# Patient Record
Sex: MALE | Race: WHITE | NOT HISPANIC OR LATINO | Employment: OTHER | URBAN - METROPOLITAN AREA
[De-identification: names, ages, dates, MRNs, and addresses within clinical notes are randomized per-mention and may not be internally consistent; named-entity substitution may affect disease eponyms.]

---

## 2017-01-16 ENCOUNTER — APPOINTMENT (EMERGENCY)
Dept: RADIOLOGY | Facility: HOSPITAL | Age: 72
End: 2017-01-16
Payer: MEDICARE

## 2017-01-16 ENCOUNTER — HOSPITAL ENCOUNTER (EMERGENCY)
Facility: HOSPITAL | Age: 72
Discharge: HOME | End: 2017-01-16
Attending: EMERGENCY MEDICINE | Admitting: EMERGENCY MEDICINE
Payer: MEDICARE

## 2017-01-16 VITALS
TEMPERATURE: 98.6 F | RESPIRATION RATE: 18 BRPM | HEIGHT: 76 IN | HEART RATE: 75 BPM | SYSTOLIC BLOOD PRESSURE: 184 MMHG | OXYGEN SATURATION: 97 % | BODY MASS INDEX: 26.18 KG/M2 | DIASTOLIC BLOOD PRESSURE: 108 MMHG | WEIGHT: 215 LBS

## 2017-01-16 DIAGNOSIS — S67.20XA: ICD-10-CM

## 2017-01-16 DIAGNOSIS — S62.617B OPEN DISPLACED FRACTURE OF PROXIMAL PHALANX OF LEFT LITTLE FINGER, INITIAL ENCOUNTER: ICD-10-CM

## 2017-01-16 DIAGNOSIS — S62.327B: Primary | ICD-10-CM

## 2017-01-16 DIAGNOSIS — S62.608B: ICD-10-CM

## 2017-01-16 PROCEDURE — 73130 X-RAY EXAM OF HAND: CPT

## 2017-01-16 PROCEDURE — 96365 THER/PROPH/DIAG IV INF INIT: CPT

## 2017-01-16 PROCEDURE — 73120 X-RAY EXAM OF HAND: CPT

## 2017-01-16 PROCEDURE — 99284 EMERGENCY DEPT VISIT MOD MDM: CPT

## 2017-01-16 RX ORDER — CEPHALEXIN 500 MG/1
500 CAPSULE ORAL 4 TIMES DAILY
Qty: 40 CAPSULE | Refills: 0 | Status: SHIPPED | OUTPATIENT
Start: 2017-01-16 | End: 2017-01-26

## 2017-01-16 RX ORDER — OXYCODONE HYDROCHLORIDE AND ACETAMINOPHEN 5; 325 MG/1; MG/1
1 TABLET ORAL EVERY 6 HOURS PRN
Qty: 12 TABLET | Refills: 0 | Status: SHIPPED | OUTPATIENT
Start: 2017-01-16 | End: 2017-01-19

## 2017-01-16 RX ORDER — BUPIVACAINE HYDROCHLORIDE 5 MG/ML
30 INJECTION, SOLUTION EPIDURAL; INTRACAUDAL ONCE
Status: COMPLETED | OUTPATIENT
Start: 2017-01-16 | End: 2017-01-16

## 2017-01-16 RX ORDER — LIDOCAINE HYDROCHLORIDE 10 MG/ML
10 INJECTION, SOLUTION EPIDURAL; INFILTRATION; INTRACAUDAL; PERINEURAL ONCE
Status: DISCONTINUED | OUTPATIENT
Start: 2017-01-16 | End: 2017-01-16 | Stop reason: HOSPADM

## 2017-01-16 RX ORDER — NAPROXEN 500 MG/1
500 TABLET ORAL 2 TIMES DAILY WITH MEALS
Qty: 14 TABLET | Refills: 0 | Status: SHIPPED | OUTPATIENT
Start: 2017-01-16 | End: 2017-01-23

## 2017-01-16 RX ADMIN — CEFAZOLIN SODIUM 2000 MG: 2 SOLUTION INTRAVENOUS at 18:55

## 2017-01-16 RX ADMIN — BUPIVACAINE HYDROCHLORIDE 30 ML: 5 INJECTION, SOLUTION EPIDURAL; INTRACAUDAL at 19:05

## 2017-01-19 ENCOUNTER — APPOINTMENT (OUTPATIENT)
Dept: LAB | Facility: CLINIC | Age: 72
End: 2017-01-19
Payer: MEDICARE

## 2017-01-19 ENCOUNTER — TRANSCRIBE ORDERS (OUTPATIENT)
Dept: LAB | Facility: CLINIC | Age: 72
End: 2017-01-19

## 2017-01-19 ENCOUNTER — ALLSCRIPTS OFFICE VISIT (OUTPATIENT)
Dept: OTHER | Facility: OTHER | Age: 72
End: 2017-01-19

## 2017-01-19 DIAGNOSIS — Z01.812 PRE-OPERATIVE LABORATORY EXAMINATION: ICD-10-CM

## 2017-01-19 DIAGNOSIS — Z01.812 PRE-OPERATIVE LABORATORY EXAMINATION: Primary | ICD-10-CM

## 2017-01-19 DIAGNOSIS — Z01.818 OTHER SPECIFIED PRE-OPERATIVE EXAMINATION: ICD-10-CM

## 2017-01-19 LAB
ANION GAP SERPL CALCULATED.3IONS-SCNC: 6 MMOL/L (ref 4–13)
BASOPHILS # BLD AUTO: 0.02 THOUSANDS/ΜL (ref 0–0.1)
BASOPHILS NFR BLD AUTO: 0 % (ref 0–1)
BUN SERPL-MCNC: 23 MG/DL (ref 5–25)
CALCIUM SERPL-MCNC: 8.7 MG/DL (ref 8.3–10.1)
CHLORIDE SERPL-SCNC: 103 MMOL/L (ref 100–108)
CO2 SERPL-SCNC: 30 MMOL/L (ref 21–32)
CREAT SERPL-MCNC: 0.9 MG/DL (ref 0.6–1.3)
EOSINOPHIL # BLD AUTO: 0.12 THOUSAND/ΜL (ref 0–0.61)
EOSINOPHIL NFR BLD AUTO: 2 % (ref 0–6)
ERYTHROCYTE [DISTWIDTH] IN BLOOD BY AUTOMATED COUNT: 13.8 % (ref 11.6–15.1)
GFR SERPL CREATININE-BSD FRML MDRD: >60 ML/MIN/1.73SQ M
GLUCOSE SERPL-MCNC: 82 MG/DL (ref 65–140)
HCT VFR BLD AUTO: 44.9 % (ref 36.5–49.3)
HGB BLD-MCNC: 15 G/DL (ref 12–17)
LYMPHOCYTES # BLD AUTO: 1.13 THOUSANDS/ΜL (ref 0.6–4.47)
LYMPHOCYTES NFR BLD AUTO: 18 % (ref 14–44)
MCH RBC QN AUTO: 29.2 PG (ref 26.8–34.3)
MCHC RBC AUTO-ENTMCNC: 33.4 G/DL (ref 31.4–37.4)
MCV RBC AUTO: 87 FL (ref 82–98)
MONOCYTES # BLD AUTO: 0.58 THOUSAND/ΜL (ref 0.17–1.22)
MONOCYTES NFR BLD AUTO: 9 % (ref 4–12)
NEUTROPHILS # BLD AUTO: 4.39 THOUSANDS/ΜL (ref 1.85–7.62)
NEUTS SEG NFR BLD AUTO: 71 % (ref 43–75)
PLATELET # BLD AUTO: 239 THOUSANDS/UL (ref 149–390)
PMV BLD AUTO: 9.3 FL (ref 8.9–12.7)
POTASSIUM SERPL-SCNC: 4.3 MMOL/L (ref 3.5–5.3)
RBC # BLD AUTO: 5.14 MILLION/UL (ref 3.88–5.62)
SODIUM SERPL-SCNC: 139 MMOL/L (ref 136–145)
WBC # BLD AUTO: 6.24 THOUSAND/UL (ref 4.31–10.16)

## 2017-01-19 PROCEDURE — 80048 BASIC METABOLIC PNL TOTAL CA: CPT

## 2017-01-19 PROCEDURE — 85025 COMPLETE CBC W/AUTO DIFF WBC: CPT

## 2017-01-19 PROCEDURE — 36415 COLL VENOUS BLD VENIPUNCTURE: CPT

## 2017-01-19 PROCEDURE — 93005 ELECTROCARDIOGRAM TRACING: CPT

## 2017-01-20 LAB
ATRIAL RATE: 63 BPM
P AXIS: 60 DEGREES
PR INTERVAL: 152 MS
QRS AXIS: 17 DEGREES
QRSD INTERVAL: 102 MS
QT INTERVAL: 426 MS
QTC INTERVAL: 435 MS
T WAVE AXIS: 35 DEGREES
VENTRICULAR RATE: 63 BPM

## 2017-01-24 RX ORDER — OXYCODONE HYDROCHLORIDE AND ACETAMINOPHEN 5; 325 MG/1; MG/1
1 TABLET ORAL
COMMUNITY

## 2017-01-25 ENCOUNTER — APPOINTMENT (OUTPATIENT)
Dept: RADIOLOGY | Facility: HOSPITAL | Age: 72
End: 2017-01-25
Payer: MEDICARE

## 2017-01-25 ENCOUNTER — ANESTHESIA (OUTPATIENT)
Dept: PERIOP | Facility: HOSPITAL | Age: 72
End: 2017-01-25
Payer: MEDICARE

## 2017-01-25 ENCOUNTER — ANESTHESIA EVENT (OUTPATIENT)
Dept: PERIOP | Facility: HOSPITAL | Age: 72
End: 2017-01-25
Payer: MEDICARE

## 2017-01-25 ENCOUNTER — HOSPITAL ENCOUNTER (OUTPATIENT)
Facility: HOSPITAL | Age: 72
Setting detail: OUTPATIENT SURGERY
Discharge: HOME | End: 2017-01-25
Attending: ORTHOPAEDIC SURGERY | Admitting: ORTHOPAEDIC SURGERY
Payer: MEDICARE

## 2017-01-25 VITALS
HEIGHT: 76 IN | WEIGHT: 210 LBS | DIASTOLIC BLOOD PRESSURE: 87 MMHG | TEMPERATURE: 97.9 F | SYSTOLIC BLOOD PRESSURE: 159 MMHG | OXYGEN SATURATION: 92 % | HEART RATE: 88 BPM | RESPIRATION RATE: 16 BRPM | BODY MASS INDEX: 25.57 KG/M2

## 2017-01-25 DIAGNOSIS — S62.619A FRACTURE OF PROXIMAL PHALANX OF DIGIT OF LEFT HAND: ICD-10-CM

## 2017-01-25 PROCEDURE — C1769 GUIDE WIRE: HCPCS | Performed by: ORTHOPAEDIC SURGERY

## 2017-01-25 PROCEDURE — C1713 ANCHOR/SCREW BN/BN,TIS/BN: HCPCS | Performed by: ORTHOPAEDIC SURGERY

## 2017-01-25 PROCEDURE — 73120 X-RAY EXAM OF HAND: CPT

## 2017-01-25 DEVICE — 2.4MM HEADLESS COMPRESSION SCREW-LONG THREAD 30MM: Type: IMPLANTABLE DEVICE | Site: FINGER | Status: FUNCTIONAL

## 2017-01-25 RX ORDER — BUPIVACAINE HYDROCHLORIDE 2.5 MG/ML
INJECTION, SOLUTION INFILTRATION; PERINEURAL AS NEEDED
Status: DISCONTINUED | OUTPATIENT
Start: 2017-01-25 | End: 2017-01-25 | Stop reason: HOSPADM

## 2017-01-25 RX ORDER — AMOXICILLIN AND CLAVULANATE POTASSIUM 875; 125 MG/1; MG/1
1 TABLET, FILM COATED ORAL 2 TIMES DAILY
Qty: 28 TABLET | Refills: 0 | Status: SHIPPED | OUTPATIENT
Start: 2017-01-25 | End: 2017-02-08

## 2017-01-25 RX ORDER — PROPOFOL 10 MG/ML
INJECTION, EMULSION INTRAVENOUS AS NEEDED
Status: DISCONTINUED | OUTPATIENT
Start: 2017-01-25 | End: 2017-01-25 | Stop reason: SURG

## 2017-01-25 RX ORDER — PROPOFOL 10 MG/ML
INJECTION, EMULSION INTRAVENOUS CONTINUOUS PRN
Status: DISCONTINUED | OUTPATIENT
Start: 2017-01-25 | End: 2017-01-25

## 2017-01-25 RX ORDER — SODIUM CHLORIDE, SODIUM LACTATE, POTASSIUM CHLORIDE, CALCIUM CHLORIDE 600; 310; 30; 20 MG/100ML; MG/100ML; MG/100ML; MG/100ML
50 INJECTION, SOLUTION INTRAVENOUS CONTINUOUS
Status: DISCONTINUED | OUTPATIENT
Start: 2017-01-25 | End: 2017-01-25 | Stop reason: HOSPADM

## 2017-01-25 RX ORDER — LIDOCAINE HYDROCHLORIDE 10 MG/ML
INJECTION, SOLUTION INFILTRATION; PERINEURAL AS NEEDED
Status: DISCONTINUED | OUTPATIENT
Start: 2017-01-25 | End: 2017-01-25 | Stop reason: SURG

## 2017-01-25 RX ORDER — ONDANSETRON 2 MG/ML
INJECTION INTRAMUSCULAR; INTRAVENOUS AS NEEDED
Status: DISCONTINUED | OUTPATIENT
Start: 2017-01-25 | End: 2017-01-25 | Stop reason: SURG

## 2017-01-25 RX ORDER — OXYCODONE HYDROCHLORIDE AND ACETAMINOPHEN 5; 325 MG/1; MG/1
TABLET ORAL
Qty: 30 TABLET | Refills: 0 | Status: SHIPPED | OUTPATIENT
Start: 2017-01-25

## 2017-01-25 RX ORDER — ONDANSETRON 2 MG/ML
4 INJECTION INTRAMUSCULAR; INTRAVENOUS EVERY 6 HOURS PRN
Status: DISCONTINUED | OUTPATIENT
Start: 2017-01-25 | End: 2017-01-25 | Stop reason: HOSPADM

## 2017-01-25 RX ADMIN — LIDOCAINE HYDROCHLORIDE 50 MG: 10 INJECTION, SOLUTION INFILTRATION; PERINEURAL at 13:02

## 2017-01-25 RX ADMIN — PROPOFOL 200 MG: 10 INJECTION, EMULSION INTRAVENOUS at 13:02

## 2017-01-25 RX ADMIN — ONDANSETRON 4 MG: 2 INJECTION INTRAMUSCULAR; INTRAVENOUS at 13:39

## 2017-01-25 RX ADMIN — DEXAMETHASONE SODIUM PHOSPHATE 10 MG: 10 INJECTION INTRAMUSCULAR; INTRAVENOUS at 13:15

## 2017-01-25 RX ADMIN — SODIUM CHLORIDE, SODIUM LACTATE, POTASSIUM CHLORIDE, AND CALCIUM CHLORIDE: .6; .31; .03; .02 INJECTION, SOLUTION INTRAVENOUS at 12:48

## 2017-01-25 RX ADMIN — CEFAZOLIN SODIUM 2000 MG: 2 SOLUTION INTRAVENOUS at 13:10

## 2017-02-14 ENCOUNTER — ALLSCRIPTS OFFICE VISIT (OUTPATIENT)
Dept: OTHER | Facility: OTHER | Age: 72
End: 2017-02-14

## 2017-02-14 ENCOUNTER — HOSPITAL ENCOUNTER (OUTPATIENT)
Dept: RADIOLOGY | Facility: CLINIC | Age: 72
Discharge: HOME | End: 2017-02-14
Payer: MEDICARE

## 2017-02-14 ENCOUNTER — APPOINTMENT (OUTPATIENT)
Dept: OCCUPATIONAL THERAPY | Facility: CLINIC | Age: 72
End: 2017-02-14
Payer: MEDICARE

## 2017-02-14 DIAGNOSIS — S62.617D DISPLACED FRACTURE OF PROXIMAL PHALANX OF LEFT LITTLE FINGER WITH ROUTINE HEALING: ICD-10-CM

## 2017-02-14 DIAGNOSIS — S62.645D: ICD-10-CM

## 2017-02-14 PROCEDURE — 73130 X-RAY EXAM OF HAND: CPT

## 2017-02-14 PROCEDURE — G8991 OTHER PT/OT GOAL STATUS: HCPCS

## 2017-02-14 PROCEDURE — G8990 OTHER PT/OT CURRENT STATUS: HCPCS

## 2017-02-14 PROCEDURE — L3906 WHO W/O JOINTS CF: HCPCS

## 2017-02-24 ENCOUNTER — GENERIC CONVERSION - ENCOUNTER (OUTPATIENT)
Dept: OTHER | Facility: OTHER | Age: 72
End: 2017-02-24

## 2017-03-16 ENCOUNTER — ALLSCRIPTS OFFICE VISIT (OUTPATIENT)
Dept: OTHER | Facility: OTHER | Age: 72
End: 2017-03-16

## 2017-03-16 ENCOUNTER — HOSPITAL ENCOUNTER (OUTPATIENT)
Dept: RADIOLOGY | Facility: CLINIC | Age: 72
Discharge: HOME | End: 2017-03-16
Payer: MEDICARE

## 2017-03-16 DIAGNOSIS — S62.645D: ICD-10-CM

## 2017-03-16 DIAGNOSIS — S62.617D DISPLACED FRACTURE OF PROXIMAL PHALANX OF LEFT LITTLE FINGER WITH ROUTINE HEALING: ICD-10-CM

## 2017-03-16 PROCEDURE — 73130 X-RAY EXAM OF HAND: CPT

## 2017-03-20 ENCOUNTER — ALLSCRIPTS OFFICE VISIT (OUTPATIENT)
Dept: OTHER | Facility: OTHER | Age: 72
End: 2017-03-20

## 2017-03-30 ENCOUNTER — HOSPITAL ENCOUNTER (OUTPATIENT)
Dept: RADIOLOGY | Facility: CLINIC | Age: 72
Discharge: HOME | End: 2017-03-30
Payer: MEDICARE

## 2017-03-30 ENCOUNTER — ALLSCRIPTS OFFICE VISIT (OUTPATIENT)
Dept: OTHER | Facility: OTHER | Age: 72
End: 2017-03-30

## 2017-03-30 DIAGNOSIS — S62.645D: ICD-10-CM

## 2017-03-30 DIAGNOSIS — L03.012 CELLULITIS OF FINGER OF LEFT HAND: ICD-10-CM

## 2017-03-30 PROCEDURE — 73130 X-RAY EXAM OF HAND: CPT

## 2017-04-10 ENCOUNTER — ALLSCRIPTS OFFICE VISIT (OUTPATIENT)
Dept: OTHER | Facility: OTHER | Age: 72
End: 2017-04-10

## 2017-05-04 ENCOUNTER — HOSPITAL ENCOUNTER (OUTPATIENT)
Dept: RADIOLOGY | Facility: CLINIC | Age: 72
Discharge: HOME | End: 2017-05-04
Payer: MEDICARE

## 2017-05-04 ENCOUNTER — ALLSCRIPTS OFFICE VISIT (OUTPATIENT)
Dept: OTHER | Facility: OTHER | Age: 72
End: 2017-05-04

## 2017-05-04 DIAGNOSIS — L03.012 CELLULITIS OF FINGER OF LEFT HAND: ICD-10-CM

## 2017-05-04 DIAGNOSIS — L98.499 NON-PRESSURE CHRONIC ULCER OF SKIN OF OTHER SITES WITH UNSPECIFIED SEVERITY (HCC): ICD-10-CM

## 2017-05-04 DIAGNOSIS — S62.617D DISPLACED FRACTURE OF PROXIMAL PHALANX OF LEFT LITTLE FINGER WITH ROUTINE HEALING: ICD-10-CM

## 2017-05-04 PROCEDURE — 73130 X-RAY EXAM OF HAND: CPT

## 2017-06-09 ENCOUNTER — GENERIC CONVERSION - ENCOUNTER (OUTPATIENT)
Dept: OTHER | Facility: OTHER | Age: 72
End: 2017-06-09

## 2017-07-06 ENCOUNTER — ALLSCRIPTS OFFICE VISIT (OUTPATIENT)
Dept: OTHER | Facility: OTHER | Age: 72
End: 2017-07-06

## 2017-07-06 ENCOUNTER — APPOINTMENT (OUTPATIENT)
Dept: RADIOLOGY | Facility: CLINIC | Age: 72
End: 2017-07-06
Payer: MEDICARE

## 2017-07-06 DIAGNOSIS — S62.617D DISPLACED FRACTURE OF PROXIMAL PHALANX OF LEFT LITTLE FINGER WITH ROUTINE HEALING: ICD-10-CM

## 2017-07-06 DIAGNOSIS — S62.645D: ICD-10-CM

## 2017-07-06 PROCEDURE — 73130 X-RAY EXAM OF HAND: CPT

## 2017-07-25 ENCOUNTER — ALLSCRIPTS OFFICE VISIT (OUTPATIENT)
Dept: OTHER | Facility: OTHER | Age: 72
End: 2017-07-25

## 2017-08-07 ENCOUNTER — TRANSCRIBE ORDERS (OUTPATIENT)
Dept: ADMINISTRATIVE | Facility: HOSPITAL | Age: 72
End: 2017-08-07

## 2017-08-07 DIAGNOSIS — M54.12 CERVICAL RADICULOPATHY: Primary | ICD-10-CM

## 2017-08-07 DIAGNOSIS — G56.21 LESION OF RIGHT ULNAR NERVE: ICD-10-CM

## 2017-08-07 DIAGNOSIS — G56.01 CARPAL TUNNEL SYNDROME ON RIGHT: ICD-10-CM

## 2017-08-07 DIAGNOSIS — G56.02 CARPAL TUNNEL SYNDROME ON LEFT: ICD-10-CM

## 2017-08-07 DIAGNOSIS — G56.22 LESION OF LEFT ULNAR NERVE: ICD-10-CM

## 2017-08-21 ENCOUNTER — OFFICE VISIT (OUTPATIENT)
Dept: RADIOLOGY | Facility: CLINIC | Age: 72
End: 2017-08-21
Payer: MEDICARE

## 2017-08-21 DIAGNOSIS — M54.12 CERVICAL RADICULOPATHY: ICD-10-CM

## 2017-08-21 PROCEDURE — 95910 NRV CNDJ TEST 7-8 STUDIES: CPT

## 2017-08-21 PROCEDURE — 95886 MUSC TEST DONE W/N TEST COMP: CPT

## 2018-01-10 NOTE — CONSULTS
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  The forefinger fracture was nondisplaced and was therefore managed conservatively  The fifth and the fracture was opened and displaced and therefore had ORIF  Surgery was uneventful  However, unfortunately, as soon as patient went home, he started work on his farm again  He had a splint and his 2 fingers and up her gloves on his hands  However, he does admit that his hands and fingers do get dirty, despite gloves  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  Not unexpectedly, patient developed a scab ulcer on his left fifth finger and cellulitis  2 weeks ago, he was placed on Bactrim by his orthopedic surgeon  He had modest improvement  Last week, antibiotic was changed to Augmentin  Patient states that he has absolutely no improvement Augmentin  Apparently, his fracture is also nonhealing, not unexpectedly  Patient denies fever, chills or systemic symptoms  Patient also tells me that he took additional sulfa medication that he had on the shelf for his cattle  He is not sure of the dosage of this exactly  At present, patient has numbness of his left fifth finger  Minimal pain  No fever or chills  Review of Systems  Complete-Male:   Constitutional: No fever or chills, feels well, no tiredness, no recent weight gain or weight loss  Eyes: No complaints of eye pain, no red eyes, no discharge from eyes, no itchy eyes  ENT: no complaints of earache, no hearing loss, no nosebleeds, no nasal discharge, no sore throat, no hoarseness  Cardiovascular: No complaints of slow heart rate, no fast heart rate, no chest pain, no palpitations, no leg claudication, no lower extremity     Respiratory: No complaints of shortness of breath, no wheezing, no cough, no SOB on exertion, no orthopnea or PND  Gastrointestinal: No complaints of abdominal pain, no constipation, no nausea or vomiting, no diarrhea or bloody stools  Genitourinary: No complaints of dysuria, no incontinence, no hesitancy, no nocturia, no genital lesion, no testicular pain  Musculoskeletal: Left fifth finger swelling with numbness  Integumentary: Scab ulcer on the left fifth finger  Neurological: No compliants of headache, no confusion, no convulsions, no numbness or tingling, no dizziness or fainting, no limb weakness, no difficulty walking  Psychiatric: Is not suicidal, no sleep disturbances, no anxiety or depression, no change in personality, no emotional problems  Endocrine: No complaints of proptosis, no hot flashes, no muscle weakness, no erectile dysfunction, no deepening of the voice, no feelings of weakness  Hematologic/Lymphatic: No complaints of swollen glands, no swollen glands in the neck, does not bleed easily, no easy bruising  ROS Reviewed:   ROS reviewed  Active Problems    1  Cellulitis of finger of left hand (681 00) (L03 012)   2  Closed nondisplaced fracture of proximal phalanx of left ring finger with routine healing   (V54 19) (S62 225D)   3  COPD (chronic obstructive pulmonary disease) (496) (J44 9)   4  Open displaced fracture of proximal phalanx of left little finger with routine healing   (V54 19) (S62 617D)    Family History    1  Family history of    2  Family history of myocardial infarction (V17 3) (Z82 49)    3  Family history of Colon cancer   4  Family history of    5  Family history of type 2 diabetes mellitus (V18 0) (Z83 3)    Social History    · Former smoker (V15 82) (K27 495)    Current Meds   1  Amoxicillin-Pot Clavulanate 875-125 MG Oral Tablet; TAKE 1 TABLET EVERY 12 HOURS   WITH MEALS UNTIL GONE;   Therapy: [id number] to (Xiang Gonzalez)  Requested for: 90RFI4589; Last   Rx:2017 Ordered   2   Cephalexin 500 MG Oral Capsule Recorded   3  Naproxen 500 MG Oral Tablet; TAKE 1 TABLET BY MOUTH EVERY 12 HOURS AS   NEEDED; Therapy: 39MKE3652 to (Last Rx:16Mar2017)  Requested for: 30AYR0636 Ordered   4  Naproxen 500 MG Oral Tablet; TAKE 1 TABLET EVERY 12 HOURS AS NEEDED; Therapy: (Recorded:20Mar2017) to Recorded   5  Oxycodone-Acetaminophen 5-325 MG Oral Tablet Recorded   6  Sulfamethoxazole-Trimethoprim 800-160 MG Oral Tablet; TAKE 1 TABLET TWICE DAILY   UNTIL FINISHED; Therapy: 61ACL6323 to (Evaluate:17Mar2017)  Requested for: 19QMV8681; Last   Rx:10Mar2017 Ordered   7  Sulfamethoxazole-Trimethoprim 800-160 MG Oral Tablet; TAKE 2 TABLET Daily at   lunchtime (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); Therapy: (Recorded:20Mar2017) to Recorded    Allergies    1  No Known Drug Allergies    Vitals  Signs   Recorded: 20Mar2017 02:16PM   Temperature: 97 3 F  Heart Rate: 88  Respiration: 16  Systolic: 110  Diastolic: 90  Height: 6 ft 4 in  Weight: 216 lb 6 4 oz  BMI Calculated: 26 34  BSA Calculated: 2 29    Physical Exam    Constitutional   General appearance: No acute distress, well appearing and well nourished  Eyes   Conjunctiva and lids: No swelling, erythema, or discharge  Pupils and irises: Equal, round and reactive to light  Ears, Nose, Mouth, and Throat   External inspection of ears and nose: Normal     Oropharynx: Normal with no erythema, edema, exudate or lesions  Pulmonary   Respiratory effort: No increased work of breathing or signs of respiratory distress  Auscultation of lungs: Clear to auscultation, equal breath sounds bilaterally, no wheezes, no rales, no rhonci  Cardiovascular   Palpation of heart: Normal PMI, no thrills  Auscultation of heart: Normal rate and rhythm, normal S1 and S2, without murmurs  Examination of extremities for edema and/or varicosities: Normal     Abdomen   Abdomen: Non-tender, no masses  Liver and spleen: No hepatomegaly or splenomegaly      Lymphatic   Palpation of lymph nodes in neck: No lymphadenopathy  Musculoskeletal   Inspection/palpation of joints, bones, and muscles: Abnormal   Left fifth finger with ulcer with scab  Finger is edematous with diffuse erythema  No warmth  No tenderness  No fluctuance  No drainage  Skin   Skin and subcutaneous tissue: Normal without rashes or lesions  Additional Exam:  Patient appears unkempt with extremely dirty hands  Assessment    1  Cellulitis of finger of left hand (681 00) (L03 012)   2  Closed nondisplaced fracture of proximal phalanx of left ring finger with routine healing   (V54 19) (S62 645D)   3  Finger ulcer (707 8) (L93 499)    Plan    1  LevoFLOXacin 500 MG Oral Tablet (Levaquin); TAKE 1 TABLET DAILY UNTIL   FINISHED   2  Sulfamethoxazole-Trimethoprim 800-160 MG Oral Tablet; TAKE 1 TABLET TWICE   DAILY UNTIL FINISHED   3  Follow-up visit in 2 weeks Evaluation and Treatment  Follow-up  Status: Hold For -   Scheduling  Requested for: 20Mar2017    Discussion/Summary  Discussion Summary: This is a 56-year-old former male, referred by his orthopedic surgeon for left fifth finger cellulitis status post recent ORIF  Patient has not responded to Bactrim and Augmentin previously  Patient has evidence of gross contamination of his hands from his daily work on the farm  1  Left fifth finger cellulitis  This is most likely secondary to open ulcer of his left finger  This is exacerbated by patient's very dirty work environment  Furthermore, he resumed work in this dirty environment immediately after surgery, prior to complete well-healing  In addition to the usual skin pathogens, we need to consider environmental pathogens also  Given lack of response to Augmentin, I would discontinue it  Given partial response to Bactrim, I will restarted  I will add Levaquin for coverage for possible environmental GNR   I doubt that any antibiotic change will work, unless patient removes himself from his dirty work environment until the ulcer in his finger heals  He clearly tells me that he will not do it because he does not have a choice  There is no one else to take care of his farm  I instructed patient to stop taking sulfa reserved for his cows  2  Left fifth finger ulcer  This is scabbed over but is probably not heal  This is the likely nidus for cellulitis  As stated above, unless the ulcer heals, cellulitis were not resolved  Patient needs to remove himself from his dirty work environment until the ulcer heals, as in above  3  Left fifth finger open fracture, status post ORIF  There is evidence of non-healing on x-ray  There is no evidence of osteomyelitis of the present time  Once again, for reasons above, it is very doubtful that his fraction will heal, unless he rests his finger, until healing is achieved  Patient is at risk for finger loss  Interestingly, patient states that he prefers to have his finger amputated and to go through another surgery  I encourage him to discuss it with his orthopedic surgeon  Follow-up with me in 2 weeks  Future Appointments    Date/Time Provider Specialty Site   03/30/2017 09:10 AM JON Phipps  Orthopedic Surgery [facility] [phone]     Signatures   Electronically signed by :  JON Verma ; Mar 20 2017  2:44PM EST                       (Author)

## 2018-01-12 VITALS
DIASTOLIC BLOOD PRESSURE: 108 MMHG | SYSTOLIC BLOOD PRESSURE: 202 MMHG | HEIGHT: 76 IN | HEART RATE: 65 BPM | BODY MASS INDEX: 26.13 KG/M2 | WEIGHT: 214.63 LBS

## 2018-01-12 VITALS
HEIGHT: 76 IN | DIASTOLIC BLOOD PRESSURE: 90 MMHG | BODY MASS INDEX: 26.35 KG/M2 | SYSTOLIC BLOOD PRESSURE: 170 MMHG | HEART RATE: 88 BPM | TEMPERATURE: 97.3 F | WEIGHT: 216.4 LBS | RESPIRATION RATE: 16 BRPM

## 2018-01-13 VITALS
HEIGHT: 76 IN | HEART RATE: 70 BPM | BODY MASS INDEX: 25.57 KG/M2 | WEIGHT: 210 LBS | DIASTOLIC BLOOD PRESSURE: 88 MMHG | SYSTOLIC BLOOD PRESSURE: 162 MMHG

## 2018-01-13 VITALS
SYSTOLIC BLOOD PRESSURE: 185 MMHG | HEART RATE: 73 BPM | HEIGHT: 76 IN | WEIGHT: 212.63 LBS | BODY MASS INDEX: 25.89 KG/M2 | DIASTOLIC BLOOD PRESSURE: 100 MMHG

## 2018-01-13 VITALS
HEART RATE: 66 BPM | BODY MASS INDEX: 25.57 KG/M2 | HEIGHT: 76 IN | SYSTOLIC BLOOD PRESSURE: 198 MMHG | DIASTOLIC BLOOD PRESSURE: 101 MMHG | WEIGHT: 210 LBS

## 2018-01-13 VITALS
DIASTOLIC BLOOD PRESSURE: 113 MMHG | HEART RATE: 83 BPM | SYSTOLIC BLOOD PRESSURE: 180 MMHG | HEIGHT: 76 IN | WEIGHT: 216.38 LBS | BODY MASS INDEX: 26.35 KG/M2

## 2018-01-14 VITALS
DIASTOLIC BLOOD PRESSURE: 85 MMHG | HEIGHT: 76 IN | SYSTOLIC BLOOD PRESSURE: 153 MMHG | HEART RATE: 80 BPM | BODY MASS INDEX: 26.58 KG/M2 | WEIGHT: 218.25 LBS

## 2018-01-14 VITALS
WEIGHT: 214 LBS | HEIGHT: 76 IN | HEART RATE: 87 BPM | SYSTOLIC BLOOD PRESSURE: 171 MMHG | DIASTOLIC BLOOD PRESSURE: 93 MMHG | BODY MASS INDEX: 26.06 KG/M2

## 2018-01-18 NOTE — PROGRESS NOTES
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  Review of Systems  Complete-Male:   Constitutional: No fever or chills, feels well, no tiredness, no recent weight gain or weight loss  Eyes: No complaints of eye pain, no red eyes, no discharge from eyes, no itchy eyes  ENT: no complaints of earache, no hearing loss, no nosebleeds, no nasal discharge, no sore throat, no hoarseness  Cardiovascular: No complaints of slow heart rate, no fast heart rate, no chest pain, no palpitations, no leg claudication, no lower extremity  Respiratory: No complaints of shortness of breath, no wheezing, no cough, no SOB on exertion, no orthopnea or PND  Gastrointestinal: No complaints of abdominal pain, no constipation, no nausea or vomiting, no diarrhea or bloody stools  Genitourinary: No complaints of dysuria, no incontinence, no hesitancy, no nocturia, no genital lesion, no testicular pain  Musculoskeletal: Left fifth finger pain improved  , but as noted in HPI  Integumentary: No complaints of skin rash or skin lesions, no itching, no skin wound, no dry skin  Neurological: No compliants of headache, no confusion, no convulsions, no numbness or tingling, no dizziness or fainting, no limb weakness, no difficulty walking  Psychiatric: Is not suicidal, no sleep disturbances, no anxiety or depression, no change in personality, no emotional problems  Endocrine: No complaints of proptosis, no hot flashes, no muscle weakness, no erectile dysfunction, no deepening of the voice, no feelings of weakness     Hematologic/Lymphatic: No complaints of swollen glands, no swollen glands in the neck, does not bleed easily, no easy bruising  ROS Reviewed:   ROS reviewed  Active Problems    1  Cellulitis of finger of left hand (681 00) (L03 012)   2  Closed nondisplaced fracture of proximal phalanx of left ring finger with routine healing   (V54 19) (S62 645D)   3  COPD (chronic obstructive pulmonary disease) (496) (J44 9)   4  Finger ulcer (707 8) (L98 499)   5  Open displaced fracture of proximal phalanx of left little finger with routine healing   (V54 19) (S62 617D)    Family History    1  Family history of    2  Family history of myocardial infarction (V17 3) (Z82 49)    3  Family history of Colon cancer   4  Family history of    5  Family history of type 2 diabetes mellitus (V18 0) (Z83 3)    Social History    · Former smoker (V15 82) (B79 951)    Current Meds   1  LevoFLOXacin 500 MG Oral Tablet; TAKE 1 TABLET DAILY UNTIL FINISHED; Therapy: 54AIH2892 to (Evaluate:2017); Last Rx:2017 Ordered   2  Naproxen 500 MG Oral Tablet; TAKE 1 TABLET EVERY 12 HOURS AS NEEDED; Therapy: (Recorded:2017) to Recorded   3  Oxycodone-Acetaminophen 5-325 MG Oral Tablet Recorded   4  Sulfamethoxazole-Trimethoprim 800-160 MG Oral Tablet; TAKE 1 TABLET TWICE DAILY   UNTIL FINISHED; Therapy: 84OOG5821 to (Evaluate:2017)  Requested for: 37DHP6268; Last   Rx:2017 Ordered   5  Sulfamethoxazole-Trimethoprim 800-160 MG Oral Tablet; TAKE 1 TABLET TWICE DAILY   UNTIL FINISHED; Therapy: 15MII1585 to (Evaluate:2017); Last Rx:2017 Ordered   6  Sulfamethoxazole-Trimethoprim 800-160 MG Oral Tablet; TAKE 2 TABLET Daily at   lunchtime (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); Therapy: (Recorded:2017) to Recorded    Allergies    1   No Known Drug Allergies    Vitals  Signs   Recorded: 2017 02:06PM   Temperature: 98 F  Heart Rate: 98  Respiration: 18  Systolic: 991  Diastolic: 88  Height: 6 ft 4 in  Weight: 214 lb 9 6 oz  BMI Calculated: 26 12  BSA Calculated: 2 28    Physical Exam    Constitutional   General appearance: No acute distress, well appearing and well nourished  Eyes   Conjunctiva and lids: No swelling, erythema, or discharge  Ears, Nose, Mouth, and Throat   External inspection of ears and nose: Normal     Oropharynx: Normal with no erythema, edema, exudate or lesions  Pulmonary   Respiratory effort: No increased work of breathing or signs of respiratory distress  Auscultation of lungs: Clear to auscultation, equal breath sounds bilaterally, no wheezes, no rales, no rhonci  Cardiovascular   Palpation of heart: Normal PMI, no thrills  Auscultation of heart: Normal rate and rhythm, normal S1 and S2, without murmurs  Examination of extremities for edema and/or varicosities: Normal     Abdomen   Abdomen: Non-tender, no masses  Liver and spleen: No hepatomegaly or splenomegaly  Lymphatic   Palpation of lymph nodes in neck: No lymphadenopathy  Musculoskeletal   Inspection/palpation of joints, bones, and muscles: Abnormal   Left fifth finger with decreased edema/erythema/tenderness  Also healed with scab  Skin   Skin and subcutaneous tissue: Normal without rashes or lesions  Psychiatric   Orientation to person, place and time: Normal          Assessment    1  Cellulitis of finger of left hand (681 00) (L03 012)   2  Closed nondisplaced fracture of proximal phalanx of left ring finger with routine healing   (V54 19) (S62 945D)   3  Finger ulcer (707 8) (L91 619)    Plan    1  Sulfamethoxazole-Trimethoprim 800-160 MG Oral Tablet   2  Sulfamethoxazole-Trimethoprim 800-160 MG Oral Tablet   3  LevoFLOXacin 500 MG Oral Tablet; TAKE 1 TABLET DAILY UNTIL FINISHED    4  Sulfamethoxazole-Trimethoprim 800-160 MG Oral Tablet; TAKE 1 TABLET TWICE   DAILY WITH FOOD    Discussion/Summary  Discussion Summary:   1  Left fifth finger cellulitis  This is resolving  Patient is systemically well  I will have him stay on Levaquin/Bactrim for one more week   Cellulitis we'll continue to improve only slowly as long as the patient does not rest his finger  High risk of relapse, given constant exposure to dirty environment with open ulcer  Patient is at risk for loss of finger  He is aware of this  2  Left fifth finger ulcer  This appears to be healing  3  Left fifth finger fracture, status post ORIF  Follow-up with trevor rodriguez  Follow-up with orthopedic surgeon as scheduled  Future Appointments    Date/Time Provider Specialty Site   05/04/2017 08:50 AM JON Sargent  Orthopedic Surgery [facility] [address]     Signatures   Electronically signed by :  JON Escalera ; Apr 10 2017  2:19PM EST                       (Author)

## 2018-01-22 VITALS
TEMPERATURE: 98 F | HEIGHT: 76 IN | BODY MASS INDEX: 26.13 KG/M2 | RESPIRATION RATE: 18 BRPM | HEART RATE: 98 BPM | DIASTOLIC BLOOD PRESSURE: 88 MMHG | WEIGHT: 214.6 LBS | SYSTOLIC BLOOD PRESSURE: 146 MMHG

## 2021-06-19 ENCOUNTER — OFFICE VISIT (OUTPATIENT)
Dept: URGENT CARE | Facility: CLINIC | Age: 76
End: 2021-06-19
Payer: COMMERCIAL

## 2021-06-19 VITALS
WEIGHT: 185 LBS | RESPIRATION RATE: 16 BRPM | HEIGHT: 76 IN | DIASTOLIC BLOOD PRESSURE: 86 MMHG | OXYGEN SATURATION: 97 % | SYSTOLIC BLOOD PRESSURE: 130 MMHG | TEMPERATURE: 97.6 F | BODY MASS INDEX: 22.53 KG/M2 | HEART RATE: 82 BPM

## 2021-06-19 DIAGNOSIS — A69.20 ERYTHEMA MIGRANS (LYME DISEASE): Primary | ICD-10-CM

## 2021-06-19 PROCEDURE — G0382 LEV 3 HOSP TYPE B ED VISIT: HCPCS | Performed by: PHYSICIAN ASSISTANT

## 2021-06-19 RX ORDER — OLMESARTAN MEDOXOMIL 40 MG/1
40 TABLET ORAL DAILY
COMMUNITY
Start: 2021-05-14

## 2021-06-19 RX ORDER — DOXYCYCLINE 100 MG/1
100 TABLET ORAL 2 TIMES DAILY
Qty: 20 TABLET | Refills: 0 | Status: SHIPPED | OUTPATIENT
Start: 2021-06-19 | End: 2021-06-29

## 2021-06-19 RX ORDER — BUDESONIDE AND FORMOTEROL FUMARATE DIHYDRATE 80; 4.5 UG/1; UG/1
2 AEROSOL RESPIRATORY (INHALATION) 2 TIMES DAILY
COMMUNITY

## 2021-06-19 RX ORDER — FOLIC ACID 1 MG/1
TABLET ORAL
COMMUNITY
Start: 2021-04-12

## 2021-06-19 RX ORDER — TIOTROPIUM BROMIDE INHALATION SPRAY 1.56 UG/1
SPRAY, METERED RESPIRATORY (INHALATION)
COMMUNITY

## 2021-06-19 RX ORDER — ALBUTEROL SULFATE 90 UG/1
AEROSOL, METERED RESPIRATORY (INHALATION)
COMMUNITY
Start: 2021-05-24

## 2021-06-19 RX ORDER — LOSARTAN POTASSIUM 100 MG/1
100 TABLET ORAL DAILY
COMMUNITY

## 2021-06-19 NOTE — PROGRESS NOTES
[employer] [facility]        NAME: Eloy Jackson is a 70 y o  male  : 1945    MRN: [id number]  DATE: 2021  TIME: 12:11 PM    Assessment and Plan   Erythema migrans (Lyme disease) [A69 20]  1  Erythema migrans (Lyme disease)  doxycycline (ADOXA) 100 MG tablet         Patient Instructions   Patient Instructions   Start the doxy tomorrow   Follow up with PCP         Follow up with PCP in 3-5 days  Proceed to  ER if symptoms worsen  Chief Complaint     Chief Complaint   Patient presents with    Tick Removal     Pt states he had 4 tick bites this week  1 to top of R foot, 1 on L calf, 1 on L upper inner thigh, and 1 on R lower buttocks which pt states had a bullseye rash appear to it yesterday  History of Present Illness       The pt is a 79-year-old male presenting with erythema migraines to the right buttock  He reports finding 4 ticks this week and removing them all  Review of Systems   Review of Systems   Constitutional: Negative for activity change, appetite change, chills, diaphoresis and fever  HENT: Negative for congestion, rhinorrhea and sore throat  Respiratory: Negative for cough, chest tightness and shortness of breath  Cardiovascular: Negative for chest pain and palpitations  Gastrointestinal: Negative for abdominal pain, diarrhea, nausea and vomiting  Musculoskeletal: Negative for arthralgias and myalgias  Skin: Positive for rash  Negative for color change and pallor  Neurological: Negative for dizziness, weakness, numbness and headaches           Current Medications       Current Outpatient Medications:     albuterol (PROVENTIL HFA,VENTOLIN HFA) 90 mcg/act inhaler, INHALE 2 PUFFS BY MOUTH EVERY 4 HOURS AS NEEDED FOR BREATHING, Disp: , Rfl:     budesonide-formoterol (Symbicort) 80-4 5 MCG/ACT inhaler, Inhale 2 puffs 2 (two) times a day, Disp: , Rfl:     Cholecalciferol 50 MCG (2000) TABS, TAKE ONE TABLET BY MOUTH DAILY (FOR LOW VITAMIN D), Disp: , Rfl:     folic acid (FOLVITE) 1 mg tablet, TAKE ONE TABLET BY MOUTH EVERY DAY *FOLIC ACID SUPPLEMENT*, Disp: , Rfl:     methotrexate 2 5 mg tablet, Take by mouth, Disp: , Rfl:     olmesartan (BENICAR) 40 mg tablet, Take 40 mg by mouth daily, Disp: , Rfl:     tiotropium (Spiriva Respimat) 1 25 MCG/ACT AERS inhaler, Inhale, Disp: , Rfl:     Tiotropium Bromide-Olodaterol (STIOLTO RESPIMAT) 2 5-2 5 MCG/ACT AERS, Inhale 2 puffs every morning, Disp: , Rfl:     tiotropium-olodaterol (STIOLTO RESPIMAT) 2 5-2 5 MCG/ACT inhaler, INHALE 2 PUFFS BY MOUTH DAILY, Disp: , Rfl:     doxycycline (ADOXA) 100 MG tablet, Take 1 tablet (100 mg total) by mouth 2 (two) times a day for 10 days, Disp: 20 tablet, Rfl: 0    losartan (COZAAR) 100 MG tablet, Take 100 mg by mouth daily, Disp: , Rfl:     naproxen (NAPROSYN) 500 mg tablet, Take 1 tablet by mouth 2 (two) times a day with meals for 7 days, Disp: 14 tablet, Rfl: 0    oxyCODONE-acetaminophen (PERCOCET) 5-325 mg per tablet, Take 1 tablet by mouth daily at bedtime as needed for moderate pain, Disp: , Rfl:     oxyCODONE-acetaminophen (PERCOCET) 5-325 mg per tablet, 1-2 tabs PO Q 4-5 hours prn pain, Disp: 30 tablet, Rfl: 0    Current Allergies     Allergies as of 06/19/2021    (No Known Allergies)            The following portions of the patient's history were reviewed and updated as appropriate: allergies, current medications, past family history, past medical history, past social history, past surgical history and problem list      Past Medical History:   Diagnosis Date    COPD (chronic obstructive pulmonary disease) ([facility] [address] )     Crushing injury of finger, left     Infectious viral hepatitis        Past Surgical History:   Procedure Laterality Date    SC OPEN TX PHALANGEAL SHAFT FRACTURE PROX/MIDDLE EA Left 1/25/2017    Procedure: ORIF LEFT SMALL FINGER FRACTURE;  Surgeon: Jeni Buck MD;  Location: [facility];  Service: Orthopedics       History reviewed   No pertinent family history  Medications have been verified  Objective   /86   Pulse 82   Temp 97 6 °F (36 4 °C) (Temporal)   Resp 16   Ht 6' 4" (1 93 m)   Wt 83 9 kg (185 lb)   SpO2 97%   BMI 22 52 kg/m²        Physical Exam     Physical Exam  Vitals reviewed  Constitutional:       General: He is not in acute distress  Appearance: Normal appearance  He is normal weight  He is not ill-appearing, toxic-appearing or diaphoretic  HENT:      Head: Normocephalic and atraumatic  Cardiovascular:      Rate and Rhythm: Normal rate and regular rhythm  Heart sounds: Normal heart sounds  No murmur heard  No friction rub  No gallop  Pulmonary:      Effort: Pulmonary effort is normal  No respiratory distress  Breath sounds: Normal breath sounds  No stridor  No wheezing, rhonchi or rales  Chest:      Chest wall: No tenderness  Abdominal:      General: Abdomen is flat  Bowel sounds are normal  There is no distension  Palpations: Abdomen is soft  Tenderness: There is no abdominal tenderness  There is no guarding  Musculoskeletal:      Cervical back: Normal range of motion  Lymphadenopathy:      Cervical: No cervical adenopathy  Skin:     General: Skin is warm and dry  Capillary Refill: Capillary refill takes less than 2 seconds  Findings: Rash present  Comments: EM to right buttock   Neurological:      Mental Status: He is alert

## 2023-12-04 ENCOUNTER — APPOINTMENT (EMERGENCY)
Dept: CT IMAGING | Facility: HOSPITAL | Age: 78
DRG: 190 | End: 2023-12-04
Payer: COMMERCIAL

## 2023-12-04 ENCOUNTER — HOSPITAL ENCOUNTER (INPATIENT)
Facility: HOSPITAL | Age: 78
LOS: 12 days | Discharge: HOME | DRG: 190 | End: 2023-12-17
Attending: STUDENT IN AN ORGANIZED HEALTH CARE EDUCATION/TRAINING PROGRAM | Admitting: INTERNAL MEDICINE
Payer: COMMERCIAL

## 2023-12-04 ENCOUNTER — APPOINTMENT (EMERGENCY)
Dept: RADIOLOGY | Facility: HOSPITAL | Age: 78
DRG: 190 | End: 2023-12-04
Payer: COMMERCIAL

## 2023-12-04 DIAGNOSIS — Z86.73 HISTORY OF STROKE: ICD-10-CM

## 2023-12-04 DIAGNOSIS — S61.209A AVULSION OF FINGER, INITIAL ENCOUNTER: ICD-10-CM

## 2023-12-04 DIAGNOSIS — R06.00 DYSPNEA: ICD-10-CM

## 2023-12-04 DIAGNOSIS — J44.1 CHRONIC OBSTRUCTIVE PULMONARY DISEASE WITH ACUTE EXACERBATION (HCC): ICD-10-CM

## 2023-12-04 DIAGNOSIS — M70.71 BURSITIS OF RIGHT HIP: ICD-10-CM

## 2023-12-04 DIAGNOSIS — R06.09 DYSPNEA ON EXERTION: ICD-10-CM

## 2023-12-04 DIAGNOSIS — J44.1 COPD EXACERBATION (HCC): Primary | ICD-10-CM

## 2023-12-04 DIAGNOSIS — J18.9 PNEUMONIA: ICD-10-CM

## 2023-12-04 DIAGNOSIS — R10.32 LLQ ABDOMINAL PAIN: ICD-10-CM

## 2023-12-04 LAB
2HR DELTA HS TROPONIN: 0 NG/L
ALBUMIN SERPL BCP-MCNC: 3.9 G/DL (ref 3.5–5)
ALP SERPL-CCNC: 61 U/L (ref 34–104)
ALT SERPL W P-5'-P-CCNC: 31 U/L (ref 7–52)
ANION GAP SERPL CALCULATED.3IONS-SCNC: 7 MMOL/L
AST SERPL W P-5'-P-CCNC: 34 U/L (ref 13–39)
ATRIAL RATE: 97 BPM
BACTERIA UR QL AUTO: ABNORMAL /HPF
BASOPHILS # BLD AUTO: 0 THOUSANDS/ÂΜL (ref 0–0.1)
BASOPHILS NFR BLD AUTO: 0 % (ref 0–1)
BILIRUB SERPL-MCNC: 0.61 MG/DL (ref 0.2–1)
BILIRUB UR QL STRIP: NEGATIVE
BNP SERPL-MCNC: 20 PG/ML (ref 0–100)
BUN SERPL-MCNC: 30 MG/DL (ref 5–25)
CALCIUM SERPL-MCNC: 8.7 MG/DL (ref 8.4–10.2)
CARDIAC TROPONIN I PNL SERPL HS: 10 NG/L
CARDIAC TROPONIN I PNL SERPL HS: 10 NG/L
CHLORIDE SERPL-SCNC: 103 MMOL/L (ref 96–108)
CLARITY UR: CLEAR
CO2 SERPL-SCNC: 23 MMOL/L (ref 21–32)
COLOR UR: YELLOW
CREAT SERPL-MCNC: 1.27 MG/DL (ref 0.6–1.3)
EOSINOPHIL # BLD AUTO: 0 THOUSAND/ÂΜL (ref 0–0.61)
EOSINOPHIL NFR BLD AUTO: 0 % (ref 0–6)
ERYTHROCYTE [DISTWIDTH] IN BLOOD BY AUTOMATED COUNT: 14.6 % (ref 11.6–15.1)
GFR SERPL CREATININE-BSD FRML MDRD: 54 ML/MIN/1.73SQ M
GLUCOSE SERPL-MCNC: 104 MG/DL (ref 65–140)
GLUCOSE UR STRIP-MCNC: NEGATIVE MG/DL
HCT VFR BLD AUTO: 37.1 % (ref 36.5–49.3)
HGB BLD-MCNC: 12.4 G/DL (ref 12–17)
HGB UR QL STRIP.AUTO: ABNORMAL
IMM GRANULOCYTES # BLD AUTO: 0 THOUSAND/UL (ref 0–0.2)
IMM GRANULOCYTES NFR BLD AUTO: 0 % (ref 0–2)
KETONES UR STRIP-MCNC: NEGATIVE MG/DL
LEUKOCYTE ESTERASE UR QL STRIP: NEGATIVE
LIPASE SERPL-CCNC: 32 U/L (ref 11–82)
LYMPHOCYTES # BLD AUTO: 0.32 THOUSANDS/ÂΜL (ref 0.6–4.47)
LYMPHOCYTES NFR BLD AUTO: 11 % (ref 14–44)
MCH RBC QN AUTO: 31.3 PG (ref 26.8–34.3)
MCHC RBC AUTO-ENTMCNC: 33.4 G/DL (ref 31.4–37.4)
MCV RBC AUTO: 94 FL (ref 82–98)
MONOCYTES # BLD AUTO: 0.2 THOUSAND/ÂΜL (ref 0.17–1.22)
MONOCYTES NFR BLD AUTO: 7 % (ref 4–12)
MUCOUS THREADS UR QL AUTO: ABNORMAL
NEUTROPHILS # BLD AUTO: 2.32 THOUSANDS/ÂΜL (ref 1.85–7.62)
NEUTS SEG NFR BLD AUTO: 82 % (ref 43–75)
NITRITE UR QL STRIP: NEGATIVE
NON-SQ EPI CELLS URNS QL MICRO: ABNORMAL /HPF
NRBC BLD AUTO-RTO: 0 /100 WBCS
P AXIS: 79 DEGREES
PH UR STRIP.AUTO: 5.5 [PH]
PLATELET # BLD AUTO: 111 THOUSANDS/UL (ref 149–390)
PMV BLD AUTO: 8.7 FL (ref 8.9–12.7)
POTASSIUM SERPL-SCNC: 4.4 MMOL/L (ref 3.5–5.3)
PR INTERVAL: 138 MS
PROT SERPL-MCNC: 6.9 G/DL (ref 6.4–8.4)
PROT UR STRIP-MCNC: ABNORMAL MG/DL
QRS AXIS: 78 DEGREES
QRSD INTERVAL: 88 MS
QT INTERVAL: 354 MS
QTC INTERVAL: 449 MS
RBC # BLD AUTO: 3.96 MILLION/UL (ref 3.88–5.62)
RBC #/AREA URNS AUTO: ABNORMAL /HPF
SODIUM SERPL-SCNC: 133 MMOL/L (ref 135–147)
SP GR UR STRIP.AUTO: 1.02 (ref 1–1.03)
T WAVE AXIS: 74 DEGREES
UROBILINOGEN UR STRIP-ACNC: <2 MG/DL
VENTRICULAR RATE: 97 BPM
WBC # BLD AUTO: 2.84 THOUSAND/UL (ref 4.31–10.16)
WBC #/AREA URNS AUTO: ABNORMAL /HPF

## 2023-12-04 PROCEDURE — 99285 EMERGENCY DEPT VISIT HI MDM: CPT

## 2023-12-04 PROCEDURE — 71045 X-RAY EXAM CHEST 1 VIEW: CPT

## 2023-12-04 PROCEDURE — 80053 COMPREHEN METABOLIC PANEL: CPT | Performed by: STUDENT IN AN ORGANIZED HEALTH CARE EDUCATION/TRAINING PROGRAM

## 2023-12-04 PROCEDURE — 96374 THER/PROPH/DIAG INJ IV PUSH: CPT

## 2023-12-04 PROCEDURE — 93005 ELECTROCARDIOGRAM TRACING: CPT

## 2023-12-04 PROCEDURE — 74177 CT ABD & PELVIS W/CONTRAST: CPT

## 2023-12-04 PROCEDURE — 99285 EMERGENCY DEPT VISIT HI MDM: CPT | Performed by: STUDENT IN AN ORGANIZED HEALTH CARE EDUCATION/TRAINING PROGRAM

## 2023-12-04 PROCEDURE — 85025 COMPLETE CBC W/AUTO DIFF WBC: CPT | Performed by: STUDENT IN AN ORGANIZED HEALTH CARE EDUCATION/TRAINING PROGRAM

## 2023-12-04 PROCEDURE — 84484 ASSAY OF TROPONIN QUANT: CPT | Performed by: STUDENT IN AN ORGANIZED HEALTH CARE EDUCATION/TRAINING PROGRAM

## 2023-12-04 PROCEDURE — 36415 COLL VENOUS BLD VENIPUNCTURE: CPT

## 2023-12-04 PROCEDURE — 83880 ASSAY OF NATRIURETIC PEPTIDE: CPT | Performed by: STUDENT IN AN ORGANIZED HEALTH CARE EDUCATION/TRAINING PROGRAM

## 2023-12-04 PROCEDURE — 81001 URINALYSIS AUTO W/SCOPE: CPT | Performed by: STUDENT IN AN ORGANIZED HEALTH CARE EDUCATION/TRAINING PROGRAM

## 2023-12-04 PROCEDURE — G1004 CDSM NDSC: HCPCS

## 2023-12-04 PROCEDURE — 94640 AIRWAY INHALATION TREATMENT: CPT

## 2023-12-04 PROCEDURE — 83690 ASSAY OF LIPASE: CPT | Performed by: STUDENT IN AN ORGANIZED HEALTH CARE EDUCATION/TRAINING PROGRAM

## 2023-12-04 PROCEDURE — 71275 CT ANGIOGRAPHY CHEST: CPT

## 2023-12-04 PROCEDURE — 84145 PROCALCITONIN (PCT): CPT | Performed by: NURSE PRACTITIONER

## 2023-12-04 RX ORDER — METHYLPREDNISOLONE SODIUM SUCCINATE 125 MG/2ML
125 INJECTION, POWDER, LYOPHILIZED, FOR SOLUTION INTRAMUSCULAR; INTRAVENOUS ONCE
Status: COMPLETED | OUTPATIENT
Start: 2023-12-04 | End: 2023-12-04

## 2023-12-04 RX ORDER — IPRATROPIUM BROMIDE AND ALBUTEROL SULFATE 2.5; .5 MG/3ML; MG/3ML
3 SOLUTION RESPIRATORY (INHALATION) ONCE
Status: COMPLETED | OUTPATIENT
Start: 2023-12-04 | End: 2023-12-04

## 2023-12-04 RX ADMIN — IOHEXOL 100 ML: 350 INJECTION, SOLUTION INTRAVENOUS at 22:43

## 2023-12-04 RX ADMIN — METHYLPREDNISOLONE SODIUM SUCCINATE 125 MG: 125 INJECTION, POWDER, FOR SOLUTION INTRAMUSCULAR; INTRAVENOUS at 21:09

## 2023-12-04 RX ADMIN — IPRATROPIUM BROMIDE AND ALBUTEROL SULFATE 3 ML: 2.5; .5 SOLUTION RESPIRATORY (INHALATION) at 21:07

## 2023-12-05 ENCOUNTER — APPOINTMENT (INPATIENT)
Dept: RADIOLOGY | Facility: HOSPITAL | Age: 78
DRG: 190 | End: 2023-12-05
Payer: COMMERCIAL

## 2023-12-05 PROBLEM — M54.9 CHRONIC BACK PAIN: Status: ACTIVE | Noted: 2023-12-05

## 2023-12-05 PROBLEM — G89.29 CHRONIC BACK PAIN: Status: ACTIVE | Noted: 2023-12-05

## 2023-12-05 PROBLEM — I25.10 CAD (CORONARY ARTERY DISEASE): Status: ACTIVE | Noted: 2023-12-05

## 2023-12-05 PROBLEM — I77.89 ECTASIA OF ARTERY (HCC): Status: ACTIVE | Noted: 2023-12-05

## 2023-12-05 PROBLEM — G47.33 OSA (OBSTRUCTIVE SLEEP APNEA): Status: ACTIVE | Noted: 2023-12-05

## 2023-12-05 PROBLEM — I10 HYPERTENSION: Status: ACTIVE | Noted: 2023-12-05

## 2023-12-05 PROBLEM — Z86.73 HISTORY OF STROKE: Status: ACTIVE | Noted: 2023-12-05

## 2023-12-05 PROBLEM — R10.9 ABDOMINAL PAIN: Status: ACTIVE | Noted: 2023-12-05

## 2023-12-05 PROBLEM — N40.0 ENLARGED PROSTATE: Status: ACTIVE | Noted: 2023-12-05

## 2023-12-05 PROBLEM — J44.1 CHRONIC OBSTRUCTIVE PULMONARY DISEASE WITH ACUTE EXACERBATION (HCC): Status: ACTIVE | Noted: 2023-12-05

## 2023-12-05 PROBLEM — U07.1 COVID-19: Status: ACTIVE | Noted: 2023-12-05

## 2023-12-05 PROBLEM — S61.209A OPEN WOUND OF FINGER OF RIGHT HAND: Status: ACTIVE | Noted: 2023-12-05

## 2023-12-05 LAB
4HR DELTA HS TROPONIN: -1 NG/L
CARDIAC TROPONIN I PNL SERPL HS: 9 NG/L
PROCALCITONIN SERPL-MCNC: 0.09 NG/ML
PROCALCITONIN SERPL-MCNC: 0.09 NG/ML

## 2023-12-05 PROCEDURE — 84484 ASSAY OF TROPONIN QUANT: CPT | Performed by: STUDENT IN AN ORGANIZED HEALTH CARE EDUCATION/TRAINING PROGRAM

## 2023-12-05 PROCEDURE — 94760 N-INVAS EAR/PLS OXIMETRY 1: CPT

## 2023-12-05 PROCEDURE — 99222 1ST HOSP IP/OBS MODERATE 55: CPT | Performed by: INTERNAL MEDICINE

## 2023-12-05 PROCEDURE — 97163 PT EVAL HIGH COMPLEX 45 MIN: CPT

## 2023-12-05 PROCEDURE — 36415 COLL VENOUS BLD VENIPUNCTURE: CPT | Performed by: STUDENT IN AN ORGANIZED HEALTH CARE EDUCATION/TRAINING PROGRAM

## 2023-12-05 PROCEDURE — 94664 DEMO&/EVAL PT USE INHALER: CPT

## 2023-12-05 PROCEDURE — 84145 PROCALCITONIN (PCT): CPT | Performed by: NURSE PRACTITIONER

## 2023-12-05 PROCEDURE — 94640 AIRWAY INHALATION TREATMENT: CPT

## 2023-12-05 PROCEDURE — 73130 X-RAY EXAM OF HAND: CPT

## 2023-12-05 PROCEDURE — 97116 GAIT TRAINING THERAPY: CPT

## 2023-12-05 PROCEDURE — 99222 1ST HOSP IP/OBS MODERATE 55: CPT | Performed by: STUDENT IN AN ORGANIZED HEALTH CARE EDUCATION/TRAINING PROGRAM

## 2023-12-05 RX ORDER — PYRIDOXINE HCL (VITAMIN B6) 50 MG
100 TABLET ORAL DAILY
Status: DISCONTINUED | OUTPATIENT
Start: 2023-12-05 | End: 2023-12-17 | Stop reason: HOSPADM

## 2023-12-05 RX ORDER — METHYLPREDNISOLONE SODIUM SUCCINATE 40 MG/ML
40 INJECTION, POWDER, LYOPHILIZED, FOR SOLUTION INTRAMUSCULAR; INTRAVENOUS EVERY 8 HOURS
Status: DISCONTINUED | OUTPATIENT
Start: 2023-12-05 | End: 2023-12-06

## 2023-12-05 RX ORDER — GUAIFENESIN 600 MG/1
1200 TABLET, EXTENDED RELEASE ORAL EVERY 12 HOURS SCHEDULED
Status: DISCONTINUED | OUTPATIENT
Start: 2023-12-05 | End: 2023-12-17 | Stop reason: HOSPADM

## 2023-12-05 RX ORDER — HYDROCODONE BITARTRATE AND HOMATROPINE METHYLBROMIDE ORAL SOLUTION 5; 1.5 MG/5ML; MG/5ML
5 LIQUID ORAL EVERY 6 HOURS PRN
Status: DISCONTINUED | OUTPATIENT
Start: 2023-12-05 | End: 2023-12-17 | Stop reason: HOSPADM

## 2023-12-05 RX ORDER — PYRIDOXINE HCL (VITAMIN B6) 50 MG
50 TABLET ORAL DAILY
COMMUNITY

## 2023-12-05 RX ORDER — IPRATROPIUM BROMIDE AND ALBUTEROL SULFATE 2.5; .5 MG/3ML; MG/3ML
3 SOLUTION RESPIRATORY (INHALATION)
Status: DISCONTINUED | OUTPATIENT
Start: 2023-12-05 | End: 2023-12-05

## 2023-12-05 RX ORDER — MELOXICAM 15 MG/1
15 TABLET ORAL DAILY
COMMUNITY
End: 2023-12-17

## 2023-12-05 RX ORDER — LOSARTAN POTASSIUM 50 MG/1
100 TABLET ORAL DAILY
Status: DISCONTINUED | OUTPATIENT
Start: 2023-12-05 | End: 2023-12-12

## 2023-12-05 RX ORDER — AZITHROMYCIN 250 MG/1
500 TABLET, FILM COATED ORAL EVERY 24 HOURS
Status: COMPLETED | OUTPATIENT
Start: 2023-12-05 | End: 2023-12-09

## 2023-12-05 RX ORDER — HYDROCODONE BITARTRATE AND HOMATROPINE METHYLBROMIDE ORAL SOLUTION 5; 1.5 MG/5ML; MG/5ML
5 LIQUID ORAL EVERY 4 HOURS PRN
Status: DISCONTINUED | OUTPATIENT
Start: 2023-12-05 | End: 2023-12-05

## 2023-12-05 RX ORDER — AMLODIPINE BESYLATE 5 MG/1
5 TABLET ORAL DAILY
COMMUNITY

## 2023-12-05 RX ORDER — LEVALBUTEROL INHALATION SOLUTION 1.25 MG/3ML
1.25 SOLUTION RESPIRATORY (INHALATION)
Status: DISCONTINUED | OUTPATIENT
Start: 2023-12-05 | End: 2023-12-08

## 2023-12-05 RX ORDER — ASCORBIC ACID 250 MG
250 TABLET ORAL DAILY
COMMUNITY

## 2023-12-05 RX ORDER — ASCORBIC ACID 500 MG
250 TABLET ORAL DAILY
Status: DISCONTINUED | OUTPATIENT
Start: 2023-12-05 | End: 2023-12-17 | Stop reason: HOSPADM

## 2023-12-05 RX ORDER — BENZONATATE 100 MG/1
200 CAPSULE ORAL 3 TIMES DAILY
Status: DISCONTINUED | OUTPATIENT
Start: 2023-12-05 | End: 2023-12-17 | Stop reason: HOSPADM

## 2023-12-05 RX ORDER — ACETAMINOPHEN 325 MG/1
650 TABLET ORAL EVERY 6 HOURS PRN
Status: DISCONTINUED | OUTPATIENT
Start: 2023-12-05 | End: 2023-12-17 | Stop reason: HOSPADM

## 2023-12-05 RX ORDER — ROSUVASTATIN CALCIUM 40 MG/1
40 TABLET, COATED ORAL DAILY
COMMUNITY

## 2023-12-05 RX ORDER — MELATONIN
2000 DAILY
Status: DISCONTINUED | OUTPATIENT
Start: 2023-12-05 | End: 2023-12-17 | Stop reason: HOSPADM

## 2023-12-05 RX ORDER — SODIUM CHLORIDE, SODIUM LACTATE, POTASSIUM CHLORIDE, CALCIUM CHLORIDE 600; 310; 30; 20 MG/100ML; MG/100ML; MG/100ML; MG/100ML
50 INJECTION, SOLUTION INTRAVENOUS CONTINUOUS
Status: DISPENSED | OUTPATIENT
Start: 2023-12-05 | End: 2023-12-05

## 2023-12-05 RX ORDER — ATORVASTATIN CALCIUM 40 MG/1
80 TABLET, FILM COATED ORAL
Status: DISCONTINUED | OUTPATIENT
Start: 2023-12-05 | End: 2023-12-17 | Stop reason: HOSPADM

## 2023-12-05 RX ORDER — FOLIC ACID 1 MG/1
1 TABLET ORAL DAILY
Status: DISCONTINUED | OUTPATIENT
Start: 2023-12-05 | End: 2023-12-17 | Stop reason: HOSPADM

## 2023-12-05 RX ORDER — ENOXAPARIN SODIUM 100 MG/ML
40 INJECTION SUBCUTANEOUS DAILY
Status: DISCONTINUED | OUTPATIENT
Start: 2023-12-05 | End: 2023-12-17 | Stop reason: HOSPADM

## 2023-12-05 RX ORDER — SODIUM CHLORIDE FOR INHALATION 0.9 %
3 VIAL, NEBULIZER (ML) INHALATION
Status: DISCONTINUED | OUTPATIENT
Start: 2023-12-05 | End: 2023-12-05

## 2023-12-05 RX ORDER — AMLODIPINE BESYLATE 5 MG/1
5 TABLET ORAL DAILY
Status: DISCONTINUED | OUTPATIENT
Start: 2023-12-05 | End: 2023-12-12

## 2023-12-05 RX ORDER — BUDESONIDE 0.5 MG/2ML
0.5 INHALANT ORAL
Status: DISCONTINUED | OUTPATIENT
Start: 2023-12-05 | End: 2023-12-17 | Stop reason: HOSPADM

## 2023-12-05 RX ADMIN — ISODIUM CHLORIDE 3 ML: 0.03 SOLUTION RESPIRATORY (INHALATION) at 13:53

## 2023-12-05 RX ADMIN — CEFEPIME 1000 MG: 1 INJECTION, POWDER, FOR SOLUTION INTRAMUSCULAR; INTRAVENOUS at 01:38

## 2023-12-05 RX ADMIN — IPRATROPIUM BROMIDE 0.5 MG: 0.5 SOLUTION RESPIRATORY (INHALATION) at 20:52

## 2023-12-05 RX ADMIN — IPRATROPIUM BROMIDE 0.5 MG: 0.5 SOLUTION RESPIRATORY (INHALATION) at 08:10

## 2023-12-05 RX ADMIN — VANCOMYCIN HYDROCHLORIDE 2000 MG: 10 INJECTION, POWDER, LYOPHILIZED, FOR SOLUTION INTRAVENOUS at 03:34

## 2023-12-05 RX ADMIN — BENZONATATE 200 MG: 100 CAPSULE ORAL at 21:08

## 2023-12-05 RX ADMIN — GUAIFENESIN 1200 MG: 600 TABLET ORAL at 08:07

## 2023-12-05 RX ADMIN — Medication 2000 UNITS: at 08:06

## 2023-12-05 RX ADMIN — LEVALBUTEROL HYDROCHLORIDE 1.25 MG: 1.25 SOLUTION RESPIRATORY (INHALATION) at 13:53

## 2023-12-05 RX ADMIN — METHYLPREDNISOLONE SODIUM SUCCINATE 40 MG: 40 INJECTION, POWDER, FOR SOLUTION INTRAMUSCULAR; INTRAVENOUS at 13:51

## 2023-12-05 RX ADMIN — METOPROLOL TARTRATE 25 MG: 25 TABLET, FILM COATED ORAL at 21:12

## 2023-12-05 RX ADMIN — LOSARTAN POTASSIUM 100 MG: 50 TABLET, FILM COATED ORAL at 08:08

## 2023-12-05 RX ADMIN — BENZONATATE 200 MG: 100 CAPSULE ORAL at 17:14

## 2023-12-05 RX ADMIN — LEVALBUTEROL HYDROCHLORIDE 1.25 MG: 1.25 SOLUTION RESPIRATORY (INHALATION) at 08:10

## 2023-12-05 RX ADMIN — ISODIUM CHLORIDE 3 ML: 0.03 SOLUTION RESPIRATORY (INHALATION) at 08:10

## 2023-12-05 RX ADMIN — AZITHROMYCIN 500 MG: 250 TABLET, FILM COATED ORAL at 05:45

## 2023-12-05 RX ADMIN — BUDESONIDE 0.5 MG: 0.5 INHALANT ORAL at 08:10

## 2023-12-05 RX ADMIN — SODIUM CHLORIDE, SODIUM LACTATE, POTASSIUM CHLORIDE, AND CALCIUM CHLORIDE 50 ML/HR: .6; .31; .03; .02 INJECTION, SOLUTION INTRAVENOUS at 06:23

## 2023-12-05 RX ADMIN — GUAIFENESIN 1200 MG: 600 TABLET ORAL at 21:06

## 2023-12-05 RX ADMIN — ATORVASTATIN CALCIUM 80 MG: 40 TABLET, FILM COATED ORAL at 15:47

## 2023-12-05 RX ADMIN — FOLIC ACID 1 MG: 1 TABLET ORAL at 08:08

## 2023-12-05 RX ADMIN — METHYLPREDNISOLONE SODIUM SUCCINATE 40 MG: 40 INJECTION, POWDER, FOR SOLUTION INTRAMUSCULAR; INTRAVENOUS at 21:06

## 2023-12-05 RX ADMIN — PYRIDOXINE HCL TAB 50 MG 100 MG: 50 TAB at 08:09

## 2023-12-05 RX ADMIN — ASPIRIN 81 MG: 81 TABLET, COATED ORAL at 08:07

## 2023-12-05 RX ADMIN — METOPROLOL TARTRATE 25 MG: 25 TABLET, FILM COATED ORAL at 08:07

## 2023-12-05 RX ADMIN — ENOXAPARIN SODIUM 40 MG: 40 INJECTION SUBCUTANEOUS at 08:10

## 2023-12-05 RX ADMIN — METHYLPREDNISOLONE SODIUM SUCCINATE 40 MG: 40 INJECTION, POWDER, FOR SOLUTION INTRAMUSCULAR; INTRAVENOUS at 05:46

## 2023-12-05 RX ADMIN — AMLODIPINE BESYLATE 5 MG: 5 TABLET ORAL at 08:07

## 2023-12-05 RX ADMIN — Medication 250 MG: at 08:08

## 2023-12-05 RX ADMIN — IPRATROPIUM BROMIDE AND ALBUTEROL SULFATE 3 ML: 2.5; .5 SOLUTION RESPIRATORY (INHALATION) at 01:34

## 2023-12-05 RX ADMIN — LEVALBUTEROL HYDROCHLORIDE 1.25 MG: 1.25 SOLUTION RESPIRATORY (INHALATION) at 20:52

## 2023-12-05 RX ADMIN — BUDESONIDE 0.5 MG: 0.5 INHALANT ORAL at 20:52

## 2023-12-05 RX ADMIN — IPRATROPIUM BROMIDE 0.5 MG: 0.5 SOLUTION RESPIRATORY (INHALATION) at 13:53

## 2023-12-05 NOTE — UTILIZATION REVIEW
Initial Clinical Review  Date: 12/6/23    Day 3: Has surpassed a 2nd midnight with active treatments and services, which include ongoing labs, scheduled nebs, IV steroids, azithromycin, monitoring of oxygen sats. .     Admission: Date/Time/Statement:  12/5/23 0129 observation AND CHANGED 12/5/23 1134 INPATIENT RE: PATIENT NEEDS > 2 MIDNIGHT STAY AS CARE STARTED ON 12/4/23 IN ED AND CONTINUES DUE TO SHORTNESS OF BREATH WITH EXERTION, TO CONTINUE IV STEROIDS, AZITHROMYCIN, SCHEDULED NEBS FOR ACUTE COPD EXACERBATION.   Admission Orders (From admission, onward)       Ordered        12/05/23 1134  Inpatient Admission  Once                          Orders Placed This Encounter   Procedures    Inpatient Admission     Standing Status:   Standing     Number of Occurrences:   1     Order Specific Question:   Level of Care     Answer:   Med Surg [16]     Order Specific Question:   Estimated length of stay     Answer:   More than 2 Midnights     Order Specific Question:   Certification     Answer:   I certify that inpatient services are medically necessary for this patient for a duration of greater than two midnights. See H&P and MD Progress Notes for additional information about the patient's course of treatment.     ED Arrival Information       Expected   -    Arrival   12/4/2023 19:02    Acuity   Urgent              Means of arrival   Wheelchair    Escorted by   Family Member    Service   Hospitalist    Admission type   Emergency              Arrival complaint   SOB (COPD)             Chief Complaint   Patient presents with    Shortness of Breath     Pt presents with SOB that has gotten worse within the last 2-3 days. Covid 2 weeks ago.    Abdominal Pain     Pt states he also has left sided abd pain that started x3 days ago. Denies vomiting, diarrhea and urinary symptoms.       Initial Presentation: 77 y.o. male from home to ED 12/4/23 and observation CONVERTED TO INPATIENT  order placed 12/5/23 due to COPD exacerbation.   Recent COVID 19.  Presented due to shortness of breath and left lower quadrant abdominal pain starting about 3 days prior to arrival.  + productive cough whitish sputum.  On exam: tachypnea.  Wheezing. Abdominal tenderness in LLQ.  Guarding.   Bun 30. Creatinine 1.27 and was 1 on 9/25/22.   wbc 2.84.  ct showed interstitial coarsening and groundglass opacity in the lung.  Possible bursitis.  Atherosclerosis.  COPD with emphysema.  In the ED given Duoneb x 2, Solumedrol and started on antibiotics.  Plan is continue IV Solu medrol, scheduled nebs of Xopenex/Atrovent TID, Budesonide BID, start azithromycin.  Titrate oxygen for sat > 88%.  Abdominal exams.  Start IVF, trend BMP    Date: 12/5/23    Day 2 CHANGED TO INPATIENT:  Today with continued shortness of breath with exertion.   Dizziness upon sitting to standing.   On exam:  lungs diminished breath sounds.  Expiratory wheezing.  Continue to monitor oxygen sats.  Oxygen weaned.  Continue steroids, scheduled nebs, azithromycin.  IVF in progress.     ED Triage Vitals   Temperature Pulse Respirations Blood Pressure SpO2   12/04/23 1928 12/04/23 1928 12/04/23 1928 12/04/23 1928 12/04/23 1928   98.9 °F (37.2 °C) 96 18 110/66 94 %      Temp Source Heart Rate Source Patient Position - Orthostatic VS BP Location FiO2 (%)   12/04/23 1928 12/04/23 1928 12/04/23 1928 12/04/23 1928 --   Oral Monitor Sitting Right arm       Pain Score       12/05/23 1454       No Pain          Wt Readings from Last 1 Encounters:   12/06/23 77.4 kg (170 lb 10.2 oz)     Additional Vital Signs:   12/05/23 0600 -- 63 20 106/57 78 92 % 28 2 L/min Nasal cannula Lying   12/05/23 0530 -- 63 20 103/61 77 92 % 28 2 L/min Nasal cannula Lying   12/05/23 0430 -- 65 20 109/63 80 93 % 28 2 L/min Nasal cannula Lying   12/05/23 0330 -- 74 20 98/58 73 92 % 28 2 L/min Nasal cannula Lying   12/05/23 0300 -- 80 20 110/62 81 93 % 28 2 L/min Nasal cannula Lying   12/05/23 0230 -- 82 22 115/62 83 94 % 28 2 L/min Nasal  cannula Lying   12/05/23 0200 -- 88 22 116/81 94 95 % -- -- None (Room air) Lying   12/05/23 0130 -- 75 22 107/67 83 95 % 28 2 L/min Nasal cannula Lying   12/05/23 0117 -- -- -- -- -- 93 % 28 2 L/min Nasal cannula --   12/05/23 0000 -- 81 24 Abnormal  104/55 75 90 % -- -- None (Room air) Lying   12/04/23 2330 -- 80 -- 104/56 76 91 % -- -- None (Room air) --   12/04/23 2300 -- 82 24 Abnormal  119/65 86 94 % -- -- None (Room air) Lying   12/04/23 2015 -- 74 24 Abnormal  141/72 97 95 % -- -- None (Room air      Pertinent Labs/Diagnostic Test Results:   PE Study with CT Abdomen and Pelvis with contrast   Final Result by Yesenia Ivey MD (12/05 0048)      There are some peripherally oriented areas of interstitial coarsening and groundglass opacity in the lungs. This may possibly be related to the history of recent COVID-19 infection. Short-term follow-up recommended.      There is an approximately 3.8 x 4.2 x 3.3 cm low-density area without peripheral enhancement just lateral to the superior aspect right hip. This may possibly be related to bursitis. Clinical correlation and follow-up recommended.      The prostate is mildly prominent and contains some calcifications within. Clinical and laboratory (PSA) correlation recommended.      No evidence of pulmonary embolism is seen.      Atherosclerosis. Coronary artery disease. The ascending thoracic aorta measures up to 4 cm diameter; this is similar to July 3, 2012. Recommend follow-up low radiation dose chest CT in 1 year.      Colonic diverticulosis without evidence of acute diverticulitis.      COPD with emphysema.      Other nonemergent and chronic findings as above.      This examination demonstrates findings for which clinical and imaging follow-up is recommended and was logged as such in EPIC.      The study was marked in EPIC for immediate notification.                     Workstation performed: [id number]         XR chest 1 view portable   ED Interpretation by  Sanya Lopez DO (12/04 2108)   Chest x-ray shows no focal consolidations, pleural effusion, pneumothorax as interpreted by myself pending final radiology read        Final Result by Phillip Dixon MD (12/05 1147)      Areas of interstitial coarsening which could be related to patient's recent history of COVID-19 infection better characterized on CTA chest abdomen pelvis performed 12/4/2023.                  Resident: DEMETRI PARKER I, the attending radiologist, have reviewed the images and agree with the final report above.      Workstation performed: [id number]         XR hand 3+ vw right    (Results Pending)       ECG 12 lead  EKG performed at 1936: Sinus rhythm with ventricular rate of 97, normal axis, , QRS 88, QTc 449, no ST segment or T wave changes concerning for acute ischemia/infarct.  Interpreted by ED Doc  as normal sinus rhythm       Results from last 7 days   Lab Units 12/06/23 0454 12/04/23 1932   WBC Thousand/uL 4.26* 2.84*   HEMOGLOBIN g/dL 11.4* 12.4   HEMATOCRIT % 34.6* 37.1   PLATELETS Thousands/uL 127* 111*   NEUTROS ABS Thousands/µL  --  2.32     Results from last 7 days   Lab Units 12/06/23  0452 12/04/23  1932   SODIUM mmol/L 135 133*   POTASSIUM mmol/L 4.6 4.4   CHLORIDE mmol/L 106 103   CO2 mmol/L 24 23   ANION GAP mmol/L 5 7   BUN mg/dL 30* 30*   CREATININE mg/dL 1.03 1.27   EGFR ml/min/1.73sq m 70 54   CALCIUM mg/dL 8.3* 8.7     Results from last 7 days   Lab Units 12/04/23  1932   AST U/L 34   ALT U/L 31   ALK PHOS U/L 61   TOTAL PROTEIN g/dL 6.9   ALBUMIN g/dL 3.9   TOTAL BILIRUBIN mg/dL 0.61     Results from last 7 days   Lab Units 12/06/23  0452 12/04/23 1932   GLUCOSE RANDOM mg/dL 152* 104     Results from last 7 days   Lab Units 12/05/23  0125 12/04/23  2257 12/04/23 1936   HS TNI 0HR ng/L  --   --  10   HS TNI 2HR ng/L  --  10  --    HSTNI D2 ng/L  --  0  --    HS TNI 4HR ng/L 9  --   --    HSTNI D4 ng/L -1  --   --      Results from last 7 days   Lab Units  12/06/23  0451 12/05/23  0416 12/04/23  1932   PROCALCITONIN ng/ml 0.07 0.09 0.09     Results from last 7 days   Lab Units 12/04/23  1932   BNP pg/mL 20     Results from last 7 days   Lab Units 12/04/23  1932   LIPASE u/L 32     Results from last 7 days   Lab Units 12/04/23  2258   CLARITY UA  Clear   COLOR UA  Yellow   SPEC GRAV UA  1.024   PH UA  5.5   GLUCOSE UA mg/dl Negative   KETONES UA mg/dl Negative   BLOOD UA  Trace*   PROTEIN UA mg/dl 100 (2+)*   NITRITE UA  Negative   BILIRUBIN UA  Negative   UROBILINOGEN UA (BE) mg/dl <2.0   LEUKOCYTES UA  Negative   WBC UA /hpf 1-2   RBC UA /hpf None Seen   BACTERIA UA /hpf None Seen   EPITHELIAL CELLS WET PREP /hpf Occasional   MUCUS THREADS  Occasional*         ED Treatment:   Medication Administration from 12/04/2023 1902 to 12/05/2023 0628         Date/Time Order Dose Route Action Comments     12/04/2023 2107 EST ipratropium-albuterol (DUO-NEB) 0.5-2.5 mg/3 mL inhalation solution 3 mL 3 mL Nebulization Given --     12/04/2023 2109 EST methylPREDNISolone sodium succinate (Solu-MEDROL) injection 125 mg 125 mg Intravenous Given --     12/05/2023 0134 EST ipratropium-albuterol (DUO-NEB) 0.5-2.5 mg/3 mL inhalation solution 3 mL 3 mL Nebulization Given --     12/05/2023 0138 EST cefepime (MAXIPIME) 1,000 mg in dextrose 5 % 50 mL IVPB 1,000 mg Intravenous New Bag --     12/05/2023 0334 EST vancomycin (VANCOCIN) 2,000 mg in sodium chloride 0.9 % 500 mL IVPB 2,000 mg Intravenous New Bag --     12/05/2023 0623 EST lactated ringers infusion 50 mL/hr Intravenous New Bag --     12/05/2023 0546 EST methylPREDNISolone sodium succinate (Solu-MEDROL) injection 40 mg 40 mg Intravenous Given --     12/05/2023 0545 EST azithromycin (ZITHROMAX) tablet 500 mg 500 mg Oral Given --          Past Medical History:   Diagnosis Date    COPD (chronic obstructive pulmonary disease) (HCC)     Crushing injury of finger, left     Infectious viral hepatitis      Present on Admission:   Chronic  obstructive pulmonary disease with acute exacerbation (HCC)      Admitting Diagnosis: Pneumonia [J18.9]  Dyspnea [R06.00]  SOB (shortness of breath) [R06.02]  Dyspnea on exertion [R06.09]  LLQ abdominal pain [R10.32]  COPD exacerbation (HCC) [J44.1]  Bursitis of right hip [M70.71]  Avulsion of finger, initial encounter [A66.926W]  Age/Sex: 77 y.o. male  Admission Orders:  Scheduled Medications:  amLODIPine, 5 mg, Oral, Daily  ascorbic acid, 250 mg, Oral, Daily  aspirin, 81 mg, Oral, Daily  atorvastatin, 80 mg, Oral, Daily With Dinner  azithromycin, 500 mg, Oral, Q24H  budesonide, 0.5 mg, Nebulization, Q12H  cholecalciferol, 2,000 Units, Oral, Daily  enoxaparin, 40 mg, Subcutaneous, Daily  folic acid, 1 mg, Oral, Daily  guaiFENesin, 1,200 mg, Oral, Q12H GALE  ipratropium, 0.5 mg, Nebulization, TID  levalbuterol, 1.25 mg, Nebulization, TID   And  sodium chloride, 3 mL, Nebulization, TID  losartan, 100 mg, Oral, Daily  methylPREDNISolone sodium succinate, 40 mg, Intravenous, Q8H  metoprolol tartrate, 25 mg, Oral, Q12H GALE  pyridoxine, 100 mg, Oral, Daily      Continuous IV Infusions:  lactated ringers, 50 mL/hr, Intravenous, Continuous      PRN Meds:  acetaminophen, 650 mg, Oral, Q6H PRN  HYDROcodone Bit-Homatrop MBr, 5 mL, Oral, Q6H PRN    Pulse oximetry with ambulation   Titrate oxygen for sat > 88%    IP CONSULT TO HAND SURGERY    Network Utilization Review Department  ATTENTION: Please call with any questions or concerns to [phone] and carefully listen to the prompts so that you are directed to the right person. All voicemails are confidential.   For Discharge needs, contact Care Management DC Support Team at [phone] opt. [phone]  Send all requests for admission clinical reviews, approved or denied determinations and any other requests to dedicated fax number below belonging to the campus where the patient is receiving treatment. List of dedicated fax numbers for the Facilities:  FACILITY NAME UR FAX NUMBER    ADMISSION DENIALS (Administrative/Medical Necessity) [phone]   DISCHARGE SUPPORT TEAM (NETWORK) [phone]   PARENT CHILD HEALTH (Maternity/NICU/Pediatrics) [phone]   [facility] [phone]   [facility] [phone]   [facility] [phone]   [facility] [phone]   [facility] [phone]   [facility] [phone]   [facility] [phone]   [facility] [phone]   [facility] [phone]   [facility] [phone]   [facility] [phone]

## 2023-12-05 NOTE — ASSESSMENT & PLAN NOTE
Without anginal complaints  Atherosclerosis, CAD noted on CT scan.  Continue statin, bb, asa  Outpatient follow up

## 2023-12-05 NOTE — ED NOTES
Please call pt spouse Francia once disposition is made, will pick pt up if discharged. Can be reached at [phone]     Allie Hernandez RN  12/05/23 0023

## 2023-12-05 NOTE — ASSESSMENT & PLAN NOTE
Worsening shortness of breath, generalized weakness over the past 3 days.  COVID 2-3 weeks ago.   CTA chest: ground glass opacity  Procalcitonin x 1 negative  IV Solu-Medrol: solumedrol 40 mg q8 hours  Respiratory protocol, nebs.   Xopenex/Atrovent: tid  Budesonide bid  Azithromycin for copd exacerbation   Abx: cefepime, vancomycin in ED. Hold further.  Appears currently only on nasal cannula for comfort.  Titrate/remove and keep o2>88%.  Consider pulm eval for ongoing hypoxia.  Monitor respiratory status.

## 2023-12-05 NOTE — ASSESSMENT & PLAN NOTE
Obtain medical records from [facility] losartan, metoprolol with hold parameters

## 2023-12-05 NOTE — CONSULTS
Orthopedics - Hand surgery consultation  Devin Chaves 77 y.o. male MRN: [id number]  Unit/Bed#: DIS-03      Chief Complaint:   Right long finger pain/injury 2 weeks ago.    HPI:  77 y.o. male with PMH COPD who is currently admitted with COPD exacerbation. He also has CAD, hypertension, and ectasia of the ascending aortic artery. Orthopedic hand surgery is engaged for evaluation of a right long finger injury sustained 2 weeks ago per patient. He notes that he was moving a pile of wood at his home, cutting and splitting logs, when he lost his balance while moving the logs, causing him to grab the pile, and the wood subsequently snagged into his right long finger over the volar aspect of the distal phalanx. He noted some bleeding, but cleaned the wound with peroxide, and has been dressing it daily with antibiotic dressings. He has not noted any purulence from the area. He feels that the wound has slowly started to improve. He has some tenderness over the area of injury to palpation. No streaking up the arm. No pain in any other digit on the hand.   He does not feel that any movement of the hand is restricted. No numbness/tingling  He notes a remote history of a left small finger amputation many years ago.   No other complaints at present.     Review Of Systems:   Skin: as per HPI  Neuro: See HPI  Musculoskeletal: See HPI  14 point review of systems negative except as stated above     Past Medical History:   Past Medical History:   Diagnosis Date    COPD (chronic obstructive pulmonary disease) (HCC)     Crushing injury of finger, left     Infectious viral hepatitis        Past Surgical History:   Past Surgical History:   Procedure Laterality Date    AZ OPEN TX PHALANGEAL SHAFT FRACTURE PROX/MIDDLE EA Left 1/25/2017    Procedure: ORIF LEFT SMALL FINGER FRACTURE;  Surgeon: Blake Badillo MD;  Location: [facility];  Service: Orthopedics       Family History:  Family history reviewed and non-contributory  History  reviewed. No pertinent family history.    Social History:  Social History     Socioeconomic History    Marital status: /Civil Union     Spouse name: None    Number of children: None    Years of education: None    Highest education level: None   Occupational History    None   Tobacco Use    Smoking status: Former    Smokeless tobacco: Former     Quit date: 4/1/2011   Substance and Sexual Activity    Alcohol use: No    Drug use: No    Sexual activity: None   Other Topics Concern    None   Social History Narrative    None     Social Determinants of Health     Financial Resource Strain: Not on file   Food Insecurity: Not on file   Transportation Needs: Not on file   Physical Activity: Not on file   Stress: Not on file   Social Connections: Not on file   Intimate Partner Violence: Not on file   Housing Stability: Not on file       Allergies:   No Known Allergies        Labs:  0   Lab Value Date/Time    HCT 37.1 12/04/2023 1932    HCT 44.9 01/19/2017 1102    HGB 12.4 12/04/2023 1932    HGB 15.0 01/19/2017 1102    WBC 2.84 (L) 12/04/2023 1932    WBC 6.24 01/19/2017 1102       Meds:    Current Facility-Administered Medications:     acetaminophen (TYLENOL) tablet 650 mg, 650 mg, Oral, Q6H PRN, AMPARO Huddleston    amLODIPine (NORVASC) tablet 5 mg, 5 mg, Oral, Daily, AMPARO Huddleston, 5 mg at 12/05/23 0807    ascorbic acid (VITAMIN C) tablet 250 mg, 250 mg, Oral, Daily, AMPARO Huddleston, 250 mg at 12/05/23 0808    aspirin (ECOTRIN LOW STRENGTH) EC tablet 81 mg, 81 mg, Oral, Daily, AMPARO Huddleston, 81 mg at 12/05/23 0807    atorvastatin (LIPITOR) tablet 80 mg, 80 mg, Oral, Daily With Dinner, AMPARO Huddleston    azithromycin (ZITHROMAX) tablet 500 mg, 500 mg, Oral, Q24H, AMPARO Huddleston, 500 mg at 12/05/23 0545    budesonide (PULMICORT) inhalation solution 0.5 mg, 0.5 mg, Nebulization, Q12H, AMPARO Huddleston, 0.5 mg at 12/05/23 0810    cholecalciferol (VITAMIN D3) tablet 2,000 Units, 2,000 Units, Oral, Daily, Tootie  AMPARO Tineo, 2,000 Units at 12/05/23 0806    enoxaparin (LOVENOX) subcutaneous injection 40 mg, 40 mg, Subcutaneous, Daily, AMPARO Huddleston, 40 mg at 12/05/23 0810    folic acid (FOLVITE) tablet 1 mg, 1 mg, Oral, Daily, AMPARO Huddleston, 1 mg at 12/05/23 0808    guaiFENesin (MUCINEX) 12 hr tablet 1,200 mg, 1,200 mg, Oral, Q12H GALE, AMPARO Huddleston, 1,200 mg at 12/05/23 0807    ipratropium (ATROVENT) 0.02 % inhalation solution 0.5 mg, 0.5 mg, Nebulization, TID, AMPARO Huddleston, 0.5 mg at 12/05/23 1353    lactated ringers infusion, 50 mL/hr, Intravenous, Continuous, AMPARO Huddleston, Last Rate: 50 mL/hr at 12/05/23 0623, 50 mL/hr at 12/05/23 0623    levalbuterol (XOPENEX) inhalation solution 1.25 mg, 1.25 mg, Nebulization, TID, 1.25 mg at 12/05/23 1353 **AND** [DISCONTINUED] sodium chloride 0.9 % inhalation solution 3 mL, 3 mL, Nebulization, TID, AMPARO Huddleston, 3 mL at 12/05/23 1353    losartan (COZAAR) tablet 100 mg, 100 mg, Oral, Daily, AMPARO Huddleston, 100 mg at 12/05/23 0808    methylPREDNISolone sodium succinate (Solu-MEDROL) injection 40 mg, 40 mg, Intravenous, Q8H, AMPARO Huddleston, 40 mg at 12/05/23 1351    metoprolol tartrate (LOPRESSOR) tablet 25 mg, 25 mg, Oral, Q12H GALE, AMPARO Huddleston, 25 mg at 12/05/23 0807    pyridoxine (VITAMIN B6) tablet 100 mg, 100 mg, Oral, Daily, AMPARO Huddleston, 100 mg at 12/05/23 0809    Current Outpatient Medications:     amLODIPine (NORVASC) 5 mg tablet, Take 5 mg by mouth daily, Disp: , Rfl:     ascorbic acid (VITAMIN C) 250 MG tablet, Take 250 mg by mouth daily, Disp: , Rfl:     aspirin (ECOTRIN LOW STRENGTH) 81 mg EC tablet, Take 81 mg by mouth daily, Disp: , Rfl:     Cholecalciferol 50 MCG (2000 UT) TABS, TAKE ONE TABLET BY MOUTH DAILY (FOR LOW VITAMIN D), Disp: , Rfl:     folic acid (FOLVITE) 1 mg tablet, TAKE ONE TABLET BY MOUTH EVERY DAY *FOLIC ACID SUPPLEMENT*, Disp: , Rfl:     losartan (COZAAR) 100 MG tablet, Take 100 mg by mouth daily, Disp: , Rfl:      "meloxicam (MOBIC) 15 mg tablet, Take 15 mg by mouth daily, Disp: , Rfl:     methotrexate 2.5 mg tablet, Take by mouth 4 tabs in am on Saturday.  3 tabs in evening on Saturday., Disp: , Rfl:     metoprolol tartrate (LOPRESSOR) 25 mg tablet, Take 25 mg by mouth every 12 (twelve) hours, Disp: , Rfl:     pyridoxine (VITAMIN B6) 50 mg tablet, Take 50 mg by mouth daily 2 tab daily, Disp: , Rfl:     rosuvastatin (CRESTOR) 40 MG tablet, Take 40 mg by mouth daily, Disp: , Rfl:     Tiotropium Bromide-Olodaterol (STIOLTO RESPIMAT) 2.5-2.5 MCG/ACT AERS, Inhale 2 puffs every morning, Disp: , Rfl:     albuterol (PROVENTIL HFA,VENTOLIN HFA) 90 mcg/act inhaler, INHALE 2 PUFFS BY MOUTH EVERY 4 HOURS AS NEEDED FOR BREATHING (Patient not taking: Reported on 12/5/2023), Disp: , Rfl:     budesonide-formoterol (Symbicort) 80-4.5 MCG/ACT inhaler, Inhale 2 puffs 2 (two) times a day (Patient not taking: Reported on 12/5/2023), Disp: , Rfl:     naproxen (NAPROSYN) 500 mg tablet, Take 1 tablet by mouth 2 (two) times a day with meals for 7 days, Disp: 14 tablet, Rfl: 0    tiotropium (Spiriva Respimat) 1.25 MCG/ACT AERS inhaler, Inhale (Patient not taking: Reported on 12/5/2023), Disp: , Rfl:     tiotropium-olodaterol (STIOLTO RESPIMAT) 2.5-2.5 MCG/ACT inhaler, INHALE 2 PUFFS BY MOUTH DAILY (Patient not taking: Reported on 12/5/2023), Disp: , Rfl:     Blood Culture:   No results found for: \"BLOODCX\"    Wound Culture:   No results found for: \"WOUNDCULT\"    Ins and Outs:  I/O last 24 hours:  In: 550 [IV Piggyback:550]  Out: -           Physical Exam:   /57 (BP Location: Right arm)   Pulse 74   Temp 98.9 °F (37.2 °C) (Oral)   Resp 20   Ht 6' 2\" (1.88 m)   Wt 77.3 kg (170 lb 6.7 oz)   SpO2 92%   BMI 21.88 kg/m²   Gen: No acute distress, resting comfortably in bed  HEENT: Eyes clear, moist mucus membranes, hearing intact  Respiratory: No audible wheezing or stridor  Cardiovascular: Well Perfused peripherally, 2+ distal pulse  Abdomen: " nondistended, no peritoneal signs  Musculoskeletal: right upper extremity  Skin: please see wound image for full characterization of wound.   He has some tenderness over the area of injury.   Injury appears to be through skin layers, but no exposed soft tissue or tendons.   No purulence or drainage from wound.   He is able to fully extend all digits.   He is able to make a composite fist.   Tendons: FDP, FDP and FDL all intact. Extensor tendons intact.   SILT m/r/u.   Motor intact ain/pin/m/r/u  2+ radial and ulnar pulse  Musculature is soft and compressible, no pain with passive stretch                  Tertiary: no tenderness over all other joints/long bones as except already stated.    Radiology:   I personally reviewed the films.  Imaging reviewed and discussed with Dr. Mckay.   XRAY right hand: no acute osseous abnormality.     _*_*_*_*_*_*_*_*_*_*_*_*_*_*_*_*_*_*_*_*_*_*_*_*_*_*_*_*_*_*_*_*_*_*_*_*_*_*_*_*_*    Assessment:  77 y.o.male with right long finger injury roughly 2 weeks ago.      Plan:   May be WBAT for ADLs to the right upper extremity.   No immediate hand surgery intervention indicated at this time.   Recommend daily dressing changes, ordered.   Body mass index is 21.88 kg/m².   Medical management per primary team.   DVT ppx per primary team.   Dispo: hand surgery signing off.   Case reviewed and discussed with Dr. Mckay.       Maureen Whitaker PA-C

## 2023-12-05 NOTE — H&P
Formerly [facility], [facility]  H&P  Name: Devin Chaves 77 y.o. male I MRN: [id number]  Unit/Bed#: ED-02 I Date of Admission: 12/4/2023   Date of Service: 12/5/2023 I Hospital Day: 0      Assessment/Plan   * Chronic obstructive pulmonary disease with acute exacerbation (HCC)  Assessment & Plan  Worsening shortness of breath, generalized weakness over the past 3 days.  COVID 2-3 weeks ago.   CTA chest: ground glass opacity  Procalcitonin x 1 negative  IV Solu-Medrol: solumedrol 40 mg q8 hours  Respiratory protocol, nebs.   Xopenex/Atrovent: tid  Budesonide bid  Azithromycin for copd exacerbation   Abx: cefepime, vancomycin in ED. Hold further.  Appears currently only on nasal cannula for comfort.  Titrate/remove and keep o2>88%.  Consider pulm eval for ongoing hypoxia.  Monitor respiratory status.    COVID-19  Assessment & Plan  Reports covid infection a few weeks ago.  Managed at home with conservative care.  No recent fever, chills.  Plan as above    Abdominal pain  Assessment & Plan  No acute findings on CT   Monitor symptoms, abdominal exam    Ectasia of artery (HCC)  Assessment & Plan  Fusiform ectasia of the ascending thoracic aorta measuring 40 mm  Repeat CT in one year  Outpatient follow up with pcp    CAD (coronary artery disease)  Assessment & Plan  Without anginal complaints  Atherosclerosis, CAD noted on CT scan.  Continue statin, bb, asa  Outpatient follow up    Hypertension  Assessment & Plan  Obtain medical records from [facility] losartan, metoprolol with hold parameters         VTE Pharmacologic Prophylaxis: VTE Score: 5 High Risk (Score >/= 5) - Pharmacological DVT Prophylaxis Ordered: enoxaparin (Lovenox). Sequential Compression Devices Ordered.  Code Status: Level 1 - Full Code   Discussion with family: Patient declined call to .     Anticipated Length of Stay: Patient will be admitted on an observation basis with an anticipated length of stay of less than 2 midnights  secondary to COPD exac.    Total Time Spent on Date of Encounter in care of patient:  mins. This time was spent on one or more of the following: performing physical exam; counseling and coordination of care; obtaining or reviewing history; documenting in the medical record; reviewing/ordering tests, medications or procedures; communicating with other healthcare professionals and discussing with patient's family/caregivers.    Chief Complaint: shortness of breath    History of Present Illness:  Devin Chaves is a 77 y.o. male with a PMH of COPD, HTN, CAD, CVA, RA on methorexate and humira who presents with shortness of breath and wheezing over the past 3 days.  Also complained of LLQ abdominal pain.  Patient reports having covid 2-3 weeks ago and was managed with supportive care only at home.  Patient underwent CTA chest without PE.  Ground glass opacities noted.  Procalcitonin negative.  Given cefepime and vancomycin in ED.  CT AP without acute findings.  Note, possible bursitis noted on CT however patient is without pain to the right hip.  Continue solumedrol, nebulizers for COPD exacerbation.    Patient is a poor historian regarding his medical history and daily medication but is overall oriented x 3.      Review of Systems:  Review of Systems   Constitutional:  Positive for activity change, appetite change and fatigue. Negative for fever.   Respiratory:  Positive for shortness of breath and wheezing.    Gastrointestinal:  Positive for abdominal pain.   All other systems reviewed and are negative.      Past Medical and Surgical History:   Past Medical History:   Diagnosis Date    COPD (chronic obstructive pulmonary disease) (HCC)     Crushing injury of finger, left     Infectious viral hepatitis        Past Surgical History:   Procedure Laterality Date    KS OPEN TX PHALANGEAL SHAFT FRACTURE PROX/MIDDLE EA Left 1/25/2017    Procedure: ORIF LEFT SMALL FINGER FRACTURE;  Surgeon: Blake Badillo MD;  Location: [facility]  MAIN OR;  Service: Orthopedics       Meds/Allergies:  Prior to Admission medications    Medication Sig Start Date End Date Taking? Authorizing Provider   amLODIPine (NORVASC) 5 mg tablet Take 5 mg by mouth daily   Yes Historical Provider, MD   ascorbic acid (VITAMIN C) 250 MG tablet Take 250 mg by mouth daily   Yes Historical Provider, MD   aspirin (ECOTRIN LOW STRENGTH) 81 mg EC tablet Take 81 mg by mouth daily   Yes Historical Provider, MD   Cholecalciferol 50 MCG (2000 UT) TABS TAKE ONE TABLET BY MOUTH DAILY (FOR LOW VITAMIN D) 4/12/21  Yes Historical Provider, MD   folic acid (FOLVITE) 1 mg tablet TAKE ONE TABLET BY MOUTH EVERY DAY *FOLIC ACID SUPPLEMENT* 4/12/21  Yes Historical Provider, MD   losartan (COZAAR) 100 MG tablet Take 100 mg by mouth daily   Yes Historical Provider, MD   meloxicam (MOBIC) 15 mg tablet Take 15 mg by mouth daily   Yes Historical Provider, MD   methotrexate 2.5 mg tablet Take by mouth 4 tabs in am on Saturday.  3 tabs in evening on Saturday.   Yes Historical Provider, MD   metoprolol tartrate (LOPRESSOR) 25 mg tablet Take 25 mg by mouth every 12 (twelve) hours   Yes Historical Provider, MD   pyridoxine (VITAMIN B6) 50 mg tablet Take 50 mg by mouth daily 2 tab daily   Yes Historical Provider, MD   rosuvastatin (CRESTOR) 40 MG tablet Take 40 mg by mouth daily   Yes Historical Provider, MD   Tiotropium Bromide-Olodaterol (STIOLTO RESPIMAT) 2.5-2.5 MCG/ACT AERS Inhale 2 puffs every morning   Yes Historical Provider, MD   albuterol (PROVENTIL HFA,VENTOLIN HFA) 90 mcg/act inhaler INHALE 2 PUFFS BY MOUTH EVERY 4 HOURS AS NEEDED FOR BREATHING  Patient not taking: Reported on 12/5/2023 5/24/21   Historical Provider, MD   budesonide-formoterol (Symbicort) 80-4.5 MCG/ACT inhaler Inhale 2 puffs 2 (two) times a day  Patient not taking: Reported on 12/5/2023    Historical Provider, MD   naproxen (NAPROSYN) 500 mg tablet Take 1 tablet by mouth 2 (two) times a day with meals for 7 days 1/16/17 1/23/17   "Cyril Arteaga DO   olmesartan (BENICAR) 40 mg tablet Take 40 mg by mouth daily  Patient not taking: Reported on 12/5/2023 5/14/21   Historical Provider, MD   oxyCODONE-acetaminophen (PERCOCET) 5-325 mg per tablet Take 1 tablet by mouth daily at bedtime as needed for moderate pain    Historical Provider, MD   oxyCODONE-acetaminophen (PERCOCET) 5-325 mg per tablet 1-2 tabs PO Q 4-5 hours prn pain 1/25/17   Blake Badillo MD   tiotropium (Spiriva Respimat) 1.25 MCG/ACT AERS inhaler Inhale  Patient not taking: Reported on 12/5/2023    Historical Provider, MD   tiotropium-olodaterol (STIOLTO RESPIMAT) 2.5-2.5 MCG/ACT inhaler INHALE 2 PUFFS BY MOUTH DAILY  Patient not taking: Reported on 12/5/2023 5/24/21   Historical Provider, MD     I have reviewed home medications with a medical source (PCP, Pharmacy, other).    Allergies: No Known Allergies    Social History:  Marital Status: /Civil Union   Occupation:   Patient Pre-hospital Living Situation: Home  Patient Pre-hospital Level of Mobility: walks  Patient Pre-hospital Diet Restrictions:   Substance Use History:   Social History     Substance and Sexual Activity   Alcohol Use No     Social History     Tobacco Use   Smoking Status Former   Smokeless Tobacco Former    Quit date: 4/1/2011     Social History     Substance and Sexual Activity   Drug Use No       Family History:  History reviewed. No pertinent family history.    Physical Exam:     Vitals:   Blood Pressure: 103/61 (12/05/23 0530)  Pulse: 63 (12/05/23 0530)  Temperature: 98.9 °F (37.2 °C) (12/04/23 1928)  Temp Source: Oral (12/04/23 1928)  Respirations: 20 (12/05/23 0530)  Height: 6' 2\" (188 cm) (12/05/23 0330)  Weight - Scale: 77.3 kg (170 lb 6.7 oz) (12/05/23 0330)  SpO2: 92 % (12/05/23 0530)    Physical Exam  Constitutional:       General: He is not in acute distress.     Appearance: Normal appearance.   HENT:      Head: Normocephalic and atraumatic.      Right Ear: External ear normal.      Left " Ear: External ear normal.      Nose: Nose normal.      Mouth/Throat:      Mouth: Mucous membranes are dry.      Pharynx: Oropharynx is clear.   Eyes:      General: No scleral icterus.     Extraocular Movements: Extraocular movements intact.      Conjunctiva/sclera: Conjunctivae normal.   Cardiovascular:      Rate and Rhythm: Normal rate and regular rhythm.      Pulses: Normal pulses.      Heart sounds: Normal heart sounds.   Pulmonary:      Effort: Pulmonary effort is normal.      Breath sounds: Wheezing present.   Abdominal:      General: Bowel sounds are normal. There is no distension.      Palpations: Abdomen is soft.      Tenderness: There is no abdominal tenderness. There is no right CVA tenderness, left CVA tenderness, guarding or rebound.   Musculoskeletal:         General: Normal range of motion.      Cervical back: Normal range of motion and neck supple.   Skin:     General: Skin is warm.      Capillary Refill: Capillary refill takes less than 2 seconds.   Neurological:      General: No focal deficit present.      Mental Status: He is alert and oriented to person, place, and time.   Psychiatric:         Cognition and Memory: Memory is impaired.          Additional Data:     Lab Results:  Results from last 7 days   Lab Units 12/04/23 1932   WBC Thousand/uL 2.84*   HEMOGLOBIN g/dL 12.4   HEMATOCRIT % 37.1   PLATELETS Thousands/uL 111*   NEUTROS PCT % 82*   LYMPHS PCT % 11*   MONOS PCT % 7   EOS PCT % 0     Results from last 7 days   Lab Units 12/04/23 1932   SODIUM mmol/L 133*   POTASSIUM mmol/L 4.4   CHLORIDE mmol/L 103   CO2 mmol/L 23   BUN mg/dL 30*   CREATININE mg/dL 1.27   ANION GAP mmol/L 7   CALCIUM mg/dL 8.7   ALBUMIN g/dL 3.9   TOTAL BILIRUBIN mg/dL 0.61   ALK PHOS U/L 61   ALT U/L 31   AST U/L 34   GLUCOSE RANDOM mg/dL 104                 Results from last 7 days   Lab Units 12/05/23  0416 12/04/23 1932   PROCALCITONIN ng/ml 0.09 0.09       Lines/Drains:  Invasive Devices       Peripheral  Intravenous Line  Duration             Peripheral IV 12/05/23 Right;Ventral (anterior) Forearm <1 day                        Imaging: Reviewed radiology reports from this admission including: chest CT scan and abdominal/pelvic CT  PE Study with CT Abdomen and Pelvis with contrast   Final Result by Yesenia Ivey MD (12/05 0048)      There are some peripherally oriented areas of interstitial coarsening and groundglass opacity in the lungs. This may possibly be related to the history of recent COVID-19 infection. Short-term follow-up recommended.      There is an approximately 3.8 x 4.2 x 3.3 cm low-density area without peripheral enhancement just lateral to the superior aspect right hip. This may possibly be related to bursitis. Clinical correlation and follow-up recommended.      The prostate is mildly prominent and contains some calcifications within. Clinical and laboratory (PSA) correlation recommended.      No evidence of pulmonary embolism is seen.      Atherosclerosis. Coronary artery disease. The ascending thoracic aorta measures up to 4 cm diameter; this is similar to July 3, 2012. Recommend follow-up low radiation dose chest CT in 1 year.      Colonic diverticulosis without evidence of acute diverticulitis.      COPD with emphysema.      Other nonemergent and chronic findings as above.      This examination demonstrates findings for which clinical and imaging follow-up is recommended and was logged as such in EPIC.      The study was marked in EPIC for immediate notification.                     Workstation performed: [id number]         XR chest 1 view portable   ED Interpretation by Sanya Lopez DO (12/04 2108)   Chest x-ray shows no focal consolidations, pleural effusion, pneumothorax as interpreted by myself pending final radiology read            EKG and Other Studies Reviewed on Admission:   EKG: NSR. HR 97.    ** Please Note: This note has been constructed using a voice recognition system.  **

## 2023-12-05 NOTE — ASSESSMENT & PLAN NOTE
Obtain medical records from [facility] losartan, metoprolol with hold parameters

## 2023-12-05 NOTE — ASSESSMENT & PLAN NOTE
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

## 2023-12-05 NOTE — ED NOTES
Patient ambulated with walker to restroom. Patient denies weakness. Dizziness from sitting to standing. SOB upon exertion which resolved at rest. Patient o2 stayed between 94%-96% during ambulation.     Duglas Ha  12/05/23 1100

## 2023-12-05 NOTE — ED CARE HANDOFF
Emergency Department Sign Out Note        Sign out and transfer of care from Dr. Lopez. See Separate Emergency Department note.     The patient, Devin Chaves, was evaluated by the previous provider for sob, abd pain.    Workup Completed:  Yes except CT pending    ED Course / Workup Pending (followup):  CT: FINDINGS:     CHEST     PULMONARY ARTERIAL TREE:  No pulmonary embolus is seen.     LUNGS: The lungs are hyperinflated in keeping with the history of COPD. Moderate emphysematous changes are present. There are some peripherally oriented areas of interstitial coarsening and groundglass opacity. There is mild bibasilar atelectasis. No   focal consolidation. No pneumothorax.     PLEURA:  Unremarkable.     HEART/AORTA: Coronary artery disease. There is atherosclerosis of the thoracic aorta. There is fusiform ectasia of the ascending thoracic aorta measuring up to 40 mm; this is similar to July 3, 2012.  Recommendation is for follow-up low radiation dose   chest CT in one year.     MEDIASTINUM AND PAWEL: There are a few small calcified nodes, similar to the prior study and not enlarged by CT criteria.     CHEST WALL AND LOWER NECK:  Unremarkable.     ABDOMEN     LIVER/BILIARY TREE:  Unremarkable.     GALLBLADDER:  No calcified gallstones. No pericholecystic inflammatory change.     SPLEEN: Small calcified splenic granulomata.     PANCREAS:  Unremarkable.     ADRENAL GLANDS: Unremarkable.     KIDNEYS/URETERS: Small renal cysts and subcentimeter hypodensities too small to characterize. Renal vascular calcifications. No hydronephrosis.     STOMACH AND BOWEL: No bowel obstruction. There is colonic diverticulosis without evidence of acute diverticulitis.     APPENDIX:  No findings to suggest appendicitis.     ABDOMINOPELVIC CAVITY:  No ascites.  No pneumoperitoneum.  No lymphadenopathy.     VESSELS: Atherosclerosis. No abdominal aortic aneurysm.     PELVIS     REPRODUCTIVE ORGANS: The prostate is mildly prominent and  contains some calcifications within.     URINARY BLADDER:  Unremarkable.     ABDOMINAL WALL/INGUINAL REGIONS: There is an approximately 3.8 x 4.2 x 3.3 cm low-density area without peripheral enhancement just lateral to the superior aspect right hip (series 310 image 134, series 606 image 110).     OSSEOUS STRUCTURES: There are healing fractures of the right fifth, sixth, seventh and eighth ribs and the left seventh rib, demonstrating callus formation and partially corticated margins. There is multilevel degenerative change of the spine.     IMPRESSION:     There are some peripherally oriented areas of interstitial coarsening and groundglass opacity in the lungs. This may possibly be related to the history of recent COVID-19 infection. Short-term follow-up recommended.     There is an approximately 3.8 x 4.2 x 3.3 cm low-density area without peripheral enhancement just lateral to the superior aspect right hip. This may possibly be related to bursitis. Clinical correlation and follow-up recommended.     The prostate is mildly prominent and contains some calcifications within. Clinical and laboratory (PSA) correlation recommended.     No evidence of pulmonary embolism is seen.     Atherosclerosis. Coronary artery disease. The ascending thoracic aorta measures up to 4 cm diameter; this is similar to July 3, 2012. Recommend follow-up low radiation dose chest CT in 1 year.     Colonic diverticulosis without evidence of acute diverticulitis.     COPD with emphysema.     Other nonemergent and chronic findings as above.     This examination demonstrates findings for which clinical and imaging follow-up is recommended and was logged as such in EPIC.     The study was marked in EPIC for immediate notification.                    Workstation performed: [id number]    CT d/w patient. Patient with pulse ox of 90% and more neb and nasal cannula oxygen ordered and IV abx ordered and admission indicated.                                       Procedures  Medical Decision Making  Amount and/or Complexity of Data Reviewed  Labs: ordered.  Radiology: ordered and independent interpretation performed.    Risk  Prescription drug management.  Decision regarding hospitalization.            Disposition  Final diagnoses:   Dyspnea   LLQ abdominal pain   Dyspnea on exertion   Pneumonia   COPD exacerbation (HCC)   Bursitis of right hip     Time reflects when diagnosis was documented in both MDM as applicable and the Disposition within this note       Time User Action Codes Description Comment    12/4/2023 10:11 PM LebamSanya Add [R06.00] Dyspnea     12/4/2023 10:11 PM LebamSayna Add [R10.32] LLQ abdominal pain     12/4/2023 10:41 PM JessicaSanya Add [R06.09] Dyspnea on exertion     12/5/2023  1:14 AM Jorge Sinclair Add [J18.9] Pneumonia     12/5/2023  1:14 AM Jogre Sinclair Add [J44.1] COPD exacerbation (HCC)     12/5/2023  1:14 AM Jorge Sinclair Modify [R06.00] Dyspnea     12/5/2023  1:14 AM Jorge Sinclair Modify [J18.9] Pneumonia     12/5/2023  1:15 AM Jorge Sinclair Add [M70.71] Bursitis of right hip           ED Disposition       ED Disposition   Admit    Condition   Stable    Date/Time   Tue Dec 5, 2023  1:28 AM    Comment   Case was discussed with RICHY Tineo and the patient's admission status was agreed to be Admission Status: observation status to the service of Dr. Everett .               Follow-up Information    None       Patient's Medications   Discharge Prescriptions    No medications on file     No discharge procedures on file.       ED Provider  Electronically Signed by     Jorge Sinclair MD  12/05/23 0129

## 2023-12-05 NOTE — PHYSICAL THERAPY NOTE
"   PHYSICAL THERAPY EVALUATION & TREATMENT  DATE: 12/05/23  TIME: 1454-1526    NAME:  Devin Chaves  AGE:   77 y.o.  Mrn:   [id number]  Length Of Stay: 0    ADMIT DX:  SOB (shortness of breath) [R06.02]    Past Medical History:   Diagnosis Date    COPD (chronic obstructive pulmonary disease) (HCC)     Crushing injury of finger, left     Infectious viral hepatitis      Past Surgical History:   Procedure Laterality Date    MD OPEN TX PHALANGEAL SHAFT FRACTURE PROX/MIDDLE EA Left 1/25/2017    Procedure: ORIF LEFT SMALL FINGER FRACTURE;  Surgeon: Blake Badillo MD;  Location: [facility];  Service: Orthopedics       Performed at least 2 patient identifiers during session: Name, Birthday, ID bracelet, and Epic photo     12/05/23 1454   PT Last Visit   PT Visit Date 12/05/23   Note Type   Note type Evaluation  (& treatment)   Pain Assessment   Pain Assessment Tool 0-10   Pain Score No Pain   Multiple Pain Sites No   Restrictions/Precautions   Weight Bearing Precautions Per Order Yes   RUE Weight Bearing Per Order WBAT  (WBAT for ADLs, however per speaking with Ortho AP recommended attempt to avoid WBing to R middle finger.)   Other Precautions Multiple lines;O2;Fall Risk;Pain  (2L O2 via NC (baseline does not wear supplemental O2))   Home Living   Type of Home House   Home Layout Stairs to enter with rails;Multi-level;Work area in basement;Performs ADLs on one level;Able to live on main level with bedroom/bathroom  (3-4 ZEINAB, FFSU, wood stove in basement)   Bathroom Accessibility Accessible   Home Equipment Cane   Prior Function   Level of [place] Independent with ADLs;Independent with functional mobility   Lives With Family  (wife & dtr)   Receives Help From Family   IADLs Independent with driving;Independent with meal prep;Independent with medication management   Falls in the last 6 months 0  (pt denies)   Vocational Retired   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 Pt reports being fully independent with all aspects of self care and functional mobility.   General   Additional Pertinent History Pt is a 77 yr old male admitted 12/4/23 with c/o SOB x2-3days, recent COVID-19, L sided abdominal pain x3 days.   Family/Caregiver Present No   Cognition   Overall Cognitive Status WFL   Arousal/Participation Cooperative   Orientation Level Oriented X4   Memory Within functional limits   Following Commands Follows multistep commands with increased time or repetition   Subjective   Subjective \"I have furniture I hold onto\"   RUE Assessment   RUE Assessment WFL   LUE Assessment   LUE Assessment WFL   RLE Assessment   RLE Assessment WFL   LLE Assessment   LLE Assessment WFL   Vision-Basic Assessment   Current Vision Wears glasses all the time   Coordination   Movements are Fluid and Coordinated 1   Sensation WFL   Light Touch   RLE Light Touch Grossly intact   LLE Light Touch Grossly intact   Proprioception   RLE Proprioception Grossly intact   LLE Proprioception Grossly Intact   Bed Mobility   Supine to Sit 6  Modified independent   Additional items HOB elevated   Sit to Supine 6  Modified independent   Additional items HOB elevated   Transfers   Sit to Stand 5  Supervision   Additional items Assist x 1;Increased time required;Verbal cues   Stand to Sit 5  Supervision   Additional items Assist x 1;Increased time required;Verbal cues   Stand pivot 5  Supervision   Additional items Assist x 1;Increased time required;Verbal cues  (RW)   Additional Comments Cues for hand placement and optimal mechanics; cues for RW management/proximity with approach to target surface.   Ambulation/Elevation   Gait pattern Narrow NICK;Decreased foot clearance;Short stride;Decreased hip extension;Decreased heel strike;Step through pattern   Gait Assistance 4  Minimal assist   Additional items Assist x 1;Verbal cues;Tactile cues   Assistive Device Rolling walker   Distance " 100ft x1 with change in direction custodial  (limited amb distance this trial due to XR tech arrival for hand XR, see below for additional treatment/gait trials)   Stair Management Assistance Not tested  (pt has 3-4 ZEINAB his home but full flight of steps to access basement which he does daily)   Balance   Static Sitting Good   Dynamic Sitting Fair +   Static Standing Fair +  (w/ RW)   Dynamic Standing Fair  (w/ RW)   Ambulatory Fair  (w/ RW)   Endurance Deficit   Endurance Deficit Yes   Endurance Deficit Description Pt currently on 2L O2 via NC, remained 90%+ on 2L t/o mobility. At baseline does not wear supplemental o2.   Activity Tolerance   Activity Tolerance Patient limited by fatigue;Patient limited by pain  (limited d/t SOB/PUTNAM)   Medical Staff Made Aware Spoke with CM Cathryn, ortho AP Maureen   Nurse Made Aware Spoke with RICHIE Sharma pre/post session   Assessment   Prognosis Good   Problem List Decreased strength;Decreased range of motion;Decreased endurance;Impaired balance;Decreased mobility;Impaired judgement;Decreased safety awareness;Decreased skin integrity;Orthopedic restrictions;Pain   Assessment Pt seen for PT evaluation for mobility assessment & discharge needs. Activity orders: Up and OOB as tolerated. Pt is a 77 yr old male admitted 12/4/23 with c/o SOB x2-3days, recent COVID-19, L sided abdominal pain x3 days, dx Chronic obstructive pulmonary disease with acute exacerbation (HCC). Comorbidities affecting pt's fnxl performance include: COPD, arthritis, HTN, CAD, CVA, RA. During PT IE, pt independently completes bed mobility, transfers with S, and ambulates 100ft with RW and TITA. During additional treatment/gait pt ambulates an additional 200ft with RW and close S. Pt displays above outlined functional impairments & limitations, and presents below his baseline level of functional mobility. The AM-PAC & Barthel Index outcome tools were used to assist in determining pt safety w/ mobility/self care &  "appropriate d/c recommendations, see above for scores. Pt is at risk of falls d/t multiple comorbidities, impaired balance, impaired insight/safety awareness, use of ambulatory aid, varying levels of pain , acuity of medical illness, ongoing medical treatment of primary dx, and unstable vitals. Pt's clinical presentation is currently unstable/unpredictable as seen in pt's presentation of vital sign response, changing level of pain, increased fall risk, new onset of impairment of functional mobility, and decreased endurance. Pt will benefit from continued PT services in order to address impairments, decrease risk of falls, maximize independence w/ fnxl mobility, & ensure safety w/ mobility for transition to next level of care. Based on pt presentation & impairments, pt would most appropriately benefit from Level III (minimal PT intensity) resources upon d/c.   Barriers to Discharge Inaccessible home environment   Goals   Patient Goals \"to get back home and working on the farm\"   [facility] Expiration Date 12/19/23   Short Term Goal #1 Patient PT goals established in order to address patient self reported goal of \"to keep walking\". Pt will: consistently complete all transfers independently in order to increase safety with functional mobility; ambulate >250ft with LRAD at indep/BENJAMIN level in order to increase safety with household and community distance functional mobility; negotiate 3-5 stairs with HR assist and S in order to facilitate safe access to his home; demonstrate understanding and independence with LE strengthening HEP; improve ambulatory balance to >/= good grade with LRAD in order to promote safety and increased independence with mobility; tolerate >3hrs OOB in upright position, in order to improve muscular endurance and respiratory status; improve AM-PAC score to >/= 24/24 in order to increase independence with mobility and decrease burden of care; improve Barthel Index score to >/= 85/100 in order to increase " independence and decrease risk of falls.   PT Treatment Day 0   Plan   Treatment/Interventions Functional transfer training;LE strengthening/ROM;Elevations;Therapeutic exercise;Endurance training;Patient/family training;Equipment eval/education;Bed mobility;Gait training;Spoke to nursing;Spoke to case management;Spoke to advanced practitioner   PT Frequency 2-3x/wk   Discharge Recommendation   Rehab Resource Intensity Level, PT III (Minimum Resource Intensity)   Equipment Recommended Walker   Walker Package Recommended Wheeled walker   Change/add to Walker Package? No   AM-PAC Basic Mobility Inpatient   Turning in Flat Bed Without Bedrails 4   Lying on Back to Sitting on Edge of Flat Bed Without Bedrails 4   Moving Bed to Chair 3   Standing Up From Chair Using Arms 3   Walk in Room 3   Climb 3-5 Stairs With Railing 3   Basic Mobility Inpatient Raw Score 20   Basic Mobility Standardized Score 43.99   Highest Level Of Mobility   -HLM Goal 6: Walk 10 steps or more   JH-HLM Achieved 8: Walk 250 feet ot more   Modified Benzie Scale   Modified Benzie Scale 4   Barthel Index   Feeding 10   Bathing 0   Grooming Score 5   Dressing Score 10   Bladder Score 10   Bowels Score 10   Toilet Use Score 5   Transfers (Bed/Chair) Score 10   Mobility (Level Surface) Score 10   Stairs Score 5   Barthel Index Score 75   Additional Treatment Session   Start Time 1510   End Time 1526   Treatment Assessment Pt is agreeable to participate in additional gait training post IE. Pt continues to complete transfers with S, and ambulates an additional 200ft with RW and close S. Pt needing frequent cues for RW management and proximity; cues for PLB due to SOB. SPO2 monitored pre/post mobility and stable at 90%+ on 2L t/o. At end of session, pt was left seated at EOB with needs in reach and lines intact. Regarding functional mobility, pt continues to present below his baseline level of functional mobility. When therapist recommended pt utilize RW  "for mobility (at least temporarily) upon d/c, pt adamantly refusing, states \"I have furniture to hold onto\". Continue to recommend Level III (minimal PT intensity) resources once medically cleared for d/c from the acute care setting. Will continue skilled PT POC as able and appropriate to address functional impairments and progress towards therapy goals. Next session, plan for intervention of increased ambulation with lesser restrictive device as able.   Equipment Use RW, Ax1   Additional Treatment Day 1   End of Consult   Patient Position at End of Consult Seated edge of bed;All needs within reach       Based on patient's [facility] Highest Level of Mobility scores today, patient currently has a goal of [facility] Levels: 8: WALK 250 FEET OR MORE, to be completed with RN staffing each shift, in order to improve overall activity tolerance and mobility, combat hospital related deconditioning, and maximize outcomes for d/c from the acute care setting.     The patient's AM-PAC Basic Mobility Inpatient Short Form Raw Score is 20. A Raw score of greater than 16 suggests the patient may benefit from discharge to home. Please also refer to the recommendation of the Physical Therapist for safe discharge planning.      Melissa Tavarez, PT, DPT   Available via [employer]  NPI # [id number]  [place] License - [id number]  12/5/2023   "

## 2023-12-05 NOTE — PLAN OF CARE
Problem: PHYSICAL THERAPY ADULT  Goal: Performs mobility at highest level of function for planned discharge setting.  See evaluation for individualized goals.  Description: Treatment/Interventions: Functional transfer training, LE strengthening/ROM, Elevations, Therapeutic exercise, Endurance training, Patient/family training, Equipment eval/education, Bed mobility, Gait training, Spoke to nursing, Spoke to case management, Spoke to advanced practitioner    Equipment Recommended: Walker     See flowsheet documentation for full assessment, interventions and recommendations.  Note: Prognosis: Good  Problem List: Decreased strength, Decreased range of motion, Decreased endurance, Impaired balance, Decreased mobility, Impaired judgement, Decreased safety awareness, Decreased skin integrity, Orthopedic restrictions, Pain  Assessment: Pt seen for PT evaluation for mobility assessment & discharge needs. Activity orders: Up and OOB as tolerated. Pt is a 77 yr old male admitted 12/4/23 with c/o SOB x2-3days, recent COVID-19, L sided abdominal pain x3 days, dx Chronic obstructive pulmonary disease with acute exacerbation (HCC). Comorbidities affecting pt's fnxl performance include: COPD, arthritis, HTN, CAD, CVA, RA. During PT IE, pt independently completes bed mobility, transfers with S, and ambulates 100ft with RW and TITA. During additional treatment/gait pt ambulates an additional 200ft with RW and close S. Pt displays above outlined functional impairments & limitations, and presents below his baseline level of functional mobility. The AM-PAC & Barthel Index outcome tools were used to assist in determining pt safety w/ mobility/self care & appropriate d/c recommendations, see above for scores. Pt is at risk of falls d/t multiple comorbidities, impaired balance, impaired insight/safety awareness, use of ambulatory aid, varying levels of pain , acuity of medical illness, ongoing medical treatment of primary dx, and  unstable vitals. Pt's clinical presentation is currently unstable/unpredictable as seen in pt's presentation of vital sign response, changing level of pain, increased fall risk, new onset of impairment of functional mobility, and decreased endurance. Pt will benefit from continued PT services in order to address impairments, decrease risk of falls, maximize independence w/ fnxl mobility, & ensure safety w/ mobility for transition to next level of care. Based on pt presentation & impairments, pt would most appropriately benefit from Level III (minimal PT intensity) resources upon d/c.    Barriers to Discharge: Inaccessible home environment     Rehab Resource Intensity Level, PT: III (Minimum Resource Intensity)    See flowsheet documentation for full assessment.

## 2023-12-05 NOTE — ED NOTES
RN assisted pt with voiding. Pt unsteady on feet, RN attempted to provide pt with nonslip socks but pt refused at this time. Urinal at bedside for future use. Pt educated on how to use urinal in bed. Call bell at bedside and pt advised to use call bell if standing assistance required for voiding.      Ksenia Daniels RN  12/05/23 1754

## 2023-12-05 NOTE — ASSESSMENT & PLAN NOTE
Reports covid infection a few weeks ago.  Managed at home with conservative care.  No recent fever, chills.  Plan as above

## 2023-12-05 NOTE — DISCHARGE INSTR - AVS FIRST PAGE
Discharge Instructions - Orthopedics  Devin Chaves 77 y.o. male MRN: [id number]  Unit/Bed#: DIS-03    Weight Bearing Status:                                           Weight bearing for activities of daily living to the right hand.     Pain:  Continue analgesics as directed    Dressing Instructions:   Change your dressings to the right finger daily. Cover with petroleum gauze, 4x4 gauze, and laura.     Appt Instructions:   If you do not have your appointment, please call the clinic at [phone] to schedule with Dr. Mckay.   Otherwise follow up as scheduled.    Contact the office sooner if you experience any increased numbness/tingling in the extremities.

## 2023-12-05 NOTE — ASSESSMENT & PLAN NOTE
Worsening shortness of breath, generalized weakness over the past 3 days.  COVID 2-3 weeks ago.   CTA chest: ground glass opacity  Procalcitonin x 1 negative  IV Solu-Medrol: solumedrol 40 mg q8 hours  Respiratory protocol, nebs.   Xopenex/Atrovent: tid  Budesonide bid  Azithromycin for copd exacerbation   Abx: cefepime, vancomycin in ED. Hold further.  Appears currently only on nasal cannula for comfort(1-2L).  Titrate/remove and keep o2>88%.  Consider pulm eval if ongoing hypoxia noted.  Monitor respiratory status  Tessalon perles GALE   Consulted pulmonology who recommended the following:   Solumedrol 40mg q12h with planned wean to prednisone  Total Azithromycin course  5 days  -Continue Xopenex q6hr   Discontinue atrovent in setting of thick secretions

## 2023-12-05 NOTE — ASSESSMENT & PLAN NOTE
Noted over middle digit  Consulted hand surgery 12/5/23  No immediate hand surgery intervention indicated at this time.   Daily dressing changes   F/u hand x-ray

## 2023-12-06 LAB
ANION GAP SERPL CALCULATED.3IONS-SCNC: 5 MMOL/L
BUN SERPL-MCNC: 30 MG/DL (ref 5–25)
CALCIUM SERPL-MCNC: 8.3 MG/DL (ref 8.4–10.2)
CHLORIDE SERPL-SCNC: 106 MMOL/L (ref 96–108)
CO2 SERPL-SCNC: 24 MMOL/L (ref 21–32)
CREAT SERPL-MCNC: 1.03 MG/DL (ref 0.6–1.3)
ERYTHROCYTE [DISTWIDTH] IN BLOOD BY AUTOMATED COUNT: 14.5 % (ref 11.6–15.1)
GFR SERPL CREATININE-BSD FRML MDRD: 70 ML/MIN/1.73SQ M
GLUCOSE SERPL-MCNC: 152 MG/DL (ref 65–140)
HCT VFR BLD AUTO: 34.6 % (ref 36.5–49.3)
HGB BLD-MCNC: 11.4 G/DL (ref 12–17)
MCH RBC QN AUTO: 31.6 PG (ref 26.8–34.3)
MCHC RBC AUTO-ENTMCNC: 32.9 G/DL (ref 31.4–37.4)
MCV RBC AUTO: 96 FL (ref 82–98)
PLATELET # BLD AUTO: 127 THOUSANDS/UL (ref 149–390)
PMV BLD AUTO: 9 FL (ref 8.9–12.7)
POTASSIUM SERPL-SCNC: 4.6 MMOL/L (ref 3.5–5.3)
PROCALCITONIN SERPL-MCNC: 0.07 NG/ML
RBC # BLD AUTO: 3.61 MILLION/UL (ref 3.88–5.62)
SODIUM SERPL-SCNC: 135 MMOL/L (ref 135–147)
WBC # BLD AUTO: 4.26 THOUSAND/UL (ref 4.31–10.16)

## 2023-12-06 PROCEDURE — 99222 1ST HOSP IP/OBS MODERATE 55: CPT | Performed by: STUDENT IN AN ORGANIZED HEALTH CARE EDUCATION/TRAINING PROGRAM

## 2023-12-06 PROCEDURE — 94640 AIRWAY INHALATION TREATMENT: CPT

## 2023-12-06 PROCEDURE — 84145 PROCALCITONIN (PCT): CPT | Performed by: NURSE PRACTITIONER

## 2023-12-06 PROCEDURE — 85027 COMPLETE CBC AUTOMATED: CPT

## 2023-12-06 PROCEDURE — 80048 BASIC METABOLIC PNL TOTAL CA: CPT

## 2023-12-06 PROCEDURE — 99232 SBSQ HOSP IP/OBS MODERATE 35: CPT | Performed by: INTERNAL MEDICINE

## 2023-12-06 PROCEDURE — 94760 N-INVAS EAR/PLS OXIMETRY 1: CPT

## 2023-12-06 RX ORDER — METHYLPREDNISOLONE SODIUM SUCCINATE 40 MG/ML
40 INJECTION, POWDER, LYOPHILIZED, FOR SOLUTION INTRAMUSCULAR; INTRAVENOUS EVERY 12 HOURS SCHEDULED
Status: COMPLETED | OUTPATIENT
Start: 2023-12-06 | End: 2023-12-07

## 2023-12-06 RX ADMIN — ASPIRIN 81 MG: 81 TABLET, COATED ORAL at 08:34

## 2023-12-06 RX ADMIN — IPRATROPIUM BROMIDE 0.5 MG: 0.5 SOLUTION RESPIRATORY (INHALATION) at 14:25

## 2023-12-06 RX ADMIN — IPRATROPIUM BROMIDE 0.5 MG: 0.5 SOLUTION RESPIRATORY (INHALATION) at 07:50

## 2023-12-06 RX ADMIN — Medication 2000 UNITS: at 08:35

## 2023-12-06 RX ADMIN — ENOXAPARIN SODIUM 40 MG: 40 INJECTION SUBCUTANEOUS at 10:25

## 2023-12-06 RX ADMIN — METHYLPREDNISOLONE SODIUM SUCCINATE 40 MG: 40 INJECTION, POWDER, FOR SOLUTION INTRAMUSCULAR; INTRAVENOUS at 14:23

## 2023-12-06 RX ADMIN — LEVALBUTEROL HYDROCHLORIDE 1.25 MG: 1.25 SOLUTION RESPIRATORY (INHALATION) at 14:25

## 2023-12-06 RX ADMIN — METHYLPREDNISOLONE SODIUM SUCCINATE 40 MG: 40 INJECTION, POWDER, FOR SOLUTION INTRAMUSCULAR; INTRAVENOUS at 05:29

## 2023-12-06 RX ADMIN — BUDESONIDE 0.5 MG: 0.5 INHALANT ORAL at 07:50

## 2023-12-06 RX ADMIN — METOPROLOL TARTRATE 25 MG: 25 TABLET, FILM COATED ORAL at 21:16

## 2023-12-06 RX ADMIN — BENZONATATE 200 MG: 100 CAPSULE ORAL at 21:13

## 2023-12-06 RX ADMIN — FOLIC ACID 1 MG: 1 TABLET ORAL at 08:34

## 2023-12-06 RX ADMIN — AZITHROMYCIN 500 MG: 250 TABLET, FILM COATED ORAL at 05:30

## 2023-12-06 RX ADMIN — BENZONATATE 200 MG: 100 CAPSULE ORAL at 08:34

## 2023-12-06 RX ADMIN — METHYLPREDNISOLONE SODIUM SUCCINATE 40 MG: 40 INJECTION, POWDER, FOR SOLUTION INTRAMUSCULAR; INTRAVENOUS at 21:14

## 2023-12-06 RX ADMIN — BENZONATATE 200 MG: 100 CAPSULE ORAL at 18:35

## 2023-12-06 RX ADMIN — Medication 250 MG: at 08:34

## 2023-12-06 RX ADMIN — LEVALBUTEROL HYDROCHLORIDE 1.25 MG: 1.25 SOLUTION RESPIRATORY (INHALATION) at 20:44

## 2023-12-06 RX ADMIN — PYRIDOXINE HCL TAB 50 MG 100 MG: 50 TAB at 08:35

## 2023-12-06 RX ADMIN — LEVALBUTEROL HYDROCHLORIDE 1.25 MG: 1.25 SOLUTION RESPIRATORY (INHALATION) at 07:50

## 2023-12-06 RX ADMIN — BUDESONIDE 0.5 MG: 0.5 INHALANT ORAL at 20:44

## 2023-12-06 RX ADMIN — GUAIFENESIN 1200 MG: 600 TABLET ORAL at 21:14

## 2023-12-06 RX ADMIN — GUAIFENESIN 1200 MG: 600 TABLET ORAL at 08:34

## 2023-12-06 RX ADMIN — ATORVASTATIN CALCIUM 80 MG: 40 TABLET, FILM COATED ORAL at 18:34

## 2023-12-06 NOTE — CONSULTS
Consultation - Pulmonary Medicine   Devin Chaves 77 y.o. male MRN: [id number]      Reason for Consult: COPD Exacerbation    Devin Chaves is a 77 y.o. male with a PMH of COPD, Tobacco(Quit 2011, 50-100PY), HTN, CVA(Visual/Equilibrium), HFpEF, RA on methorexate and humira who presents with shortness of breath and wheezing    COPD Exacerbation - Patient has expiratory wheeze that started likely with a viral trigger and has progressed despite increased inhaler use. Patient will likely need rest and a few days to open up. Patient likely has mild to moderate disease based on functional status but no PFTs on file  - Recommend Solumedrol 40mg q12hr - can wean to prednisone once patient improves  - Recommend Azithromycin 5 days  - Continue Xopenex q6hr - wean as tolerated  - Can discontinue ipratropium due to it can thicken secretions making it difficult to clear  - Wean FiO2 - Maintain O2 Sat >88%      Sebastian Ernst MD  [facility]    _____________________________________________________________________    HPI:    Devin Chaves is a 77 y.o. male with a PMH of COPD, Tobacco(Quit 2011, 50-100PY), HTN, CVA(Visual/Equilibrium), HFpEF, RA on methorexate and humira who presents with shortness of breath and wheezing     Worsening dyspnea on exertion, started last month, + sick contacts  Tried nebulizers/inhaler - some improvement  Increaesed Mucus, difficulty clearing  [facility] - 1-2 wks prior clinic   Presented due to progressive symptoms     PFT results:  The most recent pulmonary function tests were reviewed.  None     Imaging:  I personally reviewed the images on the PAC system pertinent to today's visit  CT Chest  There are some peripherally oriented areas of interstitial coarsening and groundglass opacity in the lungs. This may possibly be related to the history of recent COVID-19 infection. Short-term follow-up recommended.    Other studies:  TTE  This result has an attachment that is not  available.     Left Ventricle: Systolic function is normal with an ejection fraction   of 55-60%. There is grade I (mild) diastolic dysfunction.     Right Ventricle: Right ventricle cavity is mildly dilated.     Right Atrium: Right atrium cavity is mildly dilated.     Aortic Valve: The aortic valve is trileaflet. The leaflets are mildly   calcified.    Ascending Aorta: The aortic root is mildly dilated.     Tricuspid Valve: There is mild regurgitation.       Review of Systems:  Aside from what is mentioned in the HPI, the review of systems otherwise negative.      There is no immunization history on file for this patient.     Current Medications:    Current Facility-Administered Medications:     acetaminophen (TYLENOL) tablet 650 mg, 650 mg, Oral, Q6H PRN, AMPARO Huddleston    amLODIPine (NORVASC) tablet 5 mg, 5 mg, Oral, Daily, RON HuddlestonNP, 5 mg at 12/05/23 0807    ascorbic acid (VITAMIN C) tablet 250 mg, 250 mg, Oral, Daily, RON HuddlestonNP, 250 mg at 12/06/23 0834    aspirin (ECOTRIN LOW STRENGTH) EC tablet 81 mg, 81 mg, Oral, Daily, RON HuddlestonNP, 81 mg at 12/06/23 0834    atorvastatin (LIPITOR) tablet 80 mg, 80 mg, Oral, Daily With Dinner, AMPARO Huddleston, 80 mg at 12/05/23 1547    azithromycin (ZITHROMAX) tablet 500 mg, 500 mg, Oral, Q24H, AMPARO Huddleston, 500 mg at 12/06/23 0530    benzonatate (TESSALON PERLES) capsule 200 mg, 200 mg, Oral, TID, Boris Everett, DO, 200 mg at 12/06/23 0834    budesonide (PULMICORT) inhalation solution 0.5 mg, 0.5 mg, Nebulization, Q12H, AMPARO Huddleston, 0.5 mg at 12/06/23 0750    cholecalciferol (VITAMIN D3) tablet 2,000 Units, 2,000 Units, Oral, Daily, AMPARO Huddleston, 2,000 Units at 12/06/23 0835    enoxaparin (LOVENOX) subcutaneous injection 40 mg, 40 mg, Subcutaneous, Daily, AMPARO Huddleston, 40 mg at 12/06/23 1025    folic acid (FOLVITE) tablet 1 mg, 1 mg, Oral, Daily, AMPARO Huddleston, 1 mg at 12/06/23 0834    guaiFENesin (MUCINEX) 12 hr tablet 1,200 mg,  "1,200 mg, Oral, Q12H GALE, AMPARO Huddleston, 1,200 mg at 12/06/23 0834    HYDROcodone Bit-Homatrop MBr (HYCODAN) oral syrup 5 mL, 5 mL, Oral, Q6H PRN, Boris Everett DO    ipratropium (ATROVENT) 0.02 % inhalation solution 0.5 mg, 0.5 mg, Nebulization, TID, AMPARO Huddleston, 0.5 mg at 12/06/23 0750    levalbuterol (XOPENEX) inhalation solution 1.25 mg, 1.25 mg, Nebulization, TID, 1.25 mg at 12/06/23 0750 **AND** [DISCONTINUED] sodium chloride 0.9 % inhalation solution 3 mL, 3 mL, Nebulization, TID, AMPARO Huddleston, 3 mL at 12/05/23 1353    losartan (COZAAR) tablet 100 mg, 100 mg, Oral, Daily, AMPARO Huddleston, 100 mg at 12/05/23 0808    methylPREDNISolone sodium succinate (Solu-MEDROL) injection 40 mg, 40 mg, Intravenous, Q8H, AMPARO Huddleston, 40 mg at 12/06/23 0529    metoprolol tartrate (LOPRESSOR) tablet 25 mg, 25 mg, Oral, Q12H GALE, AMPARO Huddleston, 25 mg at 12/05/23 2112    pyridoxine (VITAMIN B6) tablet 100 mg, 100 mg, Oral, Daily, AMPARO Huddleston, 100 mg at 12/06/23 0835    Historical Information   Past Medical History:   Diagnosis Date    COPD (chronic obstructive pulmonary disease) (HCC)     Crushing injury of finger, left     Infectious viral hepatitis      Past Surgical History:   Procedure Laterality Date    AL OPEN TX PHALANGEAL SHAFT FRACTURE PROX/MIDDLE EA Left 1/25/2017    Procedure: ORIF LEFT SMALL FINGER FRACTURE;  Surgeon: Blake Badillo MD;  Location: [facility];  Service: Orthopedics     Social History   Social History     Tobacco Use   Smoking Status Former   Smokeless Tobacco Former    Quit date: 4/1/2011       Family History:   History reviewed. No pertinent family history.      PhysicalExamination:  Vitals:   /57 (BP Location: Right arm)   Pulse 70   Temp 98.4 °F (36.9 °C) (Oral)   Resp 20   Ht 6' 2\" (1.88 m)   Wt 77.4 kg (170 lb 10.2 oz)   SpO2 94%   BMI 21.91 kg/m²     Appearance -- NAD, speaking full sentences  HEENT -- anicteric sclera, clear OP, MMM  Neck -- no " "JVD  Heart -- RRR, no murmurs  Lungs -- Expiratory [place]  Abdomen -- soft, NTND, +bs  Extremities -- WWP, no LE edema  Skin -- no rash  Neuro -- A&Ox3, wnl  Psych -- no obvious depression or hallucination        Diagnostic Data:  Labs:  I personally reviewed the most recent laboratory data pertinent to today's visit    Lab Results   Component Value Date    WBC 4.26 (L) 12/06/2023    HGB 11.4 (L) 12/06/2023    HCT 34.6 (L) 12/06/2023    MCV 96 12/06/2023     (L) 12/06/2023     Lab Results   Component Value Date    CALCIUM 8.3 (L) 12/06/2023    K 4.6 12/06/2023    CO2 24 12/06/2023     12/06/2023    BUN 30 (H) 12/06/2023    CREATININE 1.03 12/06/2023     No results found for: \"IGE\"  Lab Results   Component Value Date    ALT 31 12/04/2023    AST 34 12/04/2023    ALKPHOS 61 12/04/2023           I have spent a total time of 20-50 minutes on 12/06/23 in caring for this patient including Diagnostic results, Prognosis, Risks and benefits of tx options, Instructions for management, Patient and family education, Importance of tx compliance, Risk factor reductions, Impressions, Counseling / Coordination of care, Documenting in the medical record, Reviewing / ordering tests, medicine, procedures  , Obtaining or reviewing history  , and Communicating with other healthcare professionals .   _        "

## 2023-12-06 NOTE — NUTRITION
12/06/23 1241   Recommendations/Interventions   Interventions/Recommendations Request RD protocol   Recommendations to Provider Consider liberalizing diet to 4gm Na to help improve PO intake

## 2023-12-06 NOTE — PLAN OF CARE
Problem: Potential for Falls  Goal: Patient will remain free of falls  Description: INTERVENTIONS:  - Educate patient/family on patient safety including physical limitations  - Instruct patient to call for assistance with activity   - Consult OT/PT to assist with strengthening/mobility   - Keep Call bell within reach  - Keep bed low and locked with side rails adjusted as appropriate  - Keep care items and personal belongings within reach  - Initiate and maintain comfort rounds  - Make Fall Risk Sign visible to staff  - Offer Toileting every  Hours, in advance of need  - Initiate/Maintain alarm  - Obtain necessary fall risk management equipment  - Apply yellow socks and bracelet for high fall risk patients  - Consider moving patient to room near nurses station  Outcome: Progressing     Problem: Prexisting or High Potential for Compromised Skin Integrity  Goal: Skin integrity is maintained or improved  Description: INTERVENTIONS:  - Identify patients at risk for skin breakdown  - Assess and monitor skin integrity  - Assess and monitor nutrition and hydration status  - Monitor labs   - Assess for incontinence   - Turn and reposition patient  - Assist with mobility/ambulation  - Relieve pressure over bony prominences  - Avoid friction and shearing  - Provide appropriate hygiene as needed including keeping skin clean and dry  - Evaluate need for skin moisturizer/barrier cream  - Collaborate with interdisciplinary team   - Patient/family teaching  - Consider wound care consult   Outcome: Progressing

## 2023-12-06 NOTE — MALNUTRITION/BMI
This medical record reflects one or more clinical indicators suggestive of malnutrition.    Malnutrition Findings:   Adult Malnutrition type: Acute illness  Adult Degree of Malnutrition: Malnutrition of moderate degree  Malnutrition Characteristics: Inadequate energy, Weight loss                  360 Statement: Acute/moderate malnutrition r/t condition as evidenced by intake meeting <75% estimated needs > 1 week, and 6% wt loss x 1 month (12/6/23: 170#, 11/15/23: 181#). Treatment: suggest liberalizing diet to 4gm Na. Pt not interested in nutrition supplements at this time. Request RD protocol to make future diet/supplement adjustments       Body mass index is 21.91 kg/m².     See Nutrition note dated 12/6/23 for additional details.  Completed nutrition assessment is viewable in the nutrition documentation.

## 2023-12-06 NOTE — PROGRESS NOTES
[facility]  Progress Note  Name: Devin Chaves I  MRN: [id number]  Unit/Bed#: MS Ricky CARLOS Date of Admission: 12/4/2023   Date of Service: 12/6/2023 I Hospital Day: 1    Assessment/Plan   * Chronic obstructive pulmonary disease with acute exacerbation (HCC)  Assessment & Plan  Worsening shortness of breath, generalized weakness over the past 3 days.  COVID 2-3 weeks ago.   CTA chest: ground glass opacity  Procalcitonin x 1 negative  IV Solu-Medrol: solumedrol 40 mg q8 hours  Respiratory protocol, nebs.   Xopenex/Atrovent: tid  Budesonide bid  Azithromycin for copd exacerbation   Abx: cefepime, vancomycin in ED. Hold further.  Appears currently only on nasal cannula for comfort(1-2L).  Titrate/remove and keep o2>88%.  Consider pulm eval if ongoing hypoxia noted.  Monitor respiratory status  Tessalon perles GALE     Open wound of finger of right hand  Assessment & Plan  Noted over middle digit  Consulted hand surgery 12/5/23  No immediate hand surgery intervention indicated at this time.   Daily dressing changes   F/u hand x-ray    Enlarged prostate  Assessment & Plan  Noted on CT imaging.  Denies urinary symptoms.   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  Managed at home with conservative care.  No recent fever, chills.  Plan as above    CAD (coronary artery disease)  Assessment & Plan  Without anginal complaints  Atherosclerosis, CAD noted on CT scan.  Continue statin, bb, asa  Outpatient follow up    Hypertension  Assessment & Plan  Obtain medical records from [facility] losartan, metoprolol with hold parameters               VTE Pharmacologic Prophylaxis:  VTE Score: 5 High Risk (Score >/= 5) - Pharmacological DVT Prophylaxis Ordered: enoxaparin (Lovenox). Sequential Compression Devices Ordered.    Mobility:   Basic Mobility Inpatient Raw Score: 20  JH-HLM Goal: 6: Walk 10 steps or more  JH-HLM Achieved: 8: Walk 250 feet ot more  HLM Goal achieved. Continue to encourage appropriate mobility.    Patient Centered Rounds: I performed bedside rounds with nursing staff today.  Discussions with Specialists or Other Care Team Provider: Pulmonology    Education and Discussions with Family / Patient: Patient declined call to .     Current Length of Stay: 1 day(s)  Current Patient Status: Inpatient   Discharge Plan: Anticipate discharge in 24-48 hrs to home.    Code Status: Level 1 - Full Code    Subjective:   No acute events overnight.  Patient awake alert and oriented in no acute distress conversing normally this morning.  Discussed ongoing respiratory treatment and pending pulmonology consult given minimal improvement in symptoms.  Reviewed rationale for ongoing workup with hand imaging in setting of digital wound.  Patient verbalized understanding of plan and desire to continue inpatient treatment until significant improvement in symptoms observed.  No further questions at this time.    Objective:     Vitals:   Temp (24hrs), Av.4 °F (36.9 °C), Min:98.4 °F (36.9 °C), Max:98.4 °F (36.9 °C)    Temp:  [98.4 °F (36.9 °C)] 98.4 °F (36.9 °C)  HR:  [59-84] 59  Resp:  [20-22] 20  BP: ()/(57-78) 98/59  SpO2:  [92 %-98 %] 95 %  Body mass index is 21.91 kg/m².     Input and Output Summary (last 24 hours):     Intake/Output Summary (Last 24 hours) at 2023 0630  Last data filed at 2023 0601  Gross per 24 hour   Intake --   Output 1000 ml   Net -1000 ml       Physical Exam:   Physical Exam  Constitutional:       General: He is not in acute distress.     Appearance: Normal appearance.   HENT:      Head: Normocephalic and atraumatic.      Right Ear:  External ear normal.      Left Ear: External ear normal.      Nose: Nose normal.      Mouth/Throat:      Mouth: Mucous membranes are dry.      Pharynx: Oropharynx is clear.   Eyes:      General: No scleral icterus.     Extraocular Movements: Extraocular movements intact.      Conjunctiva/sclera: Conjunctivae normal.   Cardiovascular:      Rate and Rhythm: Normal rate and regular rhythm.      Pulses: Normal pulses.      Heart sounds: Normal heart sounds.   Pulmonary:      Effort: Pulmonary effort is normal.      Breath sounds: Wheezing present.   Abdominal:      General: Bowel sounds are normal. There is no distension.      Palpations: Abdomen is soft.      Tenderness: There is no abdominal tenderness. There is no right CVA tenderness, left CVA tenderness, guarding or rebound.   Musculoskeletal:         General: Normal range of motion.      Cervical back: Normal range of motion and neck supple.   Skin:     General: Skin is warm.      Capillary Refill: Capillary refill takes less than 2 seconds.   Neurological:      General: No focal deficit present.      Mental Status: He is alert and oriented to person, place, and time.          Additional Data:     Labs:  Results from last 7 days   Lab Units 12/06/23 0454 12/04/23  1932   WBC Thousand/uL 4.26* 2.84*   HEMOGLOBIN g/dL 11.4* 12.4   HEMATOCRIT % 34.6* 37.1   PLATELETS Thousands/uL 127* 111*   NEUTROS PCT %  --  82*   LYMPHS PCT %  --  11*   MONOS PCT %  --  7   EOS PCT %  --  0     Results from last 7 days   Lab Units 12/06/23  0452 12/04/23  1932   SODIUM mmol/L 135 133*   POTASSIUM mmol/L 4.6 4.4   CHLORIDE mmol/L 106 103   CO2 mmol/L 24 23   BUN mg/dL 30* 30*   CREATININE mg/dL 1.03 1.27   ANION GAP mmol/L 5 7   CALCIUM mg/dL 8.3* 8.7   ALBUMIN g/dL  --  3.9   TOTAL BILIRUBIN mg/dL  --  0.61   ALK PHOS U/L  --  61   ALT U/L  --  31   AST U/L  --  34   GLUCOSE RANDOM mg/dL 152* 104                 Results from last 7 days   Lab Units 12/06/23  0451 12/05/23  0416  12/04/23 1932   PROCALCITONIN ng/ml 0.07 0.09 0.09       Lines/Drains:  Invasive Devices       Peripheral Intravenous Line  Duration             Peripheral IV 12/05/23 Right;Ventral (anterior) Forearm 1 day                          Imaging: Reviewed radiology reports from this admission including: chest CT scan    Recent Cultures (last 7 days):         Last 24 Hours Medication List:   Current Facility-Administered Medications   Medication Dose Route Frequency Provider Last Rate    acetaminophen  650 mg Oral Q6H PRN Tootie Tineo, RONNP      amLODIPine  5 mg Oral Daily Tootie Tineo, CRNP      ascorbic acid  250 mg Oral Daily Tootie Tineo, CRNP      aspirin  81 mg Oral Daily Tootie Tineo, CRNP      atorvastatin  80 mg Oral Daily With Dinner Tootie Tineo, CRNP      azithromycin  500 mg Oral Q24H Tootie Tineo, CRNP      benzonatate  200 mg Oral TID Boris Everett, DO      budesonide  0.5 mg Nebulization Q12H Tootie Tineo, RONNP      cholecalciferol  2,000 Units Oral Daily Tootie Tineo, CRNP      enoxaparin  40 mg Subcutaneous Daily Tootie Tineo, CRNP      folic acid  1 mg Oral Daily Tootie Tineo, CRNP      guaiFENesin  1,200 mg Oral Q12H [facility] Tootie Tineo, CRNP      HYDROcodone Bit-Homatrop MBr  5 mL Oral Q6H PRN Boris Everett, DO      ipratropium  0.5 mg Nebulization TID Tootie Tineo, RONNP      levalbuterol  1.25 mg Nebulization TID Tootie Tineo, RONNP      losartan  100 mg Oral Daily Tootie Tineo, CRNP      methylPREDNISolone sodium succinate  40 mg Intravenous Q8H Tootie Tineo, CRNP      metoprolol tartrate  25 mg Oral Q12H [facility] Tootie Tineo, CRNP      pyridoxine  100 mg Oral Daily Tootie Tineo, AMPARO          Today, Patient Was Seen By: Eric Blanco MD    **Please Note: This note may have been constructed using a voice recognition system.**

## 2023-12-07 PROBLEM — E44.0 MODERATE PROTEIN-CALORIE MALNUTRITION (HCC): Status: ACTIVE | Noted: 2023-12-07

## 2023-12-07 LAB
ANION GAP SERPL CALCULATED.3IONS-SCNC: 6 MMOL/L
BUN SERPL-MCNC: 30 MG/DL (ref 5–25)
CALCIUM SERPL-MCNC: 8.1 MG/DL (ref 8.4–10.2)
CHLORIDE SERPL-SCNC: 105 MMOL/L (ref 96–108)
CO2 SERPL-SCNC: 22 MMOL/L (ref 21–32)
CREAT SERPL-MCNC: 0.93 MG/DL (ref 0.6–1.3)
ERYTHROCYTE [DISTWIDTH] IN BLOOD BY AUTOMATED COUNT: 14.6 % (ref 11.6–15.1)
GFR SERPL CREATININE-BSD FRML MDRD: 80 ML/MIN/1.73SQ M
GLUCOSE SERPL-MCNC: 153 MG/DL (ref 65–140)
HCT VFR BLD AUTO: 34.4 % (ref 36.5–49.3)
HGB BLD-MCNC: 11.4 G/DL (ref 12–17)
MCH RBC QN AUTO: 31.6 PG (ref 26.8–34.3)
MCHC RBC AUTO-ENTMCNC: 33.1 G/DL (ref 31.4–37.4)
MCV RBC AUTO: 95 FL (ref 82–98)
PLATELET # BLD AUTO: 146 THOUSANDS/UL (ref 149–390)
PMV BLD AUTO: 9.4 FL (ref 8.9–12.7)
POTASSIUM SERPL-SCNC: 4.4 MMOL/L (ref 3.5–5.3)
RBC # BLD AUTO: 3.61 MILLION/UL (ref 3.88–5.62)
SODIUM SERPL-SCNC: 133 MMOL/L (ref 135–147)
WBC # BLD AUTO: 5.15 THOUSAND/UL (ref 4.31–10.16)

## 2023-12-07 PROCEDURE — 99232 SBSQ HOSP IP/OBS MODERATE 35: CPT | Performed by: INTERNAL MEDICINE

## 2023-12-07 PROCEDURE — 94668 MNPJ CHEST WALL SBSQ: CPT

## 2023-12-07 PROCEDURE — 85027 COMPLETE CBC AUTOMATED: CPT

## 2023-12-07 PROCEDURE — 94640 AIRWAY INHALATION TREATMENT: CPT

## 2023-12-07 PROCEDURE — 99232 SBSQ HOSP IP/OBS MODERATE 35: CPT | Performed by: STUDENT IN AN ORGANIZED HEALTH CARE EDUCATION/TRAINING PROGRAM

## 2023-12-07 PROCEDURE — 80048 BASIC METABOLIC PNL TOTAL CA: CPT

## 2023-12-07 PROCEDURE — 94760 N-INVAS EAR/PLS OXIMETRY 1: CPT

## 2023-12-07 RX ORDER — SODIUM CHLORIDE FOR INHALATION 3 %
4 VIAL, NEBULIZER (ML) INHALATION
Status: DISCONTINUED | OUTPATIENT
Start: 2023-12-08 | End: 2023-12-10

## 2023-12-07 RX ORDER — PREDNISONE 20 MG/1
40 TABLET ORAL DAILY
Status: DISCONTINUED | OUTPATIENT
Start: 2023-12-08 | End: 2023-12-11

## 2023-12-07 RX ORDER — SODIUM CHLORIDE FOR INHALATION 3 %
4 VIAL, NEBULIZER (ML) INHALATION
Status: DISCONTINUED | OUTPATIENT
Start: 2023-12-07 | End: 2023-12-07

## 2023-12-07 RX ADMIN — GUAIFENESIN 1200 MG: 600 TABLET ORAL at 08:41

## 2023-12-07 RX ADMIN — SODIUM CHLORIDE SOLN NEBU 3% 4 ML: 3 NEBU SOLN at 20:36

## 2023-12-07 RX ADMIN — BENZONATATE 200 MG: 100 CAPSULE ORAL at 17:04

## 2023-12-07 RX ADMIN — ATORVASTATIN CALCIUM 80 MG: 40 TABLET, FILM COATED ORAL at 17:04

## 2023-12-07 RX ADMIN — DICLOFENAC SODIUM TOPICAL GEL, 1% 2 G: 10 GEL TOPICAL at 11:54

## 2023-12-07 RX ADMIN — BENZONATATE 200 MG: 100 CAPSULE ORAL at 21:12

## 2023-12-07 RX ADMIN — ASPIRIN 81 MG: 81 TABLET, COATED ORAL at 08:42

## 2023-12-07 RX ADMIN — PYRIDOXINE HCL TAB 50 MG 100 MG: 50 TAB at 08:41

## 2023-12-07 RX ADMIN — METOPROLOL TARTRATE 25 MG: 25 TABLET, FILM COATED ORAL at 08:50

## 2023-12-07 RX ADMIN — LOSARTAN POTASSIUM 100 MG: 50 TABLET, FILM COATED ORAL at 08:41

## 2023-12-07 RX ADMIN — ENOXAPARIN SODIUM 40 MG: 40 INJECTION SUBCUTANEOUS at 08:42

## 2023-12-07 RX ADMIN — Medication 250 MG: at 08:41

## 2023-12-07 RX ADMIN — GUAIFENESIN 1200 MG: 600 TABLET ORAL at 21:12

## 2023-12-07 RX ADMIN — BENZONATATE 200 MG: 100 CAPSULE ORAL at 08:42

## 2023-12-07 RX ADMIN — AMLODIPINE BESYLATE 5 MG: 5 TABLET ORAL at 08:41

## 2023-12-07 RX ADMIN — DICLOFENAC SODIUM TOPICAL GEL, 1% 2 G: 10 GEL TOPICAL at 21:20

## 2023-12-07 RX ADMIN — LEVALBUTEROL HYDROCHLORIDE 1.25 MG: 1.25 SOLUTION RESPIRATORY (INHALATION) at 14:15

## 2023-12-07 RX ADMIN — FOLIC ACID 1 MG: 1 TABLET ORAL at 08:41

## 2023-12-07 RX ADMIN — AZITHROMYCIN 500 MG: 250 TABLET, FILM COATED ORAL at 05:24

## 2023-12-07 RX ADMIN — METHYLPREDNISOLONE SODIUM SUCCINATE 40 MG: 40 INJECTION, POWDER, FOR SOLUTION INTRAMUSCULAR; INTRAVENOUS at 21:12

## 2023-12-07 RX ADMIN — BUDESONIDE 0.5 MG: 0.5 INHALANT ORAL at 20:37

## 2023-12-07 RX ADMIN — LEVALBUTEROL HYDROCHLORIDE 1.25 MG: 1.25 SOLUTION RESPIRATORY (INHALATION) at 20:36

## 2023-12-07 RX ADMIN — LEVALBUTEROL HYDROCHLORIDE 1.25 MG: 1.25 SOLUTION RESPIRATORY (INHALATION) at 08:27

## 2023-12-07 RX ADMIN — METHYLPREDNISOLONE SODIUM SUCCINATE 40 MG: 40 INJECTION, POWDER, FOR SOLUTION INTRAMUSCULAR; INTRAVENOUS at 08:42

## 2023-12-07 RX ADMIN — METOPROLOL TARTRATE 25 MG: 25 TABLET, FILM COATED ORAL at 21:12

## 2023-12-07 RX ADMIN — SODIUM CHLORIDE SOLN NEBU 3% 4 ML: 3 NEBU SOLN at 14:15

## 2023-12-07 RX ADMIN — DICLOFENAC SODIUM TOPICAL GEL, 1% 2 G: 10 GEL TOPICAL at 17:05

## 2023-12-07 RX ADMIN — BUDESONIDE 0.5 MG: 0.5 INHALANT ORAL at 08:27

## 2023-12-07 RX ADMIN — Medication 2000 UNITS: at 08:42

## 2023-12-07 NOTE — ASSESSMENT & PLAN NOTE
Atherosclerosis, CAD noted on CT scan.  Patient without current chest pain  Continue statin, blocker and aspirin  Outpatient follow-up

## 2023-12-07 NOTE — PROGRESS NOTES
[facility]  Progress Note  Name: Devin Chaves I  MRN: [id number]  Unit/Bed#: S -01 I Date of Admission: 12/4/2023   Date of Service: 12/7/2023 I Hospital Day: 2    Assessment/Plan   * Chronic obstructive pulmonary disease with acute exacerbation (HCC)  Assessment & Plan  Worsening shortness of breath, generalized weakness over the past 3 days.  COVID 2-3 weeks ago.   CTA chest: ground glass opacity  Procalcitonin x 1 negative  IV Solu-Medrol: solumedrol 40 mg q8 hours  Respiratory protocol, nebs.   Xopenex/Atrovent: tid  Budesonide bid  Azithromycin for copd exacerbation   Abx: cefepime, vancomycin in ED. Hold further.  Appears currently only on nasal cannula for comfort(1-2L).  Titrate/remove and keep o2>88%.  Consider pulm eval if ongoing hypoxia noted.  Monitor respiratory status  Tessalon perles GALE   Consulted pulmonology who recommended the following:   Solumedrol 40mg q12h with planned wean to prednisone 40  tomorrow  with taper by 10 mg every 3 days as tolerated   Started Azithromycin course (5 days)  Continue Xopenex q6hr   Added humidification to neb treatments       Moderate protein-calorie malnutrition (HCC)  Assessment & Plan  Malnutrition Findings:   Adult Malnutrition type: Acute illness  Adult Degree of Malnutrition: Malnutrition of moderate degree  Malnutrition Characteristics: Inadequate energy, Weight loss                  360 Statement: Acute/moderate malnutrition r/t condition as evidenced by intake meeting <75% estimated needs > 1 week, and 6% wt loss x 1 month (12/6/23: 170#, 11/15/23: 181#). Treatment: suggest liberalizing diet to 4gm Na. Pt not interested in nutrition supplements at this time. Request RD protocol to make future diet/supplement adjustments    BMI Findings:           Body mass index is 21.8 kg/m².       Open wound of finger of right hand  Assessment & Plan  Noted over middle digit  Consulted hand surgery 12/5/23  No immediate hand surgery  intervention indicated at this time.   Daily dressing changes   Follow up hand x-ray official read    Enlarged prostate  Assessment & Plan  Noted on CT imaging.  Denies urinary symptoms.   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  Managed at home with conservative care.  No recent fever, chills.  Plan as above    CAD (coronary artery disease)  Assessment & Plan  Without anginal complaints  Atherosclerosis, CAD noted on CT scan.  Continue statin, bb, asa  Outpatient follow up    Hypertension  Assessment & Plan  Obtain medical records from [facility] losartan, metoprolol with hold parameters         VTE Pharmacologic Prophylaxis: VTE Score: 5 High Risk (Score >/= 5) - Pharmacological DVT Prophylaxis Ordered: enoxaparin (Lovenox). Sequential Compression Devices Ordered.    Mobility:   Basic Mobility Inpatient Raw Score: 23  JH-HLM Goal: 7: Walk 25 feet or more  JH-HLM Achieved: 6: Walk 10 steps or more  HLM Goal achieved. Continue to encourage appropriate mobility.    Patient Centered Rounds: I performed bedside rounds with nursing staff today.  Discussions with Specialists or Other Care Team Provider: Pulmonology    Education and Discussions with Family / Patient: Patient declined call to .     Current Length of Stay: 2 day(s)  Current Patient Status: Inpatient   Discharge Plan: Anticipate discharge in 24-48 hrs to home.    Code Status: Level 1 - Full Code    Subjective:   No acute events overnight.  Patient awake alert and oriented in no acute distress conversing normally this morning.  Reports that he still has significant mucosal secretions and difficulty expectorating  them.   Discussed pulm recommendations and addition of humidification to nebulizer treatments. Patient verbalized understanding. No further questions at this time.     Objective:     Vitals:   Temp (24hrs), Av.4 °F (36.3 °C), Min:97.2 °F (36.2 °C), Max:97.8 °F (36.6 °C)    Temp:  [97.2 °F (36.2 °C)-97.8 °F (36.6 °C)] 97.8 °F (36.6 °C)  HR:  [46-86] 76  Resp:  [17-20] 17  BP: (102-127)/(48-68) 102/48  SpO2:  [90 %-100 %] 90 %  Body mass index is 21.8 kg/m².     Input and Output Summary (last 24 hours):   No intake or output data in the 24 hours ending 23 1751      Physical Exam:   Physical Exam  Constitutional:       General: He is not in acute distress.     Appearance: Normal appearance.   HENT:      Head: Normocephalic and atraumatic.      Right Ear: External ear normal.      Left Ear: External ear normal.      Nose: Nose normal.      Mouth/Throat:      Mouth: Mucous membranes are dry.      Pharynx: Oropharynx is clear.   Eyes:      General: No scleral icterus.     Extraocular Movements: Extraocular movements intact.      Conjunctiva/sclera: Conjunctivae normal.   Cardiovascular:      Rate and Rhythm: Normal rate and regular rhythm.      Pulses: Normal pulses.      Heart sounds: Normal heart sounds.   Pulmonary:      Effort: Pulmonary effort is normal.      Breath sounds: Wheezing present.   Abdominal:      General: Bowel sounds are normal. There is no distension.      Palpations: Abdomen is soft.      Tenderness: There is no abdominal tenderness. There is no right CVA tenderness, left CVA tenderness, guarding or rebound.   Musculoskeletal:         General: Normal range of motion.      Cervical back: Normal range of motion and neck supple.   Skin:     General: Skin is warm.      Capillary Refill: Capillary refill takes less than 2 seconds.   Neurological:      General: No focal deficit present.      Mental Status: He is alert and oriented to person, place, and time.          Additional Data:      Labs:  Results from last 7 days   Lab Units 12/07/23  0513 12/06/23 0454 12/04/23 1932   WBC Thousand/uL 5.15   < > 2.84*   HEMOGLOBIN g/dL 11.4*   < > 12.4   HEMATOCRIT % 34.4*   < > 37.1   PLATELETS Thousands/uL 146*   < > 111*   NEUTROS PCT %  --   --  82*   LYMPHS PCT %  --   --  11*   MONOS PCT %  --   --  7   EOS PCT %  --   --  0    < > = values in this interval not displayed.       Results from last 7 days   Lab Units 12/07/23  0513 12/06/23 0452 12/04/23 1932   SODIUM mmol/L 133*   < > 133*   POTASSIUM mmol/L 4.4   < > 4.4   CHLORIDE mmol/L 105   < > 103   CO2 mmol/L 22   < > 23   BUN mg/dL 30*   < > 30*   CREATININE mg/dL 0.93   < > 1.27   ANION GAP mmol/L 6   < > 7   CALCIUM mg/dL 8.1*   < > 8.7   ALBUMIN g/dL  --   --  3.9   TOTAL BILIRUBIN mg/dL  --   --  0.61   ALK PHOS U/L  --   --  61   ALT U/L  --   --  31   AST U/L  --   --  34   GLUCOSE RANDOM mg/dL 153*   < > 104    < > = values in this interval not displayed.                   Results from last 7 days   Lab Units 12/06/23  0451 12/05/23 0416 12/04/23 1932   PROCALCITONIN ng/ml 0.07 0.09 0.09         Lines/Drains:  Invasive Devices       Peripheral Intravenous Line  Duration             Peripheral IV 12/05/23 Right;Ventral (anterior) Forearm 2 days                          Imaging: Reviewed radiology reports from this admission including: chest CT scan    Recent Cultures (last 7 days):         Last 24 Hours Medication List:   Current Facility-Administered Medications   Medication Dose Route Frequency Provider Last Rate    acetaminophen  650 mg Oral Q6H PRN AMPARO Huddleston      amLODIPine  5 mg Oral Daily AMPARO Huddleston      ascorbic acid  250 mg Oral Daily AMPARO Huddleston      aspirin  81 mg Oral Daily AMPARO Hdudleston      atorvastatin  80 mg Oral Daily With Dinner AMPARO Huddleston      azithromycin  500 mg Oral Q24H Eric Blanco MD      benzonatate  200 mg Oral TID Boris Everett DO      budesonide  0.5 mg Nebulization Q12H  AMPARO Huddleston      cholecalciferol  2,000 Units Oral Daily AMPARO Huddleston      Diclofenac Sodium  2 g Topical 4x Daily Eric Blanco MD      enoxaparin  40 mg Subcutaneous Daily AMPARO Huddleston      folic acid  1 mg Oral Daily AMPARO Huddleston      guaiFENesin  1,200 mg Oral Q12H [facility] AMPARO Huddleston      HYDROcodone Bit-Homatrop MBr  5 mL Oral Q6H PRN Boris Everett DO      levalbuterol  1.25 mg Nebulization TID AMPARO Huddleston      losartan  100 mg Oral Daily AMPARO Huddleston      methylPREDNISolone sodium succinate  40 mg Intravenous Q12H [facility] AMPARO Turcios      metoprolol tartrate  25 mg Oral Q12H [facility] AMPARO Huddleston      [START ON 12/8/2023] predniSONE  40 mg Oral Daily AMPARO Turcios      pyridoxine  100 mg Oral Daily AMPARO Huddleston      sodium chloride  4 mL Nebulization Q6H Boris Everett DO          Today, Patient Was Seen By: Eric Blanco MD    **Please Note: This note may have been constructed using a voice recognition system.**

## 2023-12-07 NOTE — WOUND OSTOMY CARE
Wound care consulted for finger wound, however wound care orders are placed for patient. Wound care will sign off at this time.       Sharri HUGON, RN, CWOCN

## 2023-12-07 NOTE — ASSESSMENT & PLAN NOTE
Blood pressures on softer side this morning.  Patient asymptomatic  Continue amlodipine with higher hold parameter, Lopressor twice daily and losartan with hold parameters  Continue to monitor BP per unit routine

## 2023-12-07 NOTE — PLAN OF CARE
Problem: Potential for Falls  Goal: Patient will remain free of falls  Description: INTERVENTIONS:  - Educate patient/family on patient safety including physical limitations  - Instruct patient to call for assistance with activity   - Consult OT/PT to assist with strengthening/mobility   - Keep Call bell within reach  - Keep bed low and locked with side rails adjusted as appropriate  - Keep care items and personal belongings within reach  - Initiate and maintain comfort rounds  - Make Fall Risk Sign visible to staff  - Apply yellow socks and bracelet for high fall risk patients  - Consider moving patient to room near nurses station  Outcome: Progressing

## 2023-12-07 NOTE — ASSESSMENT & PLAN NOTE
Malnutrition Findings:   Adult Malnutrition type: Acute illness  Adult Degree of Malnutrition: Malnutrition of moderate degree  Malnutrition Characteristics: Inadequate energy, Weight loss    360 Statement: Acute/moderate malnutrition r/t condition as evidenced by intake meeting <75% estimated needs > 1 week, and 6% wt loss x 1 month (12/6/23: 170#, 11/15/23: 181#). Treatment: suggest liberalizing diet to 4gm Na. Pt not interested in nutrition supplements at this time. Request RD protocol to make future diet/supplement adjustments    BMI Findings:     Body mass index is 21.8 kg/m².

## 2023-12-07 NOTE — ASSESSMENT & PLAN NOTE
With reports of left lower quadrant pain.  CT abdomen pelvis without acute findings.  Patient reports having a bowel movement for 1 week and just recently went, abdominal pain improved  Tolerating diet well

## 2023-12-07 NOTE — ASSESSMENT & PLAN NOTE
Fusiform ectasia of the ascending thoracic aorta measuring 4 cm  Repeat CT in one year  Outpatient follow-up PCP

## 2023-12-07 NOTE — ASSESSMENT & PLAN NOTE
Noted over middle digit  Consulted hand surgery 12/5/23  No immediate hand surgery intervention indicated at this time.   Daily dressing changes with local wound care as recommended  X-ray hand was completed 12/5/2023 which displayed no acute osseous abnormality.  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

## 2023-12-07 NOTE — ASSESSMENT & PLAN NOTE
Patient presented with worsening shortness of breath, generalized weakness over the past few days.  COVID-positive 2 to 3 weeks prior to admission.  Worsening shortness of breath, generalized weakness over the past 3 days.  COVID 2-3 weeks ago.   CTA chest on admission displaying interstitial coarsening and groundglass opacities.  No evidence of PE.  COPD with emphysema.  With chronic hypoxic respiratory failure, currently on baseline oxygen  Pulmonology consulted and following appreciate recommendations  Received 4 days of oral prednisone 40 mg, decrease to 30 mg today.  Continue for slow taper.  Respiratory protocol, continue scheduled Xopenex and budesonide  Patient has received 5 days azithromycin.   Remains afebrile, without leukocytosis.  Procalcitonin negative x 2  Continue supportive care with Mucinex, flutter valve, chest physiotherapy and Tessalon Perles  No need for further antibiotics

## 2023-12-07 NOTE — PROGRESS NOTES
"Progress Note - Pulmonary   Devin Chaves 77 y.o. male MRN: [id number]  Unit/Bed#: S -01 Encounter: [id number]    Assessment/Plan:    Chronic hypoxic respiratory failure due to COPD of unknown severity with acute exacerbation   Baseline oxygen requirement is 3 L via nasal cannula; however, he does not wear this consistently.   Titrate supplemental oxygen to maintain saturations greater than equal to 89%.   Ambulatory pulse ox prior to discharge.   Continue Solu-Medrol 40 mg IV every 12 hours and transition to prednisone 40 mg daily tomorrow with taper by 10 mg every 3 days as tolerated.   Continue budesonide twice daily with Xopenex and add 3% saline.  Hold ipratropium.   Will continue triple therapy inhalers at discharge with albuterol as needed.   Complete 5-day course of azithromycin.    Nicotine dependence in remission   Quit 2011.   Continued complete cessation.    Rheumatoid arthritis   On methotrexate and Humira.    Outpatient follow-up as per discharge instructions.  Discussed with primary team at bedside.  Will be available as needed.  Please call with further questions or concerns.    Chief Complaint:    \"I have mucus I cannot get out.\"    Subjective:    Devin is sitting up at the side of the bed.  He reports he is having trouble getting mucus out.  He still feels short of breath appetite.  No other complaints.    Objective:    Vitals: Blood pressure 121/68, pulse 69, temperature (!) 97.4 °F (36.3 °C), temperature source Oral, resp. rate 19, height 6' 2\" (1.88 m), weight 77 kg (169 lb 12.1 oz), SpO2 97 %.2L NC,Body mass index is 21.8 kg/m².      Intake/Output Summary (Last 24 hours) at 12/7/2023 1147  Last data filed at 12/6/2023 1230  Gross per 24 hour   Intake 240 ml   Output --   Net 240 ml       Invasive Devices       Peripheral Intravenous Line  Duration             Peripheral IV 12/05/23 Right;Ventral (anterior) Forearm 2 days                    Physical Exam:     Physical Exam  Vitals " reviewed.   Constitutional:       General: He is not in acute distress.     Appearance: He is well-developed. He is not toxic-appearing or diaphoretic.      Interventions: Nasal cannula in place.   HENT:      Head: Normocephalic and atraumatic.   Eyes:      General: No scleral icterus.  Neck:      Trachea: No tracheal deviation.   Cardiovascular:      Rate and Rhythm: Normal rate and regular rhythm.      Heart sounds: S1 normal and S2 normal. No murmur heard.     No friction rub. No gallop.   Pulmonary:      Effort: Pulmonary effort is normal. No tachypnea, accessory muscle usage or respiratory distress.      Breath sounds: No stridor. Decreased breath sounds present. No wheezing, rhonchi or rales.   Chest:      Chest wall: No tenderness.   Abdominal:      General: Bowel sounds are normal. There is no distension.      Palpations: Abdomen is soft.      Tenderness: There is no abdominal tenderness.   Musculoskeletal:         General: No tenderness.      Cervical back: Neck supple.      Right lower leg: No edema.      Left lower leg: No edema.   Skin:     General: Skin is warm and dry.      Findings: No rash.   Neurological:      Mental Status: He is alert and oriented to person, place, and time.      GCS: GCS eye subscore is 4. GCS verbal subscore is 5. GCS motor subscore is 6.   Psychiatric:         Speech: Speech normal.         Behavior: Behavior normal. Behavior is cooperative.       Labs: CBC:   Lab Results   Component Value Date    WBC 5.15 12/07/2023    HGB 11.4 (L) 12/07/2023    HCT 34.4 (L) 12/07/2023    MCV 95 12/07/2023     (L) 12/07/2023    RBC 3.61 (L) 12/07/2023    MCH 31.6 12/07/2023    MCHC 33.1 12/07/2023    RDW 14.6 12/07/2023    MPV 9.4 12/07/2023   , CMP:   Lab Results   Component Value Date    SODIUM 133 (L) 12/07/2023    K 4.4 12/07/2023     12/07/2023    CO2 22 12/07/2023    BUN 30 (H) 12/07/2023    CREATININE 0.93 12/07/2023    CALCIUM 8.1 (L) 12/07/2023    EGFR 80 12/07/2023      Imaging and other studies:  None today

## 2023-12-07 NOTE — ASSESSMENT & PLAN NOTE
With reports of COVID infection 2 to 3 weeks ago.  Managed at home with conservative care.   Continue supportive care as noted above  Pulmonology indicates in anticipation for slow recovery given recent infection

## 2023-12-07 NOTE — ASSESSMENT & PLAN NOTE
Noted on CT imaging.  Denies urinary symptoms.   Monitor intake and output  Outpatient follow up with pcp/urology

## 2023-12-08 LAB
ANION GAP SERPL CALCULATED.3IONS-SCNC: 5 MMOL/L
ATRIAL RATE: 76 BPM
BUN SERPL-MCNC: 31 MG/DL (ref 5–25)
CALCIUM SERPL-MCNC: 8.4 MG/DL (ref 8.4–10.2)
CARDIAC TROPONIN I PNL SERPL HS: 5 NG/L (ref 8–18)
CHLORIDE SERPL-SCNC: 105 MMOL/L (ref 96–108)
CO2 SERPL-SCNC: 25 MMOL/L (ref 21–32)
CREAT SERPL-MCNC: 0.99 MG/DL (ref 0.6–1.3)
GFR SERPL CREATININE-BSD FRML MDRD: 74 ML/MIN/1.73SQ M
GLUCOSE SERPL-MCNC: 140 MG/DL (ref 65–140)
P AXIS: 74 DEGREES
POTASSIUM SERPL-SCNC: 4.6 MMOL/L (ref 3.5–5.3)
PR INTERVAL: 150 MS
QRS AXIS: 76 DEGREES
QRSD INTERVAL: 88 MS
QT INTERVAL: 418 MS
QTC INTERVAL: 470 MS
SODIUM SERPL-SCNC: 135 MMOL/L (ref 135–147)
T WAVE AXIS: 74 DEGREES
VENTRICULAR RATE: 76 BPM

## 2023-12-08 PROCEDURE — 99232 SBSQ HOSP IP/OBS MODERATE 35: CPT | Performed by: INTERNAL MEDICINE

## 2023-12-08 PROCEDURE — 84484 ASSAY OF TROPONIN QUANT: CPT

## 2023-12-08 PROCEDURE — 87205 SMEAR GRAM STAIN: CPT | Performed by: INTERNAL MEDICINE

## 2023-12-08 PROCEDURE — 99232 SBSQ HOSP IP/OBS MODERATE 35: CPT | Performed by: STUDENT IN AN ORGANIZED HEALTH CARE EDUCATION/TRAINING PROGRAM

## 2023-12-08 PROCEDURE — 94640 AIRWAY INHALATION TREATMENT: CPT

## 2023-12-08 PROCEDURE — 94664 DEMO&/EVAL PT USE INHALER: CPT

## 2023-12-08 PROCEDURE — 87070 CULTURE OTHR SPECIMN AEROBIC: CPT | Performed by: INTERNAL MEDICINE

## 2023-12-08 PROCEDURE — 87107 FUNGI IDENTIFICATION MOLD: CPT | Performed by: INTERNAL MEDICINE

## 2023-12-08 PROCEDURE — 80048 BASIC METABOLIC PNL TOTAL CA: CPT

## 2023-12-08 PROCEDURE — 94668 MNPJ CHEST WALL SBSQ: CPT

## 2023-12-08 PROCEDURE — 94760 N-INVAS EAR/PLS OXIMETRY 1: CPT

## 2023-12-08 PROCEDURE — 97530 THERAPEUTIC ACTIVITIES: CPT

## 2023-12-08 PROCEDURE — 94669 MECHANICAL CHEST WALL OSCILL: CPT

## 2023-12-08 PROCEDURE — 93005 ELECTROCARDIOGRAM TRACING: CPT

## 2023-12-08 RX ORDER — LEVALBUTEROL INHALATION SOLUTION 1.25 MG/3ML
1.25 SOLUTION RESPIRATORY (INHALATION)
Status: DISCONTINUED | OUTPATIENT
Start: 2023-12-09 | End: 2023-12-17 | Stop reason: HOSPADM

## 2023-12-08 RX ORDER — ALBUTEROL SULFATE 2.5 MG/3ML
2.5 SOLUTION RESPIRATORY (INHALATION) EVERY 6 HOURS
Status: DISCONTINUED | OUTPATIENT
Start: 2023-12-08 | End: 2023-12-08

## 2023-12-08 RX ORDER — KETOROLAC TROMETHAMINE 30 MG/ML
15 INJECTION, SOLUTION INTRAMUSCULAR; INTRAVENOUS EVERY 6 HOURS PRN
Status: DISPENSED | OUTPATIENT
Start: 2023-12-08 | End: 2023-12-13

## 2023-12-08 RX ADMIN — ASPIRIN 81 MG: 81 TABLET, COATED ORAL at 08:56

## 2023-12-08 RX ADMIN — DICLOFENAC SODIUM TOPICAL GEL, 1% 2 G: 10 GEL TOPICAL at 09:01

## 2023-12-08 RX ADMIN — DICLOFENAC SODIUM TOPICAL GEL, 1% 2 G: 10 GEL TOPICAL at 21:21

## 2023-12-08 RX ADMIN — ENOXAPARIN SODIUM 40 MG: 40 INJECTION SUBCUTANEOUS at 08:56

## 2023-12-08 RX ADMIN — FOLIC ACID 1 MG: 1 TABLET ORAL at 08:56

## 2023-12-08 RX ADMIN — BUDESONIDE 0.5 MG: 0.5 INHALANT ORAL at 08:29

## 2023-12-08 RX ADMIN — PREDNISONE 40 MG: 20 TABLET ORAL at 08:56

## 2023-12-08 RX ADMIN — AZITHROMYCIN 500 MG: 250 TABLET, FILM COATED ORAL at 05:08

## 2023-12-08 RX ADMIN — LOSARTAN POTASSIUM 100 MG: 50 TABLET, FILM COATED ORAL at 08:56

## 2023-12-08 RX ADMIN — GUAIFENESIN 1200 MG: 600 TABLET ORAL at 21:19

## 2023-12-08 RX ADMIN — BENZONATATE 200 MG: 100 CAPSULE ORAL at 08:56

## 2023-12-08 RX ADMIN — KETOROLAC TROMETHAMINE 15 MG: 30 INJECTION, SOLUTION INTRAMUSCULAR; INTRAVENOUS at 08:56

## 2023-12-08 RX ADMIN — AMLODIPINE BESYLATE 5 MG: 5 TABLET ORAL at 08:56

## 2023-12-08 RX ADMIN — SODIUM CHLORIDE SOLN NEBU 3% 4 ML: 3 NEBU SOLN at 08:29

## 2023-12-08 RX ADMIN — BUDESONIDE 0.5 MG: 0.5 INHALANT ORAL at 19:48

## 2023-12-08 RX ADMIN — BENZONATATE 200 MG: 100 CAPSULE ORAL at 17:24

## 2023-12-08 RX ADMIN — GUAIFENESIN 1200 MG: 600 TABLET ORAL at 08:56

## 2023-12-08 RX ADMIN — BENZONATATE 200 MG: 100 CAPSULE ORAL at 21:19

## 2023-12-08 RX ADMIN — DICLOFENAC SODIUM TOPICAL GEL, 1% 2 G: 10 GEL TOPICAL at 17:25

## 2023-12-08 RX ADMIN — DICLOFENAC SODIUM TOPICAL GEL, 1% 2 G: 10 GEL TOPICAL at 12:01

## 2023-12-08 RX ADMIN — ALBUTEROL SULFATE 2.5 MG: 2.5 SOLUTION RESPIRATORY (INHALATION) at 14:11

## 2023-12-08 RX ADMIN — ALBUTEROL SULFATE 2.5 MG: 2.5 SOLUTION RESPIRATORY (INHALATION) at 19:48

## 2023-12-08 RX ADMIN — SODIUM CHLORIDE SOLN NEBU 3% 4 ML: 3 NEBU SOLN at 19:48

## 2023-12-08 RX ADMIN — METOPROLOL TARTRATE 25 MG: 25 TABLET, FILM COATED ORAL at 21:19

## 2023-12-08 RX ADMIN — Medication 2000 UNITS: at 08:56

## 2023-12-08 RX ADMIN — Medication 250 MG: at 08:56

## 2023-12-08 RX ADMIN — ATORVASTATIN CALCIUM 80 MG: 40 TABLET, FILM COATED ORAL at 17:24

## 2023-12-08 RX ADMIN — METOPROLOL TARTRATE 25 MG: 25 TABLET, FILM COATED ORAL at 08:56

## 2023-12-08 RX ADMIN — PYRIDOXINE HCL TAB 50 MG 100 MG: 50 TAB at 08:56

## 2023-12-08 RX ADMIN — ALBUTEROL SULFATE 2.5 MG: 2.5 SOLUTION RESPIRATORY (INHALATION) at 08:29

## 2023-12-08 RX ADMIN — SODIUM CHLORIDE SOLN NEBU 3% 4 ML: 3 NEBU SOLN at 14:11

## 2023-12-08 NOTE — PROGRESS NOTES
"Progress Note - Pulmonary   Devin Chaves 77 y.o. male MRN: [id number]  Unit/Bed#: S -01 Encounter: [id number]    Assessment/Plan:    Chronic hypoxic respiratory failure due to COPD of unknown severity with acute exacerbation              Baseline oxygen requirement is 3 L via nasal cannula; however, he does not wear this consistently.              Titrate supplemental oxygen to maintain saturations greater than equal to 89%.              Ambulatory pulse ox prior to discharge.              Continue prednisone 40 mg daily with taper by 10 mg every 3 days as tolerated.              Continue budesonide twice daily with Xopenex and 3% saline while inpatient.              Will continue triple therapy inhalers at discharge with albuterol as needed.              Complete 5-day course of azithromycin.   Agree with vest therapy to help with sputum expectoration.     Nicotine dependence in remission              Quit 2011.              Continued complete cessation.     Rheumatoid arthritis              On methotrexate and Humira.     Outpatient follow-up as per discharge instructions.  Discussed with primary team at bedside. Will sign off. Please call with questions or concerns.    Chief Complaint:    \"I could not breathe this morning.    Subjective:    Devin is resting in bed this morning.  He reports he could not breathe earlier.  He is having trouble expectorating mucus.  He did have a breathing treatment just recently and feels some improvement.  He is awaiting chest physiotherapy.    Objective:    Vitals: Blood pressure 129/68, pulse 78, temperature 98.6 °F (37 °C), resp. rate 18, height 6' 2\" (1.88 m), weight 73.3 kg (161 lb 8 oz), SpO2 94 %.  Room air,Body mass index is 20.74 kg/m².    No intake or output data in the 24 hours ending 12/08/23 1102    Invasive Devices       Peripheral Intravenous Line  Duration             Peripheral IV 12/05/23 Right;Ventral (anterior) Forearm 3 days              "       Physical Exam:     Physical Exam  Vitals reviewed.   Constitutional:       General: He is not in acute distress.     Appearance: He is well-developed. He is not toxic-appearing or diaphoretic.   HENT:      Head: Normocephalic and atraumatic.   Eyes:      General: No scleral icterus.  Neck:      Trachea: No tracheal deviation.   Cardiovascular:      Rate and Rhythm: Normal rate and regular rhythm.      Heart sounds: S1 normal and S2 normal. No murmur heard.     No friction rub. No gallop.   Pulmonary:      Effort: Pulmonary effort is normal. No tachypnea, accessory muscle usage or respiratory distress.      Breath sounds: Normal breath sounds. No stridor. No decreased breath sounds, wheezing, rhonchi or rales.   Chest:      Chest wall: No tenderness.   Abdominal:      General: Bowel sounds are normal. There is no distension.      Palpations: Abdomen is soft.      Tenderness: There is no abdominal tenderness.   Musculoskeletal:         General: No tenderness.      Cervical back: Neck supple.      Right lower leg: No edema.      Left lower leg: No edema.   Skin:     General: Skin is warm and dry.      Findings: No rash.   Neurological:      Mental Status: He is alert and oriented to person, place, and time.      GCS: GCS eye subscore is 4. GCS verbal subscore is 5. GCS motor subscore is 6.   Psychiatric:         Speech: Speech normal.         Behavior: Behavior normal. Behavior is cooperative.       Labs: CMP:   Lab Results   Component Value Date    SODIUM 135 12/08/2023    K 4.6 12/08/2023     12/08/2023    CO2 25 12/08/2023    BUN 31 (H) 12/08/2023    CREATININE 0.99 12/08/2023    CALCIUM 8.4 12/08/2023    EGFR 74 12/08/2023     Imaging and other studies:  None today

## 2023-12-08 NOTE — PLAN OF CARE
Problem: Potential for Falls  Goal: Patient will remain free of falls  Description: INTERVENTIONS:  - Educate patient/family on patient safety including physical limitations  - Instruct patient to call for assistance with activity   - Consult OT/PT to assist with strengthening/mobility   - Keep Call bell within reach  - Keep bed low and locked with side rails adjusted as appropriate  - Keep care items and personal belongings within reach  - Initiate and maintain comfort rounds  - Make Fall Risk Sign visible to staff  - Apply yellow socks and bracelet for high fall risk patients  - Consider moving patient to room near nurses station  Outcome: Progressing     Problem: Prexisting or High Potential for Compromised Skin Integrity  Goal: Skin integrity is maintained or improved  Description: INTERVENTIONS:  - Identify patients at risk for skin breakdown  - Assess and monitor skin integrity  - Assess and monitor nutrition and hydration status  - Monitor labs   - Assess for incontinence   - Turn and reposition patient  - Assist with mobility/ambulation  - Relieve pressure over bony prominences  - Avoid friction and shearing  - Provide appropriate hygiene as needed including keeping skin clean and dry  - Evaluate need for skin moisturizer/barrier cream  - Collaborate with interdisciplinary team   - Patient/family teaching  - Consider wound care consult   Outcome: Progressing     Problem: Nutrition/Hydration-ADULT  Goal: Nutrient/Hydration intake appropriate for improving, restoring or maintaining nutritional needs  Description: Monitor and assess patient's nutrition/hydration status for malnutrition. Collaborate with interdisciplinary team and initiate plan and interventions as ordered.  Monitor patient's weight and dietary intake as ordered or per policy. Utilize nutrition screening tool and intervene as necessary. Determine patient's food preferences and provide high-protein, high-caloric foods as appropriate.      INTERVENTIONS:  - Monitor oral intake, urinary output, labs, and treatment plans  - Assess nutrition and hydration status and recommend course of action  - Evaluate amount of meals eaten  - Assist patient with eating if necessary   - Allow adequate time for meals  - Recommend/ encourage appropriate diets, oral nutritional supplements, and vitamin/mineral supplements  - Order, calculate, and assess calorie counts as needed  - Recommend, monitor, and adjust tube feedings and TPN/PPN based on assessed needs  - Assess need for intravenous fluids  - Provide specific nutrition/hydration education as appropriate  - Include patient/family/caregiver in decisions related to nutrition  Outcome: Progressing

## 2023-12-08 NOTE — PLAN OF CARE
Problem: PHYSICAL THERAPY ADULT  Goal: Performs mobility at highest level of function for planned discharge setting.  See evaluation for individualized goals.  Description: Treatment/Interventions: Functional transfer training, LE strengthening/ROM, Elevations, Therapeutic exercise, Endurance training, Patient/family training, Equipment eval/education, Bed mobility, Gait training, Spoke to nursing, Spoke to case management, Spoke to advanced practitioner    Equipment Recommended: Walker     See flowsheet documentation for full assessment, interventions and recommendations.  Outcome: Progressing  Note: Prognosis: Good  Problem List: Decreased strength, Decreased endurance, Impaired balance, Decreased mobility  Assessment: Pt agreeable to PT session and motivated to amb this pm. Pt cont to make steady progress with bed mob, trans, balance, endurance and amb with the RW. Pt is making progress toward PT goals. Pt remains appropriate for level III minimum resource intensity when medically appropriate for dc.  Barriers to Discharge: Inaccessible home environment     Rehab Resource Intensity Level, PT: III (Minimum Resource Intensity)    See flowsheet documentation for full assessment.

## 2023-12-08 NOTE — PHYSICAL THERAPY NOTE
"   PT TREATMENT     23 1325   PT Last Visit   PT Visit Date 23   Note Type   Note Type Treatment   Pain Assessment   Pain Assessment Tool 0-10   Pain Score No Pain   Restrictions/Precautions   RUE Weight Bearing Per Order (S)  WBAT  (avoid weight bearing to R middle finger)   Other Precautions Fall Risk   General   Chart Reviewed Yes   Family/Caregiver Present No   Cognition   Overall Cognitive Status WFL   Arousal/Participation Cooperative   Attention Within functional limits   Orientation Level Oriented X4   Memory Within functional limits   Following Commands Follows multistep commands without difficulty   Comments At least 2 pt identifiers including name and    Subjective   Subjective \"I want to walk\"   Bed Mobility   Supine to Sit 7  Independent   Sit to Supine 7  Independent   Transfers   Sit to Stand 7  Independent   Stand to Sit 7  Independent   Ambulation/Elevation   Gait pattern Decreased foot clearance;Foward flexed;Step through pattern   Gait Assistance 6  Modified independent   Additional items Assist x 1;Verbal cues;Tactile cues   Assistive Device Rolling walker   Distance 500+ feet with change in direction with 2-3 brief standing rest breaks during amb. SAO2 93-94% on RA during ambulation   Balance   Static Sitting Good   Static Standing Fair +  (w RW)   Ambulatory Fair  (w RW)   Endurance Deficit   Endurance Deficit Yes   Endurance Deficit Description Pt needing 2-3 brief, standing rest breaks during ambulation   Activity Tolerance   Activity Tolerance Patient limited by fatigue   Assessment   Problem List Decreased strength;Decreased endurance;Impaired balance;Decreased mobility   Assessment Pt agreeable to PT session and motivated to amb this pm. Pt cont to make steady progress with bed mob, trans, balance, endurance and amb with the RW. Pt is making progress toward PT goals. Pt remains appropriate for level III minimum resource intensity when medically appropriate for dc.    The " "patient's AM-[facility] Basic Mobility Inpatient Short Form Raw Score is 21. A Raw score of greater than 16 suggests the patient may benefit from discharge to home. Please also refer to the recommendation of the Physical Therapist for safe discharge planning.   Goals   STG Expiration Date 12/19/23   Short Term Goal #1 Patient PT goals established in order to address patient self reported goal of \"to keep walking\". Pt will: consistently complete all transfers independently in order to increase safety with functional mobility; ambulate >250ft with LRAD at indep/BENJAMIN level in order to increase safety with household and community distance functional mobility; negotiate 3-5 stairs with HR assist and S in order to facilitate safe access to his home; demonstrate understanding and independence with LE strengthening HEP; improve ambulatory balance to >/= good grade with LRAD in order to promote safety and increased independence with mobility; tolerate >3hrs OOB in upright position, in order to improve muscular endurance and respiratory status; improve AM-PAC score to >/= 24/24 in order to increase independence with mobility and decrease burden of care; improve Barthel Index score to >/= 85/100 in order to increase independence and decrease risk of falls.   Plan   Treatment/Interventions ADL retraining;Functional transfer training;LE strengthening/ROM;Elevations;Therapeutic exercise;Endurance training;Patient/family training;Equipment eval/education;Bed mobility;Gait training;Compensatory technique education   PT Frequency 2-3x/wk   Discharge Recommendation   Rehab Resource Intensity Level, PT III (Minimum Resource Intensity)   AM-PAC Basic Mobility Inpatient   Turning in Flat Bed Without Bedrails 4   Lying on Back to Sitting on Edge of Flat Bed Without Bedrails 4   Moving Bed to Chair 3   Standing Up From Chair Using Arms 4   Walk in Room 3   Climb 3-5 Stairs With Railing 3   Basic Mobility Inpatient Raw Score 21   Basic Mobility " Standardized Score 45.55   Highest Level Of Mobility   JH-HLM Goal 6: Walk 10 steps or more   JH-HLM Achieved 7: Walk 25 feet or more   Education   Education Provided Mobility training;Assistive device   Patient Demonstrates acceptance/verbal understanding;Explanation/teachback used   End of Consult   Patient Position at End of Consult Seated edge of bed;All needs within reach   Licensure   [place] License Number  Kanchan L Shobha, PT

## 2023-12-08 NOTE — PROGRESS NOTES
[facility]  Progress Note  Name: Devin Chaves I  MRN: [id number]  Unit/Bed#: S -01 I Date of Admission: 12/4/2023   Date of Service: 12/8/2023 I Hospital Day: 3    Assessment/Plan   * Chronic obstructive pulmonary disease with acute exacerbation (HCC)  Assessment & Plan  Worsening shortness of breath, generalized weakness over the past 3 days.  COVID 2-3 weeks ago.   CTA chest: ground glass opacity  Procalcitonin x 1 negative  IV Solu-Medrol: solumedrol 40 mg q8 hours  Respiratory protocol, nebs.   Xopenex/Atrovent: tid  Budesonide bid  Monitor off further antibiotics  Appears currently only on nasal cannula for comfort(1-2L).  Titrate/remove and keep o2>88%.  Consider pulm eval if ongoing hypoxia noted.  Monitor respiratory status  Tessalon perles GALE   Consulted pulmonology who recommended the following:   Solumedrol 40mg q12h with planned wean to prednisone 40 tomorrow with taper by 10 mg every 3 days as tolerated   Azithromycin course (5 days)  Continue Xopenex q6hr   Continue humidification to neb treatments   Started Chest PT vest with neb treatments      Moderate protein-calorie malnutrition (HCC)  Assessment & Plan  Malnutrition Findings:   Adult Malnutrition type: Acute illness  Adult Degree of Malnutrition: Malnutrition of moderate degree  Malnutrition Characteristics: Inadequate energy, Weight loss                  360 Statement: Acute/moderate malnutrition r/t condition as evidenced by intake meeting <75% estimated needs > 1 week, and 6% wt loss x 1 month (12/6/23: 170#, 11/15/23: 181#). Treatment: suggest liberalizing diet to 4gm Na. Pt not interested in nutrition supplements at this time. Request RD protocol to make future diet/supplement adjustments    BMI Findings:           Body mass index is 21.8 kg/m².       Open wound of finger of right hand  Assessment & Plan  Noted over middle digit  Consulted hand surgery 12/5/23  No immediate hand surgery intervention  indicated at this time.   Daily dressing changes   Follow up hand x-ray official read    Enlarged prostate  Assessment & Plan  Noted on CT imaging.  Denies urinary symptoms.   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  Managed at home with conservative care.  No recent fever, chills.  Plan as above    CAD (coronary artery disease)  Assessment & Plan  Without anginal complaints  Atherosclerosis, CAD noted on CT scan.  Continue statin, bb, asa  Outpatient follow up    Hypertension  Assessment & Plan  Obtain medical records from [facility] losartan, metoprolol with hold parameters         VTE Pharmacologic Prophylaxis: VTE Score: 5 High Risk (Score >/= 5) - Pharmacological DVT Prophylaxis Ordered: enoxaparin (Lovenox). Sequential Compression Devices Ordered.    Mobility:   Basic Mobility Inpatient Raw Score: 21  JH-HLM Goal: 6: Walk 10 steps or more  JH-HLM Achieved: 7: Walk 25 feet or more  HLM Goal achieved. Continue to encourage appropriate mobility.    Patient Centered Rounds: I performed bedside rounds with nursing staff today.  Discussions with Specialists or Other Care Team Provider: Pulmonology    Education and Discussions with Family / Patient: Patient declined call to .     Current Length of Stay: 3 day(s)  Current Patient Status: Inpatient   Discharge Plan: Anticipate discharge in 24-48 hrs to home.    Code Status: Level 1 - Full Code    Subjective:   Patient continues to saturate well on ambient air while at rest.  Reported some chest pain this morning.  EKG without significant change from prior.  Random at bedtime troponin negative.  Patient reporting he is able to  expectorate some ROSC of the sputum following chest PT.  Reports that this is helping with his sensation of chest tightness.  Discussed plan for continued inpatient treatment and monitoring.  Patient verbalized understanding of and agreement to plan.  No further questions at this time.    Objective:     Vitals:   Temp (24hrs), Av.3 °F (36.8 °C), Min:97.5 °F (36.4 °C), Max:98.6 °F (37 °C)    Temp:  [97.5 °F (36.4 °C)-98.6 °F (37 °C)] 97.5 °F (36.4 °C)  HR:  [69-85] 81  Resp:  [17-18] 18  BP: (110-130)/(59-68) 110/59  SpO2:  [93 %-100 %] 93 %  Body mass index is 20.74 kg/m².     Input and Output Summary (last 24 hours):   No intake or output data in the 24 hours ending 23 1600      Physical Exam:   Physical Exam  Constitutional:       General: He is not in acute distress.     Appearance: Normal appearance.   HENT:      Head: Normocephalic and atraumatic.      Right Ear: External ear normal.      Left Ear: External ear normal.      Nose: Nose normal.      Mouth/Throat:      Mouth: Mucous membranes are dry.      Pharynx: Oropharynx is clear.   Eyes:      General: No scleral icterus.     Extraocular Movements: Extraocular movements intact.      Conjunctiva/sclera: Conjunctivae normal.   Cardiovascular:      Rate and Rhythm: Normal rate and regular rhythm.      Pulses: Normal pulses.      Heart sounds: Normal heart sounds.   Pulmonary:      Effort: Pulmonary effort is normal.      Breath sounds: Wheezing present.   Abdominal:      General: Bowel sounds are normal. There is no distension.      Palpations: Abdomen is soft.      Tenderness: There is no abdominal tenderness. There is no right CVA tenderness, left CVA tenderness, guarding or rebound.   Musculoskeletal:         General: Normal range of motion.      Cervical back: Normal range of motion and neck supple.   Skin:     General: Skin is warm.      Capillary Refill: Capillary refill takes less than 2 seconds.   Neurological:      General: No focal deficit  present.      Mental Status: He is alert and oriented to person, place, and time.          Additional Data:     Labs:  Results from last 7 days   Lab Units 12/07/23  0513 12/06/23 0454 12/04/23 1932   WBC Thousand/uL 5.15   < > 2.84*   HEMOGLOBIN g/dL 11.4*   < > 12.4   HEMATOCRIT % 34.4*   < > 37.1   PLATELETS Thousands/uL 146*   < > 111*   NEUTROS PCT %  --   --  82*   LYMPHS PCT %  --   --  11*   MONOS PCT %  --   --  7   EOS PCT %  --   --  0    < > = values in this interval not displayed.     Results from last 7 days   Lab Units 12/08/23  0521 12/06/23 0452 12/04/23 1932   SODIUM mmol/L 135   < > 133*   POTASSIUM mmol/L 4.6   < > 4.4   CHLORIDE mmol/L 105   < > 103   CO2 mmol/L 25   < > 23   BUN mg/dL 31*   < > 30*   CREATININE mg/dL 0.99   < > 1.27   ANION GAP mmol/L 5   < > 7   CALCIUM mg/dL 8.4   < > 8.7   ALBUMIN g/dL  --   --  3.9   TOTAL BILIRUBIN mg/dL  --   --  0.61   ALK PHOS U/L  --   --  61   ALT U/L  --   --  31   AST U/L  --   --  34   GLUCOSE RANDOM mg/dL 140   < > 104    < > = values in this interval not displayed.                 Results from last 7 days   Lab Units 12/06/23  0451 12/05/23 0416 12/04/23 1932   PROCALCITONIN ng/ml 0.07 0.09 0.09       Lines/Drains:  Invasive Devices       Peripheral Intravenous Line  Duration             Peripheral IV 12/05/23 Right;Ventral (anterior) Forearm 3 days                          Imaging: Reviewed radiology reports from this admission including: chest CT scan    Recent Cultures (last 7 days):         Last 24 Hours Medication List:   Current Facility-Administered Medications   Medication Dose Route Frequency Provider Last Rate    acetaminophen  650 mg Oral Q6H PRN AMPARO Huddleston      albuterol  2.5 mg Nebulization Q6H Eric Blanco MD      amLODIPine  5 mg Oral Daily AMPARO Huddleston      ascorbic acid  250 mg Oral Daily AMPARO Huddleston      aspirin  81 mg Oral Daily AMPARO Huddleston      atorvastatin  80 mg Oral Daily With Dinner Tootie Tineo,  RONNP      azithromycin  500 mg Oral Q24H Eric Blanco MD      benzonatate  200 mg Oral TID Boris Everett DO      budesonide  0.5 mg Nebulization Q12H AMPARO Huddleston      cholecalciferol  2,000 Units Oral Daily AMPARO Huddleston      Diclofenac Sodium  2 g Topical 4x Daily Eric Blanco MD      enoxaparin  40 mg Subcutaneous Daily AMPARO Huddleston      folic acid  1 mg Oral Daily AMPARO Huddleston      guaiFENesin  1,200 mg Oral Q12H [facility] AMPARO Huddleston      HYDROcodone Bit-Homatrop MBr  5 mL Oral Q6H PRN Boris Everett DO      ketorolac  15 mg Intravenous Q6H PRN Eric Blanco MD      losartan  100 mg Oral Daily AMPARO Huddleston      metoprolol tartrate  25 mg Oral Q12H [facility] AMPARO Huddleston      predniSONE  40 mg Oral Daily AMPARO Turcios      pyridoxine  100 mg Oral Daily AMPARO Huddleston      sodium chloride  4 mL Nebulization TID Boris Everett DO          Today, Patient Was Seen By: Eric Blanco MD    **Please Note: This note may have been constructed using a voice recognition system.**

## 2023-12-09 PROCEDURE — 94640 AIRWAY INHALATION TREATMENT: CPT

## 2023-12-09 PROCEDURE — 94669 MECHANICAL CHEST WALL OSCILL: CPT

## 2023-12-09 PROCEDURE — 94760 N-INVAS EAR/PLS OXIMETRY 1: CPT

## 2023-12-09 PROCEDURE — 94664 DEMO&/EVAL PT USE INHALER: CPT

## 2023-12-09 PROCEDURE — 94668 MNPJ CHEST WALL SBSQ: CPT

## 2023-12-09 PROCEDURE — 99232 SBSQ HOSP IP/OBS MODERATE 35: CPT | Performed by: INTERNAL MEDICINE

## 2023-12-09 RX ADMIN — LEVALBUTEROL HYDROCHLORIDE 1.25 MG: 1.25 SOLUTION RESPIRATORY (INHALATION) at 21:22

## 2023-12-09 RX ADMIN — AZITHROMYCIN 500 MG: 250 TABLET, FILM COATED ORAL at 05:28

## 2023-12-09 RX ADMIN — ENOXAPARIN SODIUM 40 MG: 40 INJECTION SUBCUTANEOUS at 10:05

## 2023-12-09 RX ADMIN — LEVALBUTEROL HYDROCHLORIDE 1.25 MG: 1.25 SOLUTION RESPIRATORY (INHALATION) at 13:39

## 2023-12-09 RX ADMIN — BENZONATATE 200 MG: 100 CAPSULE ORAL at 21:16

## 2023-12-09 RX ADMIN — KETOROLAC TROMETHAMINE 15 MG: 30 INJECTION, SOLUTION INTRAMUSCULAR; INTRAVENOUS at 17:12

## 2023-12-09 RX ADMIN — ASPIRIN 81 MG: 81 TABLET, COATED ORAL at 10:05

## 2023-12-09 RX ADMIN — BENZONATATE 200 MG: 100 CAPSULE ORAL at 17:12

## 2023-12-09 RX ADMIN — BENZONATATE 200 MG: 100 CAPSULE ORAL at 10:05

## 2023-12-09 RX ADMIN — GUAIFENESIN 1200 MG: 600 TABLET ORAL at 10:05

## 2023-12-09 RX ADMIN — FOLIC ACID 1 MG: 1 TABLET ORAL at 10:05

## 2023-12-09 RX ADMIN — SODIUM CHLORIDE SOLN NEBU 3% 4 ML: 3 NEBU SOLN at 07:07

## 2023-12-09 RX ADMIN — GUAIFENESIN 1200 MG: 600 TABLET ORAL at 21:16

## 2023-12-09 RX ADMIN — AMLODIPINE BESYLATE 5 MG: 5 TABLET ORAL at 10:05

## 2023-12-09 RX ADMIN — BUDESONIDE 0.5 MG: 0.5 INHALANT ORAL at 21:22

## 2023-12-09 RX ADMIN — LOSARTAN POTASSIUM 100 MG: 50 TABLET, FILM COATED ORAL at 10:04

## 2023-12-09 RX ADMIN — DICLOFENAC SODIUM TOPICAL GEL, 1% 2 G: 10 GEL TOPICAL at 10:06

## 2023-12-09 RX ADMIN — PREDNISONE 40 MG: 20 TABLET ORAL at 10:04

## 2023-12-09 RX ADMIN — DICLOFENAC SODIUM TOPICAL GEL, 1% 2 G: 10 GEL TOPICAL at 17:13

## 2023-12-09 RX ADMIN — Medication 250 MG: at 10:04

## 2023-12-09 RX ADMIN — Medication 2000 UNITS: at 10:05

## 2023-12-09 RX ADMIN — KETOROLAC TROMETHAMINE 15 MG: 30 INJECTION, SOLUTION INTRAMUSCULAR; INTRAVENOUS at 05:26

## 2023-12-09 RX ADMIN — SODIUM CHLORIDE SOLN NEBU 3% 4 ML: 3 NEBU SOLN at 21:22

## 2023-12-09 RX ADMIN — ATORVASTATIN CALCIUM 80 MG: 40 TABLET, FILM COATED ORAL at 17:12

## 2023-12-09 RX ADMIN — PYRIDOXINE HCL TAB 50 MG 100 MG: 50 TAB at 10:04

## 2023-12-09 RX ADMIN — SODIUM CHLORIDE SOLN NEBU 3% 4 ML: 3 NEBU SOLN at 13:39

## 2023-12-09 RX ADMIN — METOPROLOL TARTRATE 25 MG: 25 TABLET, FILM COATED ORAL at 10:05

## 2023-12-09 RX ADMIN — LEVALBUTEROL HYDROCHLORIDE 1.25 MG: 1.25 SOLUTION RESPIRATORY (INHALATION) at 07:07

## 2023-12-09 RX ADMIN — BUDESONIDE 0.5 MG: 0.5 INHALANT ORAL at 07:07

## 2023-12-09 NOTE — PROGRESS NOTES
[facility]  Progress Note  Name: Devin Chaves I  MRN: [id number]  Unit/Bed#: S -01 I Date of Admission: 12/4/2023   Date of Service: 12/9/2023 I Hospital Day: 4    Assessment/Plan   * Chronic obstructive pulmonary disease with acute exacerbation (HCC)  Assessment & Plan  Worsening shortness of breath, generalized weakness over the past 3 days.  COVID 2-3 weeks ago.   CTA chest: ground glass opacity  Procalcitonin x 1 negative  IV Solu-Medrol: solumedrol 40 mg q8 hours  Respiratory protocol, nebs.   Xopenex/Atrovent: tid  Budesonide bid  Monitor off further antibiotics  Appears currently only on nasal cannula for comfort(1-2L).  Titrate/remove and keep o2>88%.  Consider pulm eval if ongoing hypoxia noted.  Monitor respiratory status  Tessalon perles GALE   F/u sputum culture  Consulted pulmonology who recommended the following:   Continue prednisone 40 mg with taper by 10 mg every 3 days as tolerated   Completed 5-day Azithromycin course   Continue Xopenex q6hr   Continue humidification to neb treatments   Chest PT vest with neb treatments      Moderate protein-calorie malnutrition (HCC)  Assessment & Plan  Malnutrition Findings:   Adult Malnutrition type: Acute illness  Adult Degree of Malnutrition: Malnutrition of moderate degree  Malnutrition Characteristics: Inadequate energy, Weight loss                  360 Statement: Acute/moderate malnutrition r/t condition as evidenced by intake meeting <75% estimated needs > 1 week, and 6% wt loss x 1 month (12/6/23: 170#, 11/15/23: 181#). Treatment: suggest liberalizing diet to 4gm Na. Pt not interested in nutrition supplements at this time. Request RD protocol to make future diet/supplement adjustments    BMI Findings:           Body mass index is 21.8 kg/m².       Open wound of finger of right hand  Assessment & Plan  Noted over middle digit  Consulted hand surgery 12/5/23  No immediate hand surgery intervention indicated at this time.    Daily dressing changes   X-ray hand showed scapholunate advanced collapse, no bony abnormality     Enlarged prostate  Assessment & Plan  Noted on CT imaging.  Denies urinary symptoms.   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  Managed at home with conservative care.  No recent fever, chills.  Plan as above    CAD (coronary artery disease)  Assessment & Plan  Without anginal complaints  Atherosclerosis, CAD noted on CT scan.  Continue statin, bb, asa  Outpatient follow up    Hypertension  Assessment & Plan  Obtain medical records from [facility] losartan, metoprolol with hold parameters         VTE Pharmacologic Prophylaxis: VTE Score: 5 High Risk (Score >/= 5) - Pharmacological DVT Prophylaxis Ordered: enoxaparin (Lovenox). Sequential Compression Devices Ordered.    Mobility:   Basic Mobility Inpatient Raw Score: 21  JH-HLM Goal: 6: Walk 10 steps or more  JH-HLM Achieved: 8: Walk 250 feet ot more  HLM Goal achieved. Continue to encourage appropriate mobility.    Patient Centered Rounds: I performed bedside rounds with nursing staff today.  Discussions with Specialists or Other Care Team Provider: Pulmonology    Education and Discussions with Family / Patient: Attempted to update  (daughter) via phone. Unable to contact.    Current Length of Stay: 4 day(s)  Current Patient Status: Inpatient   Discharge Plan: Anticipate discharge in 24-48 hrs to home.    Code Status: Level 1 - Full Code    Subjective:   No events overnight patient AAOX3 in no acute distress. Patient reports he is coughing up mre mucus with use of chest PT with nebs. Reports slow but steady improvement in  symptoms. Discussed continued inpatient monitoring/treatment. Patient without further concerns at this time.     Objective:     Vitals:   Temp (24hrs), Av.3 °F (36.8 °C), Min:98.2 °F (36.8 °C), Max:98.3 °F (36.8 °C)    Temp:  [98.2 °F (36.8 °C)-98.3 °F (36.8 °C)] 98.2 °F (36.8 °C)  HR:  [70-85] 85  Resp:  [18-20] 20  BP: ()/(59-65) 129/64  SpO2:  [82 %-100 %] 93 %  Body mass index is 23.44 kg/m².     Input and Output Summary (last 24 hours):     Intake/Output Summary (Last 24 hours) at 2023 1551  Last data filed at 2023 1930  Gross per 24 hour   Intake 240 ml   Output --   Net 240 ml         Physical Exam:   Physical Exam  Constitutional:       General: He is not in acute distress.     Appearance: Normal appearance.   HENT:      Head: Normocephalic and atraumatic.      Right Ear: External ear normal.      Left Ear: External ear normal.      Nose: Nose normal.      Mouth/Throat:      Mouth: Mucous membranes are dry.      Pharynx: Oropharynx is clear.   Eyes:      General: No scleral icterus.     Extraocular Movements: Extraocular movements intact.      Conjunctiva/sclera: Conjunctivae normal.   Cardiovascular:      Rate and Rhythm: Normal rate and regular rhythm.      Pulses: Normal pulses.      Heart sounds: Normal heart sounds.   Pulmonary:      Effort: Pulmonary effort is normal.      Breath sounds: Wheezing present.   Abdominal:      General: Bowel sounds are normal. There is no distension.      Palpations: Abdomen is soft.      Tenderness: There is no abdominal tenderness. There is no right CVA tenderness, left CVA tenderness, guarding or rebound.   Musculoskeletal:         General: Normal range of motion.      Cervical back: Normal range of motion and neck supple.   Skin:     General: Skin is warm.      Capillary Refill: Capillary refill takes less than 2 seconds.   Neurological:      General: No focal deficit present.      Mental Status: He is alert and oriented to person, place, and time.           Additional Data:     Labs:  Results from last 7 days   Lab Units 12/07/23  0513 12/06/23  0454 12/04/23  1932   WBC Thousand/uL 5.15   < > 2.84*   HEMOGLOBIN g/dL 11.4*   < > 12.4   HEMATOCRIT % 34.4*   < > 37.1   PLATELETS Thousands/uL 146*   < > 111*   NEUTROS PCT %  --   --  82*   LYMPHS PCT %  --   --  11*   MONOS PCT %  --   --  7   EOS PCT %  --   --  0    < > = values in this interval not displayed.     Results from last 7 days   Lab Units 12/08/23  0521 12/06/23 0452 12/04/23 1932   SODIUM mmol/L 135   < > 133*   POTASSIUM mmol/L 4.6   < > 4.4   CHLORIDE mmol/L 105   < > 103   CO2 mmol/L 25   < > 23   BUN mg/dL 31*   < > 30*   CREATININE mg/dL 0.99   < > 1.27   ANION GAP mmol/L 5   < > 7   CALCIUM mg/dL 8.4   < > 8.7   ALBUMIN g/dL  --   --  3.9   TOTAL BILIRUBIN mg/dL  --   --  0.61   ALK PHOS U/L  --   --  61   ALT U/L  --   --  31   AST U/L  --   --  34   GLUCOSE RANDOM mg/dL 140   < > 104    < > = values in this interval not displayed.                 Results from last 7 days   Lab Units 12/06/23  0451 12/05/23 0416 12/04/23 1932   PROCALCITONIN ng/ml 0.07 0.09 0.09       Lines/Drains:  Invasive Devices       Peripheral Intravenous Line  Duration             Peripheral IV 12/09/23 Left Forearm <1 day                    Imaging: Reviewed radiology reports from this admission including: chest CT scan    Recent Cultures (last 7 days):   Results from last 7 days   Lab Units 12/08/23  1840   GRAM STAIN RESULT  1+ Epithelial Cells*  2+ Disintegrating polys*  2+ Gram positive cocci in pairs and chains*  1+ Gram positive rods*       Last 24 Hours Medication List:   Current Facility-Administered Medications   Medication Dose Route Frequency Provider Last Rate    acetaminophen  650 mg Oral Q6H PRN AMPARO Huddleston      amLODIPine  5 mg Oral Daily AMPARO Huddleston      ascorbic acid  250 mg Oral Daily AMPARO Huddleston      aspirin  81 mg Oral Daily AMPARO Huddleston      atorvastatin  80 mg Oral  Daily With Dinner AMPARO Huddleston      benzonatate  200 mg Oral TID Boris Everett DO      budesonide  0.5 mg Nebulization Q12H AMPARO Huddleston      cholecalciferol  2,000 Units Oral Daily AMPARO Huddleston      Diclofenac Sodium  2 g Topical 4x Daily Eric Blanco MD      enoxaparin  40 mg Subcutaneous Daily AMPARO Huddleston      folic acid  1 mg Oral Daily AMPARO Huddleston      guaiFENesin  1,200 mg Oral Q12H [facility] AMPARO Huddleston      HYDROcodone Bit-Homatrop MBr  5 mL Oral Q6H PRN Boris Everett,       ketorolac  15 mg Intravenous Q6H PRN Eric Blanco MD      levalbuterol  1.25 mg Nebulization TID Boris Everett DO      losartan  100 mg Oral Daily AMPARO Huddleston      metoprolol tartrate  25 mg Oral Q12H [facility] AMPARO Huddleston      predniSONE  40 mg Oral Daily AMPARO Turcios      pyridoxine  100 mg Oral Daily AMPARO Huddleston      sodium chloride  4 mL Nebulization TID Boris Everett DO          Today, Patient Was Seen By: Eric Blanco MD    **Please Note: This note may have been constructed using a voice recognition system.**

## 2023-12-10 ENCOUNTER — APPOINTMENT (INPATIENT)
Dept: RADIOLOGY | Facility: HOSPITAL | Age: 78
DRG: 190 | End: 2023-12-10
Payer: COMMERCIAL

## 2023-12-10 LAB
ERYTHROCYTE [DISTWIDTH] IN BLOOD BY AUTOMATED COUNT: 14.8 % (ref 11.6–15.1)
HCT VFR BLD AUTO: 39.5 % (ref 36.5–49.3)
HGB BLD-MCNC: 13.1 G/DL (ref 12–17)
MCH RBC QN AUTO: 31.6 PG (ref 26.8–34.3)
MCHC RBC AUTO-ENTMCNC: 33.2 G/DL (ref 31.4–37.4)
MCV RBC AUTO: 95 FL (ref 82–98)
PLATELET # BLD AUTO: 162 THOUSANDS/UL (ref 149–390)
PMV BLD AUTO: 8.9 FL (ref 8.9–12.7)
RBC # BLD AUTO: 4.14 MILLION/UL (ref 3.88–5.62)
WBC # BLD AUTO: 5.11 THOUSAND/UL (ref 4.31–10.16)

## 2023-12-10 PROCEDURE — 94640 AIRWAY INHALATION TREATMENT: CPT

## 2023-12-10 PROCEDURE — 94669 MECHANICAL CHEST WALL OSCILL: CPT

## 2023-12-10 PROCEDURE — 94760 N-INVAS EAR/PLS OXIMETRY 1: CPT

## 2023-12-10 PROCEDURE — 99232 SBSQ HOSP IP/OBS MODERATE 35: CPT | Performed by: INTERNAL MEDICINE

## 2023-12-10 PROCEDURE — 85027 COMPLETE CBC AUTOMATED: CPT

## 2023-12-10 PROCEDURE — 71046 X-RAY EXAM CHEST 2 VIEWS: CPT

## 2023-12-10 RX ADMIN — Medication 2000 UNITS: at 08:13

## 2023-12-10 RX ADMIN — BENZONATATE 200 MG: 100 CAPSULE ORAL at 23:17

## 2023-12-10 RX ADMIN — SODIUM CHLORIDE SOLN NEBU 3% 4 ML: 3 NEBU SOLN at 07:18

## 2023-12-10 RX ADMIN — GUAIFENESIN 1200 MG: 600 TABLET ORAL at 08:14

## 2023-12-10 RX ADMIN — KETOROLAC TROMETHAMINE 15 MG: 30 INJECTION, SOLUTION INTRAMUSCULAR; INTRAVENOUS at 23:34

## 2023-12-10 RX ADMIN — GUAIFENESIN 1200 MG: 600 TABLET ORAL at 23:17

## 2023-12-10 RX ADMIN — KETOROLAC TROMETHAMINE 15 MG: 30 INJECTION, SOLUTION INTRAMUSCULAR; INTRAVENOUS at 08:21

## 2023-12-10 RX ADMIN — DICLOFENAC SODIUM TOPICAL GEL, 1% 2 G: 10 GEL TOPICAL at 17:14

## 2023-12-10 RX ADMIN — ENOXAPARIN SODIUM 40 MG: 40 INJECTION SUBCUTANEOUS at 08:14

## 2023-12-10 RX ADMIN — Medication 250 MG: at 08:13

## 2023-12-10 RX ADMIN — PREDNISONE 40 MG: 20 TABLET ORAL at 08:13

## 2023-12-10 RX ADMIN — LEVALBUTEROL HYDROCHLORIDE 1.25 MG: 1.25 SOLUTION RESPIRATORY (INHALATION) at 14:08

## 2023-12-10 RX ADMIN — METOPROLOL TARTRATE 25 MG: 25 TABLET, FILM COATED ORAL at 23:17

## 2023-12-10 RX ADMIN — BENZONATATE 200 MG: 100 CAPSULE ORAL at 08:13

## 2023-12-10 RX ADMIN — AMLODIPINE BESYLATE 5 MG: 5 TABLET ORAL at 08:13

## 2023-12-10 RX ADMIN — LEVALBUTEROL HYDROCHLORIDE 1.25 MG: 1.25 SOLUTION RESPIRATORY (INHALATION) at 20:24

## 2023-12-10 RX ADMIN — BUDESONIDE 0.5 MG: 0.5 INHALANT ORAL at 20:24

## 2023-12-10 RX ADMIN — LEVALBUTEROL HYDROCHLORIDE 1.25 MG: 1.25 SOLUTION RESPIRATORY (INHALATION) at 07:18

## 2023-12-10 RX ADMIN — ASPIRIN 81 MG: 81 TABLET, COATED ORAL at 08:13

## 2023-12-10 RX ADMIN — ATORVASTATIN CALCIUM 80 MG: 40 TABLET, FILM COATED ORAL at 17:13

## 2023-12-10 RX ADMIN — FOLIC ACID 1 MG: 1 TABLET ORAL at 08:13

## 2023-12-10 RX ADMIN — BUDESONIDE 0.5 MG: 0.5 INHALANT ORAL at 07:18

## 2023-12-10 RX ADMIN — DICLOFENAC SODIUM TOPICAL GEL, 1% 2 G: 10 GEL TOPICAL at 08:14

## 2023-12-10 RX ADMIN — SODIUM CHLORIDE SOLN NEBU 3% 4 ML: 3 NEBU SOLN at 14:08

## 2023-12-10 RX ADMIN — PYRIDOXINE HCL TAB 50 MG 100 MG: 50 TAB at 08:13

## 2023-12-10 RX ADMIN — METOPROLOL TARTRATE 25 MG: 25 TABLET, FILM COATED ORAL at 08:13

## 2023-12-10 RX ADMIN — BENZONATATE 200 MG: 100 CAPSULE ORAL at 17:13

## 2023-12-10 RX ADMIN — LOSARTAN POTASSIUM 100 MG: 50 TABLET, FILM COATED ORAL at 08:13

## 2023-12-10 NOTE — PROGRESS NOTES
"Progress Note - Pulmonary   Devin Chaves 77 y.o. male MRN: [id number]  Unit/Bed#: S -01 Encounter: [id number]      Interval History:   Asked by primary service to see the patient once again for lack of improvement in subjective shortness of breath.  Patient complains of worsening shortness of breath since baseline.  Patient states that sleep was started approximately 2 to 3 weeks ago when he states he was infected with SARS-CoV-2/COVID-19.  Patient states that he has a history of COPD but does not require supplemental oxygen at baseline although he is recommended to use 3 L.  He has never been vaccinated against COVID-19.    Review of Systems:  Full 12 point review of systems negative other than previously mentioned    Objective:   Vitals: Blood pressure 134/94, pulse 85, temperature 99.2 °F (37.3 °C), resp. rate 16, height 6' 2\" (1.88 m), weight 82.7 kg (182 lb 5.1 oz), SpO2 96 %., Body mass index is 23.41 kg/m².  Mild respiratory distress  S1-S2  Markedly diminished breath sounds diffusely, no wheeze, no rhonchi, no rales  Soft, nontender, nondistended  Awake and alert    Labs: I have personally reviewed pertinent lab results.  Results Reviewed       Procedure Component Value Units Date/Time    Procalcitonin, Next Day AM Collection [[id number]]  (Normal) Collected: 12/06/23 0451    Lab Status: Final result Specimen: Blood Updated: 12/06/23 0533     Procalcitonin 0.07 ng/ml     Basic metabolic panel [[id number]]  (Abnormal) Collected: 12/06/23 0452    Lab Status: Final result Specimen: Blood Updated: 12/06/23 0524     Sodium 135 mmol/L      Potassium 4.6 mmol/L      Chloride 106 mmol/L      CO2 24 mmol/L      ANION GAP 5 mmol/L      BUN 30 mg/dL      Creatinine 1.03 mg/dL      Glucose 152 mg/dL      Calcium 8.3 mg/dL      eGFR 70 ml/min/1.73sq m     Narrative:      National Kidney Disease Foundation guidelines for Chronic Kidney Disease (CKD):     Stage 1 with normal or high GFR (GFR > 90 mL/min/1.73 square " meters)    Stage 2 Mild CKD (GFR = 60-89 mL/min/1.73 square meters)    Stage 3A Moderate CKD (GFR = 45-59 mL/min/1.73 square meters)    Stage 3B Moderate CKD (GFR = 30-44 mL/min/1.73 square meters)    Stage 4 Severe CKD (GFR = 15-29 mL/min/1.73 square meters)    Stage 5 End Stage CKD (GFR <15 mL/min/1.73 square meters)  Note: GFR calculation is accurate only with a steady state creatinine    CBC and Platelet [[id number]]  (Abnormal) Collected: 12/06/23 0454    Lab Status: Final result Specimen: Blood Updated: 12/06/23 0501     WBC 4.26 Thousand/uL      RBC 3.61 Million/uL      Hemoglobin 11.4 g/dL      Hematocrit 34.6 %      MCV 96 fL      MCH 31.6 pg      MCHC 32.9 g/dL      RDW 14.5 %      Platelets 127 Thousands/uL      MPV 9.0 fL     Procalcitonin [[id number]]  (Normal) Collected: 12/05/23 0416    Lab Status: Final result Specimen: Blood from Arm, Right Updated: 12/05/23 0549     Procalcitonin 0.09 ng/ml     Procalcitonin [[id number]]  (Normal) Collected: 12/04/23 1932    Lab Status: Final result Specimen: Blood from Arm, Left Updated: 12/05/23 0400     Procalcitonin 0.09 ng/ml     HS Troponin I 4hr [[id number]]  (Normal) Collected: 12/05/23 0125    Lab Status: Final result Specimen: Blood from Arm, Right Updated: 12/05/23 0159     hs TnI 4hr 9 ng/L      Delta 4hr hsTnI -1 ng/L     HS Troponin I 2hr [[id number]]  (Normal) Collected: 12/04/23 2257    Lab Status: Final result Specimen: Blood from Arm, Left Updated: 12/04/23 2330     hs TnI 2hr 10 ng/L      Delta 2hr hsTnI 0 ng/L     Urine Microscopic [[id number]]  (Abnormal) Collected: 12/04/23 2258    Lab Status: Final result Specimen: Urine, Clean Catch Updated: 12/04/23 2327     RBC, UA None Seen /hpf      WBC, UA 1-2 /hpf      Epithelial Cells Occasional /hpf      Bacteria, UA None Seen /hpf      MUCUS THREADS Occasional    UA w Reflex to Microscopic w Reflex to Culture [[id number]]  (Abnormal) Collected: 12/04/23 2737    Lab Status: Final result Specimen: Urine,  Clean Catch Updated: 12/04/23 2326     Color, UA Yellow     Clarity, UA Clear     Specific Gravity, UA 1.024     pH, UA 5.5     Leukocytes, UA Negative     Nitrite, UA Negative     Protein,  (2+) mg/dl      Glucose, UA Negative mg/dl      Ketones, UA Negative mg/dl      Urobilinogen, UA <2.0 mg/dl      Bilirubin, UA Negative     Occult Blood, UA Trace    B-Type Natriuretic Peptide(BNP) [[id number]]  (Normal) Collected: 12/04/23 1932    Lab Status: Final result Specimen: Blood from Arm, Left Updated: 12/04/23 2141     BNP 20 pg/mL     [place] draw [[id number]] Collected: 12/04/23 1936    Lab Status: Final result Specimen: Blood from Arm, Left Updated: 12/04/23 2101    Narrative:      The following orders were created for panel order [place] draw.  Procedure                               Abnormality         Status                     ---------                               -----------         ------                     Light Blue Top on hold[[id number]]                           Final result               Gold top on hold[[id number]]                                 Final result                 Please view results for these tests on the individual orders.    HS Troponin 0hr (reflex protocol) [[id number]]  (Normal) Collected: 12/04/23 1936    Lab Status: Final result Specimen: Blood from Arm, Left Updated: 12/04/23 2010     hs TnI 0hr 10 ng/L     Comprehensive metabolic panel [[id number]]  (Abnormal) Collected: 12/04/23 1932    Lab Status: Final result Specimen: Blood from Arm, Left Updated: 12/04/23 2003     Sodium 133 mmol/L      Potassium 4.4 mmol/L      Chloride 103 mmol/L      CO2 23 mmol/L      ANION GAP 7 mmol/L      BUN 30 mg/dL      Creatinine 1.27 mg/dL      Glucose 104 mg/dL      Calcium 8.7 mg/dL      AST 34 U/L      ALT 31 U/L      Alkaline Phosphatase 61 U/L      Total Protein 6.9 g/dL      Albumin 3.9 g/dL      Total Bilirubin 0.61 mg/dL      eGFR 54 ml/min/1.73sq m     Narrative:      National Kidney  Disease Foundation guidelines for Chronic Kidney Disease (CKD):     Stage 1 with normal or high GFR (GFR > 90 mL/min/1.73 square meters)    Stage 2 Mild CKD (GFR = 60-89 mL/min/1.73 square meters)    Stage 3A Moderate CKD (GFR = 45-59 mL/min/1.73 square meters)    Stage 3B Moderate CKD (GFR = 30-44 mL/min/1.73 square meters)    Stage 4 Severe CKD (GFR = 15-29 mL/min/1.73 square meters)    Stage 5 End Stage CKD (GFR <15 mL/min/1.73 square meters)  Note: GFR calculation is accurate only with a steady state creatinine    Lipase [[id number]]  (Normal) Collected: 12/04/23 1932    Lab Status: Final result Specimen: Blood from Arm, Left Updated: 12/04/23 2003     Lipase 32 u/L     CBC and differential [[id number]]  (Abnormal) Collected: 12/04/23 1932    Lab Status: Final result Specimen: Blood from Arm, Left Updated: 12/04/23 1940     WBC 2.84 Thousand/uL      RBC 3.96 Million/uL      Hemoglobin 12.4 g/dL      Hematocrit 37.1 %      MCV 94 fL      MCH 31.3 pg      MCHC 33.4 g/dL      RDW 14.6 %      MPV 8.7 fL      Platelets 111 Thousands/uL      nRBC 0 /100 WBCs      Neutrophils Relative 82 %      Immat GRANS % 0 %      Lymphocytes Relative 11 %      Monocytes Relative 7 %      Eosinophils Relative 0 %      Basophils Relative 0 %      Neutrophils Absolute 2.32 Thousands/µL      Immature Grans Absolute 0.00 Thousand/uL      Lymphocytes Absolute 0.32 Thousands/µL      Monocytes Absolute 0.20 Thousand/µL      Eosinophils Absolute 0.00 Thousand/µL      Basophils Absolute 0.00 Thousands/µL              Current Medications:    Current Facility-Administered Medications:     acetaminophen (TYLENOL) tablet 650 mg, 650 mg, Oral, Q6H PRN, AMPARO Huddleston    amLODIPine (NORVASC) tablet 5 mg, 5 mg, Oral, Daily, AMPARO Huddleston, 5 mg at 12/10/23 0813    ascorbic acid (VITAMIN C) tablet 250 mg, 250 mg, Oral, Daily, AMPARO Huddleston, 250 mg at 12/10/23 0813    aspirin (ECOTRIN LOW STRENGTH) EC tablet 81 mg, 81 mg, Oral, Daily, Tootie  AMPARO Tineo, 81 mg at 12/10/23 0813    atorvastatin (LIPITOR) tablet 80 mg, 80 mg, Oral, Daily With Dinner, AMPARO Huddleston, 80 mg at 12/09/23 1712    benzonatate (TESSALON PERLES) capsule 200 mg, 200 mg, Oral, TID, Boris Everett DO, 200 mg at 12/10/23 0813    budesonide (PULMICORT) inhalation solution 0.5 mg, 0.5 mg, Nebulization, Q12H, AMPARO Huddleston, 0.5 mg at 12/10/23 0718    cholecalciferol (VITAMIN D3) tablet 2,000 Units, 2,000 Units, Oral, Daily, AMPARO Huddleston, 2,000 Units at 12/10/23 0813    Diclofenac Sodium (VOLTAREN) 1 % topical gel 2 g, 2 g, Topical, 4x Daily, Eric Blanco MD, 2 g at 12/10/23 0814    enoxaparin (LOVENOX) subcutaneous injection 40 mg, 40 mg, Subcutaneous, Daily, AMPARO Huddleston, 40 mg at 12/10/23 0814    folic acid (FOLVITE) tablet 1 mg, 1 mg, Oral, Daily, AMPARO Huddleston, 1 mg at 12/10/23 0813    guaiFENesin (MUCINEX) 12 hr tablet 1,200 mg, 1,200 mg, Oral, Q12H GALE, AMPARO Huddleston, 1,200 mg at 12/10/23 0814    HYDROcodone Bit-Homatrop MBr (HYCODAN) oral syrup 5 mL, 5 mL, Oral, Q6H PRN, Boris Everett DO    ketorolac (TORADOL) injection 15 mg, 15 mg, Intravenous, Q6H PRN, Eric Blanco MD, 15 mg at 12/10/23 0821    levalbuterol (XOPENEX) inhalation solution 1.25 mg, 1.25 mg, Nebulization, TID, Boris Everett DO, 1.25 mg at 12/10/23 1408    losartan (COZAAR) tablet 100 mg, 100 mg, Oral, Daily, AMPARO Huddleston, 100 mg at 12/10/23 0813    metoprolol tartrate (LOPRESSOR) tablet 25 mg, 25 mg, Oral, Q12H GALE, AMPARO Huddleston, 25 mg at 12/10/23 0813    predniSONE tablet 40 mg, 40 mg, Oral, Daily, AMPARO Turcios, 40 mg at 12/10/23 0813    pyridoxine (VITAMIN B6) tablet 100 mg, 100 mg, Oral, Daily, AMPARO Huddleston, 100 mg at 12/10/23 0813    sodium chloride 3 % inhalation solution 4 mL, 4 mL, Nebulization, TID, Boris Everett, DO, 4 mL at 12/10/23 1408     Imaging and other studies:   I personally viewed and interpreted the following imaging studies:  CT chest  "12/4/2023 shows diffuse emphysema with scattered peripheral groundglass opacities    Assessment:  Acute on chronic hypoxic respiratory failure  COPD with acute exacerbation  PASC    Plan:  Continue to titrate supplemental oxygen for goal SpO2 of 88% to 92% in the setting of acute on chronic hypoxic respiratory failure  Continue prednisone 40 mg p.o. daily for now  Continue budesonide and Xopenex nebs as ordered  Supportive care for PASC.  PT and OT  Pulmonary medicine will continue to follow  I personally discussed this patient in depth with the primary service    Note: Portions of the record may have been created with voice recognition software. Occasional wrong word or \"sound a like\" substitutions may have occurred due to the inherent limitations of voice recognition software. Read the chart carefully and recognize, using context, where substitutions have occurred.     Dez Uriostegui M.D.  [facility]  "

## 2023-12-10 NOTE — PROGRESS NOTES
[facility] Internal Medicine Progress Note  Patient: Devin Chaves 77 y.o. male   MRN: [id number]  PCP: Oswaldo Muniz DO  Unit/Bed#: S MS Ortiz-01 Encounter: [id number]  Date Of Visit: 12/10/23    Assessment:    Principal Problem:    Chronic obstructive pulmonary disease with acute exacerbation (HCC)  Active Problems:    Hypertension    CAD (coronary artery disease)    COVID-19    Ectasia of artery (HCC)    Abdominal pain    Enlarged prostate    Open wound of finger of right hand    Moderate protein-calorie malnutrition (HCC)      Plan:    COPD with acute Exacerbation -   Chest xray today, 12/10/2023 - follow up radiologist read.  Steroid -   Prednisone 40 mg p.o. daily.  Will continue with gradual taper.  Nebulizers -   Albuterol nebulizer every 6 hours scheduled.  Pulmicort bid  Mucolytic -   Mucinex 1200 mg bid.  Continue flutter valve.  Added chest physiotherapy with vest on 12/8/2023.  Continue this today.  Antitussive -   Tessalon perles 200 mg tid.  Hycodan q6h PRN.  Not really using this though.   Antibiotic -   Azithromycin 500 mg daily given for 5 days total with last dose given on 12/9/2023.  No further antibiotic currently.  Follow-up final result for sputum culture.  Chronic Hypoxic Respiratory failure -   At baseline is supposed to use 3L NC but is noncompliant with this.   Also noncompliant with CPAP at night.   Humidify oxygen to help with dryness.  Recent COVID 19 Infection -   Patient states that he was diagnosed with this about 2 to 3 weeks ago.  Mucolytic and antitussive as detailed above.  Monitor temperatures.  Monitor oxygen saturations.  Chest xray as detailed above.  Pulmonologist indicates an anticipation for slow recovery from this infection.  LLQ Abdominal Pain -   No acute finding in the left lower quadrant on the CT abdomen pelvis done.  Seems to be improved as of 12/6/2023.  Monitor bowel movements.  No current urinary symptoms.  He does have enlarged prostate with  calcifications present.  He is not on tamsulosin or other similar medication for this  Abnormal CT at right hip -   The patient has no right hip symptoms currently.  Potentially an over read of the imaging with regards to the potential bursitis but it may not be a bad idea for the patient to have repeat imaging done within the next 4 to 6 weeks.  Voltaren gel for the left hip itself to see if this helps.  Left hip pain is probably more due to arthritis as this is the opposite side of the body as the questionable bursitis.  The patient does have osteoarthritis in various other joints as well but these do not seem to be bothering him as much.  Continue to monitor for symptoms.  PT evaluation done and patient is level 3 minimum resource intensity.  Likely can go home at discharge with minimal therapy needs.  Finger Ulceration -   Hand surgery consultation placed.-Surgery is not recommending any acute surgical interventions currently.  Daily dressing changes with local wound care is recommended.  Weightbearing as tolerated.  X-ray hand was done on 12/5/2023 and showed no acute osseous abnormality.  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  Severe arthritis noted throughout the hand and wrist.  Outpatient follow-up.  Rib fractures of right ribs 5-8 -   Patient states that this occurred a couple of months ago as his cow became spooked which resulted in the cow crushing him against the wall resulting in rib fractures.  The patient never sought medical attention at that time but there appears to be some routine ongoing healing on his imaging currently.  Recommend continuing use of incentive spirometer.       VTE Pharmacologic Prophylaxis:   VTE Score: 5 High Risk (Score >/= 5) - Pharmacological DVT Prophylaxis Ordered: Enoxaparin (Lovenox). Sequential Compression Devices Ordered.    Mechanical VTE Prophylaxis in Place: No (Ambulating when I go to see him)    Patient Centered Rounds: I have  performed bedside rounds with nursing staff today.    Discussions with Specialists or Other Care Team Provider: Dr. Uriostegui with pulmonary team.    Education and Discussions with Family / Patient: Patient declined call to .    Time Spent for Care: 30 minutes.  More than 50% of total time spent on counseling and coordination of care as described above.    Current Length of Stay: 5 day(s)  Current Patient Status: Inpatient     Certification Statement: The patient will continue to require additional inpatient hospital stay due to continue dyspnea and work of breathing.     Discharge Plan / Estimated Discharge Date: Anticipate discharge in 48-72 hrs to home.    Code Status: Level 1 - Full Code      Subjective:   Patient's maximum temperature is 99.2 this morning.  His oxygen saturations have been 92 to 96% on 2 L.  When I went to go see the patient today he was actually ambulating to grab some sharron crackers from a kitchen area that is about 2 doors away from his room here in the hospital.  The patient's oxygen saturations had dipped down to 87% with him off of the oxygen and he was feeling very labored with his breathing.  Patient feels that overall even when he is not ambulating his breathing still feels like it is not getting better.  The patient has been bringing up a little bit more in the way of the sputum.  He denies any nasal congestion but has had some congestion in the chest.  He still feels like it is tough to get the sputum up.  However, he does feel like the chest PT vest is helping.  The patient notes that his nose and mouth feels very dry to him.    Objective:     Vitals:   Temp (24hrs), Av.6 °F (37 °C), Min:98 °F (36.7 °C), Max:99.2 °F (37.3 °C)    Temp:  [98 °F (36.7 °C)-99.2 °F (37.3 °C)] 98.5 °F (36.9 °C)  HR:  [75-85] 75  Resp:  [16-17] 17  BP: (109-134)/(59-94) 119/64  SpO2:  [92 %-96 %] 94 %  Body mass index is 23.41 kg/m².     Input and Output Summary (last 24 hours):        Intake/Output Summary (Last 24 hours) at 12/10/2023 1550  Last data filed at 12/10/2023 0100  Gross per 24 hour   Intake --   Output 400 ml   Net -400 ml       Physical Exam:   The patient's oromucosa is just slightly dry in its appearance.  The heart is with a regular rate and rhythm with normal S1 and S2 heart sounds.  No obvious murmurs, rubs, or gallops.  The lungs are diminished bilaterally and there are still some mild right basilar rales present which are stable compared to previous.  No wheezing or rhonchi noted.  Breathing pattern is labored with his recent ambulation just prior to my visit.  He is using neck and slight use of accessory muscles with some pursed lip breathing.  His oxygen saturations are 87% on room air.  The abdomen is soft, nondistended, nontender to palpation.  Bowel sounds are present and normoactive.  The extremities are with no significant calf tenderness and no significant edema noted.  Patient is awake alert and oriented x 3.  No focal neurological deficits.    Additional Data:     Labs:  Results from last 7 days   Lab Units 12/10/23  0438 12/06/23  0454 12/04/23  1932   WBC Thousand/uL 5.11   < > 2.84*   HEMOGLOBIN g/dL 13.1   < > 12.4   HEMATOCRIT % 39.5   < > 37.1   PLATELETS Thousands/uL 162   < > 111*   NEUTROS PCT %  --   --  82*   LYMPHS PCT %  --   --  11*   MONOS PCT %  --   --  7   EOS PCT %  --   --  0    < > = values in this interval not displayed.     Results from last 7 days   Lab Units 12/08/23  0521 12/06/23  0452 12/04/23  1932   SODIUM mmol/L 135   < > 133*   POTASSIUM mmol/L 4.6   < > 4.4   CHLORIDE mmol/L 105   < > 103   CO2 mmol/L 25   < > 23   BUN mg/dL 31*   < > 30*   CREATININE mg/dL 0.99   < > 1.27   ANION GAP mmol/L 5   < > 7   CALCIUM mg/dL 8.4   < > 8.7   ALBUMIN g/dL  --   --  3.9   TOTAL BILIRUBIN mg/dL  --   --  0.61   ALK PHOS U/L  --   --  61   ALT U/L  --   --  31   AST U/L  --   --  34   GLUCOSE RANDOM mg/dL 140   < > 104    < > = values in  this interval not displayed.                 Results from last 7 days   Lab Units 12/06/23  0451 12/05/23  0416 12/04/23  1932   PROCALCITONIN ng/ml 0.07 0.09 0.09       Imaging: Personally reviewed the following imaging: chest xray    Recent Cultures (last 7 days):     Results from last 7 days   Lab Units 12/08/23  1840   SPUTUM CULTURE  1+ Growth of  2+ Growth of   GRAM STAIN RESULT  1+ Epithelial Cells*  2+ Disintegrating polys*  2+ Gram positive cocci in pairs and chains*  1+ Gram positive rods*       Lines/Drains:  Invasive Devices       Peripheral Intravenous Line  Duration             Peripheral IV 12/09/23 Left Forearm 1 day                    Telemetry:        Last 24 Hours Medication List:   Current Facility-Administered Medications   Medication Dose Route Frequency Provider Last Rate    acetaminophen  650 mg Oral Q6H PRN AMPARO Huddleston      amLODIPine  5 mg Oral Daily AMPARO Huddleston      ascorbic acid  250 mg Oral Daily AMPARO Huddleston      aspirin  81 mg Oral Daily AMPARO Huddleston      atorvastatin  80 mg Oral Daily With Dinner AMPARO Huddleston      benzonatate  200 mg Oral TID Boris Everett,       budesonide  0.5 mg Nebulization Q12H AMPARO Huddleston      cholecalciferol  2,000 Units Oral Daily AMPARO Huddleston      Diclofenac Sodium  2 g Topical 4x Daily Eric Blanco MD      enoxaparin  40 mg Subcutaneous Daily AMPARO Huddleston      folic acid  1 mg Oral Daily AMPARO Huddleston      guaiFENesin  1,200 mg Oral Q12H [facility] AMPARO Huddleston      HYDROcodone Bit-Homatrop MBr  5 mL Oral Q6H PRN Boris Everett DO      ketorolac  15 mg Intravenous Q6H PRN Eric Blanco MD      levalbuterol  1.25 mg Nebulization TID Boris Everett DO      losartan  100 mg Oral Daily AMPARO Huddleston      metoprolol tartrate  25 mg Oral Q12H [facility] AMPARO Huddleston      predniSONE  40 mg Oral Daily AMPARO Turcios      pyridoxine  100 mg Oral Daily AMPARO Huddleston          Today, Patient Was Seen By:  Boris Everett, DO    ** Please Note: This note has been constructed using a voice recognition system. **

## 2023-12-11 PROCEDURE — 94669 MECHANICAL CHEST WALL OSCILL: CPT

## 2023-12-11 PROCEDURE — 99232 SBSQ HOSP IP/OBS MODERATE 35: CPT | Performed by: INTERNAL MEDICINE

## 2023-12-11 PROCEDURE — 94668 MNPJ CHEST WALL SBSQ: CPT

## 2023-12-11 PROCEDURE — 94760 N-INVAS EAR/PLS OXIMETRY 1: CPT

## 2023-12-11 PROCEDURE — 94640 AIRWAY INHALATION TREATMENT: CPT

## 2023-12-11 RX ADMIN — DICLOFENAC SODIUM TOPICAL GEL, 1% 2 G: 10 GEL TOPICAL at 17:36

## 2023-12-11 RX ADMIN — ASPIRIN 81 MG: 81 TABLET, COATED ORAL at 08:06

## 2023-12-11 RX ADMIN — PYRIDOXINE HCL TAB 50 MG 100 MG: 50 TAB at 08:04

## 2023-12-11 RX ADMIN — ATORVASTATIN CALCIUM 80 MG: 40 TABLET, FILM COATED ORAL at 17:36

## 2023-12-11 RX ADMIN — PREDNISONE 40 MG: 20 TABLET ORAL at 08:04

## 2023-12-11 RX ADMIN — BENZONATATE 200 MG: 100 CAPSULE ORAL at 17:36

## 2023-12-11 RX ADMIN — DICLOFENAC SODIUM TOPICAL GEL, 1% 2 G: 10 GEL TOPICAL at 12:19

## 2023-12-11 RX ADMIN — METOPROLOL TARTRATE 25 MG: 25 TABLET, FILM COATED ORAL at 08:57

## 2023-12-11 RX ADMIN — LOSARTAN POTASSIUM 100 MG: 50 TABLET, FILM COATED ORAL at 08:57

## 2023-12-11 RX ADMIN — DICLOFENAC SODIUM TOPICAL GEL, 1% 2 G: 10 GEL TOPICAL at 08:06

## 2023-12-11 RX ADMIN — BUDESONIDE 0.5 MG: 0.5 INHALANT ORAL at 19:16

## 2023-12-11 RX ADMIN — LEVALBUTEROL HYDROCHLORIDE 1.25 MG: 1.25 SOLUTION RESPIRATORY (INHALATION) at 19:16

## 2023-12-11 RX ADMIN — AMLODIPINE BESYLATE 5 MG: 5 TABLET ORAL at 08:56

## 2023-12-11 RX ADMIN — GUAIFENESIN 1200 MG: 600 TABLET ORAL at 08:05

## 2023-12-11 RX ADMIN — BUDESONIDE 0.5 MG: 0.5 INHALANT ORAL at 07:16

## 2023-12-11 RX ADMIN — Medication 2000 UNITS: at 08:05

## 2023-12-11 RX ADMIN — LEVALBUTEROL HYDROCHLORIDE 1.25 MG: 1.25 SOLUTION RESPIRATORY (INHALATION) at 13:33

## 2023-12-11 RX ADMIN — BENZONATATE 200 MG: 100 CAPSULE ORAL at 08:05

## 2023-12-11 RX ADMIN — LEVALBUTEROL HYDROCHLORIDE 1.25 MG: 1.25 SOLUTION RESPIRATORY (INHALATION) at 07:16

## 2023-12-11 RX ADMIN — GUAIFENESIN 1200 MG: 600 TABLET ORAL at 21:12

## 2023-12-11 RX ADMIN — FOLIC ACID 1 MG: 1 TABLET ORAL at 08:05

## 2023-12-11 RX ADMIN — Medication 250 MG: at 08:06

## 2023-12-11 RX ADMIN — BENZONATATE 200 MG: 100 CAPSULE ORAL at 21:12

## 2023-12-11 RX ADMIN — ENOXAPARIN SODIUM 40 MG: 40 INJECTION SUBCUTANEOUS at 08:06

## 2023-12-11 NOTE — PROGRESS NOTES
[facility] Internal Medicine Progress Note  Patient: Devin Chaves 77 y.o. male   MRN: [id number]  PCP: Oswaldo Muniz,   Unit/Bed#: S MS [facility]-01 Encounter: [id number]  Date Of Visit: 12/11/23    Assessment:    Principal Problem:    Chronic obstructive pulmonary disease with acute exacerbation (HCC)  Active Problems:    Hypertension    CAD (coronary artery disease)    COVID-19    Ectasia of artery (HCC)    Abdominal pain    Enlarged prostate    Open wound of finger of right hand    Moderate protein-calorie malnutrition (HCC)      Plan:    COPD with acute Exacerbation -   Chest xray today, 12/10/2023 - follow up radiologist read.  Steroid -   Prednisone 40 mg p.o. daily.  Will continue with gradual taper.  Decrease down to 30 mg dose tomorrow.  Nebulizers -   Albuterol nebulizer every 6 hours scheduled.  Pulmicort bid  Mucolytic -   Mucinex 1200 mg bid.  Continue flutter valve.  Added chest physiotherapy with vest on 12/8/2023.  Has felt this is benefiting him.   Antitussive -   Tessalon perles 200 mg tid.  Hycodan q6h PRN.  Not really using this though.   Antibiotic -   Azithromycin 500 mg daily given for 5 days total with last dose given on 12/9/2023.  No further antibiotic currently.  Follow-up final result for sputum culture.  Chronic Hypoxic Respiratory failure -   At baseline is supposed to use 3L NC but is noncompliant with this.   Also noncompliant with CPAP at night.   Humidifying oxygen to help with dryness.  Increased cough production once this was added.  Oral mucosa more moist.  Recent COVID 19 Infection -   Patient states that he was diagnosed with this about 2 to 3 weeks ago.  Mucolytic and antitussive as detailed above.  Monitor temperatures.  Monitor oxygen saturations.  Chest xray as detailed above.  Pulmonologist indicates an anticipation for slow recovery from this infection.  LLQ Abdominal Pain -   No acute finding in the left lower quadrant on the CT abdomen pelvis done.  Seems to be  improved as of 12/6/2023.  Monitor bowel movements.  No current urinary symptoms.  He does have enlarged prostate with calcifications present.  He is not on tamsulosin or other similar medication for this  Abnormal CT at right hip -   The patient has no right hip symptoms currently.  Potentially an over read of the imaging with regards to the potential bursitis but it may not be a bad idea for the patient to have repeat imaging done within the next 4 to 6 weeks.  Voltaren gel for the left hip itself to see if this helps.  Left hip pain is probably more due to arthritis as this is the opposite side of the body as the questionable bursitis.  The patient does have osteoarthritis in various other joints as well but these do not seem to be bothering him as much.  Continue to monitor for symptoms.  PT evaluation done and patient is level 3 minimum resource intensity.  Likely can go home at discharge with minimal therapy needs.  Finger Ulceration -   Hand surgery consultation placed.-Surgery is not recommending any acute surgical interventions currently.  Daily dressing changes with local wound care is recommended.  Weightbearing as tolerated.  X-ray hand was done on 12/5/2023 and showed no acute osseous abnormality.  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  Severe arthritis noted throughout the hand and wrist.  Outpatient follow-up.  Rib fractures of right ribs 5-8 -   Patient states that this occurred a couple of months ago as his cow became spooked which resulted in the cow crushing him against the wall resulting in rib fractures.  The patient never sought medical attention at that time but there appears to be some routine ongoing healing on his imaging currently.  Recommend continuing use of incentive spirometer.       VTE Pharmacologic Prophylaxis:   VTE Score: 5 High Risk (Score >/= 5) - Pharmacological DVT Prophylaxis Ordered: Enoxaparin (Lovenox). Sequential Compression Devices  Ordered.    Mechanical VTE Prophylaxis in Place: No (Ambulating when I go to see him)    Patient Centered Rounds: I have performed bedside rounds with nursing staff today.    Discussions with Specialists or Other Care Team Provider: Case management.    Education and Discussions with Family / Patient: Patient declined call to .    Time Spent for Care: 30 minutes.  More than 50% of total time spent on counseling and coordination of care as described above.    Current Length of Stay: 6 day(s)  Current Patient Status: Inpatient     Certification Statement: The patient will continue to require additional inpatient hospital stay due to continue dyspnea and work of breathing.     Discharge Plan / Estimated Discharge Date:  Anticipate discharge to home in next 24-48 hours.  Hospital bed ordered.    Code Status: Level 1 - Full Code      Subjective:   The patient's breathing is little bit better today than it was yesterday.  However, the patient still feels like he is not ready to go home yet.  The patient is starting to have a more productive cough finally and shows me a sample of sputum that he has in a cup.  The sputum is a clear to whitish color and seems to be thinning out a bit more.  It no longer is having that darker yellow color.  The patient denies any dizziness or lightheadedness.  No chest tightness or pain today.  No fevers or chills. The patient would benefit from a hospital bed after discharge due to improvements with breathing when head of bed is inclined to a 30 degree or higher angle and also this would reduce any risk of aspiration.      Objective:     Vitals:   Temp (24hrs), Av.5 °F (36.9 °C), Min:97.9 °F (36.6 °C), Max:99 °F (37.2 °C)    Temp:  [97.9 °F (36.6 °C)-99 °F (37.2 °C)] 97.9 °F (36.6 °C)  HR:  [70-93] 93  Resp:  [17-18] 18  BP: (105-119)/(53-70) 112/59  SpO2:  [87 %-95 %] 90 %  Body mass index is 21.29 kg/m².     Input and Output Summary (last 24 hours):     No intake or output  data in the 24 hours ending 12/11/23 1526      Physical Exam:     The patient has culture requested today.  The patient has oxygen saturations of about 92% on room air when I see him but states that he was just on oxygen not long before I came into his room.  However, the patient had been walking about 1 doorway in the hallway when I caught up with him.  The patient states that he has good days and bad days.  The patient's lungs are overall diminished as would be expected with his COPD history but he does seem to have stable aeration of the lungs.  No wheezing, rhonchi, or rales noted today.  No use of accessory muscles for respiration.  However, the patient still has some slight tachypnea.  However, this is again improved from yesterday.  The heart is with a regular rate and rhythm with normal S1 and S2 heart sounds.  No obvious murmurs, rubs, or gallops.  The extremities are with no segment edema or calf tenderness.  No varicosities.  Abdomen is soft, nondistended, nontender to palpation.  Bowel sounds are present and normoactive.  Patient is awake alert and oriented x 3.  No focal neurological deficits.    Additional Data:     Labs:  Results from last 7 days   Lab Units 12/10/23  0438 12/06/23  0454 12/04/23  1932   WBC Thousand/uL 5.11   < > 2.84*   HEMOGLOBIN g/dL 13.1   < > 12.4   HEMATOCRIT % 39.5   < > 37.1   PLATELETS Thousands/uL 162   < > 111*   NEUTROS PCT %  --   --  82*   LYMPHS PCT %  --   --  11*   MONOS PCT %  --   --  7   EOS PCT %  --   --  0    < > = values in this interval not displayed.     Results from last 7 days   Lab Units 12/08/23  0521 12/06/23  0452 12/04/23  1932   SODIUM mmol/L 135   < > 133*   POTASSIUM mmol/L 4.6   < > 4.4   CHLORIDE mmol/L 105   < > 103   CO2 mmol/L 25   < > 23   BUN mg/dL 31*   < > 30*   CREATININE mg/dL 0.99   < > 1.27   ANION GAP mmol/L 5   < > 7   CALCIUM mg/dL 8.4   < > 8.7   ALBUMIN g/dL  --   --  3.9   TOTAL BILIRUBIN mg/dL  --   --  0.61   ALK PHOS U/L  --    --  61   ALT U/L  --   --  31   AST U/L  --   --  34   GLUCOSE RANDOM mg/dL 140   < > 104    < > = values in this interval not displayed.                 Results from last 7 days   Lab Units 12/06/23  0451 12/05/23  0416 12/04/23  1932   PROCALCITONIN ng/ml 0.07 0.09 0.09       Imaging: Reviewed radiology reports from this admission including: chest xray.  Also reviewed images with patient in room.    Recent Cultures (last 7 days):     Results from last 7 days   Lab Units 12/08/23  1840   SPUTUM CULTURE  1+ Growth of  2+ Growth of   GRAM STAIN RESULT  1+ Epithelial Cells*  2+ Disintegrating polys*  2+ Gram positive cocci in pairs and chains*  1+ Gram positive rods*       Lines/Drains:  Invasive Devices       Peripheral Intravenous Line  Duration             Peripheral IV 12/09/23 Left Forearm 2 days                    Telemetry:        Last 24 Hours Medication List:   Current Facility-Administered Medications   Medication Dose Route Frequency Provider Last Rate    acetaminophen  650 mg Oral Q6H PRN AMPARO Huddleston      amLODIPine  5 mg Oral Daily AMPARO Huddleston      ascorbic acid  250 mg Oral Daily AMPARO Huddleston      aspirin  81 mg Oral Daily Tootie Tineo, AMPARO      atorvastatin  80 mg Oral Daily With Dinner AMPARO Huddleston      benzonatate  200 mg Oral TID Boris Everett,       budesonide  0.5 mg Nebulization Q12H AMPARO Huddleston      cholecalciferol  2,000 Units Oral Daily AMPARO Huddleston      Diclofenac Sodium  2 g Topical 4x Daily Eric Blanco MD      enoxaparin  40 mg Subcutaneous Daily AMPARO Huddleston      folic acid  1 mg Oral Daily AMPARO Huddleston      guaiFENesin  1,200 mg Oral Q12H GALE AMPARO Huddleston      HYDROcodone Bit-Homatrop MBr  5 mL Oral Q6H PRN Boris Everett,       ketorolac  15 mg Intravenous Q6H PRN Eric Blanco MD      levalbuterol  1.25 mg Nebulization TID Boris Everett,       losartan  100 mg Oral Daily AMPARO Huddleston      metoprolol tartrate  25 mg Oral Q12H  [facility] AMPARO Huddleston      predniSONE  40 mg Oral Daily AMPARO Turcios      pyridoxine  100 mg Oral Daily AMPARO Huddleston          Today, Patient Was Seen By: Boris Everett DO    ** Please Note: This note has been constructed using a voice recognition system. **

## 2023-12-11 NOTE — CASE MANAGEMENT
Case Management Discharge Planning Note    Patient name Devin Chaves  Location S /S -01 MRN [id number]  : 1945 Date 2023       Current Admission Date: 2023  Current Admission Diagnosis:Chronic obstructive pulmonary disease with acute exacerbation (HCC)   Patient Active Problem List    Diagnosis Date Noted    Moderate protein-calorie malnutrition (HCC) 2023    Hypertension 2023    CAD (coronary artery disease) 2023    Chronic obstructive pulmonary disease with acute exacerbation (HCC) 2023    JAZMINE (obstructive sleep apnea) 2023    Chronic back pain 2023    COVID-19 2023    Ectasia of artery (HCC) 2023    History of stroke 2023    Abdominal pain 2023    Enlarged prostate 2023    Open wound of finger of right hand 2023      LOS (days): 6  Geometric Mean LOS (GMLOS) (days):   Days to GMLOS:     OBJECTIVE:  Risk of Unplanned Readmission Score: 8.56         Current admission status: Inpatient   Preferred Pharmacy:   [id number] - [place], [place] - [address]  [address]  [facility] [address]  Phone: [phone] Fax: [phone]    [facility] [place] [facility] [address]  [address]  [address]  [facility] [address]  Phone: [phone] Fax: [phone]    Primary Care Provider: Oswaldo Muniz DO    Primary Insurance: [facility] [employer] [employer] [facility]  Secondary Insurance: [employer]  REP    DISCHARGE DETAILS:                                     DME Referral Provided  Referral made for DME?: Yes  DME referral completed for the following items:: Hospital Bed  DME Supplier Name:: [employer]    Other Referral/Resources/Interventions Provided:  Interventions: DME       CM notified by primary nurse that patient's wife is interested in a hospital bed for patient at [place].  CM made a referral to [facility] for this request  to see if it will be covered by patient's insurance.    CM department will continue to follow to assist with discharge coordination.

## 2023-12-12 PROBLEM — E87.1 HYPONATREMIA: Status: ACTIVE | Noted: 2023-12-12

## 2023-12-12 PROBLEM — S22.41XA FRACTURE OF MULTIPLE RIBS OF RIGHT SIDE: Status: ACTIVE | Noted: 2023-12-12

## 2023-12-12 PROBLEM — J96.11 CHRONIC HYPOXIC RESPIRATORY FAILURE (HCC): Status: ACTIVE | Noted: 2023-12-12

## 2023-12-12 PROBLEM — M06.9 RHEUMATOID ARTHRITIS, UNSPECIFIED (HCC): Status: ACTIVE | Noted: 2023-12-12

## 2023-12-12 LAB
ANION GAP SERPL CALCULATED.3IONS-SCNC: 5 MMOL/L
BUN SERPL-MCNC: 28 MG/DL (ref 5–25)
CALCIUM SERPL-MCNC: 8.2 MG/DL (ref 8.4–10.2)
CHLORIDE SERPL-SCNC: 100 MMOL/L (ref 96–108)
CO2 SERPL-SCNC: 26 MMOL/L (ref 21–32)
CREAT SERPL-MCNC: 1.13 MG/DL (ref 0.6–1.3)
ERYTHROCYTE [DISTWIDTH] IN BLOOD BY AUTOMATED COUNT: 14.8 % (ref 11.6–15.1)
GFR SERPL CREATININE-BSD FRML MDRD: 63 ML/MIN/1.73SQ M
GLUCOSE SERPL-MCNC: 111 MG/DL (ref 65–140)
HCT VFR BLD AUTO: 38 % (ref 36.5–49.3)
HGB BLD-MCNC: 12.6 G/DL (ref 12–17)
MCH RBC QN AUTO: 31.3 PG (ref 26.8–34.3)
MCHC RBC AUTO-ENTMCNC: 33.2 G/DL (ref 31.4–37.4)
MCV RBC AUTO: 95 FL (ref 82–98)
OSMOLALITY UR: 562 MMOL/KG
PLATELET # BLD AUTO: 141 THOUSANDS/UL (ref 149–390)
PMV BLD AUTO: 9.5 FL (ref 8.9–12.7)
POTASSIUM SERPL-SCNC: 4 MMOL/L (ref 3.5–5.3)
RBC # BLD AUTO: 4.02 MILLION/UL (ref 3.88–5.62)
SODIUM 24H UR-SCNC: 54 MOL/L
SODIUM SERPL-SCNC: 131 MMOL/L (ref 135–147)
TSH SERPL DL<=0.05 MIU/L-ACNC: 3.52 UIU/ML (ref 0.45–4.5)
WBC # BLD AUTO: 7.24 THOUSAND/UL (ref 4.31–10.16)

## 2023-12-12 PROCEDURE — 99232 SBSQ HOSP IP/OBS MODERATE 35: CPT

## 2023-12-12 PROCEDURE — 94640 AIRWAY INHALATION TREATMENT: CPT

## 2023-12-12 PROCEDURE — 94760 N-INVAS EAR/PLS OXIMETRY 1: CPT

## 2023-12-12 PROCEDURE — 85027 COMPLETE CBC AUTOMATED: CPT | Performed by: INTERNAL MEDICINE

## 2023-12-12 PROCEDURE — 83935 ASSAY OF URINE OSMOLALITY: CPT

## 2023-12-12 PROCEDURE — 94669 MECHANICAL CHEST WALL OSCILL: CPT

## 2023-12-12 PROCEDURE — 84443 ASSAY THYROID STIM HORMONE: CPT

## 2023-12-12 PROCEDURE — 84300 ASSAY OF URINE SODIUM: CPT

## 2023-12-12 PROCEDURE — 80048 BASIC METABOLIC PNL TOTAL CA: CPT | Performed by: INTERNAL MEDICINE

## 2023-12-12 RX ORDER — AMLODIPINE BESYLATE 5 MG/1
5 TABLET ORAL DAILY
Status: DISCONTINUED | OUTPATIENT
Start: 2023-12-13 | End: 2023-12-17 | Stop reason: HOSPADM

## 2023-12-12 RX ORDER — LOSARTAN POTASSIUM 50 MG/1
50 TABLET ORAL DAILY
Status: DISCONTINUED | OUTPATIENT
Start: 2023-12-13 | End: 2023-12-17 | Stop reason: HOSPADM

## 2023-12-12 RX ADMIN — BUDESONIDE 0.5 MG: 0.5 INHALANT ORAL at 07:11

## 2023-12-12 RX ADMIN — Medication 2000 UNITS: at 09:15

## 2023-12-12 RX ADMIN — ASPIRIN 81 MG: 81 TABLET, COATED ORAL at 09:14

## 2023-12-12 RX ADMIN — LEVALBUTEROL HYDROCHLORIDE 1.25 MG: 1.25 SOLUTION RESPIRATORY (INHALATION) at 20:33

## 2023-12-12 RX ADMIN — BENZONATATE 200 MG: 100 CAPSULE ORAL at 16:22

## 2023-12-12 RX ADMIN — GUAIFENESIN 1200 MG: 600 TABLET ORAL at 09:14

## 2023-12-12 RX ADMIN — ENOXAPARIN SODIUM 40 MG: 40 INJECTION SUBCUTANEOUS at 09:14

## 2023-12-12 RX ADMIN — Medication 250 MG: at 09:14

## 2023-12-12 RX ADMIN — BENZONATATE 200 MG: 100 CAPSULE ORAL at 21:03

## 2023-12-12 RX ADMIN — PREDNISONE 30 MG: 20 TABLET ORAL at 09:15

## 2023-12-12 RX ADMIN — ATORVASTATIN CALCIUM 80 MG: 40 TABLET, FILM COATED ORAL at 16:22

## 2023-12-12 RX ADMIN — PYRIDOXINE HCL TAB 50 MG 100 MG: 50 TAB at 09:16

## 2023-12-12 RX ADMIN — DICLOFENAC SODIUM TOPICAL GEL, 1% 2 G: 10 GEL TOPICAL at 09:15

## 2023-12-12 RX ADMIN — GUAIFENESIN 1200 MG: 600 TABLET ORAL at 21:03

## 2023-12-12 RX ADMIN — LEVALBUTEROL HYDROCHLORIDE 1.25 MG: 1.25 SOLUTION RESPIRATORY (INHALATION) at 07:11

## 2023-12-12 RX ADMIN — BENZONATATE 200 MG: 100 CAPSULE ORAL at 09:14

## 2023-12-12 RX ADMIN — BUDESONIDE 0.5 MG: 0.5 INHALANT ORAL at 20:33

## 2023-12-12 RX ADMIN — FOLIC ACID 1 MG: 1 TABLET ORAL at 09:15

## 2023-12-12 RX ADMIN — LEVALBUTEROL HYDROCHLORIDE 1.25 MG: 1.25 SOLUTION RESPIRATORY (INHALATION) at 14:32

## 2023-12-12 RX ADMIN — SODIUM CHLORIDE 1000 ML: 0.9 INJECTION, SOLUTION INTRAVENOUS at 16:21

## 2023-12-12 NOTE — ASSESSMENT & PLAN NOTE
Baseline is supposed to be using 3 L nasal cannula but reports noncompliance with this.  Patient currently requiring 1 to 2 L nasal cannula with oxygen saturations above 90%  Patient reports he does not have oxygen at home and will start wearing it  Home oxygen evaluation for needs in the a.m.  CM made aware

## 2023-12-12 NOTE — ASSESSMENT & PLAN NOTE
Suspect hypovolemic hyponatremia in setting increased water intake secondary to steroids with increased urinary output  Will give bolus of NaCL  Start fluid restriction 1800 mL, discussed with patient and nursing  Follow-up urine studies  BMP in AM

## 2023-12-12 NOTE — PROGRESS NOTES
[facility]  Progress Note  Name: Devin Chaves I  MRN: [id number]  Unit/Bed#: S -01 I Date of Admission: 12/4/2023   Date of Service: 12/12/2023 I Hospital Day: 7    Assessment/Plan   * Chronic obstructive pulmonary disease with acute exacerbation (HCC)  Assessment & Plan  Patient presented with worsening shortness of breath, generalized weakness over the past few days.  COVID-positive 2 to 3 weeks prior to admission.  Worsening shortness of breath, generalized weakness over the past 3 days.  COVID 2-3 weeks ago.   CTA chest on admission displaying interstitial coarsening and groundglass opacities.  No evidence of PE.  COPD with emphysema.  With chronic hypoxic respiratory failure, currently on baseline oxygen  Pulmonology consulted and following appreciate recommendations  Received 4 days of oral prednisone 40 mg, decrease to 30 mg today.  Continue for slow taper.  Respiratory protocol, continue scheduled Xopenex and budesonide  Patient has received 5 days azithromycin.   Remains afebrile, without leukocytosis.  Procalcitonin negative x 2  Continue supportive care with Mucinex, flutter valve, chest physiotherapy and Tessalon Perles  No need for further antibiotics      Chronic hypoxic respiratory failure (HCC)  Assessment & Plan  Baseline is supposed to be using 3 L nasal cannula but reports noncompliance with this.  Patient currently requiring 1 to 2 L nasal cannula with oxygen saturations above 90%  Patient reports he does not have oxygen at home and will start wearing it  Home oxygen evaluation for needs in the a.m.  CM made aware    COVID-19  Assessment & Plan  With reports of COVID infection 2 to 3 weeks ago.  Managed at home with conservative care.   Continue supportive care as noted above  Pulmonology indicates in anticipation for slow recovery given recent infection      CAD (coronary artery disease)  Assessment & Plan  Atherosclerosis, CAD noted on CT scan.  Patient  without current chest pain  Continue statin, blocker and aspirin  Outpatient follow-up    Abdominal pain  Assessment & Plan  With reports of left lower quadrant pain.  CT abdomen pelvis without acute findings.  Patient reports having a bowel movement for 1 week and just recently went, abdominal pain improved  Tolerating diet well    Hyponatremia  Assessment & Plan  Suspect hypovolemic hyponatremia in setting increased water intake secondary to steroids with increased urinary output  Will give bolus of NaCL  Start fluid restriction 1800 mL, discussed with patient and nursing  Follow-up urine studies  BMP in AM    Fracture of multiple ribs of right side  Assessment & Plan  With fractures of right ribs 5 through 8.  Per patient this occurred a couple months ago  Appears to have routine ongoing healing on his image currently  Recommend continuing incentive spirometer  Will require outpatient follow-up    Moderate protein-calorie malnutrition (HCC)  Assessment & Plan  Malnutrition Findings:   Adult Malnutrition type: Acute illness  Adult Degree of Malnutrition: Malnutrition of moderate degree  Malnutrition Characteristics: Inadequate energy, Weight loss    360 Statement: Acute/moderate malnutrition r/t condition as evidenced by intake meeting <75% estimated needs > 1 week, and 6% wt loss x 1 month (12/6/23: 170#, 11/15/23: 181#). Treatment: suggest liberalizing diet to 4gm Na. Pt not interested in nutrition supplements at this time. Request RD protocol to make future diet/supplement adjustments    BMI Findings:     Body mass index is 21.8 kg/m².       Open wound of finger of right hand  Assessment & Plan  Noted over middle digit  Consulted hand surgery 12/5/23  No immediate hand surgery intervention indicated at this time.   Daily dressing changes with local wound care as recommended  X-ray hand was completed 12/5/2023 which displayed no acute osseous abnormality.  Wrist findings are consistent with scapholunate advanced  collapse and severe arthritis throughout hand and wrist.    Enlarged prostate  Assessment & Plan  Noted on CT imaging.  Denies urinary symptoms.   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  Patient asymptomatic  Continue amlodipine with higher hold parameter, Lopressor twice daily and losartan with hold parameters  Continue to monitor BP per unit routine        VTE Pharmacologic Prophylaxis: VTE Score: 5 High Risk (Score >/= 5) - Pharmacological DVT Prophylaxis Ordered: enoxaparin (Lovenox). Sequential Compression Devices Ordered.    Mobility:   Basic Mobility Inpatient Raw Score: 21  JH-HLM Goal: 6: Walk 10 steps or more  JH-HLM Achieved: 6: Walk 10 steps or more  HLM Goal NOT achieved. Continue with multidisciplinary rounding and encourage appropriate mobility to improve upon HLM goals.    Patient Centered Rounds: I performed bedside rounds with nursing staff today.   Discussions with Specialists or Other Care Team Provider: Pulmonology, CM    Education and Discussions with Family / Patient: Updated  (wife) via phone. NO answer, left voicemail.     Total Time Spent on Date of Encounter in care of patient: 45 mins. This time was spent on one or more of the following: performing physical exam; counseling and coordination of care; obtaining or reviewing history; documenting in the medical record; reviewing/ordering tests, medications or procedures; communicating with other healthcare professionals and discussing with patient's family/caregivers.    Current Length of Stay: 7 day(s)  Current Patient Status: Inpatient   Certification Statement: The patient will continue to require additional inpatient hospital stay due to hyponatremia, steroids,  breathing treatments, home oxygen evaluation  Discharge Plan: Anticipate discharge in 24-48 hrs to home.    Code Status: Level 1 - Full Code    Subjective:   Patient seen and examined.  Reports he feels so-so.  Feels agitated from the steroids.  Denies fever, chills, chest pain or worsening shortness of breath.  He reports he has been drinking a lot and peeing a lot.  He is still coughing up mucus.  He use the Vicks which he feels helped more than any of the medications.  Reports had a bowel movement and denies any further abdominal pain, nausea or vomiting.  He is tolerating an oral diet well.  He has been putting Voltaren over his hip and reports that it feels better.  He does note that he will need oxygen to go home with.  Lives at home with his family and is anxious to return.    Objective:     Vitals:   Temp (24hrs), Av.2 °F (36.8 °C), Min:97.5 °F (36.4 °C), Max:98.6 °F (37 °C)    Temp:  [97.5 °F (36.4 °C)-98.6 °F (37 °C)] 98.4 °F (36.9 °C)  HR:  [64-98] 90  Resp:  [17-18] 18  BP: ()/(49-63) 103/57  SpO2:  [90 %-95 %] 94 %  Body mass index is 21.29 kg/m².     Input and Output Summary (last 24 hours):     Intake/Output Summary (Last 24 hours) at 2023 1608  Last data filed at 2023 1200  Gross per 24 hour   Intake 240 ml   Output 225 ml   Net 15 ml       Physical Exam:   Physical Exam  Vitals and nursing note reviewed.   Constitutional:       General: He is not in acute distress.     Appearance: He is well-developed. He is not toxic-appearing.      Comments: Chronically ill appearing   Cardiovascular:      Rate and Rhythm: Normal rate and regular rhythm.   Pulmonary:      Effort: Pulmonary effort is normal. No respiratory distress.      Breath sounds: Wheezing present.      Comments: Productive cough. 1.5 L 93%.  Decreased aeration in breath sounds throughout.  Nonlabored breathing.  No conversational dyspnea noted.  Abdominal:      General: Bowel sounds are normal. There is no distension.       Palpations: Abdomen is soft.      Tenderness: There is no abdominal tenderness. There is no guarding or rebound.   Skin:     General: Skin is warm and dry.   Neurological:      General: No focal deficit present.      Mental Status: He is alert. Mental status is at baseline.   Psychiatric:         Mood and Affect: Mood normal.         Behavior: Behavior normal.          Additional Data:     Labs:  Results from last 7 days   Lab Units 12/12/23  0443   WBC Thousand/uL 7.24   HEMOGLOBIN g/dL 12.6   HEMATOCRIT % 38.0   PLATELETS Thousands/uL 141*     Results from last 7 days   Lab Units 12/12/23  0443   SODIUM mmol/L 131*   POTASSIUM mmol/L 4.0   CHLORIDE mmol/L 100   CO2 mmol/L 26   BUN mg/dL 28*   CREATININE mg/dL 1.13   ANION GAP mmol/L 5   CALCIUM mg/dL 8.2*   GLUCOSE RANDOM mg/dL 111     Results from last 7 days   Lab Units 12/06/23  0451   PROCALCITONIN ng/ml 0.07     Lines/Drains:  Invasive Devices       Peripheral Intravenous Line  Duration             Peripheral IV 12/09/23 Left Forearm 3 days                  Imaging: Reviewed radiology reports from this admission including: chest xray, chest CT scan, and abdominal/pelvic CT    Recent Cultures (last 7 days):   Results from last 7 days   Lab Units 12/08/23  1840   SPUTUM CULTURE  1+ Growth of  2+ Growth of   GRAM STAIN RESULT  1+ Epithelial Cells*  2+ Disintegrating polys*  2+ Gram positive cocci in pairs and chains*  1+ Gram positive rods*       Last 24 Hours Medication List:   Current Facility-Administered Medications   Medication Dose Route Frequency Provider Last Rate    acetaminophen  650 mg Oral Q6H PRN AMPARO Huddleston      [START ON 12/13/2023] amLODIPine  5 mg Oral Daily Anay Corona PA-C      ascorbic acid  250 mg Oral Daily AMPARO Huddleston      aspirin  81 mg Oral Daily AMPARO Huddleston      atorvastatin  80 mg Oral Daily With Dinner AMPARO Huddleston      benzonatate  200 mg Oral TID Boris Everett DO      budesonide  0.5 mg Nebulization  Q12H AMPARO Huddleston      cholecalciferol  2,000 Units Oral Daily AMPARO Huddleston      Diclofenac Sodium  2 g Topical 4x Daily Eric Blanco MD      enoxaparin  40 mg Subcutaneous Daily AMPARO Huddleston      folic acid  1 mg Oral Daily AMPARO Huddleston      guaiFENesin  1,200 mg Oral Q12H GALE AMPARO Huddleston      HYDROcodone Bit-Homatrop MBr  5 mL Oral Q6H PRN Boris Everett,       ketorolac  15 mg Intravenous Q6H PRN Eric Blanco MD      levalbuterol  1.25 mg Nebulization TID Boris Everett, DO      losartan  100 mg Oral Daily AMPARO Huddleston      metoprolol tartrate  25 mg Oral Q12H [facility] AMPARO Huddleston      predniSONE  30 mg Oral Daily Boris Everett,       pyridoxine  100 mg Oral Daily AMPARO Huddleston      sodium chloride  1,000 mL Intravenous Once Anay Corona PA-C          Today, Patient Was Seen By: Anay Corona PA-C    **Please Note: This note may have been constructed using a voice recognition system.**

## 2023-12-12 NOTE — ASSESSMENT & PLAN NOTE
With fractures of right ribs 5 through 8.  Per patient this occurred a couple months ago  Appears to have routine ongoing healing on his image currently  Recommend continuing incentive spirometer  Will require outpatient follow-up

## 2023-12-12 NOTE — CASE MANAGEMENT
Case Management Assessment & Discharge Planning Note    Patient name Devin Chaves  Location S /S -01 MRN [id number]  : 1945 Date 2023       Current Admission Date: 2023  Current Admission Diagnosis:Chronic obstructive pulmonary disease with acute exacerbation (HCC)   Patient Active Problem List    Diagnosis Date Noted    Hyponatremia 2023    Moderate protein-calorie malnutrition (HCC) 2023    Hypertension 2023    CAD (coronary artery disease) 2023    Chronic obstructive pulmonary disease with acute exacerbation (HCC) 2023    JAZMINE (obstructive sleep apnea) 2023    Chronic back pain 2023    COVID-19 2023    Ectasia of artery (HCC) 2023    History of stroke 2023    Abdominal pain 2023    Enlarged prostate 2023    Open wound of finger of right hand 2023      LOS (days): 7  Geometric Mean LOS (GMLOS) (days):   Days to GMLOS:     OBJECTIVE:    Risk of Unplanned Readmission Score: 12.96         Current admission status: Inpatient       Preferred Pharmacy:   [facility]/pharmacy #[id number] - [place], [place] - [address]  [address]  [facility] [address]  Phone: [phone] Fax: [phone]    [place], [place] - [address]  [address]  [address]  [facility] [address]  Phone: [phone] Fax: [phone]    Primary Care Provider: Oswaldo Muniz DO    Primary Insurance: [facility] OPTUM [facility]  Secondary Insurance: AETNA  REP    ASSESSMENT:  Active Health Care Proxies       Zafar Chavesn Health Care Representative - Spouse   Primary Phone: [phone] (Mobile)  Home Phone: [phone]                 Advance Directives  Does patient have a Health Care POA?: Yes  Does patient have Advance Directives?: Yes  Primary Contact: Patient's spouse Francia    Readmission Root Cause  30 Day Readmission:  No    Patient Information  Admitted from:: Home  Mental Status: Alert  During Assessment patient was accompanied by: Not accompanied during assessment  Assessment information provided by:: Patient, Spouse  Primary Caregiver: Self  Support Systems: Self, Spouse/significant other, Family members  County of Residence: [place]  [facility] do you live in?: [place]  Home entry access options. Select all that apply.: Stairs  Number of steps to enter home.: 4  Do the steps have railings?: Yes  Type of Current Residence: [address] [place] home  Upon entering residence, is there a bedroom on the main floor (no further steps)?: Yes  Upon entering residence, is there a bathroom on the main floor (no further steps)?: Yes  Living Arrangements: Lives w/ Spouse/significant other, Lives w/ Daughter  Is patient a ?: Yes  Is patient active with [facility] ([facility])?: Yes  Is patient service connected?: Yes ([facility])    Activities of Daily Living Prior to Admission  Functional Status: Independent  Completes ADLs independently?: Yes  Ambulates independently?: Yes  Does patient use assisted devices?: No  Does patient currently own DME?: Yes  What DME does the patient currently own?: [employer]  Does patient have a history of Outpatient Therapy (PT/OT)?: No  Does the patient have a history of Short-Term Rehab?: No  Does patient have a history of HHC?: No  Does patient currently have HHC?: No    Patient Information Continued  Income Source: Pension/intermediate  Does patient have prescription coverage?: Yes  Does patient receive dialysis treatments?: No  Does patient have a history of substance abuse?: No  Does patient have a history of Mental Health Diagnosis?: No    PHQ 2/9 Screening   Reviewed PHQ 2/9 Depression Screening Score?: No    Means of Transportation  Means of Transport to Appts:: Drives Self      Housing Stability: Not on file   Food Insecurity: Not on file   Transportation Needs: Not on file   Utilities: Not on file        DISCHARGE DETAILS:    Discharge planning discussed with:: patient and spouse  Freedom of Choice: Yes     CM contacted family/caregiver?: Yes (Spouse)  Were Treatment Team discharge recommendations reviewed with patient/caregiver?: Yes  Did patient/caregiver verbalize understanding of patient care needs?: Yes  Were patient/caregiver advised of the risks associated with not following Treatment Team discharge recommendations?: Yes    Contacts  Patient Contacts: Francia-spouse  Relationship to Patient:: Family  Contact Method: Phone  Phone Number: [phone]  Reason/Outcome: Continuity of Care, Emergency Contact, Discharge Planning    Requested Home Health Care         Is the patient interested in HHC at discharge?: No    Other Referral/Resources/Interventions Provided:  Referral Comments: Referral sent for hospital-cancelled. Family bought patient a bed    Treatment Team Recommendation: Home  Discharge Destination Plan:: Home  Transport at Discharge : Family     CM spoke with patient at the bedside. CM introduced self and role. Patient lives with his daughter and spouse in a multi level home. Patient very independent prior to hospitalization. Patient runs a farm daily. No DME use at baseline. Patient has an old oxygen concentrator at home, unsure if it works. Patient currently on acute oxygen at beside. CM discussed possible home oxygen evaluation prior to discharge. CM will f/u if needing home oxygen at discharge.     Patient not interested in a RW at discharge.     [facility] Patient goes to [facility], Dr. Rowland as PCP.     CM spoke with patient's spouse Francia at . CM introduced self and role. Patient's spouse updated CM did speak with patient at the bedside. Patient's spouse stated her son bought a bed for patient at the home. No hospital bed needed. 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 Patient and spouse familiar with oxygen. Patient may have difficulty  having oxygen at home due to working on a farm. CM expressed understanding on the phone. CM provided contact number to spouse for any further questions/concerns.     CM reviewed discharge planning process including the following: identifying caregivers at home, preference for d/c planning needs,   availability of [facility] Med[facility] to Bed program, availability of treatment team to discuss questions or concerns patient and/or family may have regarding diagnosis, plan of care, old or new medications and discharge planning .CM will continue to follow for care coordination and update assessment as appropriate.

## 2023-12-13 LAB
ANION GAP SERPL CALCULATED.3IONS-SCNC: 4 MMOL/L
BUN SERPL-MCNC: 23 MG/DL (ref 5–25)
CALCIUM SERPL-MCNC: 8.2 MG/DL (ref 8.4–10.2)
CHLORIDE SERPL-SCNC: 107 MMOL/L (ref 96–108)
CO2 SERPL-SCNC: 24 MMOL/L (ref 21–32)
CREAT SERPL-MCNC: 0.87 MG/DL (ref 0.6–1.3)
GFR SERPL CREATININE-BSD FRML MDRD: 84 ML/MIN/1.73SQ M
GLUCOSE SERPL-MCNC: 85 MG/DL (ref 65–140)
MAGNESIUM SERPL-MCNC: 1.9 MG/DL (ref 1.9–2.7)
POTASSIUM SERPL-SCNC: 4.2 MMOL/L (ref 3.5–5.3)
SODIUM SERPL-SCNC: 135 MMOL/L (ref 135–147)

## 2023-12-13 PROCEDURE — 97116 GAIT TRAINING THERAPY: CPT

## 2023-12-13 PROCEDURE — 83735 ASSAY OF MAGNESIUM: CPT

## 2023-12-13 PROCEDURE — 94640 AIRWAY INHALATION TREATMENT: CPT

## 2023-12-13 PROCEDURE — 97530 THERAPEUTIC ACTIVITIES: CPT

## 2023-12-13 PROCEDURE — 80048 BASIC METABOLIC PNL TOTAL CA: CPT

## 2023-12-13 PROCEDURE — 94760 N-INVAS EAR/PLS OXIMETRY 1: CPT

## 2023-12-13 PROCEDURE — 99232 SBSQ HOSP IP/OBS MODERATE 35: CPT | Performed by: PHYSICIAN ASSISTANT

## 2023-12-13 PROCEDURE — 94669 MECHANICAL CHEST WALL OSCILL: CPT

## 2023-12-13 PROCEDURE — 94761 N-INVAS EAR/PLS OXIMETRY MLT: CPT

## 2023-12-13 PROCEDURE — 94664 DEMO&/EVAL PT USE INHALER: CPT

## 2023-12-13 RX ADMIN — GUAIFENESIN 1200 MG: 600 TABLET ORAL at 21:54

## 2023-12-13 RX ADMIN — AMLODIPINE BESYLATE 5 MG: 5 TABLET ORAL at 08:36

## 2023-12-13 RX ADMIN — LEVALBUTEROL HYDROCHLORIDE 1.25 MG: 1.25 SOLUTION RESPIRATORY (INHALATION) at 07:11

## 2023-12-13 RX ADMIN — Medication 250 MG: at 08:34

## 2023-12-13 RX ADMIN — LEVALBUTEROL HYDROCHLORIDE 1.25 MG: 1.25 SOLUTION RESPIRATORY (INHALATION) at 13:16

## 2023-12-13 RX ADMIN — PYRIDOXINE HCL TAB 50 MG 100 MG: 50 TAB at 08:36

## 2023-12-13 RX ADMIN — PREDNISONE 30 MG: 20 TABLET ORAL at 08:35

## 2023-12-13 RX ADMIN — Medication 2000 UNITS: at 08:35

## 2023-12-13 RX ADMIN — ATORVASTATIN CALCIUM 80 MG: 40 TABLET, FILM COATED ORAL at 17:31

## 2023-12-13 RX ADMIN — ENOXAPARIN SODIUM 40 MG: 40 INJECTION SUBCUTANEOUS at 08:36

## 2023-12-13 RX ADMIN — LEVALBUTEROL HYDROCHLORIDE 1.25 MG: 1.25 SOLUTION RESPIRATORY (INHALATION) at 20:36

## 2023-12-13 RX ADMIN — BENZONATATE 200 MG: 100 CAPSULE ORAL at 17:31

## 2023-12-13 RX ADMIN — METOPROLOL TARTRATE 25 MG: 25 TABLET, FILM COATED ORAL at 08:35

## 2023-12-13 RX ADMIN — DICLOFENAC SODIUM TOPICAL GEL, 1% 2 G: 10 GEL TOPICAL at 08:36

## 2023-12-13 RX ADMIN — BENZONATATE 200 MG: 100 CAPSULE ORAL at 21:54

## 2023-12-13 RX ADMIN — BENZONATATE 200 MG: 100 CAPSULE ORAL at 08:35

## 2023-12-13 RX ADMIN — ASPIRIN 81 MG: 81 TABLET, COATED ORAL at 08:34

## 2023-12-13 RX ADMIN — FOLIC ACID 1 MG: 1 TABLET ORAL at 08:34

## 2023-12-13 RX ADMIN — LOSARTAN POTASSIUM 50 MG: 50 TABLET, FILM COATED ORAL at 08:35

## 2023-12-13 RX ADMIN — BUDESONIDE 0.5 MG: 0.5 INHALANT ORAL at 20:36

## 2023-12-13 RX ADMIN — GUAIFENESIN 1200 MG: 600 TABLET ORAL at 08:34

## 2023-12-13 RX ADMIN — BUDESONIDE 0.5 MG: 0.5 INHALANT ORAL at 07:11

## 2023-12-13 NOTE — ASSESSMENT & PLAN NOTE
Patient presented with worsening shortness of breath, generalized weakness over the past few days. COVID-positive 2 to 3 weeks prior to admission.  Worsening shortness of breath, generalized weakness over the past 3 days.  COVID 2-3 weeks ago.   CTA chest on admission displaying interstitial coarsening and groundglass opacities.  No evidence of PE.  COPD with emphysema.  With chronic hypoxic respiratory failure, currently on baseline oxygen  Pulmonology consulted and following appreciate recommendations  Received 4 days of oral prednisone 40 mg, decrease to 30 mg today. Continue for slow taper.  Respiratory protocol, continue scheduled Xopenex and budesonide  Patient has received 5 days azithromycin.   Hopeful d/c tomorrow; pt with coughing spout w/ ambulation today w/ brief resp. compromise & w/ rhonchi on exam, will monitor 1 additional day   Remains afebrile, without leukocytosis.  Procalcitonin negative x 2  Continue supportive care with Mucinex, flutter valve, chest physiotherapy and Tessalon Perles  No need for further antibiotics

## 2023-12-13 NOTE — ASSESSMENT & PLAN NOTE
Malnutrition Findings:   Adult Malnutrition type: Acute illness  Adult Degree of Malnutrition: Malnutrition of moderate degree  Malnutrition Characteristics: Inadequate energy, Weight loss    360 Statement: Acute/moderate malnutrition r/t condition as evidenced by intake meeting <75% estimated needs > 1 week, and 6% wt loss x 1 month (12/6/23: 170#, 11/15/23: 181#). Treatment: suggest liberalizing diet to 4gm Na. Pt not interested in nutrition supplements at this time. Request RD protocol to make future diet/supplement adjustments    BMI Findings:     Body mass index is 21.57 kg/m².

## 2023-12-13 NOTE — CASE MANAGEMENT
Case Management Discharge Planning Note    Patient name Devin Chaves  Location S /S -01 MRN [id number]  : 1945 Date 2023       Current Admission Date: 2023  Current Admission Diagnosis:Chronic obstructive pulmonary disease with acute exacerbation (HCC)   Patient Active Problem List    Diagnosis Date Noted    Hyponatremia 2023    Chronic hypoxic respiratory failure (HCC) 2023    Fracture of multiple ribs of right side 2023    Rheumatoid arthritis, unspecified (HCC) 2023    Moderate protein-calorie malnutrition (HCC) 2023    Hypertension 2023    CAD (coronary artery disease) 2023    Chronic obstructive pulmonary disease with acute exacerbation (HCC) 2023    JAZMINE (obstructive sleep apnea) 2023    Chronic back pain 2023    COVID-19 2023    Ectasia of artery (HCC) 2023    History of stroke 2023    Abdominal pain 2023    Enlarged prostate 2023    Open wound of finger of right hand 2023      LOS (days): 8  Geometric Mean LOS (GMLOS) (days):   Days to GMLOS:     OBJECTIVE:  Risk of Unplanned Readmission Score: 12.37         Current admission status: Inpatient   Preferred Pharmacy:   [facility]/pharmacy #[id number] - [place], [place] - [address]  [address]  [facility] [address]  Phone: [phone] Fax: [phone]    [facility] [place], [place] - [address]  [address]  [address]  [facility] [address]  Phone: [phone] Fax: [phone]    Primary Care Provider: Oswaldo Muniz DO    Primary Insurance: [facility] OPTUM [facility]  Secondary Insurance: [employer]ET[employer] [facility] REP    DISCHARGE DETAILS:  CM contacted [facility] at  ext [phone]. Spoke with Deyanira. VINAY introduced self and role. Deyanira updated [facility] patient currently hospitalized and recommended for home oxygen per respiratory therapy. Deyanira requested scripts  and documentation be faxed to VA at . VA will review and once approved, [place] (DME) will deliver to the hospital.     CM faxed scripts and clinicals to [facility] at .

## 2023-12-13 NOTE — CASE MANAGEMENT
Case Management Progress Note    Patient name Devin Chaves  Location S /S -01 MRN [id number]  : 1945 Date 2023       LOS (days): 8  Geometric Mean LOS (GMLOS) (days):   Days to GMLOS:        OBJECTIVE:        Current admission status: Inpatient  Preferred Pharmacy:   [id number] - [place] - [address]  [address]  [facility] [address]  Phone: [phone] Fax: [phone]    [facility] - [place], [place] - [address]  [address]  [address]  [facility] [address]  Phone: [phone] Fax: [phone]    Primary Care Provider: Oswaldo Muniz DO    Primary Insurance: [facility]  Secondary Insurance: [employer]ET[employer] [facility] REP    PROGRESS NOTE:    Weekly Care Management Length of Stay Review     Current LOS: 8 Days    Most Recent Labs:     Lab Results   Component Value Date/Time    WBC 7.24 2023 04:43 AM    HGB 12.6 2023 04:43 AM    HCT 38.0 2023 04:43 AM     (L) 2023 04:43 AM    SODIUM 135 2023 05:35 AM    K 4.2 2023 05:35 AM     2023 05:35 AM    CO2 24 2023 05:35 AM    BUN 23 2023 05:35 AM    CREATININE 0.87 2023 05:35 AM    GLUC 85 2023 05:35 AM       Most Recent Vitals:   Vitals:    23 1536   BP: 105/52   Pulse: 80   Resp: 16   Temp: 98.4 °F (36.9 °C)   SpO2: 94%        Identified Barriers to Discharge/Discharge Goals/Care Management Interventions: COPD, wound care, pulm, ortho, home O2 eval    Intended Discharge Disposition: home-[facility] will need supple home O2    Expected Discharge Date: 24hrs

## 2023-12-13 NOTE — CASE MANAGEMENT
Case Management Discharge Planning Note    Patient name Devin Chaves  Location S /S -01 MRN [id number]  : 1945 Date 2023       Current Admission Date: 2023  Current Admission Diagnosis:Chronic obstructive pulmonary disease with acute exacerbation (HCC)   Patient Active Problem List    Diagnosis Date Noted    Hyponatremia 2023    Chronic hypoxic respiratory failure (HCC) 2023    Fracture of multiple ribs of right side 2023    Rheumatoid arthritis, unspecified (HCC) 2023    Moderate protein-calorie malnutrition (HCC) 2023    Hypertension 2023    CAD (coronary artery disease) 2023    Chronic obstructive pulmonary disease with acute exacerbation (HCC) 2023    JAZMINE (obstructive sleep apnea) 2023    Chronic back pain 2023    COVID-19 2023    Ectasia of artery (HCC) 2023    History of stroke 2023    Abdominal pain 2023    Enlarged prostate 2023    Open wound of finger of right hand 2023      LOS (days): 8  Geometric Mean LOS (GMLOS) (days):   Days to GMLOS:     OBJECTIVE:  Risk of Unplanned Readmission Score: 12.4         Current admission status: Inpatient   Preferred [facility]acy[facility]   [id number] [place], [place] - [address]  [address]  [facility] [address]  Phone: [phone] Fax: [phone]    [facility] [place] - [address]  [address]  [address]  [facility] [address]  Phone: [phone] Fax: [phone]    Primary Care Provider: Oswaldo Muniz DO    Primary Insurance: [facility] OPTUM [facility]  Secondary Insurance: [employer]ET[employer] REP    DISCHARGE DETAILS:  CM spoke with patient at the bedside. Patient expressed his concerns and frustrations to CM at the bedside. Patient stated to CM that per physicians he will be medically ready for discharge home tomorrow. Patient stating  he cannot go home at this time due to his room being cleaned and renovated. CM discussed with patient once medically stable, bedroom renovations is not a acute medical reason to stay in the hospital. CM discussed if patient could stay in another room of the home or at a family members house. Patient continued to discuss going to a rehab facility for a few days. CM reviewed with patient per PT, patient is recommended for home vs outpatient therapy. Patient does not meet criteria for inpatient rehab.     Patient frustrated at bedside. CM attempted to provide support at bedside however patient feels that as a  he is getting discharged too soon from the hospital.     Patient aware that he is not written for discharge at this time. RICHY will evaluate tomorrow. CM currently working on [facility] oxygen [facility] with the [facility]. Patient updated per respiratory he qualifies for home oxygen 3 L NC with exertion.     CM answered all questions/concerns at this time.

## 2023-12-13 NOTE — PLAN OF CARE
Problem: PHYSICAL THERAPY ADULT  Goal: Performs mobility at highest level of function for planned discharge setting.  See evaluation for individualized goals.  Description: Treatment/Interventions: Functional transfer training, LE strengthening/ROM, Elevations, Therapeutic exercise, Endurance training, Patient/family training, Equipment eval/education, Bed mobility, Gait training, Spoke to nursing, Spoke to case management, Spoke to advanced practitioner    Equipment Recommended: Walker     See flowsheet documentation for full assessment, interventions and recommendations.  Outcome: Progressing  Note: Prognosis: Good  Problem List: Decreased strength, Decreased endurance, Impaired balance, Decreased mobility  Assessment: pt began tx session in bed and was agreeable to participate in PT intervention. pt continues to remain consistant with being independent for all bed mobility and functional transfers to and from RW. In [facility] PT intervention pt was limited with activity tolerance, ambulation distance and steps completed due to SOB, decreased Sp02 low 80's and persistant cough. pt ambulated 150'x1 RW /s no LOB but Sp02 decreased to 82%. pt required several therapeutic seated rest breaks in [facility] tx session especially post ambulation as that rest break was 5 minutes. pt educated w/ verbal/visual demonstration of stair trials. pt completed 3 steps w/ bilateral hand rails and close /s. Additional was not possible due to decreased Sp02 83%. pt would benefit from cotninued skilled PT intervention in order to address deficits listed above. Post tx session pt in bed with call bell and all pt needs met  Barriers to Discharge: Inaccessible home environment     Rehab Resource Intensity Level, PT: III (Minimum Resource Intensity)    See flowsheet documentation for full assessment.

## 2023-12-13 NOTE — PROGRESS NOTES
[facility]  Progress Note  Name: Devin Chaves I  MRN: [id number]  Unit/Bed#: S -01 I Date of Admission: 12/4/2023   Date of Service: 12/13/2023 I Hospital Day: 8    Assessment/Plan     * Chronic obstructive pulmonary disease with acute exacerbation (HCC)  Assessment & Plan  Patient presented with worsening shortness of breath, generalized weakness over the past few days. COVID-positive 2 to 3 weeks prior to admission.  Worsening shortness of breath, generalized weakness over the past 3 days.  COVID 2-3 weeks ago.   CTA chest on admission displaying interstitial coarsening and groundglass opacities.  No evidence of PE.  COPD with emphysema.  With chronic hypoxic respiratory failure, currently on baseline oxygen  Pulmonology consulted and following appreciate recommendations  Received 4 days of oral prednisone 40 mg, decrease to 30 mg today. Continue for slow taper.  Respiratory protocol, continue scheduled Xopenex and budesonide  Patient has received 5 days azithromycin.   Hopeful d/c tomorrow; pt with coughing spout w/ ambulation today w/ brief resp. compromise & w/ rhonchi on exam, will monitor 1 additional day   Remains afebrile, without leukocytosis.  Procalcitonin negative x 2  Continue supportive care with Mucinex, flutter valve, chest physiotherapy and Tessalon Perles  No need for further antibiotics      COVID-19  Assessment & Plan  With reports of COVID infection 2 to 3 weeks ago.  Managed at home with conservative care.   Continue supportive care as noted above  Pulmonology indicates in anticipation for slow recovery given recent infection      Chronic hypoxic respiratory failure (HCC)  Assessment & Plan  Baseline is supposed to be using 3 L nasal cannula but reports noncompliance with this.  Patient currently requiring 1 to 2 L nasal cannula with oxygen saturations above 90%  Patient reports he does not have oxygen at home and will start wearing it  Home oxygen  evaluation for needs in the a.m.  CM made aware    Fracture of multiple ribs of right side  Assessment & Plan  With fractures of right ribs 5 through 8.  Per patient this occurred a couple months ago  Appears to have routine ongoing healing on his image currently  Recommend continuing incentive spirometer  Will require outpatient follow-up    Hyponatremia  Assessment & Plan  Suspect hypovolemic hyponatremia in setting increased water intake secondary to steroids with increased urinary output  Will give bolus of NaCL  Start fluid restriction 1800 mL, discussed with patient and nursing  Follow-up urine studies  BMP in AM    Moderate protein-calorie malnutrition (HCC)  Assessment & Plan  Malnutrition Findings:   Adult Malnutrition type: Acute illness  Adult Degree of Malnutrition: Malnutrition of moderate degree  Malnutrition Characteristics: Inadequate energy, Weight loss    360 Statement: Acute/moderate malnutrition r/t condition as evidenced by intake meeting <75% estimated needs > 1 week, and 6% wt loss x 1 month (12/6/23: 170#, 11/15/23: 181#). Treatment: suggest liberalizing diet to 4gm Na. Pt not interested in nutrition supplements at this time. Request RD protocol to make future diet/supplement adjustments    BMI Findings:     Body mass index is 21.57 kg/m².       Open wound of finger of right hand  Assessment & Plan  Noted over middle digit  Consulted hand surgery 12/5/23  No immediate hand surgery intervention indicated at this time.   Daily dressing changes with local wound care as recommended  X-ray hand was completed 12/5/2023 which displayed no acute osseous abnormality.  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    Enlarged prostate  Assessment & Plan  Noted on CT imaging.  Denies urinary symptoms.   Monitor intake and output  Outpatient follow up with pcp/urology     Abdominal pain  Assessment & Plan  With reports of left lower  quadrant pain.  CT abdomen pelvis without acute findings.  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  Patient without current chest pain  Continue statin, blocker and aspirin  Outpatient follow-up    Hypertension  Assessment & Plan  Blood pressures on softer side this morning.  Patient asymptomatic  Continue amlodipine with higher hold parameter, Lopressor twice daily and losartan with hold parameters  Continue to monitor BP per unit routine           VTE Pharmacologic Prophylaxis: VTE Score: 5 High Risk (Score >/= 5) - Pharmacological DVT Prophylaxis Ordered: enoxaparin (Lovenox). Sequential Compression Devices Ordered.    Mobility:   Basic Mobility Inpatient Raw Score: 21  JH-HLM Goal: 6: Walk 10 steps or more  JH-HLM Achieved: 7: Walk 25 feet or more  HLM Goal achieved. Continue to encourage appropriate mobility.    Patient Centered Rounds: I performed bedside rounds with nursing staff today.   Discussions with Specialists or Other Care Team Provider: CM and PT/OT    Education and Discussions with Family / Patient: wife updated via phone     Total Time Spent on Date of Encounter in care of patient: 38 mins. This time was spent on one or more of the following: performing physical exam; counseling and coordination of care; obtaining or reviewing history; documenting in the medical record; reviewing/ordering tests, medications or procedures; communicating with other healthcare professionals and discussing with patient's family/caregivers.    Current Length of Stay: 8 day(s)  Current Patient Status: Inpatient   Certification Statement: The patient will continue to require additional inpatient  hospital stay due to improving COPD exacerbation   Discharge Plan: Anticipate discharge tomorrow to home.    Code Status: Level 1 - Full Code    Subjective:   Pt is very concerned about d/c. He notes severe SOB and near syncope with coughing fit w/ sputum today. Breathing is stable at rest. He is without dysuria or constipation.     Objective:     Vitals:   Temp (24hrs), Av.1 °F (36.7 °C), Min:97.7 °F (36.5 °C), Max:98.4 °F (36.9 °C)    Temp:  [97.7 °F (36.5 °C)-98.4 °F (36.9 °C)] 98.4 °F (36.9 °C)  HR:  [61-91] 80  Resp:  [16-18] 16  BP: (105-131)/(52-72) 105/52  SpO2:  [92 %-100 %] 94 %  Body mass index is 21.57 kg/m².     Input and Output Summary (last 24 hours):     Intake/Output Summary (Last 24 hours) at 2023 1709  Last data filed at 2023 0722  Gross per 24 hour   Intake --   Output 1000 ml   Net -1000 ml       Physical Exam:   Physical Exam  Constitutional:       Appearance: Normal appearance.      Comments: Frail   HENT:      Head: Normocephalic and atraumatic.      Right Ear: External ear normal.      Left Ear: External ear normal.      Nose: Nose normal.      Mouth/Throat:      Mouth: Mucous membranes are moist.      Pharynx: Oropharynx is clear.   Eyes:      Extraocular Movements: Extraocular movements intact.      Conjunctiva/sclera: Conjunctivae normal.   Cardiovascular:      Rate and Rhythm: Normal rate and regular rhythm.   Pulmonary:      Effort: No respiratory distress.      Breath sounds: Rhonchi (exp on bases, moderate) present. No wheezing or rales.   Abdominal:      General: Abdomen is flat. Bowel sounds are normal.      Palpations: Abdomen is soft.   Musculoskeletal:         General: Deformity (right hand dressing) present. Normal range of motion.      Cervical back: Normal range of motion and neck supple.   Skin:     General: Skin is warm and dry.      Capillary Refill: Capillary refill takes less than 2 seconds.   Neurological:      General: No focal deficit present.       Mental Status: He is alert. Mental status is at baseline.   Psychiatric:         Mood and Affect: Mood normal.         Behavior: Behavior normal.          Additional Data:     Labs:  Results from last 7 days   Lab Units 12/12/23  0443   WBC Thousand/uL 7.24   HEMOGLOBIN g/dL 12.6   HEMATOCRIT % 38.0   PLATELETS Thousands/uL 141*     Results from last 7 days   Lab Units 12/13/23  0535   SODIUM mmol/L 135   POTASSIUM mmol/L 4.2   CHLORIDE mmol/L 107   CO2 mmol/L 24   BUN mg/dL 23   CREATININE mg/dL 0.87   ANION GAP mmol/L 4   CALCIUM mg/dL 8.2*   GLUCOSE RANDOM mg/dL 85                       Lines/Drains:  Invasive Devices       Peripheral Intravenous Line  Duration             Peripheral IV 12/13/23 Left;Ventral (anterior) Forearm <1 day                          Imaging: Reviewed radiology reports from this admission including: xray(s)    Recent Cultures (last 7 days):   Results from last 7 days   Lab Units 12/08/23  1840   SPUTUM CULTURE  1+ Growth of Aspergillus species*  2+ Growth of   GRAM STAIN RESULT  1+ Epithelial Cells*  2+ Disintegrating polys*  2+ Gram positive cocci in pairs and chains*  1+ Gram positive rods*       Last 24 Hours Medication List:   Current Facility-Administered Medications   Medication Dose Route Frequency Provider Last Rate    acetaminophen  650 mg Oral Q6H PRN AMPARO Huddleston      amLODIPine  5 mg Oral Daily Anay Corona PA-C      ascorbic acid  250 mg Oral Daily AMPARO Huddleston      aspirin  81 mg Oral Daily AMPARO Huddleston      atorvastatin  80 mg Oral Daily With Dinner AMPARO Huddleston      benzonatate  200 mg Oral TID Boris Everett DO      budesonide  0.5 mg Nebulization Q12H AMPARO Huddleston      cholecalciferol  2,000 Units Oral Daily AMPARO Huddleston      Diclofenac Sodium  2 g Topical 4x Daily Eric Blanco MD      enoxaparin  40 mg Subcutaneous Daily AMPARO Huddleston      folic acid  1 mg Oral Daily AMPARO Huddleston      guaiFENesin  1,200 mg Oral Q12H GALE Sommers  AMPARO Tineo      HYDROcodone Bit-Homatrop MBr  5 mL Oral Q6H PRN Boris Everett DO      levalbuterol  1.25 mg Nebulization TID Boris Everett DO      losartan  50 mg Oral Daily Anay Corona PA-C      metoprolol tartrate  25 mg Oral Q12H GALE AMPARO Huddleston      predniSONE  30 mg Oral Daily Boris Everett DO      pyridoxine  100 mg Oral Daily AMPARO Huddleston          Today, Patient Was Seen By: Brit Orellana PA-C    **Please Note: This note may have been constructed using a voice recognition system.**

## 2023-12-13 NOTE — PHYSICAL THERAPY NOTE
PHYSICAL THERAPY NOTE          Patient Name: Devin Chaves  Today's Date: 12/13/2023 12/13/23 1040   PT Last Visit   PT Visit Date 12/13/23   Note Type   Note Type Treatment   Pain Assessment   Pain Assessment Tool 0-10   Pain Score No Pain   Pain Location/Orientation Location: Back   Patient's Stated Pain Goal No pain   Hospital Pain Intervention(s) Repositioned;Ambulation/increased activity;Emotional support;Rest   Multiple Pain Sites No   Pain Rating: FLACC (Rest) - Face 0   Pain Rating: FLACC (Rest) - Legs 0   Pain Rating: FLACC (Rest) - Activity 0   Pain Rating: FLACC (Rest) - Cry 0   Pain Rating: FLACC (Rest) - Consolability 0   Score: FLACC (Rest) 0   Pain Rating: FLACC (Activity) - Face 1   Pain Rating: FLACC (Activity) - Legs 1   Pain Rating: FLACC (Activity) - Activity 1   Pain Rating: FLACC (Activity) - Cry 1   Pain Rating: FLACC (Activity) - Consolability 0   Score: FLACC (Activity) 4   Restrictions/Precautions   Weight Bearing Precautions Per Order Yes   RUE Weight Bearing Per Order (S)  WBAT  (avoid WB to R middle finger)   Other Precautions Fall Risk   General   Chart Reviewed Yes   Additional Pertinent History (S)  pt required several therapeutic rest breaks in todays tx session due to decreased Sp02 low 80's and persistant coughing   Response to Previous Treatment Other (Comment)  (pt reports a persistant coughing)   Family/Caregiver Present Yes   Cognition   Overall Cognitive Status WFL   Arousal/Participation Alert;Responsive;Cooperative   Attention Within functional limits   Orientation Level Oriented X4   Memory Within functional limits   Following Commands Follows multistep commands with increased time or repetition   Comments pt was cooperatiev throughout PT intervention   Subjective   Subjective pt was agreeable to participate in PT intervention. pt stated 0/10 pain pre/post tx session. pt stated he  has a persistant cough   Bed Mobility   Supine to Sit 7  Independent   Additional items HOB elevated   Sit to Supine 7  Independent   Additional items HOB elevated   Additional Comments pt is able to sit EOB w/o LOB while perofrming TE activities in order to strengthen LE's and increase static/dynamic sitting balance   Transfers   Sit to Stand 7  Independent   Additional items Increased time required   Stand to Sit 7  Independent   Additional items Increased time required   Stand pivot Unable to assess   Additional Comments pt utilized RW for all functional transfers w/o LOB in todays tx session   Ambulation/Elevation   Gait pattern Decreased foot clearance;Foward flexed;Short stride   Gait Assistance 5  Supervision   Additional items Assist x 1;Verbal cues   Assistive Device Rolling walker   Distance 150'x1 RW w/ /s   Stair Management Assistance 5  Supervision   Additional items Assist x 1;Verbal cues;Increased time required   Stair Management Technique Two rails;Step to pattern;Foreward;Backward   Number of Stairs 3   Ambulation/Elevation Additional Comments (S)  additional ambulation distance and steps completed not possible due to decreased Sp02 and persistant cough   Balance   Static Sitting Good   Dynamic Sitting Fair +   Static Standing Fair +   Dynamic Standing Fair   Ambulatory Fair -  (w/ RW)   Endurance Deficit   Endurance Deficit Yes   Endurance Deficit Description pt required several therapeutic seated rest breaks due to fatigue, SOB, decreased Sp02 and persistant cough   Activity Tolerance   Activity Tolerance Patient limited by fatigue;Other (Comment)  (SOB,decreased Sp02, persistant cough)   Nurse Made Aware Spoke to RN   Exercises   Knee AROM Long Arc Quad Sitting;10 reps;AROM;Bilateral   Ankle Pumps Sitting;20 reps;AROM;Bilateral   Marching Sitting;10 reps;AROM;Bilateral   Assessment   Prognosis Good   Problem List Decreased strength;Decreased endurance;Impaired balance;Decreased mobility    Assessment pt began tx session in bed and was agreeable to participate in PT intervention. pt continues to remain consistant with being independent for all bed mobility and functional transfers to and from RW. In [facility] PT intervention pt was limited with activity tolerance, ambulation distance and steps completed due to SOB, decreased Sp02 low 80's and persistant cough. pt ambulated 150'x1 RW /s no LOB but Sp02 decreased to 82%. pt required several therapeutic seated rest breaks in [facility] tx session especially post ambulation as that rest break was 5 minutes. pt educated w/ verbal/visual demonstration of stair trials. pt completed 3 steps w/ bilateral hand rails and close /s. Additional was not possible due to decreased Sp02 83%. pt would benefit from cotninued skilled PT intervention in order to address deficits listed above. Post tx session pt in bed with call bell and all pt needs met   Barriers to Discharge Inaccessible home environment   Goals   Patient Goals to stop coughing   STG Expiration Date 12/19/23   PT Treatment Day 1   Plan   Treatment/Interventions ADL retraining;Functional transfer training;LE strengthening/ROM;Elevations;Therapeutic exercise;Endurance training;Patient/family training;Equipment eval/education;Bed mobility;Gait training;Spoke to nursing;Compensatory technique education   Progress Slow progress, decreased activity tolerance   PT Frequency 2-3x/wk   Discharge Recommendation   Rehab Resource Intensity Level, PT III (Minimum Resource Intensity)   Equipment Recommended Walker   Walker Package Recommended Wheeled walker   Change/add to Walker Package? No   AM-PAC Basic Mobility Inpatient   Turning in Flat Bed Without Bedrails 4   Lying on Back to Sitting on Edge of Flat Bed Without Bedrails 4   Moving Bed to Chair 3   Standing Up From Chair Using Arms 4   Walk in Room 3   Climb 3-5 Stairs With Railing 3   Basic Mobility Inpatient Raw Score 21   Basic Mobility Standardized Score 45.55    Highest Level Of Mobility   JH-HLM Goal 6: Walk 10 steps or more   JH-HLM Achieved 7: Walk 25 feet or more   Education   Education Provided Mobility training;Assistive device;Other  (stair trials)   Patient Demonstrates verbal understanding   End of Consult   Patient Position at End of Consult Seated edge of bed;Bed/Chair alarm activated;All needs within reach   The patient's AM-PAC Basic Mobility Inpatient Short Form Raw Score is 21. A Raw score of greater than 16 suggests the patient may benefit from discharge to home. Please also refer to the recommendation of the Physical Therapist for safe discharge planning.    Mac Valladares

## 2023-12-13 NOTE — RESPIRATORY THERAPY NOTE
Home Oxygen Qualifying Test     Patient name: Devin Chaves        : 1945   Date of Test:  2023  Diagnosis:    Home Oxygen Test:    **Medicare Guidelines require item(s) 1-5 on all ambulatory patients or 1 and 2 on non-ambulatory patients.    1. Baseline SPO2 on Room Air at rest 94 %   If <= 88% on Room Air add O2 via NC to obtain SpO2 >=88%. If LPM needed, document LPM NA needed to reach =>88%    SPO2 during exertion on Room Air 86  %  During exertion monitor SPO2. If SPO2 increases >=89%, do not add supplemental oxygen    SPO2 on Oxygen at Rest NA % at NA LPM    SPO2 during exertion on Oxygen 92 % at 3 LPM    Test performed during exertion activity.      [x]  Supplemental Home Oxygen is indicated.    []  Client does not qualify for home oxygen.    Respiratory Additional Notes- Patient on RA was 94%. Does not qualify for oxygen at rest. Patient during ambulation walked about 500ft using the walker and desaturated to 86% and required 3L to maintain a goal of above 89%. Patient post walk did express SOB/wob and did say the walk was difficult for him.  TT sent to ordering provider.    Joi Pepe, RT

## 2023-12-14 PROBLEM — E87.1 HYPONATREMIA: Status: RESOLVED | Noted: 2023-12-12 | Resolved: 2023-12-14

## 2023-12-14 PROBLEM — R10.9 ABDOMINAL PAIN: Status: RESOLVED | Noted: 2023-12-05 | Resolved: 2023-12-14

## 2023-12-14 LAB
ANION GAP SERPL CALCULATED.3IONS-SCNC: 4 MMOL/L
BUN SERPL-MCNC: 21 MG/DL (ref 5–25)
CALCIUM SERPL-MCNC: 8.1 MG/DL (ref 8.4–10.2)
CHLORIDE SERPL-SCNC: 105 MMOL/L (ref 96–108)
CO2 SERPL-SCNC: 26 MMOL/L (ref 21–32)
CREAT SERPL-MCNC: 0.96 MG/DL (ref 0.6–1.3)
GFR SERPL CREATININE-BSD FRML MDRD: 77 ML/MIN/1.73SQ M
GLUCOSE SERPL-MCNC: 124 MG/DL (ref 65–140)
POTASSIUM SERPL-SCNC: 3.8 MMOL/L (ref 3.5–5.3)
SODIUM SERPL-SCNC: 135 MMOL/L (ref 135–147)

## 2023-12-14 PROCEDURE — 94669 MECHANICAL CHEST WALL OSCILL: CPT

## 2023-12-14 PROCEDURE — 94640 AIRWAY INHALATION TREATMENT: CPT

## 2023-12-14 PROCEDURE — 99232 SBSQ HOSP IP/OBS MODERATE 35: CPT | Performed by: NURSE PRACTITIONER

## 2023-12-14 PROCEDURE — 99232 SBSQ HOSP IP/OBS MODERATE 35: CPT | Performed by: INTERNAL MEDICINE

## 2023-12-14 PROCEDURE — 80048 BASIC METABOLIC PNL TOTAL CA: CPT | Performed by: PHYSICIAN ASSISTANT

## 2023-12-14 PROCEDURE — 94760 N-INVAS EAR/PLS OXIMETRY 1: CPT

## 2023-12-14 RX ADMIN — LEVALBUTEROL HYDROCHLORIDE 1.25 MG: 1.25 SOLUTION RESPIRATORY (INHALATION) at 07:04

## 2023-12-14 RX ADMIN — FOLIC ACID 1 MG: 1 TABLET ORAL at 09:02

## 2023-12-14 RX ADMIN — BENZONATATE 200 MG: 100 CAPSULE ORAL at 21:15

## 2023-12-14 RX ADMIN — GUAIFENESIN 1200 MG: 600 TABLET ORAL at 09:02

## 2023-12-14 RX ADMIN — PYRIDOXINE HCL TAB 50 MG 100 MG: 50 TAB at 09:02

## 2023-12-14 RX ADMIN — LEVALBUTEROL HYDROCHLORIDE 1.25 MG: 1.25 SOLUTION RESPIRATORY (INHALATION) at 19:52

## 2023-12-14 RX ADMIN — DICLOFENAC SODIUM TOPICAL GEL, 1% 2 G: 10 GEL TOPICAL at 18:24

## 2023-12-14 RX ADMIN — BUDESONIDE 0.5 MG: 0.5 INHALANT ORAL at 19:52

## 2023-12-14 RX ADMIN — BENZONATATE 200 MG: 100 CAPSULE ORAL at 16:11

## 2023-12-14 RX ADMIN — ATORVASTATIN CALCIUM 80 MG: 40 TABLET, FILM COATED ORAL at 16:11

## 2023-12-14 RX ADMIN — LOSARTAN POTASSIUM 50 MG: 50 TABLET, FILM COATED ORAL at 09:05

## 2023-12-14 RX ADMIN — METOPROLOL TARTRATE 25 MG: 25 TABLET, FILM COATED ORAL at 09:02

## 2023-12-14 RX ADMIN — GUAIFENESIN 1200 MG: 600 TABLET ORAL at 21:15

## 2023-12-14 RX ADMIN — BENZONATATE 200 MG: 100 CAPSULE ORAL at 09:01

## 2023-12-14 RX ADMIN — ASPIRIN 81 MG: 81 TABLET, COATED ORAL at 09:02

## 2023-12-14 RX ADMIN — ENOXAPARIN SODIUM 40 MG: 40 INJECTION SUBCUTANEOUS at 09:05

## 2023-12-14 RX ADMIN — LEVALBUTEROL HYDROCHLORIDE 1.25 MG: 1.25 SOLUTION RESPIRATORY (INHALATION) at 14:00

## 2023-12-14 RX ADMIN — Medication 2000 UNITS: at 09:02

## 2023-12-14 RX ADMIN — PREDNISONE 30 MG: 20 TABLET ORAL at 09:05

## 2023-12-14 RX ADMIN — Medication 250 MG: at 09:01

## 2023-12-14 RX ADMIN — BUDESONIDE 0.5 MG: 0.5 INHALANT ORAL at 07:04

## 2023-12-14 NOTE — ASSESSMENT & PLAN NOTE
Suspect hypovolemic hyponatremia in setting increased water intake secondary to steroids with increased urinary output- now resolved  D/c fluid restriction 1800 mL   BMP in AM

## 2023-12-14 NOTE — ASSESSMENT & PLAN NOTE
Patient presented with worsening shortness of breath, generalized weakness over the past few days. COVID-positive 2 to 3 weeks prior to admission.  Worsening shortness of breath, generalized weakness over the past 3 days.  COVID 2-3 weeks ago.   CTA chest on admission displaying interstitial coarsening and groundglass opacities.  No evidence of PE.  COPD with emphysema.  With chronic hypoxic respiratory failure, currently on baseline oxygen  Pulmonology consulted and following appreciate recommendations  Received 4 days of oral prednisone 40 mg, decrease to 30 mg x3 days- per pulm ok to d/c prednisone as patient reports this makes him agitated and he has overall improved  Respiratory protocol, continue scheduled Xopenex and budesonide  Patient has received 5 days azithromycin.   Hopeful d/c tomorrow; needs PT eval for steps- patient is adamant about not leaving and will likely appeal his discharge if discharged prior to Monday. He wants to remain in the hospital or go to a rehab facility due to his deconditioning, weakness and also his room at his house is being refinished and he reports she can not stay in his home.   Remains afebrile, without leukocytosis.  Procalcitonin negative x 2  Continue supportive care with Mucinex, flutter valve, chest physiotherapy and Tessalon Perles  No need for further antibiotics

## 2023-12-14 NOTE — CASE MANAGEMENT
Case Management Discharge Planning Note    Patient name Devin Chaves  Location S /S -01 MRN [id number]  : 1945 Date 2023       Current Admission Date: 2023  Current Admission Diagnosis:Chronic obstructive pulmonary disease with acute exacerbation (HCC)   Patient Active Problem List    Diagnosis Date Noted    Hyponatremia 2023    Chronic hypoxic respiratory failure (HCC) 2023    Fracture of multiple ribs of right side 2023    Rheumatoid arthritis, unspecified (HCC) 2023    Moderate protein-calorie malnutrition (HCC) 2023    Hypertension 2023    CAD (coronary artery disease) 2023    Chronic obstructive pulmonary disease with acute exacerbation (HCC) 2023    JAZMINE (obstructive sleep apnea) 2023    Chronic back pain 2023    COVID-19 2023    Ectasia of artery (HCC) 2023    History of stroke 2023    Abdominal pain 2023    Enlarged prostate 2023    Open wound of finger of right hand 2023      LOS (days): 9  Geometric Mean LOS (GMLOS) (days):   Days to GMLOS:     OBJECTIVE:  Risk of Unplanned Readmission Score: 12.32         Current admission status: Inpatient   Preferred Pharmacy:   [id number] - [place], [place] - [address]  [address]  [facility] [address]  Phone: [phone] Fax: [phone]    [facility] [place], [place] - [address]  [address]  [address]  [facility] [address]  Phone: [phone] Fax: [phone]    Primary Care Provider: Oswaldo Muniz DO    Primary Insurance: [facility] [employer] [facility]  Secondary Insurance: [employer]    DISCHARGE DETAILS:    Discharge planning discussed with:: Patient     Comments - [facility]: CM met with Pt to discuss his d/c plans to home but Pt remains adamant he wants to go to rehab, despite walking the hallways independently.        IMM Given (Date):: 12/14/23  IMM Given to:: Patient (Pt refused to sign acknowledging he received a review of his Medicare Rights, reporting he plans to appeal his d/c. Jim contact information given to Pt.)

## 2023-12-14 NOTE — ASSESSMENT & PLAN NOTE
Baseline is supposed to be using 3 L nasal cannula but reports noncompliance with this.  Patient currently requiring 1 to 2 L nasal cannula with oxygen saturations above 90%  Patient reported he does not have oxygen at home and will start wearing it  Home oxygen evaluation appreciated confirming need for o2 at discharge- RA was 94%. Does not qualify for oxygen at rest. Patient during ambulation walked about 500ft using the walker and desaturated to 86% and required 3L to maintain a goal of above 89%   CM aware  Noted to have o2 sat of 71% last evening, per RN pt has been noncompliant walking in the halls without o2 and this was the cause of low sat overnight

## 2023-12-14 NOTE — PLAN OF CARE
Problem: Potential for Falls  Goal: Patient will remain free of falls  Description: INTERVENTIONS:  - Educate patient/family on patient safety including physical limitations  - Instruct patient to call for assistance with activity   - Consult OT/PT to assist with strengthening/mobility   - Keep Call bell within reach  - Keep bed low and locked with side rails adjusted as appropriate  - Keep care items and personal belongings within reach  - Initiate and maintain comfort rounds  - Make Fall Risk Sign visible to staff  - Offer Toileting every  Hours, in advance of need  - Initiate/Maintain alarm  - Obtain necessary fall risk management equipment:   - Apply yellow socks and bracelet for high fall risk patients  - Consider moving patient to room near nurses station  Outcome: Progressing     Problem: Prexisting or High Potential for Compromised Skin Integrity  Goal: Skin integrity is maintained or improved  Description: INTERVENTIONS:  - Identify patients at risk for skin breakdown  - Assess and monitor skin integrity  - Assess and monitor nutrition and hydration status  - Monitor labs   - Assess for incontinence   - Turn and reposition patient  - Assist with mobility/ambulation  - Relieve pressure over bony prominences  - Avoid friction and shearing  - Provide appropriate hygiene as needed including keeping skin clean and dry  - Evaluate need for skin moisturizer/barrier cream  - Collaborate with interdisciplinary team   - Patient/family teaching  - Consider wound care consult   Outcome: Progressing     Problem: Nutrition/Hydration-ADULT  Goal: Nutrient/Hydration intake appropriate for improving, restoring or maintaining nutritional needs  Description: Monitor and assess patient's nutrition/hydration status for malnutrition. Collaborate with interdisciplinary team and initiate plan and interventions as ordered.  Monitor patient's weight and dietary intake as ordered or per policy. Utilize nutrition screening tool and  intervene as necessary. Determine patient's food preferences and provide high-protein, high-caloric foods as appropriate.     INTERVENTIONS:  - Monitor oral intake, urinary output, labs, and treatment plans  - Assess nutrition and hydration status and recommend course of action  - Evaluate amount of meals eaten  - Assist patient with eating if necessary   - Allow adequate time for meals  - Recommend/ encourage appropriate diets, oral nutritional supplements, and vitamin/mineral supplements  - Order, calculate, and assess calorie counts as needed  - Recommend, monitor, and adjust tube feedings and TPN/PPN based on assessed needs  - Assess need for intravenous fluids  - Provide specific nutrition/hydration education as appropriate  - Include patient/family/caregiver in decisions related to nutrition  Outcome: Progressing

## 2023-12-14 NOTE — ASSESSMENT & PLAN NOTE
With reports of left lower quadrant pain.  CT abdomen pelvis without acute findings.  Patient reported no bowel movement for 1 week and just recently went, abdominal pain improved  Tolerating diet well

## 2023-12-14 NOTE — ASSESSMENT & PLAN NOTE
Malnutrition Findings:   Adult Malnutrition type: Acute illness  Adult Degree of Malnutrition: Malnutrition of moderate degree  Malnutrition Characteristics: Inadequate energy, Weight loss    360 Statement: Acute/moderate malnutrition r/t condition as evidenced by intake meeting <75% estimated needs > 1 week, and 6% wt loss x 1 month (12/6/23: 170#, 11/15/23: 181#). Treatment: suggest liberalizing diet to 4gm Na. Pt not interested in nutrition supplements at this time. Request RD protocol to make future diet/supplement adjustments    BMI Findings:     Body mass index is 21.12 kg/m².

## 2023-12-14 NOTE — PROGRESS NOTES
[facility]  Progress Note  Name: Devin Chaves I  MRN: [id number]  Unit/Bed#: S -01 I Date of Admission: 12/4/2023   Date of Service: 12/14/2023 I Hospital Day: 9    Assessment/Plan   * Chronic obstructive pulmonary disease with acute exacerbation (HCC)  Assessment & Plan  Patient presented with worsening shortness of breath, generalized weakness over the past few days. COVID-positive 2 to 3 weeks prior to admission.  Worsening shortness of breath, generalized weakness over the past 3 days.  COVID 2-3 weeks ago.   CTA chest on admission displaying interstitial coarsening and groundglass opacities.  No evidence of PE.  COPD with emphysema.  With chronic hypoxic respiratory failure, currently on baseline oxygen  Pulmonology consulted and following appreciate recommendations  Received 4 days of oral prednisone 40 mg, decrease to 30 mg x3 days- per pulm ok to d/c prednisone as patient reports this makes him agitated and he has overall improved  Respiratory protocol, continue scheduled Xopenex and budesonide  Patient has received 5 days azithromycin.   Hopeful d/c tomorrow; needs PT eval for steps- patient is adamant about not leaving and will likely appeal his discharge if discharged prior to Monday. He wants to remain in the hospital or go to a rehab facility due to his deconditioning, weakness and also his room at his house is being refinished and he reports she can not stay in his home.   Remains afebrile, without leukocytosis.  Procalcitonin negative x 2  Continue supportive care with Mucinex, flutter valve, chest physiotherapy and Tessalon Perles  No need for further antibiotics      Chronic hypoxic respiratory failure (HCC)  Assessment & Plan  Baseline is supposed to be using 3 L nasal cannula but reports noncompliance with this.  Patient currently requiring 1 to 2 L nasal cannula with oxygen saturations above 90%  Patient reported he does not have oxygen at home and will start  wearing it  Home oxygen evaluation appreciated confirming need for o2 at discharge- RA was 94%. Does not qualify for oxygen at rest. Patient during ambulation walked about 500ft using the walker and desaturated to 86% and required 3L to maintain a goal of above 89%   CM aware  Noted to have o2 sat of 71% last evening, per RN pt has been noncompliant walking in the halls without o2 and this was the cause of low sat overnight     Fracture of multiple ribs of right side  Assessment & Plan  With fractures of right ribs 5 through 8.  Per patient this occurred a couple months ago  Appears to have routine ongoing healing on his image currently  Recommend continuing incentive spirometer  Will require outpatient follow-up    Moderate protein-calorie malnutrition (HCC)  Assessment & Plan  Malnutrition Findings:   Adult Malnutrition type: Acute illness  Adult Degree of Malnutrition: Malnutrition of moderate degree  Malnutrition Characteristics: Inadequate energy, Weight loss    360 Statement: Acute/moderate malnutrition r/t condition as evidenced by intake meeting <75% estimated needs > 1 week, and 6% wt loss x 1 month (12/6/23: 170#, 11/15/23: 181#). Treatment: suggest liberalizing diet to 4gm Na. Pt not interested in nutrition supplements at this time. Request RD protocol to make future diet/supplement adjustments    BMI Findings:     Body mass index is 21.12 kg/m².       Open wound of finger of right hand  Assessment & Plan  Noted over middle digit  Consulted hand surgery 12/5/23  No immediate hand surgery intervention indicated at this time.   Daily dressing changes with local wound care as recommended  X-ray hand was completed 12/5/2023 which displayed no acute osseous abnormality.  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    Enlarged prostate  Assessment & Plan  Noted on CT imaging.  Denies urinary symptoms.   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  Managed at home with conservative care.   Continue supportive care as noted above  Pulmonology indicates in anticipation for slow recovery given recent infection      CAD (coronary artery disease)  Assessment & Plan  Atherosclerosis, CAD noted on CT scan.  Patient without current chest pain  Continue statin, blocker and aspirin  Outpatient follow-up    Hypertension  Assessment & Plan  Blood pressures on softer side this morning.  Patient asymptomatic  Continue amlodipine with higher hold parameter, Lopressor twice daily and losartan with hold parameters  Continue to monitor BP per unit routine      Hyponatremia-resolved as of 12/14/2023  Assessment & Plan  Suspect hypovolemic hyponatremia in setting increased water intake secondary to steroids with increased urinary output- now resolved  D/c fluid restriction 1800 mL   BMP in AM    Abdominal pain-resolved as of 12/14/2023  Assessment & Plan  With reports of left lower quadrant pain.  CT abdomen pelvis without acute findings.  Patient reported no bowel movement for 1 week and just recently went, abdominal pain improved  Tolerating diet well               VTE Pharmacologic Prophylaxis: VTE Score: 5 High Risk (Score >/= 5) - Pharmacological DVT Prophylaxis Ordered: enoxaparin (Lovenox). Sequential Compression Devices Ordered.    Mobility:   Basic Mobility Inpatient Raw Score: 21  JH-HLM Goal: 6: Walk 10 steps or more  JH-HLM Achieved: 7: Walk 25 feet or more  HLM Goal NOT achieved. Continue with multidisciplinary rounding and encourage appropriate mobility to improve upon HLM goals.    Patient Centered Rounds: I performed bedside rounds with nursing staff today.   Discussions with Specialists or Other  "Care Team Provider: d/w RN d/w pulm, d/w ID     Education and Discussions with Family / Patient: Patient declined call to .     Total Time Spent on Date of Encounter in care of patient: 45 mins. This time was spent on one or more of the following: performing physical exam; counseling and coordination of care; obtaining or reviewing history; documenting in the medical record; reviewing/ordering tests, medications or procedures; communicating with other healthcare professionals and discussing with patient's family/caregivers.    Current Length of Stay: 9 day(s)  Current Patient Status: Inpatient   Certification Statement: The patient will continue to require additional inpatient hospital stay due to pending PT re-eval and assessment for steps   Discharge Plan: Anticipate discharge tomorrow to discharge location to be determined pending rehab evaluations.    Code Status: Level 1 - Full Code    Subjective:   Pt is agitated. Reports he fought for his country and this is the thanks he gets, getting kicked out of the hospital. He feels the government is responsible for this and he is going to fight. He is annoyed that he can't stay in the hospital until Monday until his room is refinished at home. He reports he has three rooms in his house and one room is for his daughter and the other is for his wife. He is concerned he will go home and get someone else sick in his family, reports his sons mother in law is admitted and just got intubated for what he has. He was up walking in the halls last night without o2 and his o2 was noted at 71%. He denies any current sob, he reports he gets winded when he is agitated. He reports PT never worked with him on steps. He denies any other complaints. He still feels even though he is feeling better that he should be allowed to stay in the hospital to be monitored. Denies n/v and tolerating po intake. Pt reports he gets \"nasty\" when he is on prednisone     Objective: "     Vitals:   Temp (24hrs), Av °F (36.7 °C), Min:97.7 °F (36.5 °C), Max:98.2 °F (36.8 °C)    Temp:  [97.7 °F (36.5 °C)-98.2 °F (36.8 °C)] 97.7 °F (36.5 °C)  HR:  [80-86] 80  Resp:  [18] 18  BP: (107-118)/(58-63) 118/62  SpO2:  [71 %-97 %] 97 %  Body mass index is 21.12 kg/m².     Input and Output Summary (last 24 hours):     Intake/Output Summary (Last 24 hours) at 2023 1703  Last data filed at 2023 0904  Gross per 24 hour   Intake 240 ml   Output 1000 ml   Net -760 ml       Physical Exam:   Physical Exam  Vitals and nursing note reviewed.   Constitutional:       General: He is not in acute distress.     Comments: thin   HENT:      Head: Normocephalic and atraumatic.   Eyes:      Conjunctiva/sclera: Conjunctivae normal.   Cardiovascular:      Rate and Rhythm: Normal rate and regular rhythm.      Heart sounds: Normal heart sounds. No murmur heard.  Pulmonary:      Effort: Pulmonary effort is normal. No respiratory distress.      Breath sounds: Normal breath sounds. No wheezing or rales.      Comments: Speaking in full sentences without difficulty, occasionally stopping to take a deep breath while agitated. He is on room air and sats 100%  Abdominal:      General: Bowel sounds are normal. There is no distension.      Palpations: Abdomen is soft.      Tenderness: There is no abdominal tenderness.   Musculoskeletal:         General: No swelling or tenderness.   Skin:     General: Skin is warm and dry.   Neurological:      Mental Status: He is alert. Mental status is at baseline.   Psychiatric:         Mood and Affect: Affect is angry.         Speech: Speech is rapid and pressured.         Behavior: Behavior is agitated and aggressive.          Additional Data:     Labs:  Results from last 7 days   Lab Units 23  0443   WBC Thousand/uL 7.24   HEMOGLOBIN g/dL 12.6   HEMATOCRIT % 38.0   PLATELETS Thousands/uL 141*     Results from last 7 days   Lab Units 23  0526   SODIUM mmol/L 135   POTASSIUM  mmol/L 3.8   CHLORIDE mmol/L 105   CO2 mmol/L 26   BUN mg/dL 21   CREATININE mg/dL 0.96   ANION GAP mmol/L 4   CALCIUM mg/dL 8.1*   GLUCOSE RANDOM mg/dL 124                       Lines/Drains:  Invasive Devices       Peripheral Intravenous Line  Duration             Peripheral IV 12/13/23 Left;Ventral (anterior) Forearm 1 day                          Imaging: Reviewed radiology reports from this admission including: chest CT scan, abdominal/pelvic CT, and xray(s)    Recent Cultures (last 7 days):   Results from last 7 days   Lab Units 12/08/23  1840   SPUTUM CULTURE  1+ Growth of Aspergillus species*  2+ Growth of   GRAM STAIN RESULT  1+ Epithelial Cells*  2+ Disintegrating polys*  2+ Gram positive cocci in pairs and chains*  1+ Gram positive rods*       Last 24 Hours Medication List:   Current Facility-Administered Medications   Medication Dose Route Frequency Provider Last Rate    acetaminophen  650 mg Oral Q6H PRN AMPARO Huddleston      amLODIPine  5 mg Oral Daily Anay Corona PA-C      ascorbic acid  250 mg Oral Daily AMPARO Huddleston      aspirin  81 mg Oral Daily AMPARO Huddleston      atorvastatin  80 mg Oral Daily With Dinner AMPARO Huddleston      benzonatate  200 mg Oral TID Boris Everett DO      budesonide  0.5 mg Nebulization Q12H AMPARO Huddleston      cholecalciferol  2,000 Units Oral Daily AMPARO Huddleston      Diclofenac Sodium  2 g Topical 4x Daily Eric Blanco MD      enoxaparin  40 mg Subcutaneous Daily AMPARO Huddleston      folic acid  1 mg Oral Daily AMPARO Huddleston      guaiFENesin  1,200 mg Oral Q12H [facility] AMPARO Huddleston      HYDROcodone Bit-Homatrop MBr  5 mL Oral Q6H PRN Boris Everett DO      levalbuterol  1.25 mg Nebulization TID Boris Everett DO      losartan  50 mg Oral Daily Anay Corona PA-C      metoprolol tartrate  25 mg Oral Q12H [facility] AMPARO Huddleston      pyridoxine  100 mg Oral Daily AMPARO Huddleston          Today, Patient Was Seen By: Theodora Corea,  CRNP    **Please Note: This note may have been constructed using a voice recognition system.**

## 2023-12-14 NOTE — PROGRESS NOTES
"Progress Note - Pulmonary   Devin Chaves 77 y.o. male MRN: [id number]  Unit/Bed#: S -01 Encounter: [id number]      Interval History:   Asked by primary service to see the patient once again due ot aspergillus growing form sputum from last week    Pt feels like breathing is improved significanlty. No more cough or wheezing. Does feel bery tired and Sob with exertion    Review of Systems:  Full 12 point review of systems negative other than previously mentioned    Objective:   Vitals: Blood pressure 118/62, pulse 80, temperature 97.7 °F (36.5 °C), temperature source Axillary, resp. rate 18, height 6' 2\" (1.88 m), weight 74.6 kg (164 lb 7.4 oz), SpO2 93%., Body mass index is 21.12 kg/m².  Regular rate of breathing, no wheezing, good air movement  S1-S2  Soft, nontender, nondistended  Awake and alert    Labs: I have personally reviewed pertinent lab results.  Results Reviewed       Procedure Component Value Units Date/Time    Procalcitonin, Next Day AM Collection [[id number]]  (Normal) Collected: 12/06/23 0451    Lab Status: Final result Specimen: Blood Updated: 12/06/23 0533     Procalcitonin 0.07 ng/ml     Basic metabolic panel [[id number]]  (Abnormal) Collected: 12/06/23 0452    Lab Status: Final result Specimen: Blood Updated: 12/06/23 0524     Sodium 135 mmol/L      Potassium 4.6 mmol/L      Chloride 106 mmol/L      CO2 24 mmol/L      ANION GAP 5 mmol/L      BUN 30 mg/dL      Creatinine 1.03 mg/dL      Glucose 152 mg/dL      Calcium 8.3 mg/dL      eGFR 70 ml/min/1.73sq m     Narrative:      National Kidney Disease Foundation guidelines for Chronic Kidney Disease (CKD):     Stage 1 with normal or high GFR (GFR > 90 mL/min/1.73 square meters)    Stage 2 Mild CKD (GFR = 60-89 mL/min/1.73 square meters)    Stage 3A Moderate CKD (GFR = 45-59 mL/min/1.73 square meters)    Stage 3B Moderate CKD (GFR = 30-44 mL/min/1.73 square meters)    Stage 4 Severe CKD (GFR = 15-29 mL/min/1.73 square meters)    Stage 5 End " Stage CKD (GFR <15 mL/min/1.73 square meters)  Note: GFR calculation is accurate only with a steady state creatinine    CBC and Platelet [[id number]]  (Abnormal) Collected: 12/06/23 0454    Lab Status: Final result Specimen: Blood Updated: 12/06/23 0501     WBC 4.26 Thousand/uL      RBC 3.61 Million/uL      Hemoglobin 11.4 g/dL      Hematocrit 34.6 %      MCV 96 fL      MCH 31.6 pg      MCHC 32.9 g/dL      RDW 14.5 %      Platelets 127 Thousands/uL      MPV 9.0 fL     Procalcitonin [[id number]]  (Normal) Collected: 12/05/23 0416    Lab Status: Final result Specimen: Blood from Arm, Right Updated: 12/05/23 0549     Procalcitonin 0.09 ng/ml     Procalcitonin [[id number]]  (Normal) Collected: 12/04/23 1932    Lab Status: Final result Specimen: Blood from Arm, Left Updated: 12/05/23 0400     Procalcitonin 0.09 ng/ml     HS Troponin I 4hr [[id number]]  (Normal) Collected: 12/05/23 0125    Lab Status: Final result Specimen: Blood from Arm, Right Updated: 12/05/23 0159     hs TnI 4hr 9 ng/L      Delta 4hr hsTnI -1 ng/L     HS Troponin I 2hr [[id number]]  (Normal) Collected: 12/04/23 2257    Lab Status: Final result Specimen: Blood from Arm, Left Updated: 12/04/23 2330     hs TnI 2hr 10 ng/L      Delta 2hr hsTnI 0 ng/L     Urine Microscopic [[id number]]  (Abnormal) Collected: 12/04/23 2258    Lab Status: Final result Specimen: Urine, Clean Catch Updated: 12/04/23 2327     RBC, UA None Seen /hpf      WBC, UA 1-2 /hpf      Epithelial Cells Occasional /hpf      Bacteria, UA None Seen /hpf      MUCUS THREADS Occasional    UA w Reflex to Microscopic w Reflex to Culture [[id number]]  (Abnormal) Collected: 12/04/23 2258    Lab Status: Final result Specimen: Urine, Clean Catch Updated: 12/04/23 2326     Color, UA Yellow     Clarity, UA Clear     Specific Gravity, UA 1.024     pH, UA 5.5     Leukocytes, UA Negative     Nitrite, UA Negative     Protein,  (2+) mg/dl      Glucose, UA Negative mg/dl      Ketones, UA Negative mg/dl       Urobilinogen, UA <2.0 mg/dl      Bilirubin, UA Negative     Occult Blood, UA Trace    B-Type Natriuretic Peptide(BNP) [[id number]]  (Normal) Collected: 12/04/23 1932    Lab Status: Final result Specimen: Blood from Arm, Left Updated: 12/04/23 2141     BNP 20 pg/mL     [facility] draw [[id number]] Collected: 12/04/23 1936    Lab Status: Final result Specimen: Blood from Arm, Left Updated: 12/04/23 2101    Narrative:      The following orders were created for panel order [place] draw.  Procedure                               Abnormality         Status                     ---------                               -----------         ------                     Light Blue Top on hold[[id number]]                           Final result               Gold top on hold[[id number]]                                 Final result                 Please view results for these tests on the individual orders.    HS Troponin 0hr (reflex protocol) [[id number]]  (Normal) Collected: 12/04/23 1936    Lab Status: Final result Specimen: Blood from Arm, Left Updated: 12/04/23 2010     hs TnI 0hr 10 ng/L     Comprehensive metabolic panel [[id number]]  (Abnormal) Collected: 12/04/23 1932    Lab Status: Final result Specimen: Blood from Arm, Left Updated: 12/04/23 2003     Sodium 133 mmol/L      Potassium 4.4 mmol/L      Chloride 103 mmol/L      CO2 23 mmol/L      ANION GAP 7 mmol/L      BUN 30 mg/dL      Creatinine 1.27 mg/dL      Glucose 104 mg/dL      Calcium 8.7 mg/dL      AST 34 U/L      ALT 31 U/L      Alkaline Phosphatase 61 U/L      Total Protein 6.9 g/dL      Albumin 3.9 g/dL      Total Bilirubin 0.61 mg/dL      eGFR 54 ml/min/1.73sq m     Narrative:      National Kidney Disease Foundation guidelines for Chronic Kidney Disease (CKD):     Stage 1 with normal or high GFR (GFR > 90 mL/min/1.73 square meters)    Stage 2 Mild CKD (GFR = 60-89 mL/min/1.73 square meters)    Stage 3A Moderate CKD (GFR = 45-59 mL/min/1.73 square meters)    Stage 3B  Moderate CKD (GFR = 30-44 mL/min/1.73 square meters)    Stage 4 Severe CKD (GFR = 15-29 mL/min/1.73 square meters)    Stage 5 End Stage CKD (GFR <15 mL/min/1.73 square meters)  Note: GFR calculation is accurate only with a steady state creatinine    Lipase [[id number]]  (Normal) Collected: 12/04/23 1932    Lab Status: Final result Specimen: Blood from Arm, Left Updated: 12/04/23 2003     Lipase 32 u/L     CBC and differential [[id number]]  (Abnormal) Collected: 12/04/23 1932    Lab Status: Final result Specimen: Blood from Arm, Left Updated: 12/04/23 1940     WBC 2.84 Thousand/uL      RBC 3.96 Million/uL      Hemoglobin 12.4 g/dL      Hematocrit 37.1 %      MCV 94 fL      MCH 31.3 pg      MCHC 33.4 g/dL      RDW 14.6 %      MPV 8.7 fL      Platelets 111 Thousands/uL      nRBC 0 /100 WBCs      Neutrophils Relative 82 %      Immat GRANS % 0 %      Lymphocytes Relative 11 %      Monocytes Relative 7 %      Eosinophils Relative 0 %      Basophils Relative 0 %      Neutrophils Absolute 2.32 Thousands/µL      Immature Grans Absolute 0.00 Thousand/uL      Lymphocytes Absolute 0.32 Thousands/µL      Monocytes Absolute 0.20 Thousand/µL      Eosinophils Absolute 0.00 Thousand/µL      Basophils Absolute 0.00 Thousands/µL              Current Medications:    Current Facility-Administered Medications:     acetaminophen (TYLENOL) tablet 650 mg, 650 mg, Oral, Q6H PRN, AMPARO Huddleston    amLODIPine (NORVASC) tablet 5 mg, 5 mg, Oral, Daily, Anay Corona PA-C, 5 mg at 12/13/23 0836    ascorbic acid (VITAMIN C) tablet 250 mg, 250 mg, Oral, Daily, AMPARO Huddleston, 250 mg at 12/14/23 0901    aspirin (ECOTRIN LOW STRENGTH) EC tablet 81 mg, 81 mg, Oral, Daily, AMPARO Huddleston, 81 mg at 12/14/23 0902    atorvastatin (LIPITOR) tablet 80 mg, 80 mg, Oral, Daily With Dinner, AMPARO Huddleston, 80 mg at 12/13/23 1731    benzonatate (TESSALON PERLES) capsule 200 mg, 200 mg, Oral, TID, Boris Everett DO, 200 mg at 12/14/23 0901    budesonide  (PULMICORT) inhalation solution 0.5 mg, 0.5 mg, Nebulization, Q12H, AMPARO Huddleston, 0.5 mg at 12/14/23 0704    cholecalciferol (VITAMIN D3) tablet 2,000 Units, 2,000 Units, Oral, Daily, AMPARO Huddleston, 2,000 Units at 12/14/23 0902    Diclofenac Sodium (VOLTAREN) 1 % topical gel 2 g, 2 g, Topical, 4x Daily, Eric Blanco MD, 2 g at 12/13/23 0836    enoxaparin (LOVENOX) subcutaneous injection 40 mg, 40 mg, Subcutaneous, Daily, AMPARO Huddleston, 40 mg at 12/14/23 0905    folic acid (FOLVITE) tablet 1 mg, 1 mg, Oral, Daily, AMPARO Huddleston, 1 mg at 12/14/23 0902    guaiFENesin (MUCINEX) 12 hr tablet 1,200 mg, 1,200 mg, Oral, Q12H GALE, AMPARO Huddleston, 1,200 mg at 12/14/23 0902    HYDROcodone Bit-Homatrop MBr (HYCODAN) oral syrup 5 mL, 5 mL, Oral, Q6H PRN, Boris Everett DO    levalbuterol (XOPENEX) inhalation solution 1.25 mg, 1.25 mg, Nebulization, TID, Boris Everett DO, 1.25 mg at 12/14/23 0704    losartan (COZAAR) tablet 50 mg, 50 mg, Oral, Daily, Anay Corona PA-C, 50 mg at 12/14/23 0905    metoprolol tartrate (LOPRESSOR) tablet 25 mg, 25 mg, Oral, Q12H GALE, AMPARO Huddleston, 25 mg at 12/14/23 0902    predniSONE tablet 30 mg, 30 mg, Oral, Daily, Boris Everett DO, 30 mg at 12/14/23 0905    pyridoxine (VITAMIN B6) tablet 100 mg, 100 mg, Oral, Daily, AMPARO Huddleston, 100 mg at 12/14/23 0902     Imaging and other studies:   I personally viewed and interpreted the following imaging studies:  CT chest 12/4/2023 shows diffuse emphysema with scattered peripheral groundglass opacities    Assessment:  Acute on chronic hypoxic respiratory failure, resolved  COPD with acute exacerbation  [facility]    Presented with COPD exacerbation, symptoms signifiantly improved after 10 days of steroids but remains very deconditioned.     Plan:  Home O2 assessment prior to discharge  Sp 10 days of steroids, ok to stop now  Continue budesonide and Xopenex nebs as ordered  [facility]  Recommend PT eval  due to deconditioning, weakness and SOB  I personally discussed this patient in depth with the primary service    Ledy Pagan  [facility]

## 2023-12-15 PROCEDURE — 94640 AIRWAY INHALATION TREATMENT: CPT

## 2023-12-15 PROCEDURE — 94760 N-INVAS EAR/PLS OXIMETRY 1: CPT

## 2023-12-15 PROCEDURE — 97530 THERAPEUTIC ACTIVITIES: CPT

## 2023-12-15 PROCEDURE — 97116 GAIT TRAINING THERAPY: CPT

## 2023-12-15 PROCEDURE — 94668 MNPJ CHEST WALL SBSQ: CPT

## 2023-12-15 PROCEDURE — 97110 THERAPEUTIC EXERCISES: CPT

## 2023-12-15 PROCEDURE — 99232 SBSQ HOSP IP/OBS MODERATE 35: CPT | Performed by: PHYSICIAN ASSISTANT

## 2023-12-15 PROCEDURE — 94669 MECHANICAL CHEST WALL OSCILL: CPT

## 2023-12-15 RX ORDER — LOSARTAN POTASSIUM 100 MG/1
50 TABLET ORAL DAILY
Start: 2023-12-15 | End: 2023-12-17

## 2023-12-15 RX ORDER — ALBUTEROL SULFATE 2.5 MG/3ML
2.5 SOLUTION RESPIRATORY (INHALATION) EVERY 6 HOURS PRN
Qty: 30 ML | Refills: 0 | Status: SHIPPED | OUTPATIENT
Start: 2023-12-15 | End: 2023-12-17

## 2023-12-15 RX ORDER — BENZONATATE 200 MG/1
200 CAPSULE ORAL 3 TIMES DAILY
Qty: 20 CAPSULE | Refills: 0 | Status: SHIPPED | OUTPATIENT
Start: 2023-12-15 | End: 2023-12-17

## 2023-12-15 RX ORDER — GUAIFENESIN 1200 MG/1
1200 TABLET, EXTENDED RELEASE ORAL EVERY 12 HOURS SCHEDULED
Start: 2023-12-15 | End: 2023-12-17

## 2023-12-15 RX ADMIN — GUAIFENESIN 1200 MG: 600 TABLET ORAL at 08:29

## 2023-12-15 RX ADMIN — BUDESONIDE 0.5 MG: 0.5 INHALANT ORAL at 19:58

## 2023-12-15 RX ADMIN — Medication 2000 UNITS: at 08:27

## 2023-12-15 RX ADMIN — BENZONATATE 200 MG: 100 CAPSULE ORAL at 16:41

## 2023-12-15 RX ADMIN — BUDESONIDE 0.5 MG: 0.5 INHALANT ORAL at 07:52

## 2023-12-15 RX ADMIN — ATORVASTATIN CALCIUM 80 MG: 40 TABLET, FILM COATED ORAL at 16:41

## 2023-12-15 RX ADMIN — PYRIDOXINE HCL TAB 50 MG 100 MG: 50 TAB at 08:29

## 2023-12-15 RX ADMIN — Medication 250 MG: at 08:29

## 2023-12-15 RX ADMIN — LEVALBUTEROL HYDROCHLORIDE 1.25 MG: 1.25 SOLUTION RESPIRATORY (INHALATION) at 07:52

## 2023-12-15 RX ADMIN — METOPROLOL TARTRATE 25 MG: 25 TABLET, FILM COATED ORAL at 08:27

## 2023-12-15 RX ADMIN — LEVALBUTEROL HYDROCHLORIDE 1.25 MG: 1.25 SOLUTION RESPIRATORY (INHALATION) at 19:58

## 2023-12-15 RX ADMIN — DICLOFENAC SODIUM TOPICAL GEL, 1% 2 G: 10 GEL TOPICAL at 12:01

## 2023-12-15 RX ADMIN — LOSARTAN POTASSIUM 50 MG: 50 TABLET, FILM COATED ORAL at 08:29

## 2023-12-15 RX ADMIN — ENOXAPARIN SODIUM 40 MG: 40 INJECTION SUBCUTANEOUS at 08:30

## 2023-12-15 RX ADMIN — BENZONATATE 200 MG: 100 CAPSULE ORAL at 08:27

## 2023-12-15 RX ADMIN — LEVALBUTEROL HYDROCHLORIDE 1.25 MG: 1.25 SOLUTION RESPIRATORY (INHALATION) at 13:08

## 2023-12-15 RX ADMIN — BENZONATATE 200 MG: 100 CAPSULE ORAL at 21:05

## 2023-12-15 RX ADMIN — FOLIC ACID 1 MG: 1 TABLET ORAL at 08:29

## 2023-12-15 RX ADMIN — ASPIRIN 81 MG: 81 TABLET, COATED ORAL at 08:29

## 2023-12-15 RX ADMIN — GUAIFENESIN 1200 MG: 600 TABLET ORAL at 21:05

## 2023-12-15 NOTE — ASSESSMENT & PLAN NOTE
COVID-positive 2 to 3 weeks prior to admission.  CTA chest on admission displaying interstitial coarsening and groundglass opacities.  No evidence of PE.  COPD with emphysema.  With chronic hypoxic respiratory failure, currently on baseline oxygen  Pulmonology consulted.  Treated with IV steroids and subsequently oral steroids.  These were then discontinued prior to completion of taper given patient reported that they made him feel unwell.  Being treated with Xopenex and budesonide nebulizers  Status post 5 days of azithromycin  Ambulating throughout the halls with no distress.  Needs 3L NC with exertion - home O2 being set up as prior concentrator was potentially not functional  Discussed with pulmonary.  Plan for discharge with Symbicort, Spiriva, as needed albuterol.  Patient does have a nebulizer at home as well.

## 2023-12-15 NOTE — CASE MANAGEMENT
Case Management Discharge Planning Note    Patient name Devin Chaves  Location S /S -01 MRN [id number]  : 1945 Date 12/15/2023       Current Admission Date: 2023  Current Admission Diagnosis:Chronic obstructive pulmonary disease with acute exacerbation (HCC)   Patient Active Problem List    Diagnosis Date Noted    Chronic hypoxic respiratory failure (HCC) 2023    Fracture of multiple ribs of right side 2023    Rheumatoid arthritis, unspecified (HCC) 2023    Moderate protein-calorie malnutrition (HCC) 2023    Hypertension 2023    CAD (coronary artery disease) 2023    Chronic obstructive pulmonary disease with acute exacerbation (HCC) 2023    JAZMINE (obstructive sleep apnea) 2023    Chronic back pain 2023    COVID-19 2023    Ectasia of artery (HCC) 2023    History of stroke 2023    Enlarged prostate 2023    Open wound of finger of right hand 2023      LOS (days): 10  Geometric Mean LOS (GMLOS) (days):   Days to GMLOS:     OBJECTIVE:  Risk of Unplanned Readmission Score: 10.21         Current admission status: Inpatient   Preferred Pharmacy:   [id number] - [place], [place] - [address]  [address]  [facility] [address]  Phone: [phone] Fax: [phone]    [facility] [place], [place] - [address]  [address]  [address]  [facility] [address]  Phone: [phone] Fax: [phone]    Primary Care Provider: Oswaldo Muniz DO    Primary Insurance: [facility] [employer] [facility]  Secondary Insurance: [employer]  REP    DISCHARGE DETAILS:  VINAY [facility]. Dillon BROWN is going to contact patient/family for home oxygen setup. VINAY contacted Dillon BROWN at . VINAY spoke with Allie. Dillon is contacting family at  today to coordinate home oxygen delivery. VINAY provided contact  number for confirmation of delivery.

## 2023-12-15 NOTE — PLAN OF CARE
Problem: PHYSICAL THERAPY ADULT  Goal: Performs mobility at highest level of function for planned discharge setting.  See evaluation for individualized goals.  Description: Treatment/Interventions: Functional transfer training, LE strengthening/ROM, Elevations, Therapeutic exercise, Endurance training, Patient/family training, Equipment eval/education, Bed mobility, Gait training, Spoke to nursing, Spoke to case management, Spoke to advanced practitioner    Equipment Recommended: Walker     See flowsheet documentation for full assessment, interventions and recommendations.  Note: Prognosis: Good  Problem List: Decreased strength, Decreased endurance, Decreased mobility, Impaired balance  Assessment: pt began tx session in bed and was agreeable to participate in PT intervention. Since previous tx session pt continues top remain consistant with being I for all bed mobility and functional transfers to and from RW as pt demonstrated good recall on hand placement while ascending to RW and descending back to seated EOB. pt was able to sit EOB w/o LOB while performing TE activities in order to strengthen LE's , increase activity toleranec and static/dynamic sitting balance. pt was able to increase activity tolerance and ambulation distanec in todays tx session as pt ambulated 270'x1 RW with /s, no LOB. pt Sp02 did decrease to 80% w/ ambulation as pt required a 5 minute therapeutic seated rest break in order to continue with skilled PT intervention. pt educated on all stair trials prior to initiating steps. pt completed 5 steps with bilateral hand rails and /s , no LOB. Additional was not possible due to fatigue, decreased Sp02 80% on 3L NC02. pt would benefit from continued skilled PT intervention in order to increase steps completed as pt has 12 steps at home to complete. Post tx pt seated EOB with call bell and all pt needs met  Barriers to Discharge: Inaccessible home environment     Rehab Resource Intensity Level,  PT: III (Minimum Resource Intensity)    See flowsheet documentation for full assessment.

## 2023-12-15 NOTE — ASSESSMENT & PLAN NOTE
With reports of COVID infection 2 to 3 weeks ago.  Managed at home with conservative care.   Pulmonology indicates suspect will have slow recovery given recent infection

## 2023-12-15 NOTE — ASSESSMENT & PLAN NOTE
Baseline is supposed to be using 3 L nasal cannula but reports noncompliance with this.  Reports that his concentrator at home may have not been working correctly.  New oxygen being delivered today (CM confirmed)  Home oxygen evaluation appreciated confirming need for o2 at discharge- RA was 94%. Does not qualify for oxygen at rest. Patient during ambulation walked about 500ft using the walker and desaturated to 86% and required 3L to maintain a goal of above 89%

## 2023-12-15 NOTE — CASE MANAGEMENT
Case Management Discharge Planning Note    Patient name Devin Chaves  Location S /S -01 MRN [id number]  : 1945 Date 12/15/2023       Current Admission Date: 2023  Current Admission Diagnosis:Chronic obstructive pulmonary disease with acute exacerbation (HCC)   Patient Active Problem List    Diagnosis Date Noted    Chronic hypoxic respiratory failure (HCC) 2023    Fracture of multiple ribs of right side 2023    Rheumatoid arthritis, unspecified (HCC) 2023    Moderate protein-calorie malnutrition (HCC) 2023    Hypertension 2023    CAD (coronary artery disease) 2023    Chronic obstructive pulmonary disease with acute exacerbation (HCC) 2023    JAZMINE (obstructive sleep apnea) 2023    Chronic back pain 2023    COVID-19 2023    Ectasia of artery (HCC) 2023    History of stroke 2023    Enlarged prostate 2023    Open wound of finger of right hand 2023      LOS (days): 10  Geometric Mean LOS (GMLOS) (days):   Days to GMLOS:     OBJECTIVE:  Risk of Unplanned Readmission Score: 10.21         Current admission status: Inpatient   Preferred Pharmacy:   [id number] - [place], [place] - [address]  [address]  [facility] [address]  Phone: [phone] Fax: [phone]    [facility] [place] [place] - [address]  [address]  [address]  [facility] [address]  Phone: [phone] Fax: [phone]    Primary Care Provider: Oswaldo Muniz DO    [facility]  Secondary Insurance: [employer] [facility] REP    DISCHARGE DETAILS:  VINAY and SLIM present at bedside. CM introduced self and role. Patient aware per SLIM, he is medically stable for discharge today. Patient's Medicare rights reviewed at bedside. Patient stating he wants to appeal his discharge. Patient contacted Jim and completed Appeal.  "    CM forwarded Appeal notice and IMM to  discharge support team. Appeal pending.     CM spoke with Rayshawn from [facility], [phone]. Rayshawn stating he was at patient's home and spoke with spouse. Spouse refusing to accept home oxygen delivery, stating \"he isn't coming home until Monday.\" Rayshawn from [facility] stated there is an oxygen concentrator in the home. Rayshawn switched out new tubing and also provided a portable tank for patient to transfer home.                                                                                                                           "

## 2023-12-15 NOTE — ASSESSMENT & PLAN NOTE
SBP 90-110s  Continue amlodipine with higher hold parameter, Lopressor twice daily and losartan with hold parameters

## 2023-12-15 NOTE — DISCHARGE SUMMARY
[facility]  Discharge- Devin Chaves 1945, 77 y.o. male MRN: [id number]  Unit/Bed#: S -01 Encounter: [id number]  Primary Care Provider: Oswaldo Muniz DO   Date and time admitted to hospital: 12/4/2023  8:12 PM    * Chronic obstructive pulmonary disease with acute exacerbation (HCC)  Assessment & Plan  COVID-positive 2 to 3 weeks prior to admission.  CTA chest on admission displaying interstitial coarsening and groundglass opacities.  No evidence of PE.  COPD with emphysema.  With chronic hypoxic respiratory failure, currently on baseline oxygen  Pulmonology consulted.  Treated with IV steroids and subsequently oral steroids.  These were then discontinued prior to completion of taper given patient reported that they made him feel unwell.  Being treated with Xopenex and budesonide nebulizers  Status post 5 days of azithromycin  Ambulating throughout the halls with no distress.  Needs 3L NC with exertion - home O2 being set up as prior concentrator was potentially not functional  Discussed with pulmonary.  Plan for discharge with Symbicort, Spiriva, as needed albuterol.  Patient does have a nebulizer at home as well.      Chronic hypoxic respiratory failure (HCC)  Assessment & Plan  Baseline is supposed to be using 3 L nasal cannula but reports noncompliance with this.  Reports that his concentrator at home may have not been working correctly.  New oxygen being delivered today (CM confirmed)  Home oxygen evaluation appreciated confirming need for o2 at discharge- RA was 94%. Does not qualify for oxygen at rest. Patient during ambulation walked about 500ft using the walker and desaturated to 86% and required 3L to maintain a goal of above 89%     Fracture of multiple ribs of right side  Assessment & Plan  With fractures of right ribs 5 through 8.  Per patient this occurred a couple months ago  Appears to have routine ongoing healing on his image currently  Recommend  continuing incentive spirometer    Moderate protein-calorie malnutrition (HCC)  Assessment & Plan  Malnutrition Findings:   Adult Malnutrition type: Acute illness  Adult Degree of Malnutrition: Malnutrition of moderate degree  Malnutrition Characteristics: Inadequate energy, Weight loss    360 Statement: Acute/moderate malnutrition r/t condition as evidenced by intake meeting <75% estimated needs > 1 week, and 6% wt loss x 1 month (12/6/23: 170#, 11/15/23: 181#). Treatment: suggest liberalizing diet to 4gm Na. Pt not interested in nutrition supplements at this time. Request RD protocol to make future diet/supplement adjustments    BMI Findings:     Body mass index is 21.52 kg/m².       Open wound of finger of right hand  Assessment & Plan  Noted over middle digit  Consulted hand surgery 12/5/23  No immediate hand surgery intervention indicated at this time.   Daily dressing changes with local wound care as recommended  X-ray hand was completed 12/5/2023 which displayed no acute osseous abnormality.  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    Enlarged prostate  Assessment & Plan  Noted on CT imaging.  Denies urinary symptoms.   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  Managed at home with conservative care.   Pulmonology indicates suspect will have slow recovery given recent infection      CAD (coronary artery disease)  Assessment & Plan  Atherosclerosis, CAD noted on CT scan.    Continue statin, blocker and aspirin  Outpatient follow-up    Hypertension  Assessment & Plan  SBP 90-110s  Continue amlodipine with higher hold parameter, Lopressor twice daily and losartan with hold parameters      Medical  Problems       Resolved Problems  Date Reviewed: 12/15/2023            Resolved    Abdominal pain 12/14/2023     Resolved by  AMPARO Chaves    Hyponatremia 12/14/2023     Resolved by  AMPARO Chaves        Discharging Physician / Practitioner: Priscilla Molina PA-C  PCP: Oswaldo Mnuiz DO  Admission Date:   Admission Orders (From admission, onward)       Ordered        12/05/23 1134  Inpatient Admission  Once            12/05/23 0129  Place in Observation  Once                          Discharge Date: 12/15/23    Consultations During Hospital Stay:  Orthopedics  pulmonary    Procedures Performed:   none    Significant Findings / Test Results:   CXR: No acute pulmonary disease.  COPD.  Hand xray: No acute osseous abnormality. 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   CTA chest: There are some peripherally oriented areas of interstitial coarsening and groundglass opacity in the lungs. This may possibly be related to the history of recent COVID-19 infection. Short-term follow-up recommended. There is an approximately 3.8 x 4.2 x 3.3 cm low-density area without peripheral enhancement just lateral to the superior aspect right hip. This may possibly be related to bursitis.  The prostate is mildly prominent and contains some calcifications within. No evidence of pulmonary embolism is seen. Atherosclerosis. Coronary artery disease. The ascending thoracic aorta measures up to 4 cm diameter; this is similar to July 3, 2012. Recommend follow-up low radiation dose chest CT in 1 year. Colonic diverticulosis without evidence of acute diverticulitis. COPD with emphysema.  CXR: Areas of interstitial coarsening which could be related to patient's recent history of COVID-19 infection better characterized on CTA chest abdomen pelvis performed 12/4/2023.     Incidental Findings:   Enlarged prostate and calcifications   4 cm ascending thoracic aorta  Possible bursitis of the  hip  Groundglass opacity of the lungs    Test Results Pending at Discharge (will require follow up):   none     Outpatient Tests Requested:  Imaging of right hip 4-6 weeks  CT chest 4-6 weeks   CT chest low radiation 1 year for 4 cm ascending thoracic aorta    Complications:  none    Reason for Admission: [facility]    Hospital Course:   Devin Chaves is a 77 y.o. male patient who originally presented to the hospital on 12/4/2023 due to some breath.  Patient has past medical history of COPD, hypertension, coronary disease, chronic hypoxic respiratory failure, history of CVA, rheumatoid arthritis maintained on methotrexate and Humira who presented with shortness of breath and wheezing for 3 days prior to admission.  The patient reports that he had home oxygen however was not using this because he did not want to become dependent on it.  The patient had had COVID 2 to 3 weeks ago.  He was managed with supportive care at home.  The patient underwent CTA of the chest upon admission which showed no PE however did show groundglass opacities.  Patient was started on steroids and antibiotics and subsequently admitted.    Patient was seen in consultation by orthopedics secondary to right long finger injury.  The patient had x-rays which showed no acute abnormality.  Recommended for daily dressing changes and to follow-up with the hand team as an outpatient.    Patient did have an abnormal CT of the hip however had no right pain symptoms.  Patient was recommended to have repeat imaging performed in 4 to 6 weeks.  It was noted to have healing rib fractures noted on CAT scan as well which appeared consistent with a few months ago when a cow was noted to crush him against the wall.    Patient was seen in consultation by pulmonary and felt to have COPD exacerbation and was started on azithromycin for 5 days.  Patient was tapered to oral prednisone beginning on 12/9/2023.    The patient has been up and walking throughout the hallways.  " He was seen by physical therapy and Occupational Therapy and was not deemed necessary to have rehab upon discharge.  However the patient felt that he needed time in a rehab prior to being discharged home so that he could regain his strength.  The patient was medically stable for several days however continued to be adamant that he not be discharged home because his room was being painted at home and did not want to sleep on the couch.     Patient is planning to appeal his discharge to Medicare at this time.    Please see above list of diagnoses and related plan for additional information.     Condition at Discharge: stable    Discharge Day Visit / Exam:   Subjective: Wants to go to rehab to get stronger before going home.  [place] that [place] the [place] and feel he needs to get better before doing that.  Vitals: Blood Pressure: 102/59 (12/15/23 1520)  Pulse: 65 (12/15/23 1520)  Temperature: 98.6 °F (37 °C) (12/15/23 1520)  Temp Source: Axillary (12/14/23 0705)  Respirations: 19 (12/15/23 1520)  Height: 6' 2\" (188 cm) (12/05/23 0330)  Weight - Scale: 76 kg (167 lb 9.6 oz) (12/15/23 0600)  SpO2: 95 % (12/15/23 1520)  Exam:   Physical Exam  Vitals and nursing note reviewed.   Constitutional:       General: He is not in acute distress.     Appearance: Normal appearance. He is not diaphoretic.   HENT:      Head: Normocephalic and atraumatic.      Mouth/Throat:      Mouth: Mucous membranes are moist.   Cardiovascular:      Rate and Rhythm: Normal rate and regular rhythm.   Pulmonary:      Effort: Pulmonary effort is normal.      Breath sounds: Wheezing present. No rales.      Comments: Faint end exp wheeze  2LNC  Abdominal:      General: Bowel sounds are normal.      Palpations: Abdomen is soft. There is no mass.      Tenderness: There is no abdominal tenderness. There is no guarding.   Musculoskeletal:      Right lower leg: No edema.      Left lower leg: No edema.   Skin:     " General: Skin is warm and dry.   Neurological:      Mental Status: He is alert.   Psychiatric:      Comments: agitated          Discussion with Family: Patient declined call to .     Discharge instructions/Information to patient and family:   See after visit summary for information provided to patient and family.      Provisions for Follow-Up Care:  See after visit summary for information related to follow-up care and any pertinent home health orders.      Mobility at time of Discharge:   Basic Mobility Inpatient Raw Score: 22  JH-HLM Goal: 7: Walk 25 feet or more  JH-HLM Achieved: 8: Walk 250 feet ot more  HLM Goal achieved. Continue to encourage appropriate mobility.     Disposition:   Home with VNA Services (Reminder: Complete face to face encounter)    Planned Readmission: no     Discharge Statement:  I spent 55 minutes discharging the patient. This time was spent on the day of discharge. I had direct contact with the patient on the day of discharge. Greater than 50% of the total time was spent examining patient, answering all patient questions, arranging and discussing plan of care with patient as well as directly providing post-discharge instructions.  Additional time then spent on discharge activities.    Discharge Medications:  See after visit summary for reconciled discharge medications provided to patient and/or family.      **Please Note: This note may have been constructed using a voice recognition system**

## 2023-12-15 NOTE — PHYSICAL THERAPY NOTE
PHYSICAL THERAPY NOTE          Patient Name: Devin Chaves  Today's Date: 12/15/2023         12/15/23 1125   PT Last Visit   PT Visit Date 12/15/23   Note Type   Note Type Treatment   Pain Assessment   Pain Assessment Tool 0-10   Patient's Stated Pain Goal No pain   Hospital Pain Intervention(s) Repositioned;Ambulation/increased activity;Rest;Emotional support   Multiple Pain Sites No   Pain Rating: FLACC (Rest) - Face 0   Pain Rating: FLACC (Rest) - Legs 0   Pain Rating: FLACC (Rest) - Activity 0   Pain Rating: FLACC (Rest) - Cry 0   Pain Rating: FLACC (Rest) - Consolability 0   Score: FLACC (Rest) 0   Pain Rating: FLACC (Activity) - Face 0   Pain Rating: FLACC (Activity) - Legs 0   Pain Rating: FLACC (Activity) - Activity 0   Pain Rating: FLACC (Activity) - Cry 0   Pain Rating: FLACC (Activity) - Consolability 0   Score: FLACC (Activity) 0   Restrictions/Precautions   Weight Bearing Precautions Per Order Yes   RUE Weight Bearing Per Order (S)  WBAT  (avoid WB to R middle finger)   Other Precautions Fall Risk   General   Chart Reviewed Yes   Additional Pertinent History (S)  pt continues to require several therapeutic seated rest breaks due to decreased SP02 low 80's and SOB with increased activity   Family/Caregiver Present No   Cognition   Overall Cognitive Status WFL   Arousal/Participation Alert;Responsive;Cooperative   Attention Within functional limits   Orientation Level Oriented X4   Memory Within functional limits   Following Commands Follows multistep commands with increased time or repetition   Comments pt was cooperatiev throughout PT intervention but frustrated with his situation and wants to get out of the hospital   Subjective   Subjective pt was agreeable to participate in PT intervention   Bed Mobility   Supine to Sit 7  Independent   Additional items HOB elevated   Sit to Supine 7  Independent   Additional items  "HOB elevated   Additional Comments pt was able to sit EOB w/o LOB prior to initiating functional transfers to and from RW and completed TE activities in order to strengthen LE's and increase activity tolerance   Transfers   Sit to Stand 7  Independent   Additional items Increased time required   Stand to Sit 7  Independent   Additional items Increased time required   Stand pivot 7  Independent   Additional items Increased time required   Additional Comments pt continues to require RW for all funcitonal transfers in [facility]. pt demonstrated good recall on hand placement whiel ascending to RW and descending to seated EOB   Ambulation/Elevation   Gait pattern Decreased foot clearance;Short stride;Excessively slow   Gait Assistance 5  Supervision   Additional items Assist x 1;Verbal cues   Assistive Device Rolling walker   Distance 270'x1 RW /s , no LOB but Sp02 decreased to 80% w/ ambulation   Stair Management Assistance 5  Supervision   Additional items Assist x 1;Verbal cues;Increased time required   Stair Management Technique Two rails;Step to pattern;Foreward;Backward   Number of Stairs 5   Ambulation/Elevation Additional Comments pt continues to be limited with steps completed due to SOB and decreased Sp02 80%  on 3L NC 02   Balance   Static Sitting Good   Dynamic Sitting Fair +   Static Standing Fair +   Dynamic Standing Fair +   Ambulatory Fair -  (w/ RW)   Endurance Deficit   Endurance Deficit Yes   Endurance Deficit Description pt continues to require several therapeutic seated rest breaks due to SOB, limited ambulation distance, activity tolerance and decreased Sp02 low 80\"s   Activity Tolerance   Activity Tolerance Patient limited by fatigue;Other (Comment)  (SOB, decreased Sp02 low 80\"s)   Exercises   Hip Abduction Sitting;20 reps;AROM;Bilateral   Hip Adduction Sitting;20 reps;AROM;Bilateral  (pillow squeezes)   Knee AROM Long Arc Quad Sitting;10 reps;AROM;Bilateral   Ankle Pumps Sitting;20 " reps;AROM;Bilateral   Marching Sitting;10 reps;AROM;Bilateral   Assessment   Prognosis Good   Problem List Decreased strength;Decreased endurance;Decreased mobility;Impaired balance   Assessment pt began tx session in bed and was agreeable to participate in PT intervention. Since previous tx session pt continues top remain consistant with being I for all bed mobility and functional transfers to and from RW as pt demonstrated good recall on hand placement while ascending to RW and descending back to seated EOB. pt was able to sit EOB w/o LOB while performing TE activities in order to strengthen LE's , increase activity toleranec and static/dynamic sitting balance. pt was able to increase activity tolerance and ambulation distanec in todays tx session as pt ambulated 270'x1 RW with /s, no LOB. pt Sp02 did decrease to 80% w/ ambulation as pt required a 5 minute therapeutic seated rest break in order to continue with skilled PT intervention. pt educated on all stair trials prior to initiating steps. pt completed 5 steps with bilateral hand rails and /s , no LOB. Additional was not possible due to fatigue, decreased Sp02 80% on 3L NC02. pt would benefit from continued skilled PT intervention in order to increase steps completed as pt has 12 steps at home to complete. Post tx pt seated EOB with call bell and all pt needs met   Goals   Patient Goals to get out of the hospital   STG Expiration Date 12/19/23   PT Treatment Day 2   Plan   Treatment/Interventions ADL retraining;Functional transfer training;LE strengthening/ROM;Elevations;Therapeutic exercise;Endurance training;Patient/family training;Equipment eval/education;Bed mobility;Gait training;Spoke to nursing;Compensatory technique education   Progress Slow progress, decreased activity tolerance   PT Frequency 2-3x/wk   Discharge Recommendation   Rehab Resource Intensity Level, PT III (Minimum Resource Intensity)   Equipment Recommended Walker   Walker Package  Recommended Wheeled walker   Change/add to Walker Package? No   AM-PAC Basic Mobility Inpatient   Turning in Flat Bed Without Bedrails 4   Lying on Back to Sitting on Edge of Flat Bed Without Bedrails 4   Moving Bed to Chair 4   Standing Up From Chair Using Arms 4   Walk in Room 3   Climb 3-5 Stairs With Railing 3   Basic Mobility Inpatient Raw Score 22   Basic Mobility Standardized Score 47.4   Highest Level Of Mobility   JH-HLM Goal 7: Walk 25 feet or more   JH-HLM Achieved 8: Walk 250 feet ot more   Education   Education Provided Mobility training;Assistive device;Other  (stair trials)   Patient Demonstrates acceptance/verbal understanding   End of Consult   Patient Position at End of Consult Seated edge of bed;Bed/Chair alarm activated;All needs within reach   The patient's AM-PAC Basic Mobility Inpatient Short Form Raw Score is 22. A Raw score of greater than 16 suggests the patient may benefit from discharge to home. Please also refer to the recommendation of the Physical Therapist for safe discharge planning.    Mac Valladares

## 2023-12-15 NOTE — CASE MANAGEMENT
[facility] received a [employer] appeal.   Care Manager notified of the appeal: Camryn Keith  Case Control ID: [id number]  [facility]  Clinicals attached via [employer] online portal. Appeal is pending.

## 2023-12-15 NOTE — ASSESSMENT & PLAN NOTE
Atherosclerosis, CAD noted on CT scan.    Continue statin, blocker and aspirin  Outpatient follow-up

## 2023-12-15 NOTE — ASSESSMENT & PLAN NOTE
Malnutrition Findings:   Adult Malnutrition type: Acute illness  Adult Degree of Malnutrition: Malnutrition of moderate degree  Malnutrition Characteristics: Inadequate energy, Weight loss    360 Statement: Acute/moderate malnutrition r/t condition as evidenced by intake meeting <75% estimated needs > 1 week, and 6% wt loss x 1 month (12/6/23: 170#, 11/15/23: 181#). Treatment: suggest liberalizing diet to 4gm Na. Pt not interested in nutrition supplements at this time. Request RD protocol to make future diet/supplement adjustments    BMI Findings:     Body mass index is 21.52 kg/m².

## 2023-12-15 NOTE — ASSESSMENT & PLAN NOTE
With fractures of right ribs 5 through 8.  Per patient this occurred a couple months ago  Appears to have routine ongoing healing on his image currently  Recommend continuing incentive spirometer

## 2023-12-15 NOTE — CASE MANAGEMENT
Case Management Discharge Planning Note    Patient name Devin Chaves  Location S /S -01 MRN [id number]  : 1945 Date 12/15/2023       Current Admission Date: 2023  Current Admission Diagnosis:Chronic obstructive pulmonary disease with acute exacerbation (HCC)   Patient Active Problem List    Diagnosis Date Noted    Chronic hypoxic respiratory failure (HCC) 2023    Fracture of multiple ribs of right side 2023    Rheumatoid arthritis, unspecified (HCC) 2023    Moderate protein-calorie malnutrition (HCC) 2023    Hypertension 2023    CAD (coronary artery disease) 2023    Chronic obstructive pulmonary disease with acute exacerbation (HCC) 2023    JAZMINE (obstructive sleep apnea) 2023    Chronic back pain 2023    COVID-19 2023    Ectasia of artery (HCC) 2023    History of stroke 2023    Enlarged prostate 2023    Open wound of finger of right hand 2023      LOS (days): 10  Geometric Mean LOS (GMLOS) (days):   Days to GMLOS:     OBJECTIVE:  Risk of Unplanned Readmission Score: 10.21         Current admission status: Inpatient   Preferred Pharmacy:   [id number] - [place], [place] - [address]  [address]  [facility] [address]  Phone: [phone] Fax: [phone]    [facility] [place], [place] - [address]  [address]  [address]  [facility] [address]  Phone: [phone] Fax: [phone]    Primary Care Provider: Oswaldo Muniz DO    Primary Insurance: [facility] [employer] [facility]  Secondary Insurance: [employer]  REP    DISCHARGE DETAILS:  VINAY left voice message with MARILYN Mcmillan for update on home oxygen set up at  ext [phone].     CM received follow up call from Deyanira. VINAY introduced self and role. Deyanira updated patient stable for discharge to home. VINAY following up on home oxygen set up. Deyanira stated she  will f/u and provide CM update shortly.

## 2023-12-16 ENCOUNTER — APPOINTMENT (INPATIENT)
Dept: RADIOLOGY | Facility: HOSPITAL | Age: 78
DRG: 190 | End: 2023-12-16
Payer: COMMERCIAL

## 2023-12-16 LAB
BACTERIA SPT RESP CULT: ABNORMAL
BACTERIA SPT RESP CULT: ABNORMAL
GRAM STN SPEC: ABNORMAL

## 2023-12-16 PROCEDURE — 71046 X-RAY EXAM CHEST 2 VIEWS: CPT

## 2023-12-16 PROCEDURE — 94760 N-INVAS EAR/PLS OXIMETRY 1: CPT

## 2023-12-16 PROCEDURE — 94668 MNPJ CHEST WALL SBSQ: CPT

## 2023-12-16 PROCEDURE — 94640 AIRWAY INHALATION TREATMENT: CPT

## 2023-12-16 PROCEDURE — 99232 SBSQ HOSP IP/OBS MODERATE 35: CPT | Performed by: FAMILY MEDICINE

## 2023-12-16 RX ORDER — FUROSEMIDE 10 MG/ML
20 INJECTION INTRAMUSCULAR; INTRAVENOUS ONCE
Status: COMPLETED | OUTPATIENT
Start: 2023-12-16 | End: 2023-12-16

## 2023-12-16 RX ADMIN — GUAIFENESIN 1200 MG: 600 TABLET ORAL at 09:17

## 2023-12-16 RX ADMIN — LOSARTAN POTASSIUM 50 MG: 50 TABLET, FILM COATED ORAL at 09:19

## 2023-12-16 RX ADMIN — DICLOFENAC SODIUM TOPICAL GEL, 1% 2 G: 10 GEL TOPICAL at 09:20

## 2023-12-16 RX ADMIN — LEVALBUTEROL HYDROCHLORIDE 1.25 MG: 1.25 SOLUTION RESPIRATORY (INHALATION) at 20:44

## 2023-12-16 RX ADMIN — Medication 250 MG: at 09:17

## 2023-12-16 RX ADMIN — GUAIFENESIN 1200 MG: 600 TABLET ORAL at 21:27

## 2023-12-16 RX ADMIN — FOLIC ACID 1 MG: 1 TABLET ORAL at 09:17

## 2023-12-16 RX ADMIN — METOPROLOL TARTRATE 25 MG: 25 TABLET, FILM COATED ORAL at 09:19

## 2023-12-16 RX ADMIN — BENZONATATE 200 MG: 100 CAPSULE ORAL at 17:59

## 2023-12-16 RX ADMIN — BENZONATATE 200 MG: 100 CAPSULE ORAL at 21:28

## 2023-12-16 RX ADMIN — FUROSEMIDE 20 MG: 10 INJECTION, SOLUTION INTRAMUSCULAR; INTRAVENOUS at 17:59

## 2023-12-16 RX ADMIN — AMLODIPINE BESYLATE 5 MG: 5 TABLET ORAL at 09:18

## 2023-12-16 RX ADMIN — LEVALBUTEROL HYDROCHLORIDE 1.25 MG: 1.25 SOLUTION RESPIRATORY (INHALATION) at 08:43

## 2023-12-16 RX ADMIN — ENOXAPARIN SODIUM 40 MG: 40 INJECTION SUBCUTANEOUS at 09:18

## 2023-12-16 RX ADMIN — ASPIRIN 81 MG: 81 TABLET, COATED ORAL at 09:19

## 2023-12-16 RX ADMIN — ATORVASTATIN CALCIUM 80 MG: 40 TABLET, FILM COATED ORAL at 17:59

## 2023-12-16 RX ADMIN — PYRIDOXINE HCL TAB 50 MG 100 MG: 50 TAB at 09:17

## 2023-12-16 RX ADMIN — Medication 2000 UNITS: at 09:19

## 2023-12-16 RX ADMIN — BUDESONIDE 0.5 MG: 0.5 INHALANT ORAL at 08:43

## 2023-12-16 RX ADMIN — BUDESONIDE 0.5 MG: 0.5 INHALANT ORAL at 20:50

## 2023-12-16 NOTE — PROGRESS NOTES
"Ambulated patient utilizing [employer] for continuous pulse oximetry. Started 94% at rest w/ 3L nc.     Completed 2 full laps in hallway, maintaining 90% on 3L nc.    Appears well but says \"I was pushing myself\"    Now on Room Air, 93% at rest    Will CTM  "

## 2023-12-16 NOTE — PROGRESS NOTES
[facility]  Progress Note  Name: Devin Chaves I  MRN: [id number]  Unit/Bed#: S -01 I Date of Admission: 12/4/2023   Date of Service: 12/16/2023 I Hospital Day: 11    Assessment/Plan   Fracture of multiple ribs of right side  Assessment & Plan  With fractures of right ribs 5 through 8.  Per patient this occurred a couple months ago  Appears to have routine ongoing healing on his image currently  Recommend continuing incentive spirometer    Chronic hypoxic respiratory failure (HCC)  Assessment & Plan  Baseline is supposed to be using 3 L nasal cannula but reports noncompliance with this.  Reports that his concentrator at home may have not been working correctly.  New oxygen being delivered today (CM confirmed)  Home oxygen evaluation appreciated confirming need for o2 at discharge- RA was 94%. Does not qualify for oxygen at rest. Patient during ambulation walked about 500ft using the walker and desaturated to 86% and required 3L to maintain a goal of above 89%     Moderate protein-calorie malnutrition (HCC)  Assessment & Plan  Malnutrition Findings:   Adult Malnutrition type: Acute illness  Adult Degree of Malnutrition: Malnutrition of moderate degree  Malnutrition Characteristics: Inadequate energy, Weight loss    360 Statement: Acute/moderate malnutrition r/t condition as evidenced by intake meeting <75% estimated needs > 1 week, and 6% wt loss x 1 month (12/6/23: 170#, 11/15/23: 181#). Treatment: suggest liberalizing diet to 4gm Na. Pt not interested in nutrition supplements at this time. Request RD protocol to make future diet/supplement adjustments    BMI Findings:     Body mass index is 21.52 kg/m².       Open wound of finger of right hand  Assessment & Plan  Noted over middle digit  Consulted hand surgery 12/5/23  No immediate hand surgery intervention indicated at this time.   Daily dressing changes with local wound care as recommended  X-ray hand was completed 12/5/2023  which displayed no acute osseous abnormality.  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    Enlarged prostate  Assessment & Plan  Noted on CT imaging.  Denies urinary symptoms.   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  Managed at home with conservative care.   Pulmonology indicates suspect will have slow recovery given recent infection      CAD (coronary artery disease)  Assessment & Plan  Atherosclerosis, CAD noted on CT scan.    Continue statin, blocker and aspirin  Outpatient follow-up    Hypertension  Assessment & Plan  SBP 90-110s  Continue amlodipine with higher hold parameter, Lopressor twice daily and losartan with hold parameters    * Chronic obstructive pulmonary disease with acute exacerbation (HCC)  Assessment & Plan  COVID-positive 2 to 3 weeks prior to admission.  CTA chest on admission displaying interstitial coarsening and groundglass opacities.  No evidence of PE.  COPD with emphysema.  With chronic hypoxic respiratory failure, currently on baseline oxygen  Pulmonology consulted.  Treated with IV steroids and subsequently oral steroids.  These were then discontinued prior to completion of taper given patient reported that they made him feel unwell.  Being treated with Xopenex and budesonide nebulizers  Status post 5 days of azithromycin  Ambulating throughout the halls with no distress.  Needs 3L NC with exertion - home O2 being set up as prior concentrator was potentially not functional  Discussed with pulmonary.  Plan for discharge with Symbicort, Spiriva, as needed albuterol.  Patient does have a nebulizer at home as well.  Patient filled medicare appeal and CM awaiting determination                   VTE Pharmacologic Prophylaxis: VTE Score: 5 High Risk (Score >/= 5) - Pharmacological DVT Prophylaxis Ordered: enoxaparin (Lovenox). Sequential Compression Devices Ordered.    Mobility:   Basic Mobility Inpatient Raw Score: 22  JH-HLM Goal: 7: Walk 25 feet or more  JH-HLM Achieved: 7: Walk 25 feet or more  HLM Goal achieved. Continue to encourage appropriate mobility.    Patient Centered Rounds: I performed bedside rounds with nursing staff today.   Discussions with Specialists or Other Care Team Provider: VINAY     Education and Discussions with Family / Patient: Patient declined call to .     Total Time Spent on Date of Encounter in care of patient: 30 mins. This time was spent on one or more of the following: performing physical exam; counseling and coordination of care; obtaining or reviewing history; documenting in the medical record; reviewing/ordering tests, medications or procedures; communicating with other healthcare professionals and discussing with patient's family/caregivers.    Current Length of Stay: 11 day(s)  Current Patient Status: Inpatient   Certification Statement: The patient will continue to require additional inpatient hospital stay due to pending medicare discharge appeal   Discharge Plan: Anticipate discharge in 24-48 hrs to home.    Code Status: Level 1 - Full Code    Subjective:   Patient reports he is concerned to go home [place] and unable to manage on his own. Needs to walk down 10 stairs to basement to put firewood on burner and only able to do 3 steps with PT per his story.     Objective:     Vitals:   Temp (24hrs), Av.2 °F (36.8 °C), Min:98.1 °F (36.7 °C), Max:98.3 °F (36.8 °C)    Temp:  [98.1 °F (36.7 °C)-98.3 °F (36.8 °C)] 98.3 °F (36.8 °C)  HR:  [72-95] 84  Resp:  [18-20] 20  BP: ()/(57-73) 95/57  SpO2:  [94 %-98 %] 94 %  Body mass index is 21.52 kg/m².     Input and Output Summary (last 24 hours):   No intake or output  data in the 24 hours ending 12/16/23 1523    Physical Exam:   Physical Exam  Constitutional:       Appearance: Normal appearance. He is not ill-appearing.   HENT:      Head: Normocephalic and atraumatic.   Eyes:      Conjunctiva/sclera: Conjunctivae normal.   Cardiovascular:      Rate and Rhythm: Normal rate and regular rhythm.      Pulses: Normal pulses.      Heart sounds: No murmur heard.  Pulmonary:      Effort: Pulmonary effort is normal. No respiratory distress.      Breath sounds: No wheezing or rales.   Abdominal:      General: Bowel sounds are normal. There is no distension.      Palpations: Abdomen is soft.      Tenderness: There is no abdominal tenderness.   Musculoskeletal:      Right lower leg: No edema.      Left lower leg: No edema.   Neurological:      General: No focal deficit present.      Mental Status: He is alert and oriented to person, place, and time.   Psychiatric:         Mood and Affect: Mood normal.         Behavior: Behavior normal.          Additional Data:     Labs:  Results from last 7 days   Lab Units 12/12/23  0443   WBC Thousand/uL 7.24   HEMOGLOBIN g/dL 12.6   HEMATOCRIT % 38.0   PLATELETS Thousands/uL 141*     Results from last 7 days   Lab Units 12/14/23  0526   SODIUM mmol/L 135   POTASSIUM mmol/L 3.8   CHLORIDE mmol/L 105   CO2 mmol/L 26   BUN mg/dL 21   CREATININE mg/dL 0.96   ANION GAP mmol/L 4   CALCIUM mg/dL 8.1*   GLUCOSE RANDOM mg/dL 124                       Lines/Drains:  Invasive Devices       Peripheral Intravenous Line  Duration             Peripheral IV 12/13/23 Left;Ventral (anterior) Forearm 3 days                          Imaging: Personally reviewed the following imaging: chest xray    Recent Cultures (last 7 days):         Last 24 Hours Medication List:   Current Facility-Administered Medications   Medication Dose Route Frequency Provider Last Rate    acetaminophen  650 mg Oral Q6H PRN AMPARO Huddleston      amLODIPine  5 mg Oral Daily Anay Corona PA-C       ascorbic acid  250 mg Oral Daily AMPARO Huddleston      aspirin  81 mg Oral Daily AMPARO Huddleston      atorvastatin  80 mg Oral Daily With Dinner AMPARO Huddleston      benzonatate  200 mg Oral TID Boris Everett DO      budesonide  0.5 mg Nebulization Q12H AMPARO Huddleston      cholecalciferol  2,000 Units Oral Daily AMPARO Huddleston      Diclofenac Sodium  2 g Topical 4x Daily Eric Blanco MD      enoxaparin  40 mg Subcutaneous Daily AMPARO Huddleston      folic acid  1 mg Oral Daily AMPARO Huddleston      guaiFENesin  1,200 mg Oral Q12H GALE AMPARO Huddleston      HYDROcodone Bit-Homatrop MBr  5 mL Oral Q6H PRN Boris Everett DO      levalbuterol  1.25 mg Nebulization TID Boris Everett DO      losartan  50 mg Oral Daily Anay Corona PA-C      metoprolol tartrate  25 mg Oral Q12H [facility] AMPARO Huddleston      pyridoxine  100 mg Oral Daily AMPARO Huddleston          Today, Patient Was Seen By: Dania Russell DO    **Please Note: This note may have been constructed using a voice recognition system.**

## 2023-12-16 NOTE — CASE MANAGEMENT
Case Management Discharge Planning Note    Patient name Devin Chaves  Location S /S -01 MRN [id number]  : 1945 Date 2023       Current Admission Date: 2023  Current Admission Diagnosis:Chronic obstructive pulmonary disease with acute exacerbation (HCC)   Patient Active Problem List    Diagnosis Date Noted    Chronic hypoxic respiratory failure (HCC) 2023    Fracture of multiple ribs of right side 2023    Rheumatoid arthritis, unspecified (HCC) 2023    Moderate protein-calorie malnutrition (HCC) 2023    Hypertension 2023    CAD (coronary artery disease) 2023    Chronic obstructive pulmonary disease with acute exacerbation (HCC) 2023    JAZMINE (obstructive sleep apnea) 2023    Chronic back pain 2023    COVID-19 2023    Ectasia of artery (HCC) 2023    History of stroke 2023    Enlarged prostate 2023    Open wound of finger of right hand 2023      LOS (days): 11  Geometric Mean LOS (GMLOS) (days):   Days to GMLOS:     OBJECTIVE:  Risk of Unplanned Readmission Score: 10.73         Current admission status: Inpatient   Preferred Pharmacy:   [id number] - [place], [place] - [address]  [address]  [facility] [address]  Phone: [phone] Fax: [phone]    [facility] [place] [place] - [address]  [address]  [address]  [facility] [address]  Phone: [phone] Fax: [phone]    Primary Care Provider: Oswaldo Muniz DO    Primary Insurance: [facility] [employer] [facility]  Secondary Insurance: [employer]    DISCHARGE DETAILS:  CM updated per Jim, patient's appeal is denied. Patient liability starts on 23 by noon.     CM spoke with patient at the bedside. Patient aware his appeal has been denied. Patient stated he will have transport tomorrow morning. Patient requesting every piece of  documentation be printed for him by 0700 tomorrow. Patient updated he will receive a copy of his discharge paperwork tomorrow. CM discussed signing medical release form vs joining [employer] to view his records. Patient demanding information for [employer] be provided right now.     CM updated SLIM and nursing with plan of care. Nursing provided [employer] contact number to provided to patient.

## 2023-12-16 NOTE — ASSESSMENT & PLAN NOTE
COVID-positive 2 to 3 weeks prior to admission.  CTA chest on admission displaying interstitial coarsening and groundglass opacities.  No evidence of PE.  COPD with emphysema.  With chronic hypoxic respiratory failure, currently on baseline oxygen  Pulmonology consulted.  Treated with IV steroids and subsequently oral steroids.  These were then discontinued prior to completion of taper given patient reported that they made him feel unwell.  Being treated with Xopenex and budesonide nebulizers  Status post 5 days of azithromycin  Ambulating throughout the halls with no distress.  Needs 3L NC with exertion - home O2 being set up as prior concentrator was potentially not functional  Discussed with pulmonary.  Plan for discharge with Symbicort, Spiriva, as needed albuterol.  Patient does have a nebulizer at home as well.  Patient filled medicare appeal and CM awaiting determination

## 2023-12-17 VITALS
SYSTOLIC BLOOD PRESSURE: 114 MMHG | OXYGEN SATURATION: 91 % | DIASTOLIC BLOOD PRESSURE: 71 MMHG | HEIGHT: 74 IN | WEIGHT: 167.6 LBS | TEMPERATURE: 98.3 F | HEART RATE: 78 BPM | RESPIRATION RATE: 20 BRPM | BODY MASS INDEX: 21.51 KG/M2

## 2023-12-17 PROBLEM — R22.40: Status: ACTIVE | Noted: 2023-12-17

## 2023-12-17 PROCEDURE — 99239 HOSP IP/OBS DSCHRG MGMT >30: CPT | Performed by: STUDENT IN AN ORGANIZED HEALTH CARE EDUCATION/TRAINING PROGRAM

## 2023-12-17 PROCEDURE — 94640 AIRWAY INHALATION TREATMENT: CPT

## 2023-12-17 PROCEDURE — 94669 MECHANICAL CHEST WALL OSCILL: CPT

## 2023-12-17 PROCEDURE — 94760 N-INVAS EAR/PLS OXIMETRY 1: CPT

## 2023-12-17 RX ORDER — BENZONATATE 200 MG/1
200 CAPSULE ORAL 3 TIMES DAILY PRN
Qty: 30 CAPSULE | Refills: 0 | Status: SHIPPED | OUTPATIENT
Start: 2023-12-17

## 2023-12-17 RX ORDER — LOSARTAN POTASSIUM 50 MG/1
50 TABLET ORAL DAILY
Qty: 90 TABLET | Refills: 0 | Status: SHIPPED | OUTPATIENT
Start: 2023-12-17

## 2023-12-17 RX ORDER — GUAIFENESIN 1200 MG/1
1200 TABLET, EXTENDED RELEASE ORAL EVERY 12 HOURS SCHEDULED
Qty: 14 TABLET | Refills: 0 | Status: SHIPPED | OUTPATIENT
Start: 2023-12-17 | End: 2023-12-24

## 2023-12-17 RX ORDER — ALBUTEROL SULFATE 2.5 MG/3ML
2.5 SOLUTION RESPIRATORY (INHALATION) EVERY 6 HOURS PRN
Qty: 30 ML | Refills: 0 | Status: SHIPPED | OUTPATIENT
Start: 2023-12-17

## 2023-12-17 RX ADMIN — LOSARTAN POTASSIUM 50 MG: 50 TABLET, FILM COATED ORAL at 08:52

## 2023-12-17 RX ADMIN — AMLODIPINE BESYLATE 5 MG: 5 TABLET ORAL at 08:52

## 2023-12-17 RX ADMIN — ENOXAPARIN SODIUM 40 MG: 40 INJECTION SUBCUTANEOUS at 08:53

## 2023-12-17 RX ADMIN — LEVALBUTEROL HYDROCHLORIDE 1.25 MG: 1.25 SOLUTION RESPIRATORY (INHALATION) at 08:21

## 2023-12-17 RX ADMIN — GUAIFENESIN 1200 MG: 600 TABLET ORAL at 08:51

## 2023-12-17 RX ADMIN — DICLOFENAC SODIUM TOPICAL GEL, 1% 2 G: 10 GEL TOPICAL at 08:56

## 2023-12-17 RX ADMIN — ASPIRIN 81 MG: 81 TABLET, COATED ORAL at 08:51

## 2023-12-17 RX ADMIN — Medication 250 MG: at 08:51

## 2023-12-17 RX ADMIN — PYRIDOXINE HCL TAB 50 MG 100 MG: 50 TAB at 08:52

## 2023-12-17 RX ADMIN — Medication 2000 UNITS: at 08:52

## 2023-12-17 RX ADMIN — FOLIC ACID 1 MG: 1 TABLET ORAL at 08:52

## 2023-12-17 RX ADMIN — METOPROLOL TARTRATE 25 MG: 25 TABLET, FILM COATED ORAL at 08:52

## 2023-12-17 RX ADMIN — BENZONATATE 200 MG: 100 CAPSULE ORAL at 08:51

## 2023-12-17 NOTE — ASSESSMENT & PLAN NOTE
COVID-positive 2 to 3 weeks prior to admission.  CTA chest on admission displaying interstitial coarsening and groundglass opacities.  No evidence of PE.  COPD with emphysema.  With chronic hypoxic respiratory failure, currently on baseline oxygen  Pulmonology consulted.  Treated with IV steroids and subsequently oral steroids.  These were then discontinued prior to completion of taper given patient reported that they made him feel unwell.  Being treated with Xopenex and budesonide nebulizers  Status post 5 days of azithromycin  Ambulating throughout the halls with no distress.  Needs 3L NC with exertion - home O2 being set up as prior concentrator was potentially not functional  Discussed with pulmonary.  Plan for discharge with Symbicort, Spiriva, as needed albuterol.  Patient does have a nebulizer at home as well.  Patient filled medicare appeal and CM awaiting determination ; 12/17/23 declined

## 2023-12-17 NOTE — DISCHARGE SUMMARY
"[facility]  Discharge- Devin Chaves 1945, 77 y.o. male MRN: [id number]  Unit/Bed#: S -01 Encounter: [id number]  Primary Care Provider: Oswaldo Muniz DO   Date and time admitted to hospital: 12/4/2023  8:12 PM    * Chronic obstructive pulmonary disease with acute exacerbation (HCC)  Assessment & Plan  COVID-positive 2 to 3 weeks prior to admission.  CTA chest on admission displaying interstitial coarsening and groundglass opacities.  No evidence of PE.  COPD with emphysema.  With chronic hypoxic respiratory failure, currently on baseline oxygen  Pulmonology consulted.  Treated with IV steroids and subsequently oral steroids.  These were then discontinued prior to completion of taper given patient reported that they made him feel unwell.  Being treated with Xopenex and budesonide nebulizers  Status post 5 days of azithromycin  Ambulating throughout the halls with no distress.  Needs 3L NC with exertion - home O2 being set up as prior concentrator was potentially not functional  Discussed with pulmonary.  Plan for discharge with Symbicort, Spiriva, as needed albuterol.  Patient does have a nebulizer at home as well.  Patient filled medicare appeal and CM awaiting determination ; 12/17/23 declined    Hip region mass, unspecified laterality  Assessment & Plan  CTCAP \" There is an approximately 3.8 x 4.2 x 3.3 cm low-density area without peripheral enhancement just lateral to the superior aspect right hip. This may possibly be related to bursitis. Clinical correlation and follow-up recommended.\"COPD with emphysema.    Patient made aware, he will need to follow-up with PCP to evaluate and decide if further outpatient workup is needed, on exam no masses/bruises or abnormality, no tenderness to palpation, patient noted that possibly he might have trauma at the site in the past but he denies any pain or symptoms, made aware and advised to follow-up with PCP and update if " "any changes         Fracture of multiple ribs of right side  Assessment & Plan  With fractures of right ribs 5 through 8.  Per patient this occurred a couple months ago  Appears to have routine ongoing healing on his image currently  Recommend continuing incentive spirometer    Chronic hypoxic respiratory failure (HCC)  Assessment & Plan  Baseline is supposed to be using 3 L nasal cannula but reports noncompliance with this.  Reports that his concentrator at home may have not been working correctly.  New oxygen being delivered today (CM confirmed)  Home oxygen evaluation appreciated confirming need for o2 at discharge- RA was 94%. Does not qualify for oxygen at rest. Patient during ambulation walked about 500ft using the walker and desaturated to 86% and required 3L to maintain a goal of above 89%     12/17 again patient reports he has home oxygen generator but he does not use it due to not enough space at home\"\" and he is not willing to use it, encouraged use it is tolerable on the possible and also encouraged taking a break as appropriate between the walks or stairs    Moderate protein-calorie malnutrition (HCC)  Assessment & Plan  Malnutrition Findings:   Adult Malnutrition type: Acute illness  Adult Degree of Malnutrition: Malnutrition of moderate degree  Malnutrition Characteristics: Inadequate energy, Weight loss    360 Statement: Acute/moderate malnutrition r/t condition as evidenced by intake meeting <75% estimated needs > 1 week, and 6% wt loss x 1 month (12/6/23: 170#, 11/15/23: 181#). Treatment: suggest liberalizing diet to 4gm Na. Pt not interested in nutrition supplements at this time. Request RD protocol to make future diet/supplement adjustments    BMI Findings:     Body mass index is 21.52 kg/m².       Open wound of finger of right hand  Assessment & Plan  Noted over middle digit  Consulted hand surgery 12/5/23  No immediate hand surgery intervention indicated at this time.   Daily dressing changes " with local wound care as recommended  X-ray hand was completed 12/5/2023 which displayed no acute osseous abnormality.  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    Enlarged prostate  Assessment & Plan  Noted on CT imaging.  Denies urinary symptoms.   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  Managed at home with conservative care.   Pulmonology indicates suspect will have slow recovery given recent infection      CAD (coronary artery disease)  Assessment & Plan  Atherosclerosis, CAD noted on CT scan.    Continue statin, blocker and aspirin  Outpatient follow-up    Hypertension  Assessment & Plan  SBP 90-110s  Continue amlodipine with higher hold parameter, Lopressor twice daily and losartan with hold parameters        Medical Problems       Resolved Problems  Date Reviewed: 12/16/2023            Resolved    Abdominal pain 12/14/2023     Resolved by  AMPARO Chaves    Hyponatremia 12/14/2023     Resolved by  AMPARO Chaves        Discharging Physician / Practitioner: Sreedhar Guerra DO  PCP: Oswaldo Muniz DO  Admission Date:   Admission Orders (From admission, onward)       Ordered        12/05/23 1134  Inpatient Admission  Once            12/05/23 0129  Place in Observation  Once                          Discharge Date: 12/17/23    Consultations During Hospital Stay:  Pulmonary, wound care, hand surgery    Procedures Performed:   Incidental Findings:   High-frequency chest wall oscillation/respiratory protocol    Significant Findings / Test Results:   Possible bursitis, incidental note filed and patient aware to follow-up with PCP  Enlarged prostate and calcifications   4 cm ascending  thoracic aorta  Possible bursitis of the hip  Groundglass opacity of the lungs    CXR: No acute pulmonary disease.  COPD.  Hand xray: No acute osseous abnormality. 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   CTA chest: There are some peripherally oriented areas of interstitial coarsening and groundglass opacity in the lungs. This may possibly be related to the history of recent COVID-19 infection. Short-term follow-up recommended. There is an approximately 3.8 x 4.2 x 3.3 cm low-density area without peripheral enhancement just lateral to the superior aspect right hip. This may possibly be related to bursitis.  The prostate is mildly prominent and contains some calcifications within. No evidence of pulmonary embolism is seen. Atherosclerosis. Coronary artery disease. The ascending thoracic aorta measures up to 4 cm diameter; this is similar to July 3, 2012. Recommend follow-up low radiation dose chest CT in 1 year. Colonic diverticulosis without evidence of acute diverticulitis. COPD with emphysema.  CXR: Areas of interstitial coarsening which could be related to patient's recent history of COVID-19 infection better characterized on CTA chest abdomen pelvis performed 12/4/2023.         Test Results Pending at Discharge (will require follow up):   none     Outpatient Tests Requested:  None, needs follow-up with PCP    Complications: None    Reason for Admission: COPD exacerbation    Hospital Course:   Devin Chaves is a 77 y.o. male patient who originally presented to the hospital on 12/4/2023 due to some breath.  Patient has past medical history of COPD, hypertension, coronary disease, chronic hypoxic respiratory failure, history of CVA, rheumatoid arthritis maintained on methotrexate and Humira who presented with shortness of breath and wheezing for 3 days prior to admission.  The patient reports that he had home oxygen however was not using this because he did not want to become  dependent on it.  The patient had had COVID 2 to 3 weeks ago.  He was managed with supportive care at home.  The patient underwent CTA of the chest upon admission which showed no PE however did show groundglass opacities.  Patient was started on steroids and antibiotics and subsequently admitted.     Patient was seen in consultation by orthopedics secondary to right long finger injury.  The patient had x-rays which showed no acute abnormality.  Recommended for daily dressing changes and to follow-up with the hand team as an outpatient.     Patient did have an abnormal CT of the hip however had no right pain symptoms.  Patient was recommended to have repeat imaging performed in 4 to 6 weeks.  It was noted to have healing rib fractures noted on CAT scan as well which appeared consistent with a few months ago when a cow was noted to crush him against the wall.     Patient was seen in consultation by pulmonary and felt to have COPD exacerbation and was started on azithromycin for 5 days.  Patient was tapered to oral prednisone beginning on 12/9/2023.     The patient has been up and walking throughout the hallways.  He was seen by physical therapy and Occupational Therapy and was not deemed necessary to have rehab upon discharge.  However the patient felt that he needed time in a rehab prior to being discharged home so that he could regain his strength.  The patient was medically stable for several days however continued to be adamant that he not be discharged home because his room was being painted at home and did not want to sleep on the couch.      Patient appealed his discharge to Medicare, declined on 12/17 morning    12/17/2023 patient seen and examined, sitting in the bed in no acute distress, speaking complete sentences, without noted dyspnea on exertion cough or any other respiratory symptoms, above assessment and plan explained to the patient including the right hip possible bursitis, see above, he again noted  "that he has oxygenation at home but he refused using it, updated about the appeals to decline and patient understand that there is some frustration, also explained that PT laly concluded that rehab is not indicated, he again expressed understanding, [facility] and he is advised to contact and schedule follow-up appointment with PCP within 2 weeks of discharge     Please see above list of diagnoses and related plan for additional information.     Condition at Discharge: stable    Discharge Day Visit / Exam:   Subjective: See above denies any new symptoms   Vitals: Blood Pressure: 114/71 (12/17/23 0656)  Pulse: 78 (12/17/23 0656)  Temperature: 98.3 °F (36.8 °C) (12/17/23 0656)  Temp Source: Axillary (12/14/23 0705)  Respirations: 20 (12/17/23 0656)  Height: 6' 2\" (188 cm) (12/05/23 0330)  Weight - Scale: 76 kg (167 lb 9.6 oz) (12/15/23 0600)  SpO2: 91 % (12/17/23 0822)  Exam:   Physical Exam   - GEN: Appears well, alert and oriented x 3, pleasant and cooperative, in no acute distress  - HEENT: Anicteric, mucous membranes moist, PERRL and EOMI   - NECK: No lymphadenopathy, JVD or carotid bruits   - HEART: RRR, normal S1 and S2, no murmurs, clicks, gallops or rubs   - LUNGS: Clear to auscultation bilaterally; no wheezes, rales, or rhonchi  - ABDOMEN: Normal bowel sounds, soft, no tenderness, no distention, no organomegaly or masses felt on exam.   - EXTREMITIES: Peripheral pulses normal; no clubbing, cyanosis, or edema  - NEURO: No focal findings, CN II-XII are grossly intact.   - Musculoskeletal: 5/5 strength, normal ROM, no swollen or erythematous joints.   - SKIN: Normal without suspicious lesions on exposed skin        Discussion with Family: Patient declined call to .  Patient called his wife to pick him up    Discharge instructions/Information to patient and family:   See after visit summary for information provided to patient and " family.      Provisions for Follow-Up Care:  See after visit summary for information related to follow-up care and any pertinent home health orders.      Mobility at time of Discharge:   Basic Mobility Inpatient Raw Score: 22  JH-HLM Goal: 7: Walk 25 feet or more  JH-HLM Achieved: 7: Walk 25 feet or more  HLM Goal achieved. Continue to encourage appropriate mobility.     Disposition:   Home    Planned Readmission: no     Discharge Statement:  I spent 50 minutes discharging the patient. This time was spent on the day of discharge. I had direct contact with the patient on the day of discharge. Greater than 50% of the total time was spent examining patient, answering all patient questions, arranging and discussing plan of care with patient as well as directly providing post-discharge instructions.  Additional time then spent on discharge activities.    Discharge Medications:  See after visit summary for reconciled discharge medications provided to patient and/or family.      **Please Note: This note may have been constructed using a voice recognition system**

## 2023-12-17 NOTE — ASSESSMENT & PLAN NOTE
"Baseline is supposed to be using 3 L nasal cannula but reports noncompliance with this.  Reports that his concentrator at home may have not been working correctly.  New oxygen being delivered today (CM confirmed)  Home oxygen evaluation appreciated confirming need for o2 at discharge- RA was 94%. Does not qualify for oxygen at rest. Patient during ambulation walked about 500ft using the walker and desaturated to 86% and required 3L to maintain a goal of above 89%     12/17 again patient reports he has home oxygen generator but he does not use it due to not enough space at home\"\" and he is not willing to use it, encouraged use it is tolerable on the possible and also encouraged taking a break as appropriate between the walks or stairs  "

## 2023-12-17 NOTE — ASSESSMENT & PLAN NOTE
"CTCAP \" There is an approximately 3.8 x 4.2 x 3.3 cm low-density area without peripheral enhancement just lateral to the superior aspect right hip. This may possibly be related to bursitis. Clinical correlation and follow-up recommended.\"COPD with emphysema.    Patient made aware, he will need to follow-up with PCP to evaluate and decide if further outpatient workup is needed, on exam no masses/bruises or abnormality, no tenderness to palpation, patient noted that possibly he might have trauma at the site in the past but he denies any pain or symptoms, made aware and advised to follow-up with PCP and update if any changes       "

## 2023-12-17 NOTE — INCIDENTAL FINDINGS
The following findings require follow up:  Radiographic finding   Finding: CT AP w contrast 12/4/23     There are some peripherally oriented areas of interstitial coarsening and groundglass opacity in the lungs. This may possibly be related to the history of recent COVID-19 infection. Short-term follow-up recommended.     There is an approximately 3.8 x 4.2 x 3.3 cm low-density area without peripheral enhancement just lateral to the superior aspect right hip. This may possibly be related to bursitis. Clinical correlation and follow-up recommended.     The prostate is mildly prominent and contains some calcifications within. Clinical and laboratory (PSA) correlation recommended.     No evidence of pulmonary embolism is seen.     Atherosclerosis. Coronary artery disease. The ascending thoracic aorta measures up to 4 cm diameter; this is similar to July 3, 2012. Recommend follow-up low radiation dose chest CT in 1 year.     Colonic diverticulosis without evidence of acute diverticulitis.     COPD with emphysema.     Other nonemergent and chronic findings as above.     This examination demonstrates findings for which clinical and imaging follow-up is recommended and was logged as such in EPIC.     The study was marked in EPIC for immediate notification.            Follow up required: yes , need follow up with PCP to revaluate , patient made aware    Follow up should be done within 1-2 week(s)

## 2023-12-18 ENCOUNTER — TELEPHONE (OUTPATIENT)
Dept: OBGYN CLINIC | Facility: CLINIC | Age: 78
End: 2023-12-18

## 2023-12-18 NOTE — TELEPHONE ENCOUNTER
"Spoke to pt;s wife, he is in the [facility]. Per his wife he will no longer be dealing with anyone at \"[facility]\"   "

## 2023-12-18 NOTE — TELEPHONE ENCOUNTER
----- Message from Fly Mckay MD sent at 12/18/2023  2:14 PM EST -----  Please call and offer patient follow-up for tomorrow for finger wound. Thank you.

## 2023-12-18 NOTE — CASE MANAGEMENT
[facility] received determination for [employer] Appeal.   Med Director agrees with termination of services.   Liability starts: 12/17/2023   Care Manager notified of [employer] Decision: Camryn Keith

## 2023-12-20 NOTE — UTILIZATION REVIEW
NOTIFICATION OF ADMISSION DISCHARGE   This is a Notification of Discharge from [facility]. Please be advised that this patient has been discharge from our facility. Below you will find the admission and discharge date and time including the patient’s disposition.   UTILIZATION REVIEW CONTACT:  Bijan Kwong  Utilization   Network Utilization Review Department  Phone: [phone] x carefully listen to the prompts. All voicemails are confidential.  Email: [email or web address]     ADMISSION INFORMATION  PRESENTATION DATE: 12/4/2023  8:12 PM  OBERVATION ADMISSION DATE:   INPATIENT ADMISSION DATE: 12/5/23 11:34 AM   DISCHARGE DATE: 12/17/2023 11:41 AM   DISPOSITION:Home/Self Care    Network Utilization Review Department  ATTENTION: Please call with any questions or concerns to [phone] and carefully listen to the prompts so that you are directed to the right person. All voicemails are confidential.   For Discharge needs, contact Care Management DC Support Team at [phone] opt. 2  Send all requests for admission clinical reviews, approved or denied determinations and any other requests to dedicated fax number below belonging to the campus where the patient is receiving treatment. List of dedicated fax numbers for the Facilities:  FACILITY NAME UR FAX NUMBER   ADMISSION DENIALS (Administrative/Medical Necessity) [phone]   DISCHARGE SUPPORT TEAM ([facility]) [phone]   PARENT CHILD HEALTH (Maternity/NICU/Pediatrics) [phone]   [facility] [phone]   [facility] [phone]   [facility] [phone]   [facility] [phone]   [facility] [phone]   [facility] [phone]   [facility] [phone]   [facility] [phone]    [facility] [phone]   [facility] [phone]   [facility] [phone]

## 2023-12-21 LAB
BACTERIA SPT RESP CULT: ABNORMAL
BACTERIA SPT RESP CULT: ABNORMAL
GRAM STN SPEC: ABNORMAL

## 2025-01-01 ENCOUNTER — APPOINTMENT (INPATIENT)
Dept: RADIOLOGY | Facility: HOSPITAL | Age: 80
End: 2025-01-01
Payer: COMMERCIAL

## 2025-01-01 ENCOUNTER — APPOINTMENT (INPATIENT)
Dept: CT IMAGING | Facility: HOSPITAL | Age: 80
End: 2025-01-01
Payer: COMMERCIAL

## 2025-01-01 ENCOUNTER — ANESTHESIA (INPATIENT)
Dept: PERIOP | Facility: HOSPITAL | Age: 80
End: 2025-01-01
Payer: COMMERCIAL

## 2025-01-01 ENCOUNTER — APPOINTMENT (INPATIENT)
Dept: NON INVASIVE DIAGNOSTICS | Facility: HOSPITAL | Age: 80
End: 2025-01-01
Payer: COMMERCIAL

## 2025-01-01 ENCOUNTER — ANESTHESIA EVENT (INPATIENT)
Dept: PERIOP | Facility: HOSPITAL | Age: 80
End: 2025-01-01
Payer: COMMERCIAL

## 2025-01-01 ENCOUNTER — APPOINTMENT (INPATIENT)
Dept: RADIOLOGY | Facility: HOSPITAL | Age: 80
End: 2025-01-01
Attending: RADIOLOGY
Payer: COMMERCIAL

## 2025-01-01 ENCOUNTER — APPOINTMENT (EMERGENCY)
Dept: CT IMAGING | Facility: HOSPITAL | Age: 80
End: 2025-01-01
Payer: COMMERCIAL

## 2025-01-01 ENCOUNTER — APPOINTMENT (INPATIENT)
Dept: MRI IMAGING | Facility: HOSPITAL | Age: 80
End: 2025-01-01
Payer: COMMERCIAL

## 2025-01-01 ENCOUNTER — APPOINTMENT (INPATIENT)
Dept: NEUROLOGY | Facility: CLINIC | Age: 80
End: 2025-01-01
Payer: COMMERCIAL

## 2025-01-01 ENCOUNTER — RESULTS FOLLOW-UP (OUTPATIENT)
Age: 80
End: 2025-01-01

## 2025-01-01 ENCOUNTER — APPOINTMENT (INPATIENT)
Dept: NEUROLOGY | Facility: HOSPITAL | Age: 80
End: 2025-01-01
Payer: COMMERCIAL

## 2025-01-01 ENCOUNTER — APPOINTMENT (INPATIENT)
Dept: GASTROENTEROLOGY | Facility: HOSPITAL | Age: 80
End: 2025-01-01
Payer: COMMERCIAL

## 2025-01-01 ENCOUNTER — APPOINTMENT (EMERGENCY)
Dept: RADIOLOGY | Facility: HOSPITAL | Age: 80
End: 2025-01-01
Payer: COMMERCIAL

## 2025-01-01 ENCOUNTER — APPOINTMENT (INPATIENT)
Dept: NEUROLOGY | Facility: HOSPITAL | Age: 80
End: 2025-01-01
Attending: STUDENT IN AN ORGANIZED HEALTH CARE EDUCATION/TRAINING PROGRAM
Payer: COMMERCIAL

## 2025-01-01 ENCOUNTER — TELEPHONE (OUTPATIENT)
Age: 80
End: 2025-01-01

## 2025-01-01 ENCOUNTER — HOSPITAL ENCOUNTER (INPATIENT)
Facility: HOSPITAL | Age: 80
LOS: 28 days | End: 2025-03-28
Attending: EMERGENCY MEDICINE | Admitting: SURGERY
Payer: COMMERCIAL

## 2025-01-01 VITALS
HEART RATE: 119 BPM | DIASTOLIC BLOOD PRESSURE: 45 MMHG | OXYGEN SATURATION: 96 % | WEIGHT: 225 LBS | BODY MASS INDEX: 30.48 KG/M2 | HEIGHT: 72 IN | RESPIRATION RATE: 31 BRPM | TEMPERATURE: 99.5 F | SYSTOLIC BLOOD PRESSURE: 73 MMHG

## 2025-01-01 DIAGNOSIS — R79.89 ELEVATED TROPONIN: ICD-10-CM

## 2025-01-01 DIAGNOSIS — R82.90 ABNORMAL URINALYSIS: ICD-10-CM

## 2025-01-01 DIAGNOSIS — J96.11 CHRONIC HYPOXIC RESPIRATORY FAILURE (HCC): ICD-10-CM

## 2025-01-01 DIAGNOSIS — N17.9 ACUTE RENAL FAILURE, UNSPECIFIED ACUTE RENAL FAILURE TYPE (HCC): ICD-10-CM

## 2025-01-01 DIAGNOSIS — I46.9 CARDIAC ARREST (HCC): ICD-10-CM

## 2025-01-01 DIAGNOSIS — R57.8 HEMORRHAGIC SHOCK (HCC): ICD-10-CM

## 2025-01-01 DIAGNOSIS — B95.61 MSSA BACTEREMIA: ICD-10-CM

## 2025-01-01 DIAGNOSIS — D64.9 ANEMIA: ICD-10-CM

## 2025-01-01 DIAGNOSIS — R58 HEMORRHAGE: ICD-10-CM

## 2025-01-01 DIAGNOSIS — R13.14 PHARYNGOESOPHAGEAL DYSPHAGIA: ICD-10-CM

## 2025-01-01 DIAGNOSIS — I46.8 CARDIAC ARREST DUE TO OTHER UNDERLYING CONDITION (HCC): ICD-10-CM

## 2025-01-01 DIAGNOSIS — K66.1 HEMOPERITONEUM: ICD-10-CM

## 2025-01-01 DIAGNOSIS — K63.89 COLONIC MASS: ICD-10-CM

## 2025-01-01 DIAGNOSIS — N17.9 AKI (ACUTE KIDNEY INJURY) (HCC): ICD-10-CM

## 2025-01-01 DIAGNOSIS — J96.00 SEPSIS WITH ACUTE RESPIRATORY FAILURE AND SEPTIC SHOCK, DUE TO UNSPECIFIED ORGANISM, UNSPECIFIED WHETHER HYPOXIA OR HYPERCAPNIA PRESENT (HCC): Primary | ICD-10-CM

## 2025-01-01 DIAGNOSIS — R91.1 PULMONARY NODULE: ICD-10-CM

## 2025-01-01 DIAGNOSIS — J44.9 CHRONIC OBSTRUCTIVE PULMONARY DISEASE, UNSPECIFIED COPD TYPE (HCC): ICD-10-CM

## 2025-01-01 DIAGNOSIS — R78.81 MSSA BACTEREMIA: ICD-10-CM

## 2025-01-01 DIAGNOSIS — K92.1 MELENA: ICD-10-CM

## 2025-01-01 DIAGNOSIS — R65.21 SEPSIS WITH ACUTE RESPIRATORY FAILURE AND SEPTIC SHOCK, DUE TO UNSPECIFIED ORGANISM, UNSPECIFIED WHETHER HYPOXIA OR HYPERCAPNIA PRESENT (HCC): Primary | ICD-10-CM

## 2025-01-01 DIAGNOSIS — I25.10 CORONARY ARTERY DISEASE INVOLVING NATIVE HEART WITHOUT ANGINA PECTORIS, UNSPECIFIED VESSEL OR LESION TYPE: ICD-10-CM

## 2025-01-01 DIAGNOSIS — D62 ACUTE BLOOD LOSS ANEMIA: ICD-10-CM

## 2025-01-01 DIAGNOSIS — A41.9 SEPSIS WITH ACUTE RESPIRATORY FAILURE AND SEPTIC SHOCK, DUE TO UNSPECIFIED ORGANISM, UNSPECIFIED WHETHER HYPOXIA OR HYPERCAPNIA PRESENT (HCC): Primary | ICD-10-CM

## 2025-01-01 DIAGNOSIS — E44.0 MODERATE PROTEIN-CALORIE MALNUTRITION (HCC): ICD-10-CM

## 2025-01-01 DIAGNOSIS — C18.9 MALIGNANT NEOPLASM OF COLON, UNSPECIFIED PART OF COLON (HCC): Primary | ICD-10-CM

## 2025-01-01 DIAGNOSIS — J90 PLEURAL EFFUSION, BILATERAL: ICD-10-CM

## 2025-01-01 DIAGNOSIS — I95.9 HYPOTENSION: ICD-10-CM

## 2025-01-01 DIAGNOSIS — I48.91 ATRIAL FIBRILLATION, UNSPECIFIED TYPE (HCC): ICD-10-CM

## 2025-01-01 LAB
2HR DELTA HS TROPONIN: -25 NG/L
2HR DELTA HS TROPONIN: 1877 NG/L
2HR DELTA HS TROPONIN: 8 NG/L
4HR DELTA HS TROPONIN: 1154 NG/L
4HR DELTA HS TROPONIN: 4 NG/L
4HR DELTA HS TROPONIN: 4987 NG/L
AAASAAGGREGATION: 50.2 % (ref 89–100)
AAASAINHIBITION: 49.8 % (ref 0–11)
AAMA (MAX AMPLITUDE AA): 33.4 MM (ref 51–71)
ABO GROUP BLD BPU: NORMAL
ABO GROUP BLD: NORMAL
ACTFMA(MAX AMPLITUDE ACTF): 10.6 MM (ref 2–19)
ADPADPAGGREGATION: 14.1 % (ref 83–100)
ADPADPINHIBITION: 85.9 % (ref 0–17)
ADPMA(MAX AMPLITUDE ADP): 17 MM (ref 45–69)
ALBUMIN SERPL BCG-MCNC: 1.6 G/DL (ref 3.5–5)
ALBUMIN SERPL BCG-MCNC: 1.8 G/DL (ref 3.5–5)
ALBUMIN SERPL BCG-MCNC: 1.9 G/DL (ref 3.5–5)
ALBUMIN SERPL BCG-MCNC: 2 G/DL (ref 3.5–5)
ALBUMIN SERPL BCG-MCNC: 2 G/DL (ref 3.5–5)
ALBUMIN SERPL BCG-MCNC: 2.1 G/DL (ref 3.5–5)
ALBUMIN SERPL BCG-MCNC: 2.4 G/DL (ref 3.5–5)
ALBUMIN SERPL BCG-MCNC: 2.5 G/DL (ref 3.5–5)
ALBUMIN SERPL BCG-MCNC: 2.7 G/DL (ref 3.5–5)
ALBUMIN SERPL BCG-MCNC: 2.8 G/DL (ref 3.5–5)
ALBUMIN SERPL ELPH-MCNC: 1.9 G/DL (ref 3.2–5.1)
ALBUMIN SERPL ELPH-MCNC: 39.6 % (ref 48–70)
ALBUMIN UR ELPH-MCNC: 45.2 %
ALP SERPL-CCNC: 34 U/L (ref 34–104)
ALP SERPL-CCNC: 41 U/L (ref 34–104)
ALP SERPL-CCNC: 42 U/L (ref 34–104)
ALP SERPL-CCNC: 43 U/L (ref 34–104)
ALP SERPL-CCNC: 47 U/L (ref 34–104)
ALP SERPL-CCNC: 50 U/L (ref 34–104)
ALP SERPL-CCNC: 50 U/L (ref 34–104)
ALP SERPL-CCNC: 51 U/L (ref 34–104)
ALP SERPL-CCNC: 51 U/L (ref 34–104)
ALP SERPL-CCNC: 53 U/L (ref 34–104)
ALP SERPL-CCNC: 53 U/L (ref 34–104)
ALP SERPL-CCNC: 60 U/L (ref 34–104)
ALPHA1 GLOB MFR UR ELPH: 10.6 %
ALPHA1 GLOB SERPL ELPH-MCNC: 0.32 G/DL (ref 0.15–0.47)
ALPHA1 GLOB SERPL ELPH-MCNC: 6.6 % (ref 1.8–7)
ALPHA2 GLOB MFR UR ELPH: 8.5 %
ALPHA2 GLOB SERPL ELPH-MCNC: 0.45 G/DL (ref 0.42–1.04)
ALPHA2 GLOB SERPL ELPH-MCNC: 9.3 % (ref 5.9–14.9)
ALT SERPL W P-5'-P-CCNC: 4 U/L (ref 7–52)
ALT SERPL W P-5'-P-CCNC: 5 U/L (ref 7–52)
ALT SERPL W P-5'-P-CCNC: 6 U/L (ref 7–52)
ALT SERPL W P-5'-P-CCNC: <3 U/L (ref 7–52)
ANION GAP SERPL CALCULATED.3IONS-SCNC: 11 MMOL/L (ref 4–13)
ANION GAP SERPL CALCULATED.3IONS-SCNC: 12 MMOL/L (ref 4–13)
ANION GAP SERPL CALCULATED.3IONS-SCNC: 13 MMOL/L (ref 4–13)
ANION GAP SERPL CALCULATED.3IONS-SCNC: 13 MMOL/L (ref 4–13)
ANION GAP SERPL CALCULATED.3IONS-SCNC: 14 MMOL/L (ref 4–13)
ANION GAP SERPL CALCULATED.3IONS-SCNC: 14 MMOL/L (ref 4–13)
ANION GAP SERPL CALCULATED.3IONS-SCNC: 15 MMOL/L (ref 4–13)
ANION GAP SERPL CALCULATED.3IONS-SCNC: 2 MMOL/L (ref 4–13)
ANION GAP SERPL CALCULATED.3IONS-SCNC: 3 MMOL/L (ref 4–13)
ANION GAP SERPL CALCULATED.3IONS-SCNC: 4 MMOL/L (ref 4–13)
ANION GAP SERPL CALCULATED.3IONS-SCNC: 5 MMOL/L (ref 4–13)
ANION GAP SERPL CALCULATED.3IONS-SCNC: 6 MMOL/L (ref 4–13)
ANION GAP SERPL CALCULATED.3IONS-SCNC: 7 MMOL/L (ref 4–13)
ANION GAP SERPL CALCULATED.3IONS-SCNC: 8 MMOL/L (ref 4–13)
ANION GAP SERPL CALCULATED.3IONS-SCNC: 9 MMOL/L (ref 4–13)
ANION GAP SERPL CALCULATED.3IONS-SCNC: 9 MMOL/L (ref 4–13)
ANISOCYTOSIS BLD QL SMEAR: PRESENT
APTT PPP: 106 SECONDS (ref 23–34)
APTT PPP: 123 SECONDS (ref 23–34)
APTT PPP: 153 SECONDS (ref 23–34)
APTT PPP: 169 SECONDS (ref 23–34)
APTT PPP: 41 SECONDS (ref 23–34)
APTT PPP: 44 SECONDS (ref 23–34)
APTT PPP: 45 SECONDS (ref 23–34)
APTT PPP: 45 SECONDS (ref 23–34)
APTT PPP: 59 SECONDS (ref 23–34)
APTT PPP: 61 SECONDS (ref 23–34)
APTT PPP: 63 SECONDS (ref 23–34)
APTT PPP: 66 SECONDS (ref 23–34)
APTT PPP: 69 SECONDS (ref 23–34)
APTT PPP: 79 SECONDS (ref 23–34)
APTT PPP: 81 SECONDS (ref 23–34)
ARTERIAL PATENCY WRIST A: ABNORMAL
ARTERIAL PATENCY WRIST A: NO
ARTERIAL PATENCY WRIST A: NO
AST SERPL W P-5'-P-CCNC: 15 U/L (ref 13–39)
AST SERPL W P-5'-P-CCNC: 16 U/L (ref 13–39)
AST SERPL W P-5'-P-CCNC: 19 U/L (ref 13–39)
AST SERPL W P-5'-P-CCNC: 19 U/L (ref 13–39)
AST SERPL W P-5'-P-CCNC: 20 U/L (ref 13–39)
AST SERPL W P-5'-P-CCNC: 21 U/L (ref 13–39)
AST SERPL W P-5'-P-CCNC: 26 U/L (ref 13–39)
AST SERPL W P-5'-P-CCNC: 60 U/L (ref 13–39)
AST SERPL W P-5'-P-CCNC: 69 U/L (ref 13–39)
ATRIAL RATE: 100 BPM
ATRIAL RATE: 103 BPM
ATRIAL RATE: 106 BPM
ATRIAL RATE: 108 BPM
ATRIAL RATE: 122 BPM
ATRIAL RATE: 76 BPM
B-GLOBULIN MFR UR ELPH: 7.2 %
BACTERIA BRONCH AEROBE CULT: ABNORMAL
BACTERIA BRONCH AEROBE CULT: ABNORMAL
BACTERIA UR QL AUTO: ABNORMAL /HPF
BACTERIA UR QL AUTO: ABNORMAL /HPF
BASE EX.OXY STD BLDV CALC-SCNC: 59 % (ref 60–80)
BASE EX.OXY STD BLDV CALC-SCNC: 60.4 % (ref 60–80)
BASE EX.OXY STD BLDV CALC-SCNC: 63.6 % (ref 60–80)
BASE EX.OXY STD BLDV CALC-SCNC: 63.9 % (ref 60–80)
BASE EX.OXY STD BLDV CALC-SCNC: 64.9 % (ref 60–80)
BASE EX.OXY STD BLDV CALC-SCNC: 74.2 % (ref 60–80)
BASE EX.OXY STD BLDV CALC-SCNC: 74.9 % (ref 60–80)
BASE EX.OXY STD BLDV CALC-SCNC: 80.5 % (ref 60–80)
BASE EXCESS BLDA CALC-SCNC: -0.2 MMOL/L
BASE EXCESS BLDA CALC-SCNC: -0.7 MMOL/L
BASE EXCESS BLDA CALC-SCNC: -1 MMOL/L (ref -2–3)
BASE EXCESS BLDA CALC-SCNC: -2.3 MMOL/L
BASE EXCESS BLDA CALC-SCNC: -2.5 MMOL/L
BASE EXCESS BLDA CALC-SCNC: -3.9 MMOL/L
BASE EXCESS BLDA CALC-SCNC: -4 MMOL/L (ref -2–3)
BASE EXCESS BLDA CALC-SCNC: -4.1 MMOL/L
BASE EXCESS BLDA CALC-SCNC: -4.1 MMOL/L
BASE EXCESS BLDA CALC-SCNC: -4.5 MMOL/L
BASE EXCESS BLDA CALC-SCNC: -5 MMOL/L (ref -2–3)
BASE EXCESS BLDA CALC-SCNC: -5.1 MMOL/L
BASE EXCESS BLDA CALC-SCNC: -5.3 MMOL/L
BASE EXCESS BLDA CALC-SCNC: -6 MMOL/L (ref -2–3)
BASE EXCESS BLDA CALC-SCNC: -6 MMOL/L (ref -2–3)
BASE EXCESS BLDA CALC-SCNC: 0 MMOL/L (ref -2–3)
BASE EXCESS BLDA CALC-SCNC: 0.9 MMOL/L
BASE EXCESS BLDA CALC-SCNC: 1 MMOL/L (ref -2–3)
BASE EXCESS BLDA CALC-SCNC: 2.5 MMOL/L
BASE EXCESS BLDV CALC-SCNC: -0.8 MMOL/L
BASE EXCESS BLDV CALC-SCNC: -1.6 MMOL/L
BASE EXCESS BLDV CALC-SCNC: -2.1 MMOL/L
BASE EXCESS BLDV CALC-SCNC: -2.4 MMOL/L
BASE EXCESS BLDV CALC-SCNC: -4 MMOL/L
BASE EXCESS BLDV CALC-SCNC: -5 MMOL/L
BASE EXCESS BLDV CALC-SCNC: -9.6 MMOL/L
BASE EXCESS BLDV CALC-SCNC: 1.1 MMOL/L
BASO STIPL BLD QL SMEAR: PRESENT
BASOPHILS # BLD AUTO: 0 THOUSANDS/ÂΜL (ref 0–0.1)
BASOPHILS # BLD AUTO: 0.02 THOUSANDS/ÂΜL (ref 0–0.1)
BASOPHILS # BLD AUTO: 0.03 THOUSANDS/ÂΜL (ref 0–0.1)
BASOPHILS # BLD AUTO: 0.04 THOUSANDS/ÂΜL (ref 0–0.1)
BASOPHILS # BLD AUTO: 0.04 THOUSANDS/ÂΜL (ref 0–0.1)
BASOPHILS # BLD AUTO: 0.05 THOUSANDS/ÂΜL (ref 0–0.1)
BASOPHILS # BLD AUTO: 0.06 THOUSANDS/ÂΜL (ref 0–0.1)
BASOPHILS # BLD AUTO: 0.09 THOUSANDS/ÂΜL (ref 0–0.1)
BASOPHILS # BLD MANUAL: 0 THOUSAND/UL (ref 0–0.1)
BASOPHILS # BLD MANUAL: 0.12 THOUSAND/UL (ref 0–0.1)
BASOPHILS NFR BLD AUTO: 0 % (ref 0–1)
BASOPHILS NFR BLD AUTO: 1 % (ref 0–1)
BASOPHILS NFR MAR MANUAL: 0 % (ref 0–1)
BASOPHILS NFR MAR MANUAL: 1 % (ref 0–1)
BETA GLOB ABNORMAL SERPL ELPH-MCNC: 0.23 G/DL (ref 0.31–0.57)
BETA1 GLOB SERPL ELPH-MCNC: 4.7 % (ref 4.7–7.7)
BETA2 GLOB SERPL ELPH-MCNC: 5.7 % (ref 3.1–7.9)
BETA2+GAMMA GLOB SERPL ELPH-MCNC: 0.27 G/DL (ref 0.2–0.58)
BILIRUB DIRECT SERPL-MCNC: 0.15 MG/DL (ref 0–0.2)
BILIRUB DIRECT SERPL-MCNC: 0.21 MG/DL (ref 0–0.2)
BILIRUB SERPL-MCNC: 0.3 MG/DL (ref 0.2–1)
BILIRUB SERPL-MCNC: 0.31 MG/DL (ref 0.2–1)
BILIRUB SERPL-MCNC: 0.31 MG/DL (ref 0.2–1)
BILIRUB SERPL-MCNC: 0.34 MG/DL (ref 0.2–1)
BILIRUB SERPL-MCNC: 0.38 MG/DL (ref 0.2–1)
BILIRUB SERPL-MCNC: 0.38 MG/DL (ref 0.2–1)
BILIRUB SERPL-MCNC: 0.4 MG/DL (ref 0.2–1)
BILIRUB SERPL-MCNC: 0.47 MG/DL (ref 0.2–1)
BILIRUB SERPL-MCNC: 0.56 MG/DL (ref 0.2–1)
BILIRUB SERPL-MCNC: 0.62 MG/DL (ref 0.2–1)
BILIRUB SERPL-MCNC: 0.65 MG/DL (ref 0.2–1)
BILIRUB SERPL-MCNC: 0.79 MG/DL (ref 0.2–1)
BILIRUB UR QL STRIP: NEGATIVE
BILIRUB UR QL STRIP: NEGATIVE
BLASTS NFR BLD MANUAL: 0 %
BLD GP AB SCN SERPL QL: NEGATIVE
BODY TEMPERATURE: 97.7 DEGREES FEHRENHEIT
BODY TEMPERATURE: 98.5 DEGREES FEHRENHEIT
BPU ID: NORMAL
BSA FOR ECHO PROCEDURE: 2.18 M2
BSA FOR ECHO PROCEDURE: 2.24 M2
BUN SERPL-MCNC: 104 MG/DL (ref 5–25)
BUN SERPL-MCNC: 104 MG/DL (ref 5–25)
BUN SERPL-MCNC: 106 MG/DL (ref 5–25)
BUN SERPL-MCNC: 107 MG/DL (ref 5–25)
BUN SERPL-MCNC: 109 MG/DL (ref 5–25)
BUN SERPL-MCNC: 115 MG/DL (ref 5–25)
BUN SERPL-MCNC: 118 MG/DL (ref 5–25)
BUN SERPL-MCNC: 125 MG/DL (ref 5–25)
BUN SERPL-MCNC: 29 MG/DL (ref 5–25)
BUN SERPL-MCNC: 30 MG/DL (ref 5–25)
BUN SERPL-MCNC: 31 MG/DL (ref 5–25)
BUN SERPL-MCNC: 32 MG/DL (ref 5–25)
BUN SERPL-MCNC: 32 MG/DL (ref 5–25)
BUN SERPL-MCNC: 33 MG/DL (ref 5–25)
BUN SERPL-MCNC: 34 MG/DL (ref 5–25)
BUN SERPL-MCNC: 35 MG/DL (ref 5–25)
BUN SERPL-MCNC: 36 MG/DL (ref 5–25)
BUN SERPL-MCNC: 36 MG/DL (ref 5–25)
BUN SERPL-MCNC: 37 MG/DL (ref 5–25)
BUN SERPL-MCNC: 37 MG/DL (ref 5–25)
BUN SERPL-MCNC: 38 MG/DL (ref 5–25)
BUN SERPL-MCNC: 39 MG/DL (ref 5–25)
BUN SERPL-MCNC: 40 MG/DL (ref 5–25)
BUN SERPL-MCNC: 41 MG/DL (ref 5–25)
BUN SERPL-MCNC: 42 MG/DL (ref 5–25)
BUN SERPL-MCNC: 43 MG/DL (ref 5–25)
BUN SERPL-MCNC: 44 MG/DL (ref 5–25)
BUN SERPL-MCNC: 45 MG/DL (ref 5–25)
BUN SERPL-MCNC: 46 MG/DL (ref 5–25)
BUN SERPL-MCNC: 47 MG/DL (ref 5–25)
BUN SERPL-MCNC: 48 MG/DL (ref 5–25)
BUN SERPL-MCNC: 50 MG/DL (ref 5–25)
BUN SERPL-MCNC: 51 MG/DL (ref 5–25)
BUN SERPL-MCNC: 52 MG/DL (ref 5–25)
BUN SERPL-MCNC: 53 MG/DL (ref 5–25)
BUN SERPL-MCNC: 54 MG/DL (ref 5–25)
BUN SERPL-MCNC: 56 MG/DL (ref 5–25)
BUN SERPL-MCNC: 57 MG/DL (ref 5–25)
BUN SERPL-MCNC: 57 MG/DL (ref 5–25)
BUN SERPL-MCNC: 58 MG/DL (ref 5–25)
BUN SERPL-MCNC: 60 MG/DL (ref 5–25)
BUN SERPL-MCNC: 61 MG/DL (ref 5–25)
BUN SERPL-MCNC: 63 MG/DL (ref 5–25)
BUN SERPL-MCNC: 64 MG/DL (ref 5–25)
BUN SERPL-MCNC: 68 MG/DL (ref 5–25)
BUN SERPL-MCNC: 71 MG/DL (ref 5–25)
BUN SERPL-MCNC: 74 MG/DL (ref 5–25)
BUN SERPL-MCNC: 78 MG/DL (ref 5–25)
BUN SERPL-MCNC: 79 MG/DL (ref 5–25)
BUN SERPL-MCNC: 84 MG/DL (ref 5–25)
BUN SERPL-MCNC: 86 MG/DL (ref 5–25)
BUN SERPL-MCNC: 90 MG/DL (ref 5–25)
BUN SERPL-MCNC: 96 MG/DL (ref 5–25)
BUN SERPL-MCNC: 98 MG/DL (ref 5–25)
BURR CELLS BLD QL SMEAR: PRESENT
C3 SERPL-MCNC: 83 MG/DL (ref 87–200)
C3 SERPL-MCNC: 88 MG/DL (ref 87–200)
C4 SERPL-MCNC: 21 MG/DL (ref 19–52)
C4 SERPL-MCNC: 26 MG/DL (ref 19–52)
CA-I BLD-SCNC: 1.01 MMOL/L (ref 1.12–1.32)
CA-I BLD-SCNC: 1.02 MMOL/L (ref 1.12–1.32)
CA-I BLD-SCNC: 1.04 MMOL/L (ref 1.12–1.32)
CA-I BLD-SCNC: 1.04 MMOL/L (ref 1.12–1.32)
CA-I BLD-SCNC: 1.07 MMOL/L (ref 1.12–1.32)
CA-I BLD-SCNC: 1.08 MMOL/L (ref 1.12–1.32)
CA-I BLD-SCNC: 1.1 MMOL/L (ref 1.12–1.32)
CA-I BLD-SCNC: 1.11 MMOL/L (ref 1.12–1.32)
CA-I BLD-SCNC: 1.12 MMOL/L (ref 1.12–1.32)
CA-I BLD-SCNC: 1.13 MMOL/L (ref 1.12–1.32)
CA-I BLD-SCNC: 1.14 MMOL/L (ref 1.12–1.32)
CA-I BLD-SCNC: 1.15 MMOL/L (ref 1.12–1.32)
CA-I BLD-SCNC: 1.16 MMOL/L (ref 1.12–1.32)
CA-I BLD-SCNC: 1.17 MMOL/L (ref 1.12–1.32)
CA-I BLD-SCNC: 1.18 MMOL/L (ref 1.12–1.32)
CA-I BLD-SCNC: 1.2 MMOL/L (ref 1.12–1.32)
CA-I BLD-SCNC: 1.51 MMOL/L (ref 1.12–1.32)
CALCIUM ALBUM COR SERPL-MCNC: 8.8 MG/DL (ref 8.3–10.1)
CALCIUM ALBUM COR SERPL-MCNC: 8.8 MG/DL (ref 8.3–10.1)
CALCIUM ALBUM COR SERPL-MCNC: 9.1 MG/DL (ref 8.3–10.1)
CALCIUM ALBUM COR SERPL-MCNC: 9.1 MG/DL (ref 8.3–10.1)
CALCIUM ALBUM COR SERPL-MCNC: 9.2 MG/DL (ref 8.3–10.1)
CALCIUM ALBUM COR SERPL-MCNC: 9.3 MG/DL (ref 8.3–10.1)
CALCIUM ALBUM COR SERPL-MCNC: 9.4 MG/DL (ref 8.3–10.1)
CALCIUM ALBUM COR SERPL-MCNC: 9.4 MG/DL (ref 8.3–10.1)
CALCIUM ALBUM COR SERPL-MCNC: 9.5 MG/DL (ref 8.3–10.1)
CALCIUM ALBUM COR SERPL-MCNC: 9.5 MG/DL (ref 8.3–10.1)
CALCIUM SERPL-MCNC: 6.8 MG/DL (ref 8.4–10.2)
CALCIUM SERPL-MCNC: 6.9 MG/DL (ref 8.4–10.2)
CALCIUM SERPL-MCNC: 7.1 MG/DL (ref 8.4–10.2)
CALCIUM SERPL-MCNC: 7.2 MG/DL (ref 8.4–10.2)
CALCIUM SERPL-MCNC: 7.3 MG/DL (ref 8.4–10.2)
CALCIUM SERPL-MCNC: 7.3 MG/DL (ref 8.4–10.2)
CALCIUM SERPL-MCNC: 7.4 MG/DL (ref 8.4–10.2)
CALCIUM SERPL-MCNC: 7.5 MG/DL (ref 8.4–10.2)
CALCIUM SERPL-MCNC: 7.6 MG/DL (ref 8.4–10.2)
CALCIUM SERPL-MCNC: 7.7 MG/DL (ref 8.4–10.2)
CALCIUM SERPL-MCNC: 7.8 MG/DL (ref 8.4–10.2)
CALCIUM SERPL-MCNC: 7.9 MG/DL (ref 8.4–10.2)
CALCIUM SERPL-MCNC: 8 MG/DL (ref 8.4–10.2)
CALCIUM SERPL-MCNC: 8.1 MG/DL (ref 8.4–10.2)
CALCIUM SERPL-MCNC: 8.2 MG/DL (ref 8.4–10.2)
CALCIUM SERPL-MCNC: 8.3 MG/DL (ref 8.4–10.2)
CALCIUM SERPL-MCNC: 8.5 MG/DL (ref 8.4–10.2)
CARDIAC TROPONIN I PNL SERPL HS: 116 NG/L (ref ?–50)
CARDIAC TROPONIN I PNL SERPL HS: 1295 NG/L (ref ?–50)
CARDIAC TROPONIN I PNL SERPL HS: 134 NG/L (ref ?–50)
CARDIAC TROPONIN I PNL SERPL HS: 138 NG/L (ref ?–50)
CARDIAC TROPONIN I PNL SERPL HS: 141 NG/L (ref ?–50)
CARDIAC TROPONIN I PNL SERPL HS: 142 NG/L (ref ?–50)
CARDIAC TROPONIN I PNL SERPL HS: 1990 NG/L (ref 8–18)
CARDIAC TROPONIN I PNL SERPL HS: 2898 NG/L (ref ?–50)
CARDIAC TROPONIN I PNL SERPL HS: 4775 NG/L (ref ?–50)
CARDIAC TROPONIN I PNL SERPL HS: 7233 NG/L (ref 8–18)
CARDIAC TROPONIN I PNL SERPL HS: 7885 NG/L (ref ?–50)
CARDIAC TROPONIN I PNL SERPL HS: ABNORMAL NG/L (ref 8–18)
CFFFLEV: 310.2 MG/DL (ref 278–581)
CFFMA (FUNCTIONAL FIBRINOGEN MAX AMPLITUDE): 14.6 MM (ref 15–32)
CFFMA (FUNCTIONAL FIBRINOGEN MAX AMPLITUDE): 15.3 MM (ref 15–32)
CFFMA (FUNCTIONAL FIBRINOGEN MAX AMPLITUDE): 17 MM (ref 15–32)
CFFMA (FUNCTIONAL FIBRINOGEN MAX AMPLITUDE): 17.4 MM (ref 15–32)
CHLORIDE SERPL-SCNC: 103 MMOL/L (ref 96–108)
CHLORIDE SERPL-SCNC: 104 MMOL/L (ref 96–108)
CHLORIDE SERPL-SCNC: 105 MMOL/L (ref 96–108)
CHLORIDE SERPL-SCNC: 106 MMOL/L (ref 96–108)
CHLORIDE SERPL-SCNC: 107 MMOL/L (ref 96–108)
CHLORIDE SERPL-SCNC: 108 MMOL/L (ref 96–108)
CHLORIDE SERPL-SCNC: 109 MMOL/L (ref 96–108)
CHLORIDE SERPL-SCNC: 110 MMOL/L (ref 96–108)
CHLORIDE SERPL-SCNC: 111 MMOL/L (ref 96–108)
CHLORIDE SERPL-SCNC: 112 MMOL/L (ref 96–108)
CHLORIDE SERPL-SCNC: 112 MMOL/L (ref 96–108)
CHLORIDE SERPL-SCNC: 113 MMOL/L (ref 96–108)
CHLORIDE SERPL-SCNC: 115 MMOL/L (ref 96–108)
CHLORIDE SERPL-SCNC: 116 MMOL/L (ref 96–108)
CHLORIDE SERPL-SCNC: 119 MMOL/L (ref 96–108)
CK SERPL-CCNC: 379 U/L (ref 39–308)
CKA(ANGLE): 70.8 DEG (ref 63–78)
CKHR(HEPARINASE REACTION TIME): 7.1 MIN (ref 4.3–8.3)
CKK(CLOT KINETICS): 1.6 MIN (ref 0.8–2.1)
CKLY30: 0 % (ref 0–2.6)
CKLY30: 0 % (ref 0–2.6)
CKLY30: 2.9 % (ref 0–2.6)
CKMA(MAX AMPLITUDE): 58 MM (ref 52–69)
CKR(REACTION TIME): 6.7 MIN (ref 4.6–9.1)
CKR(REACTION TIME): 7.9 MIN (ref 4.6–9.1)
CKR(REACTION TIME): 8.2 MIN (ref 4.6–9.1)
CKR(REACTION TIME): 8.8 MIN (ref 4.6–9.1)
CLARITY UR: ABNORMAL
CLARITY UR: CLEAR
CO2 SERPL-SCNC: 19 MMOL/L (ref 21–32)
CO2 SERPL-SCNC: 19 MMOL/L (ref 21–32)
CO2 SERPL-SCNC: 20 MMOL/L (ref 21–32)
CO2 SERPL-SCNC: 21 MMOL/L (ref 21–32)
CO2 SERPL-SCNC: 22 MMOL/L (ref 21–32)
CO2 SERPL-SCNC: 22 MMOL/L (ref 21–32)
CO2 SERPL-SCNC: 23 MMOL/L (ref 21–32)
CO2 SERPL-SCNC: 24 MMOL/L (ref 21–32)
CO2 SERPL-SCNC: 25 MMOL/L (ref 21–32)
CO2 SERPL-SCNC: 26 MMOL/L (ref 21–32)
CO2 SERPL-SCNC: 27 MMOL/L (ref 21–32)
CO2 SERPL-SCNC: 28 MMOL/L (ref 21–32)
CO2 SERPL-SCNC: 30 MMOL/L (ref 21–32)
CO2 SERPL-SCNC: 31 MMOL/L (ref 21–32)
CO2 SERPL-SCNC: 32 MMOL/L (ref 21–32)
CO2 SERPL-SCNC: 33 MMOL/L (ref 21–32)
CO2 SERPL-SCNC: 33 MMOL/L (ref 21–32)
CO2 SERPL-SCNC: 34 MMOL/L (ref 21–32)
COLOR UR: ABNORMAL
COLOR UR: ABNORMAL
CREAT SERPL-MCNC: 0.69 MG/DL (ref 0.6–1.3)
CREAT SERPL-MCNC: 0.71 MG/DL (ref 0.6–1.3)
CREAT SERPL-MCNC: 0.73 MG/DL (ref 0.6–1.3)
CREAT SERPL-MCNC: 0.73 MG/DL (ref 0.6–1.3)
CREAT SERPL-MCNC: 0.76 MG/DL (ref 0.6–1.3)
CREAT SERPL-MCNC: 0.79 MG/DL (ref 0.6–1.3)
CREAT SERPL-MCNC: 0.8 MG/DL (ref 0.6–1.3)
CREAT SERPL-MCNC: 0.81 MG/DL (ref 0.6–1.3)
CREAT SERPL-MCNC: 0.87 MG/DL (ref 0.6–1.3)
CREAT SERPL-MCNC: 0.92 MG/DL (ref 0.6–1.3)
CREAT SERPL-MCNC: 1 MG/DL (ref 0.6–1.3)
CREAT SERPL-MCNC: 1.02 MG/DL (ref 0.6–1.3)
CREAT SERPL-MCNC: 1.1 MG/DL (ref 0.6–1.3)
CREAT SERPL-MCNC: 1.11 MG/DL (ref 0.6–1.3)
CREAT SERPL-MCNC: 1.15 MG/DL (ref 0.6–1.3)
CREAT SERPL-MCNC: 1.2 MG/DL (ref 0.6–1.3)
CREAT SERPL-MCNC: 1.23 MG/DL (ref 0.6–1.3)
CREAT SERPL-MCNC: 1.23 MG/DL (ref 0.6–1.3)
CREAT SERPL-MCNC: 1.38 MG/DL (ref 0.6–1.3)
CREAT SERPL-MCNC: 1.39 MG/DL (ref 0.6–1.3)
CREAT SERPL-MCNC: 1.47 MG/DL (ref 0.6–1.3)
CREAT SERPL-MCNC: 1.56 MG/DL (ref 0.6–1.3)
CREAT SERPL-MCNC: 1.67 MG/DL (ref 0.6–1.3)
CREAT SERPL-MCNC: 1.8 MG/DL (ref 0.6–1.3)
CREAT SERPL-MCNC: 1.8 MG/DL (ref 0.6–1.3)
CREAT SERPL-MCNC: 1.82 MG/DL (ref 0.6–1.3)
CREAT SERPL-MCNC: 1.85 MG/DL (ref 0.6–1.3)
CREAT SERPL-MCNC: 1.85 MG/DL (ref 0.6–1.3)
CREAT SERPL-MCNC: 1.86 MG/DL (ref 0.6–1.3)
CREAT SERPL-MCNC: 1.88 MG/DL (ref 0.6–1.3)
CREAT SERPL-MCNC: 1.88 MG/DL (ref 0.6–1.3)
CREAT SERPL-MCNC: 1.94 MG/DL (ref 0.6–1.3)
CREAT SERPL-MCNC: 1.95 MG/DL (ref 0.6–1.3)
CREAT SERPL-MCNC: 1.95 MG/DL (ref 0.6–1.3)
CREAT SERPL-MCNC: 1.97 MG/DL (ref 0.6–1.3)
CREAT SERPL-MCNC: 1.97 MG/DL (ref 0.6–1.3)
CREAT SERPL-MCNC: 1.99 MG/DL (ref 0.6–1.3)
CREAT SERPL-MCNC: 2 MG/DL (ref 0.6–1.3)
CREAT SERPL-MCNC: 2.03 MG/DL (ref 0.6–1.3)
CREAT SERPL-MCNC: 2.07 MG/DL (ref 0.6–1.3)
CREAT SERPL-MCNC: 2.07 MG/DL (ref 0.6–1.3)
CREAT SERPL-MCNC: 2.11 MG/DL (ref 0.6–1.3)
CREAT SERPL-MCNC: 2.14 MG/DL (ref 0.6–1.3)
CREAT SERPL-MCNC: 2.2 MG/DL (ref 0.6–1.3)
CREAT SERPL-MCNC: 2.43 MG/DL (ref 0.6–1.3)
CREAT SERPL-MCNC: 2.43 MG/DL (ref 0.6–1.3)
CREAT SERPL-MCNC: 2.45 MG/DL (ref 0.6–1.3)
CREAT SERPL-MCNC: 2.51 MG/DL (ref 0.6–1.3)
CREAT SERPL-MCNC: 2.54 MG/DL (ref 0.6–1.3)
CREAT SERPL-MCNC: 2.68 MG/DL (ref 0.6–1.3)
CREAT SERPL-MCNC: 2.79 MG/DL (ref 0.6–1.3)
CREAT SERPL-MCNC: 2.82 MG/DL (ref 0.6–1.3)
CREAT SERPL-MCNC: 2.89 MG/DL (ref 0.6–1.3)
CREAT SERPL-MCNC: 2.9 MG/DL (ref 0.6–1.3)
CREAT SERPL-MCNC: 3.08 MG/DL (ref 0.6–1.3)
CREAT SERPL-MCNC: 3.24 MG/DL (ref 0.6–1.3)
CREAT SERPL-MCNC: 3.33 MG/DL (ref 0.6–1.3)
CREAT SERPL-MCNC: 3.35 MG/DL (ref 0.6–1.3)
CREAT SERPL-MCNC: 3.59 MG/DL (ref 0.6–1.3)
CREAT SERPL-MCNC: 3.73 MG/DL (ref 0.6–1.3)
CREAT SERPL-MCNC: 3.79 MG/DL (ref 0.6–1.3)
CREAT SERPL-MCNC: 3.91 MG/DL (ref 0.6–1.3)
CREAT SERPL-MCNC: 4.06 MG/DL (ref 0.6–1.3)
CREAT SERPL-MCNC: 4.08 MG/DL (ref 0.6–1.3)
CREAT SERPL-MCNC: 4.11 MG/DL (ref 0.6–1.3)
CREAT SERPL-MCNC: 4.18 MG/DL (ref 0.6–1.3)
CREAT SERPL-MCNC: 4.27 MG/DL (ref 0.6–1.3)
CREAT SERPL-MCNC: 4.28 MG/DL (ref 0.6–1.3)
CREAT SERPL-MCNC: 4.31 MG/DL (ref 0.6–1.3)
CREAT SERPL-MCNC: 4.41 MG/DL (ref 0.6–1.3)
CREAT UR-MCNC: 28.1 MG/DL
CREAT UR-MCNC: 89.7 MG/DL
CROSSMATCH: NORMAL
CRP SERPL QL: 166.3 MG/L
CRTMA(RAPIDTEG MAX AMPLITUDE): 48.2 MM (ref 52–70)
CRTMA(RAPIDTEG MAX AMPLITUDE): 48.4 MM (ref 52–70)
CRTMA(RAPIDTEG MAX AMPLITUDE): 49.7 MM (ref 52–70)
CRTMA(RAPIDTEG MAX AMPLITUDE): 57 MM (ref 52–70)
D DIMER PPP FEU-MCNC: 3.41 UG/ML FEU
DACRYOCYTES BLD QL SMEAR: PRESENT
DOHLE BOD BLD QL SMEAR: PRESENT
DS:DELIVERY SYSTEM: ABNORMAL
E WAVE DECELERATION TIME: 76 MS
E/A RATIO: 1.72
EOSINOPHIL # BLD AUTO: 0.01 THOUSAND/ÂΜL (ref 0–0.61)
EOSINOPHIL # BLD AUTO: 0.03 THOUSAND/ÂΜL (ref 0–0.61)
EOSINOPHIL # BLD AUTO: 0.04 THOUSAND/ÂΜL (ref 0–0.61)
EOSINOPHIL # BLD AUTO: 0.05 THOUSAND/ÂΜL (ref 0–0.61)
EOSINOPHIL # BLD AUTO: 0.07 THOUSAND/ÂΜL (ref 0–0.61)
EOSINOPHIL # BLD AUTO: 0.08 THOUSAND/ÂΜL (ref 0–0.61)
EOSINOPHIL # BLD AUTO: 0.08 THOUSAND/ÂΜL (ref 0–0.61)
EOSINOPHIL # BLD AUTO: 0.09 THOUSAND/ÂΜL (ref 0–0.61)
EOSINOPHIL # BLD AUTO: 0.11 THOUSAND/ÂΜL (ref 0–0.61)
EOSINOPHIL # BLD AUTO: 0.14 THOUSAND/ÂΜL (ref 0–0.61)
EOSINOPHIL # BLD AUTO: 0.24 THOUSAND/ÂΜL (ref 0–0.61)
EOSINOPHIL # BLD MANUAL: 0 THOUSAND/UL (ref 0–0.4)
EOSINOPHIL # BLD MANUAL: 0.1 THOUSAND/UL (ref 0–0.4)
EOSINOPHIL # BLD MANUAL: 0.11 THOUSAND/UL (ref 0–0.4)
EOSINOPHIL # BLD MANUAL: 0.23 THOUSAND/UL (ref 0–0.4)
EOSINOPHIL # BLD MANUAL: 0.4 THOUSAND/UL (ref 0–0.4)
EOSINOPHIL NFR BLD AUTO: 0 % (ref 0–6)
EOSINOPHIL NFR BLD AUTO: 1 % (ref 0–6)
EOSINOPHIL NFR BLD AUTO: 2 % (ref 0–6)
EOSINOPHIL NFR BLD MANUAL: 0 % (ref 0–6)
EOSINOPHIL NFR BLD MANUAL: 1 % (ref 0–6)
EOSINOPHIL NFR BLD MANUAL: 1 % (ref 0–6)
EOSINOPHIL NFR BLD MANUAL: 4 % (ref 0–6)
EOSINOPHIL NFR BLD MANUAL: 4 % (ref 0–6)
ERYTHROCYTE [DISTWIDTH] IN BLOOD BY AUTOMATED COUNT: 16.3 % (ref 11.6–15.1)
ERYTHROCYTE [DISTWIDTH] IN BLOOD BY AUTOMATED COUNT: 16.6 % (ref 11.6–15.1)
ERYTHROCYTE [DISTWIDTH] IN BLOOD BY AUTOMATED COUNT: 16.7 % (ref 11.6–15.1)
ERYTHROCYTE [DISTWIDTH] IN BLOOD BY AUTOMATED COUNT: 16.9 % (ref 11.6–15.1)
ERYTHROCYTE [DISTWIDTH] IN BLOOD BY AUTOMATED COUNT: 17.2 % (ref 11.6–15.1)
ERYTHROCYTE [DISTWIDTH] IN BLOOD BY AUTOMATED COUNT: 17.2 % (ref 11.6–15.1)
ERYTHROCYTE [DISTWIDTH] IN BLOOD BY AUTOMATED COUNT: 17.4 % (ref 11.6–15.1)
ERYTHROCYTE [DISTWIDTH] IN BLOOD BY AUTOMATED COUNT: 17.5 % (ref 11.6–15.1)
ERYTHROCYTE [DISTWIDTH] IN BLOOD BY AUTOMATED COUNT: 17.6 % (ref 11.6–15.1)
ERYTHROCYTE [DISTWIDTH] IN BLOOD BY AUTOMATED COUNT: 17.7 % (ref 11.6–15.1)
ERYTHROCYTE [DISTWIDTH] IN BLOOD BY AUTOMATED COUNT: 17.9 % (ref 11.6–15.1)
ERYTHROCYTE [DISTWIDTH] IN BLOOD BY AUTOMATED COUNT: 18 % (ref 11.6–15.1)
ERYTHROCYTE [DISTWIDTH] IN BLOOD BY AUTOMATED COUNT: 18 % (ref 11.6–15.1)
ERYTHROCYTE [DISTWIDTH] IN BLOOD BY AUTOMATED COUNT: 18.1 % (ref 11.6–15.1)
ERYTHROCYTE [DISTWIDTH] IN BLOOD BY AUTOMATED COUNT: 18.2 % (ref 11.6–15.1)
ERYTHROCYTE [DISTWIDTH] IN BLOOD BY AUTOMATED COUNT: 18.3 % (ref 11.6–15.1)
ERYTHROCYTE [DISTWIDTH] IN BLOOD BY AUTOMATED COUNT: 18.4 % (ref 11.6–15.1)
ERYTHROCYTE [DISTWIDTH] IN BLOOD BY AUTOMATED COUNT: 18.5 % (ref 11.6–15.1)
ERYTHROCYTE [DISTWIDTH] IN BLOOD BY AUTOMATED COUNT: 18.6 % (ref 11.6–15.1)
ERYTHROCYTE [DISTWIDTH] IN BLOOD BY AUTOMATED COUNT: 18.6 % (ref 11.6–15.1)
ERYTHROCYTE [DISTWIDTH] IN BLOOD BY AUTOMATED COUNT: 18.7 % (ref 11.6–15.1)
ERYTHROCYTE [DISTWIDTH] IN BLOOD BY AUTOMATED COUNT: 18.8 % (ref 11.6–15.1)
ERYTHROCYTE [DISTWIDTH] IN BLOOD BY AUTOMATED COUNT: 18.9 % (ref 11.6–15.1)
ERYTHROCYTE [DISTWIDTH] IN BLOOD BY AUTOMATED COUNT: 18.9 % (ref 11.6–15.1)
ERYTHROCYTE [DISTWIDTH] IN BLOOD BY AUTOMATED COUNT: 19 % (ref 11.6–15.1)
ERYTHROCYTE [DISTWIDTH] IN BLOOD BY AUTOMATED COUNT: 19.3 % (ref 11.6–15.1)
ERYTHROCYTE [DISTWIDTH] IN BLOOD BY AUTOMATED COUNT: 20.7 % (ref 11.6–15.1)
ERYTHROCYTE [SEDIMENTATION RATE] IN BLOOD: 5 MM/HOUR (ref 0–19)
FERRITIN SERPL-MCNC: 209 NG/ML (ref 24–336)
FIBRINOGEN PPP-MCNC: 307 MG/DL (ref 206–523)
FIO2 GAS DIL.REBREATH: 100 L
FIO2 GAS DIL.REBREATH: 100 L
GAMMA GLOB ABNORMAL SERPL ELPH-MCNC: 1.64 G/DL (ref 0.4–1.66)
GAMMA GLOB MFR UR ELPH: 28.5 %
GAMMA GLOB SERPL ELPH-MCNC: 34.1 % (ref 6.9–22.3)
GFR SERPL CREATININE-BSD FRML MDRD: 12 ML/MIN/1.73SQ M
GFR SERPL CREATININE-BSD FRML MDRD: 13 ML/MIN/1.73SQ M
GFR SERPL CREATININE-BSD FRML MDRD: 14 ML/MIN/1.73SQ M
GFR SERPL CREATININE-BSD FRML MDRD: 14 ML/MIN/1.73SQ M
GFR SERPL CREATININE-BSD FRML MDRD: 15 ML/MIN/1.73SQ M
GFR SERPL CREATININE-BSD FRML MDRD: 16 ML/MIN/1.73SQ M
GFR SERPL CREATININE-BSD FRML MDRD: 16 ML/MIN/1.73SQ M
GFR SERPL CREATININE-BSD FRML MDRD: 17 ML/MIN/1.73SQ M
GFR SERPL CREATININE-BSD FRML MDRD: 18 ML/MIN/1.73SQ M
GFR SERPL CREATININE-BSD FRML MDRD: 19 ML/MIN/1.73SQ M
GFR SERPL CREATININE-BSD FRML MDRD: 20 ML/MIN/1.73SQ M
GFR SERPL CREATININE-BSD FRML MDRD: 21 ML/MIN/1.73SQ M
GFR SERPL CREATININE-BSD FRML MDRD: 23 ML/MIN/1.73SQ M
GFR SERPL CREATININE-BSD FRML MDRD: 23 ML/MIN/1.73SQ M
GFR SERPL CREATININE-BSD FRML MDRD: 24 ML/MIN/1.73SQ M
GFR SERPL CREATININE-BSD FRML MDRD: 27 ML/MIN/1.73SQ M
GFR SERPL CREATININE-BSD FRML MDRD: 28 ML/MIN/1.73SQ M
GFR SERPL CREATININE-BSD FRML MDRD: 29 ML/MIN/1.73SQ M
GFR SERPL CREATININE-BSD FRML MDRD: 30 ML/MIN/1.73SQ M
GFR SERPL CREATININE-BSD FRML MDRD: 31 ML/MIN/1.73SQ M
GFR SERPL CREATININE-BSD FRML MDRD: 32 ML/MIN/1.73SQ M
GFR SERPL CREATININE-BSD FRML MDRD: 33 ML/MIN/1.73SQ M
GFR SERPL CREATININE-BSD FRML MDRD: 33 ML/MIN/1.73SQ M
GFR SERPL CREATININE-BSD FRML MDRD: 34 ML/MIN/1.73SQ M
GFR SERPL CREATININE-BSD FRML MDRD: 35 ML/MIN/1.73SQ M
GFR SERPL CREATININE-BSD FRML MDRD: 38 ML/MIN/1.73SQ M
GFR SERPL CREATININE-BSD FRML MDRD: 42 ML/MIN/1.73SQ M
GFR SERPL CREATININE-BSD FRML MDRD: 45 ML/MIN/1.73SQ M
GFR SERPL CREATININE-BSD FRML MDRD: 48 ML/MIN/1.73SQ M
GFR SERPL CREATININE-BSD FRML MDRD: 48 ML/MIN/1.73SQ M
GFR SERPL CREATININE-BSD FRML MDRD: 56 ML/MIN/1.73SQ M
GFR SERPL CREATININE-BSD FRML MDRD: 56 ML/MIN/1.73SQ M
GFR SERPL CREATININE-BSD FRML MDRD: 57 ML/MIN/1.73SQ M
GFR SERPL CREATININE-BSD FRML MDRD: 61 ML/MIN/1.73SQ M
GFR SERPL CREATININE-BSD FRML MDRD: 63 ML/MIN/1.73SQ M
GFR SERPL CREATININE-BSD FRML MDRD: 64 ML/MIN/1.73SQ M
GFR SERPL CREATININE-BSD FRML MDRD: 70 ML/MIN/1.73SQ M
GFR SERPL CREATININE-BSD FRML MDRD: 72 ML/MIN/1.73SQ M
GFR SERPL CREATININE-BSD FRML MDRD: 79 ML/MIN/1.73SQ M
GFR SERPL CREATININE-BSD FRML MDRD: 83 ML/MIN/1.73SQ M
GFR SERPL CREATININE-BSD FRML MDRD: 85 ML/MIN/1.73SQ M
GFR SERPL CREATININE-BSD FRML MDRD: 86 ML/MIN/1.73SQ M
GFR SERPL CREATININE-BSD FRML MDRD: 86 ML/MIN/1.73SQ M
GFR SERPL CREATININE-BSD FRML MDRD: 88 ML/MIN/1.73SQ M
GFR SERPL CREATININE-BSD FRML MDRD: 89 ML/MIN/1.73SQ M
GFR SERPL CREATININE-BSD FRML MDRD: 89 ML/MIN/1.73SQ M
GFR SERPL CREATININE-BSD FRML MDRD: 90 ML/MIN/1.73SQ M
GFR SERPL CREATININE-BSD FRML MDRD: 91 ML/MIN/1.73SQ M
GIANT PLATELETS BLD QL SMEAR: PRESENT
GLUCOSE SERPL-MCNC: 105 MG/DL (ref 65–140)
GLUCOSE SERPL-MCNC: 121 MG/DL (ref 65–140)
GLUCOSE SERPL-MCNC: 123 MG/DL (ref 65–140)
GLUCOSE SERPL-MCNC: 123 MG/DL (ref 65–140)
GLUCOSE SERPL-MCNC: 124 MG/DL (ref 65–140)
GLUCOSE SERPL-MCNC: 124 MG/DL (ref 65–140)
GLUCOSE SERPL-MCNC: 125 MG/DL (ref 65–140)
GLUCOSE SERPL-MCNC: 125 MG/DL (ref 65–140)
GLUCOSE SERPL-MCNC: 126 MG/DL (ref 65–140)
GLUCOSE SERPL-MCNC: 128 MG/DL (ref 65–140)
GLUCOSE SERPL-MCNC: 131 MG/DL (ref 65–140)
GLUCOSE SERPL-MCNC: 133 MG/DL (ref 65–140)
GLUCOSE SERPL-MCNC: 133 MG/DL (ref 65–140)
GLUCOSE SERPL-MCNC: 135 MG/DL (ref 65–140)
GLUCOSE SERPL-MCNC: 136 MG/DL (ref 65–140)
GLUCOSE SERPL-MCNC: 137 MG/DL (ref 65–140)
GLUCOSE SERPL-MCNC: 139 MG/DL (ref 65–140)
GLUCOSE SERPL-MCNC: 140 MG/DL (ref 65–140)
GLUCOSE SERPL-MCNC: 142 MG/DL (ref 65–140)
GLUCOSE SERPL-MCNC: 142 MG/DL (ref 65–140)
GLUCOSE SERPL-MCNC: 143 MG/DL (ref 65–140)
GLUCOSE SERPL-MCNC: 144 MG/DL (ref 65–140)
GLUCOSE SERPL-MCNC: 145 MG/DL (ref 65–140)
GLUCOSE SERPL-MCNC: 147 MG/DL (ref 65–140)
GLUCOSE SERPL-MCNC: 147 MG/DL (ref 65–140)
GLUCOSE SERPL-MCNC: 148 MG/DL (ref 65–140)
GLUCOSE SERPL-MCNC: 148 MG/DL (ref 65–140)
GLUCOSE SERPL-MCNC: 149 MG/DL (ref 65–140)
GLUCOSE SERPL-MCNC: 149 MG/DL (ref 65–140)
GLUCOSE SERPL-MCNC: 150 MG/DL (ref 65–140)
GLUCOSE SERPL-MCNC: 151 MG/DL (ref 65–140)
GLUCOSE SERPL-MCNC: 153 MG/DL (ref 65–140)
GLUCOSE SERPL-MCNC: 155 MG/DL (ref 65–140)
GLUCOSE SERPL-MCNC: 156 MG/DL (ref 65–140)
GLUCOSE SERPL-MCNC: 157 MG/DL (ref 65–140)
GLUCOSE SERPL-MCNC: 158 MG/DL (ref 65–140)
GLUCOSE SERPL-MCNC: 159 MG/DL (ref 65–140)
GLUCOSE SERPL-MCNC: 159 MG/DL (ref 65–140)
GLUCOSE SERPL-MCNC: 160 MG/DL (ref 65–140)
GLUCOSE SERPL-MCNC: 163 MG/DL (ref 65–140)
GLUCOSE SERPL-MCNC: 164 MG/DL (ref 65–140)
GLUCOSE SERPL-MCNC: 165 MG/DL (ref 65–140)
GLUCOSE SERPL-MCNC: 166 MG/DL (ref 65–140)
GLUCOSE SERPL-MCNC: 168 MG/DL (ref 65–140)
GLUCOSE SERPL-MCNC: 169 MG/DL (ref 65–140)
GLUCOSE SERPL-MCNC: 171 MG/DL (ref 65–140)
GLUCOSE SERPL-MCNC: 172 MG/DL (ref 65–140)
GLUCOSE SERPL-MCNC: 172 MG/DL (ref 65–140)
GLUCOSE SERPL-MCNC: 174 MG/DL (ref 65–140)
GLUCOSE SERPL-MCNC: 175 MG/DL (ref 65–140)
GLUCOSE SERPL-MCNC: 176 MG/DL (ref 65–140)
GLUCOSE SERPL-MCNC: 178 MG/DL (ref 65–140)
GLUCOSE SERPL-MCNC: 179 MG/DL (ref 65–140)
GLUCOSE SERPL-MCNC: 180 MG/DL (ref 65–140)
GLUCOSE SERPL-MCNC: 181 MG/DL (ref 65–140)
GLUCOSE SERPL-MCNC: 183 MG/DL (ref 65–140)
GLUCOSE SERPL-MCNC: 184 MG/DL (ref 65–140)
GLUCOSE SERPL-MCNC: 185 MG/DL (ref 65–140)
GLUCOSE SERPL-MCNC: 186 MG/DL (ref 65–140)
GLUCOSE SERPL-MCNC: 187 MG/DL (ref 65–140)
GLUCOSE SERPL-MCNC: 190 MG/DL (ref 65–140)
GLUCOSE SERPL-MCNC: 191 MG/DL (ref 65–140)
GLUCOSE SERPL-MCNC: 192 MG/DL (ref 65–140)
GLUCOSE SERPL-MCNC: 194 MG/DL (ref 65–140)
GLUCOSE SERPL-MCNC: 195 MG/DL (ref 65–140)
GLUCOSE SERPL-MCNC: 200 MG/DL (ref 65–140)
GLUCOSE SERPL-MCNC: 201 MG/DL (ref 65–140)
GLUCOSE SERPL-MCNC: 203 MG/DL (ref 65–140)
GLUCOSE SERPL-MCNC: 204 MG/DL (ref 65–140)
GLUCOSE SERPL-MCNC: 206 MG/DL (ref 65–140)
GLUCOSE SERPL-MCNC: 216 MG/DL (ref 65–140)
GLUCOSE SERPL-MCNC: 221 MG/DL (ref 65–140)
GLUCOSE SERPL-MCNC: 281 MG/DL (ref 65–140)
GLUCOSE SERPL-MCNC: 314 MG/DL (ref 65–140)
GLUCOSE SERPL-MCNC: 439 MG/DL (ref 65–140)
GLUCOSE SERPL-MCNC: 61 MG/DL (ref 65–140)
GLUCOSE SERPL-MCNC: 75 MG/DL (ref 65–140)
GLUCOSE SERPL-MCNC: 77 MG/DL (ref 65–140)
GLUCOSE SERPL-MCNC: 78 MG/DL (ref 65–140)
GLUCOSE SERPL-MCNC: 80 MG/DL (ref 65–140)
GLUCOSE SERPL-MCNC: 81 MG/DL (ref 65–140)
GLUCOSE SERPL-MCNC: 81 MG/DL (ref 65–140)
GLUCOSE SERPL-MCNC: 82 MG/DL (ref 65–140)
GLUCOSE SERPL-MCNC: 84 MG/DL (ref 65–140)
GLUCOSE SERPL-MCNC: 86 MG/DL (ref 65–140)
GLUCOSE SERPL-MCNC: 88 MG/DL (ref 65–140)
GLUCOSE SERPL-MCNC: 92 MG/DL (ref 65–140)
GLUCOSE UR STRIP-MCNC: NEGATIVE MG/DL
GLUCOSE UR STRIP-MCNC: NEGATIVE MG/DL
GRAM STN SPEC: ABNORMAL
HCO3 BLDA-SCNC: 18.7 MMOL/L (ref 22–28)
HCO3 BLDA-SCNC: 18.8 MMOL/L (ref 22–28)
HCO3 BLDA-SCNC: 19.8 MMOL/L (ref 22–28)
HCO3 BLDA-SCNC: 20.2 MMOL/L (ref 22–28)
HCO3 BLDA-SCNC: 21.1 MMOL/L (ref 22–28)
HCO3 BLDA-SCNC: 21.5 MMOL/L (ref 22–28)
HCO3 BLDA-SCNC: 21.5 MMOL/L (ref 22–28)
HCO3 BLDA-SCNC: 21.6 MMOL/L (ref 22–28)
HCO3 BLDA-SCNC: 21.7 MMOL/L (ref 22–28)
HCO3 BLDA-SCNC: 21.9 MMOL/L (ref 22–28)
HCO3 BLDA-SCNC: 22.9 MMOL/L (ref 22–28)
HCO3 BLDA-SCNC: 23.2 MMOL/L (ref 22–28)
HCO3 BLDA-SCNC: 23.8 MMOL/L (ref 22–28)
HCO3 BLDA-SCNC: 24.5 MMOL/L (ref 22–28)
HCO3 BLDA-SCNC: 24.9 MMOL/L (ref 22–28)
HCO3 BLDA-SCNC: 25.4 MMOL/L (ref 22–28)
HCO3 BLDA-SCNC: 25.6 MMOL/L (ref 22–28)
HCO3 BLDA-SCNC: 25.7 MMOL/L (ref 22–28)
HCO3 BLDA-SCNC: 27.3 MMOL/L (ref 22–28)
HCO3 BLDV-SCNC: 16 MMOL/L (ref 24–30)
HCO3 BLDV-SCNC: 21.3 MMOL/L (ref 24–30)
HCO3 BLDV-SCNC: 21.3 MMOL/L (ref 24–30)
HCO3 BLDV-SCNC: 23.6 MMOL/L (ref 24–30)
HCO3 BLDV-SCNC: 23.7 MMOL/L (ref 24–30)
HCO3 BLDV-SCNC: 24.6 MMOL/L (ref 24–30)
HCO3 BLDV-SCNC: 24.8 MMOL/L (ref 24–30)
HCO3 BLDV-SCNC: 24.8 MMOL/L (ref 24–30)
HCT VFR BLD AUTO: 15.2 % (ref 36.5–49.3)
HCT VFR BLD AUTO: 20.1 % (ref 36.5–49.3)
HCT VFR BLD AUTO: 21 % (ref 36.5–49.3)
HCT VFR BLD AUTO: 21 % (ref 36.5–49.3)
HCT VFR BLD AUTO: 21.1 % (ref 36.5–49.3)
HCT VFR BLD AUTO: 21.5 % (ref 36.5–49.3)
HCT VFR BLD AUTO: 21.6 % (ref 36.5–49.3)
HCT VFR BLD AUTO: 21.6 % (ref 36.5–49.3)
HCT VFR BLD AUTO: 21.7 % (ref 36.5–49.3)
HCT VFR BLD AUTO: 21.8 % (ref 36.5–49.3)
HCT VFR BLD AUTO: 22.1 % (ref 36.5–49.3)
HCT VFR BLD AUTO: 22.1 % (ref 36.5–49.3)
HCT VFR BLD AUTO: 22.2 % (ref 36.5–49.3)
HCT VFR BLD AUTO: 22.5 % (ref 36.5–49.3)
HCT VFR BLD AUTO: 22.7 % (ref 36.5–49.3)
HCT VFR BLD AUTO: 22.8 % (ref 36.5–49.3)
HCT VFR BLD AUTO: 23.2 % (ref 36.5–49.3)
HCT VFR BLD AUTO: 23.3 % (ref 36.5–49.3)
HCT VFR BLD AUTO: 23.5 % (ref 36.5–49.3)
HCT VFR BLD AUTO: 23.9 % (ref 36.5–49.3)
HCT VFR BLD AUTO: 24 % (ref 36.5–49.3)
HCT VFR BLD AUTO: 24.3 % (ref 36.5–49.3)
HCT VFR BLD AUTO: 24.5 % (ref 36.5–49.3)
HCT VFR BLD AUTO: 24.7 % (ref 36.5–49.3)
HCT VFR BLD AUTO: 24.7 % (ref 36.5–49.3)
HCT VFR BLD AUTO: 25.1 % (ref 36.5–49.3)
HCT VFR BLD AUTO: 25.2 % (ref 36.5–49.3)
HCT VFR BLD AUTO: 25.3 % (ref 36.5–49.3)
HCT VFR BLD AUTO: 25.3 % (ref 36.5–49.3)
HCT VFR BLD AUTO: 25.5 % (ref 36.5–49.3)
HCT VFR BLD AUTO: 25.6 % (ref 36.5–49.3)
HCT VFR BLD AUTO: 25.7 % (ref 36.5–49.3)
HCT VFR BLD AUTO: 25.8 % (ref 36.5–49.3)
HCT VFR BLD AUTO: 25.8 % (ref 36.5–49.3)
HCT VFR BLD AUTO: 26 % (ref 36.5–49.3)
HCT VFR BLD AUTO: 26 % (ref 36.5–49.3)
HCT VFR BLD AUTO: 26.1 % (ref 36.5–49.3)
HCT VFR BLD AUTO: 26.2 % (ref 36.5–49.3)
HCT VFR BLD AUTO: 26.4 % (ref 36.5–49.3)
HCT VFR BLD AUTO: 26.6 % (ref 36.5–49.3)
HCT VFR BLD AUTO: 26.9 % (ref 36.5–49.3)
HCT VFR BLD AUTO: 26.9 % (ref 36.5–49.3)
HCT VFR BLD AUTO: 27 % (ref 36.5–49.3)
HCT VFR BLD AUTO: 27 % (ref 36.5–49.3)
HCT VFR BLD AUTO: 27.1 % (ref 36.5–49.3)
HCT VFR BLD AUTO: 27.8 % (ref 36.5–49.3)
HCT VFR BLD AUTO: 28.1 % (ref 36.5–49.3)
HCT VFR BLD AUTO: 28.6 % (ref 36.5–49.3)
HCT VFR BLD AUTO: 28.8 % (ref 36.5–49.3)
HCT VFR BLD AUTO: 29.2 % (ref 36.5–49.3)
HCT VFR BLD AUTO: 29.8 % (ref 36.5–49.3)
HCT VFR BLD AUTO: 29.9 % (ref 36.5–49.3)
HCT VFR BLD AUTO: 30.4 % (ref 36.5–49.3)
HCT VFR BLD AUTO: 30.7 % (ref 36.5–49.3)
HCT VFR BLD AUTO: 32.3 % (ref 36.5–49.3)
HCT VFR BLD AUTO: 32.4 % (ref 36.5–49.3)
HCT VFR BLD AUTO: 32.4 % (ref 36.5–49.3)
HCT VFR BLD AUTO: 32.5 % (ref 36.5–49.3)
HCT VFR BLD AUTO: 33 % (ref 36.5–49.3)
HCT VFR BLD AUTO: 33.1 % (ref 36.5–49.3)
HCT VFR BLD AUTO: 33.3 % (ref 36.5–49.3)
HCT VFR BLD AUTO: 33.4 % (ref 36.5–49.3)
HCT VFR BLD AUTO: 34.3 % (ref 36.5–49.3)
HCT VFR BLD AUTO: 35.4 % (ref 36.5–49.3)
HCT VFR BLD AUTO: 35.6 % (ref 36.5–49.3)
HCT VFR BLD AUTO: 36.7 % (ref 36.5–49.3)
HCT VFR BLD AUTO: 36.7 % (ref 36.5–49.3)
HCT VFR BLD AUTO: 37.3 % (ref 36.5–49.3)
HCT VFR BLD AUTO: 38.4 % (ref 36.5–49.3)
HCT VFR BLD AUTO: 38.5 % (ref 36.5–49.3)
HCT VFR BLD AUTO: 38.7 % (ref 36.5–49.3)
HCT VFR BLD AUTO: 39 % (ref 36.5–49.3)
HCT VFR BLD AUTO: 39 % (ref 36.5–49.3)
HCT VFR BLD CALC: 16 % (ref 36.5–49.3)
HCT VFR BLD CALC: 20 % (ref 36.5–49.3)
HCT VFR BLD CALC: 30 % (ref 36.5–49.3)
HCT VFR BLD CALC: 32 % (ref 36.5–49.3)
HCT VFR BLD CALC: 32 % (ref 36.5–49.3)
HCT VFR BLD CALC: 34 % (ref 36.5–49.3)
HCT VFR BLD CALC: <15 % (ref 36.5–49.3)
HGB BLD-MCNC: 10.1 G/DL (ref 12–17)
HGB BLD-MCNC: 10.1 G/DL (ref 12–17)
HGB BLD-MCNC: 10.3 G/DL (ref 12–17)
HGB BLD-MCNC: 10.4 G/DL (ref 12–17)
HGB BLD-MCNC: 10.6 G/DL (ref 12–17)
HGB BLD-MCNC: 10.8 G/DL (ref 12–17)
HGB BLD-MCNC: 11 G/DL (ref 12–17)
HGB BLD-MCNC: 11.2 G/DL (ref 12–17)
HGB BLD-MCNC: 11.5 G/DL (ref 12–17)
HGB BLD-MCNC: 11.8 G/DL (ref 12–17)
HGB BLD-MCNC: 12.5 G/DL (ref 12–17)
HGB BLD-MCNC: 12.5 G/DL (ref 12–17)
HGB BLD-MCNC: 12.9 G/DL (ref 12–17)
HGB BLD-MCNC: 12.9 G/DL (ref 12–17)
HGB BLD-MCNC: 13.1 G/DL (ref 12–17)
HGB BLD-MCNC: 13.2 G/DL (ref 12–17)
HGB BLD-MCNC: 13.3 G/DL (ref 12–17)
HGB BLD-MCNC: 13.3 G/DL (ref 12–17)
HGB BLD-MCNC: 4.6 G/DL (ref 12–17)
HGB BLD-MCNC: 6.3 G/DL (ref 12–17)
HGB BLD-MCNC: 6.4 G/DL (ref 12–17)
HGB BLD-MCNC: 6.4 G/DL (ref 12–17)
HGB BLD-MCNC: 6.5 G/DL (ref 12–17)
HGB BLD-MCNC: 6.7 G/DL (ref 12–17)
HGB BLD-MCNC: 6.8 G/DL (ref 12–17)
HGB BLD-MCNC: 6.9 G/DL (ref 12–17)
HGB BLD-MCNC: 7 G/DL (ref 12–17)
HGB BLD-MCNC: 7.1 G/DL (ref 12–17)
HGB BLD-MCNC: 7.2 G/DL (ref 12–17)
HGB BLD-MCNC: 7.2 G/DL (ref 12–17)
HGB BLD-MCNC: 7.3 G/DL (ref 12–17)
HGB BLD-MCNC: 7.4 G/DL (ref 12–17)
HGB BLD-MCNC: 7.5 G/DL (ref 12–17)
HGB BLD-MCNC: 7.6 G/DL (ref 12–17)
HGB BLD-MCNC: 7.6 G/DL (ref 12–17)
HGB BLD-MCNC: 7.7 G/DL (ref 12–17)
HGB BLD-MCNC: 7.8 G/DL (ref 12–17)
HGB BLD-MCNC: 7.9 G/DL (ref 12–17)
HGB BLD-MCNC: 7.9 G/DL (ref 12–17)
HGB BLD-MCNC: 8 G/DL (ref 12–17)
HGB BLD-MCNC: 8 G/DL (ref 12–17)
HGB BLD-MCNC: 8.1 G/DL (ref 12–17)
HGB BLD-MCNC: 8.2 G/DL (ref 12–17)
HGB BLD-MCNC: 8.3 G/DL (ref 12–17)
HGB BLD-MCNC: 8.4 G/DL (ref 12–17)
HGB BLD-MCNC: 8.5 G/DL (ref 12–17)
HGB BLD-MCNC: 8.6 G/DL (ref 12–17)
HGB BLD-MCNC: 8.7 G/DL (ref 12–17)
HGB BLD-MCNC: 8.8 G/DL (ref 12–17)
HGB BLD-MCNC: 8.8 G/DL (ref 12–17)
HGB BLD-MCNC: 8.9 G/DL (ref 12–17)
HGB BLD-MCNC: 9 G/DL (ref 12–17)
HGB BLD-MCNC: 9 G/DL (ref 12–17)
HGB BLD-MCNC: 9.2 G/DL (ref 12–17)
HGB BLD-MCNC: 9.3 G/DL (ref 12–17)
HGB BLD-MCNC: 9.5 G/DL (ref 12–17)
HGB BLD-MCNC: 9.7 G/DL (ref 12–17)
HGB BLD-MCNC: 9.8 G/DL (ref 12–17)
HGB BLDA-MCNC: 10.2 G/DL (ref 12–17)
HGB BLDA-MCNC: 10.9 G/DL (ref 12–17)
HGB BLDA-MCNC: 10.9 G/DL (ref 12–17)
HGB BLDA-MCNC: 11.6 G/DL (ref 12–17)
HGB BLDA-MCNC: 5.4 G/DL (ref 12–17)
HGB BLDA-MCNC: 6.8 G/DL (ref 12–17)
HGB UR QL STRIP.AUTO: ABNORMAL
HGB UR QL STRIP.AUTO: ABNORMAL
HKHMA(MAX AMPLITUDE KAOLIN): 56 MM (ref 53–68)
HOROWITZ INDEX BLDA+IHG-RTO: 40 MM[HG]
I-TIME: 0.7
IGG/ALB SER: 0.66 {RATIO} (ref 1.1–1.8)
IMM GRANULOCYTES # BLD AUTO: 0.02 THOUSAND/UL (ref 0–0.2)
IMM GRANULOCYTES # BLD AUTO: 0.04 THOUSAND/UL (ref 0–0.2)
IMM GRANULOCYTES # BLD AUTO: 0.05 THOUSAND/UL (ref 0–0.2)
IMM GRANULOCYTES # BLD AUTO: 0.06 THOUSAND/UL (ref 0–0.2)
IMM GRANULOCYTES # BLD AUTO: 0.08 THOUSAND/UL (ref 0–0.2)
IMM GRANULOCYTES # BLD AUTO: 0.11 THOUSAND/UL (ref 0–0.2)
IMM GRANULOCYTES # BLD AUTO: >0.5 THOUSAND/UL (ref 0–0.2)
IMM GRANULOCYTES NFR BLD AUTO: 0 % (ref 0–2)
IMM GRANULOCYTES NFR BLD AUTO: 1 % (ref 0–2)
IMM GRANULOCYTES NFR BLD AUTO: 2 % (ref 0–2)
IMM GRANULOCYTES NFR BLD AUTO: 2 % (ref 0–2)
IMM GRANULOCYTES NFR BLD AUTO: 5 % (ref 0–2)
INR PPP: 1.19 (ref 0.85–1.19)
INR PPP: 1.2 (ref 0.85–1.19)
INR PPP: 1.24 (ref 0.85–1.19)
INR PPP: 1.36 (ref 0.85–1.19)
INR PPP: 1.43 (ref 0.85–1.19)
INR PPP: 1.43 (ref 0.85–1.19)
INR PPP: 1.91 (ref 0.85–1.19)
INTERPRETATION UR IFE-IMP: NORMAL
INTERPRETATION UR IFE-IMP: NORMAL
IRON SATN MFR SERPL: 64 % (ref 15–50)
IRON SERPL-MCNC: 101 UG/DL (ref 50–212)
KAPPA LC FREE SER-MCNC: 153.5 MG/L (ref 3.3–19.4)
KAPPA LC FREE/LAMBDA FREE SER: 1.29 {RATIO} (ref 0.26–1.65)
KETONES UR STRIP-MCNC: ABNORMAL MG/DL
KETONES UR STRIP-MCNC: NEGATIVE MG/DL
LACTATE SERPL-SCNC: 1.1 MMOL/L (ref 0.5–2)
LACTATE SERPL-SCNC: 1.1 MMOL/L (ref 0.5–2)
LACTATE SERPL-SCNC: 1.3 MMOL/L (ref 0.5–2)
LACTATE SERPL-SCNC: 1.5 MMOL/L (ref 0.5–2)
LACTATE SERPL-SCNC: 1.7 MMOL/L (ref 0.5–2)
LACTATE SERPL-SCNC: 1.8 MMOL/L (ref 0.5–2)
LACTATE SERPL-SCNC: 1.9 MMOL/L (ref 0.5–2)
LACTATE SERPL-SCNC: 1.9 MMOL/L (ref 0.5–2)
LACTATE SERPL-SCNC: 10.4 MMOL/L (ref 0.5–2)
LACTATE SERPL-SCNC: 2 MMOL/L (ref 0.5–2)
LACTATE SERPL-SCNC: 2.1 MMOL/L (ref 0.5–2)
LACTATE SERPL-SCNC: 2.2 MMOL/L (ref 0.5–2)
LACTATE SERPL-SCNC: 2.3 MMOL/L (ref 0.5–2)
LACTATE SERPL-SCNC: 2.4 MMOL/L (ref 0.5–2)
LACTATE SERPL-SCNC: 2.4 MMOL/L (ref 0.5–2)
LACTATE SERPL-SCNC: 2.5 MMOL/L (ref 0.5–2)
LACTATE SERPL-SCNC: 2.7 MMOL/L (ref 0.5–2)
LACTATE SERPL-SCNC: 2.7 MMOL/L (ref 0.5–2)
LACTATE SERPL-SCNC: 3 MMOL/L (ref 0.5–2)
LACTATE SERPL-SCNC: 4.3 MMOL/L (ref 0.5–2)
LACTATE SERPL-SCNC: 5.6 MMOL/L (ref 0.5–2)
LACTATE SERPL-SCNC: 9.7 MMOL/L (ref 0.5–2)
LAMBDA LC FREE SERPL-MCNC: 119.4 MG/L (ref 5.7–26.3)
LEUKOCYTE ESTERASE UR QL STRIP: ABNORMAL
LEUKOCYTE ESTERASE UR QL STRIP: NEGATIVE
LG PLATELETS BLD QL SMEAR: PRESENT
LG PLATELETS BLD QL SMEAR: PRESENT
LIPASE SERPL-CCNC: 24 U/L (ref 11–82)
LYMPHOCYTES # BLD AUTO: 0.17 THOUSAND/UL (ref 0.6–4.47)
LYMPHOCYTES # BLD AUTO: 0.2 THOUSAND/UL (ref 0.6–4.47)
LYMPHOCYTES # BLD AUTO: 0.22 THOUSAND/UL (ref 0.6–4.47)
LYMPHOCYTES # BLD AUTO: 0.27 THOUSAND/UL (ref 0.6–4.47)
LYMPHOCYTES # BLD AUTO: 0.33 THOUSAND/UL (ref 0.6–4.47)
LYMPHOCYTES # BLD AUTO: 0.4 THOUSAND/UL (ref 0.6–4.47)
LYMPHOCYTES # BLD AUTO: 0.43 THOUSAND/UL (ref 0.6–4.47)
LYMPHOCYTES # BLD AUTO: 0.44 THOUSANDS/ÂΜL (ref 0.6–4.47)
LYMPHOCYTES # BLD AUTO: 0.47 THOUSAND/UL (ref 0.6–4.47)
LYMPHOCYTES # BLD AUTO: 0.52 THOUSAND/UL (ref 0.6–4.47)
LYMPHOCYTES # BLD AUTO: 0.53 THOUSAND/UL (ref 0.6–4.47)
LYMPHOCYTES # BLD AUTO: 0.57 THOUSAND/UL (ref 0.6–4.47)
LYMPHOCYTES # BLD AUTO: 0.65 THOUSAND/UL (ref 0.6–4.47)
LYMPHOCYTES # BLD AUTO: 0.7 THOUSANDS/ÂΜL (ref 0.6–4.47)
LYMPHOCYTES # BLD AUTO: 0.71 THOUSANDS/ÂΜL (ref 0.6–4.47)
LYMPHOCYTES # BLD AUTO: 0.75 THOUSANDS/ÂΜL (ref 0.6–4.47)
LYMPHOCYTES # BLD AUTO: 0.76 THOUSANDS/ÂΜL (ref 0.6–4.47)
LYMPHOCYTES # BLD AUTO: 0.78 THOUSANDS/ÂΜL (ref 0.6–4.47)
LYMPHOCYTES # BLD AUTO: 0.83 THOUSANDS/ÂΜL (ref 0.6–4.47)
LYMPHOCYTES # BLD AUTO: 0.84 THOUSANDS/ÂΜL (ref 0.6–4.47)
LYMPHOCYTES # BLD AUTO: 0.92 THOUSANDS/ÂΜL (ref 0.6–4.47)
LYMPHOCYTES # BLD AUTO: 0.95 THOUSANDS/ÂΜL (ref 0.6–4.47)
LYMPHOCYTES # BLD AUTO: 1 % (ref 14–44)
LYMPHOCYTES # BLD AUTO: 1.11 THOUSANDS/ÂΜL (ref 0.6–4.47)
LYMPHOCYTES # BLD AUTO: 1.13 THOUSANDS/ÂΜL (ref 0.6–4.47)
LYMPHOCYTES # BLD AUTO: 2 % (ref 14–44)
LYMPHOCYTES # BLD AUTO: 2 % (ref 14–44)
LYMPHOCYTES # BLD AUTO: 3 % (ref 14–44)
LYMPHOCYTES # BLD AUTO: 4 % (ref 14–44)
LYMPHOCYTES # BLD AUTO: 5 % (ref 14–44)
LYMPHOCYTES # BLD AUTO: 5.71 THOUSANDS/ÂΜL (ref 0.6–4.47)
LYMPHOCYTES # BLD AUTO: 6 % (ref 14–44)
LYMPHOCYTES # BLD AUTO: 7 % (ref 14–44)
LYMPHOCYTES NFR BLD AUTO: 14 % (ref 14–44)
LYMPHOCYTES NFR BLD AUTO: 15 % (ref 14–44)
LYMPHOCYTES NFR BLD AUTO: 15 % (ref 14–44)
LYMPHOCYTES NFR BLD AUTO: 16 % (ref 14–44)
LYMPHOCYTES NFR BLD AUTO: 17 % (ref 14–44)
LYMPHOCYTES NFR BLD AUTO: 18 % (ref 14–44)
LYMPHOCYTES NFR BLD AUTO: 18 % (ref 14–44)
LYMPHOCYTES NFR BLD AUTO: 19 % (ref 14–44)
LYMPHOCYTES NFR BLD AUTO: 21 % (ref 14–44)
LYMPHOCYTES NFR BLD AUTO: 25 % (ref 14–44)
LYMPHOCYTES NFR BLD AUTO: 4 % (ref 14–44)
LYMPHOCYTES NFR BLD AUTO: 44 % (ref 14–44)
LYMPHOCYTES NFR BLD AUTO: 5 % (ref 14–44)
MACROCYTES BLD QL AUTO: PRESENT
MAGNESIUM SERPL-MCNC: 1.6 MG/DL (ref 1.9–2.7)
MAGNESIUM SERPL-MCNC: 1.8 MG/DL (ref 1.9–2.7)
MAGNESIUM SERPL-MCNC: 1.8 MG/DL (ref 1.9–2.7)
MAGNESIUM SERPL-MCNC: 1.9 MG/DL (ref 1.9–2.7)
MAGNESIUM SERPL-MCNC: 2 MG/DL (ref 1.9–2.7)
MAGNESIUM SERPL-MCNC: 2.1 MG/DL (ref 1.9–2.7)
MAGNESIUM SERPL-MCNC: 2.2 MG/DL (ref 1.9–2.7)
MAGNESIUM SERPL-MCNC: 2.3 MG/DL (ref 1.9–2.7)
MAGNESIUM SERPL-MCNC: 2.3 MG/DL (ref 1.9–2.7)
MAGNESIUM SERPL-MCNC: 2.6 MG/DL (ref 1.9–2.7)
MCH RBC QN AUTO: 28.2 PG (ref 26.8–34.3)
MCH RBC QN AUTO: 28.2 PG (ref 26.8–34.3)
MCH RBC QN AUTO: 28.3 PG (ref 26.8–34.3)
MCH RBC QN AUTO: 28.4 PG (ref 26.8–34.3)
MCH RBC QN AUTO: 28.4 PG (ref 26.8–34.3)
MCH RBC QN AUTO: 28.6 PG (ref 26.8–34.3)
MCH RBC QN AUTO: 28.6 PG (ref 26.8–34.3)
MCH RBC QN AUTO: 28.7 PG (ref 26.8–34.3)
MCH RBC QN AUTO: 28.8 PG (ref 26.8–34.3)
MCH RBC QN AUTO: 28.9 PG (ref 26.8–34.3)
MCH RBC QN AUTO: 29 PG (ref 26.8–34.3)
MCH RBC QN AUTO: 29.1 PG (ref 26.8–34.3)
MCH RBC QN AUTO: 29.1 PG (ref 26.8–34.3)
MCH RBC QN AUTO: 29.2 PG (ref 26.8–34.3)
MCH RBC QN AUTO: 29.3 PG (ref 26.8–34.3)
MCH RBC QN AUTO: 29.4 PG (ref 26.8–34.3)
MCH RBC QN AUTO: 29.5 PG (ref 26.8–34.3)
MCH RBC QN AUTO: 29.6 PG (ref 26.8–34.3)
MCH RBC QN AUTO: 29.6 PG (ref 26.8–34.3)
MCH RBC QN AUTO: 29.7 PG (ref 26.8–34.3)
MCH RBC QN AUTO: 29.8 PG (ref 26.8–34.3)
MCH RBC QN AUTO: 29.8 PG (ref 26.8–34.3)
MCH RBC QN AUTO: 29.9 PG (ref 26.8–34.3)
MCH RBC QN AUTO: 30.1 PG (ref 26.8–34.3)
MCH RBC QN AUTO: 30.2 PG (ref 26.8–34.3)
MCH RBC QN AUTO: 30.3 PG (ref 26.8–34.3)
MCH RBC QN AUTO: 30.3 PG (ref 26.8–34.3)
MCH RBC QN AUTO: 30.4 PG (ref 26.8–34.3)
MCH RBC QN AUTO: 30.4 PG (ref 26.8–34.3)
MCH RBC QN AUTO: 30.6 PG (ref 26.8–34.3)
MCHC RBC AUTO-ENTMCNC: 29.6 G/DL (ref 31.4–37.4)
MCHC RBC AUTO-ENTMCNC: 29.6 G/DL (ref 31.4–37.4)
MCHC RBC AUTO-ENTMCNC: 29.8 G/DL (ref 31.4–37.4)
MCHC RBC AUTO-ENTMCNC: 30 G/DL (ref 31.4–37.4)
MCHC RBC AUTO-ENTMCNC: 30.3 G/DL (ref 31.4–37.4)
MCHC RBC AUTO-ENTMCNC: 30.6 G/DL (ref 31.4–37.4)
MCHC RBC AUTO-ENTMCNC: 30.7 G/DL (ref 31.4–37.4)
MCHC RBC AUTO-ENTMCNC: 30.8 G/DL (ref 31.4–37.4)
MCHC RBC AUTO-ENTMCNC: 30.8 G/DL (ref 31.4–37.4)
MCHC RBC AUTO-ENTMCNC: 30.9 G/DL (ref 31.4–37.4)
MCHC RBC AUTO-ENTMCNC: 31 G/DL (ref 31.4–37.4)
MCHC RBC AUTO-ENTMCNC: 31.1 G/DL (ref 31.4–37.4)
MCHC RBC AUTO-ENTMCNC: 31.2 G/DL (ref 31.4–37.4)
MCHC RBC AUTO-ENTMCNC: 31.3 G/DL (ref 31.4–37.4)
MCHC RBC AUTO-ENTMCNC: 31.4 G/DL (ref 31.4–37.4)
MCHC RBC AUTO-ENTMCNC: 31.5 G/DL (ref 31.4–37.4)
MCHC RBC AUTO-ENTMCNC: 31.5 G/DL (ref 31.4–37.4)
MCHC RBC AUTO-ENTMCNC: 31.7 G/DL (ref 31.4–37.4)
MCHC RBC AUTO-ENTMCNC: 31.8 G/DL (ref 31.4–37.4)
MCHC RBC AUTO-ENTMCNC: 31.9 G/DL (ref 31.4–37.4)
MCHC RBC AUTO-ENTMCNC: 31.9 G/DL (ref 31.4–37.4)
MCHC RBC AUTO-ENTMCNC: 32 G/DL (ref 31.4–37.4)
MCHC RBC AUTO-ENTMCNC: 32.1 G/DL (ref 31.4–37.4)
MCHC RBC AUTO-ENTMCNC: 32.1 G/DL (ref 31.4–37.4)
MCHC RBC AUTO-ENTMCNC: 32.2 G/DL (ref 31.4–37.4)
MCHC RBC AUTO-ENTMCNC: 32.2 G/DL (ref 31.4–37.4)
MCHC RBC AUTO-ENTMCNC: 32.4 G/DL (ref 31.4–37.4)
MCHC RBC AUTO-ENTMCNC: 32.5 G/DL (ref 31.4–37.4)
MCHC RBC AUTO-ENTMCNC: 32.5 G/DL (ref 31.4–37.4)
MCHC RBC AUTO-ENTMCNC: 32.6 G/DL (ref 31.4–37.4)
MCHC RBC AUTO-ENTMCNC: 32.6 G/DL (ref 31.4–37.4)
MCHC RBC AUTO-ENTMCNC: 32.7 G/DL (ref 31.4–37.4)
MCHC RBC AUTO-ENTMCNC: 32.9 G/DL (ref 31.4–37.4)
MCHC RBC AUTO-ENTMCNC: 33.2 G/DL (ref 31.4–37.4)
MCHC RBC AUTO-ENTMCNC: 33.5 G/DL (ref 31.4–37.4)
MCHC RBC AUTO-ENTMCNC: 34.1 G/DL (ref 31.4–37.4)
MCHC RBC AUTO-ENTMCNC: 34.4 G/DL (ref 31.4–37.4)
MCV RBC AUTO: 84 FL (ref 82–98)
MCV RBC AUTO: 86 FL (ref 82–98)
MCV RBC AUTO: 87 FL (ref 82–98)
MCV RBC AUTO: 88 FL (ref 82–98)
MCV RBC AUTO: 89 FL (ref 82–98)
MCV RBC AUTO: 90 FL (ref 82–98)
MCV RBC AUTO: 91 FL (ref 82–98)
MCV RBC AUTO: 91 FL (ref 82–98)
MCV RBC AUTO: 92 FL (ref 82–98)
MCV RBC AUTO: 93 FL (ref 82–98)
MCV RBC AUTO: 94 FL (ref 82–98)
MCV RBC AUTO: 95 FL (ref 82–98)
MCV RBC AUTO: 96 FL (ref 82–98)
MCV RBC AUTO: 96 FL (ref 82–98)
MCV RBC AUTO: 98 FL (ref 82–98)
METAMYELOCYTE ABSOLUTE CT: 0.1 THOUSAND/UL (ref 0–0.1)
METAMYELOCYTE ABSOLUTE CT: 0.11 THOUSAND/UL (ref 0–0.1)
METAMYELOCYTE ABSOLUTE CT: 0.12 THOUSAND/UL (ref 0–0.1)
METAMYELOCYTE ABSOLUTE CT: 0.13 THOUSAND/UL (ref 0–0.1)
METAMYELOCYTE ABSOLUTE CT: 0.22 THOUSAND/UL (ref 0–0.1)
METAMYELOCYTE ABSOLUTE CT: 0.26 THOUSAND/UL (ref 0–0.1)
METAMYELOCYTE ABSOLUTE CT: 0.3 THOUSAND/UL (ref 0–0.1)
METAMYELOCYTES NFR BLD MANUAL: 0 % (ref 0–1)
METAMYELOCYTES NFR BLD MANUAL: 1 % (ref 0–1)
METAMYELOCYTES NFR BLD MANUAL: 2 % (ref 0–1)
METAMYELOCYTES NFR BLD MANUAL: 3 % (ref 0–1)
MICROALBUMIN UR-MCNC: 736.3 MG/L
MICROALBUMIN/CREAT 24H UR: 821 MG/G CREATININE (ref 0–30)
MICROCYTES BLD QL AUTO: PRESENT
MONOCYTES # BLD AUTO: 0.11 THOUSAND/UL (ref 0–1.22)
MONOCYTES # BLD AUTO: 0.23 THOUSAND/UL (ref 0–1.22)
MONOCYTES # BLD AUTO: 0.32 THOUSAND/UL (ref 0–1.22)
MONOCYTES # BLD AUTO: 0.4 THOUSAND/UL (ref 0–1.22)
MONOCYTES # BLD AUTO: 0.46 THOUSAND/ÂΜL (ref 0.17–1.22)
MONOCYTES # BLD AUTO: 0.49 THOUSAND/ÂΜL (ref 0.17–1.22)
MONOCYTES # BLD AUTO: 0.49 THOUSAND/ÂΜL (ref 0.17–1.22)
MONOCYTES # BLD AUTO: 0.54 THOUSAND/UL (ref 0–1.22)
MONOCYTES # BLD AUTO: 0.54 THOUSAND/UL (ref 0–1.22)
MONOCYTES # BLD AUTO: 0.54 THOUSAND/ÂΜL (ref 0.17–1.22)
MONOCYTES # BLD AUTO: 0.55 THOUSAND/ÂΜL (ref 0.17–1.22)
MONOCYTES # BLD AUTO: 0.56 THOUSAND/ÂΜL (ref 0.17–1.22)
MONOCYTES # BLD AUTO: 0.56 THOUSAND/ÂΜL (ref 0.17–1.22)
MONOCYTES # BLD AUTO: 0.57 THOUSAND/UL (ref 0–1.22)
MONOCYTES # BLD AUTO: 0.57 THOUSAND/ÂΜL (ref 0.17–1.22)
MONOCYTES # BLD AUTO: 0.58 THOUSAND/UL (ref 0–1.22)
MONOCYTES # BLD AUTO: 0.59 THOUSAND/ÂΜL (ref 0.17–1.22)
MONOCYTES # BLD AUTO: 0.59 THOUSAND/ÂΜL (ref 0.17–1.22)
MONOCYTES # BLD AUTO: 0.75 THOUSAND/UL (ref 0–1.22)
MONOCYTES # BLD AUTO: 0.79 THOUSAND/UL (ref 0–1.22)
MONOCYTES # BLD AUTO: 0.8 THOUSAND/UL (ref 0–1.22)
MONOCYTES # BLD AUTO: 0.89 THOUSAND/ÂΜL (ref 0.17–1.22)
MONOCYTES # BLD AUTO: 1.1 THOUSAND/UL (ref 0–1.22)
MONOCYTES # BLD AUTO: 1.1 THOUSAND/ÂΜL (ref 0.17–1.22)
MONOCYTES # BLD AUTO: 1.29 THOUSAND/ÂΜL (ref 0.17–1.22)
MONOCYTES NFR BLD AUTO: 10 % (ref 4–12)
MONOCYTES NFR BLD AUTO: 11 % (ref 4–12)
MONOCYTES NFR BLD AUTO: 13 % (ref 4–12)
MONOCYTES NFR BLD AUTO: 7 % (ref 4–12)
MONOCYTES NFR BLD AUTO: 9 % (ref 4–12)
MONOCYTES NFR BLD: 1 % (ref 4–12)
MONOCYTES NFR BLD: 1 % (ref 4–12)
MONOCYTES NFR BLD: 3 % (ref 4–12)
MONOCYTES NFR BLD: 4 % (ref 4–12)
MONOCYTES NFR BLD: 5 % (ref 4–12)
MONOCYTES NFR BLD: 5 % (ref 4–12)
MONOCYTES NFR BLD: 6 % (ref 4–12)
MONOCYTES NFR BLD: 6 % (ref 4–12)
MONOCYTES NFR BLD: 8 % (ref 4–12)
MONOCYTES NFR BLD: 8 % (ref 4–12)
MRSA NOSE QL CULT: NORMAL
MV PEAK A VEL: 0.64 M/S
MV PEAK E VEL: 110 CM/S
MV STENOSIS PRESSURE HALF TIME: 22 MS
MV VALVE AREA P 1/2 METHOD: 10
MYELOCYTE ABSOLUTE CT: 0.07 THOUSAND/UL (ref 0–0.1)
MYELOCYTE ABSOLUTE CT: 0.09 THOUSAND/UL (ref 0–0.1)
MYELOCYTE ABSOLUTE CT: 0.11 THOUSAND/UL (ref 0–0.1)
MYELOCYTE ABSOLUTE CT: 0.14 THOUSAND/UL (ref 0–0.1)
MYELOCYTE ABSOLUTE CT: 0.17 THOUSAND/UL (ref 0–0.1)
MYELOCYTE ABSOLUTE CT: 0.39 THOUSAND/UL (ref 0–0.1)
MYELOCYTE ABSOLUTE CT: 0.4 THOUSAND/UL (ref 0–0.1)
MYELOCYTE ABSOLUTE CT: 1.32 THOUSAND/UL (ref 0–0.1)
MYELOCYTES NFR BLD MANUAL: 0 % (ref 0–1)
MYELOCYTES NFR BLD MANUAL: 1 % (ref 0–1)
MYELOCYTES NFR BLD MANUAL: 3 % (ref 0–1)
MYELOCYTES NFR BLD MANUAL: 3 % (ref 0–1)
MYELOCYTES NFR BLD MANUAL: 4 % (ref 0–1)
MYELOCYTES NFR BLD MANUAL: 6 % (ref 0–1)
NEUTROPHILS # BLD AUTO: 11.53 THOUSANDS/ÂΜL (ref 1.85–7.62)
NEUTROPHILS # BLD AUTO: 2.83 THOUSANDS/ÂΜL (ref 1.85–7.62)
NEUTROPHILS # BLD AUTO: 3.09 THOUSANDS/ÂΜL (ref 1.85–7.62)
NEUTROPHILS # BLD AUTO: 3.21 THOUSANDS/ÂΜL (ref 1.85–7.62)
NEUTROPHILS # BLD AUTO: 3.33 THOUSANDS/ÂΜL (ref 1.85–7.62)
NEUTROPHILS # BLD AUTO: 3.41 THOUSANDS/ÂΜL (ref 1.85–7.62)
NEUTROPHILS # BLD AUTO: 3.43 THOUSANDS/ÂΜL (ref 1.85–7.62)
NEUTROPHILS # BLD AUTO: 3.43 THOUSANDS/ÂΜL (ref 1.85–7.62)
NEUTROPHILS # BLD AUTO: 3.53 THOUSANDS/ÂΜL (ref 1.85–7.62)
NEUTROPHILS # BLD AUTO: 3.58 THOUSANDS/ÂΜL (ref 1.85–7.62)
NEUTROPHILS # BLD AUTO: 3.88 THOUSANDS/ÂΜL (ref 1.85–7.62)
NEUTROPHILS # BLD AUTO: 5.84 THOUSANDS/ÂΜL (ref 1.85–7.62)
NEUTROPHILS # BLD AUTO: 9.63 THOUSANDS/ÂΜL (ref 1.85–7.62)
NEUTROPHILS # BLD MANUAL: 10.03 THOUSAND/UL (ref 1.85–7.62)
NEUTROPHILS # BLD MANUAL: 10.49 THOUSAND/UL (ref 1.85–7.62)
NEUTROPHILS # BLD MANUAL: 11.17 THOUSAND/UL (ref 1.85–7.62)
NEUTROPHILS # BLD MANUAL: 12.87 THOUSAND/UL (ref 1.85–7.62)
NEUTROPHILS # BLD MANUAL: 19.13 THOUSAND/UL (ref 1.85–7.62)
NEUTROPHILS # BLD MANUAL: 4.8 THOUSAND/UL (ref 1.85–7.62)
NEUTROPHILS # BLD MANUAL: 5.62 THOUSAND/UL (ref 1.85–7.62)
NEUTROPHILS # BLD MANUAL: 6.89 THOUSAND/UL (ref 1.85–7.62)
NEUTROPHILS # BLD MANUAL: 8.07 THOUSAND/UL (ref 1.85–7.62)
NEUTROPHILS # BLD MANUAL: 8.31 THOUSAND/UL (ref 1.85–7.62)
NEUTROPHILS # BLD MANUAL: 8.58 THOUSAND/UL (ref 1.85–7.62)
NEUTROPHILS # BLD MANUAL: 9.63 THOUSAND/UL (ref 1.85–7.62)
NEUTS BAND NFR BLD MANUAL: 1 % (ref 0–8)
NEUTS BAND NFR BLD MANUAL: 19 % (ref 0–8)
NEUTS BAND NFR BLD MANUAL: 2 % (ref 0–8)
NEUTS BAND NFR BLD MANUAL: 27 % (ref 0–8)
NEUTS BAND NFR BLD MANUAL: 41 % (ref 0–8)
NEUTS BAND NFR BLD MANUAL: 5 % (ref 0–8)
NEUTS BAND NFR BLD MANUAL: 7 % (ref 0–8)
NEUTS BAND NFR BLD MANUAL: 9 % (ref 0–8)
NEUTS SEG NFR BLD AUTO: 42 % (ref 43–75)
NEUTS SEG NFR BLD AUTO: 44 % (ref 43–75)
NEUTS SEG NFR BLD AUTO: 59 % (ref 43–75)
NEUTS SEG NFR BLD AUTO: 62 % (ref 43–75)
NEUTS SEG NFR BLD AUTO: 65 % (ref 43–75)
NEUTS SEG NFR BLD AUTO: 66 % (ref 43–75)
NEUTS SEG NFR BLD AUTO: 67 % (ref 43–75)
NEUTS SEG NFR BLD AUTO: 67 % (ref 43–75)
NEUTS SEG NFR BLD AUTO: 69 % (ref 43–75)
NEUTS SEG NFR BLD AUTO: 69 % (ref 43–75)
NEUTS SEG NFR BLD AUTO: 70 % (ref 43–75)
NEUTS SEG NFR BLD AUTO: 70 % (ref 43–75)
NEUTS SEG NFR BLD AUTO: 71 % (ref 43–75)
NEUTS SEG NFR BLD AUTO: 71 % (ref 43–75)
NEUTS SEG NFR BLD AUTO: 72 % (ref 43–75)
NEUTS SEG NFR BLD AUTO: 73 % (ref 43–75)
NEUTS SEG NFR BLD AUTO: 74 % (ref 43–75)
NEUTS SEG NFR BLD AUTO: 79 % (ref 43–75)
NEUTS SEG NFR BLD AUTO: 82 % (ref 43–75)
NEUTS SEG NFR BLD AUTO: 82 % (ref 43–75)
NEUTS SEG NFR BLD AUTO: 83 % (ref 43–75)
NEUTS SEG NFR BLD AUTO: 86 % (ref 43–75)
NEUTS SEG NFR BLD AUTO: 90 % (ref 43–75)
NEUTS SEG NFR BLD AUTO: 90 % (ref 43–75)
NEUTS SEG NFR BLD AUTO: 93 % (ref 43–75)
NITRITE UR QL STRIP: NEGATIVE
NITRITE UR QL STRIP: NEGATIVE
NON-SQ EPI CELLS URNS QL MICRO: ABNORMAL /HPF
NON-SQ EPI CELLS URNS QL MICRO: ABNORMAL /HPF
NRBC BLD AUTO-RTO: 0 /100 WBCS
NRBC BLD AUTO-RTO: 1 /100 WBC (ref 0–2)
NRBC BLD AUTO-RTO: 2 /100 WBC (ref 0–2)
O2 CT BLDA-SCNC: 10.9 ML/DL (ref 16–23)
O2 CT BLDA-SCNC: 11.9 ML/DL (ref 16–23)
O2 CT BLDA-SCNC: 12.3 ML/DL (ref 16–23)
O2 CT BLDA-SCNC: 12.3 ML/DL (ref 16–23)
O2 CT BLDA-SCNC: 12.7 ML/DL (ref 16–23)
O2 CT BLDA-SCNC: 12.9 ML/DL (ref 16–23)
O2 CT BLDA-SCNC: 13.3 ML/DL (ref 16–23)
O2 CT BLDA-SCNC: 16 ML/DL (ref 16–23)
O2 CT BLDA-SCNC: 17.3 ML/DL (ref 16–23)
O2 CT BLDA-SCNC: 17.6 ML/DL (ref 16–23)
O2 CT BLDA-SCNC: 18 ML/DL (ref 16–23)
O2 CT BLDA-SCNC: 18 ML/DL (ref 16–23)
O2 CT BLDV-SCNC: 12.6 ML/DL
O2 CT BLDV-SCNC: 6.8 ML/DL
O2 CT BLDV-SCNC: 8.8 ML/DL
O2 CT BLDV-SCNC: 9.3 ML/DL
O2 CT BLDV-SCNC: 9.3 ML/DL
O2 CT BLDV-SCNC: 9.4 ML/DL
O2 CT BLDV-SCNC: 9.5 ML/DL
O2 CT BLDV-SCNC: 9.5 ML/DL
OVALOCYTES BLD QL SMEAR: PRESENT
OXYHGB MFR BLDA: 85.6 % (ref 94–97)
OXYHGB MFR BLDA: 89.3 % (ref 94–97)
OXYHGB MFR BLDA: 93.2 % (ref 94–97)
OXYHGB MFR BLDA: 94 % (ref 94–97)
OXYHGB MFR BLDA: 95.3 % (ref 94–97)
OXYHGB MFR BLDA: 95.5 % (ref 94–97)
OXYHGB MFR BLDA: 96 % (ref 94–97)
OXYHGB MFR BLDA: 96.3 % (ref 94–97)
OXYHGB MFR BLDA: 96.4 % (ref 94–97)
OXYHGB MFR BLDA: 96.6 % (ref 94–97)
OXYHGB MFR BLDA: 96.9 % (ref 94–97)
OXYHGB MFR BLDA: 97.2 % (ref 94–97)
P AXIS: 47 DEGREES
P AXIS: 55 DEGREES
P AXIS: 57 DEGREES
P AXIS: 61 DEGREES
P AXIS: 61 DEGREES
P AXIS: 74 DEGREES
PCO2 BLD: 21 MMOL/L (ref 21–32)
PCO2 BLD: 23 MMOL/L (ref 21–32)
PCO2 BLD: 23 MMOL/L (ref 21–32)
PCO2 BLD: 24 MMOL/L (ref 21–32)
PCO2 BLD: 27 MMOL/L (ref 21–32)
PCO2 BLD: 28 MMOL/L (ref 21–32)
PCO2 BLD: 29 MMOL/L (ref 21–32)
PCO2 BLD: 38.2 MM HG (ref 36–44)
PCO2 BLD: 43.2 MM HG (ref 36–44)
PCO2 BLD: 44.3 MM HG (ref 36–44)
PCO2 BLD: 47.3 MM HG (ref 36–44)
PCO2 BLD: 50.1 MM HG (ref 36–44)
PCO2 BLD: 58.6 MM HG (ref 36–44)
PCO2 BLD: 63.5 MM HG (ref 36–44)
PCO2 BLDA: 30.4 MM HG (ref 36–44)
PCO2 BLDA: 30.7 MM HG (ref 36–44)
PCO2 BLDA: 32.2 MM HG (ref 36–44)
PCO2 BLDA: 33.9 MM HG (ref 36–44)
PCO2 BLDA: 35.3 MM HG (ref 36–44)
PCO2 BLDA: 35.4 MM HG (ref 36–44)
PCO2 BLDA: 35.7 MM HG (ref 36–44)
PCO2 BLDA: 36.1 MM HG (ref 36–44)
PCO2 BLDA: 40.3 MM HG (ref 36–44)
PCO2 BLDA: 42.2 MM HG (ref 36–44)
PCO2 BLDA: 42.7 MM HG (ref 36–44)
PCO2 BLDA: 59.6 MM HG (ref 36–44)
PCO2 BLDV: 33.3 MM HG (ref 42–50)
PCO2 BLDV: 35.4 MM HG (ref 42–50)
PCO2 BLDV: 39.5 MM HG (ref 42–50)
PCO2 BLDV: 44 MM HG (ref 42–50)
PCO2 BLDV: 44.9 MM HG (ref 42–50)
PCO2 BLDV: 45.6 MM HG (ref 42–50)
PCO2 BLDV: 46 MM HG (ref 42–50)
PCO2 BLDV: 49.4 MM HG (ref 42–50)
PEEP RESPIRATORY: 6 CM[H2O]
PEEP RESPIRATORY: 8 CM[H2O]
PH BLD: 7.17 [PH] (ref 7.35–7.45)
PH BLD: 7.21 [PH] (ref 7.35–7.45)
PH BLD: 7.29 [PH] (ref 7.35–7.45)
PH BLD: 7.29 [PH] (ref 7.35–7.45)
PH BLD: 7.32 [PH] (ref 7.35–7.45)
PH BLD: 7.34 [PH] (ref 7.35–7.45)
PH BLD: 7.38 [PH] (ref 7.35–7.45)
PH BLDA: 7.22 [PH] (ref 7.35–7.45)
PH BLDA: 7.32 [PH] (ref 7.35–7.45)
PH BLDA: 7.34 [PH] (ref 7.35–7.45)
PH BLDA: 7.38 [PH] (ref 7.35–7.45)
PH BLDA: 7.39 [PH] (ref 7.35–7.45)
PH BLDA: 7.4 [PH] (ref 7.35–7.45)
PH BLDA: 7.4 [PH] (ref 7.35–7.45)
PH BLDA: 7.41 [PH] (ref 7.35–7.45)
PH BLDA: 7.43 [PH] (ref 7.35–7.45)
PH BLDA: 7.43 [PH] (ref 7.35–7.45)
PH BLDA: 7.46 [PH] (ref 7.35–7.45)
PH BLDA: 7.49 [PH] (ref 7.35–7.45)
PH BLDV: 7.3 [PH] (ref 7.3–7.4)
PH BLDV: 7.3 [PH] (ref 7.3–7.4)
PH BLDV: 7.32 [PH] (ref 7.3–7.4)
PH BLDV: 7.33 [PH] (ref 7.3–7.4)
PH BLDV: 7.34 [PH] (ref 7.3–7.4)
PH BLDV: 7.35 [PH] (ref 7.3–7.4)
PH BLDV: 7.35 [PH] (ref 7.3–7.4)
PH BLDV: 7.46 [PH] (ref 7.3–7.4)
PH UR STRIP.AUTO: 5 [PH]
PH UR STRIP.AUTO: 5.5 [PH]
PHOSPHATE SERPL-MCNC: 1.4 MG/DL (ref 2.3–4.1)
PHOSPHATE SERPL-MCNC: 1.7 MG/DL (ref 2.3–4.1)
PHOSPHATE SERPL-MCNC: 1.7 MG/DL (ref 2.3–4.1)
PHOSPHATE SERPL-MCNC: 1.9 MG/DL (ref 2.3–4.1)
PHOSPHATE SERPL-MCNC: 2 MG/DL (ref 2.3–4.1)
PHOSPHATE SERPL-MCNC: 2 MG/DL (ref 2.3–4.1)
PHOSPHATE SERPL-MCNC: 2.1 MG/DL (ref 2.3–4.1)
PHOSPHATE SERPL-MCNC: 2.2 MG/DL (ref 2.3–4.1)
PHOSPHATE SERPL-MCNC: 2.3 MG/DL (ref 2.3–4.1)
PHOSPHATE SERPL-MCNC: 2.4 MG/DL (ref 2.3–4.1)
PHOSPHATE SERPL-MCNC: 2.5 MG/DL (ref 2.3–4.1)
PHOSPHATE SERPL-MCNC: 2.6 MG/DL (ref 2.3–4.1)
PHOSPHATE SERPL-MCNC: 2.8 MG/DL (ref 2.3–4.1)
PHOSPHATE SERPL-MCNC: 3.2 MG/DL (ref 2.3–4.1)
PHOSPHATE SERPL-MCNC: 3.2 MG/DL (ref 2.3–4.1)
PHOSPHATE SERPL-MCNC: 3.6 MG/DL (ref 2.3–4.1)
PHOSPHATE SERPL-MCNC: 3.8 MG/DL (ref 2.3–4.1)
PHOSPHATE SERPL-MCNC: 4 MG/DL (ref 2.3–4.1)
PHOSPHATE SERPL-MCNC: 4 MG/DL (ref 2.3–4.1)
PHOSPHATE SERPL-MCNC: 4.1 MG/DL (ref 2.3–4.1)
PHOSPHATE SERPL-MCNC: 4.1 MG/DL (ref 2.3–4.1)
PHOSPHATE SERPL-MCNC: 4.3 MG/DL (ref 2.3–4.1)
PHOSPHATE SERPL-MCNC: 4.4 MG/DL (ref 2.3–4.1)
PHOSPHATE SERPL-MCNC: 4.5 MG/DL (ref 2.3–4.1)
PHOSPHATE SERPL-MCNC: 4.6 MG/DL (ref 2.3–4.1)
PHOSPHATE SERPL-MCNC: 4.7 MG/DL (ref 2.3–4.1)
PHOSPHATE SERPL-MCNC: 4.8 MG/DL (ref 2.3–4.1)
PHOSPHATE SERPL-MCNC: 4.9 MG/DL (ref 2.3–4.1)
PHOSPHATE SERPL-MCNC: 5 MG/DL (ref 2.3–4.1)
PHOSPHATE SERPL-MCNC: 5.1 MG/DL (ref 2.3–4.1)
PHOSPHATE SERPL-MCNC: 5.2 MG/DL (ref 2.3–4.1)
PHOSPHATE SERPL-MCNC: 5.2 MG/DL (ref 2.3–4.1)
PLASMA CELLS NFR BLD: 0 % (ref 0–0)
PLATELET # BLD AUTO: 103 THOUSANDS/UL (ref 149–390)
PLATELET # BLD AUTO: 103 THOUSANDS/UL (ref 149–390)
PLATELET # BLD AUTO: 104 THOUSANDS/UL (ref 149–390)
PLATELET # BLD AUTO: 110 THOUSANDS/UL (ref 149–390)
PLATELET # BLD AUTO: 117 THOUSANDS/UL (ref 149–390)
PLATELET # BLD AUTO: 122 THOUSANDS/UL (ref 149–390)
PLATELET # BLD AUTO: 126 THOUSANDS/UL (ref 149–390)
PLATELET # BLD AUTO: 133 THOUSANDS/UL (ref 149–390)
PLATELET # BLD AUTO: 135 THOUSANDS/UL (ref 149–390)
PLATELET # BLD AUTO: 142 THOUSANDS/UL (ref 149–390)
PLATELET # BLD AUTO: 143 THOUSANDS/UL (ref 149–390)
PLATELET # BLD AUTO: 144 THOUSANDS/UL (ref 149–390)
PLATELET # BLD AUTO: 145 THOUSANDS/UL (ref 149–390)
PLATELET # BLD AUTO: 146 THOUSANDS/UL (ref 149–390)
PLATELET # BLD AUTO: 147 THOUSANDS/UL (ref 149–390)
PLATELET # BLD AUTO: 159 THOUSANDS/UL (ref 149–390)
PLATELET # BLD AUTO: 160 THOUSANDS/UL (ref 149–390)
PLATELET # BLD AUTO: 162 THOUSANDS/UL (ref 149–390)
PLATELET # BLD AUTO: 173 THOUSANDS/UL (ref 149–390)
PLATELET # BLD AUTO: 173 THOUSANDS/UL (ref 149–390)
PLATELET # BLD AUTO: 187 THOUSANDS/UL (ref 149–390)
PLATELET # BLD AUTO: 201 THOUSANDS/UL (ref 149–390)
PLATELET # BLD AUTO: 220 THOUSANDS/UL (ref 149–390)
PLATELET # BLD AUTO: 47 THOUSANDS/UL (ref 149–390)
PLATELET # BLD AUTO: 56 THOUSANDS/UL (ref 149–390)
PLATELET # BLD AUTO: 64 THOUSANDS/UL (ref 149–390)
PLATELET # BLD AUTO: 64 THOUSANDS/UL (ref 149–390)
PLATELET # BLD AUTO: 70 THOUSANDS/UL (ref 149–390)
PLATELET # BLD AUTO: 72 THOUSANDS/UL (ref 149–390)
PLATELET # BLD AUTO: 74 THOUSANDS/UL (ref 149–390)
PLATELET # BLD AUTO: 76 THOUSANDS/UL (ref 149–390)
PLATELET # BLD AUTO: 78 THOUSANDS/UL (ref 149–390)
PLATELET # BLD AUTO: 78 THOUSANDS/UL (ref 149–390)
PLATELET # BLD AUTO: 79 THOUSANDS/UL (ref 149–390)
PLATELET # BLD AUTO: 86 THOUSANDS/UL (ref 149–390)
PLATELET # BLD AUTO: 90 THOUSANDS/UL (ref 149–390)
PLATELET # BLD AUTO: 95 THOUSANDS/UL (ref 149–390)
PLATELET # BLD AUTO: 99 THOUSANDS/UL (ref 149–390)
PLATELET BLD QL SMEAR: ABNORMAL
PLATELET BLD QL SMEAR: ADEQUATE
PMV BLD AUTO: 10.1 FL (ref 8.9–12.7)
PMV BLD AUTO: 10.6 FL (ref 8.9–12.7)
PMV BLD AUTO: 10.6 FL (ref 8.9–12.7)
PMV BLD AUTO: 10.7 FL (ref 8.9–12.7)
PMV BLD AUTO: 11.1 FL (ref 8.9–12.7)
PMV BLD AUTO: 11.3 FL (ref 8.9–12.7)
PMV BLD AUTO: 11.3 FL (ref 8.9–12.7)
PMV BLD AUTO: 11.4 FL (ref 8.9–12.7)
PMV BLD AUTO: 11.4 FL (ref 8.9–12.7)
PMV BLD AUTO: 11.5 FL (ref 8.9–12.7)
PMV BLD AUTO: 11.6 FL (ref 8.9–12.7)
PMV BLD AUTO: 11.8 FL (ref 8.9–12.7)
PMV BLD AUTO: 11.8 FL (ref 8.9–12.7)
PMV BLD AUTO: 12 FL (ref 8.9–12.7)
PMV BLD AUTO: 12.1 FL (ref 8.9–12.7)
PMV BLD AUTO: 12.5 FL (ref 8.9–12.7)
PMV BLD AUTO: 12.6 FL (ref 8.9–12.7)
PMV BLD AUTO: 12.6 FL (ref 8.9–12.7)
PMV BLD AUTO: 13 FL (ref 8.9–12.7)
PMV BLD AUTO: 8.4 FL (ref 8.9–12.7)
PMV BLD AUTO: 8.6 FL (ref 8.9–12.7)
PMV BLD AUTO: 8.7 FL (ref 8.9–12.7)
PMV BLD AUTO: 8.8 FL (ref 8.9–12.7)
PMV BLD AUTO: 8.8 FL (ref 8.9–12.7)
PMV BLD AUTO: 8.9 FL (ref 8.9–12.7)
PMV BLD AUTO: 9.1 FL (ref 8.9–12.7)
PMV BLD AUTO: 9.1 FL (ref 8.9–12.7)
PMV BLD AUTO: 9.2 FL (ref 8.9–12.7)
PMV BLD AUTO: 9.2 FL (ref 8.9–12.7)
PMV BLD AUTO: 9.4 FL (ref 8.9–12.7)
PMV BLD AUTO: 9.5 FL (ref 8.9–12.7)
PMV BLD AUTO: 9.6 FL (ref 8.9–12.7)
PMV BLD AUTO: 9.8 FL (ref 8.9–12.7)
PO2 BLD: 104 MM HG (ref 75–129)
PO2 BLD: 170 MM HG (ref 75–129)
PO2 BLD: 349 MM HG (ref 75–129)
PO2 BLD: 54 MM HG (ref 75–129)
PO2 BLD: 85 MM HG (ref 75–129)
PO2 BLD: 85 MM HG (ref 75–129)
PO2 BLD: 88 MM HG (ref 75–129)
PO2 BLDA: 100.4 MM HG (ref 75–129)
PO2 BLDA: 103.2 MM HG (ref 75–129)
PO2 BLDA: 107.7 MM HG (ref 75–129)
PO2 BLDA: 178.7 MM HG (ref 75–129)
PO2 BLDA: 53.9 MM HG (ref 75–129)
PO2 BLDA: 58.2 MM HG (ref 75–129)
PO2 BLDA: 70.5 MM HG (ref 75–129)
PO2 BLDA: 84.9 MM HG (ref 75–129)
PO2 BLDA: 86.2 MM HG (ref 75–129)
PO2 BLDA: 89 MM HG (ref 75–129)
PO2 BLDA: 94.5 MM HG (ref 75–129)
PO2 BLDA: 96.6 MM HG (ref 75–129)
PO2 BLDV: 31.3 MM HG (ref 35–45)
PO2 BLDV: 32.7 MM HG (ref 35–45)
PO2 BLDV: 32.9 MM HG (ref 35–45)
PO2 BLDV: 33.2 MM HG (ref 35–45)
PO2 BLDV: 35.9 MM HG (ref 35–45)
PO2 BLDV: 38.5 MM HG (ref 35–45)
PO2 BLDV: 40.8 MM HG (ref 35–45)
PO2 BLDV: 46.3 MM HG (ref 35–45)
POIKILOCYTOSIS BLD QL SMEAR: PRESENT
POLYCHROMASIA BLD QL SMEAR: PRESENT
POTASSIUM BLD-SCNC: 3.4 MMOL/L (ref 3.5–5.3)
POTASSIUM BLD-SCNC: 3.8 MMOL/L (ref 3.5–5.3)
POTASSIUM BLD-SCNC: 3.9 MMOL/L (ref 3.5–5.3)
POTASSIUM BLD-SCNC: 4.2 MMOL/L (ref 3.5–5.3)
POTASSIUM BLD-SCNC: 4.5 MMOL/L (ref 3.5–5.3)
POTASSIUM BLD-SCNC: 4.5 MMOL/L (ref 3.5–5.3)
POTASSIUM BLD-SCNC: 5.2 MMOL/L (ref 3.5–5.3)
POTASSIUM SERPL-SCNC: 3.2 MMOL/L (ref 3.5–5.3)
POTASSIUM SERPL-SCNC: 3.2 MMOL/L (ref 3.5–5.3)
POTASSIUM SERPL-SCNC: 3.3 MMOL/L (ref 3.5–5.3)
POTASSIUM SERPL-SCNC: 3.4 MMOL/L (ref 3.5–5.3)
POTASSIUM SERPL-SCNC: 3.5 MMOL/L (ref 3.5–5.3)
POTASSIUM SERPL-SCNC: 3.6 MMOL/L (ref 3.5–5.3)
POTASSIUM SERPL-SCNC: 3.7 MMOL/L (ref 3.5–5.3)
POTASSIUM SERPL-SCNC: 3.8 MMOL/L (ref 3.5–5.3)
POTASSIUM SERPL-SCNC: 3.9 MMOL/L (ref 3.5–5.3)
POTASSIUM SERPL-SCNC: 4 MMOL/L (ref 3.5–5.3)
POTASSIUM SERPL-SCNC: 4.1 MMOL/L (ref 3.5–5.3)
POTASSIUM SERPL-SCNC: 4.2 MMOL/L (ref 3.5–5.3)
POTASSIUM SERPL-SCNC: 4.3 MMOL/L (ref 3.5–5.3)
POTASSIUM SERPL-SCNC: 4.4 MMOL/L (ref 3.5–5.3)
POTASSIUM SERPL-SCNC: 4.5 MMOL/L (ref 3.5–5.3)
POTASSIUM SERPL-SCNC: 4.6 MMOL/L (ref 3.5–5.3)
PR INTERVAL: 136 MS
PR INTERVAL: 138 MS
PR INTERVAL: 138 MS
PR INTERVAL: 140 MS
PR INTERVAL: 146 MS
PR INTERVAL: 166 MS
PROMYELOCYTE ABSOLUTE CT: 0.06 THOUSAND/UL (ref 0–0)
PROMYELOCYTE ABSOLUTE CT: 0.11 THOUSAND/UL (ref 0–0)
PROMYELOCYTES NFR BLD MANUAL: 0 % (ref 0–0)
PROMYELOCYTES NFR BLD MANUAL: 1 % (ref 0–0)
PROMYELOCYTES NFR BLD MANUAL: 1 % (ref 0–0)
PROT PATTERN SERPL ELPH-IMP: ABNORMAL
PROT PATTERN UR ELPH-IMP: NORMAL
PROT SERPL-MCNC: 3.1 G/DL (ref 6.4–8.4)
PROT SERPL-MCNC: 4.2 G/DL (ref 6.4–8.4)
PROT SERPL-MCNC: 4.2 G/DL (ref 6.4–8.4)
PROT SERPL-MCNC: 4.3 G/DL (ref 6.4–8.4)
PROT SERPL-MCNC: 4.3 G/DL (ref 6.4–8.4)
PROT SERPL-MCNC: 4.4 G/DL (ref 6.4–8.4)
PROT SERPL-MCNC: 4.5 G/DL (ref 6.4–8.4)
PROT SERPL-MCNC: 4.6 G/DL (ref 6.4–8.4)
PROT SERPL-MCNC: 4.7 G/DL (ref 6.4–8.4)
PROT SERPL-MCNC: 4.8 G/DL (ref 6.4–8.2)
PROT SERPL-MCNC: 5.1 G/DL (ref 6.4–8.4)
PROT SERPL-MCNC: 5.1 G/DL (ref 6.4–8.4)
PROT SERPL-MCNC: 5.3 G/DL (ref 6.4–8.4)
PROT UR STRIP-MCNC: ABNORMAL MG/DL
PROT UR STRIP-MCNC: ABNORMAL MG/DL
PROT UR-MCNC: 203.7 MG/DL
PROT UR-MCNC: 96.9 MG/DL
PROT/CREAT UR: 3.4 MG/G{CREAT} (ref 0–0.1)
PROTHROMBIN TIME: 15.9 SECONDS (ref 12.3–15)
PROTHROMBIN TIME: 16 SECONDS (ref 12.3–15)
PROTHROMBIN TIME: 16.4 SECONDS (ref 12.3–15)
PROTHROMBIN TIME: 17.5 SECONDS (ref 12.3–15)
PROTHROMBIN TIME: 18.2 SECONDS (ref 12.3–15)
PROTHROMBIN TIME: 18.2 SECONDS (ref 12.3–15)
PROTHROMBIN TIME: 22.7 SECONDS (ref 12.3–15)
QRS AXIS: -6 DEGREES
QRS AXIS: 67 DEGREES
QRS AXIS: 68 DEGREES
QRS AXIS: 70 DEGREES
QRSD INTERVAL: 102 MS
QRSD INTERVAL: 90 MS
QRSD INTERVAL: 90 MS
QRSD INTERVAL: 94 MS
QRSD INTERVAL: 94 MS
QRSD INTERVAL: 98 MS
QT INTERVAL: 302 MS
QT INTERVAL: 320 MS
QT INTERVAL: 346 MS
QT INTERVAL: 364 MS
QT INTERVAL: 384 MS
QT INTERVAL: 424 MS
QTC INTERVAL: 401 MS
QTC INTERVAL: 446 MS
QTC INTERVAL: 456 MS
QTC INTERVAL: 476 MS
QTC INTERVAL: 477 MS
QTC INTERVAL: 514 MS
RBC # BLD AUTO: 1.59 MILLION/UL (ref 3.88–5.62)
RBC # BLD AUTO: 2.15 MILLION/UL (ref 3.88–5.62)
RBC # BLD AUTO: 2.3 MILLION/UL (ref 3.88–5.62)
RBC # BLD AUTO: 2.31 MILLION/UL (ref 3.88–5.62)
RBC # BLD AUTO: 2.33 MILLION/UL (ref 3.88–5.62)
RBC # BLD AUTO: 2.33 MILLION/UL (ref 3.88–5.62)
RBC # BLD AUTO: 2.34 MILLION/UL (ref 3.88–5.62)
RBC # BLD AUTO: 2.48 MILLION/UL (ref 3.88–5.62)
RBC # BLD AUTO: 2.5 MILLION/UL (ref 3.88–5.62)
RBC # BLD AUTO: 2.5 MILLION/UL (ref 3.88–5.62)
RBC # BLD AUTO: 2.52 MILLION/UL (ref 3.88–5.62)
RBC # BLD AUTO: 2.56 MILLION/UL (ref 3.88–5.62)
RBC # BLD AUTO: 2.61 MILLION/UL (ref 3.88–5.62)
RBC # BLD AUTO: 2.64 MILLION/UL (ref 3.88–5.62)
RBC # BLD AUTO: 2.69 MILLION/UL (ref 3.88–5.62)
RBC # BLD AUTO: 2.7 MILLION/UL (ref 3.88–5.62)
RBC # BLD AUTO: 2.72 MILLION/UL (ref 3.88–5.62)
RBC # BLD AUTO: 2.74 MILLION/UL (ref 3.88–5.62)
RBC # BLD AUTO: 2.77 MILLION/UL (ref 3.88–5.62)
RBC # BLD AUTO: 2.8 MILLION/UL (ref 3.88–5.62)
RBC # BLD AUTO: 2.8 MILLION/UL (ref 3.88–5.62)
RBC # BLD AUTO: 2.82 MILLION/UL (ref 3.88–5.62)
RBC # BLD AUTO: 2.84 MILLION/UL (ref 3.88–5.62)
RBC # BLD AUTO: 2.86 MILLION/UL (ref 3.88–5.62)
RBC # BLD AUTO: 2.89 MILLION/UL (ref 3.88–5.62)
RBC # BLD AUTO: 2.93 MILLION/UL (ref 3.88–5.62)
RBC # BLD AUTO: 2.94 MILLION/UL (ref 3.88–5.62)
RBC # BLD AUTO: 2.94 MILLION/UL (ref 3.88–5.62)
RBC # BLD AUTO: 2.97 MILLION/UL (ref 3.88–5.62)
RBC # BLD AUTO: 2.99 MILLION/UL (ref 3.88–5.62)
RBC # BLD AUTO: 3.02 MILLION/UL (ref 3.88–5.62)
RBC # BLD AUTO: 3.08 MILLION/UL (ref 3.88–5.62)
RBC # BLD AUTO: 3.1 MILLION/UL (ref 3.88–5.62)
RBC # BLD AUTO: 3.21 MILLION/UL (ref 3.88–5.62)
RBC # BLD AUTO: 3.26 MILLION/UL (ref 3.88–5.62)
RBC # BLD AUTO: 3.32 MILLION/UL (ref 3.88–5.62)
RBC # BLD AUTO: 3.5 MILLION/UL (ref 3.88–5.62)
RBC # BLD AUTO: 3.53 MILLION/UL (ref 3.88–5.62)
RBC # BLD AUTO: 3.63 MILLION/UL (ref 3.88–5.62)
RBC # BLD AUTO: 3.97 MILLION/UL (ref 3.88–5.62)
RBC # BLD AUTO: 4.29 MILLION/UL (ref 3.88–5.62)
RBC # BLD AUTO: 4.45 MILLION/UL (ref 3.88–5.62)
RBC # BLD AUTO: 4.61 MILLION/UL (ref 3.88–5.62)
RBC #/AREA URNS AUTO: ABNORMAL /HPF
RBC #/AREA URNS AUTO: ABNORMAL /HPF
RBC MORPH BLD: PRESENT
RH BLD: NEGATIVE
SAMPLE SITE: ABNORMAL
SAO2 % BLD FROM PO2: 100 % (ref 60–85)
SAO2 % BLD FROM PO2: 80 % (ref 60–85)
SAO2 % BLD FROM PO2: 95 % (ref 60–85)
SAO2 % BLD FROM PO2: 96 % (ref 60–85)
SAO2 % BLD FROM PO2: 96 % (ref 60–85)
SAO2 % BLD FROM PO2: 97 % (ref 60–85)
SAO2 % BLD FROM PO2: 99 % (ref 60–85)
SL CV LV EF: 55
SMUDGE CELLS BLD QL SMEAR: PRESENT
SODIUM BLD-SCNC: 137 MMOL/L (ref 136–145)
SODIUM BLD-SCNC: 143 MMOL/L (ref 136–145)
SODIUM BLD-SCNC: 149 MMOL/L (ref 136–145)
SODIUM BLD-SCNC: 150 MMOL/L (ref 136–145)
SODIUM BLD-SCNC: 152 MMOL/L (ref 136–145)
SODIUM SERPL-SCNC: 132 MMOL/L (ref 135–147)
SODIUM SERPL-SCNC: 133 MMOL/L (ref 135–147)
SODIUM SERPL-SCNC: 134 MMOL/L (ref 135–147)
SODIUM SERPL-SCNC: 135 MMOL/L (ref 135–147)
SODIUM SERPL-SCNC: 136 MMOL/L (ref 135–147)
SODIUM SERPL-SCNC: 137 MMOL/L (ref 135–147)
SODIUM SERPL-SCNC: 139 MMOL/L (ref 135–147)
SODIUM SERPL-SCNC: 140 MMOL/L (ref 135–147)
SODIUM SERPL-SCNC: 141 MMOL/L (ref 135–147)
SODIUM SERPL-SCNC: 142 MMOL/L (ref 135–147)
SODIUM SERPL-SCNC: 143 MMOL/L (ref 135–147)
SODIUM SERPL-SCNC: 144 MMOL/L (ref 135–147)
SODIUM SERPL-SCNC: 145 MMOL/L (ref 135–147)
SODIUM SERPL-SCNC: 145 MMOL/L (ref 135–147)
SODIUM SERPL-SCNC: 146 MMOL/L (ref 135–147)
SODIUM SERPL-SCNC: 147 MMOL/L (ref 135–147)
SODIUM SERPL-SCNC: 147 MMOL/L (ref 135–147)
SODIUM SERPL-SCNC: 148 MMOL/L (ref 135–147)
SODIUM SERPL-SCNC: 148 MMOL/L (ref 135–147)
SODIUM SERPL-SCNC: 151 MMOL/L (ref 135–147)
SODIUM SERPL-SCNC: 152 MMOL/L (ref 135–147)
SP GR UR STRIP.AUTO: 1.01 (ref 1–1.03)
SP GR UR STRIP.AUTO: 1.04 (ref 1–1.03)
SPECIMEN EXPIRATION DATE: NORMAL
SPECIMEN SOURCE: ABNORMAL
SPHEROCYTES BLD QL SMEAR: PRESENT
T WAVE AXIS: -12 DEGREES
T WAVE AXIS: 22 DEGREES
T WAVE AXIS: 54 DEGREES
T WAVE AXIS: 60 DEGREES
T WAVE AXIS: 62 DEGREES
T WAVE AXIS: 68 DEGREES
TARGETS BLD QL SMEAR: PRESENT
TIBC SERPL-MCNC: 158.2 UG/DL (ref 250–450)
TOTAL CELLS COUNTED SPEC: 100
TOXIC GRANULES BLD QL SMEAR: PRESENT
TOXIC GRANULES BLD QL SMEAR: PRESENT
TRANSFERRIN SERPL-MCNC: 113 MG/DL (ref 203–362)
TRIGL SERPL-MCNC: 108 MG/DL (ref ?–150)
TRIGL SERPL-MCNC: 160 MG/DL (ref ?–150)
UIBC SERPL-MCNC: 57 UG/DL (ref 155–355)
UNIDENT CELLS # BLD: 0 % (ref 0–0)
UNIT DISPENSE STATUS: NORMAL
UNIT PRODUCT CODE: NORMAL
UNIT PRODUCT VOLUME: 125 ML
UNIT PRODUCT VOLUME: 125 ML
UNIT PRODUCT VOLUME: 159 ML
UNIT PRODUCT VOLUME: 250 ML
UNIT PRODUCT VOLUME: 280 ML
UNIT PRODUCT VOLUME: 300 ML
UNIT PRODUCT VOLUME: 350 ML
UNIT PRODUCT VOLUME: 500 ML
UNIT RH: NORMAL
UROBILINOGEN UR STRIP-ACNC: <2 MG/DL
UROBILINOGEN UR STRIP-ACNC: <2 MG/DL
VANCOMYCIN TROUGH SERPL-MCNC: 14.9 UG/ML (ref 10–20)
VARIANT LYMPHS # BLD AUTO: 0 %
VARIANT LYMPHS # BLD AUTO: 1 %
VENT AC: 12
VENT AC: 18
VENT- AC: AC
VENTRICULAR RATE: 100 BPM
VENTRICULAR RATE: 103 BPM
VENTRICULAR RATE: 106 BPM
VENTRICULAR RATE: 108 BPM
VENTRICULAR RATE: 122 BPM
VENTRICULAR RATE: 76 BPM
VT SETTING VENT: 470 ML
VT SETTING VENT: 550 ML
WBC # BLD AUTO: 10.01 THOUSAND/UL (ref 4.31–10.16)
WBC # BLD AUTO: 10.13 THOUSAND/UL (ref 4.31–10.16)
WBC # BLD AUTO: 10.31 THOUSAND/UL (ref 4.31–10.16)
WBC # BLD AUTO: 10.78 THOUSAND/UL (ref 4.31–10.16)
WBC # BLD AUTO: 10.82 THOUSAND/UL (ref 4.31–10.16)
WBC # BLD AUTO: 11.65 THOUSAND/UL (ref 4.31–10.16)
WBC # BLD AUTO: 12.07 THOUSAND/UL (ref 4.31–10.16)
WBC # BLD AUTO: 13.12 THOUSAND/UL (ref 4.31–10.16)
WBC # BLD AUTO: 13.14 THOUSAND/UL (ref 4.31–10.16)
WBC # BLD AUTO: 13.31 THOUSAND/UL (ref 4.31–10.16)
WBC # BLD AUTO: 13.6 THOUSAND/UL (ref 4.31–10.16)
WBC # BLD AUTO: 14.14 THOUSAND/UL (ref 4.31–10.16)
WBC # BLD AUTO: 21.99 THOUSAND/UL (ref 4.31–10.16)
WBC # BLD AUTO: 4.5 THOUSAND/UL (ref 4.31–10.16)
WBC # BLD AUTO: 4.59 THOUSAND/UL (ref 4.31–10.16)
WBC # BLD AUTO: 4.62 THOUSAND/UL (ref 4.31–10.16)
WBC # BLD AUTO: 4.63 THOUSAND/UL (ref 4.31–10.16)
WBC # BLD AUTO: 4.69 THOUSAND/UL (ref 4.31–10.16)
WBC # BLD AUTO: 4.92 THOUSAND/UL (ref 4.31–10.16)
WBC # BLD AUTO: 4.96 THOUSAND/UL (ref 4.31–10.16)
WBC # BLD AUTO: 4.97 THOUSAND/UL (ref 4.31–10.16)
WBC # BLD AUTO: 5.12 THOUSAND/UL (ref 4.31–10.16)
WBC # BLD AUTO: 5.18 THOUSAND/UL (ref 4.31–10.16)
WBC # BLD AUTO: 5.28 THOUSAND/UL (ref 4.31–10.16)
WBC # BLD AUTO: 5.32 THOUSAND/UL (ref 4.31–10.16)
WBC # BLD AUTO: 5.4 THOUSAND/UL (ref 4.31–10.16)
WBC # BLD AUTO: 5.78 THOUSAND/UL (ref 4.31–10.16)
WBC # BLD AUTO: 6.69 THOUSAND/UL (ref 4.31–10.16)
WBC # BLD AUTO: 7.08 THOUSAND/UL (ref 4.31–10.16)
WBC # BLD AUTO: 7.45 THOUSAND/UL (ref 4.31–10.16)
WBC # BLD AUTO: 7.49 THOUSAND/UL (ref 4.31–10.16)
WBC # BLD AUTO: 7.73 THOUSAND/UL (ref 4.31–10.16)
WBC # BLD AUTO: 7.73 THOUSAND/UL (ref 4.31–10.16)
WBC # BLD AUTO: 8.29 THOUSAND/UL (ref 4.31–10.16)
WBC # BLD AUTO: 8.29 THOUSAND/UL (ref 4.31–10.16)
WBC # BLD AUTO: 8.3 THOUSAND/UL (ref 4.31–10.16)
WBC # BLD AUTO: 8.68 THOUSAND/UL (ref 4.31–10.16)
WBC # BLD AUTO: 8.75 THOUSAND/UL (ref 4.31–10.16)
WBC # BLD AUTO: 8.97 THOUSAND/UL (ref 4.31–10.16)
WBC # BLD AUTO: 9.3 THOUSAND/UL (ref 4.31–10.16)
WBC # BLD AUTO: 9.54 THOUSAND/UL (ref 4.31–10.16)
WBC # BLD AUTO: 9.92 THOUSAND/UL (ref 4.31–10.16)
WBC # BLD AUTO: 9.98 THOUSAND/UL (ref 4.31–10.16)
WBC #/AREA URNS AUTO: ABNORMAL /HPF
WBC #/AREA URNS AUTO: ABNORMAL /HPF

## 2025-01-01 PROCEDURE — 99291 CRITICAL CARE FIRST HOUR: CPT | Performed by: SURGERY

## 2025-01-01 PROCEDURE — 82948 REAGENT STRIP/BLOOD GLUCOSE: CPT

## 2025-01-01 PROCEDURE — 83735 ASSAY OF MAGNESIUM: CPT

## 2025-01-01 PROCEDURE — 93005 ELECTROCARDIOGRAM TRACING: CPT

## 2025-01-01 PROCEDURE — 70553 MRI BRAIN STEM W/O & W/DYE: CPT

## 2025-01-01 PROCEDURE — 85018 HEMOGLOBIN: CPT

## 2025-01-01 PROCEDURE — 86037 ANCA TITER EACH ANTIBODY: CPT | Performed by: STUDENT IN AN ORGANIZED HEALTH CARE EDUCATION/TRAINING PROGRAM

## 2025-01-01 PROCEDURE — 94003 VENT MGMT INPAT SUBQ DAY: CPT

## 2025-01-01 PROCEDURE — 0JH63XZ INSERTION OF TUNNELED VASCULAR ACCESS DEVICE INTO CHEST SUBCUTANEOUS TISSUE AND FASCIA, PERCUTANEOUS APPROACH: ICD-10-PCS | Performed by: RADIOLOGY

## 2025-01-01 PROCEDURE — 99223 1ST HOSP IP/OBS HIGH 75: CPT | Performed by: INTERNAL MEDICINE

## 2025-01-01 PROCEDURE — 36556 INSERT NON-TUNNEL CV CATH: CPT | Performed by: SURGERY

## 2025-01-01 PROCEDURE — 80053 COMPREHEN METABOLIC PANEL: CPT

## 2025-01-01 PROCEDURE — 85730 THROMBOPLASTIN TIME PARTIAL: CPT | Performed by: SURGERY

## 2025-01-01 PROCEDURE — 84100 ASSAY OF PHOSPHORUS: CPT | Performed by: SURGERY

## 2025-01-01 PROCEDURE — 99233 SBSQ HOSP IP/OBS HIGH 50: CPT | Performed by: INTERNAL MEDICINE

## 2025-01-01 PROCEDURE — 74177 CT ABD & PELVIS W/CONTRAST: CPT

## 2025-01-01 PROCEDURE — 85576 BLOOD PLATELET AGGREGATION: CPT

## 2025-01-01 PROCEDURE — 85014 HEMATOCRIT: CPT

## 2025-01-01 PROCEDURE — 99232 SBSQ HOSP IP/OBS MODERATE 35: CPT | Performed by: NURSE PRACTITIONER

## 2025-01-01 PROCEDURE — 86850 RBC ANTIBODY SCREEN: CPT

## 2025-01-01 PROCEDURE — 80048 BASIC METABOLIC PNL TOTAL CA: CPT

## 2025-01-01 PROCEDURE — 84484 ASSAY OF TROPONIN QUANT: CPT | Performed by: STUDENT IN AN ORGANIZED HEALTH CARE EDUCATION/TRAINING PROGRAM

## 2025-01-01 PROCEDURE — 99232 SBSQ HOSP IP/OBS MODERATE 35: CPT | Performed by: INTERNAL MEDICINE

## 2025-01-01 PROCEDURE — 86140 C-REACTIVE PROTEIN: CPT | Performed by: INTERNAL MEDICINE

## 2025-01-01 PROCEDURE — 85027 COMPLETE CBC AUTOMATED: CPT | Performed by: INTERNAL MEDICINE

## 2025-01-01 PROCEDURE — 80048 BASIC METABOLIC PNL TOTAL CA: CPT | Performed by: PHYSICIAN ASSISTANT

## 2025-01-01 PROCEDURE — 90945 DIALYSIS ONE EVALUATION: CPT

## 2025-01-01 PROCEDURE — G0545 PR INHERENT VISIT TO INPT: HCPCS | Performed by: INTERNAL MEDICINE

## 2025-01-01 PROCEDURE — 83735 ASSAY OF MAGNESIUM: CPT | Performed by: STUDENT IN AN ORGANIZED HEALTH CARE EDUCATION/TRAINING PROGRAM

## 2025-01-01 PROCEDURE — 97530 THERAPEUTIC ACTIVITIES: CPT

## 2025-01-01 PROCEDURE — NC001 PR NO CHARGE

## 2025-01-01 PROCEDURE — 82805 BLOOD GASES W/O2 SATURATION: CPT | Performed by: PHYSICIAN ASSISTANT

## 2025-01-01 PROCEDURE — 84100 ASSAY OF PHOSPHORUS: CPT | Performed by: NURSE PRACTITIONER

## 2025-01-01 PROCEDURE — 94150 VITAL CAPACITY TEST: CPT

## 2025-01-01 PROCEDURE — 86901 BLOOD TYPING SEROLOGIC RH(D): CPT

## 2025-01-01 PROCEDURE — 85027 COMPLETE CBC AUTOMATED: CPT | Performed by: PHYSICIAN ASSISTANT

## 2025-01-01 PROCEDURE — 30233H1 TRANSFUSION OF NONAUTOLOGOUS WHOLE BLOOD INTO PERIPHERAL VEIN, PERCUTANEOUS APPROACH: ICD-10-PCS | Performed by: INTERNAL MEDICINE

## 2025-01-01 PROCEDURE — 71045 X-RAY EXAM CHEST 1 VIEW: CPT

## 2025-01-01 PROCEDURE — 82803 BLOOD GASES ANY COMBINATION: CPT

## 2025-01-01 PROCEDURE — NC001 PR NO CHARGE: Performed by: SURGERY

## 2025-01-01 PROCEDURE — 84100 ASSAY OF PHOSPHORUS: CPT | Performed by: PHYSICIAN ASSISTANT

## 2025-01-01 PROCEDURE — 94760 N-INVAS EAR/PLS OXIMETRY 1: CPT

## 2025-01-01 PROCEDURE — 84132 ASSAY OF SERUM POTASSIUM: CPT

## 2025-01-01 PROCEDURE — 99024 POSTOP FOLLOW-UP VISIT: CPT | Performed by: SURGERY

## 2025-01-01 PROCEDURE — 84484 ASSAY OF TROPONIN QUANT: CPT | Performed by: SURGERY

## 2025-01-01 PROCEDURE — 0B938ZZ DRAINAGE OF RIGHT MAIN BRONCHUS, VIA NATURAL OR ARTIFICIAL OPENING ENDOSCOPIC: ICD-10-PCS | Performed by: SURGERY

## 2025-01-01 PROCEDURE — 86160 COMPLEMENT ANTIGEN: CPT | Performed by: INTERNAL MEDICINE

## 2025-01-01 PROCEDURE — 84156 ASSAY OF PROTEIN URINE: CPT | Performed by: INTERNAL MEDICINE

## 2025-01-01 PROCEDURE — 85610 PROTHROMBIN TIME: CPT

## 2025-01-01 PROCEDURE — 94640 AIRWAY INHALATION TREATMENT: CPT

## 2025-01-01 PROCEDURE — 99232 SBSQ HOSP IP/OBS MODERATE 35: CPT | Performed by: STUDENT IN AN ORGANIZED HEALTH CARE EDUCATION/TRAINING PROGRAM

## 2025-01-01 PROCEDURE — P9010 WHOLE BLOOD FOR TRANSFUSION: HCPCS

## 2025-01-01 PROCEDURE — 83605 ASSAY OF LACTIC ACID: CPT

## 2025-01-01 PROCEDURE — 84166 PROTEIN E-PHORESIS/URINE/CSF: CPT | Performed by: STUDENT IN AN ORGANIZED HEALTH CARE EDUCATION/TRAINING PROGRAM

## 2025-01-01 PROCEDURE — 76937 US GUIDE VASCULAR ACCESS: CPT

## 2025-01-01 PROCEDURE — 85027 COMPLETE CBC AUTOMATED: CPT

## 2025-01-01 PROCEDURE — 85025 COMPLETE CBC W/AUTO DIFF WBC: CPT

## 2025-01-01 PROCEDURE — 86900 BLOOD TYPING SEROLOGIC ABO: CPT

## 2025-01-01 PROCEDURE — 70450 CT HEAD/BRAIN W/O DYE: CPT

## 2025-01-01 PROCEDURE — 87070 CULTURE OTHR SPECIMN AEROBIC: CPT | Performed by: SURGERY

## 2025-01-01 PROCEDURE — 80053 COMPREHEN METABOLIC PANEL: CPT | Performed by: INTERNAL MEDICINE

## 2025-01-01 PROCEDURE — 0DBA0ZZ EXCISION OF JEJUNUM, OPEN APPROACH: ICD-10-PCS | Performed by: SURGERY

## 2025-01-01 PROCEDURE — 06HY33Z INSERTION OF INFUSION DEVICE INTO LOWER VEIN, PERCUTANEOUS APPROACH: ICD-10-PCS | Performed by: INTERNAL MEDICINE

## 2025-01-01 PROCEDURE — 99292 CRITICAL CARE ADDL 30 MIN: CPT | Performed by: INTERNAL MEDICINE

## 2025-01-01 PROCEDURE — 84100 ASSAY OF PHOSPHORUS: CPT

## 2025-01-01 PROCEDURE — 82330 ASSAY OF CALCIUM: CPT

## 2025-01-01 PROCEDURE — 93308 TTE F-UP OR LMTD: CPT | Performed by: INTERNAL MEDICINE

## 2025-01-01 PROCEDURE — 85007 BL SMEAR W/DIFF WBC COUNT: CPT | Performed by: STUDENT IN AN ORGANIZED HEALTH CARE EDUCATION/TRAINING PROGRAM

## 2025-01-01 PROCEDURE — 85007 BL SMEAR W/DIFF WBC COUNT: CPT | Performed by: PHYSICIAN ASSISTANT

## 2025-01-01 PROCEDURE — 90945 DIALYSIS ONE EVALUATION: CPT | Performed by: INTERNAL MEDICINE

## 2025-01-01 PROCEDURE — 85014 HEMATOCRIT: CPT | Performed by: PHYSICIAN ASSISTANT

## 2025-01-01 PROCEDURE — 85018 HEMOGLOBIN: CPT | Performed by: NURSE PRACTITIONER

## 2025-01-01 PROCEDURE — 87205 SMEAR GRAM STAIN: CPT | Performed by: SURGERY

## 2025-01-01 PROCEDURE — 93321 DOPPLER ECHO F-UP/LMTD STD: CPT | Performed by: INTERNAL MEDICINE

## 2025-01-01 PROCEDURE — 85730 THROMBOPLASTIN TIME PARTIAL: CPT | Performed by: NURSE PRACTITIONER

## 2025-01-01 PROCEDURE — 82947 ASSAY GLUCOSE BLOOD QUANT: CPT

## 2025-01-01 PROCEDURE — 87040 BLOOD CULTURE FOR BACTERIA: CPT

## 2025-01-01 PROCEDURE — 86850 RBC ANTIBODY SCREEN: CPT | Performed by: STUDENT IN AN ORGANIZED HEALTH CARE EDUCATION/TRAINING PROGRAM

## 2025-01-01 PROCEDURE — NC001 PR NO CHARGE: Performed by: INTERNAL MEDICINE

## 2025-01-01 PROCEDURE — 85027 COMPLETE CBC AUTOMATED: CPT | Performed by: STUDENT IN AN ORGANIZED HEALTH CARE EDUCATION/TRAINING PROGRAM

## 2025-01-01 PROCEDURE — 82728 ASSAY OF FERRITIN: CPT | Performed by: INTERNAL MEDICINE

## 2025-01-01 PROCEDURE — NC001 PR NO CHARGE: Performed by: PHYSICIAN ASSISTANT

## 2025-01-01 PROCEDURE — 02H633Z INSERTION OF INFUSION DEVICE INTO RIGHT ATRIUM, PERCUTANEOUS APPROACH: ICD-10-PCS | Performed by: RADIOLOGY

## 2025-01-01 PROCEDURE — 95720 EEG PHY/QHP EA INCR W/VEEG: CPT | Performed by: STUDENT IN AN ORGANIZED HEALTH CARE EDUCATION/TRAINING PROGRAM

## 2025-01-01 PROCEDURE — 86920 COMPATIBILITY TEST SPIN: CPT

## 2025-01-01 PROCEDURE — 82805 BLOOD GASES W/O2 SATURATION: CPT

## 2025-01-01 PROCEDURE — 83605 ASSAY OF LACTIC ACID: CPT | Performed by: STUDENT IN AN ORGANIZED HEALTH CARE EDUCATION/TRAINING PROGRAM

## 2025-01-01 PROCEDURE — 99233 SBSQ HOSP IP/OBS HIGH 50: CPT | Performed by: SURGERY

## 2025-01-01 PROCEDURE — P9016 RBC LEUKOCYTES REDUCED: HCPCS

## 2025-01-01 PROCEDURE — 06HY33Z INSERTION OF INFUSION DEVICE INTO LOWER VEIN, PERCUTANEOUS APPROACH: ICD-10-PCS | Performed by: SURGERY

## 2025-01-01 PROCEDURE — 83605 ASSAY OF LACTIC ACID: CPT | Performed by: PHYSICIAN ASSISTANT

## 2025-01-01 PROCEDURE — 99233 SBSQ HOSP IP/OBS HIGH 50: CPT | Performed by: STUDENT IN AN ORGANIZED HEALTH CARE EDUCATION/TRAINING PROGRAM

## 2025-01-01 PROCEDURE — 99222 1ST HOSP IP/OBS MODERATE 55: CPT | Performed by: UROLOGY

## 2025-01-01 PROCEDURE — 83540 ASSAY OF IRON: CPT | Performed by: INTERNAL MEDICINE

## 2025-01-01 PROCEDURE — 88307 TISSUE EXAM BY PATHOLOGIST: CPT | Performed by: PATHOLOGY

## 2025-01-01 PROCEDURE — 82550 ASSAY OF CK (CPK): CPT | Performed by: PHYSICIAN ASSISTANT

## 2025-01-01 PROCEDURE — 0W3G0ZZ CONTROL BLEEDING IN PERITONEAL CAVITY, OPEN APPROACH: ICD-10-PCS | Performed by: SURGERY

## 2025-01-01 PROCEDURE — 0DBF0ZZ EXCISION OF RIGHT LARGE INTESTINE, OPEN APPROACH: ICD-10-PCS | Performed by: SURGERY

## 2025-01-01 PROCEDURE — 85347 COAGULATION TIME ACTIVATED: CPT | Performed by: PHYSICIAN ASSISTANT

## 2025-01-01 PROCEDURE — 83735 ASSAY OF MAGNESIUM: CPT | Performed by: PHYSICIAN ASSISTANT

## 2025-01-01 PROCEDURE — 85018 HEMOGLOBIN: CPT | Performed by: SURGERY

## 2025-01-01 PROCEDURE — 93010 ELECTROCARDIOGRAM REPORT: CPT | Performed by: NURSE PRACTITIONER

## 2025-01-01 PROCEDURE — 88344 IMHCHEM/IMCYTCHM EA MLT ANTB: CPT | Performed by: PATHOLOGY

## 2025-01-01 PROCEDURE — 0DB80ZZ EXCISION OF SMALL INTESTINE, OPEN APPROACH: ICD-10-PCS | Performed by: SURGERY

## 2025-01-01 PROCEDURE — 36620 INSERTION CATHETER ARTERY: CPT | Performed by: INTERNAL MEDICINE

## 2025-01-01 PROCEDURE — 85397 CLOTTING FUNCT ACTIVITY: CPT

## 2025-01-01 PROCEDURE — 85397 CLOTTING FUNCT ACTIVITY: CPT | Performed by: STUDENT IN AN ORGANIZED HEALTH CARE EDUCATION/TRAINING PROGRAM

## 2025-01-01 PROCEDURE — 99222 1ST HOSP IP/OBS MODERATE 55: CPT | Performed by: INTERNAL MEDICINE

## 2025-01-01 PROCEDURE — 85397 CLOTTING FUNCT ACTIVITY: CPT | Performed by: PHYSICIAN ASSISTANT

## 2025-01-01 PROCEDURE — P9099 BLOOD COMPONENT/PRODUCT NOC: HCPCS

## 2025-01-01 PROCEDURE — 4A133B1 MONITORING OF ARTERIAL PRESSURE, PERIPHERAL, PERCUTANEOUS APPROACH: ICD-10-PCS | Performed by: INTERNAL MEDICINE

## 2025-01-01 PROCEDURE — 82330 ASSAY OF CALCIUM: CPT | Performed by: PHYSICIAN ASSISTANT

## 2025-01-01 PROCEDURE — 84166 PROTEIN E-PHORESIS/URINE/CSF: CPT | Performed by: INTERNAL MEDICINE

## 2025-01-01 PROCEDURE — 86160 COMPLEMENT ANTIGEN: CPT | Performed by: STUDENT IN AN ORGANIZED HEALTH CARE EDUCATION/TRAINING PROGRAM

## 2025-01-01 PROCEDURE — 80076 HEPATIC FUNCTION PANEL: CPT

## 2025-01-01 PROCEDURE — 84165 PROTEIN E-PHORESIS SERUM: CPT | Performed by: INTERNAL MEDICINE

## 2025-01-01 PROCEDURE — P9012 CRYOPRECIPITATE EACH UNIT: HCPCS

## 2025-01-01 PROCEDURE — 86923 COMPATIBILITY TEST ELECTRIC: CPT

## 2025-01-01 PROCEDURE — 36620 INSERTION CATHETER ARTERY: CPT

## 2025-01-01 PROCEDURE — A6250 SKIN SEAL PROTECT MOISTURIZR: HCPCS | Performed by: SURGERY

## 2025-01-01 PROCEDURE — 85007 BL SMEAR W/DIFF WBC COUNT: CPT

## 2025-01-01 PROCEDURE — 83550 IRON BINDING TEST: CPT | Performed by: INTERNAL MEDICINE

## 2025-01-01 PROCEDURE — 80048 BASIC METABOLIC PNL TOTAL CA: CPT | Performed by: SURGERY

## 2025-01-01 PROCEDURE — 85014 HEMATOCRIT: CPT | Performed by: SURGERY

## 2025-01-01 PROCEDURE — 80202 ASSAY OF VANCOMYCIN: CPT | Performed by: INTERNAL MEDICINE

## 2025-01-01 PROCEDURE — 85018 HEMOGLOBIN: CPT | Performed by: PHYSICIAN ASSISTANT

## 2025-01-01 PROCEDURE — 82330 ASSAY OF CALCIUM: CPT | Performed by: SURGERY

## 2025-01-01 PROCEDURE — 86335 IMMUNFIX E-PHORSIS/URINE/CSF: CPT | Performed by: INTERNAL MEDICINE

## 2025-01-01 PROCEDURE — 87081 CULTURE SCREEN ONLY: CPT | Performed by: HOSPITALIST

## 2025-01-01 PROCEDURE — 82330 ASSAY OF CALCIUM: CPT | Performed by: INTERNAL MEDICINE

## 2025-01-01 PROCEDURE — 93325 DOPPLER ECHO COLOR FLOW MAPG: CPT

## 2025-01-01 PROCEDURE — 74178 CT ABD&PLV WO CNTR FLWD CNTR: CPT

## 2025-01-01 PROCEDURE — 84100 ASSAY OF PHOSPHORUS: CPT | Performed by: INTERNAL MEDICINE

## 2025-01-01 PROCEDURE — 99285 EMERGENCY DEPT VISIT HI MDM: CPT | Performed by: EMERGENCY MEDICINE

## 2025-01-01 PROCEDURE — P9017 PLASMA 1 DONOR FRZ W/IN 8 HR: HCPCS

## 2025-01-01 PROCEDURE — 85027 COMPLETE CBC AUTOMATED: CPT | Performed by: SURGERY

## 2025-01-01 PROCEDURE — 87102 FUNGUS ISOLATION CULTURE: CPT | Performed by: SURGERY

## 2025-01-01 PROCEDURE — 03HY32Z INSERTION OF MONITORING DEVICE INTO UPPER ARTERY, PERCUTANEOUS APPROACH: ICD-10-PCS | Performed by: SURGERY

## 2025-01-01 PROCEDURE — 85730 THROMBOPLASTIN TIME PARTIAL: CPT

## 2025-01-01 PROCEDURE — 87077 CULTURE AEROBIC IDENTIFY: CPT

## 2025-01-01 PROCEDURE — 85610 PROTHROMBIN TIME: CPT | Performed by: INTERNAL MEDICINE

## 2025-01-01 PROCEDURE — 4A133J1 MONITORING OF ARTERIAL PULSE, PERIPHERAL, PERCUTANEOUS APPROACH: ICD-10-PCS | Performed by: SURGERY

## 2025-01-01 PROCEDURE — 86334 IMMUNOFIX E-PHORESIS SERUM: CPT | Performed by: STUDENT IN AN ORGANIZED HEALTH CARE EDUCATION/TRAINING PROGRAM

## 2025-01-01 PROCEDURE — 0BH18EZ INSERTION OF ENDOTRACHEAL AIRWAY INTO TRACHEA, VIA NATURAL OR ARTIFICIAL OPENING ENDOSCOPIC: ICD-10-PCS | Performed by: INTERNAL MEDICINE

## 2025-01-01 PROCEDURE — 93321 DOPPLER ECHO F-UP/LMTD STD: CPT

## 2025-01-01 PROCEDURE — 77001 FLUOROGUIDE FOR VEIN DEVICE: CPT

## 2025-01-01 PROCEDURE — 97535 SELF CARE MNGMENT TRAINING: CPT

## 2025-01-01 PROCEDURE — 0B978ZX DRAINAGE OF LEFT MAIN BRONCHUS, VIA NATURAL OR ARTIFICIAL OPENING ENDOSCOPIC, DIAGNOSTIC: ICD-10-PCS | Performed by: SURGERY

## 2025-01-01 PROCEDURE — 82805 BLOOD GASES W/O2 SATURATION: CPT | Performed by: NURSE PRACTITIONER

## 2025-01-01 PROCEDURE — 99291 CRITICAL CARE FIRST HOUR: CPT | Performed by: INTERNAL MEDICINE

## 2025-01-01 PROCEDURE — 80053 COMPREHEN METABOLIC PANEL: CPT | Performed by: PHYSICIAN ASSISTANT

## 2025-01-01 PROCEDURE — 83735 ASSAY OF MAGNESIUM: CPT | Performed by: NURSE PRACTITIONER

## 2025-01-01 PROCEDURE — P9035 PLATELET PHERES LEUKOREDUCED: HCPCS

## 2025-01-01 PROCEDURE — 80053 COMPREHEN METABOLIC PANEL: CPT | Performed by: STUDENT IN AN ORGANIZED HEALTH CARE EDUCATION/TRAINING PROGRAM

## 2025-01-01 PROCEDURE — 36600 WITHDRAWAL OF ARTERIAL BLOOD: CPT

## 2025-01-01 PROCEDURE — 82570 ASSAY OF URINE CREATININE: CPT

## 2025-01-01 PROCEDURE — 85384 FIBRINOGEN ACTIVITY: CPT | Performed by: STUDENT IN AN ORGANIZED HEALTH CARE EDUCATION/TRAINING PROGRAM

## 2025-01-01 PROCEDURE — 71260 CT THORAX DX C+: CPT

## 2025-01-01 PROCEDURE — 85730 THROMBOPLASTIN TIME PARTIAL: CPT | Performed by: INTERNAL MEDICINE

## 2025-01-01 PROCEDURE — 85025 COMPLETE CBC W/AUTO DIFF WBC: CPT | Performed by: INTERNAL MEDICINE

## 2025-01-01 PROCEDURE — 0WQF0ZZ REPAIR ABDOMINAL WALL, OPEN APPROACH: ICD-10-PCS | Performed by: SURGERY

## 2025-01-01 PROCEDURE — 97116 GAIT TRAINING THERAPY: CPT

## 2025-01-01 PROCEDURE — 97110 THERAPEUTIC EXERCISES: CPT

## 2025-01-01 PROCEDURE — 99285 EMERGENCY DEPT VISIT HI MDM: CPT

## 2025-01-01 PROCEDURE — 44120 REMOVAL OF SMALL INTESTINE: CPT | Performed by: SURGERY

## 2025-01-01 PROCEDURE — 85347 COAGULATION TIME ACTIVATED: CPT

## 2025-01-01 PROCEDURE — P9040 RBC LEUKOREDUCED IRRADIATED: HCPCS

## 2025-01-01 PROCEDURE — 84484 ASSAY OF TROPONIN QUANT: CPT

## 2025-01-01 PROCEDURE — 36556 INSERT NON-TUNNEL CV CATH: CPT | Performed by: PHYSICIAN ASSISTANT

## 2025-01-01 PROCEDURE — 93010 ELECTROCARDIOGRAM REPORT: CPT | Performed by: INTERNAL MEDICINE

## 2025-01-01 PROCEDURE — 99223 1ST HOSP IP/OBS HIGH 75: CPT | Performed by: COLON & RECTAL SURGERY

## 2025-01-01 PROCEDURE — C1750 CATH, HEMODIALYSIS,LONG-TERM: HCPCS

## 2025-01-01 PROCEDURE — 83605 ASSAY OF LACTIC ACID: CPT | Performed by: NURSE PRACTITIONER

## 2025-01-01 PROCEDURE — 93325 DOPPLER ECHO COLOR FLOW MAPG: CPT | Performed by: INTERNAL MEDICINE

## 2025-01-01 PROCEDURE — 93308 TTE F-UP OR LMTD: CPT

## 2025-01-01 PROCEDURE — 92526 ORAL FUNCTION THERAPY: CPT

## 2025-01-01 PROCEDURE — 82043 UR ALBUMIN QUANTITATIVE: CPT

## 2025-01-01 PROCEDURE — 36558 INSERT TUNNELED CV CATH: CPT

## 2025-01-01 PROCEDURE — 85014 HEMATOCRIT: CPT | Performed by: NURSE PRACTITIONER

## 2025-01-01 PROCEDURE — 4A133J1 MONITORING OF ARTERIAL PULSE, PERIPHERAL, PERCUTANEOUS APPROACH: ICD-10-PCS | Performed by: INTERNAL MEDICINE

## 2025-01-01 PROCEDURE — 31500 INSERT EMERGENCY AIRWAY: CPT

## 2025-01-01 PROCEDURE — 83735 ASSAY OF MAGNESIUM: CPT | Performed by: INTERNAL MEDICINE

## 2025-01-01 PROCEDURE — 74176 CT ABD & PELVIS W/O CONTRAST: CPT

## 2025-01-01 PROCEDURE — 85379 FIBRIN DEGRADATION QUANT: CPT | Performed by: STUDENT IN AN ORGANIZED HEALTH CARE EDUCATION/TRAINING PROGRAM

## 2025-01-01 PROCEDURE — 99223 1ST HOSP IP/OBS HIGH 75: CPT | Performed by: SURGERY

## 2025-01-01 PROCEDURE — 97163 PT EVAL HIGH COMPLEX 45 MIN: CPT

## 2025-01-01 PROCEDURE — 83735 ASSAY OF MAGNESIUM: CPT | Performed by: SURGERY

## 2025-01-01 PROCEDURE — NC001 PR NO CHARGE: Performed by: NURSE PRACTITIONER

## 2025-01-01 PROCEDURE — 85730 THROMBOPLASTIN TIME PARTIAL: CPT | Performed by: STUDENT IN AN ORGANIZED HEALTH CARE EDUCATION/TRAINING PROGRAM

## 2025-01-01 PROCEDURE — 84100 ASSAY OF PHOSPHORUS: CPT | Performed by: STUDENT IN AN ORGANIZED HEALTH CARE EDUCATION/TRAINING PROGRAM

## 2025-01-01 PROCEDURE — 80048 BASIC METABOLIC PNL TOTAL CA: CPT | Performed by: INTERNAL MEDICINE

## 2025-01-01 PROCEDURE — 74018 RADEX ABDOMEN 1 VIEW: CPT

## 2025-01-01 PROCEDURE — 97167 OT EVAL HIGH COMPLEX 60 MIN: CPT

## 2025-01-01 PROCEDURE — 88309 TISSUE EXAM BY PATHOLOGIST: CPT | Performed by: PATHOLOGY

## 2025-01-01 PROCEDURE — 30233P1 TRANSFUSION OF NONAUTOLOGOUS FROZEN RED CELLS INTO PERIPHERAL VEIN, PERCUTANEOUS APPROACH: ICD-10-PCS | Performed by: EMERGENCY MEDICINE

## 2025-01-01 PROCEDURE — 94664 DEMO&/EVAL PT USE INHALER: CPT

## 2025-01-01 PROCEDURE — A9585 GADOBUTROL INJECTION: HCPCS | Performed by: FAMILY MEDICINE

## 2025-01-01 PROCEDURE — 36415 COLL VENOUS BLD VENIPUNCTURE: CPT

## 2025-01-01 PROCEDURE — 82805 BLOOD GASES W/O2 SATURATION: CPT | Performed by: STUDENT IN AN ORGANIZED HEALTH CARE EDUCATION/TRAINING PROGRAM

## 2025-01-01 PROCEDURE — 85610 PROTHROMBIN TIME: CPT | Performed by: STUDENT IN AN ORGANIZED HEALTH CARE EDUCATION/TRAINING PROGRAM

## 2025-01-01 PROCEDURE — 87186 SC STD MICRODIL/AGAR DIL: CPT | Performed by: SURGERY

## 2025-01-01 PROCEDURE — 0W3F0ZZ CONTROL BLEEDING IN ABDOMINAL WALL, OPEN APPROACH: ICD-10-PCS | Performed by: SURGERY

## 2025-01-01 PROCEDURE — 85018 HEMOGLOBIN: CPT | Performed by: HOSPITALIST

## 2025-01-01 PROCEDURE — 86334 IMMUNOFIX E-PHORESIS SERUM: CPT | Performed by: INTERNAL MEDICINE

## 2025-01-01 PROCEDURE — 84295 ASSAY OF SERUM SODIUM: CPT

## 2025-01-01 PROCEDURE — 5A1D90Z PERFORMANCE OF URINARY FILTRATION, CONTINUOUS, GREATER THAN 18 HOURS PER DAY: ICD-10-PCS | Performed by: INTERNAL MEDICINE

## 2025-01-01 PROCEDURE — 85576 BLOOD PLATELET AGGREGATION: CPT | Performed by: STUDENT IN AN ORGANIZED HEALTH CARE EDUCATION/TRAINING PROGRAM

## 2025-01-01 PROCEDURE — 83521 IG LIGHT CHAINS FREE EACH: CPT | Performed by: INTERNAL MEDICINE

## 2025-01-01 PROCEDURE — 95712 VEEG 2-12 HR INTMT MNTR: CPT

## 2025-01-01 PROCEDURE — 04HY32Z INSERTION OF MONITORING DEVICE INTO LOWER ARTERY, PERCUTANEOUS APPROACH: ICD-10-PCS | Performed by: INTERNAL MEDICINE

## 2025-01-01 PROCEDURE — 99233 SBSQ HOSP IP/OBS HIGH 50: CPT | Performed by: COLON & RECTAL SURGERY

## 2025-01-01 PROCEDURE — 84478 ASSAY OF TRIGLYCERIDES: CPT

## 2025-01-01 PROCEDURE — 36558 INSERT TUNNELED CV CATH: CPT | Performed by: RADIOLOGY

## 2025-01-01 PROCEDURE — 83520 IMMUNOASSAY QUANT NOS NONAB: CPT | Performed by: STUDENT IN AN ORGANIZED HEALTH CARE EDUCATION/TRAINING PROGRAM

## 2025-01-01 PROCEDURE — 30233M1 TRANSFUSION OF NONAUTOLOGOUS PLASMA CRYOPRECIPITATE INTO PERIPHERAL VEIN, PERCUTANEOUS APPROACH: ICD-10-PCS | Performed by: INTERNAL MEDICINE

## 2025-01-01 PROCEDURE — 86335 IMMUNFIX E-PHORSIS/URINE/CSF: CPT | Performed by: STUDENT IN AN ORGANIZED HEALTH CARE EDUCATION/TRAINING PROGRAM

## 2025-01-01 PROCEDURE — 85576 BLOOD PLATELET AGGREGATION: CPT | Performed by: PHYSICIAN ASSISTANT

## 2025-01-01 PROCEDURE — 95715 VEEG EA 12-26HR INTMT MNTR: CPT

## 2025-01-01 PROCEDURE — 76937 US GUIDE VASCULAR ACCESS: CPT | Performed by: RADIOLOGY

## 2025-01-01 PROCEDURE — 5A1955Z RESPIRATORY VENTILATION, GREATER THAN 96 CONSECUTIVE HOURS: ICD-10-PCS | Performed by: INTERNAL MEDICINE

## 2025-01-01 PROCEDURE — 4A133B1 MONITORING OF ARTERIAL PRESSURE, PERIPHERAL, PERCUTANEOUS APPROACH: ICD-10-PCS | Performed by: SURGERY

## 2025-01-01 PROCEDURE — 85014 HEMATOCRIT: CPT | Performed by: STUDENT IN AN ORGANIZED HEALTH CARE EDUCATION/TRAINING PROGRAM

## 2025-01-01 PROCEDURE — 85384 FIBRINOGEN ACTIVITY: CPT | Performed by: PHYSICIAN ASSISTANT

## 2025-01-01 PROCEDURE — 85007 BL SMEAR W/DIFF WBC COUNT: CPT | Performed by: SURGERY

## 2025-01-01 PROCEDURE — 30233K1 TRANSFUSION OF NONAUTOLOGOUS FROZEN PLASMA INTO PERIPHERAL VEIN, PERCUTANEOUS APPROACH: ICD-10-PCS | Performed by: STUDENT IN AN ORGANIZED HEALTH CARE EDUCATION/TRAINING PROGRAM

## 2025-01-01 PROCEDURE — 81001 URINALYSIS AUTO W/SCOPE: CPT

## 2025-01-01 PROCEDURE — 31624 DX BRONCHOSCOPE/LAVAGE: CPT | Performed by: SURGERY

## 2025-01-01 PROCEDURE — 31500 INSERT EMERGENCY AIRWAY: CPT | Performed by: INTERNAL MEDICINE

## 2025-01-01 PROCEDURE — 80076 HEPATIC FUNCTION PANEL: CPT | Performed by: NURSE PRACTITIONER

## 2025-01-01 PROCEDURE — 82570 ASSAY OF URINE CREATININE: CPT | Performed by: INTERNAL MEDICINE

## 2025-01-01 PROCEDURE — 86900 BLOOD TYPING SEROLOGIC ABO: CPT | Performed by: STUDENT IN AN ORGANIZED HEALTH CARE EDUCATION/TRAINING PROGRAM

## 2025-01-01 PROCEDURE — 84478 ASSAY OF TRIGLYCERIDES: CPT | Performed by: PHYSICIAN ASSISTANT

## 2025-01-01 PROCEDURE — 92610 EVALUATE SWALLOWING FUNCTION: CPT

## 2025-01-01 PROCEDURE — 71250 CT THORAX DX C-: CPT

## 2025-01-01 PROCEDURE — 83690 ASSAY OF LIPASE: CPT

## 2025-01-01 PROCEDURE — 85384 FIBRINOGEN ACTIVITY: CPT

## 2025-01-01 PROCEDURE — 44160 REMOVAL OF COLON: CPT | Performed by: SURGERY

## 2025-01-01 PROCEDURE — 99223 1ST HOSP IP/OBS HIGH 75: CPT | Performed by: STUDENT IN AN ORGANIZED HEALTH CARE EDUCATION/TRAINING PROGRAM

## 2025-01-01 PROCEDURE — 81001 URINALYSIS AUTO W/SCOPE: CPT | Performed by: INTERNAL MEDICINE

## 2025-01-01 PROCEDURE — 85018 HEMOGLOBIN: CPT | Performed by: STUDENT IN AN ORGANIZED HEALTH CARE EDUCATION/TRAINING PROGRAM

## 2025-01-01 PROCEDURE — 86901 BLOOD TYPING SEROLOGIC RH(D): CPT | Performed by: STUDENT IN AN ORGANIZED HEALTH CARE EDUCATION/TRAINING PROGRAM

## 2025-01-01 PROCEDURE — 49002 REOPENING OF ABDOMEN: CPT | Performed by: SURGERY

## 2025-01-01 PROCEDURE — 30233R1 TRANSFUSION OF NONAUTOLOGOUS PLATELETS INTO PERIPHERAL VEIN, PERCUTANEOUS APPROACH: ICD-10-PCS | Performed by: INTERNAL MEDICINE

## 2025-01-01 PROCEDURE — 95700 EEG CONT REC W/VID EEG TECH: CPT

## 2025-01-01 PROCEDURE — 36430 TRANSFUSION BLD/BLD COMPNT: CPT

## 2025-01-01 PROCEDURE — 85652 RBC SED RATE AUTOMATED: CPT | Performed by: INTERNAL MEDICINE

## 2025-01-01 PROCEDURE — 92950 HEART/LUNG RESUSCITATION CPR: CPT

## 2025-01-01 PROCEDURE — 94002 VENT MGMT INPAT INIT DAY: CPT

## 2025-01-01 PROCEDURE — 84165 PROTEIN E-PHORESIS SERUM: CPT | Performed by: STUDENT IN AN ORGANIZED HEALTH CARE EDUCATION/TRAINING PROGRAM

## 2025-01-01 PROCEDURE — C1894 INTRO/SHEATH, NON-LASER: HCPCS

## 2025-01-01 PROCEDURE — 99232 SBSQ HOSP IP/OBS MODERATE 35: CPT | Performed by: COLON & RECTAL SURGERY

## 2025-01-01 PROCEDURE — 5A12012 PERFORMANCE OF CARDIAC OUTPUT, SINGLE, MANUAL: ICD-10-PCS | Performed by: INTERNAL MEDICINE

## 2025-01-01 PROCEDURE — NC001 PR NO CHARGE: Performed by: RADIOLOGY

## 2025-01-01 PROCEDURE — 77001 FLUOROGUIDE FOR VEIN DEVICE: CPT | Performed by: RADIOLOGY

## 2025-01-01 PROCEDURE — C1769 GUIDE WIRE: HCPCS

## 2025-01-01 PROCEDURE — 87154 CUL TYP ID BLD PTHGN 6+ TRGT: CPT

## 2025-01-01 RX ORDER — CALCIUM CHLORIDE 100 MG/ML
SYRINGE (ML) INTRAVENOUS CODE/TRAUMA/SEDATION MEDICATION
Status: COMPLETED | OUTPATIENT
Start: 2025-01-01 | End: 2025-01-01

## 2025-01-01 RX ORDER — AMOXICILLIN 250 MG
1 CAPSULE ORAL
Status: DISCONTINUED | OUTPATIENT
Start: 2025-01-01 | End: 2025-01-01

## 2025-01-01 RX ORDER — FENTANYL CITRATE 50 UG/ML
100 INJECTION, SOLUTION INTRAMUSCULAR; INTRAVENOUS ONCE
Refills: 0 | Status: COMPLETED | OUTPATIENT
Start: 2025-01-01 | End: 2025-01-01

## 2025-01-01 RX ORDER — LACTULOSE 10 G/15ML
30 SOLUTION ORAL 3 TIMES DAILY
Status: DISCONTINUED | OUTPATIENT
Start: 2025-01-01 | End: 2025-01-01

## 2025-01-01 RX ORDER — ALBUMIN HUMAN 50 G/1000ML
25 SOLUTION INTRAVENOUS ONCE
Status: COMPLETED | OUTPATIENT
Start: 2025-01-01 | End: 2025-01-01

## 2025-01-01 RX ORDER — POTASSIUM CHLORIDE 1500 MG/1
40 TABLET, EXTENDED RELEASE ORAL ONCE
Status: COMPLETED | OUTPATIENT
Start: 2025-01-01 | End: 2025-01-01

## 2025-01-01 RX ORDER — ALBUMIN HUMAN 50 G/1000ML
SOLUTION INTRAVENOUS
Status: COMPLETED
Start: 2025-01-01 | End: 2025-01-01

## 2025-01-01 RX ORDER — VANCOMYCIN HYDROCHLORIDE 1 G/200ML
10 INJECTION, SOLUTION INTRAVENOUS EVERY 12 HOURS
Status: DISCONTINUED | OUTPATIENT
Start: 2025-01-01 | End: 2025-01-01

## 2025-01-01 RX ORDER — CEFAZOLIN SODIUM 2 G/50ML
2000 SOLUTION INTRAVENOUS EVERY 12 HOURS
Status: DISCONTINUED | OUTPATIENT
Start: 2025-01-01 | End: 2025-01-01

## 2025-01-01 RX ORDER — FENTANYL CITRATE 50 UG/ML
INJECTION, SOLUTION INTRAMUSCULAR; INTRAVENOUS
Status: COMPLETED
Start: 2025-01-01 | End: 2025-01-01

## 2025-01-01 RX ORDER — MIDAZOLAM HYDROCHLORIDE 2 MG/2ML
INJECTION, SOLUTION INTRAMUSCULAR; INTRAVENOUS
Status: COMPLETED
Start: 2025-01-01 | End: 2025-01-01

## 2025-01-01 RX ORDER — MAGNESIUM SULFATE 1 G/100ML
1 INJECTION INTRAVENOUS ONCE
Status: COMPLETED | OUTPATIENT
Start: 2025-01-01 | End: 2025-01-01

## 2025-01-01 RX ORDER — FUROSEMIDE 10 MG/ML
20 INJECTION INTRAMUSCULAR; INTRAVENOUS ONCE
Status: COMPLETED | OUTPATIENT
Start: 2025-01-01 | End: 2025-01-01

## 2025-01-01 RX ORDER — ALBUMIN HUMAN 50 G/1000ML
12.5 SOLUTION INTRAVENOUS ONCE
Status: COMPLETED | OUTPATIENT
Start: 2025-01-01 | End: 2025-01-01

## 2025-01-01 RX ORDER — METOPROLOL TARTRATE 25 MG/1
25 TABLET, FILM COATED ORAL EVERY 12 HOURS SCHEDULED
Status: DISCONTINUED | OUTPATIENT
Start: 2025-01-01 | End: 2025-01-01

## 2025-01-01 RX ORDER — POTASSIUM CHLORIDE 29.8 MG/ML
40 INJECTION INTRAVENOUS ONCE
Status: DISCONTINUED | OUTPATIENT
Start: 2025-01-01 | End: 2025-01-01

## 2025-01-01 RX ORDER — LORAZEPAM 2 MG/ML
1 INJECTION INTRAMUSCULAR
Status: DISCONTINUED | OUTPATIENT
Start: 2025-01-01 | End: 2025-01-01 | Stop reason: HOSPADM

## 2025-01-01 RX ORDER — CALCIUM GLUCONATE 20 MG/ML
1 INJECTION, SOLUTION INTRAVENOUS ONCE
Status: COMPLETED | OUTPATIENT
Start: 2025-01-01 | End: 2025-01-01

## 2025-01-01 RX ORDER — BUMETANIDE 0.25 MG/ML
2 INJECTION, SOLUTION INTRAMUSCULAR; INTRAVENOUS 3 TIMES DAILY
Status: DISCONTINUED | OUTPATIENT
Start: 2025-01-01 | End: 2025-01-01

## 2025-01-01 RX ORDER — MAGNESIUM HYDROXIDE 1200 MG/15ML
LIQUID ORAL AS NEEDED
Status: DISCONTINUED | OUTPATIENT
Start: 2025-01-01 | End: 2025-01-01 | Stop reason: HOSPADM

## 2025-01-01 RX ORDER — SENNOSIDES 8.8 MG/5ML
17.6 LIQUID ORAL
Status: DISCONTINUED | OUTPATIENT
Start: 2025-01-01 | End: 2025-01-01

## 2025-01-01 RX ORDER — FENTANYL CITRATE 50 UG/ML
50 INJECTION, SOLUTION INTRAMUSCULAR; INTRAVENOUS ONCE
Refills: 0 | Status: COMPLETED | OUTPATIENT
Start: 2025-01-01 | End: 2025-01-01

## 2025-01-01 RX ORDER — FUROSEMIDE 10 MG/ML
40 INJECTION INTRAMUSCULAR; INTRAVENOUS ONCE
Status: COMPLETED | OUTPATIENT
Start: 2025-01-01 | End: 2025-01-01

## 2025-01-01 RX ORDER — DEXTROSE MONOHYDRATE 50 MG/ML
75 INJECTION, SOLUTION INTRAVENOUS CONTINUOUS
Status: DISCONTINUED | OUTPATIENT
Start: 2025-01-01 | End: 2025-01-01

## 2025-01-01 RX ORDER — HYDROMORPHONE HCL IN WATER/PF 6 MG/30 ML
0.2 PATIENT CONTROLLED ANALGESIA SYRINGE INTRAVENOUS EVERY 4 HOURS PRN
Status: DISCONTINUED | OUTPATIENT
Start: 2025-01-01 | End: 2025-01-01

## 2025-01-01 RX ORDER — POTASSIUM CHLORIDE 29.8 MG/ML
40 INJECTION INTRAVENOUS ONCE
Status: COMPLETED | OUTPATIENT
Start: 2025-01-01 | End: 2025-01-01

## 2025-01-01 RX ORDER — FERROUS SULFATE 325(65) MG
325 TABLET ORAL
Status: DISCONTINUED | OUTPATIENT
Start: 2025-01-01 | End: 2025-01-01

## 2025-01-01 RX ORDER — INDOMETHACIN 25 MG/1
CAPSULE ORAL
Status: COMPLETED
Start: 2025-01-01 | End: 2025-01-01

## 2025-01-01 RX ORDER — SODIUM CHLORIDE FOR INHALATION 3 %
4 VIAL, NEBULIZER (ML) INHALATION
Status: DISCONTINUED | OUTPATIENT
Start: 2025-01-01 | End: 2025-01-01

## 2025-01-01 RX ORDER — ALBUTEROL SULFATE 90 UG/1
2 INHALANT RESPIRATORY (INHALATION) EVERY 4 HOURS PRN
Status: DISCONTINUED | OUTPATIENT
Start: 2025-01-01 | End: 2025-01-01

## 2025-01-01 RX ORDER — SODIUM CHLORIDE FOR INHALATION 3 %
4 VIAL, NEBULIZER (ML) INHALATION
Status: COMPLETED | OUTPATIENT
Start: 2025-01-01 | End: 2025-01-01

## 2025-01-01 RX ORDER — HEPARIN SODIUM 5000 [USP'U]/ML
5000 INJECTION, SOLUTION INTRAVENOUS; SUBCUTANEOUS EVERY 8 HOURS SCHEDULED
Status: DISCONTINUED | OUTPATIENT
Start: 2025-01-01 | End: 2025-01-01

## 2025-01-01 RX ORDER — DEXTROSE 50 % IN WATER 50 %
SYRINGE (ML) INTRAVENOUS CODE/TRAUMA/SEDATION MEDICATION
Status: COMPLETED | OUTPATIENT
Start: 2025-01-01 | End: 2025-01-01

## 2025-01-01 RX ORDER — DEXTROSE MONOHYDRATE 100 MG/ML
75 INJECTION, SOLUTION INTRAVENOUS CONTINUOUS
Status: DISCONTINUED | OUTPATIENT
Start: 2025-01-01 | End: 2025-01-01

## 2025-01-01 RX ORDER — OXYCODONE HCL 5 MG/5 ML
5 SOLUTION, ORAL ORAL
Refills: 0 | Status: DISCONTINUED | OUTPATIENT
Start: 2025-01-01 | End: 2025-01-01

## 2025-01-01 RX ORDER — INDOMETHACIN 25 MG/1
50 CAPSULE ORAL ONCE
Status: COMPLETED | OUTPATIENT
Start: 2025-01-01 | End: 2025-01-01

## 2025-01-01 RX ORDER — SODIUM CHLORIDE FOR INHALATION 3 %
4 VIAL, NEBULIZER (ML) INHALATION 3 TIMES DAILY
Status: DISCONTINUED | OUTPATIENT
Start: 2025-01-01 | End: 2025-01-01

## 2025-01-01 RX ORDER — MIDAZOLAM HYDROCHLORIDE 2 MG/2ML
2 INJECTION, SOLUTION INTRAMUSCULAR; INTRAVENOUS ONCE
Status: COMPLETED | OUTPATIENT
Start: 2025-01-01 | End: 2025-01-01

## 2025-01-01 RX ORDER — SODIUM CHLORIDE, SODIUM GLUCONATE, SODIUM ACETATE, POTASSIUM CHLORIDE, MAGNESIUM CHLORIDE, SODIUM PHOSPHATE, DIBASIC, AND POTASSIUM PHOSPHATE .53; .5; .37; .037; .03; .012; .00082 G/100ML; G/100ML; G/100ML; G/100ML; G/100ML; G/100ML; G/100ML
500 INJECTION, SOLUTION INTRAVENOUS ONCE
Status: COMPLETED | OUTPATIENT
Start: 2025-01-01 | End: 2025-01-01

## 2025-01-01 RX ORDER — POLYETHYLENE GLYCOL 3350 17 G/17G
17 POWDER, FOR SOLUTION ORAL DAILY
Status: DISCONTINUED | OUTPATIENT
Start: 2025-01-01 | End: 2025-01-01

## 2025-01-01 RX ORDER — HEPARIN SODIUM 10000 [USP'U]/100ML
3-20 INJECTION, SOLUTION INTRAVENOUS
Status: CANCELLED | OUTPATIENT
Start: 2025-01-01

## 2025-01-01 RX ORDER — POTASSIUM CHLORIDE 1500 MG/1
20 TABLET, EXTENDED RELEASE ORAL DAILY
Status: DISCONTINUED | OUTPATIENT
Start: 2025-01-01 | End: 2025-01-01

## 2025-01-01 RX ORDER — SODIUM CHLORIDE, SODIUM LACTATE, POTASSIUM CHLORIDE, CALCIUM CHLORIDE 600; 310; 30; 20 MG/100ML; MG/100ML; MG/100ML; MG/100ML
100 INJECTION, SOLUTION INTRAVENOUS CONTINUOUS
Status: DISCONTINUED | OUTPATIENT
Start: 2025-01-01 | End: 2025-01-01 | Stop reason: ALTCHOICE

## 2025-01-01 RX ORDER — EPINEPHRINE 1 MG/ML
INJECTION, SOLUTION, CONCENTRATE INTRAVENOUS
Status: DISPENSED
Start: 2025-01-01 | End: 2025-01-01

## 2025-01-01 RX ORDER — SODIUM CHLORIDE 9 MG/ML
INJECTION, SOLUTION INTRAVENOUS CONTINUOUS PRN
Status: DISCONTINUED | OUTPATIENT
Start: 2025-01-01 | End: 2025-01-01

## 2025-01-01 RX ORDER — BUDESONIDE 0.5 MG/2ML
0.5 INHALANT ORAL
Status: DISCONTINUED | OUTPATIENT
Start: 2025-01-01 | End: 2025-01-01

## 2025-01-01 RX ORDER — LEVALBUTEROL INHALATION SOLUTION 1.25 MG/3ML
1.25 SOLUTION RESPIRATORY (INHALATION)
Status: DISCONTINUED | OUTPATIENT
Start: 2025-01-01 | End: 2025-01-01

## 2025-01-01 RX ORDER — POTASSIUM CHLORIDE 14.9 MG/ML
20 INJECTION INTRAVENOUS ONCE
Status: COMPLETED | OUTPATIENT
Start: 2025-01-01 | End: 2025-01-01

## 2025-01-01 RX ORDER — BUPIVACAINE HYDROCHLORIDE 2.5 MG/ML
INJECTION, SOLUTION EPIDURAL; INFILTRATION; INTRACAUDAL; PERINEURAL AS NEEDED
Status: DISCONTINUED | OUTPATIENT
Start: 2025-01-01 | End: 2025-01-01 | Stop reason: HOSPADM

## 2025-01-01 RX ORDER — FUROSEMIDE 10 MG/ML
80 INJECTION INTRAMUSCULAR; INTRAVENOUS ONCE
Status: COMPLETED | OUTPATIENT
Start: 2025-01-01 | End: 2025-01-01

## 2025-01-01 RX ORDER — OXYCODONE HCL 5 MG/5 ML
2.5 SOLUTION, ORAL ORAL
Refills: 0 | Status: DISCONTINUED | OUTPATIENT
Start: 2025-01-01 | End: 2025-01-01

## 2025-01-01 RX ORDER — ATORVASTATIN CALCIUM 40 MG/1
40 TABLET, FILM COATED ORAL
Status: DISCONTINUED | OUTPATIENT
Start: 2025-01-01 | End: 2025-01-01

## 2025-01-01 RX ORDER — PROPOFOL 10 MG/ML
INJECTION, EMULSION INTRAVENOUS
Status: COMPLETED
Start: 2025-01-01 | End: 2025-01-01

## 2025-01-01 RX ORDER — HEPARIN SODIUM 10000 [USP'U]/100ML
3-24 INJECTION, SOLUTION INTRAVENOUS
Status: DISCONTINUED | OUTPATIENT
Start: 2025-01-01 | End: 2025-01-01

## 2025-01-01 RX ORDER — CALCIUM GLUCONATE 20 MG/ML
2 INJECTION, SOLUTION INTRAVENOUS ONCE
Status: COMPLETED | OUTPATIENT
Start: 2025-01-01 | End: 2025-01-01

## 2025-01-01 RX ORDER — OXYCODONE HYDROCHLORIDE 5 MG/1
5 TABLET ORAL EVERY 6 HOURS PRN
Refills: 0 | Status: DISCONTINUED | OUTPATIENT
Start: 2025-01-01 | End: 2025-01-01

## 2025-01-01 RX ORDER — HALOPERIDOL 5 MG/ML
1 INJECTION INTRAMUSCULAR EVERY 2 HOUR PRN
Status: DISCONTINUED | OUTPATIENT
Start: 2025-01-01 | End: 2025-01-01 | Stop reason: HOSPADM

## 2025-01-01 RX ORDER — MAGNESIUM SULFATE HEPTAHYDRATE 40 MG/ML
2 INJECTION, SOLUTION INTRAVENOUS ONCE
Status: COMPLETED | OUTPATIENT
Start: 2025-01-01 | End: 2025-01-01

## 2025-01-01 RX ORDER — ALBUMIN (HUMAN) 12.5 G/50ML
12.5 SOLUTION INTRAVENOUS EVERY 6 HOURS
Status: DISCONTINUED | OUTPATIENT
Start: 2025-01-01 | End: 2025-01-01

## 2025-01-01 RX ORDER — ALBUMIN (HUMAN) 12.5 G/50ML
12.5 SOLUTION INTRAVENOUS ONCE
Status: COMPLETED | OUTPATIENT
Start: 2025-01-01 | End: 2025-01-01

## 2025-01-01 RX ORDER — GADOBUTROL 604.72 MG/ML
10 INJECTION INTRAVENOUS
Status: COMPLETED | OUTPATIENT
Start: 2025-01-01 | End: 2025-01-01

## 2025-01-01 RX ORDER — SUCCINYLCHOLINE/SOD CL,ISO/PF 100 MG/5ML
SYRINGE (ML) INTRAVENOUS CODE/TRAUMA/SEDATION MEDICATION
Status: COMPLETED | OUTPATIENT
Start: 2025-01-01 | End: 2025-01-01

## 2025-01-01 RX ORDER — ASCORBIC ACID 500 MG
250 TABLET ORAL DAILY
Status: DISCONTINUED | OUTPATIENT
Start: 2025-01-01 | End: 2025-01-01

## 2025-01-01 RX ORDER — HYDROXYCHLOROQUINE SULFATE 200 MG/1
400 TABLET, FILM COATED ORAL
Status: DISCONTINUED | OUTPATIENT
Start: 2025-01-01 | End: 2025-01-01

## 2025-01-01 RX ORDER — METOLAZONE 5 MG/1
5 TABLET ORAL ONCE
Status: COMPLETED | OUTPATIENT
Start: 2025-01-01 | End: 2025-01-01

## 2025-01-01 RX ORDER — PROPOFOL 10 MG/ML
5-50 INJECTION, EMULSION INTRAVENOUS
Status: DISCONTINUED | OUTPATIENT
Start: 2025-01-01 | End: 2025-01-01

## 2025-01-01 RX ORDER — BUMETANIDE 0.25 MG/ML
2 INJECTION INTRAMUSCULAR; INTRAVENOUS CONTINUOUS
Status: DISCONTINUED | OUTPATIENT
Start: 2025-01-01 | End: 2025-01-01

## 2025-01-01 RX ORDER — TORSEMIDE 10 MG/1
10 TABLET ORAL EVERY OTHER DAY
Status: DISCONTINUED | OUTPATIENT
Start: 2025-01-01 | End: 2025-01-01

## 2025-01-01 RX ORDER — ACETAMINOPHEN 325 MG/1
650 TABLET ORAL EVERY 6 HOURS PRN
Status: DISCONTINUED | OUTPATIENT
Start: 2025-01-01 | End: 2025-01-01

## 2025-01-01 RX ORDER — FUROSEMIDE 10 MG/ML
60 INJECTION INTRAMUSCULAR; INTRAVENOUS ONCE
Status: COMPLETED | OUTPATIENT
Start: 2025-01-01 | End: 2025-01-01

## 2025-01-01 RX ORDER — METHYLPREDNISOLONE SODIUM SUCCINATE 40 MG/ML
40 INJECTION, POWDER, LYOPHILIZED, FOR SOLUTION INTRAMUSCULAR; INTRAVENOUS EVERY 8 HOURS SCHEDULED
Status: DISCONTINUED | OUTPATIENT
Start: 2025-01-01 | End: 2025-01-01

## 2025-01-01 RX ORDER — INSULIN LISPRO 100 [IU]/ML
2-12 INJECTION, SOLUTION INTRAVENOUS; SUBCUTANEOUS EVERY 6 HOURS SCHEDULED
Status: DISCONTINUED | OUTPATIENT
Start: 2025-01-01 | End: 2025-01-01

## 2025-01-01 RX ORDER — HEPARIN SODIUM 1000 [USP'U]/ML
2000 INJECTION, SOLUTION INTRAVENOUS; SUBCUTANEOUS EVERY 6 HOURS PRN
Status: DISCONTINUED | OUTPATIENT
Start: 2025-01-01 | End: 2025-01-01

## 2025-01-01 RX ORDER — HYDROMORPHONE HCL IN WATER/PF 6 MG/30 ML
0.2 PATIENT CONTROLLED ANALGESIA SYRINGE INTRAVENOUS ONCE
Status: COMPLETED | OUTPATIENT
Start: 2025-01-01 | End: 2025-01-01

## 2025-01-01 RX ORDER — EPINEPHRINE 0.1 MG/ML
INJECTION INTRAVENOUS CODE/TRAUMA/SEDATION MEDICATION
Status: COMPLETED | OUTPATIENT
Start: 2025-01-01 | End: 2025-01-01

## 2025-01-01 RX ORDER — ALBUTEROL SULFATE 0.83 MG/ML
2.5 SOLUTION RESPIRATORY (INHALATION) EVERY 6 HOURS PRN
Status: DISCONTINUED | OUTPATIENT
Start: 2025-01-01 | End: 2025-01-01

## 2025-01-01 RX ORDER — SODIUM CHLORIDE, SODIUM GLUCONATE, SODIUM ACETATE, POTASSIUM CHLORIDE, MAGNESIUM CHLORIDE, SODIUM PHOSPHATE, DIBASIC, AND POTASSIUM PHOSPHATE .53; .5; .37; .037; .03; .012; .00082 G/100ML; G/100ML; G/100ML; G/100ML; G/100ML; G/100ML; G/100ML
1000 INJECTION, SOLUTION INTRAVENOUS ONCE
Status: COMPLETED | OUTPATIENT
Start: 2025-01-01 | End: 2025-01-01

## 2025-01-01 RX ORDER — FENTANYL CITRATE-0.9 % NACL/PF 10 MCG/ML
50 PLASTIC BAG, INJECTION (ML) INTRAVENOUS CONTINUOUS
Status: DISCONTINUED | OUTPATIENT
Start: 2025-01-01 | End: 2025-01-01

## 2025-01-01 RX ORDER — FOLIC ACID 1 MG/1
1 TABLET ORAL DAILY
Status: DISCONTINUED | OUTPATIENT
Start: 2025-01-01 | End: 2025-01-01

## 2025-01-01 RX ORDER — SODIUM BICARBONATE 84 MG/ML
INJECTION, SOLUTION INTRAVENOUS CODE/TRAUMA/SEDATION MEDICATION
Status: COMPLETED | OUTPATIENT
Start: 2025-01-01 | End: 2025-01-01

## 2025-01-01 RX ORDER — HEPARIN SODIUM 1000 [USP'U]/ML
4000 INJECTION, SOLUTION INTRAVENOUS; SUBCUTANEOUS ONCE
Status: DISCONTINUED | OUTPATIENT
Start: 2025-01-01 | End: 2025-01-01

## 2025-01-01 RX ORDER — LEVALBUTEROL INHALATION SOLUTION 0.63 MG/3ML
0.63 SOLUTION RESPIRATORY (INHALATION)
Status: DISCONTINUED | OUTPATIENT
Start: 2025-01-01 | End: 2025-01-01

## 2025-01-01 RX ORDER — ROCURONIUM BROMIDE 10 MG/ML
INJECTION, SOLUTION INTRAVENOUS AS NEEDED
Status: DISCONTINUED | OUTPATIENT
Start: 2025-01-01 | End: 2025-01-01

## 2025-01-01 RX ORDER — TORSEMIDE 10 MG/1
10 TABLET ORAL DAILY
Status: DISCONTINUED | OUTPATIENT
Start: 2025-01-01 | End: 2025-01-01

## 2025-01-01 RX ORDER — CALCIUM GLUCONATE 20 MG/ML
INJECTION, SOLUTION INTRAVENOUS
Status: COMPLETED
Start: 2025-01-01 | End: 2025-01-01

## 2025-01-01 RX ORDER — OXYCODONE HCL 5 MG/5 ML
5 SOLUTION, ORAL ORAL EVERY 6 HOURS
Refills: 0 | Status: DISCONTINUED | OUTPATIENT
Start: 2025-01-01 | End: 2025-01-01

## 2025-01-01 RX ORDER — FENTANYL CITRATE 50 UG/ML
50 INJECTION, SOLUTION INTRAMUSCULAR; INTRAVENOUS EVERY 2 HOUR PRN
Refills: 0 | Status: DISCONTINUED | OUTPATIENT
Start: 2025-01-01 | End: 2025-01-01

## 2025-01-01 RX ORDER — LIDOCAINE 50 MG/G
3 PATCH TOPICAL DAILY
Status: DISCONTINUED | OUTPATIENT
Start: 2025-01-01 | End: 2025-01-01

## 2025-01-01 RX ORDER — PANTOPRAZOLE SODIUM 40 MG/10ML
40 INJECTION, POWDER, LYOPHILIZED, FOR SOLUTION INTRAVENOUS
Status: DISCONTINUED | OUTPATIENT
Start: 2025-01-01 | End: 2025-01-01

## 2025-01-01 RX ORDER — FENTANYL CITRATE 50 UG/ML
25 INJECTION, SOLUTION INTRAMUSCULAR; INTRAVENOUS ONCE
Refills: 0 | Status: COMPLETED | OUTPATIENT
Start: 2025-01-01 | End: 2025-01-01

## 2025-01-01 RX ORDER — OXYCODONE HCL 5 MG/5 ML
7.5 SOLUTION, ORAL ORAL EVERY 6 HOURS
Refills: 0 | Status: DISCONTINUED | OUTPATIENT
Start: 2025-01-01 | End: 2025-01-01

## 2025-01-01 RX ORDER — PANTOPRAZOLE SODIUM 40 MG/1
40 TABLET, DELAYED RELEASE ORAL
Status: DISCONTINUED | OUTPATIENT
Start: 2025-01-01 | End: 2025-01-01

## 2025-01-01 RX ORDER — CEFAZOLIN SODIUM 2 G/50ML
2000 SOLUTION INTRAVENOUS EVERY 8 HOURS
Status: DISCONTINUED | OUTPATIENT
Start: 2025-01-01 | End: 2025-01-01

## 2025-01-01 RX ORDER — SODIUM CHLORIDE, SODIUM LACTATE, POTASSIUM CHLORIDE, CALCIUM CHLORIDE 600; 310; 30; 20 MG/100ML; MG/100ML; MG/100ML; MG/100ML
INJECTION, SOLUTION INTRAVENOUS CONTINUOUS PRN
Status: DISCONTINUED | OUTPATIENT
Start: 2025-01-01 | End: 2025-01-01

## 2025-01-01 RX ORDER — MORPHINE SULFATE 4 MG/ML
4 INJECTION, SOLUTION INTRAMUSCULAR; INTRAVENOUS
Status: DISCONTINUED | OUTPATIENT
Start: 2025-01-01 | End: 2025-01-01 | Stop reason: HOSPADM

## 2025-01-01 RX ORDER — METHYLPREDNISOLONE SODIUM SUCCINATE 40 MG/ML
40 INJECTION, POWDER, LYOPHILIZED, FOR SOLUTION INTRAMUSCULAR; INTRAVENOUS EVERY 12 HOURS SCHEDULED
Status: DISCONTINUED | OUTPATIENT
Start: 2025-01-01 | End: 2025-01-01

## 2025-01-01 RX ORDER — SODIUM CHLORIDE, SODIUM GLUCONATE, SODIUM ACETATE, POTASSIUM CHLORIDE, MAGNESIUM CHLORIDE, SODIUM PHOSPHATE, DIBASIC, AND POTASSIUM PHOSPHATE .53; .5; .37; .037; .03; .012; .00082 G/100ML; G/100ML; G/100ML; G/100ML; G/100ML; G/100ML; G/100ML
500 INJECTION, SOLUTION INTRAVENOUS ONCE
Status: DISCONTINUED | OUTPATIENT
Start: 2025-01-01 | End: 2025-01-01

## 2025-01-01 RX ORDER — TAMSULOSIN HYDROCHLORIDE 0.4 MG/1
0.4 CAPSULE ORAL
Status: DISCONTINUED | OUTPATIENT
Start: 2025-01-01 | End: 2025-01-01

## 2025-01-01 RX ORDER — SODIUM CHLORIDE, SODIUM GLUCONATE, SODIUM ACETATE, POTASSIUM CHLORIDE, MAGNESIUM CHLORIDE, SODIUM PHOSPHATE, DIBASIC, AND POTASSIUM PHOSPHATE .53; .5; .37; .037; .03; .012; .00082 G/100ML; G/100ML; G/100ML; G/100ML; G/100ML; G/100ML; G/100ML
250 INJECTION, SOLUTION INTRAVENOUS ONCE
Status: COMPLETED | OUTPATIENT
Start: 2025-01-01 | End: 2025-01-01

## 2025-01-01 RX ORDER — HEPARIN SODIUM 10000 [USP'U]/100ML
3-20 INJECTION, SOLUTION INTRAVENOUS
Status: DISCONTINUED | OUTPATIENT
Start: 2025-01-01 | End: 2025-01-01

## 2025-01-01 RX ORDER — OLANZAPINE 10 MG/2ML
5 INJECTION, POWDER, FOR SOLUTION INTRAMUSCULAR ONCE
Status: COMPLETED | OUTPATIENT
Start: 2025-01-01 | End: 2025-01-01

## 2025-01-01 RX ORDER — BISACODYL 10 MG
10 SUPPOSITORY, RECTAL RECTAL DAILY
Status: DISCONTINUED | OUTPATIENT
Start: 2025-01-01 | End: 2025-01-01

## 2025-01-01 RX ORDER — BISACODYL 10 MG
10 SUPPOSITORY, RECTAL RECTAL DAILY PRN
Status: DISCONTINUED | OUTPATIENT
Start: 2025-01-01 | End: 2025-01-01 | Stop reason: HOSPADM

## 2025-01-01 RX ORDER — HYDROMORPHONE HCL/PF 1 MG/ML
0.5 SYRINGE (ML) INJECTION EVERY 4 HOURS PRN
Status: DISCONTINUED | OUTPATIENT
Start: 2025-01-01 | End: 2025-01-01

## 2025-01-01 RX ORDER — LIDOCAINE HYDROCHLORIDE 10 MG/ML
INJECTION, SOLUTION EPIDURAL; INFILTRATION; INTRACAUDAL; PERINEURAL
Status: COMPLETED
Start: 2025-01-01 | End: 2025-01-01

## 2025-01-01 RX ORDER — MICONAZOLE NITRATE 20 MG/G
CREAM TOPICAL 2 TIMES DAILY
Status: DISCONTINUED | OUTPATIENT
Start: 2025-01-01 | End: 2025-01-01

## 2025-01-01 RX ORDER — INSULIN LISPRO 100 [IU]/ML
1-6 INJECTION, SOLUTION INTRAVENOUS; SUBCUTANEOUS EVERY 6 HOURS SCHEDULED
Status: DISCONTINUED | OUTPATIENT
Start: 2025-01-01 | End: 2025-01-01

## 2025-01-01 RX ORDER — MIDAZOLAM HYDROCHLORIDE 2 MG/2ML
4 INJECTION, SOLUTION INTRAMUSCULAR; INTRAVENOUS ONCE
Status: COMPLETED | OUTPATIENT
Start: 2025-01-01 | End: 2025-01-01

## 2025-01-01 RX ORDER — DEXMEDETOMIDINE HYDROCHLORIDE 4 UG/ML
.1-.7 INJECTION, SOLUTION INTRAVENOUS
Status: DISCONTINUED | OUTPATIENT
Start: 2025-01-01 | End: 2025-01-01

## 2025-01-01 RX ORDER — CALCIUM GLUCONATE 20 MG/ML
1 INJECTION, SOLUTION INTRAVENOUS ONCE
Status: DISCONTINUED | OUTPATIENT
Start: 2025-01-01 | End: 2025-01-01

## 2025-01-01 RX ORDER — PANTOPRAZOLE SODIUM 40 MG/10ML
40 INJECTION, POWDER, LYOPHILIZED, FOR SOLUTION INTRAVENOUS EVERY 12 HOURS SCHEDULED
Status: DISCONTINUED | OUTPATIENT
Start: 2025-01-01 | End: 2025-01-01

## 2025-01-01 RX ORDER — HEPARIN SODIUM 10000 [USP'U]/100ML
3-24 INJECTION, SOLUTION INTRAVENOUS
Status: DISCONTINUED | OUTPATIENT
Start: 2025-01-01 | End: 2025-01-01 | Stop reason: SDUPTHER

## 2025-01-01 RX ORDER — ASPIRIN 81 MG/1
81 TABLET ORAL DAILY
Status: DISCONTINUED | OUTPATIENT
Start: 2025-01-01 | End: 2025-01-01

## 2025-01-01 RX ORDER — ETOMIDATE 2 MG/ML
INJECTION INTRAVENOUS CODE/TRAUMA/SEDATION MEDICATION
Status: COMPLETED | OUTPATIENT
Start: 2025-01-01 | End: 2025-01-01

## 2025-01-01 RX ORDER — CHLORHEXIDINE GLUCONATE ORAL RINSE 1.2 MG/ML
15 SOLUTION DENTAL EVERY 12 HOURS SCHEDULED
Status: DISCONTINUED | OUTPATIENT
Start: 2025-01-01 | End: 2025-01-01 | Stop reason: SDUPTHER

## 2025-01-01 RX ORDER — LEVALBUTEROL INHALATION SOLUTION 1.25 MG/3ML
1.25 SOLUTION RESPIRATORY (INHALATION) EVERY 4 HOURS PRN
Status: DISCONTINUED | OUTPATIENT
Start: 2025-01-01 | End: 2025-01-01

## 2025-01-01 RX ORDER — LEVALBUTEROL INHALATION SOLUTION 1.25 MG/3ML
1.25 SOLUTION RESPIRATORY (INHALATION) EVERY 8 HOURS PRN
Status: DISCONTINUED | OUTPATIENT
Start: 2025-01-01 | End: 2025-01-01

## 2025-01-01 RX ORDER — HEPARIN SODIUM 1000 [USP'U]/ML
4000 INJECTION, SOLUTION INTRAVENOUS; SUBCUTANEOUS EVERY 6 HOURS PRN
Status: DISCONTINUED | OUTPATIENT
Start: 2025-01-01 | End: 2025-01-01

## 2025-01-01 RX ORDER — POTASSIUM CHLORIDE 14.9 MG/ML
20 INJECTION INTRAVENOUS
Status: COMPLETED | OUTPATIENT
Start: 2025-01-01 | End: 2025-01-01

## 2025-01-01 RX ORDER — HYDROMORPHONE HCL/PF 1 MG/ML
0.5 SYRINGE (ML) INJECTION
Status: DISCONTINUED | OUTPATIENT
Start: 2025-01-01 | End: 2025-01-01

## 2025-01-01 RX ORDER — FENTANYL CITRATE-0.9 % NACL/PF 10 MCG/ML
100 PLASTIC BAG, INJECTION (ML) INTRAVENOUS CONTINUOUS
Status: DISCONTINUED | OUTPATIENT
Start: 2025-01-01 | End: 2025-01-01 | Stop reason: HOSPADM

## 2025-01-01 RX ORDER — TORSEMIDE 20 MG/1
20 TABLET ORAL DAILY
Status: DISCONTINUED | OUTPATIENT
Start: 2025-01-01 | End: 2025-01-01

## 2025-01-01 RX ORDER — ACETAMINOPHEN 10 MG/ML
1000 INJECTION, SOLUTION INTRAVENOUS EVERY 6 HOURS SCHEDULED
Status: DISPENSED | OUTPATIENT
Start: 2025-01-01 | End: 2025-01-01

## 2025-01-01 RX ORDER — SODIUM CHLORIDE, SODIUM GLUCONATE, SODIUM ACETATE, POTASSIUM CHLORIDE, MAGNESIUM CHLORIDE, SODIUM PHOSPHATE, DIBASIC, AND POTASSIUM PHOSPHATE .53; .5; .37; .037; .03; .012; .00082 G/100ML; G/100ML; G/100ML; G/100ML; G/100ML; G/100ML; G/100ML
75 INJECTION, SOLUTION INTRAVENOUS CONTINUOUS
Status: DISCONTINUED | OUTPATIENT
Start: 2025-01-01 | End: 2025-01-01

## 2025-01-01 RX ORDER — CHLORHEXIDINE GLUCONATE ORAL RINSE 1.2 MG/ML
15 SOLUTION DENTAL EVERY 12 HOURS SCHEDULED
Status: DISCONTINUED | OUTPATIENT
Start: 2025-01-01 | End: 2025-01-01

## 2025-01-01 RX ORDER — POLYETHYLENE GLYCOL 3350 17 G/17G
17 POWDER, FOR SOLUTION ORAL DAILY PRN
Status: DISCONTINUED | OUTPATIENT
Start: 2025-01-01 | End: 2025-01-01

## 2025-01-01 RX ADMIN — Medication: at 20:58

## 2025-01-01 RX ADMIN — CEFAZOLIN SODIUM 2000 MG: 2 SOLUTION INTRAVENOUS at 16:26

## 2025-01-01 RX ADMIN — HYDROXYCHLOROQUINE SULFATE 400 MG: 200 TABLET ORAL at 08:12

## 2025-01-01 RX ADMIN — BUDESONIDE INHALATION 0.5 MG: 0.5 SUSPENSION RESPIRATORY (INHALATION) at 08:28

## 2025-01-01 RX ADMIN — CEFAZOLIN SODIUM 2000 MG: 2 SOLUTION INTRAVENOUS at 02:59

## 2025-01-01 RX ADMIN — PANTOPRAZOLE SODIUM 40 MG: 40 INJECTION, POWDER, LYOPHILIZED, FOR SOLUTION INTRAVENOUS at 21:16

## 2025-01-01 RX ADMIN — LIDOCAINE 5% 3 PATCH: 700 PATCH TOPICAL at 08:12

## 2025-01-01 RX ADMIN — BUDESONIDE INHALATION 0.5 MG: 0.5 SUSPENSION RESPIRATORY (INHALATION) at 19:54

## 2025-01-01 RX ADMIN — Medication 250 MG: at 08:23

## 2025-01-01 RX ADMIN — BUDESONIDE INHALATION 0.5 MG: 0.5 SUSPENSION RESPIRATORY (INHALATION) at 07:43

## 2025-01-01 RX ADMIN — IOHEXOL 100 ML: 350 INJECTION, SOLUTION INTRAVENOUS at 14:46

## 2025-01-01 RX ADMIN — TAMSULOSIN HYDROCHLORIDE 0.4 MG: 0.4 CAPSULE ORAL at 17:33

## 2025-01-01 RX ADMIN — HEPARIN SODIUM 6 UNITS/KG/HR: 10000 INJECTION, SOLUTION INTRAVENOUS at 08:25

## 2025-01-01 RX ADMIN — IPRATROPIUM BROMIDE 0.5 MG: 0.5 SOLUTION RESPIRATORY (INHALATION) at 08:28

## 2025-01-01 RX ADMIN — PANTOPRAZOLE SODIUM 40 MG: 40 TABLET, DELAYED RELEASE ORAL at 08:09

## 2025-01-01 RX ADMIN — POLYETHYLENE GLYCOL 3350 17 G: 17 POWDER, FOR SOLUTION ORAL at 08:34

## 2025-01-01 RX ADMIN — CEFAZOLIN SODIUM 2000 MG: 2 SOLUTION INTRAVENOUS at 20:48

## 2025-01-01 RX ADMIN — Medication: at 13:18

## 2025-01-01 RX ADMIN — SODIUM CHLORIDE SOLN NEBU 3% 4 ML: 3 NEBU SOLN at 19:54

## 2025-01-01 RX ADMIN — POTASSIUM CHLORIDE 40 MEQ: 1500 TABLET, EXTENDED RELEASE ORAL at 09:36

## 2025-01-01 RX ADMIN — HYDROMORPHONE HYDROCHLORIDE 0.2 MG: 0.2 INJECTION, SOLUTION INTRAMUSCULAR; INTRAVENOUS; SUBCUTANEOUS at 00:13

## 2025-01-01 RX ADMIN — FENTANYL CITRATE 50 MCG: 50 INJECTION INTRAMUSCULAR; INTRAVENOUS at 20:29

## 2025-01-01 RX ADMIN — VASOPRESSIN 0.04 UNITS/MIN: 20 INJECTION, SOLUTION INTRAVENOUS at 03:00

## 2025-01-01 RX ADMIN — DEXMEDETOMIDINE HYDROCHLORIDE 0.2 MCG/KG/HR: 400 INJECTION INTRAVENOUS at 02:52

## 2025-01-01 RX ADMIN — NOREPINEPHRINE BITARTRATE 3 MCG/MIN: 1 INJECTION, SOLUTION, CONCENTRATE INTRAVENOUS at 13:39

## 2025-01-01 RX ADMIN — FENTANYL CITRATE 50 MCG: 50 INJECTION INTRAMUSCULAR; INTRAVENOUS at 05:40

## 2025-01-01 RX ADMIN — CEFAZOLIN SODIUM 2000 MG: 2 SOLUTION INTRAVENOUS at 13:30

## 2025-01-01 RX ADMIN — SODIUM CHLORIDE SOLN NEBU 3% 4 ML: 3 NEBU SOLN at 14:16

## 2025-01-01 RX ADMIN — Medication 20000 ML: at 23:03

## 2025-01-01 RX ADMIN — LEVALBUTEROL HYDROCHLORIDE 1.25 MG: 1.25 SOLUTION RESPIRATORY (INHALATION) at 21:09

## 2025-01-01 RX ADMIN — PANTOPRAZOLE SODIUM 40 MG: 40 TABLET, DELAYED RELEASE ORAL at 05:19

## 2025-01-01 RX ADMIN — CEFAZOLIN SODIUM 2000 MG: 2 SOLUTION INTRAVENOUS at 04:48

## 2025-01-01 RX ADMIN — CHLORHEXIDINE GLUCONATE 15 ML: 1.2 SOLUTION ORAL at 21:29

## 2025-01-01 RX ADMIN — CEFAZOLIN SODIUM 2000 MG: 2 SOLUTION INTRAVENOUS at 20:14

## 2025-01-01 RX ADMIN — Medication 2000 UNITS: at 08:30

## 2025-01-01 RX ADMIN — Medication: at 21:05

## 2025-01-01 RX ADMIN — IPRATROPIUM BROMIDE 0.5 MG: 0.5 SOLUTION RESPIRATORY (INHALATION) at 07:43

## 2025-01-01 RX ADMIN — PANTOPRAZOLE SODIUM 40 MG: 40 TABLET, DELAYED RELEASE ORAL at 05:45

## 2025-01-01 RX ADMIN — Medication 2000 UNITS: at 08:12

## 2025-01-01 RX ADMIN — Medication 20000 ML: at 23:13

## 2025-01-01 RX ADMIN — HYDROXYCHLOROQUINE SULFATE 400 MG: 200 TABLET ORAL at 08:26

## 2025-01-01 RX ADMIN — Medication 2 MG/HR: at 04:52

## 2025-01-01 RX ADMIN — PIPERACILLIN AND TAZOBACTAM 4.5 G: 36; 4.5 INJECTION, POWDER, LYOPHILIZED, FOR SOLUTION INTRAVENOUS at 21:15

## 2025-01-01 RX ADMIN — Medication 1 MG/HR: at 20:02

## 2025-01-01 RX ADMIN — FENTANYL CITRATE 50 MCG: 50 INJECTION INTRAMUSCULAR; INTRAVENOUS at 16:28

## 2025-01-01 RX ADMIN — METOPROLOL TARTRATE 25 MG: 25 TABLET, FILM COATED ORAL at 08:23

## 2025-01-01 RX ADMIN — IPRATROPIUM BROMIDE 0.5 MG: 0.5 SOLUTION RESPIRATORY (INHALATION) at 13:37

## 2025-01-01 RX ADMIN — LIDOCAINE 5% 3 PATCH: 700 PATCH TOPICAL at 08:10

## 2025-01-01 RX ADMIN — Medication 75 MCG/HR: at 06:21

## 2025-01-01 RX ADMIN — POLYETHYLENE GLYCOL 3350 17 G: 17 POWDER, FOR SOLUTION ORAL at 08:20

## 2025-01-01 RX ADMIN — CEFAZOLIN SODIUM 2000 MG: 2 SOLUTION INTRAVENOUS at 20:27

## 2025-01-01 RX ADMIN — INSULIN LISPRO 2 UNITS: 100 INJECTION, SOLUTION INTRAVENOUS; SUBCUTANEOUS at 05:30

## 2025-01-01 RX ADMIN — Medication 20000 ML: at 12:05

## 2025-01-01 RX ADMIN — EPINEPHRINE 1 MG: 0.1 INJECTION INTRAVENOUS at 11:37

## 2025-01-01 RX ADMIN — LIDOCAINE 5% 3 PATCH: 700 PATCH TOPICAL at 08:31

## 2025-01-01 RX ADMIN — CALCIUM GLUCONATE 1 G: 20 INJECTION, SOLUTION INTRAVENOUS at 07:34

## 2025-01-01 RX ADMIN — CALCIUM GLUCONATE 1 G: 20 INJECTION, SOLUTION INTRAVENOUS at 22:49

## 2025-01-01 RX ADMIN — INSULIN LISPRO 1 UNITS: 100 INJECTION, SOLUTION INTRAVENOUS; SUBCUTANEOUS at 18:11

## 2025-01-01 RX ADMIN — SODIUM CHLORIDE, SODIUM GLUCONATE, SODIUM ACETATE, POTASSIUM CHLORIDE, MAGNESIUM CHLORIDE, SODIUM PHOSPHATE, DIBASIC, AND POTASSIUM PHOSPHATE 75 ML/HR: .53; .5; .37; .037; .03; .012; .00082 INJECTION, SOLUTION INTRAVENOUS at 09:01

## 2025-01-01 RX ADMIN — FENTANYL CITRATE 50 MCG: 50 INJECTION, SOLUTION INTRAMUSCULAR; INTRAVENOUS at 16:24

## 2025-01-01 RX ADMIN — Medication 250 MG: at 09:01

## 2025-01-01 RX ADMIN — SENNOSIDES, DOCUSATE SODIUM 1 TABLET: 8.6; 5 TABLET ORAL at 23:53

## 2025-01-01 RX ADMIN — LEVALBUTEROL HYDROCHLORIDE 1.25 MG: 1.25 SOLUTION RESPIRATORY (INHALATION) at 08:28

## 2025-01-01 RX ADMIN — Medication 100 MCG: at 08:23

## 2025-01-01 RX ADMIN — HYDROXYCHLOROQUINE SULFATE 400 MG: 200 TABLET ORAL at 09:02

## 2025-01-01 RX ADMIN — LIDOCAINE 5% 3 PATCH: 700 PATCH TOPICAL at 09:44

## 2025-01-01 RX ADMIN — METOPROLOL TARTRATE 25 MG: 25 TABLET, FILM COATED ORAL at 08:49

## 2025-01-01 RX ADMIN — SODIUM CHLORIDE, SODIUM GLUCONATE, SODIUM ACETATE, POTASSIUM CHLORIDE, MAGNESIUM CHLORIDE, SODIUM PHOSPHATE, DIBASIC, AND POTASSIUM PHOSPHATE 75 ML/HR: .53; .5; .37; .037; .03; .012; .00082 INJECTION, SOLUTION INTRAVENOUS at 05:23

## 2025-01-01 RX ADMIN — IPRATROPIUM BROMIDE 0.5 MG: 0.5 SOLUTION RESPIRATORY (INHALATION) at 14:33

## 2025-01-01 RX ADMIN — FLUTICASONE FUROATE 1 PUFF: 100 POWDER RESPIRATORY (INHALATION) at 09:44

## 2025-01-01 RX ADMIN — LEVALBUTEROL HYDROCHLORIDE 0.63 MG: 0.63 SOLUTION RESPIRATORY (INHALATION) at 15:38

## 2025-01-01 RX ADMIN — FLUTICASONE FUROATE 1 PUFF: 100 POWDER RESPIRATORY (INHALATION) at 16:08

## 2025-01-01 RX ADMIN — IPRATROPIUM BROMIDE 0.5 MG: 0.5 SOLUTION RESPIRATORY (INHALATION) at 14:00

## 2025-01-01 RX ADMIN — MIDAZOLAM HYDROCHLORIDE 4 MG: 1 INJECTION, SOLUTION INTRAMUSCULAR; INTRAVENOUS at 15:20

## 2025-01-01 RX ADMIN — LACTULOSE 30 G: 20 SOLUTION ORAL at 21:33

## 2025-01-01 RX ADMIN — LEVALBUTEROL HYDROCHLORIDE 1.25 MG: 1.25 SOLUTION RESPIRATORY (INHALATION) at 08:34

## 2025-01-01 RX ADMIN — SENNOSIDES 17.6 MG: 8.8 SYRUP ORAL at 21:29

## 2025-01-01 RX ADMIN — SODIUM CHLORIDE, SODIUM LACTATE, POTASSIUM CHLORIDE, AND CALCIUM CHLORIDE 500 ML: .6; .31; .03; .02 INJECTION, SOLUTION INTRAVENOUS at 00:10

## 2025-01-01 RX ADMIN — BUDESONIDE INHALATION 0.5 MG: 0.5 SUSPENSION RESPIRATORY (INHALATION) at 19:44

## 2025-01-01 RX ADMIN — ALBUMIN (HUMAN) 12.5 G: 12.5 INJECTION, SOLUTION INTRAVENOUS at 03:58

## 2025-01-01 RX ADMIN — HYDROMORPHONE HYDROCHLORIDE 0.5 MG: 1 INJECTION, SOLUTION INTRAMUSCULAR; INTRAVENOUS; SUBCUTANEOUS at 19:33

## 2025-01-01 RX ADMIN — DEXMEDETOMIDINE HYDROCHLORIDE 0.2 MCG/KG/HR: 400 INJECTION INTRAVENOUS at 11:18

## 2025-01-01 RX ADMIN — FOLIC ACID 1 MG: 1 TABLET ORAL at 08:32

## 2025-01-01 RX ADMIN — POTASSIUM CHLORIDE 20 MEQ: 200 INJECTION, SOLUTION INTRAVENOUS at 09:18

## 2025-01-01 RX ADMIN — FENTANYL CITRATE 50 MCG: 50 INJECTION INTRAMUSCULAR; INTRAVENOUS at 13:07

## 2025-01-01 RX ADMIN — LEVALBUTEROL HYDROCHLORIDE 1.25 MG: 1.25 SOLUTION RESPIRATORY (INHALATION) at 07:59

## 2025-01-01 RX ADMIN — CHLORHEXIDINE GLUCONATE 15 ML: 1.2 SOLUTION ORAL at 21:33

## 2025-01-01 RX ADMIN — CEFAZOLIN SODIUM 2000 MG: 2 SOLUTION INTRAVENOUS at 18:56

## 2025-01-01 RX ADMIN — APIXABAN 5 MG: 5 TABLET, FILM COATED ORAL at 17:40

## 2025-01-01 RX ADMIN — METOPROLOL TARTRATE 25 MG: 25 TABLET, FILM COATED ORAL at 21:08

## 2025-01-01 RX ADMIN — CALCIUM GLUCONATE 1 G: 20 INJECTION, SOLUTION INTRAVENOUS at 17:40

## 2025-01-01 RX ADMIN — FENTANYL CITRATE 100 MCG: 50 INJECTION INTRAMUSCULAR; INTRAVENOUS at 15:19

## 2025-01-01 RX ADMIN — ALBUMIN (HUMAN) 12.5 G: 0.25 INJECTION, SOLUTION INTRAVENOUS at 17:33

## 2025-01-01 RX ADMIN — LEVALBUTEROL HYDROCHLORIDE 1.25 MG: 1.25 SOLUTION RESPIRATORY (INHALATION) at 08:11

## 2025-01-01 RX ADMIN — TORSEMIDE 10 MG: 10 TABLET ORAL at 09:01

## 2025-01-01 RX ADMIN — SODIUM CHLORIDE SOLN NEBU 3% 4 ML: 3 NEBU SOLN at 13:27

## 2025-01-01 RX ADMIN — OXYCODONE HYDROCHLORIDE 5 MG: 5 SOLUTION ORAL at 23:59

## 2025-01-01 RX ADMIN — PIPERACILLIN AND TAZOBACTAM 4.5 G: 36; 4.5 INJECTION, POWDER, LYOPHILIZED, FOR SOLUTION INTRAVENOUS at 05:40

## 2025-01-01 RX ADMIN — Medication 100 MCG: at 09:43

## 2025-01-01 RX ADMIN — POTASSIUM CHLORIDE 40 MEQ: 29.8 INJECTION, SOLUTION INTRAVENOUS at 10:46

## 2025-01-01 RX ADMIN — SENNOSIDES, DOCUSATE SODIUM 1 TABLET: 8.6; 5 TABLET ORAL at 20:35

## 2025-01-01 RX ADMIN — PANTOPRAZOLE SODIUM 40 MG: 40 INJECTION, POWDER, LYOPHILIZED, FOR SOLUTION INTRAVENOUS at 20:38

## 2025-01-01 RX ADMIN — Medication 20000 ML: at 11:10

## 2025-01-01 RX ADMIN — MICONAZOLE NITRATE: 20 CREAM TOPICAL at 17:13

## 2025-01-01 RX ADMIN — ATORVASTATIN CALCIUM 40 MG: 40 TABLET, FILM COATED ORAL at 15:07

## 2025-01-01 RX ADMIN — SODIUM CHLORIDE SOLN NEBU 3% 4 ML: 3 NEBU SOLN at 21:07

## 2025-01-01 RX ADMIN — SODIUM CHLORIDE, SODIUM GLUCONATE, SODIUM ACETATE, POTASSIUM CHLORIDE, MAGNESIUM CHLORIDE, SODIUM PHOSPHATE, DIBASIC, AND POTASSIUM PHOSPHATE 500 ML: .53; .5; .37; .037; .03; .012; .00082 INJECTION, SOLUTION INTRAVENOUS at 00:56

## 2025-01-01 RX ADMIN — MICONAZOLE NITRATE: 20 CREAM TOPICAL at 08:49

## 2025-01-01 RX ADMIN — CEFAZOLIN SODIUM 2000 MG: 2 SOLUTION INTRAVENOUS at 06:14

## 2025-01-01 RX ADMIN — IPRATROPIUM BROMIDE 0.5 MG: 0.5 SOLUTION RESPIRATORY (INHALATION) at 13:27

## 2025-01-01 RX ADMIN — CEFAZOLIN SODIUM 2000 MG: 2 SOLUTION INTRAVENOUS at 13:15

## 2025-01-01 RX ADMIN — CHLORHEXIDINE GLUCONATE 15 ML: 1.2 SOLUTION ORAL at 20:56

## 2025-01-01 RX ADMIN — Medication 2000 UNITS: at 11:56

## 2025-01-01 RX ADMIN — CEFAZOLIN SODIUM 2000 MG: 2 SOLUTION INTRAVENOUS at 13:47

## 2025-01-01 RX ADMIN — BISACODYL 10 MG: 10 SUPPOSITORY RECTAL at 08:33

## 2025-01-01 RX ADMIN — CEFAZOLIN SODIUM 2000 MG: 2 SOLUTION INTRAVENOUS at 05:50

## 2025-01-01 RX ADMIN — PANTOPRAZOLE SODIUM 40 MG: 40 INJECTION, POWDER, LYOPHILIZED, FOR SOLUTION INTRAVENOUS at 08:06

## 2025-01-01 RX ADMIN — BUMETANIDE 2 MG: 0.25 INJECTION INTRAMUSCULAR; INTRAVENOUS at 08:54

## 2025-01-01 RX ADMIN — Medication 2 MG/HR: at 00:17

## 2025-01-01 RX ADMIN — ALBUMIN (HUMAN) 12.5 G: 0.25 INJECTION, SOLUTION INTRAVENOUS at 03:32

## 2025-01-01 RX ADMIN — Medication 2000 UNITS: at 08:48

## 2025-01-01 RX ADMIN — POTASSIUM CHLORIDE 20 MEQ: 14.9 INJECTION, SOLUTION INTRAVENOUS at 21:16

## 2025-01-01 RX ADMIN — HYDROMORPHONE HYDROCHLORIDE 0.5 MG: 1 INJECTION, SOLUTION INTRAMUSCULAR; INTRAVENOUS; SUBCUTANEOUS at 00:11

## 2025-01-01 RX ADMIN — FOLIC ACID 1 MG: 1 TABLET ORAL at 08:12

## 2025-01-01 RX ADMIN — CALCIUM GLUCONATE 1 G: 20 INJECTION, SOLUTION INTRAVENOUS at 11:47

## 2025-01-01 RX ADMIN — FOLIC ACID 1 MG: 1 TABLET ORAL at 13:15

## 2025-01-01 RX ADMIN — CEFAZOLIN SODIUM 2000 MG: 2 SOLUTION INTRAVENOUS at 20:56

## 2025-01-01 RX ADMIN — CEFAZOLIN SODIUM 2000 MG: 2 SOLUTION INTRAVENOUS at 05:39

## 2025-01-01 RX ADMIN — PIPERACILLIN AND TAZOBACTAM 4.5 G: 36; 4.5 INJECTION, POWDER, LYOPHILIZED, FOR SOLUTION INTRAVENOUS at 22:34

## 2025-01-01 RX ADMIN — IPRATROPIUM BROMIDE 0.5 MG: 0.5 SOLUTION RESPIRATORY (INHALATION) at 13:53

## 2025-01-01 RX ADMIN — CEFAZOLIN SODIUM 2000 MG: 2 SOLUTION INTRAVENOUS at 05:38

## 2025-01-01 RX ADMIN — CALCIUM GLUCONATE 1 G: 20 INJECTION, SOLUTION INTRAVENOUS at 03:11

## 2025-01-01 RX ADMIN — POTASSIUM CHLORIDE 40 MEQ: 1500 TABLET, EXTENDED RELEASE ORAL at 06:38

## 2025-01-01 RX ADMIN — SODIUM CHLORIDE, SODIUM LACTATE, POTASSIUM CHLORIDE, AND CALCIUM CHLORIDE 100 ML/HR: .6; .31; .03; .02 INJECTION, SOLUTION INTRAVENOUS at 00:10

## 2025-01-01 RX ADMIN — HEPARIN SODIUM 15 UNITS/KG/HR: 10000 INJECTION, SOLUTION INTRAVENOUS at 17:50

## 2025-01-01 RX ADMIN — POTASSIUM CHLORIDE 40 MEQ: 1500 TABLET, EXTENDED RELEASE ORAL at 16:57

## 2025-01-01 RX ADMIN — CHLORHEXIDINE GLUCONATE 15 ML: 1.2 SOLUTION ORAL at 21:34

## 2025-01-01 RX ADMIN — CHLORHEXIDINE GLUCONATE 15 ML: 1.2 SOLUTION ORAL at 08:58

## 2025-01-01 RX ADMIN — FENTANYL CITRATE 50 MCG: 50 INJECTION INTRAMUSCULAR; INTRAVENOUS at 18:07

## 2025-01-01 RX ADMIN — CEFAZOLIN SODIUM 2000 MG: 2 SOLUTION INTRAVENOUS at 13:11

## 2025-01-01 RX ADMIN — LEVALBUTEROL HYDROCHLORIDE 1.25 MG: 1.25 SOLUTION RESPIRATORY (INHALATION) at 20:24

## 2025-01-01 RX ADMIN — ALBUMIN (HUMAN) 25 G: 12.5 INJECTION, SOLUTION INTRAVENOUS at 06:53

## 2025-01-01 RX ADMIN — CEFAZOLIN SODIUM 2000 MG: 2 SOLUTION INTRAVENOUS at 18:00

## 2025-01-01 RX ADMIN — FLUTICASONE FUROATE 1 PUFF: 100 POWDER RESPIRATORY (INHALATION) at 08:28

## 2025-01-01 RX ADMIN — MAGNESIUM SULFATE HEPTAHYDRATE 2 G: 40 INJECTION, SOLUTION INTRAVENOUS at 10:22

## 2025-01-01 RX ADMIN — CALCIUM GLUCONATE 1 G: 20 INJECTION, SOLUTION INTRAVENOUS at 17:02

## 2025-01-01 RX ADMIN — Medication: at 21:45

## 2025-01-01 RX ADMIN — BUDESONIDE INHALATION 0.5 MG: 0.5 SUSPENSION RESPIRATORY (INHALATION) at 07:45

## 2025-01-01 RX ADMIN — LEVALBUTEROL HYDROCHLORIDE 1.25 MG: 1.25 SOLUTION RESPIRATORY (INHALATION) at 13:27

## 2025-01-01 RX ADMIN — LEVALBUTEROL HYDROCHLORIDE 1.25 MG: 1.25 SOLUTION RESPIRATORY (INHALATION) at 07:45

## 2025-01-01 RX ADMIN — Medication 20000 ML: at 11:13

## 2025-01-01 RX ADMIN — BUDESONIDE INHALATION 0.5 MG: 0.5 SUSPENSION RESPIRATORY (INHALATION) at 08:34

## 2025-01-01 RX ADMIN — SENNOSIDES, DOCUSATE SODIUM 1 TABLET: 8.6; 5 TABLET ORAL at 23:11

## 2025-01-01 RX ADMIN — FLUTICASONE FUROATE 1 PUFF: 100 POWDER RESPIRATORY (INHALATION) at 08:34

## 2025-01-01 RX ADMIN — VANCOMYCIN HYDROCHLORIDE 1000 MG: 1 INJECTION, SOLUTION INTRAVENOUS at 16:12

## 2025-01-01 RX ADMIN — IPRATROPIUM BROMIDE 0.5 MG: 0.5 SOLUTION RESPIRATORY (INHALATION) at 20:24

## 2025-01-01 RX ADMIN — CEFAZOLIN SODIUM 2000 MG: 2 SOLUTION INTRAVENOUS at 21:27

## 2025-01-01 RX ADMIN — Medication 20000 ML: at 21:02

## 2025-01-01 RX ADMIN — CEFAZOLIN SODIUM 2000 MG: 2 SOLUTION INTRAVENOUS at 15:06

## 2025-01-01 RX ADMIN — FENTANYL CITRATE 50 MCG: 50 INJECTION INTRAMUSCULAR; INTRAVENOUS at 07:52

## 2025-01-01 RX ADMIN — TAMSULOSIN HYDROCHLORIDE 0.4 MG: 0.4 CAPSULE ORAL at 17:25

## 2025-01-01 RX ADMIN — NOREPINEPHRINE BITARTRATE 2 MCG/MIN: 1 INJECTION, SOLUTION, CONCENTRATE INTRAVENOUS at 01:43

## 2025-01-01 RX ADMIN — Medication 2000 UNITS: at 08:06

## 2025-01-01 RX ADMIN — TAMSULOSIN HYDROCHLORIDE 0.4 MG: 0.4 CAPSULE ORAL at 17:29

## 2025-01-01 RX ADMIN — SODIUM BICARBONATE 50 MEQ: 84 INJECTION INTRAVENOUS at 11:41

## 2025-01-01 RX ADMIN — Medication 20000 ML: at 06:15

## 2025-01-01 RX ADMIN — IPRATROPIUM BROMIDE 0.5 MG: 0.5 SOLUTION RESPIRATORY (INHALATION) at 08:15

## 2025-01-01 RX ADMIN — PROPOFOL 5 MCG/KG/MIN: 10 INJECTION, EMULSION INTRAVENOUS at 21:20

## 2025-01-01 RX ADMIN — SODIUM BICARBONATE 50 MEQ: 84 INJECTION INTRAVENOUS at 11:37

## 2025-01-01 RX ADMIN — CEFAZOLIN SODIUM 2000 MG: 2 SOLUTION INTRAVENOUS at 05:41

## 2025-01-01 RX ADMIN — CALCIUM GLUCONATE 2 G: 20 INJECTION, SOLUTION INTRAVENOUS at 11:42

## 2025-01-01 RX ADMIN — SODIUM CHLORIDE, SODIUM GLUCONATE, SODIUM ACETATE, POTASSIUM CHLORIDE, MAGNESIUM CHLORIDE, SODIUM PHOSPHATE, DIBASIC, AND POTASSIUM PHOSPHATE 75 ML/HR: .53; .5; .37; .037; .03; .012; .00082 INJECTION, SOLUTION INTRAVENOUS at 18:10

## 2025-01-01 RX ADMIN — FENTANYL CITRATE 50 MCG: 50 INJECTION INTRAMUSCULAR; INTRAVENOUS at 05:08

## 2025-01-01 RX ADMIN — METOPROLOL TARTRATE 25 MG: 25 TABLET, FILM COATED ORAL at 21:14

## 2025-01-01 RX ADMIN — CEFAZOLIN SODIUM 2000 MG: 2 SOLUTION INTRAVENOUS at 15:15

## 2025-01-01 RX ADMIN — CALCIUM GLUCONATE 1 G: 20 INJECTION, SOLUTION INTRAVENOUS at 16:44

## 2025-01-01 RX ADMIN — ATORVASTATIN CALCIUM 40 MG: 40 TABLET, FILM COATED ORAL at 17:33

## 2025-01-01 RX ADMIN — CHLORHEXIDINE GLUCONATE 15 ML: 1.2 SOLUTION ORAL at 20:38

## 2025-01-01 RX ADMIN — SENNOSIDES 17.6 MG: 8.8 SYRUP ORAL at 21:10

## 2025-01-01 RX ADMIN — MICONAZOLE NITRATE: 20 CREAM TOPICAL at 18:11

## 2025-01-01 RX ADMIN — Medication 20000 ML: at 06:31

## 2025-01-01 RX ADMIN — LIDOCAINE 5% 3 PATCH: 700 PATCH TOPICAL at 09:33

## 2025-01-01 RX ADMIN — CEFAZOLIN SODIUM 2000 MG: 2 SOLUTION INTRAVENOUS at 20:06

## 2025-01-01 RX ADMIN — CEFAZOLIN SODIUM 2000 MG: 2 SOLUTION INTRAVENOUS at 19:36

## 2025-01-01 RX ADMIN — MAGNESIUM SULFATE HEPTAHYDRATE 1 G: 1 INJECTION, SOLUTION INTRAVENOUS at 10:06

## 2025-01-01 RX ADMIN — METOPROLOL TARTRATE 25 MG: 25 TABLET, FILM COATED ORAL at 21:56

## 2025-01-01 RX ADMIN — POLYETHYLENE GLYCOL 3350 17 G: 17 POWDER, FOR SOLUTION ORAL at 11:19

## 2025-01-01 RX ADMIN — ALBUTEROL SULFATE 2.5 MG: 2.5 SOLUTION RESPIRATORY (INHALATION) at 11:40

## 2025-01-01 RX ADMIN — MICONAZOLE NITRATE: 20 CREAM TOPICAL at 08:54

## 2025-01-01 RX ADMIN — METOLAZONE 5 MG: 5 TABLET ORAL at 15:04

## 2025-01-01 RX ADMIN — Medication 250 MG: at 13:15

## 2025-01-01 RX ADMIN — Medication 250 MG: at 08:26

## 2025-01-01 RX ADMIN — HYDROMORPHONE HYDROCHLORIDE 0.5 MG: 1 INJECTION, SOLUTION INTRAMUSCULAR; INTRAVENOUS; SUBCUTANEOUS at 12:08

## 2025-01-01 RX ADMIN — PANTOPRAZOLE SODIUM 40 MG: 40 INJECTION, POWDER, LYOPHILIZED, FOR SOLUTION INTRAVENOUS at 09:00

## 2025-01-01 RX ADMIN — CEFAZOLIN SODIUM 2000 MG: 2 SOLUTION INTRAVENOUS at 05:13

## 2025-01-01 RX ADMIN — Medication 3 MG: at 21:56

## 2025-01-01 RX ADMIN — FLUTICASONE FUROATE 1 PUFF: 100 POWDER RESPIRATORY (INHALATION) at 09:04

## 2025-01-01 RX ADMIN — SODIUM PHOSPHATE, MONOBASIC, MONOHYDRATE AND SODIUM PHOSPHATE, DIBASIC, ANHYDROUS 6 MMOL: 142; 276 INJECTION, SOLUTION INTRAVENOUS at 17:16

## 2025-01-01 RX ADMIN — ALBUMIN (HUMAN) 12.5 G: 0.25 INJECTION, SOLUTION INTRAVENOUS at 04:09

## 2025-01-01 RX ADMIN — METOPROLOL TARTRATE 25 MG: 25 TABLET, FILM COATED ORAL at 08:48

## 2025-01-01 RX ADMIN — Medication 2000 UNITS: at 09:37

## 2025-01-01 RX ADMIN — LEVALBUTEROL HYDROCHLORIDE 1.25 MG: 1.25 SOLUTION RESPIRATORY (INHALATION) at 14:22

## 2025-01-01 RX ADMIN — MICONAZOLE NITRATE: 20 CREAM TOPICAL at 17:04

## 2025-01-01 RX ADMIN — SODIUM CHLORIDE SOLN NEBU 3% 4 ML: 3 NEBU SOLN at 14:22

## 2025-01-01 RX ADMIN — CEFAZOLIN SODIUM 2000 MG: 2 SOLUTION INTRAVENOUS at 02:42

## 2025-01-01 RX ADMIN — SODIUM PHOSPHATE, MONOBASIC, MONOHYDRATE AND SODIUM PHOSPHATE, DIBASIC, ANHYDROUS 6 MMOL: 276; 142 INJECTION, SOLUTION INTRAVENOUS at 17:11

## 2025-01-01 RX ADMIN — POTASSIUM CHLORIDE 20 MEQ: 14.9 INJECTION, SOLUTION INTRAVENOUS at 00:10

## 2025-01-01 RX ADMIN — INDOMETHACIN 50 MEQ: 25 CAPSULE ORAL at 13:38

## 2025-01-01 RX ADMIN — BUDESONIDE INHALATION 0.5 MG: 0.5 SUSPENSION RESPIRATORY (INHALATION) at 20:24

## 2025-01-01 RX ADMIN — SODIUM PHOSPHATE, MONOBASIC, MONOHYDRATE AND SODIUM PHOSPHATE, DIBASIC, ANHYDROUS 6 MMOL: 142; 276 INJECTION, SOLUTION INTRAVENOUS at 00:57

## 2025-01-01 RX ADMIN — MAGNESIUM SULFATE HEPTAHYDRATE 1 G: 1 INJECTION, SOLUTION INTRAVENOUS at 13:04

## 2025-01-01 RX ADMIN — MICONAZOLE NITRATE 1 APPLICATION: 20 CREAM TOPICAL at 09:27

## 2025-01-01 RX ADMIN — CEFAZOLIN SODIUM 2000 MG: 2 SOLUTION INTRAVENOUS at 20:10

## 2025-01-01 RX ADMIN — SODIUM BICARBONATE 50 MEQ: 84 INJECTION INTRAVENOUS at 13:38

## 2025-01-01 RX ADMIN — METHYLPREDNISOLONE SODIUM SUCCINATE 40 MG: 40 INJECTION, POWDER, FOR SOLUTION INTRAMUSCULAR; INTRAVENOUS at 09:01

## 2025-01-01 RX ADMIN — DOCUSATE SODIUM 100 MG: 50 LIQUID ORAL at 08:20

## 2025-01-01 RX ADMIN — LIDOCAINE 5% 3 PATCH: 700 PATCH TOPICAL at 08:32

## 2025-01-01 RX ADMIN — DEXTROSE MONOHYDRATE 25 G: 500 INJECTION PARENTERAL at 10:35

## 2025-01-01 RX ADMIN — HYDROXYCHLOROQUINE SULFATE 400 MG: 200 TABLET ORAL at 09:30

## 2025-01-01 RX ADMIN — EPINEPHRINE 1 MG: 0.1 INJECTION INTRAVENOUS at 11:31

## 2025-01-01 RX ADMIN — Medication 250 MG: at 08:58

## 2025-01-01 RX ADMIN — PANTOPRAZOLE SODIUM 40 MG: 40 INJECTION, POWDER, LYOPHILIZED, FOR SOLUTION INTRAVENOUS at 22:05

## 2025-01-01 RX ADMIN — SODIUM PHOSPHATE, MONOBASIC, MONOHYDRATE AND SODIUM PHOSPHATE, DIBASIC, ANHYDROUS 9 MMOL: 142; 276 INJECTION, SOLUTION INTRAVENOUS at 00:09

## 2025-01-01 RX ADMIN — SODIUM CHLORIDE, SODIUM GLUCONATE, SODIUM ACETATE, POTASSIUM CHLORIDE, MAGNESIUM CHLORIDE, SODIUM PHOSPHATE, DIBASIC, AND POTASSIUM PHOSPHATE 500 ML: .53; .5; .37; .037; .03; .012; .00082 INJECTION, SOLUTION INTRAVENOUS at 12:13

## 2025-01-01 RX ADMIN — TAMSULOSIN HYDROCHLORIDE 0.4 MG: 0.4 CAPSULE ORAL at 17:04

## 2025-01-01 RX ADMIN — INSULIN LISPRO 1 UNITS: 100 INJECTION, SOLUTION INTRAVENOUS; SUBCUTANEOUS at 12:14

## 2025-01-01 RX ADMIN — Medication 100 MCG: at 08:33

## 2025-01-01 RX ADMIN — METHYLNALTREXONE BROMIDE 12 MG: 12 INJECTION, SOLUTION SUBCUTANEOUS at 12:31

## 2025-01-01 RX ADMIN — LIDOCAINE 5% 3 PATCH: 700 PATCH TOPICAL at 09:02

## 2025-01-01 RX ADMIN — FUROSEMIDE 40 MG: 10 INJECTION, SOLUTION INTRAMUSCULAR; INTRAVENOUS at 16:26

## 2025-01-01 RX ADMIN — SODIUM PHOSPHATE, MONOBASIC, MONOHYDRATE AND SODIUM PHOSPHATE, DIBASIC, ANHYDROUS 9 MMOL: 142; 276 INJECTION, SOLUTION INTRAVENOUS at 13:37

## 2025-01-01 RX ADMIN — FLUTICASONE FUROATE 1 PUFF: 100 POWDER RESPIRATORY (INHALATION) at 09:38

## 2025-01-01 RX ADMIN — NOREPINEPHRINE BITARTRATE 24 MCG/MIN: 1 INJECTION INTRAVENOUS at 12:12

## 2025-01-01 RX ADMIN — CHLORHEXIDINE GLUCONATE 15 ML: 1.2 SOLUTION ORAL at 08:20

## 2025-01-01 RX ADMIN — METOPROLOL TARTRATE 25 MG: 25 TABLET, FILM COATED ORAL at 08:32

## 2025-01-01 RX ADMIN — Medication 250 MG: at 08:50

## 2025-01-01 RX ADMIN — Medication 20000 ML: at 01:34

## 2025-01-01 RX ADMIN — Medication 20000 ML: at 04:28

## 2025-01-01 RX ADMIN — ALBUMIN (HUMAN) 25 G: 12.5 INJECTION, SOLUTION INTRAVENOUS at 20:38

## 2025-01-01 RX ADMIN — ALBUMIN (HUMAN) 25 G: 12.5 INJECTION, SOLUTION INTRAVENOUS at 00:05

## 2025-01-01 RX ADMIN — SODIUM CHLORIDE, SODIUM LACTATE, POTASSIUM CHLORIDE, AND CALCIUM CHLORIDE 100 ML/HR: .6; .31; .03; .02 INJECTION, SOLUTION INTRAVENOUS at 01:53

## 2025-01-01 RX ADMIN — Medication 20000 ML: at 22:05

## 2025-01-01 RX ADMIN — MICONAZOLE NITRATE: 20 CREAM TOPICAL at 17:25

## 2025-01-01 RX ADMIN — CALCIUM GLUCONATE 2 G: 20 INJECTION, SOLUTION INTRAVENOUS at 19:51

## 2025-01-01 RX ADMIN — MICONAZOLE NITRATE: 20 CREAM TOPICAL at 08:51

## 2025-01-01 RX ADMIN — INSULIN LISPRO 6 UNITS: 100 INJECTION, SOLUTION INTRAVENOUS; SUBCUTANEOUS at 18:16

## 2025-01-01 RX ADMIN — METOPROLOL TARTRATE 25 MG: 25 TABLET, FILM COATED ORAL at 20:28

## 2025-01-01 RX ADMIN — Medication 20000 ML: at 05:16

## 2025-01-01 RX ADMIN — MAGNESIUM SULFATE HEPTAHYDRATE 1 G: 1 INJECTION, SOLUTION INTRAVENOUS at 06:20

## 2025-01-01 RX ADMIN — NOREPINEPHRINE BITARTRATE 24 MCG/MIN: 1 INJECTION, SOLUTION, CONCENTRATE INTRAVENOUS at 11:35

## 2025-01-01 RX ADMIN — SODIUM CHLORIDE, SODIUM GLUCONATE, SODIUM ACETATE, POTASSIUM CHLORIDE, MAGNESIUM CHLORIDE, SODIUM PHOSPHATE, DIBASIC, AND POTASSIUM PHOSPHATE 250 ML: .53; .5; .37; .037; .03; .012; .00082 INJECTION, SOLUTION INTRAVENOUS at 09:19

## 2025-01-01 RX ADMIN — SODIUM PHOSPHATE, MONOBASIC, MONOHYDRATE AND SODIUM PHOSPHATE, DIBASIC, ANHYDROUS 6 MMOL: 142; 276 INJECTION, SOLUTION INTRAVENOUS at 00:52

## 2025-01-01 RX ADMIN — PANTOPRAZOLE SODIUM 40 MG: 40 INJECTION, POWDER, LYOPHILIZED, FOR SOLUTION INTRAVENOUS at 08:10

## 2025-01-01 RX ADMIN — Medication: at 21:22

## 2025-01-01 RX ADMIN — MICONAZOLE NITRATE: 20 CREAM TOPICAL at 17:09

## 2025-01-01 RX ADMIN — TORSEMIDE 10 MG: 10 TABLET ORAL at 08:32

## 2025-01-01 RX ADMIN — Medication 100 MCG: at 09:37

## 2025-01-01 RX ADMIN — CEFAZOLIN SODIUM 2000 MG: 2 SOLUTION INTRAVENOUS at 20:29

## 2025-01-01 RX ADMIN — SODIUM CHLORIDE, SODIUM GLUCONATE, SODIUM ACETATE, POTASSIUM CHLORIDE, MAGNESIUM CHLORIDE, SODIUM PHOSPHATE, DIBASIC, AND POTASSIUM PHOSPHATE 75 ML/HR: .53; .5; .37; .037; .03; .012; .00082 INJECTION, SOLUTION INTRAVENOUS at 02:48

## 2025-01-01 RX ADMIN — PANTOPRAZOLE SODIUM 40 MG: 40 INJECTION, POWDER, LYOPHILIZED, FOR SOLUTION INTRAVENOUS at 08:20

## 2025-01-01 RX ADMIN — Medication 100 MCG/HR: at 06:27

## 2025-01-01 RX ADMIN — HEPARIN SODIUM 6 UNITS/KG/HR: 10000 INJECTION, SOLUTION INTRAVENOUS at 20:23

## 2025-01-01 RX ADMIN — IPRATROPIUM BROMIDE 0.5 MG: 0.5 SOLUTION RESPIRATORY (INHALATION) at 14:39

## 2025-01-01 RX ADMIN — FOLIC ACID 1 MG: 1 TABLET ORAL at 09:37

## 2025-01-01 RX ADMIN — ALBUMIN HUMAN 12.5 G: 50 SOLUTION INTRAVENOUS at 21:21

## 2025-01-01 RX ADMIN — FLUTICASONE FUROATE 1 PUFF: 100 POWDER RESPIRATORY (INHALATION) at 08:50

## 2025-01-01 RX ADMIN — ALBUMIN (HUMAN) 12.5 G: 12.5 INJECTION, SOLUTION INTRAVENOUS at 04:48

## 2025-01-01 RX ADMIN — CEFAZOLIN SODIUM 2000 MG: 2 SOLUTION INTRAVENOUS at 04:24

## 2025-01-01 RX ADMIN — INSULIN LISPRO 2 UNITS: 100 INJECTION, SOLUTION INTRAVENOUS; SUBCUTANEOUS at 06:45

## 2025-01-01 RX ADMIN — PANTOPRAZOLE SODIUM 40 MG: 40 INJECTION, POWDER, LYOPHILIZED, FOR SOLUTION INTRAVENOUS at 20:15

## 2025-01-01 RX ADMIN — MAGNESIUM SULFATE 1 G: 1 INJECTION INTRAVENOUS at 11:36

## 2025-01-01 RX ADMIN — ALBUMIN (HUMAN) 12.5 G: 0.25 INJECTION, SOLUTION INTRAVENOUS at 09:56

## 2025-01-01 RX ADMIN — POTASSIUM CHLORIDE 20 MEQ: 14.9 INJECTION, SOLUTION INTRAVENOUS at 23:19

## 2025-01-01 RX ADMIN — MAGNESIUM SULFATE 1 G: 1 INJECTION INTRAVENOUS at 05:33

## 2025-01-01 RX ADMIN — ROCURONIUM 20 MG: 50 INJECTION, SOLUTION INTRAVENOUS at 19:02

## 2025-01-01 RX ADMIN — OXYCODONE HYDROCHLORIDE 7.5 MG: 5 SOLUTION ORAL at 05:52

## 2025-01-01 RX ADMIN — SODIUM BICARBONATE 50 MEQ: 84 INJECTION INTRAVENOUS at 11:28

## 2025-01-01 RX ADMIN — INSULIN LISPRO 2 UNITS: 100 INJECTION, SOLUTION INTRAVENOUS; SUBCUTANEOUS at 12:04

## 2025-01-01 RX ADMIN — ALBUMIN (HUMAN) 12.5 G: 0.25 INJECTION, SOLUTION INTRAVENOUS at 09:16

## 2025-01-01 RX ADMIN — IPRATROPIUM BROMIDE 0.5 MG: 0.5 SOLUTION RESPIRATORY (INHALATION) at 14:22

## 2025-01-01 RX ADMIN — MICONAZOLE NITRATE: 20 CREAM TOPICAL at 08:21

## 2025-01-01 RX ADMIN — LEVALBUTEROL HYDROCHLORIDE 1.25 MG: 1.25 SOLUTION RESPIRATORY (INHALATION) at 19:44

## 2025-01-01 RX ADMIN — MAGNESIUM SULFATE 1 G: 1 INJECTION INTRAVENOUS at 00:07

## 2025-01-01 RX ADMIN — LEVALBUTEROL HYDROCHLORIDE 1.25 MG: 1.25 SOLUTION RESPIRATORY (INHALATION) at 14:00

## 2025-01-01 RX ADMIN — CHLORHEXIDINE GLUCONATE 15 ML: 1.2 SOLUTION ORAL at 20:14

## 2025-01-01 RX ADMIN — SODIUM CHLORIDE 1000 ML: 0.9 INJECTION, SOLUTION INTRAVENOUS at 10:45

## 2025-01-01 RX ADMIN — CEFAZOLIN SODIUM 2000 MG: 2 SOLUTION INTRAVENOUS at 02:34

## 2025-01-01 RX ADMIN — ACETAMINOPHEN 1000 MG: 10 INJECTION INTRAVENOUS at 06:13

## 2025-01-01 RX ADMIN — FENTANYL CITRATE 50 MCG: 50 INJECTION INTRAMUSCULAR; INTRAVENOUS at 02:55

## 2025-01-01 RX ADMIN — FUROSEMIDE 40 MG: 10 INJECTION, SOLUTION INTRAMUSCULAR; INTRAVENOUS at 13:19

## 2025-01-01 RX ADMIN — LEVALBUTEROL HYDROCHLORIDE 1.25 MG: 1.25 SOLUTION RESPIRATORY (INHALATION) at 10:05

## 2025-01-01 RX ADMIN — CEFAZOLIN SODIUM 2000 MG: 2 SOLUTION INTRAVENOUS at 06:11

## 2025-01-01 RX ADMIN — SODIUM PHOSPHATE, MONOBASIC, MONOHYDRATE AND SODIUM PHOSPHATE, DIBASIC, ANHYDROUS 9 MMOL: 142; 276 INJECTION, SOLUTION INTRAVENOUS at 11:36

## 2025-01-01 RX ADMIN — BISACODYL 10 MG: 10 SUPPOSITORY RECTAL at 08:12

## 2025-01-01 RX ADMIN — LACTULOSE 30 G: 20 SOLUTION ORAL at 21:34

## 2025-01-01 RX ADMIN — Medication: at 21:58

## 2025-01-01 RX ADMIN — MICONAZOLE NITRATE: 20 CREAM TOPICAL at 17:40

## 2025-01-01 RX ADMIN — ATORVASTATIN CALCIUM 40 MG: 40 TABLET, FILM COATED ORAL at 16:57

## 2025-01-01 RX ADMIN — VASOPRESSIN 0.04 UNITS/MIN: 20 INJECTION, SOLUTION INTRAVENOUS at 18:06

## 2025-01-01 RX ADMIN — Medication 100 MCG: at 08:06

## 2025-01-01 RX ADMIN — HYDROMORPHONE HYDROCHLORIDE 0.5 MG: 1 INJECTION, SOLUTION INTRAMUSCULAR; INTRAVENOUS; SUBCUTANEOUS at 08:09

## 2025-01-01 RX ADMIN — INSULIN LISPRO 2 UNITS: 100 INJECTION, SOLUTION INTRAVENOUS; SUBCUTANEOUS at 23:41

## 2025-01-01 RX ADMIN — SODIUM CHLORIDE: 0.9 INJECTION, SOLUTION INTRAVENOUS at 17:46

## 2025-01-01 RX ADMIN — IPRATROPIUM BROMIDE 0.5 MG: 0.5 SOLUTION RESPIRATORY (INHALATION) at 07:45

## 2025-01-01 RX ADMIN — INSULIN LISPRO 2 UNITS: 100 INJECTION, SOLUTION INTRAVENOUS; SUBCUTANEOUS at 12:16

## 2025-01-01 RX ADMIN — PANTOPRAZOLE SODIUM 40 MG: 40 INJECTION, POWDER, LYOPHILIZED, FOR SOLUTION INTRAVENOUS at 20:57

## 2025-01-01 RX ADMIN — TORSEMIDE 10 MG: 10 TABLET ORAL at 08:06

## 2025-01-01 RX ADMIN — SODIUM CHLORIDE, SODIUM GLUCONATE, SODIUM ACETATE, POTASSIUM CHLORIDE, MAGNESIUM CHLORIDE, SODIUM PHOSPHATE, DIBASIC, AND POTASSIUM PHOSPHATE 75 ML/HR: .53; .5; .37; .037; .03; .012; .00082 INJECTION, SOLUTION INTRAVENOUS at 13:29

## 2025-01-01 RX ADMIN — SODIUM CHLORIDE, SODIUM GLUCONATE, SODIUM ACETATE, POTASSIUM CHLORIDE, MAGNESIUM CHLORIDE, SODIUM PHOSPHATE, DIBASIC, AND POTASSIUM PHOSPHATE 1000 ML: .53; .5; .37; .037; .03; .012; .00082 INJECTION, SOLUTION INTRAVENOUS at 13:40

## 2025-01-01 RX ADMIN — INSULIN LISPRO 2 UNITS: 100 INJECTION, SOLUTION INTRAVENOUS; SUBCUTANEOUS at 06:06

## 2025-01-01 RX ADMIN — BISACODYL 10 MG: 10 SUPPOSITORY RECTAL at 08:11

## 2025-01-01 RX ADMIN — SENNOSIDES, DOCUSATE SODIUM 1 TABLET: 8.6; 5 TABLET ORAL at 21:01

## 2025-01-01 RX ADMIN — ATORVASTATIN CALCIUM 40 MG: 40 TABLET, FILM COATED ORAL at 17:45

## 2025-01-01 RX ADMIN — ALBUMIN (HUMAN) 12.5 G: 0.25 INJECTION, SOLUTION INTRAVENOUS at 14:30

## 2025-01-01 RX ADMIN — LIDOCAINE 5% 3 PATCH: 700 PATCH TOPICAL at 09:24

## 2025-01-01 RX ADMIN — HYDROMORPHONE HYDROCHLORIDE 0.2 MG: 0.2 INJECTION, SOLUTION INTRAMUSCULAR; INTRAVENOUS; SUBCUTANEOUS at 18:37

## 2025-01-01 RX ADMIN — MEROPENEM 1000 MG: 1 INJECTION, POWDER, FOR SOLUTION INTRAVENOUS at 12:21

## 2025-01-01 RX ADMIN — GLYCERIN 1 SUPPOSITORY: 2 SUPPOSITORY RECTAL at 21:29

## 2025-01-01 RX ADMIN — BUMETANIDE 2 MG: 0.25 INJECTION INTRAMUSCULAR; INTRAVENOUS at 16:14

## 2025-01-01 RX ADMIN — OXYCODONE HYDROCHLORIDE 5 MG: 5 SOLUTION ORAL at 05:45

## 2025-01-01 RX ADMIN — DOCUSATE SODIUM 100 MG: 50 LIQUID ORAL at 17:11

## 2025-01-01 RX ADMIN — NOREPINEPHRINE BITARTRATE 4 MCG/MIN: 1 INJECTION, SOLUTION, CONCENTRATE INTRAVENOUS at 21:58

## 2025-01-01 RX ADMIN — OXYCODONE HYDROCHLORIDE 5 MG: 5 SOLUTION ORAL at 17:13

## 2025-01-01 RX ADMIN — CEFAZOLIN SODIUM 2000 MG: 2 SOLUTION INTRAVENOUS at 12:38

## 2025-01-01 RX ADMIN — SODIUM CHLORIDE 8 MG/HR: 9 INJECTION, SOLUTION INTRAVENOUS at 02:48

## 2025-01-01 RX ADMIN — CEFAZOLIN SODIUM 2000 MG: 2 SOLUTION INTRAVENOUS at 11:00

## 2025-01-01 RX ADMIN — NOREPINEPHRINE BITARTRATE 4 MCG/MIN: 1 INJECTION INTRAVENOUS at 10:45

## 2025-01-01 RX ADMIN — UMECLIDINIUM BROMIDE AND VILANTEROL TRIFENATATE 1 PUFF: 62.5; 25 POWDER RESPIRATORY (INHALATION) at 08:49

## 2025-01-01 RX ADMIN — CHLORHEXIDINE GLUCONATE 15 ML: 1.2 SOLUTION ORAL at 20:23

## 2025-01-01 RX ADMIN — METOPROLOL TARTRATE 25 MG: 25 TABLET, FILM COATED ORAL at 09:38

## 2025-01-01 RX ADMIN — SODIUM CHLORIDE SOLN NEBU 3% 4 ML: 3 NEBU SOLN at 08:26

## 2025-01-01 RX ADMIN — ALBUMIN HUMAN 12.5 G: 50 SOLUTION INTRAVENOUS at 10:43

## 2025-01-01 RX ADMIN — PANTOPRAZOLE SODIUM 40 MG: 40 INJECTION, POWDER, LYOPHILIZED, FOR SOLUTION INTRAVENOUS at 08:13

## 2025-01-01 RX ADMIN — FLUTICASONE FUROATE 1 PUFF: 100 POWDER RESPIRATORY (INHALATION) at 08:49

## 2025-01-01 RX ADMIN — MICONAZOLE NITRATE: 20 CREAM TOPICAL at 09:18

## 2025-01-01 RX ADMIN — HYDROXYCHLOROQUINE SULFATE 400 MG: 200 TABLET ORAL at 09:26

## 2025-01-01 RX ADMIN — CALCIUM GLUCONATE 2 G: 20 INJECTION, SOLUTION INTRAVENOUS at 15:26

## 2025-01-01 RX ADMIN — MAGNESIUM SULFATE HEPTAHYDRATE 1 G: 1 INJECTION, SOLUTION INTRAVENOUS at 00:40

## 2025-01-01 RX ADMIN — FLUTICASONE FUROATE 1 PUFF: 100 POWDER RESPIRATORY (INHALATION) at 09:03

## 2025-01-01 RX ADMIN — MIDAZOLAM HYDROCHLORIDE 4 MG: 2 INJECTION, SOLUTION INTRAMUSCULAR; INTRAVENOUS at 15:20

## 2025-01-01 RX ADMIN — CALCIUM GLUCONATE 1 G: 20 INJECTION, SOLUTION INTRAVENOUS at 05:36

## 2025-01-01 RX ADMIN — PANTOPRAZOLE SODIUM 40 MG: 40 TABLET, DELAYED RELEASE ORAL at 08:48

## 2025-01-01 RX ADMIN — Medication 50 MCG/HR: at 21:27

## 2025-01-01 RX ADMIN — OLANZAPINE 5 MG: 10 INJECTION, POWDER, FOR SOLUTION INTRAMUSCULAR at 08:19

## 2025-01-01 RX ADMIN — FLUTICASONE FUROATE 1 PUFF: 100 POWDER RESPIRATORY (INHALATION) at 08:24

## 2025-01-01 RX ADMIN — IPRATROPIUM BROMIDE 0.5 MG: 0.5 SOLUTION RESPIRATORY (INHALATION) at 07:56

## 2025-01-01 RX ADMIN — CHLORHEXIDINE GLUCONATE 15 ML: 1.2 SOLUTION ORAL at 08:11

## 2025-01-01 RX ADMIN — IPRATROPIUM BROMIDE 0.5 MG: 0.5 SOLUTION RESPIRATORY (INHALATION) at 07:59

## 2025-01-01 RX ADMIN — LIDOCAINE 5% 1 PATCH: 700 PATCH TOPICAL at 09:02

## 2025-01-01 RX ADMIN — DOCUSATE SODIUM 100 MG: 50 LIQUID ORAL at 08:31

## 2025-01-01 RX ADMIN — MICONAZOLE NITRATE: 20 CREAM TOPICAL at 08:33

## 2025-01-01 RX ADMIN — Medication 250 MG: at 08:34

## 2025-01-01 RX ADMIN — CEFAZOLIN SODIUM 2000 MG: 2 SOLUTION INTRAVENOUS at 10:10

## 2025-01-01 RX ADMIN — CHLORHEXIDINE GLUCONATE 15 ML: 1.2 SOLUTION ORAL at 08:13

## 2025-01-01 RX ADMIN — BUDESONIDE INHALATION 0.5 MG: 0.5 SUSPENSION RESPIRATORY (INHALATION) at 19:37

## 2025-01-01 RX ADMIN — EPINEPHRINE 1 MG: 0.1 INJECTION INTRAVENOUS at 11:40

## 2025-01-01 RX ADMIN — ACETAMINOPHEN 1000 MG: 10 INJECTION INTRAVENOUS at 17:51

## 2025-01-01 RX ADMIN — Medication 2000 UNITS: at 08:59

## 2025-01-01 RX ADMIN — BUDESONIDE INHALATION 0.5 MG: 0.5 SUSPENSION RESPIRATORY (INHALATION) at 19:53

## 2025-01-01 RX ADMIN — FOLIC ACID 1 MG: 1 TABLET ORAL at 08:23

## 2025-01-01 RX ADMIN — BUDESONIDE INHALATION 0.5 MG: 0.5 SUSPENSION RESPIRATORY (INHALATION) at 07:42

## 2025-01-01 RX ADMIN — MICONAZOLE NITRATE: 20 CREAM TOPICAL at 08:06

## 2025-01-01 RX ADMIN — PANTOPRAZOLE SODIUM 40 MG: 40 INJECTION, POWDER, LYOPHILIZED, FOR SOLUTION INTRAVENOUS at 18:07

## 2025-01-01 RX ADMIN — LACTULOSE 30 G: 20 SOLUTION ORAL at 08:34

## 2025-01-01 RX ADMIN — PANTOPRAZOLE SODIUM 40 MG: 40 INJECTION, POWDER, LYOPHILIZED, FOR SOLUTION INTRAVENOUS at 08:11

## 2025-01-01 RX ADMIN — Medication 100 MCG: at 08:32

## 2025-01-01 RX ADMIN — Medication 2000 UNITS: at 08:27

## 2025-01-01 RX ADMIN — Medication 2 MG/HR: at 11:04

## 2025-01-01 RX ADMIN — CEFAZOLIN SODIUM 2000 MG: 2 SOLUTION INTRAVENOUS at 21:26

## 2025-01-01 RX ADMIN — LACTULOSE 30 G: 20 SOLUTION ORAL at 16:03

## 2025-01-01 RX ADMIN — BUDESONIDE INHALATION 0.5 MG: 0.5 SUSPENSION RESPIRATORY (INHALATION) at 07:56

## 2025-01-01 RX ADMIN — BUDESONIDE INHALATION 0.5 MG: 0.5 SUSPENSION RESPIRATORY (INHALATION) at 19:36

## 2025-01-01 RX ADMIN — Medication 2000 UNITS: at 08:49

## 2025-01-01 RX ADMIN — SODIUM PHOSPHATE, MONOBASIC, MONOHYDRATE AND SODIUM PHOSPHATE, DIBASIC, ANHYDROUS 12 MMOL: 142; 276 INJECTION, SOLUTION INTRAVENOUS at 17:31

## 2025-01-01 RX ADMIN — FENTANYL CITRATE 50 MCG: 50 INJECTION INTRAMUSCULAR; INTRAVENOUS at 13:46

## 2025-01-01 RX ADMIN — FOLIC ACID 1 MG: 1 TABLET ORAL at 08:06

## 2025-01-01 RX ADMIN — HYDROXYCHLOROQUINE SULFATE 400 MG: 200 TABLET ORAL at 08:49

## 2025-01-01 RX ADMIN — PANTOPRAZOLE SODIUM 40 MG: 40 INJECTION, POWDER, LYOPHILIZED, FOR SOLUTION INTRAVENOUS at 20:23

## 2025-01-01 RX ADMIN — FLUTICASONE FUROATE 1 PUFF: 100 POWDER RESPIRATORY (INHALATION) at 09:33

## 2025-01-01 RX ADMIN — ROCURONIUM 50 MG: 50 INJECTION, SOLUTION INTRAVENOUS at 17:46

## 2025-01-01 RX ADMIN — MICONAZOLE NITRATE: 20 CREAM TOPICAL at 19:16

## 2025-01-01 RX ADMIN — ACETAMINOPHEN 1000 MG: 10 INJECTION INTRAVENOUS at 05:38

## 2025-01-01 RX ADMIN — ACETAMINOPHEN 1000 MG: 10 INJECTION INTRAVENOUS at 00:11

## 2025-01-01 RX ADMIN — BUDESONIDE INHALATION 0.5 MG: 0.5 SUSPENSION RESPIRATORY (INHALATION) at 08:10

## 2025-01-01 RX ADMIN — CEFAZOLIN SODIUM 2000 MG: 2 SOLUTION INTRAVENOUS at 18:15

## 2025-01-01 RX ADMIN — DOCUSATE SODIUM 100 MG: 50 LIQUID ORAL at 17:13

## 2025-01-01 RX ADMIN — HEPARIN SODIUM 15 UNITS/KG/HR: 10000 INJECTION, SOLUTION INTRAVENOUS at 15:15

## 2025-01-01 RX ADMIN — SODIUM PHOSPHATE, MONOBASIC, MONOHYDRATE AND SODIUM PHOSPHATE, DIBASIC, ANHYDROUS 6 MMOL: 142; 276 INJECTION, SOLUTION INTRAVENOUS at 11:42

## 2025-01-01 RX ADMIN — NOREPINEPHRINE BITARTRATE 5 MCG/MIN: 1 INJECTION, SOLUTION, CONCENTRATE INTRAVENOUS at 02:56

## 2025-01-01 RX ADMIN — FOLIC ACID 1 MG: 1 TABLET ORAL at 09:26

## 2025-01-01 RX ADMIN — VANCOMYCIN HYDROCHLORIDE 1750 MG: 5 INJECTION, POWDER, LYOPHILIZED, FOR SOLUTION INTRAVENOUS at 17:48

## 2025-01-01 RX ADMIN — CEFAZOLIN SODIUM 2000 MG: 2 SOLUTION INTRAVENOUS at 21:39

## 2025-01-01 RX ADMIN — IOHEXOL 100 ML: 350 INJECTION, SOLUTION INTRAVENOUS at 06:03

## 2025-01-01 RX ADMIN — Medication 250 MG: at 09:37

## 2025-01-01 RX ADMIN — POTASSIUM CHLORIDE 40 MEQ: 29.8 INJECTION, SOLUTION INTRAVENOUS at 11:11

## 2025-01-01 RX ADMIN — MICONAZOLE NITRATE: 20 CREAM TOPICAL at 09:16

## 2025-01-01 RX ADMIN — TORSEMIDE 10 MG: 10 TABLET ORAL at 08:49

## 2025-01-01 RX ADMIN — LEVALBUTEROL HYDROCHLORIDE 1.25 MG: 1.25 SOLUTION RESPIRATORY (INHALATION) at 08:23

## 2025-01-01 RX ADMIN — CALCIUM GLUCONATE 1 G: 20 INJECTION, SOLUTION INTRAVENOUS at 17:10

## 2025-01-01 RX ADMIN — FUROSEMIDE 60 MG: 10 INJECTION, SOLUTION INTRAMUSCULAR; INTRAVENOUS at 15:27

## 2025-01-01 RX ADMIN — HYDROMORPHONE HYDROCHLORIDE 0.5 MG: 1 INJECTION, SOLUTION INTRAMUSCULAR; INTRAVENOUS; SUBCUTANEOUS at 19:55

## 2025-01-01 RX ADMIN — INSULIN LISPRO 2 UNITS: 100 INJECTION, SOLUTION INTRAVENOUS; SUBCUTANEOUS at 00:25

## 2025-01-01 RX ADMIN — HYDROXYCHLOROQUINE SULFATE 400 MG: 200 TABLET ORAL at 09:46

## 2025-01-01 RX ADMIN — SODIUM CHLORIDE, SODIUM LACTATE, POTASSIUM CHLORIDE, AND CALCIUM CHLORIDE 500 ML: .6; .31; .03; .02 INJECTION, SOLUTION INTRAVENOUS at 01:15

## 2025-01-01 RX ADMIN — CALCIUM GLUCONATE 1 G: 20 INJECTION, SOLUTION INTRAVENOUS at 00:42

## 2025-01-01 RX ADMIN — SODIUM PHOSPHATE, MONOBASIC, MONOHYDRATE AND SODIUM PHOSPHATE, DIBASIC, ANHYDROUS 6 MMOL: 142; 276 INJECTION, SOLUTION INTRAVENOUS at 06:14

## 2025-01-01 RX ADMIN — SODIUM PHOSPHATE, MONOBASIC, MONOHYDRATE AND SODIUM PHOSPHATE, DIBASIC, ANHYDROUS 6 MMOL: 142; 276 INJECTION, SOLUTION INTRAVENOUS at 06:42

## 2025-01-01 RX ADMIN — HYDROXYCHLOROQUINE SULFATE 400 MG: 200 TABLET ORAL at 08:06

## 2025-01-01 RX ADMIN — ATORVASTATIN CALCIUM 40 MG: 40 TABLET, FILM COATED ORAL at 17:40

## 2025-01-01 RX ADMIN — IPRATROPIUM BROMIDE 0.5 MG: 0.5 SOLUTION RESPIRATORY (INHALATION) at 21:09

## 2025-01-01 RX ADMIN — CEFAZOLIN SODIUM 2000 MG: 2 SOLUTION INTRAVENOUS at 08:49

## 2025-01-01 RX ADMIN — INSULIN LISPRO 2 UNITS: 100 INJECTION, SOLUTION INTRAVENOUS; SUBCUTANEOUS at 17:20

## 2025-01-01 RX ADMIN — SENNOSIDES 17.6 MG: 8.8 SYRUP ORAL at 21:01

## 2025-01-01 RX ADMIN — MAGNESIUM SULFATE HEPTAHYDRATE 2 G: 40 INJECTION, SOLUTION INTRAVENOUS at 18:12

## 2025-01-01 RX ADMIN — CEFAZOLIN SODIUM 2000 MG: 2 SOLUTION INTRAVENOUS at 21:16

## 2025-01-01 RX ADMIN — CEFAZOLIN SODIUM 2000 MG: 2 SOLUTION INTRAVENOUS at 18:20

## 2025-01-01 RX ADMIN — MICONAZOLE NITRATE: 20 CREAM TOPICAL at 08:58

## 2025-01-01 RX ADMIN — FLUTICASONE FUROATE 1 PUFF: 100 POWDER RESPIRATORY (INHALATION) at 08:06

## 2025-01-01 RX ADMIN — Medication 50 MCG/HR: at 06:23

## 2025-01-01 RX ADMIN — APIXABAN 5 MG: 5 TABLET, FILM COATED ORAL at 08:34

## 2025-01-01 RX ADMIN — METOPROLOL TARTRATE 25 MG: 25 TABLET, FILM COATED ORAL at 09:44

## 2025-01-01 RX ADMIN — CHLORHEXIDINE GLUCONATE 15 ML: 1.2 SOLUTION ORAL at 08:06

## 2025-01-01 RX ADMIN — SENNOSIDES 17.6 MG: 8.8 SYRUP ORAL at 21:33

## 2025-01-01 RX ADMIN — HYDROXYCHLOROQUINE SULFATE 400 MG: 200 TABLET ORAL at 08:48

## 2025-01-01 RX ADMIN — LACTULOSE 30 G: 20 SOLUTION ORAL at 08:20

## 2025-01-01 RX ADMIN — CEFAZOLIN SODIUM 2000 MG: 2 SOLUTION INTRAVENOUS at 04:59

## 2025-01-01 RX ADMIN — MICONAZOLE NITRATE: 20 CREAM TOPICAL at 17:16

## 2025-01-01 RX ADMIN — ALBUMIN (HUMAN) 25 G: 12.5 INJECTION, SOLUTION INTRAVENOUS at 06:05

## 2025-01-01 RX ADMIN — Medication 100 MCG: at 08:12

## 2025-01-01 RX ADMIN — CALCIUM GLUCONATE 1 G: 20 INJECTION, SOLUTION INTRAVENOUS at 00:43

## 2025-01-01 RX ADMIN — METHYLPREDNISOLONE SODIUM SUCCINATE 40 MG: 40 INJECTION, POWDER, FOR SOLUTION INTRAMUSCULAR; INTRAVENOUS at 17:04

## 2025-01-01 RX ADMIN — CEFAZOLIN SODIUM 2000 MG: 2 SOLUTION INTRAVENOUS at 04:33

## 2025-01-01 RX ADMIN — Medication 100 MCG/HR: at 13:45

## 2025-01-01 RX ADMIN — PANTOPRAZOLE SODIUM 40 MG: 40 TABLET, DELAYED RELEASE ORAL at 05:13

## 2025-01-01 RX ADMIN — ATORVASTATIN CALCIUM 40 MG: 40 TABLET, FILM COATED ORAL at 17:15

## 2025-01-01 RX ADMIN — SODIUM PHOSPHATE, MONOBASIC, MONOHYDRATE AND SODIUM PHOSPHATE, DIBASIC, ANHYDROUS 6 MMOL: 142; 276 INJECTION, SOLUTION INTRAVENOUS at 11:54

## 2025-01-01 RX ADMIN — Medication 20000 ML: at 17:35

## 2025-01-01 RX ADMIN — Medication 50 MCG/HR: at 02:31

## 2025-01-01 RX ADMIN — TAMSULOSIN HYDROCHLORIDE 0.4 MG: 0.4 CAPSULE ORAL at 15:43

## 2025-01-01 RX ADMIN — FOLIC ACID 1 MG: 1 TABLET ORAL at 09:43

## 2025-01-01 RX ADMIN — CEFAZOLIN SODIUM 2000 MG: 2 SOLUTION INTRAVENOUS at 12:46

## 2025-01-01 RX ADMIN — CHLORHEXIDINE GLUCONATE 15 ML: 1.2 SOLUTION ORAL at 21:35

## 2025-01-01 RX ADMIN — CEFAZOLIN SODIUM 2000 MG: 2 SOLUTION INTRAVENOUS at 03:16

## 2025-01-01 RX ADMIN — DEXTROSE 75 ML/HR: 10 SOLUTION INTRAVENOUS at 21:40

## 2025-01-01 RX ADMIN — SODIUM PHOSPHATE, MONOBASIC, MONOHYDRATE AND SODIUM PHOSPHATE, DIBASIC, ANHYDROUS 6 MMOL: 142; 276 INJECTION, SOLUTION INTRAVENOUS at 07:30

## 2025-01-01 RX ADMIN — APIXABAN 2.5 MG: 2.5 TABLET, FILM COATED ORAL at 08:50

## 2025-01-01 RX ADMIN — NOREPINEPHRINE BITARTRATE 20 MCG/MIN: 1 INJECTION, SOLUTION, CONCENTRATE INTRAVENOUS at 17:46

## 2025-01-01 RX ADMIN — CEFAZOLIN SODIUM 2000 MG: 2 SOLUTION INTRAVENOUS at 22:05

## 2025-01-01 RX ADMIN — SODIUM CHLORIDE SOLN NEBU 3% 4 ML: 3 NEBU SOLN at 08:35

## 2025-01-01 RX ADMIN — CALCIUM GLUCONATE 1 G: 20 INJECTION, SOLUTION INTRAVENOUS at 12:26

## 2025-01-01 RX ADMIN — Medication 250 MG: at 08:32

## 2025-01-01 RX ADMIN — DOCUSATE SODIUM 100 MG: 50 LIQUID ORAL at 08:11

## 2025-01-01 RX ADMIN — HEPARIN SODIUM 6 UNITS/KG/HR: 10000 INJECTION, SOLUTION INTRAVENOUS at 00:01

## 2025-01-01 RX ADMIN — OXYCODONE HYDROCHLORIDE 7.5 MG: 5 SOLUTION ORAL at 00:11

## 2025-01-01 RX ADMIN — FUROSEMIDE 80 MG: 10 INJECTION, SOLUTION INTRAMUSCULAR; INTRAVENOUS at 04:29

## 2025-01-01 RX ADMIN — TAMSULOSIN HYDROCHLORIDE 0.4 MG: 0.4 CAPSULE ORAL at 15:41

## 2025-01-01 RX ADMIN — DEXTROSE 75 ML/HR: 5 SOLUTION INTRAVENOUS at 04:31

## 2025-01-01 RX ADMIN — HYDROXYCHLOROQUINE SULFATE 400 MG: 200 TABLET ORAL at 08:30

## 2025-01-01 RX ADMIN — MICONAZOLE NITRATE: 20 CREAM TOPICAL at 08:32

## 2025-01-01 RX ADMIN — VANCOMYCIN HYDROCHLORIDE 1000 MG: 1 INJECTION, SOLUTION INTRAVENOUS at 03:58

## 2025-01-01 RX ADMIN — ATORVASTATIN CALCIUM 40 MG: 40 TABLET, FILM COATED ORAL at 15:41

## 2025-01-01 RX ADMIN — TAMSULOSIN HYDROCHLORIDE 0.4 MG: 0.4 CAPSULE ORAL at 15:07

## 2025-01-01 RX ADMIN — Medication: at 21:02

## 2025-01-01 RX ADMIN — Medication 2.5 MG: at 09:45

## 2025-01-01 RX ADMIN — IPRATROPIUM BROMIDE 0.5 MG: 0.5 SOLUTION RESPIRATORY (INHALATION) at 15:44

## 2025-01-01 RX ADMIN — CHLORHEXIDINE GLUCONATE 15 ML: 1.2 SOLUTION ORAL at 09:00

## 2025-01-01 RX ADMIN — IPRATROPIUM BROMIDE 0.5 MG: 0.5 SOLUTION RESPIRATORY (INHALATION) at 07:42

## 2025-01-01 RX ADMIN — APIXABAN 5 MG: 5 TABLET, FILM COATED ORAL at 17:04

## 2025-01-01 RX ADMIN — TORSEMIDE 10 MG: 10 TABLET ORAL at 13:43

## 2025-01-01 RX ADMIN — Medication 100 MG: at 10:55

## 2025-01-01 RX ADMIN — MAGNESIUM SULFATE HEPTAHYDRATE 1 G: 1 INJECTION, SOLUTION INTRAVENOUS at 11:47

## 2025-01-01 RX ADMIN — FOLIC ACID 1 MG: 1 TABLET ORAL at 08:27

## 2025-01-01 RX ADMIN — SODIUM PHOSPHATE, MONOBASIC, MONOHYDRATE AND SODIUM PHOSPHATE, DIBASIC, ANHYDROUS 6 MMOL: 142; 276 INJECTION, SOLUTION INTRAVENOUS at 05:54

## 2025-01-01 RX ADMIN — Medication 2000 UNITS: at 08:32

## 2025-01-01 RX ADMIN — HYDROMORPHONE HYDROCHLORIDE 0.2 MG: 0.2 INJECTION, SOLUTION INTRAMUSCULAR; INTRAVENOUS; SUBCUTANEOUS at 08:15

## 2025-01-01 RX ADMIN — VASOPRESSIN 0.04 UNITS/MIN: 20 INJECTION, SOLUTION INTRAVENOUS at 11:15

## 2025-01-01 RX ADMIN — INSULIN LISPRO 2 UNITS: 100 INJECTION, SOLUTION INTRAVENOUS; SUBCUTANEOUS at 23:27

## 2025-01-01 RX ADMIN — SODIUM PHOSPHATE, MONOBASIC, MONOHYDRATE AND SODIUM PHOSPHATE, DIBASIC, ANHYDROUS 6 MMOL: 142; 276 INJECTION, SOLUTION INTRAVENOUS at 23:44

## 2025-01-01 RX ADMIN — LEVALBUTEROL HYDROCHLORIDE 1.25 MG: 1.25 SOLUTION RESPIRATORY (INHALATION) at 13:37

## 2025-01-01 RX ADMIN — BISACODYL 10 MG: 10 SUPPOSITORY RECTAL at 08:20

## 2025-01-01 RX ADMIN — CHLORHEXIDINE GLUCONATE 15 ML: 1.2 SOLUTION ORAL at 09:15

## 2025-01-01 RX ADMIN — LACTULOSE 30 G: 20 SOLUTION ORAL at 21:10

## 2025-01-01 RX ADMIN — CHLORHEXIDINE GLUCONATE 15 ML: 1.2 SOLUTION ORAL at 20:57

## 2025-01-01 RX ADMIN — APIXABAN 2.5 MG: 2.5 TABLET, FILM COATED ORAL at 09:36

## 2025-01-01 RX ADMIN — PANTOPRAZOLE SODIUM 40 MG: 40 TABLET, DELAYED RELEASE ORAL at 05:41

## 2025-01-01 RX ADMIN — CEFAZOLIN SODIUM 2000 MG: 2 SOLUTION INTRAVENOUS at 21:08

## 2025-01-01 RX ADMIN — CALCIUM GLUCONATE 1 G: 20 INJECTION, SOLUTION INTRAVENOUS at 11:11

## 2025-01-01 RX ADMIN — METOPROLOL TARTRATE 25 MG: 25 TABLET, FILM COATED ORAL at 08:59

## 2025-01-01 RX ADMIN — Medication 20 ML: at 15:14

## 2025-01-01 RX ADMIN — LACTULOSE 30 G: 20 SOLUTION ORAL at 21:29

## 2025-01-01 RX ADMIN — ATORVASTATIN CALCIUM 40 MG: 40 TABLET, FILM COATED ORAL at 15:43

## 2025-01-01 RX ADMIN — NOREPINEPHRINE BITARTRATE 7 MCG/MIN: 1 INJECTION, SOLUTION, CONCENTRATE INTRAVENOUS at 05:02

## 2025-01-01 RX ADMIN — HYDROMORPHONE HYDROCHLORIDE 0.5 MG: 1 INJECTION, SOLUTION INTRAMUSCULAR; INTRAVENOUS; SUBCUTANEOUS at 15:53

## 2025-01-01 RX ADMIN — METOPROLOL TARTRATE 25 MG: 25 TABLET, FILM COATED ORAL at 08:12

## 2025-01-01 RX ADMIN — POLYETHYLENE GLYCOL 3350 17 G: 17 POWDER, FOR SOLUTION ORAL at 08:11

## 2025-01-01 RX ADMIN — ETOMIDATE 20 MG: 2 INJECTION INTRAVENOUS at 10:54

## 2025-01-01 RX ADMIN — CEFAZOLIN SODIUM 2000 MG: 2 SOLUTION INTRAVENOUS at 11:39

## 2025-01-01 RX ADMIN — LEVALBUTEROL HYDROCHLORIDE 1.25 MG: 1.25 SOLUTION RESPIRATORY (INHALATION) at 14:39

## 2025-01-01 RX ADMIN — ATORVASTATIN CALCIUM 40 MG: 40 TABLET, FILM COATED ORAL at 17:18

## 2025-01-01 RX ADMIN — CEFAZOLIN SODIUM 2000 MG: 2 SOLUTION INTRAVENOUS at 12:39

## 2025-01-01 RX ADMIN — LEVALBUTEROL HYDROCHLORIDE 1.25 MG: 1.25 SOLUTION RESPIRATORY (INHALATION) at 19:36

## 2025-01-01 RX ADMIN — SODIUM CHLORIDE 8 MG/HR: 9 INJECTION, SOLUTION INTRAVENOUS at 11:35

## 2025-01-01 RX ADMIN — IPRATROPIUM BROMIDE 0.5 MG: 0.5 SOLUTION RESPIRATORY (INHALATION) at 08:34

## 2025-01-01 RX ADMIN — LEVALBUTEROL HYDROCHLORIDE 1.25 MG: 1.25 SOLUTION RESPIRATORY (INHALATION) at 08:26

## 2025-01-01 RX ADMIN — LEVALBUTEROL HYDROCHLORIDE 1.25 MG: 1.25 SOLUTION RESPIRATORY (INHALATION) at 19:37

## 2025-01-01 RX ADMIN — CHLORHEXIDINE GLUCONATE 15 ML: 1.2 SOLUTION ORAL at 08:33

## 2025-01-01 RX ADMIN — ALBUMIN (HUMAN) 12.5 G: 12.5 INJECTION, SOLUTION INTRAVENOUS at 10:43

## 2025-01-01 RX ADMIN — FOLIC ACID 1 MG: 1 TABLET ORAL at 08:48

## 2025-01-01 RX ADMIN — HYDROXYCHLOROQUINE SULFATE 400 MG: 200 TABLET ORAL at 08:38

## 2025-01-01 RX ADMIN — MAGNESIUM SULFATE HEPTAHYDRATE 1 G: 1 INJECTION, SOLUTION INTRAVENOUS at 12:26

## 2025-01-01 RX ADMIN — METOPROLOL TARTRATE 25 MG: 25 TABLET, FILM COATED ORAL at 21:59

## 2025-01-01 RX ADMIN — ALBUMIN (HUMAN) 25 G: 12.5 INJECTION, SOLUTION INTRAVENOUS at 11:16

## 2025-01-01 RX ADMIN — PANTOPRAZOLE SODIUM 40 MG: 40 INJECTION, POWDER, LYOPHILIZED, FOR SOLUTION INTRAVENOUS at 08:54

## 2025-01-01 RX ADMIN — BUDESONIDE INHALATION 0.5 MG: 0.5 SUSPENSION RESPIRATORY (INHALATION) at 21:09

## 2025-01-01 RX ADMIN — CHLORHEXIDINE GLUCONATE 15 ML: 1.2 SOLUTION ORAL at 08:19

## 2025-01-01 RX ADMIN — SODIUM PHOSPHATE, MONOBASIC, MONOHYDRATE AND SODIUM PHOSPHATE, DIBASIC, ANHYDROUS 6 MMOL: 142; 276 INJECTION, SOLUTION INTRAVENOUS at 17:49

## 2025-01-01 RX ADMIN — MICONAZOLE NITRATE: 20 CREAM TOPICAL at 17:11

## 2025-01-01 RX ADMIN — SODIUM PHOSPHATE, MONOBASIC, MONOHYDRATE AND SODIUM PHOSPHATE, DIBASIC, ANHYDROUS 6 MMOL: 142; 276 INJECTION, SOLUTION INTRAVENOUS at 23:04

## 2025-01-01 RX ADMIN — PANTOPRAZOLE SODIUM 40 MG: 40 INJECTION, POWDER, LYOPHILIZED, FOR SOLUTION INTRAVENOUS at 08:33

## 2025-01-01 RX ADMIN — CALCIUM GLUCONATE 1 G: 20 INJECTION, SOLUTION INTRAVENOUS at 17:57

## 2025-01-01 RX ADMIN — IPRATROPIUM BROMIDE 0.5 MG: 0.5 SOLUTION RESPIRATORY (INHALATION) at 19:54

## 2025-01-01 RX ADMIN — CALCIUM GLUCONATE 1 G: 20 INJECTION, SOLUTION INTRAVENOUS at 05:13

## 2025-01-01 RX ADMIN — INSULIN LISPRO 2 UNITS: 100 INJECTION, SOLUTION INTRAVENOUS; SUBCUTANEOUS at 05:10

## 2025-01-01 RX ADMIN — MAGNESIUM SULFATE HEPTAHYDRATE 1 G: 1 INJECTION, SOLUTION INTRAVENOUS at 17:15

## 2025-01-01 RX ADMIN — FENTANYL CITRATE 50 MCG: 50 INJECTION INTRAMUSCULAR; INTRAVENOUS at 15:10

## 2025-01-01 RX ADMIN — HYDROXYCHLOROQUINE SULFATE 400 MG: 200 TABLET ORAL at 08:22

## 2025-01-01 RX ADMIN — SENNOSIDES, DOCUSATE SODIUM 1 TABLET: 8.6; 5 TABLET ORAL at 21:56

## 2025-01-01 RX ADMIN — Medication 100 MCG: at 08:26

## 2025-01-01 RX ADMIN — TAMSULOSIN HYDROCHLORIDE 0.4 MG: 0.4 CAPSULE ORAL at 17:18

## 2025-01-01 RX ADMIN — CHLORHEXIDINE GLUCONATE 15 ML: 1.2 SOLUTION ORAL at 08:12

## 2025-01-01 RX ADMIN — MICONAZOLE NITRATE: 20 CREAM TOPICAL at 09:09

## 2025-01-01 RX ADMIN — NOREPINEPHRINE BITARTRATE 7 MCG/MIN: 1 INJECTION, SOLUTION, CONCENTRATE INTRAVENOUS at 15:55

## 2025-01-01 RX ADMIN — ALBUMIN (HUMAN) 25 G: 12.5 INJECTION, SOLUTION INTRAVENOUS at 03:22

## 2025-01-01 RX ADMIN — Medication: at 20:55

## 2025-01-01 RX ADMIN — APIXABAN 2.5 MG: 2.5 TABLET, FILM COATED ORAL at 17:18

## 2025-01-01 RX ADMIN — IPRATROPIUM BROMIDE 0.5 MG: 0.5 SOLUTION RESPIRATORY (INHALATION) at 08:23

## 2025-01-01 RX ADMIN — METOPROLOL TARTRATE 25 MG: 25 TABLET, FILM COATED ORAL at 21:30

## 2025-01-01 RX ADMIN — CEFAZOLIN SODIUM 2000 MG: 2 SOLUTION INTRAVENOUS at 01:18

## 2025-01-01 RX ADMIN — FUROSEMIDE 20 MG: 10 INJECTION, SOLUTION INTRAMUSCULAR; INTRAVENOUS at 14:30

## 2025-01-01 RX ADMIN — PIPERACILLIN AND TAZOBACTAM 4.5 G: 36; 4.5 INJECTION, POWDER, LYOPHILIZED, FOR SOLUTION INTRAVENOUS at 06:13

## 2025-01-01 RX ADMIN — FOLIC ACID 1 MG: 1 TABLET ORAL at 08:33

## 2025-01-01 RX ADMIN — METOPROLOL TARTRATE 25 MG: 25 TABLET, FILM COATED ORAL at 19:41

## 2025-01-01 RX ADMIN — PANTOPRAZOLE SODIUM 40 MG: 40 TABLET, DELAYED RELEASE ORAL at 05:20

## 2025-01-01 RX ADMIN — SODIUM PHOSPHATE, MONOBASIC, MONOHYDRATE AND SODIUM PHOSPHATE, DIBASIC, ANHYDROUS 6 MMOL: 142; 276 INJECTION, SOLUTION INTRAVENOUS at 13:07

## 2025-01-01 RX ADMIN — CEFAZOLIN SODIUM 2000 MG: 2 SOLUTION INTRAVENOUS at 02:22

## 2025-01-01 RX ADMIN — MAGNESIUM SULFATE 1 G: 1 INJECTION INTRAVENOUS at 11:18

## 2025-01-01 RX ADMIN — Medication 20000 ML: at 00:14

## 2025-01-01 RX ADMIN — ALBUMIN (HUMAN) 25 G: 12.5 INJECTION, SOLUTION INTRAVENOUS at 23:29

## 2025-01-01 RX ADMIN — CHLORHEXIDINE GLUCONATE 15 ML: 1.2 SOLUTION ORAL at 08:31

## 2025-01-01 RX ADMIN — SODIUM CHLORIDE 8 MG/HR: 9 INJECTION, SOLUTION INTRAVENOUS at 15:06

## 2025-01-01 RX ADMIN — HYDROXYCHLOROQUINE SULFATE 400 MG: 200 TABLET ORAL at 09:37

## 2025-01-01 RX ADMIN — CALCIUM GLUCONATE 1 G: 20 INJECTION, SOLUTION INTRAVENOUS at 00:56

## 2025-01-01 RX ADMIN — PANTOPRAZOLE SODIUM 40 MG: 40 TABLET, DELAYED RELEASE ORAL at 05:35

## 2025-01-01 RX ADMIN — ATORVASTATIN CALCIUM 40 MG: 40 TABLET, FILM COATED ORAL at 17:04

## 2025-01-01 RX ADMIN — Medication: at 21:29

## 2025-01-01 RX ADMIN — Medication 75 MCG/HR: at 06:31

## 2025-01-01 RX ADMIN — CEFAZOLIN SODIUM 2000 MG: 2 SOLUTION INTRAVENOUS at 14:27

## 2025-01-01 RX ADMIN — SODIUM CHLORIDE, SODIUM LACTATE, POTASSIUM CHLORIDE, AND CALCIUM CHLORIDE: .6; .31; .03; .02 INJECTION, SOLUTION INTRAVENOUS at 17:46

## 2025-01-01 RX ADMIN — Medication 100 MCG: at 09:01

## 2025-01-01 RX ADMIN — ALTEPLASE 2 MG: 2.2 INJECTION, POWDER, LYOPHILIZED, FOR SOLUTION INTRAVENOUS at 03:12

## 2025-01-01 RX ADMIN — TAMSULOSIN HYDROCHLORIDE 0.4 MG: 0.4 CAPSULE ORAL at 17:45

## 2025-01-01 RX ADMIN — CEFAZOLIN SODIUM 2000 MG: 2 SOLUTION INTRAVENOUS at 21:36

## 2025-01-01 RX ADMIN — ROCURONIUM 30 MG: 50 INJECTION, SOLUTION INTRAVENOUS at 18:12

## 2025-01-01 RX ADMIN — MICONAZOLE NITRATE: 20 CREAM TOPICAL at 18:43

## 2025-01-01 RX ADMIN — PANTOPRAZOLE SODIUM 40 MG: 40 INJECTION, POWDER, LYOPHILIZED, FOR SOLUTION INTRAVENOUS at 08:58

## 2025-01-01 RX ADMIN — CALCIUM GLUCONATE 1 G: 20 INJECTION, SOLUTION INTRAVENOUS at 22:46

## 2025-01-01 RX ADMIN — NOREPINEPHRINE BITARTRATE 13 MCG/MIN: 1 INJECTION, SOLUTION, CONCENTRATE INTRAVENOUS at 03:57

## 2025-01-01 RX ADMIN — IPRATROPIUM BROMIDE 0.5 MG: 0.5 SOLUTION RESPIRATORY (INHALATION) at 14:37

## 2025-01-01 RX ADMIN — CEFAZOLIN SODIUM 2000 MG: 2 SOLUTION INTRAVENOUS at 13:42

## 2025-01-01 RX ADMIN — Medication: at 21:19

## 2025-01-01 RX ADMIN — SENNOSIDES, DOCUSATE SODIUM 1 TABLET: 8.6; 5 TABLET ORAL at 21:40

## 2025-01-01 RX ADMIN — MIDAZOLAM 2 MG: 1 INJECTION INTRAMUSCULAR; INTRAVENOUS at 14:12

## 2025-01-01 RX ADMIN — SODIUM CHLORIDE 8 MG/HR: 9 INJECTION, SOLUTION INTRAVENOUS at 17:57

## 2025-01-01 RX ADMIN — CEFAZOLIN SODIUM 2000 MG: 2 SOLUTION INTRAVENOUS at 23:59

## 2025-01-01 RX ADMIN — ALBUMIN (HUMAN) 12.5 G: 0.25 INJECTION, SOLUTION INTRAVENOUS at 16:23

## 2025-01-01 RX ADMIN — Medication 250 MG: at 08:06

## 2025-01-01 RX ADMIN — PANTOPRAZOLE SODIUM 40 MG: 40 INJECTION, POWDER, LYOPHILIZED, FOR SOLUTION INTRAVENOUS at 09:15

## 2025-01-01 RX ADMIN — INSULIN LISPRO 2 UNITS: 100 INJECTION, SOLUTION INTRAVENOUS; SUBCUTANEOUS at 00:15

## 2025-01-01 RX ADMIN — HEPARIN SODIUM 5000 UNITS: 5000 INJECTION INTRAVENOUS; SUBCUTANEOUS at 14:27

## 2025-01-01 RX ADMIN — Medication 250 MG: at 09:43

## 2025-01-01 RX ADMIN — MICONAZOLE NITRATE: 20 CREAM TOPICAL at 08:11

## 2025-01-01 RX ADMIN — SENNOSIDES 17.6 MG: 8.8 SYRUP ORAL at 21:09

## 2025-01-01 RX ADMIN — PANTOPRAZOLE SODIUM 40 MG: 40 INJECTION, POWDER, LYOPHILIZED, FOR SOLUTION INTRAVENOUS at 08:36

## 2025-01-01 RX ADMIN — DOCUSATE SODIUM 100 MG: 50 LIQUID ORAL at 17:08

## 2025-01-01 RX ADMIN — BUDESONIDE INHALATION 0.5 MG: 0.5 SUSPENSION RESPIRATORY (INHALATION) at 08:15

## 2025-01-01 RX ADMIN — ACETAMINOPHEN 1000 MG: 10 INJECTION INTRAVENOUS at 00:57

## 2025-01-01 RX ADMIN — VANCOMYCIN HYDROCHLORIDE 1000 MG: 1 INJECTION, SOLUTION INTRAVENOUS at 04:03

## 2025-01-01 RX ADMIN — SODIUM CHLORIDE 8 MG/HR: 9 INJECTION, SOLUTION INTRAVENOUS at 03:04

## 2025-01-01 RX ADMIN — MAGNESIUM SULFATE 1 G: 1 INJECTION INTRAVENOUS at 18:14

## 2025-01-01 RX ADMIN — LEVALBUTEROL HYDROCHLORIDE 1.25 MG: 1.25 SOLUTION RESPIRATORY (INHALATION) at 07:42

## 2025-01-01 RX ADMIN — TORSEMIDE 20 MG: 20 TABLET ORAL at 08:48

## 2025-01-01 RX ADMIN — NOREPINEPHRINE BITARTRATE 4 MCG/MIN: 1 INJECTION, SOLUTION, CONCENTRATE INTRAVENOUS at 08:25

## 2025-01-01 RX ADMIN — CEFAZOLIN SODIUM 2000 MG: 2 SOLUTION INTRAVENOUS at 13:07

## 2025-01-01 RX ADMIN — Medication 2000 UNITS: at 08:22

## 2025-01-01 RX ADMIN — Medication 100 MCG/HR: at 03:30

## 2025-01-01 RX ADMIN — CEFAZOLIN SODIUM 2000 MG: 2 SOLUTION INTRAVENOUS at 20:18

## 2025-01-01 RX ADMIN — IPRATROPIUM BROMIDE 0.5 MG: 0.5 SOLUTION RESPIRATORY (INHALATION) at 19:37

## 2025-01-01 RX ADMIN — ALBUMIN (HUMAN) 12.5 G: 12.5 INJECTION, SOLUTION INTRAVENOUS at 21:21

## 2025-01-01 RX ADMIN — CALCIUM GLUCONATE 1 G: 20 INJECTION, SOLUTION INTRAVENOUS at 13:05

## 2025-01-01 RX ADMIN — FENTANYL CITRATE 50 MCG: 50 INJECTION INTRAMUSCULAR; INTRAVENOUS at 05:21

## 2025-01-01 RX ADMIN — ALBUMIN (HUMAN) 12.5 G: 0.25 INJECTION, SOLUTION INTRAVENOUS at 21:24

## 2025-01-01 RX ADMIN — POTASSIUM CHLORIDE 40 MEQ: 29.8 INJECTION, SOLUTION INTRAVENOUS at 22:27

## 2025-01-01 RX ADMIN — POTASSIUM CHLORIDE 20 MEQ: 14.9 INJECTION, SOLUTION INTRAVENOUS at 19:08

## 2025-01-01 RX ADMIN — BISACODYL 10 MG: 10 SUPPOSITORY RECTAL at 08:13

## 2025-01-01 RX ADMIN — OXYCODONE HYDROCHLORIDE 5 MG: 5 TABLET ORAL at 18:17

## 2025-01-01 RX ADMIN — IPRATROPIUM BROMIDE 0.5 MG: 0.5 SOLUTION RESPIRATORY (INHALATION) at 21:07

## 2025-01-01 RX ADMIN — Medication: at 23:35

## 2025-01-01 RX ADMIN — MIDAZOLAM HYDROCHLORIDE 2 MG: 2 INJECTION, SOLUTION INTRAMUSCULAR; INTRAVENOUS at 14:12

## 2025-01-01 RX ADMIN — TAMSULOSIN HYDROCHLORIDE 0.4 MG: 0.4 CAPSULE ORAL at 17:15

## 2025-01-01 RX ADMIN — Medication 250 MG: at 09:26

## 2025-01-01 RX ADMIN — METOPROLOL TARTRATE 25 MG: 25 TABLET, FILM COATED ORAL at 08:34

## 2025-01-01 RX ADMIN — PANTOPRAZOLE SODIUM 40 MG: 40 INJECTION, POWDER, LYOPHILIZED, FOR SOLUTION INTRAVENOUS at 08:12

## 2025-01-01 RX ADMIN — Medication 2000 UNITS: at 13:14

## 2025-01-01 RX ADMIN — BUDESONIDE INHALATION 0.5 MG: 0.5 SUSPENSION RESPIRATORY (INHALATION) at 08:26

## 2025-01-01 RX ADMIN — CEFAZOLIN SODIUM 2000 MG: 2 SOLUTION INTRAVENOUS at 11:49

## 2025-01-01 RX ADMIN — SODIUM PHOSPHATE, MONOBASIC, MONOHYDRATE AND SODIUM PHOSPHATE, DIBASIC, ANHYDROUS 6 MMOL: 142; 276 INJECTION, SOLUTION INTRAVENOUS at 19:05

## 2025-01-01 RX ADMIN — IPRATROPIUM BROMIDE 0.5 MG: 0.5 SOLUTION RESPIRATORY (INHALATION) at 19:44

## 2025-01-01 RX ADMIN — CEFAZOLIN SODIUM 2000 MG: 2 SOLUTION INTRAVENOUS at 03:04

## 2025-01-01 RX ADMIN — LACTULOSE 30 G: 20 SOLUTION ORAL at 16:29

## 2025-01-01 RX ADMIN — METHYLPREDNISOLONE SODIUM SUCCINATE 40 MG: 40 INJECTION, POWDER, FOR SOLUTION INTRAMUSCULAR; INTRAVENOUS at 00:53

## 2025-01-01 RX ADMIN — MAGNESIUM SULFATE IN WATER FOR 2 G: 40 INJECTION INTRAVENOUS at 19:08

## 2025-01-01 RX ADMIN — ALBUMIN (HUMAN) 12.5 G: 0.25 INJECTION, SOLUTION INTRAVENOUS at 21:30

## 2025-01-01 RX ADMIN — MICONAZOLE NITRATE: 20 CREAM TOPICAL at 08:34

## 2025-01-01 RX ADMIN — HEPARIN SODIUM 8 UNITS/KG/HR: 10000 INJECTION, SOLUTION INTRAVENOUS at 10:05

## 2025-01-01 RX ADMIN — CEFAZOLIN SODIUM 2000 MG: 2 SOLUTION INTRAVENOUS at 11:02

## 2025-01-01 RX ADMIN — METOPROLOL TARTRATE 25 MG: 25 TABLET, FILM COATED ORAL at 08:26

## 2025-01-01 RX ADMIN — EPINEPHRINE 1 MG: 0.1 INJECTION INTRAVENOUS at 11:34

## 2025-01-01 RX ADMIN — BUDESONIDE INHALATION 0.5 MG: 0.5 SUSPENSION RESPIRATORY (INHALATION) at 08:23

## 2025-01-01 RX ADMIN — Medication 100 MCG: at 08:48

## 2025-01-01 RX ADMIN — SENNOSIDES, DOCUSATE SODIUM 1 TABLET: 8.6; 5 TABLET ORAL at 21:30

## 2025-01-01 RX ADMIN — Medication 2 MG/HR: at 17:20

## 2025-01-01 RX ADMIN — SODIUM CHLORIDE, SODIUM LACTATE, POTASSIUM CHLORIDE, AND CALCIUM CHLORIDE 500 ML: .6; .31; .03; .02 INJECTION, SOLUTION INTRAVENOUS at 00:45

## 2025-01-01 RX ADMIN — CALCIUM GLUCONATE 1 G: 20 INJECTION, SOLUTION INTRAVENOUS at 06:12

## 2025-01-01 RX ADMIN — LEVALBUTEROL HYDROCHLORIDE 1.25 MG: 1.25 SOLUTION RESPIRATORY (INHALATION) at 19:54

## 2025-01-01 RX ADMIN — METOPROLOL TARTRATE 25 MG: 25 TABLET, FILM COATED ORAL at 13:14

## 2025-01-01 RX ADMIN — POLYETHYLENE GLYCOL 3350 17 G: 17 POWDER, FOR SOLUTION ORAL at 08:31

## 2025-01-01 RX ADMIN — MICONAZOLE NITRATE: 20 CREAM TOPICAL at 08:13

## 2025-01-01 RX ADMIN — FENTANYL CITRATE 25 MCG: 50 INJECTION INTRAMUSCULAR; INTRAVENOUS at 12:15

## 2025-01-01 RX ADMIN — TAMSULOSIN HYDROCHLORIDE 0.4 MG: 0.4 CAPSULE ORAL at 16:08

## 2025-01-01 RX ADMIN — ATORVASTATIN CALCIUM 40 MG: 40 TABLET, FILM COATED ORAL at 17:25

## 2025-01-01 RX ADMIN — DOCUSATE SODIUM 100 MG: 50 LIQUID ORAL at 08:33

## 2025-01-01 RX ADMIN — POTASSIUM CHLORIDE 40 MEQ: 1500 TABLET, EXTENDED RELEASE ORAL at 10:22

## 2025-01-01 RX ADMIN — IPRATROPIUM BROMIDE 0.5 MG: 0.5 SOLUTION RESPIRATORY (INHALATION) at 13:21

## 2025-01-01 RX ADMIN — SODIUM PHOSPHATE, MONOBASIC, MONOHYDRATE AND SODIUM PHOSPHATE, DIBASIC, ANHYDROUS 6 MMOL: 142; 276 INJECTION, SOLUTION INTRAVENOUS at 06:27

## 2025-01-01 RX ADMIN — SENNOSIDES, DOCUSATE SODIUM 1 TABLET: 8.6; 5 TABLET ORAL at 20:58

## 2025-01-01 RX ADMIN — MICONAZOLE NITRATE: 20 CREAM TOPICAL at 08:48

## 2025-01-01 RX ADMIN — NOREPINEPHRINE BITARTRATE 17 MCG/MIN: 1 INJECTION, SOLUTION, CONCENTRATE INTRAVENOUS at 08:21

## 2025-01-01 RX ADMIN — Medication 2000 UNITS: at 09:43

## 2025-01-01 RX ADMIN — SENNOSIDES, DOCUSATE SODIUM 1 TABLET: 8.6; 5 TABLET ORAL at 21:59

## 2025-01-01 RX ADMIN — FOLIC ACID 1 MG: 1 TABLET ORAL at 09:00

## 2025-01-01 RX ADMIN — POTASSIUM CHLORIDE 40 MEQ: 29.8 INJECTION, SOLUTION INTRAVENOUS at 11:47

## 2025-01-01 RX ADMIN — APIXABAN 5 MG: 5 TABLET, FILM COATED ORAL at 08:33

## 2025-01-01 RX ADMIN — CEFAZOLIN SODIUM 2000 MG: 2 SOLUTION INTRAVENOUS at 04:11

## 2025-01-01 RX ADMIN — MICONAZOLE NITRATE: 20 CREAM TOPICAL at 12:19

## 2025-01-01 RX ADMIN — Medication 100 MCG: at 08:49

## 2025-01-01 RX ADMIN — POTASSIUM CHLORIDE 40 MEQ: 29.8 INJECTION, SOLUTION INTRAVENOUS at 13:03

## 2025-01-01 RX ADMIN — LEVALBUTEROL HYDROCHLORIDE 1.25 MG: 1.25 SOLUTION RESPIRATORY (INHALATION) at 14:37

## 2025-01-01 RX ADMIN — LEVALBUTEROL HYDROCHLORIDE 1.25 MG: 1.25 SOLUTION RESPIRATORY (INHALATION) at 08:15

## 2025-01-01 RX ADMIN — MICONAZOLE NITRATE: 20 CREAM TOPICAL at 08:10

## 2025-01-01 RX ADMIN — BUDESONIDE INHALATION 0.5 MG: 0.5 SUSPENSION RESPIRATORY (INHALATION) at 07:59

## 2025-01-01 RX ADMIN — SODIUM PHOSPHATE, MONOBASIC, MONOHYDRATE AND SODIUM PHOSPHATE, DIBASIC, ANHYDROUS 6 MMOL: 142; 276 INJECTION, SOLUTION INTRAVENOUS at 00:38

## 2025-01-01 RX ADMIN — CALCIUM CHLORIDE 2 G: 100 INJECTION INTRAVENOUS; INTRAVENTRICULAR at 11:43

## 2025-01-01 RX ADMIN — PANTOPRAZOLE SODIUM 40 MG: 40 TABLET, DELAYED RELEASE ORAL at 05:51

## 2025-01-01 RX ADMIN — CALCIUM GLUCONATE 1 G: 20 INJECTION, SOLUTION INTRAVENOUS at 23:06

## 2025-01-01 RX ADMIN — Medication 50 MCG/HR: at 11:08

## 2025-01-01 RX ADMIN — Medication 20000 ML: at 18:30

## 2025-01-01 RX ADMIN — ACETAMINOPHEN 1000 MG: 10 INJECTION INTRAVENOUS at 14:33

## 2025-01-01 RX ADMIN — VASOPRESSIN 0.04 UNITS/MIN: 20 INJECTION, SOLUTION INTRAVENOUS at 11:48

## 2025-01-01 RX ADMIN — CALCIUM GLUCONATE 1 G: 20 INJECTION, SOLUTION INTRAVENOUS at 10:47

## 2025-01-01 RX ADMIN — LEVALBUTEROL HYDROCHLORIDE 1.25 MG: 1.25 SOLUTION RESPIRATORY (INHALATION) at 19:53

## 2025-01-01 RX ADMIN — FENTANYL CITRATE 25 MCG: 50 INJECTION INTRAMUSCULAR; INTRAVENOUS at 15:06

## 2025-01-01 RX ADMIN — METOPROLOL TARTRATE 25 MG: 25 TABLET, FILM COATED ORAL at 08:06

## 2025-01-01 RX ADMIN — FENTANYL CITRATE 25 MCG: 50 INJECTION, SOLUTION INTRAMUSCULAR; INTRAVENOUS at 12:15

## 2025-01-01 RX ADMIN — FENTANYL CITRATE 50 MCG: 50 INJECTION INTRAMUSCULAR; INTRAVENOUS at 16:24

## 2025-01-01 RX ADMIN — HYDROMORPHONE HYDROCHLORIDE 0.2 MG: 0.2 INJECTION, SOLUTION INTRAMUSCULAR; INTRAVENOUS; SUBCUTANEOUS at 19:19

## 2025-01-01 RX ADMIN — TORSEMIDE 10 MG: 10 TABLET ORAL at 08:34

## 2025-01-01 RX ADMIN — Medication 75 MCG/HR: at 16:36

## 2025-01-01 RX ADMIN — MICONAZOLE NITRATE: 20 CREAM TOPICAL at 17:10

## 2025-01-01 RX ADMIN — HYDROXYCHLOROQUINE SULFATE 400 MG: 200 TABLET ORAL at 08:33

## 2025-01-01 RX ADMIN — Medication 75 MCG/HR: at 16:44

## 2025-01-01 RX ADMIN — METOPROLOL TARTRATE 25 MG: 25 TABLET, FILM COATED ORAL at 20:09

## 2025-01-01 RX ADMIN — GLYCERIN 1 SUPPOSITORY: 2 SUPPOSITORY RECTAL at 22:48

## 2025-01-01 RX ADMIN — IOHEXOL 100 ML: 350 INJECTION, SOLUTION INTRAVENOUS at 08:07

## 2025-01-01 RX ADMIN — Medication 100 MCG: at 08:59

## 2025-01-01 RX ADMIN — FOLIC ACID 1 MG: 1 TABLET ORAL at 09:01

## 2025-01-01 RX ADMIN — Medication 100 MCG: at 09:26

## 2025-01-01 RX ADMIN — MAGNESIUM SULFATE HEPTAHYDRATE 2 G: 40 INJECTION, SOLUTION INTRAVENOUS at 08:22

## 2025-01-01 RX ADMIN — INSULIN LISPRO 4 UNITS: 100 INJECTION, SOLUTION INTRAVENOUS; SUBCUTANEOUS at 17:41

## 2025-01-01 RX ADMIN — APIXABAN 2.5 MG: 2.5 TABLET, FILM COATED ORAL at 17:29

## 2025-01-01 RX ADMIN — FLUTICASONE FUROATE 1 PUFF: 100 POWDER RESPIRATORY (INHALATION) at 09:25

## 2025-01-01 RX ADMIN — ALBUMIN (HUMAN) 12.5 G: 0.25 INJECTION, SOLUTION INTRAVENOUS at 16:29

## 2025-01-01 RX ADMIN — ALBUMIN (HUMAN) 12.5 G: 12.5 INJECTION, SOLUTION INTRAVENOUS at 11:48

## 2025-01-01 RX ADMIN — Medication 2000 UNITS: at 08:33

## 2025-01-01 RX ADMIN — CEFAZOLIN SODIUM 2000 MG: 2 SOLUTION INTRAVENOUS at 10:18

## 2025-01-01 RX ADMIN — INSULIN LISPRO 2 UNITS: 100 INJECTION, SOLUTION INTRAVENOUS; SUBCUTANEOUS at 17:36

## 2025-01-01 RX ADMIN — LIDOCAINE 5% 3 PATCH: 700 PATCH TOPICAL at 09:27

## 2025-01-01 RX ADMIN — INSULIN LISPRO 2 UNITS: 100 INJECTION, SOLUTION INTRAVENOUS; SUBCUTANEOUS at 05:39

## 2025-01-01 RX ADMIN — LACTULOSE 30 G: 20 SOLUTION ORAL at 08:11

## 2025-01-01 RX ADMIN — METHYLNALTREXONE BROMIDE 12 MG: 12 INJECTION, SOLUTION SUBCUTANEOUS at 14:23

## 2025-01-01 RX ADMIN — IPRATROPIUM BROMIDE 0.5 MG: 0.5 SOLUTION RESPIRATORY (INHALATION) at 19:53

## 2025-01-01 RX ADMIN — LIDOCAINE 5% 3 PATCH: 700 PATCH TOPICAL at 08:50

## 2025-01-01 RX ADMIN — INSULIN LISPRO 2 UNITS: 100 INJECTION, SOLUTION INTRAVENOUS; SUBCUTANEOUS at 13:46

## 2025-01-01 RX ADMIN — SENNOSIDES 17.6 MG: 8.8 SYRUP ORAL at 21:36

## 2025-01-01 RX ADMIN — Medication 250 MG: at 08:12

## 2025-01-01 RX ADMIN — METOPROLOL TARTRATE 25 MG: 25 TABLET, FILM COATED ORAL at 08:33

## 2025-01-01 RX ADMIN — Medication 200 G: at 16:52

## 2025-01-01 RX ADMIN — CEFAZOLIN SODIUM 2000 MG: 2 SOLUTION INTRAVENOUS at 17:11

## 2025-01-01 RX ADMIN — CEFAZOLIN SODIUM 2000 MG: 2 SOLUTION INTRAVENOUS at 05:04

## 2025-01-01 RX ADMIN — OXYCODONE HYDROCHLORIDE 5 MG: 5 SOLUTION ORAL at 06:06

## 2025-01-01 RX ADMIN — LEVALBUTEROL HYDROCHLORIDE 1.25 MG: 1.25 SOLUTION RESPIRATORY (INHALATION) at 13:21

## 2025-01-01 RX ADMIN — Medication 2000 UNITS: at 09:02

## 2025-01-01 RX ADMIN — IOHEXOL 70 ML: 350 INJECTION, SOLUTION INTRAVENOUS at 15:56

## 2025-01-01 RX ADMIN — SENNOSIDES 17.6 MG: 8.8 SYRUP ORAL at 21:39

## 2025-01-01 RX ADMIN — FENTANYL CITRATE 50 MCG: 50 INJECTION INTRAMUSCULAR; INTRAVENOUS at 03:40

## 2025-01-01 RX ADMIN — CHLORHEXIDINE GLUCONATE 15 ML: 1.2 SOLUTION ORAL at 20:29

## 2025-01-01 RX ADMIN — INSULIN LISPRO 2 UNITS: 100 INJECTION, SOLUTION INTRAVENOUS; SUBCUTANEOUS at 18:02

## 2025-01-01 RX ADMIN — LIDOCAINE HYDROCHLORIDE 300 MG: 10 INJECTION, SOLUTION EPIDURAL; INFILTRATION; INTRACAUDAL; PERINEURAL at 15:12

## 2025-01-01 RX ADMIN — METHYLPREDNISOLONE SODIUM SUCCINATE 40 MG: 40 INJECTION, POWDER, FOR SOLUTION INTRAMUSCULAR; INTRAVENOUS at 08:33

## 2025-01-01 RX ADMIN — METOPROLOL TARTRATE 25 MG: 25 TABLET, FILM COATED ORAL at 09:01

## 2025-01-01 RX ADMIN — PANTOPRAZOLE SODIUM 40 MG: 40 INJECTION, POWDER, LYOPHILIZED, FOR SOLUTION INTRAVENOUS at 20:29

## 2025-01-01 RX ADMIN — INSULIN LISPRO 2 UNITS: 100 INJECTION, SOLUTION INTRAVENOUS; SUBCUTANEOUS at 12:07

## 2025-01-01 RX ADMIN — FENTANYL CITRATE 50 MCG: 50 INJECTION INTRAMUSCULAR; INTRAVENOUS at 00:33

## 2025-01-01 RX ADMIN — ACETAMINOPHEN 1000 MG: 10 INJECTION INTRAVENOUS at 11:53

## 2025-01-01 RX ADMIN — MAGNESIUM SULFATE HEPTAHYDRATE 1 G: 1 INJECTION, SOLUTION INTRAVENOUS at 00:47

## 2025-01-01 RX ADMIN — METOPROLOL TARTRATE 25 MG: 25 TABLET, FILM COATED ORAL at 20:06

## 2025-01-01 RX ADMIN — PANTOPRAZOLE SODIUM 40 MG: 40 INJECTION, POWDER, LYOPHILIZED, FOR SOLUTION INTRAVENOUS at 21:35

## 2025-01-01 RX ADMIN — PANTOPRAZOLE SODIUM 40 MG: 40 INJECTION, POWDER, LYOPHILIZED, FOR SOLUTION INTRAVENOUS at 20:56

## 2025-01-01 RX ADMIN — SODIUM CHLORIDE 8 MG/HR: 9 INJECTION, SOLUTION INTRAVENOUS at 10:38

## 2025-01-01 RX ADMIN — MICONAZOLE NITRATE: 20 CREAM TOPICAL at 15:43

## 2025-01-01 RX ADMIN — MICONAZOLE NITRATE: 20 CREAM TOPICAL at 17:36

## 2025-01-01 RX ADMIN — OXYCODONE HYDROCHLORIDE 5 MG: 5 SOLUTION ORAL at 00:15

## 2025-01-01 RX ADMIN — Medication 250 MG: at 08:48

## 2025-01-01 RX ADMIN — INSULIN LISPRO 1 UNITS: 100 INJECTION, SOLUTION INTRAVENOUS; SUBCUTANEOUS at 05:32

## 2025-01-01 RX ADMIN — GLYCERIN 1 SUPPOSITORY: 2 SUPPOSITORY RECTAL at 22:00

## 2025-01-01 RX ADMIN — SODIUM PHOSPHATE, MONOBASIC, MONOHYDRATE AND SODIUM PHOSPHATE, DIBASIC, ANHYDROUS 6 MMOL: 142; 276 INJECTION, SOLUTION INTRAVENOUS at 12:16

## 2025-01-01 RX ADMIN — Medication: at 21:15

## 2025-01-01 RX ADMIN — MICONAZOLE NITRATE: 20 CREAM TOPICAL at 18:34

## 2025-01-01 RX ADMIN — CEFAZOLIN SODIUM 2000 MG: 2 SOLUTION INTRAVENOUS at 12:17

## 2025-01-01 RX ADMIN — NOREPINEPHRINE BITARTRATE 12 MCG/MIN: 1 INJECTION, SOLUTION, CONCENTRATE INTRAVENOUS at 22:49

## 2025-01-01 RX ADMIN — FENTANYL CITRATE 25 MCG: 50 INJECTION INTRAMUSCULAR; INTRAVENOUS at 11:40

## 2025-01-01 RX ADMIN — METHYLPREDNISOLONE SODIUM SUCCINATE 40 MG: 40 INJECTION, POWDER, FOR SOLUTION INTRAMUSCULAR; INTRAVENOUS at 17:25

## 2025-01-01 RX ADMIN — CALCIUM GLUCONATE 1 G: 20 INJECTION, SOLUTION INTRAVENOUS at 05:42

## 2025-01-01 RX ADMIN — IPRATROPIUM BROMIDE 0.5 MG: 0.5 SOLUTION RESPIRATORY (INHALATION) at 08:10

## 2025-01-01 RX ADMIN — MICONAZOLE NITRATE: 20 CREAM TOPICAL at 17:18

## 2025-01-01 RX ADMIN — FLUTICASONE FUROATE 1 PUFF: 100 POWDER RESPIRATORY (INHALATION) at 08:33

## 2025-01-01 RX ADMIN — CEFAZOLIN SODIUM 2000 MG: 2 SOLUTION INTRAVENOUS at 03:12

## 2025-01-01 RX ADMIN — PIPERACILLIN AND TAZOBACTAM 4.5 G: 36; 4.5 INJECTION, POWDER, LYOPHILIZED, FOR SOLUTION INTRAVENOUS at 17:47

## 2025-01-01 RX ADMIN — METOPROLOL TARTRATE 25 MG: 25 TABLET, FILM COATED ORAL at 21:45

## 2025-01-01 RX ADMIN — PANTOPRAZOLE SODIUM 40 MG: 40 TABLET, DELAYED RELEASE ORAL at 05:37

## 2025-01-01 RX ADMIN — POTASSIUM CHLORIDE 40 MEQ: 1500 TABLET, EXTENDED RELEASE ORAL at 07:06

## 2025-01-01 RX ADMIN — METOPROLOL TARTRATE 25 MG: 25 TABLET, FILM COATED ORAL at 20:27

## 2025-01-01 RX ADMIN — LEVALBUTEROL HYDROCHLORIDE 1.25 MG: 1.25 SOLUTION RESPIRATORY (INHALATION) at 21:07

## 2025-01-01 RX ADMIN — OXYCODONE HYDROCHLORIDE 5 MG: 5 SOLUTION ORAL at 12:31

## 2025-01-01 RX ADMIN — GADOBUTROL 10 ML: 604.72 INJECTION INTRAVENOUS at 13:44

## 2025-01-01 RX ADMIN — LEVALBUTEROL HYDROCHLORIDE 1.25 MG: 1.25 SOLUTION RESPIRATORY (INHALATION) at 15:44

## 2025-01-01 RX ADMIN — LEVALBUTEROL HYDROCHLORIDE 1.25 MG: 1.25 SOLUTION RESPIRATORY (INHALATION) at 07:56

## 2025-01-01 RX ADMIN — FOLIC ACID 1 MG: 1 TABLET ORAL at 08:50

## 2025-01-01 RX ADMIN — PANTOPRAZOLE SODIUM 40 MG: 40 TABLET, DELAYED RELEASE ORAL at 05:16

## 2025-01-01 RX ADMIN — CEFAZOLIN SODIUM 2000 MG: 2 SOLUTION INTRAVENOUS at 21:29

## 2025-01-01 RX ADMIN — NOREPINEPHRINE BITARTRATE 7 MCG/MIN: 1 INJECTION, SOLUTION, CONCENTRATE INTRAVENOUS at 00:47

## 2025-01-01 RX ADMIN — DOCUSATE SODIUM 100 MG: 50 LIQUID ORAL at 13:10

## 2025-01-01 RX ADMIN — MICONAZOLE NITRATE: 20 CREAM TOPICAL at 08:12

## 2025-01-01 RX ADMIN — IPRATROPIUM BROMIDE 0.5 MG: 0.5 SOLUTION RESPIRATORY (INHALATION) at 08:26

## 2025-01-01 RX ADMIN — SODIUM PHOSPHATE, MONOBASIC, MONOHYDRATE AND SODIUM PHOSPHATE, DIBASIC, ANHYDROUS 6 MMOL: 142; 276 INJECTION, SOLUTION INTRAVENOUS at 03:58

## 2025-01-01 RX ADMIN — UMECLIDINIUM BROMIDE AND VILANTEROL TRIFENATATE 1 PUFF: 62.5; 25 POWDER RESPIRATORY (INHALATION) at 09:25

## 2025-01-01 RX ADMIN — LEVALBUTEROL HYDROCHLORIDE 1.25 MG: 1.25 SOLUTION RESPIRATORY (INHALATION) at 13:52

## 2025-01-01 RX ADMIN — ALBUMIN (HUMAN) 12.5 G: 0.25 INJECTION, SOLUTION INTRAVENOUS at 15:28

## 2025-01-01 RX ADMIN — TAMSULOSIN HYDROCHLORIDE 0.4 MG: 0.4 CAPSULE ORAL at 17:40

## 2025-01-01 RX ADMIN — SODIUM CHLORIDE SOLN NEBU 3% 4 ML: 3 NEBU SOLN at 08:16

## 2025-01-01 RX ADMIN — INSULIN LISPRO 2 UNITS: 100 INJECTION, SOLUTION INTRAVENOUS; SUBCUTANEOUS at 00:00

## 2025-01-01 RX ADMIN — LEVALBUTEROL HYDROCHLORIDE 1.25 MG: 1.25 SOLUTION RESPIRATORY (INHALATION) at 14:16

## 2025-01-01 RX ADMIN — OXYCODONE HYDROCHLORIDE 5 MG: 5 SOLUTION ORAL at 12:08

## 2025-01-01 RX ADMIN — FENTANYL CITRATE 50 MCG: 50 INJECTION INTRAMUSCULAR; INTRAVENOUS at 21:33

## 2025-01-01 RX ADMIN — CEFAZOLIN SODIUM 2000 MG: 2 SOLUTION INTRAVENOUS at 03:49

## 2025-01-01 RX ADMIN — PIPERACILLIN AND TAZOBACTAM 4.5 G: 36; 4.5 INJECTION, POWDER, LYOPHILIZED, FOR SOLUTION INTRAVENOUS at 14:04

## 2025-01-01 RX ADMIN — FLUTICASONE FUROATE 1 PUFF: 100 POWDER RESPIRATORY (INHALATION) at 08:13

## 2025-01-01 RX ADMIN — IPRATROPIUM BROMIDE 0.5 MG: 0.5 SOLUTION RESPIRATORY (INHALATION) at 14:16

## 2025-01-01 RX ADMIN — EPINEPHRINE 1 MG: 0.1 INJECTION INTRAVENOUS at 11:28

## 2025-01-01 RX ADMIN — FENTANYL CITRATE 50 MCG: 50 INJECTION INTRAMUSCULAR; INTRAVENOUS at 02:04

## 2025-01-01 RX ADMIN — TAMSULOSIN HYDROCHLORIDE 0.4 MG: 0.4 CAPSULE ORAL at 16:58

## 2025-01-01 RX ADMIN — CEFAZOLIN SODIUM 2000 MG: 2 SOLUTION INTRAVENOUS at 12:45

## 2025-01-01 RX ADMIN — PANTOPRAZOLE SODIUM 40 MG: 40 TABLET, DELAYED RELEASE ORAL at 08:26

## 2025-01-01 RX ADMIN — POTASSIUM CHLORIDE 40 MEQ: 29.8 INJECTION, SOLUTION INTRAVENOUS at 18:12

## 2025-01-01 RX ADMIN — LEVALBUTEROL HYDROCHLORIDE 1.25 MG: 1.25 SOLUTION RESPIRATORY (INHALATION) at 07:43

## 2025-01-01 RX ADMIN — Medication 20000 ML: at 06:41

## 2025-01-01 RX ADMIN — Medication 2000 UNITS: at 09:26

## 2025-01-01 RX ADMIN — ATORVASTATIN CALCIUM 40 MG: 40 TABLET, FILM COATED ORAL at 16:26

## 2025-01-01 RX ADMIN — SODIUM CHLORIDE SOLN NEBU 3% 4 ML: 3 NEBU SOLN at 21:09

## 2025-01-01 RX ADMIN — CALCIUM GLUCONATE 1 G: 20 INJECTION, SOLUTION INTRAVENOUS at 06:19

## 2025-01-01 RX ADMIN — TAMSULOSIN HYDROCHLORIDE 0.4 MG: 0.4 CAPSULE ORAL at 16:26

## 2025-01-01 RX ADMIN — CEFAZOLIN SODIUM 2000 MG: 2 SOLUTION INTRAVENOUS at 13:10

## 2025-01-01 RX ADMIN — Medication 25 MCG/HR: at 13:39

## 2025-01-01 RX ADMIN — OXYCODONE HYDROCHLORIDE 7.5 MG: 5 SOLUTION ORAL at 11:41

## 2025-01-01 RX ADMIN — METHYLPREDNISOLONE SODIUM SUCCINATE 40 MG: 40 INJECTION, POWDER, FOR SOLUTION INTRAMUSCULAR; INTRAVENOUS at 01:19

## 2025-01-01 RX ADMIN — FUROSEMIDE 20 MG: 10 INJECTION, SOLUTION INTRAMUSCULAR; INTRAVENOUS at 17:33

## 2025-01-01 RX ADMIN — LEVALBUTEROL HYDROCHLORIDE 1.25 MG: 1.25 SOLUTION RESPIRATORY (INHALATION) at 14:33

## 2025-01-01 RX ADMIN — ATORVASTATIN CALCIUM 40 MG: 40 TABLET, FILM COATED ORAL at 17:29

## 2025-01-01 RX ADMIN — BUDESONIDE INHALATION 0.5 MG: 0.5 SUSPENSION RESPIRATORY (INHALATION) at 21:07

## 2025-01-01 RX ADMIN — CEFAZOLIN SODIUM 2000 MG: 2 SOLUTION INTRAVENOUS at 16:39

## 2025-01-01 RX ADMIN — IPRATROPIUM BROMIDE 0.5 MG: 0.5 SOLUTION RESPIRATORY (INHALATION) at 19:36

## 2025-01-01 RX ADMIN — METOPROLOL TARTRATE 25 MG: 25 TABLET, FILM COATED ORAL at 09:43

## 2025-02-07 ENCOUNTER — NURSING HOME VISIT (OUTPATIENT)
Dept: GERIATRICS | Facility: OTHER | Age: 80
End: 2025-02-07
Payer: COMMERCIAL

## 2025-02-07 VITALS
SYSTOLIC BLOOD PRESSURE: 110 MMHG | HEART RATE: 98 BPM | RESPIRATION RATE: 16 BRPM | DIASTOLIC BLOOD PRESSURE: 62 MMHG | WEIGHT: 177.5 LBS | TEMPERATURE: 98.3 F | BODY MASS INDEX: 22.79 KG/M2

## 2025-02-07 DIAGNOSIS — J44.1 CHRONIC OBSTRUCTIVE PULMONARY DISEASE WITH ACUTE EXACERBATION (HCC): ICD-10-CM

## 2025-02-07 DIAGNOSIS — I25.10 CORONARY ARTERY DISEASE INVOLVING NATIVE HEART WITHOUT ANGINA PECTORIS, UNSPECIFIED VESSEL OR LESION TYPE: ICD-10-CM

## 2025-02-07 DIAGNOSIS — M54.9 CHRONIC BACK PAIN: ICD-10-CM

## 2025-02-07 DIAGNOSIS — G89.29 CHRONIC BACK PAIN: ICD-10-CM

## 2025-02-07 DIAGNOSIS — Z86.73 HISTORY OF STROKE: ICD-10-CM

## 2025-02-07 DIAGNOSIS — I48.91 ATRIAL FIBRILLATION (HCC): ICD-10-CM

## 2025-02-07 DIAGNOSIS — J96.11 CHRONIC HYPOXIC RESPIRATORY FAILURE (HCC): ICD-10-CM

## 2025-02-07 DIAGNOSIS — I10 HYPERTENSION, UNSPECIFIED TYPE: Primary | ICD-10-CM

## 2025-02-07 PROCEDURE — 99305 1ST NF CARE MODERATE MDM 35: CPT | Performed by: INTERNAL MEDICINE

## 2025-02-07 RX ORDER — SERTRALINE HYDROCHLORIDE 25 MG/1
12.5 TABLET, FILM COATED ORAL DAILY
Status: ON HOLD | COMMUNITY

## 2025-02-07 RX ORDER — FERROUS SULFATE 325(65) MG
325 TABLET ORAL
Status: ON HOLD | COMMUNITY

## 2025-02-07 NOTE — ASSESSMENT & PLAN NOTE
Recent hospitalization for COPD exacerbation.  Chronically on 2 L nasal cannula  Continue Spiriva and albuterol

## 2025-02-07 NOTE — PROGRESS NOTES
[facility]  [address]   [address]  [place], [address] [phone]    Progress Note  Code [facility] 31    Patient Location     [facility]    Reason for visit       Patient’s recent vitals, labs and updated medications were reviewed on [facility] of facility.     Problem List Items Addressed This Visit       Hypertension - Primary    Continue Losartan 50 mg daily and lopressor 25 mg q12 hrs.   Monitor daily vitals.          CAD (coronary artery disease)    Atherosclerosis, CAD noted on CT scan.    Continue statin, blocker and aspirin  Outpatient follow-up         Relevant Medications    apixaban (Eliquis) 5 mg    Chronic obstructive pulmonary disease with acute exacerbation (HCC)    Recent hospitalization for COPD exacerbation.  Chronically on 2 L nasal cannula  Continue Spiriva and albuterol         Chronic back pain    Patient reports history of fall with chronic pain         Chronic hypoxic respiratory failure (HCC)    Chronically on 2 L nasal cannula at rest and 3 L nasal cannula on exertion         Atrial fibrillation (HCC)    History of A-fib  On Eliquis and Lopressor                HPI       77-year-old male with a past medical history of COPD, hypertension, CAD, chronic hypoxic respiratory failure on 2 L, history of CVA, rheumatoid arthritis who reports he initially presented to hospital due to shortness of breath likely secondary to COPD extubation as he had COVID few weeks prior to hospitalization. He was treated with steroids and antibiotics. [facility] to his bilateral lower extremities with pruritic rash up to the knees.  He was then subsequently discharged for postacute rehab.      Patient seen and examined at bedside.  He is alert and oriented, lying comfortably in his bed and in no distress.  However, he reports chronic pain on his back, right shoulder and bilateral lower  extremities.        Review of Systems   Constitutional:  Negative for chills and fever.   Respiratory:  Negative for cough and shortness of breath.    Cardiovascular:  Negative for chest pain and palpitations.   Gastrointestinal:  Negative for abdominal pain, nausea and vomiting.   Musculoskeletal:  Positive for arthralgias and back pain.   Neurological:  Negative for dizziness, light-headedness and numbness.   All other systems reviewed and are negative.      Past Medical History:   Diagnosis Date    COPD (chronic obstructive pulmonary disease) (HCC)     Crushing injury of finger, left     Infectious viral hepatitis        Past Surgical History:   Procedure Laterality Date    FL LUMBAR PUNCTURE DIAGNOSTIC  2/10/2022    AK OPEN TX PHALANGEAL SHAFT FRACTURE PROX/MIDDLE EA Left 1/25/2017    Procedure: ORIF LEFT SMALL FINGER FRACTURE;  Surgeon: Blake Badillo MD;  Location: [facility];  Service: Orthopedics       Social History     Tobacco Use   Smoking Status Former   Smokeless Tobacco Former    Quit date: 4/1/2011       No family history on file.     No Known Allergies      Current Outpatient Medications:     apixaban (Eliquis) 5 mg, Take 5 mg by mouth 2 (two) times a day, Disp: , Rfl:     ferrous sulfate 325 (65 Fe) mg tablet, Take 325 mg by mouth daily with breakfast, Disp: , Rfl:     sertraline (ZOLOFT) 25 mg tablet, Take 12.5 mg by mouth daily, Disp: , Rfl:     albuterol (2.5 mg/3 mL) 0.083 % nebulizer solution, Take 3 mL (2.5 mg total) by nebulization every 6 (six) hours as needed for wheezing or shortness of breath, Disp: 30 mL, Rfl: 0    albuterol (PROVENTIL HFA,VENTOLIN HFA) 90 mcg/act inhaler, INHALE 2 PUFFS BY MOUTH EVERY 4 HOURS AS NEEDED FOR BREATHING, Disp: , Rfl:     amLODIPine (NORVASC) 5 mg tablet, Take 5 mg by mouth daily, Disp: , Rfl:     ascorbic acid (VITAMIN C) 250 MG tablet, Take 250 mg by mouth daily, Disp: , Rfl:     aspirin (ECOTRIN LOW STRENGTH) 81 mg EC tablet, Take 81 mg by mouth daily,  Disp: , Rfl:     benzonatate (TESSALON) 200 MG capsule, Take 1 capsule (200 mg total) by mouth 3 (three) times a day as needed for cough, Disp: 30 capsule, Rfl: 0    budesonide-formoterol (Symbicort) 80-4.5 MCG/ACT inhaler, Inhale 2 puffs 2 (two) times a day, Disp: , Rfl:     Cholecalciferol 50 MCG (2000 UT) TABS, TAKE ONE TABLET BY MOUTH DAILY (FOR LOW VITAMIN D), Disp: , Rfl:     folic acid (FOLVITE) 1 mg tablet, TAKE ONE TABLET BY MOUTH EVERY DAY *FOLIC ACID SUPPLEMENT*, Disp: , Rfl:     losartan (COZAAR) 50 mg tablet, Take 1 tablet (50 mg total) by mouth daily, Disp: 90 tablet, Rfl: 0    methotrexate 2.5 mg tablet, Take by mouth 4 tabs in am on Saturday.  3 tabs in evening on Saturday., Disp: , Rfl:     metoprolol tartrate (LOPRESSOR) 25 mg tablet, Take 25 mg by mouth every 12 (twelve) hours, Disp: , Rfl:     pyridoxine (VITAMIN B6) 50 mg tablet, Take 50 mg by mouth daily 2 tab daily, Disp: , Rfl:     rosuvastatin (CRESTOR) 40 MG tablet, Take 40 mg by mouth daily, Disp: , Rfl:     tiotropium (Spiriva Respimat) 1.25 MCG/ACT AERS inhaler, Inhale, Disp: , Rfl:     Updated list was reviewed in [facility] system of facility.     Vitals:    02/07/25 1443   BP: 110/62   Pulse: 98   Resp: 16   Temp: 98.3 °F (36.8 °C)       Physical Exam  Vitals and nursing note reviewed.   Constitutional:       Appearance: Normal appearance. He is normal weight.   Cardiovascular:      Rate and Rhythm: Normal rate and regular rhythm.      Pulses: Normal pulses.      Heart sounds: Normal heart sounds.   Pulmonary:      Effort: Pulmonary effort is normal.      Breath sounds: Normal breath sounds.   Abdominal:      General: Bowel sounds are normal.      Palpations: Abdomen is soft.   Musculoskeletal:         General: Deformity present.   Skin:     Findings: Rash present.   Neurological:      Mental Status: He is alert and oriented to person, place, and time. Mental status is at baseline.         Diagnostic Data:    Recent labs were  "reviewed.    Additional Notes:     Portions of the record may have been created with voice recognition software.  Occasional wrong word or \"sound a like\" substitutions may have occurred due to the inherent limitations of voice recognition software.  Read the chart carefully and recognize, using context, where substitutions have occurred.        "

## 2025-02-08 ENCOUNTER — TELEPHONE (OUTPATIENT)
Dept: OTHER | Facility: OTHER | Age: 80
End: 2025-02-08

## 2025-02-08 NOTE — TELEPHONE ENCOUNTER
"RN stated, \"I have a medication that I cannot get for this resident and need direction from the on call.\"    On call notified via secure chat.  "

## 2025-02-09 RX ORDER — DOCUSATE SODIUM 100 MG/1
100 CAPSULE, LIQUID FILLED ORAL 2 TIMES DAILY
Status: ON HOLD | COMMUNITY

## 2025-02-09 RX ORDER — LOSARTAN POTASSIUM 25 MG/1
25 TABLET ORAL DAILY
Status: ON HOLD
Start: 2025-02-09 | End: 2025-03-11

## 2025-02-09 RX ORDER — MELOXICAM 15 MG/1
15 TABLET ORAL DAILY
Status: ON HOLD | COMMUNITY

## 2025-02-09 RX ORDER — FAMOTIDINE 20 MG/1
20 TABLET, FILM COATED ORAL 2 TIMES DAILY
Status: ON HOLD | COMMUNITY

## 2025-02-09 RX ORDER — HYDROXYCHLOROQUINE SULFATE 200 MG/1
400 TABLET, FILM COATED ORAL
Status: ON HOLD | COMMUNITY

## 2025-02-09 NOTE — ADDENDUM NOTE
Addended by: ESTRELLITA CARRIZALES on: 2/9/2025 03:59 PM     Modules accepted: Orders, Level of Service

## 2025-02-11 ENCOUNTER — APPOINTMENT (EMERGENCY)
Dept: RADIOLOGY | Facility: HOSPITAL | Age: 80
DRG: 871 | End: 2025-02-11
Payer: COMMERCIAL

## 2025-02-11 ENCOUNTER — TELEPHONE (OUTPATIENT)
Dept: OTHER | Facility: OTHER | Age: 80
End: 2025-02-11

## 2025-02-11 ENCOUNTER — APPOINTMENT (INPATIENT)
Dept: VASCULAR ULTRASOUND | Facility: HOSPITAL | Age: 80
DRG: 871 | End: 2025-02-11
Payer: COMMERCIAL

## 2025-02-11 ENCOUNTER — APPOINTMENT (EMERGENCY)
Dept: CT IMAGING | Facility: HOSPITAL | Age: 80
DRG: 871 | End: 2025-02-11
Payer: COMMERCIAL

## 2025-02-11 ENCOUNTER — APPOINTMENT (INPATIENT)
Dept: ULTRASOUND IMAGING | Facility: HOSPITAL | Age: 80
DRG: 871 | End: 2025-02-11
Payer: COMMERCIAL

## 2025-02-11 ENCOUNTER — HOSPITAL ENCOUNTER (INPATIENT)
Facility: HOSPITAL | Age: 80
LOS: 11 days | Discharge: NURSING, REHAB OR LONG-TERM CARE | DRG: 871 | End: 2025-02-22
Attending: EMERGENCY MEDICINE | Admitting: INTERNAL MEDICINE
Payer: COMMERCIAL

## 2025-02-11 DIAGNOSIS — K63.89 COLONIC MASS: ICD-10-CM

## 2025-02-11 DIAGNOSIS — I48.91 ATRIAL FIBRILLATION (HCC): ICD-10-CM

## 2025-02-11 DIAGNOSIS — I77.810 AORTIC ECTASIA, THORACIC (HCC): ICD-10-CM

## 2025-02-11 DIAGNOSIS — J18.9 PNEUMONIA: ICD-10-CM

## 2025-02-11 DIAGNOSIS — Z86.73 HISTORY OF STROKE: ICD-10-CM

## 2025-02-11 DIAGNOSIS — M54.2 NECK PAIN: ICD-10-CM

## 2025-02-11 DIAGNOSIS — D64.9 ANEMIA: ICD-10-CM

## 2025-02-11 DIAGNOSIS — M54.9 BACK PAIN: ICD-10-CM

## 2025-02-11 DIAGNOSIS — J96.11 CHRONIC HYPOXIC RESPIRATORY FAILURE (HCC): ICD-10-CM

## 2025-02-11 DIAGNOSIS — R91.1 PULMONARY NODULE, LEFT: ICD-10-CM

## 2025-02-11 DIAGNOSIS — N40.0 ENLARGED PROSTATE: ICD-10-CM

## 2025-02-11 DIAGNOSIS — I63.9 EMBOLIC STROKE (HCC): ICD-10-CM

## 2025-02-11 DIAGNOSIS — R79.89 ELEVATED TROPONIN: ICD-10-CM

## 2025-02-11 DIAGNOSIS — I63.9 STROKE (HCC): ICD-10-CM

## 2025-02-11 DIAGNOSIS — R78.81 BACTEREMIA: ICD-10-CM

## 2025-02-11 DIAGNOSIS — K59.00 CONSTIPATION: ICD-10-CM

## 2025-02-11 DIAGNOSIS — I77.819 AORTIC ECTASIA (HCC): ICD-10-CM

## 2025-02-11 DIAGNOSIS — R78.81 MSSA BACTEREMIA: ICD-10-CM

## 2025-02-11 DIAGNOSIS — E86.0 DEHYDRATION: ICD-10-CM

## 2025-02-11 DIAGNOSIS — A41.9 SEPSIS (HCC): ICD-10-CM

## 2025-02-11 DIAGNOSIS — B95.61 MSSA BACTEREMIA: ICD-10-CM

## 2025-02-11 DIAGNOSIS — J44.1 COPD EXACERBATION (HCC): Primary | ICD-10-CM

## 2025-02-11 DIAGNOSIS — J44.9 CHRONIC OBSTRUCTIVE PULMONARY DISEASE, UNSPECIFIED COPD TYPE (HCC): ICD-10-CM

## 2025-02-11 DIAGNOSIS — R29.90 STROKE-LIKE SYMPTOMS: ICD-10-CM

## 2025-02-11 DIAGNOSIS — M21.962 LEFT ANKLE JOINT DEFORMITY: ICD-10-CM

## 2025-02-11 PROBLEM — R33.8 ACUTE URINARY RETENTION: Status: ACTIVE | Noted: 2025-02-11

## 2025-02-11 PROBLEM — R65.10 SIRS (SYSTEMIC INFLAMMATORY RESPONSE SYNDROME) (HCC): Status: ACTIVE | Noted: 2025-02-11

## 2025-02-11 PROBLEM — R26.2 AMBULATORY DYSFUNCTION: Status: ACTIVE | Noted: 2025-02-11

## 2025-02-11 PROBLEM — M79.89 SWELLING OF CALF: Status: ACTIVE | Noted: 2025-02-11

## 2025-02-11 PROBLEM — I95.9 HYPOTENSION: Status: ACTIVE | Noted: 2025-02-11

## 2025-02-11 LAB
2HR DELTA HS TROPONIN: 114 NG/L
4HR DELTA HS TROPONIN: 245 NG/L
ALBUMIN SERPL BCG-MCNC: 2.6 G/DL (ref 3.5–5)
ALP SERPL-CCNC: 66 U/L (ref 34–104)
ALT SERPL W P-5'-P-CCNC: 89 U/L (ref 7–52)
ANION GAP SERPL CALCULATED.3IONS-SCNC: 6 MMOL/L (ref 4–13)
ANISOCYTOSIS BLD QL SMEAR: PRESENT
AST SERPL W P-5'-P-CCNC: 62 U/L (ref 13–39)
ATRIAL RATE: 109 BPM
ATRIAL RATE: 110 BPM
BACTERIA UR QL AUTO: ABNORMAL /HPF
BASE EX.OXY STD BLDV CALC-SCNC: 73.9 % (ref 60–80)
BASE EXCESS BLDV CALC-SCNC: 1.6 MMOL/L
BASOPHILS # BLD MANUAL: 0 THOUSAND/UL (ref 0–0.1)
BASOPHILS NFR MAR MANUAL: 0 % (ref 0–1)
BILIRUB SERPL-MCNC: 0.78 MG/DL (ref 0.2–1)
BILIRUB UR QL STRIP: NEGATIVE
BNP SERPL-MCNC: 228 PG/ML (ref 0–100)
BUN SERPL-MCNC: 45 MG/DL (ref 5–25)
CALCIUM ALBUM COR SERPL-MCNC: 9.7 MG/DL (ref 8.3–10.1)
CALCIUM SERPL-MCNC: 8.6 MG/DL (ref 8.4–10.2)
CARDIAC TROPONIN I PNL SERPL HS: 362 NG/L (ref ?–50)
CARDIAC TROPONIN I PNL SERPL HS: 476 NG/L (ref ?–50)
CARDIAC TROPONIN I PNL SERPL HS: 607 NG/L (ref ?–50)
CHLORIDE SERPL-SCNC: 98 MMOL/L (ref 96–108)
CLARITY UR: CLEAR
CO2 SERPL-SCNC: 28 MMOL/L (ref 21–32)
COLOR UR: YELLOW
CREAT SERPL-MCNC: 1.25 MG/DL (ref 0.6–1.3)
D DIMER PPP FEU-MCNC: 3.89 UG/ML FEU
EOSINOPHIL # BLD MANUAL: 0 THOUSAND/UL (ref 0–0.4)
EOSINOPHIL NFR BLD MANUAL: 0 % (ref 0–6)
ERYTHROCYTE [DISTWIDTH] IN BLOOD BY AUTOMATED COUNT: 17.2 % (ref 11.6–15.1)
FLUAV AG UPPER RESP QL IA.RAPID: NEGATIVE
FLUBV AG UPPER RESP QL IA.RAPID: NEGATIVE
GFR SERPL CREATININE-BSD FRML MDRD: 55 ML/MIN/1.73SQ M
GLUCOSE SERPL-MCNC: 134 MG/DL (ref 65–140)
GLUCOSE UR STRIP-MCNC: NEGATIVE MG/DL
HCO3 BLDV-SCNC: 25.8 MMOL/L (ref 24–30)
HCT VFR BLD AUTO: 25.4 % (ref 36.5–49.3)
HGB BLD-MCNC: 8.2 G/DL (ref 12–17)
HGB UR QL STRIP.AUTO: ABNORMAL
KETONES UR STRIP-MCNC: NEGATIVE MG/DL
L PNEUMO1 AG UR QL IA.RAPID: NEGATIVE
LACTATE SERPL-SCNC: 1.9 MMOL/L (ref 0.5–2)
LEUKOCYTE ESTERASE UR QL STRIP: NEGATIVE
LYMPHOCYTES # BLD AUTO: 0.74 THOUSAND/UL (ref 0.6–4.47)
LYMPHOCYTES # BLD AUTO: 6 % (ref 14–44)
MCH RBC QN AUTO: 29.8 PG (ref 26.8–34.3)
MCHC RBC AUTO-ENTMCNC: 32.3 G/DL (ref 31.4–37.4)
MCV RBC AUTO: 92 FL (ref 82–98)
METAMYELOCYTE ABSOLUTE CT: 0.49 THOUSAND/UL (ref 0–0.1)
METAMYELOCYTES NFR BLD MANUAL: 4 % (ref 0–1)
MICROCYTES BLD QL AUTO: PRESENT
MONOCYTES # BLD AUTO: 0.12 THOUSAND/UL (ref 0–1.22)
MONOCYTES NFR BLD: 1 % (ref 4–12)
MUCOUS THREADS UR QL AUTO: ABNORMAL
NEUTROPHILS # BLD MANUAL: 10.96 THOUSAND/UL (ref 1.85–7.62)
NEUTS BAND NFR BLD MANUAL: 2 % (ref 0–8)
NEUTS SEG NFR BLD AUTO: 87 % (ref 43–75)
NITRITE UR QL STRIP: NEGATIVE
NON-SQ EPI CELLS URNS QL MICRO: ABNORMAL /HPF
O2 CT BLDV-SCNC: 9.4 ML/DL
P AXIS: 53 DEGREES
P AXIS: 58 DEGREES
PCO2 BLDV: 38.7 MM HG (ref 42–50)
PH BLDV: 7.44 [PH] (ref 7.3–7.4)
PH UR STRIP.AUTO: 5.5 [PH]
PLATELET # BLD AUTO: 224 THOUSANDS/UL (ref 149–390)
PLATELET BLD QL SMEAR: ADEQUATE
PLATELET CLUMP BLD QL SMEAR: PRESENT
PMV BLD AUTO: 8.7 FL (ref 8.9–12.7)
PO2 BLDV: 38.8 MM HG (ref 35–45)
POLYCHROMASIA BLD QL SMEAR: PRESENT
POTASSIUM SERPL-SCNC: 4.2 MMOL/L (ref 3.5–5.3)
PR INTERVAL: 136 MS
PR INTERVAL: 138 MS
PROCALCITONIN SERPL-MCNC: 0.9 NG/ML
PROT SERPL-MCNC: 6.3 G/DL (ref 6.4–8.4)
PROT UR STRIP-MCNC: ABNORMAL MG/DL
QRS AXIS: 42 DEGREES
QRS AXIS: 50 DEGREES
QRSD INTERVAL: 92 MS
QRSD INTERVAL: 96 MS
QT INTERVAL: 352 MS
QT INTERVAL: 358 MS
QTC INTERVAL: 476 MS
QTC INTERVAL: 482 MS
RBC # BLD AUTO: 2.75 MILLION/UL (ref 3.88–5.62)
RBC #/AREA URNS AUTO: ABNORMAL /HPF
RBC MORPH BLD: PRESENT
S PNEUM AG UR QL: NEGATIVE
SARS-COV+SARS-COV-2 AG RESP QL IA.RAPID: NEGATIVE
SODIUM SERPL-SCNC: 132 MMOL/L (ref 135–147)
SP GR UR STRIP.AUTO: 1.03 (ref 1–1.03)
T WAVE AXIS: 53 DEGREES
T WAVE AXIS: 56 DEGREES
UROBILINOGEN UR STRIP-ACNC: <2 MG/DL
VENTRICULAR RATE: 109 BPM
VENTRICULAR RATE: 110 BPM
WBC # BLD AUTO: 12.32 THOUSAND/UL (ref 4.31–10.16)
WBC #/AREA URNS AUTO: ABNORMAL /HPF

## 2025-02-11 PROCEDURE — 94640 AIRWAY INHALATION TREATMENT: CPT

## 2025-02-11 PROCEDURE — 85007 BL SMEAR W/DIFF WBC COUNT: CPT

## 2025-02-11 PROCEDURE — 87147 CULTURE TYPE IMMUNOLOGIC: CPT

## 2025-02-11 PROCEDURE — 87081 CULTURE SCREEN ONLY: CPT | Performed by: INTERNAL MEDICINE

## 2025-02-11 PROCEDURE — 99285 EMERGENCY DEPT VISIT HI MDM: CPT

## 2025-02-11 PROCEDURE — 96365 THER/PROPH/DIAG IV INF INIT: CPT

## 2025-02-11 PROCEDURE — 87811 SARS-COV-2 COVID19 W/OPTIC: CPT

## 2025-02-11 PROCEDURE — 76775 US EXAM ABDO BACK WALL LIM: CPT

## 2025-02-11 PROCEDURE — 96367 TX/PROPH/DG ADDL SEQ IV INF: CPT

## 2025-02-11 PROCEDURE — 87804 INFLUENZA ASSAY W/OPTIC: CPT

## 2025-02-11 PROCEDURE — 96375 TX/PRO/DX INJ NEW DRUG ADDON: CPT

## 2025-02-11 PROCEDURE — 85027 COMPLETE CBC AUTOMATED: CPT

## 2025-02-11 PROCEDURE — 87040 BLOOD CULTURE FOR BACTERIA: CPT

## 2025-02-11 PROCEDURE — 96360 HYDRATION IV INFUSION INIT: CPT

## 2025-02-11 PROCEDURE — 99285 EMERGENCY DEPT VISIT HI MDM: CPT | Performed by: EMERGENCY MEDICINE

## 2025-02-11 PROCEDURE — 93005 ELECTROCARDIOGRAM TRACING: CPT

## 2025-02-11 PROCEDURE — 87449 NOS EACH ORGANISM AG IA: CPT

## 2025-02-11 PROCEDURE — 71045 X-RAY EXAM CHEST 1 VIEW: CPT

## 2025-02-11 PROCEDURE — 93971 EXTREMITY STUDY: CPT

## 2025-02-11 PROCEDURE — 81001 URINALYSIS AUTO W/SCOPE: CPT

## 2025-02-11 PROCEDURE — 80053 COMPREHEN METABOLIC PANEL: CPT

## 2025-02-11 PROCEDURE — 93010 ELECTROCARDIOGRAM REPORT: CPT | Performed by: INTERNAL MEDICINE

## 2025-02-11 PROCEDURE — 87154 CUL TYP ID BLD PTHGN 6+ TRGT: CPT

## 2025-02-11 PROCEDURE — 71275 CT ANGIOGRAPHY CHEST: CPT

## 2025-02-11 PROCEDURE — 84145 PROCALCITONIN (PCT): CPT

## 2025-02-11 PROCEDURE — 94760 N-INVAS EAR/PLS OXIMETRY 1: CPT

## 2025-02-11 PROCEDURE — 96361 HYDRATE IV INFUSION ADD-ON: CPT

## 2025-02-11 PROCEDURE — 94664 DEMO&/EVAL PT USE INHALER: CPT

## 2025-02-11 PROCEDURE — 83605 ASSAY OF LACTIC ACID: CPT

## 2025-02-11 PROCEDURE — 36415 COLL VENOUS BLD VENIPUNCTURE: CPT

## 2025-02-11 PROCEDURE — 94644 CONT INHLJ TX 1ST HOUR: CPT

## 2025-02-11 PROCEDURE — 99223 1ST HOSP IP/OBS HIGH 75: CPT | Performed by: INTERNAL MEDICINE

## 2025-02-11 PROCEDURE — 83880 ASSAY OF NATRIURETIC PEPTIDE: CPT

## 2025-02-11 PROCEDURE — 87186 SC STD MICRODIL/AGAR DIL: CPT

## 2025-02-11 PROCEDURE — 96374 THER/PROPH/DIAG INJ IV PUSH: CPT

## 2025-02-11 PROCEDURE — 82805 BLOOD GASES W/O2 SATURATION: CPT

## 2025-02-11 PROCEDURE — 85379 FIBRIN DEGRADATION QUANT: CPT

## 2025-02-11 PROCEDURE — 84484 ASSAY OF TROPONIN QUANT: CPT

## 2025-02-11 RX ORDER — PREDNISONE 20 MG/1
40 TABLET ORAL DAILY
Status: DISCONTINUED | OUTPATIENT
Start: 2025-02-12 | End: 2025-02-11

## 2025-02-11 RX ORDER — PREDNISONE 20 MG/1
40 TABLET ORAL DAILY
Status: DISCONTINUED | OUTPATIENT
Start: 2025-02-12 | End: 2025-02-13

## 2025-02-11 RX ORDER — ATORVASTATIN CALCIUM 40 MG/1
80 TABLET, FILM COATED ORAL
Status: DISCONTINUED | OUTPATIENT
Start: 2025-02-11 | End: 2025-02-22 | Stop reason: HOSPADM

## 2025-02-11 RX ORDER — TAMSULOSIN HYDROCHLORIDE 0.4 MG/1
0.4 CAPSULE ORAL
Status: DISCONTINUED | OUTPATIENT
Start: 2025-02-11 | End: 2025-02-22 | Stop reason: HOSPADM

## 2025-02-11 RX ORDER — LOSARTAN POTASSIUM 25 MG/1
25 TABLET ORAL DAILY
Status: DISCONTINUED | OUTPATIENT
Start: 2025-02-12 | End: 2025-02-22 | Stop reason: HOSPADM

## 2025-02-11 RX ORDER — ACETAMINOPHEN 10 MG/ML
1000 INJECTION, SOLUTION INTRAVENOUS ONCE
Status: COMPLETED | OUTPATIENT
Start: 2025-02-11 | End: 2025-02-11

## 2025-02-11 RX ORDER — SERTRALINE HYDROCHLORIDE 25 MG/1
12.5 TABLET, FILM COATED ORAL DAILY
Status: DISCONTINUED | OUTPATIENT
Start: 2025-02-12 | End: 2025-02-22 | Stop reason: HOSPADM

## 2025-02-11 RX ORDER — SODIUM CHLORIDE FOR INHALATION 0.9 %
3 VIAL, NEBULIZER (ML) INHALATION
Status: DISCONTINUED | OUTPATIENT
Start: 2025-02-11 | End: 2025-02-12

## 2025-02-11 RX ORDER — LEVALBUTEROL INHALATION SOLUTION 1.25 MG/3ML
1.25 SOLUTION RESPIRATORY (INHALATION)
Status: DISCONTINUED | OUTPATIENT
Start: 2025-02-11 | End: 2025-02-14

## 2025-02-11 RX ORDER — POLYETHYLENE GLYCOL 3350 17 G/17G
17 POWDER, FOR SOLUTION ORAL DAILY PRN
Status: DISCONTINUED | OUTPATIENT
Start: 2025-02-11 | End: 2025-02-22 | Stop reason: HOSPADM

## 2025-02-11 RX ORDER — METHYLPREDNISOLONE 4 MG/1
12 TABLET ORAL 2 TIMES DAILY
COMMUNITY
Start: 2025-02-09 | End: 2025-02-22

## 2025-02-11 RX ORDER — DOCUSATE SODIUM 100 MG/1
100 CAPSULE, LIQUID FILLED ORAL 2 TIMES DAILY
Status: DISCONTINUED | OUTPATIENT
Start: 2025-02-11 | End: 2025-02-12

## 2025-02-11 RX ORDER — ALBUTEROL SULFATE 90 UG/1
2 INHALANT RESPIRATORY (INHALATION) EVERY 4 HOURS PRN
Status: CANCELLED | OUTPATIENT
Start: 2025-02-11

## 2025-02-11 RX ORDER — ALBUTEROL SULFATE 5 MG/ML
10 SOLUTION RESPIRATORY (INHALATION) ONCE
Status: COMPLETED | OUTPATIENT
Start: 2025-02-11 | End: 2025-02-11

## 2025-02-11 RX ORDER — ALBUTEROL SULFATE 0.83 MG/ML
2.5 SOLUTION RESPIRATORY (INHALATION) EVERY 6 HOURS PRN
Status: CANCELLED | OUTPATIENT
Start: 2025-02-11

## 2025-02-11 RX ORDER — SODIUM CHLORIDE FOR INHALATION 0.9 %
12 VIAL, NEBULIZER (ML) INHALATION ONCE
Status: COMPLETED | OUTPATIENT
Start: 2025-02-11 | End: 2025-02-11

## 2025-02-11 RX ORDER — HYDROXYCHLOROQUINE SULFATE 200 MG/1
400 TABLET, FILM COATED ORAL
Status: DISCONTINUED | OUTPATIENT
Start: 2025-02-12 | End: 2025-02-22 | Stop reason: HOSPADM

## 2025-02-11 RX ORDER — FAMOTIDINE 20 MG/1
20 TABLET, FILM COATED ORAL DAILY
Status: DISCONTINUED | OUTPATIENT
Start: 2025-02-12 | End: 2025-02-13

## 2025-02-11 RX ORDER — IPRATROPIUM BROMIDE AND ALBUTEROL SULFATE .5; 3 MG/3ML; MG/3ML
1 SOLUTION RESPIRATORY (INHALATION) ONCE
Status: COMPLETED | OUTPATIENT
Start: 2025-02-11 | End: 2025-02-11

## 2025-02-11 RX ORDER — ASPIRIN 81 MG/1
81 TABLET ORAL DAILY
Status: DISCONTINUED | OUTPATIENT
Start: 2025-02-12 | End: 2025-02-15

## 2025-02-11 RX ORDER — ALBUTEROL SULFATE 2.5 MG/3ML
1 SOLUTION RESPIRATORY (INHALATION) ONCE
Status: COMPLETED | OUTPATIENT
Start: 2025-02-11 | End: 2025-02-11

## 2025-02-11 RX ORDER — METOPROLOL TARTRATE 25 MG/1
25 TABLET, FILM COATED ORAL EVERY 12 HOURS SCHEDULED
Status: DISCONTINUED | OUTPATIENT
Start: 2025-02-11 | End: 2025-02-22 | Stop reason: HOSPADM

## 2025-02-11 RX ORDER — ACETAMINOPHEN 325 MG/1
650 TABLET ORAL EVERY 6 HOURS PRN
Status: DISCONTINUED | OUTPATIENT
Start: 2025-02-11 | End: 2025-02-22 | Stop reason: HOSPADM

## 2025-02-11 RX ORDER — METHYLPREDNISOLONE SODIUM SUCCINATE 125 MG/2ML
60 INJECTION, POWDER, LYOPHILIZED, FOR SOLUTION INTRAMUSCULAR; INTRAVENOUS ONCE
Status: COMPLETED | OUTPATIENT
Start: 2025-02-11 | End: 2025-02-11

## 2025-02-11 RX ORDER — GUAIFENESIN 600 MG/1
1200 TABLET, EXTENDED RELEASE ORAL 2 TIMES DAILY
Status: DISCONTINUED | OUTPATIENT
Start: 2025-02-11 | End: 2025-02-22 | Stop reason: HOSPADM

## 2025-02-11 RX ORDER — FORMOTEROL FUMARATE 20 UG/2ML
20 SOLUTION RESPIRATORY (INHALATION)
Status: DISCONTINUED | OUTPATIENT
Start: 2025-02-11 | End: 2025-02-14

## 2025-02-11 RX ADMIN — GUAIFENESIN 1200 MG: 600 TABLET, EXTENDED RELEASE ORAL at 17:49

## 2025-02-11 RX ADMIN — SODIUM CHLORIDE 1000 ML: 0.9 INJECTION, SOLUTION INTRAVENOUS at 13:23

## 2025-02-11 RX ADMIN — IPRATROPIUM BROMIDE 1 MG: 0.5 SOLUTION RESPIRATORY (INHALATION) at 09:31

## 2025-02-11 RX ADMIN — ACETAMINOPHEN 1000 MG: 10 INJECTION INTRAVENOUS at 10:02

## 2025-02-11 RX ADMIN — ALBUTEROL SULFATE 10 MG: 2.5 SOLUTION RESPIRATORY (INHALATION) at 09:31

## 2025-02-11 RX ADMIN — AZITHROMYCIN MONOHYDRATE 500 MG: 500 INJECTION, POWDER, LYOPHILIZED, FOR SOLUTION INTRAVENOUS at 12:00

## 2025-02-11 RX ADMIN — FORMOTEROL FUMARATE DIHYDRATE 20 MCG: 20 SOLUTION RESPIRATORY (INHALATION) at 19:39

## 2025-02-11 RX ADMIN — ISODIUM CHLORIDE 12 ML: 0.03 SOLUTION RESPIRATORY (INHALATION) at 09:31

## 2025-02-11 RX ADMIN — SODIUM CHLORIDE 500 ML: 0.9 INJECTION, SOLUTION INTRAVENOUS at 09:56

## 2025-02-11 RX ADMIN — ATORVASTATIN CALCIUM 80 MG: 40 TABLET, FILM COATED ORAL at 17:49

## 2025-02-11 RX ADMIN — LEVALBUTEROL HYDROCHLORIDE 1.25 MG: 1.25 SOLUTION RESPIRATORY (INHALATION) at 19:39

## 2025-02-11 RX ADMIN — TAMSULOSIN HYDROCHLORIDE 0.4 MG: 0.4 CAPSULE ORAL at 23:02

## 2025-02-11 RX ADMIN — Medication 3 ML: at 19:39

## 2025-02-11 RX ADMIN — ACETAMINOPHEN 650 MG: 325 TABLET, FILM COATED ORAL at 18:36

## 2025-02-11 RX ADMIN — IPRATROPIUM BROMIDE 0.5 MG: 0.5 SOLUTION RESPIRATORY (INHALATION) at 19:39

## 2025-02-11 RX ADMIN — SODIUM CHLORIDE 1000 ML: 0.9 INJECTION, SOLUTION INTRAVENOUS at 11:36

## 2025-02-11 RX ADMIN — METHYLPREDNISOLONE SODIUM SUCCINATE 60 MG: 125 INJECTION, POWDER, FOR SOLUTION INTRAMUSCULAR; INTRAVENOUS at 09:25

## 2025-02-11 RX ADMIN — CEFTRIAXONE SODIUM 1000 MG: 10 INJECTION, POWDER, FOR SOLUTION INTRAVENOUS at 11:42

## 2025-02-11 RX ADMIN — DOCUSATE SODIUM 100 MG: 100 CAPSULE, LIQUID FILLED ORAL at 17:49

## 2025-02-11 RX ADMIN — APIXABAN 5 MG: 5 TABLET, FILM COATED ORAL at 17:49

## 2025-02-11 RX ADMIN — IOHEXOL 85 ML: 350 INJECTION, SOLUTION INTRAVENOUS at 12:31

## 2025-02-11 NOTE — ED ATTENDING ATTESTATION
2/11/2025  I, Chang Tran MD, saw and evaluated the patient. I have discussed the patient with the resident/non-physician practitioner and agree with the resident's/non-physician practitioner's findings, Plan of Care, and MDM as documented in the resident's/non-physician practitioner's note, except where noted. All available labs and Radiology studies were reviewed.  I was present for key portions of any procedure(s) performed by the resident/non-physician practitioner and I was immediately available to provide assistance.       At this point I agree with the current assessment done in the Emergency Department.  I have conducted an independent evaluation of this patient a history and physical is as follows:  Briefly, 79-year-old male sent from postacute rehab with worsening shortness of breath.  Patient has a history of COPD, typically wears 2 L of oxygen by nasal cannula at baseline, at approximately 6 this morning began having worsening shortness of breath, dyspneic despite increasing oxygen to 4 L/min.  Also noted to be tachycardic.  Patient was given nebulizer treatment by EMS prior to arrival with some improvement but not resolution of symptoms.  Patient does note relative immobility over the last 5 days, largely bedbound, does have some swelling to the left lower extremity that he describes as chronic.  Patient is currently on Eliquis and is compliant with same.  He denies fevers, trauma, chest pain, abdominal pain, vomiting, other symptoms.  On examination, wheezes throughout all lung fields, very poor air movement and prolonged expiration noted, swelling with 1+ pitting edema noted to left lower extremity, abdomen nontender, heart sounds normal.  EKG not acutely ischemic, labs as detailed in resident note.  Breathing improved substantially with nebs and steroids, started on broad-spectrum antibiotics for COPD exacerbation.  D-dimer was sent, returned elevated, prompting CT which showed no PE.   Incidental findings relayed to patient and noted in diagnoses.  Admitted to internal medicine, hemodynamically stable and comfortable at that time.    ED Course         Critical Care Time  Procedures

## 2025-02-11 NOTE — ED PROVIDER NOTES
Time reflects when diagnosis was documented in both MDM as applicable and the Disposition within this note       Time User Action Codes Description Comment    2/11/2025  1:57 PM Kotil, Mal Add [J44.1] COPD exacerbation (HCC)     2/11/2025  1:57 PM Kotil, Mal Add [J18.9] Pneumonia     2/11/2025  1:57 PM Kotil, Mal Add [I77.819] Aortic ectasia (HCC)     2/11/2025  1:58 PM Kotil, Mal Add [A41.9] Sepsis (HCC)     2/11/2025  1:58 PM Kotil, Mal Add [E86.0] Dehydration     2/11/2025  1:58 PM Kotil, Mal Add [R79.89] Elevated troponin     2/11/2025  1:58 PM Kotil, Mal Add [I77.810] Aortic ectasia, thoracic (HCC)     2/11/2025  1:59 PM Kotil, Mal Add [R91.1] Pulmonary nodule, left     2/11/2025  6:43 PM DionysiusYazmin Add [I48.91] Atrial fibrillation (HCC)     2/12/2025  8:43 AM Maria Alejandra Mercer Add [R78.81] Bacteremia           ED Disposition       ED Disposition   Admit    Condition   Stable    Date/Time   Tue Feb 11, 2025  2:10 PM    Comment   Case was discussed with Dr. Hodges and the patient's admission status was agreed to be Admission Status: inpatient status to the service of Dr. Hodges .               Assessment & Plan       Medical Decision Making  79y.o M presenting with SOB. Ddx includes but not limited to COPD exacerbation, PNA, PE, HF. On presentation, Pt appeared with increased WOB and intermittent episodes of sleepiness. VBG ordered to r/o CO2 retention, which was not evident Exam showed mild end-expiratory wheezes, which was not definitive for COPD exacerbation, albeit this could be due to severe bronchoconstriction leading to minimal air movement. He was provided a AMAYA neb which improved his WOB and SOB, however, his repeat lung exam did not show much of a difference. Given his WOB, tachycardia, which may be due to severe dehydration, minimal relief with solumedrol and AMAYA neb, and risk factors for DVT (immobilization) I had concerns for PE. Ddimer was elevated  prompting CT PE study. He presented with hypotension that eventually resolved with significant amounts of IV fluid; additionally, his tachycardia improved as well, thus indicating likely dehydration as a cause. CT PE study was negative for PE. No evidence of PNA, however, he does meet sepsis criteria and PNA is likely source, therefore, he was started on rocephin and azithromycin both for PNA and for COPD exacerbation. He improved significantly following my interventions. Given his initial clinical appearance, and concerns that he would need escalating O2 requirements if discharged home, Pt was admitted to medicine for further workup and management.     Amount and/or Complexity of Data Reviewed  Labs: ordered. Decision-making details documented in ED Course.  Radiology: ordered and independent interpretation performed. Decision-making details documented in ED Course.    Risk  Prescription drug management.  Decision regarding hospitalization.        ED Course as of 02/12/25 1142   Tue Feb 11, 2025   1014 hs TnI 0hr(!): 362  Likely demand given tachycardia, h/o afib, respiratory distress    1036 EKG without evidence of acute ischemic findings    1036 Given persistent SOB, tachypnea, increased WOB, and absence of significant wheezing on exam s/p nebulizer therapy, I have c/f possible PE. Therefore, Ddimer was ordered   1206 Ddimer elevated, therefore, CT PE study ordered   1357 CTA chest pe study  IMPRESSION:     No evidence of pulmonary embolus.     Stable ectasia of the ascending thoracic aorta measuring up to 41 mm.     Mild secretions in the lower trachea, correlate for any dysphagia or possible aspiration.     Noncalcified left lung apex pulmonary nodule, 6 x 5 mm. Because this was not present on prior chest CT of December 4, 2023, conservative management with follow-up low radiation dose noncontrast chest CT in 6 months is recommended.            Medications   albuterol (FOR EMS ONLY) (2.5 mg/3 mL) 0.083 %  inhalation solution 2.5 mg (0 mg Does not apply Given to EMS 2/11/25 0919)   ipratropium-albuterol (FOR EMS ONLY) (DUO-NEB) 0.5-2.5 mg/3 mL inhalation solution 3 mL (0 mL Does not apply Given to EMS 2/11/25 0919)   albuterol inhalation solution 10 mg (10 mg Nebulization Given 2/11/25 0931)   ipratropium (ATROVENT) 0.02 % inhalation solution 1 mg (1 mg Nebulization Given 2/11/25 0931)   sodium chloride 0.9 % inhalation solution 12 mL (12 mL Nebulization Given 2/11/25 0931)   methylPREDNISolone sodium succinate (Solu-MEDROL) injection 60 mg (60 mg Intravenous Given 2/11/25 0925)   sodium chloride 0.9 % bolus 500 mL (0 mL Intravenous Stopped 2/11/25 1056)   acetaminophen (Ofirmev) injection 1,000 mg (0 mg Intravenous Stopped 2/11/25 1017)   ceftriaxone (ROCEPHIN) 1 g/50 mL in dextrose IVPB (0 mg Intravenous Stopped 2/11/25 1212)   azithromycin (ZITHROMAX) 500 mg in sodium chloride 0.9% 250mL IVPB 500 mg (0 mg Intravenous Stopped 2/11/25 1300)   sodium chloride 0.9 % bolus 1,000 mL (0 mL Intravenous Stopped 2/11/25 1311)   iohexol (OMNIPAQUE) 350 MG/ML injection (MULTI-DOSE) 85 mL (85 mL Intravenous Given 2/11/25 1231)   sodium chloride 0.9 % bolus 1,000 mL (0 mL Intravenous Stopped 2/11/25 1423)       ED Risk Strat Scores   HEART Risk Score      Flowsheet Row Most Recent Value   Heart Score Risk Calculator    History 1 Filed at: 02/12/2025 1135   ECG 1 Filed at: 02/12/2025 1135   Age 2 Filed at: 02/12/2025 1135   Risk Factors 1 Filed at: 02/12/2025 1135   Troponin 2 Filed at: 02/12/2025 1135   HEART Score 7 Filed at: 02/12/2025 1135          HEART Risk Score      Flowsheet Row Most Recent Value   Heart Score Risk Calculator    History 1 Filed at: 02/12/2025 1135   ECG 1 Filed at: 02/12/2025 1135   Age 2 Filed at: 02/12/2025 1135   Risk Factors 1 Filed at: 02/12/2025 1135   Troponin 2 Filed at: 02/12/2025 1135   HEART Score 7 Filed at: 02/12/2025 1135                            SBIRT 22yo+      Flowsheet Row Most Recent  Value   Initial Alcohol Screen: US AUDIT-C     1. How often do you have a drink containing alcohol? 0 Filed at: 02/11/2025 0847   2. How many drinks containing alcohol do you have on a typical day you are drinking?  0 Filed at: 02/11/2025 0847   3a. Male UNDER 65: How often do you have five or more drinks on one occasion? 0 Filed at: 02/11/2025 0847   3b. FEMALE Any Age, or MALE 65+: How often do you have 4 or more drinks on one occassion? 0 Filed at: 02/11/2025 0847   Audit-C Score 0 Filed at: 02/11/2025 0847   PRINCE: How many times in the past year have you...    Used an illegal drug or used a prescription medication for non-medical reasons? Never Filed at: 02/11/2025 0847            Wells' Criteria for PE      Flowsheet Row Most Recent Value   Wells' Criteria for PE    Clinical signs and symptoms of DVT 3 Filed at: 02/12/2025 1135   PE is primary diagnosis or equally likely 3 Filed at: 02/12/2025 1135   HR >100 1.5 Filed at: 02/12/2025 1135   Immobilization at least 3 days or Surgery in the previous 4 weeks 1.5 Filed at: 02/12/2025 1135   Previous, objectively diagnosed PE or DVT 0 Filed at: 02/12/2025 1135   Hemoptysis 0 Filed at: 02/12/2025 1135   Malignancy with treatment within 6 months or palliative 0 Filed at: 02/12/2025 1135   Wells' Criteria Total 9 Filed at: 02/12/2025 1135                        History of Present Illness       Chief Complaint   Patient presents with    Shortness of Breath     Pt arrives via EMS from [place] [facility] with c/o SOB since 0600, wears 2LNC chronically. EMS reports facility increased O2 to 4L without relief and noticed he was tachy, called EMS. 2.5 albuterol and 3mg duoneb given by EMS. Denies CP.        Past Medical History:   Diagnosis Date    Atrial fibrillation (HCC)     COPD (chronic obstructive pulmonary disease) (HCC)     Crushing injury of finger, left     Infectious viral hepatitis       Past Surgical History:   Procedure Laterality Date    FL LUMBAR PUNCTURE  DIAGNOSTIC  2/10/2022    [place] OPEN TX PHALANGEAL SHAFT FRACTURE PROX/MIDDLE EA Left 1/25/2017    Procedure: ORIF LEFT SMALL FINGER FRACTURE;  Surgeon: Blake Badillo MD;  Location: [facility];  Service: Orthopedics      History reviewed. No pertinent family history.   Social History     Tobacco Use    Smoking status: Former    Smokeless tobacco: Former     Quit date: 4/1/2011   Substance Use Topics    Alcohol use: No    Drug use: No      E-Cigarette/Vaping      E-Cigarette/Vaping Substances      I have reviewed and agree with the history as documented.     79y.o M w/ h/o COPD (on 2L O2 at home), Afib (on eliquis) presenting for SOB. 1 night ago Pt became acutely short of breath with CP and need to increase his home O2 to 4L. He found minimal relief with home albuterol. This morning, however, Pt's SOB worsened. Of note, 2yrs ago he sustained a left ankle Fx resulting in ambulatory difficulties and LLE edema. He has been working with PT for several months, however, over the past week he has been predominantly sedentary due to [facility] He otherwise denies h/o DVT/PE, recent long-distance travel, recent Fx, recent surgery, pleurisy, coughing, N/V, abdominal pain, arm pain, HA, fevers.      History provided by:  Patient      Review of Systems   Constitutional:  Positive for activity change. Negative for fever.   Respiratory:  Positive for shortness of breath and wheezing. Negative for cough.    Cardiovascular:  Positive for chest pain and leg swelling.   Gastrointestinal:  Negative for abdominal pain, nausea and vomiting.           Objective       ED Triage Vitals   Temperature Pulse Blood Pressure Respirations SpO2 Patient Position - Orthostatic VS   02/11/25 0845 02/11/25 0845 02/11/25 0845 02/11/25 0845 02/11/25 0845 02/11/25 0845   97.6 °F (36.4 °C) (!) 110 98/55 20 96 % Sitting      Temp Source Heart Rate Source BP Location FiO2 (%) Pain Score    02/11/25 0845 02/11/25 0845  02/11/25 0845 -- 02/11/25 1715    Oral Monitor Right arm  5      Vitals      Date and Time Temp Pulse SpO2 Resp BP Pain Score FACES Pain Rating User   02/12/25 1015 -- 75 -- -- 117/59 -- -- MAL   02/12/25 0824 -- 62 95 % 20 117/59 -- -- ML   02/12/25 0730 -- -- 100 % -- -- -- -- BD   02/12/25 0014 -- -- -- -- 99/58 -- -- VC   02/12/25 0013 -- -- -- -- 93/52 -- -- VC   02/11/25 2300 -- 86 100 % 18 91/51 -- -- CC   02/11/25 2100 97.5 °F (36.4 °C) 95 -- 18 98/54 5 -- CC   02/11/25 1941 -- -- 98 % -- -- -- -- MD   02/11/25 1836 -- -- -- -- -- 5 -- SD   02/11/25 1715 -- -- -- -- -- 5 -- SD   02/11/25 1715 97.6 °F (36.4 °C) 87 -- 18 102/56 -- -- BA   02/11/25 1630 -- 92 98 % 20 100/58 -- -- AG   02/11/25 1515 -- 88 100 % 18 99/60 -- -- AG   02/11/25 1500 -- 92 98 % -- 101/63 -- -- CF   02/11/25 1445 -- 88 98 % -- 101/62 -- -- AG   02/11/25 1430 -- 89 96 % -- 101/60 -- -- AG   02/11/25 1415 -- 89 98 % 18 95/58 -- -- AG   02/11/25 1400 -- 88 99 % 18 99/60 -- -- AG   02/11/25 1345 -- 90 100 % 16 91/57 -- -- AG   02/11/25 1330 -- 91 100 % 16 89/55 -- -- AG   02/11/25 1315 -- 92 99 % 16 86/53 provider notified -- -- AG   02/11/25 1300 -- 91 99 % 20 89/55 -- -- AG   02/11/25 1245 -- 95 96 % 20 90/54 -- -- AG   02/11/25 1215 -- 95 97 % 20 90/56 -- -- AG   02/11/25 1200 -- 98 93 % 20 88/50 -- -- AG   02/11/25 1145 -- 102 96 % 20 83/51 -- -- AG   02/11/25 1130 -- 105 97 % 20 83/51 -- -- AG   02/11/25 1115 -- 106 95 % 20 88/54 -- -- AG   02/11/25 1100 -- 107 93 % 24 84/47 -- -- AG   02/11/25 1000 -- 109 100 % 24 96/58 -- -- AG   02/11/25 0930 -- 106 94 % 28 89/54 -- -- AG   02/11/25 0900 -- 107 98 % 24 95/57 -- -- AG   02/11/25 0845 97.6 °F (36.4 °C) 110 96 % 20 98/55 -- -- AG            Physical Exam  Constitutional:       General: He is in acute distress.      Appearance: Normal appearance.   HENT:      Head: Normocephalic and atraumatic.      Nose: Nose normal. No rhinorrhea.      Mouth/Throat:      Mouth: Mucous membranes are  dry.      Pharynx: Oropharynx is clear.   Eyes:      Extraocular Movements: Extraocular movements intact.      Conjunctiva/sclera: Conjunctivae normal.   Cardiovascular:      Rate and Rhythm: Regular rhythm. Tachycardia present.      Pulses: Normal pulses.      Heart sounds: Normal heart sounds.   Pulmonary:      Effort: Bradypnea, accessory muscle usage and prolonged expiration present.      Breath sounds: Decreased air movement present. Wheezing (mild diffuse expiratory wheezes) present.   Abdominal:      General: Abdomen is flat.      Palpations: Abdomen is soft.   Musculoskeletal:         General: No tenderness.      Right lower leg: No edema.      Left lower leg: Edema present.   Skin:     General: Skin is warm and dry.      Capillary Refill: Capillary refill takes less than 2 seconds.   Neurological:      General: No focal deficit present.      Mental Status: He is alert and oriented to person, place, and time.         Results Reviewed       Procedure Component Value Units Date/Time    Blood culture #1 [[id number]]  (Abnormal) Collected: 02/11/25 1113    Lab Status: Preliminary result Specimen: Blood from Arm, Right Updated: 02/12/25 0947     Gram Stain Result Gram positive cocci in clusters    Blood culture #2 [[id number]]  (Abnormal) Collected: 02/11/25 1113    Lab Status: Preliminary result Specimen: Blood from Arm, Right Updated: 02/12/25 0126     Gram Stain Result Gram positive cocci in clusters    Blood Culture Identification Panel [[id number]]  (Abnormal) Collected: 02/11/25 1113    Lab Status: Preliminary result Specimen: Blood from Arm, Right Updated: 02/12/25 0126     Staphylococcus aureus Detected    Narrative:      Routine culture and susceptiblity to follow for confirmation.    Film Array panel tests for 11 gram positive organisms, 15 gram negative organisms, 7 yeast species and 10 resistance genes.     Legionella antigen, urine [[id number]]  (Normal) Collected: 02/11/25 1635    Lab Status: Final  result Specimen: Urine, Other Updated: 02/11/25 2325     Legionella Urinary Antigen Negative    Strep Pneumoniae, Urine [[id number]]  (Normal) Collected: 02/11/25 1635    Lab Status: Final result Specimen: Urine, Other Updated: 02/11/25 2324     Strep pneumoniae antigen, urine Negative    Urine Microscopic [[id number]]  (Abnormal) Collected: 02/11/25 1635    Lab Status: Final result Specimen: Urine, Straight Cath Updated: 02/11/25 1743     RBC, UA 10-20 /hpf      WBC, UA 2-4 /hpf      Epithelial Cells None Seen /hpf      Bacteria, UA None Seen /hpf      MUCUS THREADS Occasional    UA w Reflex to Microscopic w Reflex to Culture [[id number]]  (Abnormal) Collected: 02/11/25 1635    Lab Status: Final result Specimen: Urine, Straight Cath Updated: 02/11/25 1707     Color, UA Yellow     Clarity, UA Clear     Specific Gravity, UA 1.034     pH, UA 5.5     Leukocytes, UA Negative     Nitrite, UA Negative     Protein, UA 30 (1+) mg/dl      Glucose, UA Negative mg/dl      Ketones, UA Negative mg/dl      Urobilinogen, UA <2.0 mg/dl      Bilirubin, UA Negative     Occult Blood, UA Small    Lactic acid, plasma (w/reflex if result > 2.0) [[id number]]  (Normal) Collected: 02/11/25 1531    Lab Status: Final result Specimen: Blood from Arm, Left Updated: 02/11/25 1554     LACTIC ACID 1.9 mmol/L     Narrative:      Result may be elevated if tourniquet was used during collection.    HS Troponin I 4hr [[id number]]  (Abnormal) Collected: 02/11/25 1328    Lab Status: Final result Specimen: Blood from Arm, Left Updated: 02/11/25 1406     hs TnI 4hr 607 ng/L      Delta 4hr hsTnI 245 ng/L     Procalcitonin [[id number]]  (Abnormal) Collected: 02/11/25 0921    Lab Status: Final result Specimen: Blood from Arm, Right Updated: 02/11/25 1232     Procalcitonin 0.90 ng/ml     HS Troponin I 2hr [[id number]]  (Abnormal) Collected: 02/11/25 1115    Lab Status: Final result Specimen: Blood from Arm, Right Updated: 02/11/25 1214     hs TnI 2hr 476 ng/L       Delta 2hr hsTnI 114 ng/L     D-dimer, quantitative [[id number]]  (Abnormal) Collected: 02/11/25 1030    Lab Status: Final result Specimen: Blood from Arm, Right Updated: 02/11/25 1130     D-Dimer, Quant 3.89 ug/ml FEU     Narrative:      In the evaluation for possible pulmonary embolism, in the appropriate (Well's Score of 4 or less) patient, the age adjusted d-dimer cutoff for this patient can be calculated as:    Age x 0.01 (in ug/mL) for Age-adjusted D-dimer exclusion threshold for a patient over 50 years.    RBC Morphology Reflex Test [[id number]] Collected: 02/11/25 0921    Lab Status: Final result Specimen: Blood from Arm, Right Updated: 02/11/25 1101    CBC and differential [[id number]]  (Abnormal) Collected: 02/11/25 0921    Lab Status: Final result Specimen: Blood from Arm, Right Updated: 02/11/25 1037     WBC 12.32 Thousand/uL      RBC 2.75 Million/uL      Hemoglobin 8.2 g/dL      Hematocrit 25.4 %      MCV 92 fL      MCH 29.8 pg      MCHC 32.3 g/dL      RDW 17.2 %      MPV 8.7 fL      Platelets 224 Thousands/uL     Narrative:      This is an appended report.  These results have been appended to a previously verified report.    Manual Differential(PHLEBS Do Not Order) [[id number]]  (Abnormal) Collected: 02/11/25 0921    Lab Status: Final result Specimen: Blood from Arm, Right Updated: 02/11/25 1037     Segmented % 87 %      Bands % 2 %      Lymphocytes % 6 %      Monocytes % 1 %      Eosinophils % 0 %      Basophils % 0 %      Metamyelocytes % 4 %      Absolute Neutrophils 10.96 Thousand/uL      Absolute Lymphocytes 0.74 Thousand/uL      Absolute Monocytes 0.12 Thousand/uL      Absolute Eosinophils 0.00 Thousand/uL      Absolute Basophils 0.00 Thousand/uL      Absolute Metamyelocytes 0.49 Thousand/uL      Total Counted --     RBC Morphology Present     Platelet Estimate Adequate     Clumped Platelets Present     Anisocytosis Present     Microcytes Present     Polychromasia Present    B-Type Natriuretic  Peptide(BNP) [[id number]]  (Abnormal) Collected: 02/11/25 0921    Lab Status: Final result Specimen: Blood from Arm, Right Updated: 02/11/25 1013      pg/mL     HS Troponin 0hr (reflex protocol) [[id number]]  (Abnormal) Collected: 02/11/25 0921    Lab Status: Final result Specimen: Blood from Arm, Right Updated: 02/11/25 1011     hs TnI 0hr 362 ng/L     Blood gas, venous [[id number]]  (Abnormal) Collected: 02/11/25 1001    Lab Status: Final result Specimen: Blood from Arm, Right Updated: 02/11/25 1008     pH, [place] 7.442     pCO2, [place] 38.7 mm Hg      pO2, [place] 38.8 mm Hg      HCO3, [place] 25.8 mmol/L      Base Excess, [place] 1.6 mmol/L      O2 Content, [place] 9.4 ml/dL      O2 HGB, VENOUS 73.9 %     Comprehensive metabolic panel [[id number]]  (Abnormal) Collected: 02/11/25 0921    Lab Status: Final result Specimen: Blood from Arm, Right Updated: 02/11/25 1002     Sodium 132 mmol/L      Potassium 4.2 mmol/L      Chloride 98 mmol/L      CO2 28 mmol/L      ANION GAP 6 mmol/L      BUN 45 mg/dL      Creatinine 1.25 mg/dL      Glucose 134 mg/dL      Calcium 8.6 mg/dL      Corrected Calcium 9.7 mg/dL      AST 62 U/L      ALT 89 U/L      Alkaline Phosphatase 66 U/L      Total Protein 6.3 g/dL      Albumin 2.6 g/dL      Total Bilirubin 0.78 mg/dL      eGFR 55 ml/min/1.73sq m     Narrative:      National Kidney Disease Foundation guidelines for Chronic Kidney Disease (CKD):     Stage 1 with normal or high GFR (GFR > 90 mL/min/1.73 square meters)    Stage 2 Mild CKD (GFR = 60-89 mL/min/1.73 square meters)    Stage 3A Moderate CKD (GFR = 45-59 mL/min/1.73 square meters)    Stage 3B Moderate CKD (GFR = 30-44 mL/min/1.73 square meters)    Stage 4 Severe CKD (GFR = 15-29 mL/min/1.73 square meters)    Stage 5 End Stage CKD (GFR <15 mL/min/1.73 square meters)  Note: GFR calculation is accurate only with a steady state creatinine    FLU/COVID Rapid Antigen (30 min. TAT) - Preferred screening test in ED [[id number]]  (Normal) Collected:  02/11/25 0921    Lab Status: Final result Specimen: Nares from Nose Updated: 02/11/25 0954     SARS COV Rapid Antigen Negative     Influenza A Rapid Antigen Negative     Influenza B Rapid Antigen Negative    Narrative:      This test has been performed using the [employer] Berna 2 FLU+SARS Antigen test under the Emergency Use Authorization (EUA). This test has been validated by the  and verified by the performing laboratory. The Berna uses lateral flow immunofluorescent sandwich assay to detect SARS-COV, Influenza A and Influenza B Antigen.     The [employer] Berna 2 SARS Antigen test does not differentiate between SARS-CoV and SARS-CoV-2.     Negative results are presumptive and may be confirmed with a molecular assay, if necessary, for patient management. Negative results do not rule out SARS-CoV-2 or influenza infection and should not be used as the sole basis for treatment or patient management decisions. A negative test result may occur if the level of antigen in a sample is below the limit of detection of this test.     Positive results are indicative of the presence of viral antigens, but do not rule out bacterial infection or co-infection with other viruses.     All test results should be used as an adjunct to clinical observations and other information available to the provider.    FOR PEDIATRIC PATIENTS - copy/paste COVID Guidelines URL to browser: [email or web address]            VAS VENOUS DUPLEX -LOWER LIMB UNILATERAL   Final Interpretation by Darnell Webb MD (02/12 0720)      US kidney and bladder   Final Interpretation by Wilmer Ceballos MD (02/11 1930)      No hydronephrosis.      Moderate bladder distention.      Perinephric edema on the left.      Findings were discussed with Dr. Peralta at 7:25 p.m. via secure text      Workstation performed: [id number]         CTA chest pe study   Final Interpretation by Darnell Sharma MD (02/11  "[id number])      No evidence of pulmonary embolus.      Stable ectasia of the ascending thoracic aorta measuring up to 41 mm.      Mild secretions in the lower trachea, correlate for any dysphagia or possible aspiration.      Noncalcified left lung apex pulmonary nodule, 6 x 5 mm. Because this was not present on prior chest CT of December 4, 2023, conservative management with follow-up low radiation dose noncontrast chest CT in 6 months is recommended.      This examination was marked \"immediate notification\" in Epic in order to begin the standard process by which the radiology reading room liaison alerts the referring practitioner.                     Resident: Bijan Fajardo I, the attending radiologist, have reviewed the images and agree with the final report above.      Workstation performed: [id number]         XR chest 1 view portable   ED Interpretation by Mal Cotton MD (02/11 1041)   Findings suggestive of atelectasis. No obvious consolidation, cardiomegaly, pleural effusion, or pulmonary edema      Final Interpretation by Chato Beltran MD (02/11 1048)      No acute cardiopulmonary disease.            Workstation performed: [id number]             ECG 12 Lead Documentation Only    Date/Time: 2/11/2025 9:16 AM    Performed by: Mal Cotton MD  Authorized by: Mal Cotton MD    ECG reviewed by me, the ED Provider: yes    Patient location:  ED  Interpretation:     Interpretation: abnormal    Rate:     ECG rate:  110    ECG rate assessment: tachycardic    Rhythm:     Rhythm: sinus rhythm    Ectopy:     Ectopy: none    QRS:     QRS axis:  Normal    QRS intervals:  Normal  Conduction:     Conduction: normal    ST segments:     ST segments:  Normal  T waves:     T waves: normal        ED Medication and Procedure Management   Prior to Admission Medications   Prescriptions Last Dose Informant Patient Reported? Taking?   Cholecalciferol 50 MCG (2000 UT) TABS Unknown  Yes No   Sig: TAKE ONE TABLET BY MOUTH " DAILY (FOR LOW VITAMIN D)   albuterol (2.5 mg/3 mL) 0.083 % nebulizer solution 2/10/2025  No Yes   Sig: Take 3 mL (2.5 mg total) by nebulization every 6 (six) hours as needed for wheezing or shortness of breath   albuterol (PROVENTIL HFA,VENTOLIN HFA) 90 mcg/act inhaler Unknown  Yes No   Sig: INHALE 2 PUFFS BY MOUTH EVERY 4 HOURS AS NEEDED FOR BREATHING   apixaban (Eliquis) 5 mg 2/11/2025  Yes Yes   Sig: Take 5 mg by mouth 2 (two) times a day   ascorbic acid (VITAMIN C) 250 MG tablet 2/11/2025  Yes Yes   Sig: Take 250 mg by mouth daily   aspirin (ECOTRIN LOW STRENGTH) 81 mg EC tablet 2/11/2025  Yes Yes   Sig: Take 81 mg by mouth daily   cyanocobalamin (VITAMIN B-12) 100 MCG tablet 2/11/2025  Yes Yes   Sig: Take 100 mcg by mouth daily   docusate sodium (COLACE) 100 mg capsule 2/10/2025  Yes Yes   Sig: Take 100 mg by mouth 2 (two) times a day   famotidine (PEPCID) 20 mg tablet 2/10/2025  Yes Yes   Sig: Take 20 mg by mouth 2 (two) times a day   ferrous sulfate 325 (65 Fe) mg tablet 2/11/2025  Yes Yes   Sig: Take 325 mg by mouth daily with breakfast   folic acid (FOLVITE) 1 mg tablet 2/10/2025  Yes Yes   Sig: TAKE ONE TABLET BY MOUTH EVERY DAY *FOLIC ACID SUPPLEMENT*   hydroxychloroquine (PLAQUENIL) 200 mg tablet 2/11/2025  Yes Yes   Sig: Take 400 mg by mouth daily with breakfast   losartan (COZAAR) 25 mg tablet 2/11/2025  No Yes   Sig: Take 1 tablet (25 mg total) by mouth daily   meloxicam (MOBIC) 15 mg tablet 2/11/2025  Yes Yes   Sig: Take 15 mg by mouth daily   methylprednisolone (MEDROL) 4 mg tablet 2/10/2025  Yes Yes   Sig: Take 12 mg by mouth 2 (two) times a day   metoprolol tartrate (LOPRESSOR) 25 mg tablet 2/11/2025  Yes Yes   Sig: Take 25 mg by mouth every 12 (twelve) hours   mometasone 220 mcg/actuation inhaler 2/10/2025  Yes Yes   Sig: Inhale 1 puff every evening Rinse mouth after use.   rosuvastatin (CRESTOR) 40 MG tablet 2/10/2025  Yes Yes   Sig: Take 20 mg by mouth daily   sertraline (ZOLOFT) 25 mg tablet  2/11/2025  Yes Yes   Sig: Take 12.5 mg by mouth daily      Facility-Administered Medications: None     Current Discharge Medication List        CONTINUE these medications which have NOT CHANGED    Details   albuterol (2.5 mg/3 mL) 0.083 % nebulizer solution Take 3 mL (2.5 mg total) by nebulization every 6 (six) hours as needed for wheezing or shortness of breath  Qty: 30 mL, Refills: 0    Associated Diagnoses: COPD exacerbation (HCC)      apixaban (Eliquis) 5 mg Take 5 mg by mouth 2 (two) times a day      ascorbic acid (VITAMIN C) 250 MG tablet Take 250 mg by mouth daily      aspirin (ECOTRIN LOW STRENGTH) 81 mg EC tablet Take 81 mg by mouth daily      cyanocobalamin (VITAMIN B-12) 100 MCG tablet Take 100 mcg by mouth daily      docusate sodium (COLACE) 100 mg capsule Take 100 mg by mouth 2 (two) times a day      famotidine (PEPCID) 20 mg tablet Take 20 mg by mouth 2 (two) times a day      ferrous sulfate 325 (65 Fe) mg tablet Take 325 mg by mouth daily with breakfast      folic acid (FOLVITE) 1 mg tablet TAKE ONE TABLET BY MOUTH EVERY DAY *FOLIC ACID SUPPLEMENT*      hydroxychloroquine (PLAQUENIL) 200 mg tablet Take 400 mg by mouth daily with breakfast      losartan (COZAAR) 25 mg tablet Take 1 tablet (25 mg total) by mouth daily    Associated Diagnoses: History of stroke      meloxicam (MOBIC) 15 mg tablet Take 15 mg by mouth daily      methylprednisolone (MEDROL) 4 mg tablet Take 12 mg by mouth 2 (two) times a day      metoprolol tartrate (LOPRESSOR) 25 mg tablet Take 25 mg by mouth every 12 (twelve) hours      mometasone 220 mcg/actuation inhaler Inhale 1 puff every evening Rinse mouth after use.      rosuvastatin (CRESTOR) 40 MG tablet Take 20 mg by mouth daily      sertraline (ZOLOFT) 25 mg tablet Take 12.5 mg by mouth daily      albuterol (PROVENTIL HFA,VENTOLIN HFA) 90 mcg/act inhaler INHALE 2 PUFFS BY MOUTH EVERY 4 HOURS AS NEEDED FOR BREATHING      Cholecalciferol 50 MCG (2000 UT) TABS TAKE ONE TABLET BY  MOUTH DAILY (FOR LOW VITAMIN D)           No discharge procedures on file.  ED SEPSIS DOCUMENTATION   Time reflects when diagnosis was documented in both MDM as applicable and the Disposition within this note       Time User Action Codes Description Comment    2/11/2025  1:57 PM Mal Cotton [J44.1] COPD exacerbation ([facility])     2/11/2025  1:57 PM Mal Cotton [J18.9] Pneumonia     2/11/2025  1:57 PM Mal Cotton [I77.819] Aortic ectasia ([facility])     2/11/2025  1:58 PM Mal Cotton [A41.9] Sepsis ([facility])     2/11/2025  1:58 PM Mal Cotton [E86.0] Dehydration     2/11/2025  1:58 PM Mal Cotton [R79.89] Elevated troponin     2/11/2025  1:58 PM Mal Cotton [I77.810] Aortic ectasia, thoracic ([facility])     2/11/2025  1:59 PM Mal Cotton [R91.1] Pulmonary nodule, left     2/11/2025  6:43 PM Yazmin Cortez [I48.91] Atrial fibrillation ([facility])     2/12/2025  8:43 AM Maria Alejandra Mercer [R78.81] Bacteremia                  Mal Cotton MD  02/12/25 1142

## 2025-02-11 NOTE — TELEPHONE ENCOUNTER
Nanette from [facility] [phone]. Yesterday patient experienced shortness of breath needed increased oxygen, labs ordered for this morning. This morning increased shortness of breath, fever 100.5 , , B/P 95/56 which is change from baseline. Please call.  Thank you. ESC sent to [facility], confirmed receipt.

## 2025-02-11 NOTE — ED NOTES
Pt unable to void, bladder scan complete. Provider notified. Provider okay'd antibiotics before urine sample.      Mariposa Horn RN  02/11/25 5933

## 2025-02-11 NOTE — ED PROCEDURE NOTE
Procedure  POC Cardiac US    Date/Time: 2/11/2025 10:48 AM    Performed by: Shukri Mccormack DO  Authorized by: Shukri Mccormack DO    Patient location:  ED  Other Assisting Provider: No    Procedure details:     Exam Type:  Educational    Indications: dyspnea      Assessment / Evaluation for: cardiac function and pericardial effusion      Exam Type: initial exam      Image quality: limited diagnostic      Image availability:  Images available in PACS  Patient Details:     Cardiac Rhythm:  Regular    Mechanical ventilation: No    Cardiac findings:     Echo technique: limited 2D      Views obtained: subcostal and apical      Pericardial effusion: absent      Tamponade physiology: absent      Wall motion: normal      LV systolic function: normal      RV dilation: none    Pulmonary findings:     Left Lung Findings: left lung sliding      Right lung findings: right lung sliding      B-lines: no B-lines present                     Shukri Mccormack DO  02/11/25 1050

## 2025-02-12 ENCOUNTER — APPOINTMENT (INPATIENT)
Dept: NON INVASIVE DIAGNOSTICS | Facility: HOSPITAL | Age: 80
DRG: 871 | End: 2025-02-12
Payer: COMMERCIAL

## 2025-02-12 ENCOUNTER — APPOINTMENT (INPATIENT)
Dept: MRI IMAGING | Facility: HOSPITAL | Age: 80
DRG: 871 | End: 2025-02-12
Payer: COMMERCIAL

## 2025-02-12 PROBLEM — R78.81 GRAM-POSITIVE COCCI BACTEREMIA: Status: ACTIVE | Noted: 2025-02-12

## 2025-02-12 LAB
2HR DELTA HS TROPONIN: -227 NG/L
4HR DELTA HS TROPONIN: -98 NG/L
ALBUMIN SERPL BCG-MCNC: 2.3 G/DL (ref 3.5–5)
ALP SERPL-CCNC: 63 U/L (ref 34–104)
ALT SERPL W P-5'-P-CCNC: 65 U/L (ref 7–52)
ANION GAP SERPL CALCULATED.3IONS-SCNC: 5 MMOL/L (ref 4–13)
ANISOCYTOSIS BLD QL SMEAR: PRESENT
AORTIC ROOT: 3.6 CM
AORTIC VALVE MEAN VELOCITY: 11.7 M/S
ASCENDING AORTA: 3.9 CM
AST SERPL W P-5'-P-CCNC: 40 U/L (ref 13–39)
AV AREA BY CONTINUOUS VTI: 3 CM2
AV AREA PEAK VELOCITY: 2.3 CM2
AV LVOT MEAN GRADIENT: 2 MMHG
AV LVOT PEAK GRADIENT: 4 MMHG
AV MEAN PRESS GRAD SYS DOP V1V2: 6 MMHG
AV ORIFICE AREA US: 3.03 CM2
AV PEAK GRADIENT: 13 MMHG
AV REGURGITATION PRESSURE HALF TIME: 268 MS
AV VELOCITY RATIO: 0.73
AV VMAX SYS DOP: 1.79 M/S
BASOPHILS # BLD MANUAL: 0 THOUSAND/UL (ref 0–0.1)
BASOPHILS NFR MAR MANUAL: 0 % (ref 0–1)
BILIRUB SERPL-MCNC: 0.42 MG/DL (ref 0.2–1)
BSA FOR ECHO PROCEDURE: 2.13 M2
BUN SERPL-MCNC: 40 MG/DL (ref 5–25)
CALCIUM ALBUM COR SERPL-MCNC: 9.6 MG/DL (ref 8.3–10.1)
CALCIUM SERPL-MCNC: 8.2 MG/DL (ref 8.4–10.2)
CARDIAC TROPONIN I PNL SERPL HS: 489 NG/L (ref ?–50)
CARDIAC TROPONIN I PNL SERPL HS: 618 NG/L (ref ?–50)
CARDIAC TROPONIN I PNL SERPL HS: 716 NG/L (ref ?–50)
CHLORIDE SERPL-SCNC: 106 MMOL/L (ref 96–108)
CO2 SERPL-SCNC: 26 MMOL/L (ref 21–32)
CREAT SERPL-MCNC: 0.97 MG/DL (ref 0.6–1.3)
DOP CALC AO VTI: 32.52 CM
DOP CALC LVOT AREA: 4.15 CM2
DOP CALC LVOT CARDIAC INDEX: 3.47 L/MIN/M2
DOP CALC LVOT CARDIAC OUTPUT: 7.4 L/MIN
DOP CALC LVOT DIAMETER: 2.3 CM
DOP CALC LVOT PEAK VEL VTI: 23.69 CM
DOP CALC LVOT PEAK VEL: 0.99 M/S
DOP CALC LVOT STROKE INDEX: 46.5 ML/M2
DOP CALC LVOT STROKE VOLUME: 99
E WAVE DECELERATION TIME: 187 MS
E/A RATIO: 0.69
EOSINOPHIL # BLD MANUAL: 0 THOUSAND/UL (ref 0–0.4)
EOSINOPHIL NFR BLD MANUAL: 0 % (ref 0–6)
ERYTHROCYTE [DISTWIDTH] IN BLOOD BY AUTOMATED COUNT: 17.1 % (ref 11.6–15.1)
FRACTIONAL SHORTENING: 33 (ref 28–44)
GFR SERPL CREATININE-BSD FRML MDRD: 74 ML/MIN/1.73SQ M
GLUCOSE SERPL-MCNC: 110 MG/DL (ref 65–140)
HCT VFR BLD AUTO: 25 % (ref 36.5–49.3)
HGB BLD-MCNC: 8.1 G/DL (ref 12–17)
INTERVENTRICULAR SEPTUM IN DIASTOLE (PARASTERNAL SHORT AXIS VIEW): 1.1 CM
INTERVENTRICULAR SEPTUM: 1.1 CM (ref 0.6–1.1)
LAAS-AP2: 22.1 CM2
LAAS-AP4: 24.2 CM2
LEFT ATRIUM AREA SYSTOLE SINGLE PLANE A4C: 24 CM2
LEFT ATRIUM SIZE: 3.9 CM
LEFT ATRIUM VOLUME (MOD BIPLANE): 79 ML
LEFT ATRIUM VOLUME INDEX (MOD BIPLANE): 37.1 ML/M2
LEFT INTERNAL DIMENSION IN SYSTOLE: 3.2 CM (ref 2.1–4)
LEFT VENTRICULAR INTERNAL DIMENSION IN DIASTOLE: 4.8 CM (ref 3.5–6)
LEFT VENTRICULAR POSTERIOR WALL IN END DIASTOLE: 1.1 CM
LEFT VENTRICULAR STROKE VOLUME: 65 ML
LV EF US.2D.A4C+ESTIMATED: 53 %
LVSV (TEICH): 65 ML
LYMPHOCYTES # BLD AUTO: 0.31 THOUSAND/UL (ref 0.6–4.47)
LYMPHOCYTES # BLD AUTO: 3 % (ref 14–44)
MCH RBC QN AUTO: 30.5 PG (ref 26.8–34.3)
MCHC RBC AUTO-ENTMCNC: 32.4 G/DL (ref 31.4–37.4)
MCV RBC AUTO: 94 FL (ref 82–98)
MONOCYTES # BLD AUTO: 0.31 THOUSAND/UL (ref 0–1.22)
MONOCYTES NFR BLD: 3 % (ref 4–12)
MV E'TISSUE VEL-SEP: 8 CM/S
MV PEAK A VEL: 1.31 M/S
MV PEAK E VEL: 90 CM/S
MV STENOSIS PRESSURE HALF TIME: 54 MS
MV VALVE AREA P 1/2 METHOD: 4.1
NEUTROPHILS # BLD MANUAL: 9.83 THOUSAND/UL (ref 1.85–7.62)
NEUTS BAND NFR BLD MANUAL: 3 % (ref 0–8)
NEUTS SEG NFR BLD AUTO: 91 % (ref 43–75)
PLATELET # BLD AUTO: 173 THOUSANDS/UL (ref 149–390)
PLATELET BLD QL SMEAR: ADEQUATE
PMV BLD AUTO: 8.9 FL (ref 8.9–12.7)
POLYCHROMASIA BLD QL SMEAR: PRESENT
POTASSIUM SERPL-SCNC: 4.5 MMOL/L (ref 3.5–5.3)
PROCALCITONIN SERPL-MCNC: 0.85 NG/ML
PROT SERPL-MCNC: 5.5 G/DL (ref 6.4–8.4)
RBC # BLD AUTO: 2.66 MILLION/UL (ref 3.88–5.62)
RBC MORPH BLD: PRESENT
RIGHT ATRIUM AREA SYSTOLE A4C: 16.4 CM2
RIGHT VENTRICLE ID DIMENSION: 3.3 CM
SINOTUBULAR JUNCTION: 2.7 CM
SL CV AV DECELERATION TIME RETROGRADE: 923 MS
SL CV AV PEAK GRADIENT RETROGRADE: 44 MMHG
SL CV LEFT ATRIUM LENGTH A2C: 5.4 CM
SL CV LV EF: 55
SL CV PED ECHO LEFT VENTRICLE DIASTOLIC VOLUME (MOD BIPLANE) 2D: 107 ML
SL CV PED ECHO LEFT VENTRICLE SYSTOLIC VOLUME (MOD BIPLANE) 2D: 42 ML
SL CV SINUS OF VALSALVA 2D: 4.2 CM
SODIUM SERPL-SCNC: 137 MMOL/L (ref 135–147)
STJ: 2.7 CM
TR MAX PG: 23 MMHG
TR PEAK VELOCITY: 2.4 M/S
TRICUSPID ANNULAR PLANE SYSTOLIC EXCURSION: 2 CM
TRICUSPID VALVE PEAK REGURGITATION VELOCITY: 2.42 M/S
WBC # BLD AUTO: 10.46 THOUSAND/UL (ref 4.31–10.16)

## 2025-02-12 PROCEDURE — 72158 MRI LUMBAR SPINE W/O & W/DYE: CPT

## 2025-02-12 PROCEDURE — 85007 BL SMEAR W/DIFF WBC COUNT: CPT

## 2025-02-12 PROCEDURE — 80053 COMPREHEN METABOLIC PANEL: CPT

## 2025-02-12 PROCEDURE — 84145 PROCALCITONIN (PCT): CPT

## 2025-02-12 PROCEDURE — 93971 EXTREMITY STUDY: CPT | Performed by: STUDENT IN AN ORGANIZED HEALTH CARE EDUCATION/TRAINING PROGRAM

## 2025-02-12 PROCEDURE — 85027 COMPLETE CBC AUTOMATED: CPT

## 2025-02-12 PROCEDURE — 93306 TTE W/DOPPLER COMPLETE: CPT

## 2025-02-12 PROCEDURE — G0545 PR INHERENT VISIT TO INPT: HCPCS | Performed by: INTERNAL MEDICINE

## 2025-02-12 PROCEDURE — 92610 EVALUATE SWALLOWING FUNCTION: CPT

## 2025-02-12 PROCEDURE — 94760 N-INVAS EAR/PLS OXIMETRY 1: CPT

## 2025-02-12 PROCEDURE — A9585 GADOBUTROL INJECTION: HCPCS | Performed by: INTERNAL MEDICINE

## 2025-02-12 PROCEDURE — 94640 AIRWAY INHALATION TREATMENT: CPT

## 2025-02-12 PROCEDURE — 99232 SBSQ HOSP IP/OBS MODERATE 35: CPT | Performed by: INTERNAL MEDICINE

## 2025-02-12 PROCEDURE — 99223 1ST HOSP IP/OBS HIGH 75: CPT | Performed by: INTERNAL MEDICINE

## 2025-02-12 PROCEDURE — 84484 ASSAY OF TROPONIN QUANT: CPT

## 2025-02-12 PROCEDURE — 93306 TTE W/DOPPLER COMPLETE: CPT | Performed by: INTERNAL MEDICINE

## 2025-02-12 RX ORDER — DOCUSATE SODIUM 100 MG/1
100 CAPSULE, LIQUID FILLED ORAL DAILY
Status: DISCONTINUED | OUTPATIENT
Start: 2025-02-13 | End: 2025-02-17

## 2025-02-12 RX ORDER — CEFAZOLIN SODIUM 2 G/50ML
2000 SOLUTION INTRAVENOUS EVERY 8 HOURS
Status: DISCONTINUED | OUTPATIENT
Start: 2025-02-12 | End: 2025-02-22 | Stop reason: HOSPADM

## 2025-02-12 RX ORDER — ALBUMIN (HUMAN) 12.5 G/50ML
25 SOLUTION INTRAVENOUS ONCE
Status: COMPLETED | OUTPATIENT
Start: 2025-02-12 | End: 2025-02-12

## 2025-02-12 RX ORDER — GADOBUTROL 604.72 MG/ML
8 INJECTION INTRAVENOUS
Status: COMPLETED | OUTPATIENT
Start: 2025-02-12 | End: 2025-02-12

## 2025-02-12 RX ADMIN — Medication 3 ML: at 14:24

## 2025-02-12 RX ADMIN — LEVALBUTEROL HYDROCHLORIDE 1.25 MG: 1.25 SOLUTION RESPIRATORY (INHALATION) at 14:23

## 2025-02-12 RX ADMIN — IPRATROPIUM BROMIDE 0.5 MG: 0.5 SOLUTION RESPIRATORY (INHALATION) at 07:35

## 2025-02-12 RX ADMIN — LEVALBUTEROL HYDROCHLORIDE 1.25 MG: 1.25 SOLUTION RESPIRATORY (INHALATION) at 23:31

## 2025-02-12 RX ADMIN — IPRATROPIUM BROMIDE 0.5 MG: 0.5 SOLUTION RESPIRATORY (INHALATION) at 14:23

## 2025-02-12 RX ADMIN — FAMOTIDINE 20 MG: 20 TABLET, FILM COATED ORAL at 08:37

## 2025-02-12 RX ADMIN — METOPROLOL TARTRATE 25 MG: 25 TABLET, FILM COATED ORAL at 08:38

## 2025-02-12 RX ADMIN — APIXABAN 5 MG: 5 TABLET, FILM COATED ORAL at 17:44

## 2025-02-12 RX ADMIN — Medication 3 ML: at 07:35

## 2025-02-12 RX ADMIN — GUAIFENESIN 1200 MG: 600 TABLET, EXTENDED RELEASE ORAL at 08:38

## 2025-02-12 RX ADMIN — FORMOTEROL FUMARATE DIHYDRATE 20 MCG: 20 SOLUTION RESPIRATORY (INHALATION) at 07:35

## 2025-02-12 RX ADMIN — CEFAZOLIN SODIUM 2000 MG: 2 SOLUTION INTRAVENOUS at 18:17

## 2025-02-12 RX ADMIN — PREDNISONE 40 MG: 20 TABLET ORAL at 08:38

## 2025-02-12 RX ADMIN — ATORVASTATIN CALCIUM 80 MG: 40 TABLET, FILM COATED ORAL at 17:46

## 2025-02-12 RX ADMIN — SERTRALINE HYDROCHLORIDE 12.5 MG: 25 TABLET ORAL at 08:38

## 2025-02-12 RX ADMIN — FLUTICASONE FUROATE 1 PUFF: 100 POWDER RESPIRATORY (INHALATION) at 08:40

## 2025-02-12 RX ADMIN — CEFAZOLIN SODIUM 2000 MG: 2 SOLUTION INTRAVENOUS at 10:34

## 2025-02-12 RX ADMIN — ALBUMIN (HUMAN) 25 G: 0.25 INJECTION, SOLUTION INTRAVENOUS at 08:38

## 2025-02-12 RX ADMIN — LEVALBUTEROL HYDROCHLORIDE 1.25 MG: 1.25 SOLUTION RESPIRATORY (INHALATION) at 07:35

## 2025-02-12 RX ADMIN — TAMSULOSIN HYDROCHLORIDE 0.4 MG: 0.4 CAPSULE ORAL at 17:44

## 2025-02-12 RX ADMIN — GUAIFENESIN 1200 MG: 600 TABLET, EXTENDED RELEASE ORAL at 17:44

## 2025-02-12 RX ADMIN — APIXABAN 5 MG: 5 TABLET, FILM COATED ORAL at 08:37

## 2025-02-12 RX ADMIN — ASPIRIN 81 MG: 81 TABLET, COATED ORAL at 08:38

## 2025-02-12 RX ADMIN — FORMOTEROL FUMARATE DIHYDRATE 20 MCG: 20 SOLUTION RESPIRATORY (INHALATION) at 23:31

## 2025-02-12 RX ADMIN — HYDROXYCHLOROQUINE SULFATE 400 MG: 200 TABLET ORAL at 08:40

## 2025-02-12 RX ADMIN — IPRATROPIUM BROMIDE 0.5 MG: 0.5 SOLUTION RESPIRATORY (INHALATION) at 23:31

## 2025-02-12 RX ADMIN — DOCUSATE SODIUM 100 MG: 100 CAPSULE, LIQUID FILLED ORAL at 08:38

## 2025-02-12 RX ADMIN — GADOBUTROL 8 ML: 604.72 INJECTION INTRAVENOUS at 23:18

## 2025-02-12 NOTE — ASSESSMENT & PLAN NOTE
"Patient reports chronic back pain has been worse due to \"inactivity\" and thinks worsened around the time of the catheterization. In setting of MSSA bacteremia, need to evaluate lumbar spine   -continues antibiotic as above  -serial exam  -pain management per primary   -recommend MRI L spine  "

## 2025-02-12 NOTE — ASSESSMENT & PLAN NOTE
Pt reports new urinary incontinence day prior to presentation and inability to void despite urge to urinate on day of presentation  H/o enlarged prostate  Marked suprapubic tenderness on exam  Straight cath drained 700 cc urine  Suspect new urinary retention 2/2 BPH    Start tamsulosin 0.4 mg QD  Urinary retention protocol  [facility]

## 2025-02-12 NOTE — ASSESSMENT & PLAN NOTE
2/11/25 CXR no infiltrates, CTA chest: no PE, mild secretions in lower trachea. On 6L NCO2, up from baseline 2L NCO2  Urinary antigens, Flu/COVID screen negative  -monitor respiratory status  -respiratory support prn  -monitor O2 requirements  -serial exam  -aspiration precautions  -additional inventions pending clinical course

## 2025-02-12 NOTE — ASSESSMENT & PLAN NOTE
Incidental finding on CTA PE, new from prior study  6 mm x 5 mm  Recommend repeat CT chest in 6 months for surveillance

## 2025-02-12 NOTE — ASSESSMENT & PLAN NOTE
Pt reports new urinary incontinence day prior to presentation and inability to void despite urge to urinate on day of presentation  H/o enlarged prostate  Marked suprapubic tenderness on exam  Straight cath drained 700 cc urine  Suspect new urinary retention 2/2 BPH  US Kidney/Bladder 2/11/25: No hydronephrosis, moderate bladder distention, perinephric edema on L side.    Plan:  Tamsulosin 0.4 mg QD  Urinary retention protocol

## 2025-02-12 NOTE — ASSESSMENT & PLAN NOTE
Incidental finding on CTA PE, new from prior study  6 mm x 5 mm    Plan:  Recommend repeat CT chest in 6 months for surveillance

## 2025-02-12 NOTE — ASSESSMENT & PLAN NOTE
-MAP as low as 62 in the ED per automated cuff  -Pt denies acute lightheadedness (does endorse chronic intermittent positional lightheadedness, but none today)  -Received 2.5 L NS in ED (30cc/kg) with resolution of hypotension  -Etiology of hypotension remains unclear at this time, though, as noted above, suspect possible uro-sepsis, suggested by new urinary incontinence day prior to presentation (suspect overflow) and urinary retention identified this admission  -Monitor VS including BP.   -If hypotension recurs, confirm via manual, assess symptoms; suggest fluid bolus and discussion w crit care.

## 2025-02-12 NOTE — INCIDENTAL FINDINGS
The following findings require follow up:  Radiographic finding   Finding: Noncalcified left lung apex pulmonary nodule, 6 x 5 mm. Because this was not present on prior chest CT of December 4, 2023    Follow up required: Follow-up low radiation dose noncontrast chest CT in 6 months is recommended.    Follow up should be done within 6 month(s)    Please notify the following clinician to assist with the follow up:   Dr. Muniz    Incidental finding results were discussed with the Patient by Mal Neely DO on 02/12/25.   They expressed understanding and all questions answered.

## 2025-02-12 NOTE — ASSESSMENT & PLAN NOTE
Renal US: moderate bladder distention, no hydronephrosis, perinephric edema left. 2/11/25 s/p straight cath for 700 mL. U/A benign. No urinary symptoms other than hesitancy   -monitor urinary output/symptoms  -additional inventions pending retention course

## 2025-02-12 NOTE — ASSESSMENT & PLAN NOTE
Pt presents from [facility], to which he had been discharged following recent hospitalization.   Reports history of L ankle fracture--reportedly has been advised against surgery/anesthesia due to COPD

## 2025-02-12 NOTE — ASSESSMENT & PLAN NOTE
Pt w COPD on 2L supplemental O2 at baseline  Was started on course of methylprednisolone 12 mg PO BID at STR on 2/9/25, continued up to presentation to hospital  Pt c/o increased SOB, increased PUTNAM, increased cough, increased wheezing, increased sputum production.  Reports feeling better since arriving to ED and receiving nebulizer treatments and IV steroids    Plan:  -Nebs TID  -Prednisone 40 mg PO QD starting tomorrow x2 days to complete short course of steroids  -Titrate O2 to maintain SpO2 greater than 88%  -Check Echo to assess for PA pressure/pulmonary hypertension (minimally elevated on 2022 echo), worsening diastolic dysfunction (grade 1 in 2022), or new systolic dysfunction contributing to chronic respiratory failure

## 2025-02-12 NOTE — ASSESSMENT & PLAN NOTE
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

## 2025-02-12 NOTE — ASSESSMENT & PLAN NOTE
Trop 362-->476-->607  No CP or EKG changes to suggest MI  Suspect non-MI troponin elevation in the setting of hypotension

## 2025-02-12 NOTE — ASSESSMENT & PLAN NOTE
Patient rate controlled since admission    Plan:  Continue home Eliquis  Continue home metoprolol with hold parameters

## 2025-02-12 NOTE — ASSESSMENT & PLAN NOTE
Vs Sepsis with tachycardia, tachypnea and hypotension with WBC 12 K.   -antibiotics as below  -follow-up cultures and adjust antibiotics as needed  -monitor temperature and hemodynamics  -serial exam  -additional inventions pending clinical course  -monitoring serial CBC and BMP for treatment response and any developing toxicities

## 2025-02-12 NOTE — ASSESSMENT & PLAN NOTE
Pt w COPD on 2L supplemental O2 at baseline  Was started on course of methylprednisolone 12 mg PO BID at STR on 2/9/25, continued up to presentation to hospital  Pt c/o increased SOB, increased PUTNAM, increased cough, increased wheezing, increased sputum production.  Reports feeling better since arriving to ED and receiving nebulizer treatments and IV steroids  Currently requiring 4-6L, above baseline of 2L NC  TTE 2/12/25: EF 55%, Normal systolic dysfunction, G1DD, No vegetation, No mural thrombus, moderate aortic sclerosis    Plan:  Nebs TID  Prednisone 40 mg PO QD starting tomorrow x2 days to complete short course of steroids  Can consider longer course if no improvement  Titrate O2 to maintain SpO2 greater than 88%

## 2025-02-12 NOTE — H&P
H&P - Hospitalist   Name: Devin Chaves 79 y.o. male I MRN: [id number]  Unit/Bed#: -01 I Date of Admission: 2/11/2025   Date of Service: 2/11/2025 I Hospital Day: 0     Assessment & Plan  SIRS (systemic inflammatory response syndrome) ([facility])  -Meeting SIRS criteria for tachycardia (HR ), tachypnea (RR 16-28), and leukocytosis (12.32)  -Notably, patient was started on course of methylprednisolone on 2/09/25 for COPD exacerbation, current dose 12 mg PO BID prior to admission  -EMS administered albuterol and ipratropium nebs prior to arrival  -Received CTX and azithro in ED for suspected CAP  -No clear source of infection. CTA PE study in ED did not demonstrate acute pathology.  -Pt c/o inability to urinate today despite sensation of needing to void  -Bladder scan showed only 30 cc, however straight cath returned 700 ml  -UA w reflex collected and sent, though pt received abx several hours prior to collection  -Consider possible uro-sepsis    -Continue CTX 1 g Q24H  -F/u UA  -Repeat AM procal  -Monitor fever curve and WBC (on steroids)  Hypotension  -MAP as low as 62 in the ED per automated cuff  -Pt denies acute lightheadedness (does endorse chronic intermittent positional lightheadedness, but none today)  -Received 2.5 L NS in ED (30cc/kg) with resolution of hypotension  -Etiology of hypotension remains unclear at this time, though, as noted above, suspect possible uro-sepsis, suggested by new urinary incontinence day prior to presentation (suspect overflow) and urinary retention identified this admission  -Monitor VS including BP.   -If hypotension recurs, confirm via manual, assess symptoms; suggest fluid bolus and discussion w crit care.  COPD (chronic obstructive pulmonary disease) ([facility])  Pt w COPD on 2L supplemental O2 at baseline  Was started on course of methylprednisolone 12 mg PO BID at [facility] on 2/9/25, continued up to presentation to hospital  Pt c/o increased SOB, increased PUTNAM, increased  cough, increased wheezing, increased sputum production.  Reports feeling better since arriving to ED and receiving nebulizer treatments and IV steroids    Plan:  -Nebs TID  -Prednisone 40 mg PO QD starting tomorrow x2 days to complete short course of steroids  -Titrate O2 to maintain SpO2 greater than 88%  -Check Echo to assess for PA pressure/pulmonary hypertension (minimally elevated on 2022 echo), worsening diastolic dysfunction (grade 1 in 2022), or new systolic dysfunction contributing to chronic respiratory failure  Acute urinary retention  Pt reports new urinary incontinence day prior to presentation and inability to void despite urge to urinate on day of presentation  H/o enlarged prostate  Marked suprapubic tenderness on exam  Straight cath drained 700 cc urine  Suspect new urinary retention 2/2 BPH    Start tamsulosin 0.4 mg QD  Urinary retention protocol  US kiddn/bladder  Swelling of calf  L calf markedly swollen and edematous compared to R  L calf tenderness to palpation; R calf non-tender  No palpabale cords  D-dimer 3.89  CTA PE chest negative for PE  Pt is on Eliquis for Afib prior to admission    Check venous duplex to r/o acute DVT in LLE  Atrial fibrillation (HCC)  Continue home Eliquis  Continue home metoprolol with hold parameters  Pulmonary nodule  Incidental finding on CTA PE, new from prior study  6 mm x 5 mm  Recommend repeat CT chest in 6 months for surveillance  Constipation  Pt reports last BM two days prior to presentation  States he normally moves bowels daily  Start Colace BID  Miralax prn  Up-titrate bowel regimen as needed to achieve effect  Ambulatory dysfunction  Pt presents from [facility], to which he had been discharged following recent hospitalization.   Reports history of L ankle fracture--reportedly has been advised against surgery/anesthesia due to COPD  Elevated troponin  Trop 362-->476-->607  No CP or EKG changes to suggest MI  Suspect non-MI troponin elevation in the setting  "of hypotension      VTE Pharmacologic Prophylaxis: VTE Score: 9 High Risk (Score >/= 5) - Pharmacological DVT Prophylaxis Ordered: apixaban (Eliquis). Sequential Compression Devices Ordered.  Code Status: Level 3 - DNAR and DNI   Discussion with family: Updated  (wife and grandson) at bedside.    Anticipated Length of Stay: Patient will be admitted on an inpatient basis with an anticipated length of stay of greater than 2 midnights secondary to hypotension.    History of Present Illness   Chief Complaint: JAKE Chaves is a 79 y.o. male with a PMH of COPD, chronic respiratory failure, JAZMINE, coronary artery disease, atrial fibrillation, rheumatoid arthritis, hypertension, aortic ectasia, stroke, enlarged prostate, who presents from [facility] for shortness of breath.    Patient was recently admitted from 12/10/24-2/6/25 for COPD flare, discharged 2/6/25 to [facility].  Wife has noticed a gradual decline over these 2 months.  Patient reports he \"felt lousy\" this morning, with back pain (chronic but believes it has been exacerbated by sedentary status) and increased shortness of breath.  Also notes that he has been unable to urinate today despite urge to void, and endorses a new episode of urinary incontinence yesterday on review of systems.   Patient endorses increased cough, increased wheezing, increased sputum production, increased shortness of breath, orthopnea, dyspnea on exertion.  Started on a course of methylprednisolone at [facility] on 2/9/2025 for suspected COPD exacerbation.  Both patient and his spouse deny any episodes of coughing or choking when eating food or drinking fluids.  Reports last BM 2 days prior to presentation, passing flatus today.    Review of Systems   Constitutional:  Positive for activity change (Decreased), appetite change (Decreased), chills and unexpected weight change. Negative for diaphoresis, fatigue and fever.   HENT:  " Positive for congestion. Negative for sore throat.    Respiratory:  Positive for cough, shortness of breath and wheezing. Negative for choking.    Cardiovascular:  Positive for chest pain (Patient reports chronic mild, dull pain in a band across the anterior chest, denies any acute changes) and leg swelling. Negative for palpitations.   Gastrointestinal:  Positive for constipation. Negative for abdominal pain, diarrhea, nausea and vomiting.   Genitourinary:  Positive for difficulty urinating, enuresis and urgency. Negative for dysuria and flank pain.   Musculoskeletal:  Positive for joint swelling (Chronic, history of RA).   Neurological:  Positive for light-headedness (Chronic, intermittent, positional; denies lightheadedness today). Negative for dizziness and headaches.       Historical Information   Past Medical History:   Diagnosis Date    Atrial fibrillation (HCC)     COPD (chronic obstructive pulmonary disease) (HCC)     Crushing injury of finger, left     Infectious viral hepatitis      Past Surgical History:   Procedure Laterality Date    FL LUMBAR PUNCTURE DIAGNOSTIC  2/10/2022    [place] OPEN TX PHALANGEAL SHAFT FRACTURE PROX/MIDDLE EA Left 1/25/2017    Procedure: ORIF LEFT SMALL FINGER FRACTURE;  Surgeon: Blake Badillo MD;  Location: [facility];  Service: Orthopedics     Social History     Tobacco Use    Smoking status: Former    Smokeless tobacco: Former     Quit date: 4/1/2011   Substance and Sexual Activity    Alcohol use: No    Drug use: No    Sexual activity: Not on file     E-Cigarette/Vaping     E-Cigarette/Vaping Substances     Family history non-contributory  Social History:  Marital Status: /Civil Union   Occupation: Not assessed  Patient Pre-hospital Living Situation: Home, With spouse  Patient Pre-hospital Level of Mobility:  Previously walked, however has been staying off his feet to avoid further injury to the left lower extremity  Patient Pre-hospital Diet Restrictions:  None    Meds/Allergies   I have reveiwed home medications using records provided by [facility].  Prior to Admission medications    Medication Sig Start Date End Date Taking? Authorizing Provider   albuterol (2.5 mg/3 mL) 0.083 % nebulizer solution Take 3 mL (2.5 mg total) by nebulization every 6 (six) hours as needed for wheezing or shortness of breath 12/17/23  Yes Sreedhar Guerra,    apixaban (Eliquis) 5 mg Take 5 mg by mouth 2 (two) times a day   Yes Historical Provider, MD   ascorbic acid (VITAMIN C) 250 MG tablet Take 250 mg by mouth daily   Yes Historical Provider, MD   aspirin (ECOTRIN LOW STRENGTH) 81 mg EC tablet Take 81 mg by mouth daily   Yes Historical Provider, MD   cyanocobalamin (VITAMIN B-12) 100 MCG tablet Take 100 mcg by mouth daily   Yes Historical Provider, MD   docusate sodium (COLACE) 100 mg capsule Take 100 mg by mouth 2 (two) times a day   Yes Historical Provider, MD   famotidine (PEPCID) 20 mg tablet Take 20 mg by mouth 2 (two) times a day   Yes Historical Provider, MD   ferrous sulfate 325 (65 Fe) mg tablet Take 325 mg by mouth daily with breakfast   Yes Historical Provider, MD   folic acid (FOLVITE) 1 mg tablet TAKE ONE TABLET BY MOUTH EVERY DAY *FOLIC ACID SUPPLEMENT* 4/12/21  Yes Historical Provider, MD   hydroxychloroquine (PLAQUENIL) 200 mg tablet Take 400 mg by mouth daily with breakfast   Yes Historical Provider, MD   losartan (COZAAR) 25 mg tablet Take 1 tablet (25 mg total) by mouth daily 2/9/25 3/11/25 Yes Barbara Roman MD   meloxicam (MOBIC) 15 mg tablet Take 15 mg by mouth daily   Yes Historical Provider, MD   methylprednisolone (MEDROL) 4 mg tablet Take 12 mg by mouth 2 (two) times a day 2/9/25 2/12/25 Yes Historical Provider, MD   metoprolol tartrate (LOPRESSOR) 25 mg tablet Take 25 mg by mouth every 12 (twelve) hours   Yes Historical Provider, MD   mometasone 220 mcg/actuation inhaler Inhale 1 puff every evening Rinse mouth after use.   Yes Historical Provider, MD   rosuvastatin  "(CRESTOR) 40 MG tablet Take 20 mg by mouth daily   Yes Historical Provider, MD   sertraline (ZOLOFT) 25 mg tablet Take 12.5 mg by mouth daily   Yes Historical Provider, MD   albuterol (PROVENTIL HFA,VENTOLIN HFA) 90 mcg/act inhaler INHALE 2 PUFFS BY MOUTH EVERY 4 HOURS AS NEEDED FOR BREATHING 5/24/21   Historical Provider, MD   Cholecalciferol 50 MCG (2000 UT) TABS TAKE ONE TABLET BY MOUTH DAILY (FOR LOW VITAMIN D) 4/12/21   Historical Provider, MD     Allergies   Allergen Reactions    Shellfish-Derived Products - Food Allergy Other (See Comments)     Pt states, \"I reacted, I dont know\"       Objective :  Temp:  [97.6 °F (36.4 °C)] 97.6 °F (36.4 °C)  HR:  [] 87  BP: ()/(47-63) 102/56  Resp:  [16-28] 18  SpO2:  [93 %-100 %] 98 %  O2 Device: Nasal cannula  Nasal Cannula O2 Flow Rate (L/min):  [2 L/min-3 L/min] 3 L/min    Physical Exam  Vitals reviewed.   Constitutional:       General: He is not in acute distress.     Appearance: Normal appearance. He is normal weight. He is not ill-appearing, toxic-appearing or diaphoretic.   HENT:      Head: Normocephalic and atraumatic.   Eyes:      General: No scleral icterus.        Right eye: No discharge.         Left eye: No discharge.      Conjunctiva/sclera: Conjunctivae normal.   Cardiovascular:      Rate and Rhythm: Normal rate and regular rhythm.      Pulses: Normal pulses.      Heart sounds: No murmur heard.     No friction rub. No gallop.      Comments: Faint heart sounds  Pulmonary:      Effort: No respiratory distress.      Breath sounds: No stridor. No wheezing, rhonchi or rales.      Comments: Diminished breath sounds in all lung fields, respiratory rate 28 breaths/min  Chest:      Chest wall: No tenderness.   Abdominal:      General: Bowel sounds are normal. There is no distension.      Palpations: Abdomen is soft.      Tenderness: There is abdominal tenderness (Mild to marked tenderness to palpation throughout the abdomen, most notably suprapubic). " There is no guarding or rebound.   Musculoskeletal:         General: Swelling (Marked swelling of left lower leg compared to right), tenderness (Left calf tenderness to palpation) and signs of injury (Left ankle, brace in place) present.      Right lower leg: Edema (2+ pretibial pitting edema) present.      Left lower leg: Edema (3+ uniform pitting edema from ankle to knee) present.   Skin:     General: Skin is warm and dry.      Coloration: Skin is not jaundiced or pale.   Neurological:      Mental Status: He is alert.   Psychiatric:         Mood and Affect: Mood normal.         Behavior: Behavior normal.       Lines/Drains:            Lab Results: I have reviewed the following results:  Results from last 7 days   Lab Units 02/11/25  0921   WBC Thousand/uL 12.32*   HEMOGLOBIN g/dL 8.2*   HEMATOCRIT % 25.4*   PLATELETS Thousands/uL 224   BANDS PCT % 2   LYMPHO PCT % 6*   MONO PCT % 1*   EOS PCT % 0     Results from last 7 days   Lab Units 02/11/25  0921   SODIUM mmol/L 132*   POTASSIUM mmol/L 4.2   CHLORIDE mmol/L 98   CO2 mmol/L 28   BUN mg/dL 45*   CREATININE mg/dL 1.25   ANION GAP mmol/L 6   CALCIUM mg/dL 8.6   ALBUMIN g/dL 2.6*   TOTAL BILIRUBIN mg/dL 0.78   ALK PHOS U/L 66   ALT U/L 89*   AST U/L 62*   GLUCOSE RANDOM mg/dL 134             Lab Results   Component Value Date    HGBA1C 5.4 09/25/2022     Results from last 7 days   Lab Units 02/11/25  1531 02/11/25  0921   LACTIC ACID mmol/L 1.9  --    PROCALCITONIN ng/ml  --  0.90*       Imaging Results Review: I reviewed radiology reports from this admission including: CT PE chest.  Other Study Results Review: EKG was reviewed.  Other studies reviewed include: Prior echo (2022)    Administrative Statements       ** Please Note: This note has been constructed using a voice recognition system. **

## 2025-02-12 NOTE — ASSESSMENT & PLAN NOTE
MAP as low as 62 in the ED per automated cuff  Received 2.5 L NS in ED (30cc/kg) with resolution of hypotension  Home Htn meds: Metoprolol tartrate 25 mg BID, Losartan 25 mg QD  Losartan recent decreased from 50 mg due to concerns for hypotension  Etiology of hypotension remains unclear at this time, though, as noted above, suspect possible uro-sepsis, suggested by new urinary incontinence day prior to presentation (suspect overflow) and urinary retention identified this admission    Plan:  Monitor VS including BP.   If hypotension recurs, confirm via manual, assess symptoms; suggest fluid bolus and discussion w crit care.  Continue home metoprolol tartrate 25 mg BID  Hold losartan 25 mg QD

## 2025-02-12 NOTE — ASSESSMENT & PLAN NOTE
-Meeting SIRS criteria for tachycardia (HR ), tachypnea (RR 16-28), and leukocytosis (12.32)  -Notably, patient was started on course of methylprednisolone on 2/09/25 for COPD exacerbation, current dose 12 mg PO BID prior to admission  -EMS administered albuterol and ipratropium nebs prior to arrival  -Received CTX and azithro in ED for suspected CAP  -No clear source of infection. CTA PE study in ED did not demonstrate acute pathology.  -Pt c/o inability to urinate today despite sensation of needing to void  -Bladder scan showed only 30 cc, however straight cath returned 700 ml  -UA w reflex collected and sent, though pt received abx several hours prior to collection  -Consider possible uro-sepsis    -Continue CTX 1 g Q24H  -F/u UA  -Repeat AM procal  -Monitor fever curve and WBC (on steroids)

## 2025-02-12 NOTE — ASSESSMENT & PLAN NOTE
Pt presents from [facility], to which he had been discharged following recent hospitalization.   Reports history of L ankle fracture--reportedly has been advised against surgery/anesthesia due to COPD    Plan:  PT/OT Evaluation  Fall precautions

## 2025-02-12 NOTE — CONSULTS
"Consultation - Infectious Disease   Name: Devin Chaves 79 y.o. male I MRN: [id number]  Unit/Bed#: -01 I Date of Admission: 2/11/2025   Date of Service: 2/12/2025 I Hospital Day: 1   Inpatient consult to Infectious Diseases  Consult performed by: Berta Martinez PA-C  Consult ordered by: Maria Alejandra Mercer MD        Physician Requesting Evaluation: Viviane Chavez, *   Reason for Evaluation / Principal Problem: Bacteremia    Assessment & Plan  SIRS (systemic inflammatory response syndrome) (HCC)  Vs Sepsis with tachycardia, tachypnea and hypotension with WBC 12 K.   -antibiotics as below  -follow-up cultures and adjust antibiotics as needed  -monitor temperature and hemodynamics  -serial exam  -additional inventions pending clinical course  -monitoring serial CBC and BMP for treatment response and any developing toxicities     Gram-positive cocci bacteremia  Admission Blood cultures labeled same arm/same time are + for MSSA on prelim BCID panel. 2/12/25 TTE negative for vegetation. No PPM, intravascular devices. Patient does have acute on chronic low back pain.  -continues Cefazolin 2 g IV q 8 hours at prsent  -follow-up final blood cultures and adjust antibiotics as needed  -check repeat blood cultures Friday  -recommend MRI L spine  -additional inventions pending clinical course     Back pain  Patient reports chronic back pain has been worse due to \"inactivity\" and thinks worsened around the time of the catheterization. In setting of MSSA bacteremia, need to evaluate lumbar spine   -continues antibiotic as above  -serial exam  -pain management per primary   -recommend MRI L spine  Acute urinary retention  Renal US: moderate bladder distention, no hydronephrosis, perinephric edema left. 2/11/25 s/p straight cath for 700 mL. U/A benign. No urinary symptoms other than hesitancy   -monitor urinary output/symptoms  -additional inventions pending retention course     COPD (chronic obstructive pulmonary " "disease) (HCC)  2/11/25 CXR no infiltrates, CTA chest: no PE, mild secretions in lower trachea. 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 They agree with the plan.    Antibiotics:  Cefazolin     History of Present Illness   Devin Chaves is a 79 y.o. year old male with COPD [facility]24-2[facility]/25 for COPD exacerbation s/p COVID-19, chronic respiratory failure, JAZMINE, coronary artery disease, atrial fibrillation, rheumatoid arthritis on plaquenil, hypertension, aortic ectasia, stroke, and enlarged prostate who presents from [facility] for shortness of breath on 2/11/25. Soon on arrival patient had tachycardia, tachypnea and hypotension with WBC 12 K. Blood cultures were obtained. 2/11/25 CXR no infiltrates, CTA chest: no PE, mild secretions in lower trachea, Renal US: moderate bladder distention, no hydronephrosis, perinephric edema left. Patient was given loading doses of Ceftriaxone and Azithromycin. Blood cultures labeled same arm/same time are + for MSSA on prelim BCID panel. Infectious Disease is now being consulted regarding evaluation and management of bacteremia.  Patient denies any fever, chills, sweats, N/V/D, CP, increased cough, SOB and no dysuria. Patient reports chronic back pain has been worse due to \"inactivity\" and thinks worsened around the time of the catheterization.  A complete review of systems is negative other than that noted in the HPI.    Historical Information   Past Medical History:   Diagnosis Date    Atrial fibrillation (HCC)     COPD (chronic obstructive " pulmonary disease) (HCC)     Crushing injury of finger, left     Infectious viral hepatitis      Past Surgical History:   Procedure Laterality Date    FL LUMBAR PUNCTURE DIAGNOSTIC  2/10/2022    OR OPEN TX PHALANGEAL SHAFT FRACTURE PROX/MIDDLE EA Left 1/25/2017    Procedure: ORIF LEFT SMALL FINGER FRACTURE;  Surgeon: Blake Badillo MD;  Location: [facility];  Service: Orthopedics     Social History     Tobacco Use    Smoking status: Former    Smokeless tobacco: Former     Quit date: 4/1/2011   Substance and Sexual Activity    Alcohol use: No    Drug use: No    Sexual activity: Not on file     E-Cigarette/Vaping     E-Cigarette/Vaping Substances         Objective :  Temp:  [97.5 °F (36.4 °C)-97.6 °F (36.4 °C)] 97.5 °F (36.4 °C)  HR:  [62-95] 75  BP: ()/(51-63) 117/59  Resp:  [16-20] 20  SpO2:  [95 %-100 %] 95 %  O2 Device: Nasal cannula  Nasal Cannula O2 Flow Rate (L/min):  [2 L/min-6 L/min] 6 L/min      General Appearance:  79 year old male, appearing chronically debilitated, nontoxic, propped in bed, actively arching back due to lumbar spine discomfort   Head:  Normocephalic, without obvious abnormality, atraumatic   Eyes:  Conjunctiva pink and sclera anicteric, both eyes   Nose: Nares normal, mucosa normal, no drainage   Throat: Oropharynx moist without lesions   Neck: Supple, symmetrical, no adenopathy, no tenderness/mass/nodules   Back:   Symmetric, no curvature, ROM normal, no CVA tenderness   Lungs:   Decreased  breath sounds fairly clear to auscultation bilaterally, some short answers to questions, on 4L NCO2 statting 95%   Chest Wall:  No tenderness or deformity   Heart:  RRR; no murmur, rub or gallop   Abdomen:   Soft, non-tender, non-distended, positive bowel sounds    Extremities: No cyanosis, clubbing, + L > RLE edema   Skin: No rashes or lesions. No draining wounds noted. Multiple ecchymoses of UEs and scabs of hands.         Lymph nodes: Cervical, supraclavicular nodes normal   Neurologic: Alert  and oriented times 3, extremity strength 5/5 and symmetric         Lab Results: I have reviewed the following results:  Results from last 7 days   Lab Units 02/12/25  0442 02/11/25  0921   WBC Thousand/uL 10.46* 12.32*   HEMOGLOBIN g/dL 8.1* 8.2*   PLATELETS Thousands/uL 173 224     Results from last 7 days   Lab Units 02/12/25  0442 02/11/25  0921   SODIUM mmol/L 137 132*   POTASSIUM mmol/L 4.5 4.2   CHLORIDE mmol/L 106 98   CO2 mmol/L 26 28   BUN mg/dL 40* 45*   CREATININE mg/dL 0.97 1.25   EGFR ml/min/1.73sq m 74 55   CALCIUM mg/dL 8.2* 8.6   AST U/L 40* 62*   ALT U/L 65* 89*   ALK PHOS U/L 63 66   ALBUMIN g/dL 2.3* 2.6*     Results from last 7 days   Lab Units 02/11/25  1635 02/11/25  1113   GRAM STAIN RESULT   --  Gram positive cocci in clusters*  Gram positive cocci in clusters*   LEGIONELLA URINARY ANTIGEN  Negative  --      Results from last 7 days   Lab Units 02/12/25  0442 02/11/25  0921   PROCALCITONIN ng/ml 0.85* 0.90*             Results from last 7 days   Lab Units 02/11/25  1030   D-DIMER QUANTITATIVE ug/ml FEU 3.89*       Imaging Results Review: I personally reviewed the following image studies in PACS and associated radiology reports: chest xray, CT chest, and Ultrasound(s). My interpretation of the radiology images/reports is: 2/11/25 CXR no infiltrates, CTA chest: no PE, mild secretions in lower trachea, Renal US: moderate bladder distention, no hydronephrosis, perinephric edema left..  Other Study Results Review: My interpretation of other studies include: 2/12/25 TTE no vegetation seen.

## 2025-02-12 NOTE — SPEECH THERAPY NOTE
"Speech-Language Pathology Bedside Swallow Evaluation        Patient Name: Devin Chaves    Today's Date: 2/12/2025     Problem List  Principal Problem:    Gram-positive cocci bacteremia  Active Problems:    COPD (chronic obstructive pulmonary disease) (HCC)    Atrial fibrillation (HCC)    SIRS (systemic inflammatory response syndrome) (HCC)    Acute urinary retention    Hypotension    Pulmonary nodule    Swelling of calf    Constipation    Ambulatory dysfunction    Elevated troponin         Summary    Pt presents with normal appearing oral and pharyngeal swallowing skills w/ no c/o food dysphagia or overt s/s aspiration. Pt denied any difficulty chewing or swallowing  w/ edentulous stated.      Recommendations:   Diet: regular diet and thin liquids   Meds: whole with liquid   Frequent Oral care: 2x/day  Other Recommendations/ considerations: no follow up tx needed.        Current Medical Status  Pt is a 79 y.o. male who presented to [facility]  with bacteremia.  Patient was recently admitted from 12/10/24-2/6/25 for COPD flare, discharged 2/6/25 to [place] [facility].  Wife has noticed a gradual decline over these 2 months.  Patient reports he \"felt lousy\" this morning, with back pain (chronic but believes it has been exacerbated by sedentary status) and increased shortness of breath.  Also notes that he has been unable to urinate today despite urge to void, and endorses a new episode of urinary incontinence yesterday on review of systems.              Patient endorses increased cough, increased wheezing, increased sputum production, increased shortness of breath, orthopnea, dyspnea on exertion.  Started on a course of methylprednisolone at [facility] [place] on 2/9/2025 for suspected COPD exacerbation.  Both patient and his spouse deny any episodes of coughing or choking when eating food or drinking fluids.  Reports last BM 2 days prior to presentation, passing flatus today.    Past medical history:   Please " see H&P for details    Special Studies:  CTA chest pe study: 2/11/25 Mild secretions in the lower trachea, correlate for any dysphagia or possible aspiration.       Social/Education/Vocational Hx:  Pt lives in SNF[facility]ECF- [facility]    Swallow Information   Prior speech/swallowing tx: none   Current Risks for Dysphagia & Aspiration:  generalized weakness  Current Symptoms/Concerns:  c/o cough, SOB  Current Diet: regular diet and thin liquids   Baseline Diet: regular diet and thin liquids  Takes pills- whole w/ water     Baseline Assessment   Behavior/Cognition: alert  Speech/Language Status: able to participate in conversation and able to follow commands  Patient Positioning: upright in bed     Swallow Mechanism Exam   Facial: symmetrical  Labial: WFL  Lingual: WFL  Velum: unable to visualize  Mandible: adequate ROM  Dentition: edentulous  Vocal quality:clear/adequate   Volitional Cough: strong/productive   Respiratory: NC    Consistencies Assessed and Performance   Consistencies Administered: thin liquids, nectar thick, puree, and mechanical soft solids    Oral Stage: pt able to bite solids with functional mastication/manipulation. No oral residue noted. Pt took sips of liquids by cup and straw w/ good oral control and transfer.     Pharyngeal Stage: swallow initiation appeared timely w/ no overt s/s aspiration.       Esophageal Concerns: none reported      Results Reviewed with: patient and RN   Dysphagia Goals: none at this time      Cheri Strong MA CCC-SLP  Speech Pathologist  [place] license # [id number]  [place] license # [id number]  Available via Secure Chat

## 2025-02-12 NOTE — ASSESSMENT & PLAN NOTE
Trop 362-->476-->607  No CP or EKG changes to suggest MI  Suspect non-MI troponin elevation in the setting of hypotension    Plan:  Continue to monitor hemodynamics

## 2025-02-12 NOTE — UTILIZATION REVIEW
"Initial Clinical Review    Admission: Date/Time/Statement:   Admission Orders (From admission, onward)       Ordered        02/11/25 1411  INPATIENT ADMISSION  Once                          Orders Placed This Encounter   Procedures    INPATIENT ADMISSION     Standing Status:   Standing     Number of Occurrences:   1     Level of Care:   Med Surg [16]     Estimated length of stay:   More than 2 Midnights     Certification:   I certify that inpatient services are medically necessary for this patient for a duration of greater than two midnights. See H&P and MD Progress Notes for additional information about the patient's course of treatment.     ED Arrival Information       Expected   -    Arrival   2/11/2025 08:40    Acuity   Emergent              Means of arrival   Ambulance    Escorted by   [facility]    Service   Hospitalist    Admission type   Emergency              Arrival complaint   ems             Chief Complaint   Patient presents with    Shortness of Breath     Pt arrives via EMS from [place] [facility] with c/o SOB since 0600, wears 2LNC chronically. EMS reports facility increased O2 to 4L without relief and noticed he was tachy, called EMS. 2.5 albuterol and 3mg duoneb given by EMS. Denies CP.        Initial Presentation: 79 y.o. male presents to ED via  EMS  from  post acute rehab with shortness of breath.  Recently admitted  12/10 - 2/6  with COPD and discharged  on  2/6  to rehab.  Wife noticed a gradual decline over the past  2 months. Felt \"  lousy\" the day of admission, had back pain, chronic issue  but feels  it is worse and increased shortness of breath.  Has  been unable to urinate the day of admission despite an urge to void  and had  an episode of  urinary incontinence the day  before admission.  Has increased cough, increased wheezing with sputum production, increased  shortness of breath, orthopnea  and dyspnea on  exertion.  Started  on  prednisone on  2/9  at rehab for " suspected  COPD  exacerbation.  Last  BM 2 days  prior to admission. Additional PMH  is chronic respiratory failure, JAZMINE, CAD, Afib,  RA, HTN, aortic ectasia, stroke  and enlarged prostate.  Troponin elevated,  362>476>607.  No EKG  changes.    L calf  markedly tender, swollen and  edematous compared to right.    D  Dimer  3.89.   CTA  chest negative for  PE.  Met SIRS  criteria  in ED with tachycardia, tachypnea and leukocytosis, no clear source of infection, possible  urosepsis.  Admit  Ip with  SIRS,  Hypotension,   COPD, Acute urinary retention, Swelling of calf  and plan is monitor  BP and labs,  S/P 2.5 L  IVF in ED,  straight cathed in ED for  700 cc,  DARRYL,  prednisone, nebulizers, O2  to keep sats  >  88 %,  2 DE,  U/S  kidney and bladder,  VDE  LE,  colace and continue current meds.          Anticipated Length of Stay/Certification Statement:  Patient will be admitted on an inpatient basis with an anticipated length of stay of greater than 2 midnights secondary to hypotension.     Date:    2/12   Day 2:   ID  consult  Now with  Gram positive  COCCI Bacteremia.  Unknown source at present.  Continue   DARRYL and F/u final blood cultures.      Now on  O2  6 L  NC.   No DVT  or  thrombophlebitis   noted on   VAS.  States  L ankle is  chronically deformed and swollen for past  2 years after [place] accident [place].  Needs  PT/OT/fall precautions.  No further urinary incontinence.      Date:    2/13  Day 3: Has surpassed a 2nd midnight with active treatments and services.  Currently on  O2  6 L  NC, sats  94  %.  Remains on  DARRYL.  F/U  repeat  BC.    No new  symptoms noted.  Continue  PT/OT/fall precautions.  Continue current meds.         ED Treatment-Medication Administration from 02/11/2025 0840 to 02/11/2025 4314         Date/Time Order Dose Route Action     02/11/2025 0919 albuterol (FOR EMS ONLY) (2.5 mg/3 mL) 0.083 % inhalation solution 2.5 mg 0 mg Does not apply Given to EMS     02/11/2025 0919  ipratropium-albuterol (FOR EMS ONLY) (DUO-NEB) 0.5-2.5 mg/3 mL inhalation solution 3 mL 0 mL Does not apply Given to EMS     02/11/2025 0931 albuterol inhalation solution 10 mg 10 mg Nebulization Given     02/11/2025 0931 ipratropium (ATROVENT) 0.02 % inhalation solution 1 mg 1 mg Nebulization Given     02/11/2025 0931 sodium chloride 0.9 % inhalation solution 12 mL 12 mL Nebulization Given     02/11/2025 0925 methylPREDNISolone sodium succinate (Solu-MEDROL) injection 60 mg 60 mg Intravenous Given     02/11/2025 0956 sodium chloride 0.9 % bolus 500 mL 500 mL Intravenous New Bag     02/11/2025 1002 acetaminophen (Ofirmev) injection 1,000 mg 1,000 mg Intravenous New Bag     02/11/2025 1142 ceftriaxone (ROCEPHIN) 1 g/50 mL in dextrose IVPB 1,000 mg Intravenous New Bag     02/11/2025 1200 azithromycin (ZITHROMAX) 500 mg in sodium chloride 0.9% 250mL IVPB 500 mg 500 mg Intravenous New Bag     02/11/2025 1136 sodium chloride 0.9 % bolus 1,000 mL 1,000 mL Intravenous New Bag     02/11/2025 1231 iohexol (OMNIPAQUE) 350 MG/ML injection (MULTI-DOSE) 85 mL 85 mL Intravenous Given     02/11/2025 1323 sodium chloride 0.9 % bolus 1,000 mL 1,000 mL Intravenous New Bag            Scheduled Medications:  apixaban, 5 mg, Oral, BID  aspirin, 81 mg, Oral, Daily  atorvastatin, 80 mg, Oral, Daily With Dinner  cefazolin, 2,000 mg, Intravenous, Q8H  docusate sodium, 100 mg, Oral, BID  famotidine, 20 mg, Oral, Daily  fluticasone, 1 puff, Inhalation, Daily  formoterol, 20 mcg, Nebulization, Q12H  guaiFENesin, 1,200 mg, Oral, BID  hydroxychloroquine, 400 mg, Oral, Daily With Breakfast  ipratropium, 0.5 mg, Nebulization, TID  levalbuterol, 1.25 mg, Nebulization, TID   And  sodium chloride, 3 mL, Nebulization, TID  [Held by provider] losartan, 25 mg, Oral, Daily  metoprolol tartrate, 25 mg, Oral, Q12H GALE  predniSONE, 40 mg, Oral, Daily  sertraline, 12.5 mg, Oral, Daily  tamsulosin, 0.4 mg, Oral, Daily With Dinner      Continuous IV  Infusions:     PRN Meds:  acetaminophen, 650 mg, Oral, Q6H PRN  polyethylene glycol, 17 g, Oral, Daily PRN      ED Triage Vitals   Temperature Pulse Respirations Blood Pressure SpO2 Pain Score   02/11/25 0845 02/11/25 0845 02/11/25 0845 02/11/25 0845 02/11/25 0845 02/11/25 1715   97.6 °F (36.4 °C) (!) 110 20 98/55 96 % 5     Weight (last 2 days)       Date/Time Weight    02/12/25 0600 82.8 (182.54)    02/11/25 0845 82.3 (181.44)            Vital Signs (last 3 days)       Date/Time Temp Pulse Resp BP MAP (mmHg) SpO2 Calculated FIO2 (%) - Nasal Cannula Nasal Cannula O2 Flow Rate (L/min) O2 Device Patient Position - Orthostatic VS Pain    02/12/25 0824 -- 62 20 117/59 82 95 % 44 6 L/min Nasal cannula Lying --    02/12/25 0730 -- -- -- -- -- 100 % 32 3 L/min Nasal cannula -- --    02/12/25 0014 -- -- -- 99/58 -- -- -- -- -- Lying --    02/12/25 0013 -- -- -- 93/52 -- -- -- -- -- Lying --    02/11/25 2300 -- 86 18 91/51 64 100 % 32 3 L/min Nasal cannula Lying --    02/11/25 2100 97.5 °F (36.4 °C) 95 18 98/54 70 -- -- -- -- Lying 5 02/11/25 1941 -- -- -- -- -- 98 % 32 3 L/min Nasal cannula -- --    02/11/25 1836 -- -- -- -- -- -- -- -- -- -- 5 02/11/25 1715 97.6 °F (36.4 °C) 87 18 102/56 74 -- 32 3 L/min Nasal cannula Lying 5    02/11/25 1630 -- 92 20 100/58 72 98 % 28 2 L/min Nasal cannula Lying --    02/11/25 1515 -- 88 18 99/60 75 100 % 28 2 L/min Nasal cannula Lying --    02/11/25 1500 -- 92 -- 101/63 78 98 % -- -- -- -- --    02/11/25 1445 -- 88 -- 101/62 77 98 % -- -- -- -- --    02/11/25 1430 -- 89 -- 101/60 75 96 % -- -- -- -- --    02/11/25 1415 -- 89 18 95/58 72 98 % 28 2 L/min Nasal cannula Lying --    02/11/25 1400 -- 88 18 99/60 73 99 % 28 2 L/min Nasal cannula Lying --    02/11/25 1345 -- 90 16 91/57 70 100 % 28 2 L/min Nasal cannula Lying --    02/11/25 1330 -- 91 16 89/55 67 100 % 28 2 L/min Nasal cannula Lying --    02/11/25 1315 -- 92 16 86/53 64 99 % 28 2 L/min Nasal cannula Lying --    02/11/25  "1300 -- 91 20 89/55 68 99 % 28 2 L/min Nasal cannula Lying --    02/11/25 1245 -- 95 20 90/54 67 96 % 28 2 L/min Nasal cannula Lying --    02/11/25 1215 -- 95 20 90/56 68 97 % 28 2 L/min Nasal cannula Lying --    02/11/25 1200 -- 98 20 88/50 62 93 % 28 2 L/min Nasal cannula Lying --    02/11/25 1145 -- 102 20 83/51 63 96 % 28 2 L/min Nasal cannula Sitting --    02/11/25 1130 -- 105 20 83/51 63 97 % 28 2 L/min Nasal cannula Sitting --    02/11/25 1115 -- 106 20 88/54 66 95 % 28 2 L/min Nasal cannula Lying --    02/11/25 1100 -- 107 24 84/47 62 93 % -- -- None (Room air) Lying --    02/11/25 1000 -- 109 24 96/58 72 100 % -- -- Simple mask Sitting --    02/11/25 0930 -- 106 28 89/54 67 94 % -- -- None (Room air) Sitting --    02/11/25 0900 -- 107 24 95/57 71 98 % -- -- None (Room air) Sitting --    02/11/25 0845 97.6 °F (36.4 °C) 110 20 98/55 71 96 % -- -- None (Room air) Sitting --              Pertinent Labs/Diagnostic Test Results:   Radiology:  VAS VENOUS DUPLEX -LOWER LIMB UNILATERAL   Final Interpretation by Darnell Webb MD (02/12 0720)      US kidney and bladder   Final Interpretation by Wilmer Ceballos MD (02/11 1930)      No hydronephrosis.      Moderate bladder distention.      Perinephric edema on the left.      Findings were discussed with Dr. Peralta at 7:25 p.m. via secure text      Workstation performed: [id number]         CTA chest pe study   Final Interpretation by Darnell Sharma MD (02/11 7035)      No evidence of pulmonary embolus.      Stable ectasia of the ascending thoracic aorta measuring up to 41 mm.      Mild secretions in the lower trachea, correlate for any dysphagia or possible aspiration.      Noncalcified left lung apex pulmonary nodule, 6 x 5 mm. Because this was not present on prior chest CT of December 4, 2023, conservative management with follow-up low radiation dose noncontrast chest CT in 6 months is recommended.      This examination was marked \"immediate notification\" in " Epic in order to begin the standard process by which the radiology reading room liaison alerts the referring practitioner.                     Resident: Bijan Fajardo I, the attending radiologist, have reviewed the images and agree with the final report above.      Workstation performed: [id number]         XR chest 1 view portable   ED Interpretation by Mal Cotton MD (02/11 1041)   Findings suggestive of atelectasis. No obvious consolidation, cardiomegaly, pleural effusion, or pulmonary edema      Final Interpretation by Chato Beltran MD (02/11 1048)      No acute cardiopulmonary disease.            Workstation performed: [id number]           Cardiology:  ECG 12 lead   Final Result by Talon Ayoub MD (02/11 1615)   Sinus tachycardia   Otherwise normal ECG   When compared with ECG of 11-Feb-2025 08:47, (unconfirmed)   No significant change was found   Confirmed by Talon Ayoub ([id number]) on 2/11/2025 4:15:28 PM      ECG 12 lead   Final Result by Talon Ayoub MD (02/11 1609)   Sinus tachycardia   Otherwise normal ECG   When compared with ECG of 08-Dec-2023 08:04,   No significant change was found   Confirmed by Talon Ayoub ([id number]) on 2/11/2025 4:09:12 PM            Results from last 7 days   Lab Units 02/12/25  0442 02/11/25  0921   WBC Thousand/uL 10.46* 12.32*   HEMOGLOBIN g/dL 8.1* 8.2*   HEMATOCRIT % 25.0* 25.4*   PLATELETS Thousands/uL 173 224   BANDS PCT % 3 2         Results from last 7 days   Lab Units 02/12/25  0442 02/11/25  0921   SODIUM mmol/L 137 132*   POTASSIUM mmol/L 4.5 4.2   CHLORIDE mmol/L 106 98   CO2 mmol/L 26 28   ANION GAP mmol/L 5 6   BUN mg/dL 40* 45*   CREATININE mg/dL 0.97 1.25   EGFR ml/min/1.73sq m 74 55   CALCIUM mg/dL 8.2* 8.6     Results from last 7 days   Lab Units 02/12/25  0442 02/11/25  0921   AST U/L 40* 62*   ALT U/L 65* 89*   ALK PHOS U/L 63 66   TOTAL PROTEIN g/dL 5.5* 6.3*   ALBUMIN g/dL 2.3* 2.6*   TOTAL BILIRUBIN mg/dL 0.42 0.78         Results from  last 7 days   Lab Units 02/12/25  0442 02/11/25  0921   GLUCOSE RANDOM mg/dL 110 134     Results from last 7 days   Lab Units 02/11/25  1001   PH DEAN  7.442*   PCO2 DEAN mm Hg 38.7*   PO2 DEAN mm Hg 38.8   HCO3 DEAN mmol/L 25.8   BASE EXC DEAN mmol/L 1.6   O2 CONTENT DEAN ml/dL 9.4   O2 HGB, VENOUS % 73.9             Results from last 7 days   Lab Units 02/11/25  1328 02/11/25  1115 02/11/25  0921   HS TNI 0HR ng/L  --   --  362*   HS TNI 2HR ng/L  --  476*  --    HSTNI D2 ng/L  --  114*  --    HS TNI 4HR ng/L 607*  --   --    HSTNI D4 ng/L 245*  --   --      Results from last 7 days   Lab Units 02/11/25  1030   D-DIMER QUANTITATIVE ug/ml FEU 3.89*             Results from last 7 days   Lab Units 02/12/25  0442 02/11/25  0921   PROCALCITONIN ng/ml 0.85* 0.90*     Results from last 7 days   Lab Units 02/11/25  1531   LACTIC ACID mmol/L 1.9             Results from last 7 days   Lab Units 02/11/25  0921   BNP pg/mL 228*           Results from last 7 days   Lab Units 02/11/25  1635   CLARITY UA  Clear   COLOR UA  Yellow   SPEC GRAV UA  1.034*   PH UA  5.5   GLUCOSE UA mg/dl Negative   KETONES UA mg/dl Negative   BLOOD UA  Small*   PROTEIN UA mg/dl 30 (1+)*   NITRITE UA  Negative   BILIRUBIN UA  Negative   UROBILINOGEN UA (BE) mg/dl <2.0   LEUKOCYTES UA  Negative   WBC UA /hpf 2-4*   RBC UA /hpf 10-20*   BACTERIA UA /hpf None Seen   EPITHELIAL CELLS WET PREP /hpf None Seen   MUCUS THREADS  Occasional*     Results from last 7 days   Lab Units 02/11/25  1635   STREP PNEUMONIAE ANTIGEN, URINE  Negative   LEGIONELLA URINARY ANTIGEN  Negative                             Results from last 7 days   Lab Units 02/11/25  1113   GRAM STAIN RESULT  Gram positive cocci in clusters*  Gram positive cocci in clusters*               Present on Admission:   Atrial fibrillation ([facility])   COPD (chronic obstructive pulmonary disease) ([facility])      Admitting Diagnosis: Dehydration [E86.0]  Pneumonia [J18.9]  SOB (shortness of breath) [R06.02]  Aortic  ectasia (HCC) [I77.819]  Elevated troponin [R79.89]  COPD exacerbation (HCC) [J44.1]  Pulmonary nodule, left [R91.1]  Aortic ectasia, thoracic (HCC) [I77.810]  Sepsis (HCC) [A41.9]  Age/Sex: 79 y.o. male    Network Utilization Review Department  ATTENTION: Please call with any questions or concerns to [phone] and carefully listen to the prompts so that you are directed to the right person. All voicemails are confidential.   For Discharge needs, contact Care Management DC Support Team at [phone] opt. 2  Send all requests for admission clinical reviews, approved or denied determinations and any other requests to dedicated fax number below belonging to the campus where the patient is receiving treatment. List of dedicated fax numbers for the Facilities:  FACILITY NAME UR FAX NUMBER   ADMISSION DENIALS (Administrative/Medical Necessity) [phone]   DISCHARGE SUPPORT TEAM (NETWORK) [phone]   PARENT CHILD HEALTH (Maternity/NICU/Pediatrics) [phone]   [facility] [phone]   [facility] [phone]   [facility] [phone]   [facility] [phone]   [facility] [phone]   [facility] [phone]   [facility] [phone]   [facility] [phone]   [facility] [phone]   [facility] [phone]   [facility] [phone]   [facility] [phone]

## 2025-02-12 NOTE — ASSESSMENT & PLAN NOTE
"Meeting SIRS criteria for tachycardia (HR ), tachypnea (RR 16-28), and leukocytosis (12.32)  Notably, patient was started on course of methylprednisolone on 2/09/25 for COPD exacerbation, current dose 12 mg PO BID prior to admission  EMS administered albuterol and ipratropium nebs prior to arrival  Received CTX and azithro in ED for suspected CAP  No clear source of infection. CTA PE study in ED did not demonstrate acute pathology.  Bladder scan showed only 30 cc, however straight cath returned 700 ml  Consider possible uro-sepsis    Plan:  See \"Gram positive bacteremia\"  "

## 2025-02-12 NOTE — ASSESSMENT & PLAN NOTE
Blood cultures with Gram + cocci, likely Staph Aureus  Unknown source at this time, patient with multiple small abrasions on hands and arms. UA negative, LLE does not appear to have cellulitis.  Patient complaining of worsening back pain, given new onset urinary retention and incontinence will evaluate for lumbar spine pathology  TTE negative for vegetations    Plan:  Start Cefazolin 2 g IV q8 hrs  Discontinue CTX  Monitor fever curve and WBC (on steroids)  ID consulted, appreciate recs  MRI L-Spine pending

## 2025-02-12 NOTE — ASSESSMENT & PLAN NOTE
Admission Blood cultures labeled same arm/same time are + for MSSA on prelim BCID panel. 2/12/25 TTE negative for vegetation. No PPM, intravascular devices. Patient does have acute on chronic low back pain.  -continues Cefazolin 2 g IV q 8 hours at prsent  -follow-up final blood cultures and adjust antibiotics as needed  -check repeat blood cultures Friday  -recommend MRI L spine  -additional inventions pending clinical course

## 2025-02-12 NOTE — ASSESSMENT & PLAN NOTE
Pt reports last BM two days prior to presentation  States he normally moves bowels daily  Start Colace BID  Miralax prn  Up-titrate bowel regimen as needed to achieve effect

## 2025-02-12 NOTE — ASSESSMENT & PLAN NOTE
L calf markedly swollen and edematous compared to R  L calf tenderness to palpation; R calf non-tender  No palpabale cords  D-dimer 3.89  CTA PE chest negative for PE  Pt is on Eliquis for Afib prior to admission    Check venous duplex to r/o acute DVT in LLE

## 2025-02-12 NOTE — PROGRESS NOTES
"Progress Note - Hospitalist   Name: Devin Chaves 79 y.o. male I MRN: [id number]  Unit/Bed#: -01 I Date of Admission: 2/11/2025   Date of Service: 2/12/2025 I Hospital Day: 1    Assessment & Plan  Gram-positive cocci bacteremia  Blood cultures with Gram + cocci, likely Staph Aureus  Unknown source at this time, patient with multiple small abrasions on hands and arms. UA negative, LLE does not appear to have cellulitis.  Patient complaining of worsening back pain, given new onset urinary retention and incontinence will evaluate for lumbar spine pathology  TTE negative for vegetations    Plan:  Start Cefazolin 2 g IV q8 hrs  Discontinue CTX  Monitor fever curve and WBC (on steroids)  ID consulted, appreciate recs  MRI L-Spine pending  SIRS (systemic inflammatory response syndrome) (HCC)  Meeting SIRS criteria for tachycardia (HR ), tachypnea (RR 16-28), and leukocytosis (12.32)  Notably, patient was started on course of methylprednisolone on 2/09/25 for COPD exacerbation, current dose 12 mg PO BID prior to admission  EMS administered albuterol and ipratropium nebs prior to arrival  Received CTX and azithro in ED for suspected CAP  No clear source of infection. CTA PE study in ED did not demonstrate acute pathology.  Bladder scan showed only 30 cc, however straight cath returned 700 ml  Consider possible uro-sepsis    Plan:  See \"Gram positive bacteremia\"  Hypotension  MAP as low as 62 in the ED per automated cuff  Received 2.5 L NS in ED (30cc/kg) with resolution of hypotension  Home Htn meds: Metoprolol tartrate 25 mg BID, Losartan 25 mg QD  Losartan recent decreased from 50 mg due to concerns for hypotension  Etiology of hypotension remains unclear at this time, though, as noted above, suspect possible uro-sepsis, suggested by new urinary incontinence day prior to presentation (suspect overflow) and urinary retention identified this admission    Plan:  Monitor VS including BP.   If hypotension recurs, " confirm via manual, assess symptoms; suggest fluid bolus and discussion w crit care.  Continue home metoprolol tartrate 25 mg BID  Hold losartan 25 mg QD  COPD (chronic obstructive pulmonary disease) (HCC)  Pt w COPD on 2L supplemental O2 at baseline  Was started on course of methylprednisolone 12 mg PO BID at [facility] on 2/9/25, continued up to presentation to hospital  Pt c/o increased SOB, increased PUTNAM, increased cough, increased wheezing, increased sputum production.  Reports feeling better since arriving to ED and receiving nebulizer treatments and IV steroids  Currently requiring 4-6L, above baseline of 2L NC  TTE 2/12/25: EF 55%, Normal systolic dysfunction, G1DD, No vegetation, No mural thrombus, moderate aortic sclerosis    Plan:  Nebs TID  Prednisone 40 mg PO QD starting tomorrow x2 days to complete short course of steroids  Can consider longer course if no improvement  Titrate O2 to maintain SpO2 greater than 88%  Acute urinary retention  Pt reports new urinary incontinence day prior to presentation and inability to void despite urge to urinate on day of presentation  H/o enlarged prostate  Marked suprapubic tenderness on exam  Straight cath drained 700 cc urine  Suspect new urinary retention 2/2 BPH  US Kidney/Bladder 2/11/25: No hydronephrosis, moderate bladder distention, perinephric edema on L side.    Plan:  Tamsulosin 0.4 mg QD  Urinary retention protocol  Swelling of calf  Pt is on Eliquis for Afib prior to admission  L calf markedly swollen and edematous compared to R, TTP/  D-dimer 3.89 on admission  CTA PE chest negative for PE  VAS duplex BLE 2/11/25: No DVT or thrombophlebitis   Patient notes that his L ankle is chronically deformed and swollen for the past 2 years after accident [place]    Plan:  PT/OT Evaluation  Fall precautions   Monitor for expansion of erythema  Atrial fibrillation (HCC)  Patient rate controlled since admission    Plan:  Continue home Eliquis  Continue home  metoprolol with hold parameters  Pulmonary nodule  Incidental finding on CTA PE, new from prior study  6 mm x 5 mm    Plan:  Recommend repeat CT chest in 6 months for surveillance  Constipation  Patient with BM overnight    Plan:  Deescalate Colace to QD  Miralax prn  Ambulatory dysfunction  Pt [facility] from [facility], to which he had been discharged following recent hospitalization.   Reports history of L ankle fracture--reportedly has been advised against surgery/anesthesia due to COPD    Plan:  PT/OT Evaluation  Fall precautions   Elevated troponin  Trop 362-->476-->607  No CP or EKG changes to suggest MI  Suspect non-MI troponin elevation in the setting of hypotension    Plan:  Continue to monitor hemodynamics    VTE Pharmacologic Prophylaxis: VTE Score: 9 High Risk (Score >/= 5) - Pharmacological DVT Prophylaxis Ordered: apixaban (Eliquis). Sequential Compression Devices Ordered.    Mobility:   Basic Mobility Inpatient Raw Score: 14  -HLM Goal: 4: Move to chair/commode  JH-HLM Achieved: 3: Sit at edge of bed  JH-HLM Goal NOT achieved. Continue with multidisciplinary rounding and encourage appropriate mobility to improve upon -HLM goals.    Patient Centered Rounds: I performed bedside rounds with nursing staff today.   Discussions with Specialists or Other Care Team Provider: None    Education and Discussions with Family / Patient: Updated  (wife) via phone.    Current Length of Stay: 1 day(s)  Current Patient Status: Inpatient   Certification Statement: The patient will continue to require additional inpatient hospital stay due to COPD Exacerbation  Discharge Plan: Anticipate discharge in 48-72 hrs to discharge location to be determined pending rehab evaluations.    Code Status: Level 3 - DNAR and DNI    Subjective   Patient endorses episode of urinary incontinence overnight, denies any occurrence of this prior to this admission. 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     Objective :  Temp:  [97.5 °F (36.4 °C)-97.6 °F (36.4 °C)] 97.5 °F (36.4 °C)  HR:  [] 62  BP: ()/(47-63) 117/59  Resp:  [16-24] 20  SpO2:  [93 %-100 %] 95 %  O2 Device: Nasal cannula  Nasal Cannula O2 Flow Rate (L/min):  [2 L/min-6 L/min] 6 L/min    Body mass index is 22.22 kg/m².     Input and Output Summary (last 24 hours):     Intake/Output Summary (Last 24 hours) at 2/12/2025 1032  Last data filed at 2/12/2025 0601  Gross per 24 hour   Intake 3070 ml   Output 700 ml   Net 2370 ml       Physical Exam  Vitals and nursing note reviewed.   Constitutional:       General: He is not in acute distress.     Appearance: He is well-developed.   HENT:      Head: Normocephalic and atraumatic.   Eyes:      Conjunctiva/sclera: Conjunctivae normal.   Cardiovascular:      Rate and Rhythm: Normal rate and regular rhythm.      Heart sounds: No murmur heard.  Pulmonary:      Effort: Pulmonary effort is normal.      Breath sounds: No stridor. No wheezing, rhonchi or rales.      Comments: Decreased breath sounds throughout  Abdominal:      Palpations: Abdomen is soft.      Tenderness: There is no abdominal tenderness.   Musculoskeletal:         General: No swelling.      Cervical back: Neck supple.      Left foot: Deformity present.   Feet:      Left foot:      Skin integrity: Erythema present. No ulcer, blister, skin breakdown or warmth.   Skin:     General: Skin is warm and dry.      Capillary Refill: Capillary refill takes less than 2 seconds.   Neurological:      Mental Status: He is alert.   Psychiatric:         Mood and Affect: Mood normal.           Lines/Drains:              Lab Results: I have reviewed the following results:   Results from last 7 days   Lab Units 02/12/25  0442   WBC Thousand/uL 10.46*   HEMOGLOBIN g/dL 8.1*   HEMATOCRIT % 25.0*   PLATELETS Thousands/uL 173   BANDS PCT % 3   LYMPHO PCT % 3*   MONO PCT % 3*   EOS PCT % 0     Results from  "last 7 days   Lab Units 02/12/25  0442   SODIUM mmol/L 137   POTASSIUM mmol/L 4.5   CHLORIDE mmol/L 106   CO2 mmol/L 26   BUN mg/dL 40*   CREATININE mg/dL 0.97   ANION GAP mmol/L 5   CALCIUM mg/dL 8.2*   ALBUMIN g/dL 2.3*   TOTAL BILIRUBIN mg/dL 0.42   ALK PHOS U/L 63   ALT U/L 65*   AST U/L 40*   GLUCOSE RANDOM mg/dL 110                 Results from last 7 days   Lab Units 02/12/25  0442 02/11/25  1531 02/11/25  0921   LACTIC ACID mmol/L  --  1.9  --    PROCALCITONIN ng/ml 0.85*  --  0.90*       Recent Cultures (last 7 days):   Results from last 7 days   Lab Units 02/11/25  1635 02/11/25  1113   GRAM STAIN RESULT   --  Gram positive cocci in clusters*  Gram positive cocci in clusters*   LEGIONELLA URINARY ANTIGEN  Negative  --        US kidney and bladder  Result Date: 2/11/2025  Impression: No hydronephrosis. Moderate bladder distention. Perinephric edema on the left. Findings were discussed with Dr. Peralta at 7:25 p.m. via secure text Workstation performed: [id number]     CTA chest pe study  Result Date: 2/11/2025  Impression: No evidence of pulmonary embolus. Stable ectasia of the ascending thoracic aorta measuring up to 41 mm. Mild secretions in the lower trachea, correlate for any dysphagia or possible aspiration. Noncalcified left lung apex pulmonary nodule, 6 x 5 mm. Because this was not present on prior chest CT of December 4, 2023, conservative management with follow-up low radiation dose noncontrast chest CT in 6 months is recommended. This examination was marked \"immediate notification\" in Epic in order to begin the standard process by which the radiology reading room liaison alerts the referring practitioner. Resident: Bijan Fajardo I, the attending radiologist, have reviewed the images and agree with the final report above. Workstation performed: [id number]     XR chest 1 view portable  Result Date: 2/11/2025  Impression: No acute cardiopulmonary disease. Workstation performed: [id number]       XR " chest 1 view portable  Result Date: 2/11/2025  Impression No acute cardiopulmonary disease. Workstation performed: [id number]        Other Study Results Review: EKG was reviewed.     Last 24 Hours Medication List:     Current Facility-Administered Medications:     acetaminophen (TYLENOL) tablet 650 mg, Q6H PRN    apixaban (ELIQUIS) tablet 5 mg, BID    aspirin (ECOTRIN LOW STRENGTH) EC tablet 81 mg, Daily    atorvastatin (LIPITOR) tablet 80 mg, Daily With Dinner    ceFAZolin (ANCEF) IVPB (premix in dextrose) 2,000 mg 50 mL, Q8H    docusate sodium (COLACE) capsule 100 mg, BID    famotidine (PEPCID) tablet 20 mg, Daily    fluticasone (ARNUITY ELLIPTA) 100 MCG/ACT inhaler 1 puff, Daily    formoterol (PERFOROMIST) nebulizer solution 20 mcg, Q12H    guaiFENesin (MUCINEX) 12 hr tablet 1,200 mg, BID    hydroxychloroquine (PLAQUENIL) tablet 400 mg, Daily With Breakfast    ipratropium (ATROVENT) 0.02 % inhalation solution 0.5 mg, TID    levalbuterol (XOPENEX) inhalation solution 1.25 mg, TID **AND** sodium chloride 0.9 % inhalation solution 3 mL, TID    [Held by provider] losartan (COZAAR) tablet 25 mg, Daily    metoprolol tartrate (LOPRESSOR) tablet 25 mg, Q12H GALE    polyethylene glycol (MIRALAX) packet 17 g, Daily PRN    predniSONE tablet 40 mg, Daily    sertraline (ZOLOFT) tablet 12.5 mg, Daily    tamsulosin (FLOMAX) capsule 0.4 mg, Daily With Dinner    Administrative Statements   Today, Patient Was Seen By: Mal Neely DO      **Please Note: This note may have been constructed using a voice recognition system.**

## 2025-02-13 ENCOUNTER — APPOINTMENT (INPATIENT)
Dept: MRI IMAGING | Facility: HOSPITAL | Age: 80
DRG: 871 | End: 2025-02-13
Payer: COMMERCIAL

## 2025-02-13 ENCOUNTER — APPOINTMENT (INPATIENT)
Dept: CT IMAGING | Facility: HOSPITAL | Age: 80
DRG: 871 | End: 2025-02-13
Payer: COMMERCIAL

## 2025-02-13 PROBLEM — D64.9 ANEMIA: Status: ACTIVE | Noted: 2025-02-13

## 2025-02-13 LAB
ABO GROUP BLD: NORMAL
ABO GROUP BLD: NORMAL
ALBUMIN SERPL BCG-MCNC: 2.5 G/DL (ref 3.5–5)
ALP SERPL-CCNC: 57 U/L (ref 34–104)
ALT SERPL W P-5'-P-CCNC: 42 U/L (ref 7–52)
ANION GAP SERPL CALCULATED.3IONS-SCNC: 5 MMOL/L (ref 4–13)
ANISOCYTOSIS BLD QL SMEAR: PRESENT
AST SERPL W P-5'-P-CCNC: 26 U/L (ref 13–39)
BASOPHILS # BLD MANUAL: 0 THOUSAND/UL (ref 0–0.1)
BASOPHILS NFR MAR MANUAL: 0 % (ref 0–1)
BILIRUB SERPL-MCNC: 0.37 MG/DL (ref 0.2–1)
BLD GP AB SCN SERPL QL: NEGATIVE
BUN SERPL-MCNC: 37 MG/DL (ref 5–25)
CALCIUM ALBUM COR SERPL-MCNC: 9.4 MG/DL (ref 8.3–10.1)
CALCIUM SERPL-MCNC: 8.2 MG/DL (ref 8.4–10.2)
CARDIAC TROPONIN I PNL SERPL HS: 564 NG/L (ref 8–18)
CHLORIDE SERPL-SCNC: 105 MMOL/L (ref 96–108)
CO2 SERPL-SCNC: 28 MMOL/L (ref 21–32)
CREAT SERPL-MCNC: 0.94 MG/DL (ref 0.6–1.3)
EOSINOPHIL # BLD MANUAL: 0 THOUSAND/UL (ref 0–0.4)
EOSINOPHIL NFR BLD MANUAL: 0 % (ref 0–6)
ERYTHROCYTE [DISTWIDTH] IN BLOOD BY AUTOMATED COUNT: 17.3 % (ref 11.6–15.1)
GFR SERPL CREATININE-BSD FRML MDRD: 77 ML/MIN/1.73SQ M
GLUCOSE SERPL-MCNC: 94 MG/DL (ref 65–140)
HCT VFR BLD AUTO: 20.8 % (ref 36.5–49.3)
HCT VFR BLD AUTO: 21 % (ref 36.5–49.3)
HGB BLD-MCNC: 6.7 G/DL (ref 12–17)
HGB BLD-MCNC: 6.8 G/DL (ref 12–17)
HGB BLD-MCNC: 7.1 G/DL (ref 12–17)
HYPERCHROMIA BLD QL SMEAR: PRESENT
LYMPHOCYTES # BLD AUTO: 0.41 THOUSAND/UL (ref 0.6–4.47)
LYMPHOCYTES # BLD AUTO: 5 % (ref 14–44)
MAGNESIUM SERPL-MCNC: 2.1 MG/DL (ref 1.9–2.7)
MCH RBC QN AUTO: 29.8 PG (ref 26.8–34.3)
MCHC RBC AUTO-ENTMCNC: 32.4 G/DL (ref 31.4–37.4)
MCV RBC AUTO: 92 FL (ref 82–98)
MONOCYTES # BLD AUTO: 0.33 THOUSAND/UL (ref 0–1.22)
MONOCYTES NFR BLD: 4 % (ref 4–12)
MRSA NOSE QL CULT: NORMAL
NEUTROPHILS # BLD MANUAL: 7.53 THOUSAND/UL (ref 1.85–7.62)
NEUTS SEG NFR BLD AUTO: 91 % (ref 43–75)
PLATELET # BLD AUTO: 183 THOUSANDS/UL (ref 149–390)
PLATELET BLD QL SMEAR: ADEQUATE
PLATELET CLUMP BLD QL SMEAR: PRESENT
PMV BLD AUTO: 8.6 FL (ref 8.9–12.7)
POLYCHROMASIA BLD QL SMEAR: PRESENT
POTASSIUM SERPL-SCNC: 4.1 MMOL/L (ref 3.5–5.3)
PROT SERPL-MCNC: 5.4 G/DL (ref 6.4–8.4)
RBC # BLD AUTO: 2.28 MILLION/UL (ref 3.88–5.62)
RBC MORPH BLD: PRESENT
RH BLD: NEGATIVE
RH BLD: NEGATIVE
SODIUM SERPL-SCNC: 138 MMOL/L (ref 135–147)
SPECIMEN EXPIRATION DATE: NORMAL
WBC # BLD AUTO: 8.27 THOUSAND/UL (ref 4.31–10.16)

## 2025-02-13 PROCEDURE — 30233N1 TRANSFUSION OF NONAUTOLOGOUS RED BLOOD CELLS INTO PERIPHERAL VEIN, PERCUTANEOUS APPROACH: ICD-10-PCS | Performed by: INTERNAL MEDICINE

## 2025-02-13 PROCEDURE — A9585 GADOBUTROL INJECTION: HCPCS | Performed by: INTERNAL MEDICINE

## 2025-02-13 PROCEDURE — 85014 HEMATOCRIT: CPT

## 2025-02-13 PROCEDURE — 86900 BLOOD TYPING SEROLOGIC ABO: CPT

## 2025-02-13 PROCEDURE — 86850 RBC ANTIBODY SCREEN: CPT

## 2025-02-13 PROCEDURE — 99233 SBSQ HOSP IP/OBS HIGH 50: CPT | Performed by: INTERNAL MEDICINE

## 2025-02-13 PROCEDURE — 70470 CT HEAD/BRAIN W/O & W/DYE: CPT

## 2025-02-13 PROCEDURE — 85018 HEMOGLOBIN: CPT

## 2025-02-13 PROCEDURE — 86901 BLOOD TYPING SEROLOGIC RH(D): CPT

## 2025-02-13 PROCEDURE — 85007 BL SMEAR W/DIFF WBC COUNT: CPT

## 2025-02-13 PROCEDURE — 84484 ASSAY OF TROPONIN QUANT: CPT

## 2025-02-13 PROCEDURE — 72156 MRI NECK SPINE W/O & W/DYE: CPT

## 2025-02-13 PROCEDURE — P9016 RBC LEUKOCYTES REDUCED: HCPCS

## 2025-02-13 PROCEDURE — 83735 ASSAY OF MAGNESIUM: CPT

## 2025-02-13 PROCEDURE — G0545 PR INHERENT VISIT TO INPT: HCPCS | Performed by: INTERNAL MEDICINE

## 2025-02-13 PROCEDURE — 85027 COMPLETE CBC AUTOMATED: CPT

## 2025-02-13 PROCEDURE — 80053 COMPREHEN METABOLIC PANEL: CPT

## 2025-02-13 PROCEDURE — 86923 COMPATIBILITY TEST ELECTRIC: CPT

## 2025-02-13 PROCEDURE — 99232 SBSQ HOSP IP/OBS MODERATE 35: CPT | Performed by: INTERNAL MEDICINE

## 2025-02-13 PROCEDURE — 94760 N-INVAS EAR/PLS OXIMETRY 1: CPT

## 2025-02-13 PROCEDURE — 93005 ELECTROCARDIOGRAM TRACING: CPT

## 2025-02-13 PROCEDURE — 94640 AIRWAY INHALATION TREATMENT: CPT

## 2025-02-13 RX ORDER — OXYCODONE HYDROCHLORIDE 5 MG/1
5 TABLET ORAL EVERY 4 HOURS PRN
Refills: 0 | Status: DISCONTINUED | OUTPATIENT
Start: 2025-02-13 | End: 2025-02-20

## 2025-02-13 RX ORDER — LIDOCAINE 50 MG/G
1 PATCH TOPICAL DAILY
Status: DISCONTINUED | OUTPATIENT
Start: 2025-02-13 | End: 2025-02-22 | Stop reason: HOSPADM

## 2025-02-13 RX ORDER — GADOBUTROL 604.72 MG/ML
8 INJECTION INTRAVENOUS
Status: COMPLETED | OUTPATIENT
Start: 2025-02-13 | End: 2025-02-13

## 2025-02-13 RX ORDER — SODIUM CHLORIDE, SODIUM GLUCONATE, SODIUM ACETATE, POTASSIUM CHLORIDE, MAGNESIUM CHLORIDE, SODIUM PHOSPHATE, DIBASIC, AND POTASSIUM PHOSPHATE .53; .5; .37; .037; .03; .012; .00082 G/100ML; G/100ML; G/100ML; G/100ML; G/100ML; G/100ML; G/100ML
75 INJECTION, SOLUTION INTRAVENOUS CONTINUOUS
Status: DISPENSED | OUTPATIENT
Start: 2025-02-13 | End: 2025-02-14

## 2025-02-13 RX ORDER — METHOCARBAMOL 500 MG/1
500 TABLET, FILM COATED ORAL EVERY 8 HOURS SCHEDULED
Status: DISCONTINUED | OUTPATIENT
Start: 2025-02-13 | End: 2025-02-22 | Stop reason: HOSPADM

## 2025-02-13 RX ORDER — ALBUMIN (HUMAN) 12.5 G/50ML
25 SOLUTION INTRAVENOUS ONCE
Status: COMPLETED | OUTPATIENT
Start: 2025-02-13 | End: 2025-02-13

## 2025-02-13 RX ORDER — LIDOCAINE HYDROCHLORIDE 20 MG/ML
15 SOLUTION OROPHARYNGEAL 2 TIMES DAILY
Status: DISCONTINUED | OUTPATIENT
Start: 2025-02-13 | End: 2025-02-16

## 2025-02-13 RX ORDER — METHOCARBAMOL 500 MG/1
500 TABLET, FILM COATED ORAL EVERY 6 HOURS PRN
Status: DISCONTINUED | OUTPATIENT
Start: 2025-02-13 | End: 2025-02-13

## 2025-02-13 RX ORDER — PANTOPRAZOLE SODIUM 40 MG/1
40 TABLET, DELAYED RELEASE ORAL
Status: DISCONTINUED | OUTPATIENT
Start: 2025-02-13 | End: 2025-02-15

## 2025-02-13 RX ORDER — LIDOCAINE 50 MG/G
1 PATCH TOPICAL DAILY
Status: DISCONTINUED | OUTPATIENT
Start: 2025-02-14 | End: 2025-02-22 | Stop reason: HOSPADM

## 2025-02-13 RX ORDER — SUCRALFATE 1 G/1
1 TABLET ORAL
Status: DISCONTINUED | OUTPATIENT
Start: 2025-02-13 | End: 2025-02-22 | Stop reason: HOSPADM

## 2025-02-13 RX ADMIN — FORMOTEROL FUMARATE DIHYDRATE 20 MCG: 20 SOLUTION RESPIRATORY (INHALATION) at 07:20

## 2025-02-13 RX ADMIN — ACETAMINOPHEN 650 MG: 325 TABLET, FILM COATED ORAL at 15:13

## 2025-02-13 RX ADMIN — OXYCODONE HYDROCHLORIDE 5 MG: 5 TABLET ORAL at 18:45

## 2025-02-13 RX ADMIN — CEFAZOLIN SODIUM 2000 MG: 2 SOLUTION INTRAVENOUS at 18:47

## 2025-02-13 RX ADMIN — FLUTICASONE FUROATE 1 PUFF: 100 POWDER RESPIRATORY (INHALATION) at 08:48

## 2025-02-13 RX ADMIN — LEVALBUTEROL HYDROCHLORIDE 1.25 MG: 1.25 SOLUTION RESPIRATORY (INHALATION) at 19:20

## 2025-02-13 RX ADMIN — APIXABAN 5 MG: 5 TABLET, FILM COATED ORAL at 18:45

## 2025-02-13 RX ADMIN — IPRATROPIUM BROMIDE 0.5 MG: 0.5 SOLUTION RESPIRATORY (INHALATION) at 14:02

## 2025-02-13 RX ADMIN — ATORVASTATIN CALCIUM 80 MG: 40 TABLET, FILM COATED ORAL at 15:10

## 2025-02-13 RX ADMIN — SUCRALFATE 1 G: 1 TABLET ORAL at 21:24

## 2025-02-13 RX ADMIN — SODIUM CHLORIDE, SODIUM GLUCONATE, SODIUM ACETATE, POTASSIUM CHLORIDE, MAGNESIUM CHLORIDE, SODIUM PHOSPHATE, DIBASIC, AND POTASSIUM PHOSPHATE 75 ML/HR: .53; .5; .37; .037; .03; .012; .00082 INJECTION, SOLUTION INTRAVENOUS at 08:37

## 2025-02-13 RX ADMIN — SERTRALINE HYDROCHLORIDE 12.5 MG: 25 TABLET ORAL at 08:43

## 2025-02-13 RX ADMIN — DOCUSATE SODIUM 100 MG: 100 CAPSULE, LIQUID FILLED ORAL at 08:43

## 2025-02-13 RX ADMIN — PANTOPRAZOLE SODIUM 40 MG: 40 TABLET, DELAYED RELEASE ORAL at 15:12

## 2025-02-13 RX ADMIN — IPRATROPIUM BROMIDE 0.5 MG: 0.5 SOLUTION RESPIRATORY (INHALATION) at 19:20

## 2025-02-13 RX ADMIN — IPRATROPIUM BROMIDE 0.5 MG: 0.5 SOLUTION RESPIRATORY (INHALATION) at 07:20

## 2025-02-13 RX ADMIN — PREDNISONE 40 MG: 20 TABLET ORAL at 08:43

## 2025-02-13 RX ADMIN — LIDOCAINE HYDROCHLORIDE 15 ML: 20 SOLUTION ORAL at 21:48

## 2025-02-13 RX ADMIN — ASPIRIN 81 MG: 81 TABLET, COATED ORAL at 08:45

## 2025-02-13 RX ADMIN — FORMOTEROL FUMARATE DIHYDRATE 20 MCG: 20 SOLUTION RESPIRATORY (INHALATION) at 19:20

## 2025-02-13 RX ADMIN — GUAIFENESIN 1200 MG: 600 TABLET, EXTENDED RELEASE ORAL at 18:45

## 2025-02-13 RX ADMIN — CEFAZOLIN SODIUM 2000 MG: 2 SOLUTION INTRAVENOUS at 01:15

## 2025-02-13 RX ADMIN — FAMOTIDINE 20 MG: 20 TABLET, FILM COATED ORAL at 08:43

## 2025-02-13 RX ADMIN — ACETAMINOPHEN 650 MG: 325 TABLET, FILM COATED ORAL at 06:28

## 2025-02-13 RX ADMIN — OXYCODONE HYDROCHLORIDE 5 MG: 5 TABLET ORAL at 15:13

## 2025-02-13 RX ADMIN — PANTOPRAZOLE SODIUM 40 MG: 40 TABLET, DELAYED RELEASE ORAL at 09:12

## 2025-02-13 RX ADMIN — METHOCARBAMOL TABLETS 500 MG: 500 TABLET, COATED ORAL at 21:24

## 2025-02-13 RX ADMIN — TAMSULOSIN HYDROCHLORIDE 0.4 MG: 0.4 CAPSULE ORAL at 15:10

## 2025-02-13 RX ADMIN — LEVALBUTEROL HYDROCHLORIDE 1.25 MG: 1.25 SOLUTION RESPIRATORY (INHALATION) at 14:02

## 2025-02-13 RX ADMIN — CEFAZOLIN SODIUM 2000 MG: 2 SOLUTION INTRAVENOUS at 08:43

## 2025-02-13 RX ADMIN — LEVALBUTEROL HYDROCHLORIDE 1.25 MG: 1.25 SOLUTION RESPIRATORY (INHALATION) at 07:20

## 2025-02-13 RX ADMIN — SUCRALFATE 1 G: 1 TABLET ORAL at 18:47

## 2025-02-13 RX ADMIN — IOHEXOL 100 ML: 350 INJECTION, SOLUTION INTRAVENOUS at 10:13

## 2025-02-13 RX ADMIN — ALBUMIN (HUMAN) 25 G: 0.25 INJECTION, SOLUTION INTRAVENOUS at 18:47

## 2025-02-13 RX ADMIN — METHOCARBAMOL TABLETS 500 MG: 500 TABLET, COATED ORAL at 16:07

## 2025-02-13 RX ADMIN — HYDROXYCHLOROQUINE SULFATE 400 MG: 200 TABLET ORAL at 08:43

## 2025-02-13 RX ADMIN — LIDOCAINE 1 PATCH: 50 PATCH CUTANEOUS at 08:45

## 2025-02-13 RX ADMIN — GUAIFENESIN 1200 MG: 600 TABLET, EXTENDED RELEASE ORAL at 08:43

## 2025-02-13 RX ADMIN — GADOBUTROL 8 ML: 604.72 INJECTION INTRAVENOUS at 17:17

## 2025-02-13 RX ADMIN — METOPROLOL TARTRATE 25 MG: 25 TABLET, FILM COATED ORAL at 21:24

## 2025-02-13 RX ADMIN — METOPROLOL TARTRATE 25 MG: 25 TABLET, FILM COATED ORAL at 08:43

## 2025-02-13 RX ADMIN — OXYCODONE HYDROCHLORIDE 5 MG: 5 TABLET ORAL at 09:12

## 2025-02-13 NOTE — ASSESSMENT & PLAN NOTE
Blood cultures with Gram + cocci, likely Staph Aureus  Unknown source at this time, patient with multiple small abrasions on hands and arms. UA negative, LLE does not appear to have cellulitis.  Patient complaining of worsening back pain, given new onset urinary retention and incontinence will evaluate for lumbar spine pathology  TTE negative for vegetations  MRI Lumbar: No evidence of abscess or abnormal enhancement in the paraspinal soft tissues.   NCCTH: No acute hemorrhage, edema, or mass effect. Chronic microangiopathic changes and chronic appearing infarctions    Plan:  Start Cefazolin 2 g IV q8 hrs  Discontinue CTX  Monitor fever curve and WBC (on steroids)  ID consulted, appreciate recs  MRI Brain and C-Spine pending

## 2025-02-13 NOTE — ASSESSMENT & PLAN NOTE
Recent Labs     02/12/25  0442 02/13/25  0537 02/13/25  0906   HGB 8.1* 6.8* 7.1*     Plan:  H/H q8 hrs  Transfuse if <7   Patient already typed and screened, not consented

## 2025-02-13 NOTE — ASSESSMENT & PLAN NOTE
"Patient reports chronic back pain has been worse due to \"inactivity\" and thinks worsened around the time of the catheterization. 2/13/25 MRI L spine: mild inferior plate edema at L1 with adjacent small Schmorl's node and minimal adjacent postcontrast enhancement. More c/w DDD. No paraspinal edema.   -continues antibiotic as above  -serial exam  -pain management per primary   "

## 2025-02-13 NOTE — ASSESSMENT & PLAN NOTE
Admission Blood cultures labeled same arm/same time are + for MSSA on prelim BCID panel. 2/12/25 TTE negative for vegetation. No PPM, intravascular devices. Patient reports chronic low back pain is at baseline today  -continues Cefazolin 2 g IV q 8 hours at present  -follow-up final blood cultures and adjust antibiotics as needed  -check repeat blood cultures Friday  -additional inventions pending clinical course

## 2025-02-13 NOTE — PLAN OF CARE
Problem: PAIN - ADULT  Goal: Verbalizes/displays adequate comfort level or baseline comfort level  Description: Interventions:  - Encourage patient to monitor pain and request assistance  - Assess pain using appropriate pain scale  - Administer analgesics based on type and severity of pain and evaluate response  - Implement non-pharmacological measures as appropriate and evaluate response  - Consider cultural and social influences on pain and pain management  - Notify physician/advanced practitioner if interventions unsuccessful or patient reports new pain  Outcome: Progressing     Problem: INFECTION - ADULT  Goal: Absence or prevention of progression during hospitalization  Description: INTERVENTIONS:  - Assess and monitor for signs and symptoms of infection  - Monitor lab/diagnostic results  - Monitor all insertion sites, i.e. indwelling lines, tubes, and drains  - Monitor endotracheal if appropriate and nasal secretions for changes in amount and color  - [place] appropriate cooling/warming therapies per order  - Administer medications as ordered  - Instruct and encourage patient and family to use good hand hygiene technique  - Identify and instruct in appropriate isolation precautions for identified infection/condition  Outcome: Progressing     Problem: Knowledge Deficit  Goal: Patient/family/caregiver demonstrates understanding of disease process, treatment plan, medications, and discharge instructions  Description: Complete learning assessment and assess knowledge base.  Interventions:  - Provide teaching at level of understanding  - Provide teaching via preferred learning methods  Outcome: Progressing

## 2025-02-13 NOTE — PROGRESS NOTES
"Progress Note - Hospitalist   Name: Devin Chaves 79 y.o. male I MRN: [id number]  Unit/Bed#: -01 I Date of Admission: 2/11/2025   Date of Service: 2/13/2025 I Hospital Day: 2    Assessment & Plan  Gram-positive cocci bacteremia  Blood cultures with Gram + cocci, likely Staph Aureus  Unknown source at this time, patient with multiple small abrasions on hands and arms. UA negative, LLE does not appear to have cellulitis.  Patient complaining of worsening back pain, given new onset urinary retention and incontinence will evaluate for lumbar spine pathology  TTE negative for vegetations  MRI Lumbar: No evidence of abscess or abnormal enhancement in the paraspinal soft tissues.   NCCTH: No acute hemorrhage, edema, or mass effect. 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 CTA PE study in ED did not demonstrate acute pathology.  Bladder scan showed only 30 cc, however straight cath returned 700 ml  Consider possible uro-sepsis    Plan:  See \"Gram positive bacteremia\"  Hypotension  MAP as low as 62 in the ED per automated cuff  Received 2.5 L NS in ED (30cc/kg) with resolution of hypotension  Home Htn meds: Metoprolol tartrate 25 mg BID, Losartan 25 mg QD  Losartan recent decreased from 50 mg due to concerns for hypotension  Etiology of hypotension remains unclear at this time, though, as noted above, suspect possible uro-sepsis, " suggested by new urinary incontinence day prior to presentation (suspect overflow) and urinary retention identified this admission    Plan:  Monitor VS including BP.   If hypotension recurs, confirm via manual, assess symptoms; suggest fluid bolus and discussion w crit care.  Continue home metoprolol tartrate 25 mg BID  Hold losartan 25 mg QD  COPD (chronic obstructive pulmonary disease) (HCC)  Pt w COPD on 2L supplemental O2 at baseline  Was started on course of methylprednisolone 12 mg PO BID at [facility] on 2/9/25, continued up to presentation to hospital  Pt c/o increased SOB, increased PUTNAM, increased cough, increased wheezing, increased sputum production.  Reports feeling better since arriving to ED and receiving nebulizer treatments and IV steroids  Currently requiring 4-6L, above baseline of 2L NC  TTE 2/12/25: EF 55%, Normal systolic dysfunction, G1DD, No vegetation, No mural thrombus, moderate aortic sclerosis    Plan:  Nebs TID  Prednisone 40 mg PO QD starting tomorrow x2 days to complete short course of steroids  Can consider longer course if no improvement  Titrate O2 to maintain SpO2 greater than 88%  Acute urinary retention  Pt reports new urinary incontinence day prior to presentation and inability to void despite urge to urinate on day of presentation  H/o enlarged prostate  Marked suprapubic tenderness on exam  Straight cath drained 700 cc urine  Suspect new urinary retention 2/2 BPH  US Kidney/Bladder 2/11/25: No hydronephrosis, moderate bladder distention, perinephric edema on L side.    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   Reports history of L ankle fracture--reportedly has been advised against surgery/anesthesia due to COPD    Plan:  PT/OT Evaluation  Fall precautions   Elevated troponin  Trop 362-->476-->607  No CP or EKG changes to suggest MI  Suspect non-MI troponin elevation in the setting of hypotension    Plan:  Continue to monitor hemodynamics  Consider cardiology consult if persistent chest pain and elevated troponins  Back pain    Anemia  Recent Labs     02/12/25  0442 02/13/25  0537 02/13/25  0906   HGB 8.1* 6.8* 7.1*     Plan:  H/H q8 hrs  Transfuse if <7   Patient already typed and screened, not consented      VTE Pharmacologic Prophylaxis: VTE Score: 9 High Risk (Score >/= 5) - Pharmacological DVT Prophylaxis Ordered: apixaban (Eliquis). Sequential Compression Devices Ordered.    Mobility:   Basic Mobility Inpatient Raw Score: 14  JH-HLM Goal: 4: Move to chair/commode  JH-HLM Achieved: 4: Move to chair/commode  JH-HLM Goal achieved. Continue to encourage appropriate mobility.    Patient Centered Rounds: I performed bedside rounds with nursing staff today.   Discussions with Specialists or Other Care Team Provider: Infectious Disease    Education and Discussions with Family / Patient: Updated  (wife and daughter) via phone.    Current Length of Stay: 2 day(s)  Current Patient Status: Inpatient   Certification Statement: The patient  will continue to require additional inpatient hospital stay due to Gram positive bacteremia.  Discharge Plan: Anticipate discharge in >72 hrs to discharge location to be determined pending rehab evaluations.    Code Status: Level 3 - DNAR and DNI    Subjective   Patient seen examined at bedside, versus continued mild to moderate shortness of breath and cough, patient requesting something for cough.  Also endorsing continued description chest pain. Otherwise states that back pain is under control, continues to note neck pain.    Objective :  Temp:  [97.7 °F (36.5 °C)-97.9 °F (36.6 °C)] 97.9 °F (36.6 °C)  HR:  [] 104  BP: ()/(56-58) 120/58  Resp:  [19-20] 20  SpO2:  [94 %-100 %] 94 %  O2 Device: Nasal cannula  Nasal Cannula O2 Flow Rate (L/min):  [3 L/min-6 L/min] 6 L/min    Body mass index is 22.25 kg/m².     Input and Output Summary (last 24 hours):     Intake/Output Summary (Last 24 hours) at 2/13/2025 1318  Last data filed at 2/13/2025 0601  Gross per 24 hour   Intake 400 ml   Output 1500 ml   Net -1100 ml       Physical Exam  Vitals and nursing note reviewed.   Constitutional:       General: He is not in acute distress.     Appearance: He is well-developed.   HENT:      Head: Normocephalic and atraumatic.   Eyes:      Conjunctiva/sclera: Conjunctivae normal.   Cardiovascular:      Rate and Rhythm: Normal rate and regular rhythm.      Heart sounds: No murmur heard.  Pulmonary:      Effort: Pulmonary effort is normal.      Breath sounds: No stridor. No wheezing, rhonchi or rales.      Comments: Decreased breath sounds throughout  Abdominal:      Palpations: Abdomen is soft.      Tenderness: There is no abdominal tenderness.   Musculoskeletal:         General: Tenderness (C-spine) present. No swelling.      Cervical back: Neck supple.      Left foot: Deformity present.   Feet:      Left foot:      Skin integrity: Erythema present. No ulcer, blister, skin breakdown or warmth.   Skin:     General: Skin is  warm and dry.      Capillary Refill: Capillary refill takes less than 2 seconds.   Neurological:      General: No focal deficit present.      Mental Status: He is alert and oriented to person, place, and time.   Psychiatric:         Mood and Affect: Mood normal.           Lines/Drains:        Telemetry:  Telemetry Orders (From admission, onward)               24 Hour Telemetry Monitoring  Continuous x 24 Hours (Telem)        Expiring   Question:  Reason for 24 Hour Telemetry  Answer:  Arrhythmias requiring acute medical intervention / PPM or ICD malfunction                     Telemetry Reviewed: Normal Sinus Rhythm  Indication for Continued Telemetry Use: Acute MI/Unstable Angina/Rule out ACS               Lab Results: I have reviewed the following results:   Results from last 7 days   Lab Units 02/13/25  0906 02/13/25  0537 02/12/25  0442   WBC Thousand/uL  --  8.27 10.46*   HEMOGLOBIN g/dL 7.1* 6.8* 8.1*   HEMATOCRIT %  --  21.0* 25.0*   PLATELETS Thousands/uL  --  183 173   BANDS PCT %  --   --  3   LYMPHO PCT %  --  5* 3*   MONO PCT %  --  4 3*   EOS PCT %  --  0 0     Results from last 7 days   Lab Units 02/13/25  0537   SODIUM mmol/L 138   POTASSIUM mmol/L 4.1   CHLORIDE mmol/L 105   CO2 mmol/L 28   BUN mg/dL 37*   CREATININE mg/dL 0.94   ANION GAP mmol/L 5   CALCIUM mg/dL 8.2*   ALBUMIN g/dL 2.5*   TOTAL BILIRUBIN mg/dL 0.37   ALK PHOS U/L 57   ALT U/L 42   AST U/L 26   GLUCOSE RANDOM mg/dL 94                 Results from last 7 days   Lab Units 02/12/25  0442 02/11/25  1531 02/11/25  0921   LACTIC ACID mmol/L  --  1.9  --    PROCALCITONIN ng/ml 0.85*  --  0.90*       Recent Cultures (last 7 days):   Results from last 7 days   Lab Units 02/11/25  1635 02/11/25  1113   BLOOD CULTURE   --  Staphylococcus aureus*  Staphylococcus aureus*   GRAM STAIN RESULT   --  Gram positive cocci in clusters*  Gram positive cocci in clusters*   LEGIONELLA URINARY ANTIGEN  Negative  --        CT head w wo contrast  Result  "Date: 2/13/2025  Impression: No acute hemorrhage, edema, or mass effect. Chronic microangiopathic changes and chronic appearing infarctions as above. No pathologic enhancement. Workstation performed: [id number]     MRI lumbar spine w wo contrast  Result Date: 2/13/2025  Impression: Mild inferior plate edema at L1 with adjacent small Schmorl's node and minimal adjacent postcontrast enhancement. Vacuum disc phenomenon noted on the recent CT at this level. Findings likely represent degenerative disc disease with Schmorl's node rather than discitis and osteomyelitis which should only be considered in the right clinical setting. No paraspinal edema or enhancement identified. No evidence of abscess or abnormal enhancement in the paraspinal soft tissues. 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 Resident: KEI Wallace I, the attending radiologist, have reviewed the images and agree with the final report above. Workstation performed: [id number]     US kidney and bladder  Result Date: 2/11/2025  Impression: No hydronephrosis. Moderate bladder distention. Perinephric edema on the left. Findings were discussed with Dr. Peralta at 7:25 p.m. via secure text Workstation performed: [id number]     CTA chest pe study  Result Date: 2/11/2025  Impression: No evidence of pulmonary embolus. Stable ectasia of the ascending thoracic aorta measuring up to 41 mm. Mild secretions in the lower trachea, correlate for any dysphagia or possible aspiration. Noncalcified left lung apex pulmonary nodule, 6 x 5 mm. Because this was not present on prior chest CT of December 4, 2023, conservative management with follow-up low radiation dose noncontrast chest CT in 6 months is recommended. This examination was marked \"immediate notification\" in Epic in order to begin the standard process by which the radiology reading room liaison alerts the referring practitioner. Resident: Bijan Fajardo I, the attending radiologist, have " reviewed the images and agree with the final report above. Workstation performed: [id number]     XR chest 1 view portable  Result Date: 2/11/2025  Impression: No acute cardiopulmonary disease. Workstation performed: [id number]       No Chest XR results available for this patient.     Other Study Results Review: No additional pertinent studies reviewed.    Last 24 Hours Medication List:     Current Facility-Administered Medications:     acetaminophen (TYLENOL) tablet 650 mg, Q6H PRN    apixaban (ELIQUIS) tablet 5 mg, BID    aspirin (ECOTRIN LOW STRENGTH) EC tablet 81 mg, Daily    atorvastatin (LIPITOR) tablet 80 mg, Daily With Dinner    ceFAZolin (ANCEF) IVPB (premix in dextrose) 2,000 mg 50 mL, Q8H, Last Rate: 2,000 mg (02/13/25 0843)    docusate sodium (COLACE) capsule 100 mg, Daily    famotidine (PEPCID) tablet 20 mg, Daily    fluticasone (ARNUITY ELLIPTA) 100 MCG/ACT inhaler 1 puff, Daily    formoterol (PERFOROMIST) nebulizer solution 20 mcg, Q12H    guaiFENesin (MUCINEX) 12 hr tablet 1,200 mg, BID    hydroxychloroquine (PLAQUENIL) tablet 400 mg, Daily With Breakfast    ipratropium (ATROVENT) 0.02 % inhalation solution 0.5 mg, TID    levalbuterol (XOPENEX) inhalation solution 1.25 mg, TID **AND** [DISCONTINUED] sodium chloride 0.9 % inhalation solution 3 mL, TID    lidocaine (LIDODERM) 5 % patch 1 patch, Daily    [Held by provider] losartan (COZAAR) tablet 25 mg, Daily    methocarbamol (ROBAXIN) tablet 500 mg, Q6H PRN    metoprolol tartrate (LOPRESSOR) tablet 25 mg, Q12H GALE    multi-electrolyte (PLASMALYTE-A/ISOLYTE-S PH 7.4) IV solution, Continuous, Last Rate: 75 mL/hr (02/13/25 0837)    oxyCODONE (ROXICODONE) IR tablet 5 mg, Q4H PRN    oxyCODONE (ROXICODONE) split tablet 2.5 mg, Q4H PRN    pantoprazole (PROTONIX) EC tablet 40 mg, BID AC    polyethylene glycol (MIRALAX) packet 17 g, Daily PRN    sertraline (ZOLOFT) tablet 12.5 mg, Daily    tamsulosin (FLOMAX) capsule 0.4 mg, Daily With Dinner    Administrative  Statements   Today, Patient Was Seen By: Mal Neely DO      **Please Note: This note may have been constructed using a voice recognition system.**

## 2025-02-13 NOTE — PLAN OF CARE
Problem: Prexisting or High Potential for Compromised Skin Integrity  Goal: Skin integrity is maintained or improved  Description: INTERVENTIONS:  - Identify patients at risk for skin breakdown  - Assess and monitor skin integrity  - Assess and monitor nutrition and hydration status  - Monitor labs   - Assess for incontinence   - Turn and reposition patient  - Assist with mobility/ambulation  - Relieve pressure over bony prominences  - Avoid friction and shearing  - Provide appropriate hygiene as needed including keeping skin clean and dry  - Evaluate need for skin moisturizer/barrier cream  - Collaborate with interdisciplinary team   - Patient/family teaching  - Consider wound care consult   Outcome: Progressing     Problem: PAIN - ADULT  Goal: Verbalizes/displays adequate comfort level or baseline comfort level  Description: Interventions:  - Encourage patient to monitor pain and request assistance  - Assess pain using appropriate pain scale  - Administer analgesics based on type and severity of pain and evaluate response  - Implement non-pharmacological measures as appropriate and evaluate response  - Consider cultural and social influences on pain and pain management  - Notify physician/advanced practitioner if interventions unsuccessful or patient reports new pain  Outcome: Progressing     Problem: INFECTION - ADULT  Goal: Absence or prevention of progression during hospitalization  Description: INTERVENTIONS:  - Assess and monitor for signs and symptoms of infection  - Monitor lab/diagnostic results  - Monitor all insertion sites, i.e. indwelling lines, tubes, and drains  - Monitor endotracheal if appropriate and nasal secretions for changes in amount and color  - [place] appropriate cooling/warming therapies per order  - Administer medications as ordered  - Instruct and encourage patient and family to use good hand hygiene technique  - Identify and instruct in appropriate isolation precautions for  identified infection/condition  Outcome: Progressing  Goal: Absence of fever/infection during neutropenic period  Description: INTERVENTIONS:  - Monitor WBC    Outcome: Progressing     Problem: SAFETY ADULT  Goal: Patient will remain free of falls  Description: INTERVENTIONS:  - Educate patient/family on patient safety including physical limitations  - Instruct patient to call for assistance with activity   - Consult OT/PT to assist with strengthening/mobility   - Keep Call bell within reach  - Keep bed low and locked with side rails adjusted as appropriate  - Keep care items and personal belongings within reach  - Initiate and maintain comfort rounds  - Make Fall Risk Sign visible to staff  - Offer Toileting every 2 Hours, in advance of need  - Initiate/Maintain bed alarm    - Apply yellow socks and bracelet for high fall risk patients  - Consider moving patient to room near nurses station  Outcome: Progressing  Goal: Maintain or return to baseline ADL function  Description: INTERVENTIONS:  -  Assess patient's ability to carry out ADLs; assess patient's baseline for ADL function and identify physical deficits which impact ability to perform ADLs (bathing, care of mouth/teeth, toileting, grooming, dressing, etc.)  - Assess/evaluate cause of self-care deficits   - Assess range of motion  - Assess patient's mobility; develop plan if impaired  - Assess patient's need for assistive devices and provide as appropriate  - Encourage maximum independence but intervene and supervise when necessary  - Involve family in performance of ADLs  - Assess for home care needs following discharge   - Consider OT consult to assist with ADL evaluation and planning for discharge  - Provide patient education as appropriate  Outcome: Progressing  Goal: Maintains/Returns to pre admission functional level  Description: INTERVENTIONS:  - Perform AM-PAC 6 Click Basic Mobility/ Daily Activity assessment daily.  - Set and communicate daily  mobility goal to care team and patient/family/caregiver.   - Collaborate with rehabilitation services on mobility goals if consulted  - Perform Range of Motion 3 times a day.  - Reposition patient every 2 hours.  - Dangle patient 3 times a day  - Stand patient 3 times a day  - Ambulate patient 3 times a day  - Out of bed to chair 3 times a day   - Out of bed for meals 3 times a day  - Out of bed for toileting  - Record patient progress and toleration of activity level   Outcome: Progressing     Problem: DISCHARGE PLANNING  Goal: Discharge to home or other facility with appropriate resources  Description: INTERVENTIONS:  - Identify barriers to discharge w/patient and caregiver  - Arrange for needed discharge resources and transportation as appropriate  - Identify discharge learning needs (meds, wound care, etc.)  - Arrange for interpretive services to assist at discharge as needed  - Refer to Case Management Department for coordinating discharge planning if the patient needs post-hospital services based on physician/advanced practitioner order or complex needs related to functional status, cognitive ability, or social support system  Outcome: Progressing     Problem: Knowledge Deficit  Goal: Patient/family/caregiver demonstrates understanding of disease process, treatment plan, medications, and discharge instructions  Description: Complete learning assessment and assess knowledge base.  Interventions:  - Provide teaching at level of understanding  - Provide teaching via preferred learning methods  Outcome: Progressing     Problem: Nutrition/Hydration-ADULT  Goal: Nutrient/Hydration intake appropriate for improving, restoring or maintaining nutritional needs  Description: Monitor and assess patient's nutrition/hydration status for malnutrition. Collaborate with interdisciplinary team and initiate plan and interventions as ordered.  Monitor patient's weight and dietary intake as ordered or per policy. Utilize nutrition  screening tool and intervene as necessary. Determine patient's food preferences and provide high-protein, high-caloric foods as appropriate.     INTERVENTIONS:  - Monitor oral intake, urinary output, labs, and treatment plans  - Assess nutrition and hydration status and recommend course of action  - Evaluate amount of meals eaten  - Assist patient with eating if necessary   - Allow adequate time for meals  - Recommend/ encourage appropriate diets, oral nutritional supplements, and vitamin/mineral supplements  - Order, calculate, and assess calorie counts as needed  - Recommend, monitor, and adjust tube feedings and TPN/PPN based on assessed needs  - Assess need for intravenous fluids  - Provide specific nutrition/hydration education as appropriate  - Include patient/family/caregiver in decisions related to nutrition  Outcome: Progressing

## 2025-02-13 NOTE — CASE MANAGEMENT
Case Management Assessment & Discharge Planning Note    Patient name Devin Chaves  Location /-01 MRN [id number]  : 1945 Date 2025       Current Admission Date: 2025  Current Admission Diagnosis:Gram-positive cocci bacteremia   Patient Active Problem List    Diagnosis Date Noted Date Diagnosed    Gram-positive cocci bacteremia 2025     SIRS (systemic inflammatory response syndrome) ([facility]) 2025     Acute urinary retention 2025     Hypotension 2025     Pulmonary nodule 2025     Swelling of calf 2025     Constipation 2025     Ambulatory dysfunction 2025     Elevated troponin 2025     Atrial fibrillation ([facility]) 2025     Hip region mass, unspecified laterality 2023     Chronic hypoxic respiratory failure ([facility]) 2023     Fracture of multiple ribs of right side 2023     Rheumatoid arthritis, unspecified ([facility]) 2023     Moderate protein-calorie malnutrition ([facility]) 2023     Hypertension 2023     CAD (coronary artery disease) 2023     COPD (chronic obstructive pulmonary disease) ([facility]) 2023     JAZMINE (obstructive sleep apnea) 2023     Back pain 2023     COVID-19 2023     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  [address]  [facility] [address]  Phone: [phone] Fax: [phone]    [facility] - [place], [place] - [address]  [address]  [address]  [facility] [address]  Phone: [phone] Fax: [phone]    [facility] - [address]  [address]  [place] [address]  Phone: [phone] Fax: [phone]    Primary Care Provider: Oswaldo Muniz DO    Primary Insurance: [facility] [employer] [facility]  Secondary Insurance: [employer]    ASSESSMENT:  Active Health Care Proxies       Francia Chaves Health Care Representative - Spouse   Primary Phone: [phone] (Mobile)  Home Phone: [phone]                           Readmission Root Cause  30 Day Readmission: No    Patient Information  Admitted from:: Home  Mental Status: Alert  During Assessment patient was accompanied by: Spouse, Daughter  Assessment information provided by:: Spouse, Daughter  Primary Caregiver: Self  Support Systems: Self, Spouse/significant other, Daughter, Family members  County of Residence: [place]  What city do you live in?: [place]  Home entry access options. Select all that apply.: Stairs  Number of steps to enter home.: 4  Type of Current Residence: 2 story home  Upon entering residence, is there a bedroom on the main floor (no further steps)?: Yes  Upon entering residence, is there a bathroom on the main floor (no further steps)?: Yes  Living Arrangements: Lives w/ Spouse/significant other, Lives w/ Daughter  Is patient a ?: Yes  Is patient active with [facility] ([facility])?: Yes  Is patient service connected?: Yes    Activities of Daily Living Prior to Admission  Functional Status: Independent  Completes ADLs independently?: Yes  Ambulates independently?: Yes  Does patient use assisted devices?: Yes  Assisted Devices (DME) used: Hospital Bed, Home Oxygen concentrator, Portable Oxygen tanks, Straight Cane  DME Company Name (respiratory supplies): [employer]  O2 Rate(s): 2L  Does the patient have a history of Short-Term Rehab?: Yes    Patient Information Continued  Income Source: Pension/[employer]  Does patient have prescription coverage?: Yes  Does patient receive dialysis  treatments?: No  Does patient have a history of substance abuse?: No  Does patient have a history of Mental Health Diagnosis?: No    Means of Transportation  Means of Transport to Appts:: Family transport    DISCHARGE DETAILS:    Discharge planning discussed with:: Patient spouse and daughter  Freedom of Choice: Yes  Comments - Freedom of Choice: Preference is for pt to return to [facility], family unsure if they would like pt to return to [facility], would like blanket referral sent. Aware PT/OT recs are pending at this time  CM contacted family/caregiver?: Yes (Spouse and daughter via phone)  Were Treatment Team discharge recommendations reviewed with patient/caregiver?: Yes  Did patient/caregiver verbalize understanding of patient care needs?: N/A- going to facility  Were patient/caregiver advised of the risks associated with not following Treatment Team discharge recommendations?: Yes    Contacts  Patient Contacts: Spouse, daughter  Relationship to Patient:: Family  Contact Method: Phone  Phone Number: [phone]  Reason/Outcome: Continuity of Care, Emergency Contact, Discharge Planning, Referral    Requested Home Health Care         Is the patient interested in C at discharge?: No    DME Referral Provided  Referral made for DME?: No    Other Referral/Resources/Interventions Provided:  Interventions: Other (Specify), Short Term Rehab  Referral Comments: CM spoke with pt spouse and daughter via phone, introduced self and role with dcp. Spouse reported pt was receiving STR at [facility]. Spouse unsure if she would like for pt to return to facility, would like blanket referrals sent to see what additional options are. Spouse aware choice list will be provided so preference can be established, pending PT/OT recs. Spouse reported prior to rehab, pt was living with her and their daughter in a 2 story home with 4 ZEINAB. Spouse reported there is a FFSU. Spouse reported pt has an electric bed, cane and O2 equipment. Pt  is on 2L O2 at baseline. Spouse reported pt is 100% service connected through the [facility]. PCP is Dr. Ortiz through the [facility] as per spouse. CM sent STR blanket referral via [employer]. CM will continue to follow for formal PT/OT recs.    Would you like to participate in our [facility] service program?  : No - Declined    Treatment Team Recommendation: Other (Pending PT/OT)  Discharge Destination Plan:: Short Term Rehab

## 2025-02-13 NOTE — PROGRESS NOTES
"Progress Note - Infectious Disease   Name: Devin Chaves 79 y.o. male I MRN: [id number]  Unit/Bed#: -01 I Date of Admission: 2/11/2025   Date of Service: 2/13/2025 I Hospital Day: 2    Assessment & Plan  Sepsis ([facility])  E/b tachycardia, tachypnea and hypotension with WBC 12 K.   -antibiotics as below  -follow-up cultures and adjust antibiotics as needed  -monitor temperature and hemodynamics  -serial exam  -additional inventions pending clinical course  -monitoring serial CBC and BMP for treatment response and any developing toxicities     Gram-positive cocci bacteremia  Admission Blood cultures labeled same arm/same time are + for MSSA on prelim BCID panel. 2/12/25 TTE negative for vegetation. No PPM, intravascular devices. Patient reports chronic low back pain is at baseline today  -continues Cefazolin 2 g IV q 8 hours at present  -follow-up final blood cultures and adjust antibiotics as needed  -check repeat blood cultures Friday  -additional inventions pending clinical course     Back pain  Patient reports chronic back pain has been worse due to \"inactivity\" and thinks worsened around the time of the catheterization. 2/13/25 MRI L spine: mild inferior plate edema at L1 with adjacent small Schmorl's node and minimal adjacent postcontrast enhancement. More c/w DDD. No paraspinal edema.   -continues antibiotic as above  -serial exam  -pain management per primary   Acute urinary retention  Renal US: moderate bladder distention, no hydronephrosis, perinephric edema left. 2/11/25 s/p straight cath for 700 mL. U/A benign. No urinary symptoms other than hesitancy   -monitor urinary output/symptoms  -additional inventions pending retention course     COPD (chronic obstructive pulmonary disease) ([facility])  2/11/25 CXR no infiltrates, CTA chest: no PE, mild secretions in lower trachea. Back to baseline 2L NCO2  Urinary antigens, Flu/COVID screen negative  -monitor respiratory status  -respiratory support prn  -monitor " O2 requirements  -serial exam  -aspiration precautions  -additional inventions pending clinical course     Atrial fibrillation (HCC)  On Eliquis        I have discussed with patient, RN, and Dr. Chavez's primary care team regarding the above plan to continue IV Cefazolin and monitor closely. They agree with the plan.     Antibiotics:  Cefazolin     Subjective   Patient has no fever, chills, sweats overnight; no nausea, vomiting, diarrhea; no increased cough, shortness of breath; chronic back pain back to baseline today. Main discomfort is chest pressure. 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     Objective :  Temp:  [97.7 °F (36.5 °C)-97.9 °F (36.6 °C)] 97.9 °F (36.6 °C)  HR:  [] 104  BP: ()/(56-58) 120/58  Resp:  [19-20] 20  SpO2:  [94 %-100 %] 94 %  O2 Device: Nasal cannula  Nasal Cannula O2 Flow Rate (L/min):  [3 L/min-6 L/min] 6 L/min    General Appearance:  79 year old male, chronically debilitated, nontoxic, no acute distress, propped fairly comfortably in bed.   HEENT: Atraumatic normocephalic   Throat: Oropharynx moist.    Pulmonary:   Scattered coarse breath sounds, respirations fairly comfortable with conversation about cows, statting 97% on 2L NCO2   Cardiac:  RRR   Abdomen:   Soft, non-tender, non-distended   Extremities: + edema LLE > RLE   : No glass, no SPT   Psychiatric: Awake, cooperative   Skin: No new rashes. IV site nontender.          Lab Results: I have reviewed the following results:  Results from last 7 days   Lab Units 02/13/25  0906 02/13/25  0537 02/12/25  0442 02/11/25  0921   WBC Thousand/uL  --  8.27 10.46* 12.32*   HEMOGLOBIN g/dL 7.1* 6.8* 8.1* 8.2*   PLATELETS Thousands/uL  --  183 173 224     Results from last 7 days   Lab Units 02/13/25  0537 02/12/25  0442 02/11/25  0921   SODIUM mmol/L 138 137 132*   POTASSIUM mmol/L 4.1 4.5 4.2   CHLORIDE mmol/L 105 106 98   CO2 mmol/L 28 26 28   BUN mg/dL 37* 40* 45*   CREATININE mg/dL 0.94 0.97 1.25   EGFR ml/min/1.73sq m 77 74 55    CALCIUM mg/dL 8.2* 8.2* 8.6   AST U/L 26 40* 62*   ALT U/L 42 65* 89*   ALK PHOS U/L 57 63 66   ALBUMIN g/dL 2.5* 2.3* 2.6*     Results from last 7 days   Lab Units 02/11/25  1810 02/11/25  1635 02/11/25  1113   BLOOD CULTURE   --   --  Staphylococcus aureus*  Staphylococcus aureus*   GRAM STAIN RESULT   --   --  Gram positive cocci in clusters*  Gram positive cocci in clusters*   MRSA CULTURE ONLY  No Methicillin Resistant Staphlyococcus aureus (MRSA) isolated  --   --    LEGIONELLA URINARY ANTIGEN   --  Negative  --      Results from last 7 days   Lab Units 02/12/25  0442 02/11/25  0921   PROCALCITONIN ng/ml 0.85* 0.90*             Results from last 7 days   Lab Units 02/11/25  1030   D-DIMER QUANTITATIVE ug/ml FEU 3.89*     Imaging Results Review: I personally reviewed the following image studies in PACS and associated radiology reports: MRI spine. My interpretation of the radiology images/reports is: 2/13/25 MRI L spine: mild inferior plate edema at L1 with adjacent small Schmorl's node and minimal adjacent postcontrast enhancement. More c/w DDD. No paraspinal edema. 2/13/25 CT head: no acute hemorrhage, edema or mass effect.  Other Study Results Review: No additional pertinent studies reviewed.

## 2025-02-13 NOTE — ASSESSMENT & PLAN NOTE
E/b tachycardia, tachypnea and hypotension with WBC 12 K.   -antibiotics as below  -follow-up cultures and adjust antibiotics as needed  -monitor temperature and hemodynamics  -serial exam  -additional inventions pending clinical course  -monitoring serial CBC and BMP for treatment response and any developing toxicities

## 2025-02-13 NOTE — ASSESSMENT & PLAN NOTE
Trop 362-->476-->607  No CP or EKG changes to suggest MI  Suspect non-MI troponin elevation in the setting of hypotension    Plan:  Continue to monitor hemodynamics  Consider cardiology consult if persistent chest pain and elevated troponins

## 2025-02-13 NOTE — ASSESSMENT & PLAN NOTE
2/11/25 CXR no infiltrates, CTA chest: no PE, mild secretions in lower trachea. Back to baseline 2L NCO2  Urinary antigens, Flu/COVID screen negative  -monitor respiratory status  -respiratory support prn  -monitor O2 requirements  -serial exam  -aspiration precautions  -additional inventions pending clinical course

## 2025-02-13 NOTE — ASSESSMENT & PLAN NOTE
Pt presents from [facility], to which he had been discharged following recent hospitalization.   Reports history of L ankle fracture--reportedly has been advised against surgery/anesthesia due to COPD    Plan:  PT/OT Evaluation  Fall precautions

## 2025-02-14 ENCOUNTER — APPOINTMENT (INPATIENT)
Dept: MRI IMAGING | Facility: HOSPITAL | Age: 80
DRG: 871 | End: 2025-02-14
Payer: COMMERCIAL

## 2025-02-14 PROBLEM — B95.61 MSSA BACTEREMIA: Status: ACTIVE | Noted: 2025-02-12

## 2025-02-14 PROBLEM — M54.2 NECK PAIN: Status: ACTIVE | Noted: 2025-02-14

## 2025-02-14 LAB
ABO GROUP BLD BPU: NORMAL
ABO GROUP BLD BPU: NORMAL
ALBUMIN SERPL BCG-MCNC: 2.6 G/DL (ref 3.5–5)
ALP SERPL-CCNC: 49 U/L (ref 34–104)
ALT SERPL W P-5'-P-CCNC: 17 U/L (ref 7–52)
ANION GAP SERPL CALCULATED.3IONS-SCNC: 5 MMOL/L (ref 4–13)
ANISOCYTOSIS BLD QL SMEAR: PRESENT
AST SERPL W P-5'-P-CCNC: 23 U/L (ref 13–39)
ATRIAL RATE: 74 BPM
BACTERIA BLD CULT: ABNORMAL
BACTERIA BLD CULT: ABNORMAL
BASOPHILS # BLD AUTO: 0.01 THOUSANDS/ΜL (ref 0–0.1)
BASOPHILS NFR BLD AUTO: 0 % (ref 0–1)
BILIRUB SERPL-MCNC: 0.41 MG/DL (ref 0.2–1)
BPU ID: NORMAL
BPU ID: NORMAL
BUN SERPL-MCNC: 38 MG/DL (ref 5–25)
CA-I BLD-SCNC: 1.13 MMOL/L (ref 1.12–1.32)
CALCIUM ALBUM COR SERPL-MCNC: 9.3 MG/DL (ref 8.3–10.1)
CALCIUM SERPL-MCNC: 8.2 MG/DL (ref 8.4–10.2)
CHLORIDE SERPL-SCNC: 106 MMOL/L (ref 96–108)
CO2 SERPL-SCNC: 27 MMOL/L (ref 21–32)
CREAT SERPL-MCNC: 1.08 MG/DL (ref 0.6–1.3)
CROSSMATCH: NORMAL
CROSSMATCH: NORMAL
EOSINOPHIL # BLD AUTO: 0 THOUSAND/ΜL (ref 0–0.61)
EOSINOPHIL NFR BLD AUTO: 0 % (ref 0–6)
ERYTHROCYTE [DISTWIDTH] IN BLOOD BY AUTOMATED COUNT: 18.3 % (ref 11.6–15.1)
ERYTHROCYTE [SEDIMENTATION RATE] IN BLOOD: 26 MM/HOUR (ref 0–19)
FERRITIN SERPL-MCNC: 147 NG/ML (ref 24–336)
FOLATE SERPL-MCNC: 14 NG/ML
GFR SERPL CREATININE-BSD FRML MDRD: 65 ML/MIN/1.73SQ M
GLUCOSE SERPL-MCNC: 100 MG/DL (ref 65–140)
GRAM STN SPEC: ABNORMAL
GRAM STN SPEC: ABNORMAL
HCT VFR BLD AUTO: 21.3 % (ref 36.5–49.3)
HCT VFR BLD AUTO: 26.1 % (ref 36.5–49.3)
HCT VFR BLD AUTO: 26.2 % (ref 36.5–49.3)
HGB BLD-MCNC: 6.8 G/DL (ref 12–17)
HGB BLD-MCNC: 8.4 G/DL (ref 12–17)
HGB BLD-MCNC: 8.4 G/DL (ref 12–17)
IMM GRANULOCYTES # BLD AUTO: 0.09 THOUSAND/UL (ref 0–0.2)
IMM GRANULOCYTES NFR BLD AUTO: 1 % (ref 0–2)
IRON SATN MFR SERPL: 22 % (ref 15–50)
IRON SERPL-MCNC: 37 UG/DL (ref 50–212)
LDH SERPL-CCNC: 229 U/L (ref 140–271)
LYMPHOCYTES # BLD AUTO: 0.55 THOUSANDS/ΜL (ref 0.6–4.47)
LYMPHOCYTES # BLD AUTO: 0.67 THOUSAND/UL (ref 0.6–4.47)
LYMPHOCYTES # BLD AUTO: 10 %
LYMPHOCYTES NFR BLD AUTO: 8 % (ref 14–44)
MAGNESIUM SERPL-MCNC: 2.2 MG/DL (ref 1.9–2.7)
MCH RBC QN AUTO: 29.2 PG (ref 26.8–34.3)
MCHC RBC AUTO-ENTMCNC: 31.9 G/DL (ref 31.4–37.4)
MCV RBC AUTO: 91 FL (ref 82–98)
MONOCYTES # BLD AUTO: 0.27 THOUSAND/UL (ref 0–1.22)
MONOCYTES # BLD AUTO: 0.42 THOUSAND/ΜL (ref 0.17–1.22)
MONOCYTES NFR BLD AUTO: 4 % (ref 4–12)
MONOCYTES NFR BLD AUTO: 6 % (ref 4–12)
NEUTROPHILS # BLD AUTO: 5.66 THOUSANDS/ΜL (ref 1.85–7.62)
NEUTS BAND NFR BLD MANUAL: 3 % (ref 0–8)
NEUTS SEG # BLD: 5.79 THOUSAND/UL (ref 1.81–6.82)
NEUTS SEG NFR BLD AUTO: 83 %
NEUTS SEG NFR BLD AUTO: 85 % (ref 43–75)
NRBC BLD AUTO-RTO: 0 /100 WBCS
P AXIS: 93 DEGREES
PLATELET # BLD AUTO: 168 THOUSANDS/UL (ref 149–390)
PLATELET BLD QL SMEAR: ADEQUATE
PMV BLD AUTO: 8.8 FL (ref 8.9–12.7)
POLYCHROMASIA BLD QL SMEAR: PRESENT
POTASSIUM SERPL-SCNC: 4.2 MMOL/L (ref 3.5–5.3)
PR INTERVAL: 172 MS
PROT SERPL-MCNC: 5.2 G/DL (ref 6.4–8.4)
QRS AXIS: 9 DEGREES
QRSD INTERVAL: 94 MS
QT INTERVAL: 416 MS
QTC INTERVAL: 462 MS
RBC # BLD AUTO: 2.33 MILLION/UL (ref 3.88–5.62)
RETICS # AUTO: ABNORMAL 10*3/UL (ref 14356–105094)
RETICS # CALC: 3.21 % (ref 0.37–1.87)
S AUREUS DNA BLD POS QL NAA+NON-PROBE: DETECTED
SODIUM SERPL-SCNC: 138 MMOL/L (ref 135–147)
T WAVE AXIS: 31 DEGREES
TIBC SERPL-MCNC: 166.6 UG/DL (ref 250–450)
TOTAL CELLS COUNTED SPEC: 100
TOXIC GRANULES BLD QL SMEAR: PRESENT
TRANSFERRIN SERPL-MCNC: 119 MG/DL (ref 203–362)
UIBC SERPL-MCNC: 130 UG/DL (ref 155–355)
UNIT DISPENSE STATUS: NORMAL
UNIT DISPENSE STATUS: NORMAL
UNIT PRODUCT CODE: NORMAL
UNIT PRODUCT CODE: NORMAL
UNIT PRODUCT VOLUME: 300 ML
UNIT PRODUCT VOLUME: 350 ML
UNIT RH: NORMAL
UNIT RH: NORMAL
VENTRICULAR RATE: 74 BPM
VIT B12 SERPL-MCNC: 972 PG/ML (ref 180–914)
WBC # BLD AUTO: 6.73 THOUSAND/UL (ref 4.31–10.16)
WBC TOXIC VACUOLES BLD QL SMEAR: PRESENT

## 2025-02-14 PROCEDURE — 80053 COMPREHEN METABOLIC PANEL: CPT | Performed by: INTERNAL MEDICINE

## 2025-02-14 PROCEDURE — 87040 BLOOD CULTURE FOR BACTERIA: CPT | Performed by: INTERNAL MEDICINE

## 2025-02-14 PROCEDURE — 85652 RBC SED RATE AUTOMATED: CPT | Performed by: PHYSICIAN ASSISTANT

## 2025-02-14 PROCEDURE — 83540 ASSAY OF IRON: CPT

## 2025-02-14 PROCEDURE — 82728 ASSAY OF FERRITIN: CPT

## 2025-02-14 PROCEDURE — 82746 ASSAY OF FOLIC ACID SERUM: CPT

## 2025-02-14 PROCEDURE — 82330 ASSAY OF CALCIUM: CPT | Performed by: INTERNAL MEDICINE

## 2025-02-14 PROCEDURE — 82607 VITAMIN B-12: CPT

## 2025-02-14 PROCEDURE — 70553 MRI BRAIN STEM W/O & W/DYE: CPT

## 2025-02-14 PROCEDURE — 85014 HEMATOCRIT: CPT

## 2025-02-14 PROCEDURE — 85018 HEMOGLOBIN: CPT | Performed by: INTERNAL MEDICINE

## 2025-02-14 PROCEDURE — 99232 SBSQ HOSP IP/OBS MODERATE 35: CPT | Performed by: INTERNAL MEDICINE

## 2025-02-14 PROCEDURE — 97167 OT EVAL HIGH COMPLEX 60 MIN: CPT

## 2025-02-14 PROCEDURE — 85014 HEMATOCRIT: CPT | Performed by: INTERNAL MEDICINE

## 2025-02-14 PROCEDURE — P9016 RBC LEUKOCYTES REDUCED: HCPCS

## 2025-02-14 PROCEDURE — 30233N1 TRANSFUSION OF NONAUTOLOGOUS RED BLOOD CELLS INTO PERIPHERAL VEIN, PERCUTANEOUS APPROACH: ICD-10-PCS | Performed by: INTERNAL MEDICINE

## 2025-02-14 PROCEDURE — G0545 PR INHERENT VISIT TO INPT: HCPCS | Performed by: INTERNAL MEDICINE

## 2025-02-14 PROCEDURE — 85025 COMPLETE CBC W/AUTO DIFF WBC: CPT | Performed by: INTERNAL MEDICINE

## 2025-02-14 PROCEDURE — 85045 AUTOMATED RETICULOCYTE COUNT: CPT

## 2025-02-14 PROCEDURE — 83735 ASSAY OF MAGNESIUM: CPT | Performed by: INTERNAL MEDICINE

## 2025-02-14 PROCEDURE — 99222 1ST HOSP IP/OBS MODERATE 55: CPT | Performed by: STUDENT IN AN ORGANIZED HEALTH CARE EDUCATION/TRAINING PROGRAM

## 2025-02-14 PROCEDURE — 94640 AIRWAY INHALATION TREATMENT: CPT

## 2025-02-14 PROCEDURE — 93010 ELECTROCARDIOGRAM REPORT: CPT | Performed by: INTERNAL MEDICINE

## 2025-02-14 PROCEDURE — 94760 N-INVAS EAR/PLS OXIMETRY 1: CPT

## 2025-02-14 PROCEDURE — 83550 IRON BINDING TEST: CPT

## 2025-02-14 PROCEDURE — 97530 THERAPEUTIC ACTIVITIES: CPT

## 2025-02-14 PROCEDURE — 85007 BL SMEAR W/DIFF WBC COUNT: CPT

## 2025-02-14 PROCEDURE — A9585 GADOBUTROL INJECTION: HCPCS | Performed by: INTERNAL MEDICINE

## 2025-02-14 PROCEDURE — 97163 PT EVAL HIGH COMPLEX 45 MIN: CPT

## 2025-02-14 PROCEDURE — 83615 LACTATE (LD) (LDH) ENZYME: CPT

## 2025-02-14 PROCEDURE — 85018 HEMOGLOBIN: CPT

## 2025-02-14 RX ORDER — ALBUTEROL SULFATE 90 UG/1
2 INHALANT RESPIRATORY (INHALATION) EVERY 4 HOURS PRN
Status: DISCONTINUED | OUTPATIENT
Start: 2025-02-14 | End: 2025-02-22 | Stop reason: HOSPADM

## 2025-02-14 RX ORDER — HEPARIN SODIUM 1000 [USP'U]/ML
4000 INJECTION, SOLUTION INTRAVENOUS; SUBCUTANEOUS EVERY 6 HOURS PRN
Status: DISCONTINUED | OUTPATIENT
Start: 2025-02-14 | End: 2025-02-14

## 2025-02-14 RX ORDER — HEPARIN SODIUM 1000 [USP'U]/ML
4000 INJECTION, SOLUTION INTRAVENOUS; SUBCUTANEOUS ONCE
Status: DISCONTINUED | OUTPATIENT
Start: 2025-02-14 | End: 2025-02-14

## 2025-02-14 RX ORDER — HYDROMORPHONE HCL IN WATER/PF 6 MG/30 ML
0.2 PATIENT CONTROLLED ANALGESIA SYRINGE INTRAVENOUS ONCE
Status: COMPLETED | OUTPATIENT
Start: 2025-02-14 | End: 2025-02-14

## 2025-02-14 RX ORDER — ALBUMIN (HUMAN) 12.5 G/50ML
25 SOLUTION INTRAVENOUS ONCE
Status: COMPLETED | OUTPATIENT
Start: 2025-02-14 | End: 2025-02-14

## 2025-02-14 RX ORDER — HEPARIN SODIUM 1000 [USP'U]/ML
2000 INJECTION, SOLUTION INTRAVENOUS; SUBCUTANEOUS EVERY 6 HOURS PRN
Status: DISCONTINUED | OUTPATIENT
Start: 2025-02-14 | End: 2025-02-14

## 2025-02-14 RX ORDER — GADOBUTROL 604.72 MG/ML
8 INJECTION INTRAVENOUS
Status: COMPLETED | OUTPATIENT
Start: 2025-02-14 | End: 2025-02-14

## 2025-02-14 RX ORDER — HEPARIN SODIUM 10000 [USP'U]/100ML
3-20 INJECTION, SOLUTION INTRAVENOUS
Status: DISCONTINUED | OUTPATIENT
Start: 2025-02-14 | End: 2025-02-14

## 2025-02-14 RX ADMIN — LIDOCAINE HYDROCHLORIDE 15 ML: 20 SOLUTION ORAL at 09:52

## 2025-02-14 RX ADMIN — ALBUTEROL SULFATE 2 PUFF: 90 AEROSOL, METERED RESPIRATORY (INHALATION) at 06:04

## 2025-02-14 RX ADMIN — GUAIFENESIN 1200 MG: 600 TABLET, EXTENDED RELEASE ORAL at 09:51

## 2025-02-14 RX ADMIN — ALBUMIN (HUMAN) 25 G: 0.25 INJECTION, SOLUTION INTRAVENOUS at 12:20

## 2025-02-14 RX ADMIN — PANTOPRAZOLE SODIUM 40 MG: 40 TABLET, DELAYED RELEASE ORAL at 06:04

## 2025-02-14 RX ADMIN — FORMOTEROL FUMARATE DIHYDRATE 20 MCG: 20 SOLUTION RESPIRATORY (INHALATION) at 09:10

## 2025-02-14 RX ADMIN — CEFAZOLIN SODIUM 2000 MG: 2 SOLUTION INTRAVENOUS at 10:18

## 2025-02-14 RX ADMIN — HYDROMORPHONE HYDROCHLORIDE 0.2 MG: 0.2 INJECTION, SOLUTION INTRAMUSCULAR; INTRAVENOUS; SUBCUTANEOUS at 16:02

## 2025-02-14 RX ADMIN — APIXABAN 5 MG: 5 TABLET, FILM COATED ORAL at 09:51

## 2025-02-14 RX ADMIN — LIDOCAINE 1 PATCH: 50 PATCH CUTANEOUS at 09:52

## 2025-02-14 RX ADMIN — SERTRALINE HYDROCHLORIDE 12.5 MG: 25 TABLET ORAL at 09:51

## 2025-02-14 RX ADMIN — METHOCARBAMOL TABLETS 500 MG: 500 TABLET, COATED ORAL at 05:09

## 2025-02-14 RX ADMIN — OXYCODONE HYDROCHLORIDE 5 MG: 5 TABLET ORAL at 21:04

## 2025-02-14 RX ADMIN — LEVALBUTEROL HYDROCHLORIDE 1.25 MG: 1.25 SOLUTION RESPIRATORY (INHALATION) at 09:10

## 2025-02-14 RX ADMIN — OXYCODONE HYDROCHLORIDE 5 MG: 5 TABLET ORAL at 09:51

## 2025-02-14 RX ADMIN — SUCRALFATE 1 G: 1 TABLET ORAL at 12:20

## 2025-02-14 RX ADMIN — SUCRALFATE 1 G: 1 TABLET ORAL at 18:14

## 2025-02-14 RX ADMIN — APIXABAN 5 MG: 5 TABLET, FILM COATED ORAL at 18:14

## 2025-02-14 RX ADMIN — METOPROLOL TARTRATE 25 MG: 25 TABLET, FILM COATED ORAL at 21:04

## 2025-02-14 RX ADMIN — SUCRALFATE 1 G: 1 TABLET ORAL at 06:04

## 2025-02-14 RX ADMIN — CEFAZOLIN SODIUM 2000 MG: 2 SOLUTION INTRAVENOUS at 18:15

## 2025-02-14 RX ADMIN — HYDROXYCHLOROQUINE SULFATE 400 MG: 200 TABLET ORAL at 09:51

## 2025-02-14 RX ADMIN — SUCRALFATE 1 G: 1 TABLET ORAL at 21:04

## 2025-02-14 RX ADMIN — METHOCARBAMOL TABLETS 500 MG: 500 TABLET, COATED ORAL at 21:04

## 2025-02-14 RX ADMIN — GADOBUTROL 8 ML: 604.72 INJECTION INTRAVENOUS at 17:05

## 2025-02-14 RX ADMIN — OXYCODONE HYDROCHLORIDE 5 MG: 5 TABLET ORAL at 01:00

## 2025-02-14 RX ADMIN — FLUTICASONE FUROATE 1 PUFF: 100 POWDER RESPIRATORY (INHALATION) at 12:21

## 2025-02-14 RX ADMIN — METOPROLOL TARTRATE 25 MG: 25 TABLET, FILM COATED ORAL at 09:51

## 2025-02-14 RX ADMIN — GUAIFENESIN 1200 MG: 600 TABLET, EXTENDED RELEASE ORAL at 18:14

## 2025-02-14 RX ADMIN — METHOCARBAMOL TABLETS 500 MG: 500 TABLET, COATED ORAL at 14:22

## 2025-02-14 RX ADMIN — DOCUSATE SODIUM 100 MG: 100 CAPSULE, LIQUID FILLED ORAL at 09:51

## 2025-02-14 RX ADMIN — ATORVASTATIN CALCIUM 80 MG: 40 TABLET, FILM COATED ORAL at 18:14

## 2025-02-14 RX ADMIN — CEFAZOLIN SODIUM 2000 MG: 2 SOLUTION INTRAVENOUS at 01:35

## 2025-02-14 RX ADMIN — LIDOCAINE HYDROCHLORIDE 15 ML: 20 SOLUTION ORAL at 21:07

## 2025-02-14 RX ADMIN — PANTOPRAZOLE SODIUM 40 MG: 40 TABLET, DELAYED RELEASE ORAL at 18:14

## 2025-02-14 RX ADMIN — TAMSULOSIN HYDROCHLORIDE 0.4 MG: 0.4 CAPSULE ORAL at 18:14

## 2025-02-14 RX ADMIN — IPRATROPIUM BROMIDE 0.5 MG: 0.5 SOLUTION RESPIRATORY (INHALATION) at 09:10

## 2025-02-14 RX ADMIN — ASPIRIN 81 MG: 81 TABLET, COATED ORAL at 09:51

## 2025-02-14 NOTE — PROGRESS NOTES
"Progress Note - Infectious Disease   Name: Devin Chaves 79 y.o. male I MRN: [id number]  Unit/Bed#: S -01 I Date of Admission: 2/11/2025   Date of Service: 2/14/2025 I Hospital Day: 3    Assessment & Plan  Sepsis (HCC)  E/b tachycardia, tachypnea and hypotension with WBC 12 K.   -antibiotics as below  -follow-up final cultures  -monitor temperature and hemodynamics  -serial exam  -additional inventions pending clinical course  -monitoring serial CBC and BMP for treatment response and any developing toxicities     MSSA bacteremia  Admission Blood cultures labeled same arm/same time are + for MSSA. 2/12/25 TTE negative for vegetation. No PPM, intravascular devices. Acute posterior neck pain predominant starting 2/14/25 and possible deep cervical infection evident on MRI C spine  -continues Cefazolin 2 g IV q 8 hours at present  -follow-up repeat blood cultures Friday  -serial exam  -additional inventions pending clinical course  -anticipate protracted IV antibiotic course     Neck pain  Acute posterior neck pain predominant starting 2/14/25 2/14/25 MRI C spine: cervical degenerative change. Mild nonspecific edema within the right posterior paraspinal musculature of the upper cervical spine with mild enhancement. 2 separate foci of linear nonenhancement may represent peripherally enhancing fluid collections. No discitis or osteomyelitis.  -continues antibiotic as above  -serial exam  -check ESR/CRP  -pain management per primary   Back pain  Patient reports chronic back pain has been worse due to \"inactivity\" and thinks worsened around the time of the catheterization. 2/13/25 MRI L spine: mild inferior plate edema at L1 with adjacent small Schmorl's node and minimal adjacent postcontrast enhancement. More c/w DDD. No paraspinal edema.   -continues antibiotic as above  -serial exam  -pain management per primary   Acute urinary retention  Renal US: moderate bladder distention, no hydronephrosis, perinephric " edema left. 2/11/25 s/p straight cath for 700 mL. U/A benign. No urinary symptoms other than hesitancy   -monitor urinary output/symptoms  -additional inventions pending retention course     COPD (chronic obstructive pulmonary disease) ([facility])  2/11/25 CXR no infiltrates, CTA chest: no PE, mild secretions in lower trachea. Back to baseline 2L NCO2  Urinary antigens, Flu/COVID screen negative  -monitor respiratory status  -respiratory support prn  -monitor O2 requirements  -serial exam  -aspiration precautions  -additional inventions pending clinical course     Atrial fibrillation ([facility])  On Eliquis      I have discussed with patient, RN, and Dr. Chavez's primary care team regarding the above plan to continue IV Cefazolin and monitor closely. They agree with the plan.     Antibiotics:  Cefazolin     Subjective   Patient has no fever, chills, sweats overnight; no nausea, vomiting, diarrhea; no increased cough, shortness of breath; chronic low back pain back to baseline today. However, main discomfort today is in posterior neck.     Objective :  Temp:  [97.5 °F (36.4 °C)-98.1 °F (36.7 °C)] 97.5 °F (36.4 °C)  HR:  [75-94] 85  BP: ()/(46-72) 115/72  Resp:  [16-20] 18  SpO2:  [93 %-99 %] 96 %  O2 Device: Nasal cannula  Nasal Cannula O2 Flow Rate (L/min):  [2 L/min] 2 L/min    General Appearance:  79 year old male, chronically debilitated, nontoxic, no acute distress, propped fairly comfortably in bed.   HEENT: Atraumatic normocephalic, keeps neck partially flexed and decreased LROM due to pain.    Throat: Oropharynx moist.    Pulmonary:   Scattered coarse breath sounds, respirations fairly comfortable with conversation about cows, statting 96% on 2L NCO2   Cardiac:  RRR   Abdomen:   Soft, non-tender, non-distended   Extremities: + edema LLE > RLE   : No glass, no SPT   Psychiatric: Awake, cooperative   Skin: No new rashes. IV site nontender.          Lab Results: I have reviewed the following results:  Results from  last 7 days   Lab Units 02/14/25  0337 02/13/25  1829 02/13/25  0906 02/13/25  0537 02/12/25  0442   WBC Thousand/uL 6.73  --   --  8.27 10.46*   HEMOGLOBIN g/dL 6.8* 6.7* 7.1* 6.8* 8.1*   PLATELETS Thousands/uL 168  --   --  183 173     Results from last 7 days   Lab Units 02/14/25  0337 02/13/25  0537 02/12/25  0442   SODIUM mmol/L 138 138 137   POTASSIUM mmol/L 4.2 4.1 4.5   CHLORIDE mmol/L 106 105 106   CO2 mmol/L 27 28 26   BUN mg/dL 38* 37* 40*   CREATININE mg/dL 1.08 0.94 0.97   EGFR ml/min/1.73sq m 65 77 74   CALCIUM mg/dL 8.2* 8.2* 8.2*   AST U/L 23 26 40*   ALT U/L 17 42 65*   ALK PHOS U/L 49 57 63   ALBUMIN g/dL 2.6* 2.5* 2.3*     Results from last 7 days   Lab Units 02/11/25  1810 02/11/25  1635 02/11/25  1113   BLOOD CULTURE   --   --  Staphylococcus aureus*  Staphylococcus aureus*   GRAM STAIN RESULT   --   --  Gram positive cocci in clusters*  Gram positive cocci in clusters*   MRSA CULTURE ONLY  No Methicillin Resistant Staphlyococcus aureus (MRSA) isolated  --   --    LEGIONELLA URINARY ANTIGEN   --  Negative  --      Results from last 7 days   Lab Units 02/12/25  0442 02/11/25  0921   PROCALCITONIN ng/ml 0.85* 0.90*             Results from last 7 days   Lab Units 02/11/25  1030   D-DIMER QUANTITATIVE ug/ml FEU 3.89*     Imaging Results Review: I personally reviewed the following image studies in PACS and associated radiology reports: MRI brain and MRI spine. My interpretation of the radiology images/reports is: 2/14/25 MRI C spine: cervical degenerative change. Mild nonspecific edema within the right posterior paraspinal musculature of the upper cervical spine with mild enhancement. 2 separate foci of linear nonenhancement may represent peripherally enhancing fluid collections. No discitis or osteomyelitis.  Other Study Results Review: No additional pertinent studies reviewed.

## 2025-02-14 NOTE — ASSESSMENT & PLAN NOTE
Blood cultures with Gram + cocci, likely Staph Aureus  Unknown source at this time, patient with multiple small abrasions on hands and arms. UA negative, LLE does not appear to have cellulitis.  TTE negative for vegetations  MRI L-spine: No evidence of abscess or abnormal enhancement in the paraspinal soft tissues.  MRI C-spine: Mild nonspecific edema within the right posterior paraspinal musculature of the upper cervical spine seen on sagittal STIR imaging with mild enhancement. Minimal peripheral enhancement is noted and differential considerations would include infectious/inflammatory myositis with soft tissue abscess. No osteomyelitis or discitis. No cord compression or abnormal cord signal.  NCCTH: No acute hemorrhage, edema, or mass effect. Chronic microangiopathic changes and chronic appearing infarctions    Plan:  Start Cefazolin 2 g IV q8 hrs  Given myositis and possible abscess appreicated on MRI C-spine will require prolonged IV antibiotic course  Monitor fever curve and WBC  ID consulted, appreciate recs  MRI Brain pending  If  findings concerning for septic emboli will require a CATHI

## 2025-02-14 NOTE — CONSULTS
Consultation - Gastroenterology   Name: Devin Chaves 79 y.o. male I MRN: [id number]  Unit/Bed#: S -01 I Date of Admission: 2/11/2025   Date of Service: 2/14/2025 I Hospital Day: 3   Inpatient consult to gastroenterology  Consult performed by: Edith Nunez PA-C  Consult ordered by: Mal Neely DO        Physician Requesting Evaluation: Viviane Chavez, *   Reason for Evaluation / Principal Problem: pt with persistent anemia s/p transfusion, on eliquis. Denies hematemesis/melena    Assessment & Plan  Anemia  - Hgb of 6.7 improved to 8.4 following 2 units PRBC. 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 Also gastric nodule noted requiring EUS, unclear if this has been done  - Patient on Eliquis    - Agree with pantroprazole 40 mg PO BID and sucralfate   - Monitor for overt signs of bleeding and transfuse for hgb goal >7  - Will plan for inpatient colonoscopy +/- EUS. Timing of procedure will depend on resolution of bacteremia    - Eliquis must be held 2 days prior to colonoscopy         History of Present Illness   HPI:  Devin Chaves is a 79 y.o. male with COPD on 2L NC at baseline, CAD, Afib on Eliquis, and rheumatoid arthritis who presented with shortness of breath on 2/11 found to have gram positive bacteremia now with hemoglobin of 6.7 requiring 2 units PRBC overnight and improving to 8.4.     Patient endorses chronic GERD, takes famotidine. Has been constipated this admission but had a small BM last night and denies melena or blood in stool. Denies nausea, vomiting, odynophagia, dysphagia, abdominal pain, rectal bleeding.    [facility] His most recent colonoscopy which was completed 12/2020. Per report pt had +FOBT and father with colon cancer diagnosed at age 60. 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   - non-bleeding ulcer at the hepatic flexure, path showed focal colitis w/ ulceration and atrophic changes but negative for malignancy  - 1 sessile polyp at transverse colon and 1 at rectum, both negative for malignancy  - multiple diverticula  Additionally, colonoscopy report recommended follow up for a gastric nodule with EUS. Patient does not recall if this was done. No record of prior EGD in chart.     Patient stopped drinking alcohol in 1988.     Review of Systems   Constitutional:  Positive for appetite change and fatigue.   Gastrointestinal:  Positive for constipation. Negative for abdominal distention, abdominal pain, anal bleeding, blood in stool, diarrhea, nausea, rectal pain and vomiting.     Medical History Review: I have reviewed the patient's PMH, PSH, Social History, Family History, Meds, and Allergies     Objective :  Temp:  [97.5 °F (36.4 °C)-98.1 °F (36.7 °C)] 97.5 °F (36.4 °C)  HR:  [75-94] 85  BP: ()/(46-72) 115/72  Resp:  [16-20] 18  SpO2:  [93 %-99 %] 96 %  O2 Device: Nasal cannula  Nasal Cannula O2 Flow Rate (L/min):  [2 L/min] 2 L/min    Physical Exam  Vitals and nursing note reviewed.   Constitutional:       General: He is not in acute distress.     Appearance: He is well-developed.   HENT:      Head: Normocephalic and atraumatic.   Eyes:      Conjunctiva/sclera: Conjunctivae normal.   Cardiovascular:      Rate and Rhythm: Normal rate and regular rhythm.      Heart sounds: No murmur heard.  Pulmonary:      Effort: Pulmonary effort is normal. No respiratory distress.      Comments: 2L NC, diminished breath sounds  Abdominal:      General: Bowel sounds are normal. There is no distension.      Palpations: Abdomen is soft.      Tenderness: There is no abdominal tenderness. There is no guarding or rebound.   Musculoskeletal:         General: No swelling.      Cervical back: Neck supple.   Skin:     General: Skin is  warm and dry.      Capillary Refill: Capillary refill takes less than 2 seconds.   Neurological:      General: No focal deficit present.      Mental Status: He is alert.   Psychiatric:         Mood and Affect: Mood normal.         Behavior: Behavior normal.         Lab Results: I have reviewed the following results:CBC/BMP:   .     02/14/25 0337 02/14/25  1122   WBC 6.73  --    HGB 6.8* 8.4*   HCT 21.3* 26.1*     --    SODIUM 138  --    K 4.2  --      --    CO2 27  --    BUN 38*  --    CREATININE 1.08  --    GLUC 100  --    CAIONIZED 1.13  --    MG 2.2  --     , LFTs:   .     02/14/25 0337   AST 23   ALT 17   ALB 2.6*   TBILI 0.41   ALKPHOS 49        Imaging Results Review: No pertinent imaging studies reviewed.  Other Study Results Review: No additional pertinent studies reviewed.

## 2025-02-14 NOTE — ASSESSMENT & PLAN NOTE
Recent Labs     02/13/25  0906 02/13/25  1829 02/14/25  0337   HGB 7.1* 6.7* 6.8*     Plan:  H/H q8 hrs  Transfuse if <7   Patient already typed and screened, not consented  Patient required 2 units packed red blood cells overnight on 2/13  Given persistent anemia despite PRBC transfusions will initiate anemia workup  Iron panel, B12, Folate, LDH  GI consult, appreciate recs

## 2025-02-14 NOTE — PLAN OF CARE
Problem: OCCUPATIONAL THERAPY ADULT  Goal: Performs self-care activities at highest level of function for planned discharge setting.  See evaluation for individualized goals.  Description: Treatment Interventions: ADL retraining, Functional transfer training, UE strengthening/ROM, Endurance training, Patient/family training, Equipment evaluation/education, Compensatory technique education, Continued evaluation, Energy conservation, Activityengagement          See flowsheet documentation for full assessment, interventions and recommendations.   Note: Limitation: Decreased ADL status, Decreased endurance, Decreased self-care trans, Decreased high-level ADLs  Prognosis: Fair  Assessment: Pt is a 78 yo male who presented to [facility] from rehab with SOB, difficulty urinating, a new episode of urinary incontinence, cough, wheezing, orthopnea, and PUTNAM. Pt dx w/ gram-positive cocci bacteremia. Pt  has a past medical history of Atrial fibrillation (HCC), COPD (chronic obstructive pulmonary disease) (HCC), Crushing injury of finger, left, and Infectious viral hepatitis. Pt with active OT orders in which OT consulted to assess pt's functional status and occupational performance to determine safe d/c needs. Pt presenting from rehab however prior to rehab, pt lives with his wife and daughter in a raised ranch home with 6 ZEINAB in front and 12 steps to access main floor through garage. PTA, pt was independent in ADLs/IADLs however at rehab, pt was receiving assistance with ADLs/IADLs and uses RW for functional mobility. (+) driving. Currently, pt performing bed mobility w/ Min A x1, UB ADLs w/ supervision, and LB ADLs w/ Mod A. Pt demonstrates the following limitations/impairments which impact the pt's ability to engage in valued occupations: balance, endurance/activity tolerance, standing tolerance, functional reach, postural/trunk control, strength, safety awareness, and pain. From an OT standpoint, recommend  discharge to post-acute rehab once medically stable. The patient's raw score on the AM-PAC Daily Activity Inpatient Short Form is 16. A raw score of less than 19 suggests the patient may benefit from discharge to post-acute rehabilitation services. Please refer to the recommendation of the Occupational Therapist for safe discharge planning.  Pt would benefit from skilled OT services 3-5x/wk to address acute care needs and underlying performance skills to promote safety, decrease fall risk, and enhance occupational performance to return to PLOF. Goals to be met within the next 10-14 days.     Rehab Resource Intensity Level, OT: II (Moderate Resource Intensity)

## 2025-02-14 NOTE — ASSESSMENT & PLAN NOTE
Acute posterior neck pain predominant starting 2/14/25 2/14/25 MRI C spine: cervical degenerative change. Mild nonspecific edema within the right posterior paraspinal musculature of the upper cervical spine with mild enhancement. 2 separate foci of linear nonenhancement may represent peripherally enhancing fluid collections. No discitis or osteomyelitis.  -continues antibiotic as above  -serial exam  -check ESR/CRP  -pain management per primary

## 2025-02-14 NOTE — PROGRESS NOTES
"Progress Note - Hospitalist   Name: Devin Chaves 79 y.o. male I MRN: [id number]  Unit/Bed#: S -01 I Date of Admission: 2/11/2025   Date of Service: 2/14/2025 I Hospital Day: 3    Assessment & Plan  Gram-positive cocci bacteremia  Blood cultures with Gram + cocci, likely Staph Aureus  Unknown source at this time, patient with multiple small abrasions on hands and arms. UA negative, LLE does not appear to have cellulitis.  TTE negative for vegetations  MRI L-spine: No evidence of abscess or abnormal enhancement in the paraspinal soft tissues.  MRI C-spine: Mild nonspecific edema within the right posterior paraspinal musculature of the upper cervical spine seen on sagittal STIR imaging with mild enhancement. Minimal peripheral enhancement is noted and differential considerations would include infectious/inflammatory myositis with soft tissue abscess. No osteomyelitis or discitis. No cord compression or abnormal cord signal.  NCCTH: No acute hemorrhage, edema, or mass effect. Chronic microangiopathic changes and chronic appearing infarctions    Plan:  Start Cefazolin 2 g IV q8 hrs  Given myositis and possible abscess appreicated on MRI C-spine will require prolonged IV antibiotic course  Monitor fever curve and WBC  ID consulted, appreciate recs  MRI Brain pending  If  findings concerning for septic emboli will require a CATHI  Sepsis (HCC)  Meeting SIRS criteria for tachycardia (HR ), tachypnea (RR 16-28), and leukocytosis (12.32)  Notably, patient was started on course of methylprednisolone on 2/09/25 for COPD exacerbation, current dose 12 mg PO BID prior to admission  EMS administered albuterol and ipratropium nebs prior to arrival  Received CTX and azithro in ED for suspected CAP  No clear source of infection. CTA PE study in ED did not demonstrate acute pathology.  Bladder scan showed only 30 cc, however straight cath returned 700 ml  Consider possible uro-sepsis    Plan:  See \"Gram positive " "bacteremia\"  Hypotension  MAP as low as 62 in the ED per automated cuff  Home Htn meds: Metoprolol tartrate 25 mg BID, Losartan 25 mg QD  Losartan recent decreased from 50 mg due to concerns for hypotension    Plan:  Monitor VS including BP.   If hypotension recurs, confirm via manual, assess symptoms; suggest fluid bolus and discussion w crit care.  Continue home metoprolol tartrate 25 mg BID  Hold losartan 25 mg QD  COPD (chronic obstructive pulmonary disease) ([facility])  Pt w COPD on 2L supplemental O2 at baseline  Was started on course of methylprednisolone 12 mg PO BID at STR on 2/9/25, continued up to presentation to hospital  Pt c/o increased SOB, increased PUTNAM, increased cough, increased wheezing, increased sputum production.  Currently requiring 4-6L, above baseline of 2L NC  TTE 2/12/25: EF 55%, Normal systolic dysfunction, G1DD, No vegetation, No mural thrombus, moderate aortic sclerosis    Plan:  Nebs TID  Titrate O2 to maintain SpO2 greater than 88%  Acute urinary retention  Pt reports new urinary incontinence day prior to presentation and inability to void despite urge to urinate on day of presentation  H/o enlarged prostate  Marked suprapubic tenderness on exam  Straight cath drained 700 cc urine  Suspect new urinary retention 2/2 BPH  US Kidney/Bladder 2/11/25: No hydronephrosis, moderate bladder distention, perinephric edema on L side.    Plan:  Tamsulosin 0.4 mg QD  Urinary retention protocol  Atrial fibrillation (HCC)  Patient rate controlled since admission    Plan:  Continue home Eliquis  Continue home metoprolol with hold parameters  Constipation  Patient with BM overnight    Plan:  Deescalate Colace to QD  Miralax prn  Back pain  MRI L-Spine: Negative for acute pathology    Plan:  Lidocaine patches  Robaxin 500 mg q8 hrs scheduled  Anemia  Recent Labs     02/13/25  0906 02/13/25  1829 02/14/25  0337   HGB 7.1* 6.7* 6.8*     Plan:  H/H q8 hrs  Transfuse if <7   Patient already typed and screened, " not consented  Patient required 2 units packed red blood cells overnight on 2/13  Given persistent anemia despite PRBC transfusions will initiate anemia workup  Iron panel, B12, Folate, LDH  GI consult, appreciate recs    VTE Pharmacologic Prophylaxis: VTE Score: 9 High Risk (Score >/= 5) - Pharmacological DVT Prophylaxis Ordered: apixaban (Eliquis). Sequential Compression Devices Ordered.    Mobility:   Basic Mobility Inpatient Raw Score: 14  JH-HL Goal: 4: Move to chair/commode  JH-HLM Achieved: 1: Laying in bed  JH-HLM Goal achieved. Continue to encourage appropriate mobility.    Patient Centered Rounds: I performed bedside rounds with nursing staff today.   Discussions with Specialists or Other Care Team Provider: ID    Education and Discussions with Family / Patient: Updated  (wife and daughter) via phone.    Current Length of Stay: 3 day(s)  Current Patient Status: Inpatient   Certification Statement: The patient will continue to require additional inpatient hospital stay due to Bacteremia, persistent anemia requiring blood transfusions  Discharge Plan: Anticipate discharge in >72 hrs to discharge location to be determined pending rehab evaluations.    Code Status: Level 3 - DNAR and DNI    Subjective   Patient seen and examined at bedside, denies any hematemesis, hematuria, melena or bloody stools.  Also denying any lightheadedness or dizziness overnight.  Denies history of anemia in the past however I will confirm this with his daughter and evaluate if any [facility].    Objective :  Temp:  [97.5 °F (36.4 °C)-98.1 °F (36.7 °C)] 97.6 °F (36.4 °C)  HR:  [] 85  BP: ()/(48-63) 111/62  Resp:  [16-20] 20  SpO2:  [93 %-99 %] 97 %  O2 Device: Nasal cannula  Nasal Cannula O2 Flow Rate (L/min):  [2 L/min-6 L/min] 2 L/min    Body mass index is 22.25 kg/m².     Input and Output Summary (last 24 hours):     Intake/Output  Summary (Last 24 hours) at 2/14/2025 0652  Last data filed at 2/14/2025 0417  Gross per 24 hour   Intake 702.5 ml   Output 700 ml   Net 2.5 ml       Physical Exam  Vitals and nursing note reviewed.   Constitutional:       General: He is not in acute distress.     Appearance: He is well-developed.   HENT:      Head: Normocephalic and atraumatic.   Eyes:      Conjunctiva/sclera: Conjunctivae normal.   Cardiovascular:      Rate and Rhythm: Normal rate and regular rhythm.      Heart sounds: No murmur heard.  Pulmonary:      Effort: Pulmonary effort is normal.      Breath sounds: No stridor. No wheezing, rhonchi or rales.      Comments: Diminished breath sounds   Abdominal:      Palpations: Abdomen is soft.      Tenderness: There is no abdominal tenderness.   Musculoskeletal:         General: Tenderness (C-spine) present. No swelling.      Cervical back: Neck supple.      Left foot: Deformity present.   Feet:      Left foot:      Skin integrity: Erythema present. No ulcer, blister, skin breakdown or warmth.   Skin:     General: Skin is warm and dry.      Capillary Refill: Capillary refill takes less than 2 seconds.   Neurological:      General: No focal deficit present.      Mental Status: He is alert and oriented to person, place, and time.   Psychiatric:         Mood and Affect: Mood normal.           Lines/Drains:        Telemetry:  Telemetry Orders (From admission, onward)               24 Hour Telemetry Monitoring  Continuous x 24 Hours (Telem)        Expiring   Question:  Reason for 24 Hour Telemetry  Answer:  Arrhythmias requiring acute medical intervention / PPM or ICD malfunction                     Telemetry Reviewed: Normal Sinus Rhythm  Indication for Continued Telemetry Use: Acute MI/Unstable Angina/Rule out ACS               Lab Results: I have reviewed the following results:   Results from last 7 days   Lab Units 02/14/25  0337 02/13/25  0537 02/12/25  0442   WBC Thousand/uL 6.73   < > 10.46*   HEMOGLOBIN  "g/dL 6.8*   < > 8.1*   HEMATOCRIT % 21.3*   < > 25.0*   PLATELETS Thousands/uL 168   < > 173   BANDS PCT %  --   --  3   SEGS PCT % 85*  --   --    LYMPHO PCT % 8*   < > 3*   MONO PCT % 6   < > 3*   EOS PCT % 0   < > 0    < > = values in this interval not displayed.     Results from last 7 days   Lab Units 02/14/25  0337   SODIUM mmol/L 138   POTASSIUM mmol/L 4.2   CHLORIDE mmol/L 106   CO2 mmol/L 27   BUN mg/dL 38*   CREATININE mg/dL 1.08   ANION GAP mmol/L 5   CALCIUM mg/dL 8.2*   ALBUMIN g/dL 2.6*   TOTAL BILIRUBIN mg/dL 0.41   ALK PHOS U/L 49   ALT U/L 17   AST U/L 23   GLUCOSE RANDOM mg/dL 100                 Results from last 7 days   Lab Units 02/12/25  0442 02/11/25  1531 02/11/25  0921   LACTIC ACID mmol/L  --  1.9  --    PROCALCITONIN ng/ml 0.85*  --  0.90*       Recent Cultures (last 7 days):   Results from last 7 days   Lab Units 02/11/25  1635 02/11/25  1113   BLOOD CULTURE   --  Staphylococcus aureus*  Staphylococcus aureus*   GRAM STAIN RESULT   --  Gram positive cocci in clusters*  Gram positive cocci in clusters*   LEGIONELLA URINARY ANTIGEN  Negative  --        MRI cervical spine w wo contrast  Result Date: 2/14/2025  Impression: Cervical degenerative change with mild canal stenosis and mild to moderate foraminal narrowing. No cord compression or abnormal cord signal. Mild nonspecific edema within the right posterior paraspinal musculature of the upper cervical spine seen on sagittal STIR imaging with mild enhancement. Minimal peripheral enhancement is noted and differential considerations would include infectious/inflammatory myositis with soft tissue abscess. No discitis or osteomyelitis. This examination was marked \"immediate notification\" in Epic in order to begin the standard process by which the radiology reading room liaison alerts the referring practitioner. Workstation performed: [id number]     CT head w wo contrast  Result Date: 2/13/2025  Impression: No acute hemorrhage, edema, or " "mass effect. Chronic microangiopathic changes and chronic appearing infarctions as above. No pathologic enhancement. Workstation performed: [id number]     MRI lumbar spine w wo contrast  Result Date: 2/13/2025  Impression: Mild inferior plate edema at L1 with adjacent small Schmorl's node and minimal adjacent postcontrast enhancement. Vacuum disc phenomenon noted on the recent CT at this level. Findings likely represent degenerative disc disease with Schmorl's node rather than discitis and osteomyelitis which should only be considered in the right clinical setting. No paraspinal edema or enhancement identified. No evidence of abscess or abnormal enhancement in the paraspinal soft tissues. 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 Resident: KEI Wallace I, the attending radiologist, have reviewed the images and agree with the final report above. Workstation performed: [id number]     US kidney and bladder  Result Date: 2/11/2025  Impression: No hydronephrosis. Moderate bladder distention. Perinephric edema on the left. Findings were discussed with Dr. Peralta at 7:25 p.m. via secure text Workstation performed: [id number]     CTA chest pe study  Result Date: 2/11/2025  Impression: No evidence of pulmonary embolus. Stable ectasia of the ascending thoracic aorta measuring up to 41 mm. Mild secretions in the lower trachea, correlate for any dysphagia or possible aspiration. Noncalcified left lung apex pulmonary nodule, 6 x 5 mm. Because this was not present on prior chest CT of December 4, 2023, conservative management with follow-up low radiation dose noncontrast chest CT in 6 months is recommended. This examination was marked \"immediate notification\" in Epic in order to begin the standard process by which the radiology reading room liaison alerts the referring practitioner. Resident: Bijan Fajardo I, the attending radiologist, have reviewed the images and agree with the final report above. " Workstation performed: [id number]     XR chest 1 view portable  Result Date: 2/11/2025  Impression: No acute cardiopulmonary disease. Workstation performed: [id number]       No Chest XR results available for this patient.     Other Study Results Review: No additional pertinent studies reviewed.    Last 24 Hours Medication List:     Current Facility-Administered Medications:     acetaminophen (TYLENOL) tablet 650 mg, Q6H PRN    albuterol (PROVENTIL HFA,VENTOLIN HFA) inhaler 2 puff, Q4H PRN    apixaban (ELIQUIS) tablet 5 mg, BID    aspirin (ECOTRIN LOW STRENGTH) EC tablet 81 mg, Daily    atorvastatin (LIPITOR) tablet 80 mg, Daily With Dinner    ceFAZolin (ANCEF) IVPB (premix in dextrose) 2,000 mg 50 mL, Q8H, Last Rate: 2,000 mg (02/14/25 0135)    docusate sodium (COLACE) capsule 100 mg, Daily    fluticasone (ARNUITY ELLIPTA) 100 MCG/ACT inhaler 1 puff, Daily    formoterol (PERFOROMIST) nebulizer solution 20 mcg, Q12H    guaiFENesin (MUCINEX) 12 hr tablet 1,200 mg, BID    hydroxychloroquine (PLAQUENIL) tablet 400 mg, Daily With Breakfast    ipratropium (ATROVENT) 0.02 % inhalation solution 0.5 mg, TID    levalbuterol (XOPENEX) inhalation solution 1.25 mg, TID **AND** [DISCONTINUED] sodium chloride 0.9 % inhalation solution 3 mL, TID    lidocaine (LIDODERM) 5 % patch 1 patch, Daily    lidocaine (LIDODERM) 5 % patch 1 patch, Daily    Lidocaine Viscous HCl (XYLOCAINE) 2 % mucosal solution 15 mL, BID    [Held by provider] losartan (COZAAR) tablet 25 mg, Daily    methocarbamol (ROBAXIN) tablet 500 mg, Q8H GALE    metoprolol tartrate (LOPRESSOR) tablet 25 mg, Q12H GALE    multi-electrolyte (PLASMALYTE-A/ISOLYTE-S PH 7.4) IV solution, Continuous, Last Rate: 75 mL/hr (02/13/25 0837)    oxyCODONE (ROXICODONE) IR tablet 5 mg, Q4H PRN    oxyCODONE (ROXICODONE) split tablet 2.5 mg, Q4H PRN    pantoprazole (PROTONIX) EC tablet 40 mg, BID AC    polyethylene glycol (MIRALAX) packet 17 g, Daily PRN    sertraline (ZOLOFT) tablet 12.5  mg, Daily    sucralfate (CARAFATE) tablet 1 g, 4x Daily (AC & HS)    tamsulosin (FLOMAX) capsule 0.4 mg, Daily With Dinner    Administrative Statements   Today, Patient Was Seen By: Mal Neely DO      **Please Note: This note may have been constructed using a voice recognition system.**

## 2025-02-14 NOTE — ASSESSMENT & PLAN NOTE
MAP as low as 62 in the ED per automated cuff  Home Htn meds: Metoprolol tartrate 25 mg BID, Losartan 25 mg QD  Losartan recent decreased from 50 mg due to concerns for hypotension    Plan:  Monitor VS including BP.   If hypotension recurs, confirm via manual, assess symptoms; suggest fluid bolus and discussion w crit care.  Continue home metoprolol tartrate 25 mg BID  Hold losartan 25 mg QD

## 2025-02-14 NOTE — RESPIRATORY THERAPY NOTE
RT Protocol Note  Devin Chaves 79 y.o. male MRN: [id number]  Unit/Bed#: S -01 Encounter: [id number]    Assessment    Principal Problem:    Gram-positive cocci bacteremia  Active Problems:    COPD (chronic obstructive pulmonary disease) (HCC)    Back pain    Atrial fibrillation (HCC)    Sepsis (HCC)    Acute urinary retention    Hypotension    Constipation    Anemia      Home Pulmonary Medications:  PRN Alb Nebs and MDI  Home Devices/Therapy: Other (Comment) (MDI/nebulizer)    Past Medical History:   Diagnosis Date    Atrial fibrillation (HCC)     COPD (chronic obstructive pulmonary disease) (HCC)     Crushing injury of finger, left     Infectious viral hepatitis      Social History     Socioeconomic History    Marital status: /Civil Union     Spouse name: None    Number of children: None    Years of education: None    Highest education level: None   Occupational History    None   Tobacco Use    Smoking status: Former    Smokeless tobacco: Former     Quit date: 4/1/2011   Substance and Sexual Activity    Alcohol use: No    Drug use: No    Sexual activity: None   Other Topics Concern    None   Social History Narrative    None     Social Drivers of Health     Financial Resource Strain: Not on file   Food Insecurity: No Food Insecurity (2/11/2025)    Nursing - Inadequate Food Risk Classification     Worried About Running Out of Food in the Last Year: Not on file     Ran Out of Food in the Last Year: Not on file     Ran Out of Food in the Last Year: Never true   Transportation Needs: No Transportation Needs (2/11/2025)    Nursing - Transportation Risk Classification     Lack of Transportation: Not on file     Lack of Transportation: No   Physical Activity: Not on file   Stress: Not on file   Social Connections: Unknown (6/18/2024)    Received from [employer]    Social Connections     How often do you feel lonely or isolated from those around you? (Adult - for ages 18 years and over): Not on file  "  Intimate Partner Violence: Unknown (2025)    Nursing IPS     Feels Physically and Emotionally Safe: Not on file     Physically Hurt by Someone: Not on file     Humiliated or Emotionally Abused by Someone: Not on file     Physically Hurt by Someone: No     Hurt or Threatened by Someone: No   Housing Stability: Unknown (2025)    Nursing: Inadequate Housing Risk Classification     Has Housing: Not on file     Worried About Losing Housing: Not on file     Unable to Get Utilities: Not on file     Unable to Pay for Housing in the Last Year: No     Has Housin       Subjective         Objective    Physical Exam:   Assessment Type: Assess only  General Appearance: Awake  Respiratory Pattern: Normal  Chest Assessment: Chest expansion symmetrical  Bilateral Breath Sounds: Diminished    Vitals:  Blood pressure 115/72, pulse 85, temperature 97.5 °F (36.4 °C), resp. rate 18, height 6' 4\" (1.93 m), weight 84.3 kg (185 lb 13.6 oz), SpO2 96%.          Imaging and other studies:           Plan    Respiratory Plan: Mild Distress pathway        Resp Comments: scheduled nebs not indicated pt takes PRN at home has MDI PRN ordered   "

## 2025-02-14 NOTE — PHYSICAL THERAPY NOTE
PHYSICAL THERAPY EVALUATION NOTE    Patient Name: Devin Chaves  Today's Date: 2/14/2025  AGE:   79 y.o.  Mrn:   [id number]  ADMIT DX:  Dehydration [E86.0]  Pneumonia [J18.9]  SOB (shortness of breath) [R06.02]  Aortic ectasia (HCC) [I77.819]  Elevated troponin [R79.89]  COPD exacerbation (HCC) [J44.1]  Pulmonary nodule, left [R91.1]  Aortic ectasia, thoracic (HCC) [I77.810]  Sepsis (HCC) [A41.9]    Past Medical History:   Diagnosis Date    Atrial fibrillation (HCC)     COPD (chronic obstructive pulmonary disease) (HCC)     Crushing injury of finger, left     Infectious viral hepatitis      Length Of Stay: 3  PHYSICAL THERAPY EVALUATION :    02/14/25 0809   PT Last Visit   PT Visit Date 02/14/25   Pain Assessment   Pain Assessment Tool 0-10   Pain Score 6   Pain Location/Orientation Location: Back   Hospital Pain Intervention(s) Repositioned;Ambulation/increased activity   Restrictions/Precautions   Braces or Orthoses Other (Comment)  (left ankle brace)   Other Precautions Chair Alarm;Bed Alarm;Multiple lines;Telemetry;O2;Fall Risk;Pain   Home Living   Type of Home House;Other (Comment)  (pt was admitted from [facility] where he was receiving inpatient rehab.)   Home Layout Two level;Able to live on main level with bedroom/bathroom;Other (Comment)  (4 ZEINAB)   Additional Comments lives w/ spouse. daughter is supportive. ambulates w/ cane. owns walker and hospital bed. independent w/ ADLs. no falls in last 6 months. uses 2L oxygen at home.  (pt reports progressing well at inpatient rehab. ambulated in the aguirre w/ walker and chair follow. received assist w/ ADLs.)   General   Additional Pertinent History 2/14/25 at 1:21 blood pressure was 97/52   Family/Caregiver Present No   Cognition   Arousal/Participation Alert   Orientation Level Oriented to person;Other (Comment)  (pt was identified w/ full name, birth date)    Following Commands Follows one step commands with increased time or repetition   Comments 3L oxygen via nasal cannula. resting pulse ox 97%, active 94%. blood pressure supine 114/62 and 79 BPM, seated 111/46 and 90 BPM, standing 115/72 and 85 BPM.   Subjective   Subjective pt seen supine in bed. agreed to PT eval. stated having back pain and feeling tired. reports feeling frustration w/ current mobility status.   RUE Assessment   RUE Assessment WFL   LUE Assessment   LUE Assessment WFL   RLE Assessment   RLE Assessment WFL  (hip and knee 4-/5, ankle 3+/5)   LLE Assessment   LLE Assessment X  (hip and knee 3-/5, ankle not tested)   Vision-Basic Assessment   Current Vision Wears glasses all the time   Coordination   Sensation X  (light touch impaired bilateral feet)   Bed Mobility   Supine to Sit 3  Moderate assistance   Additional items Assist x 1;HOB elevated;Bedrails;Increased time required;Verbal cues;LE management  (for bedrail use, trunk/LE positioning)   Transfers   Sit to Stand 3  Moderate assistance   Additional items Assist x 1;Increased time required;Verbal cues  (for hand placement, LE positioning)   Stand to Sit 3  Moderate assistance  (poorly controlled descent to sitting surface)   Additional items Assist x 1;Impulsive;Verbal cues  (for body positioning, safety)   Stand pivot Unable to assess   Additional Comments pt stood approximately 1 minute w/ roller walker w/ minx1 progressing to modx1 as pt became fatigued. pt noted increasingly dyspnea and lightheadedness, which relieved w/ return to seaed position.   Ambulation/Elevation   Gait pattern Not appropriate   Assistive Device Rolling walker   Balance   Static Sitting Fair +   Static Standing Poor +  (to poor, w/ roller walker)   Ambulatory Zero   Activity Tolerance   Activity Tolerance Patient limited by fatigue;Patient limited by pain   Nurse Made Aware spoke to [facility]   Assessment   Problem List Decreased strength;Decreased range of  motion;Decreased endurance;Impaired balance;Decreased mobility;Decreased safety awareness;Impaired sensation;Pain   Assessment Pt presents w/ shortness of breath. Dx: bacteremia, sepsis, COPD, hypotension, acute urinary retention, a-fib, and back pain. order placed for PT eval and tx, w/ activity order of activity as tolerated. pt presents w/ comorbidities of COPD, CAD, a-fib, RA, HTN, and CVA and personal factors of living in 2 story house, mobilizing w/ assistive device, and stair(s) to enter home. pt presents w/ pain, weakness, decreased ROM, decreased endurance, impaired balance, altered sensation, decreased safety awareness, and fall risk. these impairments are evident in findings from physical examination (weakness, decreased ROM, and altered sensation), mobility assessment (need for min to mod assist w/ all phases of mobility and inability to mobilize out of bed when usually mobilizing independently and need for cueing for mobility and breathing technique), and Barthel Index: 50/100. pt needed input for mobility technique and safety. pt is at risk for falls due to physical and safety awareness deficits. pt's clinical presentation is unstable/unpredictable (evident in poor blood pressure control, need for assist w/ all phases of mobility when usually mobilizing independently, pain impacting overall mobility status, need for increased supplemental oxygen in order to maintain oxygen saturation, and need for input for mobility technique/safety). pt needs inpatient PT tx to improve mobility deficits and progress mobility training as appropriate.   Goals   Patient Goals I want to go home. 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   STG Expiration Date 02/28/25   Short Term Goal #1 pt will: Increase bilateral LE strength 1/2 grade to facilitate independent mobility, Perform bed mobility w/ supervision to increase level of independence, Perform all transfers w/ supervision to improve independence, Ambulate 100 ft. 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, Navigate 3 stair(s) w/ modx1 with unilateral handrail to facilitate return to previous living environment, Increase all balance 1 grade to decrease risk for falls, Complete exercise program independently to increase strength and endurance, Tolerate 3 hr OOB to faciliate upright tolerance, Tolerate standing 2 minutes w/ supervision to facilitate functional task performance, Improve Barthel Index score to 70 or greater to facilitate independence, and Complete Timed Up and Go or Comfortable Gait Speed to further assess mobility and monitor progress   PT Treatment Day 1   Plan   Treatment/Interventions Functional transfer training;LE strengthening/ROM;Therapeutic exercise;Cognitive reorientation;Endurance training;Patient/family training;Equipment eval/education;Bed mobility;Gait training;Compensatory technique education;Elevations   PT Frequency 3-5x/wk   Discharge Recommendation   Rehab Resource Intensity Level, PT II (Moderate Resource Intensity)   AM-PAC Basic Mobility Inpatient   Turning in Flat Bed Without Bedrails 3   Lying on Back to Sitting on Edge of Flat Bed Without Bedrails 2   Moving Bed to Chair 2   Standing Up From Chair Using Arms 2   Walk in Room 1   Climb 3-5 Stairs With Railing 1   Basic Mobility Inpatient Raw Score 11   Basic Mobility Standardized Score 30.25   [facility] Highest Level Of Mobility   -[facility] Goal 4: Move to chair/commode   -[facility] Achieved 5: Stand (1 or more minutes)   Barthel Index   Feeding 10   Bathing 0   Grooming Score 5   Dressing Score 5   Bladder Score 5   Bowels Score 10   Toilet Use Score 5   Transfers (Bed/Chair) Score 10   Mobility (Level Surface) Score 0   Stairs Score 0   Barthel Index Score 50   Additional Treatment Session   Start Time 0809   End Time 0819   Treatment Assessment Pt agreed to participate in PT intervention. Pt completed additional  mobilization to address physical and mobility deficits noted during eval. Therapist provided education to pt including walker management and transfer technique. Sit < - > stand transfer w/ modx1. Stand pivot transfer w/ roller walker w/ maxx1. Pt was unable to complete pregait activities. Pt shows improvement w/ completion of transition out of bed. Further inpatient PT intervention is necessary to maximize functional independence.   Equipment Use roller walker   Additional Treatment Day 1   End of Consult   Patient Position at End of Consult Bedside chair;Bed/Chair alarm activated;All needs within reach     The patient's AM-PAC Basic Mobility Inpatient Short Form Raw Score is 11. A Raw score of less than or equal to 16 suggests the patient may benefit from discharge to post-acute rehabilitation services. Please also refer to the recommendation of the Physical Therapist for safe discharge planning.    Skilled PT recommended while in hospital and upon DC to progress pt toward treatment goals.     Gonzalo Mcgraw, PT

## 2025-02-14 NOTE — PLAN OF CARE
Problem: Prexisting or High Potential for Compromised Skin Integrity  Goal: Skin integrity is maintained or improved  Description: INTERVENTIONS:  - Identify patients at risk for skin breakdown  - Assess and monitor skin integrity  - Assess and monitor nutrition and hydration status  - Monitor labs   - Assess for incontinence   - Turn and reposition patient  - Assist with mobility/ambulation  - Relieve pressure over bony prominences  - Avoid friction and shearing  - Provide appropriate hygiene as needed including keeping skin clean and dry  - Evaluate need for skin moisturizer/barrier cream  - Collaborate with interdisciplinary team   - Patient/family teaching  - Consider wound care consult   Outcome: Progressing     Problem: PAIN - ADULT  Goal: Verbalizes/displays adequate comfort level or baseline comfort level  Description: Interventions:  - Encourage patient to monitor pain and request assistance  - Assess pain using appropriate pain scale  - Administer analgesics based on type and severity of pain and evaluate response  - Implement non-pharmacological measures as appropriate and evaluate response  - Consider cultural and social influences on pain and pain management  - Notify physician/advanced practitioner if interventions unsuccessful or patient reports new pain  Outcome: Not Progressing     Problem: INFECTION - ADULT  Goal: Absence or prevention of progression during hospitalization  Description: INTERVENTIONS:  - Assess and monitor for signs and symptoms of infection  - Monitor lab/diagnostic results  - Monitor all insertion sites, i.e. indwelling lines, tubes, and drains  - Monitor endotracheal if appropriate and nasal secretions for changes in amount and color  - [place] appropriate cooling/warming therapies per order  - Administer medications as ordered  - Instruct and encourage patient and family to use good hand hygiene technique  - Identify and instruct in appropriate isolation precautions for  identified infection/condition  Outcome: Progressing  Goal: Absence of fever/infection during neutropenic period  Description: INTERVENTIONS:  - Monitor WBC    Outcome: Progressing     Problem: SAFETY ADULT  Goal: Patient will remain free of falls  Description: INTERVENTIONS:  - Educate patient/family on patient safety including physical limitations  - Instruct patient to call for assistance with activity   - Consult OT/PT to assist with strengthening/mobility   - Keep Call bell within reach  - Keep bed low and locked with side rails adjusted as appropriate  - Keep care items and personal belongings within reach  - Initiate and maintain comfort rounds  - Make Fall Risk Sign visible to staff  - Offer Toileting every = Hours, in advance of need  - Initiate/Maintain alarm  - Obtain necessary fall risk management equipment:   - Apply yellow socks and bracelet for high fall risk patients  - Consider moving patient to room near nurses station  Outcome: Progressing  Goal: Maintain or return to baseline ADL function  Description: INTERVENTIONS:  -  Assess patient's ability to carry out ADLs; assess patient's baseline for ADL function and identify physical deficits which impact ability to perform ADLs (bathing, care of mouth/teeth, toileting, grooming, dressing, etc.)  - Assess/evaluate cause of self-care deficits   - Assess range of motion  - Assess patient's mobility; develop plan if impaired  - Assess patient's need for assistive devices and provide as appropriate  - Encourage maximum independence but intervene and supervise when necessary  - Involve family in performance of ADLs  - Assess for home care needs following discharge   - Consider OT consult to assist with ADL evaluation and planning for discharge  - Provide patient education as appropriate  Outcome: Not Progressing  Goal: Maintains/Returns to pre admission functional level  Description: INTERVENTIONS:  - Perform AM-PAC 6 Click Basic Mobility/ Daily Activity  assessment daily.  - Set and communicate daily mobility goal to care team and patient/family/caregiver.   - Collaborate with rehabilitation services on mobility goals if consulted  - Perform Range of Motion  times a day.  - Reposition patient every  hours.  - Dangle patient  times a day  - Stand patient  times a day  - Ambulate patient  times a day  - Out of bed to chair  times a day   - Out of bed for meals times a day  - Out of bed for toileting  - Record patient progress and toleration of activity level   Outcome: Not Progressing     Problem: DISCHARGE PLANNING  Goal: Discharge to home or other facility with appropriate resources  Description: INTERVENTIONS:  - Identify barriers to discharge w/patient and caregiver  - Arrange for needed discharge resources and transportation as appropriate  - Identify discharge learning needs (meds, wound care, etc.)  - Arrange for interpretive services to assist at discharge as needed  - Refer to Case Management Department for coordinating discharge planning if the patient needs post-hospital services based on physician/advanced practitioner order or complex needs related to functional status, cognitive ability, or social support system  Outcome: Progressing     Problem: Knowledge Deficit  Goal: Patient/family/caregiver demonstrates understanding of disease process, treatment plan, medications, and discharge instructions  Description: Complete learning assessment and assess knowledge base.  Interventions:  - Provide teaching at level of understanding  - Provide teaching via preferred learning methods  Outcome: Progressing     Problem: Nutrition/Hydration-ADULT  Goal: Nutrient/Hydration intake appropriate for improving, restoring or maintaining nutritional needs  Description: Monitor and assess patient's nutrition/hydration status for malnutrition. Collaborate with interdisciplinary team and initiate plan and interventions as ordered.  Monitor patient's weight and dietary intake  as ordered or per policy. Utilize nutrition screening tool and intervene as necessary. Determine patient's food preferences and provide high-protein, high-caloric foods as appropriate.     INTERVENTIONS:  - Monitor oral intake, urinary output, labs, and treatment plans  - Assess nutrition and hydration status and recommend course of action  - Evaluate amount of meals eaten  - Assist patient with eating if necessary   - Allow adequate time for meals  - Recommend/ encourage appropriate diets, oral nutritional supplements, and vitamin/mineral supplements  - Order, calculate, and assess calorie counts as needed  - Recommend, monitor, and adjust tube feedings and TPN/PPN based on assessed needs  - Assess need for intravenous fluids  - Provide specific nutrition/hydration education as appropriate  - Include patient/family/caregiver in decisions related to nutrition  Outcome: Progressing

## 2025-02-14 NOTE — ASSESSMENT & PLAN NOTE
E/b tachycardia, tachypnea and hypotension with WBC 12 K.   -antibiotics as below  -follow-up final cultures  -monitor temperature and hemodynamics  -serial exam  -additional inventions pending clinical course  -monitoring serial CBC and BMP for treatment response and any developing toxicities

## 2025-02-14 NOTE — ASSESSMENT & PLAN NOTE
Pt w COPD on 2L supplemental O2 at baseline  Was started on course of methylprednisolone 12 mg PO BID at STR on 2/9/25, continued up to presentation to hospital  Pt c/o increased SOB, increased PUTNAM, increased cough, increased wheezing, increased sputum production.  Currently requiring 4-6L, above baseline of 2L NC  TTE 2/12/25: EF 55%, Normal systolic dysfunction, G1DD, No vegetation, No mural thrombus, moderate aortic sclerosis    Plan:  Nebs TID  Titrate O2 to maintain SpO2 greater than 88%

## 2025-02-14 NOTE — ASSESSMENT & PLAN NOTE
Recent Labs     02/14/25  1122 02/14/25  2021 02/15/25  0459   HGB 8.4* 8.4* 8.2*     Plan:  H/H q8 hrs  Transfuse if <7   Patient already typed and screened, not consented  Patient required 2 units packed red blood cells overnight on 2/13  Given persistent anemia despite PRBC transfusions will initiate anemia workup  Iron panel, B12, Folate, LDH  GI consult, appreciate recs  GI deferred colonoscopy in the background of active bacteremia

## 2025-02-14 NOTE — ASSESSMENT & PLAN NOTE
Admission Blood cultures labeled same arm/same time are + for MSSA. 2/12/25 TTE negative for vegetation. No PPM, intravascular devices. Acute posterior neck pain predominant starting 2/14/25 and possible deep cervical infection evident on MRI C spine  -continues Cefazolin 2 g IV q 8 hours at present  -follow-up repeat blood cultures Friday  -serial exam  -additional inventions pending clinical course  -anticipate protracted IV antibiotic course

## 2025-02-14 NOTE — PLAN OF CARE
Problem: PHYSICAL THERAPY ADULT  Goal: Performs mobility at highest level of function for planned discharge setting.  See evaluation for individualized goals.  Description: Treatment/Interventions: Functional transfer training, LE strengthening/ROM, Therapeutic exercise, Cognitive reorientation, Endurance training, Patient/family training, Equipment eval/education, Bed mobility, Gait training, Compensatory technique education, Elevations          See flowsheet documentation for full assessment, interventions and recommendations.  Note:    Problem List: Decreased strength, Decreased range of motion, Decreased endurance, Impaired balance, Decreased mobility, Decreased safety awareness, Impaired sensation, Pain  Assessment: Pt presents w/ shortness of breath. Dx: bacteremia, sepsis, COPD, hypotension, acute urinary retention, a-fib, and back pain. order placed for PT eval and tx, w/ activity order of activity as tolerated. pt presents w/ comorbidities of COPD, CAD, a-fib, RA, HTN, and CVA and personal factors of living in 2 story house, mobilizing w/ assistive device, and stair(s) to enter home. pt presents w/ pain, weakness, decreased ROM, decreased endurance, impaired balance, altered sensation, decreased safety awareness, and fall risk. these impairments are evident in findings from physical examination (weakness, decreased ROM, and altered sensation), mobility assessment (need for min to mod assist w/ all phases of mobility and inability to mobilize out of bed when usually mobilizing independently and need for cueing for mobility and breathing technique), and Barthel Index: 50/100. pt needed input for mobility technique and safety. pt is at risk for falls due to physical and safety awareness deficits. pt's clinical presentation is unstable/unpredictable (evident in poor blood pressure control, need for assist w/ all phases of mobility when usually mobilizing independently, pain impacting overall mobility status,  need for increased supplemental oxygen in order to maintain oxygen saturation, and need for input for mobility technique/safety). pt needs inpatient PT tx to improve mobility deficits and progress mobility training as appropriate.        Rehab Resource Intensity Level, PT: II (Moderate Resource Intensity)    See flowsheet documentation for full assessment.

## 2025-02-14 NOTE — ASSESSMENT & PLAN NOTE
- Hgb of 6.7 improved to 8.4 following 2 units PRBC. 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 Also gastric nodule noted requiring EUS, unclear if this has been done  - Patient on Eliquis    - Agree with pantroprazole 40 mg PO BID and sucralfate   - Monitor for overt signs of bleeding and transfuse for hgb goal >7  - Will plan for inpatient colonoscopy +/- EUS. Timing of procedure will depend on resolution of bacteremia    - Eliquis must be held 2 days prior to colonoscopy

## 2025-02-14 NOTE — ASSESSMENT & PLAN NOTE
MRI L-Spine: Negative for acute pathology    Plan:  Lidocaine patches  Robaxin 500 mg q8 hrs scheduled

## 2025-02-14 NOTE — OCCUPATIONAL THERAPY NOTE
Occupational Therapy Evaluation     Patient Name: Devin Chaves  Today's Date: 2/14/2025  Problem List  Principal Problem:    Gram-positive cocci bacteremia  Active Problems:    COPD (chronic obstructive pulmonary disease) (HCC)    Back pain    Atrial fibrillation (HCC)    Sepsis (HCC)    Acute urinary retention    Hypotension    Constipation    Anemia    Past Medical History  Past Medical History:   Diagnosis Date    Atrial fibrillation (HCC)     COPD (chronic obstructive pulmonary disease) (HCC)     Crushing injury of finger, left     Infectious viral hepatitis      Past Surgical History  Past Surgical History:   Procedure Laterality Date    FL LUMBAR PUNCTURE DIAGNOSTIC  2/10/2022    NH OPEN TX PHALANGEAL SHAFT FRACTURE PROX/MIDDLE EA Left 1/25/2017    Procedure: ORIF LEFT SMALL FINGER FRACTURE;  Surgeon: Blake Badillo MD;  Location: [facility];  Service: Orthopedics         02/14/25 1314   OT Last Visit   OT Visit Date 02/14/25   Note Type   Note type Evaluation   Pain Assessment   Pain Assessment Tool 0-10   Pain Score 7   Pain Location/Orientation Orientation: Left;Location: Foot;Location: Back   Effect of Pain on Daily Activities Impacts ability to engage in valued occupations   Hospital Pain Intervention(s) Repositioned;Ambulation/increased activity;Emotional support   Restrictions/Precautions   Weight Bearing Precautions Per Order No   Braces or Orthoses Other (Comment)  (Left ankle brace)   Other Precautions Bed Alarm;Multiple lines;Telemetry;Fall Risk;Pain   Home Living   Type of Home House;Other (Comment)  (Pt presenting from [facility] however prior to rehab, pt lives in raised ranch home with basement with 6 ZEINAB in front and 12 steps through garage to main floor)   Home Layout Two level;Work area in basement;Performs ADLs on one level;Able to live on main level with bedroom/bathroom;Access   Bathroom Shower/Tub Tub/shower unit   Bathroom Toilet Standard  (Pt reporting low  "toilet)   Bathroom Accessibility Accessible   Home Equipment Walker;Cane;Hospital bed   Additional Comments Pt presenting from rehab however prior to rehab, pt lives with his wife and daughter in a raised ranch home with 6 ZEINAB in front and 12 steps through garage to access main floor; reports that he does his wood cutting/work down in the basement; has RW vs SPC for functional mobility   Prior Function   Level of [facility] Independent with ADLs;Independent with functional mobility;Independent with IADLS  (At rehab, pt was recieving increased assistance however prior to rehab, pt was independent)   Lives With Spouse;Family;Daughter   Receives Help From Family   IADLs Independent with driving;Independent with meal prep;Independent with medication management   Falls in the last 6 months 0   Vocational Retired   Lifestyle   Autonomy Prior to rehab, pt was independent in ADLs/IADLs however @ rehab was recieving assistance with functional needs and using RW for functional mobility   Reciprocal Relationships Lives with his wife and daughter   Service to Others Retired   Intrinsic Gratification Enjoys wood cutting   Subjective   Subjective \"I do my work in the basement so I have to get up and down those steps, but right now no\"   ADL   Where Assessed Edge of bed   Eating Assistance 5  Supervision/Setup   Grooming Assistance 5  Supervision/Setup   UB Bathing Assistance 5  Supervision/Setup   LB Bathing Assistance 3  Moderate Assistance   UB Dressing Assistance 5  Supervision/Setup   LB Dressing Assistance 3  Moderate Assistance   Toileting Assistance  3  Moderate Assistance   Functional Assistance 3  Moderate Assistance   Bed Mobility   Supine to Sit 4  Minimal assistance   Additional items Assist x 1;HOB elevated;Bedrails;Increased time required;Verbal cues;LE management   Sit to Supine 4  Minimal assistance   Additional items Assist x 1;HOB elevated;Bedrails;Increased time required;Verbal cues;LE management   Additional " Comments At end of session, pt left lying supine in bed with HOB elevated with all functional needs in reach with bed alarm activated   Transfers   Sit to Stand Unable to assess   Stand to Sit Unable to assess   Additional Comments Declining further OOB activity @ this time, reporting that he does not want to aggrevate his L ankle anymore than it already is bothering him   Functional Mobility   Additional Comments Will continue to assess as pt declining further OOB mobility @ this time   Balance   Static Sitting Fair +   Dynamic Sitting Fair -   Activity Tolerance   Activity Tolerance Patient limited by pain   Nurse Made Aware RN cleared   RUE Assessment   RUE Assessment WFL   LUE Assessment   LUE Assessment WFL   Hand Function   Gross Motor Coordination Functional   Fine Motor Coordination Functional   Cognition   Overall Cognitive Status WFL   Arousal/Participation Alert;Responsive;Arousable;Cooperative   Attention Within functional limits   Orientation Level Oriented X4   Memory Within functional limits   Following Commands Follows one step commands without difficulty   Comments Pt pleasant and cooperative   Assessment   Limitation Decreased ADL status;Decreased endurance;Decreased self-care trans;Decreased high-level ADLs   Prognosis Fair   Assessment Pt is a 80 yo male who presented to [facility] from rehab with SOB, difficulty urinating, a new episode of urinary incontinence, cough, wheezing, orthopnea, and PUTNAM. Pt dx w/ gram-positive cocci bacteremia. Pt  has a past medical history of Atrial fibrillation (HCC), COPD (chronic obstructive pulmonary disease) (HCC), Crushing injury of finger, left, and Infectious viral hepatitis. Pt with active OT orders in which OT consulted to assess pt's functional status and occupational performance to determine safe d/c needs. Pt presenting from rehab however prior to rehab, pt lives with his wife and daughter in a raised ranch home with 6 ZEINAB in front and 12 steps  to access main floor through garage. PTA, pt was independent in ADLs/IADLs however at rehab, pt was receiving assistance with ADLs/IADLs and uses RW for functional mobility. (+) driving. Currently, pt performing bed mobility w/ Min A x1, UB ADLs w/ supervision, and LB ADLs w/ Mod A. Pt demonstrates the following limitations/impairments which impact the pt's ability to engage in valued occupations: balance, endurance/activity tolerance, standing tolerance, functional reach, postural/trunk control, strength, safety awareness, and pain. From an OT standpoint, recommend discharge to post-acute rehab once medically stable. The patient's raw score on the -PAC Daily Activity Inpatient Short Form is 16. A raw score of less than 19 suggests the patient may benefit from discharge to post-acute rehabilitation services. Please refer to the recommendation of the Occupational Therapist for safe discharge planning.  Pt would benefit from skilled OT services 3-5x/wk to address acute care needs and underlying performance skills to promote safety, decrease fall risk, and enhance occupational performance to return to [facility]. Goals to be met within the next 10-14 days.   Goals   Patient Goals To go home   LTG Time Frame 10-14   Long Term Goal #1 See OT goals listed below   Plan   Treatment Interventions ADL retraining;Functional transfer training;UE strengthening/ROM;Endurance training;Patient/family training;Equipment evaluation/education;Compensatory technique education;Continued evaluation;Energy conservation;Activityengagement   Goal Expiration Date 02/28/25   OT Frequency 3-5x/wk   Discharge Recommendation   Rehab Resource Intensity Level, OT II (Moderate Resource Intensity)   AM-PAC Daily Activity Inpatient   Lower Body Dressing 2   Bathing 2   Toileting 2   Upper Body Dressing 3   Grooming 3   Eating 4   Daily Activity Raw Score 16   Daily Activity Standardized Score (Calc for Raw Score >=11) 35.96   AM-PAC Applied Cognition  Inpatient   Following a Speech/Presentation 4   Understanding Ordinary Conversation 4   Taking Medications 4   Remembering Where Things Are Placed or Put Away 4   Remembering List of 4-5 Errands 4   Taking Care of Complicated Tasks 4   Applied Cognition Raw Score 24   Applied Cognition Standardized Score 62.21   End of Consult   Education Provided Yes   Patient Position at End of Consult Supine;Bed/Chair alarm activated;All needs within reach   Nurse Communication Nurse aware of consult       OT GOALS:    Pt will improve functional mobility during ADL/IADL/leisure tasks with S using AE/DME prn.    Pt will improve activity tolerance/functional endurance during ADL/IADL/leisure tasks for at least 20 minutes to improve occupational performance and engagement in valued occupations using AE/DME prn.    Pt will engage in ongoing functional/formal cognitive assessments to assist with safe d/c planning and increase safety during functional tasks.    Pt will improve dynamic standing balance for at least 15 minutes with S during functional tasks to decrease fall risk and improve independence and engagement in ADL/IADL/leisure activities.    Pt will follow 100% of multi-step commands in ADL/IADL/leisure activities to improve functional cognition used in functional daily routines.    Pt will complete functional transfers on and off all surfaces used in daily routines with S for safety to maximize functional/occupational performance.    Pt will complete all bed mobility tasks with Mod I to serve as a prerequisite for EOB/OOB ADL/IADL/leisure tasks, optimize positioning/comfort, and increase functional independence.    Pt will independently demonstrate good carryover of safety precautions and education/training during ADL/IADL/leisure tasks with energy conservation techniques s/p skilled instruction without verbal cues.    Pt will complete UB ADL tasks with Mod I using AE/DME prn to increase functional independence in  ADL/IADL/leisure tasks.    Pt will complete LB ADL tasks with Mod I using AE/DME prn to increase functional independence in ADL/IADL/leisure tasks.     Pt will complete toileting tasks with Mod I and good hygiene/thoroughness using AE/DME prn to increase functional independence.    Pt will independently identify and utilize 2-3 positive coping strategies to enhance overall wellbeing and engagement in valued occupations.    Nirali Sam MS, OTR/L

## 2025-02-15 ENCOUNTER — APPOINTMENT (INPATIENT)
Dept: CT IMAGING | Facility: HOSPITAL | Age: 80
DRG: 871 | End: 2025-02-15
Payer: COMMERCIAL

## 2025-02-15 ENCOUNTER — APPOINTMENT (INPATIENT)
Dept: RADIOLOGY | Facility: HOSPITAL | Age: 80
DRG: 871 | End: 2025-02-15
Payer: COMMERCIAL

## 2025-02-15 PROBLEM — I63.9 STROKE (HCC): Status: ACTIVE | Noted: 2025-02-15

## 2025-02-15 LAB
ANION GAP SERPL CALCULATED.3IONS-SCNC: 6 MMOL/L (ref 4–13)
BASOPHILS # BLD AUTO: 0.03 THOUSANDS/ΜL (ref 0–0.1)
BASOPHILS NFR BLD AUTO: 0 % (ref 0–1)
BUN SERPL-MCNC: 29 MG/DL (ref 5–25)
CALCIUM SERPL-MCNC: 8.2 MG/DL (ref 8.4–10.2)
CHLORIDE SERPL-SCNC: 104 MMOL/L (ref 96–108)
CO2 SERPL-SCNC: 27 MMOL/L (ref 21–32)
CREAT SERPL-MCNC: 1.09 MG/DL (ref 0.6–1.3)
EOSINOPHIL # BLD AUTO: 0.04 THOUSAND/ΜL (ref 0–0.61)
EOSINOPHIL NFR BLD AUTO: 1 % (ref 0–6)
ERYTHROCYTE [DISTWIDTH] IN BLOOD BY AUTOMATED COUNT: 18.2 % (ref 11.6–15.1)
GFR SERPL CREATININE-BSD FRML MDRD: 65 ML/MIN/1.73SQ M
GLUCOSE SERPL-MCNC: 98 MG/DL (ref 65–140)
HCT VFR BLD AUTO: 25.9 % (ref 36.5–49.3)
HGB BLD-MCNC: 8.2 G/DL (ref 12–17)
IMM GRANULOCYTES # BLD AUTO: 0.21 THOUSAND/UL (ref 0–0.2)
IMM GRANULOCYTES NFR BLD AUTO: 3 % (ref 0–2)
LYMPHOCYTES # BLD AUTO: 0.8 THOUSANDS/ΜL (ref 0.6–4.47)
LYMPHOCYTES NFR BLD AUTO: 12 % (ref 14–44)
MAGNESIUM SERPL-MCNC: 1.9 MG/DL (ref 1.9–2.7)
MCH RBC QN AUTO: 28.9 PG (ref 26.8–34.3)
MCHC RBC AUTO-ENTMCNC: 31.7 G/DL (ref 31.4–37.4)
MCV RBC AUTO: 91 FL (ref 82–98)
MONOCYTES # BLD AUTO: 0.35 THOUSAND/ΜL (ref 0.17–1.22)
MONOCYTES NFR BLD AUTO: 5 % (ref 4–12)
NEUTROPHILS # BLD AUTO: 5.42 THOUSANDS/ΜL (ref 1.85–7.62)
NEUTS SEG NFR BLD AUTO: 79 % (ref 43–75)
NRBC BLD AUTO-RTO: 0 /100 WBCS
PLATELET # BLD AUTO: 178 THOUSANDS/UL (ref 149–390)
PMV BLD AUTO: 9.1 FL (ref 8.9–12.7)
POTASSIUM SERPL-SCNC: 3.8 MMOL/L (ref 3.5–5.3)
RBC # BLD AUTO: 2.84 MILLION/UL (ref 3.88–5.62)
SODIUM SERPL-SCNC: 137 MMOL/L (ref 135–147)
WBC # BLD AUTO: 6.85 THOUSAND/UL (ref 4.31–10.16)

## 2025-02-15 PROCEDURE — G0545 PR INHERENT VISIT TO INPT: HCPCS | Performed by: INTERNAL MEDICINE

## 2025-02-15 PROCEDURE — 70496 CT ANGIOGRAPHY HEAD: CPT

## 2025-02-15 PROCEDURE — 83735 ASSAY OF MAGNESIUM: CPT

## 2025-02-15 PROCEDURE — 99233 SBSQ HOSP IP/OBS HIGH 50: CPT | Performed by: INTERNAL MEDICINE

## 2025-02-15 PROCEDURE — 92610 EVALUATE SWALLOWING FUNCTION: CPT | Performed by: SPEECH-LANGUAGE PATHOLOGIST

## 2025-02-15 PROCEDURE — 99232 SBSQ HOSP IP/OBS MODERATE 35: CPT | Performed by: INTERNAL MEDICINE

## 2025-02-15 PROCEDURE — 74230 X-RAY XM SWLNG FUNCJ C+: CPT

## 2025-02-15 PROCEDURE — 80048 BASIC METABOLIC PNL TOTAL CA: CPT

## 2025-02-15 PROCEDURE — 85027 COMPLETE CBC AUTOMATED: CPT

## 2025-02-15 PROCEDURE — 99223 1ST HOSP IP/OBS HIGH 75: CPT | Performed by: STUDENT IN AN ORGANIZED HEALTH CARE EDUCATION/TRAINING PROGRAM

## 2025-02-15 PROCEDURE — 70498 CT ANGIOGRAPHY NECK: CPT

## 2025-02-15 PROCEDURE — 92611 MOTION FLUOROSCOPY/SWALLOW: CPT | Performed by: SPEECH-LANGUAGE PATHOLOGIST

## 2025-02-15 RX ORDER — PANTOPRAZOLE SODIUM 40 MG/10ML
40 INJECTION, POWDER, LYOPHILIZED, FOR SOLUTION INTRAVENOUS EVERY 12 HOURS SCHEDULED
Status: DISCONTINUED | OUTPATIENT
Start: 2025-02-15 | End: 2025-02-22

## 2025-02-15 RX ORDER — MAGNESIUM SULFATE 1 G/100ML
1 INJECTION INTRAVENOUS ONCE
Status: COMPLETED | OUTPATIENT
Start: 2025-02-15 | End: 2025-02-15

## 2025-02-15 RX ORDER — POTASSIUM CHLORIDE 1500 MG/1
40 TABLET, EXTENDED RELEASE ORAL ONCE
Status: COMPLETED | OUTPATIENT
Start: 2025-02-15 | End: 2025-02-15

## 2025-02-15 RX ORDER — ASPIRIN 81 MG/1
81 TABLET, CHEWABLE ORAL DAILY
Status: DISCONTINUED | OUTPATIENT
Start: 2025-02-15 | End: 2025-02-22 | Stop reason: HOSPADM

## 2025-02-15 RX ADMIN — GUAIFENESIN 1200 MG: 600 TABLET, EXTENDED RELEASE ORAL at 17:51

## 2025-02-15 RX ADMIN — CEFAZOLIN SODIUM 2000 MG: 2 SOLUTION INTRAVENOUS at 01:28

## 2025-02-15 RX ADMIN — METHOCARBAMOL TABLETS 500 MG: 500 TABLET, COATED ORAL at 04:28

## 2025-02-15 RX ADMIN — CEFAZOLIN SODIUM 2000 MG: 2 SOLUTION INTRAVENOUS at 17:52

## 2025-02-15 RX ADMIN — PANTOPRAZOLE SODIUM 40 MG: 40 INJECTION, POWDER, FOR SOLUTION INTRAVENOUS at 20:01

## 2025-02-15 RX ADMIN — CEFAZOLIN SODIUM 2000 MG: 2 SOLUTION INTRAVENOUS at 11:22

## 2025-02-15 RX ADMIN — METOPROLOL TARTRATE 25 MG: 25 TABLET, FILM COATED ORAL at 20:01

## 2025-02-15 RX ADMIN — METHOCARBAMOL TABLETS 500 MG: 500 TABLET, COATED ORAL at 16:09

## 2025-02-15 RX ADMIN — ATORVASTATIN CALCIUM 80 MG: 40 TABLET, FILM COATED ORAL at 17:51

## 2025-02-15 RX ADMIN — SUCRALFATE 1 G: 1 TABLET ORAL at 17:51

## 2025-02-15 RX ADMIN — IOHEXOL 75 ML: 350 INJECTION, SOLUTION INTRAVENOUS at 12:45

## 2025-02-15 RX ADMIN — FLUTICASONE FUROATE 1 PUFF: 100 POWDER RESPIRATORY (INHALATION) at 09:46

## 2025-02-15 RX ADMIN — POTASSIUM CHLORIDE 40 MEQ: 1500 TABLET, EXTENDED RELEASE ORAL at 16:09

## 2025-02-15 RX ADMIN — OXYCODONE HYDROCHLORIDE 5 MG: 5 TABLET ORAL at 19:33

## 2025-02-15 RX ADMIN — TAMSULOSIN HYDROCHLORIDE 0.4 MG: 0.4 CAPSULE ORAL at 17:51

## 2025-02-15 RX ADMIN — PANTOPRAZOLE SODIUM 40 MG: 40 INJECTION, POWDER, FOR SOLUTION INTRAVENOUS at 09:38

## 2025-02-15 RX ADMIN — OXYCODONE HYDROCHLORIDE 5 MG: 5 TABLET ORAL at 05:00

## 2025-02-15 RX ADMIN — LIDOCAINE 1 PATCH: 50 PATCH CUTANEOUS at 09:39

## 2025-02-15 RX ADMIN — MAGNESIUM SULFATE HEPTAHYDRATE 1 G: 1 INJECTION, SOLUTION INTRAVENOUS at 16:09

## 2025-02-15 RX ADMIN — OXYCODONE HYDROCHLORIDE 5 MG: 5 TABLET ORAL at 01:28

## 2025-02-15 RX ADMIN — LIDOCAINE HYDROCHLORIDE 15 ML: 20 SOLUTION ORAL at 20:00

## 2025-02-15 NOTE — ASSESSMENT & PLAN NOTE
MRI L-Spine: Negative for acute pathology  MRI C-spine shows possible soft tissue infection status post IV cefazolin  ID on board    Plan:  Lidocaine patches  Robaxin 500 mg q8 hrs scheduled

## 2025-02-15 NOTE — PROGRESS NOTES
"Progress Note - Infectious Disease   Name: Devin Chaves 79 y.o. male I MRN: [id number]  Unit/Bed#: S -01 I Date of Admission: 2/11/2025   Date of Service: 2/15/2025 I Hospital Day: 4    Assessment & Plan  Sepsis (HCC)  E/b tachycardia, tachypnea and hypotension with WBC 12 K.  Patient is clinically much improved.  WBC has normalized.  SBP is now normal  -antibiotics as below  -follow-up final cultures  -monitor temperature and hemodynamics  -serial exam  -additional inventions pending clinical course  -monitoring serial CBC and BMP for treatment response and any developing toxicities     MSSA bacteremia  Admission Blood cultures labeled same arm/same time are + for MSSA. 2/12/25 TTE negative for vegetation. No PPM, intravascular devices. Acute posterior neck pain predominant starting 2/14/25 and possible deep cervical infection evident on MRI C spine  -continues Cefazolin 2 g IV q 8 hours at present  -follow-up repeat blood cultures Friday  -serial exam  -additional inventions pending clinical course  -anticipate protracted IV antibiotic course     Neck pain  Acute posterior neck pain predominant starting 2/14/25 2/14/25 MRI C spine: cervical degenerative change. Mild nonspecific edema within the right posterior paraspinal musculature of the upper cervical spine with mild enhancement. 2 separate foci of linear nonenhancement may represent peripherally enhancing fluid collections. No discitis or osteomyelitis.  ESR is only mildly elevated and CRP is pending.  -continues antibiotic as above  -serial exam  -Follow-up CRP  -pain management per primary   Back pain  Patient reports chronic back pain has been worse due to \"inactivity\" and thinks worsened around the time of the catheterization. 2/13/25 MRI L spine: mild inferior plate edema at L1 with adjacent small Schmorl's node and minimal adjacent postcontrast enhancement. More c/w DDD. No paraspinal edema.   -continues antibiotic as above  -serial " exam  -pain management per primary   Acute urinary retention  Renal US: moderate bladder distention, no hydronephrosis, perinephric edema left. 2/11/25 s/p straight cath for 700 mL. U/A benign. No urinary symptoms other than hesitancy   -monitor urinary output/symptoms  -additional inventions pending retention course     COPD (chronic obstructive pulmonary disease) ([facility])  2/11/25 CXR no infiltrates, CTA chest: no PE, mild secretions in lower trachea. Back to baseline 2L NCO2  Urinary antigens, Flu/COVID screen negative  -monitor respiratory status  -respiratory support prn  -monitor O2 requirements  -serial exam  -aspiration precautions  -additional inventions pending clinical course     Atrial fibrillation (HCC)  On Eliquis    Discussed with patient in detail regarding the above plan.      Antibiotics:  Cefazolin    Subjective   Patient complains of persistent pain in his neck and low back.  Pain is worse with sitting up straight.  No arm or leg weakness.  Temperature stays down.  No chills.  He is tolerating the bottle well.  No nausea, vomiting or diarrhea.    Objective :  Temp:  [98 °F (36.7 °C)-100.1 °F (37.8 °C)] 98 °F (36.7 °C)  HR:  [] 104  BP: (111-132)/(55-69) 120/63  SpO2:  [91 %-99 %] 99 %    General:  No acute distress  Psychiatric:  Awake and alert  Pulmonary:  Normal respiratory excursion without accessory muscle use  Abdomen:  Soft, nontender  Extremities:  No edema  Skin:  No rashes      Lab Results: I have reviewed the following results:  Results from last 7 days   Lab Units 02/15/25  0459 02/14/25  2021 02/14/25  1122 02/14/25 0337 02/13/25  0906 02/13/25  0537   WBC Thousand/uL 6.85  --   --  6.73  --  8.27   HEMOGLOBIN g/dL 8.2* 8.4* 8.4* 6.8*   < > 6.8*   PLATELETS Thousands/uL 178  --   --  168  --  183    < > = values in this interval not displayed.     Results from last 7 days   Lab Units 02/15/25  0459 02/14/25  0337 02/13/25  0537 02/12/25  0442   SODIUM mmol/L 137 138 138 137    POTASSIUM mmol/L 3.8 4.2 4.1 4.5   CHLORIDE mmol/L 104 106 105 106   CO2 mmol/L 27 27 28 26   BUN mg/dL 29* 38* 37* 40*   CREATININE mg/dL 1.09 1.08 0.94 0.97   EGFR ml/min/1.73sq m 65 65 77 74   CALCIUM mg/dL 8.2* 8.2* 8.2* 8.2*   AST U/L  --  23 26 40*   ALT U/L  --  17 42 65*   ALK PHOS U/L  --  49 57 63   ALBUMIN g/dL  --  2.6* 2.5* 2.3*     Results from last 7 days   Lab Units 02/14/25  0325 02/11/25  1810 02/11/25  1635 02/11/25  1113   BLOOD CULTURE  Received in Microbiology Lab. Culture in Progress.  Received in Microbiology Lab. Culture in Progress.  --   --  Staphylococcus aureus*  Staphylococcus aureus*   GRAM STAIN RESULT   --   --   --  Gram positive cocci in clusters*  Gram positive cocci in clusters*   MRSA CULTURE ONLY   --  No Methicillin Resistant Staphlyococcus aureus (MRSA) isolated  --   --    LEGIONELLA URINARY ANTIGEN   --   --  Negative  --      Results from last 7 days   Lab Units 02/12/25  0442 02/11/25  0921   PROCALCITONIN ng/ml 0.85* 0.90*         Results from last 7 days   Lab Units 02/14/25  0337   FERRITIN ng/mL 147     Results from last 7 days   Lab Units 02/11/25  1030   D-DIMER QUANTITATIVE ug/ml FEU 3.89*

## 2025-02-15 NOTE — ASSESSMENT & PLAN NOTE
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 Addmited with sepsis with leukocytosis, tachypnea, and tachycardia and found to have MSSA bacteremia. Work up thus far with no clear infectious origin. Course c/b anemia requiring transfusions. Neurology consulted after MRI revealed acute/subacute bihemispheric multifocal strokes.     Work Up:  MRI brain w/wo (2/14/25): L frontal lobe acute to early subacute infarct, multiple scattered punctate strokes in the bilateral frontal, right parietal, bilateral occipital, and right cerebellum with several microhemorrhages associated with recent infarcts with a few additional chronic microhemorrhages. Chronic RBG and L cerebellar lacunar infarct. L occipital lobe hemosiderin staining consistent with remote injury.  CTA H/N without evidence of LVO, dissection, aneurysm, high-grade stenosis. Mild atherosclerotic disease of the head and neck. 4.1 cm ectasia of the ascending thoracic aorta.  TTE (2/12/2025): EF 55%, G1 DD, akinetic basal inferior and mid inferior segments. Normal size of atria bilaterally.    Impression: Pt with acute - subacute bihemispheric strokes in the setting of MSSA bacteremia of unclear origin and acute anemia only missing x1 dose of Eliquis while admitted. Etiology possibly 2/2 eliquis failure d/t acute infectious disease process      Plan:  Stroke pathway  Telemetry   BP goal: Normotension  AC/AP: Hold Eliquis given microhemorrhages on MR, can continue aspirin.  Restart Eliquis on 2/17 in repeat CTH stable  Statin: Atorvastatin 8  Labs: Hemoglobin A1c, LDL  Consider CATHI - will notify primary team  ID following, appreciate recommendations

## 2025-02-15 NOTE — ASSESSMENT & PLAN NOTE
E/b tachycardia, tachypnea and hypotension with WBC 12 K.  Patient is clinically much improved.  WBC has normalized.  SBP is now normal  -antibiotics as below  -follow-up final cultures  -monitor temperature and hemodynamics  -serial exam  -additional inventions pending clinical course  -monitoring serial CBC and BMP for treatment response and any developing toxicities

## 2025-02-15 NOTE — PROCEDURES
Speech-Language Pathology Video Barium Swallow Study        Patient Name: Devin Chaves    Today's Date: 2/15/2025     Problem List  Patient Active Problem List   Diagnosis    Hypertension    CAD (coronary artery disease)    COPD (chronic obstructive pulmonary disease) (HCC)    JAZMINE (obstructive sleep apnea)    Back pain    COVID-19    Ectasia of artery (HCC)    History of stroke    Enlarged prostate    Open wound of finger of right hand    Moderate protein-calorie malnutrition (HCC)    Chronic hypoxic respiratory failure (HCC)    Fracture of multiple ribs of right side    Rheumatoid arthritis, unspecified (HCC)    Hip region mass, unspecified laterality    Atrial fibrillation (HCC)    Sepsis (HCC)    Acute urinary retention    Hypotension    Constipation    MSSA bacteremia    Anemia    Neck pain       Past Medical History  Past Medical History:   Diagnosis Date    Atrial fibrillation (HCC)     COPD (chronic obstructive pulmonary disease) (HCC)     Crushing injury of finger, left     Infectious viral hepatitis        Past Surgical History  Past Surgical History:   Procedure Laterality Date    FL LUMBAR PUNCTURE DIAGNOSTIC  2/10/2022    [place] OPEN TX PHALANGEAL SHAFT FRACTURE PROX/MIDDLE EA Left 1/25/2017    Procedure: ORIF LEFT SMALL FINGER FRACTURE;  Surgeon: Blake Badillo MD;  Location: [facility];  Service: Orthopedics         General Information;  Pt is a 79 y.o. male who was seen for a BSE, with recommendations for VBS to further assess the swallow.     Previous VBS: none    Consistencies Assessed and Performance   Pt was seen in radiology for a Video Barium Swallow Study, seated in the upright position and viewed laterally with the following consistencies:     Administered: thin liquids, nectar thick, puree, soft solids, and hard solids  Specific materials administered: Barium laden pudding, cracker, cookie, nectar thick, thin liquids, 13mm barium pill. Liquids were  administered by tsp, cup and straw.    Results are as follows:     **Images are available for review on PACS    Oral stage:  Lip closure: adequate  Mastication: prolonged as expected given edentulous status- GWFL  Bolus formation: adequate  Bolus control: GWFL  Transfer: piecemeal transfer  Residue: min tongue base retention    Pharyngeal stage:  Swallow promptness: prompt- min delay  Spill to valleculae:  +with solid  Spill to pyriforms: + with solid  Epiglottic inversion: reduced   Laryngeal rise: adequate  Pharyngeal constriction: reduced  Vallecular retention: mod with puree and solids  Pyriform retention: -  PPW coating:  -  CP prominence: not visualized  Retropulsion from prominence: -  Transient penetration: -  Epiglottic undercoat: -  Penetration: -  Aspiration: -    Screening of Esophageal stage:  Dysmotility: +  Retropulsion: +  Tertiary contractions: +  Reflux: -  Retention: +  Stricture:  no obvious stricture noted  LES: delayed emptying/relaxation of LES    This is not a formal assessment of the esophagus and limited to small amounts in the upright position      Penetration/Aspiration:  Thin: PAS - 1  Nectar: PAS- 1  Honey: PAS- DNT  Puree: PAS- 1  Solid: PAS-1  Response to Aspiration: N/A  Strategies/Efficacy: patient benefited from effortful swallows, slow/coordination of breath and liquid wash           8-Point Penetration-Aspiration Scale   1 Material does not enter the airway   2 Material enters the airway, remains above the vocal folds, and is ejected  from the  airway    3 Material enters the airway, remains above the vocal folds, and is not ejected from the airway   4 Material enters the airway, contacts the vocal folds, and is ejected from the airway   5 Material enters the airway, contacts the vocal folds, and is not ejected from the airway    6 Material enters the airway, passes below the vocal folds and is ejected into the larynx or out of the airway    7 Material enters the airway, passes  below the vocal folds, and is not ejected from the trachea despite effort    8 Material enters the airway, passes below the vocal folds, and no effort is made to eject          Assessment Summary;  Pt presents with mild-moderate oropharyngeal dysphagia characterized by reduced pharyngeal constriction, tongue base retraction and epiglottic inversion with post swallow retention. No aspiration or penetration was noted today however risk is present. Additionally, he does appear to have a component of esophageal dysphagia- likely retrograde aspiration risk. If a dedicated assessment of the esophagus is desired, consider esophagram/barium swallow.       Recommendations;  Recommend mechanically altered/level 2 diet and thin liquids, with upright posture, only feed when fully alert, slow rate of feeding, small bites/sips, and alternating bites and sips.   Recommended Form of Meds:  as tolerated/desired      Aspiration precautions   Reflux Precautions    Results reviewed with patient, RN, and MD.      Goals:  Pt will tolerate least restrictive diet w/out s/s aspiration or oral/pharyngeal difficulties.    Dysphagia Goals: pt will tolerate dysphagia 3 with thin liquids without s/s of aspiration x3    Will f/u      Jacqueline Hamilton M.S., CCC-SLP  Speech Language Pathologist   Available via Secure Chat  [place] #[id number]  [place] #[id number]

## 2025-02-15 NOTE — PLAN OF CARE
Problem: Prexisting or High Potential for Compromised Skin Integrity  Goal: Skin integrity is maintained or improved  Description: INTERVENTIONS:  - Identify patients at risk for skin breakdown  - Assess and monitor skin integrity  - Assess and monitor nutrition and hydration status  - Monitor labs   - Assess for incontinence   - Turn and reposition patient  - Assist with mobility/ambulation  - Relieve pressure over bony prominences  - Avoid friction and shearing  - Provide appropriate hygiene as needed including keeping skin clean and dry  - Evaluate need for skin moisturizer/barrier cream  - Collaborate with interdisciplinary team   - Patient/family teaching  - Consider wound care consult   Outcome: Progressing     Problem: PAIN - ADULT  Goal: Verbalizes/displays adequate comfort level or baseline comfort level  Description: Interventions:  - Encourage patient to monitor pain and request assistance  - Assess pain using appropriate pain scale  - Administer analgesics based on type and severity of pain and evaluate response  - Implement non-pharmacological measures as appropriate and evaluate response  - Consider cultural and social influences on pain and pain management  - Notify physician/advanced practitioner if interventions unsuccessful or patient reports new pain  Outcome: Progressing     Problem: INFECTION - ADULT  Goal: Absence or prevention of progression during hospitalization  Description: INTERVENTIONS:  - Assess and monitor for signs and symptoms of infection  - Monitor lab/diagnostic results  - Monitor all insertion sites, i.e. indwelling lines, tubes, and drains  - Monitor endotracheal if appropriate and nasal secretions for changes in amount and color  - [place] appropriate cooling/warming therapies per order  - Administer medications as ordered  - Instruct and encourage patient and family to use good hand hygiene technique  - Identify and instruct in appropriate isolation precautions for  identified infection/condition  Outcome: Progressing  Goal: Absence of fever/infection during neutropenic period  Description: INTERVENTIONS:  - Monitor WBC    Outcome: Progressing     Problem: SAFETY ADULT  Goal: Patient will remain free of falls  Description: INTERVENTIONS:  - Educate patient/family on patient safety including physical limitations  - Instruct patient to call for assistance with activity   - Consult OT/PT to assist with strengthening/mobility   - Keep Call bell within reach  - Keep bed low and locked with side rails adjusted as appropriate  - Keep care items and personal belongings within reach  - Initiate and maintain comfort rounds  - Make Fall Risk Sign visible to staff  - Offer Toileting every  Hours, in advance of need  - Initiate/Maintain alarm  - Obtain necessary fall risk management equipment:   - Apply yellow socks and bracelet for high fall risk patients  - Consider moving patient to room near nurses station  Outcome: Progressing  Goal: Maintain or return to baseline ADL function  Description: INTERVENTIONS:  -  Assess patient's ability to carry out ADLs; assess patient's baseline for ADL function and identify physical deficits which impact ability to perform ADLs (bathing, care of mouth/teeth, toileting, grooming, dressing, etc.)  - Assess/evaluate cause of self-care deficits   - Assess range of motion  - Assess patient's mobility; develop plan if impaired  - Assess patient's need for assistive devices and provide as appropriate  - Encourage maximum independence but intervene and supervise when necessary  - Involve family in performance of ADLs  - Assess for home care needs following discharge   - Consider OT consult to assist with ADL evaluation and planning for discharge  - Provide patient education as appropriate  Outcome: Progressing  Goal: Maintains/Returns to pre admission functional level  Description: INTERVENTIONS:  - Perform AM-PAC 6 Click Basic Mobility/ Daily Activity  assessment daily.  - Set and communicate daily mobility goal to care team and patient/family/caregiver.   - Collaborate with rehabilitation services on mobility goals if consulted  - Perform Range of Motion  times a day.  - Reposition patient every  hours.  - Dangle patient  times a day  - Stand patient  times a day  - Ambulate patient  times a day  - Out of bed to chair  times a day   - Out of bed for meals  times a day  - Out of bed for toileting  - Record patient progress and toleration of activity level   Outcome: Progressing     Problem: DISCHARGE PLANNING  Goal: Discharge to home or other facility with appropriate resources  Description: INTERVENTIONS:  - Identify barriers to discharge w/patient and caregiver  - Arrange for needed discharge resources and transportation as appropriate  - Identify discharge learning needs (meds, wound care, etc.)  - Arrange for interpretive services to assist at discharge as needed  - Refer to Case Management Department for coordinating discharge planning if the patient needs post-hospital services based on physician/advanced practitioner order or complex needs related to functional status, cognitive ability, or social support system  Outcome: Progressing     Problem: Knowledge Deficit  Goal: Patient/family/caregiver demonstrates understanding of disease process, treatment plan, medications, and discharge instructions  Description: Complete learning assessment and assess knowledge base.  Interventions:  - Provide teaching at level of understanding  - Provide teaching via preferred learning methods  Outcome: Progressing     Problem: Nutrition/Hydration-ADULT  Goal: Nutrient/Hydration intake appropriate for improving, restoring or maintaining nutritional needs  Description: Monitor and assess patient's nutrition/hydration status for malnutrition. Collaborate with interdisciplinary team and initiate plan and interventions as ordered.  Monitor patient's weight and dietary intake as  ordered or per policy. Utilize nutrition screening tool and intervene as necessary. Determine patient's food preferences and provide high-protein, high-caloric foods as appropriate.     INTERVENTIONS:  - Monitor oral intake, urinary output, labs, and treatment plans  - Assess nutrition and hydration status and recommend course of action  - Evaluate amount of meals eaten  - Assist patient with eating if necessary   - Allow adequate time for meals  - Recommend/ encourage appropriate diets, oral nutritional supplements, and vitamin/mineral supplements  - Order, calculate, and assess calorie counts as needed  - Recommend, monitor, and adjust tube feedings and TPN/PPN based on assessed needs  - Assess need for intravenous fluids  - Provide specific nutrition/hydration education as appropriate  - Include patient/family/caregiver in decisions related to nutrition  Outcome: Progressing

## 2025-02-15 NOTE — ASSESSMENT & PLAN NOTE
Acute posterior neck pain predominant starting 2/14/25 2/14/25 MRI C spine: cervical degenerative change. Mild nonspecific edema within the right posterior paraspinal musculature of the upper cervical spine with mild enhancement. 2 separate foci of linear nonenhancement may represent peripherally enhancing fluid collections. No discitis or osteomyelitis.  ESR is only mildly elevated and CRP is pending.  -continues antibiotic as above  -serial exam  -Follow-up CRP  -pain management per primary    Nephrology Inpatient Initial Consult       Reason for Consult:   JASON on CKD 4    History of Present Illness:      88 yo M with history of CKD 4 followed by Dr. Colin, CAD, sCHF (EF 35-40% in 2020), HTN, BPH with bilateral hydronephrosis in the past for which he performs CIC three times a day presents with JASON on outpatient labs.    Creatinine up to 3.69 mg/dl - was 1.92 mg/dl on May outpatient labs.  States he has not been taking NSAIDs, denies n/v/d, eating ok but has less urine output despite CIC.  No leg swelling or SOB.  No new meds.    Review of Systems: 12 point review of systems were reviewed and were negative unless reported in the History of Present Illness.    Other History:  Past Medical History:   Diagnosis Date    BPH (benign prostatic hyperplasia)     CAD (coronary artery disease)     Mixed hyperlipidemia         Past Surgical History:   Procedure Laterality Date    Eye surgery      bilateral catarct     Prostatectomy          History reviewed. No pertinent family history.    Social History     Socioeconomic History    Marital status: /Civil Union     Spouse name: Not on file    Number of children: Not on file    Years of education: Not on file    Highest education level: Not on file   Occupational History    Not on file   Tobacco Use    Smoking status: Never    Smokeless tobacco: Never   Vaping Use    Vaping status: never used   Substance and Sexual Activity    Alcohol use: Never    Drug use: Never    Sexual activity: Not Currently     Partners: Female   Other Topics Concern    Not on file   Social History Narrative    Not on file     Social Determinants of Health     Financial Resource Strain: Patient Declined (8/16/2024)    Financial Resource Strain     Unable to Get: Patient declined   Food Insecurity: Patient Declined (8/16/2024)    Food Insecurity     Worried about Food: Patient declined     Food is Gone: Patient declined   Transportation Needs: Patient Declined (8/16/2024)     Transportation Needs     Lack of Reliable Transportation: Patient declined   Physical Activity: Not on file   Stress: Not on file   Social Connections: Low Risk  (8/16/2024)    Social Connections     Social Connectivity: 5 or more times a week   Interpersonal Safety: Patient Declined (8/16/2024)    Interpersonal Safety     How often physically hurt: Patient declined     How often insulted or talked down to: Patient declined     How often threatened with harm: Patient declined     How often scream or curse at: Patient declined       Social History     Tobacco Use    Smoking status: Never    Smokeless tobacco: Never   Vaping Use    Vaping status: never used   Substance Use Topics    Alcohol use: Never    Drug use: Never        ALLERGIES:  No Known Allergies     Medications:    Current Facility-Administered Medications   Medication Dose Route Frequency Provider Last Rate Last Admin    rosuvastatin (CRESTOR) tablet 40 mg  40 mg Oral Daily Cynthia Toney MD   40 mg at 08/17/24 0855    aspirin (ECOTRIN) enteric coated tablet 81 mg  81 mg Oral Once Cynthia Toney MD        aspirin (ECOTRIN) enteric coated tablet 81 mg  81 mg Oral Daily Cynthia Toney MD        calcitRIOL (ROCALTROL) capsule 0.25 mcg  0.25 mcg Oral Every Other Day Cynthia Toney MD   0.25 mcg at 08/17/24 0855    metoPROLOL succinate (TOPROL-XL) ER tablet 50 mg  50 mg Oral Daily Cynthia Toney MD        latanoprost (XALATAN) 0.005 % ophthalmic solution 1 drop  1 drop Both Eyes Nightly Cynthia Toney MD        cefTRIAXone (ROCEPHIN) syringe 1,000 mg  1,000 mg Intravenous Daily Cynthia Toney MD   1,000 mg at 08/17/24 0856    heparin (porcine) injection 5,000 Units  5,000 Units Subcutaneous 3 times per day Cynthia Toney MD             Physical Exam:    Patient Vitals for the past 24 hrs:   BP Temp Temp src Pulse Resp SpO2 Height Weight   08/17/24 0740 (!) 150/69 97.7 °F (36.5 °C) Oral 65 16 99 % -- --   08/17/24 0057 (!) 153/65 -- -- 78 -- 98 % -- --    08/16/24 2348 (!) 160/72 -- -- -- -- -- -- --   08/16/24 2327 -- -- -- -- -- -- 5' 5\" (1.651 m) 59.1 kg (130 lb 4.7 oz)   08/16/24 2317 -- 97.9 °F (36.6 °C) Oral 78 18 98 % -- --   08/16/24 2222 (!) 149/78 -- -- 77 18 98 % -- --   08/16/24 2027 (!) 157/88 -- -- 74 18 98 % -- --   08/16/24 1933 (!) 182/86 97.9 °F (36.6 °C) Oral 80 18 98 % -- --         Intake/Output Summary (Last 24 hours) at 8/17/2024 1201  Last data filed at 8/17/2024 0830  Gross per 24 hour   Intake 1240 ml   Output 950 ml   Net 290 ml       General - well appearing, comfortable, nad  Skin - warm, no visible rashes  HEENT - NCAT, MMM, no oral ulcers  CV- RRR, no m/g/r  Resp- CTAB, no wheeze/rale/rhonchi  Abd- soft, nt/nd, normoacitve BS   - no CVA tenderness, no [place]  Ext- warm, no LE edema  Psych - affect normal   Neuro- no asterixis    Labs:    BMP:   Recent Labs   Lab 08/17/24  0734 08/16/24 2014   CREATININE 3.48* 3.69*   BUN 46* 48*   CO2 21 25       CBC:   Recent Labs   Lab 08/17/24  0632 08/16/24 2014   HGB 12.2* 13.5   WBC 6.7 7.4   * 145   MCV 91.6 90.8           Imaging:    CT a/p without contrast - atrophic kidneys, moderate R hydro, no ureteral stone seen, BPH    ASSESSMENT/PLAN:    89 yo M with history of CKD 4 followed by Dr. Colin, CAD, sCHF (EF 35-40% in 2020), HTN, BPH with bilateral hydronephrosis in the past for which he performs CIC three times a day presents with JASON on outpatient labs.    JASON on CKD 4:  -- euvolemic on exam; trial LR at 75 ml/h  -- hold diuretics, RAASi, contrast, NSAIDs, fleets  -- strict I/Os    R hydronephrosis:  -- urology consult    Acidosis:  -- change Iv fluids to LR  -- address hydronephrosis    Anemia:  -- no indication for ROBERT    Secondary hyperparathyroidism:  -- continue outpateint calcitriol    HTN:  -- continue metoprolol  -- add CCB if needed    Will follow.  Discussed with RN.    Thank you for allowing me to participate in the care of this patient. Please do not hesitate to  contact me with questions or concerns.    Annemarie Horton MD    [facility]  Nephrology  [address] [place] [address]  PH: [phone]

## 2025-02-15 NOTE — CONSULTS
NEUROLOGY RESIDENCY CONSULT NOTE   Name: Devin Chaves   Age & Sex: 79 y.o. 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 Addmited with sepsis with leukocytosis, tachypnea, and tachycardia and found to have MSSA bacteremia. Work up thus far with no clear infectious origin. Course c/b anemia requiring transfusions. Neurology consulted after MRI revealed acute/subacute bihemispheric multifocal strokes.     Work Up:  MRI brain w/wo (2/14/25): L frontal lobe acute to early subacute infarct, multiple scattered punctate strokes in the bilateral frontal, right parietal, bilateral occipital, and right cerebellum with several microhemorrhages associated with recent infarcts with a few additional chronic microhemorrhages. Chronic RBG and L cerebellar lacunar infarct. L occipital lobe hemosiderin staining consistent with remote injury.  CTA H/N without evidence of LVO, dissection, aneurysm, high-grade stenosis. Mild atherosclerotic disease of the head and neck. 4.1 cm ectasia of the ascending thoracic aorta.  TTE (2/12/2025): EF 55%, G1 DD, akinetic basal inferior and mid inferior segments. Normal size of atria bilaterally.    Impression: Pt with acute - subacute bihemispheric strokes in the setting of MSSA bacteremia of unclear origin and acute anemia only missing x1 dose of Eliquis while admitted. Etiology possibly 2/2 eliquis failure d/t acute infectious disease process      Plan:  Stroke pathway  Telemetry   BP goal: Normotension  AC/AP: Hold Eliquis given microhemorrhages on MR, can continue aspirin.  Restart Eliquis on 2/17 in repeat CTH stable  Statin: Atorvastatin 8  Labs: Hemoglobin A1c, LDL  Consider CATHI -  will notify primary team  ID following, appreciate recommendations    SUBJECTIVE   Reason for Consult / Principal Problem: Strokes on MRI  Hx and PE limited by: None  HPI: 79-year-old male with a pertinent past medical history of atrial fibrillation on Eliquis, CAD on aspirin, rheumatoid arthritis, COPD on baseline O2 supplementation with 2 to 3 L, enlarged prostate, and aortic anesthesia initially presented for evaluation of increased shortness of breath from facility and urinary retention. Admitted with sepsis with leukocytosis, tachypnea, and tachycardia with initial concerns for upper respiratory infection versus aspiration pneumonitis started on IV antibiotics with ceftriaxone. Was found to have MSSA bacteremia. There was concerns for discitis versus osteomyelitis given new complaints of back pain however MRI L-spine with mild inferior plate edema at L1 but without overt concerns for osteo or discitis. Hospital course further complicated by anemia requiring transfusions. MRI C-spine shows cervical degenerative disease. Mild nonspecific edema within the right posterior paraspinal muscle on upper cervical spine questioning infectious myositis. Neurology consulted after MRI brain was obtained revealing evidence of new strokes.    Inpatient consult to Neurology  Consult performed by: Nirav Yancey DO  Consult ordered by: Maria Alejandra Mercer MD        Historical Information   Past Medical History:   Diagnosis Date    Atrial fibrillation (HCC)     COPD (chronic obstructive pulmonary disease) (HCC)     Crushing injury of finger, left     Infectious viral hepatitis      Past Surgical History:   Procedure Laterality Date    FL LUMBAR PUNCTURE DIAGNOSTIC  2/10/2022    AL OPEN TX PHALANGEAL SHAFT FRACTURE PROX/MIDDLE EA Left 1/25/2017    Procedure: ORIF LEFT SMALL FINGER FRACTURE;  Surgeon: Blake Badillo MD;  Location: [facility];  Service: Orthopedics     Social History   Social History     Substance and  Sexual Activity   Alcohol Use No     Social History     Substance and Sexual Activity   Drug Use No     E-Cigarette/Vaping     E-Cigarette/Vaping Substances     Social History     Tobacco Use   Smoking Status Former   Smokeless Tobacco Former    Quit date: 4/1/2011     Family History: History reviewed. No pertinent family history.  Meds/Allergies   current meds:   Current Facility-Administered Medications:     acetaminophen (TYLENOL) tablet 650 mg, Q6H PRN    albuterol (PROVENTIL HFA,VENTOLIN HFA) inhaler 2 puff, Q4H PRN    [Held by provider] apixaban (ELIQUIS) tablet 5 mg, BID    aspirin chewable tablet 81 mg, Daily    atorvastatin (LIPITOR) tablet 80 mg, Daily With Dinner    barium sulfate tablet 1 tablet, Once in imaging    barium sulfate tablet 1 tablet, Once in imaging    ceFAZolin (ANCEF) IVPB (premix in dextrose) 2,000 mg 50 mL, Q8H, Last Rate: 2,000 mg (02/15/25 1122)    docusate sodium (COLACE) capsule 100 mg, Daily    fluticasone (ARNUITY ELLIPTA) 100 MCG/ACT inhaler 1 puff, Daily    guaiFENesin (MUCINEX) 12 hr tablet 1,200 mg, BID    hydroxychloroquine (PLAQUENIL) tablet 400 mg, Daily With Breakfast    lidocaine (LIDODERM) 5 % patch 1 patch, Daily    lidocaine (LIDODERM) 5 % patch 1 patch, Daily    Lidocaine Viscous HCl (XYLOCAINE) 2 % mucosal solution 15 mL, BID    [Held by provider] losartan (COZAAR) tablet 25 mg, Daily    methocarbamol (ROBAXIN) tablet 500 mg, Q8H GALE    metoprolol tartrate (LOPRESSOR) tablet 25 mg, Q12H GALE    oxyCODONE (ROXICODONE) IR tablet 5 mg, Q4H PRN    oxyCODONE (ROXICODONE) split tablet 2.5 mg, Q4H PRN    pantoprazole (PROTONIX) injection 40 mg, Q12H GALE    polyethylene glycol (MIRALAX) packet 17 g, Daily PRN    sertraline (ZOLOFT) tablet 12.5 mg, Daily    sucralfate (CARAFATE) tablet 1 g, 4x Daily (AC & HS)    tamsulosin (FLOMAX) capsule 0.4 mg, Daily With Dinner and PTA meds:   Prior to Admission Medications   Prescriptions Last Dose Informant Patient Reported? Taking?    Cholecalciferol 50 MCG (2000 UT) TABS Unknown  Yes No   Sig: TAKE ONE TABLET BY MOUTH DAILY (FOR LOW VITAMIN D)   albuterol (2.5 mg/3 mL) 0.083 % nebulizer solution 2/10/2025  No Yes   Sig: Take 3 mL (2.5 mg total) by nebulization every 6 (six) hours as needed for wheezing or shortness of breath   albuterol (PROVENTIL HFA,VENTOLIN HFA) 90 mcg/act inhaler Unknown  Yes No   Sig: INHALE 2 PUFFS BY MOUTH EVERY 4 HOURS AS NEEDED FOR BREATHING   apixaban (Eliquis) 5 mg 2/11/2025  Yes Yes   Sig: Take 5 mg by mouth 2 (two) times a day   ascorbic acid (VITAMIN C) 250 MG tablet 2/11/2025  Yes Yes   Sig: Take 250 mg by mouth daily   aspirin (ECOTRIN LOW STRENGTH) 81 mg EC tablet 2/11/2025  Yes Yes   Sig: Take 81 mg by mouth daily   cyanocobalamin (VITAMIN B-12) 100 MCG tablet 2/11/2025  Yes Yes   Sig: Take 100 mcg by mouth daily   docusate sodium (COLACE) 100 mg capsule 2/10/2025  Yes Yes   Sig: Take 100 mg by mouth 2 (two) times a day   famotidine (PEPCID) 20 mg tablet 2/10/2025  Yes Yes   Sig: Take 20 mg by mouth 2 (two) times a day   ferrous sulfate 325 (65 Fe) mg tablet 2/11/2025  Yes Yes   Sig: Take 325 mg by mouth daily with breakfast   folic acid (FOLVITE) 1 mg tablet 2/10/2025  Yes Yes   Sig: TAKE ONE TABLET BY MOUTH EVERY DAY *FOLIC ACID SUPPLEMENT*   hydroxychloroquine (PLAQUENIL) 200 mg tablet 2/11/2025  Yes Yes   Sig: Take 400 mg by mouth daily with breakfast   losartan (COZAAR) 25 mg tablet 2/11/2025  No Yes   Sig: Take 1 tablet (25 mg total) by mouth daily   meloxicam (MOBIC) 15 mg tablet 2/11/2025  Yes Yes   Sig: Take 15 mg by mouth daily   methylprednisolone (MEDROL) 4 mg tablet 2/10/2025  Yes Yes   Sig: Take 12 mg by mouth 2 (two) times a day   metoprolol tartrate (LOPRESSOR) 25 mg tablet 2/11/2025  Yes Yes   Sig: Take 25 mg by mouth every 12 (twelve) hours   mometasone 220 mcg/actuation inhaler 2/10/2025  Yes Yes   Sig: Inhale 1 puff every evening Rinse mouth after use.   rosuvastatin (CRESTOR) 40 MG tablet  "2/10/2025  Yes Yes   Sig: Take 20 mg by mouth daily   sertraline (ZOLOFT) 25 mg tablet 2025  Yes Yes   Sig: Take 12.5 mg by mouth daily      Facility-Administered Medications: None     Allergies   Allergen Reactions    Shellfish-Derived Products - Food Allergy Other (See Comments)     Pt states, \"I reacted, I dont know\"       Review of previous medical records was completed.  Review of Systems    OBJECTIVE     Patient ID: Devin Chaves is a 79 y.o. male.    Vitals:   Vitals:    02/15/25 0300 02/15/25 0753 02/15/25 1110 02/15/25 1622   BP:  120/63 139/63 127/57   BP Location:       Pulse:  104 100 102   Resp:       Temp:  98 °F (36.7 °C) 97.6 °F (36.4 °C) 98.2 °F (36.8 °C)   TempSrc:       SpO2:  99% 97% 95%   Weight: 76.2 kg (167 lb 15.9 oz)      Height:          Body mass index is 20.45 kg/m².     Intake/Output Summary (Last 24 hours) at 2/15/2025 1727  Last data filed at 2/15/2025 1502  Gross per 24 hour   Intake 540 ml   Output 1400 ml   Net -860 ml       Temperature:   Temp (24hrs), Av.6 °F (37 °C), Min:97.6 °F (36.4 °C), Max:100.1 °F (37.8 °C)    Temperature: 98.2 °F (36.8 °C)    Invasive Devices:   Invasive Devices       Peripheral Intravenous Line  Duration             Peripheral IV 25 Right Antecubital 4 days    Peripheral IV 25 Right;Ventral (anterior) Forearm 4 days                    GENERAL EXAM:  Constitutional: Not in acute distress. Not ill-appearing, toxic-appearing or diaphoretic.   HENT: Normocephalic and atraumatic. Nose and Ears normal.   Eyes: No scleral icterus. No discharge.   Cardiovascular:  Distal extremities warm without palpable edema or tenderness, no observed significant swelling.   Pulmonary: Pulmonary effort is normal. Not in respiratory distress  Musculoskeletal: No swelling or deformity.  Psychiatric: Normal behavior and appropriate affect     NEUROLOGIC  EXAM:  Mental Status: Alertness: alert, Orientation: person, month year but not day a week or date. , " Speech/Language fluent, clear, coherent, Commands: Can follow multistep commands  Cranial Nerves:  I Not tested   II Visual Fields bitemporal hemianopia   II, Ill,  IV, VI Pupils equal, round, reactive to light and accommodation  EOM full and intact   V facial sensation was normal and symmetrical   VII facial symmetry equal   VIII Normal hearing to speech   IX, X Normal Palatal Elevation, No Uvular Deviation   XI Shoulder shrug and head turn is normal   XII Midline tongue protrusion   Motor: 5/5 strength in the upper extremities, RLE 4 out of 5, LLE limited strength testing due to significant swelling  Sensory: Decreased sensation to light touch on the left lower extremity compared to the right  Coordination: Cerebellar arm drift absent. Finger To Nose: Right Intact, Left Inact  Station/Gait: Deferred d/t medical severity  LABORATORY DATA    Labs: I have personally reviewed pertinent labs.    CBC:  Results from last 7 days   Lab Units 02/15/25  0459 02/14/25  2021 02/14/25  1122 02/14/25  0337 02/13/25  1829 02/13/25  0906 02/13/25  0537 02/12/25  0442 02/11/25  0921   WBC Thousand/uL 6.85  --   --  6.73  --   --  8.27 10.46* 12.32*   RBC Million/uL 2.84*  --   --  2.33*  --   --  2.28* 2.66* 2.75*   HEMOGLOBIN g/dL 8.2* 8.4* 8.4* 6.8* 6.7* 7.1* 6.8* 8.1* 8.2*   HEMATOCRIT % 25.9* 26.2* 26.1* 21.3* 20.8*  --  21.0* 25.0* 25.4*   MCV fL 91  --   --  91  --   --  92 94 92   MCH pg 28.9  --   --  29.2  --   --  29.8 30.5 29.8   MCHC g/dL 31.7  --   --  31.9  --   --  32.4 32.4 32.3   RDW % 18.2*  --   --  18.3*  --   --  17.3* 17.1* 17.2*   MPV fL 9.1  --   --  8.8*  --   --  8.6* 8.9 8.7*   PLATELETS Thousands/uL 178  --   --  168  --   --  183 173 224   NRBC AUTO /100 WBCs 0  --   --  0  --   --   --   --   --    SEGS PCT % 79*  --   --  85*  --   --   --   --   --    LYMPHO PCT % 12*  --   --  8*  10  --   --  5* 3* 6*   MONO PCT % 5  --   --  6  4  --   --  4 3* 1*   EOS PCT % 1  --   --  0  --   --  0 0 0  "  BASOS PCT % 0  --   --  0  --   --  0 0 0   TOTAL NEUT ABS Thousands/µL 5.42  --   --  5.79  5.66  --   --   --   --   --    LYMPHS ABS Thousands/µL 0.80  --   --  0.67  0.55*  --   --   --   --   --    MONOS ABS Thousand/µL 0.35  --   --  0.42  --   --   --   --   --    EOS ABS Thousand/µL 0.04  --   --  0.00  --   --  0.00 0.00 0.00   ,   Chemistry Profile:   Results from last 7 days   Lab Units 02/15/25  0459 02/14/25  0337 02/13/25  0537 02/12/25  0442 02/11/25  0921   POTASSIUM mmol/L 3.8 4.2 4.1 4.5 4.2   CHLORIDE mmol/L 104 106 105 106 98   CO2 mmol/L 27 27 28 26 28   BUN mg/dL 29* 38* 37* 40* 45*   CREATININE mg/dL 1.09 1.08 0.94 0.97 1.25   CALCIUM mg/dL 8.2* 8.2* 8.2* 8.2* 8.6   MAGNESIUM mg/dL 1.9 2.2 2.1  --   --    AST U/L  --  23 26 40* 62*   ALT U/L  --  17 42 65* 89*   ALK PHOS U/L  --  49 57 63 66   EGFR ml/min/1.73sq m 65 65 77 74 55     Results from last 7 days   Lab Units 02/15/25  0459 02/14/25  0337 02/13/25  0537 02/12/25  0442 02/11/25  0921   GLUCOSE RANDOM mg/dL 98 100 94 110 134      No results for input(s): \"POCGLU\" in the last 72 hours.  PT/INR: No results found for: \"PT\", \"INR\",   Hemoglobin A1c 5.4 (9/25/2022), LDL No results in last 5 years (No results in last 5 years)    Micro: I have reviewed pertinent results.  Results from last 7 days   Lab Units 02/11/25  1635   LEUKOCYTES UA  Negative   NITRITE UA  Negative   GLUCOSE UA mg/dl Negative   KETONES UA mg/dl Negative   BLOOD UA  Small*   WBC UA /hpf 2-4*   RBC UA /hpf 10-20*   BACTERIA UA /hpf None Seen     Lab Results   Component Value Date    BLOODCX No Growth at 24 hrs. 02/14/2025    BLOODCX No Growth at 24 hrs. 02/14/2025    BLOODCX Staphylococcus aureus (A) 02/11/2025    BLOODCX Staphylococcus aureus (A) 02/11/2025    SPUTUMCULTUR 1+ Growth of Aspergillus species, NOT fumigatus (A) 12/08/2023    SPUTUMCULTUR 2+ Growth of 12/08/2023     IMAGING STUDIES    Radiology Results: I have personally reviewed pertinent reports and " reviewed films in PACS  CTA head and neck w wo contrast  Result Date: 2/15/2025  Impression: CT Brain: - Known small multifocal infarcts in bilateral cerebral hemispheres and right cerebellum were better evaluated on MRI brain dated 2/14/2025, likely embolic. No new CT signs of acute infarction. - Unchanged chronic lacunar infarct in right basal ganglia with moderate chronic microangiopathy. CT Angiography: - Negative CTA head and neck for large vessel occlusion, dissection, aneurysm, or high-grade stenosis. - Mild atherosclerotic disease of the head and neck, as detailed above. Multiple pulmonary nodules measuring up to 0.9 cm in left upper lobe, unchanged. Based on current Fleischner Society 2017 Guidelines on incidental pulmonary nodule, follow-up non-contrast CT is recommended at 3-6 months from the initial examination and, if stable at that time, an additional follow-up is recommended for 18-24 months from the initial examination. 4.1 cm ectasia of ascending thoracic aorta. Recommend follow-up CT chest in 12 months. Additional chronic/incidental findings as detailed above. The study was marked in EPIC for immediate notification. Workstation performed: [id number]     MRI brain w wo contrast  Result Date: 2/15/2025  Impression: Multiple tiny recent infarcts scattered in multiple vascular distributions that are likely embolic. Several microhemorrhages associated with recent infarcts. No acute space-occupying hematoma. Chronic ischemic changes. The study was marked in EPIC for immediate notification. Workstation performed: [id number]     MRI cervical spine w wo contrast  Result Date: 2/14/2025  Impression: Cervical degenerative change with mild canal stenosis and mild to moderate foraminal narrowing. No cord compression or abnormal cord signal. Mild nonspecific edema within the right posterior paraspinal musculature of the upper cervical spine seen on sagittal STIR imaging with mild enhancement. Minimal peripheral  "enhancement is noted and differential considerations would include infectious/inflammatory myositis with soft tissue abscess. No discitis or osteomyelitis. This examination was marked \"immediate notification\" in Epic in order to begin the standard process by which the radiology reading room liaison alerts the referring practitioner. Workstation performed: [id number]     CT head w wo contrast  Result Date: 2/13/2025  Impression: No acute hemorrhage, edema, or mass effect. Chronic microangiopathic changes and chronic appearing infarctions as above. No pathologic enhancement. Workstation performed: [id number]     MRI lumbar spine w wo contrast  Result Date: 2/13/2025  Impression: Mild inferior plate edema at L1 with adjacent small Schmorl's node and minimal adjacent postcontrast enhancement. Vacuum disc phenomenon noted on the recent CT at this level. Findings likely represent degenerative disc disease with Schmorl's node rather than discitis and osteomyelitis which should only be considered in the right clinical setting. No paraspinal edema or enhancement identified. No evidence of abscess or abnormal enhancement in the paraspinal soft tissues. 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 Resident: KEI Wallace I, the attending radiologist, have reviewed the images and agree with the final report above. Workstation performed: [id number]     US kidney and bladder  Result Date: 2/11/2025  Impression: No hydronephrosis. Moderate bladder distention. Perinephric edema on the left. Findings were discussed with Dr. Peralta at 7:25 p.m. via secure text Workstation performed: [id number]     CTA chest pe study  Result Date: 2/11/2025  Impression: No evidence of pulmonary embolus. Stable ectasia of the ascending thoracic aorta measuring up to 41 mm. Mild secretions in the lower trachea, correlate for any dysphagia or possible aspiration. Noncalcified left lung apex pulmonary nodule, 6 x 5 mm. Because " "this was not present on prior chest CT of December 4, 2023, conservative management with follow-up low radiation dose noncontrast chest CT in 6 months is recommended. This examination was marked \"immediate notification\" in Epic in order to begin the standard process by which the radiology reading room liaison alerts the referring practitioner. Resident: Bijan Fajardo I, the attending radiologist, have reviewed the images and agree with the final report above. Workstation performed: [id number]     XR chest 1 view portable  Result Date: 2/11/2025  Impression: No acute cardiopulmonary disease. Workstation performed: [id number]      Other Diagnostic Results: I have personally reviewed pertinent reports  Results for orders placed during the hospital encounter of 02/11/25    Echo complete w/ contrast if indicated    Interpretation Summary    Left Ventricle: Left ventricle is not well visualized. Left ventricular cavity size is normal. Wall thickness is mildly increased. The left ventricular ejection fraction is 55%. Systolic function is normal. Diastolic function is mildly abnormal, consistent with grade I (abnormal) relaxation.    The following segments are akinetic: basal inferior and mid inferior.    All other segments are normal.    Right Ventricle: Right ventricle is not well visualized. Right ventricular cavity size is mildly dilated. Systolic function is mildly to moderately reduced.    Aortic Valve: The aortic valve is trileaflet. The leaflets are not thickened. The leaflets are moderately calcified. There is mildly reduced mobility. There is mild to moderate regurgitation. There is aortic valve sclerosis.    Mitral Valve: There is mild regurgitation.    Tricuspid Valve: There is mild regurgitation.    Encounter Date: 02/11/25   ECG 12 lead   Result Value    Ventricular Rate 74    Atrial Rate 74    MA Interval 172    QRSD Interval 94    QT Interval 416    QTC Interval 462    P Axis 93    QRS Axis 9    T Wave Axis " 31    Narrative    Normal sinus rhythm  Cannot rule out Inferior infarct , age undetermined  Abnormal ECG  When compared with ECG of 11-Feb-2025 10:43,  No significant change was found  Confirmed by Talon Ayoub ([id number]) on 2/14/2025 8:37:29 AM        ACTIVE MEDICATIONS   Scheduled PRN   [Held by provider] apixaban, 5 mg, BID  aspirin, 81 mg, Daily  atorvastatin, 80 mg, Daily With Dinner  cefazolin, 2,000 mg, Q8H  docusate sodium, 100 mg, Daily  fluticasone, 1 puff, Daily  guaiFENesin, 1,200 mg, BID  hydroxychloroquine, 400 mg, Daily With Breakfast  lidocaine, 1 patch, Daily  lidocaine, 1 patch, Daily  Lidocaine Viscous HCl, 15 mL, BID  [Held by provider] losartan, 25 mg, Daily  methocarbamol, 500 mg, Q8H GALE  metoprolol tartrate, 25 mg, Q12H GALE  pantoprazole, 40 mg, Q12H GALE  sertraline, 12.5 mg, Daily  sucralfate, 1 g, 4x Daily (AC & HS)  tamsulosin, 0.4 mg, Daily With Dinner      acetaminophen, 650 mg, Q6H PRN  albuterol, 2 puff, Q4H PRN  barium sulfate, 1 tablet, Once in imaging  barium sulfate, 1 tablet, Once in imaging  oxyCODONE, 5 mg, Q4H PRN  oxyCODONE, 2.5 mg, Q4H PRN  polyethylene glycol, 17 g, Daily PRN       Continuous          Prior to Admission medications    Medication Sig Start Date End Date Taking? Authorizing Provider   albuterol (2.5 mg/3 mL) 0.083 % nebulizer solution Take 3 mL (2.5 mg total) by nebulization every 6 (six) hours as needed for wheezing or shortness of breath 12/17/23  Yes Sreedhar Guerra,    apixaban (Eliquis) 5 mg Take 5 mg by mouth 2 (two) times a day   Yes Historical Provider, MD   ascorbic acid (VITAMIN C) 250 MG tablet Take 250 mg by mouth daily   Yes Historical Provider, MD   aspirin (ECOTRIN LOW STRENGTH) 81 mg EC tablet Take 81 mg by mouth daily   Yes Historical Provider, MD   cyanocobalamin (VITAMIN B-12) 100 MCG tablet Take 100 mcg by mouth daily   Yes Historical Provider, MD   docusate sodium (COLACE) 100 mg capsule Take 100 mg by mouth 2 (two) times a day   Yes  Historical Provider, MD   famotidine (PEPCID) 20 mg tablet Take 20 mg by mouth 2 (two) times a day   Yes Historical Provider, MD   ferrous sulfate 325 (65 Fe) mg tablet Take 325 mg by mouth daily with breakfast   Yes Historical Provider, MD   folic acid (FOLVITE) 1 mg tablet TAKE ONE TABLET BY MOUTH EVERY DAY *FOLIC ACID SUPPLEMENT* 4/12/21  Yes Historical Provider, MD   hydroxychloroquine (PLAQUENIL) 200 mg tablet Take 400 mg by mouth daily with breakfast   Yes Historical Provider, MD   losartan (COZAAR) 25 mg tablet Take 1 tablet (25 mg total) by mouth daily 2/9/25 3/11/25 Yes Barbara Roman MD   meloxicam (MOBIC) 15 mg tablet Take 15 mg by mouth daily   Yes Historical Provider, MD   metoprolol tartrate (LOPRESSOR) 25 mg tablet Take 25 mg by mouth every 12 (twelve) hours   Yes Historical Provider, MD   mometasone 220 mcg/actuation inhaler Inhale 1 puff every evening Rinse mouth after use.   Yes Historical Provider, MD   rosuvastatin (CRESTOR) 40 MG tablet Take 20 mg by mouth daily   Yes Historical Provider, MD   sertraline (ZOLOFT) 25 mg tablet Take 12.5 mg by mouth daily   Yes Historical Provider, MD   albuterol (PROVENTIL HFA,VENTOLIN HFA) 90 mcg/act inhaler INHALE 2 PUFFS BY MOUTH EVERY 4 HOURS AS NEEDED FOR BREATHING 5/24/21   Historical Provider, MD   Cholecalciferol 50 MCG (2000 UT) TABS TAKE ONE TABLET BY MOUTH DAILY (FOR LOW VITAMIN D) 4/12/21   Historical Provider, MD       VTE Mechanical Prophylaxis: Sequential Compression Device  VTE Pharmacologic Prophylaxis: VTE covered by:  [Held by provider] apixaban, Oral      CODE STATUS & ADVANCED DIRECTIVES   Code Status: Level 3 - DNAR and DNI  Advance Directive and Living Will:      Power of :    POLST:    ====================================================  Nirav Yancey, DO [facility] Neurology Residency, PGY-3

## 2025-02-15 NOTE — SPEECH THERAPY NOTE
Speech-Language Pathology Bedside Swallow Evaluation        Patient Name: Devin Chaves    Today's Date: 2/15/2025     Problem List  Patient Active Problem List   Diagnosis    Hypertension    CAD (coronary artery disease)    COPD (chronic obstructive pulmonary disease) (HCC)    JAZMINE (obstructive sleep apnea)    Back pain    COVID-19    Ectasia of artery (HCC)    History of stroke    Enlarged prostate    Open wound of finger of right hand    Moderate protein-calorie malnutrition (HCC)    Chronic hypoxic respiratory failure (HCC)    Fracture of multiple ribs of right side    Rheumatoid arthritis, unspecified (HCC)    Hip region mass, unspecified laterality    Atrial fibrillation (HCC)    Sepsis (HCC)    Acute urinary retention    Hypotension    Constipation    MSSA bacteremia    Anemia    Neck pain       Past Medical History  Past Medical History:   Diagnosis Date    Atrial fibrillation (HCC)     COPD (chronic obstructive pulmonary disease) (HCC)     Crushing injury of finger, left     Infectious viral hepatitis        Past Surgical History  Past Surgical History:   Procedure Laterality Date    FL LUMBAR PUNCTURE DIAGNOSTIC  2/10/2022    NV OPEN TX PHALANGEAL SHAFT FRACTURE PROX/MIDDLE EA Left 1/25/2017    Procedure: ORIF LEFT SMALL FINGER FRACTURE;  Surgeon: Blake Badillo MD;  Location: [facility];  Service: Orthopedics         Current Medical Status  Pt is a 79 y.o. male who presented to [facility] with sepsis in the context of Staph bacteremia with ?deep cervical neck infection. Patient seen by this department during this admission with swallow assessed to be GWFL and subsequently discharged from our services. Unfortunately MRI with incidental findings of multiple tiny recent infarcts. Stroke pathway was initiated and re-evaluation requested. Upon my arrival the patient does admit to increased phelgm/mucus and worsening dyspnea over the last few days which worsens with phonation and po  "intake. He reports increased need for effort with swallowing and that with liquids \"it feels like I'm drowning.\"     He denies any new changes in speech or language aside from SOB with phonation. He does admit to chronic word finding difficulties for years.    Secretions in lower trachea noted on CT 2/11    Past medical history:   Please see H&P for details      Special Studies:  2/13 MRI brain w wo contrast: Multiple tiny recent infarcts scattered in multiple vascular distributions that are likely embolic. Several microhemorrhages associated with recent infarcts. No acute space-occupying hematoma. Chronic ischemic changes.    2/13 MRI w wo contrast cervical spine: Cervical degenerative change with mild canal stenosis and mild to moderate foraminal narrowing. No cord compression or abnormal cord signal. Mild nonspecific edema within the right posterior paraspinal musculature of the upper cervical spine seen on sagittal STIR imaging with mild enhancement. Minimal peripheral enhancement is noted and differential considerations would include infectious/inflammatory myositis with soft tissue abscess. No discitis or osteomyelitis.    2/11 CTA chest po study: No evidence of pulmonary embolus. Stable ectasia of the ascending thoracic aorta measuring up to 41 mm. Mild secretions in the lower trachea, correlate for any dysphagia or possible aspiration. Noncalcified left lung apex pulmonary nodule, 6 x 5 mm. Because this was not present on prior chest CT of December 4, 2023, conservative management with follow-up low radiation dose noncontrast chest CT in 6 months is recommended.    Swallow Information   Current Risks for Dysphagia & Aspiration: CVA, change in respiratory status, and reported new dysphagia     Current Symptoms/Concerns: coughing with po and change in respiratory status    Current Diet: regular diet and thin liquids      Baseline Diet: regular diet and thin liquids      Baseline Assessment "   Behavior/Cognition: alert    Speech/Language Status: able to participate in conversation and able to follow commands    Patient Positioning: upright in recliner    Swallow Mechanism Exam   Facial: symmetrical appearing + R upper and middle facial sensory deficit  Labial: WFL  Lingual:  mildly reduced lingual strength, ROM adequate  Velum: symmetrical  Mandible: adequate ROM  Dentition: edentulous ( for at least a few years)  Vocal quality:clear/adequate   Volitional Cough:  fair+    Resp: 2L NC    Consistencies Assessed and Performance   Consistencies Administered: thin liquids, nectar thick, and soft solids  Specific materials administered included: pancakes, thin juice, nectar thick juice    Oral Stage:   Bolus retrieval and anterior containment appeared adequate. Mastication was adequate with the materials administered today. Bolus formation and transfer were functional with no significant oral residue noted.Cannot r/o premature spill.     Pharyngeal Stage:   Swallowing initiation appeared prompt/min delayed + effortful. Pt with c/o globus + req multiple swallows and increased dyspnea. Intermittent drop in SPO2 noted with thin liquids. Wet congested cough noted prior to and intermittently during po intake. No additional coughing, throat clearing, change in vocal quality or respiratory status noted today.     Esophageal Concerns: globus sensation and belching    Summary   s/s suggestive of minimal oral and suspected at least mild pharyngeal dysphagia- cannot r/o silent aspiration component. VFSS is recommended to further assess.   Further goals/POC TBD pending results of VBSS      Plan  Will complete VBSS today    Jacqueline Hamilton M.S., CCC-SLP  Speech Language Pathologist   Available via Secure Chat  [place] #[id number]  [place] #[id number]

## 2025-02-15 NOTE — ASSESSMENT & PLAN NOTE
Blood cultures with Gram + cocci, likely Staph Aureus  Unknown source at this time, patient with multiple small abrasions on hands and arms. UA negative, LLE does not appear to have cellulitis.  TTE negative for vegetations  MRI L-spine: No evidence of abscess or abnormal enhancement in the paraspinal soft tissues.  MRI C-spine: Mild nonspecific edema within the right posterior paraspinal musculature of the upper cervical spine seen on sagittal STIR imaging with mild enhancement. Minimal peripheral enhancement is noted and differential considerations would include infectious/inflammatory myositis with soft tissue abscess. No osteomyelitis or discitis. No cord compression or abnormal cord signal.  NCCTH: No acute hemorrhage, edema, or mass effect. Chronic microangiopathic changes and chronic appearing infarctions    Plan:  Start Cefazolin 2 g IV q8 hrs  Given myositis and possible abscess appreicated on MRI C-spine will require prolonged IV antibiotic course  ID consulted, appreciate recs  MRI Brain shows possible multifocal infarction

## 2025-02-15 NOTE — ASSESSMENT & PLAN NOTE
Patient rate controlled since admission    Plan:  Eliquis has been hold because of MRI proven hemorrhagic conversion of multifocal embolic infarction, most likely septic emboli in the background of MSSA bacteremia.  Continue home metoprolol with hold parameters

## 2025-02-15 NOTE — ASSESSMENT & PLAN NOTE
"Meeting SIRS criteria for tachycardia (HR ), tachypnea (RR 16-28), and leukocytosis (12.32)  Notably, patient was started on course of methylprednisolone on 2/09/25 for COPD exacerbation, current dose 12 mg PO BID prior to admission  EMS administered albuterol and ipratropium nebs prior to arrival  Received CTX and azithro in ED for suspected CAP  No clear source of infection. CTA PE study in ED did not demonstrate acute pathology.  Bladder scan showed only 30 cc, however straight cath returned 700 ml  Consider possible uro-sepsis  The most recent MRI lumbar spine unremarkable  The most recent MRI C-spine shows possible soft tissue infection  The most recent MRI contrast of the brain shows possible multifocal embolic infarction with hemorrhagic conversion, most likely septic emboli s/p cefazolin IV.    Plan:  See \"Gram positive bacteremia\"  "

## 2025-02-15 NOTE — PROGRESS NOTES
Progress Note - Hospitalist   Name: Devin Chaves 79 y.o. male I MRN: [id number]  Unit/Bed#: S -01 I Date of Admission: 2/11/2025   Date of Service: 2/15/2025 I Hospital Day: 4    Assessment & Plan  MSSA bacteremia  Blood cultures with Gram + cocci, likely Staph Aureus  Unknown source at this time, patient with multiple small abrasions on hands and arms. UA negative, LLE does not appear to have cellulitis.  TTE negative for vegetations  MRI L-spine: No evidence of abscess or abnormal enhancement in the paraspinal soft tissues.  MRI C-spine: Mild nonspecific edema within the right posterior paraspinal musculature of the upper cervical spine seen on sagittal STIR imaging with mild enhancement. Minimal peripheral enhancement is noted and differential considerations would include infectious/inflammatory myositis with soft tissue abscess. No osteomyelitis or discitis. No cord compression or abnormal cord signal.  NCCTH: No acute hemorrhage, edema, or mass effect. Chronic microangiopathic changes and chronic appearing infarctions    Plan:  Start Cefazolin 2 g IV q8 hrs  Given myositis and possible abscess appreicated on MRI C-spine will require prolonged IV antibiotic course  ID consulted, appreciate recs  MRI Brain shows possible multifocal infarction  Sepsis (HCC)  Meeting SIRS criteria for tachycardia (HR ), tachypnea (RR 16-28), and leukocytosis (12.32)  Notably, patient was started on course of methylprednisolone on 2/09/25 for COPD exacerbation, current dose 12 mg PO BID prior to admission  EMS administered albuterol and ipratropium nebs prior to arrival  Received CTX and azithro in ED for suspected CAP  No clear source of infection. CTA PE study in ED did not demonstrate acute pathology.  Bladder scan showed only 30 cc, however straight cath returned 700 ml  Consider possible uro-sepsis  The most recent MRI lumbar spine unremarkable  The most recent MRI C-spine shows possible soft tissue  "infection  The most recent MRI contrast of the brain shows possible multifocal embolic infarction with hemorrhagic conversion, most likely septic emboli s/p cefazolin IV.    Plan:  See \"Gram positive bacteremia\"  Hypotension  MAP as low as 62 in the ED per automated cuff  Home Htn meds: Metoprolol tartrate 25 mg BID, Losartan 25 mg QD  Losartan recent decreased from 50 mg due to concerns for hypotension    Plan:  Monitor VS including BP.   If hypotension recurs, confirm via manual, assess symptoms; suggest fluid bolus and discussion w crit care.  Continue home metoprolol tartrate 25 mg BID  Hold losartan 25 mg QD  COPD (chronic obstructive pulmonary disease) ([facility])  Pt w COPD on 2L supplemental O2 at baseline  Was started on course of methylprednisolone 12 mg PO BID at [facility] on 2/9/25, continued up to presentation to hospital  Pt c/o increased SOB, increased PUTNAM, increased cough, increased wheezing, increased sputum production.  Currently requiring 4-6L, above baseline of 2L NC  TTE 2/12/25: EF 55%, Normal systolic dysfunction, G1DD, No vegetation, No mural thrombus, moderate aortic sclerosis    Plan:  Nebs TID  Titrate O2 to maintain SpO2 greater than 88%  Acute urinary retention  Pt reports new urinary incontinence day prior to presentation and inability to void despite urge to urinate on day of presentation  H/o enlarged prostate  Marked suprapubic tenderness on exam  Straight cath drained 700 cc urine  Suspect new urinary retention 2/2 BPH  US Kidney/Bladder 2/11/25: No hydronephrosis, moderate bladder distention, perinephric edema on L side.    Plan:  Tamsulosin 0.4 mg QD  Urinary retention protocol  Atrial fibrillation ([facility])  Patient rate controlled since admission    Plan:  Eliquis has been hold because of MRI proven hemorrhagic conversion of multifocal embolic infarction, most likely septic emboli in the background of MSSA bacteremia.  Continue home metoprolol with hold parameters  Constipation  Patient with BM " overnight    Plan:  Deescalate Colace to QD  Miralax prn  Back pain  MRI L-Spine: Negative for acute pathology  MRI C-spine shows possible soft tissue infection status post IV cefazolin  ID on board    Plan:  Lidocaine patches  Robaxin 500 mg q8 hrs scheduled  Anemia  Recent Labs     02/14/25  1122 02/14/25  2021 02/15/25  0459   HGB 8.4* 8.4* 8.2*     Plan:  H/H q8 hrs  Transfuse if <7   Patient already typed and screened, not consented  Patient required 2 units packed red blood cells overnight on 2/13  Given persistent anemia despite PRBC transfusions will initiate anemia workup  Iron panel, B12, Folate, LDH  GI consult, appreciate recs  GI deferred colonoscopy in the background of active bacteremia  Neck pain  The most recent MRI C-spine shows possible soft tissue infection in the background of bacteremia    VTE Pharmacologic Prophylaxis: VTE Score: 9 High Risk (Score >/= 5) - Pharmacological DVT Prophylaxis Contraindicated. Sequential Compression Devices Ordered.    Mobility:   Basic Mobility Inpatient Raw Score: 11  JH-HLM Goal: 4: Move to chair/commode  JH-HLM Achieved: 5: Stand (1 or more minutes)  JH-HLM Goal NOT achieved. Continue with multidisciplinary rounding and encourage appropriate mobility to improve upon JH-HLM goals.    Patient Centered Rounds: I performed bedside rounds with nursing staff today.   Discussions with Specialists or Other Care Team Provider: ID, neuro    Education and Discussions with Family / Patient: Updated  (wife) via phone.    Current Length of Stay: 4 day(s)  Current Patient Status: Inpatient   Certification Statement: The patient will continue to require additional inpatient hospital stay due to MSSA bacteremia  Discharge Plan: Anticipate discharge in 48-72 hrs to rehab facility.    Code Status: Level 3 - DNAR and DNI    Subjective   No overnight events.  Patient was feeling better.    Objective :  Temp:  [97.6 °F (36.4 °C)-100.1 °F (37.8 °C)] 97.6 °F (36.4  °C)  HR:  [] 100  BP: (111-139)/(55-69) 139/63  SpO2:  [91 %-99 %] 97 %    Body mass index is 20.45 kg/m².     Input and Output Summary (last 24 hours):     Intake/Output Summary (Last 24 hours) at 2/15/2025 1208  Last data filed at 2/15/2025 0900  Gross per 24 hour   Intake 1380 ml   Output 1325 ml   Net 55 ml       Physical Exam  Vitals and nursing note reviewed.   Constitutional:       General: He is not in acute distress.     Appearance: He is well-developed. He is ill-appearing. He is not toxic-appearing or diaphoretic.   HENT:      Head: Normocephalic and atraumatic.   Eyes:      Conjunctiva/sclera: Conjunctivae normal.   Cardiovascular:      Rate and Rhythm: Regular rhythm. Tachycardia present.      Pulses: Normal pulses.      Heart sounds: Normal heart sounds. No murmur heard.     No friction rub. No gallop.   Pulmonary:      Effort: Pulmonary effort is normal.      Breath sounds: No stridor. No wheezing, rhonchi or rales.      Comments: Diminished breath sounds   Abdominal:      General: There is no distension.      Palpations: Abdomen is soft. There is no mass.      Tenderness: There is no abdominal tenderness. There is no guarding or rebound.      Hernia: No hernia is present.   Musculoskeletal:         General: Tenderness (C-spine) present. No swelling.      Cervical back: Neck supple.      Right lower leg: No edema.      Left lower leg: No edema.      Left foot: Deformity present.   Feet:      Left foot:      Skin integrity: Erythema present. No ulcer, blister, skin breakdown or warmth.   Skin:     General: Skin is warm and dry.      Capillary Refill: Capillary refill takes less than 2 seconds.   Neurological:      General: No focal deficit present.      Mental Status: He is alert and oriented to person, place, and time.   Psychiatric:         Mood and Affect: Mood normal.         Behavior: Behavior normal.         Thought Content: Thought content normal.         Judgment: Judgment normal.          Lines/Drains:              Lab Results: I have reviewed the following results:   Results from last 7 days   Lab Units 02/15/25  0459 02/14/25  1122 02/14/25  0337   WBC Thousand/uL 6.85  --  6.73   HEMOGLOBIN g/dL 8.2*   < > 6.8*   HEMATOCRIT % 25.9*   < > 21.3*   PLATELETS Thousands/uL 178  --  168   BANDS PCT %  --   --  3   SEGS PCT % 79*  --  85*   LYMPHO PCT % 12*  --  8*  10   MONO PCT % 5  --  6  4   EOS PCT % 1  --  0    < > = values in this interval not displayed.     Results from last 7 days   Lab Units 02/15/25  0459 02/14/25  0337   SODIUM mmol/L 137 138   POTASSIUM mmol/L 3.8 4.2   CHLORIDE mmol/L 104 106   CO2 mmol/L 27 27   BUN mg/dL 29* 38*   CREATININE mg/dL 1.09 1.08   ANION GAP mmol/L 6 5   CALCIUM mg/dL 8.2* 8.2*   ALBUMIN g/dL  --  2.6*   TOTAL BILIRUBIN mg/dL  --  0.41   ALK PHOS U/L  --  49   ALT U/L  --  17   AST U/L  --  23   GLUCOSE RANDOM mg/dL 98 100                 Results from last 7 days   Lab Units 02/12/25  0442 02/11/25  1531 02/11/25  0921   LACTIC ACID mmol/L  --  1.9  --    PROCALCITONIN ng/ml 0.85*  --  0.90*       Recent Cultures (last 7 days):   Results from last 7 days   Lab Units 02/14/25  0325 02/11/25  1635 02/11/25  1113   BLOOD CULTURE  No Growth at 24 hrs.  No Growth at 24 hrs.  --  Staphylococcus aureus*  Staphylococcus aureus*   GRAM STAIN RESULT   --   --  Gram positive cocci in clusters*  Gram positive cocci in clusters*   LEGIONELLA URINARY ANTIGEN   --  Negative  --        Imaging Results Review: I personally reviewed the following image studies/reports in PACS and discussed pertinent findings with Radiology: chest xray, CT C-spine, and MRI brain. My interpretation of the radiology images/reports is: Possible soft tissue infection in the cervical spine, multifocal emboli infarct with hemorrhagic conversion in the brain.  Other Study Results Review: EKG was reviewed.     Last 24 Hours Medication List:     Current Facility-Administered Medications:      acetaminophen (TYLENOL) tablet 650 mg, Q6H PRN    albuterol (PROVENTIL HFA,VENTOLIN HFA) inhaler 2 puff, Q4H PRN    atorvastatin (LIPITOR) tablet 80 mg, Daily With Dinner    barium sulfate tablet 1 tablet, Once in imaging    barium sulfate tablet 1 tablet, Once in imaging    ceFAZolin (ANCEF) IVPB (premix in dextrose) 2,000 mg 50 mL, Q8H, Last Rate: 2,000 mg (02/15/25 1122)    docusate sodium (COLACE) capsule 100 mg, Daily    fluticasone (ARNUITY ELLIPTA) 100 MCG/ACT inhaler 1 puff, Daily    guaiFENesin (MUCINEX) 12 hr tablet 1,200 mg, BID    hydroxychloroquine (PLAQUENIL) tablet 400 mg, Daily With Breakfast    lidocaine (LIDODERM) 5 % patch 1 patch, Daily    lidocaine (LIDODERM) 5 % patch 1 patch, Daily    Lidocaine Viscous HCl (XYLOCAINE) 2 % mucosal solution 15 mL, BID    [Held by provider] losartan (COZAAR) tablet 25 mg, Daily    magnesium sulfate IVPB (premix) SOLN 1 g, Once    methocarbamol (ROBAXIN) tablet 500 mg, Q8H GALE    metoprolol tartrate (LOPRESSOR) tablet 25 mg, Q12H GALE    oxyCODONE (ROXICODONE) IR tablet 5 mg, Q4H PRN    oxyCODONE (ROXICODONE) split tablet 2.5 mg, Q4H PRN    pantoprazole (PROTONIX) injection 40 mg, Q12H GALE    polyethylene glycol (MIRALAX) packet 17 g, Daily PRN    potassium chloride (Klor-Con M20) CR tablet 40 mEq, Once    sertraline (ZOLOFT) tablet 12.5 mg, Daily    sucralfate (CARAFATE) tablet 1 g, 4x Daily (AC & HS)    tamsulosin (FLOMAX) capsule 0.4 mg, Daily With Dinner    Administrative Statements   Today, Patient Was Seen By: Maria Alejandra Mercer MD      **Please Note: This note may have been constructed using a voice recognition system.**

## 2025-02-16 ENCOUNTER — APPOINTMENT (INPATIENT)
Dept: RADIOLOGY | Facility: HOSPITAL | Age: 80
DRG: 871 | End: 2025-02-16
Payer: COMMERCIAL

## 2025-02-16 PROBLEM — K59.00 CONSTIPATION: Status: RESOLVED | Noted: 2025-02-11 | Resolved: 2025-02-16

## 2025-02-16 LAB
ALBUMIN SERPL BCG-MCNC: 2.6 G/DL (ref 3.5–5)
ALP SERPL-CCNC: 51 U/L (ref 34–104)
ALT SERPL W P-5'-P-CCNC: 3 U/L (ref 7–52)
ANION GAP SERPL CALCULATED.3IONS-SCNC: 3 MMOL/L (ref 4–13)
APTT PPP: 56 SECONDS (ref 23–34)
AST SERPL W P-5'-P-CCNC: 20 U/L (ref 13–39)
BASOPHILS # BLD AUTO: 0.04 THOUSANDS/ΜL (ref 0–0.1)
BASOPHILS NFR BLD AUTO: 1 % (ref 0–1)
BILIRUB SERPL-MCNC: 0.55 MG/DL (ref 0.2–1)
BUN SERPL-MCNC: 21 MG/DL (ref 5–25)
CALCIUM ALBUM COR SERPL-MCNC: 9.3 MG/DL (ref 8.3–10.1)
CALCIUM SERPL-MCNC: 8.2 MG/DL (ref 8.4–10.2)
CHLORIDE SERPL-SCNC: 104 MMOL/L (ref 96–108)
CHOLEST SERPL-MCNC: 54 MG/DL (ref ?–200)
CO2 SERPL-SCNC: 30 MMOL/L (ref 21–32)
CREAT SERPL-MCNC: 0.91 MG/DL (ref 0.6–1.3)
CRP SERPL QL: 106.8 MG/L
EOSINOPHIL # BLD AUTO: 0.04 THOUSAND/ΜL (ref 0–0.61)
EOSINOPHIL NFR BLD AUTO: 1 % (ref 0–6)
ERYTHROCYTE [DISTWIDTH] IN BLOOD BY AUTOMATED COUNT: 17.7 % (ref 11.6–15.1)
EST. AVERAGE GLUCOSE BLD GHB EST-MCNC: 120 MG/DL
GFR SERPL CREATININE-BSD FRML MDRD: 81 ML/MIN/1.73SQ M
GLUCOSE SERPL-MCNC: 91 MG/DL (ref 65–140)
HBA1C MFR BLD: 5.8 %
HCT VFR BLD AUTO: 26.7 % (ref 36.5–49.3)
HDLC SERPL-MCNC: 17 MG/DL
HGB BLD-MCNC: 8.5 G/DL (ref 12–17)
IMM GRANULOCYTES # BLD AUTO: 0.15 THOUSAND/UL (ref 0–0.2)
IMM GRANULOCYTES NFR BLD AUTO: 2 % (ref 0–2)
INR PPP: 1.51 (ref 0.85–1.19)
LDLC SERPL CALC-MCNC: 15 MG/DL (ref 0–100)
LYMPHOCYTES # BLD AUTO: 0.89 THOUSANDS/ΜL (ref 0.6–4.47)
LYMPHOCYTES NFR BLD AUTO: 13 % (ref 14–44)
MAGNESIUM SERPL-MCNC: 2 MG/DL (ref 1.9–2.7)
MCH RBC QN AUTO: 29.4 PG (ref 26.8–34.3)
MCHC RBC AUTO-ENTMCNC: 31.8 G/DL (ref 31.4–37.4)
MCV RBC AUTO: 92 FL (ref 82–98)
MONOCYTES # BLD AUTO: 0.35 THOUSAND/ΜL (ref 0.17–1.22)
MONOCYTES NFR BLD AUTO: 5 % (ref 4–12)
NEUTROPHILS # BLD AUTO: 5.19 THOUSANDS/ΜL (ref 1.85–7.62)
NEUTS SEG NFR BLD AUTO: 78 % (ref 43–75)
NRBC BLD AUTO-RTO: 0 /100 WBCS
PLATELET # BLD AUTO: 149 THOUSANDS/UL (ref 149–390)
PMV BLD AUTO: 8.8 FL (ref 8.9–12.7)
POTASSIUM SERPL-SCNC: 3.6 MMOL/L (ref 3.5–5.3)
PROT SERPL-MCNC: 5.6 G/DL (ref 6.4–8.4)
PROTHROMBIN TIME: 19 SECONDS (ref 12.3–15)
RBC # BLD AUTO: 2.89 MILLION/UL (ref 3.88–5.62)
SODIUM SERPL-SCNC: 137 MMOL/L (ref 135–147)
TRIGL SERPL-MCNC: 112 MG/DL (ref ?–150)
WBC # BLD AUTO: 6.66 THOUSAND/UL (ref 4.31–10.16)

## 2025-02-16 PROCEDURE — 94640 AIRWAY INHALATION TREATMENT: CPT

## 2025-02-16 PROCEDURE — 80061 LIPID PANEL: CPT

## 2025-02-16 PROCEDURE — 80053 COMPREHEN METABOLIC PANEL: CPT

## 2025-02-16 PROCEDURE — 71045 X-RAY EXAM CHEST 1 VIEW: CPT

## 2025-02-16 PROCEDURE — 99232 SBSQ HOSP IP/OBS MODERATE 35: CPT | Performed by: INTERNAL MEDICINE

## 2025-02-16 PROCEDURE — 83735 ASSAY OF MAGNESIUM: CPT

## 2025-02-16 PROCEDURE — 94760 N-INVAS EAR/PLS OXIMETRY 1: CPT

## 2025-02-16 PROCEDURE — 85730 THROMBOPLASTIN TIME PARTIAL: CPT

## 2025-02-16 PROCEDURE — G0545 PR INHERENT VISIT TO INPT: HCPCS | Performed by: INTERNAL MEDICINE

## 2025-02-16 PROCEDURE — 85025 COMPLETE CBC W/AUTO DIFF WBC: CPT

## 2025-02-16 PROCEDURE — 85610 PROTHROMBIN TIME: CPT

## 2025-02-16 PROCEDURE — 86140 C-REACTIVE PROTEIN: CPT | Performed by: PHYSICIAN ASSISTANT

## 2025-02-16 PROCEDURE — 83036 HEMOGLOBIN GLYCOSYLATED A1C: CPT

## 2025-02-16 PROCEDURE — 99233 SBSQ HOSP IP/OBS HIGH 50: CPT | Performed by: INTERNAL MEDICINE

## 2025-02-16 RX ORDER — MAGNESIUM SULFATE HEPTAHYDRATE 40 MG/ML
2 INJECTION, SOLUTION INTRAVENOUS ONCE
Status: COMPLETED | OUTPATIENT
Start: 2025-02-16 | End: 2025-02-16

## 2025-02-16 RX ORDER — LEVALBUTEROL INHALATION SOLUTION 1.25 MG/3ML
1.25 SOLUTION RESPIRATORY (INHALATION)
Status: DISCONTINUED | OUTPATIENT
Start: 2025-02-16 | End: 2025-02-21

## 2025-02-16 RX ORDER — POTASSIUM CHLORIDE 1500 MG/1
20 TABLET, EXTENDED RELEASE ORAL ONCE
Status: COMPLETED | OUTPATIENT
Start: 2025-02-16 | End: 2025-02-16

## 2025-02-16 RX ORDER — LEVALBUTEROL INHALATION SOLUTION 1.25 MG/3ML
1.25 SOLUTION RESPIRATORY (INHALATION)
Status: DISCONTINUED | OUTPATIENT
Start: 2025-02-16 | End: 2025-02-16

## 2025-02-16 RX ORDER — LIDOCAINE 50 MG/G
1 PATCH TOPICAL DAILY
Status: DISCONTINUED | OUTPATIENT
Start: 2025-02-16 | End: 2025-02-22 | Stop reason: HOSPADM

## 2025-02-16 RX ADMIN — ATORVASTATIN CALCIUM 80 MG: 40 TABLET, FILM COATED ORAL at 17:26

## 2025-02-16 RX ADMIN — METOPROLOL TARTRATE 25 MG: 25 TABLET, FILM COATED ORAL at 21:47

## 2025-02-16 RX ADMIN — CEFAZOLIN SODIUM 2000 MG: 2 SOLUTION INTRAVENOUS at 08:18

## 2025-02-16 RX ADMIN — SUCRALFATE 1 G: 1 TABLET ORAL at 11:26

## 2025-02-16 RX ADMIN — METHOCARBAMOL TABLETS 500 MG: 500 TABLET, COATED ORAL at 21:47

## 2025-02-16 RX ADMIN — LIDOCAINE 1 PATCH: 50 PATCH CUTANEOUS at 08:26

## 2025-02-16 RX ADMIN — METHOCARBAMOL TABLETS 500 MG: 500 TABLET, COATED ORAL at 15:25

## 2025-02-16 RX ADMIN — FLUTICASONE FUROATE 1 PUFF: 100 POWDER RESPIRATORY (INHALATION) at 08:27

## 2025-02-16 RX ADMIN — ASPIRIN 81 MG CHEWABLE TABLET 81 MG: 81 TABLET CHEWABLE at 08:17

## 2025-02-16 RX ADMIN — CEFAZOLIN SODIUM 2000 MG: 2 SOLUTION INTRAVENOUS at 01:42

## 2025-02-16 RX ADMIN — HYDROXYCHLOROQUINE SULFATE 400 MG: 200 TABLET ORAL at 08:17

## 2025-02-16 RX ADMIN — PANTOPRAZOLE SODIUM 40 MG: 40 INJECTION, POWDER, FOR SOLUTION INTRAVENOUS at 08:17

## 2025-02-16 RX ADMIN — GUAIFENESIN 1200 MG: 600 TABLET, EXTENDED RELEASE ORAL at 08:17

## 2025-02-16 RX ADMIN — SUCRALFATE 1 G: 1 TABLET ORAL at 17:25

## 2025-02-16 RX ADMIN — MAGNESIUM SULFATE HEPTAHYDRATE 2 G: 40 INJECTION, SOLUTION INTRAVENOUS at 08:18

## 2025-02-16 RX ADMIN — IPRATROPIUM BROMIDE 0.5 MG: 0.5 SOLUTION RESPIRATORY (INHALATION) at 19:13

## 2025-02-16 RX ADMIN — CEFAZOLIN SODIUM 2000 MG: 2 SOLUTION INTRAVENOUS at 17:25

## 2025-02-16 RX ADMIN — METHOCARBAMOL TABLETS 500 MG: 500 TABLET, COATED ORAL at 06:19

## 2025-02-16 RX ADMIN — SUCRALFATE 1 G: 1 TABLET ORAL at 06:19

## 2025-02-16 RX ADMIN — LEVALBUTEROL HYDROCHLORIDE 1.25 MG: 1.25 SOLUTION RESPIRATORY (INHALATION) at 19:13

## 2025-02-16 RX ADMIN — GUAIFENESIN 1200 MG: 600 TABLET, EXTENDED RELEASE ORAL at 17:25

## 2025-02-16 RX ADMIN — TAMSULOSIN HYDROCHLORIDE 0.4 MG: 0.4 CAPSULE ORAL at 17:25

## 2025-02-16 RX ADMIN — DOCUSATE SODIUM 100 MG: 100 CAPSULE, LIQUID FILLED ORAL at 08:18

## 2025-02-16 RX ADMIN — POTASSIUM CHLORIDE 20 MEQ: 1500 TABLET, EXTENDED RELEASE ORAL at 08:17

## 2025-02-16 RX ADMIN — SUCRALFATE 1 G: 1 TABLET ORAL at 21:47

## 2025-02-16 RX ADMIN — SERTRALINE HYDROCHLORIDE 12.5 MG: 25 TABLET ORAL at 08:17

## 2025-02-16 RX ADMIN — PANTOPRAZOLE SODIUM 40 MG: 40 INJECTION, POWDER, FOR SOLUTION INTRAVENOUS at 21:47

## 2025-02-16 RX ADMIN — METOPROLOL TARTRATE 25 MG: 25 TABLET, FILM COATED ORAL at 08:17

## 2025-02-16 RX ADMIN — LIDOCAINE 1 PATCH: 50 PATCH CUTANEOUS at 09:44

## 2025-02-16 NOTE — ASSESSMENT & PLAN NOTE
Recent Labs     02/14/25  2021 02/15/25  0459 02/16/25  0608   HGB 8.4* 8.2* 8.5*     Lab Results   Component Value Date    IRON 37 (L) 02/14/2025    FERRITIN 147 02/14/2025    TIBC 166.6 (L) 02/14/2025    CONCFE 22 02/14/2025     B12 972, Folate 14  Stable    Plan:  H/H q8 hrs  Transfuse if <7   Patient already typed and screened, not consented  Patient required 2 units packed red blood cells overnight on 2/13  Given persistent anemia despite PRBC transfusions will initiate anemia workup  Iron panel, B12, Folate, LDH  GI consult, appreciate recs  Discussed with GI, will defer given stabilization of Hgb and new finding of septic emboli

## 2025-02-16 NOTE — ASSESSMENT & PLAN NOTE
The most recent MRI C-spine shows possible soft tissue infection in the background of bacteremia    Plan:  Lidocaine patch

## 2025-02-16 NOTE — ASSESSMENT & PLAN NOTE
Pt w COPD on 2L supplemental O2 at baseline  Pt c/o increased SOB, increased PUTNAM, increased cough, increased wheezing, increased sputum production prior to admission.  Currently requiring 3L, above baseline of 2L NC  TTE 2/12/25: EF 55%, Normal systolic dysfunction, G1DD, No vegetation, No mural thrombus, moderate aortic sclerosis    Plan:  Nebs TID  Titrate O2 to maintain SpO2 greater than 88%

## 2025-02-16 NOTE — PROGRESS NOTES
Progress Note - Hospitalist   Name: Devin Chaves 79 y.o. male I MRN: [id number]  Unit/Bed#: S -01 I Date of Admission: 2/11/2025   Date of Service: 2/16/2025 I Hospital Day: 5    Assessment & Plan  MSSA bacteremia  Blood cultures with Gram + cocci, likely Staph Aureus  Unknown source at this time, patient with multiple small abrasions on hands and arms. UA negative, LLE does not appear to have cellulitis.  TTE negative for vegetations  Patient mentioned small laceration from [facility] with pustule formation, possible source at there is myositis to that area  MRI L-spine: No evidence of abscess or abnormal enhancement in the paraspinal soft tissues.  MRI C-spine: Mild nonspecific edema within the right posterior paraspinal musculature of the upper cervical spine seen on sagittal STIR imaging with mild enhancement. Minimal peripheral enhancement is noted and differential considerations would include infectious/inflammatory myositis with soft tissue abscess. No osteomyelitis or discitis. No cord compression or abnormal cord signal.  NCCTH: No acute hemorrhage, edema, or mass effect. 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    Stroke (HCC)  MRI brain: Multiple tiny recent infarcts scattered in multiple vascular distributions that are likely embolic. Several microhemorrhages associated with recent infarcts. No acute space-occupying hematoma. Chronic ischemic changes.    Plan:  Hold Eliquis given conversion demonstrated on MRI brain  Continue aspirin per neurology  Will inquire for need for CATHI with ID  Sepsis (HCC)  Meeting SIRS criteria for tachycardia (HR ), tachypnea (RR 16-28), and  "leukocytosis (12.32)  No clear source of infection. CTA PE study in ED did not demonstrate acute pathology.  The most recent MRI lumbar spine unremarkable  The most recent MRI C-spine shows possible soft tissue infection  The most recent MRI contrast of the brain shows possible multifocal embolic infarction with hemorrhagic conversion, most likely septic emboli s/p cefazolin IV.    Plan:  See \"Gram positive bacteremia\"  Hypotension  MAP as low as 62 in the ED per automated cuff  Home Htn meds: Metoprolol tartrate 25 mg BID, Losartan 25 mg QD  Losartan recent decreased from 50 mg due to concerns for hypotension    Plan:  Monitor VS including BP.   If hypotension recurs, confirm via manual, assess symptoms; suggest fluid bolus and discussion w crit care.  Continue home metoprolol tartrate 25 mg BID  Hold losartan 25 mg QD  COPD (chronic obstructive pulmonary disease) ([facility])  Pt w COPD on 2L supplemental O2 at baseline  Pt c/o increased SOB, increased PUTNAM, increased cough, increased wheezing, increased sputum production prior to admission.  Currently requiring 3L, above baseline of 2L NC  TTE 2/12/25: EF 55%, Normal systolic dysfunction, G1DD, No vegetation, No mural thrombus, moderate aortic sclerosis    Plan:  Nebs TID  Titrate O2 to maintain SpO2 greater than 88%  Acute urinary retention  Pt reports new urinary incontinence day prior to presentation and inability to void despite urge to urinate on day of presentation  H/o enlarged prostate  Suspect new urinary retention 2/2 BPH  US Kidney/Bladder 2/11/25: No hydronephrosis, moderate bladder distention, perinephric edema on L side.    Plan:  Tamsulosin 0.4 mg QD  Urinary retention protocol  Atrial fibrillation ([facility])  Patient rate controlled since admission    Plan:  Eliquis has been held because of MRI proven hemorrhagic conversion of multifocal embolic infarction, most likely septic emboli in the background of MSSA bacteremia.  Continue home metoprolol with hold " parameters  Back pain  MRI L-Spine: Negative for acute pathology  MRI C-spine shows possible soft tissue infection status post IV cefazolin  ID on board    Plan:  Lidocaine patches  Robaxin 500 mg q8 hrs scheduled  Anemia  Recent Labs     02/14/25  2021 02/15/25  0459 02/16/25  0608   HGB 8.4* 8.2* 8.5*     Lab Results   Component Value Date    IRON 37 (L) 02/14/2025    FERRITIN 147 02/14/2025    TIBC 166.6 (L) 02/14/2025    CONCFE 22 02/14/2025     B12 972, Folate 14  Stable    Plan:  H/H q8 hrs  Transfuse if <7   Patient already typed and screened, not consented  Patient required 2 units packed red blood cells overnight on 2/13  Given persistent anemia despite PRBC transfusions will initiate anemia workup  Iron panel, B12, Folate, LDH  GI consult, appreciate recs  Discussed with GI, will defer given stabilization of Hgb and new finding of septic emboli  Neck pain  The most recent MRI C-spine shows possible soft tissue infection in the background of bacteremia    Plan:  Lidocaine patch  Constipation (Resolved: 2/16/2025)  Patient with BM overnight    Plan:  Deescalate Colace to QD  Miralax prn    VTE Pharmacologic Prophylaxis: VTE Score: 9 High Risk (Score >/= 5) - Pharmacological DVT Prophylaxis Contraindicated. Sequential Compression Devices Ordered.    Mobility:   Basic Mobility Inpatient Raw Score: 11  -HLM Goal: 4: Move to chair/commode  JH-HLM Achieved: 1: Laying in bed  JH-HLM Goal NOT achieved. Continue with multidisciplinary rounding and encourage appropriate mobility to improve upon JH-HLM goals.    Patient Centered Rounds: I performed bedside rounds with nursing staff today.   Discussions with Specialists or Other Care Team Provider: None    Education and Discussions with Family / Patient: Updated  (wife and niece) at bedside.    Current Length of Stay: 5 day(s)  Current Patient Status: Inpatient   Certification Statement: The patient will continue to require additional inpatient hospital  stay due to MSSA bacteremia and new bi hemispheric strokes  Discharge Plan: Anticipate discharge in >72 hrs to discharge location to be determined pending rehab evaluations.    Code Status: Level 3 - DNAR and DNI    Subjective   Patient seen and examined at bedside this morning, AAOx3.  Was in obvious discomfort complaining of neck pain.  When I reiterated that he had myositis and possible abscess in this area patient noted that [facility] and was put in this area, noted a small pimple/pustule formed and resolved.  Otherwise patient endorses improvement of shortness of breath denies any chest pain at this time.    Objective :  Temp:  [97.6 °F (36.4 °C)-98.2 °F (36.8 °C)] 97.7 °F (36.5 °C)  HR:  [] 76  BP: (102-139)/(54-66) 108/54  Resp:  [16] 16  SpO2:  [95 %-100 %] 98 %  O2 Device: Nasal cannula  Nasal Cannula O2 Flow Rate (L/min):  [3 L/min] 3 L/min    Body mass index is 22.11 kg/m².     Input and Output Summary (last 24 hours):     Intake/Output Summary (Last 24 hours) at 2/16/2025 0625  Last data filed at 2/15/2025 2201  Gross per 24 hour   Intake 480 ml   Output 1050 ml   Net -570 ml       Physical Exam  Vitals and nursing note reviewed.   Constitutional:       General: He is not in acute distress.     Appearance: He is well-developed.   HENT:      Head: Normocephalic and atraumatic.   Eyes:      Conjunctiva/sclera: Conjunctivae normal.   Cardiovascular:      Rate and Rhythm: Normal rate and regular rhythm.      Heart sounds: No murmur heard.     Comments: Left third finger discolored and purple on examination, picture in media  Pulmonary:      Effort: Pulmonary effort is normal.      Breath sounds: No stridor. No wheezing, rhonchi or rales.      Comments: Diminished breath sounds   Abdominal:      Palpations: Abdomen is soft.      Tenderness: There is no abdominal tenderness.   Musculoskeletal:         General: Tenderness (C-spine) present. No swelling.      Cervical back:  Neck supple.      Left foot: Deformity present.   Feet:      Left foot:      Skin integrity: Erythema present. No ulcer, blister, skin breakdown or warmth.   Skin:     General: Skin is warm and dry.      Capillary Refill: Capillary refill takes less than 2 seconds.   Neurological:      General: No focal deficit present.      Mental Status: He is alert and oriented to person, place, and time.   Psychiatric:         Mood and Affect: Mood normal.           Lines/Drains:              Lab Results: I have reviewed the following results:   Results from last 7 days   Lab Units 02/16/25  0608 02/14/25  1122 02/14/25  0337   WBC Thousand/uL 6.66   < > 6.73   HEMOGLOBIN g/dL 8.5*   < > 6.8*   HEMATOCRIT % 26.7*   < > 21.3*   PLATELETS Thousands/uL 149   < > 168   BANDS PCT %  --   --  3   SEGS PCT % 78*   < > 85*   LYMPHO PCT % 13*   < > 8*  10   MONO PCT % 5   < > 6  4   EOS PCT % 1   < > 0    < > = values in this interval not displayed.     Results from last 7 days   Lab Units 02/15/25  0459 02/14/25  0337   SODIUM mmol/L 137 138   POTASSIUM mmol/L 3.8 4.2   CHLORIDE mmol/L 104 106   CO2 mmol/L 27 27   BUN mg/dL 29* 38*   CREATININE mg/dL 1.09 1.08   ANION GAP mmol/L 6 5   CALCIUM mg/dL 8.2* 8.2*   ALBUMIN g/dL  --  2.6*   TOTAL BILIRUBIN mg/dL  --  0.41   ALK PHOS U/L  --  49   ALT U/L  --  17   AST U/L  --  23   GLUCOSE RANDOM mg/dL 98 100     Results from last 7 days   Lab Units 02/16/25  0051   INR  1.51*             Results from last 7 days   Lab Units 02/12/25  0442 02/11/25  1531 02/11/25  0921   LACTIC ACID mmol/L  --  1.9  --    PROCALCITONIN ng/ml 0.85*  --  0.90*       Recent Cultures (last 7 days):   Results from last 7 days   Lab Units 02/14/25  0325 02/11/25  1635 02/11/25  1113   BLOOD CULTURE  No Growth at 24 hrs.  No Growth at 24 hrs.  --  Staphylococcus aureus*  Staphylococcus aureus*   GRAM STAIN RESULT   --   --  Gram positive cocci in clusters*  Gram positive cocci in clusters*   LEGIONELLA URINARY  ANTIGEN   --  Negative  --        CTA head and neck w wo contrast  Result Date: 2/15/2025  Impression: CT Brain: - Known small multifocal infarcts in bilateral cerebral hemispheres and right cerebellum were better evaluated on MRI brain dated 2/14/2025, likely embolic. No new CT signs of acute infarction. - Unchanged chronic lacunar infarct in right basal ganglia with moderate chronic microangiopathy. CT Angiography: - Negative CTA head and neck for large vessel occlusion, dissection, aneurysm, or high-grade stenosis. - Mild atherosclerotic disease of the head and neck, as detailed above. Multiple pulmonary nodules measuring up to 0.9 cm in left upper lobe, unchanged. Based on current Fleischner Society 2017 Guidelines on incidental pulmonary nodule, follow-up non-contrast CT is recommended at 3-6 months from the initial examination and, if stable at that time, an additional follow-up is recommended for 18-24 months from the initial examination. 4.1 cm ectasia of ascending thoracic aorta. Recommend follow-up CT chest in 12 months. Additional chronic/incidental findings as detailed above. The study was marked in EPIC for immediate notification. Workstation performed: [id number]     MRI brain w wo contrast  Result Date: 2/15/2025  Impression: Multiple tiny recent infarcts scattered in multiple vascular distributions that are likely embolic. Several microhemorrhages associated with recent infarcts. No acute space-occupying hematoma. Chronic ischemic changes. The study was marked in EPIC for immediate notification. Workstation performed: [id number]     MRI cervical spine w wo contrast  Result Date: 2/14/2025  Impression: Cervical degenerative change with mild canal stenosis and mild to moderate foraminal narrowing. No cord compression or abnormal cord signal. Mild nonspecific edema within the right posterior paraspinal musculature of the upper cervical spine seen on sagittal STIR imaging with mild enhancement. Minimal  "peripheral enhancement is noted and differential considerations would include infectious/inflammatory myositis with soft tissue abscess. No discitis or osteomyelitis. This examination was marked \"immediate notification\" in Epic in order to begin the standard process by which the radiology reading room liaison alerts the referring practitioner. Workstation performed: [id number]     CT head w wo contrast  Result Date: 2/13/2025  Impression: No acute hemorrhage, edema, or mass effect. Chronic microangiopathic changes and chronic appearing infarctions as above. No pathologic enhancement. Workstation performed: [id number]     MRI lumbar spine w wo contrast  Result Date: 2/13/2025  Impression: Mild inferior plate edema at L1 with adjacent small Schmorl's node and minimal adjacent postcontrast enhancement. Vacuum disc phenomenon noted on the recent CT at this level. Findings likely represent degenerative disc disease with Schmorl's node rather than discitis and osteomyelitis which should only be considered in the right clinical setting. No paraspinal edema or enhancement identified. No evidence of abscess or abnormal enhancement in the paraspinal soft tissues. 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 Resident: KEI Wallace I, the attending radiologist, have reviewed the images and agree with the final report above. Workstation performed: [id number]     US kidney and bladder  Result Date: 2/11/2025  Impression: No hydronephrosis. Moderate bladder distention. Perinephric edema on the left. Findings were discussed with Dr. Peralta at 7:25 p.m. via secure text Workstation performed: [id number]     CTA chest pe study  Result Date: 2/11/2025  Impression: No evidence of pulmonary embolus. Stable ectasia of the ascending thoracic aorta measuring up to 41 mm. Mild secretions in the lower trachea, correlate for any dysphagia or possible aspiration. Noncalcified left lung apex pulmonary nodule, 6 x 5 mm. " "Because this was not present on prior chest CT of December 4, 2023, conservative management with follow-up low radiation dose noncontrast chest CT in 6 months is recommended. This examination was marked \"immediate notification\" in Epic in order to begin the standard process by which the radiology reading room liaison alerts the referring practitioner. Resident: Bijan Fajardo I, the attending radiologist, have reviewed the images and agree with the final report above. Workstation performed: [id number]     XR chest 1 view portable  Result Date: 2/11/2025  Impression: No acute cardiopulmonary disease. Workstation performed: [id number]       No Chest XR results available for this patient.     Other Study Results Review: No additional pertinent studies reviewed.    Last 24 Hours Medication List:     Current Facility-Administered Medications:     acetaminophen (TYLENOL) tablet 650 mg, Q6H PRN    albuterol (PROVENTIL HFA,VENTOLIN HFA) inhaler 2 puff, Q4H PRN    [Held by provider] apixaban (ELIQUIS) tablet 5 mg, BID    aspirin chewable tablet 81 mg, Daily    atorvastatin (LIPITOR) tablet 80 mg, Daily With Dinner    barium sulfate tablet 1 tablet, Once in imaging    barium sulfate tablet 1 tablet, Once in imaging    ceFAZolin (ANCEF) IVPB (premix in dextrose) 2,000 mg 50 mL, Q8H, Last Rate: 2,000 mg (02/16/25 0142)    docusate sodium (COLACE) capsule 100 mg, Daily    fluticasone (ARNUITY ELLIPTA) 100 MCG/ACT inhaler 1 puff, Daily    guaiFENesin (MUCINEX) 12 hr tablet 1,200 mg, BID    hydroxychloroquine (PLAQUENIL) tablet 400 mg, Daily With Breakfast    lidocaine (LIDODERM) 5 % patch 1 patch, Daily    lidocaine (LIDODERM) 5 % patch 1 patch, Daily    Lidocaine Viscous HCl (XYLOCAINE) 2 % mucosal solution 15 mL, BID    [Held by provider] losartan (COZAAR) tablet 25 mg, Daily    methocarbamol (ROBAXIN) tablet 500 mg, Q8H GALE    metoprolol tartrate (LOPRESSOR) tablet 25 mg, Q12H GALE    oxyCODONE (ROXICODONE) IR tablet 5 mg, Q4H " PRN    oxyCODONE (ROXICODONE) split tablet 2.5 mg, Q4H PRN    pantoprazole (PROTONIX) injection 40 mg, Q12H GALE    polyethylene glycol (MIRALAX) packet 17 g, Daily PRN    sertraline (ZOLOFT) tablet 12.5 mg, Daily    sucralfate (CARAFATE) tablet 1 g, 4x Daily (AC & HS)    tamsulosin (FLOMAX) capsule 0.4 mg, Daily With Dinner    Administrative Statements   Today, Patient Was Seen By: Mal Neely DO      **Please Note: This note may have been constructed using a voice recognition system.**

## 2025-02-16 NOTE — PROGRESS NOTES
Progress Note - Infectious Disease   Name: Devin Chaves 79 y.o. male I MRN: [id number]  Unit/Bed#: S -01 I Date of Admission: 2/11/2025   Date of Service: 2/16/2025 I Hospital Day: 5    Assessment & Plan  Sepsis (HCC)  E/b tachycardia, tachypnea and hypotension with WBC 12 K.  Patient is clinically much improved.  WBC has normalized.  SBP is now normal  -antibiotics as below  -follow-up final cultures  -monitor temperature and hemodynamics  -serial exam  -additional inventions pending clinical course  -monitoring serial CBC and BMP for treatment response and any developing toxicities     MSSA bacteremia  Admission Blood cultures labeled same arm/same time are + for MSSA. 2/12/25 TTE negative for vegetation. No PPM, intravascular devices. Acute posterior neck pain predominant starting 2/14/25 and possible deep cervical infection evident on MRI C spine.  Bacteremia clearing  -continues Cefazolin 2 g IV q 8 hours at present  -follow-up repeat blood cultures  -serial exam  -additional inventions pending clinical course  -anticipate protracted IV antibiotic course     Neck pain  Acute posterior neck pain predominant starting 2/14/25 2/14/25 MRI C spine: cervical degenerative change. Mild nonspecific edema within the right posterior paraspinal musculature of the upper cervical spine with mild enhancement. 2 separate foci of linear nonenhancement may represent peripherally enhancing fluid collections. No discitis or osteomyelitis.  ESR is only mildly elevated but CRP is highly elevated.  Patient will need long-term IV antibiotic and monitoring of ESR/CRP on treatment.  He also needs repeat MRI.  -continues cefazolin as above  -Treat x 6-week course  -serial exam  -Monitor ESR/CRP.  If ESR/CRP remain elevated at the end of IV antibiotic course, patient will need to be transitioned to p.o. antibiotic and remain on it until inflammatory markers are normal.  -Repeat C-spine MRI in 4 to 6 weeks.  -pain management per  "primary   Back pain  Patient reports chronic back pain has been worse due to \"inactivity\" and thinks worsened around the time of the catheterization. 2/13/25 MRI L spine: mild inferior plate edema at L1 with adjacent small Schmorl's node and minimal adjacent postcontrast enhancement. More c/w DDD. No paraspinal edema.   -continues antibiotic as above  -serial exam  -pain management per primary   Acute urinary retention  Renal US: moderate bladder distention, no hydronephrosis, perinephric edema left. 2/11/25 s/p straight cath for 700 mL. U/A benign. No urinary symptoms other than hesitancy   -monitor urinary output/symptoms  -additional inventions pending retention course     COPD (chronic obstructive pulmonary disease) ([facility])  2/11/25 CXR no infiltrates, CTA chest: no PE, mild secretions in lower trachea. Back to baseline 2L N[employer]  Urinary antigens, Flu/COVID screen negative  -monitor respiratory status  -respiratory support prn  -monitor O2 requirements  -serial exam  -aspiration precautions  -additional inventions pending clinical course     Atrial fibrillation ([facility])  On Eliquis    Stroke ([facility])  Patient's head CT and MRI showed multiple small bilateral hemispheric infarcts, suggestive of septic emboli.  TTE did not show obvious vegetation but infective endocarditis is possible.  However, patient will be on long-term IV antibiotic for C-spine infection.  As long as bacteremia clears, CATHI will not change treatment plan.  For, it does not need to be done.  However, if bacteremia does not clear, patient would need CATHI then.  -IV cefazolin as in above  -Follow-up on repeat blood culture    Discussed with patient in detail regarding the above plan.  I have discussed with Dr. Chavez from primary service regarding the above plan to continue IV antibiotics.  She agrees with the plan.    Antibiotics:  Cefazolin     Subjective   Patient's neck pain about the same.  Low back pain is improved  No headache  Temperature stays " down.  No chills.  He is tolerating antibiotic well.  No nausea, vomiting or diarrhea.    Objective :  Temp:  [97.3 °F (36.3 °C)-98.2 °F (36.8 °C)] 97.5 °F (36.4 °C)  HR:  [] 82  BP: ()/(44-66) 97/44  Resp:  [16] 16  SpO2:  [84 %-100 %] 99 %  O2 Device: Nasal cannula  Nasal Cannula O2 Flow Rate (L/min):  [3 L/min] 3 L/min    General:  No acute distress  Psychiatric:  Awake and alert  Neck: Mild tenderness to palpation at midline.  No deformity.  No mass.  No erythema.  Pulmonary:  Normal respiratory excursion without accessory muscle use  Abdomen:  Soft, nontender  Extremities:  No edema  Skin:  No rashes      Lab Results: I have reviewed the following results:  Results from last 7 days   Lab Units 02/16/25  0608 02/15/25  0459 02/14/25 2021 02/14/25  1122 02/14/25  0337   WBC Thousand/uL 6.66 6.85  --   --  6.73   HEMOGLOBIN g/dL 8.5* 8.2* 8.4*   < > 6.8*   PLATELETS Thousands/uL 149 178  --   --  168    < > = values in this interval not displayed.     Results from last 7 days   Lab Units 02/16/25  0837 02/15/25  0459 02/14/25  0337 02/13/25  0537   SODIUM mmol/L 137 137 138 138   POTASSIUM mmol/L 3.6 3.8 4.2 4.1   CHLORIDE mmol/L 104 104 106 105   CO2 mmol/L 30 27 27 28   BUN mg/dL 21 29* 38* 37*   CREATININE mg/dL 0.91 1.09 1.08 0.94   EGFR ml/min/1.73sq m 81 65 65 77   CALCIUM mg/dL 8.2* 8.2* 8.2* 8.2*   AST U/L 20  --  23 26   ALT U/L 3*  --  17 42   ALK PHOS U/L 51  --  49 57   ALBUMIN g/dL 2.6*  --  2.6* 2.5*     Results from last 7 days   Lab Units 02/14/25  0325 02/11/25  1810 02/11/25  1635 02/11/25  1113   BLOOD CULTURE  No Growth at 48 hrs.  No Growth at 48 hrs.  --   --  Staphylococcus aureus*  Staphylococcus aureus*   GRAM STAIN RESULT   --   --   --  Gram positive cocci in clusters*  Gram positive cocci in clusters*   MRSA CULTURE ONLY   --  No Methicillin Resistant Staphlyococcus aureus (MRSA) isolated  --   --    LEGIONELLA URINARY ANTIGEN   --   --  Negative  --      Results from  last 7 days   Lab Units 02/12/25  0442 02/11/25  0921   PROCALCITONIN ng/ml 0.85* 0.90*     Results from last 7 days   Lab Units 02/16/25  0051   CRP mg/L 106.8*     Results from last 7 days   Lab Units 02/14/25  0337   FERRITIN ng/mL 147     Results from last 7 days   Lab Units 02/11/25  1030   D-DIMER QUANTITATIVE ug/ml FEU 3.89*     Imaging Results Review: I personally reviewed the following image studies in PACS and associated radiology reports: CT head and MRI brain. My interpretation of the radiology images/reports is: Head CT and MRI show small multifocal infarcts, concerning for septic emboli..

## 2025-02-16 NOTE — ASSESSMENT & PLAN NOTE
MRI brain: Multiple tiny recent infarcts scattered in multiple vascular distributions that are likely embolic. Several microhemorrhages associated with recent infarcts. No acute space-occupying hematoma. Chronic ischemic changes.    Plan:  Hold Eliquis given conversion demonstrated on MRI brain  Continue aspirin per neurology  Will inquire for need for CATHI with ID

## 2025-02-16 NOTE — ASSESSMENT & PLAN NOTE
Patient's head CT and MRI showed multiple small bilateral hemispheric infarcts, suggestive of septic emboli.  TTE did not show obvious vegetation but infective endocarditis is possible.  However, patient will be on long-term IV antibiotic for C-spine infection.  As long as bacteremia clears, CATHI will not change treatment plan.  For, it does not need to be done.  However, if bacteremia does not clear, patient would need CATHI then.  -IV cefazolin as in above  -Follow-up on repeat blood culture    Discussed with patient in detail regarding the above plan.

## 2025-02-16 NOTE — PLAN OF CARE
Problem: Prexisting or High Potential for Compromised Skin Integrity  Goal: Skin integrity is maintained or improved  Description: INTERVENTIONS:  - Identify patients at risk for skin breakdown  - Assess and monitor skin integrity  - Assess and monitor nutrition and hydration status  - Monitor labs   - Assess for incontinence   - Turn and reposition patient  - Assist with mobility/ambulation  - Relieve pressure over bony prominences  - Avoid friction and shearing  - Provide appropriate hygiene as needed including keeping skin clean and dry  - Evaluate need for skin moisturizer/barrier cream  - Collaborate with interdisciplinary team   - Patient/family teaching  - Consider wound care consult   Outcome: Progressing     Problem: PAIN - ADULT  Goal: Verbalizes/displays adequate comfort level or baseline comfort level  Description: Interventions:  - Encourage patient to monitor pain and request assistance  - Assess pain using appropriate pain scale  - Administer analgesics based on type and severity of pain and evaluate response  - Implement non-pharmacological measures as appropriate and evaluate response  - Consider cultural and social influences on pain and pain management  - Notify physician/advanced practitioner if interventions unsuccessful or patient reports new pain  Outcome: Progressing     Problem: INFECTION - ADULT  Goal: Absence or prevention of progression during hospitalization  Description: INTERVENTIONS:  - Assess and monitor for signs and symptoms of infection  - Monitor lab/diagnostic results  - Monitor all insertion sites, i.e. indwelling lines, tubes, and drains  - Monitor endotracheal if appropriate and nasal secretions for changes in amount and color  - [place] appropriate cooling/warming therapies per order  - Administer medications as ordered  - Instruct and encourage patient and family to use good hand hygiene technique  - Identify and instruct in appropriate isolation precautions for  identified infection/condition  Outcome: Progressing  Goal: Absence of fever/infection during neutropenic period  Description: INTERVENTIONS:  - Monitor WBC    Outcome: Progressing     Problem: SAFETY ADULT  Goal: Patient will remain free of falls  Description: INTERVENTIONS:  - Educate patient/family on patient safety including physical limitations  - Instruct patient to call for assistance with activity   - Consult OT/PT to assist with strengthening/mobility   - Keep Call bell within reach  - Keep bed low and locked with side rails adjusted as appropriate  - Keep care items and personal belongings within reach  - Initiate and maintain comfort rounds  - Make Fall Risk Sign visible to staff  - Offer Toileting every 2 Hours, in advance of need  - Initiate/Maintain bed/chair alarm  - Obtain necessary fall risk management equipment  - Apply yellow socks and bracelet for high fall risk patients  - Consider moving patient to room near nurses station  Outcome: Progressing     Problem: Knowledge Deficit  Goal: Patient/family/caregiver demonstrates understanding of disease process, treatment plan, medications, and discharge instructions  Description: Complete learning assessment and assess knowledge base.  Interventions:  - Provide teaching at level of understanding  - Provide teaching via preferred learning methods  Outcome: Progressing     Problem: Neurological Deficit  Goal: Neurological status is stable or improving  Description: Interventions:  - Monitor and assess patient's level of consciousness, motor function, sensory function, and level of assistance needed for ADLs.   - Monitor and report changes from baseline. Collaborate with interdisciplinary team to initiate plan and implement interventions as ordered.   - Provide and maintain a safe environment.  - Consider seizure precautions.  - Consider fall precautions.  - Consider aspiration precautions.  - Consider bleeding precautions.  Outcome: Progressing      Problem: Activity Intolerance/Impaired Mobility  Goal: Mobility/activity is maintained at optimum level for patient  Description: Interventions:  - Assess and monitor patient  barriers to mobility and need for assistive/adaptive devices.  - Assess patient's emotional response to limitations.  - Collaborate with interdisciplinary team and initiate plans and interventions as ordered.  - Encourage independent activity per ability.  - Maintain proper body alignment.  - Perform active/passive rom as tolerated/ordered.  - Plan activities to conserve energy.  - Turn patient as appropriate  Outcome: Progressing     Problem: Communication Impairment  Goal: Ability to express needs and understand communication  Description: Assess patient's communication skills and ability to understand information.  Patient will demonstrate use of effective communication techniques, alternative methods of communication and understanding even if not able to speak.     - Encourage communication and provide alternate methods of communication as needed.  - Collaborate with case management/ for discharge needs.  - Include patient/family/caregiver in decisions related to communication.  Outcome: Progressing     Problem: Potential for Aspiration  Goal: Non-ventilated patient's risk of aspiration is minimized  Description: Assess and monitor vital signs, respiratory status, and labs (WBC).  Monitor for signs of aspiration (tachypnea, cough, rales, wheezing, cyanosis, fever).    - Assess and monitor patient's ability to swallow.  - Place patient up in chair to eat if possible.  - HOB up at 90 degrees to eat if unable to get patient up into chair.  - Supervise patient during oral intake.   - Instruct patient/ family to take small bites.  - Instruct patient/ family to take small single sips when taking liquids.  - Follow patient-specific strategies generated by speech pathologist.  Outcome: Progressing  Goal: Ventilated patient's risk  of aspiration is minimized  Description: Assess and monitor vital signs, respiratory status, airway cuff pressure, and labs (WBC).  Monitor for signs of aspiration (tachypnea, cough, rales, wheezing, cyanosis, fever).    - Elevate head of bed 30 degrees if patient has tube feeding.  - Monitor tube feeding.  Outcome: Progressing     Problem: Nutrition  Goal: Nutrition/Hydration status is improving  Description: Monitor and assess patient's nutrition/hydration status for malnutrition (ex- brittle hair, bruises, dry skin, pale skin and conjunctiva, muscle wasting, smooth red tongue, and disorientation). Collaborate with interdisciplinary team and initiate plan and interventions as ordered.  Monitor patient's weight and dietary intake as ordered or per policy. Utilize nutrition screening tool and intervene per policy. Determine patient's food preferences and provide high-protein, high-caloric foods as appropriate.     - Assist patient with eating.  - Allow adequate time for meals.  - Encourage patient to take dietary supplement as ordered.  - Collaborate with clinical nutritionist.  - Include patient/family/caregiver in decisions related to nutrition.  Outcome: Progressing

## 2025-02-16 NOTE — ASSESSMENT & PLAN NOTE
Admission Blood cultures labeled same arm/same time are + for MSSA. 2/12/25 TTE negative for vegetation. No PPM, intravascular devices. Acute posterior neck pain predominant starting 2/14/25 and possible deep cervical infection evident on MRI C spine.  Bacteremia clearing  -continues Cefazolin 2 g IV q 8 hours at present  -follow-up repeat blood cultures  -serial exam  -additional inventions pending clinical course  -anticipate protracted IV antibiotic course

## 2025-02-16 NOTE — ASSESSMENT & PLAN NOTE
Patient rate controlled since admission    Plan:  Eliquis has been held because of MRI proven hemorrhagic conversion of multifocal embolic infarction, most likely septic emboli in the background of MSSA bacteremia.  Continue home metoprolol with hold parameters

## 2025-02-16 NOTE — ASSESSMENT & PLAN NOTE
"Meeting SIRS criteria for tachycardia (HR ), tachypnea (RR 16-28), and leukocytosis (12.32)  No clear source of infection. CTA PE study in ED did not demonstrate acute pathology.  The most recent MRI lumbar spine unremarkable  The most recent MRI C-spine shows possible soft tissue infection  The most recent MRI contrast of the brain shows possible multifocal embolic infarction with hemorrhagic conversion, most likely septic emboli s/p cefazolin IV.    Plan:  See \"Gram positive bacteremia\"  "

## 2025-02-16 NOTE — ASSESSMENT & PLAN NOTE
Acute posterior neck pain predominant starting 2/14/25 2/14/25 MRI C spine: cervical degenerative change. Mild nonspecific edema within the right posterior paraspinal musculature of the upper cervical spine with mild enhancement. 2 separate foci of linear nonenhancement may represent peripherally enhancing fluid collections. No discitis or osteomyelitis.  ESR is only mildly elevated but CRP is highly elevated.  Patient will need long-term IV antibiotic and monitoring of ESR/CRP on treatment.  He also needs repeat MRI.  -continues cefazolin as above  -Treat x 6-week course  -serial exam  -Monitor ESR/CRP.  If ESR/CRP remain elevated at the end of IV antibiotic course, patient will need to be transitioned to p.o. antibiotic and remain on it until inflammatory markers are normal.  -Repeat C-spine MRI in 4 to 6 weeks.  -pain management per primary

## 2025-02-16 NOTE — ASSESSMENT & PLAN NOTE
Pt reports new urinary incontinence day prior to presentation and inability to void despite urge to urinate on day of presentation  H/o enlarged prostate  Suspect new urinary retention 2/2 BPH  US Kidney/Bladder 2/11/25: No hydronephrosis, moderate bladder distention, perinephric edema on L side.    Plan:  Tamsulosin 0.4 mg QD  Urinary retention protocol

## 2025-02-16 NOTE — ASSESSMENT & PLAN NOTE
Blood cultures with Gram + cocci, likely Staph Aureus  Unknown source at this time, patient with multiple small abrasions on hands and arms. UA negative, LLE does not appear to have cellulitis.  TTE negative for vegetations  Patient mentioned small laceration from haircut at [employer] with pustule formation, possible source at there is myositis to that area  MRI L-spine: No evidence of abscess or abnormal enhancement in the paraspinal soft tissues.  MRI C-spine: Mild nonspecific edema within the right posterior paraspinal musculature of the upper cervical spine seen on sagittal STIR imaging with mild enhancement. Minimal peripheral enhancement is noted and differential considerations would include infectious/inflammatory myositis with soft tissue abscess. No osteomyelitis or discitis. No cord compression or abnormal cord signal.  NCCTH: No acute hemorrhage, edema, or mass effect. 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

## 2025-02-17 ENCOUNTER — APPOINTMENT (INPATIENT)
Dept: CT IMAGING | Facility: HOSPITAL | Age: 80
DRG: 871 | End: 2025-02-17
Payer: COMMERCIAL

## 2025-02-17 LAB
ANION GAP SERPL CALCULATED.3IONS-SCNC: 3 MMOL/L (ref 4–13)
ANISOCYTOSIS BLD QL SMEAR: PRESENT
APTT PPP: 54 SECONDS (ref 23–34)
BASOPHILS # BLD MANUAL: 0 THOUSAND/UL (ref 0–0.1)
BASOPHILS NFR MAR MANUAL: 0 % (ref 0–1)
BUN SERPL-MCNC: 19 MG/DL (ref 5–25)
CALCIUM SERPL-MCNC: 7.8 MG/DL (ref 8.4–10.2)
CHLORIDE SERPL-SCNC: 104 MMOL/L (ref 96–108)
CO2 SERPL-SCNC: 30 MMOL/L (ref 21–32)
CREAT SERPL-MCNC: 0.95 MG/DL (ref 0.6–1.3)
EOSINOPHIL # BLD MANUAL: 0 THOUSAND/UL (ref 0–0.4)
EOSINOPHIL NFR BLD MANUAL: 0 % (ref 0–6)
ERYTHROCYTE [DISTWIDTH] IN BLOOD BY AUTOMATED COUNT: 17.4 % (ref 11.6–15.1)
GFR SERPL CREATININE-BSD FRML MDRD: 76 ML/MIN/1.73SQ M
GLUCOSE SERPL-MCNC: 95 MG/DL (ref 65–140)
HCT VFR BLD AUTO: 24.5 % (ref 36.5–49.3)
HCT VFR BLD AUTO: 24.8 % (ref 36.5–49.3)
HGB BLD-MCNC: 7.8 G/DL (ref 12–17)
HGB BLD-MCNC: 7.9 G/DL (ref 12–17)
HGB BLD-MCNC: 8.7 G/DL (ref 12–17)
INR PPP: 1.39 (ref 0.85–1.19)
LYMPHOCYTES # BLD AUTO: 0.72 THOUSAND/UL (ref 0.6–4.47)
LYMPHOCYTES # BLD AUTO: 14 % (ref 14–44)
MAGNESIUM SERPL-MCNC: 2 MG/DL (ref 1.9–2.7)
MCH RBC QN AUTO: 29.4 PG (ref 26.8–34.3)
MCHC RBC AUTO-ENTMCNC: 32.2 G/DL (ref 31.4–37.4)
MCV RBC AUTO: 91 FL (ref 82–98)
MONOCYTES # BLD AUTO: 0.19 THOUSAND/UL (ref 0–1.22)
MONOCYTES NFR BLD: 4 % (ref 4–12)
MYELOCYTE ABSOLUTE CT: 0.05 THOUSAND/UL (ref 0–0.1)
MYELOCYTES NFR BLD MANUAL: 1 % (ref 0–1)
NEUTROPHILS # BLD MANUAL: 3.86 THOUSAND/UL (ref 1.85–7.62)
NEUTS BAND NFR BLD MANUAL: 1 % (ref 0–8)
NEUTS SEG NFR BLD AUTO: 79 % (ref 43–75)
PLATELET # BLD AUTO: 141 THOUSANDS/UL (ref 149–390)
PLATELET BLD QL SMEAR: ABNORMAL
PMV BLD AUTO: 8.6 FL (ref 8.9–12.7)
POTASSIUM SERPL-SCNC: 3.5 MMOL/L (ref 3.5–5.3)
PROTHROMBIN TIME: 17.8 SECONDS (ref 12.3–15)
RBC # BLD AUTO: 2.69 MILLION/UL (ref 3.88–5.62)
RBC MORPH BLD: PRESENT
SODIUM SERPL-SCNC: 137 MMOL/L (ref 135–147)
VARIANT LYMPHS # BLD AUTO: 1 %
WBC # BLD AUTO: 4.82 THOUSAND/UL (ref 4.31–10.16)

## 2025-02-17 PROCEDURE — 70450 CT HEAD/BRAIN W/O DYE: CPT

## 2025-02-17 PROCEDURE — G0545 PR INHERENT VISIT TO INPT: HCPCS | Performed by: INTERNAL MEDICINE

## 2025-02-17 PROCEDURE — 85007 BL SMEAR W/DIFF WBC COUNT: CPT

## 2025-02-17 PROCEDURE — 85014 HEMATOCRIT: CPT

## 2025-02-17 PROCEDURE — 99232 SBSQ HOSP IP/OBS MODERATE 35: CPT | Performed by: INTERNAL MEDICINE

## 2025-02-17 PROCEDURE — 85027 COMPLETE CBC AUTOMATED: CPT

## 2025-02-17 PROCEDURE — 80048 BASIC METABOLIC PNL TOTAL CA: CPT

## 2025-02-17 PROCEDURE — 94640 AIRWAY INHALATION TREATMENT: CPT

## 2025-02-17 PROCEDURE — 85610 PROTHROMBIN TIME: CPT

## 2025-02-17 PROCEDURE — 85018 HEMOGLOBIN: CPT

## 2025-02-17 PROCEDURE — 97530 THERAPEUTIC ACTIVITIES: CPT

## 2025-02-17 PROCEDURE — 99233 SBSQ HOSP IP/OBS HIGH 50: CPT | Performed by: INTERNAL MEDICINE

## 2025-02-17 PROCEDURE — 85730 THROMBOPLASTIN TIME PARTIAL: CPT

## 2025-02-17 PROCEDURE — 83735 ASSAY OF MAGNESIUM: CPT

## 2025-02-17 PROCEDURE — 94760 N-INVAS EAR/PLS OXIMETRY 1: CPT

## 2025-02-17 PROCEDURE — 99233 SBSQ HOSP IP/OBS HIGH 50: CPT | Performed by: PSYCHIATRY & NEUROLOGY

## 2025-02-17 PROCEDURE — 99223 1ST HOSP IP/OBS HIGH 75: CPT

## 2025-02-17 RX ORDER — BISACODYL 10 MG
10 SUPPOSITORY, RECTAL RECTAL DAILY
Status: DISCONTINUED | OUTPATIENT
Start: 2025-02-17 | End: 2025-02-17

## 2025-02-17 RX ORDER — SENNOSIDES 8.6 MG
1 TABLET ORAL
Status: DISCONTINUED | OUTPATIENT
Start: 2025-02-17 | End: 2025-02-18

## 2025-02-17 RX ORDER — HEPARIN SODIUM 5000 [USP'U]/ML
5000 INJECTION, SOLUTION INTRAVENOUS; SUBCUTANEOUS EVERY 8 HOURS SCHEDULED
Status: DISCONTINUED | OUTPATIENT
Start: 2025-02-17 | End: 2025-02-18

## 2025-02-17 RX ORDER — BISACODYL 10 MG
10 SUPPOSITORY, RECTAL RECTAL DAILY PRN
Status: DISCONTINUED | OUTPATIENT
Start: 2025-02-17 | End: 2025-02-22 | Stop reason: HOSPADM

## 2025-02-17 RX ORDER — DOCUSATE SODIUM 100 MG/1
100 CAPSULE, LIQUID FILLED ORAL 2 TIMES DAILY
Status: DISCONTINUED | OUTPATIENT
Start: 2025-02-17 | End: 2025-02-22 | Stop reason: HOSPADM

## 2025-02-17 RX ORDER — POTASSIUM CHLORIDE 1500 MG/1
40 TABLET, EXTENDED RELEASE ORAL ONCE
Status: COMPLETED | OUTPATIENT
Start: 2025-02-17 | End: 2025-02-17

## 2025-02-17 RX ADMIN — METOPROLOL TARTRATE 25 MG: 25 TABLET, FILM COATED ORAL at 09:35

## 2025-02-17 RX ADMIN — LEVALBUTEROL HYDROCHLORIDE 1.25 MG: 1.25 SOLUTION RESPIRATORY (INHALATION) at 13:30

## 2025-02-17 RX ADMIN — LIDOCAINE 1 PATCH: 50 PATCH CUTANEOUS at 09:36

## 2025-02-17 RX ADMIN — PANTOPRAZOLE SODIUM 40 MG: 40 INJECTION, POWDER, FOR SOLUTION INTRAVENOUS at 21:22

## 2025-02-17 RX ADMIN — POTASSIUM CHLORIDE 40 MEQ: 1500 TABLET, EXTENDED RELEASE ORAL at 09:35

## 2025-02-17 RX ADMIN — SERTRALINE HYDROCHLORIDE 12.5 MG: 25 TABLET ORAL at 09:35

## 2025-02-17 RX ADMIN — CEFAZOLIN SODIUM 2000 MG: 2 SOLUTION INTRAVENOUS at 00:24

## 2025-02-17 RX ADMIN — LEVALBUTEROL HYDROCHLORIDE 1.25 MG: 1.25 SOLUTION RESPIRATORY (INHALATION) at 20:03

## 2025-02-17 RX ADMIN — METHOCARBAMOL TABLETS 500 MG: 500 TABLET, COATED ORAL at 21:22

## 2025-02-17 RX ADMIN — ASPIRIN 81 MG CHEWABLE TABLET 81 MG: 81 TABLET CHEWABLE at 09:35

## 2025-02-17 RX ADMIN — POLYETHYLENE GLYCOL 3350, SODIUM SULFATE ANHYDROUS, SODIUM BICARBONATE, SODIUM CHLORIDE, POTASSIUM CHLORIDE 4000 ML: 236; 22.74; 6.74; 5.86; 2.97 POWDER, FOR SOLUTION ORAL at 16:50

## 2025-02-17 RX ADMIN — SUCRALFATE 1 G: 1 TABLET ORAL at 06:13

## 2025-02-17 RX ADMIN — BISACODYL 10 MG: 10 SUPPOSITORY RECTAL at 12:14

## 2025-02-17 RX ADMIN — SUCRALFATE 1 G: 1 TABLET ORAL at 16:50

## 2025-02-17 RX ADMIN — ATORVASTATIN CALCIUM 80 MG: 40 TABLET, FILM COATED ORAL at 16:49

## 2025-02-17 RX ADMIN — SUCRALFATE 1 G: 1 TABLET ORAL at 21:22

## 2025-02-17 RX ADMIN — LIDOCAINE 1 PATCH: 50 PATCH CUTANEOUS at 09:35

## 2025-02-17 RX ADMIN — CEFAZOLIN SODIUM 2000 MG: 2 SOLUTION INTRAVENOUS at 16:50

## 2025-02-17 RX ADMIN — OXYCODONE HYDROCHLORIDE 5 MG: 5 TABLET ORAL at 21:50

## 2025-02-17 RX ADMIN — PANTOPRAZOLE SODIUM 40 MG: 40 INJECTION, POWDER, FOR SOLUTION INTRAVENOUS at 09:34

## 2025-02-17 RX ADMIN — GUAIFENESIN 1200 MG: 600 TABLET, EXTENDED RELEASE ORAL at 16:49

## 2025-02-17 RX ADMIN — GUAIFENESIN 1200 MG: 600 TABLET, EXTENDED RELEASE ORAL at 09:34

## 2025-02-17 RX ADMIN — SENNOSIDES 8.6 MG: 8.6 TABLET ORAL at 21:22

## 2025-02-17 RX ADMIN — FLUTICASONE FUROATE 1 PUFF: 100 POWDER RESPIRATORY (INHALATION) at 09:36

## 2025-02-17 RX ADMIN — METHOCARBAMOL TABLETS 500 MG: 500 TABLET, COATED ORAL at 05:09

## 2025-02-17 RX ADMIN — POLYETHYLENE GLYCOL 3350 17 G: 17 POWDER, FOR SOLUTION ORAL at 00:24

## 2025-02-17 RX ADMIN — LEVALBUTEROL HYDROCHLORIDE 1.25 MG: 1.25 SOLUTION RESPIRATORY (INHALATION) at 07:54

## 2025-02-17 RX ADMIN — IPRATROPIUM BROMIDE 0.5 MG: 0.5 SOLUTION RESPIRATORY (INHALATION) at 20:03

## 2025-02-17 RX ADMIN — TAMSULOSIN HYDROCHLORIDE 0.4 MG: 0.4 CAPSULE ORAL at 16:49

## 2025-02-17 RX ADMIN — DOCUSATE SODIUM 100 MG: 100 CAPSULE, LIQUID FILLED ORAL at 16:50

## 2025-02-17 RX ADMIN — OXYCODONE HYDROCHLORIDE 5 MG: 5 TABLET ORAL at 05:09

## 2025-02-17 RX ADMIN — HYDROXYCHLOROQUINE SULFATE 400 MG: 200 TABLET ORAL at 09:35

## 2025-02-17 RX ADMIN — METOPROLOL TARTRATE 25 MG: 25 TABLET, FILM COATED ORAL at 21:22

## 2025-02-17 RX ADMIN — IPRATROPIUM BROMIDE 0.5 MG: 0.5 SOLUTION RESPIRATORY (INHALATION) at 13:30

## 2025-02-17 RX ADMIN — CEFAZOLIN SODIUM 2000 MG: 2 SOLUTION INTRAVENOUS at 09:35

## 2025-02-17 RX ADMIN — IPRATROPIUM BROMIDE 0.5 MG: 0.5 SOLUTION RESPIRATORY (INHALATION) at 07:54

## 2025-02-17 RX ADMIN — SUCRALFATE 1 G: 1 TABLET ORAL at 12:14

## 2025-02-17 RX ADMIN — DOCUSATE SODIUM 100 MG: 100 CAPSULE, LIQUID FILLED ORAL at 09:35

## 2025-02-17 NOTE — ASSESSMENT & PLAN NOTE
- SC for >400 2/17   - Recommend bladder scans q4-6h and PRN SC   - monitor for retention, incontinence (including overflow incontinence), signs/symptoms of UTI  - recommend toileting program Q2-4 H during day and Q4-6H overnight   - recommend bladder scans/urinary retention protocol if concerns/risk of retention  - Optimal bowel management/constipation mgmt/prevention

## 2025-02-17 NOTE — ASSESSMENT & PLAN NOTE
MSSA bacteremia of uncertain etiology   - Possible soft tissue infection in cervical spine  - Risk of septic v other emboli to brain   - Mgmt per ID  - 6 week course of Ancef, monitor labs, vitals, exam closely  - C-scope planned for 2/17 for anemia with hx of non-bleeding angioectasia at cecum and non-bleeding ulcer at hepatic flexure in past  - Repeat MRI C spine in 4-6 weeks

## 2025-02-17 NOTE — PROGRESS NOTES
Progress Note - Neurology   Name: Devin Chaves 79 y.o. male I MRN: [id number]  Unit/Bed#: S -01 I Date of Admission: 2/11/2025   Date of Service: 2/17/2025 I Hospital Day: 6    Assessment & Plan  Stroke (HCC)  79M with PMH of Afib on Apixaban, chronic R BG and L cerebellar infarcts, COPD on 2-3L O2, RA and JAZMINE with recent prolonged hospital course for COPD exacerbation presenting with SOB and acute back pain w/ new urinary retention. Addmited with sepsis with leukocytosis, tachypnea, and tachycardia and found to have MSSA bacteremia. Work up thus far with no clear infectious origin. Course c/b anemia requiring transfusions. Neurology consulted after MRI revealed acute/subacute bihemispheric multifocal strokes.     Work Up:  MRI brain w/wo (2/14/25): L frontal lobe acute to early subacute infarct, multiple scattered punctate strokes in the bilateral frontal, right parietal, bilateral occipital, and right cerebellum with several microhemorrhages associated with recent infarcts with a few additional chronic microhemorrhages. Chronic RBG and L cerebellar lacunar infarct. L occipital lobe hemosiderin staining consistent with remote injury.  CTA H/N without evidence of LVO, dissection, aneurysm, high-grade stenosis. Mild atherosclerotic disease of the head and neck. 4.1 cm ectasia of the ascending thoracic aorta.  TTE (2/12/2025): EF 55%, G1 DD, akinetic basal inferior and mid inferior segments. Normal size of atria bilaterally.  NC CTH (2/17/25): 3 mm focus of hyperdensity left superior frontal lobe as described unchanged from the prior study may represent a small 3 mm hemorrhage versus focus of mineralization.   Labs: Hemoglobin A1c 5.8 (2/16/2025), LDL 15 (2/16/2025)    Impression: Pt with acute - subacute bihemispheric strokes in the setting of MSSA bacteremia of unclear origin and acute anemia only missing x1 dose of Eliquis while admitted. Etiology possibly 2/2 eliquis failure d/t acute infectious disease  process. Given presence of potential hemorrhage in the inferior L frontal lobe will continue to hold AC until colonoscopy performed on Wednesday. Can then repeat NC CTH and if stable can restart anticoagulation with Apixaban.      Plan:  Stroke pathway  Telemetry   BP goal: Normotension  AC/AP: Hold Eliquis given microhemorrhages on MR and L Inferior forntal lobe with questionable small hemorrhage, can continue aspirin.  Repeat NC CTH on Wed after colonoscopy - if stable can restart AC  Statin: Atorvastatin 80 mg QHS  PT/OT/ST Beltran Andie will need follow up in in 6 weeks with neurovascular attending or advance practitioner. He will not require outpatient neurological testing.  I have discussed the above management plan in detail with the primary service.   Neurology service will follow.  Please contact the SecureChat role for the Neurology service with any questions/concerns.    Subjective   Patient seen and examined this morning at bedside. No acute overnight events.     Review of Systems   Neurological:  Negative for dizziness, tremors, seizures, syncope, facial asymmetry, speech difficulty, weakness, light-headedness, numbness and headaches.     negative  Complete 14 point review of systems completed and was otherwise unremarkable aside noted above.    Objective :    Temp:  [97.5 °F (36.4 °C)-97.9 °F (36.6 °C)] 97.9 °F (36.6 °C)  HR:  [77-91] 77  BP: ()/(44-57) 116/52  Resp:  [17-18] 18  SpO2:  [99 %-100 %] 100 %  O2 Device: Nasal cannula  Nasal Cannula O2 Flow Rate (L/min):  [4 L/min-13 L/min] 4 L/min    GENERAL EXAM:  General Appearance: in no apparent distress, well developed and well nourished, and non-toxic  HENT: Normocephalic and atraumatic. Nose and Ears normal.   Eyes: No scleral icterus. No discharge.   Cardiovascular:  Distal extremities warm without palpable edema or tenderness, no observed significant swelling.   Pulmonary: Pulmonary effort is normal. Not in respiratory  distress  Musculoskeletal: No swelling or deformity.  Psychiatric: Normal behavior and appropriate affect     NEUROLOGIC  EXAM:  Mental Status: Alertness: alert. Orientation: time, date, person, place. Speech/Language normal rate, tone and rhythm. Commands: Can follow multistep commands  Cranial Nerves:  I Not tested   II Visual Fields bitemporal hemianopsia   II, Ill,  IV, VI Pupils equal, round, reactive to light and accommodation  EOM full and intact   V facial sensation was normal and symmetrical   VII facial symmetry equal   VIII Normal hearing to speech   IX, X Normal Palatal Elevation, No Uvular Deviation   XI Shoulder shrug and head turn is normal   XII Midline tongue protrusion   Motor: RUE 5/5, LUE 5/5, RLE 4/5, LLE testing limited d/t edema   Reflexes:   Sensory: Normal light touch sensation  Coordination: Finger To Nose:  R - Intact, L - Intact  Gait: Not tested      Lab Results: I have reviewed the following results:  CBC:  Results from last 7 days   Lab Units 02/17/25  0844 02/16/25  0608 02/15/25  0459 02/14/25  2021 02/14/25  1122 02/14/25  0337 02/13/25  1829 02/13/25  0906 02/13/25  0537 02/12/25  0442 02/11/25  0921   WBC Thousand/uL 4.82 6.66 6.85  --   --  6.73  --   --  8.27 10.46* 12.32*   RBC Million/uL 2.69* 2.89* 2.84*  --   --  2.33*  --   --  2.28* 2.66* 2.75*   HEMOGLOBIN g/dL 7.9* 8.5* 8.2* 8.4* 8.4* 6.8* 6.7*   < > 6.8* 8.1* 8.2*   HEMATOCRIT % 24.5* 26.7* 25.9* 26.2* 26.1* 21.3* 20.8*  --  21.0* 25.0* 25.4*   MCV fL 91 92 91  --   --  91  --   --  92 94 92   MCH pg 29.4 29.4 28.9  --   --  29.2  --   --  29.8 30.5 29.8   MCHC g/dL 32.2 31.8 31.7  --   --  31.9  --   --  32.4 32.4 32.3   RDW % 17.4* 17.7* 18.2*  --   --  18.3*  --   --  17.3* 17.1* 17.2*   MPV fL 8.6* 8.8* 9.1  --   --  8.8*  --   --  8.6* 8.9 8.7*   PLATELETS Thousands/uL 141* 149 178  --   --  168  --   --  183 173 224   NRBC AUTO /100 WBCs  --  0 0  --   --  0  --   --   --   --   --    SEGS PCT %  --  78* 79*  --    "--  85*  --   --   --   --   --    LYMPHO PCT % 14 13* 12*  --   --  8*  10  --   --  5* 3* 6*   MONO PCT % 4 5 5  --   --  6  4  --   --  4 3* 1*   EOS PCT % 0 1 1  --   --  0  --   --  0 0 0   BASOS PCT % 0 1 0  --   --  0  --   --  0 0 0   TOTAL NEUT ABS Thousands/µL  --  5.19 5.42  --   --  5.79  5.66  --   --   --   --   --    LYMPHS ABS Thousands/µL  --  0.89 0.80  --   --  0.67  0.55*  --   --   --   --   --    MONOS ABS Thousand/µL  --  0.35 0.35  --   --  0.42  --   --   --   --   --    EOS ABS Thousand/uL 0.00 0.04 0.04  --   --  0.00  --   --  0.00 0.00 0.00    < > = values in this interval not displayed.   ,   Chemistry Profile:   Results from last 7 days   Lab Units 02/17/25  0844 02/16/25  0837 02/15/25  0459 02/14/25  0337 02/13/25  0537 02/12/25  0442 02/11/25  0921   POTASSIUM mmol/L 3.5 3.6 3.8 4.2 4.1 4.5 4.2   CHLORIDE mmol/L 104 104 104 106 105 106 98   CO2 mmol/L 30 30 27 27 28 26 28   BUN mg/dL 19 21 29* 38* 37* 40* 45*   CREATININE mg/dL 0.95 0.91 1.09 1.08 0.94 0.97 1.25   CALCIUM mg/dL 7.8* 8.2* 8.2* 8.2* 8.2* 8.2* 8.6   MAGNESIUM mg/dL 2.0 2.0 1.9 2.2 2.1  --   --    AST U/L  --  20  --  23 26 40* 62*   ALT U/L  --  3*  --  17 42 65* 89*   ALK PHOS U/L  --  51  --  49 57 63 66   EGFR ml/min/1.73sq m 76 81 65 65 77 74 55     Results from last 7 days   Lab Units 02/17/25  0844 02/16/25  0837 02/15/25  0459 02/14/25  0337 02/13/25  0537 02/12/25  0442 02/11/25  0921   GLUCOSE RANDOM mg/dL 95 91 98 100 94 110 134      No results for input(s): \"POCGLU\" in the last 72 hours.  PT/INR: No results found for: \"PT\", \"INR\",   Hemoglobin A1c 5.8 (2/16/2025), LDL 15 (2/16/2025)    Micro: I have reviewed pertinent results.  Results from last 7 days   Lab Units 02/11/25  1635   LEUKOCYTES UA  Negative   NITRITE UA  Negative   GLUCOSE UA mg/dl Negative   KETONES UA mg/dl Negative   BLOOD UA  Small*   WBC UA /hpf 2-4*   RBC UA /hpf 10-20*     Lab Results   Component Value Date    BLOODCX No Growth at 48 " hrs. 02/14/2025    BLOODCX No Growth at 48 hrs. 02/14/2025    BLOODCX Staphylococcus aureus (A) 02/11/2025    BLOODCX Staphylococcus aureus (A) 02/11/2025    SPUTUMCULTUR 1+ Growth of Aspergillus species, NOT fumigatus (A) 12/08/2023    SPUTUMCULTUR 2+ Growth of 12/08/2023       Imaging Results Review: I reviewed radiology reports from this admission including: CT head.  CTA head and neck w wo contrast  Result Date: 2/15/2025  Impression: CT Brain: - Known small multifocal infarcts in bilateral cerebral hemispheres and right cerebellum were better evaluated on MRI brain dated 2/14/2025, likely embolic. No new CT signs of acute infarction. - Unchanged chronic lacunar infarct in right basal ganglia with moderate chronic microangiopathy. CT Angiography: - Negative CTA head and neck for large vessel occlusion, dissection, aneurysm, or high-grade stenosis. - Mild atherosclerotic disease of the head and neck, as detailed above. Multiple pulmonary nodules measuring up to 0.9 cm in left upper lobe, unchanged. Based on current Fleischner Society 2017 Guidelines on incidental pulmonary nodule, follow-up non-contrast CT is recommended at 3-6 months from the initial examination and, if stable at that time, an additional follow-up is recommended for 18-24 months from the initial examination. 4.1 cm ectasia of ascending thoracic aorta. Recommend follow-up CT chest in 12 months. Additional chronic/incidental findings as detailed above. The study was marked in EPIC for immediate notification. Workstation performed: [id number]     MRI brain w wo contrast  Result Date: 2/15/2025  Impression: Multiple tiny recent infarcts scattered in multiple vascular distributions that are likely embolic. Several microhemorrhages associated with recent infarcts. No acute space-occupying hematoma. Chronic ischemic changes. The study was marked in EPIC for immediate notification. Workstation performed: [id number]     MRI cervical spine w wo  "contrast  Result Date: 2/14/2025  Impression: Cervical degenerative change with mild canal stenosis and mild to moderate foraminal narrowing. No cord compression or abnormal cord signal. Mild nonspecific edema within the right posterior paraspinal musculature of the upper cervical spine seen on sagittal STIR imaging with mild enhancement. Minimal peripheral enhancement is noted and differential considerations would include infectious/inflammatory myositis with soft tissue abscess. No discitis or osteomyelitis. This examination was marked \"immediate notification\" in Epic in order to begin the standard process by which the radiology reading room liaison alerts the referring practitioner. Workstation performed: [id number]     CT head w wo contrast  Result Date: 2/13/2025  Impression: No acute hemorrhage, edema, or mass effect. Chronic microangiopathic changes and chronic appearing infarctions as above. No pathologic enhancement. Workstation performed: [id number]     MRI lumbar spine w wo contrast  Result Date: 2/13/2025  Impression: Mild inferior plate edema at L1 with adjacent small Schmorl's node and minimal adjacent postcontrast enhancement. Vacuum disc phenomenon noted on the recent CT at this level. Findings likely represent degenerative disc disease with Schmorl's node rather than discitis and osteomyelitis which should only be considered in the right clinical setting. No paraspinal edema or enhancement identified. No evidence of abscess or abnormal enhancement in the paraspinal soft tissues. 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 Resident: KEI Wallace I, the attending radiologist, have reviewed the images and agree with the final report above. Workstation performed: [id number]     US kidney and bladder  Result Date: 2/11/2025  Impression: No hydronephrosis. Moderate bladder distention. Perinephric edema on the left. Findings were discussed with Dr. Peralta at 7:25 p.m. via " "secure text Workstation performed: [id number]     CTA chest pe study  Result Date: 2/11/2025  Impression: No evidence of pulmonary embolus. Stable ectasia of the ascending thoracic aorta measuring up to 41 mm. Mild secretions in the lower trachea, correlate for any dysphagia or possible aspiration. Noncalcified left lung apex pulmonary nodule, 6 x 5 mm. Because this was not present on prior chest CT of December 4, 2023, conservative management with follow-up low radiation dose noncontrast chest CT in 6 months is recommended. This examination was marked \"immediate notification\" in Epic in order to begin the standard process by which the radiology reading room liaison alerts the referring practitioner. Resident: Bijan Fajardo I, the attending radiologist, have reviewed the images and agree with the final report above. Workstation performed: [id number]     XR chest 1 view portable  Result Date: 2/11/2025  Impression: No acute cardiopulmonary disease. Workstation performed: [id number]      Other Diagnostic Results: I have personally reviewed pertinent reports  Results for orders placed during the hospital encounter of 02/11/25    Echo complete w/ contrast if indicated    Interpretation Summary    Left Ventricle: Left ventricle is not well visualized. Left ventricular cavity size is normal. Wall thickness is mildly increased. The left ventricular ejection fraction is 55%. Systolic function is normal. Diastolic function is mildly abnormal, consistent with grade I (abnormal) relaxation.    The following segments are akinetic: basal inferior and mid inferior.    All other segments are normal.    Right Ventricle: Right ventricle is not well visualized. Right ventricular cavity size is mildly dilated. Systolic function is mildly to moderately reduced.    Aortic Valve: The aortic valve is trileaflet. The leaflets are not thickened. The leaflets are moderately calcified. There is mildly reduced mobility. There is mild to " moderate regurgitation. There is aortic valve sclerosis.    Mitral Valve: There is mild regurgitation.    Tricuspid Valve: There is mild regurgitation.    ACTIVE MEDICATIONS   Scheduled PRN   [Held by provider] apixaban, 5 mg, BID  aspirin, 81 mg, Daily  atorvastatin, 80 mg, Daily With Dinner  cefazolin, 2,000 mg, Q8H  docusate sodium, 100 mg, Daily  fluticasone, 1 puff, Daily  guaiFENesin, 1,200 mg, BID  hydroxychloroquine, 400 mg, Daily With Breakfast  ipratropium, 0.5 mg, TID  levalbuterol, 1.25 mg, TID  lidocaine, 1 patch, Daily  lidocaine, 1 patch, Daily  lidocaine, 1 patch, Daily  [Held by provider] losartan, 25 mg, Daily  methocarbamol, 500 mg, Q8H GALE  metoprolol tartrate, 25 mg, Q12H GALE  pantoprazole, 40 mg, Q12H GALE  polyethylene glycol, 4,000 mL, See Admin Instructions  sertraline, 12.5 mg, Daily  sucralfate, 1 g, 4x Daily (AC & HS)  tamsulosin, 0.4 mg, Daily With Dinner     acetaminophen, 650 mg, Q6H PRN  albuterol, 2 puff, Q4H PRN  barium sulfate, 1 tablet, Once in imaging  barium sulfate, 1 tablet, Once in imaging  oxyCODONE, 5 mg, Q4H PRN  oxyCODONE, 2.5 mg, Q4H PRN  polyethylene glycol, 17 g, Daily PRN       Continuous          VTE Mechanical Prophylaxis: Sequential Compression Device  VTE Pharmacologic Prophylaxis: VTE covered by:  [Held by provider] apixaban, Oral      ====================================================  Nirav Yancey, DO [facility] Neurology Residency, PGY-3

## 2025-02-17 NOTE — ASSESSMENT & PLAN NOTE
Blood cultures with Gram + cocci, likely Staph Aureus  Unknown source at this time, patient with multiple small abrasions on hands and arms. UA negative, LLE does not appear to have cellulitis.  TTE negative for vegetations  Patient mentioned small laceration from haircut at [employer] with pustule formation, possible source at there is myositis to that area  MRI L-spine: No evidence of abscess or abnormal enhancement in the paraspinal soft tissues.  MRI C-spine: Mild nonspecific edema within the right posterior paraspinal musculature of the upper cervical spine seen on sagittal STIR imaging with mild enhancement. Minimal peripheral enhancement is noted and differential considerations would include infectious/inflammatory myositis with soft tissue abscess. No osteomyelitis or discitis. No cord compression or abnormal cord signal.  NCCTH: No acute hemorrhage, edema, or mass effect. Chronic microangiopathic changes and chronic appearing infarctions  MRI Brain shows possible multifocal infarction    Plan:  Continue cefazolin 2 g IV q8 hrs  Given myositis and possible abscess appreicated on MRI C-spine will require prolonged IV antibiotic course  ID consulted, appreciate recs  Follow repeat blood cultures  If repeat blood cultures positive, can discuss CATHI at that time.  Will eventually need PICC for planned 6 week course

## 2025-02-17 NOTE — ASSESSMENT & PLAN NOTE
Admission Blood cultures labeled same arm/same time are + for MSSA. 2/12/25 TTE negative for vegetation. No PPM, intravascular devices. Acute posterior neck pain predominant starting 2/14/25 and possible deep cervical infection evident on MRI C spine.  Bacteremia clearing  -continues Cefazolin 2 g IV q 8 hours  -follow-up repeat blood cultures  -can have PICC placed anytime   -anticipate protracted IV antibiotic course as below  -office personnel arranging outpatient f/u and MRI needs

## 2025-02-17 NOTE — PHYSICAL THERAPY NOTE
PHYSICAL THERAPY TREATMENT NOTE    Patient Name: Devin Chaves  Today's Date: 2/17/2025 02/17/25 1332   PT Last Visit   PT Visit Date 02/17/25   Pain Assessment   Pain Assessment Tool 0-10   Pain Score No Pain   Restrictions/Precautions   Other Precautions Chair Alarm;Bed Alarm;Fall Risk;O2  (Masimo)   General   Chart Reviewed Yes   Additional Pertinent History 2L oxygen via nasal cannula. resting pulse ox 95% and 73 BPM, active 91% and 86 BPM.   Family/Caregiver Present No   Cognition   Arousal/Participation Alert;Cooperative   Attention Attends with cues to redirect   Orientation Level Oriented to person;Other (Comment)  (pt was identified w/ full name, birth date)   Following Commands Follows one step commands with increased time or repetition   Subjective   Subjective pt seen sitting on commode w/ NSG present .pt agreed to PT session. denied pain. reports feeling tired and easily short of breath.   Transfers   Sit to Stand 2  Maximal assistance   Additional items Assist x 1;Increased time required;Verbal cues  (for hand placement)   Stand to Sit 3  Moderate assistance  (poorly controlled descent)   Additional items Assist x 1;Verbal cues  (for body positioning, safety)   Stand pivot 2  Maximal assistance   Additional items Assist x 1;Increased time required;Verbal cues  (for walker placement, LE positioning)   Additional Comments pt stood 45 seconds w/ roller walker and mod to maxx1. additional standing not possible due to fatigue. seated rest break. pt then completed stand pivot transfer w/ roller walker as noted above.   Ambulation/Elevation   Gait pattern Not appropriate   Assistive Device Rolling walker   Balance   Static Sitting Fair +   Static Standing Poor  (to poor-, w/ roller walker)   Ambulatory Zero   Activity Tolerance   Activity Tolerance Patient limited by fatigue   Nurse Made Aware spoke to Rachel WALLACE    Assessment   Problem List Decreased strength;Decreased range of motion;Decreased endurance;Impaired balance;Decreased mobility;Decreased safety awareness;Impaired sensation;Pain   Assessment pt continues to require assistance w/ all phases of mobility, including input for appropriate technique and frequent rest breaks w/ low threshold for activity. pt remains at high risk for falling secondary to physical and saety awareness deficits. continued inpatient PT is indicated to improve mobility status and independence.   Goals   Patient Goals I want to go home   STG Expiration Date 02/28/25   Short Term Goal #1 pt will: Increase bilateral LE strength 1/2 grade to facilitate independent mobility, Perform bed mobility w/ supervision to increase level of independence, Perform all transfers w/ supervision to improve independence, Ambulate 100 ft. 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, Navigate 3 stair(s) w/ modx1 with unilateral handrail to facilitate return to previous living environment, Increase all balance 1 grade to decrease risk for falls, Complete exercise program independently to increase strength and endurance, Tolerate 3 hr OOB to faciliate upright tolerance, Tolerate standing 2 minutes w/ supervision to facilitate functional task performance, Improve Barthel Index score to 70 or greater to facilitate independence, and Complete Timed Up and Go or Comfortable Gait Speed to further assess mobility and monitor progress   PT Treatment Day 2   Plan   Treatment/Interventions Functional transfer training;LE strengthening/ROM;Therapeutic exercise;Cognitive reorientation;Endurance training;Patient/family training;Equipment eval/education;Bed mobility;Gait training;Compensatory technique education;Elevations   Progress Progressing toward goals   PT Frequency 3-5x/wk   Discharge Recommendation   Rehab Resource Intensity Level, PT II  (Moderate Resource Intensity)   AM-PAC Basic Mobility Inpatient   Turning in Flat Bed Without Bedrails 3   Lying on Back to Sitting on Edge of Flat Bed Without Bedrails 2   Moving Bed to Chair 2   Standing Up From Chair Using Arms 2   Walk in Room 1   Climb 3-5 Stairs With Railing 1   Basic Mobility Inpatient Raw Score 11   Basic Mobility Standardized Score 30.25   [facility] Highest Level Of Mobility   -[facility] Goal 4: Move to chair/commode   -HL Achieved 5: Stand (1 or more minutes)   End of Consult   Patient Position at End of Consult Bedside chair;Bed/Chair alarm activated;All needs within reach     The patient's AM-PAC Basic Mobility Inpatient Short Form Raw Score is 11. A Raw score of less than or equal to 16 suggests the patient may benefit from discharge to post-acute rehabilitation services. Please also refer to the recommendation of the Physical Therapist for safe discharge planning.    Skilled inpatient PT recommended while in hospital to progress pt toward treatment goals.    Gonzalo Mcgraw, PT

## 2025-02-17 NOTE — ASSESSMENT & PLAN NOTE
Patient again complaining of constipation, no BM for 2 days    Plan:  Increase Colace to BID  Miralax daily  Senna daily

## 2025-02-17 NOTE — PLAN OF CARE
Problem: PHYSICAL THERAPY ADULT  Goal: Performs mobility at highest level of function for planned discharge setting.  See evaluation for individualized goals.  Description: Treatment/Interventions: Functional transfer training, LE strengthening/ROM, Therapeutic exercise, Cognitive reorientation, Endurance training, Patient/family training, Equipment eval/education, Bed mobility, Gait training, Compensatory technique education, Elevations          See flowsheet documentation for full assessment, interventions and recommendations.  Outcome: Progressing  Note:    Problem List: Decreased strength, Decreased range of motion, Decreased endurance, Impaired balance, Decreased mobility, Decreased safety awareness, Impaired sensation, Pain  Assessment: pt continues to require assistance w/ all phases of mobility, including input for appropriate technique and frequent rest breaks w/ low threshold for activity. pt remains at high risk for falling secondary to physical and saety awareness deficits. continued inpatient PT is indicated to improve mobility status and independence.        Rehab Resource Intensity Level, PT: II (Moderate Resource Intensity)    See flowsheet documentation for full assessment.

## 2025-02-17 NOTE — ASSESSMENT & PLAN NOTE
MRI L spine showed mild inferior plate edema at L1 with adjacent small Schmorl's node and minimal adjacent postcontrast enhancement. More c/w DDD. No paraspinal edema.  - Pain mgmt  - Monitor neuro and overall exam   - PT, OT

## 2025-02-17 NOTE — ASSESSMENT & PLAN NOTE
Patient's head CT and MRI showed multiple small bilateral hemispheric infarcts, suggestive of septic emboli.  TTE did not show obvious vegetation but infective endocarditis is possible.  However, patient will be on long-term IV antibiotic for C-spine infection.  As long as bacteremia clears, CATHI will not change treatment plan, therefore, it does not need to be done.  -IV cefazolin as in above  -Follow-up on repeat blood culture    Discussed with patient in detail regarding the above plan.

## 2025-02-17 NOTE — ASSESSMENT & PLAN NOTE
MRI C spine showed cervical degenerative change - mild canal and moderate neuroforaminal stenosis. Mild nonspecific edema within the right posterior paraspinal musculature of the upper cervical spine with mild enhancement. 2 separate foci of linear nonenhancement may represent peripherally enhancing fluid collections. No discitis or osteomyelitis     - Abx per ID  - Pain mgmt  - Monitor neuro and overall exam   - PT, OT

## 2025-02-17 NOTE — PROGRESS NOTES
Progress Note - Infectious Disease   Name: Devin Chaves 79 y.o. male I MRN: [id number]  Unit/Bed#: S -01 I Date of Admission: 2/11/2025   Date of Service: 2/17/2025 I Hospital Day: 6    Assessment & Plan  Sepsis (HCC)  E/b tachycardia, tachypnea and hypotension with WBC 12 K.  Patient is clinically much improved.  WBC has normalized.  SBP is now normal  -antibiotics as below  -follow-up final cultures  -monitor temperature and hemodynamics  -serial exam  -additional inventions pending clinical course  -monitoring serial CBC and BMP for treatment response and any developing toxicities     MSSA bacteremia  Admission Blood cultures labeled same arm/same time are + for MSSA. 2/12/25 TTE negative for vegetation. No PPM, intravascular devices. Acute posterior neck pain predominant starting 2/14/25 and possible deep cervical infection evident on MRI C spine.  Bacteremia clearing  -continues Cefazolin 2 g IV q 8 hours  -follow-up repeat blood cultures  -can have PICC placed anytime   -anticipate protracted IV antibiotic course as below  -office personnel arranging outpatient f/u and MRI needs     Neck pain  Acute posterior neck pain predominant starting 2/14/25 2/14/25 MRI C spine: cervical degenerative change. Mild nonspecific edema within the right posterior paraspinal musculature of the upper cervical spine with mild enhancement. 2 separate foci of linear nonenhancement may represent peripherally enhancing fluid collections. No discitis or osteomyelitis.  ESR is only mildly elevated at 26 (2/14) but  (2/16) is highly elevated.  Patient will need long-term IV antibiotic and monitoring of ESR/CRP on treatment.  He also needs repeat MRI.  -continues cefazolin as above  -Treat x 6-week from blood culture clearance to 3/27/25    -serial exam  -Monitor ESR/CRP.  If ESR/CRP remain elevated at the end of IV antibiotic course, patient will need to be transitioned to p.o. antibiotic and remain on it until  "inflammatory markers are normal.  -Repeat C-spine MRI in 4 to 6 weeks.  -pain management per primary   Back pain  Patient reports chronic back pain has been worse due to \"inactivity\" and thinks worsened around the time of the catheterization. 2/13/25 MRI L spine: mild inferior plate edema at L1 with adjacent small Schmorl's node and minimal adjacent postcontrast enhancement. More c/w DDD. No paraspinal edema.   -continues antibiotic as above  -serial exam  -pain management per primary   Acute urinary retention  Renal US: moderate bladder distention, no hydronephrosis, perinephric edema left. 2/11/25 s/p straight cath for 700 mL. U/A benign. No urinary symptoms other than hesitancy   -monitor urinary output/symptoms  -additional inventions pending retention course     COPD (chronic obstructive pulmonary disease) ([facility])  2/11/25 CXR no infiltrates, CTA chest: no PE, mild secretions in lower trachea. Back to baseline 2L NCO2  Urinary antigens, Flu/COVID screen negative  -monitor respiratory status  -respiratory support prn  -monitor O2 requirements  -serial exam  -aspiration precautions  -additional inventions pending clinical course     Atrial fibrillation ([facility])  On Eliquis    Stroke ([facility])  Patient's head CT and MRI showed multiple small bilateral hemispheric infarcts, suggestive of septic emboli.  TTE did not show obvious vegetation but infective endocarditis is possible.  However, patient will be on long-term IV antibiotic for C-spine infection.  As long as bacteremia clears, CATHI will not change treatment plan, therefore, it does not need to be done.  -IV cefazolin as in above  -Follow-up on repeat blood culture    Discussed with patient in detail regarding the above plan.  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 They agree with the plan.    Antibiotics:  Cefazolin     Subjective   Patient's neck pain about the same.  Low back pain is improved  No " headache  Temperature stays down.  No chills.  He is tolerating antibiotic well.  No nausea, vomiting or diarrhea.    Objective :  Temp:  [97.7 °F (36.5 °C)-97.9 °F (36.6 °C)] 97.7 °F (36.5 °C)  HR:  [77-91] 89  BP: (103-123)/(52-57) 107/57  Resp:  [15-18] 15  SpO2:  [96 %-100 %] 96 %  O2 Device: Nasal cannula  Nasal Cannula O2 Flow Rate (L/min):  [4 L/min-13 L/min] 5 L/min    General:  79 year old chronically debilitated male, No acute distress  Psychiatric:  Awake and alert  Neck: Mild tenderness to palpation at midline.  No deformity.  No mass.  No erythema.Lidoderm patch in place  Pulmonary:  Normal respiratory excursion at baseline with short statements and accessory muscle use, on 2L NCO2 statting 100%  Abdomen:  Soft, nontender  Extremities:  No edema  Skin:  No rashes, left arm IV site nontender      Lab Results: I have reviewed the following results:  Results from last 7 days   Lab Units 02/17/25  1352 02/17/25  0844 02/16/25  0608 02/15/25  0459   WBC Thousand/uL  --  4.82 6.66 6.85   HEMOGLOBIN g/dL 8.7* 7.9* 8.5* 8.2*   PLATELETS Thousands/uL  --  141* 149 178     Results from last 7 days   Lab Units 02/17/25  0844 02/16/25  0837 02/15/25  0459 02/14/25  0337 02/13/25  0537   SODIUM mmol/L 137 137 137 138 138   POTASSIUM mmol/L 3.5 3.6 3.8 4.2 4.1   CHLORIDE mmol/L 104 104 104 106 105   CO2 mmol/L 30 30 27 27 28   BUN mg/dL 19 21 29* 38* 37*   CREATININE mg/dL 0.95 0.91 1.09 1.08 0.94   EGFR ml/min/1.73sq m 76 81 65 65 77   CALCIUM mg/dL 7.8* 8.2* 8.2* 8.2* 8.2*   AST U/L  --  20  --  23 26   ALT U/L  --  3*  --  17 42   ALK PHOS U/L  --  51  --  49 57   ALBUMIN g/dL  --  2.6*  --  2.6* 2.5*     Results from last 7 days   Lab Units 02/14/25  0325 02/11/25  1810 02/11/25  1635 02/11/25  1113   BLOOD CULTURE  No Growth at 72 hrs.  No Growth at 72 hrs.  --   --  Staphylococcus aureus*  Staphylococcus aureus*   GRAM STAIN RESULT   --   --   --  Gram positive cocci in clusters*  Gram positive cocci in  clusters*   MRSA CULTURE ONLY   --  No Methicillin Resistant Staphlyococcus aureus (MRSA) isolated  --   --    LEGIONELLA URINARY ANTIGEN   --   --  Negative  --      Results from last 7 days   Lab Units 02/12/25  0442 02/11/25  0921   PROCALCITONIN ng/ml 0.85* 0.90*     Results from last 7 days   Lab Units 02/16/25  0051   CRP mg/L 106.8*     Results from last 7 days   Lab Units 02/14/25  0337   FERRITIN ng/mL 147     Results from last 7 days   Lab Units 02/11/25  1030   D-DIMER QUANTITATIVE ug/ml FEU 3.89*     Imaging Results Review: I personally reviewed the following image studies in PACS and associated radiology reports: CT head and MRI brain. My interpretation of the radiology images/reports is: Head CT and MRI show small multifocal infarcts, concerning for septic emboli..

## 2025-02-17 NOTE — CONSULTS
CONSULTATION - PMR   Name: Devin Chaves 79 y.o. male I MRN: [id number]  Unit/Bed#: S -01 I Date of Admission: 2/11/2025   Date of Service: 2/17/2025 I Hospital Day: 6     Referred by:  Nirav Yancey DO  For:  Stroke-like symptoms, MSSA bacteremia   Assessment & Plan  Stroke (HCC)  - P/w: blurry vision prompting MRI brain   - Imaging:   - MRI brain showed L frontal lobe acute to early subacute infarct, multiple scattered punctate strokes in the bilateral frontal, right parietal, bilateral occipital, and right cerebellum with several microhemorrhages associated with recent infarcts with a few additional chronic microhemorrhages. Chronic RBG and L cerebellar lacunar infarct. L occipital lobe hemosiderin staining consistent with remote injury.   - Neuro consulted and felt etiology of bihemispheric strokes in setting of MSSA bacteremia uncertain origin.  Patient only missed 1 dose of eliquis but felt eliquis failure possible due to acute infectious d/s process.      - Status and residual impairments:    Imbalance, deconditioning - most residual functional deficits likely related to non-stroke causes at this time   - Med/surgical management:   Given potential hemorrhage in inferior L frontal lobe A/C is on hold with plan for colonoscopy on 2/19.  If CTH stable afterwards can resume Eliquis.   Abx per ID   Statin   Optimal BP control   - Neuropsych Med review:    Robaxin 500mg TID   Zoloft 12.5mg qday    Oxy 2.5-5mg Q4H PRN    (Care with sedating meds)     - Continue PT, OT in acute setting to improve ADLs, mobility, strength, conditioning, and decrease risks of immobility as well as to assist with dispo recommendations   - Decrease risks of complications related to decreased mobility status and monitor for them during course  - MSK - atrophy, contractures, bone demineralization, CV - DVT/PE, orthostasis, decreased CO, Resp - atelectasis, PNA, GI - constipation, reduced appetite,  - retention,  incontinence, Skin - pressure injuries  - Optimal bowel/bladder mgmt/hygiene - monitor for retention, incontinence, infection     - Turn patient Q2H, skin checks minimum Qshift  - ROM of major joints 2-3 times per day (if unable to do it on own)  - Supportive counseling and updates to family (as appropriate)   - Fall precautions - if needed increase rounding or consider virtual sitter or in-person sitter  - Overall mgmt per primary team currently, recommend optimal mgmt during rehab process as well  - Remains at risk for increased confusion/delirium, restlessness, agitation, and fall - continue to monitor for concerns/changes  - Monitor neuro-exam, wakefulness, mood, cognition, insight into deficits and safety awareness   - Monitor and ensure optimal management electrolytes, nutrition, and hydration  - Monitor for signs or symptoms of infection, medication intolerances, other systemic etiologies  - Additional labs, imaging, specialist follow-up as needed per primary team currently   - Overstimulation precautions, frequent re-orientation, re-direction, re-assurance  - Optimal mood, pain, and sleep management  - If impaired sleep or behavior recommend sleep log and agitation monitoring    - Limit sedating medications when possible  - For routine restlessness, anxiety, irritability focus on non-pharmacologic management    - Hold benzo's with increased risk paradoxical reaction and possibility of limiting cognitive recovery    Devin Chaves was evaluated by PT, OT and now by PMR physician and found to have new and significant deficits in ADLs and mobility.      Prior Level of Function and Social history:    Patient was admitted to [facility] but prior to that lived in 2  with 4 [place] with spouse.    Patient states he was progressing well ambulating in hallway with walker and chair follow and needed assist with ADLs at SNF    Current Level of Function:    Mod assist LBB, LBD, toileting  Supervision  other ADLs  Transfers max assist; stood for 45 secs with RW mod-max x1  Not able to ambulate    Disposition recommendation:  Too be determined - SNF v ARC/IRF   - The patient is currently too low level for ARC/IRF setting but has several acute medical issues going on - anemia, rigors, infection  - As these improve, hopefully, functional activity tolerance will improve to be appropriate for higher level setting otherwise recommend SNF   MSSA bacteremia  MSSA bacteremia of uncertain etiology   - Possible soft tissue infection in cervical spine  - Risk of septic v other emboli to brain   - Mgmt per ID  - 6 week course of Ancef, monitor labs, vitals, exam closely  - C-scope planned for 2/17 for anemia with hx of non-bleeding angioectasia at cecum and non-bleeding ulcer at hepatic flexure in past  - Repeat MRI C spine in 4-6 weeks   Neck pain   MRI C spine showed cervical degenerative change - mild canal and moderate neuroforaminal stenosis. Mild nonspecific edema within the right posterior paraspinal musculature of the upper cervical spine with mild enhancement. 2 separate foci of linear nonenhancement may represent peripherally enhancing fluid collections. No discitis or osteomyelitis     - Abx per ID  - Pain mgmt  - Monitor neuro and overall exam   - PT, OT   Back pain  MRI L spine showed mild inferior plate edema at L1 with adjacent small Schmorl's node and minimal adjacent postcontrast enhancement. More c/w DDD. No paraspinal edema.  - Pain mgmt  - Monitor neuro and overall exam   - PT, OT   COPD (chronic obstructive pulmonary disease) (HCC)  - Chronic resp failure on chronic 2L of O2  - Overall mgmt per IM/hospitalist service currently  - Optimal mgmt during rehab course   - Monitor lung exam, vitals with and without activity  - Encourage incentive spirometry  - Fluticasone, Atrovent, Xopenex    Atrial fibrillation (HCC)  - Mgmt currently per primary team   - Rate control: MTP  - Antithrombotic: On hold   - OP  Cards follow-up    Sepsis (HCC)    Acute urinary retention  - SC for >400 2/17   - Recommend bladder scans q4-6h and PRN SC   - monitor for retention, incontinence (including overflow incontinence), signs/symptoms of UTI  - recommend toileting program Q2-4 H during day and Q4-6H overnight   - recommend bladder scans/urinary retention protocol if concerns/risk of retention  - Optimal bowel management/constipation mgmt/prevention     Hypotension    Constipation  Starting GoLytely bowel prep  - monitor for constipation, incontinence, and diarrhea  - ensure appropriate hydration; wean constipating meds if and when possible (if applicable)    - goal 1 appropriate BM every 1-2 days  - monitor for signs and symptoms of obstruction/ileus > not currently; hold stimulant lax if occurs  - Limiting constipating medications if possible  - Ensure adequate hydration     Anemia  - Mgmt per primary team and recommend optimal mgmt during rehab process  - Monitor H/H, vitals, signs/symptoms of acute bleeding  - C-scope planned for 2/17 for anemia with hx of non-bleeding angioectasia at cecum and non-bleeding ulcer at hepatic flexure in past    L ankle arthropathy, chronic, severe  - Home lace-up brace with activity; remove when not using or if skin breakdown - monitor skin/ankle closely  - Did need sx but know not a candidate due to chronic medical conditions  - PT, pain mgmt, fall precautions   - OP pods or ortho-foot     Encounter for skin care  - Increased risk of skin wounds/breakdown/rashes due to recent immobility and co-morbidities   - Turn patient Q2H in bed (use pillows or wedges), encourage wt shifts every 10-15 minutes in chair  - Patient and if available caregiver education   - Hydragaurd (or other appropriate barrier cream) to buttocks and sacrum BID & PRN   - Allevyn foam to heels and/or float heels when in bed   - Optimal bowel/bladder hygiene; keep skin clean and dry   - EHOB waffle cushion to chair/WC when OOB  - Rec  nursing to document in chart and Notify MD if buttock, sacrum, heel, or other skin site develops erythema, skin breakdown, or rashes as soon as possible.  If patient is soiling themselves with urine or stool notify MD.  If you are unable to maintain skin integrity and prevent erythema due to frequency of soiling notify MD as soon as possible as well  - Consult would care for any wounds or significant concerns for skin integrity     VTE prophylaxis   As outlined by primary team      Other medical issues:     As per primary service (and relevant consultants if applicable)    ==================================================================    Chief Complaint:  Neck pain     History of Present Illness:   Devin Chaves is a 79 y.o. male with Pmh of Afib, stroke, COPD, chronic resp failure, CAD, JAZMINE, HTN, aortic ectasia, enlarged prostate who was admitted to [facility] from 12/10/24-2/6/25 for COPD exacerbation in context of Covid infection c/b suspected chemical exposure/rash to BLE and discharged to [facility] who developed SOB and inability to urinate and was brought to hospital where he was diagnosis with sepsis, acute on chronic hypoxic respiratory failure, and MSSA bacteremia.  He was noted to have neck and back pain and later c/o blurry vision prompting MRI C, L spine and later Brain.  MRI C spine showed cervical degenerative change. Mild nonspecific edema within the right posterior paraspinal musculature of the upper cervical spine with mild enhancement. 2 separate foci of linear nonenhancement may represent peripherally enhancing fluid collections. No discitis or osteomyelitis   MRI L spine showed mild inferior plate edema at L1 with adjacent small Schmorl's node and minimal adjacent postcontrast enhancement. More c/w DDD. No paraspinal edema.  MRI brain showed L frontal lobe acute to early subacute infarct, multiple scattered punctate strokes in the bilateral frontal, right parietal,  "bilateral occipital, and right cerebellum with several microhemorrhages associated with recent infarcts with a few additional chronic microhemorrhages. Chronic RBG and L cerebellar lacunar infarct. L occipital lobe hemosiderin staining consistent with remote injury. TTE was negative for vegetation.  ID consulted and recommended 6 week course of Ancef.  CXR did not show infiltrates.  CTA chest did not reveal PE and showed mild secretion in lower trachea with respiratory failure back to baseline.  Neuro consulted and felt etiology of bihemispheric strokes in setting of MSSA bacteremia uncertain origin.  Patient only missed 1 dose of eliquis but felt eliquis failure possible due to acute infectious d/s process.  Given potential hemorrhage in inferior L frontal lobe A/C is on hold with plan for colonoscopy on 2/19.  If CTH stable afterwards can resume Eliquis.      On eval, patient reports significant neck pain not improving.  He denies radiating pain down arms, weakness, sensory changes.  He reports chronic stable LBP.  He reports chronic significant L ankle/unstable ankle for which he is not longer a candidate for sx but apparently needed.  He uses [facility]-up [facility]  He reports some chills at times.  He denies increased SOB, CP, abdominal pain, nausea or other new complaints.     Review of Systems:     Complete review of systems obtained.    Please see HPI for details with other significant symptoms or history listed here:    Otherwise, 14 point review of systems completed and was otherwise unremarkable.    Allergies   Allergen Reactions    Shellfish-Derived Products - Food Allergy Other (See Comments)     Pt states, \"I reacted, I dont know\"       Current Facility-Administered Medications:     acetaminophen (TYLENOL) tablet 650 mg, 650 mg, Oral, Q6H PRN, Jorge Peralta MD, 650 mg at 02/13/25 1513    albuterol (PROVENTIL HFA,VENTOLIN HFA) inhaler 2 puff, 2 puff, Inhalation, Q4H PRN, Viviane King " MD Scott, 2 puff at 02/14/25 0604    [Held by provider] apixaban (ELIQUIS) tablet 5 mg, 5 mg, Oral, BID, Maria Alejandra Mercer MD    aspirin chewable tablet 81 mg, 81 mg, Oral, Daily, Nirav Yancey DO, 81 mg at 02/17/25 0935    atorvastatin (LIPITOR) tablet 80 mg, 80 mg, Oral, Daily With Dinner, Jorge Peralta MD, 80 mg at 02/16/25 1726    barium sulfate tablet 1 tablet, 1 tablet, Oral, Once in imaging, Kemal Roland MD    barium sulfate tablet 1 tablet, 1 tablet, Oral, Once in imaging, Viviane Chavez MD    bisacodyl (DULCOLAX) rectal suppository 10 mg, 10 mg, Rectal, Daily PRN, Mal Neely DO    ceFAZolin (ANCEF) IVPB (premix in dextrose) 2,000 mg 50 mL, 2,000 mg, Intravenous, Q8H, Maria Alejandra Mercer MD, Last Rate: 100 mL/hr at 02/17/25 0935, 2,000 mg at 02/17/25 0935    docusate sodium (COLACE) capsule 100 mg, 100 mg, Oral, BID, Mal Neely DO    fluticasone (ARNUITY ELLIPTA) 100 MCG/ACT inhaler 1 puff, 1 puff, Inhalation, Daily, Jorge Peralta MD, 1 puff at 02/17/25 0936    guaiFENesin (MUCINEX) 12 hr tablet 1,200 mg, 1,200 mg, Oral, BID, Jorge Peralta MD, 1,200 mg at 02/17/25 0934    heparin (porcine) subcutaneous injection 5,000 Units, 5,000 Units, Subcutaneous, Q8H GALE, Mal Neely DO    hydroxychloroquine (PLAQUENIL) tablet 400 mg, 400 mg, Oral, Daily With Breakfast, Jorge Peralta MD, 400 mg at 02/17/25 0935    ipratropium (ATROVENT) 0.02 % inhalation solution 0.5 mg, 0.5 mg, Nebulization, TID, Viviane Chavez MD, 0.5 mg at 02/17/25 1330    levalbuterol (XOPENEX) inhalation solution 1.25 mg, 1.25 mg, Nebulization, TID, Viviane Chavez MD, 1.25 mg at 02/17/25 1330    lidocaine (LIDODERM) 5 % patch 1 patch, 1 patch, Topical, Daily, Maria Alejandra Mercer MD, 1 patch at 02/17/25 0936    lidocaine (LIDODERM) 5 % patch 1 patch, 1 patch, Topical, Daily, Maria Alejandra Mercer MD, 1 patch at 02/17/25 0936    lidocaine (LIDODERM) 5 % patch 1 patch, 1 patch, Topical, Daily,  Mal Neely DO, 1 patch at 02/17/25 0935    [Held by provider] losartan (COZAAR) tablet 25 mg, 25 mg, Oral, Daily, Mal Neely DO    methocarbamol (ROBAXIN) tablet 500 mg, 500 mg, Oral, Q8H GALE, Mal Neely DO, 500 mg at 02/17/25 0509    metoprolol tartrate (LOPRESSOR) tablet 25 mg, 25 mg, Oral, Q12H GALE, Jorge Peralta MD, 25 mg at 02/17/25 0935    oxyCODONE (ROXICODONE) IR tablet 5 mg, 5 mg, Oral, Q4H PRN, Maria Alejandra Mercer MD, 5 mg at 02/17/25 0509    oxyCODONE (ROXICODONE) split tablet 2.5 mg, 2.5 mg, Oral, Q4H PRN, Maria Alejandra Mercer MD    pantoprazole (PROTONIX) injection 40 mg, 40 mg, Intravenous, Q12H GALE, Maria Alejandra Mercer MD, 40 mg at 02/17/25 0934    polyethylene glycol (GOLYTELY) bowel prep 4,000 mL, 4,000 mL, Oral, See Admin Instructions, Clint Molina PA-C    polyethylene glycol (MIRALAX) packet 17 g, 17 g, Oral, Daily PRN, Mal Neely DO, 17 g at 02/17/25 0024    senna (SENOKOT) tablet 8.6 mg, 1 tablet, Oral, HS, Mal Neely DO    sertraline (ZOLOFT) tablet 12.5 mg, 12.5 mg, Oral, Daily, Jorge Peralta MD, 12.5 mg at 02/17/25 0935    sucralfate (CARAFATE) tablet 1 g, 1 g, Oral, 4x Daily (AC & HS), Mal Neely DO, 1 g at 02/17/25 1214    tamsulosin (FLOMAX) capsule 0.4 mg, 0.4 mg, Oral, Daily With Dinner, Jorge Peralta MD, 0.4 mg at 02/16/25 1725    Past Medical History:   Diagnosis Date    Atrial fibrillation (HCC)     COPD (chronic obstructive pulmonary disease) (HCC)     Crushing injury of finger, left     Infectious viral hepatitis        Past Surgical History:   Procedure Laterality Date    FL LUMBAR PUNCTURE DIAGNOSTIC  2/10/2022    [facility] OPEN TX PHALANGEAL SHAFT FRACTURE PROX/MIDDLE EA Left 1/25/2017    Procedure: ORIF LEFT SMALL FINGER FRACTURE;  Surgeon: Blake Badillo MD;  Location: [facility];  Service: Orthopedics      History reviewed. No pertinent family history.    Social History available currently in EMR:  (See additional SH as outlined above)    Social History     Socioeconomic History    Marital status: /Civil Union     Spouse name: None    Number of children: None    Years of education: None    Highest education level: None   Occupational History    None   Tobacco Use    Smoking status: Former    Smokeless tobacco: Former     Quit date: 2011   Substance and Sexual Activity    Alcohol use: No    Drug use: No    Sexual activity: None   Other Topics Concern    None   Social History Narrative    None     Social Drivers of Health     Financial Resource Strain: Not on file   Food Insecurity: No Food Insecurity (2025)    Nursing - Inadequate Food Risk Classification     Worried About Running Out of Food in the Last Year: Not on file     Ran Out of Food in the Last Year: Not on file     Ran Out of Food in the Last Year: Never true   Transportation Needs: No Transportation Needs (2025)    Nursing - Transportation Risk Classification     Lack of Transportation: Not on file     Lack of Transportation: No   Physical Activity: Not on file   Stress: Not on file   Social Connections: Unknown (2024)    Received from [employer]    Social [place]     How often do you feel lonely or isolated from those around you? (Adult - for ages 18 years and over): Not on file   Intimate Partner Violence: Unknown (2025)    Nursing IPS     Feels Physically and Emotionally Safe: Not on file     Physically Hurt by Someone: Not on file     Humiliated or Emotionally Abused by Someone: Not on file     Physically Hurt by Someone: No     Hurt or Threatened by Someone: No   Housing Stability: Unknown (2025)    Nursing: Inadequate Housing Risk Classification     Has Housing: Not on file     Worried About Losing Housing: Not on file     Unable to Get Utilities: Not on file     Unable to Pay for Housing in the Last Year: No     Has Housin       Physical Examination:   Temp:  [97.5 °F (36.4 °C)-97.9 °F (36.6 °C)] 97.5 °F (36.4 °C)  HR:  [77-91] 87  BP:  (103-118)/(52-61) 118/61  Resp:  [15-20] 20  SpO2:  [96 %-100 %] 96 %  O2 Device: Nasal cannula  Nasal Cannula O2 Flow Rate (L/min):  [2 L/min-13 L/min] 2 L/min  General: Awake, ill-appearing  HENT: NCAT, MMM, NC in place  Respiratory: Unlabored breathing  Cardiovascular: Regular rate   Gastrointestinal: Soft, non-distended  Genitourinary: No glass (SC performed by nursing)   SkiN/MSK/Extremities:  L>R pedal edeam; Significant L ankle valgus deformity with enlarged L ankle joint without erythema or increased warmth; some mild TTP but patient reports this is chronic and stable  Neurologic/Psych:   MENTAL STATUS: awake, oriented to person, place, time, and situation  Affect: Euthymic   Strength/MMT:  BUE 5-/5, BLE prox 4+/5, RLE distal 5/5, LLE distal 4+/5  FTN intact     Data:  Lab Results   Component Value Date    HGB 8.7 (L) 02/17/2025    HCT 24.5 (L) 02/17/2025    WBC 4.82 02/17/2025    K 3.5 02/17/2025    K 4.1 09/25/2022     02/17/2025     09/25/2022    CREATININE 0.95 02/17/2025    CREATININE 1 09/25/2022    BUN 19 02/17/2025    BUN 21 (H) 09/25/2022    BUN 22 02/06/2022       XR chest portable  Result Date: 2/17/2025  Impression: Limited study. There appears to be some left basilar atelectasis. There is some central vascular prominence in the hilar regions. Workstation performed: [id number]     CTA head and neck w wo contrast  Result Date: 2/15/2025  Impression: CT Brain: - Known small multifocal infarcts in bilateral cerebral hemispheres and right cerebellum were better evaluated on MRI brain dated 2/14/2025, likely embolic. No new CT signs of acute infarction. - Unchanged chronic lacunar infarct in right basal ganglia with moderate chronic microangiopathy. CT Angiography: - Negative CTA head and neck for large vessel occlusion, dissection, aneurysm, or high-grade stenosis. - Mild atherosclerotic disease of the head and neck, as detailed above. Multiple pulmonary nodules measuring up to 0.9 cm in  "left upper lobe, unchanged. Based on current Fleischner Society 2017 Guidelines on incidental pulmonary nodule, follow-up non-contrast CT is recommended at 3-6 months from the initial examination and, if stable at that time, an additional follow-up is recommended for 18-24 months from the initial examination. 4.1 cm ectasia of ascending thoracic aorta. Recommend follow-up CT chest in 12 months. Additional chronic/incidental findings as detailed above. The study was marked in EPIC for immediate notification. Workstation performed: [id number]     MRI brain w wo contrast  Result Date: 2/15/2025  Impression: Multiple tiny recent infarcts scattered in multiple vascular distributions that are likely embolic. Several microhemorrhages associated with recent infarcts. No acute space-occupying hematoma. Chronic ischemic changes. The study was marked in EPIC for immediate notification. Workstation performed: [id number]     MRI cervical spine w wo contrast  Result Date: 2/14/2025  Impression: Cervical degenerative change with mild canal stenosis and mild to moderate foraminal narrowing. No cord compression or abnormal cord signal. Mild nonspecific edema within the right posterior paraspinal musculature of the upper cervical spine seen on sagittal STIR imaging with mild enhancement. Minimal peripheral enhancement is noted and differential considerations would include infectious/inflammatory myositis with soft tissue abscess. No discitis or osteomyelitis. This examination was marked \"immediate notification\" in Epic in order to begin the standard process by which the radiology reading room liaison alerts the referring practitioner. Workstation performed: [id number]     CT head w wo contrast  Result Date: 2/13/2025  Impression: No acute hemorrhage, edema, or mass effect. Chronic microangiopathic changes and chronic appearing infarctions as above. No pathologic enhancement. Workstation performed: [id number]     MRI lumbar spine w " "wo contrast  Result Date: 2/13/2025  Impression: Mild inferior plate edema at L1 with adjacent small Schmorl's node and minimal adjacent postcontrast enhancement. Vacuum disc phenomenon noted on the recent CT at this level. Findings likely represent degenerative disc disease with Schmorl's node rather than discitis and osteomyelitis which should only be considered in the right clinical setting. No paraspinal edema or enhancement identified. No evidence of abscess or abnormal enhancement in the paraspinal soft tissues. 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 Resident: KEI Wallace I, the attending radiologist, have reviewed the images and agree with the final report above. Workstation performed: [id number]     US kidney and bladder  Result Date: 2/11/2025  Impression: No hydronephrosis. Moderate bladder distention. Perinephric edema on the left. Findings were discussed with Dr. Peralta at 7:25 p.m. via secure text Workstation performed: [id number]     CTA chest pe study  Result Date: 2/11/2025  Impression: No evidence of pulmonary embolus. Stable ectasia of the ascending thoracic aorta measuring up to 41 mm. Mild secretions in the lower trachea, correlate for any dysphagia or possible aspiration. Noncalcified left lung apex pulmonary nodule, 6 x 5 mm. Because this was not present on prior chest CT of December 4, 2023, conservative management with follow-up low radiation dose noncontrast chest CT in 6 months is recommended. This examination was marked \"immediate notification\" in Epic in order to begin the standard process by which the radiology reading room liaison alerts the referring practitioner. Resident: Bijan Fajardo I, the attending radiologist, have reviewed the images and agree with the final report above. Workstation performed: [id number]     XR chest 1 view portable  Result Date: 2/11/2025  Impression: No acute cardiopulmonary disease. Workstation performed: [id number] "       Pertinent labs and imaging reviewed.      Patient seen on date of service listed.    80 minutes or greater spent for this encounter which included a combination of face-to-face time with patient and non-face-to-face time which in part specifically includes management of bacteremia, soft tissue infections, stroke, AUR, ankle arthropathy .  Face-to-face time included extended discussion with patient regarding current condition, medical history, mood, medical/rehabilitation management, and disposition.  Non face-to-face time included coordination of care with patient's co-managing AP and/or physician(s) thru communication and/or review of their recent documentation as well as reviewing vitals, bowel/bladder function, recent labs, diagnostic imaging, and notes from therapy, CM, and nursing.      Thank you for allowing the PM&R service to participate in the care of this patient. We will continue to follow Devin Chaves's progress with you. Please do not hesitate to call with questions or concerns.    Abhijeet Hernandez MD, MS  [facility]  Physical Medicine and Rehabilitation  Brain Injury Medicine

## 2025-02-17 NOTE — SPEECH THERAPY NOTE
Speech Language/Pathology    Speech/Language Pathology Refusal Note    Patient Name: Devin Chaves  Today's Date: 2/17/2025       Attempted to see pt for follow up for swallowing, pt on regular diet. Pt currently refusing to eat lunch 2* constipation. RN stated pt ate minimal for breakfast as well, but tolerated w/o dysphagia symptoms.  MD in to see pt and aware.     Will follow up as able.       Cheri Strong MA CCC-SLP  Speech Pathologist  Available via Proximetry

## 2025-02-17 NOTE — ASSESSMENT & PLAN NOTE
Starting GoLytely bowel prep  - monitor for constipation, incontinence, and diarrhea  - ensure appropriate hydration; wean constipating meds if and when possible (if applicable)    - goal 1 appropriate BM every 1-2 days  - monitor for signs and symptoms of obstruction/ileus > not currently; hold stimulant lax if occurs  - Limiting constipating medications if possible  - Ensure adequate hydration

## 2025-02-17 NOTE — ASSESSMENT & PLAN NOTE
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 Addmited with sepsis with leukocytosis, tachypnea, and tachycardia and found to have MSSA bacteremia. Work up thus far with no clear infectious origin. Course c/b anemia requiring transfusions. Neurology consulted after MRI revealed acute/subacute bihemispheric multifocal strokes.     Work Up:  MRI brain w/wo (2/14/25): L frontal lobe acute to early subacute infarct, multiple scattered punctate strokes in the bilateral frontal, right parietal, bilateral occipital, and right cerebellum with several microhemorrhages associated with recent infarcts with a few additional chronic microhemorrhages. Chronic RBG and L cerebellar lacunar infarct. L occipital lobe hemosiderin staining consistent with remote injury.  CTA H/N without evidence of LVO, dissection, aneurysm, high-grade stenosis. Mild atherosclerotic disease of the head and neck. 4.1 cm ectasia of the ascending thoracic aorta.  TTE (2/12/2025): EF 55%, G1 DD, akinetic basal inferior and mid inferior segments. Normal size of atria bilaterally.  NC CTH (2/17/25): 3 mm focus of hyperdensity left superior frontal lobe as described unchanged from the prior study may represent a small 3 mm hemorrhage versus focus of mineralization.   Labs: Hemoglobin A1c 5.8 (2/16/2025), LDL 15 (2/16/2025)    Impression: Pt with acute - subacute bihemispheric strokes in the setting of MSSA bacteremia of unclear origin and acute anemia only missing x1 dose of Eliquis while admitted. Etiology possibly 2/2 eliquis failure d/t acute infectious disease process. Given presence of potential hemorrhage in the inferior L frontal lobe will continue to hold AC until colonoscopy performed on Wednesday. Can then repeat NC CTH and if stable can restart anticoagulation with  Apixaban.      Plan:  Stroke pathway  Telemetry   BP goal: Normotension  AC/AP: Hold Eliquis given microhemorrhages on MR and L Inferior forntal lobe with questionable small hemorrhage, can continue aspirin.  Repeat NC CTH on Wed after colonoscopy - if stable can restart AC  Statin: Atorvastatin 80 mg QHS  PT/OT/ST

## 2025-02-17 NOTE — ASSESSMENT & PLAN NOTE
- Chronic resp failure on chronic 2L of O2  - Overall mgmt per IM/hospitalist service currently  - Optimal mgmt during rehab course   - Monitor lung exam, vitals with and without activity  - Encourage incentive spirometry  - Fluticasone, Atrovent, Xopenex

## 2025-02-17 NOTE — ASSESSMENT & PLAN NOTE
MRI brain: Multiple tiny recent infarcts scattered in multiple vascular distributions that are likely embolic. Several microhemorrhages associated with recent infarcts. No acute space-occupying hematoma. Chronic ischemic changes.    Plan:  Hold Eliquis given conversion demonstrated on MRI brain - Restart per neurology  Continue aspirin per neurology

## 2025-02-17 NOTE — PROGRESS NOTES
Progress Note - Hospitalist   Name: Devin Chaves 79 y.o. male I MRN: [id number]  Unit/Bed#: S -01 I Date of Admission: 2/11/2025   Date of Service: 2/17/2025 I Hospital Day: 6    Assessment & Plan  MSSA bacteremia  Blood cultures with Gram + cocci, likely Staph Aureus  Unknown source at this time, patient with multiple small abrasions on hands and arms. UA negative, LLE does not appear to have cellulitis.  TTE negative for vegetations  Patient mentioned small laceration from haircut at [employer] with pustule formation, possible source at there is myositis to that area  MRI L-spine: No evidence of abscess or abnormal enhancement in the paraspinal soft tissues.  MRI C-spine: Mild nonspecific edema within the right posterior paraspinal musculature of the upper cervical spine seen on sagittal STIR imaging with mild enhancement. Minimal peripheral enhancement is noted and differential considerations would include infectious/inflammatory myositis with soft tissue abscess. No osteomyelitis or discitis. No cord compression or abnormal cord signal.  NCCTH: No acute hemorrhage, edema, or mass effect. Chronic microangiopathic changes and chronic appearing infarctions  MRI Brain shows possible multifocal infarction    Plan:  Continue cefazolin 2 g IV q8 hrs  Given myositis and possible abscess appreicated on MRI C-spine will require prolonged IV antibiotic course  ID consulted, appreciate recs  Follow repeat blood cultures  If repeat blood cultures positive, can discuss CATHI at that time.  Will eventually need PICC for planned 6 week course  Stroke ([facility])  MRI brain: Multiple tiny recent infarcts scattered in multiple vascular distributions that are likely embolic. Several microhemorrhages associated with recent infarcts. No acute space-occupying hematoma. Chronic ischemic changes.    Plan:  Hold Eliquis given conversion demonstrated on MRI brain - Restart per neurology  Continue aspirin per neurology  Sepsis (HCC)  Meeting  "SIRS criteria for tachycardia (HR ), tachypnea (RR 16-28), and leukocytosis (12.32)  No clear source of infection. CTA PE study in ED did not demonstrate acute pathology.  The most recent MRI lumbar spine unremarkable  The most recent MRI C-spine shows possible soft tissue infection  The most recent MRI contrast of the brain shows possible multifocal embolic infarction with hemorrhagic conversion, most likely septic emboli s/p cefazolin IV.    Plan:  See \"Gram positive bacteremia\"  Hypotension  MAP as low as 62 in the ED per automated cuff  Home Htn meds: Metoprolol tartrate 25 mg BID, Losartan 25 mg QD  Losartan recent decreased from 50 mg due to concerns for hypotension    Plan:  Monitor VS including BP.   If hypotension recurs, confirm via manual, assess symptoms; suggest fluid bolus and discussion w crit care.  Continue home metoprolol tartrate 25 mg BID  Hold losartan 25 mg QD  COPD (chronic obstructive pulmonary disease) ([facility])  Pt w COPD on 2L supplemental O2 at baseline  Pt c/o increased SOB, increased PUTNAM, increased cough, increased wheezing, increased sputum production prior to admission.  Currently requiring 3L, above baseline of 2L NC  TTE 2/12/25: EF 55%, Normal systolic dysfunction, G1DD, No vegetation, No mural thrombus, moderate aortic sclerosis    Plan:  Nebs TID  Titrate O2 to maintain SpO2 greater than 88%  Acute urinary retention  Pt reports new urinary incontinence day prior to presentation and inability to void despite urge to urinate on day of presentation  H/o enlarged prostate  Suspect new urinary retention 2/2 BPH  US Kidney/Bladder 2/11/25: No hydronephrosis, moderate bladder distention, perinephric edema on L side.    Plan:  Tamsulosin 0.4 mg QD  Urinary retention protocol  Atrial fibrillation ([facility])  Patient rate controlled since admission    Plan:  Eliquis has been held because of MRI proven hemorrhagic conversion of multifocal embolic infarction, most likely septic emboli in the " background of MSSA bacteremia.  Continue home metoprolol with hold parameters  Back pain  MRI L-Spine: Negative for acute pathology  MRI C-spine shows possible soft tissue infection status post IV cefazolin  ID on board    Plan:  Lidocaine patches  Robaxin 500 mg q8 hrs scheduled  Anemia  Recent Labs     02/14/25  2021 02/15/25  0459 02/16/25  0608   HGB 8.4* 8.2* 8.5*     Lab Results   Component Value Date    IRON 37 (L) 02/14/2025    FERRITIN 147 02/14/2025    TIBC 166.6 (L) 02/14/2025    CONCFE 22 02/14/2025     B12 972, Folate 14  Stable    Plan:  Transfuse if <7   Patient already typed and screened, not consented  Patient required 2 units packed red blood cells overnight on 2/13  GI consult, appreciate recs  Discussed with GI, will defer given stabilization of Hgb and new finding of septic emboli  Will monitor Hgb after restarting AC with eliquis  Neck pain  The most recent MRI C-spine shows possible soft tissue infection in the background of bacteremia    Plan:  Lidocaine patch  Constipation  Patient again complaining of constipation, no BM for 2 days    Plan:  Increase Colace to BID  Miralax daily  Senna daily    VTE Pharmacologic Prophylaxis: VTE Score: 9 High Risk (Score >/= 5) - Pharmacological DVT Prophylaxis Contraindicated. Sequential Compression Devices Ordered.    Mobility:   Basic Mobility Inpatient Raw Score: 11  JH-HLM Goal: 4: Move to chair/commode  JH-HLM Achieved: 2: Bed activities/Dependent transfer  JH-HLM Goal NOT achieved. Continue with multidisciplinary rounding and encourage appropriate mobility to improve upon JH-HLM goals.    Patient Centered Rounds: I performed bedside rounds with nursing staff today.   Discussions with Specialists or Other Care Team Provider: ID    Education and Discussions with Family / Patient: Updated  (wife) via phone.    Current Length of Stay: 6 day(s)  Current Patient Status: Inpatient   Certification Statement: The patient will continue to require  additional inpatient hospital stay due to MSSA bacteremia  Discharge Plan: Anticipate discharge in >72 hrs to discharge location to be determined pending rehab evaluations.    Code Status: Level 3 - DNAR and DNI    Subjective   Patient seen and examined at bedside, endorses improved cough and not currently endorsing any shortness of breath. Patient does note that he feels constipated and has not had a BM in a couple of day. Denies chest pain, increased sputum production, fever, diarrhea, vomiting, or bleeding.    Objective :  Temp:  [97.3 °F (36.3 °C)-97.5 °F (36.4 °C)] 97.5 °F (36.4 °C)  HR:  [59-91] 83  BP: ()/(44-61) 116/52  Resp:  [17] 17  SpO2:  [84 %-100 %] 100 %  O2 Device: Nasal cannula  Nasal Cannula O2 Flow Rate (L/min):  [4 L/min-13 L/min] 4 L/min    Body mass index is 22.38 kg/m².     Input and Output Summary (last 24 hours):     Intake/Output Summary (Last 24 hours) at 2/17/2025 0626  Last data filed at 2/17/2025 0501  Gross per 24 hour   Intake 660 ml   Output 800 ml   Net -140 ml       Physical Exam  Vitals and nursing note reviewed.   Constitutional:       General: He is not in acute distress.     Appearance: He is well-developed.   HENT:      Head: Normocephalic and atraumatic.   Eyes:      Conjunctiva/sclera: Conjunctivae normal.   Cardiovascular:      Rate and Rhythm: Normal rate and regular rhythm.      Heart sounds: No murmur heard.  Pulmonary:      Effort: Pulmonary effort is normal.      Breath sounds: No stridor. No wheezing, rhonchi or rales.      Comments: Diminished breath sounds   Abdominal:      Palpations: Abdomen is soft.      Tenderness: There is no abdominal tenderness.   Musculoskeletal:         General: Tenderness (C-spine) present. No swelling.      Cervical back: Neck supple.      Left foot: Deformity present.   Feet:      Left foot:      Skin integrity: Erythema present. No ulcer, blister, skin breakdown or warmth.   Skin:     General: Skin is warm and dry.      Capillary  Refill: Capillary refill takes less than 2 seconds.   Neurological:      General: No focal deficit present.      Mental Status: He is alert and oriented to person, place, and time.   Psychiatric:         Mood and Affect: Mood normal.           Lines/Drains:              Lab Results: I have reviewed the following results:   Results from last 7 days   Lab Units 02/16/25  0608 02/14/25  1122 02/14/25  0337   WBC Thousand/uL 6.66   < > 6.73   HEMOGLOBIN g/dL 8.5*   < > 6.8*   HEMATOCRIT % 26.7*   < > 21.3*   PLATELETS Thousands/uL 149   < > 168   BANDS PCT %  --   --  3   SEGS PCT % 78*   < > 85*   LYMPHO PCT % 13*   < > 8*  10   MONO PCT % 5   < > 6  4   EOS PCT % 1   < > 0    < > = values in this interval not displayed.     Results from last 7 days   Lab Units 02/16/25  0837   SODIUM mmol/L 137   POTASSIUM mmol/L 3.6   CHLORIDE mmol/L 104   CO2 mmol/L 30   BUN mg/dL 21   CREATININE mg/dL 0.91   ANION GAP mmol/L 3*   CALCIUM mg/dL 8.2*   ALBUMIN g/dL 2.6*   TOTAL BILIRUBIN mg/dL 0.55   ALK PHOS U/L 51   ALT U/L 3*   AST U/L 20   GLUCOSE RANDOM mg/dL 91     Results from last 7 days   Lab Units 02/16/25  0051   INR  1.51*         Results from last 7 days   Lab Units 02/16/25  0608   HEMOGLOBIN A1C % 5.8*     Results from last 7 days   Lab Units 02/12/25  0442 02/11/25  1531 02/11/25  0921   LACTIC ACID mmol/L  --  1.9  --    PROCALCITONIN ng/ml 0.85*  --  0.90*       Recent Cultures (last 7 days):   Results from last 7 days   Lab Units 02/14/25  0325 02/11/25  1635 02/11/25  1113   BLOOD CULTURE  No Growth at 48 hrs.  No Growth at 48 hrs.  --  Staphylococcus aureus*  Staphylococcus aureus*   GRAM STAIN RESULT   --   --  Gram positive cocci in clusters*  Gram positive cocci in clusters*   LEGIONELLA URINARY ANTIGEN   --  Negative  --        CTA head and neck w wo contrast  Result Date: 2/15/2025  Impression: CT Brain: - Known small multifocal infarcts in bilateral cerebral hemispheres and right cerebellum were better  "evaluated on MRI brain dated 2/14/2025, likely embolic. No new CT signs of acute infarction. - Unchanged chronic lacunar infarct in right basal ganglia with moderate chronic microangiopathy. CT Angiography: - Negative CTA head and neck for large vessel occlusion, dissection, aneurysm, or high-grade stenosis. - Mild atherosclerotic disease of the head and neck, as detailed above. Multiple pulmonary nodules measuring up to 0.9 cm in left upper lobe, unchanged. Based on current Fleischner Society 2017 Guidelines on incidental pulmonary nodule, follow-up non-contrast CT is recommended at 3-6 months from the initial examination and, if stable at that time, an additional follow-up is recommended for 18-24 months from the initial examination. 4.1 cm ectasia of ascending thoracic aorta. Recommend follow-up CT chest in 12 months. Additional chronic/incidental findings as detailed above. The study was marked in EPIC for immediate notification. Workstation performed: [id number]     MRI brain w wo contrast  Result Date: 2/15/2025  Impression: Multiple tiny recent infarcts scattered in multiple vascular distributions that are likely embolic. Several microhemorrhages associated with recent infarcts. No acute space-occupying hematoma. Chronic ischemic changes. The study was marked in EPIC for immediate notification. Workstation performed: [id number]     MRI cervical spine w wo contrast  Result Date: 2/14/2025  Impression: Cervical degenerative change with mild canal stenosis and mild to moderate foraminal narrowing. No cord compression or abnormal cord signal. Mild nonspecific edema within the right posterior paraspinal musculature of the upper cervical spine seen on sagittal STIR imaging with mild enhancement. Minimal peripheral enhancement is noted and differential considerations would include infectious/inflammatory myositis with soft tissue abscess. No discitis or osteomyelitis. This examination was marked \"immediate " "notification\" in Epic in order to begin the standard process by which the radiology reading room liaison alerts the referring practitioner. Workstation performed: [id number]     CT head w wo contrast  Result Date: 2/13/2025  Impression: No acute hemorrhage, edema, or mass effect. Chronic microangiopathic changes and chronic appearing infarctions as above. No pathologic enhancement. Workstation performed: [id number]     MRI lumbar spine w wo contrast  Result Date: 2/13/2025  Impression: Mild inferior plate edema at L1 with adjacent small Schmorl's node and minimal adjacent postcontrast enhancement. Vacuum disc phenomenon noted on the recent CT at this level. Findings likely represent degenerative disc disease with Schmorl's node rather than discitis and osteomyelitis which should only be considered in the right clinical setting. No paraspinal edema or enhancement identified. No evidence of abscess or abnormal enhancement in the paraspinal soft tissues. 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 Resident: KEI Wallace I, the attending radiologist, have reviewed the images and agree with the final report above. Workstation performed: [id number]     US kidney and bladder  Result Date: 2/11/2025  Impression: No hydronephrosis. Moderate bladder distention. Perinephric edema on the left. Findings were discussed with Dr. Peralta at 7:25 p.m. via secure text Workstation performed: [id number]     CTA chest pe study  Result Date: 2/11/2025  Impression: No evidence of pulmonary embolus. Stable ectasia of the ascending thoracic aorta measuring up to 41 mm. Mild secretions in the lower trachea, correlate for any dysphagia or possible aspiration. Noncalcified left lung apex pulmonary nodule, 6 x 5 mm. Because this was not present on prior chest CT of December 4, 2023, conservative management with follow-up low radiation dose noncontrast chest CT in 6 months is recommended. This examination was marked " "\"immediate notification\" in Epic in order to begin the standard process by which the radiology reading room liaison alerts the referring practitioner. Resident: Bijan Fajardo I, the attending radiologist, have reviewed the images and agree with the final report above. Workstation performed: [id number]     XR chest 1 view portable  Result Date: 2/11/2025  Impression: No acute cardiopulmonary disease. Workstation performed: [id number]       No Chest XR results available for this patient.     Other Study Results Review: No additional pertinent studies reviewed.    Last 24 Hours Medication List:     Current Facility-Administered Medications:     acetaminophen (TYLENOL) tablet 650 mg, Q6H PRN    albuterol (PROVENTIL HFA,VENTOLIN HFA) inhaler 2 puff, Q4H PRN    [Held by provider] apixaban (ELIQUIS) tablet 5 mg, BID    aspirin chewable tablet 81 mg, Daily    atorvastatin (LIPITOR) tablet 80 mg, Daily With Dinner    barium sulfate tablet 1 tablet, Once in imaging    barium sulfate tablet 1 tablet, Once in imaging    ceFAZolin (ANCEF) IVPB (premix in dextrose) 2,000 mg 50 mL, Q8H, Last Rate: 2,000 mg (02/17/25 0024)    docusate sodium (COLACE) capsule 100 mg, Daily    fluticasone (ARNUITY ELLIPTA) 100 MCG/ACT inhaler 1 puff, Daily    guaiFENesin (MUCINEX) 12 hr tablet 1,200 mg, BID    hydroxychloroquine (PLAQUENIL) tablet 400 mg, Daily With Breakfast    ipratropium (ATROVENT) 0.02 % inhalation solution 0.5 mg, TID    levalbuterol (XOPENEX) inhalation solution 1.25 mg, TID    lidocaine (LIDODERM) 5 % patch 1 patch, Daily    lidocaine (LIDODERM) 5 % patch 1 patch, Daily    lidocaine (LIDODERM) 5 % patch 1 patch, Daily    [Held by provider] losartan (COZAAR) tablet 25 mg, Daily    methocarbamol (ROBAXIN) tablet 500 mg, Q8H GALE    metoprolol tartrate (LOPRESSOR) tablet 25 mg, Q12H GALE    oxyCODONE (ROXICODONE) IR tablet 5 mg, Q4H PRN    oxyCODONE (ROXICODONE) split tablet 2.5 mg, Q4H PRN    pantoprazole (PROTONIX) injection 40 " mg, Q12H GALE    polyethylene glycol (MIRALAX) packet 17 g, Daily PRN    sertraline (ZOLOFT) tablet 12.5 mg, Daily    sucralfate (CARAFATE) tablet 1 g, 4x Daily (AC & HS)    tamsulosin (FLOMAX) capsule 0.4 mg, Daily With Dinner    Administrative Statements   Today, Patient Was Seen By: Mal Neely DO      **Please Note: This note may have been constructed using a voice recognition system.**

## 2025-02-17 NOTE — ASSESSMENT & PLAN NOTE
- P/w: blurry vision prompting MRI brain   - Imaging:   - MRI brain showed L frontal lobe acute to early subacute infarct, multiple scattered punctate strokes in the bilateral frontal, right parietal, bilateral occipital, and right cerebellum with several microhemorrhages associated with recent infarcts with a few additional chronic microhemorrhages. Chronic RBG and L cerebellar lacunar infarct. L occipital lobe hemosiderin staining consistent with remote injury.   - Neuro consulted and felt etiology of bihemispheric strokes in setting of MSSA bacteremia uncertain origin.  Patient only missed 1 dose of eliquis but felt eliquis failure possible due to acute infectious d/s process.      - Status and residual impairments:    Imbalance, deconditioning - most residual functional deficits likely related to non-stroke causes at this time   - Med/surgical management:   Given potential hemorrhage in inferior L frontal lobe A/C is on hold with plan for colonoscopy on 2/19.  If CTH stable afterwards can resume Eliquis.   Abx per ID   Statin   Optimal BP control   - Neuropsych Med review:    Robaxin 500mg TID   Zoloft 12.5mg qday    Oxy 2.5-5mg Q4H PRN    (Care with sedating meds)     - Continue PT, OT in acute setting to improve ADLs, mobility, strength, conditioning, and decrease risks of immobility as well as to assist with dispo recommendations   - Decrease risks of complications related to decreased mobility status and monitor for them during course  - MSK - atrophy, contractures, bone demineralization, CV - DVT/PE, orthostasis, decreased CO, Resp - atelectasis, PNA, GI - constipation, reduced appetite,  - retention, incontinence, Skin - pressure injuries  - Optimal bowel/bladder mgmt/hygiene - monitor for retention, incontinence, infection     - Turn patient Q2H, skin checks minimum Qshift  - ROM of major joints 2-3 times per day (if unable to do it on own)  - Supportive counseling and updates to family (as appropriate)    - Fall precautions - if needed increase rounding or consider virtual sitter or in-person sitter  - Overall mgmt per primary team currently, recommend optimal mgmt during rehab process as well  - Remains at risk for increased confusion/delirium, restlessness, agitation, and fall - continue to monitor for concerns/changes  - Monitor neuro-exam, wakefulness, mood, cognition, insight into deficits and safety awareness   - Monitor and ensure optimal management electrolytes, nutrition, and hydration  - Monitor for signs or symptoms of infection, medication intolerances, other systemic etiologies  - Additional labs, imaging, specialist follow-up as needed per primary team currently   - Overstimulation precautions, frequent re-orientation, re-direction, re-assurance  - Optimal mood, pain, and sleep management  - If impaired sleep or behavior recommend sleep log and agitation monitoring    - Limit sedating medications when possible  - For routine restlessness, anxiety, irritability focus on non-pharmacologic management    - Hold benzo's with increased risk paradoxical reaction and possibility of limiting cognitive recovery    Devin Chaves was evaluated by PT, OT and now by PMR physician and found to have new and significant deficits in ADLs and mobility.      Prior Level of Function and Social history:    Patient was admitted to [facility] but prior to that lived in 2  with 4 [facility] with spouse.    Patient states he was progressing well ambulating in hallway with walker and chair follow and needed assist with ADLs at SNF    Current Level of Function:    Mod assist LBB, LBD, toileting  Supervision other ADLs  Transfers max assist; stood for 45 secs with RW mod-max x1  Not able to ambulate    Disposition recommendation:  Too be determined - SNF v ARC/IRF   - The patient is currently too low level for ARC/IRF setting but has several acute medical issues going on - anemia, rigors, infection  - As these  improve, hopefully, functional activity tolerance will improve to be appropriate for higher level setting otherwise recommend SNF

## 2025-02-17 NOTE — ASSESSMENT & PLAN NOTE
Recent Labs     02/14/25  2021 02/15/25  0459 02/16/25  0608   HGB 8.4* 8.2* 8.5*     Lab Results   Component Value Date    IRON 37 (L) 02/14/2025    FERRITIN 147 02/14/2025    TIBC 166.6 (L) 02/14/2025    CONCFE 22 02/14/2025     B12 972, Folate 14  Stable    Plan:  Transfuse if <7   Patient already typed and screened, not consented  Patient required 2 units packed red blood cells overnight on 2/13  GI consult, appreciate recs  Discussed with GI, will defer given stabilization of Hgb and new finding of septic emboli  Will monitor Hgb after restarting AC with eliquis

## 2025-02-17 NOTE — ASSESSMENT & PLAN NOTE
Acute posterior neck pain predominant starting 2/14/25 2/14/25 MRI C spine: cervical degenerative change. Mild nonspecific edema within the right posterior paraspinal musculature of the upper cervical spine with mild enhancement. 2 separate foci of linear nonenhancement may represent peripherally enhancing fluid collections. No discitis or osteomyelitis.  ESR is only mildly elevated at 26 (2/14) but  (2/16) is highly elevated.  Patient will need long-term IV antibiotic and monitoring of ESR/CRP on treatment.  He also needs repeat MRI.  -continues cefazolin as above  -Treat x 6-week from blood culture clearance to 3/27/25    -serial exam  -Monitor ESR/CRP.  If ESR/CRP remain elevated at the end of IV antibiotic course, patient will need to be transitioned to p.o. antibiotic and remain on it until inflammatory markers are normal.  -Repeat C-spine MRI in 4 to 6 weeks.  -pain management per primary

## 2025-02-17 NOTE — ASSESSMENT & PLAN NOTE
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

## 2025-02-17 NOTE — ASSESSMENT & PLAN NOTE
- Mgmt currently per primary team   - Rate control: MTP  - Antithrombotic: On hold   - OP Cards follow-up

## 2025-02-18 LAB
ANION GAP SERPL CALCULATED.3IONS-SCNC: 5 MMOL/L (ref 4–13)
BASOPHILS # BLD AUTO: 0.03 THOUSANDS/ΜL (ref 0–0.1)
BASOPHILS NFR BLD AUTO: 1 % (ref 0–1)
BUN SERPL-MCNC: 17 MG/DL (ref 5–25)
CALCIUM SERPL-MCNC: 8.2 MG/DL (ref 8.4–10.2)
CHLORIDE SERPL-SCNC: 102 MMOL/L (ref 96–108)
CO2 SERPL-SCNC: 31 MMOL/L (ref 21–32)
CREAT SERPL-MCNC: 0.89 MG/DL (ref 0.6–1.3)
EOSINOPHIL # BLD AUTO: 0.03 THOUSAND/ΜL (ref 0–0.61)
EOSINOPHIL NFR BLD AUTO: 1 % (ref 0–6)
ERYTHROCYTE [DISTWIDTH] IN BLOOD BY AUTOMATED COUNT: 17.6 % (ref 11.6–15.1)
GFR SERPL CREATININE-BSD FRML MDRD: 82 ML/MIN/1.73SQ M
GLUCOSE SERPL-MCNC: 103 MG/DL (ref 65–140)
HCT VFR BLD AUTO: 24.6 % (ref 36.5–49.3)
HCT VFR BLD AUTO: 27.1 % (ref 36.5–49.3)
HCT VFR BLD AUTO: 27.5 % (ref 36.5–49.3)
HGB BLD-MCNC: 7.9 G/DL (ref 12–17)
HGB BLD-MCNC: 8.5 G/DL (ref 12–17)
HGB BLD-MCNC: 8.5 G/DL (ref 12–17)
IMM GRANULOCYTES # BLD AUTO: 0.05 THOUSAND/UL (ref 0–0.2)
IMM GRANULOCYTES NFR BLD AUTO: 1 % (ref 0–2)
LYMPHOCYTES # BLD AUTO: 0.94 THOUSANDS/ΜL (ref 0.6–4.47)
LYMPHOCYTES NFR BLD AUTO: 16 % (ref 14–44)
MAGNESIUM SERPL-MCNC: 2 MG/DL (ref 1.9–2.7)
MCH RBC QN AUTO: 28.8 PG (ref 26.8–34.3)
MCHC RBC AUTO-ENTMCNC: 31.4 G/DL (ref 31.4–37.4)
MCV RBC AUTO: 92 FL (ref 82–98)
MONOCYTES # BLD AUTO: 0.47 THOUSAND/ΜL (ref 0.17–1.22)
MONOCYTES NFR BLD AUTO: 8 % (ref 4–12)
NEUTROPHILS # BLD AUTO: 4.24 THOUSANDS/ΜL (ref 1.85–7.62)
NEUTS SEG NFR BLD AUTO: 73 % (ref 43–75)
NRBC BLD AUTO-RTO: 0 /100 WBCS
PLATELET # BLD AUTO: 160 THOUSANDS/UL (ref 149–390)
PMV BLD AUTO: 8.5 FL (ref 8.9–12.7)
POTASSIUM SERPL-SCNC: 3.8 MMOL/L (ref 3.5–5.3)
RBC # BLD AUTO: 2.95 MILLION/UL (ref 3.88–5.62)
SODIUM SERPL-SCNC: 138 MMOL/L (ref 135–147)
WBC # BLD AUTO: 5.76 THOUSAND/UL (ref 4.31–10.16)

## 2025-02-18 PROCEDURE — 80048 BASIC METABOLIC PNL TOTAL CA: CPT

## 2025-02-18 PROCEDURE — 99233 SBSQ HOSP IP/OBS HIGH 50: CPT | Performed by: INTERNAL MEDICINE

## 2025-02-18 PROCEDURE — 85014 HEMATOCRIT: CPT

## 2025-02-18 PROCEDURE — 99232 SBSQ HOSP IP/OBS MODERATE 35: CPT | Performed by: INTERNAL MEDICINE

## 2025-02-18 PROCEDURE — 97535 SELF CARE MNGMENT TRAINING: CPT

## 2025-02-18 PROCEDURE — 85025 COMPLETE CBC W/AUTO DIFF WBC: CPT

## 2025-02-18 PROCEDURE — NC001 PR NO CHARGE: Performed by: INTERNAL MEDICINE

## 2025-02-18 PROCEDURE — G0545 PR INHERENT VISIT TO INPT: HCPCS | Performed by: INTERNAL MEDICINE

## 2025-02-18 PROCEDURE — 85018 HEMOGLOBIN: CPT

## 2025-02-18 PROCEDURE — 94760 N-INVAS EAR/PLS OXIMETRY 1: CPT

## 2025-02-18 PROCEDURE — 83735 ASSAY OF MAGNESIUM: CPT

## 2025-02-18 PROCEDURE — 94640 AIRWAY INHALATION TREATMENT: CPT

## 2025-02-18 RX ORDER — POTASSIUM CHLORIDE 1500 MG/1
20 TABLET, EXTENDED RELEASE ORAL ONCE
Status: COMPLETED | OUTPATIENT
Start: 2025-02-18 | End: 2025-02-18

## 2025-02-18 RX ORDER — SENNOSIDES 8.6 MG
1 TABLET ORAL
Status: DISCONTINUED | OUTPATIENT
Start: 2025-02-18 | End: 2025-02-22 | Stop reason: HOSPADM

## 2025-02-18 RX ORDER — SODIUM CHLORIDE, SODIUM GLUCONATE, SODIUM ACETATE, POTASSIUM CHLORIDE, MAGNESIUM CHLORIDE, SODIUM PHOSPHATE, DIBASIC, AND POTASSIUM PHOSPHATE .53; .5; .37; .037; .03; .012; .00082 G/100ML; G/100ML; G/100ML; G/100ML; G/100ML; G/100ML; G/100ML
100 INJECTION, SOLUTION INTRAVENOUS CONTINUOUS
Status: DISPENSED | OUTPATIENT
Start: 2025-02-18 | End: 2025-02-19

## 2025-02-18 RX ORDER — POLYETHYLENE GLYCOL 3350 17 G/17G
238 POWDER, FOR SOLUTION ORAL ONCE
Status: DISCONTINUED | OUTPATIENT
Start: 2025-02-19 | End: 2025-02-19

## 2025-02-18 RX ADMIN — IPRATROPIUM BROMIDE 0.5 MG: 0.5 SOLUTION RESPIRATORY (INHALATION) at 13:53

## 2025-02-18 RX ADMIN — METHOCARBAMOL TABLETS 500 MG: 500 TABLET, COATED ORAL at 14:38

## 2025-02-18 RX ADMIN — LEVALBUTEROL HYDROCHLORIDE 1.25 MG: 1.25 SOLUTION RESPIRATORY (INHALATION) at 13:53

## 2025-02-18 RX ADMIN — IPRATROPIUM BROMIDE 0.5 MG: 0.5 SOLUTION RESPIRATORY (INHALATION) at 07:42

## 2025-02-18 RX ADMIN — LIDOCAINE 1 PATCH: 50 PATCH CUTANEOUS at 09:05

## 2025-02-18 RX ADMIN — POTASSIUM CHLORIDE 20 MEQ: 1500 TABLET, EXTENDED RELEASE ORAL at 09:04

## 2025-02-18 RX ADMIN — CEFAZOLIN SODIUM 2000 MG: 2 SOLUTION INTRAVENOUS at 17:33

## 2025-02-18 RX ADMIN — METOPROLOL TARTRATE 25 MG: 25 TABLET, FILM COATED ORAL at 09:04

## 2025-02-18 RX ADMIN — FLUTICASONE FUROATE 1 PUFF: 100 POWDER RESPIRATORY (INHALATION) at 09:05

## 2025-02-18 RX ADMIN — TAMSULOSIN HYDROCHLORIDE 0.4 MG: 0.4 CAPSULE ORAL at 17:33

## 2025-02-18 RX ADMIN — OXYCODONE HYDROCHLORIDE 5 MG: 5 TABLET ORAL at 05:17

## 2025-02-18 RX ADMIN — PANTOPRAZOLE SODIUM 40 MG: 40 INJECTION, POWDER, FOR SOLUTION INTRAVENOUS at 09:04

## 2025-02-18 RX ADMIN — PANTOPRAZOLE SODIUM 40 MG: 40 INJECTION, POWDER, FOR SOLUTION INTRAVENOUS at 21:16

## 2025-02-18 RX ADMIN — METHOCARBAMOL TABLETS 500 MG: 500 TABLET, COATED ORAL at 21:17

## 2025-02-18 RX ADMIN — DOCUSATE SODIUM 100 MG: 100 CAPSULE, LIQUID FILLED ORAL at 17:33

## 2025-02-18 RX ADMIN — GUAIFENESIN 1200 MG: 600 TABLET, EXTENDED RELEASE ORAL at 17:33

## 2025-02-18 RX ADMIN — LEVALBUTEROL HYDROCHLORIDE 1.25 MG: 1.25 SOLUTION RESPIRATORY (INHALATION) at 19:31

## 2025-02-18 RX ADMIN — IPRATROPIUM BROMIDE 0.5 MG: 0.5 SOLUTION RESPIRATORY (INHALATION) at 19:31

## 2025-02-18 RX ADMIN — METOPROLOL TARTRATE 25 MG: 25 TABLET, FILM COATED ORAL at 21:17

## 2025-02-18 RX ADMIN — ASPIRIN 81 MG CHEWABLE TABLET 81 MG: 81 TABLET CHEWABLE at 09:04

## 2025-02-18 RX ADMIN — GUAIFENESIN 1200 MG: 600 TABLET, EXTENDED RELEASE ORAL at 09:04

## 2025-02-18 RX ADMIN — SODIUM CHLORIDE, SODIUM GLUCONATE, SODIUM ACETATE, POTASSIUM CHLORIDE, MAGNESIUM CHLORIDE, SODIUM PHOSPHATE, DIBASIC, AND POTASSIUM PHOSPHATE 100 ML/HR: .53; .5; .37; .037; .03; .012; .00082 INJECTION, SOLUTION INTRAVENOUS at 15:38

## 2025-02-18 RX ADMIN — CEFAZOLIN SODIUM 2000 MG: 2 SOLUTION INTRAVENOUS at 09:04

## 2025-02-18 RX ADMIN — ATORVASTATIN CALCIUM 80 MG: 40 TABLET, FILM COATED ORAL at 17:33

## 2025-02-18 RX ADMIN — SUCRALFATE 1 G: 1 TABLET ORAL at 06:20

## 2025-02-18 RX ADMIN — LEVALBUTEROL HYDROCHLORIDE 1.25 MG: 1.25 SOLUTION RESPIRATORY (INHALATION) at 07:42

## 2025-02-18 RX ADMIN — LIDOCAINE 1 PATCH: 50 PATCH CUTANEOUS at 09:04

## 2025-02-18 RX ADMIN — CEFAZOLIN SODIUM 2000 MG: 2 SOLUTION INTRAVENOUS at 00:50

## 2025-02-18 RX ADMIN — DOCUSATE SODIUM 100 MG: 100 CAPSULE, LIQUID FILLED ORAL at 09:04

## 2025-02-18 RX ADMIN — HYDROXYCHLOROQUINE SULFATE 400 MG: 200 TABLET ORAL at 09:04

## 2025-02-18 RX ADMIN — METHOCARBAMOL TABLETS 500 MG: 500 TABLET, COATED ORAL at 05:17

## 2025-02-18 RX ADMIN — SERTRALINE HYDROCHLORIDE 12.5 MG: 25 TABLET ORAL at 09:04

## 2025-02-18 NOTE — ASSESSMENT & PLAN NOTE
MRI brain: Multiple tiny recent infarcts scattered in multiple vascular distributions that are likely embolic. Several microhemorrhages associated with recent infarcts. No acute space-occupying hematoma. Chronic ischemic changes.    Plan:  Hold Eliquis given conversion demonstrated on MRI brain  Will plan to restart pending colonoscopy and repeat CTH to assess for additional hemorrhage  Continue aspirin per neurology

## 2025-02-18 NOTE — CASE MANAGEMENT
Case Management Discharge Planning Note    Patient name Devin Chaves  Location S /S -01 MRN [id number]  : 1945 Date 2025       Current Admission Date: 2025  Current Admission Diagnosis:MSSA bacteremia   Patient Active Problem List    Diagnosis Date Noted Date Diagnosed    Stroke (HCC) 02/15/2025     Neck pain 2025     Anemia 2025     MSSA bacteremia 2025     Sepsis (HCC) 2025     Acute urinary retention 2025     Hypotension 2025     Constipation 2025     Atrial fibrillation (HCC) 2025     Hip region mass, unspecified laterality 2023     Chronic hypoxic respiratory failure ([facility]) 2023     Fracture of multiple ribs of right side 2023     Rheumatoid arthritis, unspecified ([facility]) 2023     Moderate protein-calorie malnutrition ([facility]) 2023     Hypertension 2023     CAD (coronary artery disease) 2023     COPD (chronic obstructive pulmonary disease) ([facility]) 2023     JAZMINE (obstructive sleep apnea) 2023     Back pain 2023     COVID-19 2023     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  [address]  [facility] [address]  Phone: [phone] Fax: [phone]    [facility] - [place] - [address]  [address]  [address]  [place] [address]  Phone: [phone] Fax: [phone]    [facility] - [place] - [address]  [address]  [place] [address]  Phone: [phone] Fax: [phone]    Primary Care  Provider: Oswaldo Muniz DO    Primary Insurance: [facility] OPTUM [facility]  Secondary Insurance: [employer]  REP    DISCHARGE DETAILS:       Additional Comments: VM left for Pts wife to discuss accepting STR choices. CM is awaiting a phone call back.

## 2025-02-18 NOTE — ASSESSMENT & PLAN NOTE
MRI L-Spine: Negative for acute pathology  MRI C-spine shows possible soft tissue infection status post IV cefazolin  ID on board    Plan:  Lidocaine patches  Robaxin 500 mg q8 hrs scheduled  Tylenol 650 mg q6 hrs prn for mild pain  Oxycodone 2.5/5 mg q4 hrs prn for mod/sev pain

## 2025-02-18 NOTE — ASSESSMENT & PLAN NOTE
Recent Labs     02/17/25  1702 02/18/25  0036 02/18/25  0509   HGB 7.8* 7.9* 8.5*     Lab Results   Component Value Date    IRON 37 (L) 02/14/2025    FERRITIN 147 02/14/2025    TIBC 166.6 (L) 02/14/2025    CONCFE 22 02/14/2025     B12 972, Folate 14  Stable    Plan:  Transfuse if <7   Patient already typed and screened, not consented  Patient required 2 units packed red blood cells overnight on 2/13  GI consult, appreciate recs  Plan for colonoscopy on 2/19, patient prepping today

## 2025-02-18 NOTE — SPEECH THERAPY NOTE
Speech Language/Pathology    Speech/Language Pathology cancel Note    Patient Name: Devin Chaves  Today's Date: 2/18/2025       Spoke w/ RN yesterday afternoon, pt had bloody BM after enema; Now on bowel prep for colonoscopy. Will hold and follow up when able to resume a regular diet.       Cheri Strong MA CCC-SLP  Speech Pathologist  Available via [employer]

## 2025-02-18 NOTE — ASSESSMENT & PLAN NOTE
Admission Blood cultures labeled same arm/same time are + for MSSA. 2/12/25 TTE negative for vegetation. No PPM, intravascular devices. Acute posterior neck pain predominant starting 2/14/25 and possible deep cervical infection evident on MRI C spine.  Bacteremia clearing  -continues Cefazolin 2 g IV q 8 hours  -follow-up repeat blood cultures  -can have PICC placed anytime   -anticipate protracted IV antibiotic course as below

## 2025-02-18 NOTE — PLAN OF CARE
Problem: OCCUPATIONAL THERAPY ADULT  Goal: Performs self-care activities at highest level of function for planned discharge setting.  See evaluation for individualized goals.  Description: Treatment Interventions: ADL retraining, Functional transfer training, UE strengthening/ROM, Endurance training, Patient/family training, Equipment evaluation/education, Compensatory technique education, Continued evaluation, Energy conservation, Activityengagement          See flowsheet documentation for full assessment, interventions and recommendations.   2/18/2025 1454 by DANELLE Trujillo  Outcome: Progressing  Note: Limitation: Decreased ADL status, Decreased endurance, Decreased self-care trans, Decreased high-level ADLs  Prognosis: Fair  Assessment: Pt seen on this date for skilled OT treatment session. At start of session pt supine in bed and reports incontinence of BM. Pt agreeable for OOB transfer to BS for continued BM. Pt requiring increased (A) with bed mobility. Pt with improved OOB activity tolerance despite continued L ankle pain. Pt requiring max A x1 for SPT to/from BSC. Pt with improved standing tolerance for toileting hygiene. Limited effectiveness due to poor wiping technique - requiring A for thoroughness. Edu on techniques provided to pt to reduce mess. Pt with improved overall activity tolerance from previous session, continues to be limited by overall problem solving and insight, endurance, ADL performance and limited ability to complete functional mobility. Pt would continue to benefit from skilled OT treatment sessions in order to address remaining deficits     Rehab Resource Intensity Level, OT: II (Moderate Resource Intensity)

## 2025-02-18 NOTE — ASSESSMENT & PLAN NOTE
MAP as low as 62 in the ED per automated cuff  Home Htn meds: Metoprolol tartrate 25 mg BID, Losartan 25 mg QD  Losartan recent decreased from 50 mg due to concerns for hypotension  Blood Pressure: (!) 106/49  Patient with soft pressures today  Plan:  Monitor VS including BP.   If hypotension recurs, confirm via manual, assess symptoms; suggest fluid bolus/albumin and discussion w crit care.  Continue home metoprolol tartrate 25 mg BID  Hold losartan 25 mg QD  IVF hydration with isolyte @ 100 cc/hr for 10 hrs

## 2025-02-18 NOTE — ASSESSMENT & PLAN NOTE
Suspect new urinary retention 2/2 BPH  US Kidney/Bladder 2/11/25: No hydronephrosis, moderate bladder distention, perinephric edema on L side.  Patient endorses improvement of retention with initiation of tamsulosin    Plan:  Tamsulosin 0.4 mg QD  Urinary retention protocol

## 2025-02-18 NOTE — PROGRESS NOTES
Progress Note - Gastroenterology   Name: Devin Chaves 79 y.o. male I MRN: [id number]  Unit/Bed#: S -01 I Date of Admission: 2/11/2025   Date of Service: 2/18/2025 I Hospital Day: 7    Assessment & Plan  Anemia  79 y.o. male with COPD on 2L NC at baseline, CAD, Afib on Eliquis, and rheumatoid arthritis who presented with shortness of breath on 2/11 found to have gram positive bacteremia, hemoglobin of 6.7 on 2/13 requiring 2 units PRBC and improved to 8.4. Previous Hgb 12.6 in 2023. 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 Also gastric nodule noted requiring EUS, unclear if this has been done.  - Patient reported constipation, per chart review received Colace, Miralax, Senokot, Dulcolax on 2/17 as well as 1/2 of GoLytely bowel prep. Subsequently noted to have dark stools with dark red liquid. Hgb has remained relatively stable.   - Discussed with SLIM, patient stable for EGD/colonoscopy tomorrow. Will plan for patient to complete remainder of GoLytely this evening as well as 1/2 Miralax prep in the AM prior to procedure  - Continue pantroprazole 40 mg PO BID  - Hold Carafate after midnight. Can resume post-procedure  - Continue to monitor for overt signs of bleeding and transfuse for Hgb goal >7  - Outpatient EUS pending course due to history of gastric nodule      Subjective   Patient seen lying in bed this morning, wife present. Patient notes some shortness of breath with movement but overall feels breathing is back to baseline. Nursing was providing hygiene care during visit this morning, dark brown stool visualized on pad.       Objective :  Temp:  [97.4 °F (36.3 °C)-97.8 °F (36.6 °C)] 97.8 °F (36.6 °C)  HR:  [] 77  BP: ()/(48-61) 99/49  Resp:  [20] 20  SpO2:  [96 %-100 %] 98 %  O2 Device: Nasal cannula  Nasal Cannula O2 Flow Rate (L/min):  [2 L/min-5 L/min] 2 L/min    Physical Exam  Constitutional:       General: He is  not in acute distress.     Appearance: Normal appearance.   HENT:      Head: Normocephalic and atraumatic.   Eyes:      Conjunctiva/sclera: Conjunctivae normal.   Cardiovascular:      Rate and Rhythm: Normal rate and regular rhythm.      Heart sounds: No murmur heard.  Pulmonary:      Effort: No respiratory distress.      Comments: 2L NC, decreased breath sounds bilaterally  Abdominal:      General: Bowel sounds are normal.      Palpations: Abdomen is soft.      Tenderness: There is no abdominal tenderness. There is no guarding.   Musculoskeletal:         General: No swelling.      Cervical back: Neck supple.   Skin:     General: Skin is warm and dry.      Findings: Bruising present.   Neurological:      General: No focal deficit present.      Mental Status: He is alert.

## 2025-02-18 NOTE — ASSESSMENT & PLAN NOTE
Patient rate controlled since admission    Plan:  Eliquis has been held because of MRI proven hemorrhagic conversion of multifocal embolic infarction and suspected GI bleed  Continue home metoprolol with hold parameters

## 2025-02-18 NOTE — ASSESSMENT & PLAN NOTE
Acute posterior neck pain predominant starting 2/14/25 2/14/25 MRI C spine: cervical degenerative change. Mild nonspecific edema within the right posterior paraspinal musculature of the upper cervical spine with mild enhancement. 2 separate foci of linear nonenhancement may represent peripherally enhancing fluid collections. No discitis or osteomyelitis.  ESR is only mildly elevated at 26 (2/14) but  (2/16) is highly elevated.  Patient will need long-term IV antibiotic and monitoring of ESR/CRP on treatment.  He also needs repeat MRI.  -continues cefazolin as above  -Treat x 6-week from blood culture clearance to 3/27/25    -serial exam  -Monitor ESR/CRP.  If ESR/CRP remain elevated at the end of IV antibiotic course, patient will need to be transitioned to p.o. antibiotic and remain on it until inflammatory markers are normal.  -Repeat C-spine MRI in 4 to 6 weeks.  -pain management per primary   -follow up with ID outpatient on 3/3/25 at 12:30 pm - Chastity attending

## 2025-02-18 NOTE — CASE MANAGEMENT
Case Management Progress Note    Patient name Devin Chaves  Location S /S -01 MRN [id number]  : 1945 Date 2025       LOS (days): 7  Geometric Mean LOS (GMLOS) (days):   Days to GMLOS:        OBJECTIVE:        Current admission status: Inpatient  Preferred Pharmacy:   [id number] [place] - [address]  [address]  [place] [address]  Phone: [phone] Fax: [phone]    [facility] - [place] - [address]  [address]  [address]  [place] [address]  Phone: [phone] Fax: [phone]    [facility] [place] - [address]  [address]  [place] [address]  Phone: [phone] Fax: [phone]    Primary Care Provider: Oswaldo Muniz DO    [facility]  [facility]    PROGRESS NOTE:    Weekly Care Management Length of Stay Review     Current LOS: 7 Days    Most Recent Labs:     Lab Results   Component Value Date/Time    WBC 5.76 2025 05:09 AM    HGB 8.5 (L) 2025 04:03 PM    HCT 27.5 (L) 2025 04:03 PM     2025 05:09 AM    BANDSPCT 1 2025 08:44 AM    SODIUM 138 2025 05:09 AM    K 3.8 2025 05:09 AM     2025 05:09 AM    CO2 31 2025 05:09 AM    BUN 17 2025 05:09 AM    CREATININE 0.89 2025 05:09 AM    GLUC 103 2025 05:09 AM    ALKPHOS 51 2025 08:37 AM    ALT 3 (L) 2025 08:37 AM    AST 20 2025 08:37 AM    ALB 2.6 (L) 2025 08:37 AM    TBILI 0.55 2025 08:37 AM    CHOLESTEROL 54 2025 08:37 AM    HDL 17 (L) 2025 08:37 AM    LDLCALC 15 2025 08:37 AM    TRIG 112 2025 08:37 AM    HGBA1C 5.8 (H) 2025 06:08 AM    INR 1.39 (H) 2025 01:59 PM       Most Recent Vitals:   Vitals:    25 1535   BP: 108/52   Pulse: 91   Resp:     Temp: 97.6 °F (36.4 °C)   SpO2: 98%        Identified Barriers to Discharge/Discharge Goals/Care Management Interventions:  MSSA bacteremia, IV ABX, colonoscopy tomorrow. Awaiting finale plan for IV ABX.     Intended Discharge Disposition: Family does not want patient to return to [facility]. Additional referrals sent.     Expected Discharge Date: TBD

## 2025-02-18 NOTE — ASSESSMENT & PLAN NOTE
79 y.o. male with COPD on 2L NC at baseline, CAD, Afib on Eliquis, and rheumatoid arthritis who presented with shortness of breath on 2/11 found to have gram positive bacteremia, hemoglobin of 6.7 on 2/13 requiring 2 units PRBC and improved to 8.4. Previous Hgb 12.6 in 2023. 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 Also gastric nodule noted requiring EUS, unclear if this has been done.  - Patient reported constipation, per chart review received Colace, Miralax, Senokot, Dulcolax on 2/17 as well as 1/2 of GoLytely bowel prep. Subsequently noted to have dark stools with dark red liquid. Hgb has remained relatively stable.   - Discussed with SLIM, patient stable for EGD/colonoscopy tomorrow. Will plan for patient to complete remainder of GoLytely this evening as well as 1/2 Miralax prep in the AM prior to procedure  - Continue pantroprazole 40 mg PO BID  - Hold Carafate after midnight. Can resume post-procedure  - Continue to monitor for overt signs of bleeding and transfuse for Hgb goal >7  - Outpatient EUS pending course due to history of gastric nodule

## 2025-02-18 NOTE — OCCUPATIONAL THERAPY NOTE
Occupational Therapy Progress Note     Patient Name: Devin Chaves  Today's Date: 2/18/2025  Problem List  Principal Problem:    MSSA bacteremia  Active Problems:    COPD (chronic obstructive pulmonary disease) (HCC)    Back pain    Atrial fibrillation (HCC)    Sepsis (HCC)    Acute urinary retention    Hypotension    Constipation    Anemia    Neck pain    Stroke ([facility])              02/18/25 1433   OT Last Visit   OT Visit Date 02/18/25   Note Type   Note Type Treatment   Pain Assessment   Pain Assessment Tool 0-10   Pain Score 3   Pain Location/Orientation Orientation: Left;Location: Foot    (pt initially complaining of pain upon therapist arrival - no complaints during transfers/mobility - offering ice pack - pt declining)   Restrictions/Precautions   Weight Bearing Precautions Per Order No   Other Precautions Cognitive;Chair Alarm;Bed Alarm;Multiple lines;Fall Risk;Pain;O2   Lifestyle   Autonomy Prior to rehab, pt was independent in ADLs/IADLs however @ rehab was recieving assistance with functional needs and using RW for functional mobility   Reciprocal Relationships Lives with his wife and daughter   Service to Others Retired   Intrinsic Gratification Enjoys reading - especially WW[employer] books   ADL   UB Dressing Assistance 4  Minimal Assistance   UB Dressing Deficit Increased time to complete;Supervision/safety;Verbal cueing;Thread RUE;Thread LUE;Pull around back   UB Dressing Comments doff/donning gown   LB Dressing Assistance 1  Total Assistance   LB Dressing Deficit Don/doff R sock;Don/doff L sock   Toileting Assistance  2  Maximal Assistance   Toileting Deficit Increased time to complete;Clothing management down;Perineal hygiene;Clothing management up;Bedside commode   Toileting Comments A for clothing management - grossly incontinent en route to BSC. Able to initiate hygiene in sitting - limited effectiveness, A  for thoroughness with hygiene in standing. Pt with bowel movement covering hands and requiring  "cuing + A for washing prior to reaching for RW and therapist to assist   Functional Standing Tolerance   Time 3 min   Activity toilet hygiene in standing   Comments requiring mod A x1 to maintain standing balance   Bed Mobility   Rolling R 5  Supervision   Additional items Increased time required;Verbal cues   Rolling L 5  Supervision   Additional items Increased time required;Verbal cues   Supine to Sit 3  Moderate assistance   Additional items Assist x 1;Increased time required;Verbal cues;LE management   Transfers   Sit to Stand 3  Moderate assistance   Additional items Assist x 1;Increased time required;Verbal cues   Stand to Sit 3  Moderate assistance   Additional items Assist x 1;Increased time required;Verbal cues   Stand pivot 2  Maximal assistance   Additional items Assist x 1;Increased time required;Verbal cues   Additional Comments HHA for functional transfers - pt reaching for arm rests of surface for pivoting   Functional Mobility   Additional Comments unable to advance at this time SPT only   Subjective   Subjective \"I think I dropped a log\"   Cognition   Overall Cognitive Status Impaired   Arousal/Participation Alert;Cooperative   Attention Attends with cues to redirect   Orientation Level Oriented X4   Memory Decreased short term memory;Decreased recall of recent events   Following Commands Follows one step commands without difficulty   Comments pleasant and cooperative, limited recall of previous sessions. Poor insight into deficits   Activity Tolerance   Activity Tolerance Patient limited by fatigue  (reports SOB feeling s/p transfers - requiring incresed time in sitting for rest breaks and recovery. O2 noted to fluctuate 89-94% with activity)   Medical Staff Made Aware RICHIE Ramos   Assessment   Assessment Pt seen on this date for skilled OT treatment session. At start of session pt supine in bed and reports incontinence of BM. Pt agreeable for OOB transfer to BSC for continued BM. Pt requiring " increased (A) with bed mobility. Pt with improved OOB activity tolerance despite continued L ankle pain. Pt requiring max A x1 for SPT to/from BSC. Pt with improved standing tolerance for toileting hygiene. Limited effectiveness due to poor wiping technique - requiring A for thoroughness. Edu on techniques provided to pt to reduce mess. Pt with improved overall activity tolerance from previous session, continues to be limited by overall problem solving and insight, endurance, ADL performance and limited ability to complete functional mobility. Pt would continue to benefit from skilled OT treatment sessions in order to address remaining deficits   Plan   Goal Expiration Date 02/28/25   OT Treatment Day 1   OT Frequency 3-5x/wk   Discharge Recommendation   Rehab Resource Intensity Level, OT II (Moderate Resource Intensity)   AM-PAC Daily Activity Inpatient   Lower Body Dressing 1   Bathing 2   Toileting 2   Upper Body Dressing 3   Grooming 3   Eating 4   Daily Activity Raw Score 15   Daily Activity Standardized Score (Calc for Raw Score >=11) 34.69   AM-PAC Applied Cognition Inpatient   Following a Speech/Presentation 3   Understanding Ordinary Conversation 4   Taking Medications 2   Remembering Where Things Are Placed or Put Away 3   Remembering List of 4-5 Errands 2   Taking Care of Complicated Tasks 2   Applied Cognition Raw Score 16   Applied Cognition Standardized Score 35.03   End of Consult   Patient Position at End of Consult Bedside chair;Bed/Chair alarm activated;All needs within reach     OT GOALS:     Pt will improve functional mobility during ADL/IADL/leisure tasks with S using AE/DME prn. PROGRESSING     Pt will improve activity tolerance/functional endurance during ADL/IADL/leisure tasks for at least 20 minutes to improve occupational performance and engagement in valued occupations using AE/DME prn.  PROGRESSING     Pt will engage in ongoing functional/formal cognitive assessments to assist with safe  d/c planning and increase safety during functional tasks.     Pt will improve dynamic standing balance for at least 15 minutes with S during functional tasks to decrease fall risk and improve independence and engagement in ADL/IADL/leisure activities. PROGRESSING     Pt will follow 100% of multi-step commands in ADL/IADL/leisure activities to improve functional cognition used in functional daily routines. PROGRESSING     Pt will complete functional transfers on and off all surfaces used in daily routines with S for safety to maximize functional/occupational performance. PROGRESSING     Pt will complete all bed mobility tasks with Mod I to serve as a prerequisite for EOB/OOB ADL/IADL/leisure tasks, optimize positioning/comfort, and increase functional independence. PROGRESSING     Pt will independently demonstrate good carryover of safety precautions and education/training during ADL/IADL/leisure tasks with energy conservation techniques s/p skilled instruction without verbal cues. PROGRESSING     Pt will complete UB ADL tasks with Mod I using AE/DME prn to increase functional independence in ADL/IADL/leisure tasks. PROGRESSING     Pt will complete LB ADL tasks with Mod I using AE/DME prn to increase functional independence in ADL/IADL/leisure tasks.  PROGRESSING     Pt will complete toileting tasks with Mod I and good hygiene/thoroughness using AE/DME prn to increase functional independence. PROGRESSING    Pt will independently identify and utilize 2-3 positive coping strategies to enhance overall wellbeing and engagement in valued occupations.       The patient's raw score on the AM-PAC Daily Activity Inpatient Short Form is 15. A raw score of less than 19 suggests the patient may benefit from discharge to post-acute rehabilitation services. HOWEVER please refer to the recommendation of the Occupational Therapist for safe discharge planning.      Lisa Martinez MS, OTR/L

## 2025-02-18 NOTE — PROGRESS NOTES
Progress Note - Hospitalist   Name: Devin Chaves 79 y.o. male I MRN: [id number]  Unit/Bed#: S -01 I Date of Admission: 2/11/2025   Date of Service: 2/18/2025 I Hospital Day: 7    Assessment & Plan  MSSA bacteremia  Blood cultures with Gram + cocci, likely Staph Aureus  Unknown source at this time, patient with multiple small abrasions on hands and arms. UA negative, LLE does not appear to have cellulitis.  TTE negative for vegetations  Patient mentioned small laceration from haircut at [facility] with pustule formation, possible source at there is myositis to that area  MRI L-spine: No evidence of abscess or abnormal enhancement in the paraspinal soft tissues.  MRI C-spine: Mild nonspecific edema within the right posterior paraspinal musculature of the upper cervical spine seen on sagittal STIR imaging with mild enhancement. Minimal peripheral enhancement is noted and differential considerations would include infectious/inflammatory myositis with soft tissue abscess. No osteomyelitis or discitis. No cord compression or abnormal cord signal.  NCCTH: No acute hemorrhage, edema, or mass effect. Chronic microangiopathic changes and chronic appearing infarctions  MRI Brain shows possible multifocal infarction    Plan:  Continue cefazolin 2 g IV q8 hrs  Given myositis and possible abscess appreicated on MRI C-spine will require prolonged IV antibiotic course  ID consulted, appreciate recs  Follow repeat blood cultures  If repeat blood cultures positive, can discuss CATHI at that time.  Will eventually need PICC for planned 6 week course  Anemia  Recent Labs     02/17/25  1702 02/18/25  0036 02/18/25  0509   HGB 7.8* 7.9* 8.5*     Lab Results   Component Value Date    IRON 37 (L) 02/14/2025    FERRITIN 147 02/14/2025    TIBC 166.6 (L) 02/14/2025    CONCFE 22 02/14/2025     B12 972, Folate 14  Stable    Plan:  Transfuse if <7   Patient already typed and screened, not consented  Patient required 2 units packed red blood  "cells overnight on 2/13  GI consult, appreciate recs  Plan for colonoscopy on 2/19, patient prepping today  Stroke ([facility])  MRI brain: Multiple tiny recent infarcts scattered in multiple vascular distributions that are likely embolic. Several microhemorrhages associated with recent infarcts. No acute space-occupying hematoma. Chronic ischemic changes.    Plan:  Hold Eliquis given conversion demonstrated on MRI brain  Will plan to restart pending colonoscopy and repeat CTH to assess for additional hemorrhage  Continue aspirin per neurology  Sepsis ([facility])  Meeting SIRS criteria for tachycardia (HR ), tachypnea (RR 16-28), and leukocytosis (12.32)  No clear source of infection. CTA PE study in ED did not demonstrate acute pathology.  The most recent MRI lumbar spine unremarkable  The most recent MRI C-spine shows possible soft tissue infection  The most recent MRI contrast of the brain shows possible multifocal embolic infarction with hemorrhagic conversion, most likely septic emboli s/p cefazolin IV.    Plan:  See \"Gram positive bacteremia\"  Hypotension  MAP as low as 62 in the ED per automated cuff  Home Htn meds: Metoprolol tartrate 25 mg BID, Losartan 25 mg QD  Losartan recent decreased from 50 mg due to concerns for hypotension  Blood Pressure: (!) 106/49  Patient with soft pressures today  Plan:  Monitor VS including BP.   If hypotension recurs, confirm via manual, assess symptoms; suggest fluid bolus/albumin and discussion w crit care.  Continue home metoprolol tartrate 25 mg BID  Hold losartan 25 mg QD  IVF hydration with isolyte @ 100 cc/hr for 10 hrs  COPD (chronic obstructive pulmonary disease) ([facility])  Pt w COPD on 2L supplemental O2 at baseline.  Currently requiring 3L, above baseline of 2L NC  TTE 2/12/25: EF 55%, Normal systolic dysfunction, G1DD, No vegetation, No mural thrombus, moderate aortic sclerosis    Plan:  Nebs TID  Titrate O2 to maintain SpO2 greater than 88%  Acute urinary " retention    Suspect new urinary retention 2/2 BPH  US Kidney/Bladder 2/11/25: No hydronephrosis, moderate bladder distention, perinephric edema on L side.  Patient endorses improvement of retention with initiation of tamsulosin    Plan:  Tamsulosin 0.4 mg QD  Urinary retention protocol  Atrial fibrillation (HCC)  Patient rate controlled since admission    Plan:  Eliquis has been held because of MRI proven hemorrhagic conversion of multifocal embolic infarction and suspected GI bleed  Continue home metoprolol with hold parameters  Back pain  MRI L-Spine: Negative for acute pathology  MRI C-spine shows possible soft tissue infection status post IV cefazolin  ID on board    Plan:  Lidocaine patches  Robaxin 500 mg q8 hrs scheduled  Tylenol 650 mg q6 hrs prn for mild pain  Oxycodone 2.5/5 mg q4 hrs prn for mod/sev pain  Neck pain  The most recent MRI C-spine shows possible soft tissue infection in the background of bacteremia    Plan:  Lidocaine patch  Constipation  Patient with large BM with blood yesterday    Plan:  Colace BID  Miralax daily  Senna prn    VTE Pharmacologic Prophylaxis: VTE Score: 9 High Risk (Score >/= 5) - Pharmacological DVT Prophylaxis Contraindicated. Sequential Compression Devices Ordered.    Mobility:   Basic Mobility Inpatient Raw Score: 11  JH-HLM Goal: 4: Move to chair/commode  JH-HLM Achieved: 5: Stand (1 or more minutes)  JH-HLM Goal achieved. Continue to encourage appropriate mobility.    Patient Centered Rounds: I performed bedside rounds with nursing staff today.   Discussions with Specialists or Other Care Team Provider: GI    Education and Discussions with Family / Patient: Updated  (wife) via phone.    Current Length of Stay: 7 day(s)  Current Patient Status: Inpatient   Certification Statement: The patient will continue to require additional inpatient hospital stay due to MSSA bacteremia, evaluation for GI bleed  Discharge Plan: Anticipate discharge in >72 hrs to  discharge location to be determined pending rehab evaluations.    Code Status: Level 3 - DNAR and DNI    Subjective   Patient seen and examined at bedside, denies any recurrence of shakiness also appears less pale on exam.  Patient also denies chest pain or fever at this time.  Patient is tolerating his prep and is amenable to plans for colonoscopy tomorrow.    Objective :  Temp:  [97.4 °F (36.3 °C)-97.9 °F (36.6 °C)] 97.4 °F (36.3 °C)  HR:  [] 101  BP: (104-118)/(52-61) 104/54  Resp:  [15-20] 20  SpO2:  [96 %-100 %] 97 %  O2 Device: Nasal cannula  Nasal Cannula O2 Flow Rate (L/min):  [2 L/min-5 L/min] 2 L/min    Body mass index is 22.57 kg/m².     Input and Output Summary (last 24 hours):     Intake/Output Summary (Last 24 hours) at 2/18/2025 0640  Last data filed at 2/18/2025 0524  Gross per 24 hour   Intake 1680 ml   Output 1245 ml   Net 435 ml       Physical Exam  Vitals and nursing note reviewed.   Constitutional:       General: He is not in acute distress.     Appearance: He is well-developed.   HENT:      Head: Normocephalic and atraumatic.   Eyes:      Conjunctiva/sclera: Conjunctivae normal.   Cardiovascular:      Rate and Rhythm: Normal rate and regular rhythm.      Heart sounds: No murmur heard.  Pulmonary:      Effort: Pulmonary effort is normal.      Breath sounds: No stridor. No wheezing, rhonchi or rales.      Comments: Diminished breath sounds   Abdominal:      Palpations: Abdomen is soft.      Tenderness: There is no abdominal tenderness.   Musculoskeletal:         General: Tenderness (C-spine) present. No swelling.      Cervical back: Neck supple.      Left foot: Deformity present.   Feet:      Left foot:      Skin integrity: Erythema present. No ulcer, blister, skin breakdown or warmth.   Skin:     General: Skin is warm and dry.      Coloration: Skin is not mottled or pale.      Findings: Abrasion present.      Comments: Scattered abrasions on bilateral hands   Neurological:      General: No  focal deficit present.      Mental Status: He is alert and oriented to person, place, and time.   Psychiatric:         Mood and Affect: Mood normal.           Lines/Drains:        Telemetry:  Telemetry Orders (From admission, onward)               24 Hour Telemetry Monitoring  Continuous x 24 Hours (Telem)        Expiring   Question:  Reason for 24 Hour Telemetry  Answer:  TIA/Suspected CVA/ Confirmed CVA                     Telemetry Reviewed: Normal Sinus Rhythm  Indication for Continued Telemetry Use: Acute CVA               Lab Results: I have reviewed the following results:   Results from last 7 days   Lab Units 02/18/25  0509 02/17/25  1352 02/17/25  0844   WBC Thousand/uL 5.76  --  4.82   HEMOGLOBIN g/dL 8.5*   < > 7.9*   HEMATOCRIT % 27.1*   < > 24.5*   PLATELETS Thousands/uL 160  --  141*   BANDS PCT %  --   --  1   SEGS PCT % 73  --   --    LYMPHO PCT % 16  --  14   MONO PCT % 8  --  4   EOS PCT % 1  --  0    < > = values in this interval not displayed.     Results from last 7 days   Lab Units 02/18/25  0509 02/17/25  0844 02/16/25  0837   SODIUM mmol/L 138   < > 137   POTASSIUM mmol/L 3.8   < > 3.6   CHLORIDE mmol/L 102   < > 104   CO2 mmol/L 31   < > 30   BUN mg/dL 17   < > 21   CREATININE mg/dL 0.89   < > 0.91   ANION GAP mmol/L 5   < > 3*   CALCIUM mg/dL 8.2*   < > 8.2*   ALBUMIN g/dL  --   --  2.6*   TOTAL BILIRUBIN mg/dL  --   --  0.55   ALK PHOS U/L  --   --  51   ALT U/L  --   --  3*   AST U/L  --   --  20   GLUCOSE RANDOM mg/dL 103   < > 91    < > = values in this interval not displayed.     Results from last 7 days   Lab Units 02/17/25  1359   INR  1.39*         Results from last 7 days   Lab Units 02/16/25  0608   HEMOGLOBIN A1C % 5.8*     Results from last 7 days   Lab Units 02/12/25  0442 02/11/25  1531 02/11/25  0921   LACTIC ACID mmol/L  --  1.9  --    PROCALCITONIN ng/ml 0.85*  --  0.90*       Recent Cultures (last 7 days):   Results from last 7 days   Lab Units 02/14/25  4498  02/11/25  1635 02/11/25  1113   BLOOD CULTURE  No Growth at 72 hrs.  No Growth at 72 hrs.  --  Staphylococcus aureus*  Staphylococcus aureus*   GRAM STAIN RESULT   --   --  Gram positive cocci in clusters*  Gram positive cocci in clusters*   LEGIONELLA URINARY ANTIGEN   --  Negative  --        XR chest portable  Result Date: 2/17/2025  Impression: Limited study. There appears to be some left basilar atelectasis. There is some central vascular prominence in the hilar regions. Workstation performed: [id number]     CTA head and neck w wo contrast  Result Date: 2/15/2025  Impression: CT Brain: - Known small multifocal infarcts in bilateral cerebral hemispheres and right cerebellum were better evaluated on MRI brain dated 2/14/2025, likely embolic. No new CT signs of acute infarction. - Unchanged chronic lacunar infarct in right basal ganglia with moderate chronic microangiopathy. CT Angiography: - Negative CTA head and neck for large vessel occlusion, dissection, aneurysm, or high-grade stenosis. - Mild atherosclerotic disease of the head and neck, as detailed above. Multiple pulmonary nodules measuring up to 0.9 cm in left upper lobe, unchanged. Based on current Fleischner Society 2017 Guidelines on incidental pulmonary nodule, follow-up non-contrast CT is recommended at 3-6 months from the initial examination and, if stable at that time, an additional follow-up is recommended for 18-24 months from the initial examination. 4.1 cm ectasia of ascending thoracic aorta. Recommend follow-up CT chest in 12 months. Additional chronic/incidental findings as detailed above. The study was marked in EPIC for immediate notification. Workstation performed: [id number]     MRI brain w wo contrast  Result Date: 2/15/2025  Impression: Multiple tiny recent infarcts scattered in multiple vascular distributions that are likely embolic. Several microhemorrhages associated with recent infarcts. No acute space-occupying hematoma. Chronic  "ischemic changes. The study was marked in EPIC for immediate notification. Workstation performed: [id number]     MRI cervical spine w wo contrast  Result Date: 2/14/2025  Impression: Cervical degenerative change with mild canal stenosis and mild to moderate foraminal narrowing. No cord compression or abnormal cord signal. Mild nonspecific edema within the right posterior paraspinal musculature of the upper cervical spine seen on sagittal STIR imaging with mild enhancement. Minimal peripheral enhancement is noted and differential considerations would include infectious/inflammatory myositis with soft tissue abscess. No discitis or osteomyelitis. This examination was marked \"immediate notification\" in Epic in order to begin the standard process by which the radiology reading room liaison alerts the referring practitioner. Workstation performed: [id number]     CT head w wo contrast  Result Date: 2/13/2025  Impression: No acute hemorrhage, edema, or mass effect. Chronic microangiopathic changes and chronic appearing infarctions as above. No pathologic enhancement. Workstation performed: [id number]     MRI lumbar spine w wo contrast  Result Date: 2/13/2025  Impression: Mild inferior plate edema at L1 with adjacent small Schmorl's node and minimal adjacent postcontrast enhancement. Vacuum disc phenomenon noted on the recent CT at this level. Findings likely represent degenerative disc disease with Schmorl's node rather than discitis and osteomyelitis which should only be considered in the right clinical setting. No paraspinal edema or enhancement identified. No evidence of abscess or abnormal enhancement in the paraspinal soft tissues. 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 Resident: KEI Wallace I, the attending radiologist, have reviewed the images and agree with the final report above. Workstation performed: [id number]     US kidney and bladder  Result Date: 2/11/2025  Impression: No " "hydronephrosis. Moderate bladder distention. Perinephric edema on the left. Findings were discussed with Dr. Peralta at 7:25 p.m. via secure text Workstation performed: [id number]     CTA chest pe study  Result Date: 2/11/2025  Impression: No evidence of pulmonary embolus. Stable ectasia of the ascending thoracic aorta measuring up to 41 mm. Mild secretions in the lower trachea, correlate for any dysphagia or possible aspiration. Noncalcified left lung apex pulmonary nodule, 6 x 5 mm. Because this was not present on prior chest CT of December 4, 2023, conservative management with follow-up low radiation dose noncontrast chest CT in 6 months is recommended. This examination was marked \"immediate notification\" in Epic in order to begin the standard process by which the radiology reading room liaison alerts the referring practitioner. Resident: Bijan Fajardo I, the attending radiologist, have reviewed the images and agree with the final report above. Workstation performed: [id number]     XR chest 1 view portable  Result Date: 2/11/2025  Impression: No acute cardiopulmonary disease. Workstation performed: [id number]       XR chest portable  Result Date: 2/17/2025  Impression Limited study. There appears to be some left basilar atelectasis. There is some central vascular prominence in the hilar regions. Workstation performed: [id number]        Other Study Results Review: No additional pertinent studies reviewed.    Last 24 Hours Medication List:     Current Facility-Administered Medications:     acetaminophen (TYLENOL) tablet 650 mg, Q6H PRN    albuterol (PROVENTIL HFA,VENTOLIN HFA) inhaler 2 puff, Q4H PRN    [Held by provider] apixaban (ELIQUIS) tablet 5 mg, BID    aspirin chewable tablet 81 mg, Daily    atorvastatin (LIPITOR) tablet 80 mg, Daily With Dinner    barium sulfate tablet 1 tablet, Once in imaging    barium sulfate tablet 1 tablet, Once in imaging    bisacodyl (DULCOLAX) rectal suppository 10 mg, Daily PRN    " ceFAZolin (ANCEF) IVPB (premix in dextrose) 2,000 mg 50 mL, Q8H, Last Rate: 2,000 mg (02/18/25 0050)    docusate sodium (COLACE) capsule 100 mg, BID    fluticasone (ARNUITY ELLIPTA) 100 MCG/ACT inhaler 1 puff, Daily    guaiFENesin (MUCINEX) 12 hr tablet 1,200 mg, BID    [Held by provider] heparin (porcine) subcutaneous injection 5,000 Units, Q8H GALE    hydroxychloroquine (PLAQUENIL) tablet 400 mg, Daily With Breakfast    ipratropium (ATROVENT) 0.02 % inhalation solution 0.5 mg, TID    levalbuterol (XOPENEX) inhalation solution 1.25 mg, TID    lidocaine (LIDODERM) 5 % patch 1 patch, Daily    lidocaine (LIDODERM) 5 % patch 1 patch, Daily    lidocaine (LIDODERM) 5 % patch 1 patch, Daily    [Held by provider] losartan (COZAAR) tablet 25 mg, Daily    methocarbamol (ROBAXIN) tablet 500 mg, Q8H GALE    metoprolol tartrate (LOPRESSOR) tablet 25 mg, Q12H GALE    oxyCODONE (ROXICODONE) IR tablet 5 mg, Q4H PRN    oxyCODONE (ROXICODONE) split tablet 2.5 mg, Q4H PRN    pantoprazole (PROTONIX) injection 40 mg, Q12H GALE    polyethylene glycol (GOLYTELY) bowel prep 4,000 mL, See Admin Instructions    polyethylene glycol (MIRALAX) packet 17 g, Daily PRN    senna (SENOKOT) tablet 8.6 mg, HS    sertraline (ZOLOFT) tablet 12.5 mg, Daily    sucralfate (CARAFATE) tablet 1 g, 4x Daily (AC & HS)    tamsulosin (FLOMAX) capsule 0.4 mg, Daily With Dinner    Administrative Statements   Today, Patient Was Seen By: Mal Neely DO      **Please Note: This note may have been constructed using a voice recognition system.**

## 2025-02-18 NOTE — ASSESSMENT & PLAN NOTE
Pt w COPD on 2L supplemental O2 at baseline.  Currently requiring 3L, above baseline of 2L NC  TTE 2/12/25: EF 55%, Normal systolic dysfunction, G1DD, No vegetation, No mural thrombus, moderate aortic sclerosis    Plan:  Nebs TID  Titrate O2 to maintain SpO2 greater than 88%

## 2025-02-18 NOTE — PROGRESS NOTES
Progress Note - Infectious Disease   Name: Devin Chaves 79 y.o. male I MRN: [id number]  Unit/Bed#: S -01 I Date of Admission: 2/11/2025   Date of Service: 2/18/2025 I Hospital Day: 7    Assessment & Plan  Sepsis (HCC)  E/b tachycardia, tachypnea and hypotension with WBC 12 K.  Patient is clinically much improved.  WBC has normalized.  SBP is now normal  -antibiotics as below  -follow-up final cultures  -monitor temperature and hemodynamics  -serial exam  -additional inventions pending clinical course  -monitoring serial CBC and BMP for treatment response and any developing toxicities     MSSA bacteremia  Admission Blood cultures labeled same arm/same time are + for MSSA. 2/12/25 TTE negative for vegetation. No PPM, intravascular devices. Acute posterior neck pain predominant starting 2/14/25 and possible deep cervical infection evident on MRI C spine.  Bacteremia clearing  -continues Cefazolin 2 g IV q 8 hours  -follow-up repeat blood cultures  -can have PICC placed anytime   -anticipate protracted IV antibiotic course as below     Neck pain  Acute posterior neck pain predominant starting 2/14/25 2/14/25 MRI C spine: cervical degenerative change. Mild nonspecific edema within the right posterior paraspinal musculature of the upper cervical spine with mild enhancement. 2 separate foci of linear nonenhancement may represent peripherally enhancing fluid collections. No discitis or osteomyelitis.  ESR is only mildly elevated at 26 (2/14) but  (2/16) is highly elevated.  Patient will need long-term IV antibiotic and monitoring of ESR/CRP on treatment.  He also needs repeat MRI.  -continues cefazolin as above  -Treat x 6-week from blood culture clearance to 3/27/25    -serial exam  -Monitor ESR/CRP.  If ESR/CRP remain elevated at the end of IV antibiotic course, patient will need to be transitioned to p.o. antibiotic and remain on it until inflammatory markers are normal.  -Repeat C-spine MRI in 4 to 6  "weeks.  -pain management per primary   -follow up with ID outpatient on 3/3/25 at 12:30 pm - Chastity attending  Back pain  Patient reports chronic back pain has been worse due to \"inactivity\" and thinks worsened around the time of the catheterization. 2/13/25 MRI L spine: mild inferior plate edema at L1 with adjacent small Schmorl's node and minimal adjacent postcontrast enhancement. More c/w DDD. No paraspinal edema.   -continues antibiotic as above  -serial exam  -pain management per primary   Acute urinary retention  Renal US: moderate bladder distention, no hydronephrosis, perinephric edema left. 2/11/25 s/p straight cath for 700 mL. U/A benign. No urinary symptoms other than hesitancy   -monitor urinary output/symptoms  -additional inventions pending retention course     COPD (chronic obstructive pulmonary disease) ([facility])  2/11/25 CXR no infiltrates, CTA chest: no PE, mild secretions in lower trachea. Back to baseline 2L NCO2  Urinary antigens, Flu/COVID screen negative  -monitor respiratory status  -respiratory support prn  -monitor O2 requirements  -serial exam  -aspiration precautions  -additional inventions pending clinical course     Atrial fibrillation ([facility])  On Eliquis    Stroke ([facility])  Patient's head CT and MRI showed multiple small bilateral hemispheric infarcts, suggestive of septic emboli.  TTE did not show obvious vegetation but infective endocarditis is possible.  However, patient will be on long-term IV antibiotic for C-spine infection.  As long as bacteremia clears, CATHI will not change treatment plan, therefore, it does not need to be done.  -IV cefazolin as in above  -Follow-up on repeat blood culture    Discussed with patient in detail regarding the above plan.  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 They agree with the plan.    Antibiotics:  Cefazolin     Subjective   Patient's neck pain about the same.  Low back pain is " improved  No headache  Temperature stays down.  No chills.  He is tolerating antibiotic well.  No nausea, vomiting or diarrhea.    Objective :  Temp:  [97.6 °F (36.4 °C)-97.8 °F (36.6 °C)] 97.6 °F (36.4 °C)  HR:  [] 91  BP: ()/(48-54) 108/52  Resp:  [20] 20  SpO2:  [97 %-100 %] 98 %  O2 Device: Nasal cannula  Nasal Cannula O2 Flow Rate (L/min):  [2 L/min] 2 L/min    General:  79 year old chronically debilitated male, No acute distress, on commode bedside working with OT  Psychiatric:  Awake and alert  Neck: Mild tenderness to palpation at midline.  No deformity.  No mass.  No erythema.Lidoderm patch in place  Pulmonary:  Normal respiratory excursion at baseline with short statements and accessory muscle use, on 2L NCO2 statting 98%  Abdomen:  Soft, nontender  Extremities:  No edema  Skin:  No rashes, left arm IV site nontender      Lab Results: I have reviewed the following results:  Results from last 7 days   Lab Units 02/18/25  0509 02/18/25  0036 02/17/25  1702 02/17/25  1352 02/17/25  0844 02/16/25  0608   WBC Thousand/uL 5.76  --   --   --  4.82 6.66   HEMOGLOBIN g/dL 8.5* 7.9* 7.8*   < > 7.9* 8.5*   PLATELETS Thousands/uL 160  --   --   --  141* 149    < > = values in this interval not displayed.     Results from last 7 days   Lab Units 02/18/25  0509 02/17/25  0844 02/16/25  0837 02/15/25  0459 02/14/25  0337 02/13/25  0537   SODIUM mmol/L 138 137 137   < > 138 138   POTASSIUM mmol/L 3.8 3.5 3.6   < > 4.2 4.1   CHLORIDE mmol/L 102 104 104   < > 106 105   CO2 mmol/L 31 30 30   < > 27 28   BUN mg/dL 17 19 21   < > 38* 37*   CREATININE mg/dL 0.89 0.95 0.91   < > 1.08 0.94   EGFR ml/min/1.73sq m 82 76 81   < > 65 77   CALCIUM mg/dL 8.2* 7.8* 8.2*   < > 8.2* 8.2*   AST U/L  --   --  20  --  23 26   ALT U/L  --   --  3*  --  17 42   ALK PHOS U/L  --   --  51  --  49 57   ALBUMIN g/dL  --   --  2.6*  --  2.6* 2.5*    < > = values in this interval not displayed.     Results from last 7 days   Lab Units  02/14/25  0325 02/11/25  1810 02/11/25  1635   BLOOD CULTURE  No Growth After 4 Days.  No Growth After 4 Days.  --   --    MRSA CULTURE ONLY   --  No Methicillin Resistant Staphlyococcus aureus (MRSA) isolated  --    LEGIONELLA URINARY ANTIGEN   --   --  Negative     Results from last 7 days   Lab Units 02/12/25  0442   PROCALCITONIN ng/ml 0.85*     Results from last 7 days   Lab Units 02/16/25  0051   CRP mg/L 106.8*     Results from last 7 days   Lab Units 02/14/25  0337   FERRITIN ng/mL 147           Imaging Results Review: No pertinent imaging studies reviewed.

## 2025-02-19 LAB
ANION GAP SERPL CALCULATED.3IONS-SCNC: 4 MMOL/L (ref 4–13)
BACTERIA BLD CULT: NORMAL
BACTERIA BLD CULT: NORMAL
BASOPHILS # BLD AUTO: 0.02 THOUSANDS/ΜL (ref 0–0.1)
BASOPHILS NFR BLD AUTO: 0 % (ref 0–1)
BUN SERPL-MCNC: 16 MG/DL (ref 5–25)
CALCIUM SERPL-MCNC: 7.8 MG/DL (ref 8.4–10.2)
CHLORIDE SERPL-SCNC: 103 MMOL/L (ref 96–108)
CO2 SERPL-SCNC: 30 MMOL/L (ref 21–32)
CREAT SERPL-MCNC: 0.88 MG/DL (ref 0.6–1.3)
EOSINOPHIL # BLD AUTO: 0.02 THOUSAND/ΜL (ref 0–0.61)
EOSINOPHIL NFR BLD AUTO: 0 % (ref 0–6)
ERYTHROCYTE [DISTWIDTH] IN BLOOD BY AUTOMATED COUNT: 17.7 % (ref 11.6–15.1)
GFR SERPL CREATININE-BSD FRML MDRD: 82 ML/MIN/1.73SQ M
GLUCOSE SERPL-MCNC: 78 MG/DL (ref 65–140)
HCT VFR BLD AUTO: 23.6 % (ref 36.5–49.3)
HCT VFR BLD AUTO: 26.9 % (ref 36.5–49.3)
HGB BLD-MCNC: 7.3 G/DL (ref 12–17)
HGB BLD-MCNC: 8.6 G/DL (ref 12–17)
IMM GRANULOCYTES # BLD AUTO: 0.05 THOUSAND/UL (ref 0–0.2)
IMM GRANULOCYTES NFR BLD AUTO: 1 % (ref 0–2)
LYMPHOCYTES # BLD AUTO: 0.87 THOUSANDS/ΜL (ref 0.6–4.47)
LYMPHOCYTES NFR BLD AUTO: 15 % (ref 14–44)
MAGNESIUM SERPL-MCNC: 1.9 MG/DL (ref 1.9–2.7)
MCH RBC QN AUTO: 28.2 PG (ref 26.8–34.3)
MCHC RBC AUTO-ENTMCNC: 30.9 G/DL (ref 31.4–37.4)
MCV RBC AUTO: 91 FL (ref 82–98)
MONOCYTES # BLD AUTO: 0.56 THOUSAND/ΜL (ref 0.17–1.22)
MONOCYTES NFR BLD AUTO: 10 % (ref 4–12)
NEUTROPHILS # BLD AUTO: 4.39 THOUSANDS/ΜL (ref 1.85–7.62)
NEUTS SEG NFR BLD AUTO: 74 % (ref 43–75)
NRBC BLD AUTO-RTO: 0 /100 WBCS
PLATELET # BLD AUTO: 145 THOUSANDS/UL (ref 149–390)
PMV BLD AUTO: 8.3 FL (ref 8.9–12.7)
POTASSIUM SERPL-SCNC: 3.6 MMOL/L (ref 3.5–5.3)
RBC # BLD AUTO: 2.59 MILLION/UL (ref 3.88–5.62)
SODIUM SERPL-SCNC: 137 MMOL/L (ref 135–147)
WBC # BLD AUTO: 5.91 THOUSAND/UL (ref 4.31–10.16)

## 2025-02-19 PROCEDURE — 85025 COMPLETE CBC W/AUTO DIFF WBC: CPT

## 2025-02-19 PROCEDURE — G0545 PR INHERENT VISIT TO INPT: HCPCS | Performed by: INTERNAL MEDICINE

## 2025-02-19 PROCEDURE — 99233 SBSQ HOSP IP/OBS HIGH 50: CPT | Performed by: INTERNAL MEDICINE

## 2025-02-19 PROCEDURE — 83735 ASSAY OF MAGNESIUM: CPT

## 2025-02-19 PROCEDURE — 94640 AIRWAY INHALATION TREATMENT: CPT

## 2025-02-19 PROCEDURE — 80048 BASIC METABOLIC PNL TOTAL CA: CPT

## 2025-02-19 PROCEDURE — 85014 HEMATOCRIT: CPT

## 2025-02-19 PROCEDURE — 94760 N-INVAS EAR/PLS OXIMETRY 1: CPT

## 2025-02-19 PROCEDURE — 85018 HEMOGLOBIN: CPT

## 2025-02-19 RX ORDER — SODIUM CHLORIDE, SODIUM GLUCONATE, SODIUM ACETATE, POTASSIUM CHLORIDE, MAGNESIUM CHLORIDE, SODIUM PHOSPHATE, DIBASIC, AND POTASSIUM PHOSPHATE .53; .5; .37; .037; .03; .012; .00082 G/100ML; G/100ML; G/100ML; G/100ML; G/100ML; G/100ML; G/100ML
100 INJECTION, SOLUTION INTRAVENOUS CONTINUOUS
Status: DISPENSED | OUTPATIENT
Start: 2025-02-19 | End: 2025-02-20

## 2025-02-19 RX ORDER — POTASSIUM CHLORIDE 14.9 MG/ML
20 INJECTION INTRAVENOUS
Status: COMPLETED | OUTPATIENT
Start: 2025-02-19 | End: 2025-02-19

## 2025-02-19 RX ORDER — MAGNESIUM SULFATE HEPTAHYDRATE 40 MG/ML
2 INJECTION, SOLUTION INTRAVENOUS ONCE
Status: COMPLETED | OUTPATIENT
Start: 2025-02-19 | End: 2025-02-19

## 2025-02-19 RX ORDER — POLYETHYLENE GLYCOL 3350 17 G/17G
238 POWDER, FOR SOLUTION ORAL ONCE
Status: COMPLETED | OUTPATIENT
Start: 2025-02-19 | End: 2025-02-19

## 2025-02-19 RX ORDER — BISACODYL 5 MG/1
10 TABLET, DELAYED RELEASE ORAL ONCE
Status: COMPLETED | OUTPATIENT
Start: 2025-02-19 | End: 2025-02-19

## 2025-02-19 RX ORDER — BISACODYL 5 MG/1
10 TABLET, DELAYED RELEASE ORAL ONCE
Status: DISCONTINUED | OUTPATIENT
Start: 2025-02-19 | End: 2025-02-22 | Stop reason: HOSPADM

## 2025-02-19 RX ADMIN — ATORVASTATIN CALCIUM 80 MG: 40 TABLET, FILM COATED ORAL at 15:43

## 2025-02-19 RX ADMIN — OXYCODONE HYDROCHLORIDE 5 MG: 5 TABLET ORAL at 14:05

## 2025-02-19 RX ADMIN — LEVALBUTEROL HYDROCHLORIDE 1.25 MG: 1.25 SOLUTION RESPIRATORY (INHALATION) at 14:17

## 2025-02-19 RX ADMIN — LIDOCAINE 1 PATCH: 50 PATCH CUTANEOUS at 08:47

## 2025-02-19 RX ADMIN — TAMSULOSIN HYDROCHLORIDE 0.4 MG: 0.4 CAPSULE ORAL at 15:43

## 2025-02-19 RX ADMIN — OXYCODONE HYDROCHLORIDE 5 MG: 5 TABLET ORAL at 20:16

## 2025-02-19 RX ADMIN — IPRATROPIUM BROMIDE 0.5 MG: 0.5 SOLUTION RESPIRATORY (INHALATION) at 14:18

## 2025-02-19 RX ADMIN — METOPROLOL TARTRATE 25 MG: 25 TABLET, FILM COATED ORAL at 22:23

## 2025-02-19 RX ADMIN — MAGNESIUM SULFATE HEPTAHYDRATE 2 G: 40 INJECTION, SOLUTION INTRAVENOUS at 09:16

## 2025-02-19 RX ADMIN — SERTRALINE HYDROCHLORIDE 12.5 MG: 25 TABLET ORAL at 08:46

## 2025-02-19 RX ADMIN — GUAIFENESIN 1200 MG: 600 TABLET, EXTENDED RELEASE ORAL at 08:46

## 2025-02-19 RX ADMIN — METHOCARBAMOL TABLETS 500 MG: 500 TABLET, COATED ORAL at 08:58

## 2025-02-19 RX ADMIN — METHOCARBAMOL TABLETS 500 MG: 500 TABLET, COATED ORAL at 22:23

## 2025-02-19 RX ADMIN — DOCUSATE SODIUM 100 MG: 100 CAPSULE, LIQUID FILLED ORAL at 16:52

## 2025-02-19 RX ADMIN — POTASSIUM CHLORIDE 20 MEQ: 14.9 INJECTION, SOLUTION INTRAVENOUS at 08:48

## 2025-02-19 RX ADMIN — IPRATROPIUM BROMIDE 0.5 MG: 0.5 SOLUTION RESPIRATORY (INHALATION) at 20:22

## 2025-02-19 RX ADMIN — PANTOPRAZOLE SODIUM 40 MG: 40 INJECTION, POWDER, FOR SOLUTION INTRAVENOUS at 08:46

## 2025-02-19 RX ADMIN — LEVALBUTEROL HYDROCHLORIDE 1.25 MG: 1.25 SOLUTION RESPIRATORY (INHALATION) at 20:22

## 2025-02-19 RX ADMIN — CEFAZOLIN SODIUM 2000 MG: 2 SOLUTION INTRAVENOUS at 01:30

## 2025-02-19 RX ADMIN — HYDROXYCHLOROQUINE SULFATE 400 MG: 200 TABLET ORAL at 08:58

## 2025-02-19 RX ADMIN — DOCUSATE SODIUM 100 MG: 100 CAPSULE, LIQUID FILLED ORAL at 08:46

## 2025-02-19 RX ADMIN — PANTOPRAZOLE SODIUM 40 MG: 40 INJECTION, POWDER, FOR SOLUTION INTRAVENOUS at 22:22

## 2025-02-19 RX ADMIN — METOPROLOL TARTRATE 25 MG: 25 TABLET, FILM COATED ORAL at 08:46

## 2025-02-19 RX ADMIN — CEFAZOLIN SODIUM 2000 MG: 2 SOLUTION INTRAVENOUS at 16:45

## 2025-02-19 RX ADMIN — POLYETHYLENE GLYCOL 3350 238 G: 17 POWDER, FOR SOLUTION ORAL at 16:00

## 2025-02-19 RX ADMIN — METHOCARBAMOL TABLETS 500 MG: 500 TABLET, COATED ORAL at 13:18

## 2025-02-19 RX ADMIN — BISACODYL 10 MG: 5 TABLET, COATED ORAL at 16:48

## 2025-02-19 RX ADMIN — FLUTICASONE FUROATE 1 PUFF: 100 POWDER RESPIRATORY (INHALATION) at 08:48

## 2025-02-19 RX ADMIN — GUAIFENESIN 1200 MG: 600 TABLET, EXTENDED RELEASE ORAL at 16:52

## 2025-02-19 RX ADMIN — SODIUM CHLORIDE, SODIUM GLUCONATE, SODIUM ACETATE, POTASSIUM CHLORIDE, MAGNESIUM CHLORIDE, SODIUM PHOSPHATE, DIBASIC, AND POTASSIUM PHOSPHATE 100 ML/HR: .53; .5; .37; .037; .03; .012; .00082 INJECTION, SOLUTION INTRAVENOUS at 15:43

## 2025-02-19 RX ADMIN — ASPIRIN 81 MG CHEWABLE TABLET 81 MG: 81 TABLET CHEWABLE at 08:46

## 2025-02-19 RX ADMIN — CEFAZOLIN SODIUM 2000 MG: 2 SOLUTION INTRAVENOUS at 08:47

## 2025-02-19 RX ADMIN — POTASSIUM CHLORIDE 20 MEQ: 14.9 INJECTION, SOLUTION INTRAVENOUS at 09:18

## 2025-02-19 NOTE — SPEECH THERAPY NOTE
Speech Language/Pathology    Speech/Language Pathology Cancel Note    Patient Name: Devin Chaves  Today's Date: 2/19/2025       Pt tentative for EGD and colonoscopy today. Will follow up tomorrow pending results.       Cheri Strong MA CCC-SLP  Speech Pathologist  Available via [employer]

## 2025-02-19 NOTE — CASE MANAGEMENT
Case Management Discharge Planning Note    Patient name Devin Chaves  Location S /S -01 MRN [id number]  : 1945 Date 2025       Current Admission Date: 2025  Current Admission Diagnosis:MSSA bacteremia   Patient Active Problem List    Diagnosis Date Noted Date Diagnosed    Stroke (HCC) 02/15/2025     Neck pain 2025     Anemia 2025     MSSA bacteremia 2025     Sepsis (HCC) 2025     Acute urinary retention 2025     Hypotension 2025     Constipation 2025     Atrial fibrillation (HCC) 2025     Hip region mass, unspecified laterality 2023     Chronic hypoxic respiratory failure ([facility]) 2023     Fracture of multiple ribs of right side 2023     Rheumatoid arthritis, unspecified ([facility]) 2023     Moderate protein-calorie malnutrition ([facility]) 2023     Hypertension 2023     CAD (coronary artery disease) 2023     COPD (chronic obstructive pulmonary disease) ([facility]) 2023     JAZMINE (obstructive sleep apnea) 2023     Back pain 2023     COVID-19 2023     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  [address]  [facility] [address]  Phone: [phone] Fax: [phone]    [facility] - [place] - [address]  [address]  [address]  [place] [address]  Phone: [phone] Fax: [phone]    [place] - [place] - [address]  [address]  [place] [address]  Phone: [phone] Fax: [phone]    Primary Care  Provider: Oswaldo Muniz DO    [facility]  Secondary Insurance: [facility] RE[facility]    DISCHARGE DETAILS:       Additional Comments: Per [facility] provider, Pt is not yet medically ready for discharge. Pt's colonoscopy was delayed until tomorrow. Pt will need a picc line placed prior to D/C. Pt is expected to be medically ready in 72+ hours.

## 2025-02-19 NOTE — PROGRESS NOTES
Progress Note - Infectious Disease   Name: Devin Chaves 79 y.o. male I MRN: [id number]  Unit/Bed#: S -01 I Date of Admission: 2/11/2025   Date of Service: 2/19/2025 I Hospital Day: 8    Assessment & Plan  Sepsis (HCC)  E/b tachycardia, tachypnea and hypotension with WBC 12 K.  Patient is clinically much improved.  WBC has normalized.  SBP is now normal  -antibiotics as below  -follow-up final cultures  -monitor temperature and hemodynamics  -serial exam  -additional inventions pending clinical course  -monitoring serial CBC and BMP for treatment response and any developing toxicities     MSSA bacteremia  Admission Blood cultures labeled same arm/same time are + for MSSA. 2/12/25 TTE negative for vegetation. No PPM, intravascular devices. Acute posterior neck pain predominant starting 2/14/25 and possible deep cervical infection evident on MRI C spine.  Bacteremia clearing  -continues Cefazolin 2 g IV q 8 hours  -follow-up repeat blood cultures  -can have PICC placed anytime   -anticipate protracted IV antibiotic course as below     Neck pain  Acute posterior neck pain predominant starting 2/14/25 2/14/25 MRI C spine: cervical degenerative change. Mild nonspecific edema within the right posterior paraspinal musculature of the upper cervical spine with mild enhancement. 2 separate foci of linear nonenhancement may represent peripherally enhancing fluid collections. No discitis or osteomyelitis.  ESR is only mildly elevated at 26 (2/14) but  (2/16) is highly elevated.  Patient will need long-term IV antibiotic and monitoring of ESR/CRP on treatment.  He also needs repeat MRI.  -continues cefazolin as above  -Treat x 6-week from blood culture clearance to 3/27/25    -serial exam  -Monitor ESR/CRP.  If ESR/CRP remain elevated at the end of IV antibiotic course, patient will need to be transitioned to p.o. antibiotic and remain on it until inflammatory markers are normal.  -Repeat C-spine MRI in 4 to 6  "weeks.  -pain management per primary   -follow up with ID outpatient on 3/3/25 at 12:30 pm - Chastity attending  Back pain  Patient reports chronic back pain has been worse due to \"inactivity\" and thinks worsened around the time of the catheterization. 2/13/25 MRI L spine: mild inferior plate edema at L1 with adjacent small Schmorl's node and minimal adjacent postcontrast enhancement. More c/w DDD. No paraspinal edema.   -continues antibiotic as above  -serial exam  -pain management per primary   Acute urinary retention  Renal US: moderate bladder distention, no hydronephrosis, perinephric edema left. 2/11/25 s/p straight cath for 700 mL. U/A benign. No urinary symptoms other than hesitancy   -monitor urinary output/symptoms  -additional inventions pending retention course     COPD (chronic obstructive pulmonary disease) ([facility])  2/11/25 CXR no infiltrates, CTA chest: no PE, mild secretions in lower trachea. Back to baseline 2L NCO2  Urinary antigens, Flu/COVID screen negative  -monitor respiratory status  -respiratory support prn  -monitor O2 requirements  -serial exam  -aspiration precautions  -additional inventions pending clinical course     Atrial fibrillation ([facility])  On Eliquis    Stroke ([facility])  Patient's head CT and MRI showed multiple small bilateral hemispheric infarcts, suggestive of septic emboli.  TTE did not show obvious vegetation but infective endocarditis is possible.  However, patient will be on long-term IV antibiotic for C-spine infection.  As long as bacteremia clears, CATHI will not change treatment plan, therefore, it does not need to be done.  -IV cefazolin as in above  -Follow-up on repeat blood culture    Discussed with patient and family in detail regarding the above plan.  I have discussed with Dr. Conroy's from primary service regarding the above plan to continue IV antibiotics. They agree with the plan.  Awaiting EGD/Colonoscopy bowel prep. Procedure planned for " 2/20/25    Antibiotics:  Cefazolin D6 from blood culture clearance    Subjective   Patient's neck pain about the same.  Low back pain is improved  No headache  Temperature stays down.  No chills.  He is tolerating antibiotic well.  No nausea, vomiting. + soft stool on liquid diet. For EGD/Colonoscopy 2/20/25    Objective :  Temp:  [97.6 °F (36.4 °C)-98.5 °F (36.9 °C)] 98.5 °F (36.9 °C)  HR:  [89-95] 89  BP: (106-123)/(49-62) 123/62  Resp:  [16-19] 16  SpO2:  [86 %-100 %] 86 %  O2 Device: Nasal cannula  Nasal Cannula O2 Flow Rate (L/min):  [1 L/min-2 L/min] 2 L/min    General:  79 year old chronically debilitated male, No acute distress, on commode bedside and pivoted to chair.  Psychiatric:  Awake and alert  Neck: Mild tenderness to palpation at midline.  Keeps neck in flexion position. No mass.  No erythema. Lidoderm patch in place  Pulmonary:  Normal respiratory excursion at baseline with short statements and accessory muscle use, statting 94% off NCO2 at present  Abdomen:  Soft, nontender  Extremities:  No edema  Skin:  No rashes, left arm IV site nontender, + ecchmyoses      Lab Results: I have reviewed the following results:  Results from last 7 days   Lab Units 02/19/25  0950 02/19/25  0553 02/18/25  1603 02/18/25  0509 02/17/25  1352 02/17/25  0844   WBC Thousand/uL  --  5.91  --  5.76  --  4.82   HEMOGLOBIN g/dL 8.6* 7.3* 8.5* 8.5*   < > 7.9*   PLATELETS Thousands/uL  --  145*  --  160  --  141*    < > = values in this interval not displayed.     Results from last 7 days   Lab Units 02/19/25  0553 02/18/25  0509 02/17/25  0844 02/16/25  0837 02/15/25  0459 02/14/25  0337 02/13/25  0537   SODIUM mmol/L 137 138 137 137   < > 138 138   POTASSIUM mmol/L 3.6 3.8 3.5 3.6   < > 4.2 4.1   CHLORIDE mmol/L 103 102 104 104   < > 106 105   CO2 mmol/L 30 31 30 30   < > 27 28   BUN mg/dL 16 17 19 21   < > 38* 37*   CREATININE mg/dL 0.88 0.89 0.95 0.91   < > 1.08 0.94   EGFR ml/min/1.73sq m 82 82 76 81   < > 65 77    CALCIUM mg/dL 7.8* 8.2* 7.8* 8.2*   < > 8.2* 8.2*   AST U/L  --   --   --  20  --  23 26   ALT U/L  --   --   --  3*  --  17 42   ALK PHOS U/L  --   --   --  51  --  49 57   ALBUMIN g/dL  --   --   --  2.6*  --  2.6* 2.5*    < > = values in this interval not displayed.     Results from last 7 days   Lab Units 02/14/25  0325   BLOOD CULTURE  No Growth After 5 Days.  No Growth After 5 Days.           Results from last 7 days   Lab Units 02/16/25  0051   CRP mg/L 106.8*     Results from last 7 days   Lab Units 02/14/25  0337   FERRITIN ng/mL 147           Imaging Results Review: No pertinent imaging studies reviewed.

## 2025-02-19 NOTE — PROGRESS NOTES
Progress Note - Hospitalist   Name: Devin Chaves 79 y.o. male I MRN: [id number]  Unit/Bed#: S -01 I Date of Admission: 2/11/2025   Date of Service: 2/19/2025 I Hospital Day: 8    Assessment & Plan  MSSA bacteremia  Blood cultures with Gram + cocci, likely Staph Aureus  Unknown source at this time, patient with multiple small abrasions on hands and arms. UA negative, LLE does not appear to have cellulitis.  TTE negative for vegetations  Patient mentioned small laceration from [facility]irc[facility] at [facility] with pustule formation, possible source at there is myositis to that area  MRI L-spine: No evidence of abscess or abnormal enhancement in the paraspinal soft tissues.  MRI C-spine: Mild nonspecific edema within the right posterior paraspinal musculature of the upper cervical spine seen on sagittal STIR imaging with mild enhancement. Minimal peripheral enhancement is noted and differential considerations would include infectious/inflammatory myositis with soft tissue abscess. No osteomyelitis or discitis. No cord compression or abnormal cord signal.  NCCTH: No acute hemorrhage, edema, or mass effect. Chronic microangiopathic changes and chronic appearing infarctions  MRI Brain shows possible multifocal infarction    Plan:  Continue cefazolin 2 g IV q8 hrs  Given myositis and possible abscess appreicated on MRI C-spine will require prolonged IV antibiotic course  Plan for PICC line later this week  ID consulted, appreciate recs  Follow repeat blood cultures  If repeat blood cultures positive, can discuss CATHI at that time.  Anemia  Recent Labs     02/18/25  1603 02/19/25  0553 02/19/25  0950   HGB 8.5* 7.3* 8.6*     Lab Results   Component Value Date    IRON 37 (L) 02/14/2025    FERRITIN 147 02/14/2025    TIBC 166.6 (L) 02/14/2025    CONCFE 22 02/14/2025     B12 972, Folate 14  Stable    Plan:  Transfuse if <7   Patient already typed and screened, not consented  Patient required 2 units packed red blood cells overnight on  "2/13  GI consult, appreciate recs  Plan for colonoscopy on 2/20, patient prepping today  EGD/Colonoscopy planned for 2/19 however delayed due to error resulting in patient not receiving adequate prep  Stroke ([facility])  MRI brain: Multiple tiny recent infarcts scattered in multiple vascular distributions that are likely embolic. Several microhemorrhages associated with recent infarcts. No acute space-occupying hematoma. Chronic ischemic changes.    Plan:  Hold Eliquis given conversion demonstrated on MRI brain  Will plan to restart pending colonoscopy and repeat CTH to assess for additional hemorrhage  Continue aspirin per neurology  Sepsis ([facility])  Meeting SIRS criteria for tachycardia (HR ), tachypnea (RR 16-28), and leukocytosis (12.32)  No clear source of infection. CTA PE study in ED did not demonstrate acute pathology.  The most recent MRI lumbar spine unremarkable  The most recent MRI C-spine shows possible soft tissue infection  The most recent MRI contrast of the brain shows possible multifocal embolic infarction with hemorrhagic conversion, most likely septic emboli s/p cefazolin IV.    Plan:  See \"Gram positive bacteremia\"  Hypotension  MAP as low as 62 in the ED per automated cuff  Home Htn meds: Metoprolol tartrate 25 mg BID, Losartan 25 mg QD  Losartan recent decreased from 50 mg due to concerns for hypotension  Blood Pressure: (!) 101/43  Patient with soft pressures today    Plan:  Monitor VS including BP.   If hypotension recurs, confirm via manual, assess symptoms; suggest fluid bolus/albumin and discussion w crit care.  Continue home metoprolol tartrate 25 mg BID  Hold losartan 25 mg QD  IVF hydration with isolyte @ 100 cc/hr for 10 hrs  COPD (chronic obstructive pulmonary disease) ([facility])  Pt w COPD on 2L supplemental O2 at baseline.  Currently requiring 3L, above baseline of 2L NC  TTE 2/12/25: EF 55%, Normal systolic dysfunction, G1DD, No vegetation, No mural thrombus, moderate aortic " sclerosis    Plan:  Nebs TID  Titrate O2 to maintain SpO2 greater than 88%  Acute urinary retention    Suspect new urinary retention 2/2 BPH  US Kidney/Bladder 2/11/25: No hydronephrosis, moderate bladder distention, perinephric edema on L side.  Patient endorses improvement of retention with initiation of tamsulosin    Plan:  Tamsulosin 0.4 mg QD  Urinary retention protocol  Atrial fibrillation (HCC)  Patient rate controlled since admission    Plan:  Eliquis has been held because of MRI proven hemorrhagic conversion of multifocal embolic infarction and suspected GI bleed  Continue home metoprolol with hold parameters  Back pain  MRI L-Spine: Negative for acute pathology  MRI C-spine shows possible soft tissue infection status post IV cefazolin  ID on board    Plan:  Lidocaine patches  Robaxin 500 mg q8 hrs scheduled  Tylenol 650 mg q6 hrs prn for mild pain  Oxycodone 2.5/5 mg q4 hrs prn for mod/sev pain  Neck pain  The most recent MRI C-spine shows possible soft tissue infection in the background of bacteremia    Plan:  Lidocaine patch  Constipation  Patient with large BM with blood yesterday    Plan:  Colace BID  Miralax daily  Senna prn    VTE Pharmacologic Prophylaxis: VTE Score: 9 High Risk (Score >/= 5) - Pharmacological DVT Prophylaxis Contraindicated. Sequential Compression Devices Ordered.    Mobility:   Basic Mobility Inpatient Raw Score: 14  JH-HLM Goal: 4: Move to chair/commode  JH-HLM Achieved: 2: Bed activities/Dependent transfer  JH-HLM Goal NOT achieved. Continue with multidisciplinary rounding and encourage appropriate mobility to improve upon JH-HLM goals.    Patient Centered Rounds: I performed bedside rounds with nursing staff today.   Discussions with Specialists or Other Care Team Provider: GI    Education and Discussions with Family / Patient: Updated  (wife) via phone.    Current Length of Stay: 8 day(s)  Current Patient Status: Inpatient   Certification Statement: The patient  will continue to require additional inpatient hospital stay due to Possible upper GI bleed, MSSA bacteremia  Discharge Plan: Anticipate discharge in >72 hrs to discharge location to be determined pending rehab evaluations.    Code Status: Level 3 - DNAR and DNI    Subjective   Patient emotional this morning due to frequent urinary and fecal incontinence overnight, also very upset over the delay in his colonoscopy. Patient amenable with plan for EGD/colonoscopy tomorrow.    Objective :  Temp:  [97.4 °F (36.3 °C)-98.5 °F (36.9 °C)] 97.4 °F (36.3 °C)  HR:  [75-95] 75  BP: (101-123)/(43-62) 101/43  Resp:  [16-19] 16  SpO2:  [86 %-98 %] 98 %  O2 Device: Nasal cannula  Nasal Cannula O2 Flow Rate (L/min):  [1 L/min-2 L/min] 2 L/min    Body mass index is 22.76 kg/m².     Input and Output Summary (last 24 hours):   No intake or output data in the 24 hours ending 02/19/25 1534    Physical Exam  Vitals and nursing note reviewed.   Constitutional:       General: He is not in acute distress.     Appearance: He is well-developed.   HENT:      Head: Normocephalic and atraumatic.   Eyes:      Conjunctiva/sclera: Conjunctivae normal.   Cardiovascular:      Rate and Rhythm: Normal rate and regular rhythm.      Heart sounds: No murmur heard.  Pulmonary:      Effort: Pulmonary effort is normal.      Breath sounds: No stridor. No wheezing, rhonchi or rales.      Comments: Diminished breath sounds   Abdominal:      Palpations: Abdomen is soft.      Tenderness: There is no abdominal tenderness.   Musculoskeletal:         General: Tenderness (C-spine) present. No swelling.      Cervical back: Neck supple.      Left foot: Deformity present.   Feet:      Left foot:      Skin integrity: Erythema present. No ulcer, blister, skin breakdown or warmth.   Skin:     General: Skin is warm and dry.      Coloration: Skin is not mottled or pale.      Findings: Abrasion present.      Comments: Scattered abrasions on bilateral hands   Neurological:       General: No focal deficit present.      Mental Status: He is alert and oriented to person, place, and time.   Psychiatric:         Mood and Affect: Mood normal.           Lines/Drains:              Lab Results: I have reviewed the following results:   Results from last 7 days   Lab Units 02/19/25  0950 02/19/25  0553 02/17/25  1352 02/17/25  0844   WBC Thousand/uL  --  5.91   < > 4.82   HEMOGLOBIN g/dL 8.6* 7.3*   < > 7.9*   HEMATOCRIT % 26.9* 23.6*   < > 24.5*   PLATELETS Thousands/uL  --  145*   < > 141*   BANDS PCT %  --   --   --  1   SEGS PCT %  --  74   < >  --    LYMPHO PCT %  --  15   < > 14   MONO PCT %  --  10   < > 4   EOS PCT %  --  0   < > 0    < > = values in this interval not displayed.     Results from last 7 days   Lab Units 02/19/25  0553 02/17/25  0844 02/16/25  0837   SODIUM mmol/L 137   < > 137   POTASSIUM mmol/L 3.6   < > 3.6   CHLORIDE mmol/L 103   < > 104   CO2 mmol/L 30   < > 30   BUN mg/dL 16   < > 21   CREATININE mg/dL 0.88   < > 0.91   ANION GAP mmol/L 4   < > 3*   CALCIUM mg/dL 7.8*   < > 8.2*   ALBUMIN g/dL  --   --  2.6*   TOTAL BILIRUBIN mg/dL  --   --  0.55   ALK PHOS U/L  --   --  51   ALT U/L  --   --  3*   AST U/L  --   --  20   GLUCOSE RANDOM mg/dL 78   < > 91    < > = values in this interval not displayed.     Results from last 7 days   Lab Units 02/17/25  1359   INR  1.39*         Results from last 7 days   Lab Units 02/16/25  0608   HEMOGLOBIN A1C % 5.8*           Recent Cultures (last 7 days):   Results from last 7 days   Lab Units 02/14/25  0325   BLOOD CULTURE  No Growth After 5 Days.  No Growth After 5 Days.       XR chest portable  Result Date: 2/17/2025  Impression: Limited study. There appears to be some left basilar atelectasis. There is some central vascular prominence in the hilar regions. Workstation performed: [id number]     CTA head and neck w wo contrast  Result Date: 2/15/2025  Impression: CT Brain: - Known small multifocal infarcts in bilateral cerebral  hemispheres and right cerebellum were better evaluated on MRI brain dated 2/14/2025, likely embolic. No new CT signs of acute infarction. - Unchanged chronic lacunar infarct in right basal ganglia with moderate chronic microangiopathy. CT Angiography: - Negative CTA head and neck for large vessel occlusion, dissection, aneurysm, or high-grade stenosis. - Mild atherosclerotic disease of the head and neck, as detailed above. Multiple pulmonary nodules measuring up to 0.9 cm in left upper lobe, unchanged. Based on current Fleischner Society 2017 Guidelines on incidental pulmonary nodule, follow-up non-contrast CT is recommended at 3-6 months from the initial examination and, if stable at that time, an additional follow-up is recommended for 18-24 months from the initial examination. 4.1 cm ectasia of ascending thoracic aorta. Recommend follow-up CT chest in 12 months. Additional chronic/incidental findings as detailed above. The study was marked in EPIC for immediate notification. Workstation performed: [id number]     MRI brain w wo contrast  Result Date: 2/15/2025  Impression: Multiple tiny recent infarcts scattered in multiple vascular distributions that are likely embolic. Several microhemorrhages associated with recent infarcts. No acute space-occupying hematoma. Chronic ischemic changes. The study was marked in EPIC for immediate notification. Workstation performed: [id number]     MRI cervical spine w wo contrast  Result Date: 2/14/2025  Impression: Cervical degenerative change with mild canal stenosis and mild to moderate foraminal narrowing. No cord compression or abnormal cord signal. Mild nonspecific edema within the right posterior paraspinal musculature of the upper cervical spine seen on sagittal STIR imaging with mild enhancement. Minimal peripheral enhancement is noted and differential considerations would include infectious/inflammatory myositis with soft tissue abscess. No discitis or osteomyelitis. This  "examination was marked \"immediate notification\" in Epic in order to begin the standard process by which the radiology reading room liaison alerts the referring practitioner. Workstation performed: [id number]     CT head w wo contrast  Result Date: 2/13/2025  Impression: No acute hemorrhage, edema, or mass effect. Chronic microangiopathic changes and chronic appearing infarctions as above. No pathologic enhancement. Workstation performed: [id number]     MRI lumbar spine w wo contrast  Result Date: 2/13/2025  Impression: Mild inferior plate edema at L1 with adjacent small Schmorl's node and minimal adjacent postcontrast enhancement. Vacuum disc phenomenon noted on the recent CT at this level. Findings likely represent degenerative disc disease with Schmorl's node rather than discitis and osteomyelitis which should only be considered in the right clinical setting. No paraspinal edema or enhancement identified. No evidence of abscess or abnormal enhancement in the paraspinal soft tissues. 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 Resident: KEI Wallace I, the attending radiologist, have reviewed the images and agree with the final report above. Workstation performed: [id number]     US kidney and bladder  Result Date: 2/11/2025  Impression: No hydronephrosis. Moderate bladder distention. Perinephric edema on the left. Findings were discussed with Dr. Peralta at 7:25 p.m. via secure text Workstation performed: [id number]     CTA chest pe study  Result Date: 2/11/2025  Impression: No evidence of pulmonary embolus. Stable ectasia of the ascending thoracic aorta measuring up to 41 mm. Mild secretions in the lower trachea, correlate for any dysphagia or possible aspiration. Noncalcified left lung apex pulmonary nodule, 6 x 5 mm. Because this was not present on prior chest CT of December 4, 2023, conservative management with follow-up low radiation dose noncontrast chest CT in 6 months is " "recommended. This examination was marked \"immediate notification\" in Epic in order to begin the standard process by which the radiology reading room liaison alerts the referring practitioner. Resident: Bijan Fajardo I, the attending radiologist, have reviewed the images and agree with the final report above. Workstation performed: [id number]     XR chest 1 view portable  Result Date: 2/11/2025  Impression: No acute cardiopulmonary disease. Workstation performed: [id number]       No Chest XR results available for this patient.     Other Study Results Review: No additional pertinent studies reviewed.    Last 24 Hours Medication List:     Current Facility-Administered Medications:     acetaminophen (TYLENOL) tablet 650 mg, Q6H PRN    albuterol (PROVENTIL HFA,VENTOLIN HFA) inhaler 2 puff, Q4H PRN    aspirin chewable tablet 81 mg, Daily    atorvastatin (LIPITOR) tablet 80 mg, Daily With Dinner    barium sulfate tablet 1 tablet, Once in imaging    barium sulfate tablet 1 tablet, Once in imaging    bisacodyl (DULCOLAX) rectal suppository 10 mg, Daily PRN    ceFAZolin (ANCEF) IVPB (premix in dextrose) 2,000 mg 50 mL, Q8H, Last Rate: 2,000 mg (02/19/25 0847)    docusate sodium (COLACE) capsule 100 mg, BID    fluticasone (ARNUITY ELLIPTA) 100 MCG/ACT inhaler 1 puff, Daily    guaiFENesin (MUCINEX) 12 hr tablet 1,200 mg, BID    hydroxychloroquine (PLAQUENIL) tablet 400 mg, Daily With Breakfast    ipratropium (ATROVENT) 0.02 % inhalation solution 0.5 mg, TID    levalbuterol (XOPENEX) inhalation solution 1.25 mg, TID    lidocaine (LIDODERM) 5 % patch 1 patch, Daily    lidocaine (LIDODERM) 5 % patch 1 patch, Daily    lidocaine (LIDODERM) 5 % patch 1 patch, Daily    [Held by provider] losartan (COZAAR) tablet 25 mg, Daily    methocarbamol (ROBAXIN) tablet 500 mg, Q8H GALE    metoprolol tartrate (LOPRESSOR) tablet 25 mg, Q12H GALE    multi-electrolyte (PLASMALYTE-A/ISOLYTE-S PH 7.4) IV solution, Continuous    oxyCODONE (ROXICODONE) " IR tablet 5 mg, Q4H PRN    oxyCODONE (ROXICODONE) split tablet 2.5 mg, Q4H PRN    pantoprazole (PROTONIX) injection 40 mg, Q12H GALE    polyethylene glycol (GLYCOLAX) bowel prep 238 g, Once    polyethylene glycol (MIRALAX) packet 17 g, Daily PRN    senna (SENOKOT) tablet 8.6 mg, HS PRN    sertraline (ZOLOFT) tablet 12.5 mg, Daily    [Held by provider] sucralfate (CARAFATE) tablet 1 g, 4x Daily (AC & HS)    tamsulosin (FLOMAX) capsule 0.4 mg, Daily With Dinner    Administrative Statements   Today, Patient Was Seen By: Mal Neely DO      **Please Note: This note may have been constructed using a voice recognition system.**

## 2025-02-19 NOTE — ASSESSMENT & PLAN NOTE
Patient's head CT and MRI showed multiple small bilateral hemispheric infarcts, suggestive of septic emboli.  TTE did not show obvious vegetation but infective endocarditis is possible.  However, patient will be on long-term IV antibiotic for C-spine infection.  As long as bacteremia clears, CATHI will not change treatment plan, therefore, it does not need to be done.  -IV cefazolin as in above  -Follow-up on repeat blood culture    Discussed with patient and family in detail regarding the above plan.

## 2025-02-19 NOTE — ASSESSMENT & PLAN NOTE
MAP as low as 62 in the ED per automated cuff  Home Htn meds: Metoprolol tartrate 25 mg BID, Losartan 25 mg QD  Losartan recent decreased from 50 mg due to concerns for hypotension  Blood Pressure: (!) 101/43  Patient with soft pressures today    Plan:  Monitor VS including BP.   If hypotension recurs, confirm via manual, assess symptoms; suggest fluid bolus/albumin and discussion w crit care.  Continue home metoprolol tartrate 25 mg BID  Hold losartan 25 mg QD  IVF hydration with isolyte @ 100 cc/hr for 10 hrs

## 2025-02-19 NOTE — ASSESSMENT & PLAN NOTE
Blood cultures with Gram + cocci, likely Staph Aureus  Unknown source at this time, patient with multiple small abrasions on hands and arms. UA negative, LLE does not appear to have cellulitis.  TTE negative for vegetations  Patient mentioned small laceration from [facility]irc[facility] with pustule formation, possible source at there is myositis to that area  MRI L-spine: No evidence of abscess or abnormal enhancement in the paraspinal soft tissues.  MRI C-spine: Mild nonspecific edema within the right posterior paraspinal musculature of the upper cervical spine seen on sagittal STIR imaging with mild enhancement. Minimal peripheral enhancement is noted and differential considerations would include infectious/inflammatory myositis with soft tissue abscess. No osteomyelitis or discitis. No cord compression or abnormal cord signal.  NCCTH: No acute hemorrhage, edema, or mass effect. Chronic microangiopathic changes and chronic appearing infarctions  MRI Brain shows possible multifocal infarction    Plan:  Continue cefazolin 2 g IV q8 hrs  Given myositis and possible abscess appreicated on MRI C-spine will require prolonged IV antibiotic course  Plan for PICC line later this week  ID consulted, appreciate recs  Follow repeat blood cultures  If repeat blood cultures positive, can discuss CATHI at that time.

## 2025-02-19 NOTE — ASSESSMENT & PLAN NOTE
Recent Labs     02/18/25  1603 02/19/25  0553 02/19/25  0950   HGB 8.5* 7.3* 8.6*     Lab Results   Component Value Date    IRON 37 (L) 02/14/2025    FERRITIN 147 02/14/2025    TIBC 166.6 (L) 02/14/2025    CONCFE 22 02/14/2025     B12 972, Folate 14  Stable    Plan:  Transfuse if <7   Patient already typed and screened, not consented  Patient required 2 units packed red blood cells overnight on 2/13  GI consult, appreciate recs  Plan for colonoscopy on 2/20, patient prepping today  EGD/Colonoscopy planned for 2/19 however delayed due to error resulting in patient not receiving adequate prep

## 2025-02-20 ENCOUNTER — APPOINTMENT (INPATIENT)
Dept: CT IMAGING | Facility: HOSPITAL | Age: 80
DRG: 871 | End: 2025-02-20
Payer: COMMERCIAL

## 2025-02-20 ENCOUNTER — ANESTHESIA EVENT (INPATIENT)
Dept: GASTROENTEROLOGY | Facility: HOSPITAL | Age: 80
DRG: 871 | End: 2025-02-20
Payer: COMMERCIAL

## 2025-02-20 ENCOUNTER — APPOINTMENT (INPATIENT)
Dept: GASTROENTEROLOGY | Facility: HOSPITAL | Age: 80
DRG: 871 | End: 2025-02-20
Payer: COMMERCIAL

## 2025-02-20 ENCOUNTER — ANESTHESIA (INPATIENT)
Dept: GASTROENTEROLOGY | Facility: HOSPITAL | Age: 80
DRG: 871 | End: 2025-02-20
Payer: COMMERCIAL

## 2025-02-20 PROBLEM — K63.89 COLONIC MASS: Status: ACTIVE | Noted: 2025-02-20

## 2025-02-20 LAB
ALBUMIN SERPL BCG-MCNC: 2.3 G/DL (ref 3.5–5)
ALP SERPL-CCNC: 54 U/L (ref 34–104)
ALT SERPL W P-5'-P-CCNC: <3 U/L (ref 7–52)
ANION GAP SERPL CALCULATED.3IONS-SCNC: 4 MMOL/L (ref 4–13)
AST SERPL W P-5'-P-CCNC: 21 U/L (ref 13–39)
BASOPHILS # BLD AUTO: 0.01 THOUSANDS/ΜL (ref 0–0.1)
BASOPHILS NFR BLD AUTO: 0 % (ref 0–1)
BILIRUB SERPL-MCNC: 0.58 MG/DL (ref 0.2–1)
BUN SERPL-MCNC: 15 MG/DL (ref 5–25)
CA-I BLD-SCNC: 1.15 MMOL/L (ref 1.12–1.32)
CALCIUM ALBUM COR SERPL-MCNC: 9.3 MG/DL (ref 8.3–10.1)
CALCIUM SERPL-MCNC: 7.9 MG/DL (ref 8.4–10.2)
CHLORIDE SERPL-SCNC: 105 MMOL/L (ref 96–108)
CO2 SERPL-SCNC: 30 MMOL/L (ref 21–32)
CREAT SERPL-MCNC: 0.91 MG/DL (ref 0.6–1.3)
EOSINOPHIL # BLD AUTO: 0.01 THOUSAND/ΜL (ref 0–0.61)
EOSINOPHIL NFR BLD AUTO: 0 % (ref 0–6)
ERYTHROCYTE [DISTWIDTH] IN BLOOD BY AUTOMATED COUNT: 17.2 % (ref 11.6–15.1)
GFR SERPL CREATININE-BSD FRML MDRD: 81 ML/MIN/1.73SQ M
GLUCOSE SERPL-MCNC: 81 MG/DL (ref 65–140)
HCT VFR BLD AUTO: 26.4 % (ref 36.5–49.3)
HGB BLD-MCNC: 8 G/DL (ref 12–17)
IMM GRANULOCYTES # BLD AUTO: 0.05 THOUSAND/UL (ref 0–0.2)
IMM GRANULOCYTES NFR BLD AUTO: 1 % (ref 0–2)
LYMPHOCYTES # BLD AUTO: 0.81 THOUSANDS/ΜL (ref 0.6–4.47)
LYMPHOCYTES NFR BLD AUTO: 13 % (ref 14–44)
MAGNESIUM SERPL-MCNC: 2.1 MG/DL (ref 1.9–2.7)
MCH RBC QN AUTO: 28.6 PG (ref 26.8–34.3)
MCHC RBC AUTO-ENTMCNC: 30.3 G/DL (ref 31.4–37.4)
MCV RBC AUTO: 94 FL (ref 82–98)
MONOCYTES # BLD AUTO: 0.49 THOUSAND/ΜL (ref 0.17–1.22)
MONOCYTES NFR BLD AUTO: 8 % (ref 4–12)
NEUTROPHILS # BLD AUTO: 5.01 THOUSANDS/ΜL (ref 1.85–7.62)
NEUTS SEG NFR BLD AUTO: 78 % (ref 43–75)
NRBC BLD AUTO-RTO: 0 /100 WBCS
PLATELET # BLD AUTO: 172 THOUSANDS/UL (ref 149–390)
PMV BLD AUTO: 8.5 FL (ref 8.9–12.7)
POTASSIUM SERPL-SCNC: 4.7 MMOL/L (ref 3.5–5.3)
PROT SERPL-MCNC: 5.2 G/DL (ref 6.4–8.4)
RBC # BLD AUTO: 2.8 MILLION/UL (ref 3.88–5.62)
SODIUM SERPL-SCNC: 139 MMOL/L (ref 135–147)
WBC # BLD AUTO: 6.38 THOUSAND/UL (ref 4.31–10.16)

## 2025-02-20 PROCEDURE — 99233 SBSQ HOSP IP/OBS HIGH 50: CPT | Performed by: INTERNAL MEDICINE

## 2025-02-20 PROCEDURE — 43235 EGD DIAGNOSTIC BRUSH WASH: CPT | Performed by: INTERNAL MEDICINE

## 2025-02-20 PROCEDURE — 94760 N-INVAS EAR/PLS OXIMETRY 1: CPT

## 2025-02-20 PROCEDURE — 71260 CT THORAX DX C+: CPT

## 2025-02-20 PROCEDURE — 94640 AIRWAY INHALATION TREATMENT: CPT

## 2025-02-20 PROCEDURE — G0545 PR INHERENT VISIT TO INPT: HCPCS | Performed by: INTERNAL MEDICINE

## 2025-02-20 PROCEDURE — 0DJ08ZZ INSPECTION OF UPPER INTESTINAL TRACT, VIA NATURAL OR ARTIFICIAL OPENING ENDOSCOPIC: ICD-10-PCS | Performed by: INTERNAL MEDICINE

## 2025-02-20 PROCEDURE — 80053 COMPREHEN METABOLIC PANEL: CPT

## 2025-02-20 PROCEDURE — 74177 CT ABD & PELVIS W/CONTRAST: CPT

## 2025-02-20 PROCEDURE — 85025 COMPLETE CBC W/AUTO DIFF WBC: CPT

## 2025-02-20 PROCEDURE — 45378 DIAGNOSTIC COLONOSCOPY: CPT | Performed by: INTERNAL MEDICINE

## 2025-02-20 PROCEDURE — 99223 1ST HOSP IP/OBS HIGH 75: CPT | Performed by: PHYSICIAN ASSISTANT

## 2025-02-20 PROCEDURE — 82330 ASSAY OF CALCIUM: CPT

## 2025-02-20 PROCEDURE — 88305 TISSUE EXAM BY PATHOLOGIST: CPT | Performed by: PATHOLOGY

## 2025-02-20 PROCEDURE — 83735 ASSAY OF MAGNESIUM: CPT

## 2025-02-20 PROCEDURE — 88341 IMHCHEM/IMCYTCHM EA ADD ANTB: CPT | Performed by: PATHOLOGY

## 2025-02-20 PROCEDURE — 70450 CT HEAD/BRAIN W/O DYE: CPT

## 2025-02-20 PROCEDURE — 88342 IMHCHEM/IMCYTCHM 1ST ANTB: CPT | Performed by: PATHOLOGY

## 2025-02-20 PROCEDURE — 0DBK8ZX EXCISION OF ASCENDING COLON, VIA NATURAL OR ARTIFICIAL OPENING ENDOSCOPIC, DIAGNOSTIC: ICD-10-PCS | Performed by: INTERNAL MEDICINE

## 2025-02-20 PROCEDURE — 99232 SBSQ HOSP IP/OBS MODERATE 35: CPT | Performed by: INTERNAL MEDICINE

## 2025-02-20 RX ORDER — SODIUM CHLORIDE, SODIUM LACTATE, POTASSIUM CHLORIDE, CALCIUM CHLORIDE 600; 310; 30; 20 MG/100ML; MG/100ML; MG/100ML; MG/100ML
INJECTION, SOLUTION INTRAVENOUS CONTINUOUS PRN
Status: DISCONTINUED | OUTPATIENT
Start: 2025-02-20 | End: 2025-02-20

## 2025-02-20 RX ORDER — PROPOFOL 10 MG/ML
INJECTION, EMULSION INTRAVENOUS AS NEEDED
Status: DISCONTINUED | OUTPATIENT
Start: 2025-02-20 | End: 2025-02-20

## 2025-02-20 RX ORDER — LIDOCAINE HYDROCHLORIDE 10 MG/ML
INJECTION, SOLUTION EPIDURAL; INFILTRATION; INTRACAUDAL; PERINEURAL AS NEEDED
Status: DISCONTINUED | OUTPATIENT
Start: 2025-02-20 | End: 2025-02-20

## 2025-02-20 RX ORDER — PROPOFOL 10 MG/ML
INJECTION, EMULSION INTRAVENOUS CONTINUOUS PRN
Status: DISCONTINUED | OUTPATIENT
Start: 2025-02-20 | End: 2025-02-20

## 2025-02-20 RX ADMIN — SERTRALINE HYDROCHLORIDE 12.5 MG: 25 TABLET ORAL at 08:44

## 2025-02-20 RX ADMIN — CEFAZOLIN SODIUM 2000 MG: 2 SOLUTION INTRAVENOUS at 00:48

## 2025-02-20 RX ADMIN — PROPOFOL 70 MG: 10 INJECTION, EMULSION INTRAVENOUS at 11:51

## 2025-02-20 RX ADMIN — IOHEXOL 100 ML: 350 INJECTION, SOLUTION INTRAVENOUS at 18:14

## 2025-02-20 RX ADMIN — METHOCARBAMOL TABLETS 500 MG: 500 TABLET, COATED ORAL at 15:45

## 2025-02-20 RX ADMIN — PROPOFOL 80 MCG/KG/MIN: 10 INJECTION, EMULSION INTRAVENOUS at 11:51

## 2025-02-20 RX ADMIN — OXYCODONE HYDROCHLORIDE 5 MG: 5 TABLET ORAL at 00:25

## 2025-02-20 RX ADMIN — HYDROXYCHLOROQUINE SULFATE 400 MG: 200 TABLET ORAL at 06:14

## 2025-02-20 RX ADMIN — IPRATROPIUM BROMIDE 0.5 MG: 0.5 SOLUTION RESPIRATORY (INHALATION) at 20:15

## 2025-02-20 RX ADMIN — GUAIFENESIN 1200 MG: 600 TABLET, EXTENDED RELEASE ORAL at 08:44

## 2025-02-20 RX ADMIN — PHENYLEPHRINE HYDROCHLORIDE 200 MCG: 50 INJECTION INTRAVENOUS at 12:13

## 2025-02-20 RX ADMIN — METOPROLOL TARTRATE 25 MG: 25 TABLET, FILM COATED ORAL at 22:02

## 2025-02-20 RX ADMIN — METHOCARBAMOL TABLETS 500 MG: 500 TABLET, COATED ORAL at 22:02

## 2025-02-20 RX ADMIN — METOPROLOL TARTRATE 25 MG: 25 TABLET, FILM COATED ORAL at 08:44

## 2025-02-20 RX ADMIN — ATORVASTATIN CALCIUM 80 MG: 40 TABLET, FILM COATED ORAL at 15:45

## 2025-02-20 RX ADMIN — PHENYLEPHRINE HYDROCHLORIDE 150 MCG: 50 INJECTION INTRAVENOUS at 12:04

## 2025-02-20 RX ADMIN — LEVALBUTEROL HYDROCHLORIDE 1.25 MG: 1.25 SOLUTION RESPIRATORY (INHALATION) at 13:46

## 2025-02-20 RX ADMIN — CEFAZOLIN SODIUM 2000 MG: 2 SOLUTION INTRAVENOUS at 08:44

## 2025-02-20 RX ADMIN — ACETAMINOPHEN 650 MG: 325 TABLET, FILM COATED ORAL at 22:02

## 2025-02-20 RX ADMIN — SUCRALFATE 1 G: 1 TABLET ORAL at 06:16

## 2025-02-20 RX ADMIN — SODIUM CHLORIDE, SODIUM LACTATE, POTASSIUM CHLORIDE, AND CALCIUM CHLORIDE: .6; .31; .03; .02 INJECTION, SOLUTION INTRAVENOUS at 11:00

## 2025-02-20 RX ADMIN — PANTOPRAZOLE SODIUM 40 MG: 40 INJECTION, POWDER, FOR SOLUTION INTRAVENOUS at 22:02

## 2025-02-20 RX ADMIN — PHENYLEPHRINE HYDROCHLORIDE 150 MCG: 50 INJECTION INTRAVENOUS at 11:51

## 2025-02-20 RX ADMIN — LIDOCAINE HYDROCHLORIDE 60 MG: 10 INJECTION, SOLUTION EPIDURAL; INFILTRATION; INTRACAUDAL; PERINEURAL at 11:50

## 2025-02-20 RX ADMIN — PANTOPRAZOLE SODIUM 40 MG: 40 INJECTION, POWDER, FOR SOLUTION INTRAVENOUS at 08:43

## 2025-02-20 RX ADMIN — LEVALBUTEROL HYDROCHLORIDE 1.25 MG: 1.25 SOLUTION RESPIRATORY (INHALATION) at 07:41

## 2025-02-20 RX ADMIN — FLUTICASONE FUROATE 1 PUFF: 100 POWDER RESPIRATORY (INHALATION) at 09:01

## 2025-02-20 RX ADMIN — SUCRALFATE 1 G: 1 TABLET ORAL at 15:45

## 2025-02-20 RX ADMIN — Medication 40 MG: at 12:19

## 2025-02-20 RX ADMIN — LEVALBUTEROL HYDROCHLORIDE 1.25 MG: 1.25 SOLUTION RESPIRATORY (INHALATION) at 20:15

## 2025-02-20 RX ADMIN — GUAIFENESIN 1200 MG: 600 TABLET, EXTENDED RELEASE ORAL at 17:31

## 2025-02-20 RX ADMIN — METHOCARBAMOL TABLETS 500 MG: 500 TABLET, COATED ORAL at 06:14

## 2025-02-20 RX ADMIN — IPRATROPIUM BROMIDE 0.5 MG: 0.5 SOLUTION RESPIRATORY (INHALATION) at 13:46

## 2025-02-20 RX ADMIN — SUCRALFATE 1 G: 1 TABLET ORAL at 22:02

## 2025-02-20 RX ADMIN — IPRATROPIUM BROMIDE 0.5 MG: 0.5 SOLUTION RESPIRATORY (INHALATION) at 07:41

## 2025-02-20 RX ADMIN — TAMSULOSIN HYDROCHLORIDE 0.4 MG: 0.4 CAPSULE ORAL at 15:45

## 2025-02-20 NOTE — ASSESSMENT & PLAN NOTE
2/14 MRI brain: Multiple tiny recent infarcts scattered in multiple vascular distributions that are likely embolic. Several microhemorrhages associated with recent infarcts. No acute space-occupying hematoma. Chronic ischemic changes  Neuro following: low suspicion for septic emboli. Psb 2/2 eliquis failure 2/2 acute infectious process

## 2025-02-20 NOTE — ASSESSMENT & PLAN NOTE
Recent Labs     02/19/25  0553 02/19/25  0950 02/20/25  0613   HGB 7.3* 8.6* 8.0*     Lab Results   Component Value Date    IRON 37 (L) 02/14/2025    FERRITIN 147 02/14/2025    TIBC 166.6 (L) 02/14/2025    CONCFE 22 02/14/2025     B12 972, Folate 14  Stable    Plan:  Transfuse if <7   Patient required 2 units packed red blood cells overnight on 2/13  GI consult, appreciate recs  Colonoscopy on 2/20/25, small friable colonic mass found concerning for malignancy

## 2025-02-20 NOTE — ASSESSMENT & PLAN NOTE
"Meeting SIRS criteria for tachycardia (HR ), tachypnea (RR 16-28), and leukocytosis (12.32)  The most recent MRI lumbar spine unremarkable  The most recent MRI C-spine shows possible soft tissue infection  The most recent MRI contrast of the brain shows possible multifocal embolic infarction with hemorrhagic conversion, most likely septic emboli s/p cefazolin IV.    Plan:  See \"Gram positive bacteremia\"  "

## 2025-02-20 NOTE — PROGRESS NOTES
Progress Note - Hospitalist   Name: Devin Chaves 79 y.o. male I MRN: [id number]  Unit/Bed#: S -01 I Date of Admission: 2/11/2025   Date of Service: 2/20/2025 I Hospital Day: 9    Assessment & Plan  MSSA bacteremia  Blood cultures with Gram + cocci, likely Staph Aureus  TTE negative for vegetations  Patient mentioned small laceration from haircut at [facility] with pustule formation, possible source at there is myositis to that area  MRI C-spine: Mild nonspecific edema within the right posterior paraspinal musculature of the upper cervical spine. Minimal peripheral enhancement is noted and differential considerations would include infectious/inflammatory myositis with soft tissue abscess. No osteomyelitis or discitis. No cord compression or abnormal cord signal.  MRI Brain shows possible multifocal infarction    Plan:  Continue cefazolin 2 g IV q8 hrs  Given myositis and possible abscess appreicated on MRI C-spine will require prolonged IV antibiotic course  Consent for PICC line obtained, will discuss placing on 2/21/2025  ID consulted, appreciate recs  Follow repeat blood cultures, NGTD  Anemia  Recent Labs     02/19/25  0553 02/19/25  0950 02/20/25  0613   HGB 7.3* 8.6* 8.0*     Lab Results   Component Value Date    IRON 37 (L) 02/14/2025    FERRITIN 147 02/14/2025    TIBC 166.6 (L) 02/14/2025    CONCFE 22 02/14/2025     B12 972, Folate 14  Stable    Plan:  Transfuse if <7   Patient required 2 units packed red blood cells overnight on 2/13  GI consult, appreciate recs  Colonoscopy on 2/20/25, small friable colonic mass found concerning for malignancy  Stroke (HCC)  MRI brain: Multiple tiny recent infarcts scattered in multiple vascular distributions that are likely embolic. Several microhemorrhages associated with recent infarcts. No acute space-occupying hematoma. Chronic ischemic changes.  NCCTH 2/17: 3 mm focus of hyperdensity left superior frontal lobe as described unchanged from the prior study may  "represent a small 3 mm hemorrhage versus focus of mineralization.    Plan:  Plan to restart eliquis after repeat CTH if stable hemorrhage  Continue aspirin per neurology  Sepsis ([facility])  Meeting SIRS criteria for tachycardia (HR ), tachypnea (RR 16-28), and leukocytosis (12.32)  The most recent MRI lumbar spine unremarkable  The most recent MRI C-spine shows possible soft tissue infection  The most recent MRI contrast of the brain shows possible multifocal embolic infarction with hemorrhagic conversion, most likely septic emboli s/p cefazolin IV.    Plan:  See \"Gram positive bacteremia\"  Hypotension  MAP as low as 62 in the ED per automated cuff  Home Htn meds: Metoprolol tartrate 25 mg BID, Losartan 25 mg QD  Losartan recent decreased from 50 mg due to concerns for hypotension  Blood Pressure: 117/57  Patient with soft pressures today    Plan:  Monitor VS including BP.   If hypotension recurs, confirm via manual, assess symptoms; suggest fluid bolus/albumin and discussion w crit care.  Continue home metoprolol tartrate 25 mg BID  Hold losartan 25 mg QD  IVF hydration with isolyte @ 100 cc/hr for 10 hrs  COPD (chronic obstructive pulmonary disease) ([facility])  Pt w COPD on 2L supplemental O2 at baseline.  Currently requiring 3L, above baseline of 2L NC  TTE 2/12/25: EF 55%, Normal systolic dysfunction, G1DD, No vegetation, No mural thrombus, moderate aortic sclerosis    Plan:  Nebs TID  Titrate O2 to maintain SpO2 greater than 88%  Acute urinary retention    Suspect new urinary retention 2/2 BPH  US Kidney/Bladder 2/11/25: No hydronephrosis, moderate bladder distention, perinephric edema on L side.  Patient endorses improvement of retention with initiation of tamsulosin    Plan:  Tamsulosin 0.4 mg QD  Urinary retention protocol  Atrial fibrillation ([facility])  Patient rate controlled since admission    Plan:  Plan repeat CTH today, if stable cleared to restart eliquis per neurology  Continue home metoprolol with hold " parameters  Back pain  MRI L-Spine: Negative for acute pathology  MRI C-spine shows possible soft tissue infection status post IV cefazolin  ID on board    Plan:  Lidocaine patches  Robaxin 500 mg q8 hrs scheduled  Tylenol 650 mg q6 hrs prn for mild pain  Oxycodone 2.5/5 mg q4 hrs prn for mod/sev pain  Neck pain  The most recent MRI C-spine shows possible soft tissue infection in the background of bacteremia    Plan:  Lidocaine patch  Constipation  Patient with large BM with blood yesterday    Plan:  Colace BID  Miralax daily  Senna prn  Rheumatoid arthritis (HCC)  Home med: Hydroxychloroquine 400 mg QD    Plan:  Continue home hydroxychloroquine  Colonic mass  During colonoscopy on 2/20/25, small friable colonic mass found    Plan:  Pathology pending  CT CAP w contrast pending  CEA pending  F/u with GI outpatient    VTE Pharmacologic Prophylaxis: VTE Score: 9 High Risk (Score >/= 5) - Pharmacological DVT Prophylaxis Contraindicated. Sequential Compression Devices Ordered.    Mobility:   Basic Mobility Inpatient Raw Score: 14  JH-HLM Goal: 4: Move to chair/commode  JH-HLM Achieved: 4: Move to chair/commode  JH-HLM Goal achieved. Continue to encourage appropriate mobility.    Patient Centered Rounds: I performed bedside rounds with nursing staff today.   Discussions with Specialists or Other Care Team Provider: Neurology, ID, GI    Education and Discussions with Family / Patient: Updated  (wife) via phone.    Current Length of Stay: 9 day(s)  Current Patient Status: Inpatient   Certification Statement: The patient will continue to require additional inpatient hospital stay due to PICC placement  Discharge Plan: Anticipate discharge in 24-48 hrs to rehab facility.    Code Status: Level 3 - DNAR and DNI    Subjective   Patient seen and examined at bedside, more comfortable now that he is no longer prepping and having frequent bowel incontinence.  Patient denies any abdominal pain, ornis also denies chest  pain or worsening shortness of breath.    Objective :  Temp:  [97.2 °F (36.2 °C)-98.3 °F (36.8 °C)] 98.3 °F (36.8 °C)  HR:  [65-90] 65  BP: ()/(43-63) 117/57  Resp:  [17-20] 18  SpO2:  [85 %-100 %] 95 %  O2 Device: Nasal cannula  Nasal Cannula O2 Flow Rate (L/min):  [2 L/min-3 L/min] 3 L/min    Body mass index is 22.76 kg/m².     Input and Output Summary (last 24 hours):     Intake/Output Summary (Last 24 hours) at 2/20/2025 1352  Last data filed at 2/20/2025 1112  Gross per 24 hour   Intake 480 ml   Output 640 ml   Net -160 ml       Physical Exam  Vitals and nursing note reviewed.   Constitutional:       General: He is not in acute distress.     Appearance: He is well-developed.   HENT:      Head: Normocephalic and atraumatic.   Eyes:      Conjunctiva/sclera: Conjunctivae normal.   Cardiovascular:      Rate and Rhythm: Normal rate and regular rhythm.      Heart sounds: No murmur heard.  Pulmonary:      Effort: Pulmonary effort is normal. No respiratory distress.      Breath sounds: Normal breath sounds.   Abdominal:      Palpations: Abdomen is soft.      Tenderness: There is no abdominal tenderness.   Musculoskeletal:         General: No swelling.      Cervical back: Neck supple.      Comments: Left ankle deformity with left foot edema   Skin:     General: Skin is warm and dry.      Capillary Refill: Capillary refill takes less than 2 seconds.      Comments: Scattered well-healing abrasions  Mild redness in sacral region   Neurological:      Mental Status: He is alert.   Psychiatric:         Mood and Affect: Mood normal.           Lines/Drains:              Lab Results: I have reviewed the following results:   Results from last 7 days   Lab Units 02/20/25  0613 02/17/25  1352 02/17/25  0844   WBC Thousand/uL 6.38   < > 4.82   HEMOGLOBIN g/dL 8.0*   < > 7.9*   HEMATOCRIT % 26.4*   < > 24.5*   PLATELETS Thousands/uL 172   < > 141*   BANDS PCT %  --   --  1   SEGS PCT % 78*   < >  --    LYMPHO PCT % 13*   < > 14    MONO PCT % 8   < > 4   EOS PCT % 0   < > 0    < > = values in this interval not displayed.     Results from last 7 days   Lab Units 02/20/25  0613   SODIUM mmol/L 139   POTASSIUM mmol/L 4.7   CHLORIDE mmol/L 105   CO2 mmol/L 30   BUN mg/dL 15   CREATININE mg/dL 0.91   ANION GAP mmol/L 4   CALCIUM mg/dL 7.9*   ALBUMIN g/dL 2.3*   TOTAL BILIRUBIN mg/dL 0.58   ALK PHOS U/L 54   ALT U/L <3*   AST U/L 21   GLUCOSE RANDOM mg/dL 81     Results from last 7 days   Lab Units 02/17/25  1359   INR  1.39*         Results from last 7 days   Lab Units 02/16/25  0608   HEMOGLOBIN A1C % 5.8*           Recent Cultures (last 7 days):   Results from last 7 days   Lab Units 02/14/25  0325   BLOOD CULTURE  No Growth After 5 Days.  No Growth After 5 Days.       XR chest portable  Result Date: 2/17/2025  Impression: Limited study. There appears to be some left basilar atelectasis. There is some central vascular prominence in the hilar regions. Workstation performed: [id number]     CTA head and neck w wo contrast  Result Date: 2/15/2025  Impression: CT Brain: - Known small multifocal infarcts in bilateral cerebral hemispheres and right cerebellum were better evaluated on MRI brain dated 2/14/2025, likely embolic. No new CT signs of acute infarction. - Unchanged chronic lacunar infarct in right basal ganglia with moderate chronic microangiopathy. CT Angiography: - Negative CTA head and neck for large vessel occlusion, dissection, aneurysm, or high-grade stenosis. - Mild atherosclerotic disease of the head and neck, as detailed above. Multiple pulmonary nodules measuring up to 0.9 cm in left upper lobe, unchanged. Based on current Fleischner Society 2017 Guidelines on incidental pulmonary nodule, follow-up non-contrast CT is recommended at 3-6 months from the initial examination and, if stable at that time, an additional follow-up is recommended for 18-24 months from the initial examination. 4.1 cm ectasia of ascending thoracic aorta.  "Recommend follow-up CT chest in 12 months. Additional chronic/incidental findings as detailed above. The study was marked in EPIC for immediate notification. Workstation performed: [id number]     MRI brain w wo contrast  Result Date: 2/15/2025  Impression: Multiple tiny recent infarcts scattered in multiple vascular distributions that are likely embolic. Several microhemorrhages associated with recent infarcts. No acute space-occupying hematoma. Chronic ischemic changes. The study was marked in EPIC for immediate notification. Workstation performed: [id number]     MRI cervical spine w wo contrast  Result Date: 2/14/2025  Impression: Cervical degenerative change with mild canal stenosis and mild to moderate foraminal narrowing. No cord compression or abnormal cord signal. Mild nonspecific edema within the right posterior paraspinal musculature of the upper cervical spine seen on sagittal STIR imaging with mild enhancement. Minimal peripheral enhancement is noted and differential considerations would include infectious/inflammatory myositis with soft tissue abscess. No discitis or osteomyelitis. This examination was marked \"immediate notification\" in Epic in order to begin the standard process by which the radiology reading room liaison alerts the referring practitioner. Workstation performed: [id number]     CT head w wo contrast  Result Date: 2/13/2025  Impression: No acute hemorrhage, edema, or mass effect. Chronic microangiopathic changes and chronic appearing infarctions as above. No pathologic enhancement. Workstation performed: [id number]     MRI lumbar spine w wo contrast  Result Date: 2/13/2025  Impression: Mild inferior plate edema at L1 with adjacent small Schmorl's node and minimal adjacent postcontrast enhancement. Vacuum disc phenomenon noted on the recent CT at this level. Findings likely represent degenerative disc disease with Schmorl's node rather than discitis and osteomyelitis which should only be " "considered in the right clinical setting. No paraspinal edema or enhancement identified. No evidence of abscess or abnormal enhancement in the paraspinal soft tissues. 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 Resident: KEI Wallace I, the attending radiologist, have reviewed the images and agree with the final report above. Workstation performed: [id number]     US kidney and bladder  Result Date: 2/11/2025  Impression: No hydronephrosis. Moderate bladder distention. Perinephric edema on the left. Findings were discussed with Dr. Peralta at 7:25 p.m. via secure text Workstation performed: [id number]     CTA chest pe study  Result Date: 2/11/2025  Impression: No evidence of pulmonary embolus. Stable ectasia of the ascending thoracic aorta measuring up to 41 mm. Mild secretions in the lower trachea, correlate for any dysphagia or possible aspiration. Noncalcified left lung apex pulmonary nodule, 6 x 5 mm. Because this was not present on prior chest CT of December 4, 2023, conservative management with follow-up low radiation dose noncontrast chest CT in 6 months is recommended. This examination was marked \"immediate notification\" in Epic in order to begin the standard process by which the radiology reading room liaison alerts the referring practitioner. Resident: Bijan Fajardo I, the attending radiologist, have reviewed the images and agree with the final report above. Workstation performed: [id number]     XR chest 1 view portable  Result Date: 2/11/2025  Impression: No acute cardiopulmonary disease. Workstation performed: [id number]       No Chest XR results available for this patient.     Other Study Results Review: No additional pertinent studies reviewed.    Last 24 Hours Medication List:     Current Facility-Administered Medications:     acetaminophen (TYLENOL) tablet 650 mg, Q6H PRN    albuterol (PROVENTIL HFA,VENTOLIN HFA) inhaler 2 puff, Q4H PRN    aspirin chewable tablet 81 mg, " Daily    atorvastatin (LIPITOR) tablet 80 mg, Daily With Dinner    barium sulfate tablet 1 tablet, Once in imaging    barium sulfate tablet 1 tablet, Once in imaging    bisacodyl (DULCOLAX) EC tablet 10 mg, Once    bisacodyl (DULCOLAX) rectal suppository 10 mg, Daily PRN    ceFAZolin (ANCEF) IVPB (premix in dextrose) 2,000 mg 50 mL, Q8H, Last Rate: 2,000 mg (02/20/25 0844)    docusate sodium (COLACE) capsule 100 mg, BID    fluticasone (ARNUITY ELLIPTA) 100 MCG/ACT inhaler 1 puff, Daily    guaiFENesin (MUCINEX) 12 hr tablet 1,200 mg, BID    hydroxychloroquine (PLAQUENIL) tablet 400 mg, Daily With Breakfast    ipratropium (ATROVENT) 0.02 % inhalation solution 0.5 mg, TID    levalbuterol (XOPENEX) inhalation solution 1.25 mg, TID    lidocaine (LIDODERM) 5 % patch 1 patch, Daily    lidocaine (LIDODERM) 5 % patch 1 patch, Daily    lidocaine (LIDODERM) 5 % patch 1 patch, Daily    [Held by provider] losartan (COZAAR) tablet 25 mg, Daily    methocarbamol (ROBAXIN) tablet 500 mg, Q8H GALE    metoprolol tartrate (LOPRESSOR) tablet 25 mg, Q12H GALE    pantoprazole (PROTONIX) injection 40 mg, Q12H GALE    polyethylene glycol (MIRALAX) packet 17 g, Daily PRN    senna (SENOKOT) tablet 8.6 mg, HS PRN    sertraline (ZOLOFT) tablet 12.5 mg, Daily    sucralfate (CARAFATE) tablet 1 g, 4x Daily (AC & HS)    tamsulosin (FLOMAX) capsule 0.4 mg, Daily With Dinner    Administrative Statements   Today, Patient Was Seen By: Mal Neely DO      **Please Note: This note may have been constructed using a voice recognition system.**

## 2025-02-20 NOTE — ANESTHESIA POSTPROCEDURE EVALUATION
Post-Op Assessment Note    CV Status:  Stable    Pain management: adequate       Mental Status:  Sleepy   Hydration Status:  Euvolemic   PONV Controlled:  Controlled   Airway Patency:  Patent     Post Op Vitals Reviewed: Yes    No anethesia notable event occurred.    Staff: CRNA           Last Filed PACU Vitals:  Vitals Value Taken Time   Temp     Pulse 69    BP 97/52    Resp 14    SpO2 98

## 2025-02-20 NOTE — PROGRESS NOTES
Progress Note - Infectious Disease   Name: Devin Chaves 79 y.o. male I MRN: [id number]  Unit/Bed#: S -01 I Date of Admission: 2/11/2025   Date of Service: 2/20/2025 I Hospital Day: 9    Assessment & Plan  Sepsis (HCC)  E/b tachycardia, tachypnea and hypotension with WBC 12 K.  Patient is clinically much improved.  WBC has normalized.  SBP is now normal  -antibiotics as below  -follow-up final cultures  -monitor temperature and hemodynamics  -serial exam  -additional inventions pending clinical course  -monitoring serial CBC and BMP for treatment response and any developing toxicities     MSSA bacteremia  Admission Blood cultures labeled same arm/same time are + for MSSA. 2/12/25 TTE negative for vegetation. No PPM, intravascular devices. Acute posterior neck pain predominant starting 2/14/25 and possible deep cervical infection evident on MRI C spine.  Bacteremia clearing  -continues Cefazolin 2 g IV q 8 hours  -follow-up repeat blood cultures  -can have PICC placed anytime   -anticipate protracted IV antibiotic course as below    Neck pain  Acute posterior neck pain predominant starting 2/14/25 2/14/25 MRI C spine: cervical degenerative change. Mild nonspecific edema within the right posterior paraspinal musculature of the upper cervical spine with mild enhancement. 2 separate foci of linear nonenhancement may represent peripherally enhancing fluid collections. No discitis or osteomyelitis.  ESR is only mildly elevated at 26 (2/14) but  (2/16) is highly elevated.  Patient will need long-term IV antibiotic and monitoring of ESR/CRP on treatment.  He also needs repeat MRI.  -continues cefazolin as above  -Treat x 6-week from blood culture clearance to 3/27/25    -serial exam  -Monitor ESR/CRP.  If ESR/CRP remain elevated at the end of IV antibiotic course, patient will need to be transitioned to p.o. antibiotic and remain on it until inflammatory markers are normal.  -Repeat C-spine MRI in 4 to 6  "weeks.  -pain management per primary   -follow up with ID outpatient on 3/3/25 at 12:30 pm - Chastity attending  Back pain  Patient reports chronic back pain has been worse due to \"inactivity\" and thinks worsened around the time of the catheterization. 2/13/25 MRI L spine: mild inferior plate edema at L1 with adjacent small Schmorl's node and minimal adjacent postcontrast enhancement. More c/w DDD. No paraspinal edema.   -continues antibiotic as above  -serial exam  -pain management per primary   Acute urinary retention  Renal US: moderate bladder distention, no hydronephrosis, perinephric edema left. 2/11/25 s/p straight cath for 700 mL. U/A benign. No urinary symptoms other than hesitancy   -monitor urinary output/symptoms  -additional inventions pending retention course     COPD (chronic obstructive pulmonary disease) ([facility])  2/11/25 CXR no infiltrates, CTA chest: no PE, mild secretions in lower trachea. Back to baseline 2L NCO2  Urinary antigens, Flu/COVID screen negative  -monitor respiratory status  -respiratory support prn  -monitor O2 requirements  -serial exam  -aspiration precautions  -additional inventions pending clinical course     Atrial fibrillation ([facility])  On Eliquis    Stroke ([facility])  Patient's head CT and MRI showed multiple small bilateral hemispheric infarcts, suggestive of septic emboli.  TTE did not show obvious vegetation but infective endocarditis is possible.  However, patient will be on long-term IV antibiotic for C-spine infection.  As long as bacteremia clears, CATHI will not change treatment plan, therefore, it does not need to be done.  -IV cefazolin as in above  -Follow-up on repeat blood culture    Discussed with patient and family in detail regarding the above plan.  I have discussed with Dr. Conroy's from primary service regarding the above plan to continue IV antibiotics and arranging for outpatient infusion. They agree with the plan.    Antibiotics:  Cefazolin D7 from blood culture " clearance    Subjective   Patient's neck pain about the same.  Low back pain is improved  No headache  Temperature stays down.  No chills.  He is tolerating antibiotic well.  No nausea, vomiting. + soft stool on liquid diet. + EGD/Colonoscopy today    Objective :  Temp:  [97.2 °F (36.2 °C)-98.3 °F (36.8 °C)] 98.3 °F (36.8 °C)  HR:  [69-90] 70  BP: ()/(43-63) 93/52  Resp:  [17-20] 20  SpO2:  [85 %-100 %] 100 %  O2 Device: Nasal cannula  Nasal Cannula O2 Flow Rate (L/min):  [2 L/min-3 L/min] 3 L/min    General:  79 year old chronically debilitated male, No acute distress, on commode bedside and pivoted to chair.  Psychiatric:  Awake and alert  Neck: Mild tenderness to palpation at midline.  Keeps neck in flexion position. No mass.  No erythema. Lidoderm patch off  Pulmonary:  Normal respiratory excursion at baseline with short statements and accessory muscle use, statting 95% on 3L NCO2 at present  Abdomen:  Soft, nontender  Extremities:  No edema  Skin:  No rashes, left arm IV site nontender, + ecchmyoses      Lab Results: I have reviewed the following results:  Results from last 7 days   Lab Units 02/20/25  0613 02/19/25  0950 02/19/25  0553 02/18/25  1603 02/18/25  0509   WBC Thousand/uL 6.38  --  5.91  --  5.76   HEMOGLOBIN g/dL 8.0* 8.6* 7.3*   < > 8.5*   PLATELETS Thousands/uL 172  --  145*  --  160    < > = values in this interval not displayed.     Results from last 7 days   Lab Units 02/20/25  0613 02/19/25  0553 02/18/25  0509 02/17/25  0844 02/16/25  0837 02/15/25  0459 02/14/25  0337   SODIUM mmol/L 139 137 138   < > 137   < > 138   POTASSIUM mmol/L 4.7 3.6 3.8   < > 3.6   < > 4.2   CHLORIDE mmol/L 105 103 102   < > 104   < > 106   CO2 mmol/L 30 30 31   < > 30   < > 27   BUN mg/dL 15 16 17   < > 21   < > 38*   CREATININE mg/dL 0.91 0.88 0.89   < > 0.91   < > 1.08   EGFR ml/min/1.73sq m 81 82 82   < > 81   < > 65   CALCIUM mg/dL 7.9* 7.8* 8.2*   < > 8.2*   < > 8.2*   AST U/L 21  --   --   --  20  --   23   ALT U/L <3*  --   --   --  3*  --  17   ALK PHOS U/L 54  --   --   --  51  --  49   ALBUMIN g/dL 2.3*  --   --   --  2.6*  --  2.6*    < > = values in this interval not displayed.     Results from last 7 days   Lab Units 02/14/25  0325   BLOOD CULTURE  No Growth After 5 Days.  No Growth After 5 Days.           Results from last 7 days   Lab Units 02/16/25  0051   CRP mg/L 106.8*     Results from last 7 days   Lab Units 02/14/25  0337   FERRITIN ng/mL 147           Imaging Results Review: I personally reviewed the following image studies in PACS and associated radiology reports: procedure reports. My interpretation of the radiology images/reports is: 2/20/25 colonoscopy: 2.5 cm colonic mass sent for multiple bxs.

## 2025-02-20 NOTE — CASE MANAGEMENT
Case Management Discharge Planning Note    Patient name Devin Chaves  Location S /S -01 MRN [id number]  : 1945 Date 2025       Current Admission Date: 2025  Current Admission Diagnosis:MSSA bacteremia   Patient Active Problem List    Diagnosis Date Noted Date Diagnosed    Stroke (HCC) 02/15/2025     Neck pain 2025     Anemia 2025     MSSA bacteremia 2025     Sepsis (HCC) 2025     Acute urinary retention 2025     Hypotension 2025     Constipation 2025     Atrial fibrillation (HCC) 2025     Hip region mass, unspecified laterality 2023     Chronic hypoxic respiratory failure ([facility]) 2023     Fracture of multiple ribs of right side 2023     Rheumatoid arthritis, unspecified ([facility]) 2023     Moderate protein-calorie malnutrition ([facility]) 2023     Hypertension 2023     CAD (coronary artery disease) 2023     COPD (chronic obstructive pulmonary disease) ([facility]) 2023     JAZMINE (obstructive sleep apnea) 2023     Back pain 2023     COVID-19 2023     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  [address]  [facility] [address]  Phone: [phone] Fax: [phone]    [email or web address] - [place] - [address]  [address]  [place] [address]  Phone: [phone] Fax: [phone]    [place] [facility] - [place] - [address]  [address]  [place] [address]  Phone: [phone] Fax: [phone]    Primary Care  Provider: Oswaldo Muniz DO    Primary Insurance: [employer] [facility] [employer] [facility]  Secondary Insurance: [facility] REP    DISCHARGE DETAILS:       Additional Comments: Pt and his wife have slected [facility] for[facility]- reserved in [place].

## 2025-02-20 NOTE — ASSESSMENT & PLAN NOTE
MAP as low as 62 in the ED per automated cuff  Home Htn meds: Metoprolol tartrate 25 mg BID, Losartan 25 mg QD  Losartan recent decreased from 50 mg due to concerns for hypotension  Blood Pressure: 117/57  Patient with soft pressures today    Plan:  Monitor VS including BP.   If hypotension recurs, confirm via manual, assess symptoms; suggest fluid bolus/albumin and discussion w crit care.  Continue home metoprolol tartrate 25 mg BID  Hold losartan 25 mg QD  IVF hydration with isolyte @ 100 cc/hr for 10 hrs

## 2025-02-20 NOTE — PHYSICAL THERAPY NOTE
Physical Therapy Cancellation Note     02/20/25 1123   Note Type   Note Type Cancelled Session   Cancel Reasons Patient to operating room   Assessment   Assessment attempted to see pt for PT intervention but Maggi WALLACE reports pt is off the floor at colonoscopy. will follow and continue PT as medically appropriate and schedule allows.     Gonzalo Mcgraw, PT

## 2025-02-20 NOTE — OCCUPATIONAL THERAPY NOTE
Occupational Therapy Cancel Note     Patient Name: Devin Chaves  Today's Date: 2/20/2025  Problem List  Principal Problem:    MSSA bacteremia  Active Problems:    COPD (chronic obstructive pulmonary disease) (HCC)    Back pain    Atrial fibrillation (HCC)    Sepsis (HCC)    Acute urinary retention    Hypotension    Constipation    Anemia    Neck pain    Stroke (HCC)       02/20/25 1125   Note Type   Note Type Cancelled Session  (Thursday 2/20/25)   Cancel Reasons Patient off floor/test  (Will continue to follow)         Zahida Cuba, OTR/L  [id number]  [id number]

## 2025-02-20 NOTE — ASSESSMENT & PLAN NOTE
- Thought to be 2/2 skin source, ?cervical spine infection. ID following. Treating as early discitis/osteo  - 6 weeks IV abx per ID

## 2025-02-20 NOTE — ANESTHESIA POSTPROCEDURE EVALUATION
Post-Op Assessment Note    CV Status:  Stable    Pain management: adequate       Mental Status:  Alert and awake   Hydration Status:  Euvolemic   PONV Controlled:  Controlled   Airway Patency:  Patent     Post Op Vitals Reviewed: Yes    No anethesia notable event occurred.    Staff: Anesthesiologist           Last Filed PACU Vitals:  Vitals Value Taken Time   Temp 98.3 °F (36.8 °C) 02/20/25 1226   Pulse 73 02/20/25 1321   /47 02/20/25 1319   Resp 18 02/20/25 1302   SpO2 90 % 02/20/25 1321   Vitals shown include unfiled device data.    Modified Devyn:     Vitals Value Taken Time   Activity 2 02/20/25 1247   Respiration 2 02/20/25 1247   Circulation 2 02/20/25 1247   Consciousness 2 02/20/25 1247   Oxygen Saturation 1 02/20/25 1247     Modified Devyn Score: 9

## 2025-02-20 NOTE — ASSESSMENT & PLAN NOTE
Blood cultures with Gram + cocci, likely Staph Aureus  TTE negative for vegetations  Patient mentioned small laceration from haircut at [facility] with pustule formation, possible source at there is myositis to that area  MRI C-spine: Mild nonspecific edema within the right posterior paraspinal musculature of the upper cervical spine. Minimal peripheral enhancement is noted and differential considerations would include infectious/inflammatory myositis with soft tissue abscess. No osteomyelitis or discitis. No cord compression or abnormal cord signal.  MRI Brain shows possible multifocal infarction    Plan:  Continue cefazolin 2 g IV q8 hrs  Given myositis and possible abscess appreicated on MRI C-spine will require prolonged IV antibiotic course  Consent for PICC line obtained, will discuss placing on 2/21/2025  ID consulted, appreciate recs  Follow repeat blood cultures, NGTD

## 2025-02-20 NOTE — PLAN OF CARE
Problem: Prexisting or High Potential for Compromised Skin Integrity  Goal: Skin integrity is maintained or improved  Description: INTERVENTIONS:  - Identify patients at risk for skin breakdown  - Assess and monitor skin integrity  - Assess and monitor nutrition and hydration status  - Monitor labs   - Assess for incontinence   - Turn and reposition patient  - Assist with mobility/ambulation  - Relieve pressure over bony prominences  - Avoid friction and shearing  - Provide appropriate hygiene as needed including keeping skin clean and dry  - Evaluate need for skin moisturizer/barrier cream  - Collaborate with interdisciplinary team   - Patient/family teaching  - Consider wound care consult   Outcome: Progressing     Problem: PAIN - ADULT  Goal: Verbalizes/displays adequate comfort level or baseline comfort level  Description: Interventions:  - Encourage patient to monitor pain and request assistance  - Assess pain using appropriate pain scale  - Administer analgesics based on type and severity of pain and evaluate response  - Implement non-pharmacological measures as appropriate and evaluate response  - Consider cultural and social influences on pain and pain management  - Notify physician/advanced practitioner if interventions unsuccessful or patient reports new pain  Outcome: Progressing     Problem: INFECTION - ADULT  Goal: Absence or prevention of progression during hospitalization  Description: INTERVENTIONS:  - Assess and monitor for signs and symptoms of infection  - Monitor lab/diagnostic results  - Monitor all insertion sites, i.e. indwelling lines, tubes, and drains  - Monitor endotracheal if appropriate and nasal secretions for changes in amount and color  - [place] appropriate cooling/warming therapies per order  - Administer medications as ordered  - Instruct and encourage patient and family to use good hand hygiene technique  - Identify and instruct in appropriate isolation precautions for  identified infection/condition  Outcome: Progressing  Goal: Absence of fever/infection during neutropenic period  Description: INTERVENTIONS:  - Monitor WBC    Outcome: Progressing     Problem: SAFETY ADULT  Goal: Patient will remain free of falls  Description: INTERVENTIONS:  - Educate patient/family on patient safety including physical limitations  - Instruct patient to call for assistance with activity   - Consult OT/PT to assist with strengthening/mobility   - Keep Call bell within reach  - Keep bed low and locked with side rails adjusted as appropriate  - Keep care items and personal belongings within reach  - Initiate and maintain comfort rounds  - Make Fall Risk Sign visible to staff  - Apply yellow socks and bracelet for high fall risk patients  - Consider moving patient to room near nurses station  Outcome: Progressing  Goal: Maintain or return to baseline ADL function  Description: INTERVENTIONS:  -  Assess patient's ability to carry out ADLs; assess patient's baseline for ADL function and identify physical deficits which impact ability to perform ADLs (bathing, care of mouth/teeth, toileting, grooming, dressing, etc.)  - Assess/evaluate cause of self-care deficits   - Assess range of motion  - Assess patient's mobility; develop plan if impaired  - Assess patient's need for assistive devices and provide as appropriate  - Encourage maximum independence but intervene and supervise when necessary  - Involve family in performance of ADLs  - Assess for home care needs following discharge   - Consider OT consult to assist with ADL evaluation and planning for discharge  - Provide patient education as appropriate  Outcome: Progressing  Goal: Maintains/Returns to pre admission functional level  Description: INTERVENTIONS:  - Perform AM-PAC 6 Click Basic Mobility/ Daily Activity assessment daily.  - Set and communicate daily mobility goal to care team and patient/family/caregiver.   - Collaborate with rehabilitation  services on mobility goals if consulted  - Out of bed for toileting  - Record patient progress and toleration of activity level   Outcome: Progressing     Problem: DISCHARGE PLANNING  Goal: Discharge to home or other facility with appropriate resources  Description: INTERVENTIONS:  - Identify barriers to discharge w/patient and caregiver  - Arrange for needed discharge resources and transportation as appropriate  - Identify discharge learning needs (meds, wound care, etc.)  - Arrange for interpretive services to assist at discharge as needed  - Refer to Case Management Department for coordinating discharge planning if the patient needs post-hospital services based on physician/advanced practitioner order or complex needs related to functional status, cognitive ability, or social support system  Outcome: Progressing     Problem: Knowledge Deficit  Goal: Patient/family/caregiver demonstrates understanding of disease process, treatment plan, medications, and discharge instructions  Description: Complete learning assessment and assess knowledge base.  Interventions:  - Provide teaching at level of understanding  - Provide teaching via preferred learning methods  Outcome: Progressing     Problem: Nutrition/Hydration-ADULT  Goal: Nutrient/Hydration intake appropriate for improving, restoring or maintaining nutritional needs  Description: Monitor and assess patient's nutrition/hydration status for malnutrition. Collaborate with interdisciplinary team and initiate plan and interventions as ordered.  Monitor patient's weight and dietary intake as ordered or per policy. Utilize nutrition screening tool and intervene as necessary. Determine patient's food preferences and provide high-protein, high-caloric foods as appropriate.     INTERVENTIONS:  - Monitor oral intake, urinary output, labs, and treatment plans  - Assess nutrition and hydration status and recommend course of action  - Evaluate amount of meals eaten  - Assist  patient with eating if necessary   - Allow adequate time for meals  - Recommend/ encourage appropriate diets, oral nutritional supplements, and vitamin/mineral supplements  - Order, calculate, and assess calorie counts as needed  - Recommend, monitor, and adjust tube feedings and TPN/PPN based on assessed needs  - Assess need for intravenous fluids  - Provide specific nutrition/hydration education as appropriate  - Include patient/family/caregiver in decisions related to nutrition  Outcome: Progressing     Problem: Neurological Deficit  Goal: Neurological status is stable or improving  Description: Interventions:  - Monitor and assess patient's level of consciousness, motor function, sensory function, and level of assistance needed for ADLs.   - Monitor and report changes from baseline. Collaborate with interdisciplinary team to initiate plan and implement interventions as ordered.   - Provide and maintain a safe environment.  - Consider seizure precautions.  - Consider fall precautions.  - Consider aspiration precautions.  - Consider bleeding precautions.  Outcome: Progressing     Problem: Activity Intolerance/Impaired Mobility  Goal: Mobility/activity is maintained at optimum level for patient  Description: Interventions:  - Assess and monitor patient  barriers to mobility and need for assistive/adaptive devices.  - Assess patient's emotional response to limitations.  - Collaborate with interdisciplinary team and initiate plans and interventions as ordered.  - Encourage independent activity per ability.  - Maintain proper body alignment.  - Perform active/passive rom as tolerated/ordered.  - Plan activities to conserve energy.  - Turn patient as appropriate  Outcome: Progressing     Problem: Communication Impairment  Goal: Ability to express needs and understand communication  Description: Assess patient's communication skills and ability to understand information.  Patient will demonstrate use of effective  communication techniques, alternative methods of communication and understanding even if not able to speak.     - Encourage communication and provide alternate methods of communication as needed.  - Collaborate with case management/ for discharge needs.  - Include patient/family/caregiver in decisions related to communication.  Outcome: Progressing     Problem: Potential for Aspiration  Goal: Non-ventilated patient's risk of aspiration is minimized  Description: Assess and monitor vital signs, respiratory status, and labs (WBC).  Monitor for signs of aspiration (tachypnea, cough, rales, wheezing, cyanosis, fever).    - Assess and monitor patient's ability to swallow.  - Place patient up in chair to eat if possible.  - HOB up at 90 degrees to eat if unable to get patient up into chair.  - Supervise patient during oral intake.   - Instruct patient/ family to take small bites.  - Instruct patient/ family to take small single sips when taking liquids.  - Follow patient-specific strategies generated by speech pathologist.  Outcome: Progressing  Goal: Ventilated patient's risk of aspiration is minimized  Description: Assess and monitor vital signs, respiratory status, airway cuff pressure, and labs (WBC).  Monitor for signs of aspiration (tachypnea, cough, rales, wheezing, cyanosis, fever).    - Elevate head of bed 30 degrees if patient has tube feeding.  - Monitor tube feeding.  Outcome: Progressing     Problem: Nutrition  Goal: Nutrition/Hydration status is improving  Description: Monitor and assess patient's nutrition/hydration status for malnutrition (ex- brittle hair, bruises, dry skin, pale skin and conjunctiva, muscle wasting, smooth red tongue, and disorientation). Collaborate with interdisciplinary team and initiate plan and interventions as ordered.  Monitor patient's weight and dietary intake as ordered or per policy. Utilize nutrition screening tool and intervene per policy. Determine patient's  food preferences and provide high-protein, high-caloric foods as appropriate.     - Assist patient with eating.  - Allow adequate time for meals.  - Encourage patient to take dietary supplement as ordered.  - Collaborate with clinical nutritionist.  - Include patient/family/caregiver in decisions related to nutrition.  Outcome: Progressing

## 2025-02-20 NOTE — ASSESSMENT & PLAN NOTE
During colonoscopy on 2/20/25, small friable colonic mass found    Plan:  Pathology pending  CT CAP w contrast pending  CEA pending  F/u with GI outpatient

## 2025-02-20 NOTE — SPEECH THERAPY NOTE
Speech Language/Pathology    Speech/Language Pathology Cancel Note    Patient Name: Devin Chaves  Today's Date: 2/20/2025       Colonoscopy not done yesterday due to incomplete bowel prep, now scheduled for today.  Will follow up as able.       Cheri Strong MA CCC-SLP  Speech Pathologist  Available via "[employer]t

## 2025-02-20 NOTE — ANESTHESIA PREPROCEDURE EVALUATION
Procedure:  COLONOSCOPY  EGD    Relevant Problems   CARDIO   (+) Atrial fibrillation (HCC)   (+) CAD (coronary artery disease)   (+) Hypertension      HEMATOLOGY   (+) Anemia      MUSCULOSKELETAL   (+) Back pain   (+) Rheumatoid arthritis, unspecified (HCC)      NEURO/PSYCH   (+) Stroke (HCC)      PULMONARY   (+) COPD (chronic obstructive pulmonary disease) (HCC)   (+) Chronic hypoxic respiratory failure (HCC)   (+) JAZMINE (obstructive sleep apnea)      Neurology/Sleep   (+) History of stroke      Urinary   (+) Acute urinary retention      Cardiovascular/Peripheral Vascular   (+) Hypotension      Other   (+) MSSA bacteremia   (+) Sepsis ([facility])       Latest Reference Range & Units 02/20/25 06:13   WBC 4.31 - 10.16 Thousand/uL 6.38   RBC 3.88 - 5.62 Million/uL 2.80 (L)   Hemoglobin 12.0 - 17.0 g/dL 8.0 (L)   Hematocrit 36.5 - 49.3 % 26.4 (L)   MCV 82 - 98 fL 94   MCH 26.8 - 34.3 pg 28.6   MCHC 31.4 - 37.4 g/dL 30.3 (L)   RDW 11.6 - 15.1 % 17.2 (H)   Platelet Count 149 - 390 Thousands/uL 172   (L): Data is abnormally low  (H): Data is abnormally high    ECHO 2/12/25:      Left Ventricle: Left ventricle is not well visualized. Left ventricular cavity size is normal. Wall thickness is mildly increased. The left ventricular ejection fraction is 55%. Systolic function is normal. Diastolic function is mildly abnormal, consistent with grade I (abnormal) relaxation.    The following segments are akinetic: basal inferior and mid inferior.    All other segments are normal.    Right Ventricle: Right ventricle is not well visualized. Right ventricular cavity size is mildly dilated. Systolic function is mildly to moderately reduced.    Aortic Valve: The aortic valve is trileaflet. The leaflets are not thickened. The leaflets are moderately calcified. There is mildly reduced mobility. There is mild to moderate regurgitation. There is aortic valve sclerosis.    Mitral Valve: There is mild regurgitation.    Tricuspid Valve: There is mild  regurgitation.    Physical Exam    Airway    Mallampati score: III  TM Distance: >3 FB  Neck ROM: full     Dental        Cardiovascular      Pulmonary      Other Findings        Anesthesia Plan  ASA Score- 3     Anesthesia Type- IV sedation with anesthesia with ASA Monitors.         Additional Monitors:     Airway Plan:            Plan Factors-Exercise tolerance (METS): <4 METS.    Chart reviewed.   Existing labs reviewed. Patient summary reviewed.    Patient is not a current smoker.  Patient did not smoke on day of surgery.            Induction- intravenous.    Postoperative Plan-     Perioperative Resuscitation Plan - Level 1 - Full Code.       Informed Consent- Anesthetic plan and risks discussed with patient.  I personally reviewed this patient with the CRNA. Discussed and agreed on the Anesthesia Plan with the CRNA..      NPO Status:  Vitals Value Taken Time   Date of last liquid 02/20/25 02/20/25 1052   Time of last liquid 0845 02/20/25 1052   Date of last solid 02/17/25 02/20/25 1052   Time of last solid

## 2025-02-20 NOTE — ASSESSMENT & PLAN NOTE
Patient rate controlled since admission    Plan:  Plan repeat CTH today, if stable cleared to restart eliquis per neurology  Continue home metoprolol with hold parameters

## 2025-02-20 NOTE — CONSULTS
"Consultation - Colorectal   Name: Devin Chaves 79 y.o. male I MRN: [id number]  Unit/Bed#: S -01 I Date of Admission: 2/11/2025   Date of Service: 2/20/2025 I Hospital Day: 9   Inpatient consult to Colorectal Surgery  Consult performed by: Emy Smith PA-C  Consult ordered by: Maximino Conroy MD        Physician Requesting Evaluation: Maximino Conroy MD   Reason for Evaluation / Principal Problem: colon mass    Assessment & Plan  MSSA bacteremia  - Thought to be 2/2 skin source, ?cervical spine infection. ID following. Treating as early discitis/osteo  - 6 weeks IV abx per ID  COPD (chronic obstructive pulmonary disease) (HCC)  On 2.5-3 L at baseline per pt  On 3L here  Atrial fibrillation (HCC)  On Eliquis at home, held currently  Stroke (HCC)  2/14 MRI brain: Multiple tiny recent infarcts scattered in multiple vascular distributions that are likely embolic. Several microhemorrhages associated with recent infarcts. No acute space-occupying hematoma. Chronic ischemic changes  Neuro following: low suspicion for septic emboli. Psb 2/2 eliquis failure 2/2 acute infectious process  Colonic mass  - 2/20 colonoscopy: Proximal ascending colon-across from the ileocecal valve there is a 2.5 cm hard friable polypoid mass appears malignant status post multiple biopsies   - follow up biopsies  - CT CAP ordered, follow up  - f/u [facility]  - no acute surgical intervention warranted at this time. F/u with colorectal as outpt  - pt DNR/DNI. Did discuss if pt interested in pursuing surgical intervention and/or chemotherapy. Per pt, would like to wait for path and staging work up to discuss his \"chances\" prior to making a decision on pursuing treatment    Rest of care per primary  Colorectal service signing off.  Please contact the SecureChat role for the Colorectal service with any questions/concerns.    History of Present Illness   Devin Chaves is a 79 y.o. male PMH afib on Eliquis, COPD on 2.5-3L @ baseline, " "JAZMINE, RA on plaquenil, here for MSSA bacteremia thought to be 2/2 skin source, ? cervical spine infection. Found to have strokes while inpt. GI was consulted for acute on chronic anemia while inpt, requiring transfusions. He underwent EGD/colonoscopy 2/20 with findings of   \"Proximal ascending colon-across from the ileocecal valve there is a 2.5 cm hard friable polypoid mass appears malignant status post multiple biopsies.\"  For which colorectal surgery is being consulted.  Pt denies hx of bloody Bms, however during colonoscopy prep was noted to have dark red blood in stool. he denies any real complaints prior to admission. 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 denies any recent weight loss, abdominal pain, changes to bowel function (until he was admitted, then endorses constipation and decreased appetite while inpt). father had cancer but unsure what kind. denies personal hx of cancer.     Review of Systems   Constitutional:  Negative for activity change and appetite change.   Respiratory: Negative.     Cardiovascular: Negative.    Gastrointestinal:  Positive for blood in stool and constipation. Negative for abdominal pain, diarrhea, nausea and vomiting.   Genitourinary: Negative.    Psychiatric/Behavioral: Negative.     All other systems reviewed and are negative.    Medical History Review: I have reviewed the patient's PMH, PSH, Social History, Family History, Meds, and Allergies   Historical Information   Past Medical History:   Diagnosis Date    Atrial fibrillation (HCC)     COPD (chronic obstructive pulmonary disease) (HCC)     Crushing injury of finger, left     Infectious viral hepatitis      Past Surgical History:   Procedure Laterality Date    FL LUMBAR PUNCTURE DIAGNOSTIC  2/10/2022    ND OPEN TX PHALANGEAL SHAFT FRACTURE PROX/MIDDLE EA Left 1/25/2017    Procedure: ORIF LEFT SMALL FINGER FRACTURE;  Surgeon: Blake Badillo MD;  Location: " [facility];  Service: Orthopedics     Social History     Tobacco Use    Smoking status: Former    Smokeless tobacco: Former     Quit date: 4/1/2011   Substance and Sexual Activity    Alcohol use: No    Drug use: No    Sexual activity: Not on file     E-Cigarette/Vaping     E-Cigarette/Vaping Substances     History reviewed. No pertinent family history.  Social History     Tobacco Use    Smoking status: Former    Smokeless tobacco: Former     Quit date: 4/1/2011   Substance and Sexual Activity    Alcohol use: No    Drug use: No    Sexual activity: Not on file       Objective :  Temp:  [97.2 °F (36.2 °C)-98.3 °F (36.8 °C)] 98.3 °F (36.8 °C)  HR:  [65-90] 65  BP: ()/(51-63) 117/57  Resp:  [17-20] 18  SpO2:  [85 %-100 %] 95 %  O2 Device: Nasal cannula  Nasal Cannula O2 Flow Rate (L/min):  [2 L/min-3 L/min] 3 L/min      Physical Exam  Vitals and nursing note reviewed.   Constitutional:       General: He is not in acute distress.     Appearance: Normal appearance. He is normal weight. He is ill-appearing. He is not toxic-appearing or diaphoretic.   HENT:      Head: Normocephalic and atraumatic.   Cardiovascular:      Rate and Rhythm: Normal rate.   Pulmonary:      Effort: Pulmonary effort is normal. No respiratory distress.   Abdominal:      General: Abdomen is flat. There is no distension.      Palpations: Abdomen is soft.      Tenderness: There is no abdominal tenderness. There is no guarding or rebound.      Comments: Scar from prior open appy   Skin:     General: Skin is warm.      Capillary Refill: Capillary refill takes less than 2 seconds.   Neurological:      General: No focal deficit present.      Mental Status: He is alert and oriented to person, place, and time.   Psychiatric:         Mood and Affect: Mood normal.         Behavior: Behavior normal.         Lab Results: I have reviewed the following results:  Recent Labs     02/20/25  0613   WBC 6.38   HGB 8.0*   HCT 26.4*      SODIUM 139   K 4.7       CO2 30   BUN 15   CREATININE 0.91   GLUC 81   CAIONIZED 1.15   MG 2.1   AST 21   ALT <3*   ALB 2.3*   TBILI 0.58   ALKPHOS 54       Imaging Results Review: No pertinent imaging studies reviewed.  Other Study Results Review: Other studies reviewed include: colonoscopy    VTE Pharmacologic Prophylaxis: VTE covered by:    None     VTE Mechanical Prophylaxis: sequential compression device    Administrative Statements   I have spent a total time of 30 minutes in caring for this patient on the day of the visit/encounter including Diagnostic results, Risks and benefits of tx options, Instructions for management, Patient and family education, Importance of tx compliance, Impressions, Counseling / Coordination of care, Documenting in the medical record, Reviewing/placing orders in the medical record (including tests, medications, and/or procedures), Obtaining or reviewing history  , and Communicating with other healthcare professionals .

## 2025-02-20 NOTE — ASSESSMENT & PLAN NOTE
MRI brain: Multiple tiny recent infarcts scattered in multiple vascular distributions that are likely embolic. Several microhemorrhages associated with recent infarcts. No acute space-occupying hematoma. Chronic ischemic changes.  NCCTH 2/17: 3 mm focus of hyperdensity left superior frontal lobe as described unchanged from the prior study may represent a small 3 mm hemorrhage versus focus of mineralization.    Plan:  Plan to restart eliquis after repeat CTH if stable hemorrhage  Continue aspirin per neurology

## 2025-02-21 ENCOUNTER — APPOINTMENT (INPATIENT)
Dept: RADIOLOGY | Facility: HOSPITAL | Age: 80
DRG: 871 | End: 2025-02-21
Payer: COMMERCIAL

## 2025-02-21 PROBLEM — M21.962 LEFT ANKLE JOINT DEFORMITY: Status: ACTIVE | Noted: 2025-02-21

## 2025-02-21 LAB
ANION GAP SERPL CALCULATED.3IONS-SCNC: 5 MMOL/L (ref 4–13)
BASOPHILS # BLD AUTO: 0.02 THOUSANDS/ΜL (ref 0–0.1)
BASOPHILS NFR BLD AUTO: 0 % (ref 0–1)
BUN SERPL-MCNC: 17 MG/DL (ref 5–25)
CALCIUM SERPL-MCNC: 8 MG/DL (ref 8.4–10.2)
CEA SERPL-MCNC: 3.1 NG/ML (ref 0–3)
CHLORIDE SERPL-SCNC: 105 MMOL/L (ref 96–108)
CO2 SERPL-SCNC: 28 MMOL/L (ref 21–32)
CREAT SERPL-MCNC: 0.94 MG/DL (ref 0.6–1.3)
EOSINOPHIL # BLD AUTO: 0.02 THOUSAND/ΜL (ref 0–0.61)
EOSINOPHIL NFR BLD AUTO: 0 % (ref 0–6)
ERYTHROCYTE [DISTWIDTH] IN BLOOD BY AUTOMATED COUNT: 17.2 % (ref 11.6–15.1)
GFR SERPL CREATININE-BSD FRML MDRD: 77 ML/MIN/1.73SQ M
GLUCOSE SERPL-MCNC: 115 MG/DL (ref 65–140)
HCT VFR BLD AUTO: 26.6 % (ref 36.5–49.3)
HGB BLD-MCNC: 8.2 G/DL (ref 12–17)
IMM GRANULOCYTES # BLD AUTO: 0.05 THOUSAND/UL (ref 0–0.2)
IMM GRANULOCYTES NFR BLD AUTO: 1 % (ref 0–2)
LYMPHOCYTES # BLD AUTO: 0.67 THOUSANDS/ΜL (ref 0.6–4.47)
LYMPHOCYTES NFR BLD AUTO: 11 % (ref 14–44)
MAGNESIUM SERPL-MCNC: 2 MG/DL (ref 1.9–2.7)
MCH RBC QN AUTO: 28.9 PG (ref 26.8–34.3)
MCHC RBC AUTO-ENTMCNC: 30.8 G/DL (ref 31.4–37.4)
MCV RBC AUTO: 94 FL (ref 82–98)
MONOCYTES # BLD AUTO: 0.41 THOUSAND/ΜL (ref 0.17–1.22)
MONOCYTES NFR BLD AUTO: 7 % (ref 4–12)
NEUTROPHILS # BLD AUTO: 4.7 THOUSANDS/ΜL (ref 1.85–7.62)
NEUTS SEG NFR BLD AUTO: 81 % (ref 43–75)
NRBC BLD AUTO-RTO: 0 /100 WBCS
PLATELET # BLD AUTO: 147 THOUSANDS/UL (ref 149–390)
PMV BLD AUTO: 8.9 FL (ref 8.9–12.7)
POTASSIUM SERPL-SCNC: 3.6 MMOL/L (ref 3.5–5.3)
RBC # BLD AUTO: 2.84 MILLION/UL (ref 3.88–5.62)
SODIUM SERPL-SCNC: 138 MMOL/L (ref 135–147)
WBC # BLD AUTO: 5.87 THOUSAND/UL (ref 4.31–10.16)

## 2025-02-21 PROCEDURE — 99233 SBSQ HOSP IP/OBS HIGH 50: CPT

## 2025-02-21 PROCEDURE — 94640 AIRWAY INHALATION TREATMENT: CPT

## 2025-02-21 PROCEDURE — G0545 PR INHERENT VISIT TO INPT: HCPCS | Performed by: INTERNAL MEDICINE

## 2025-02-21 PROCEDURE — 94760 N-INVAS EAR/PLS OXIMETRY 1: CPT

## 2025-02-21 PROCEDURE — 99232 SBSQ HOSP IP/OBS MODERATE 35: CPT | Performed by: INTERNAL MEDICINE

## 2025-02-21 PROCEDURE — 97535 SELF CARE MNGMENT TRAINING: CPT

## 2025-02-21 PROCEDURE — 83735 ASSAY OF MAGNESIUM: CPT

## 2025-02-21 PROCEDURE — 99233 SBSQ HOSP IP/OBS HIGH 50: CPT | Performed by: INTERNAL MEDICINE

## 2025-02-21 PROCEDURE — 97530 THERAPEUTIC ACTIVITIES: CPT

## 2025-02-21 PROCEDURE — 92526 ORAL FUNCTION THERAPY: CPT

## 2025-02-21 PROCEDURE — 73610 X-RAY EXAM OF ANKLE: CPT

## 2025-02-21 PROCEDURE — 80048 BASIC METABOLIC PNL TOTAL CA: CPT

## 2025-02-21 PROCEDURE — 02HV33Z INSERTION OF INFUSION DEVICE INTO SUPERIOR VENA CAVA, PERCUTANEOUS APPROACH: ICD-10-PCS | Performed by: INTERNAL MEDICINE

## 2025-02-21 PROCEDURE — C1751 CATH, INF, PER/CENT/MIDLINE: HCPCS

## 2025-02-21 PROCEDURE — 97110 THERAPEUTIC EXERCISES: CPT

## 2025-02-21 PROCEDURE — 82378 CARCINOEMBRYONIC ANTIGEN: CPT

## 2025-02-21 PROCEDURE — 85025 COMPLETE CBC W/AUTO DIFF WBC: CPT

## 2025-02-21 RX ORDER — POTASSIUM CHLORIDE 1500 MG/1
40 TABLET, EXTENDED RELEASE ORAL ONCE
Status: COMPLETED | OUTPATIENT
Start: 2025-02-21 | End: 2025-02-21

## 2025-02-21 RX ORDER — KETOROLAC TROMETHAMINE 30 MG/ML
15 INJECTION, SOLUTION INTRAMUSCULAR; INTRAVENOUS ONCE
Status: COMPLETED | OUTPATIENT
Start: 2025-02-21 | End: 2025-02-21

## 2025-02-21 RX ORDER — LEVALBUTEROL INHALATION SOLUTION 1.25 MG/3ML
1.25 SOLUTION RESPIRATORY (INHALATION) EVERY 8 HOURS PRN
Status: DISCONTINUED | OUTPATIENT
Start: 2025-02-21 | End: 2025-02-22 | Stop reason: HOSPADM

## 2025-02-21 RX ADMIN — HYDROXYCHLOROQUINE SULFATE 400 MG: 200 TABLET ORAL at 05:52

## 2025-02-21 RX ADMIN — METOPROLOL TARTRATE 25 MG: 25 TABLET, FILM COATED ORAL at 09:45

## 2025-02-21 RX ADMIN — METHOCARBAMOL TABLETS 500 MG: 500 TABLET, COATED ORAL at 05:52

## 2025-02-21 RX ADMIN — TAMSULOSIN HYDROCHLORIDE 0.4 MG: 0.4 CAPSULE ORAL at 17:29

## 2025-02-21 RX ADMIN — ASPIRIN 81 MG CHEWABLE TABLET 81 MG: 81 TABLET CHEWABLE at 09:45

## 2025-02-21 RX ADMIN — IPRATROPIUM BROMIDE 0.5 MG: 0.5 SOLUTION RESPIRATORY (INHALATION) at 13:59

## 2025-02-21 RX ADMIN — LEVALBUTEROL HYDROCHLORIDE 1.25 MG: 1.25 SOLUTION RESPIRATORY (INHALATION) at 13:59

## 2025-02-21 RX ADMIN — PANTOPRAZOLE SODIUM 40 MG: 40 INJECTION, POWDER, FOR SOLUTION INTRAVENOUS at 20:24

## 2025-02-21 RX ADMIN — KETOROLAC TROMETHAMINE 15 MG: 30 INJECTION, SOLUTION INTRAMUSCULAR; INTRAVENOUS at 20:25

## 2025-02-21 RX ADMIN — GUAIFENESIN 1200 MG: 600 TABLET, EXTENDED RELEASE ORAL at 09:46

## 2025-02-21 RX ADMIN — LIDOCAINE 1 PATCH: 50 PATCH CUTANEOUS at 09:48

## 2025-02-21 RX ADMIN — DOCUSATE SODIUM 100 MG: 100 CAPSULE, LIQUID FILLED ORAL at 09:45

## 2025-02-21 RX ADMIN — LIDOCAINE 1 PATCH: 50 PATCH CUTANEOUS at 09:46

## 2025-02-21 RX ADMIN — SUCRALFATE 1 G: 1 TABLET ORAL at 10:53

## 2025-02-21 RX ADMIN — METOPROLOL TARTRATE 25 MG: 25 TABLET, FILM COATED ORAL at 20:24

## 2025-02-21 RX ADMIN — CEFAZOLIN SODIUM 2000 MG: 2 SOLUTION INTRAVENOUS at 09:46

## 2025-02-21 RX ADMIN — SUCRALFATE 1 G: 1 TABLET ORAL at 21:24

## 2025-02-21 RX ADMIN — PANTOPRAZOLE SODIUM 40 MG: 40 INJECTION, POWDER, FOR SOLUTION INTRAVENOUS at 09:46

## 2025-02-21 RX ADMIN — IPRATROPIUM BROMIDE 0.5 MG: 0.5 SOLUTION RESPIRATORY (INHALATION) at 08:48

## 2025-02-21 RX ADMIN — GUAIFENESIN 1200 MG: 600 TABLET, EXTENDED RELEASE ORAL at 17:29

## 2025-02-21 RX ADMIN — METHOCARBAMOL TABLETS 500 MG: 500 TABLET, COATED ORAL at 13:13

## 2025-02-21 RX ADMIN — CEFAZOLIN SODIUM 2000 MG: 2 SOLUTION INTRAVENOUS at 00:50

## 2025-02-21 RX ADMIN — SUCRALFATE 1 G: 1 TABLET ORAL at 05:52

## 2025-02-21 RX ADMIN — APIXABAN 5 MG: 5 TABLET, FILM COATED ORAL at 17:29

## 2025-02-21 RX ADMIN — FLUTICASONE FUROATE 1 PUFF: 100 POWDER RESPIRATORY (INHALATION) at 09:49

## 2025-02-21 RX ADMIN — SERTRALINE HYDROCHLORIDE 12.5 MG: 25 TABLET ORAL at 09:45

## 2025-02-21 RX ADMIN — APIXABAN 5 MG: 5 TABLET, FILM COATED ORAL at 10:53

## 2025-02-21 RX ADMIN — METHOCARBAMOL TABLETS 500 MG: 500 TABLET, COATED ORAL at 21:24

## 2025-02-21 RX ADMIN — CEFAZOLIN SODIUM 2000 MG: 2 SOLUTION INTRAVENOUS at 17:32

## 2025-02-21 RX ADMIN — SUCRALFATE 1 G: 1 TABLET ORAL at 17:29

## 2025-02-21 RX ADMIN — POTASSIUM CHLORIDE 40 MEQ: 1500 TABLET, EXTENDED RELEASE ORAL at 09:46

## 2025-02-21 RX ADMIN — LEVALBUTEROL HYDROCHLORIDE 1.25 MG: 1.25 SOLUTION RESPIRATORY (INHALATION) at 08:48

## 2025-02-21 RX ADMIN — ATORVASTATIN CALCIUM 80 MG: 40 TABLET, FILM COATED ORAL at 17:32

## 2025-02-21 NOTE — ASSESSMENT & PLAN NOTE
- P/w: blurry vision prompting MRI brain   - Imaging:   - MRI brain 2/14 showed L frontal lobe acute to early subacute infarct, multiple scattered punctate strokes in the bilateral frontal, right parietal, bilateral occipital, and right cerebellum with several microhemorrhages associated with recent infarcts with a few additional chronic microhemorrhages. Chronic RBG and L cerebellar lacunar infarct. L occipital lobe hemosiderin staining consistent with remote injury.   - CTH 2/20 - No acute intracranial abnormality. Chronic microangiopathic ch   - Neuro consulted and felt etiology of bihemispheric strokes in setting of MSSA bacteremia uncertain origin.  Patient only missed 1 dose of eliquis but felt eliquis failure possible due to acute infectious d/s process.      - Status and residual impairments:    Imbalance, deconditioning - most residual functional deficits likely related to non-stroke causes at this time   - Med/surgical management:   Given potential hemorrhage in inferior L frontal lobe and colonoscopy A/C was on hold    Eliquis 5mg BID resumed 2/21   Abx per ID   Statin   Optimal BP control   - Neuropsych Med review:    Robaxin 500mg TID   Zoloft 12.5mg qday    Oxy 2.5-5mg Q4H PRN    (Care with sedating meds)     - Continue PT, OT in acute setting to improve ADLs, mobility, strength, conditioning, and decrease risks of immobility as well as to assist with dispo recommendations   - Decrease risks of complications related to decreased mobility status and monitor for them during course  - MSK - atrophy, contractures, bone demineralization, CV - DVT/PE, orthostasis, decreased CO, Resp - atelectasis, PNA, GI - constipation, reduced appetite,  - retention, incontinence, Skin - pressure injuries  - Optimal bowel/bladder mgmt/hygiene - monitor for retention, incontinence, infection     - Turn patient Q2H, skin checks minimum Qshift  - ROM of major joints 2-3 times per day (if unable to do it on own)  -  Supportive counseling and updates to family (as appropriate)   - Fall precautions - if needed increase rounding or consider virtual sitter or in-person sitter  - Overall mgmt per primary team currently, recommend optimal mgmt during rehab process as well  - Remains at risk for increased confusion/delirium, restlessness, agitation, and fall - continue to monitor for concerns/changes  - Monitor neuro-exam, wakefulness, mood, cognition, insight into deficits and safety awareness   - Monitor and ensure optimal management electrolytes, nutrition, and hydration  - Monitor for signs or symptoms of infection, medication intolerances, other systemic etiologies  - Additional labs, imaging, specialist follow-up as needed per primary team currently   - Overstimulation precautions, frequent re-orientation, re-direction, re-assurance  - Optimal mood, pain, and sleep management  - If impaired sleep or behavior recommend sleep log and agitation monitoring    - Limit sedating medications when possible  - For routine restlessness, anxiety, irritability focus on non-pharmacologic management    - Hold benzo's with increased risk paradoxical reaction and possibility of limiting cognitive recovery    Devin Chaves was evaluated by PT, OT and now by PMR physician and found to have new and significant deficits in ADLs and mobility.      Prior Level of Function and Social history:    Patient was admitted to [facility] but prior to that lived in 2  with 4 [employer] with spouse.    Patient states he was progressing well ambulating in hallway with walker and chair follow and needed assist with ADLs at SNF    Current Level of Function:    2/21 PMR MD Ambulation few steps Mod-max assist   2/21 transfers mod assist; Total assist LBD, Min assist UBD; AM-PAC 2 toileting and bathing    2/17 - Transfers max assist; stood for 45 secs with RW mod-max x1  Not able to ambulate    Disposition recommendation:  SNF  - The patient is currently  too low level for ARC/IRF setting  - If functional/activity tolerance would significantly improve this recommendation could change; currently has too much neck and ankle pain and too much deconditioning to tolerate IRF

## 2025-02-21 NOTE — PLAN OF CARE
Problem: Prexisting or High Potential for Compromised Skin Integrity  Goal: Skin integrity is maintained or improved  Description: INTERVENTIONS:  - Identify patients at risk for skin breakdown  - Assess and monitor skin integrity  - Assess and monitor nutrition and hydration status  - Monitor labs   - Assess for incontinence   - Turn and reposition patient  - Assist with mobility/ambulation  - Relieve pressure over bony prominences  - Avoid friction and shearing  - Provide appropriate hygiene as needed including keeping skin clean and dry  - Evaluate need for skin moisturizer/barrier cream  - Collaborate with interdisciplinary team   - Patient/family teaching  - Consider wound care consult   Outcome: Progressing     Problem: PAIN - ADULT  Goal: Verbalizes/displays adequate comfort level or baseline comfort level  Description: Interventions:  - Encourage patient to monitor pain and request assistance  - Assess pain using appropriate pain scale  - Administer analgesics based on type and severity of pain and evaluate response  - Implement non-pharmacological measures as appropriate and evaluate response  - Consider cultural and social influences on pain and pain management  - Notify physician/advanced practitioner if interventions unsuccessful or patient reports new pain  Outcome: Progressing     Problem: INFECTION - ADULT  Goal: Absence or prevention of progression during hospitalization  Description: INTERVENTIONS:  - Assess and monitor for signs and symptoms of infection  - Monitor lab/diagnostic results  - Monitor all insertion sites, i.e. indwelling lines, tubes, and drains  - Monitor endotracheal if appropriate and nasal secretions for changes in amount and color  - Stanley appropriate cooling/warming therapies per order  - Administer medications as ordered  - Instruct and encourage patient and family to use good hand hygiene technique  - Identify and instruct in appropriate isolation precautions for  identified infection/condition  Outcome: Progressing  Goal: Absence of fever/infection during neutropenic period  Description: INTERVENTIONS:  - Monitor WBC    Outcome: Progressing     Problem: SAFETY ADULT  Goal: Patient will remain free of falls  Description: INTERVENTIONS:  - Educate patient/family on patient safety including physical limitations  - Instruct patient to call for assistance with activity   - Consult OT/PT to assist with strengthening/mobility   - Keep Call bell within reach  - Keep bed low and locked with side rails adjusted as appropriate  - Keep care items and personal belongings within reach  - Initiate and maintain comfort rounds  - Make Fall Risk Sign visible to staff  - Offer Toileting every  Hours, in advance of need  - Initiate/Maintain alarm  - Obtain necessary fall risk management equipment:   - Apply yellow socks and bracelet for high fall risk patients  - Consider moving patient to room near nurses station  Outcome: Progressing  Goal: Maintain or return to baseline ADL function  Description: INTERVENTIONS:  -  Assess patient's ability to carry out ADLs; assess patient's baseline for ADL function and identify physical deficits which impact ability to perform ADLs (bathing, care of mouth/teeth, toileting, grooming, dressing, etc.)  - Assess/evaluate cause of self-care deficits   - Assess range of motion  - Assess patient's mobility; develop plan if impaired  - Assess patient's need for assistive devices and provide as appropriate  - Encourage maximum independence but intervene and supervise when necessary  - Involve family in performance of ADLs  - Assess for home care needs following discharge   - Consider OT consult to assist with ADL evaluation and planning for discharge  - Provide patient education as appropriate  Outcome: Progressing     Problem: DISCHARGE PLANNING  Goal: Discharge to home or other facility with appropriate resources  Description: INTERVENTIONS:  - Identify barriers  to discharge w/patient and caregiver  - Arrange for needed discharge resources and transportation as appropriate  - Identify discharge learning needs (meds, wound care, etc.)  - Arrange for interpretive services to assist at discharge as needed  - Refer to Case Management Department for coordinating discharge planning if the patient needs post-hospital services based on physician/advanced practitioner order or complex needs related to functional status, cognitive ability, or social support system  Outcome: Progressing     Problem: Knowledge Deficit  Goal: Patient/family/caregiver demonstrates understanding of disease process, treatment plan, medications, and discharge instructions  Description: Complete learning assessment and assess knowledge base.  Interventions:  - Provide teaching at level of understanding  - Provide teaching via preferred learning methods  Outcome: Progressing     Problem: Nutrition/Hydration-ADULT  Goal: Nutrient/Hydration intake appropriate for improving, restoring or maintaining nutritional needs  Description: Monitor and assess patient's nutrition/hydration status for malnutrition. Collaborate with interdisciplinary team and initiate plan and interventions as ordered.  Monitor patient's weight and dietary intake as ordered or per policy. Utilize nutrition screening tool and intervene as necessary. Determine patient's food preferences and provide high-protein, high-caloric foods as appropriate.     INTERVENTIONS:  - Monitor oral intake, urinary output, labs, and treatment plans  - Assess nutrition and hydration status and recommend course of action  - Evaluate amount of meals eaten  - Assist patient with eating if necessary   - Allow adequate time for meals  - Recommend/ encourage appropriate diets, oral nutritional supplements, and vitamin/mineral supplements  - Order, calculate, and assess calorie counts as needed  - Recommend, monitor, and adjust tube feedings and TPN/PPN based on  assessed needs  - Assess need for intravenous fluids  - Provide specific nutrition/hydration education as appropriate  - Include patient/family/caregiver in decisions related to nutrition  Outcome: Progressing     Problem: Neurological Deficit  Goal: Neurological status is stable or improving  Description: Interventions:  - Monitor and assess patient's level of consciousness, motor function, sensory function, and level of assistance needed for ADLs.   - Monitor and report changes from baseline. Collaborate with interdisciplinary team to initiate plan and implement interventions as ordered.   - Provide and maintain a safe environment.  - Consider seizure precautions.  - Consider fall precautions.  - Consider aspiration precautions.  - Consider bleeding precautions.  Outcome: Progressing     Problem: Activity Intolerance/Impaired Mobility  Goal: Mobility/activity is maintained at optimum level for patient  Description: Interventions:  - Assess and monitor patient  barriers to mobility and need for assistive/adaptive devices.  - Assess patient's emotional response to limitations.  - Collaborate with interdisciplinary team and initiate plans and interventions as ordered.  - Encourage independent activity per ability.  - Maintain proper body alignment.  - Perform active/passive rom as tolerated/ordered.  - Plan activities to conserve energy.  - Turn patient as appropriate  Outcome: Progressing     Problem: Communication Impairment  Goal: Ability to express needs and understand communication  Description: Assess patient's communication skills and ability to understand information.  Patient will demonstrate use of effective communication techniques, alternative methods of communication and understanding even if not able to speak.     - Encourage communication and provide alternate methods of communication as needed.  - Collaborate with case management/ for discharge needs.  - Include patient/family/caregiver  in decisions related to communication.  Outcome: Progressing     Problem: Potential for Aspiration  Goal: Non-ventilated patient's risk of aspiration is minimized  Description: Assess and monitor vital signs, respiratory status, and labs (WBC).  Monitor for signs of aspiration (tachypnea, cough, rales, wheezing, cyanosis, fever).    - Assess and monitor patient's ability to swallow.  - Place patient up in chair to eat if possible.  - HOB up at 90 degrees to eat if unable to get patient up into chair.  - Supervise patient during oral intake.   - Instruct patient/ family to take small bites.  - Instruct patient/ family to take small single sips when taking liquids.  - Follow patient-specific strategies generated by speech pathologist.  Outcome: Progressing  Goal: Ventilated patient's risk of aspiration is minimized  Description: Assess and monitor vital signs, respiratory status, airway cuff pressure, and labs (WBC).  Monitor for signs of aspiration (tachypnea, cough, rales, wheezing, cyanosis, fever).    - Elevate head of bed 30 degrees if patient has tube feeding.  - Monitor tube feeding.  Outcome: Progressing     Problem: Nutrition  Goal: Nutrition/Hydration status is improving  Description: Monitor and assess patient's nutrition/hydration status for malnutrition (ex- brittle hair, bruises, dry skin, pale skin and conjunctiva, muscle wasting, smooth red tongue, and disorientation). Collaborate with interdisciplinary team and initiate plan and interventions as ordered.  Monitor patient's weight and dietary intake as ordered or per policy. Utilize nutrition screening tool and intervene per policy. Determine patient's food preferences and provide high-protein, high-caloric foods as appropriate.     - Assist patient with eating.  - Allow adequate time for meals.  - Encourage patient to take dietary supplement as ordered.  - Collaborate with clinical nutritionist.  - Include patient/family/caregiver in decisions related  to nutrition.  Outcome: Progressing

## 2025-02-21 NOTE — ASSESSMENT & PLAN NOTE
MAP as low as 62 in the ED per automated cuff  Home Htn meds: Metoprolol tartrate 25 mg BID, Losartan 25 mg QD  Losartan recent decreased from 50 mg due to concerns for hypotension  Blood Pressure: 110/52    Plan:  Monitor VS including BP.   If hypotension recurs, confirm via manual, assess symptoms; suggest fluid bolus/albumin and discussion w crit care.  Continue home metoprolol tartrate 25 mg BID  Hold losartan 25 mg QD - Will plan to conitnue to hold on discharge until f/u with PCP

## 2025-02-21 NOTE — SPEECH THERAPY NOTE
Speech Language/Pathology    Speech/Language Pathology Progress Note    Patient Name: Devin Chaves  Today's Date: 2/21/2025      Subjective:  Pt seen for dysphagia tx follow up on regular diet. EGD was unremarkable. Colonoscopy 2/20/25  showed Proximal ascending colon-across from the ileocecal valve there is a 2.5 cm hard friable polypoid mass appears malignant status post multiple biopsies.     Objective:  Pt sitting in chair, stated his appetite is coming back. Denied difficulty swallowing except for large pills, which sometimes get stuck and he needs to cough them up.   Pt took bites of PBJ sand and sips of cran juice. Mastication/manipulation was prolonged but effective. Good oral control w/ liquids. Swallows were timely and complete w/ no c/o food dysphagia or overt s/s aspiration.     Assessment:  Oral and pharyngeal swallowing skills appeared WNL     Plan/Recommendations:  Cont regular diet w/ thin liquids  Meds as tolerated, consider large pills in applesauce or pudding.   No further follow up needed.      Cheri Strong MA CCC-SLP  Speech Pathologist  Available via [employer]

## 2025-02-21 NOTE — ASSESSMENT & PLAN NOTE
- Chronic resp failure on chronic 2L of O2  (2-3L recently)   - Overall mgmt per IM/hospitalist service currently  - Optimal mgmt during rehab course   - Monitor lung exam, vitals with and without activity  - Encourage incentive spirometry  - Fluticasone, Atrovent, Xopenex

## 2025-02-21 NOTE — ASSESSMENT & PLAN NOTE
Chronic significant; was to consider ankle sx but other health issues have prevented this; uses lace-up brace at home   - Reports increased pain and difficulty Wb'ing  - Recommend L ankle XR and podiatry or ortho consult  - Pain mgmt, fall precautions in interim

## 2025-02-21 NOTE — PROGRESS NOTES
Progress Note - Hospitalist   Name: Devin Chaves 79 y.o. male I MRN: [id number]  Unit/Bed#: S -01 I Date of Admission: 2/11/2025   Date of Service: 2/21/2025 I Hospital Day: 10    Assessment & Plan  MSSA bacteremia  Blood cultures with Gram + cocci, likely Staph Aureus  TTE negative for vegetations  Patient mentioned small laceration from [facility]irc[facility] at [facility] with pustule formation, possible source at there is myositis to that area  MRI C-spine: Mild nonspecific edema within the right posterior paraspinal musculature of the upper cervical spine. Minimal peripheral enhancement is noted and differential considerations would include infectious/inflammatory myositis with soft tissue abscess. No osteomyelitis or discitis. No cord compression or abnormal cord signal.  MRI Brain shows possible multifocal infarction    Plan:  Continue cefazolin 2 g IV q8 hrs  Given myositis and possible abscess appreicated on MRI C-spine will require prolonged IV antibiotic course  PICC line placed for prolonged course of antibiotics   ID consulted, appreciate recs  Follow repeat blood cultures, NGTD  Will f/u outpatient   Anemia  Recent Labs     02/19/25  0950 02/20/25  0613 02/21/25  0558   HGB 8.6* 8.0* 8.2*     Lab Results   Component Value Date    IRON 37 (L) 02/14/2025    FERRITIN 147 02/14/2025    TIBC 166.6 (L) 02/14/2025    CONCFE 22 02/14/2025     B12 972, Folate 14  Stable    Plan:  Transfuse if <7   Patient required 2 units packed red blood cells overnight on 2/13  GI consult, appreciate recs  Colonoscopy on 2/20/25, small friable colonic mass found concerning for malignancy  Colorectal surgery consulted, will f/u outpatient  Stroke (HCC)  MRI brain: Multiple tiny recent infarcts scattered in multiple vascular distributions that are likely embolic. Several microhemorrhages associated with recent infarcts. No acute space-occupying hematoma. Chronic ischemic changes.  [facility]CTH 2/17: 3 mm focus of hyperdensity left superior frontal  "lobe as described unchanged from the prior study may represent a small 3 mm hemorrhage versus focus of mineralization.  NCCTH 2/20: Stable, no additional hemorrhage    Plan:  Eliquis 5 mg BID restart on 2/21/25  Monitor for bleeding  Continue aspirin per neurology  Sepsis ([facility])  Meeting SIRS criteria for tachycardia (HR ), tachypnea (RR 16-28), and leukocytosis (12.32)  The most recent MRI lumbar spine unremarkable  The most recent MRI C-spine shows possible soft tissue infection  The most recent MRI contrast of the brain shows possible multifocal embolic infarction with hemorrhagic conversion, most likely septic emboli s/p cefazolin IV.    Plan:  See \"Gram positive bacteremia\"  Hypotension  MAP as low as 62 in the ED per automated cuff  Home Htn meds: Metoprolol tartrate 25 mg BID, Losartan 25 mg QD  Losartan recent decreased from 50 mg due to concerns for hypotension  Blood Pressure: 110/52    Plan:  Monitor VS including BP.   If hypotension recurs, confirm via manual, assess symptoms; suggest fluid bolus/albumin and discussion w crit care.  Continue home metoprolol tartrate 25 mg BID  Hold losartan 25 mg QD - Will plan to conitnue to hold on discharge until f/u with PCP  COPD (chronic obstructive pulmonary disease) ([facility])  Pt w COPD on 2L supplemental O2 at baseline.  Currently requiring 3L, above baseline of 2L NC  TTE 2/12/25: EF 55%, Normal systolic dysfunction, G1DD, No vegetation, No mural thrombus, moderate aortic sclerosis    Plan:  Nebs TID  Titrate O2 to maintain SpO2 greater than 88%  Acute urinary retention    Suspect new urinary retention 2/2 BPH  US Kidney/Bladder 2/11/25: No hydronephrosis, moderate bladder distention, perinephric edema on L side.  Patient endorses improvement of retention with initiation of tamsulosin    Plan:  Tamsulosin 0.4 mg QD  Urinary retention protocol  Atrial fibrillation ([facility])  Patient rate controlled since admission    Plan:  Restarted Eliquis 5 mg BID  Continue home " metoprolol with hold parameters  Back pain  MRI L-Spine: Negative for acute pathology  MRI C-spine shows possible soft tissue infection status post IV cefazolin  ID on board    Plan:  Lidocaine patches  Robaxin 500 mg q8 hrs scheduled  Tylenol 650 mg q6 hrs prn for mild pain  Oxycodone 2.5/5 mg q4 hrs prn for mod/sev pain  Neck pain  The most recent MRI C-spine shows possible soft tissue infection in the background of bacteremia    Plan:  Lidocaine patch  Constipation  Patient with large BM with blood yesterday    Plan:  Colace BID  Miralax daily  Senna prn  Rheumatoid arthritis (HCC)  Home med: Hydroxychloroquine 400 mg QD    Plan:  Continue home hydroxychloroquine  Colonic mass  During colonoscopy on 2/20/25, small friable colonic mass found    Plan:  Pathology pending  CT CAP w contrast pending  CEA pending  F/u with GI outpatient  Left ankle joint deformity  PMR requesting orthopedic evaluation for chronic L ankle deformity to confirm patient has correct brace  Orthopedic surgery instructed to contact podiatry    Plan:  Podiatry consulted  Recommended outpatient follow-up given chronic condition  Will provide referral at discharge    VTE Pharmacologic Prophylaxis: VTE Score: 9 High Risk (Score >/= 5) - Pharmacological DVT Prophylaxis Ordered: apixaban (Eliquis). Sequential Compression Devices Ordered.    Mobility:   Basic Mobility Inpatient Raw Score: 10  JH-HLM Goal: 4: Move to chair/commode  JH-HLM Achieved: 4: Move to chair/commode  JH-HLM Goal achieved. Continue to encourage appropriate mobility.    Patient Centered Rounds: I performed bedside rounds with nursing staff today.   Discussions with Specialists or Other Care Team Provider: Colorectal surgery, gastroenterology    Education and Discussions with Family / Patient: Attempted to update  (wife and daughter) via phone. Left voicemail.     Current Length of Stay: 10 day(s)  Current Patient Status: Inpatient   Certification Statement: The  patient will continue to require additional inpatient hospital stay due to monitoring for bleeding following initiation of anticoagulation, continuation of IV antibiotics for MSSA bacteremia  Discharge Plan: Anticipate discharge later today or tomorrow to rehab facility.    Code Status: Level 3 - DNAR and DNI    Subjective   Patient seen and examined at bedside this morning, continues to complain of neck and left foot pain which he attributes to immobility.  Otherwise continues to deny any chest pain, shortness of breath, fever, notes that diarrhea has subsided and he is no longer having fecal incontinence.    Objective :  Temp:  [97.4 °F (36.3 °C)-98.7 °F (37.1 °C)] 97.4 °F (36.3 °C)  HR:  [71-97] 85  BP: ()/(48-71) 110/52  SpO2:  [95 %-100 %] 97 %  O2 Device: Nasal cannula  Nasal Cannula O2 Flow Rate (L/min):  [3 L/min] 3 L/min    Body mass index is 22.62 kg/m².     Input and Output Summary (last 24 hours):     Intake/Output Summary (Last 24 hours) at 2/21/2025 1550  Last data filed at 2/21/2025 0400  Gross per 24 hour   Intake 650 ml   Output 800 ml   Net -150 ml       Physical Exam  Vitals and nursing note reviewed.   Constitutional:       General: He is not in acute distress.     Appearance: He is well-developed.   HENT:      Head: Normocephalic and atraumatic.   Eyes:      Conjunctiva/sclera: Conjunctivae normal.   Cardiovascular:      Rate and Rhythm: Normal rate and regular rhythm.      Heart sounds: No murmur heard.  Pulmonary:      Effort: Pulmonary effort is normal. No respiratory distress.      Breath sounds: Normal breath sounds.   Abdominal:      Palpations: Abdomen is soft.      Tenderness: There is no abdominal tenderness.   Musculoskeletal:         General: No swelling.      Cervical back: Neck supple.      Comments: Left ankle deformity with left foot edema   Skin:     General: Skin is warm and dry.      Capillary Refill: Capillary refill takes less than 2 seconds.      Comments: Scattered  well-healing abrasions  Mild redness in sacral region   Neurological:      Mental Status: He is alert.   Psychiatric:         Mood and Affect: Mood normal.           Lines/Drains:  Lines/Drains/Airways       Active Status       Name Placement date Placement time Site Days    PICC Line 02/21/25 Right Basilic 02/21/25  1056  Basilic  less than 1                            Lab Results: I have reviewed the following results:   Results from last 7 days   Lab Units 02/21/25  0558 02/17/25  1352 02/17/25  0844   WBC Thousand/uL 5.87   < > 4.82   HEMOGLOBIN g/dL 8.2*   < > 7.9*   HEMATOCRIT % 26.6*   < > 24.5*   PLATELETS Thousands/uL 147*   < > 141*   BANDS PCT %  --   --  1   SEGS PCT % 81*   < >  --    LYMPHO PCT % 11*   < > 14   MONO PCT % 7   < > 4   EOS PCT % 0   < > 0    < > = values in this interval not displayed.     Results from last 7 days   Lab Units 02/21/25  0558 02/20/25  0613   SODIUM mmol/L 138 139   POTASSIUM mmol/L 3.6 4.7   CHLORIDE mmol/L 105 105   CO2 mmol/L 28 30   BUN mg/dL 17 15   CREATININE mg/dL 0.94 0.91   ANION GAP mmol/L 5 4   CALCIUM mg/dL 8.0* 7.9*   ALBUMIN g/dL  --  2.3*   TOTAL BILIRUBIN mg/dL  --  0.58   ALK PHOS U/L  --  54   ALT U/L  --  <3*   AST U/L  --  21   GLUCOSE RANDOM mg/dL 115 81     Results from last 7 days   Lab Units 02/17/25  1359   INR  1.39*         Results from last 7 days   Lab Units 02/16/25  0608   HEMOGLOBIN A1C % 5.8*           Recent Cultures (last 7 days):         XR chest portable  Result Date: 2/17/2025  Impression: Limited study. There appears to be some left basilar atelectasis. There is some central vascular prominence in the hilar regions. Workstation performed: [id number]     CTA head and neck w wo contrast  Result Date: 2/15/2025  Impression: CT Brain: - Known small multifocal infarcts in bilateral cerebral hemispheres and right cerebellum were better evaluated on MRI brain dated 2/14/2025, likely embolic. No new CT signs of acute infarction. - Unchanged  "chronic lacunar infarct in right basal ganglia with moderate chronic microangiopathy. CT Angiography: - Negative CTA head and neck for large vessel occlusion, dissection, aneurysm, or high-grade stenosis. - Mild atherosclerotic disease of the head and neck, as detailed above. Multiple pulmonary nodules measuring up to 0.9 cm in left upper lobe, unchanged. Based on current Fleischner Society 2017 Guidelines on incidental pulmonary nodule, follow-up non-contrast CT is recommended at 3-6 months from the initial examination and, if stable at that time, an additional follow-up is recommended for 18-24 months from the initial examination. 4.1 cm ectasia of ascending thoracic aorta. Recommend follow-up CT chest in 12 months. Additional chronic/incidental findings as detailed above. The study was marked in EPIC for immediate notification. Workstation performed: [id number]     MRI brain w wo contrast  Result Date: 2/15/2025  Impression: Multiple tiny recent infarcts scattered in multiple vascular distributions that are likely embolic. Several microhemorrhages associated with recent infarcts. No acute space-occupying hematoma. Chronic ischemic changes. The study was marked in EPIC for immediate notification. Workstation performed: [id number]     MRI cervical spine w wo contrast  Result Date: 2/14/2025  Impression: Cervical degenerative change with mild canal stenosis and mild to moderate foraminal narrowing. No cord compression or abnormal cord signal. Mild nonspecific edema within the right posterior paraspinal musculature of the upper cervical spine seen on sagittal STIR imaging with mild enhancement. Minimal peripheral enhancement is noted and differential considerations would include infectious/inflammatory myositis with soft tissue abscess. No discitis or osteomyelitis. This examination was marked \"immediate notification\" in Epic in order to begin the standard process by which the radiology reading room liaison alerts the " "referring practitioner. Workstation performed: [id number]     CT head w wo contrast  Result Date: 2/13/2025  Impression: No acute hemorrhage, edema, or mass effect. Chronic microangiopathic changes and chronic appearing infarctions as above. No pathologic enhancement. Workstation performed: [id number]     MRI lumbar spine w wo contrast  Result Date: 2/13/2025  Impression: Mild inferior plate edema at L1 with adjacent small Schmorl's node and minimal adjacent postcontrast enhancement. Vacuum disc phenomenon noted on the recent CT at this level. Findings likely represent degenerative disc disease with Schmorl's node rather than discitis and osteomyelitis which should only be considered in the right clinical setting. No paraspinal edema or enhancement identified. No evidence of abscess or abnormal enhancement in the paraspinal soft tissues. 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 Resident: KEI Wallace I, the attending radiologist, have reviewed the images and agree with the final report above. Workstation performed: [id number]     US kidney and bladder  Result Date: 2/11/2025  Impression: No hydronephrosis. Moderate bladder distention. Perinephric edema on the left. Findings were discussed with Dr. Peralta at 7:25 p.m. via secure text Workstation performed: [id number]     CTA chest pe study  Result Date: 2/11/2025  Impression: No evidence of pulmonary embolus. Stable ectasia of the ascending thoracic aorta measuring up to 41 mm. Mild secretions in the lower trachea, correlate for any dysphagia or possible aspiration. Noncalcified left lung apex pulmonary nodule, 6 x 5 mm. Because this was not present on prior chest CT of December 4, 2023, conservative management with follow-up low radiation dose noncontrast chest CT in 6 months is recommended. This examination was marked \"immediate notification\" in Epic in order to begin the standard process by which the radiology reading room liaison " alerts the referring practitioner. Resident: Bijan Fajardo I, the attending radiologist, have reviewed the images and agree with the final report above. Workstation performed: [id number]     XR chest 1 view portable  Result Date: 2/11/2025  Impression: No acute cardiopulmonary disease. Workstation performed: [id number]       No Chest XR results available for this patient.     Other Study Results Review: No additional pertinent studies reviewed.    Last 24 Hours Medication List:     Current Facility-Administered Medications:     acetaminophen (TYLENOL) tablet 650 mg, Q6H PRN    albuterol (PROVENTIL HFA,VENTOLIN HFA) inhaler 2 puff, Q4H PRN    apixaban (ELIQUIS) tablet 5 mg, BID    aspirin chewable tablet 81 mg, Daily    atorvastatin (LIPITOR) tablet 80 mg, Daily With Dinner    barium sulfate tablet 1 tablet, Once in imaging    barium sulfate tablet 1 tablet, Once in imaging    bisacodyl (DULCOLAX) EC tablet 10 mg, Once    bisacodyl (DULCOLAX) rectal suppository 10 mg, Daily PRN    ceFAZolin (ANCEF) IVPB (premix in dextrose) 2,000 mg 50 mL, Q8H, Last Rate: 2,000 mg (02/21/25 0946)    docusate sodium (COLACE) capsule 100 mg, BID    fluticasone (ARNUITY ELLIPTA) 100 MCG/ACT inhaler 1 puff, Daily    guaiFENesin (MUCINEX) 12 hr tablet 1,200 mg, BID    hydroxychloroquine (PLAQUENIL) tablet 400 mg, Daily With Breakfast    ipratropium (ATROVENT) 0.02 % inhalation solution 0.5 mg, TID    levalbuterol (XOPENEX) inhalation solution 1.25 mg, TID    lidocaine (LIDODERM) 5 % patch 1 patch, Daily    lidocaine (LIDODERM) 5 % patch 1 patch, Daily    lidocaine (LIDODERM) 5 % patch 1 patch, Daily    [Held by provider] losartan (COZAAR) tablet 25 mg, Daily    methocarbamol (ROBAXIN) tablet 500 mg, Q8H GALE    metoprolol tartrate (LOPRESSOR) tablet 25 mg, Q12H GALE    pantoprazole (PROTONIX) injection 40 mg, Q12H GALE    polyethylene glycol (MIRALAX) packet 17 g, Daily PRN    senna (SENOKOT) tablet 8.6 mg, HS PRN    sertraline (ZOLOFT)  tablet 12.5 mg, Daily    sucralfate (CARAFATE) tablet 1 g, 4x Daily (AC & HS)    tamsulosin (FLOMAX) capsule 0.4 mg, Daily With Dinner    Administrative Statements   Today, Patient Was Seen By: Mal Neely DO      **Please Note: This note may have been constructed using a voice recognition system.**

## 2025-02-21 NOTE — ASSESSMENT & PLAN NOTE
Blood cultures with Gram + cocci, likely Staph Aureus  TTE negative for vegetations  Patient mentioned small laceration from [facility]ircut at [facility] with pustule formation, possible source at there is myositis to that area  MRI C-spine: Mild nonspecific edema within the right posterior paraspinal musculature of the upper cervical spine. Minimal peripheral enhancement is noted and differential considerations would include infectious/inflammatory myositis with soft tissue abscess. No osteomyelitis or discitis. No cord compression or abnormal cord signal.  MRI Brain shows possible multifocal infarction    Plan:  Continue cefazolin 2 g IV q8 hrs  Given myositis and possible abscess appreicated on MRI C-spine will require prolonged IV antibiotic course  PICC line placed for prolonged course of antibiotics   ID consulted, appreciate recs  Follow repeat blood cultures, NGTD  Will f/u outpatient

## 2025-02-21 NOTE — ASSESSMENT & PLAN NOTE
Patient rate controlled since admission    Plan:  Restarted Eliquis 5 mg BID  Continue home metoprolol with hold parameters

## 2025-02-21 NOTE — PLAN OF CARE
Problem: PHYSICAL THERAPY ADULT  Goal: Performs mobility at highest level of function for planned discharge setting.  See evaluation for individualized goals.  Description: Treatment/Interventions: Functional transfer training, LE strengthening/ROM, Therapeutic exercise, Cognitive reorientation, Endurance training, Patient/family training, Equipment eval/education, Bed mobility, Gait training, Compensatory technique education, Elevations          See flowsheet documentation for full assessment, interventions and recommendations.  Outcome: Progressing  Note:    Problem List: Decreased strength, Decreased range of motion, Decreased endurance, Impaired balance, Decreased mobility, Decreased safety awareness, Impaired sensation, Pain  Assessment: pt began tx session lying supine in the bed, was agreeable to participate in PT intervention. Dr. Hernandez present upon arrival to pt room. Per Dr. Hernandez functional transfers appropriate at this time. Slight progress with bed mobility as pt required mod ax1 for completing a supine<>sit EOB transfer with LE and HHA to complete transfer to seated EOB. pt was able to sit EOB w/o LOB 4 minutes while being educated on functional transfers, hand placement w/ RW. pt required mod ax1 for all functional transfers with RW as pt also required RW management in order to complete a SPT from EOB to RW. pt did participate in TE activities while seated in jason recliner with AROM, no increases in pain in order to increase pt activity tolerance/ROM. Post tx pt in the recliner with call bell, chair alarm activated and all pt needs met. Continue to recommend DC w/ level 2 moderate rehab resource intensity when medically cleared (ambulation trials not attempted due to pending X ray of L ankle)        Rehab Resource Intensity Level, PT: II (Moderate Resource Intensity)    See flowsheet documentation for full assessment.

## 2025-02-21 NOTE — PROGRESS NOTES
Progress Note - Infectious Disease   Name: Devin Chaves 79 y.o. male I MRN: [id number]  Unit/Bed#: S -01 I Date of Admission: 2/11/2025   Date of Service: 2/21/2025 I Hospital Day: 10    Assessment & Plan  Sepsis (HCC)  E/b tachycardia, tachypnea and hypotension with WBC 12 K.  Patient is clinically much improved.  WBC has normalized.  SBP is now normal  -antibiotics as below  -monitor temperature and hemodynamics  -serial exam  -additional inventions pending clinical course  -monitoring serial CBC and BMP for treatment response and any developing toxicities     MSSA bacteremia  Admission Blood cultures labeled same arm/same time are + for MSSA. 2/12/25 TTE negative for vegetation. No PPM, intravascular devices. Acute posterior neck pain predominant starting 2/14/25 and possible deep cervical infection evident on MRI C spine.  Bacteremia cleared  -continues Cefazolin 2 g IV q 8 hours  -right arm PICC placed 2/21/25   -anticipate protracted IV antibiotic course as below    Neck pain  Acute posterior neck pain predominant starting 2/14/25 2/14/25 MRI C spine: cervical degenerative change. Mild nonspecific edema within the right posterior paraspinal musculature of the upper cervical spine with mild enhancement. 2 separate foci of linear nonenhancement may represent peripherally enhancing fluid collections. No discitis or osteomyelitis.  ESR is only mildly elevated at 26 (2/14) but  (2/16) is highly elevated.  Patient will need long-term IV antibiotic and monitoring of ESR/CRP on treatment.  He also needs repeat MRI.  -continues cefazolin as above  -Treat x 6-week from blood culture clearance to 3/27/25    -serial exam  -Monitor ESR/CRP.  If ESR/CRP remain elevated at the end of IV antibiotic course, patient will need to be transitioned to p.o. antibiotic and remain on it until inflammatory markers are normal.  -Repeat C-spine MRI in 4 to 6 weeks.  -pain management per primary   -follow up with ID  "outpatient on 3/3/25 at 12:30 pm - Chastity attending  Back pain  Patient reports chronic back pain has been worse due to \"inactivity\" and thinks worsened around the time of the catheterization. 2/13/25 MRI L spine: mild inferior plate edema at L1 with adjacent small Schmorl's node and minimal adjacent postcontrast enhancement. More c/w DDD. No paraspinal edema.   -continues antibiotic as above  -serial exam  -pain management per primary   Acute urinary retention  Renal US: moderate bladder distention, no hydronephrosis, perinephric edema left. 2/11/25 s/p straight cath for 700 mL. U/A benign. No urinary symptoms other than hesitancy   -monitor urinary output/symptoms  -additional inventions pending retention course     COPD (chronic obstructive pulmonary disease) ([facility])  2/11/25 CXR no infiltrates, CTA chest: no PE, mild secretions in lower trachea. Back to baseline 2L NCO2  Urinary antigens, Flu/COVID screen negative  -monitor respiratory status  -respiratory support prn  -monitor O2 requirements  -serial exam  -aspiration precautions  -additional inventions pending clinical course     Atrial fibrillation ([facility])  On Eliquis    Colonic mass  2/20/25 colonoscopy: 2.5 cm colonic mass sent for multiple bxs      I have discussed with patient, RN and Dr. Conroy's primary service regarding the above plan to continue IV antibiotics and arranging for outpatient infusion. They agree with the plan.  We will see patient again 2/24/25 if here. Please call ID on call physician in meantime if questions.      Antibiotics:  Cefazolin D8 from blood culture clearance    Subjective   Patient's neck pain about the same.  Low back pain is improved  No headache  Temperature stays down.  No chills.  He is tolerating antibiotic well.  No nausea, vomiting. no stoo today s/p Colonoscopy 2/20/25     Objective :  Temp:  [97.2 °F (36.2 °C)-98.7 °F (37.1 °C)] 97.4 °F (36.3 °C)  HR:  [65-97] 85  BP: ()/(44-71) 110/52  Resp:  [16-20] " 16  SpO2:  [92 %-100 %] 97 %  O2 Device: Nasal cannula  Nasal Cannula O2 Flow Rate (L/min):  [3 L/min] 3 L/min    General:  79 year old chronically debilitated male, No acute distress, sitting in chair  Psychiatric:  Awake and alert  Neck: Mild tenderness to palpation at midline.  Keeps neck in flexion position. No mass.  No erythema. Lidoderm patch off  Pulmonary:  Normal respiratory excursion at baseline with short statements and some accessory muscle use, statting 97% on 3L NCO2 at present  Abdomen:  Soft, nontender  Extremities:  No edema  Skin:  No rashes, new right arm PICC site nontender, + ecchmyoses      Lab Results: I have reviewed the following results:  Results from last 7 days   Lab Units 02/21/25  0558 02/20/25  0613 02/19/25  0950 02/19/25  0553   WBC Thousand/uL 5.87 6.38  --  5.91   HEMOGLOBIN g/dL 8.2* 8.0* 8.6* 7.3*   PLATELETS Thousands/uL 147* 172  --  145*     Results from last 7 days   Lab Units 02/21/25  0558 02/20/25  0613 02/19/25  0553 02/17/25  0844 02/16/25  0837   SODIUM mmol/L 138 139 137   < > 137   POTASSIUM mmol/L 3.6 4.7 3.6   < > 3.6   CHLORIDE mmol/L 105 105 103   < > 104   CO2 mmol/L 28 30 30   < > 30   BUN mg/dL 17 15 16   < > 21   CREATININE mg/dL 0.94 0.91 0.88   < > 0.91   EGFR ml/min/1.73sq m 77 81 82   < > 81   CALCIUM mg/dL 8.0* 7.9* 7.8*   < > 8.2*   AST U/L  --  21  --   --  20   ALT U/L  --  <3*  --   --  3*   ALK PHOS U/L  --  54  --   --  51   ALBUMIN g/dL  --  2.3*  --   --  2.6*    < > = values in this interval not displayed.                 Results from last 7 days   Lab Units 02/16/25  0051   CRP mg/L 106.8*                 Imaging Results Review: I personally reviewed the following image studies in PACS and associated radiology reports: CT abdomen/pelvis and CT head. My interpretation of the radiology images/reports is: 2/20/25 CT head:.

## 2025-02-21 NOTE — ASSESSMENT & PLAN NOTE
"- C-scope  2/20/25, small friable colonic mass found per IM    Plan:  \"Pathology pending  CT CAP w contrast pending  CEA pending  F/u with GI outpatient\"  "

## 2025-02-21 NOTE — ASSESSMENT & PLAN NOTE
E/b tachycardia, tachypnea and hypotension with WBC 12 K.  Patient is clinically much improved.  WBC has normalized.  SBP is now normal  -antibiotics as below  -monitor temperature and hemodynamics  -serial exam  -additional inventions pending clinical course  -monitoring serial CBC and BMP for treatment response and any developing toxicities

## 2025-02-21 NOTE — PROGRESS NOTES
Progress Note - Gastroenterology   Name: Devin Chaves 79 y.o. male I MRN: [id number]  Unit/Bed#: S -01 I Date of Admission: 2/11/2025   Date of Service: 2/21/2025 I Hospital Day: 10    Assessment & Plan  Anemia  79 y.o. male with COPD on 2L NC at baseline, CAD, Afib on Eliquis, and rheumatoid arthritis who presented with shortness of breath on 2/11 found to have gram positive bacteremia, hemoglobin of 6.7 on 2/13 requiring 2 units PRBC and improved to 8.4. Previous Hgb 12.6 in 2023. 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 Also gastric nodule noted requiring EUS, unclear if this has been done.  - Patient reported constipation, per chart review received Colace, Miralax, Senokot, Dulcolax on 2/17 as well as 1/2 of GoLytely bowel prep. Subsequently noted to have dark stools with dark red liquid  - s/p EGD and colonoscopy 2/20. Proximal ascending colon-across from the ileocecal valve there is a 2.5 cm hard friable polypoid mass appears malignant status post multiple biopsies. EGD normal.   - Continue pantoprazole  - No further episodes of dark stools per nursing. Continue to monitor for overt signs of bleeding and transfuse for Hgb goal >7    Colonic mass  - Colonoscopy 2/20 - Proximal ascending colon-across from the ileocecal valve there is a 2.5 cm hard friable polypoid mass appears malignant status post multiple biopsies  - CT chest/abd/pelvis 2/20 - Wall thickening at the ileocecal valve/cecum compatible with colonic mass discovered on recent colonoscopy. Posterior splenic infarct. Small loculated left pleural effusion. Left lower lobe atelectasis  - Colorectal surgery input appreciated, no acute surgical intervention warranted at this time, plan to follow up as outpatient  - CEA pending  - Follow up biopsy results    Gastroenterology service will follow.    Subjective   Patient denies abdominal pain, nausea, vomiting. Reports good  appetite. No further dark stools per nursing.     Objective :  Temp:  [97.2 °F (36.2 °C)-98.7 °F (37.1 °C)] 97.4 °F (36.3 °C)  HR:  [65-97] 85  BP: ()/(44-71) 110/52  Resp:  [16-20] 16  SpO2:  [92 %-100 %] 100 %  O2 Device: Nasal cannula  Nasal Cannula O2 Flow Rate (L/min):  [3 L/min] 3 L/min    Physical Exam  Vitals reviewed.   Constitutional:       General: He is not in acute distress.     Appearance: He is well-developed.   HENT:      Head: Normocephalic.   Eyes:      Conjunctiva/sclera: Conjunctivae normal.   Cardiovascular:      Rate and Rhythm: Normal rate.      Heart sounds: No murmur heard.  Pulmonary:      Effort: Pulmonary effort is normal.      Breath sounds: Normal breath sounds.   Abdominal:      General: Bowel sounds are normal.      Palpations: Abdomen is soft.      Tenderness: There is no abdominal tenderness.   Musculoskeletal:      Cervical back: Neck supple.   Skin:     General: Skin is warm and dry.   Neurological:      Mental Status: He is alert.   Psychiatric:         Mood and Affect: Mood normal.

## 2025-02-21 NOTE — PHYSICAL THERAPY NOTE
PHYSICAL THERAPY NOTE          Patient Name: Devin Chaves  Today's Date: 2/21/2025 02/21/25 1125   PT Last Visit   PT Visit Date 02/21/25   Note Type   Note Type Treatment   Pain Assessment   Pain Assessment Tool 0-10   Pain Score 8   Pain Location/Orientation Orientation: Left;Location: Foot  (ankle)   Effect of Pain on Daily Activities limited activity tolerance   Patient's Stated Pain Goal No pain   Hospital Pain Intervention(s) Repositioned;Ambulation/increased activity;Rest   Multiple Pain Sites Yes   Restrictions/Precautions   Weight Bearing Precautions Per Order No   Braces or Orthoses Other (Comment)  (L ankle brace)   Other Precautions Cognitive;Chair Alarm;Bed Alarm;Fall Risk;Pain  (pt on room air)   General   Chart Reviewed Yes   Response to Previous Treatment Patient with no complaints from previous session.   Family/Caregiver Present No   Cognition   Overall Cognitive Status Impaired   Arousal/Participation Alert;Responsive;Cooperative   Attention Attends with cues to redirect   Orientation Level Oriented X4   Memory Decreased short term memory;Decreased recall of recent events   Following Commands Follows one step commands with increased time or repetition   Comments pt was cooperative in todays txs ession but agitated due to L ankle pain   Subjective   Subjective pt was agreeable to get OOB and into the recliner. pt stated he has pain in L ankle especially when he weight bears   Bed Mobility   Supine to Sit 3  Moderate assistance   Additional items Assist x 1;HOB elevated;Bedrails;Increased time required;Verbal cues;LE management;Other  (HHA)   Sit to Supine Unable to assess   Additional Comments pt seated OOB in [facility] recliner post tx w/ call bell, chair alarm activated and all pt needs met   Transfers   Sit to Stand 3  Moderate assistance   Additional items Assist x 1;Increased time required;Verbal cues    Stand to Sit 3  Moderate assistance   Additional items Assist x 1;Armrests;Increased time required;Verbal cues   Stand pivot 3  Moderate assistance   Additional items Assist x 1;Increased time required;Verbal cues   Additional Comments pt required mod ax1 for all functional transfers with RW and VC's for hand placement and lateral stepping to complete a SPT from EOB to recliner   Ambulation/Elevation   Gait pattern (S)  Not appropriate  (Upon arrival to pt room Dr. Soria present speaking to patient. Dr. soria will order to L ankle x-ray for the pt due to increased L ankle pain.)   Balance   Static Sitting Fair +   Dynamic Sitting Fair -   Static Standing Poor +   Dynamic Standing Poor   Ambulatory Zero   Endurance Deficit   Endurance Deficit Yes   Endurance Deficit Description limited activity tolerance, functional mobility   Activity Tolerance   Activity Tolerance Patient limited by pain   Nurse Made Aware Spoke to RN   Exercises   Hip Abduction Sitting;15 reps;AROM;Bilateral   Hip Adduction Sitting;15 reps;AROM;Bilateral  (pillow squeezes)   Knee AROM Long Arc Quad Sitting;10 reps;AROM;Bilateral   Ankle Pumps Sitting;15 reps;AROM;Bilateral  (limited L ankle DF)   Marching Sitting;10 reps;AROM;Bilateral   Assessment   Problem List Decreased strength;Decreased range of motion;Decreased endurance;Impaired balance;Decreased mobility;Decreased safety awareness;Impaired sensation;Pain   Assessment pt began tx session lying supine in the bed, was agreeable to participate in PT intervention. Dr. Soria present upon arrival to pt room. Per Dr. Soria functional transfers appropriate at this time. Slight progress with bed mobility as pt required mod ax1 for completing a supine<>sit EOB transfer with LE and HHA to complete transfer to seated EOB. pt was able to sit EOB w/o LOB 4 minutes while being educated on functional transfers, hand placement w/ RW. pt required mod ax1 for all functional transfers with RW as pt also  required RW management in order to complete a SPT from EOB to RW. pt did participate in TE activities while seated in [facility] recliner with AROM, no increases in pain in order to increase pt activity tolerance/ROM. Post tx pt in the recliner with call bell, chair alarm activated and all pt needs met. Continue to recommend DC w/ level 2 moderate rehab resource intensity when medically cleared  (ambulation trials not attempted due to pending X ray of L ankle)   Goals   Patient Goals none stated   PT Treatment Day 3   Plan   Treatment/Interventions LE strengthening/ROM;Functional transfer training;Therapeutic exercise;Endurance training;Patient/family training;Equipment eval/education;Bed mobility;Gait training;Spoke to nursing;Spoke to advanced practitioner;Compensatory technique education;Cognitive reorientation   Progress Slow progress, decreased activity tolerance   PT Frequency 3-5x/wk   Discharge Recommendation   Rehab Resource Intensity Level, PT II (Moderate Resource Intensity)   AM-PAC Basic Mobility Inpatient   Turning in Flat Bed Without Bedrails 2   Lying on Back to Sitting on Edge of Flat Bed Without Bedrails 2   Moving Bed to Chair 2   Standing Up From Chair Using Arms 2   Walk in Room 1   Climb 3-5 Stairs With Railing 1   Basic Mobility Inpatient Raw Score 10   [facility] Highest Level Of Mobility   -HL Goal 4: Move to chair/commode   -[facility] Achieved 4: Move to chair/commode   Education   Education Provided Other  (bed mobility, TE activities and functional transfers)   Patient Reinforcement needed   End of Consult   Patient Position at End of Consult Bedside chair;Bed/Chair alarm activated;All needs within reach   The patient's AM-PAC Basic Mobility Inpatient Short Form Raw Score is 10. A Raw score of less than or equal to 16 suggests the patient may benefit from discharge to post-acute rehabilitation services. Please also refer to the recommendation of the Physical Therapist for safe discharge  planning.    Mac Valladares

## 2025-02-21 NOTE — ASSESSMENT & PLAN NOTE
- Still significant   MRI C spine showed cervical degenerative change - mild canal and moderate neuroforaminal stenosis. Mild nonspecific edema within the right posterior paraspinal musculature of the upper cervical spine with mild enhancement. 2 separate foci of linear nonenhancement may represent peripherally enhancing fluid collections. No discitis or osteomyelitis     - Abx per ID  - Pain mgmt  - Monitor neuro and overall exam   - PT, OT

## 2025-02-21 NOTE — ASSESSMENT & PLAN NOTE
- Mgmt per primary team and recommend optimal mgmt during rehab process  - Monitor H/H, vitals, signs/symptoms of acute bleeding  - anemia with hx of non-bleeding angioectasia at cecum and non-bleeding ulcer at hepatic flexure in past  - C-scope  2/20/25, small friable colonic mass found per IM   - Hb stable 2/21, monitor closely back on Eliquis (and aspirin)

## 2025-02-21 NOTE — PROGRESS NOTES
Progress Note - PMR   Name: Devin Chaves 79 y.o. male I MRN: [id number]  Unit/Bed#: S -01 I Date of Admission: 2/11/2025   Date of Service: 2/21/2025 I Hospital Day: 10    Assessment & Plan  Stroke (HCC)  - P/w: blurry vision prompting MRI brain   - Imaging:   - MRI brain 2/14 showed L frontal lobe acute to early subacute infarct, multiple scattered punctate strokes in the bilateral frontal, right parietal, bilateral occipital, and right cerebellum with several microhemorrhages associated with recent infarcts with a few additional chronic microhemorrhages. Chronic RBG and L cerebellar lacunar infarct. L occipital lobe hemosiderin staining consistent with remote injury.   - CTH 2/20 - No acute intracranial abnormality. Chronic microangiopathic ch   - Neuro consulted and felt etiology of bihemispheric strokes in setting of MSSA bacteremia uncertain origin.  Patient only missed 1 dose of eliquis but felt eliquis failure possible due to acute infectious d/s process.      - Status and residual impairments:    Imbalance, deconditioning - most residual functional deficits likely related to non-stroke causes at this time   - Med/surgical management:   Given potential hemorrhage in inferior L frontal lobe and colonoscopy A/C was on hold    Eliquis 5mg BID resumed 2/21   Abx per ID   Statin   Optimal BP control   - Neuropsych Med review:    Robaxin 500mg TID   Zoloft 12.5mg qday    Oxy 2.5-5mg Q4H PRN    (Care with sedating meds)     - Continue PT, OT in acute setting to improve ADLs, mobility, strength, conditioning, and decrease risks of immobility as well as to assist with dispo recommendations   - Decrease risks of complications related to decreased mobility status and monitor for them during course  - MSK - atrophy, contractures, bone demineralization, CV - DVT/PE, orthostasis, decreased CO, Resp - atelectasis, PNA, GI - constipation, reduced appetite,  - retention, incontinence, Skin - pressure injuries  -  Optimal bowel/bladder mgmt/hygiene - monitor for retention, incontinence, infection     - Turn patient Q2H, skin checks minimum Qshift  - ROM of major joints 2-3 times per day (if unable to do it on own)  - Supportive counseling and updates to family (as appropriate)   - Fall precautions - if needed increase rounding or consider virtual sitter or in-person sitter  - Overall mgmt per primary team currently, recommend optimal mgmt during rehab process as well  - Remains at risk for increased confusion/delirium, restlessness, agitation, and fall - continue to monitor for concerns/changes  - Monitor neuro-exam, wakefulness, mood, cognition, insight into deficits and safety awareness   - Monitor and ensure optimal management electrolytes, nutrition, and hydration  - Monitor for signs or symptoms of infection, medication intolerances, other systemic etiologies  - Additional labs, imaging, specialist follow-up as needed per primary team currently   - Overstimulation precautions, frequent re-orientation, re-direction, re-assurance  - Optimal mood, pain, and sleep management  - If impaired sleep or behavior recommend sleep log and agitation monitoring    - Limit sedating medications when possible  - For routine restlessness, anxiety, irritability focus on non-pharmacologic management    - Hold benzo's with increased risk paradoxical reaction and possibility of limiting cognitive recovery    Devin Chaves was evaluated by PT, OT and now by PMR physician and found to have new and significant deficits in ADLs and mobility.      Prior Level of Function and Social history:    Patient was admitted to [facility] but prior to that lived in 2  with 4 [employer] with spouse.    Patient states he was progressing well ambulating in hallway with walker and chair follow and needed assist with ADLs at SNF    Current Level of Function:    2/21 PMR MD Ambulation few steps Mod-max assist   2/21 transfers mod assist; Total assist  LBD, Min assist UBD; AM-PAC 2 toileting and bathing    2/17 - Transfers max assist; stood for 45 secs with RW mod-max x1  Not able to ambulate    Disposition recommendation:  SNF  - The patient is currently too low level for ARC/IRF setting  - If functional/activity tolerance would significantly improve this recommendation could change; currently has too much neck and ankle pain and too much deconditioning to tolerate IRF    MSSA bacteremia  MSSA bacteremia of uncertain etiology   - Possible soft tissue infection in cervical spine  - Risk of septic v other emboli to brain   - Mgmt per ID  - 6 week course of Ancef, monitor labs, vitals, exam closely  - C-scope planned for 2/17 for anemia with hx of non-bleeding angioectasia at cecum and non-bleeding ulcer at hepatic flexure in past  - Repeat MRI C spine in 4-6 weeks   Neck pain  - Still significant   MRI C spine showed cervical degenerative change - mild canal and moderate neuroforaminal stenosis. Mild nonspecific edema within the right posterior paraspinal musculature of the upper cervical spine with mild enhancement. 2 separate foci of linear nonenhancement may represent peripherally enhancing fluid collections. No discitis or osteomyelitis     - Abx per ID  - Pain mgmt  - Monitor neuro and overall exam   - PT, OT   Back pain  MRI L spine showed mild inferior plate edema at L1 with adjacent small Schmorl's node and minimal adjacent postcontrast enhancement. More c/w DDD. No paraspinal edema.  - Pain mgmt  - Monitor neuro and overall exam   - PT, OT   COPD (chronic obstructive pulmonary disease) (HCC)  - Chronic resp failure on chronic 2L of O2  (2-3L recently)   - Overall mgmt per IM/hospitalist service currently  - Optimal mgmt during rehab course   - Monitor lung exam, vitals with and without activity  - Encourage incentive spirometry  - Fluticasone, Atrovent, Xopenex    Atrial fibrillation (HCC)  - Mgmt currently per primary team   - Rate control: MTP  -  "Antithrombotic: Eliquis    - OP Cards follow-up    Sepsis (HCC)    Acute urinary retention  - SC for >400 2/17   - Recommend bladder scans q4-6h and PRN SC   - monitor for retention, incontinence (including overflow incontinence), signs/symptoms of UTI  - recommend toileting program Q2-4 H during day and Q4-6H overnight   - recommend bladder scans/urinary retention protocol if concerns/risk of retention  - Optimal bowel management/constipation mgmt/prevention     Hypotension    Constipation  - Resolved s/p recent GoLytely bowel prep  - monitor for constipation, incontinence, and diarrhea  - ensure appropriate hydration; wean constipating meds if and when possible (if applicable)    - goal 1 appropriate BM every 1-2 days  - monitor for signs and symptoms of obstruction/ileus > not currently; hold stimulant lax if occurs  - Limiting constipating medications if possible  - Ensure adequate hydration     Anemia  - Mgmt per primary team and recommend optimal mgmt during rehab process  - Monitor H/H, vitals, signs/symptoms of acute bleeding  - anemia with hx of non-bleeding angioectasia at cecum and non-bleeding ulcer at hepatic flexure in past  - C-scope  2/20/25, small friable colonic mass found per IM   - Hb stable 2/21, monitor closely back on Eliquis (and aspirin)   Rheumatoid arthritis (HCC)    Colonic mass  - C-scope  2/20/25, small friable colonic mass found per IM    Plan:  \"Pathology pending  CT CAP w contrast pending  CEA pending  F/u with GI outpatient\"  Left ankle joint deformity  Chronic significant; was to consider ankle sx but other health issues have prevented this; uses lace-up brace at home   - Reports increased pain and difficulty Wb'ing  - Recommend L ankle XR and podiatry or ortho consult  - Pain mgmt, fall precautions in interim     Encounter for skin care  - Increased risk of skin wounds/breakdown/rashes due to recent immobility and co-morbidities   - Turn patient Q2H in bed (use pillows or wedges), " encourage wt shifts every 10-15 minutes in chair  - Patient and if available caregiver education   - Hydragaurd (or other appropriate barrier cream) to buttocks and sacrum BID & PRN   - Allevyn foam to heels and/or float heels when in bed   - Optimal bowel/bladder hygiene; keep skin clean and dry   - EHOB waffle cushion to chair/WC when OOB  - Rec nursing to document in chart and Notify MD if buttock, sacrum, heel, or other skin site develops erythema, skin breakdown, or rashes as soon as possible.  If patient is soiling themselves with urine or stool notify MD.  If you are unable to maintain skin integrity and prevent erythema due to frequency of soiling notify MD as soon as possible as well  - Consult would care for any wounds or significant concerns for skin integrity      VTE prophylaxis   As outlined by primary team      Other medical issues:     As per primary service (and relevant consultants if applicable)  Subjective   On eval, patient reports increased acute on chronic L ankle pain and more difficulty weightbearing on it recently.  He denies increased warmth or redness, fever, chills sweats.  He reports ongoing neck pain perhaps slightly better with occasional pain into shoulders without worsening strength or sensation.      Chief Complaint:  Pain       Objective :  Temp:  [97.4 °F (36.3 °C)-98.7 °F (37.1 °C)] 98.5 °F (36.9 °C)  HR:  [71-97] 87  BP: ()/(48-71) 126/58  Resp:  [17] 17  SpO2:  [95 %-100 %] 97 %  O2 Device: Nasal cannula  Nasal Cannula O2 Flow Rate (L/min):  [2 L/min-3 L/min] 2 L/min      Physical Exam    General: Awake,lying in bed  HENT: NCAT, MMM, NC in place with head band thermometer in place  Respiratory: Unlabored breathing  Cardiovascular: Regular rate   Gastrointestinal: Soft, non-distended  Genitourinary: No glass  SkiN/MSK/Extremities:  L>R pedal edema; Significant L ankle valgus deformity with enlarged L ankle joint without erythema or increased warmth; some mild TTP (reports  pain overall has worsened and more difficulty weightbearing last few weeks)   Neurologic/Psych:   MENTAL STATUS: awake, oriented to person, place, time, and situation  Affect: Frustrated   Strength/MMT:  stable    Scheduled Meds:  Current Facility-Administered Medications   Medication Dose Route Frequency Provider Last Rate    acetaminophen  650 mg Oral Q6H PRN Adwoa Segal MD      albuterol  2 puff Inhalation Q4H PRN Adwoa Segal MD      apixaban  5 mg Oral BID Maximino Conroy MD      aspirin  81 mg Oral Daily Nirav Yancey DO      atorvastatin  80 mg Oral Daily With Dinner Jorge Peralta MD      barium sulfate  1 tablet Oral Once in imaging Adwoa Segal MD      barium sulfate  1 tablet Oral Once in imaging Adwoa Segal MD      bisacodyl  10 mg Oral Once Adwoa Segal MD      bisacodyl  10 mg Rectal Daily PRN Adwoa Segal MD      cefazolin  2,000 mg Intravenous Q8H Adwoa Segal MD 2,000 mg (02/21/25 0946)    docusate sodium  100 mg Oral BID Mal Neely DO      fluticasone  1 puff Inhalation Daily Jorge Peralta MD      guaiFENesin  1,200 mg Oral BID Adwoa Segal MD      hydroxychloroquine  400 mg Oral Daily With Breakfast Jorge Peralta MD      ipratropium  0.5 mg Nebulization TID Adwoa Segal MD      levalbuterol  1.25 mg Nebulization TID Adwoa Segal MD      lidocaine  1 patch Topical Daily Adwoa Segal MD      lidocaine  1 patch Topical Daily Adwoa Segal MD      lidocaine  1 patch Topical Daily Adwoa Segal MD      [Held by provider] losartan  25 mg Oral Daily Mal Neely DO      methocarbamol  500 mg Oral Q8H GALE Adwoa Segal MD      metoprolol tartrate  25 mg Oral Q12H GALE Jorge Peralta MD      pantoprazole  40 mg Intravenous Q12H GALE Adwoa Segal MD      polyethylene glycol  17 g Oral Daily PRN Adwoa Segal MD      senna  1 tablet Oral HS PRN Adwoa Segal MD      sertraline  12.5 mg Oral Daily Jorge Peralta MD      sucralfate  1 g Oral 4x Daily (AC &  HS) Adwoa Segal MD      tamsulosin  0.4 mg Oral Daily With Dinner Adwoa Segal MD           Lab Results: I have reviewed the following results:  Results from last 7 days   Lab Units 02/21/25  0558 02/20/25  0613 02/19/25  0950 02/19/25  0553   HEMOGLOBIN g/dL 8.2* 8.0* 8.6* 7.3*   HEMATOCRIT % 26.6* 26.4* 26.9* 23.6*   WBC Thousand/uL 5.87 6.38  --  5.91   PLATELETS Thousands/uL 147* 172  --  145*     Results from last 7 days   Lab Units 02/21/25  0558 02/20/25  0613 02/19/25  0553 02/17/25  0844 02/16/25  0837   BUN mg/dL 17 15 16   < > 21   SODIUM mmol/L 138 139 137   < > 137   POTASSIUM mmol/L 3.6 4.7 3.6   < > 3.6   CHLORIDE mmol/L 105 105 103   < > 104   CREATININE mg/dL 0.94 0.91 0.88   < > 0.91   CALCIUM, IONIZED mmol/L  --  1.15  --   --   --    AST U/L  --  21  --   --  20   ALT U/L  --  <3*  --   --  3*    < > = values in this interval not displayed.       Results from last 7 days   Lab Units 02/17/25  1359 02/16/25  0051   PROTIME seconds 17.8* 19.0*   INR  1.39* 1.51*        50 minutes or greater spent for this encounter which included a combination of face-to-face time with Devin Chaves and non-face-to-face time which in part specifically includes management of CVA, anemia, ankle deformity, neck pain/infection, bacteremia.  Face-to-face time included extended discussion with patient regarding current condition, medical history, mood, medical/rehabilitation management, and disposition.  Non face-to-face time included coordination of care with patient's co-managing AP and/or physician(s) thru communication and review of their recent documentation as well as reviewing vitals, bowel/bladder function, recent labs, and notes from therapy, CM, and nursing.

## 2025-02-21 NOTE — ASSESSMENT & PLAN NOTE
- Mgmt currently per primary team   - Rate control: MTP  - Antithrombotic: Eliquis    - OP Cards follow-up

## 2025-02-21 NOTE — PROCEDURES
Insert Complex Venous Access Line    Date/Time: 2/21/2025 10:54 AM    Performed by: Camryn Biswas RN  Authorized by: Maximino Conroy MD    Patient location:  Bedside  Consent:     Consent obtained:  Verbal and written (Obtained by MD)    Consent given by:  Patient (Obtained by MD)    Procedural risks discussed: Discussed with MD.    Alternatives discussed: Discussed with MD.  Universal protocol:     Procedure explained and questions answered to patient or proxy's satisfaction: yes      Immediately prior to procedure, a time out was called: yes      Relevant documents present and verified: yes      Test results available and properly labeled: yes      Radiology Images displayed and confirmed.  If images not available, report reviewed: yes      Required blood products, implants, devices, and special equipment available: yes      Site/side marked: yes      Patient identity confirmed:  Verbally with patient and arm band  Pre-procedure details:     Hand hygiene: Hand hygiene performed prior to insertion      Sterile barrier technique: All elements of maximal sterile technique followed      Skin preparation:  ChloraPrep    Skin preparation agent: Skin preparation agent completely dried prior to procedure    Procedure details:     Complex Venous Access Line Type: PICC      Complex Venous Access Line Indications: long term antibiotics      Catheter tip vessel location: atriocaval junction      Orientation:  Right and upper    Location:  Basilic    Procedural supplies:  Single lumen    Catheter size:  4 Fr (Ref#[id number],Lot#[id number],Exp09/30/25)    Total catheter length (cm):  40    Catheter out on skin (cm):  0    Max flow rate:  999ml/hr    Arm circumference:  30cm    Patient evaluated for contraindications to access (i.e. fistula, thrombosis, etc): Yes      Approach: percutaneous technique used      Patient position:  Flat    Ultrasound image availability:  Still images obtained    Sterile ultrasound techniques:  Sterile gel and sterile probe covers were used      Number of attempts:  1    Successful placement: yes      Landmarks identified: yes      Vessel of catheter tip end:  Sherlock 3CG confirmed  Anesthesia (see MAR for exact dosages):     Anesthesia method:  Local infiltration    Local anesthetic:  Lidocaine 1% w/o epi (2.5ml lido w/o epi used)  Post-procedure details:     Post-procedure:  Dressing applied and securement device placed    Assessment:  Blood return through all ports    Post-procedure complications: none      Patient tolerance of procedure:  Tolerated well, no immediate complications

## 2025-02-21 NOTE — OCCUPATIONAL THERAPY NOTE
Occupational Therapy Progress Note     Patient Name: Devin Chaves  Today's Date: 2/21/2025  Problem List  Principal Problem:    MSSA bacteremia  Active Problems:    COPD (chronic obstructive pulmonary disease) (HCC)    Back pain    Rheumatoid arthritis (HCC)    Atrial fibrillation (HCC)    Sepsis (HCC)    Acute urinary retention    Hypotension    Constipation    Anemia    Neck pain    Stroke (HCC)    Colonic mass              02/21/25 0821   OT Last Visit   OT Visit Date 02/21/25   Note Type   Note Type Treatment   Pain Assessment   Pain Assessment Tool 0-10   Pain Score 7   Pain Location/Orientation Location: Neck   Hospital Pain Intervention(s) Repositioned;Emotional support   Restrictions/Precautions   Weight Bearing Precautions Per Order No   Other Precautions Cognitive;Chair Alarm;Bed Alarm;O2;Fall Risk;Pain  (2L O2)   Lifestyle   Autonomy Prior to rehab, pt was independent in ADLs/IADLs however @ rehab was recieving assistance with functional needs and using RW for functional mobility   Reciprocal Relationships Lives with his wife and daughter   Service to Others Retired   Intrinsic Gratification Enjoys reading - especially WW2 books   ADL   UB Dressing Assistance 4  Minimal Assistance   UB Dressing Deficit Increased time to complete;Supervision/safety;Verbal cueing;Thread RUE;Thread LUE;Pull around back   UB Dressing Comments doff/donning gown   LB Dressing Assistance 1  Total Assistance   LB Dressing Deficit Don/doff R sock;Don/doff L sock   Bed Mobility   Supine to Sit 4  Minimal assistance   Additional items Assist x 1;Increased time required;Verbal cues;LE management;Bedrails;HOB elevated   Additional Comments sitting EOB with (S)   Transfers   Sit to Stand 3  Moderate assistance   Additional items Assist x 1;Increased time required;Verbal cues   Stand to Sit 3  Moderate assistance   Additional items Assist x 1;Increased time required;Verbal cues   Additional Comments use of RW   Functional Mobility  "  Functional Mobility 3  Moderate assistance   Additional Comments Ax1, limited distance forward and side stepping to recliner chair. Cuing for maintaining feet inside the RW   Additional items Rolling walker   Subjective   Subjective \"I don't know why they want me to eat just one pancake\"   Cognition   Overall Cognitive Status Impaired   Arousal/Participation Alert;Cooperative   Attention Attends with cues to redirect   Orientation Level Oriented X4   Memory Decreased short term memory;Decreased recall of recent events   Following Commands Follows one step commands with increased time or repetition   Comments pleasant and cooperative, limitd insight into deficits. Requiring cuing for safety throughout   Activity Tolerance   Activity Tolerance Patient limited by fatigue  (O2 sats maintaining >89% with activity on 2L)   Assessment   Assessment Pt seen on this date for skilled OT treatment session. At start of session pt supine in bed. Pt agreeable and motivated to participate in session. Pt demonstrating improved performance with bed mobility. Continues to be limited by functional transfers and functional mobility. Demonstrating consistent performance with dressing tasks. Pt requiring edu throughout session for pacing of tasks and PLB techniques to improve overall performance. Increased time for completing functional transfers. Pt would continue to benefit from skilled OT treatment sessions in order to address remaining deficits   Plan   Goal Expiration Date 02/28/25   OT Treatment Day 2   OT Frequency 3-5x/wk   Discharge Recommendation   Rehab Resource Intensity Level, OT II (Moderate Resource Intensity)   AM-PAC Daily Activity Inpatient   Lower Body Dressing 1   Bathing 2   Toileting 2   Upper Body Dressing 3   Grooming 3   Eating 4   Daily Activity Raw Score 15   Daily Activity Standardized Score (Calc for Raw Score >=11) 34.69   AM-PAC Applied Cognition Inpatient   Following a Speech/Presentation 3 "   Understanding Ordinary Conversation 4   Taking Medications 2   Remembering Where Things Are Placed or Put Away 3   Remembering List of 4-5 Errands 2   Taking Care of Complicated Tasks 2   Applied Cognition Raw Score 16   Applied Cognition Standardized Score 35.03   End of Consult   Patient Position at End of Consult Bedside chair;Bed/Chair alarm activated;All needs within reach       OT GOALS:     Pt will improve functional mobility during ADL/IADL/leisure tasks with S using AE/DME prn. PROGRESSING     Pt will improve activity tolerance/functional endurance during ADL/IADL/leisure tasks for at least 20 minutes to improve occupational performance and engagement in valued occupations using AE/DME prn.  PROGRESSING     Pt will engage in ongoing functional/formal cognitive assessments to assist with safe d/c planning and increase safety during functional tasks.     Pt will improve dynamic standing balance for at least 15 minutes with S during functional tasks to decrease fall risk and improve independence and engagement in ADL/IADL/leisure activities. PROGRESSING     Pt will follow 100% of multi-step commands in ADL/IADL/leisure activities to improve functional cognition used in functional daily routines. PROGRESSING     Pt will complete functional transfers on and off all surfaces used in daily routines with S for safety to maximize functional/occupational performance. PROGRESSING     Pt will complete all bed mobility tasks with Mod I to serve as a prerequisite for EOB/OOB ADL/IADL/leisure tasks, optimize positioning/comfort, and increase functional independence. PROGRESSING     Pt will independently demonstrate good carryover of safety precautions and education/training during ADL/IADL/leisure tasks with energy conservation techniques s/p skilled instruction without verbal cues. PROGRESSING     Pt will complete UB ADL tasks with Mod I using AE/DME prn to increase functional independence in ADL/IADL/leisure tasks.  PROGRESSING     Pt will complete LB ADL tasks with Mod I using AE/DME prn to increase functional independence in ADL/IADL/leisure tasks.  PROGRESSING     Pt will complete toileting tasks with Mod I and good hygiene/thoroughness using AE/DME prn to increase functional independence. PROGRESSING    Pt will independently identify and utilize 2-3 positive coping strategies to enhance overall wellbeing and engagement in valued occupations       The patient's raw score on the AM-PAC Daily Activity Inpatient Short Form is 15. A raw score of less than 19 suggests the patient may benefit from discharge to post-acute rehabilitation services. HOWEVER please refer to the recommendation of the Occupational Therapist for safe discharge planning.      Lisa Martinez MS, OTR/L

## 2025-02-21 NOTE — PLAN OF CARE
Problem: OCCUPATIONAL THERAPY ADULT  Goal: Performs self-care activities at highest level of function for planned discharge setting.  See evaluation for individualized goals.  Description: Treatment Interventions: ADL retraining, Functional transfer training, UE strengthening/ROM, Endurance training, Patient/family training, Equipment evaluation/education, Compensatory technique education, Continued evaluation, Energy conservation, Activityengagement          See flowsheet documentation for full assessment, interventions and recommendations.   Outcome: Progressing  Note: Limitation: Decreased ADL status, Decreased endurance, Decreased self-care trans, Decreased high-level ADLs  Prognosis: Fair  Assessment: Pt seen on this date for skilled OT treatment session. At start of session pt supine in bed. Pt agreeable and motivated to participate in session. Pt demonstrating improved performance with bed mobility. Continues to be limited by functional transfers and functional mobility. Demonstrating consistent performance with dressing tasks. Pt requiring edu throughout session for pacing of tasks and PLB techniques to improve overall performance. Increased time for completing functional transfers. Pt would continue to benefit from skilled OT treatment sessions in order to address remaining deficits     Rehab Resource Intensity Level, OT: II (Moderate Resource Intensity)

## 2025-02-21 NOTE — ASSESSMENT & PLAN NOTE
- Colonoscopy 2/20 - Proximal ascending colon-across from the ileocecal valve there is a 2.5 cm hard friable polypoid mass appears malignant status post multiple biopsies  - CT chest/abd/pelvis 2/20 - Wall thickening at the ileocecal valve/cecum compatible with colonic mass discovered on recent colonoscopy. Posterior splenic infarct. Small loculated left pleural effusion. Left lower lobe atelectasis  - Colorectal surgery input appreciated, no acute surgical intervention warranted at this time, plan to follow up as outpatient  - CEA pending  - Follow up biopsy results

## 2025-02-21 NOTE — ASSESSMENT & PLAN NOTE
PMR requesting orthopedic evaluation for chronic L ankle deformity to confirm patient has correct brace  Orthopedic surgery instructed to contact podiatry    Plan:  Podiatry consulted  Recommended outpatient follow-up given chronic condition  Will provide referral at discharge

## 2025-02-21 NOTE — ASSESSMENT & PLAN NOTE
- Resolved s/p recent GoLytely bowel prep  - monitor for constipation, incontinence, and diarrhea  - ensure appropriate hydration; wean constipating meds if and when possible (if applicable)    - goal 1 appropriate BM every 1-2 days  - monitor for signs and symptoms of obstruction/ileus > not currently; hold stimulant lax if occurs  - Limiting constipating medications if possible  - Ensure adequate hydration

## 2025-02-21 NOTE — ASSESSMENT & PLAN NOTE
Admission Blood cultures labeled same arm/same time are + for MSSA. 2/12/25 TTE negative for vegetation. No PPM, intravascular devices. Acute posterior neck pain predominant starting 2/14/25 and possible deep cervical infection evident on MRI C spine.  Bacteremia cleared  -continues Cefazolin 2 g IV q 8 hours  -right arm PICC placed 2/21/25   -anticipate protracted IV antibiotic course as below

## 2025-02-21 NOTE — ASSESSMENT & PLAN NOTE
Recent Labs     02/19/25  0950 02/20/25  0613 02/21/25  0558   HGB 8.6* 8.0* 8.2*     Lab Results   Component Value Date    IRON 37 (L) 02/14/2025    FERRITIN 147 02/14/2025    TIBC 166.6 (L) 02/14/2025    CONCFE 22 02/14/2025     B12 972, Folate 14  Stable    Plan:  Transfuse if <7   Patient required 2 units packed red blood cells overnight on 2/13  GI consult, appreciate recs  Colonoscopy on 2/20/25, small friable colonic mass found concerning for malignancy  Colorectal surgery consulted, will f/u outpatient

## 2025-02-21 NOTE — ASSESSMENT & PLAN NOTE
MRI brain: Multiple tiny recent infarcts scattered in multiple vascular distributions that are likely embolic. Several microhemorrhages associated with recent infarcts. No acute space-occupying hematoma. Chronic ischemic changes.  NCCTH 2/17: 3 mm focus of hyperdensity left superior frontal lobe as described unchanged from the prior study may represent a small 3 mm hemorrhage versus focus of mineralization.  NCCTH 2/20: Stable, no additional hemorrhage    Plan:  Eliquis 5 mg BID restart on 2/21/25  Monitor for bleeding  Continue aspirin per neurology

## 2025-02-21 NOTE — ASSESSMENT & PLAN NOTE
79 y.o. male with COPD on 2L NC at baseline, CAD, Afib on Eliquis, and rheumatoid arthritis who presented with shortness of breath on 2/11 found to have gram positive bacteremia, hemoglobin of 6.7 on 2/13 requiring 2 units PRBC and improved to 8.4. Previous Hgb 12.6 in 2023. 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 Also gastric nodule noted requiring EUS, unclear if this has been done.  - Patient reported constipation, per chart review received Colace, Miralax, Senokot, Dulcolax on 2/17 as well as 1/2 of GoLytely bowel prep. Subsequently noted to have dark stools with dark red liquid  - s/p EGD and colonoscopy 2/20. Proximal ascending colon-across from the ileocecal valve there is a 2.5 cm hard friable polypoid mass appears malignant status post multiple biopsies. EGD normal.   - Continue pantoprazole  - No further episodes of dark stools per nursing. Continue to monitor for overt signs of bleeding and transfuse for Hgb goal >7

## 2025-02-22 ENCOUNTER — TELEPHONE (OUTPATIENT)
Dept: OTHER | Facility: OTHER | Age: 80
End: 2025-02-22

## 2025-02-22 VITALS
HEIGHT: 76 IN | SYSTOLIC BLOOD PRESSURE: 126 MMHG | OXYGEN SATURATION: 97 % | HEART RATE: 82 BPM | DIASTOLIC BLOOD PRESSURE: 58 MMHG | RESPIRATION RATE: 17 BRPM | BODY MASS INDEX: 22.9 KG/M2 | WEIGHT: 188.05 LBS | TEMPERATURE: 98 F

## 2025-02-22 PROCEDURE — 99239 HOSP IP/OBS DSCHRG MGMT >30: CPT | Performed by: INTERNAL MEDICINE

## 2025-02-22 RX ORDER — POTASSIUM CHLORIDE 1500 MG/1
40 TABLET, EXTENDED RELEASE ORAL ONCE
Status: COMPLETED | OUTPATIENT
Start: 2025-02-22 | End: 2025-02-22

## 2025-02-22 RX ORDER — SENNOSIDES 8.6 MG
8.6 TABLET ORAL
Status: ON HOLD
Start: 2025-02-22

## 2025-02-22 RX ORDER — CEFAZOLIN SODIUM 2 G/50ML
2000 SOLUTION INTRAVENOUS EVERY 8 HOURS
Status: ON HOLD
Start: 2025-02-22 | End: 2025-03-27

## 2025-02-22 RX ORDER — LEVALBUTEROL INHALATION SOLUTION 1.25 MG/3ML
1.25 SOLUTION RESPIRATORY (INHALATION) EVERY 8 HOURS PRN
Status: ON HOLD
Start: 2025-02-22

## 2025-02-22 RX ORDER — LIDOCAINE 50 MG/G
3 PATCH TOPICAL DAILY
Status: ON HOLD
Start: 2025-02-23

## 2025-02-22 RX ORDER — PANTOPRAZOLE SODIUM 40 MG/1
40 TABLET, DELAYED RELEASE ORAL
Status: DISCONTINUED | OUTPATIENT
Start: 2025-02-22 | End: 2025-02-22 | Stop reason: HOSPADM

## 2025-02-22 RX ORDER — POLYETHYLENE GLYCOL 3350 17 G/17G
17 POWDER, FOR SOLUTION ORAL DAILY PRN
Status: ON HOLD
Start: 2025-02-22

## 2025-02-22 RX ORDER — TAMSULOSIN HYDROCHLORIDE 0.4 MG/1
0.4 CAPSULE ORAL
Status: ON HOLD
Start: 2025-02-22

## 2025-02-22 RX ORDER — ALBUTEROL SULFATE 90 UG/1
2 INHALANT RESPIRATORY (INHALATION) EVERY 4 HOURS PRN
Status: ON HOLD
Start: 2025-02-22

## 2025-02-22 RX ADMIN — LIDOCAINE 1 PATCH: 50 PATCH CUTANEOUS at 09:57

## 2025-02-22 RX ADMIN — CEFAZOLIN SODIUM 2000 MG: 2 SOLUTION INTRAVENOUS at 09:55

## 2025-02-22 RX ADMIN — LIDOCAINE 1 PATCH: 50 PATCH CUTANEOUS at 09:56

## 2025-02-22 RX ADMIN — SERTRALINE HYDROCHLORIDE 12.5 MG: 25 TABLET ORAL at 09:55

## 2025-02-22 RX ADMIN — CEFAZOLIN SODIUM 2000 MG: 2 SOLUTION INTRAVENOUS at 00:38

## 2025-02-22 RX ADMIN — POTASSIUM CHLORIDE 40 MEQ: 1500 TABLET, EXTENDED RELEASE ORAL at 09:56

## 2025-02-22 RX ADMIN — METOPROLOL TARTRATE 25 MG: 25 TABLET, FILM COATED ORAL at 09:56

## 2025-02-22 RX ADMIN — METHOCARBAMOL TABLETS 500 MG: 500 TABLET, COATED ORAL at 13:01

## 2025-02-22 RX ADMIN — SUCRALFATE 1 G: 1 TABLET ORAL at 11:52

## 2025-02-22 RX ADMIN — HYDROXYCHLOROQUINE SULFATE 400 MG: 200 TABLET ORAL at 11:52

## 2025-02-22 RX ADMIN — DOCUSATE SODIUM 100 MG: 100 CAPSULE, LIQUID FILLED ORAL at 09:56

## 2025-02-22 RX ADMIN — SUCRALFATE 1 G: 1 TABLET ORAL at 06:39

## 2025-02-22 RX ADMIN — GUAIFENESIN 1200 MG: 600 TABLET, EXTENDED RELEASE ORAL at 09:56

## 2025-02-22 RX ADMIN — PANTOPRAZOLE SODIUM 40 MG: 40 INJECTION, POWDER, FOR SOLUTION INTRAVENOUS at 09:55

## 2025-02-22 RX ADMIN — APIXABAN 5 MG: 5 TABLET, FILM COATED ORAL at 09:56

## 2025-02-22 RX ADMIN — METHOCARBAMOL TABLETS 500 MG: 500 TABLET, COATED ORAL at 05:39

## 2025-02-22 RX ADMIN — FLUTICASONE FUROATE 1 PUFF: 100 POWDER RESPIRATORY (INHALATION) at 09:55

## 2025-02-22 RX ADMIN — ASPIRIN 81 MG CHEWABLE TABLET 81 MG: 81 TABLET CHEWABLE at 09:56

## 2025-02-22 NOTE — INCIDENTAL FINDINGS
The following findings require follow up:  Radiographic finding   Finding: Posterior splenic infarct     Follow up required: None    Incidental finding results were discussed with the Patient by Mal Neely DO on 02/22/25.   They expressed understanding and all questions answered.

## 2025-02-22 NOTE — CASE MANAGEMENT
Case Management Discharge Planning Note    Patient name Devin Chaves  Location S /S -01 MRN [id number]  : 1945 Date 2025       Current Admission Date: 2025  Current Admission Diagnosis:MSSA bacteremia   Patient Active Problem List    Diagnosis Date Noted Date Diagnosed    Left ankle joint deformity 2025     Colonic mass 2025     Stroke (HCC) 02/15/2025     Neck pain 2025     Anemia 2025     MSSA bacteremia 2025     Sepsis ([facility]) 2025     Acute urinary retention 2025     Hypotension 2025     Constipation 2025     Atrial fibrillation ([facility]) 2025     Hip region mass, unspecified laterality 2023     Chronic hypoxic respiratory failure ([facility]) 2023     Fracture of multiple ribs of right side 2023     Rheumatoid arthritis ([facility]) 2023     Moderate protein-calorie malnutrition ([facility]) 2023     Hypertension 2023     CAD (coronary artery disease) 2023     COPD (chronic obstructive pulmonary disease) ([facility]) 2023     JAZMINE (obstructive sleep apnea) 2023     Back pain 2023     COVID-19 2023     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  [address]  [facility] [address]  Phone: [phone] Fax: [phone]    [facility] [place] - [address]  [address]  [address]  [facility] [address]  Phone: [phone] Fax: [phone]    [facility] [place] - [address]  [address]  [place]  [address]  Phone: [phone] Fax: [phone]    Primary Care Provider: Oswaldo Muniz DO    Primary Insurance: [facility] [employer] [facility]  Secondary Insurance: [employer] REP    DISCHARGE DETAILS:             CM contacted family/caregiver?: Yes             Contacts  Patient Contacts: Francia (spouse)  Relationship to Patient:: Family  Contact Method: Phone  Phone Number: [phone]  Reason/Outcome: Continuity of Care, Emergency Contact, Discharge Planning, Referral              Other Referral/Resources/Interventions Provided:  Interventions: Transportation  Referral Comments: Per SLIM - patient medically stable for d/c today. CM f/u w/ [facility] via aidin re: same - confirmed able to accept patient today. Transport referral placed to [facility], confirmed for 1400 via [facility] to [facility] today. All parties notified of same.         Treatment Team Recommendation: Short Term Rehab  Discharge Destination Plan:: Short Term Rehab  Transport at Discharge : [facility]        Transported by (Company and Unit #): [facility]  ETA of Transport (Date): 02/22/25  ETA of Transport (Time): 1400                            Accepting Facility Name, City & State : [facility]  Receiving Facility/Agency Phone Number: [phone]    Facility/Agency Fax Number: [phone]

## 2025-02-22 NOTE — PROGRESS NOTES
The pantoprazole has / have been converted to Oral per [facility] IV-to-PO Auto-Conversion Protocol for Adults as approved by the Pharmacy and Therapeutics Committee. The patient met all eligible criteria:  1) Age = 18 years old   2) Received at least one dose of the IV form   3) Receiving at least one other scheduled oral/enteral medication   4) Tolerating an oral/enteral diet   and did not have any exclusions:   1) Critical care patient   2) Active GI bleed (IF assessing H2RAs or PPIs)   3) Continuous tube feeding (IF assessing cipro, doxycycline, levofloxacin, minocycline, rifampin, or voriconazole)   4) Receiving PO vancomycin (IF assessing metronidazole)   5) Persistent nausea and/or vomiting   6) Ileus or gastrointestinal obstruction   7) Xiomara/nasogastric tube set for continuous suction   8) Specific order not to automatically convert to PO (in the order's comments or if discussed in the most recent Infectious Disease or primary team's progress notes).

## 2025-02-22 NOTE — DISCHARGE SUMMARY
Discharge Summary - Hospitalist   Name: Devin Chaves 79 y.o. male I MRN: [id number]  Unit/Bed#: S -01 I Date of Admission: 2/11/2025   Date of Service: 2/22/2025 I Hospital Day: 11     Assessment & Plan  MSSA bacteremia  Blood cultures with Gram + cocci, likely Staph Aureus  TTE negative for vegetations  Patient mentioned small laceration from [facility] [employer] with pustule formation, possible source at there is myositis to that area  MRI C-spine: Mild nonspecific edema within the right posterior paraspinal musculature of the upper cervical spine. Minimal peripheral enhancement is noted and differential considerations would include infectious/inflammatory myositis with soft tissue abscess. No osteomyelitis or discitis. No cord compression or abnormal cord signal.  MRI Brain shows possible multifocal infarction    Plan:  Continue cefazolin 2 g IV q8 hrs until 3/27/25  PICC line placed on 2/21/25  ID consulted, appreciate recs  Follow repeat blood cultures, NGTD  Will f/u outpatient on 3/3/25  Anemia  Recent Labs     02/19/25  0950 02/20/25  0613 02/21/25  0558   HGB 8.6* 8.0* 8.2*     Lab Results   Component Value Date    IRON 37 (L) 02/14/2025    FERRITIN 147 02/14/2025    TIBC 166.6 (L) 02/14/2025    CONCFE 22 02/14/2025     B12 972, Folate 14  Stable    Plan:  Colonoscopy on 2/20/25, small friable colonic mass found concerning for malignancy  Colorectal surgery consulted, will f/u outpatient  Stroke (HCC)  MRI brain: Multiple tiny recent infarcts scattered in multiple vascular distributions that are likely embolic. Several microhemorrhages associated with recent infarcts. No acute space-occupying hematoma. Chronic ischemic changes.  NCCTH 2/17: 3 mm focus of hyperdensity left superior frontal lobe as described unchanged from the prior study may represent a small 3 mm hemorrhage versus focus of mineralization.  NCCTH 2/20: Stable, no additional hemorrhage    Plan:  Eliquis 5 mg BID  Continue aspirin per  "neurology  Sepsis ([facility])  Meeting SIRS criteria for tachycardia (HR ), tachypnea (RR 16-28), and leukocytosis (12.32)  The most recent MRI lumbar spine unremarkable  The most recent MRI C-spine shows possible soft tissue infection  The most recent MRI contrast of the brain shows possible multifocal embolic infarction with hemorrhagic conversion, most likely septic emboli s/p cefazolin IV.    Plan:  See \"Gram positive bacteremia\"  Hypotension  MAP as low as 62 in the ED per automated cuff  Home Htn meds: Metoprolol tartrate 25 mg BID, Losartan 25 mg QD  Losartan recent decreased from 50 mg due to concerns for hypotension  Blood Pressure: 107/52    Plan:  Monitor VS including BP.   If hypotension recurs, confirm via manual, assess symptoms; suggest fluid bolus/albumin and discussion w crit care.  Continue home metoprolol tartrate 25 mg BID on discharge  Hold losartan 25 mg QD - Will plan to conitnue to hold on discharge until f/u with PCP  COPD (chronic obstructive pulmonary disease) ([facility])  Pt w COPD on 2L supplemental O2 at baseline.  Currently requiring 3L, above baseline of 2L NC  TTE 2/12/25: EF 55%, Normal systolic dysfunction, G1DD, No vegetation, No mural thrombus, moderate aortic sclerosis    Plan:  Nebs TID  Titrate O2 to maintain SpO2 greater than 88%  Acute urinary retention    Suspect new urinary retention 2/2 BPH  US Kidney/Bladder 2/11/25: No hydronephrosis, moderate bladder distention, perinephric edema on L side.  Patient endorses improvement of retention with initiation of tamsulosin    Plan:  Tamsulosin 0.4 mg QD  Urinary retention protocol  Atrial fibrillation ([facility])  Patient rate controlled since admission    Plan:  Eliquis 5 mg BID  Continue home metoprolol   Back pain  MRI L-Spine: Negative for acute pathology  MRI C-spine shows possible soft tissue infection status post IV cefazolin  ID on board    Plan:  Lidocaine patches  Robaxin 500 mg q8 hrs scheduled  Tylenol 650 mg q6 hrs prn for mild " pain  Neck pain  The most recent MRI C-spine shows possible soft tissue infection in the background of bacteremia    Plan:  Lidocaine patch  Constipation  Patient with large BM with blood yesterday    Plan:  Colace BID  Miralax daily  Senna prn  Rheumatoid arthritis (HCC)  Home med: Hydroxychloroquine 400 mg QD    Plan:  Continue home hydroxychloroquine  Colonic mass  During colonoscopy on 2/20/25, small friable colonic mass found  CT CAP w Contrast  CEA 3.1     Plan:  Pathology pending  F/u with GI outpatient  Left ankle joint deformity  PMR requesting orthopedic evaluation for chronic L ankle deformity to confirm patient has correct brace  Orthopedic surgery instructed to contact podiatry    Plan:  Podiatry consulted  Recommended outpatient follow-up given chronic condition  Will provide referral at discharge     Medical Problems       Resolved Problems  Date Reviewed: 12/16/2023   None       Discharging Physician / Practitioner: Mal Neely DO  PCP: Oswaldo Muniz DO  Admission Date:   Admission Orders (From admission, onward)       Ordered        02/11/25 1411  INPATIENT ADMISSION  Once                          Discharge Date: 02/22/25    Consultations During Hospital Stay:  Gastroenterology  Colorectal surgery  Infectious disease  PMR    Procedures Performed:   EGD/Colonscopy    Significant Findings / Test Results:   2.5 m mass near ileocecal valve    Incidental Findings:   Posterior splenic infarct  I reviewed the above mentioned incidental findings with the patient and/or family and they expressed understanding.    Test Results Pending at Discharge (will require follow up):   Pathology studies from colonoscopy     Outpatient Tests Requested:  None    Complications:  None    Reason for Admission: COPD exacerbation    Hospital Course:   Devin Chaves is a 79 y.o. male patient who originally presented to the hospital on 2/11/2025 due to COPD exacerbation, complicated by MSSA bacteremia    [facility]  Course: Devin Chaves is a 79 y.o. male with a PMH of COPD, chronic respiratory failure, JAZMINE, coronary artery disease, atrial fibrillation, rheumatoid arthritis, hypertension, aortic ectasia, stroke, enlarged prostate, who presented from [facility] facility for shortness of breath on 2/11/25.  Patient was recently admitted from 12/10/24-2/6/25 for COPD flare, discharged 2/6/25 to [place] [facility] [facility].  On arrival to ED patient was treated for COPD exacerbation with nebulizer treatments meet sepsis criteria initially for tachycardia tachypnea and leukocytosis with suspected source UTI given recent episode of urinary incontinence.  However UA did not show signs of infection patient was noted to have multiple abrasions on his hands and arms.  Blood cultures did demonstrate gram-positive bacteremia determined to be MSSA bacteremia patient was started on cefazolin.  ID was consulted at which point decision was made for long-term course of antibiotics to end on 3/27/2025.  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  Pathology was taken and still pending, CEA 3.1.  Colorectal surgery consulted, will follow-up with patient following discharge for possible surgical planning.  This time patient is medically cleared for discharge and continuation of IV cefazolin every 8 hours until 3/27/2025.  Patient will be discharged to [place] [facility].      Please see above list of diagnoses and related plan for additional information.     Condition at Discharge: stable    Discharge Day Visit / Exam:   Subjective: Patient seen and examined at bedside this morning, continues to complain of neck and back pain however he is amenable for discharge to [place] for acute rehab.  Plan explained to him including follow-up with infectious disease and  "colorectal surgery.  Vitals: Blood Pressure: 107/52 (02/21/25 2223)  Pulse: 69 (02/21/25 2223)  Temperature: 98.6 °F (37 °C) (02/21/25 2223)  Temp Source: Oral (02/21/25 1601)  Respirations: 17 (02/21/25 1601)  Height: 6' 4\" (193 cm) (02/12/25 1015)  Weight - Scale: 85.3 kg (188 lb 0.8 oz) (02/22/25 0600)  SpO2: 98 % (02/21/25 2223)  Physical Exam  Vitals and nursing note reviewed.   Constitutional:       General: He is not in acute distress.     Appearance: He is well-developed.   HENT:      Head: Normocephalic and atraumatic.   Eyes:      Conjunctiva/sclera: Conjunctivae normal.   Cardiovascular:      Rate and Rhythm: Normal rate and regular rhythm.      Heart sounds: No murmur heard.  Pulmonary:      Effort: Pulmonary effort is normal. No respiratory distress.      Breath sounds: Normal breath sounds.   Abdominal:      Palpations: Abdomen is soft.      Tenderness: There is no abdominal tenderness.   Musculoskeletal:         General: No swelling.      Cervical back: Neck supple.      Comments: Left ankle deformity with left foot edema   Skin:     General: Skin is warm and dry.      Capillary Refill: Capillary refill takes less than 2 seconds.      Comments: Scattered well-healing abrasions  Mild redness in sacral region   Neurological:      Mental Status: He is alert.   Psychiatric:         Mood and Affect: Mood normal.          Discussion with Family: Updated  (wife and daughter) via phone.    Discharge instructions/Information to patient and family:   See after visit summary for information provided to patient and family.      Provisions for Follow-Up Care:  See after visit summary for information related to follow-up care and any pertinent home health orders.      Mobility at time of Discharge:   Basic Mobility Inpatient Raw Score: 10  JH-HLM Goal: 4: Move to chair/commode  JH-HLM Achieved: 4: Move to chair/commode  HLM Goal achieved. Continue to encourage appropriate mobility.     Disposition: "   Acute Rehab at [place] [facility]    Planned Readmission: No    Discharge Medications:  See after visit summary for reconciled discharge medications provided to patient and/or family.      Administrative Statements   Discharge Statement:      **Please Note: This note may have been constructed using a voice recognition system**

## 2025-02-22 NOTE — PLAN OF CARE
Problem: Prexisting or High Potential for Compromised Skin Integrity  Goal: Skin integrity is maintained or improved  Description: INTERVENTIONS:  - Identify patients at risk for skin breakdown  - Assess and monitor skin integrity  - Assess and monitor nutrition and hydration status  - Monitor labs   - Assess for incontinence   - Turn and reposition patient  - Assist with mobility/ambulation  - Relieve pressure over bony prominences  - Avoid friction and shearing  - Provide appropriate hygiene as needed including keeping skin clean and dry  - Evaluate need for skin moisturizer/barrier cream  - Collaborate with interdisciplinary team   - Patient/family teaching  - Consider wound care consult   Outcome: Progressing     Problem: PAIN - ADULT  Goal: Verbalizes/displays adequate comfort level or baseline comfort level  Description: Interventions:  - Encourage patient to monitor pain and request assistance  - Assess pain using appropriate pain scale  - Administer analgesics based on type and severity of pain and evaluate response  - Implement non-pharmacological measures as appropriate and evaluate response  - Consider cultural and social influences on pain and pain management  - Notify physician/advanced practitioner if interventions unsuccessful or patient reports new pain  Outcome: Progressing     Problem: INFECTION - ADULT  Goal: Absence or prevention of progression during hospitalization  Description: INTERVENTIONS:  - Assess and monitor for signs and symptoms of infection  - Monitor lab/diagnostic results  - Monitor all insertion sites, i.e. indwelling lines, tubes, and drains  - Monitor endotracheal if appropriate and nasal secretions for changes in amount and color  - [place] appropriate cooling/warming therapies per order  - Administer medications as ordered  - Instruct and encourage patient and family to use good hand hygiene technique  - Identify and instruct in appropriate isolation precautions for  identified infection/condition  Outcome: Progressing  Goal: Absence of fever/infection during neutropenic period  Description: INTERVENTIONS:  - Monitor WBC    Outcome: Progressing     Problem: SAFETY ADULT  Goal: Patient will remain free of falls  Description: INTERVENTIONS:  - Educate patient/family on patient safety including physical limitations  - Instruct patient to call for assistance with activity   - Consult OT/PT to assist with strengthening/mobility   - Keep Call bell within reach  - Keep bed low and locked with side rails adjusted as appropriate  - Keep care items and personal belongings within reach  - Initiate and maintain comfort rounds  - Make Fall Risk Sign visible to staff  - Offer Toileting every  Hours, in advance of need  - Initiate/Maintain alarm  - Obtain necessary fall risk management equipment:   - Apply yellow socks and bracelet for high fall risk patients  - Consider moving patient to room near nurses station  Outcome: Progressing  Goal: Maintain or return to baseline ADL function  Description: INTERVENTIONS:  -  Assess patient's ability to carry out ADLs; assess patient's baseline for ADL function and identify physical deficits which impact ability to perform ADLs (bathing, care of mouth/teeth, toileting, grooming, dressing, etc.)  - Assess/evaluate cause of self-care deficits   - Assess range of motion  - Assess patient's mobility; develop plan if impaired  - Assess patient's need for assistive devices and provide as appropriate  - Encourage maximum independence but intervene and supervise when necessary  - Involve family in performance of ADLs  - Assess for home care needs following discharge   - Consider OT consult to assist with ADL evaluation and planning for discharge  - Provide patient education as appropriate  Outcome: Progressing  Goal: Maintains/Returns to pre admission functional level  Description: INTERVENTIONS:  - Perform AM-PAC 6 Click Basic Mobility/ Daily Activity  assessment daily.  - Set and communicate daily mobility goal to care team and patient/family/caregiver.   - Collaborate with rehabilitation services on mobility goals if consulted  - Perform Range of Motion  times a day.  - Reposition patient every  hours.  - Dangle patient  times a day  - Stand patient  times a day  - Ambulate patient  times a day  - Out of bed to chair  times a day   - Out of bed for meals  times a day  - Out of bed for toileting  - Record patient progress and toleration of activity level   Outcome: Progressing     Problem: DISCHARGE PLANNING  Goal: Discharge to home or other facility with appropriate resources  Description: INTERVENTIONS:  - Identify barriers to discharge w/patient and caregiver  - Arrange for needed discharge resources and transportation as appropriate  - Identify discharge learning needs (meds, wound care, etc.)  - Arrange for interpretive services to assist at discharge as needed  - Refer to Case Management Department for coordinating discharge planning if the patient needs post-hospital services based on physician/advanced practitioner order or complex needs related to functional status, cognitive ability, or social support system  Outcome: Progressing     Problem: Knowledge Deficit  Goal: Patient/family/caregiver demonstrates understanding of disease process, treatment plan, medications, and discharge instructions  Description: Complete learning assessment and assess knowledge base.  Interventions:  - Provide teaching at level of understanding  - Provide teaching via preferred learning methods  Outcome: Progressing     Problem: Nutrition/Hydration-ADULT  Goal: Nutrient/Hydration intake appropriate for improving, restoring or maintaining nutritional needs  Description: Monitor and assess patient's nutrition/hydration status for malnutrition. Collaborate with interdisciplinary team and initiate plan and interventions as ordered.  Monitor patient's weight and dietary intake as  ordered or per policy. Utilize nutrition screening tool and intervene as necessary. Determine patient's food preferences and provide high-protein, high-caloric foods as appropriate.     INTERVENTIONS:  - Monitor oral intake, urinary output, labs, and treatment plans  - Assess nutrition and hydration status and recommend course of action  - Evaluate amount of meals eaten  - Assist patient with eating if necessary   - Allow adequate time for meals  - Recommend/ encourage appropriate diets, oral nutritional supplements, and vitamin/mineral supplements  - Order, calculate, and assess calorie counts as needed  - Recommend, monitor, and adjust tube feedings and TPN/PPN based on assessed needs  - Assess need for intravenous fluids  - Provide specific nutrition/hydration education as appropriate  - Include patient/family/caregiver in decisions related to nutrition  Outcome: Progressing     Problem: Neurological Deficit  Goal: Neurological status is stable or improving  Description: Interventions:  - Monitor and assess patient's level of consciousness, motor function, sensory function, and level of assistance needed for ADLs.   - Monitor and report changes from baseline. Collaborate with interdisciplinary team to initiate plan and implement interventions as ordered.   - Provide and maintain a safe environment.  - Consider seizure precautions.  - Consider fall precautions.  - Consider aspiration precautions.  - Consider bleeding precautions.  Outcome: Progressing     Problem: Activity Intolerance/Impaired Mobility  Goal: Mobility/activity is maintained at optimum level for patient  Description: Interventions:  - Assess and monitor patient  barriers to mobility and need for assistive/adaptive devices.  - Assess patient's emotional response to limitations.  - Collaborate with interdisciplinary team and initiate plans and interventions as ordered.  - Encourage independent activity per ability.  - Maintain proper body  alignment.  - Perform active/passive rom as tolerated/ordered.  - Plan activities to conserve energy.  - Turn patient as appropriate  Outcome: Progressing     Problem: Communication Impairment  Goal: Ability to express needs and understand communication  Description: Assess patient's communication skills and ability to understand information.  Patient will demonstrate use of effective communication techniques, alternative methods of communication and understanding even if not able to speak.     - Encourage communication and provide alternate methods of communication as needed.  - Collaborate with case management/ for discharge needs.  - Include patient/family/caregiver in decisions related to communication.  Outcome: Progressing     Problem: Potential for Aspiration  Goal: Non-ventilated patient's risk of aspiration is minimized  Description: Assess and monitor vital signs, respiratory status, and labs (WBC).  Monitor for signs of aspiration (tachypnea, cough, rales, wheezing, cyanosis, fever).    - Assess and monitor patient's ability to swallow.  - Place patient up in chair to eat if possible.  - HOB up at 90 degrees to eat if unable to get patient up into chair.  - Supervise patient during oral intake.   - Instruct patient/ family to take small bites.  - Instruct patient/ family to take small single sips when taking liquids.  - Follow patient-specific strategies generated by speech pathologist.  Outcome: Progressing  Goal: Ventilated patient's risk of aspiration is minimized  Description: Assess and monitor vital signs, respiratory status, airway cuff pressure, and labs (WBC).  Monitor for signs of aspiration (tachypnea, cough, rales, wheezing, cyanosis, fever).    - Elevate head of bed 30 degrees if patient has tube feeding.  - Monitor tube feeding.  Outcome: Progressing     Problem: Nutrition  Goal: Nutrition/Hydration status is improving  Description: Monitor and assess patient's  nutrition/hydration status for malnutrition (ex- brittle hair, bruises, dry skin, pale skin and conjunctiva, muscle wasting, smooth red tongue, and disorientation). Collaborate with interdisciplinary team and initiate plan and interventions as ordered.  Monitor patient's weight and dietary intake as ordered or per policy. Utilize nutrition screening tool and intervene per policy. Determine patient's food preferences and provide high-protein, high-caloric foods as appropriate.     - Assist patient with eating.  - Allow adequate time for meals.  - Encourage patient to take dietary supplement as ordered.  - Collaborate with clinical nutritionist.  - Include patient/family/caregiver in decisions related to nutrition.  Outcome: Progressing

## 2025-02-22 NOTE — INCIDENTAL FINDINGS
The following findings require follow up:  Radiographic finding   Finding: Noncalcified left lung apex pulmonary nodule, 6 x 5 mm. Because this was not present on prior chest CT of December 4, 2023, conservative management with follow-up low radiation dose noncontrast chest CT in 6 months is recommended.    Follow up required: CT Chest   Follow up should be done within 6 month(s)    Please notify the following clinician to assist with the follow up:  PCP    Incidental finding results were discussed with the Patient by Mal Neely DO on 02/22/25.   They expressed understanding and all questions answered.

## 2025-02-22 NOTE — ASSESSMENT & PLAN NOTE
MAP as low as 62 in the ED per automated cuff  Home Htn meds: Metoprolol tartrate 25 mg BID, Losartan 25 mg QD  Losartan recent decreased from 50 mg due to concerns for hypotension  Blood Pressure: 107/52    Plan:  Monitor VS including BP.   If hypotension recurs, confirm via manual, assess symptoms; suggest fluid bolus/albumin and discussion w crit care.  Continue home metoprolol tartrate 25 mg BID on discharge  Hold losartan 25 mg QD - Will plan to conitnue to hold on discharge until f/u with PCP

## 2025-02-22 NOTE — ASSESSMENT & PLAN NOTE
MRI brain: Multiple tiny recent infarcts scattered in multiple vascular distributions that are likely embolic. Several microhemorrhages associated with recent infarcts. No acute space-occupying hematoma. Chronic ischemic changes.  [facility] 2/17: 3 mm focus of hyperdensity left superior frontal lobe as described unchanged from the prior study may represent a small 3 mm hemorrhage versus focus of mineralization.  [facility] 2/20: Stable, no additional hemorrhage    Plan:  Eliquis 5 mg BID  Continue aspirin per neurology

## 2025-02-22 NOTE — DISCHARGE INSTR - AVS FIRST PAGE
Dear Devin Chaves,     It was our pleasure to care for you here at [facility].  It is our hope that we were always able to exceed the expected standards for your care during your stay.  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  You were cared for on the 3rd floor by Mal Neely DO under the service of Maximino Conroy MD with the [facility] who covers for your primary care physician (PCP), Oswaldo Muniz DO, while you were hospitalized.  If you have any questions or concerns related to this hospitalization, you may contact us at .  For follow up as well as any medication refills, we recommend that you follow up with your primary care physician.  A registered nurse will reach out to you by phone within a few days after your discharge to answer any additional questions that you may have after going home.  However, at this time we provide for you here, the most important instructions / recommendations at discharge:     Notable Medication Adjustments -   Start cefazolin 2 g every 8 hours until 3/27/2025  Start Lidocaine patches, 3 patches every 12 hrs as needed for neck and back pain  Start Miralax 17g daily as needed for constipation  Start Senna 1 capsule as needed for constipation  Start Albuterol inhaler 2 puffs every 4 hrs as needed for shortness of breath  STOP Losartan 25 mg   STOP Meloxicam  STOP Medrol  Continute all other medications as previously prescribed prior to this hospital admission  Testing Required after Discharge -   CBC with differential, serum creatinine, ESR, CRP every week until completion of antibiotics  Important follow up information -   Please follow-up with infectious disease on 3/3/2025  Follow-up with podiatry in following discharge, referral has been placed  Please follow-up with colorectal surgeon for discussion of management of  colonic mass, referral has been placed  Please follow-up with psychiatry after discharge, referral has been placed  Please follow-up with podiatry for evaluation of left ankle and brace fitting following discharge, referral has been placed  Please make an appointment with your primary care physician within 1 week of discharge as soon as possible - referral has been placed to [facility] internal medicine  Other Instructions -   Please continue to work with physical therapy to improve strength and ambulation following discharge  Call colorectal surgery if concern for more black stools or rectal bleeding, if large amount return to ED  Please review this entire after visit summary as additional general instructions including medication list, appointments, activity, diet, any pertinent wound care, and other additional recommendations from your care team that may be provided for you.      Sincerely,     Mal Neely, DO

## 2025-02-22 NOTE — HOSPITAL COURSE
Devin Chaves is a 79 y.o. male with a PMH of COPD, chronic respiratory failure, JAZMINE, coronary artery disease, atrial fibrillation, rheumatoid arthritis, hypertension, aortic ectasia, stroke, enlarged prostate, who presented from [facility] for shortness of breath on 2/11/25.  Patient was recently admitted from 12/10/24-2/6/25 for COPD flare, discharged 2/6/25 to [facility].  On arrival to ED patient was treated for COPD exacerbation with nebulizer treatments meet sepsis criteria initially for tachycardia tachypnea and leukocytosis with suspected source UTI given recent episode of urinary incontinence.  However UA did not show signs of infection patient was noted to have multiple abrasions on his hands and arms.  Blood cultures did demonstrate gram-positive bacteremia determined to be MSSA bacteremia patient was started on cefazolin.  ID was consulted at which point decision was made for long-term course of antibiotics to end on 3/27/2025.  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  Pathology was taken and still pending, CEA 3.1.  Colorectal surgery consulted, will follow-up with patient following discharge for possible surgical planning.  This time patient is medically cleared for discharge and continuation of IV cefazolin every 8 hours until 3/27/2025.  Patient will be discharged to [facility] rehab.

## 2025-02-22 NOTE — PLAN OF CARE
Problem: Prexisting or High Potential for Compromised Skin Integrity  Goal: Skin integrity is maintained or improved  Description: INTERVENTIONS:  - Identify patients at risk for skin breakdown  - Assess and monitor skin integrity  - Assess and monitor nutrition and hydration status  - Monitor labs   - Assess for incontinence   - Turn and reposition patient  - Assist with mobility/ambulation  - Relieve pressure over bony prominences  - Avoid friction and shearing  - Provide appropriate hygiene as needed including keeping skin clean and dry  - Evaluate need for skin moisturizer/barrier cream  - Collaborate with interdisciplinary team   - Patient/family teaching  - Consider wound care consult   2/22/2025 1343 by Jenn Bautista RN  Outcome: Completed  2/22/2025 1104 by Jenn Bautista RN  Outcome: Progressing     Problem: PAIN - ADULT  Goal: Verbalizes/displays adequate comfort level or baseline comfort level  Description: Interventions:  - Encourage patient to monitor pain and request assistance  - Assess pain using appropriate pain scale  - Administer analgesics based on type and severity of pain and evaluate response  - Implement non-pharmacological measures as appropriate and evaluate response  - Consider cultural and social influences on pain and pain management  - Notify physician/advanced practitioner if interventions unsuccessful or patient reports new pain  2/22/2025 1343 by Jenn Bautista RN  Outcome: Completed  2/22/2025 1104 by Jenn Bautista RN  Outcome: Progressing     Problem: INFECTION - ADULT  Goal: Absence or prevention of progression during hospitalization  Description: INTERVENTIONS:  - Assess and monitor for signs and symptoms of infection  - Monitor lab/diagnostic results  - Monitor all insertion sites, i.e. indwelling lines, tubes, and drains  - Monitor endotracheal if appropriate and nasal secretions for changes in amount and color  - [place] appropriate cooling/warming therapies per  order  - Administer medications as ordered  - Instruct and encourage patient and family to use good hand hygiene technique  - Identify and instruct in appropriate isolation precautions for identified infection/condition  2/22/2025 1343 by Jenn Bautista RN  Outcome: Completed  2/22/2025 1104 by Jenn Bautista RN  Outcome: Progressing  Goal: Absence of fever/infection during neutropenic period  Description: INTERVENTIONS:  - Monitor WBC    2/22/2025 1343 by Jenn Bautista RN  Outcome: Completed  2/22/2025 1104 by Jenn Bautista RN  Outcome: Progressing     Problem: SAFETY ADULT  Goal: Patient will remain free of falls  Description: INTERVENTIONS:  - Educate patient/family on patient safety including physical limitations  - Instruct patient to call for assistance with activity   - Consult OT/PT to assist with strengthening/mobility   - Keep Call bell within reach  - Keep bed low and locked with side rails adjusted as appropriate  - Keep care items and personal belongings within reach  - Initiate and maintain comfort rounds  - Make Fall Risk Sign visible to staff  - Offer Toileting every  Hours, in advance of need  - Initiate/Maintain alarm  - Obtain necessary fall risk management equipment:   - Apply yellow socks and bracelet for high fall risk patients  - Consider moving patient to room near nurses station  2/22/2025 1343 by Jenn Bautista RN  Outcome: Completed  2/22/2025 1104 by Jenn Bautista RN  Outcome: Progressing  Goal: Maintain or return to baseline ADL function  Description: INTERVENTIONS:  -  Assess patient's ability to carry out ADLs; assess patient's baseline for ADL function and identify physical deficits which impact ability to perform ADLs (bathing, care of mouth/teeth, toileting, grooming, dressing, etc.)  - Assess/evaluate cause of self-care deficits   - Assess range of motion  - Assess patient's mobility; develop plan if impaired  - Assess patient's need for assistive devices and provide  as appropriate  - Encourage maximum independence but intervene and supervise when necessary  - Involve family in performance of ADLs  - Assess for home care needs following discharge   - Consider OT consult to assist with ADL evaluation and planning for discharge  - Provide patient education as appropriate  2/22/2025 1343 by Jenn Bautista RN  Outcome: Completed  2/22/2025 1104 by Jenn Bautista RN  Outcome: Progressing  Goal: Maintains/Returns to pre admission functional level  Description: INTERVENTIONS:  - Perform AM-PAC 6 Click Basic Mobility/ Daily Activity assessment daily.  - Set and communicate daily mobility goal to care team and patient/family/caregiver.   - Collaborate with rehabilitation services on mobility goals if consulted  - Perform Range of Motion  times a day.  - Reposition patient every  hours.  - Dangle patient  times a day  - Stand patient  times a day  - Ambulate patient  times a day  - Out of bed to chair  times a day   - Out of bed for meals  times a day  - Out of bed for toileting  - Record patient progress and toleration of activity level   2/22/2025 1343 by Jenn Bautista RN  Outcome: Completed  2/22/2025 1104 by Jenn Bautista RN  Outcome: Progressing     Problem: DISCHARGE PLANNING  Goal: Discharge to home or other facility with appropriate resources  Description: INTERVENTIONS:  - Identify barriers to discharge w/patient and caregiver  - Arrange for needed discharge resources and transportation as appropriate  - Identify discharge learning needs (meds, wound care, etc.)  - Arrange for interpretive services to assist at discharge as needed  - Refer to Case Management Department for coordinating discharge planning if the patient needs post-hospital services based on physician/advanced practitioner order or complex needs related to functional status, cognitive ability, or social support system  2/22/2025 1343 by Jenn Bautista RN  Outcome: Completed  2/22/2025 1104 by Jenn  RICHIE Bautista  Outcome: Progressing     Problem: Knowledge Deficit  Goal: Patient/family/caregiver demonstrates understanding of disease process, treatment plan, medications, and discharge instructions  Description: Complete learning assessment and assess knowledge base.  Interventions:  - Provide teaching at level of understanding  - Provide teaching via preferred learning methods  2/22/2025 1343 by Jenn Bautista RN  Outcome: Completed  2/22/2025 1104 by Jenn Bautista RN  Outcome: Progressing     Problem: Nutrition/Hydration-ADULT  Goal: Nutrient/Hydration intake appropriate for improving, restoring or maintaining nutritional needs  Description: Monitor and assess patient's nutrition/hydration status for malnutrition. Collaborate with interdisciplinary team and initiate plan and interventions as ordered.  Monitor patient's weight and dietary intake as ordered or per policy. Utilize nutrition screening tool and intervene as necessary. Determine patient's food preferences and provide high-protein, high-caloric foods as appropriate.     INTERVENTIONS:  - Monitor oral intake, urinary output, labs, and treatment plans  - Assess nutrition and hydration status and recommend course of action  - Evaluate amount of meals eaten  - Assist patient with eating if necessary   - Allow adequate time for meals  - Recommend/ encourage appropriate diets, oral nutritional supplements, and vitamin/mineral supplements  - Order, calculate, and assess calorie counts as needed  - Recommend, monitor, and adjust tube feedings and TPN/PPN based on assessed needs  - Assess need for intravenous fluids  - Provide specific nutrition/hydration education as appropriate  - Include patient/family/caregiver in decisions related to nutrition  2/22/2025 1343 by Jenn Bautista RN  Outcome: Completed  2/22/2025 1104 by Jenn Bautista RN  Outcome: Progressing     Problem: Neurological Deficit  Goal: Neurological status is stable or  improving  Description: Interventions:  - Monitor and assess patient's level of consciousness, motor function, sensory function, and level of assistance needed for ADLs.   - Monitor and report changes from baseline. Collaborate with interdisciplinary team to initiate plan and implement interventions as ordered.   - Provide and maintain a safe environment.  - Consider seizure precautions.  - Consider fall precautions.  - Consider aspiration precautions.  - Consider bleeding precautions.  2/22/2025 1343 by Jenn Bautista RN  Outcome: Completed  2/22/2025 1104 by Jenn Bautista RN  Outcome: Progressing     Problem: Activity Intolerance/Impaired Mobility  Goal: Mobility/activity is maintained at optimum level for patient  Description: Interventions:  - Assess and monitor patient  barriers to mobility and need for assistive/adaptive devices.  - Assess patient's emotional response to limitations.  - Collaborate with interdisciplinary team and initiate plans and interventions as ordered.  - Encourage independent activity per ability.  - Maintain proper body alignment.  - Perform active/passive rom as tolerated/ordered.  - Plan activities to conserve energy.  - Turn patient as appropriate  2/22/2025 1343 by Jenn Bautista RN  Outcome: Completed  2/22/2025 1104 by Jenn Bautista RN  Outcome: Progressing     Problem: Communication Impairment  Goal: Ability to express needs and understand communication  Description: Assess patient's communication skills and ability to understand information.  Patient will demonstrate use of effective communication techniques, alternative methods of communication and understanding even if not able to speak.     - Encourage communication and provide alternate methods of communication as needed.  - Collaborate with case management/ for discharge needs.  - Include patient/family/caregiver in decisions related to communication.  2/22/2025 1343 by Jenn Bautista RN  Outcome:  Completed  2/22/2025 1104 by Jenn Bautista RN  Outcome: Progressing     Problem: Potential for Aspiration  Goal: Non-ventilated patient's risk of aspiration is minimized  Description: Assess and monitor vital signs, respiratory status, and labs (WBC).  Monitor for signs of aspiration (tachypnea, cough, rales, wheezing, cyanosis, fever).    - Assess and monitor patient's ability to swallow.  - Place patient up in chair to eat if possible.  - HOB up at 90 degrees to eat if unable to get patient up into chair.  - Supervise patient during oral intake.   - Instruct patient/ family to take small bites.  - Instruct patient/ family to take small single sips when taking liquids.  - Follow patient-specific strategies generated by speech pathologist.  2/22/2025 1343 by Jenn Bautista RN  Outcome: Completed  2/22/2025 1104 by Jenn Bautista RN  Outcome: Progressing  Goal: Ventilated patient's risk of aspiration is minimized  Description: Assess and monitor vital signs, respiratory status, airway cuff pressure, and labs (WBC).  Monitor for signs of aspiration (tachypnea, cough, rales, wheezing, cyanosis, fever).    - Elevate head of bed 30 degrees if patient has tube feeding.  - Monitor tube feeding.  2/22/2025 1343 by Jenn Bautista RN  Outcome: Completed  2/22/2025 1104 by Jenn Bautista RN  Outcome: Progressing     Problem: Nutrition  Goal: Nutrition/Hydration status is improving  Description: Monitor and assess patient's nutrition/hydration status for malnutrition (ex- brittle hair, bruises, dry skin, pale skin and conjunctiva, muscle wasting, smooth red tongue, and disorientation). Collaborate with interdisciplinary team and initiate plan and interventions as ordered.  Monitor patient's weight and dietary intake as ordered or per policy. Utilize nutrition screening tool and intervene per policy. Determine patient's food preferences and provide high-protein, high-caloric foods as appropriate.     - Assist patient  with eating.  - Allow adequate time for meals.  - Encourage patient to take dietary supplement as ordered.  - Collaborate with clinical nutritionist.  - Include patient/family/caregiver in decisions related to nutrition.  2/22/2025 1343 by Jenn Bautista RN  Outcome: Completed  2/22/2025 1104 by Jenn Bautista RN  Outcome: Progressing

## 2025-02-22 NOTE — ASSESSMENT & PLAN NOTE
Blood cultures with Gram + cocci, likely Staph Aureus  TTE negative for vegetations  Patient mentioned small laceration from haircut at [facility] with pustule formation, possible source at there is myositis to that area  MRI C-spine: Mild nonspecific edema within the right posterior paraspinal musculature of the upper cervical spine. Minimal peripheral enhancement is noted and differential considerations would include infectious/inflammatory myositis with soft tissue abscess. No osteomyelitis or discitis. No cord compression or abnormal cord signal.  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

## 2025-02-22 NOTE — ASSESSMENT & PLAN NOTE
During colonoscopy on 2/20/25, small friable colonic mass found  CT CAP w Contrast  CEA 3.1     Plan:  Pathology pending  F/u with GI outpatient

## 2025-02-22 NOTE — ASSESSMENT & PLAN NOTE
MRI L-Spine: Negative for acute pathology  MRI C-spine shows possible soft tissue infection status post IV cefazolin  ID on board    Plan:  Lidocaine patches  Robaxin 500 mg q8 hrs scheduled  Tylenol 650 mg q6 hrs prn for mild pain

## 2025-02-22 NOTE — PLAN OF CARE
Problem: Prexisting or High Potential for Compromised Skin Integrity  Goal: Skin integrity is maintained or improved  Description: INTERVENTIONS:  - Identify patients at risk for skin breakdown  - Assess and monitor skin integrity  - Assess and monitor nutrition and hydration status  - Monitor labs   - Assess for incontinence   - Turn and reposition patient  - Assist with mobility/ambulation  - Relieve pressure over bony prominences  - Avoid friction and shearing  - Provide appropriate hygiene as needed including keeping skin clean and dry  - Evaluate need for skin moisturizer/barrier cream  - Collaborate with interdisciplinary team   - Patient/family teaching  - Consider wound care consult   Outcome: Progressing     Problem: PAIN - ADULT  Goal: Verbalizes/displays adequate comfort level or baseline comfort level  Description: Interventions:  - Encourage patient to monitor pain and request assistance  - Assess pain using appropriate pain scale  - Administer analgesics based on type and severity of pain and evaluate response  - Implement non-pharmacological measures as appropriate and evaluate response  - Consider cultural and social influences on pain and pain management  - Notify physician/advanced practitioner if interventions unsuccessful or patient reports new pain  Outcome: Progressing     Problem: INFECTION - ADULT  Goal: Absence or prevention of progression during hospitalization  Description: INTERVENTIONS:  - Assess and monitor for signs and symptoms of infection  - Monitor lab/diagnostic results  - Monitor all insertion sites, i.e. indwelling lines, tubes, and drains  - Monitor endotracheal if appropriate and nasal secretions for changes in amount and color  - [place] appropriate cooling/warming therapies per order  - Administer medications as ordered  - Instruct and encourage patient and family to use good hand hygiene technique  - Identify and instruct in appropriate isolation precautions for  identified infection/condition  Outcome: Progressing  Goal: Absence of fever/infection during neutropenic period  Description: INTERVENTIONS:  - Monitor WBC    Outcome: Progressing     Problem: SAFETY ADULT  Goal: Patient will remain free of falls  Description: INTERVENTIONS:  - Educate patient/family on patient safety including physical limitations  - Instruct patient to call for assistance with activity   - Consult OT/PT to assist with strengthening/mobility   - Keep Call bell within reach  - Keep bed low and locked with side rails adjusted as appropriate  - Keep care items and personal belongings within reach  - Initiate and maintain comfort rounds  - Make Fall Risk Sign visible to staff  - Offer Toileting every Hours, in advance of need  - Initiate/Maintain alarm  - Obtain necessary fall risk management equipment:  - Apply yellow socks and bracelet for high fall risk patients  - Consider moving patient to room near nurses station  Outcome: Progressing  Goal: Maintain or return to baseline ADL function  Description: INTERVENTIONS:  -  Assess patient's ability to carry out ADLs; assess patient's baseline for ADL function and identify physical deficits which impact ability to perform ADLs (bathing, care of mouth/teeth, toileting, grooming, dressing, etc.)  - Assess/evaluate cause of self-care deficits   - Assess range of motion  - Assess patient's mobility; develop plan if impaired  - Assess patient's need for assistive devices and provide as appropriate  - Encourage maximum independence but intervene and supervise when necessary  - Involve family in performance of ADLs  - Assess for home care needs following discharge   - Consider OT consult to assist with ADL evaluation and planning for discharge  - Provide patient education as appropriate  Outcome: Progressing  Goal: Maintains/Returns to pre admission functional level  Description: INTERVENTIONS:  - Perform AM-PAC 6 Click Basic Mobility/ Daily Activity  assessment daily.  - Set and communicate daily mobility goal to care team and patient/family/caregiver.   - Collaborate with rehabilitation services on mobility goals if consulted  - Perform Range of Motion  times a day.  - Reposition patient every  hours.  - Dangle patient  times a day  - Stand patient  times a day  - Ambulate patient  times a day  - Out of bed to chair  times a day   - Out of bed for meals  times a day  - Out of bed for toileting  - Record patient progress and toleration of activity level   Outcome: Progressing

## 2025-02-22 NOTE — ASSESSMENT & PLAN NOTE
Recent Labs     02/19/25  0950 02/20/25  0613 02/21/25  0558   HGB 8.6* 8.0* 8.2*     Lab Results   Component Value Date    IRON 37 (L) 02/14/2025    FERRITIN 147 02/14/2025    TIBC 166.6 (L) 02/14/2025    CONCFE 22 02/14/2025     B12 972, Folate 14  Stable    Plan:  Colonoscopy on 2/20/25, small friable colonic mass found concerning for malignancy  Colorectal surgery consulted, will f/u outpatient

## 2025-02-23 ENCOUNTER — TELEPHONE (OUTPATIENT)
Dept: OTHER | Facility: OTHER | Age: 80
End: 2025-02-23

## 2025-02-23 NOTE — TELEPHONE ENCOUNTER
Upon attempting to give AM dose of cefazolin TID, IV site is bloody with flushing.  Holding med at this time.  Seeking guidance.     On call provider paged.

## 2025-02-24 ENCOUNTER — TELEPHONE (OUTPATIENT)
Dept: INFECTIOUS DISEASES | Facility: CLINIC | Age: 80
End: 2025-02-24

## 2025-02-24 ENCOUNTER — NURSING HOME VISIT (OUTPATIENT)
Dept: GERIATRICS | Facility: OTHER | Age: 80
End: 2025-02-24
Payer: COMMERCIAL

## 2025-02-24 DIAGNOSIS — M05.9 RHEUMATOID ARTHRITIS WITH POSITIVE RHEUMATOID FACTOR, INVOLVING UNSPECIFIED SITE (HCC): ICD-10-CM

## 2025-02-24 DIAGNOSIS — J44.9 CHRONIC OBSTRUCTIVE PULMONARY DISEASE, UNSPECIFIED COPD TYPE (HCC): Primary | ICD-10-CM

## 2025-02-24 DIAGNOSIS — R78.81 MSSA BACTEREMIA: Primary | ICD-10-CM

## 2025-02-24 DIAGNOSIS — M54.2 NECK PAIN: ICD-10-CM

## 2025-02-24 DIAGNOSIS — I10 PRIMARY HYPERTENSION: ICD-10-CM

## 2025-02-24 DIAGNOSIS — B95.61 MSSA BACTEREMIA: Primary | ICD-10-CM

## 2025-02-24 DIAGNOSIS — R93.89 ABNORMAL MRI: ICD-10-CM

## 2025-02-24 DIAGNOSIS — R78.81 MSSA BACTEREMIA: ICD-10-CM

## 2025-02-24 DIAGNOSIS — K63.89 COLONIC MASS: ICD-10-CM

## 2025-02-24 DIAGNOSIS — N40.0 ENLARGED PROSTATE: ICD-10-CM

## 2025-02-24 DIAGNOSIS — I48.91 ATRIAL FIBRILLATION, UNSPECIFIED TYPE (HCC): ICD-10-CM

## 2025-02-24 DIAGNOSIS — B95.61 MSSA BACTEREMIA: ICD-10-CM

## 2025-02-24 DIAGNOSIS — Z79.2 LONG TERM (CURRENT) USE OF ANTIBIOTICS: ICD-10-CM

## 2025-02-24 PROCEDURE — 99305 1ST NF CARE MODERATE MDM 35: CPT | Performed by: FAMILY MEDICINE

## 2025-02-24 NOTE — TELEPHONE ENCOUNTER
Spoke to Emily at [facility]. Confirmed IV  abx, weekly labs, MRI, follow up. She will pass on the appointments to Tracy. She has no questions at this time.

## 2025-02-24 NOTE — PROGRESS NOTES
OPAT NOTE    AP ONLY CAMPUSES ARE: [place], [place], and [place].   In these cases, physician is only cosigning notes.    Supervising/Discharge provider: Antoinette    Diagnosis: MSSA Bacteremia, Abnormal MRI    Drug: Cefazolin    Dose/Route/Frequency: 2g IV q8    Labs/Frequency: CBCD Cre CRP ESR weekly    End Date:  3/26  MRI: [facility] 3/20 11 AM    Infusion/VNA/SNF contact: [facility]    Next appointment: 3/3 12:30 with Kavin Ely PA-C ([facility])

## 2025-02-24 NOTE — PROGRESS NOTES
[facility]  [address]  [place] [address]   [facility]  History and Physical    NAME: Devin Chaves  AGE: 79 y.o. SEX: male [phone]    DATE OF ENCOUNTER: 2/25/2025    Code status:  No CPR    Assessment and Plan     1. Chronic obstructive pulmonary disease, unspecified COPD type (HCC) (Primary)  -Stable  -On 2 L of oxygen via nasal cannula  -Continue albuterol inhalation every 6 as needed  -Continue Advair 250-50 mcg/inh. 1 puff twice a day  -Monitor oxygen saturations    2. Primary hypertension  -Controlled  -Continue metoprolol tartrate 25 mg p.o. twice daily    3. MSSA bacteremia  -Stable  -Receiving IV cefazolin 2 g 3 times a day until 3/27/2025  -Getting weekly labs    4. Rheumatoid arthritis with positive rheumatoid factor, involving unspecified site (HCC)  -Stable  -Continue hydroxychloroquine 400 mg daily    5. Enlarged prostate  -Denies urinary symptoms at this time  -Continue tamsulosin 0.4 mg p.o. daily    6. Atrial fibrillation, unspecified type (HCC)  -Rate controlled  -Continue apixaban 5 mg p.o. twice daily  -Continue metoprolol tartrate 25 mg p.o. twice daily    7. Colonic mass  - s/p colonoscopy on 2/20/25 (small friable mass found)  -CEA 3.1  -CT abdomen pelvis with contrast: Wall thickening at the ileocecal wall compatible with the colonic mass identified during colonoscopy)  - s/p biopsy of the mass, results pending  -Follow-up with GI as outpatient    8. Neck pain  -Chronic  -Lidocaine 4% patch ordered    All medications and routine orders were reviewed and updated as needed.    Plan discussed with: Patient    Chief Complaint     Seen for admission at Nursing Facility    History of Present Illness     Devin Chaves, a 78 y/o male with PMH of HTN/hypotension, CAD, COPD, JAZMINE, RA, A. Fib, Anemia, stroke got admitted to the hospital with COPD exacerbation, complicated by MSSA bacteremia. He was treated with neb treatments, IV cefazolin  (until 3/27/25). He also had anemia, received 2 u PRBC, underwent colonoscopy, which showed a 2.5 cm mass in ileocecal valve. Biopsy was done.   His overall condition is improving, got [facility] and abx until 3/27/25.   He was at [facility] receiving therapy, sent to hospital with COPD exacerbation on 2/11/25.   He was seen and examined, stable.  He is able to give good history.  Says he is not feeling well, his neck is hurting, which is chronic.  He is also complaining of diaper rash. he has no respiratory complaints at this time.  He is eating and sleeping okay.  He is on 2 L of oxygen via nasal cannula.  He is receiving IV antibiotics via PICC until 3/27/2025.  He lives at home with wife, independent of ADLs, uses cane.  Staff have no complaints at this time.    HISTORY:  Past Medical History:   Diagnosis Date   • Atrial fibrillation (HCC)    • COPD (chronic obstructive pulmonary disease) (HCC)    • Crushing injury of finger, left    • Infectious viral hepatitis      History reviewed. No pertinent family history.  Social History     Socioeconomic History   • Marital status: /Civil Union     Spouse name: None   • Number of children: None   • Years of education: None   • Highest education level: None   Occupational History   • None   Tobacco Use   • Smoking status: Former   • Smokeless tobacco: Former     Quit date: 4/1/2011   Substance and Sexual Activity   • Alcohol use: No   • Drug use: No   • Sexual activity: None   Other Topics Concern   • None   Social History Narrative   • None     Social Drivers of Health     Financial Resource Strain: Not on file   Food Insecurity: No Food Insecurity (2/11/2025)    Nursing - Inadequate Food Risk Classification    • Worried About Running Out of Food in the Last Year: Not on file    • Ran Out of Food in the Last Year: Not on file    • Ran Out of Food in the Last Year: Never true   Transportation Needs: No Transportation Needs (2/11/2025)    Nursing  "- Transportation Risk Classification    • Lack of Transportation: Not on file    • Lack of Transportation: No   Physical Activity: Not on file   Stress: Not on file   Social Connections: Unknown (2024)    Received from [facility]    Social Connections    • How often do you feel lonely or isolated from those around you? (Adult - for ages 18 years and over): Not on file   Intimate Partner Violence: Unknown (2025)    Nursing IPS    • Feels Physically and Emotionally Safe: Not on file    • Physically Hurt by Someone: Not on file    • Humiliated or Emotionally Abused by Someone: Not on file    • Physically Hurt by Someone: No    • Hurt or Threatened by Someone: No   Housing Stability: Unknown (2025)    Nursing: Inadequate Housing Risk Classification    • Has Housing: Not on file    • Worried About Losing Housing: Not on file    • Unable to Get Utilities: Not on file    • Unable to Pay for Housing in the Last Year: No    • Has Housin       Allergies:  Allergies   Allergen Reactions   • Shellfish-Derived Products - Food Allergy Other (See Comments)     Pt states, \"I reacted, I dont know\"       Review of Systems     Review of Systems   Constitutional:  Positive for activity change and fatigue.   HENT:  Positive for hearing loss.    Eyes: Negative.    Respiratory: Negative.     Cardiovascular: Negative.    Gastrointestinal: Negative.    Genitourinary: Negative.    Musculoskeletal:  Positive for arthralgias, gait problem and neck pain.   Neurological:  Positive for weakness.   Psychiatric/Behavioral: Negative.     As in HPI.    Medications and orders     All medications reviewed and updated in group home EMR.      Objective     Vitals: T: 98.0, P: 80, R: 16, BP: 135/80, 96% on RA, Wt: 193.6 lbs    Physical Exam  Vitals and nursing note reviewed.   Constitutional:       General: He is not in acute distress.     Appearance: Normal appearance. He is well-developed. He is not diaphoretic.   HENT:      Head: " Normocephalic and atraumatic.      Nose: Nose normal.      Mouth/Throat:      Mouth: Mucous membranes are moist.      Pharynx: Oropharynx is clear. No oropharyngeal exudate.   Eyes:      General: No scleral icterus.        Right eye: No discharge.         Left eye: No discharge.      Extraocular Movements: Extraocular movements intact.      Conjunctiva/sclera: Conjunctivae normal.   Cardiovascular:      Rate and Rhythm: Normal rate and regular rhythm.      Heart sounds: Normal heart sounds. No murmur heard.  Pulmonary:      Effort: Pulmonary effort is normal. No respiratory distress.      Breath sounds: Normal breath sounds. No wheezing.      Comments: Oxygen via NC  Chest:      Chest wall: No tenderness.   Abdominal:      General: Bowel sounds are normal. There is no distension.      Palpations: Abdomen is soft.      Tenderness: There is no abdominal tenderness. There is no guarding.   Musculoskeletal:         General: No deformity. Normal range of motion.      Cervical back: Tenderness present.   Skin:     General: Skin is warm and dry.      Findings: Bruising and rash present.   Neurological:      Mental Status: He is alert and oriented to person, place, and time.      Cranial Nerves: No cranial nerve deficit.      Motor: No abnormal muscle tone.      Coordination: Coordination normal.   Psychiatric:         Mood and Affect: Mood normal.         Behavior: Behavior normal.         Pertinent Laboratory/Diagnostic Studies:   The following labs/studies were reviewed please see chart or hospital paperwork for details.  Ref Range & Units (hover) 2/21/25 0935   CEA 3.1 High    Comment: Normal/Non-Smoker: 0.0-3.0 ng/mL  Normal/Smoker:     0.0-5.0 ng/mL     Ref Range & Units (hover) 2/21/25 0558 2/20/25 0613 2/19/25 0553 2/18/25 0509 2/17/25 0844 2/16/25 0837 2/15/25 0459   Sodium 138 139 137 138 137 137 137   Potassium 3.6 4.7 3.6 3.8 3.5 3.6 3.8   Chloride 105 105 103 102 104 104 104   CO2 28 30 30 31 30 30 27   ANION  GAP 5 4 4 5 3 Low  3 Low  6   BUN 17 15 16 17 19 21 29 High    Creatinine 0.94 0.91 CM 0.88 CM 0.89 CM 0.95 CM 0.91 CM 1.09 CM   Comment: Standardized to IDMS reference method   Glucose 115 81 CM 78  CM 95 CM 91 CM 98 CM   Comment: If the patient is fasting, the ADA then defines impaired fasting glucose as > 100 mg/dL and diabetes as > or equal to 123 mg/dL.   Calcium 8.0 Low  7.9 Low  7.8 Low  8.2 Low  7.8 Low  8.2 Low  8.2 Low    eGFR 77 81 82 82 76 81 65     Ref Range & Units (hover) 2/21/25 0558 2/20/25 0613 2/19/25 0950 2/19/25 0553 2/18/25 1603 2/18/25 0509 2/18/25 0036   WBC 5.87 6.38  5.91  5.76    RBC 2.84 Low  2.80 Low   2.59 Low   2.95 Low     Hemoglobin 8.2 Low  8.0 Low  8.6 Low  7.3 Low  8.5 Low  8.5 Low  7.9 Low    Hematocrit 26.6 Low  26.4 Low  26.9 Low  23.6 Low  27.5 Low  27.1 Low  24.6 Low    MCV 94 94  91  92    MCH 28.9 28.6  28.2  28.8    MCHC 30.8 Low  30.3 Low   30.9 Low   31.4    RDW 17.2 High  17.2 High   17.7 High   17.6 High     MPV 8.9 8.5 Low   8.3 Low   8.5 Low     Platelets 147 Low  172  145 Low   160    nRBC 0 0  0  0    Segmented % 81 High  78 High   74  73    Immature Grans % 1 1  1  1    Lymphocytes % 11 Low  13 Low   15  16    Monocytes % 7 8  10  8    Eosinophils Relative 0 0  0  1    Basophils Relative 0 0  0  1    Absolute Neutrophils 4.70 5.01  4.39  4.24    Absolute Immature Grans 0.05 0.05  0.05  0.05    Absolute Lymphocytes 0.67 0.81  0.87  0.94    Absolute Monocytes 0.41 0.49  0.56  0.47    Eosinophils Absolute 0.02 0.01  0.02  0.03    Basophils Absolute 0.02 0.01  0.02          - Counseling Documentation: patient was counseled regarding: risk factor reductions and prognosis

## 2025-02-24 NOTE — UTILIZATION REVIEW
NOTIFICATION OF ADMISSION DISCHARGE   This is a Notification of Discharge from [facility]. Please be advised that this patient has been discharge from our facility. Below you will find the admission and discharge date and time including the patient’s disposition.   UTILIZATION REVIEW CONTACT:  Norma Roy  Utilization   Network Utilization Review Department  Phone: [phone] x carefully listen to the prompts. All voicemails are confidential.  Email: [email or web address]     ADMISSION INFORMATION  PRESENTATION DATE: 2/11/2025  8:40 AM  OBERVATION ADMISSION DATE: N/A  INPATIENT ADMISSION DATE: 2/11/25  2:11 PM   DISCHARGE DATE: 2/22/2025  2:15 PM   [facility]    Network Utilization Review Department  ATTENTION: Please call with any questions or concerns to [phone] and carefully listen to the prompts so that you are directed to the right person. All voicemails are confidential.   For Discharge needs, contact Care Management DC Support Team at [phone] opt. 2  Send all requests for admission clinical reviews, approved or denied determinations and any other requests to dedicated fax number below belonging to the campus where the patient is receiving treatment. List of dedicated fax numbers for the Facilities:  FACILITY NAME UR FAX NUMBER   ADMISSION DENIALS (Administrative/Medical Necessity) [phone]   DISCHARGE SUPPORT TEAM ([facility]) [phone]   PARENT CHILD HEALTH (Maternity/NICU/Pediatrics) [phone]   [facility] [phone]   [facility] [phone]   Critical access hospital [phone]   [facility] [phone]   [facility] [phone]   [facility] [phone]   [facility] [phone]   [facility]  [phone]   [facility] [phone]   [facility] [phone]   [facility] [phone]   [facility] [phone]

## 2025-02-25 ENCOUNTER — TELEPHONE (OUTPATIENT)
Age: 80
End: 2025-02-25

## 2025-02-25 PROCEDURE — 88341 IMHCHEM/IMCYTCHM EA ADD ANTB: CPT | Performed by: PATHOLOGY

## 2025-02-25 PROCEDURE — 88305 TISSUE EXAM BY PATHOLOGIST: CPT | Performed by: PATHOLOGY

## 2025-02-25 PROCEDURE — 88342 IMHCHEM/IMCYTCHM 1ST ANTB: CPT | Performed by: PATHOLOGY

## 2025-02-25 NOTE — TELEPHONE ENCOUNTER
Patients GI provider:  Dr. Chacko    Number to return call: [phone]    Reason for call: Pts wife calling in regards to test results and colon cancer. Pt is currently in rehab and can not physically get to office. Pt was also told he is not a candidate for surgery due to medical health. Wife wants to know if provider can call her and discuss treatment options with her.     Scheduled procedure/appointment date if applicable: N/A

## 2025-02-26 ENCOUNTER — TELEPHONE (OUTPATIENT)
Age: 80
End: 2025-02-26

## 2025-02-26 ENCOUNTER — NURSING HOME VISIT (OUTPATIENT)
Dept: GERIATRICS | Facility: OTHER | Age: 80
End: 2025-02-26
Payer: COMMERCIAL

## 2025-02-26 ENCOUNTER — NURSING HOME VISIT (OUTPATIENT)
Dept: PULMONOLOGY | Facility: CLINIC | Age: 80
End: 2025-02-26
Payer: COMMERCIAL

## 2025-02-26 VITALS
DIASTOLIC BLOOD PRESSURE: 72 MMHG | OXYGEN SATURATION: 96 % | SYSTOLIC BLOOD PRESSURE: 122 MMHG | HEART RATE: 72 BPM | TEMPERATURE: 98 F | RESPIRATION RATE: 18 BRPM

## 2025-02-26 DIAGNOSIS — R26.2 AMBULATORY DYSFUNCTION: ICD-10-CM

## 2025-02-26 DIAGNOSIS — K59.00 CONSTIPATION, UNSPECIFIED CONSTIPATION TYPE: ICD-10-CM

## 2025-02-26 DIAGNOSIS — J44.9 CHRONIC OBSTRUCTIVE PULMONARY DISEASE, UNSPECIFIED COPD TYPE (HCC): Primary | ICD-10-CM

## 2025-02-26 DIAGNOSIS — R33.8 ACUTE URINARY RETENTION: ICD-10-CM

## 2025-02-26 DIAGNOSIS — I48.91 ATRIAL FIBRILLATION, UNSPECIFIED TYPE (HCC): ICD-10-CM

## 2025-02-26 DIAGNOSIS — J96.11 CHRONIC HYPOXIC RESPIRATORY FAILURE (HCC): ICD-10-CM

## 2025-02-26 DIAGNOSIS — G47.33 OSA (OBSTRUCTIVE SLEEP APNEA): ICD-10-CM

## 2025-02-26 DIAGNOSIS — M21.962 LEFT ANKLE JOINT DEFORMITY: ICD-10-CM

## 2025-02-26 DIAGNOSIS — M54.2 NECK PAIN: ICD-10-CM

## 2025-02-26 DIAGNOSIS — R78.81 MSSA BACTEREMIA: Primary | ICD-10-CM

## 2025-02-26 DIAGNOSIS — K63.89 COLONIC MASS: ICD-10-CM

## 2025-02-26 DIAGNOSIS — D64.9 ANEMIA, UNSPECIFIED TYPE: ICD-10-CM

## 2025-02-26 DIAGNOSIS — I10 PRIMARY HYPERTENSION: ICD-10-CM

## 2025-02-26 DIAGNOSIS — B95.61 MSSA BACTEREMIA: Primary | ICD-10-CM

## 2025-02-26 PROCEDURE — 99309 SBSQ NF CARE MODERATE MDM 30: CPT

## 2025-02-26 PROCEDURE — 99305 1ST NF CARE MODERATE MDM 35: CPT | Performed by: INTERNAL MEDICINE

## 2025-02-26 NOTE — ASSESSMENT & PLAN NOTE
Blood cultures positive for MSSA  ID followed while inpatient recommended extended course of antibiotics  Continue cefazolin through 3/27/2025 via PICC line  TTE negative for vegetation  Source thought to be UTI versus cellulitis  MRI of C-spine showed mild nonspecific edema within the right posterior paraspinal musculature of the upper cervical spine.  Minimal peripheral enhancement is noted and differential considerations could include infectious versus inflammatory myositis with soft tissue abscess.  No osteomyelitis or discitis.  No cord compression or abnormal cord signal.\MRI of brain shows possible multifocal infarction  Weekly labs on Tuesdays well on IV antibiotics, results faxed to ID  Outpatient follow-up with ID 3/3/5

## 2025-02-26 NOTE — ASSESSMENT & PLAN NOTE
During recent hospital stay patient had episodes of anemia requiring 2 units of PRBCs  Colonoscopy done 2/20/2025 showed 2.5 cm mass at the ileocecal valve  CEA was 3.1  Pathology pending  Outpatient follow-up with colorectal surgery 2/28/2025  Monitor CBC

## 2025-02-26 NOTE — ASSESSMENT & PLAN NOTE
Anemia noted during recent hospital stay, required 2 units PRBCs  Colonoscopy showing 2.5 centimeter mass concerning for malignancy  Pathology pending  Monitor CBC

## 2025-02-26 NOTE — ASSESSMENT & PLAN NOTE
Patient had bowel movement today  Continue Colace twice daily, MiraLAX daily, as needed senna  Encourage adequate p.o. hydration

## 2025-02-26 NOTE — ASSESSMENT & PLAN NOTE
No recent PFTs on file.  Severity unknown.  Recently hospitalized with exacerbation.  Okay off steroids.  Continue ICS/LABA twice daily.  Add LAMA daily.  - Continue Lexis as needed.  - Increase activity as able.  -Should be up-to-date on all appropriate vaccines.

## 2025-02-26 NOTE — ASSESSMENT & PLAN NOTE
Heart rate controlled, 72  Patient denies chest pain/palpitations  Continue metoprolol tartrate 25 mg every 12 hours  Continue Eliquis 5 mg twice daily

## 2025-02-26 NOTE — ASSESSMENT & PLAN NOTE
MRI C-spine showing possible soft tissue infection  Being treated with IV cefazolin through 3/27/2025  Continue Lidoderm patch  Will schedule Tylenol 975 every 8 hours

## 2025-02-26 NOTE — PROGRESS NOTES
[facility]  [address] [address] [phone]  [place] [facility]  Code 31 (STR)          NAME: Devin Chaves  AGE: 79 y.o. SEX: male CODE STATUS: No CPR    DATE OF ENCOUNTER: 2/26/2025    Assessment and Plan     1. MSSA bacteremia  Assessment & Plan:  Blood cultures positive for MSSA  ID followed while inpatient recommended extended course of antibiotics  Continue cefazolin through 3/27/2025 via PICC line  TTE negative for vegetation  Source thought to be UTI versus cellulitis  MRI of C-spine showed mild nonspecific edema within the right posterior paraspinal musculature of the upper cervical spine.  Minimal peripheral enhancement is noted and differential considerations could include infectious versus inflammatory myositis with soft tissue abscess.  No osteomyelitis or discitis.  No cord compression or abnormal cord signal.\MRI of brain shows possible multifocal infarction  Weekly labs on Tuesdays well on IV antibiotics, results faxed to ID  Outpatient follow-up with ID 3/3/5  2. Constipation, unspecified constipation type  Assessment & Plan:  Patient had bowel movement today  Continue Colace twice daily, MiraLAX daily, as needed senna  Encourage adequate p.o. hydration  3. Colonic mass  Assessment & Plan:  During recent hospital stay patient had episodes of anemia requiring 2 units of PRBCs  Colonoscopy done 2/20/2025 showed 2.5 cm mass at the ileocecal valve  CEA was 3.1  Pathology pending  Outpatient follow-up with colorectal surgery 2/28/2025  Monitor CBC  4. Neck pain  Assessment & Plan:  MRI C-spine showing possible soft tissue infection  Being treated with IV cefazolin through 3/27/2025  Continue Lidoderm patch  Will schedule Tylenol 975 every 8 hours  5. Anemia, unspecified type  Assessment & Plan:  Anemia noted during recent hospital stay, required 2 units PRBCs  Colonoscopy showing 2.5 centimeter mass concerning for malignancy  Pathology pending  Monitor CBC  6. Acute  "urinary retention  Assessment & Plan:  Continue tamsulosin 0.4 mg daily  7. Left ankle joint deformity  Assessment & Plan:  Patient's wife states [place], she felt that perhaps the cow had stepped on his ankle and he did not seek treatment at that time  Patient was hoping to have reconstructive surgery  He will need outpatient follow-up with podiatry  2/21/2025 patient had x-ray showing \" no acute fracture or dislocation, Degenerative osteoarthritis of the ankle mortise with mild marginal osteophyte formation. Although the entire foot is not visualized there does appear to be some flattening of the plantar arches. \"  8. Atrial fibrillation, unspecified type (HCC)  Assessment & Plan:  Heart rate controlled, 72  Patient denies chest pain/palpitations  Continue metoprolol tartrate 25 mg every 12 hours  Continue Eliquis 5 mg twice daily  9. Primary hypertension  Assessment & Plan:  BP stable, 122/72  Continue to monitor BP   continue metoprolol tartrate 25 mg every 12 hours with hold parameters    10. Ambulatory dysfunction  Assessment & Plan:  Multifactorial in the setting of acute/chronic medical conditions  Continue PT/OT  Fall Precautions  Ensure adequate nutrition/hydration   Monitor CBC/BMP    following for d/c planning         All medications and routine orders were reviewed and updated as needed.    Chief Complaint     STR follow up visit  Patient's care was coordinated with nursing facility staff. Recent vitals, labs, and updated medications were review on [facility] system in facility.  Past Medical and Surgical History      Past Medical History:   Diagnosis Date    Atrial fibrillation (HCC)     COPD (chronic obstructive pulmonary disease) (HCC)     Crushing injury of finger, left     Infectious viral hepatitis      Past Surgical History:   Procedure Laterality Date    FL LUMBAR PUNCTURE DIAGNOSTIC  2/10/2022    [place] OPEN TX PHALANGEAL SHAFT FRACTURE PROX/MIDDLE EA Left 1/25/2017 " "   Procedure: ORIF LEFT SMALL FINGER FRACTURE;  Surgeon: Blake Badillo MD;  [facility]: [facility];  Service: Orthopedics     Allergies   Allergen Reactions    Shellfish-Derived Products - Food Allergy Other (See Comments)     Pt states, \"I reacted, I dont know\"          History of Present Illness     HPI  Chavo Chaves is a 79 year old male, he is a STR patient of [facility] since 2/22/25. Past Medical Hx including but not limited to HTN, CAD, CVA, Afib, COPD, JAZMINE, RA, anemia. He was seen in collaboration with nursing for medical mgmt and STR follow up.     Hospital Course  Patient was admitted to the hospital 2/11/25-2/22/25 due to shortness of breath while patient was at rehab.  Patient had recent hospital stay from 12/10/2024 through 2/6/2025 for COPD exacerbation.  Patient was discharged to [facility] on 2/6/2025 and then readmitted to the hospital 2/11/2025.  In the ER patient was treated for COPD exacerbation with nebulizers, he met sepsis criteria initially for tachycardia tach, tachypnea, leukocytosis with suspected UTI source.  UA did not show signs of infection.  Additionally patient was noted to have multiple abrasions on arms and hands.  Blood cultures showed gram-positive bacteremia determined to be MSSA bacteremia and patient was started on cefazolin.  ID followed patient and determined patient required long-term course of antibiotics to end on 3/27/2025.  Hospital course was complicated by multiple episodes of anemia requiring 2 blood transfusions.  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  Pathology was taken and is pending.  CEA was 3.1.  Colorectal surgery was consulted and will follow patient at discharge for possible surgery.  Patient will continue IV cefazolin every 8 hours until 3/27/2025.  Patient was discharged back to [facility].    Rehab course  Chavo was seen and examined at bedside today.  Patient is alert and " oriented.  On exam patient is resting in bed does not appear to be in any distress.  His wife is at bedside and provides some history.  Patient has noted left ankle deformity from a previous break that did not receive treatment at the time.  Patient is hoping to have reconstructive surgery in the future.  He is sleeping well, appetite is fair.  Patient continues with therapy.  Patient will follow-up with colorectal surgery on 2/28/2025.  PICC line intact to right upper extremity, noted to have some blood leaking around incision site likely due to AC use, per staff line has a good blood return and is not difficult to flush, advised to apply dressing to contain leakage.    Denies CP/SOB/N/V/D. Denies lightheadedness, dizziness, headaches, vision changes. Per review of SNF records, Patient is eating 3 meals per day, consuming 50-75%. Last documented BM 2/26/2025. No concerns from nursing at this time.    The patient's allergies, past medical, surgical, social and family history were reviewed and unchanged.    Review of Systems     Review of Systems   Constitutional:  Positive for activity change and appetite change. Negative for fever.   HENT:  Negative for congestion.    Respiratory:  Negative for cough, shortness of breath and wheezing.         Chronic oxygen 2 to 4 L   Cardiovascular:  Positive for leg swelling. Negative for chest pain and palpitations.   Gastrointestinal:  Negative for abdominal distention, constipation, diarrhea, nausea and vomiting.   Genitourinary:  Negative for difficulty urinating and dysuria.   Musculoskeletal:  Positive for arthralgias and gait problem.        Left ankle deformity, edema   Neurological:  Positive for weakness. Negative for dizziness and headaches.   Psychiatric/Behavioral:  Negative for sleep disturbance.          Objective     Vitals:   Vitals:    02/26/25 1724   BP: 122/72   Pulse: 72   Resp: 18   Temp: 98 °F (36.7 °C)   SpO2: 96%         Physical Exam  Vitals and nursing  note reviewed.   Constitutional:       General: He is not in acute distress.     Appearance: Normal appearance. He is not ill-appearing.   HENT:      Head: Normocephalic and atraumatic.      Nose: No congestion.   Eyes:      Conjunctiva/sclera: Conjunctivae normal.   Cardiovascular:      Rate and Rhythm: Normal rate and regular rhythm.      Heart sounds: Normal heart sounds.   Pulmonary:      Effort: Pulmonary effort is normal.      Breath sounds: Normal breath sounds.   Abdominal:      General: Bowel sounds are normal. There is no distension.      Tenderness: There is no abdominal tenderness.   Musculoskeletal:      Right lower leg: Edema present.      Left lower leg: Edema present.   Skin:     General: Skin is warm.   Neurological:      Mental Status: He is alert and oriented to person, place, and time.      Motor: Weakness present.      Gait: Gait abnormal.   Psychiatric:         Mood and Affect: Mood normal.         Behavior: Behavior normal.         Pertinent Laboratory/Diagnostic Studies:   Reviewed in facility chart-stable      Current Medications   Medications reviewed and updated see facility MAR for details.      Current Outpatient Medications:     albuterol (2.5 mg/3 mL) 0.083 % nebulizer solution, Take 3 mL (2.5 mg total) by nebulization every 6 (six) hours as needed for wheezing or shortness of breath, Disp: 30 mL, Rfl: 0    albuterol (PROVENTIL HFA,VENTOLIN HFA) 90 mcg/act inhaler, INHALE 2 PUFFS BY MOUTH EVERY 4 HOURS AS NEEDED FOR BREATHING, Disp: , Rfl:     albuterol (PROVENTIL HFA,VENTOLIN HFA) 90 mcg/act inhaler, Inhale 2 puffs every 4 (four) hours as needed for wheezing, Disp: , Rfl:     apixaban (Eliquis) 5 mg, Take 5 mg by mouth 2 (two) times a day, Disp: , Rfl:     ascorbic acid (VITAMIN C) 250 MG tablet, Take 250 mg by mouth daily, Disp: , Rfl:     aspirin (ECOTRIN LOW STRENGTH) 81 mg EC tablet, Take 81 mg by mouth daily, Disp: , Rfl:     ceFAZolin (ANCEF) 2000 mg IVPB, Inject 2,000 mg into a  catheter in a vein over 30 minutes at 100 mL/hr every 8 (eight) hours, Disp: , Rfl:     Cholecalciferol 50 MCG (2000 UT) TABS, TAKE ONE TABLET BY MOUTH DAILY (FOR LOW VITAMIN D), Disp: , Rfl:     cyanocobalamin (VITAMIN B-12) 100 MCG tablet, Take 100 mcg by mouth daily, Disp: , Rfl:     docusate sodium (COLACE) 100 mg capsule, Take 100 mg by mouth 2 (two) times a day, Disp: , Rfl:     famotidine (PEPCID) 20 mg tablet, Take 20 mg by mouth 2 (two) times a day, Disp: , Rfl:     ferrous sulfate 325 (65 Fe) mg tablet, Take 325 mg by mouth daily with breakfast, Disp: , Rfl:     folic acid (FOLVITE) 1 mg tablet, TAKE ONE TABLET BY MOUTH EVERY DAY *FOLIC ACID SUPPLEMENT*, Disp: , Rfl:     hydroxychloroquine (PLAQUENIL) 200 mg tablet, Take 400 mg by mouth daily with breakfast, Disp: , Rfl:     levalbuterol (XOPENEX) 1.25 mg/3 mL nebulizer solution, Take 3 mL (1.25 mg total) by nebulization every 8 (eight) hours as needed for wheezing, Disp: , Rfl:     lidocaine (LIDODERM) 5 %, Apply 3 patches topically over 12 hours daily Remove & Discard patch within 12 hours or as directed by MD. Apply to neck and back in areas of pain., Disp: , Rfl:     metoprolol tartrate (LOPRESSOR) 25 mg tablet, Take 25 mg by mouth every 12 (twelve) hours, Disp: , Rfl:     mometasone 220 mcg/actuation inhaler, Inhale 1 puff every evening Rinse mouth after use., Disp: , Rfl:     polyethylene glycol (MIRALAX) 17 g packet, Take 17 g by mouth daily as needed (constipation), Disp: , Rfl:     rosuvastatin (CRESTOR) 40 MG tablet, Take 20 mg by mouth daily, Disp: , Rfl:     senna (SENOKOT) 8.6 mg, Take 1 tablet (8.6 mg total) by mouth daily at bedtime as needed for constipation, Disp: , Rfl:     sertraline (ZOLOFT) 25 mg tablet, Take 12.5 mg by mouth daily, Disp: , Rfl:     tamsulosin (FLOMAX) 0.4 mg, Take 1 capsule (0.4 mg total) by mouth daily with dinner, Disp: , Rfl:      Please note:  Voice-recognition software may have been used in the preparation of  "this document.  Occasional wrong word or \"sound-alike\" substitutions may have occurred due to the inherent limitations of voice recognition software.  Interpretation should be guided by context.         AMPARO Barney  2/26/2025  5:43 PM    "

## 2025-02-26 NOTE — TELEPHONE ENCOUNTER
Pt's wife Francia rutherford (no consent) stating she received a all from a doctor yesterday informing her that her  has cancer and she needs to schedule an appt w/ Dr. Chacko. I warm transfer to c&r triage nurse Jo-Ann to schedule since no available new pt appt until end of April.

## 2025-02-26 NOTE — ASSESSMENT & PLAN NOTE
Would benefit PAP therapy with sleep.       Discussed with patient.  Concerns addressed.  Follow-up with pulmonary in 3 months, sooner if needed.

## 2025-02-26 NOTE — PROGRESS NOTES
Consultation - Pulmonary Medicine   Name: Devin Chaves      : 1945      MRN: [id number]  Encounter Provider: Allie Pepper DO  Encounter Date: 2025   Encounter department: [facility]    Requesting Provider:   Dr. Muniz    Outpatient pulmonary: [facility]    Short term    Reason for Consult: COPD  Assessment & Plan  Chronic obstructive pulmonary disease, unspecified COPD type (HCC)  No recent PFTs on file.  Severity unknown.  Recently hospitalized with exacerbation.  Okay off steroids.  Continue ICS/LABA twice daily.  Add LAMA daily.  - Continue Lexis as needed.  - Increase activity as able.  -Should be up-to-date on all appropriate vaccines.         Chronic hypoxic respiratory failure (HCC)  Wears 2-4 L of oxygen daily and with sleep.         JAZMINE (obstructive sleep apnea)  Would benefit PAP therapy with sleep.       Discussed with patient.  Concerns addressed.  Follow-up with pulmonary in 3 months, sooner if needed.    No follow-ups on file.  History of Present Illness   Devin Chaves is a 79 y.o. male who is here for rehab and I am seeing for hospital f/u for COPD.     [facility]  He is on Trelegy.  He wears chronic oxygen 4 L of sleep and 3 L with exertion.    Records reviewed.  Patient hospitalized [facility] - for MSSA bacteremia with a suspected skin or soft tissue source.  Hospitalization complicated by GI bleed, atrial fibrillation, colonic mass.  Did not require any treatment for his COPD during his hospitalization.    Review of Systems  Aside from what is mentioned in the HPI, ROS is otherwise negative    Medical History Reviewed by provider this encounter:     .    Historical Information   Past Medical History:   Diagnosis Date    Atrial fibrillation (HCC)     COPD (chronic obstructive pulmonary disease) (HCC)     Crushing injury of finger, left     Infectious viral hepatitis      Past Surgical  History:   Procedure Laterality Date    FL LUMBAR PUNCTURE DIAGNOSTIC  2/10/2022    [place] OPEN TX PHALANGEAL SHAFT FRACTURE PROX/MIDDLE EA Left 1/25/2017    Procedure: ORIF LEFT SMALL FINGER FRACTURE;  Surgeon: Blake Badillo MD;  Location: [facility];  Service: Orthopedics     Social History   Social History     Tobacco Use   Smoking Status Former   Smokeless Tobacco Former    Quit date: 4/1/2011       Occupational/Environmental history:  Smoked 3 to 4 packs/day for 50 years, quit in 2011    Family History:   No family history on file.    Objective     115/65; heart rate 91; 98.0 F     Physical Exam  GEN: WDWN, nad, comfortable  HEENT: NCAT, EOMI  LUNGS: diminished diffusely b/l  ABD: soft  EXT: No c/c + edema b/l l/e    Diagnostic Data:  Labs: I personally reviewed the most recent laboratory data pertinent to today's visit.  2/21/2025 per my review shows normal renal function, stable anemia, thrombocytopenia    Radiology results:    2/20/2025 CT chest/abdomen/pelvis my review shows emphysema with left lower lobe atelectasis    Other studies:  Echo 2/12/2025: EF normal, grade 1 diastolic dysfunction, decreased RV function, mild to moderate aortic regurgitation    Allie Pepper, DO

## 2025-02-26 NOTE — ASSESSMENT & PLAN NOTE
BP stable, 122/72  Continue to monitor BP   continue metoprolol tartrate 25 mg every 12 hours with hold parameters

## 2025-02-26 NOTE — ASSESSMENT & PLAN NOTE
"Patient's [place], she felt that perhaps the cow had stepped on his ankle and he did not seek treatment at that time  Patient was hoping to have reconstructive surgery  He will need outpatient follow-up with podiatry  2/21/2025 patient had x-ray showing \" no acute fracture or dislocation, Degenerative osteoarthritis of the ankle mortise with mild marginal osteophyte formation. Although the entire foot is not visualized there does appear to be some flattening of the plantar arches. \"  "

## 2025-02-26 NOTE — TELEPHONE ENCOUNTER
Received call from [facility] to push OV a month from now at [facility].      Pt scheduled for 4/1/025 w/ Dr. Chacko.  2/28 OV canceled.

## 2025-02-27 ENCOUNTER — NURSING HOME VISIT (OUTPATIENT)
Dept: GERIATRICS | Facility: OTHER | Age: 80
End: 2025-02-27
Payer: COMMERCIAL

## 2025-02-27 VITALS
DIASTOLIC BLOOD PRESSURE: 70 MMHG | RESPIRATION RATE: 18 BRPM | TEMPERATURE: 98 F | OXYGEN SATURATION: 93 % | HEART RATE: 85 BPM | WEIGHT: 193.6 LBS | BODY MASS INDEX: 23.57 KG/M2 | SYSTOLIC BLOOD PRESSURE: 130 MMHG

## 2025-02-27 DIAGNOSIS — R78.81 MSSA BACTEREMIA: ICD-10-CM

## 2025-02-27 DIAGNOSIS — B95.61 MSSA BACTEREMIA: ICD-10-CM

## 2025-02-27 DIAGNOSIS — R13.19 ESOPHAGEAL DYSPHAGIA: ICD-10-CM

## 2025-02-27 DIAGNOSIS — I48.91 ATRIAL FIBRILLATION, UNSPECIFIED TYPE (HCC): ICD-10-CM

## 2025-02-27 DIAGNOSIS — I10 PRIMARY HYPERTENSION: ICD-10-CM

## 2025-02-27 DIAGNOSIS — R21 RASH: Primary | ICD-10-CM

## 2025-02-27 PROCEDURE — 99309 SBSQ NF CARE MODERATE MDM 30: CPT

## 2025-02-27 NOTE — ASSESSMENT & PLAN NOTE
BP stable, 130/70  Continue to monitor BP   continue metoprolol tartrate 25 mg every 12 hours with hold parameters

## 2025-02-27 NOTE — ASSESSMENT & PLAN NOTE
Heart rate controlled, 85  Patient denies chest pain/palpitations  Continue metoprolol tartrate 25 mg every 12 hours  Continue Eliquis 5 mg twice daily

## 2025-02-27 NOTE — ASSESSMENT & PLAN NOTE
Patient c/o food sticking in his throat when he swallows  This is not new per patient  He does not have any teeth and does have difficulty chewing food  Will have dietician and ST follow up with patient

## 2025-02-27 NOTE — ASSESSMENT & PLAN NOTE
B/L ankles and feet petechial red rash noted  Skin in dry and scaly  Right foot is worse than left  Will order hydrocortisone cream BID x 7 days

## 2025-02-27 NOTE — PROGRESS NOTES
[facility]  [address] [address]  [phone]  [facility]  Code 31 (STR)          NAME: Devin Chaves  AGE: 79 y.o. SEX: male CODE STATUS: No CPR    DATE OF ENCOUNTER: 2/27/2025    Assessment and Plan     1. Rash  Assessment & Plan:  B/L ankles and feet petechial red rash noted  Skin in dry and scaly  Right foot is worse than left  Will order hydrocortisone cream BID x 7 days  2. Esophageal dysphagia  Assessment & Plan:  Patient c/o food sticking in his throat when he swallows  This is not new per patient  He does not have any teeth and does have difficulty chewing food  Will have dietician and ST follow up with patient  3. Primary hypertension  Assessment & Plan:  BP stable, 130/70  Continue to monitor BP   continue metoprolol tartrate 25 mg every 12 hours with hold parameters    4. Atrial fibrillation, unspecified type (HCC)  Assessment & Plan:  Heart rate controlled, 85  Patient denies chest pain/palpitations  Continue metoprolol tartrate 25 mg every 12 hours  Continue Eliquis 5 mg twice daily  5. MSSA bacteremia  Assessment & Plan:  Blood cultures positive for MSSA  ID followed while inpatient recommended extended course of antibiotics  Continue cefazolin through 3/27/2025 via PICC line  TTE negative for vegetation  Source thought to be UTI versus cellulitis  MRI of C-spine showed mild nonspecific edema within the right posterior paraspinal musculature of the upper cervical spine.  Minimal peripheral enhancement is noted and differential considerations could include infectious versus inflammatory myositis with soft tissue abscess.  No osteomyelitis or discitis.  No cord compression or abnormal cord signal.\MRI of brain shows possible multifocal infarction  Weekly labs on Tuesdays well on IV antibiotics, results faxed to ID  Outpatient follow-up with ID 3/3/5         All medications and routine orders were reviewed and updated as needed.    Chief Complaint     STR follow  "up visit  Patient's care was coordinated with nursing facility staff. Recent vitals, labs, and updated medications were review on [facility] system in facility.  Past Medical and Surgical History      Past Medical History:   Diagnosis Date    Atrial fibrillation (HCC)     COPD (chronic obstructive pulmonary disease) (HCC)     Crushing injury of finger, left     Infectious viral hepatitis      Past Surgical History:   Procedure Laterality Date    FL LUMBAR PUNCTURE DIAGNOSTIC  2/10/2022    AR OPEN TX PHALANGEAL SHAFT FRACTURE PROX/MIDDLE EA Left 1/25/2017    Procedure: ORIF LEFT SMALL FINGER FRACTURE;  Surgeon: Blake Badillo MD;  Location: [facility];  Service: Orthopedics     Allergies   Allergen Reactions    Shellfish-Derived Products - Food Allergy Other (See Comments)     Pt states, \"I reacted, I dont know\"          History of Present Illness     HPI  Chavo Chaves is a 79 year old male, he is a STR patient of [facility] since 2/22/25. Past Medical Hx including but not limited to HTN, CAD, CVA, Afib, COPD, JAZMINE, RA, anemia. He was seen in collaboration with nursing for medical mgmt and STR follow up.     Hospital Course  Patient was admitted to the hospital 2/11/25-2/22/25 due to shortness of breath while patient was at rehab.  Patient had recent hospital stay from 12/10/2024 through 2/6/2025 for COPD exacerbation.  Patient was discharged to [facility] on 2/6/2025 and then readmitted to the hospital 2/11/2025.  In the ER patient was treated for COPD exacerbation with nebulizers, he met sepsis criteria initially for tachycardia tach, tachypnea, leukocytosis with suspected UTI source.  UA did not show signs of infection.  Additionally patient was noted to have multiple abrasions on arms and hands.  Blood cultures showed gram-positive bacteremia determined to be MSSA bacteremia and patient was started on cefazolin.  ID followed patient and determined patient required long-term course " of antibiotics to end on 3/27/2025.  Hospital course was complicated by multiple episodes of anemia requiring 2 blood transfusions.  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  Pathology was taken and is pending.  CEA was 3.1.  Colorectal surgery was consulted and will follow patient at discharge for possible surgery.  Patient will continue IV cefazolin every 8 hours until 3/27/2025.  Patient was discharged back to [facility].    Rehab course  Chavo was seen and examined at bedside today.  Patient is alert and oriented.  On exam patient is sitting in his chair and does not appear to be in any distress.  His wife is at bedside and is asking for rash on feet to be evaluated.  Red petechial rash on b/l feet and ankles noted. Skin is dry and flaky, will order hydrocortisone cream BID x 7 days. Asked to have dietician and speech see patient.     Denies CP/SOB/N/V/D. Denies lightheadedness, dizziness, headaches, vision changes. Per review of SNF records, Patient is eating 3 meals per day, consuming 50-75%. Last documented BM 2/26/2025. No concerns from nursing at this time.    The patient's allergies, past medical, surgical, social and family history were reviewed and unchanged.    Review of Systems     Review of Systems   Constitutional:  Positive for activity change and appetite change. Negative for fever.   HENT:  Positive for trouble swallowing. Negative for congestion.    Respiratory:  Negative for cough, shortness of breath and wheezing.         Chronic oxygen 2 to 4 L   Cardiovascular:  Positive for leg swelling. Negative for chest pain and palpitations.   Gastrointestinal:  Negative for abdominal distention, constipation, diarrhea, nausea and vomiting.   Genitourinary:  Negative for difficulty urinating and dysuria.   Musculoskeletal:  Positive for arthralgias and gait problem.        Left ankle deformity, edema   Skin:  Positive for rash.        B/L feet and  ankles   Neurological:  Positive for weakness. Negative for dizziness and headaches.   Psychiatric/Behavioral:  Negative for sleep disturbance.          Objective     Vitals:   Vitals:    02/27/25 1307   BP: 130/70   Pulse: 85   Resp: 18   Temp: 98 °F (36.7 °C)   SpO2: 93%           Physical Exam  Vitals and nursing note reviewed.   Constitutional:       General: He is not in acute distress.     Appearance: Normal appearance. He is not ill-appearing.   HENT:      Head: Normocephalic and atraumatic.      Nose: No congestion.   Eyes:      Conjunctiva/sclera: Conjunctivae normal.   Cardiovascular:      Rate and Rhythm: Normal rate and regular rhythm.      Heart sounds: Normal heart sounds.   Pulmonary:      Effort: Pulmonary effort is normal.      Breath sounds: Normal breath sounds.   Abdominal:      General: Bowel sounds are normal. There is no distension.      Tenderness: There is no abdominal tenderness.   Musculoskeletal:      Right lower leg: Edema present.      Left lower leg: Edema present.   Skin:     General: Skin is warm.      Findings: Rash present.   Neurological:      Mental Status: He is alert and oriented to person, place, and time.      Motor: Weakness present.      Gait: Gait abnormal.   Psychiatric:         Mood and Affect: Mood normal.         Behavior: Behavior normal.         Pertinent Laboratory/Diagnostic Studies:   Reviewed in facility chart-stable      Current Medications   Medications reviewed and updated see facility MAR for details.      Current Outpatient Medications:     albuterol (2.5 mg/3 mL) 0.083 % nebulizer solution, Take 3 mL (2.5 mg total) by nebulization every 6 (six) hours as needed for wheezing or shortness of breath, Disp: 30 mL, Rfl: 0    albuterol (PROVENTIL HFA,VENTOLIN HFA) 90 mcg/act inhaler, INHALE 2 PUFFS BY MOUTH EVERY 4 HOURS AS NEEDED FOR BREATHING, Disp: , Rfl:     albuterol (PROVENTIL HFA,VENTOLIN HFA) 90 mcg/act inhaler, Inhale 2 puffs every 4 (four) hours as  needed for wheezing, Disp: , Rfl:     apixaban (Eliquis) 5 mg, Take 5 mg by mouth 2 (two) times a day, Disp: , Rfl:     ascorbic acid (VITAMIN C) 250 MG tablet, Take 250 mg by mouth daily, Disp: , Rfl:     aspirin (ECOTRIN LOW STRENGTH) 81 mg EC tablet, Take 81 mg by mouth daily, Disp: , Rfl:     ceFAZolin (ANCEF) 2000 mg IVPB, Inject 2,000 mg into a catheter in a vein over 30 minutes at 100 mL/hr every 8 (eight) hours, Disp: , Rfl:     Cholecalciferol 50 MCG (2000 UT) TABS, TAKE ONE TABLET BY MOUTH DAILY (FOR LOW VITAMIN D), Disp: , Rfl:     cyanocobalamin (VITAMIN B-12) 100 MCG tablet, Take 100 mcg by mouth daily, Disp: , Rfl:     docusate sodium (COLACE) 100 mg capsule, Take 100 mg by mouth 2 (two) times a day, Disp: , Rfl:     famotidine (PEPCID) 20 mg tablet, Take 20 mg by mouth 2 (two) times a day, Disp: , Rfl:     ferrous sulfate 325 (65 Fe) mg tablet, Take 325 mg by mouth daily with breakfast, Disp: , Rfl:     folic acid (FOLVITE) 1 mg tablet, TAKE ONE TABLET BY MOUTH EVERY DAY *FOLIC ACID SUPPLEMENT*, Disp: , Rfl:     hydroxychloroquine (PLAQUENIL) 200 mg tablet, Take 400 mg by mouth daily with breakfast, Disp: , Rfl:     levalbuterol (XOPENEX) 1.25 mg/3 mL nebulizer solution, Take 3 mL (1.25 mg total) by nebulization every 8 (eight) hours as needed for wheezing, Disp: , Rfl:     lidocaine (LIDODERM) 5 %, Apply 3 patches topically over 12 hours daily Remove & Discard patch within 12 hours or as directed by MD. Apply to neck and back in areas of pain., Disp: , Rfl:     metoprolol tartrate (LOPRESSOR) 25 mg tablet, Take 25 mg by mouth every 12 (twelve) hours, Disp: , Rfl:     mometasone 220 mcg/actuation inhaler, Inhale 1 puff every evening Rinse mouth after use., Disp: , Rfl:     polyethylene glycol (MIRALAX) 17 g packet, Take 17 g by mouth daily as needed (constipation), Disp: , Rfl:     rosuvastatin (CRESTOR) 40 MG tablet, Take 20 mg by mouth daily, Disp: , Rfl:     senna (SENOKOT) 8.6 mg, Take 1 tablet  "(8.6 mg total) by mouth daily at bedtime as needed for constipation, Disp: , Rfl:     sertraline (ZOLOFT) 25 mg tablet, Take 12.5 mg by mouth daily, Disp: , Rfl:     tamsulosin (FLOMAX) 0.4 mg, Take 1 capsule (0.4 mg total) by mouth daily with dinner, Disp: , Rfl:      Please note:  Voice-recognition software may have been used in the preparation of this document.  Occasional wrong word or \"sound-alike\" substitutions may have occurred due to the inherent limitations of voice recognition software.  Interpretation should be guided by context.         AMPARO Barney  2/27/2025  1:33 PM    "

## 2025-02-28 ENCOUNTER — TELEPHONE (OUTPATIENT)
Dept: OTHER | Facility: OTHER | Age: 80
End: 2025-02-28

## 2025-02-28 PROBLEM — Z82.3 FAMILY HISTORY OF CEREBROVASCULAR ACCIDENT (CVA): Status: ACTIVE | Noted: 2025-02-28

## 2025-02-28 PROBLEM — N17.9 AKI (ACUTE KIDNEY INJURY) (HCC): Status: ACTIVE | Noted: 2025-02-28

## 2025-02-28 PROBLEM — K92.1 MELENA: Status: ACTIVE | Noted: 2025-02-28

## 2025-02-28 PROBLEM — R79.89 ELEVATED TROPONIN: Status: ACTIVE | Noted: 2025-02-28

## 2025-02-28 PROBLEM — J90 PLEURAL EFFUSION, BILATERAL: Status: ACTIVE | Noted: 2025-01-01

## 2025-02-28 PROBLEM — R33.9 URINARY RETENTION: Status: ACTIVE | Noted: 2025-01-01

## 2025-02-28 NOTE — ASSESSMENT & PLAN NOTE
CT A/P shows bilateral pleural effusions L>R, pulmonary vascular congestion  Decreased breath sounds in bilateral lungs bases noted on exam  Pulmonary congestion likely in setting of severe anemia    Plan:  Repeat imaging if pt's respiratory status worsens

## 2025-02-28 NOTE — ASSESSMENT & PLAN NOTE
TTE 2/12/25: EF 55%, Normal systolic dysfunction, G1DD, No vegetation, No mural thrombus, moderate aortic sclerosis     Plan:  Hold aspirin in setting of GI bleed

## 2025-02-28 NOTE — ASSESSMENT & PLAN NOTE
Patient reports daily bowel movements which are dark and dark urine.   Recently EGD on 2/19 reported normal     Consider GI consult   Consider urology consult for dark urine

## 2025-02-28 NOTE — ASSESSMENT & PLAN NOTE
Presented with hgb 4.7  Received 3 units pRBC, hgb responded to 7.3    Trend hgb  Transfuse for hgb <7  Anticoagulation on hold  Recommend iron supplements

## 2025-02-28 NOTE — LETTER
[facility]  [address]  [place] [address]  Dept: [phone]    March 20, 2025     Patient: Devin Chaves   YOB: 1945   Date of Visit: 2/28/2025       To Whom it May Concern:    Devin Chaves is under my professional care. He was admitted to the hospital on 2/28/2025 and remains inpatient. He was transferred to the ICU on 3/15 and remains in the intensive care unit in critical condition at this time.    This letter is for Lucia Chaves    If you have any questions or concerns, please don't hesitate to call.       Sincerely,          AMPARO Garcia

## 2025-02-28 NOTE — ASSESSMENT & PLAN NOTE
Systolic BP 100s-130s.  Patient normotensive.  Blood pressure stable.    Plan:  Lopressor 25 mg every 12 hours

## 2025-02-28 NOTE — ASSESSMENT & PLAN NOTE
AC: Eliquis 5 mg BID  Rate control: Lopressor 25 mg Q12 hrs    Plan:  Eliquis in setting of GI bleed  Continue Lopressor 25 mg Q12 hrs

## 2025-02-28 NOTE — ED PROVIDER NOTES
Time reflects when diagnosis was documented in both MDM as applicable and the Disposition within this note       Time User Action Codes Description Comment    2/28/2025  7:30 AM Mal Bay [D64.9] Anemia     2/28/2025  7:31 AM Mal Bay [R91.1] Pulmonary nodule     2/28/2025  7:31 AM Mal Bay [N17.9] ORIANA (acute kidney injury) (HCC)     2/28/2025  7:31 AM Mal Bay [R79.89] Elevated troponin     2/28/2025  7:32 AM Mal Bay [J90] Pleural effusion, bilateral           ED Disposition       ED Disposition   Admit    Condition   Stable    Date/Time   Fri Feb 28, 2025  7:41 AM    Comment                  Assessment & Plan       Medical Decision Making  79-year-old male extensive past medical history including A-fib on Eliquis presenting from nursing home due to hemoglobin of 4.  Patient states he has been feeling crappy for a couple days with chest pain and shortness of breath and also having dark black stools.  Started having diffuse abdominal tenderness yesterday.  Denies any use of Pepto-Bismol or iron supplementation.    Plan to repeat labs, transfusion and CT-abdomen pelvis for evaluation of black stool.  Concern for GI bleed.  Plan to admit after workup.    Amount and/or Complexity of Data Reviewed  Labs: ordered. Decision-making details documented in ED Course.  Radiology: ordered.    Risk  Prescription drug management.        ED Course as of 02/28/25 0745   Fri Feb 28, 2025   0515 LIPASE: 24   0515 MAGNESIUM: 2.1   0515 Critical result, hemoglobin 4.6.  Will transfuse 3 units of RBCs.   0520 Transfusion consent signed.   0535 GFR 24, discussed with radiologist Dr. Montes and approved for high-volume bleed scan.   0555 Creatinine(!): 2.43  ORIANA   0730 CT chest w ct abdomen pelvis w wo contrast     Decreased intracardiac blood pool density compatible with anemia.     New bilateral multi lobar interstitial thickening and tree-in-bud opacities may represent pulmonary  vascular congestion or nonspecific inflammatory or infectious process.     Bilateral pleural effusions, left greater than right, mildly enlarged and additional findings suggestive of pulmonary vascular congestion. Correlate with clinical findings.     5 mm left upper lobe nodule stable since 2/11/2025 and new since December 2023. 6 mm right lower lobe nodule new since 2/11/2025. Noncontrast chest CT follow-up in 3 months is advised.     Additional chronic findings and negatives as above.     CT abdomen and pelvis:     No evidence of high-volume gastrointestinal bleeding.     Stable upper cecal/proximal ascending colonic mass attributed to recently biopsy-proven adenocarcinoma.     Colonic diverticulosis.     No evidence of abdominopelvic metastatic disease.     Additional chronic findings and negatives as above.        0743 Discussed patient with inpatient team for admission and management of severe anemia, ORIANA.  Patient will likely need colonoscopy from gastroenterology due to black stool and anemia.  Patient currently receiving second unit of PRBCs with a planned total of 3.       Medications   iohexol (OMNIPAQUE) 350 MG/ML injection (MULTI-DOSE) 100 mL (100 mL Intravenous Given 2/28/25 0603)       ED Risk Strat Scores   HEART Risk Score      Flowsheet Row Most Recent Value   Heart Score Risk Calculator    History 0 Filed at: 02/28/2025 0744   ECG 1 Filed at: 02/28/2025 0744   Age 2 Filed at: 02/28/2025 0744   Risk Factors 1 Filed at: 02/28/2025 0744   Troponin 2 Filed at: 02/28/2025 0744   HEART Score 6 Filed at: 02/28/2025 0744          HEART Risk Score      Flowsheet Row Most Recent Value   Heart Score Risk Calculator    History 0 Filed at: 02/28/2025 0744   ECG 1 Filed at: 02/28/2025 0744   Age 2 Filed at: 02/28/2025 0744   Risk Factors 1 Filed at: 02/28/2025 0744   Troponin 2 Filed at: 02/28/2025 0744   HEART Score 6 Filed at: 02/28/2025 0744                                                  History of Present  Illness       Chief Complaint   Patient presents with    Abnormal Lab     Patient arrives via EMS with low hemoglobin. Patient complains of SOB and chest pain upon arrival. Hx of anemia and current hemoglobin of 4.        Past Medical History:   Diagnosis Date    Atrial fibrillation (HCC)     COPD (chronic obstructive pulmonary disease) (HCC)     Crushing injury of finger, left     Infectious viral hepatitis       Past Surgical History:   Procedure Laterality Date    FL LUMBAR PUNCTURE DIAGNOSTIC  2/10/2022    [place] OPEN TX PHALANGEAL SHAFT FRACTURE PROX/MIDDLE EA Left 1/25/2017    Procedure: ORIF LEFT SMALL FINGER FRACTURE;  Surgeon: Blake Badillo MD;  Location: [facility];  Service: Orthopedics      History reviewed. No pertinent family history.   Social History     Tobacco Use    Smoking status: Former    Smokeless tobacco: Former     Quit date: 4/1/2011   Substance Use Topics    Alcohol use: No    Drug use: No      E-Cigarette/Vaping      E-Cigarette/Vaping Substances      I have reviewed and agree with the history as documented.     79-year-old male extensive past medical history including A-fib on Eliquis presenting from nursing home due to hemoglobin of 4.  Patient states he has been feeling crappy for a couple days with chest pain and shortness of breath and also having dark black stools.  Started having diffuse abdominal tenderness yesterday.  Denies any use of Pepto-Bismol or iron supplementation.        Review of Systems   Constitutional:  Negative for chills and fever.   Eyes:  Negative for pain and visual disturbance.   Respiratory:  Positive for shortness of breath. Negative for cough.    Cardiovascular:  Positive for chest pain. Negative for palpitations.   Gastrointestinal:  Positive for abdominal pain and blood in stool. Negative for nausea and vomiting.   Genitourinary:  Negative for dysuria and hematuria.   Musculoskeletal:  Negative for arthralgias and back pain.   Skin:  Negative for color change  and rash.   Neurological:  Negative for dizziness, seizures, syncope, light-headedness and headaches.   All other systems reviewed and are negative.          Objective       ED Triage Vitals   Temperature Pulse Blood Pressure Respirations SpO2 Patient Position - Orthostatic VS   02/28/25 0403 02/28/25 0358 02/28/25 0358 02/28/25 0358 02/28/25 0358 02/28/25 0358   98 °F (36.7 °C) 62 105/51 18 93 % Lying      Temp Source Heart Rate Source BP Location FiO2 (%) Pain Score    02/28/25 0403 02/28/25 0605 02/28/25 0358 -- 02/28/25 0358    Oral Monitor Right arm  8      Vitals      Date and Time Temp Pulse SpO2 Resp BP Pain Score FACES Pain Rating User   02/28/25 0735 98 °F (36.7 °C) 71 98 % 18 121/57 -- -- KD   02/28/25 0730 98 °F (36.7 °C) 73 100 % 18 117/57 -- -- KD   02/28/25 0726 97.5 °F (36.4 °C) 75 100 % 18 120/59 -- -- KD   02/28/25 0659 97.2 °F (36.2 °C) 77 100 % 18 122/56 -- -- KB   02/28/25 0648 -- -- 99 % -- -- -- -- KB   02/28/25 0629 97.6 °F (36.4 °C) 74 100 % 18 103/51 -- -- KB   02/28/25 0619 97.7 °F (36.5 °C) 79 100 % 18 103/53 -- -- KB   02/28/25 0609 97.7 °F (36.5 °C) 79 -- 18 -- -- -- KB   02/28/25 0605 -- 84 100 % 18 111/53 -- -- KB   02/28/25 0530 -- 75 100 % -- 103/58 -- -- KB   02/28/25 0515 -- 78 90 % -- -- -- -- KB   02/28/25 0407 -- -- 95 % -- -- -- -- KB   02/28/25 0405 -- -- 93 % -- -- -- -- KB   02/28/25 0403 98 °F (36.7 °C) -- -- -- -- -- -- KB   02/28/25 0358 -- 62 93 % 18 105/51 8 -- KB            Physical Exam  Vitals and nursing note reviewed.   Constitutional:       General: He is not in acute distress.     Appearance: He is well-developed.   HENT:      Head: Normocephalic and atraumatic.      Nose: Nose normal. No congestion.      Mouth/Throat:      Pharynx: Oropharynx is clear.   Eyes:      Extraocular Movements: Extraocular movements intact.      Conjunctiva/sclera: Conjunctivae normal.   Cardiovascular:      Rate and Rhythm: Normal rate and regular rhythm.      Pulses: Normal pulses.       Heart sounds: Normal heart sounds. No murmur heard.  Pulmonary:      Effort: Pulmonary effort is normal. No respiratory distress.      Breath sounds: Normal breath sounds. No wheezing, rhonchi or rales.   Chest:      Chest wall: No tenderness.   Abdominal:      General: Abdomen is flat. Bowel sounds are normal. There is no distension.      Palpations: Abdomen is soft.      Tenderness: There is abdominal tenderness. There is no right CVA tenderness or left CVA tenderness.   Musculoskeletal:         General: No deformity or signs of injury. Normal range of motion.      Cervical back: Normal range of motion and neck supple. No rigidity or tenderness.      Right lower leg: Edema present.      Left lower leg: Edema present.   Skin:     General: Skin is warm and dry.      Findings: No bruising, lesion or rash.   Neurological:      General: No focal deficit present.      Mental Status: He is alert.      Cranial Nerves: No cranial nerve deficit.      Sensory: No sensory deficit.         Results Reviewed       Procedure Component Value Units Date/Time    HS Troponin I 2hr [[id number]]  (Abnormal) Collected: 02/28/25 0642    Lab Status: Final result Specimen: Blood from Arm, Left Updated: 02/28/25 0724     hs TnI 2hr 142 ng/L      Delta 2hr hsTnI 8 ng/L     HS Troponin I 4hr [[id number]]     Lab Status: No result Specimen: Blood     HS Troponin I 4hr [[id number]]     Lab Status: No result Specimen: Blood     HS Troponin 0hr (reflex protocol) [[id number]]  (Abnormal) Collected: 02/28/25 0436    Lab Status: Final result Specimen: Blood from Arm, Left Updated: 02/28/25 0528     hs TnI 0hr 134 ng/L     CBC and differential [[id number]]  (Abnormal) Collected: 02/28/25 0436    Lab Status: Final result Specimen: Blood from Arm, Left Updated: 02/28/25 0517     WBC 4.50 Thousand/uL      RBC 1.59 Million/uL      Hemoglobin 4.6 g/dL      Hematocrit 15.2 %      MCV 96 fL      MCH 28.3 pg      MCHC 29.6 g/dL      RDW 16.9 %      MPV 8.4  fL      Platelets 201 Thousands/uL      nRBC 0 /100 WBCs      Segmented % 62 %      Immature Grans % 1 %      Lymphocytes % 25 %      Monocytes % 11 %      Eosinophils Relative 1 %      Basophils Relative 0 %      Absolute Neutrophils 2.83 Thousands/µL      Absolute Immature Grans 0.04 Thousand/uL      Absolute Lymphocytes 1.11 Thousands/µL      Absolute Monocytes 0.49 Thousand/µL      Eosinophils Absolute 0.03 Thousand/µL      Basophils Absolute 0.00 Thousands/µL     Narrative:      This is an appended report.  These results have been appended to a previously verified report.    Comprehensive metabolic panel [[id number]]  (Abnormal) Collected: 02/28/25 0436    Lab Status: Final result Specimen: Blood from Arm, Left Updated: 02/28/25 0517     Sodium 139 mmol/L      Potassium 4.0 mmol/L      Chloride 108 mmol/L      CO2 26 mmol/L      ANION GAP 5 mmol/L      BUN 63 mg/dL      Creatinine 2.43 mg/dL      Glucose 80 mg/dL      Calcium 7.6 mg/dL      Corrected Calcium 9.2 mg/dL      AST 16 U/L      ALT <3 U/L      Alkaline Phosphatase 43 U/L      Total Protein 5.1 g/dL      Albumin 2.0 g/dL      Total Bilirubin 0.31 mg/dL      eGFR 24 ml/min/1.73sq m     Narrative:      National Kid[employer] guidelines for Chronic Kidney Disease (CKD):     Stage 1 with normal or high GFR (GFR > 90 mL/min/1.73 square meters)    Stage 2 Mild CKD (GFR = 60-89 mL/min/1.73 square meters)    Stage 3A Moderate CKD (GFR = 45-59 mL/min/1.73 square meters)    Stage 3B Moderate CKD (GFR = 30-44 mL/min/1.73 square meters)    Stage 4 Severe CKD (GFR = 15-29 mL/min/1.73 square meters)    Stage 5 End Stage CKD (GFR <15 mL/min/1.73 square meters)  Note: GFR calculation is accurate only with a steady state creatinine    Lipase [[id number]]  (Normal) Collected: 02/28/25 0436    Lab Status: Final result Specimen: Blood from Arm, Left Updated: 02/28/25 0506     Lipase 24 u/L     Magnesium [[id number]]  (Normal) Collected: 02/28/25 0436    Lab  Status: Final result Specimen: Blood from Arm, Left Updated: 02/28/25 0506     Magnesium 2.1 mg/dL             CT chest w ct abdomen pelvis w wo contrast   Final Interpretation by Eric Willis MD (02/28 0727)      CT chest:      Decreased intracardiac blood pool density compatible with anemia.      New bilateral multi lobar interstitial thickening and tree-in-bud opacities may represent pulmonary vascular congestion or nonspecific inflammatory or infectious process.      Bilateral pleural effusions, left greater than right, mildly enlarged and additional findings suggestive of pulmonary vascular congestion. Correlate with clinical findings.      5 mm left upper lobe nodule stable since 2/11/2025 and new since December 2023. 6 mm right lower lobe nodule new since 2/11/2025. Noncontrast chest CT follow-up in 3 months is advised.      Additional chronic findings and negatives as above.      CT abdomen and pelvis:      No evidence of high-volume gastrointestinal bleeding.      Stable upper cecal/proximal ascending colonic mass attributed to recently biopsy-proven adenocarcinoma.      Colonic diverticulosis.      No evidence of abdominopelvic metastatic disease.      Additional chronic findings and negatives as above.      The study was marked in EPIC for immediate notification.               Workstation performed: [id number]         XR chest 1 view portable    (Results Pending)       ECG 12 Lead Documentation Only    Date/Time: 2/28/2025 4:30 AM    Performed by: Mal Bay MD  Authorized by: Mal Bay MD    Patient location:  ED  Interpretation:     Interpretation: non-specific    Quality:     Tracing quality:  Limited by artifact  Rhythm:     Rhythm: sinus rhythm    Ectopy:     Ectopy: none    QRS:     QRS axis:  Normal    QRS intervals:  Normal  Conduction:     Conduction: normal    ST segments:     ST segments:  Normal  T waves:     T waves: normal        ED Medication and Procedure Management    Prior to Admission Medications   Prescriptions Last Dose Informant Patient Reported? Taking?   Cholecalciferol 50 MCG (2000 UT) TABS   Yes No   Sig: TAKE ONE TABLET BY MOUTH DAILY (FOR LOW VITAMIN D)   albuterol (2.5 mg/3 mL) 0.083 % nebulizer solution   No No   Sig: Take 3 mL (2.5 mg total) by nebulization every 6 (six) hours as needed for wheezing or shortness of breath   albuterol (PROVENTIL HFA,VENTOLIN HFA) 90 mcg/act inhaler   Yes No   Sig: INHALE 2 PUFFS BY MOUTH EVERY 4 HOURS AS NEEDED FOR BREATHING   albuterol (PROVENTIL HFA,VENTOLIN HFA) 90 mcg/act inhaler   No No   Sig: Inhale 2 puffs every 4 (four) hours as needed for wheezing   apixaban (Eliquis) 5 mg   Yes No   Sig: Take 5 mg by mouth 2 (two) times a day   ascorbic acid (VITAMIN C) 250 MG tablet   Yes No   Sig: Take 250 mg by mouth daily   aspirin (ECOTRIN LOW STRENGTH) 81 mg EC tablet   Yes No   Sig: Take 81 mg by mouth daily   ceFAZolin (ANCEF) 2000 mg IVPB   No No   Sig: Inject 2,000 mg into a catheter in a vein over 30 minutes at 100 mL/hr every 8 (eight) hours   cyanocobalamin (VITAMIN B-12) 100 MCG tablet   Yes No   Sig: Take 100 mcg by mouth daily   docusate sodium (COLACE) 100 mg capsule   Yes No   Sig: Take 100 mg by mouth 2 (two) times a day   famotidine (PEPCID) 20 mg tablet   Yes No   Sig: Take 20 mg by mouth 2 (two) times a day   ferrous sulfate 325 (65 Fe) mg tablet   Yes No   Sig: Take 325 mg by mouth daily with breakfast   folic acid (FOLVITE) 1 mg tablet   Yes No   Sig: TAKE ONE TABLET BY MOUTH EVERY DAY *FOLIC ACID SUPPLEMENT*   hydroxychloroquine (PLAQUENIL) 200 mg tablet   Yes No   Sig: Take 400 mg by mouth daily with breakfast   levalbuterol (XOPENEX) 1.25 mg/3 mL nebulizer solution   No No   Sig: Take 3 mL (1.25 mg total) by nebulization every 8 (eight) hours as needed for wheezing   lidocaine (LIDODERM) 5 %   No No   Sig: Apply 3 patches topically over 12 hours daily Remove & Discard patch within 12 hours or as directed by MD.  Apply to neck and back in areas of pain.   metoprolol tartrate (LOPRESSOR) 25 mg tablet   Yes No   Sig: Take 25 mg by mouth every 12 (twelve) hours   mometasone 220 mcg/actuation inhaler   Yes No   Sig: Inhale 1 puff every evening Rinse mouth after use.   polyethylene glycol (MIRALAX) 17 g packet   No No   Sig: Take 17 g by mouth daily as needed (constipation)   rosuvastatin (CRESTOR) 40 MG tablet   Yes No   Sig: Take 20 mg by mouth daily   senna (SENOKOT) 8.6 mg   No No   Sig: Take 1 tablet (8.6 mg total) by mouth daily at bedtime as needed for constipation   sertraline (ZOLOFT) 25 mg tablet   Yes No   Sig: Take 12.5 mg by mouth daily   tamsulosin (FLOMAX) 0.4 mg   No No   Sig: Take 1 capsule (0.4 mg total) by mouth daily with dinner      Facility-Administered Medications: None     Patient's Medications   Discharge Prescriptions    No medications on file     No discharge procedures on file.  ED SEPSIS DOCUMENTATION   Time reflects when diagnosis was documented in both MDM as applicable and the Disposition within this note       Time User Action Codes Description Comment    2/28/2025  7:30 AM Mal Bay [D64.9] Anemia     2/28/2025  7:31 AM Mal Bay [R91.1] Pulmonary nodule     2/28/2025  7:31 AM Mal Bay [N17.9] ORIANA (acute kidney injury) (HCC)     2/28/2025  7:31 AM Mal Bay [R79.89] Elevated troponin     2/28/2025  7:32 AM Mal Bay [J90] Pleural effusion, bilateral                  Mal Bay MD  02/28/25 0745

## 2025-02-28 NOTE — CONSULTS
Consultation - Vascular Surgery   Name: Devin Chaves 79 y.o. male I MRN: [id number]  Unit/Bed#: ED-18 I Date of Admission: 2/28/2025   Date of Service: 2/28/2025 I Hospital Day: 0   Inpatient consult to Colorectal Surgery  Consult performed by: Theodora Watt PA-C  Consult ordered by: Yasir Justice MD        Physician Requesting Evaluation: Yasir Justice MD   Reason for Evaluation / Principal Problem: colon mass    Assessment & Plan  COPD (chronic obstructive pulmonary disease) (HCC)  On 2L NC at baseline  Currently on 2L NC without increased work of breathing     Per primary team  Atrial fibrillation (HCC)  Treated with Eliqiuis    Currently on hold  MSSA bacteremia  During prior hospitalization diagnosed with MSSA bacteremia, acute neck pain, possible deep cervical infection? Requiring long-term antibiotics    TTE negative for vegetation   Repeat blood cultures negative 2/14   Being treated with IV Cefazolin     Per primary team  Colonic mass  Newly diagnosed proximal ascending colon mass on colonoscopy 2/19, pathology confirmed adenocarcinoma.  Colorectal surgery was consulted at that time, recommended outpatient follow up which was scheduled for today?  Patient now presenting with anemia and reports of melena   VSS  2/28 CT AP no evidence of high-volume gastrointestinal bleeding. Stable upper cecal/proximal ascending colonic mass, colonic diverticulosis. No Evidence of abdominopelvic metastatic disease.     Discussed with , no urgent surgical intervention warranted at this time.  Recommend presurgical optimization along nutritional and iron supplements.   Symptomatic anemia  Presented with hgb 4.7  Received 3 units pRBC, hgb responded to 7.3    Trend hgb  Transfuse for hgb <7  Anticoagulation on hold  Recommend iron supplements  Melena  Patient reports daily bowel movements which are dark and dark urine.   Recently EGD on 2/19 reported normal     Consider GI consult   Consider urology  consult for dark urine    Please contact the SecureChat role for the Colorectal service with any questions/concerns.    History of Present Illness   Devin Chaves is a 79 y.o. male with pmhx of Afib on Eliquis, JAZMINE, COPD on 2L NC, RA on plaquenil who presents with anemia. Patient's wife, grandson and son at bedside. Patient reports he has been having trouble with his legs since he left the hospital. He reports some decreased appetite and inability to tolerate foods at rehab facility due to them being too tough/hard. Wife not happy with the care patient has been receiving at the rehab facility and patient unable to chew harder foods due to his lack of teeth. Wife states patient had outpatient colorectal surgery appointment for today but she canceled as patient was brought to ER. Patient reports he has been having dark/black stools daily since he was recently admitted and also reports dark/black urine. He denies any chest pain, worsening shortness of breath, dizziness, lightheadedness or HA.     Patient wears home O2 (2L) at baseline. He takes Eliquis for afib, but he is unsure his last dose.     Review of Systems   Constitutional:  Positive for appetite change and fatigue. Negative for activity change, chills and fever.   Respiratory:  Negative for chest tightness and shortness of breath.    Cardiovascular:  Negative for chest pain.   Gastrointestinal:  Negative for abdominal pain, constipation, diarrhea, nausea and vomiting.        Black stools   Genitourinary:         Dark urine   Skin:  Positive for rash (RLE).   Neurological:  Negative for dizziness, syncope, light-headedness and headaches.     Historical Information   Past Medical History:   Diagnosis Date    Atrial fibrillation (HCC)     COPD (chronic obstructive pulmonary disease) (HCC)     Crushing injury of finger, left     Infectious viral hepatitis      Past Surgical History:   Procedure Laterality Date    FL LUMBAR PUNCTURE DIAGNOSTIC  2/10/2022    [place]  OPEN [place] PHALANGEAL SHAFT FRACTURE PROX/MIDDLE EA Left 1/25/2017    Procedure: ORIF LEFT SMALL FINGER FRACTURE;  Surgeon: Blake Badillo MD;  Location: [facility];  Service: Orthopedics     Social History     Tobacco Use    Smoking status: Former    Smokeless tobacco: Former     Quit date: 4/1/2011   Substance and Sexual Activity    Alcohol use: No    Drug use: No    Sexual activity: Not on file     E-Cigarette/Vaping     E-Cigarette/Vaping Substances     History reviewed. No pertinent family history.    Objective :  Temp:  [97.2 °F (36.2 °C)-98.3 °F (36.8 °C)] 97.4 °F (36.3 °C)  HR:  [62-84] 66  BP: (103-133)/(51-63) 118/60  Resp:  [18-20] 20  SpO2:  [90 %-100 %] 99 %  O2 Device: Nasal cannula  Nasal Cannula O2 Flow Rate (L/min):  [2 L/min-3.5 L/min] 2 L/min    I/O         02/26 0701  02/27 0700 02/27 0701  02/28 0700 02/28 0701  03/01 0700    Blood  350 1050    Total Intake(mL/kg)  350 (3.8) 1050 (11.5)    Net  +350 +1050                 Lines/Drains/Airways       Active Status       Name Placement date Placement time Site Days    PICC Line 02/26/25 Right Brachial 02/26/25  0548  Brachial  2                  Physical Exam  Constitutional:       General: He is not in acute distress.     Appearance: He is normal weight. He is not ill-appearing or toxic-appearing.   Cardiovascular:      Rate and Rhythm: Normal rate.   Pulmonary:      Effort: Pulmonary effort is normal. No respiratory distress.      Comments: On 2 L NC  Abdominal:      General: Abdomen is flat. There is no distension.      Palpations: Abdomen is soft.      Tenderness: There is abdominal tenderness in the right lower quadrant and left lower quadrant. There is no guarding or rebound.      Comments: Mild ttp   Skin:     General: Skin is warm and dry.   Neurological:      General: No focal deficit present.      Mental Status: He is alert.   Psychiatric:         Mood and Affect: Mood normal.         Behavior: Behavior normal.            Lab Results: I have  reviewed the following results:  Recent Labs     02/28/25  0436 02/28/25  0642 02/28/25  1223   WBC 4.50  --   --    HGB 4.6*  --  7.3*   HCT 15.2*  --   --      --   --    SODIUM 139  --   --    K 4.0  --   --      --   --    CO2 26  --   --    BUN 63*  --   --    CREATININE 2.43*  --   --    GLUC 80  --   --    MG 2.1  --   --    AST 16  --   --    ALT <3*  --   --    ALB 2.0*  --   --    TBILI 0.31  --   --    ALKPHOS 43  --   --    HSTNI0 134*  --   --    HSTNI2  --  142*  --        Imaging Results Review: I reviewed radiology reports from this admission including: CT abdomen/pelvis.  Other Study Results Review: No additional pertinent studies reviewed.    VTE Prophylaxis: Reason for no pharmacologic prophylaxis anemia requiring transfusion

## 2025-02-28 NOTE — ASSESSMENT & PLAN NOTE
Patient has history of urinary retention and was started on Flomax during last hospitalization  Pt denies difficulty urinating. Has dark/black urine on canister at bedside.    Plan:  Flomax 0.4 mg daily  Urinary retention protocol

## 2025-02-28 NOTE — TELEPHONE ENCOUNTER
Nursing home or Independent living name:  [facility]    Who is calling:  Sonia    Callback number:   [phone]     Symptoms:   Abnormal labs    Message sent to on call provider; verified read receipt of message.

## 2025-02-28 NOTE — ASSESSMENT & PLAN NOTE
Patient had recent hospitalization for COPD exacerbation complicated by MSSA bacteremia.  Blood cultures S aureus (+). Possible source small laceration from haircut, per chart review.  TTE negative for vegetations  Patient was discharged on cefazolin 2 g IV Q8 hrs until 3/27/2025. PICC line in place. Patient scheduled to follow-up outpatient with ID on 3/3/2025.  Repeat blood cultures showed no growth  Pt denies fevers. No leukocytosis on admission.    Plan:  Renally adjust IV cefazolin due to ORIANA.  Can restart 2 g TID once pt's kidney function has improved  F/up with ID outpatient on 3/3/2025

## 2025-02-28 NOTE — ASSESSMENT & PLAN NOTE
DISCHARGE/PLANNING EVALUATION  11/16/20, 1:46 PM EST    Reason for Referral: For Consideration for rehab  Mental Status: Alert and oriented to person, place and time. Decision Making: Independent  Family/Social/Home Environment: Lives alone. Current Services including food security, transportation and housekeeping: Independent. Would not elaborate on support system. Current Equipment:none  Payment Source:[employer]. Concerns or Barriers to Discharge: none  Post acute provider list with quality measures, geographic area and applicable managed care information provided. Questions regarding selection process answered:NA    Teach Back Method used with Stephane Steve regarding care plan and discharge planning. Patient verbalize understanding of the plan of care and contribute to goal setting. Patient goals, treatment preferences and discharge plan: Stephane Wilson is working on setting up Alcohol Rehab when he is discharged. He told SW that he has been calling facilities working to find one that takes his insurance. Addictions services consult has been placed. Electronically signed by BRANDO Maradiaga on 11/16/2020 at 1:46 PM Newly diagnosed proximal ascending colon mass on colonoscopy 2/19, pathology confirmed adenocarcinoma.  Colorectal surgery was consulted at that time, recommended outpatient follow up which was scheduled for today?  Patient now presenting with anemia and reports of melena   VSS  2/28 CT AP no evidence of high-volume gastrointestinal bleeding. Stable upper cecal/proximal ascending colonic mass, colonic diverticulosis. No Evidence of abdominopelvic metastatic disease.     Discussed with , no urgent surgical intervention warranted at this time.  Recommend presurgical optimization along nutritional and iron supplements.

## 2025-02-28 NOTE — CASE MANAGEMENT
Case Management Assessment & Discharge Planning Note    Patient name Devin Chaves  Location ED-18/ED-18 MRN [id number]  : 1945 Date 2025       Current Admission Date: 2025  Current Admission Diagnosis:Symptomatic anemia   Patient Active Problem List    Diagnosis Date Noted Date Diagnosed    Melena 2025     ORIANA (acute kidney injury) ([facility]) 2025     Elevated troponin 2025     Pleural effusion, bilateral 2025     Family history of cerebrovascular accident (CVA) 2025     Rash 2025     Esophageal dysphagia 2025     Ambulatory dysfunction 2025     Left ankle joint deformity 2025     Colonic mass 2025     Stroke ([facility]) 02/15/2025     Neck pain 2025     Symptomatic anemia 2025     MSSA bacteremia 2025     Sepsis ([facility]) 2025     Acute urinary retention 2025     Hypotension 2025     Constipation 2025     Atrial fibrillation ([facility]) 2025     Hip region mass, unspecified laterality 2023     Chronic hypoxic respiratory failure ([facility]) 2023     Fracture of multiple ribs of right side 2023     Rheumatoid arthritis ([facility]) 2023     Moderate protein-calorie malnutrition ([facility]) 2023     HTN (hypertension) 2023     CAD (coronary artery disease) 2023     COPD (chronic obstructive pulmonary disease) ([facility]) 2023     JAZMINE (obstructive sleep apnea) 2023     Back pain 2023     COVID-19 2023     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  [place] [address]  Phone: [phone] Fax: [phone]    [facility] - [place] - [address]  [address]  [place] [address]  Phone: [phone] Fax: [phone]    [facility] - [place] - [address]  [address]  [place] [address]  Phone: [phone] Fax: [phone]    Primary Care Provider: Park Saxena MD    Primary Insurance: [facility] [employer] [facility] [facility]  Secondary Insurance: [facility]  REP    ASSESSMENT:  Active Health Care Proxies       Francia Chaves Health Care Representative - Spouse   Primary Phone: [phone] (Mobile)  Home Phone: [phone]                           Readmission Root Cause  30 Day Readmission: Yes (patient did not remember hospital stay and could not answer questions.)  During your hospital stay, did someone (provider, nurse, ) explain your care to you in a way you could understand?: Unable to Assess  Did you feel medically stable to leave the hospital?: Unable to Assess  Were you able to pay for your medication at the pharmacy?: Unable to Assess  Did you have reliable transportation to take you to your appointments?: Unable to Assess  During previous admission, was a post-acute recommendation made?: Yes  What post-acute resources were offered?: STR  Patient was readmitted due to: [facility]  Action Plan: Further CM follow up as needed for dcp.    Patient Information  Admitted from:: Facility ([facility])  Mental Status: Alert  During Assessment patient was accompanied by: Not accompanied during assessment  Assessment information provided by:: Patient  Primary Caregiver: Self  Support Systems: Self, Spouse/significant other, Daughter, Family members  County of Residence: [place]  What city do you live in?: [place]  Home entry access options. Select all that apply.: No steps to enter home  Type of Current  Residence: Facility  Upon entering residence, is there a bedroom on the main floor (no further steps)?: Yes  Upon entering residence, is there a bathroom on the main floor (no further steps)?: Yes  Living Arrangements: Lives in Facility  Is patient a ?: Yes  Is patient active with [facility] ( Affairs)?: Yes  Is patient service connected?: Yes (yamila ingram)    Activities of Daily Living Prior to Admission  Functional Status: Assistance  Completes ADLs independently?: No  Level of ADL dependence: Assistance (patient stated facility staff assist with dressing/bathing)  Ambulates independently?: Yes  Does patient use assisted devices?: Yes  Assisted Devices (DME) used: Walker, Wheelchair  Does patient have a history of Outpatient Therapy (PT/OT)?: Yes  Does the patient have a history of Short-Term Rehab?: Yes ([facility])  Does patient have a history of HHC?: No  Does patient currently have HHC?: No         Patient Information Continued  Income Source: Pension/shelter  Does patient have prescription coverage?: Yes  Does patient receive dialysis treatments?: No  Does patient have a history of substance abuse?: No  Does patient have a history of Mental Health Diagnosis?: No         Means of Transportation  Means of Transport to Appts:: None          DISCHARGE DETAILS:    Discharge planning discussed with:: Patient at bedside  Freedom of Choice: Yes  Comments - Freedom of Choice: CM spoke with patient at bedside re: assessment and dcp. Patient provided assessemnt information. Patient came from [facility]. Patient is agreeable to returning to [facility] and referral being sent back. CM told patient that she will follow up with the [facility] and [facility] re: the patient returning.  CM contacted family/caregiver?: Yes  Were Treatment Team discharge recommendations reviewed with patient/caregiver?: Yes  Did patient/caregiver verbalize understanding of patient care needs?: N/A- going to facility  Were patient/caregiver advised of  the risks associated with not following Treatment Team discharge recommendations?: Yes    Contacts  Patient Contacts: Patient  Relationship to Patient:: Other (Comment) (self)  Contact Method: In Person  Reason/Outcome: Continuity of Care, Emergency Contact, Discharge Planning, Referral    Requested Home Health Care         Is the patient interested in C at discharge?: No    DME Referral Provided  Referral made for DME?: No    Other Referral/Resources/Interventions Provided:  Interventions: SNF, Short Term Rehab  Referral Comments: Referral sent via [facility]; Email sent to [facility] to verify that patient is service connected and to discuss pt returning to [facility].

## 2025-02-28 NOTE — ASSESSMENT & PLAN NOTE
Pt has history of CVA  MRI brain: Multiple tiny recent infarcts scattered in multiple vascular distributions that are likely embolic. Several microhemorrhages associated with recent infarcts. No acute space-occupying hematoma. Chronic ischemic changes.  NCCTH 2/17: 3 mm focus of hyperdensity left superior frontal lobe as described unchanged from the prior study may represent a small 3 mm hemorrhage versus focus of mineralization.    Plan:  Hold Eliquis and aspirin in setting of GI bleed  Continue Lipitor 40 mg daily

## 2025-02-28 NOTE — LETTER
[facility]  [address]  [place] [address]  Dept: [phone]    March 21, 2025       Attn: Student Affairs  Phone: [phone]  Fax: [phone]  Email: [email or web address]      Student: Florecita Chaves   Patient: Devin Chaves       To Whom it May Concern:    The student's grandfather, Devin Chaves, is under my professional care. The student's grandfather is currently admitted to the hospital from 2/28/2025 to present.    Florecita Chaves was at her grandfather's bedside from 03/15 - 03/19/2025.    If you have any questions or concerns, please do not hesitate to call.       Sincerely,          Burton Hughes MD

## 2025-02-28 NOTE — ASSESSMENT & PLAN NOTE
Recent Labs     02/28/25  0436   CREATININE 2.43*   EGFR 24     Estimated Creatinine Clearance: 27.9 mL/min (A) (by C-G formula based on SCr of 2.43 mg/dL (H)).     Cr 2.43 on admission. Baseline 0.9. BUN 63. BUN/Cr ratio >20  Pt having dark/black urine in canister at bedside.  Likely hypovolemic in the setting of acute GI bleed    Plan:  Isolyte 75 cc/hour  BMP at 2000  Ctm Creatinine. Consult nephrology if pt's kidney function does not improve.  F/up UA  Avoid nephrotoxic medications  Monitor I/O's  Urinary retention protocol

## 2025-02-28 NOTE — CONSULTS
[facility]  Consultation Note  Encounter: [id number]     PATIENT INFO     Name: Devin Chaves  YOB: 1945   Age: 79 y.o.   Sex: male   MRN: [id number]  Unit/Bed#: ED-18     REASON FOR CONSULTATION     -Acute drop in hemoglobin  -CT C/A/P indicates no mets and stable upper cecal/proximal ascending colonic mass attributed to recently biopsy-proven adenocarcinoma.  -Recent colonoscopy 2/20/25 indicates proximal ascending colon-across from the ileocecal    valve there is a 2.5 cm hard friable polypoid mass appears malignant status post multiple    biopsies   -biopsy indicates MMR deficient adenocarcinoma  -ECOG3     ASSESSMENT & PLAN     Devin Chaves is a 79 y.o. male with medical history significant for atrial fibrillation [on Eliquis], COPD, JAZMINE, rheumatoid arthritis [on Plaquenil], chronic anemia, recent history of right-sided colonic mass status post biopsy which indicated MMR deficient adenocarcinoma was admitted on account of progressively worsening tiredness, abdominal discomfort, shortness of breath, melena and on labs was found to have acute drop in hgb (8.2 1 week ago to 4.6) s/p 3 units pRBC (now Hgb 7.3). CT C/A/P (2/28/25) indicates no mets and stable upper cecal/proximal ascending colonic mass attributed to recently biopsy-proven adenocarcinoma. Patient underwent colonoscopy on 2/20/25 which indicated proximal ascending colon-across from the ileocecal valve there is a 2.5 cm hard friable polypoid mass appears malignant status post multiple   Biopsies. 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  ECOG3    -PT-INR and PTT elevated. This could be due to Eliquis or patient can be in DIC secondary to underlying malignancy  -We discontinued Eliquis and aspirin 2/2 bleeding.  We also discontinued his iron supplementation.  -He is high risk for blood clots secondary to immobility, underlying malignancy, obesity.  Please initiate SCDs for  now.  -Colorectal surgery on board.  Appreciate recommendations.  -Transfuse as needed to keep hemoglobin above 7  -Transfuse platelets as needed  -Send biopsy sample for molecular testing       HISTORY OF PRESENT ILLNESS       Devin Chaves is a 79 y.o. male with medical history significant for atrial fibrillation [on Eliquis], COPD, JAZMINE, rheumatoid arthritis [on Plaquenil], chronic anemia, recent history of right-sided colonic mass status post biopsy which indicated MMR deficient adenocarcinoma was admitted on account of progressively worsening tiredness, abdominal discomfort, shortness of breath, melena and on labs was found to have acute drop in hgb (8.2 1 week ago to 4.6) s/p 3 units pRBC (now Hgb 7.3). CT C/A/P (2/28/25) indicates no mets and stable upper cecal/proximal ascending colonic mass attributed to recently biopsy-proven adenocarcinoma. Patient underwent colonoscopy on 2/20/25 which indicated proximal ascending colon-across from the ileocecal valve there is a 2.5 cm hard friable polypoid mass appears malignant status post multiple   Biopsies. 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  ECOG3       REVIEW OF SYSTEMS     CONSTITUTIONAL: Denies any fever, chills, rigors, and weight loss  HEENT: No earache or tinnitus, denies hearing loss or visual disturbances  CARDIOVASCULAR: No chest pain or palpitations  RESPIRATORY: Improved dyspnea after pRBC infusion  GASTROINTESTINAL: abdominal discomfort, melena+  GENITOURINARY: No problems with urination, denies any hematuria or dysuria  NEUROLOGIC: No dizziness or vertigo, denies headaches   MUSCULOSKELETAL: Denies any muscle or joint pain   SKIN: rash on lower ext bilat (feet)  ENDOCRINE: Denies excessive thirst, denies intolerance to heat or cold      Historical Information   Past Medical History:   Diagnosis Date    Atrial fibrillation (HCC)     COPD (chronic obstructive pulmonary disease) (HCC)     Crushing injury of finger, left      "Infectious viral hepatitis      Past Surgical History:   Procedure Laterality Date    FL LUMBAR PUNCTURE DIAGNOSTIC  2/10/2022    [place] OPEN TX PHALANGEAL SHAFT FRACTURE PROX/MIDDLE EA Left 1/25/2017    Procedure: ORIF LEFT SMALL FINGER FRACTURE;  Surgeon: Blake Badillo MD;  Location: [facility];  Service: Orthopedics     Social History   Social History     Substance and Sexual Activity   Alcohol Use No     Social History     Substance and Sexual Activity   Drug Use No     Social History     Tobacco Use   Smoking Status Former   Smokeless Tobacco Former    Quit date: 4/1/2011     History reviewed. No pertinent family history.     MEDICATIONS & ALLERGIES     Meds/Allergies   Not in a hospital admission.    Current Facility-Administered Medications:     albuterol inhalation solution 2.5 mg, Q6H PRN    ascorbic acid (VITAMIN C) tablet 250 mg, Daily    [START ON 3/1/2025] atorvastatin (LIPITOR) tablet 40 mg, Daily With Dinner    ceFAZolin (ANCEF) IVPB (premix in dextrose) 2,000 mg 50 mL, Q12H    Cholecalciferol (VITAMIN D3) tablet 2,000 Units, Daily    cyanocobalamin (VITAMIN B-12) tablet 100 mcg, Daily    fluticasone (ARNUITY ELLIPTA) 100 MCG/ACT inhaler 1 puff, Daily    folic acid (FOLVITE) tablet 1 mg, Daily    [START ON 3/1/2025] hydroxychloroquine (PLAQUENIL) tablet 400 mg, Daily With Breakfast    levalbuterol (XOPENEX) inhalation solution 1.25 mg, Q8H PRN    [START ON 3/1/2025] lidocaine (LIDODERM) 5 % patch 3 patch, Daily    metoprolol tartrate (LOPRESSOR) tablet 25 mg, Q12H GALE    multi-electrolyte (PLASMALYTE-A/ISOLYTE-S PH 7.4) IV solution, Continuous    pantoprazole (PROTONIX) 80 mg in sodium chloride 0.9 % 100 mL infusion, Continuous, Last Rate: 8 mg/hr (02/28/25 1135)  Allergies   Allergen Reactions    Shellfish-Derived Products - Food Allergy Other (See Comments)     Pt states, \"I reacted, I dont know\"        PHYSICAL EXAM     Objective   Blood pressure 118/60, pulse 66, temperature (!) 97.4 °F (36.3 °C), " resp. rate 20, height 6' (1.829 m), weight 91.2 kg (201 lb 1 oz), SpO2 99%. Body mass index is 27.27 kg/m².    Intake/Output Summary (Last 24 hours) at 2/28/2025 1304  Last data filed at 2/28/2025 0958  Gross per 24 hour   Intake 1400 ml   Output --   Net 1400 ml     Medication Administration - last 24 hours from 02/27/2025 1304 to 02/28/2025 1304         Date/Time Order Dose Route Action Action by     02/28/2025 0603 EST iohexol (OMNIPAQUE) 350 MG/ML injection (MULTI-DOSE) 100 mL 100 mL Intravenous Given Tootie Terrazas     02/28/2025 1135 EST pantoprazole (PROTONIX) 80 mg in sodium chloride 0.9 % 100 mL infusion 8 mg/hr Intravenous New Bag Melissa Cormier RN     02/28/2025 1246 EST apixaban (ELIQUIS) tablet 5 mg -- Oral Unheld by provider Jef Beth MD     02/28/2025 1200 EST apixaban (ELIQUIS) tablet 5 mg -- Oral Held Dose (none)     02/28/2025 1159 EST apixaban (ELIQUIS) tablet 5 mg -- Oral Held by provider Kanchan Wilson MD     02/28/2025 1246 EST aspirin (ECOTRIN LOW STRENGTH) EC tablet 81 mg -- Oral Unheld by provider Jef Beth MD     02/28/2025 1200 EST aspirin (ECOTRIN LOW STRENGTH) EC tablet 81 mg -- Oral Held Dose (none)     02/28/2025 1159 EST aspirin (ECOTRIN LOW STRENGTH) EC tablet 81 mg -- Oral Held by provider Kanchan Wilson MD            General Appearance:   Alert, cooperative, moderate distress, obese, ECOG 3, deconditioned, mostly stays in bed   HEENT:   Normocephalic, atraumatic, anicteric     Lungs:   Equal chest rise, respirations unlabored    Heart:   Regular rate and rhythm   Abdomen:   Tenderness on palpation especially left abdomen   Extremities:   No cyanosis, clubbing or edema    Neuro:   Moves all 4 extremities    Skin:   Rash on both feet bilaterally, no jaundice, no other rashes, or lesions      Invasive Devices       Peripherally Inserted Central Catheter Line  Duration             PICC Line 02/26/25 Right Brachial 2 days              Peripheral Intravenous Line  Duration              Peripheral IV 02/28/25 Distal;Left;Upper;Ventral (anterior) Arm <1 day    Peripheral IV 02/28/25 Left;Ventral (anterior) Forearm <1 day                     LABORATORY RESULTS     Admission on 02/28/2025   Component Date Value    Ventricular Rate 02/28/2025 76     Atrial Rate 02/28/2025 76     AZ Interval 02/28/2025 166     QRSD Interval 02/28/2025 102     QT Interval 02/28/2025 424     QTC Interval 02/28/2025 477     P Axis 02/28/2025 74     QRS [place] 02/28/2025 68     T Wave [place] 02/28/2025 22     WBC 02/28/2025 4.50     RBC 02/28/2025 1.59 (L)     Hemoglobin 02/28/2025 4.6 (LL)     Hematocrit 02/28/2025 15.2 (L)     MCV 02/28/2025 96     MCH 02/28/2025 28.3     MCHC 02/28/2025 29.6 (L)     RDW 02/28/2025 16.9 (H)     MPV 02/28/2025 8.4 (L)     Platelets 02/28/2025 201     nRBC 02/28/2025 0     Segmented % 02/28/2025 62     Immature Grans % 02/28/2025 1     Lymphocytes % 02/28/2025 25     Monocytes % 02/28/2025 11     Eosinophils Relative 02/28/2025 1     Basophils Relative 02/28/2025 0     Absolute Neutrophils 02/28/2025 2.83     Absolute Immature Grans 02/28/2025 0.04     Absolute Lymphocytes 02/28/2025 1.11     Absolute Monocytes 02/28/2025 0.49     Eosinophils Absolute 02/28/2025 0.03     Basophils Absolute 02/28/2025 0.00     Sodium 02/28/2025 139     Potassium 02/28/2025 4.0     Chloride 02/28/2025 108     CO2 02/28/2025 26     ANION GAP 02/28/2025 5     BUN 02/28/2025 63 (H)     Creatinine 02/28/2025 2.43 (H)     Glucose 02/28/2025 80     Calcium 02/28/2025 7.6 (L)     Corrected Calcium 02/28/2025 9.2     AST 02/28/2025 16     ALT 02/28/2025 <3 (L)     Alkaline Phosphatase 02/28/2025 43     Total Protein 02/28/2025 5.1 (L)     Albumin 02/28/2025 2.0 (L)     Total Bilirubin 02/28/2025 0.31     eGFR 02/28/2025 24     ABO Grouping 02/28/2025 O     Rh Factor 02/28/2025 Negative     Antibody Screen 02/28/2025 Negative     Specimen Expiration Date 02/28/2025 [id number]     Unit Product Code 02/28/2025  [id number]     Unit Number 02/28/2025 [id number]     Unit ABO 02/28/2025 O     Unit RH 02/28/2025 NEG     Crossmatch 02/28/2025 Compatible     Unit Dispense Status 02/28/2025 Issued     Unit Product Volume 02/28/2025 350     Unit Product Code 02/28/2025 H9416D78     Unit Number 02/28/2025 [id number]     Unit ABO 02/28/2025 O     Unit RH 02/28/2025 NEG     Crossmatch 02/28/2025 Compatible     Unit Dispense Status 02/28/2025 Issued     Unit Product Volume 02/28/2025 350     hs TnI 0hr 02/28/2025 134 (H)     Magnesium 02/28/2025 2.1     Lipase 02/28/2025 24     Unit Product Code 02/28/2025 U9040Y00     Unit Number 02/28/2025 [id number]     Unit ABO 02/28/2025 O     Unit RH 02/28/2025 NEG     Crossmatch 02/28/2025 Compatible     Unit Dispense Status 02/28/2025 Issued     Unit Product Volume 02/28/2025 350     hs TnI 2hr 02/28/2025 142 (H)     Delta 2hr hsTnI 02/28/2025 8     hs TnI 4hr 02/28/2025 138 (H)     Delta 4hr hsTnI 02/28/2025 4     Hemoglobin 02/28/2025 7.3 (L)         IMAGING RESULTS     XR chest 1 view portable  Result Date: 2/28/2025  Narrative: XR CHEST PORTABLE INDICATION: SOB. COMPARISON: 2/16/2025 FINDINGS: Central congestion with left greater than right basilar effusions. No pneumothorax. Enlarged cardiac silhouette, unchanged. Bones are unremarkable for age. Normal upper abdomen.     Impression: Cardiomegaly with central congestion and left greater than right small basilar effusions. Workstation performed: [id number]     CT chest w ct abdomen pelvis w wo contrast  Addendum Date: 2/28/2025  Addendum: ADDENDUM: Correction to spleen section in the body of report. Hyperattenuation should be hypoattenuation. Grossly stable posterior splenic subcapsular hypodensity presumably an infarct unchanged allowing for difference in contrast timing.     Result Date: 2/28/2025  Narrative: CT CHEST, ABDOMEN AND PELVIS WITH AND WITHOUT IV CONTRAST INDICATION: eliquis, hgb-4 from facility, reports black  stool, abn chest xray. COMPARISON: CT chest abdomen pelvis 2/20/2025 and CT chest 2/11/2025, 12/4/2023 TECHNIQUE: Initial CT of the abdomen and pelvis was performed without intravenous contrast. Subsequent dynamic CT evaluation of the chest, abdomen and pelvis was performed after the administration of intravenous contrast was performed. Finally, delayed dynamic delayed phase postcontrast CT evaluation of the abdomen and pelvis was performed. Multiplanar 2D reformatted images were created from the source data. This examination, like all CT scans performed in the [facility], was performed utilizing techniques to minimize radiation dose exposure, including the use of iterative reconstruction and automated exposure control. Radiation dose length product (DLP) for this visit: 2283 mGy-cm IV Contrast: 100 mL of iohexol Enteric Contrast: Not administered. FINDINGS: CHEST LUNGS: COPD. New mild bilateral multi lobar scattered interstitial thickening and groundglass and tree-in-bud opacities. New mild multi lobar smooth septal thickening. Minimal bilateral lower lobe and lingular atelectasis, similar. 5 mm left upper lobe nodule stable since 2/11/2025 and new since 12/4/2023 image 37 series 308. 6 mm right basilar lower lobe nodule, new image 99 series 308. Mucosal stranding the right mainstem bronchus and bronchus intermedius. Central airways otherwise clear. PLEURA: Bilateral pleural effusions, small on the left and trace on the right, mildly increased. HEART/GREAT VESSELS: Heart is not enlarged. Decreased intracardiac blood pool density compatible with anemia. No pericardial effusion.  Aortic, great vessel and coronary artery calcification.  No thoracic aortic aneurysm. Moderate stenosis of the mid ascending segment of the left subclavian artery. MEDIASTINUM AND PAWEL: No enlarged lymph nodes. Few small calcified mediastinal and left hilar calcified lymph nodes. Otherwise unremarkable. CHEST WALL AND LOWER  NECK: Stable mild bilateral gynecomastia. ABDOMEN RIGHT KIDNEY AND URETER: No solid renal mass or detectable urothelial mass. No hydronephrosis or hydroureter. No urinary tract calculi. No perinephric collection. LEFT KIDNEY AND URETER: No solid renal mass or detectable urothelial mass. Subcentimeter hypoattenuating renal lesion(s), too small to characterize but statistically likely benign, which do not warrant follow-up (Radiology June 2019). No hydronephrosis or hydroureter. No urinary tract calculi. No perinephric collection. URINARY BLADDER: No bladder wall mass. No calculi. LIVER/BILIARY TREE: Unremarkable. GALLBLADDER: No calcified gallstones. No pericholecystic inflammatory change. SPLEEN: Stable posterior splenic subcapsular hyperattenuation unchanged allowing for difference in contrast timing. Tiny splenic calcified granulomata. PANCREAS: Mild to moderate diffuse atrophy. No acute findings. ADRENAL GLANDS: Unremarkable. STOMACH AND BOWEL: No vascular contrast extravasation into the stomach or bowel to suggest high-volume bleeding. Colonic diverticulosis and small duodenojejunal junction diverticulum. Mild nodular thickening of the upper cecum/proximal ascending colon attributed to recently biopsy-proven adenocarcinoma image 74 series 605. No bowel obstruction. APPENDIX: No findings to suggest appendicitis. ABDOMINOPELVIC CAVITY: Trace free fluid in the dependent pelvis. No pneumoperitoneum. No lymphadenopathy. VESSELS: Aortic and mesenteric atherosclerotic disease without occlusion. Mild stenosis of the celiac and superior mesenteric origins with well-perfused distal vessels. PELVIS REPRODUCTIVE ORGANS: Unremarkable for patient's age. ABDOMINAL WALL/INGUINAL REGIONS: Unremarkable. BONES: No acute fracture or osseous destructive lesion identified. Stable mild to moderate multilevel chronic compression deformities in the thoracic spine most prominent at T7. Old bilateral rib fractures. Degenerative changes of  the spine, pubic symphysis, and multiple joints. Levoconvex lumbar scoliosis.     Impression: CT chest: Decreased intracardiac blood pool density compatible with anemia. New bilateral multi lobar interstitial thickening and tree-in-bud opacities may represent pulmonary vascular congestion or nonspecific inflammatory or infectious process. Bilateral pleural effusions, left greater than right, mildly enlarged and additional findings suggestive of pulmonary vascular congestion. Correlate with clinical findings. 5 mm left upper lobe nodule stable since 2/11/2025 and new since December 2023. 6 mm right lower lobe nodule new since 2/11/2025. Noncontrast chest CT follow-up in 3 months is advised. Additional chronic findings and negatives as above. CT abdomen and pelvis: No evidence of high-volume gastrointestinal bleeding. Stable upper cecal/proximal ascending colonic mass attributed to recently biopsy-proven adenocarcinoma. Colonic diverticulosis. No evidence of abdominopelvic metastatic disease. Additional chronic findings and negatives as above. The study was marked in EPIC for immediate notification. Workstation performed: [id number]     XR ankle 3+ vw left  Result Date: 2/22/2025  Narrative: XR ANKLE 3+ VW LEFT INDICATION: Chronic L ankle arthropathy, instability with increased difficulty bearing weight. COMPARISON: None FINDINGS: No acute fracture or dislocation. Degenerative osteoarthritis of the ankle mortise with mild marginal osteophyte formation. Although the entire foot is not visualized there does appear to be some flattening of the plantar arches. Partially visualized degenerative osteoarthritis of the first metatarsal phalangeal joint. There is soft tissue swelling of the forefoot primarily along the dorsal surface superficial to the metatarsals.     Impression: No acute osseous abnormality. Degenerative changes as described above with partial loss of the plantar arch. Computerized Assisted Algorithm (CAA)  may have been used to analyze all applicable images. Workstation performed: [id number]     CT chest abdomen pelvis w contrast  Result Date: 2/21/2025  Narrative: CT CHEST, ABDOMEN AND PELVIS WITH IV CONTRAST INDICATION: Rule out malignancy. COMPARISON: 2/11/2025 TECHNIQUE: CT examination of the chest, abdomen and pelvis was performed. Multiplanar 2D reformatted images were created from the source data. This examination, like all CT scans performed in the [facility], was performed utilizing techniques to minimize radiation dose exposure, including the use of iterative reconstruction and automated exposure control. Radiation dose length product (DLP) for this visit: 817 mGy-cm IV Contrast: 100 mL of iohexol Enteric Contrast: Not administered. FINDINGS: CHEST LUNGS: Left lower lobe atelectasis. Emphysematous changes no tracheal or endobronchial lesion. PLEURA: Small loculated left pleural effusion. Trace right pleural effusion HEART/GREAT VESSELS: Heart is unremarkable for patient's age. No thoracic aortic aneurysm. MEDIASTINUM AND PAWEL: Unremarkable. CHEST WALL AND LOWER NECK: Unremarkable. ABDOMEN LIVER/BILIARY TREE: Unremarkable. GALLBLADDER: No calcified gallstones. No pericholecystic inflammatory change. SPLEEN: Calcified granulomata. Hypoenhancing at the posterior spleen. PANCREAS: Unremarkable. ADRENAL GLANDS: Unremarkable. KIDNEYS/URETERS: No hydronephrosis or urinary tract calculi. Subcentimeter hypoattenuating renal lesion(s), too small to characterize but statistically likely benign, which do not warrant follow-up (Radiology June 2019). STOMACH AND BOWEL: Colonic diverticulosis without findings of acute diverticulitis. Wall thickening at the ileocecal valve/cecum APPENDIX: No findings to suggest appendicitis. ABDOMINOPELVIC CAVITY: No ascites. No pneumoperitoneum. No lymphadenopathy. VESSELS: Unremarkable for patient's age. PELVIS REPRODUCTIVE ORGANS: Unremarkable for patient's age. URINARY  BLADDER: Unremarkable. ABDOMINAL WALL/INGUINAL REGIONS: Unremarkable. BONES: No acute fracture or suspicious osseous lesion.     Impression: Wall thickening at the ileocecal valve/cecum compatible with colonic mass discovered on recent colonoscopy. Posterior splenic infarct Small loculated left pleural effusion. Left lower lobe atelectasis. Workstation performed: [id number]     CT head wo contrast  Result Date: 2/20/2025  Narrative: CT BRAIN - WITHOUT CONTRAST INDICATION:   rule out hemorrhage. COMPARISON: 2/17/2025 TECHNIQUE:  CT examination of the brain was performed.  Multiplanar 2D reformatted images were created from the source data. Radiation dose length product (DLP) for this visit:  899 mGy-cm .  This examination, like all CT scans performed in the [facility], was performed utilizing techniques to minimize radiation dose exposure, including the use of iterative reconstruction and automated exposure control. IMAGE QUALITY:  Diagnostic. FINDINGS: PARENCHYMA: Decreased attenuation is noted in periventricular and subcortical white matter demonstrating an appearance that is statistically most likely to represent moderate microangiopathic change. Chronic right basal ganglia lacunar infarct. Old infarct left occipital region. No CT signs of acute infarction.  No intracranial mass, mass effect or midline shift.  No acute parenchymal hemorrhage. VENTRICLES AND EXTRA-AXIAL SPACES:  Normal for the patient's age. VISUALIZED ORBITS: Normal visualized orbits. PARANASAL SINUSES: Normal visualized paranasal sinuses. CALVARIUM AND EXTRACRANIAL SOFT TISSUES: Normal.     Impression: No acute intracranial abnormality. Chronic microangiopathic changes. Workstation performed: [id number]     Colonoscopy  Result Date: 2/20/2025  Narrative: Table formatting from the original result was not included. [facility] [address] [address] [phone] DATE OF SERVICE: 2/20/25  PHYSICIAN(S): Attending: Adwoa Segal MD Fellow: No Staff Documented INDICATION: Anemia POST-OP DIAGNOSIS: See the impression below. HISTORY: Prior colonoscopy: 4 years ago. BOWEL PREPARATION: Clenpiq; Enema PREPROCEDURE: Informed consent was obtained for the procedure, including sedation. Risks including but not limited to bleeding, infection, perforation, adverse drug reaction and aspiration were explained in detail. Also explained about less than 100% sensitivity with the exam and other alternatives. The patient was placed in the left lateral decubitus position. Procedure: Colonoscopy DETAILS OF PROCEDURE: Patient was taken to the procedure room where a time out was performed to confirm correct patient and correct procedure. The patient underwent monitored anesthesia care, which was administered by an anesthesia professional. The patient's blood pressure, ECG, ETCO2, heart rate, level of consciousness, oxygen and respirations were monitored throughout the procedure. A digital rectal exam was performed. The scope was introduced through the anus and advanced to the cecum. Retroflexion was performed in the rectum. The quality of bowel preparation was evaluated using the [place] Bowel Preparation Scale with scores of: right colon = 2, transverse colon = 2, left colon = 2. The total BBPS score was 6. Bowel prep was adequate. The patient experienced no blood loss. The procedure was not difficult. The patient tolerated the procedure well. There were no apparent adverse events. ANESTHESIA INFORMATION: ASA: III Anesthesia Type: IV Sedation with Anesthesia MEDICATIONS: simethicone (MYLICON) 40 mg in sterile water 60 mL 40 mg (Totals for administrations occurring from 1140 to 1223 on 02/20/25) FINDINGS: The cecum appeared normal. Proximal ascending colon-across from the ileocecal valve there is a 2.5 cm hard friable polypoid mass appears malignant status post multiple biopsies. Multiple diverticula in the sigmoid colon  Some liquid stool was washed off and suctioned in few areas EVENTS: Procedure Events Event Event Time ENDO CECUM REACHED 2/20/2025 12:05 PM ENDO SCOPE OUT TIME 2/20/2025 12:22 PM SPECIMENS: ID Type Source Tests Collected by Time Destination 1 : proximal ascending mass bx Tissue Polyp, Colorectal TISSUE EXAM Adwoa Segal MD 2/20/2025 12:15 PM  EQUIPMENT: Colonoscope -[id number]     Impression: The cecum appeared normal. Proximal ascending colon-across from the ileocecal valve there is a 2.5 cm hard friable polypoid mass appears malignant status post multiple biopsies. Diverticulosis in the sigmoid colon Some liquid stool was washed off and suctioned in few areas RECOMMENDATION: Repeat colonoscopy in 1 year, due: 2/20/2026 Personal history of colon cancer  Follow-up on the pathology.  Check CT scan, CEA    Adwoa Segal MD     EGD  Result Date: 2/20/2025  Narrative: Table formatting from the original result was not included. [facility] [address] [facility] [address] [phone] DATE OF SERVICE: 2/20/25 PHYSICIAN(S): Attending: Adwoa Segal MD Fellow: No Staff Documented INDICATION: Anemia POST-OP DIAGNOSIS: See the impression below. PREPROCEDURE: Informed consent was obtained for the procedure, including sedation.  Risks of perforation, hemorrhage, adverse drug reaction and aspiration were discussed. The patient was placed in the left lateral decubitus position. Patient was explained about the risks and benefits of the procedure. Risks including but not limited to bleeding, infection, and perforation were explained in detail. Also explained about less than 100% sensitivity with the exam and other alternatives. PROCEDURE: EGD DETAILS OF PROCEDURE: Patient was taken to the procedure room where a time out was performed to confirm correct patient and correct procedure. The patient underwent monitored anesthesia care, which was administered by an anesthesia professional. The  patient's blood pressure, heart rate, level of consciousness, respirations, oxygen, ECG and ETCO2 were monitored throughout the procedure. The scope was introduced through the mouth and advanced to the second part of the duodenum. Retroflexion was performed in the fundus. The patient experienced no blood loss. The procedure was not difficult. The patient tolerated the procedure well. There were no apparent adverse events. ANESTHESIA INFORMATION: ASA: III Anesthesia Type: IV Sedation with Anesthesia MEDICATIONS: simethicone (MYLICON) 40 mg in sterile water 60 mL 40 mg (Totals for administrations occurring from 1140 to 1223 on 02/20/25) FINDINGS: The esophagus, stomach and duodenum appeared normal. SPECIMENS: ID Type Source Tests Collected by Time Destination 1 : proximal ascending mass bx Tissue Polyp, Colorectal TISSUE EXAM Adwoa Segal MD 2/20/2025 12:15 PM      Impression: The esophagus, stomach and duodenum appeared normal. RECOMMENDATION: There is no recommended follow-up for this procedure.   Adwoa Segal MD     XR chest portable  Result Date: 2/17/2025  Narrative: XR CHEST PORTABLE INDICATION: Hypoxia. COMPARISON: 2/11/2025 FINDINGS: Study mildly limited with the apical regions of the lungs obscured by the patient's chin. Within the limits of the exam, there is no worrisome appearing pulmonary consolidation or mass lesion seen. There does appear to be mild atelectasis in the left lung base. No significant pleural effusion appreciated. No large volume pneumothorax. No mediastinal shift. There is some prominence in the hilar regions which is felt to most likely be due to vasculature. Heart size grossly stable. Bones are unremarkable for age. Normal upper abdomen.     Impression: Limited study. There appears to be some left basilar atelectasis. There is some central vascular prominence in the hilar regions. Workstation performed: [id number]     CT head wo contrast  Result Date: 2/17/2025  Narrative: CT BRAIN -  WITHOUT CONTRAST INDICATION:   Follow up CT for patient with recent strokes. COMPARISON: CT brain February 15, 2025 TECHNIQUE:  CT examination of the brain was performed.  Multiplanar 2D reformatted images were created from the source data. Radiation dose length product (DLP) for this visit:  971 mGy-cm .  This examination, like all CT scans performed in the [facility], was performed utilizing techniques to minimize radiation dose exposure, including the use of iterative reconstruction and automated exposure control. IMAGE QUALITY:  Diagnostic. FINDINGS: PARENCHYMA: Decreased attenuation is noted in periventricular and subcortical white matter demonstrating an appearance that is statistically most likely to represent mild microangiopathic change; this appearance is similar when compared to most recent prior examination. Several known recent infarcts are too small to adequately required level characterize on CT. 3 mm punctate hyperdensity identified in the left inferior frontal lobe series 2 image 24 and series 400 image 22 which is present on the prior study series 2 image 25 is stable in size. There is corresponding susceptibility artifact in this location on MRI. Differential considerations includes mineralization or minimal hemorrhage. VENTRICLES AND EXTRA-AXIAL SPACES:  Normal for the patient's age. VISUALIZED ORBITS: Normal visualized orbits. PARANASAL SINUSES: Normal visualized paranasal sinuses. CALVARIUM AND EXTRACRANIAL SOFT TISSUES: Normal.     Impression: 3 mm focus of hyperdensity left superior frontal lobe as described unchanged from the prior study may represent a small 3 mm hemorrhage versus focus of mineralization. Continued follow-up recommended. Mild chronic microangiopathic changes. Chronic lacunar infarct right basal ganglia. The study was marked in EPIC for immediate notification. Workstation performed: [id number]     CTA head and neck w wo contrast  Result Date:  2/15/2025  Narrative: CTA NECK AND BRAIN WITH AND WITHOUT CONTRAST INDICATION: embolic strokes COMPARISON:   MRI brain + MRI cervical spine with and without contrast 2/14/2025. CTA chest PE study 2/11/2025. TECHNIQUE:  Routine CT imaging of the Brain without contrast.Post contrast imaging was performed after administration of iodinated contrast through the neck and brain. Post contrast axial 0.625 mm images timed to opacify the arterial system.  3D rendering was performed on an independent workstation.   MIP reconstructions performed. Coronal and sagittal reconstructions were performed of the non contrast portion of the brain. Radiation dose length product (DLP) for this visit:  1664 mGy-cm .  This examination, like all CT scans performed in the [facility], was performed utilizing techniques to minimize radiation dose exposure, including the use of iterative reconstruction and automated exposure control. IV Contrast:  75 mL of iohexol IMAGE QUALITY:   Diagnostic FINDINGS: NONCONTRAST BRAIN PARENCHYMA: Known small multifocal infarcts in bilateral cerebral hemispheres and right cerebellum were better evaluated on MRI brain dated 2/14/2025, likely embolic. No new CT signs of acute infarction. Similar decreased attenuation is noted in periventricular and subcortical white matter demonstrating an appearance that is statistically most likely to represent moderate microangiopathic change. Chronic lacunar infarct in right basal ganglia, unchanged.  No intracranial mass, mass effect or midline shift.  No acute parenchymal hemorrhage. VENTRICLES AND EXTRA-AXIAL SPACES: Normal for the patient's age. VISUALIZED ORBITS: Normal. PARANASAL SINUSES: Mild scattered mucosal thickening, worse in left maxillary sinus. CTA NECK ARCH AND GREAT VESSELS: 4.1 cm ectasia of ascending thoracic aorta (5:331). Mild atherosclerotic changes with no severe stenosis. VERTEBRAL ARTERIES: Patent extracranial segments. Mild stenosis  in origin of right vertebral artery due to calcified atherosclerotic disease. RIGHT CAROTID: Patent. Scattered mild mixed atherosclerotic disease in common carotid, carotid bifurcation, and proximal cervical internal carotid arteries. Less than 50% stenosis.  No dissection. LEFT CAROTID: Patent. Scattered mild mixed atherosclerotic disease in common carotid, carotid bifurcation, proximal cervical internal carotid arteries. Less than 50% stenosis.   No dissection. NASCET criteria was used to determine the degree of internal carotid artery diameter stenosis. CTA BRAIN: INTERNAL CAROTID ARTERIES: Mild calcified atherosclerotic disease in bilateral ICA cavernous to supraclinoid segments. Mild stenosis in right ICA supraclinoid segment. No occlusion. ANTERIOR CEREBRAL ARTERY CIRCULATION:  No stenosis or occlusion. MIDDLE CEREBRAL ARTERY CIRCULATION:  No stenosis or occlusion. DISTAL VERTEBRAL ARTERIES: Mild calcified atherosclerotic disease in bilateral vertebral artery V4 segments. No stenosis or occlusion. BASILAR ARTERY:  No stenosis or occlusion. POSTERIOR CEREBRAL ARTERIES: No stenosis or occlusion. VENOUS STRUCTURES:  Normal. NON VASCULAR ANATOMY BONY STRUCTURES:  No acute osseous abnormality. Edentulous. Grade 1 anterolisthesis C2-C3. Multilevel spinal degenerative changes moderate-to-severe at C3-C6. SOFT TISSUES OF THE NECK:  Normal. THORACIC INLET: 0.9 cm left upper lobe paramediastinal solid pulmonary nodule (5:324), unchanged. 0.7 cm left upper lobe solid pulmonary nodule (5:263), unchanged. 0.5 cm right upper lobe solid pulmonary nodule (5:274), unchanged.     Impression: CT Brain: - Known small multifocal infarcts in bilateral cerebral hemispheres and right cerebellum were better evaluated on MRI brain dated 2/14/2025, likely embolic. No new CT signs of acute infarction. - Unchanged chronic lacunar infarct in right basal ganglia with moderate chronic microangiopathy. CT Angiography: - Negative CTA head and  neck for large vessel occlusion, dissection, aneurysm, or high-grade stenosis. - Mild atherosclerotic disease of the head and neck, as detailed above. Multiple pulmonary nodules measuring up to 0.9 cm in left upper lobe, unchanged. Based on current Fleischner Society 2017 Guidelines on incidental pulmonary nodule, follow-up non-contrast CT is recommended at 3-6 months from the initial examination and, if stable at that time, an additional follow-up is recommended for 18-24 months from the initial examination. 4.1 cm ectasia of ascending thoracic aorta. Recommend follow-up CT chest in 12 months. Additional chronic/incidental findings as detailed above. The study was marked in EPIC for immediate notification. Workstation performed: [id number]     FL barium swallow video w speech  Result Date: 2/15/2025  Narrative: A video barium swallow study was performed by the Department of Speech Pathology. Please refer to the report for the official interpretation. The images are stored for archival purposes only. Study images were not formally reviewed by the Radiology Department.    MRI brain w wo contrast  Result Date: 2/15/2025  Narrative: MRI BRAIN WITH AND WITHOUT CONTRAST INDICATION: septic emboli. COMPARISON: There is motion artifact. TECHNIQUE: Multiplanar, multisequence imaging of the brain was performed before and after gadolinium administration. IV Contrast:  8 mL of Gadobutrol injection IMAGE QUALITY:   Diagnostic. FINDINGS: BRAIN PARENCHYMA: Small cortical focus of restricted diffusion in the left frontal lobe consistent with acute or early subacute infarct without acute hemorrhage.  There are multiple additional scattered punctate foci of acute or subacute ischemia in the bilateral frontal, right parietal, bilateral occipital lobs and right cerebellum. There are several microhemorrhages associated with the recent infarcts as well as a few additional chronic microhemorrhages. No acute space-occupying hematoma. There  is focal cortical encephalomalacia and gliosis in the left occipital lobe with hemosiderin staining due to remote infarct. There is also gliosis and hemosiderin staining in the right cerebellum. T2/FLAIR hyperintensities in the periventricular and subcortical matter due to chronic microangiopathy. Chronic lacunar infarct in the right basal ganglia and left cerebellar. No definite abnormal enhancement allowing for limitations due to motion. Tiny curvilinear focus of enhancement adjacent to small recent left frontal infarct is favored to represent a vessel. VENTRICLES: Volume loss. No hydrocephalus SELLA AND PITUITARY GLAND:  Normal. ORBITS:  Normal. PARANASAL SINUSES: Mild mucosal thickening. Trace mastoid effusions. VASCULATURE:  Evaluation of the major intracranial vasculature demonstrates appropriate flow voids. CALVARIUM AND SKULL BASE:  Normal. EXTRACRANIAL SOFT TISSUES:  Normal.     Impression: Multiple tiny recent infarcts scattered in multiple vascular distributions that are likely embolic. Several microhemorrhages associated with recent infarcts. No acute space-occupying hematoma. Chronic ischemic changes. The study was marked in EPIC for immediate notification. Workstation performed: [id number]     MRI cervical spine w wo contrast  Result Date: 2/14/2025  Narrative: MRI CERVICAL SPINE WITH AND WITHOUT CONTRAST INDICATION: Rule out discitis/osteomyelitis. COMPARISON:  None. TECHNIQUE:  Multiplanar, multisequence imaging of the cervical spine was performed before and after gadolinium administration. . IV Contrast:  8 mL of Gadobutrol injection IMAGE QUALITY: Portions of this examination are limited by patient motion. FINDINGS: ALIGNMENT: Slight anterior subluxation of C2 upon C3. And at several other mid cervical levels. No compression fracture. Mild levoscoliosis of the mid to lower cervical spine. MARROW SIGNAL:  Normal marrow signal is identified within the visualized bony structures.  No discrete marrow  lesion. CERVICAL AND VISUALIZED UPPER THORACIC CORD AND CANAL:  Normal signal within the visualized cord. No epidural fluid collections. PREVERTEBRAL AND PARASPINAL SOFT TISSUES: Mild focal edema present within the right paramedian posterior spinal musculature in the upper and mid cervical spine extending from C2-C5. No mass or fluid collection identified. VISUALIZED POSTERIOR FOSSA:  The visualized posterior fossa demonstrates no abnormal signal. CERVICAL DISC SPACES: C2-C3: Slight anterior subluxation of C2 upon C3. No discrete disc herniation. Mild left-sided facet arthropathy with small facet effusion. Mild left foraminal narrowing. C3-C4: Disc desiccation and loss of disc height with mild annular bulging and endplate degenerative change. Mild canal stenosis and bilateral foraminal narrowing. C4-C5: Disc desiccation and loss of disc height with minor annular bulging and endplate degenerative change. No focal disc herniation. Mild canal stenosis and moderate bilateral foraminal narrowing. C5-C6: Disc desiccation and loss of disc height. Minor annular bulging. Right greater than left uncinate joint hypertrophic degenerative change. Mild central canal stenosis without cord compression. Mild left and moderate right foraminal narrowing. C6-C7: Mild annular bulging with a small focal left foraminal disc protrusion. No canal stenosis. Mild foraminal narrowing. C7-T1:  Normal. UPPER THORACIC DISC SPACES:  Normal. POSTCONTRAST IMAGING: Mild enhancement of the right posterior paraspinal musculature corresponding to the edema seen on T2 and inversion recovery imaging described above. There are 2 separate foci of linear nonenhancement which may represent peripherally  enhancing fluid collections. See series 15 images 18 through 27. OTHER FINDINGS:  None.     Impression: Cervical degenerative change with mild canal stenosis and mild to moderate foraminal narrowing. No cord compression or abnormal cord signal. Mild  "nonspecific edema within the right posterior paraspinal musculature of the upper cervical spine seen on sagittal STIR imaging with mild enhancement. Minimal peripheral enhancement is noted and differential considerations would include infectious/inflammatory myositis with soft tissue abscess. No discitis or osteomyelitis. This examination was marked \"immediate notification\" in Epic in order to begin the standard process by which the radiology reading room liaison alerts the referring practitioner. Workstation performed: [id number]     CT head w wo contrast  Result Date: 2/13/2025  Narrative: CT BRAIN - WITH AND WITHOUT CONTRAST INDICATION:   rule out hemorrhage and septic emboli. COMPARISON:  None. TECHNIQUE:  CT examination of the brain was performed both prior to and after the administration of intravenous contrast.  Multiplanar 2D reformatted images were created from the source data. Radiation dose length product (DLP) for this visit:  1977 mGy-cm .  This examination, like all CT scans performed in the [facility], was performed utilizing techniques to minimize radiation dose exposure, including the use of iterative reconstruction and automated exposure control. IV Contrast:  100 mL of iohexol IMAGE QUALITY:  Diagnostic. FINDINGS: PARENCHYMA: Decreased attenuation is noted in periventricular and subcortical white matter demonstrating an appearance that is statistically most likely to represent moderate microangiopathic change. Chronic lacunar infarct right basal ganglia. Chronic appearing left PCA territory infarction. No CT signs of acute infarction.  No intracranial mass, mass effect or midline shift.  No acute parenchymal hemorrhage. Post contrast imaging of the brain is normal. VENTRICLES AND EXTRA-AXIAL SPACES: Age-appropriate volume loss is noted.  No hydrocephalus. VISUALIZED ORBITS: Normal visualized orbits. PARANASAL SINUSES: Mild mucosal thickening of the visualized paranasal sinuses. " CALVARIUM: Normal.     Impression: No acute hemorrhage, edema, or mass effect. Chronic microangiopathic changes and chronic appearing infarctions as above. No pathologic enhancement. Workstation performed: [id number]     MRI lumbar spine w wo contrast  Result Date: 2/13/2025  Narrative: MRI LUMBAR SPINE WITH AND WITHOUT CONTRAST INDICATION: osteomyelitis. low back pain..   Blood cultures positive for MSSA. Reports acute on chronic low back pain. Concern for discitis/osteomyelitis. COMPARISON: CT chest abdomen pelvis 12/4/2023 TECHNIQUE:  Multiplanar, multisequence imaging of the lumbar spine was performed before and after gadolinium administration. . IV Contrast:  8 mL of Gadobutrol injection IMAGE QUALITY:  Diagnostic FINDINGS: VERTEBRAL BODIES:  There are 5 lumbar type vertebral bodies. Mild levocurvature of the lumbar spine apex L2-L3. Mild retrolisthesis of L1 on S2 and L5 on S1. No spondylolysis. No compression fracture.    Minimal inferior endplate edema at L1 without edema in the adjacent superior endplate of L2. No appreciable disc edema. Signal and edema on the STIR sequence (series 5 image 5 and series 4 image 5). There is enhancement on postcontrast images (series 9 image 9). Minimal edema is noted in the adjacent L1-L2 disc without abnormal enhancement. Review of the corresponding CT demonstrates disc air compatible with vacuum disc phenomenon arguing against the presence of disc infection. SACRUM:  Normal signal within the sacrum. No evidence of insufficiency or stress fracture. DISTAL CORD AND CONUS: Conus medullaris terminates at the L1-L2 level. Otherwise, normal size and signal within the distal cord and conus. PARASPINAL SOFT TISSUES:  Paraspinal soft tissues are unremarkable. No evidence of abscess. No abnormal enhancement in the paraspinal soft tissues. LOWER THORACIC DISC SPACES: Osteophytes and loss of disc height in the lower thoracic levels. Otherwise normal disc signal. Mild canal stenosis.  No significant foraminal narrowing. LUMBAR DISC SPACES: Multilevel osteophytes, disc height loss and disc desiccation, and facet arthropathy. L1-L2: Diffuse annular disc bulge. Mild facet arthropathy. Mild canal stenosis. Mild to moderate right and moderate left foraminal narrowing. L2-L3: Diffuse annular disc bulge. Mild facet arthropathy. Mild canal stenosis. Moderate right foraminal narrowing. L3-L4: Diffuse annular disc bulge. Mild to moderate facet arthropathy. Mild canal stenosis. Moderate right foraminal narrowing. Mild left foraminal narrowing. L4-L5: Diffuse annular disc bulge. Mild to moderate facet arthropathy. Mild canal stenosis. Moderate to severe right foraminal narrowing. Moderate left foraminal narrowing. L5-S1: Diffuse annular disc bulge. Mild facet arthropathy. No significant canal stenosis. Mild right and mild to moderate left foraminal narrowing. POSTCONTRAST IMAGING: Please see above. OTHER FINDINGS: Few left renal cysts measuring up to 0.5 cm.     Impression: Mild inferior plate edema at L1 with adjacent small Schmorl's node and minimal adjacent postcontrast enhancement. Vacuum disc phenomenon noted on the recent CT at this level. Findings likely represent degenerative disc disease with Schmorl's node rather than discitis and osteomyelitis which should only be considered in the right clinical setting. No paraspinal edema or enhancement identified. No evidence of abscess or abnormal enhancement in the paraspinal soft tissues. 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 Resident: KEI Wallace I, the attending radiologist, have reviewed the images and agree with the final report above. Workstation performed: [id number]     Echo complete w/ contrast if indicated  Result Date: 2/12/2025  Narrative:   Left Ventricle: Left ventricle is not well visualized. Left ventricular cavity size is normal. Wall thickness is mildly increased. The left ventricular ejection fraction is  55%. Systolic function is normal. Diastolic function is mildly abnormal, consistent with grade I (abnormal) relaxation.   The following segments are akinetic: basal inferior and mid inferior.   All other segments are normal.   Right Ventricle: Right ventricle is not well visualized. Right ventricular cavity size is mildly dilated. Systolic function is mildly to moderately reduced.   Aortic Valve: The aortic valve is trileaflet. The leaflets are not thickened. The leaflets are moderately calcified. There is mildly reduced mobility. There is mild to moderate regurgitation. There is aortic valve sclerosis.   Mitral Valve: There is mild regurgitation.   Tricuspid Valve: There is mild regurgitation.     US bedside procedure  Result Date: 2/12/2025  Narrative: [id number]    VAS VENOUS DUPLEX -LOWER LIMB UNILATERAL  Result Date: 2/12/2025  Narrative:  [facility] REPORT CLINICAL: Indications: Patient presents with left lower extremity edema, worsening SOB and elevated D-dimer, currently taking Eliquis. Operative History: No cardiovascular surgeries noted Risk Factors The patient has history of HTN, CAD and previous smoking (quit >10yrs ago).   CONCLUSION: Impression:  RIGHT LOWER LIMB LIMITED: Evaluation shows no evidence of thrombus in the common femoral vein. Doppler evaluation shows a normal response to augmentation maneuvers.  LEFT LOWER LIMB: No evidence of acute or chronic deep vein thrombosis noted. No evidence of superficial thrombophlebitis noted. Doppler evaluation shows a normal response to augmentation maneuvers. Popliteal, posterior tibial and anterior tibial arterial Doppler waveforms are triphasic.  Technical findings were documented in Epic.  SIGNATURE: Electronically Signed by: NAT MILLER MD on 2025-02-12 07:20:01 AM    US kidney and bladder  Result Date: 2/11/2025  Narrative: RENAL ULTRASOUND INDICATION: acute urinary retention. COMPARISON: CT from 2/11/2025  TECHNIQUE: Ultrasound of the retroperitoneum was performed with a curvilinear transducer utilizing volumetric sweeps and still imaging techniques. FINDINGS: KIDNEYS: Symmetric and normal size. Right kidney: 9.8 x 5.4 x 6.5 cm. Volume 178.8 mL Left kidney: 10.0 x 4.7 x 4.8 cm. Volume 117.6 mL Right kidney Normal echogenicity and contour. No mass is identified. 1.3 cm simple cyst upper pole. No hydronephrosis. No shadowing calculi. Small echogenic focus is noted with twinkle artifact. This may represent a nonshadowing stone or focus of fat. No perinephric fluid collections. Left kidney Normal echogenicity and contour. No mass is identified. 7 mm simple cyst. No hydronephrosis. No shadowing calculi. Some perinephric fluid is seen off the lower pole of the left kidney. URETERS: Nonvisualized. BLADDER: Moderate distention. No focal thickening or mass lesions. Bilateral ureteral jets not detected.     Impression: No hydronephrosis. Moderate bladder distention. Perinephric edema on the left. Findings were discussed with Dr. Peralta at 7:25 p.m. via secure text Workstation performed: [id number]     CTA chest pe study  Result Date: 2/11/2025  Narrative: CTA - CHEST WITH IV CONTRAST - PULMONARY ANGIOGRAM INDICATION: SOB, tachycardia, elevated D dimer. COMPARISON: Chest radiograph 2/11/2015. CTA chest 12/4/2023. TECHNIQUE: CTA examination of the chest was performed using angiographic technique according to a protocol specifically tailored to evaluate for pulmonary embolism. Multiplanar 2D reformatted images were created from the source data. In addition, coronal  3D MIP postprocessing was performed on the acquisition scanner. Radiation dose length product (DLP) for this visit: 364 mGy-cm . This examination, like all CT scans performed in the [facility], was performed utilizing techniques to minimize radiation dose exposure, including the use of iterative reconstruction and automated exposure control. IV  "Contrast: 85 mL of iohexol FINDINGS: PULMONARY ARTERIAL TREE:  No pulmonary embolus. LUNGS: Bibasilar atelectasis. Mild secretions/mucus in the lower trachea. Mild emphysematous changes. Noncalcified 6 x 5 mm pulmonary nodule in the left lung apex on image 58 of series 302, not present on the December 4, 2023 examination. PLEURA: Unremarkable. HEART/GREAT VESSELS: Normal size heart.  Coronary artery calcifications. Stable ectasia of the ascending thoracic aorta measuring up to 41 mm. MEDIASTINUM AND PAWEL: Redemonstrated few subcentimeter calcified lymph nodes could represent sequela of old infection. CHEST WALL AND LOWER NECK: Unremarkable. VISUALIZED STRUCTURES IN THE UPPER ABDOMEN: Small splenic calcified granulomata. Left renal vascular calcifications. OSSEOUS STRUCTURES: No acute fracture or destructive osseous lesion. Chronic healed bilateral rib fractures. Chronic mild wedge compression deformities in the midthoracic spine.     Impression: No evidence of pulmonary embolus. Stable ectasia of the ascending thoracic aorta measuring up to 41 mm. Mild secretions in the lower trachea, correlate for any dysphagia or possible aspiration. Noncalcified left lung apex pulmonary nodule, 6 x 5 mm. Because this was not present on prior chest CT of December 4, 2023, conservative management with follow-up low radiation dose noncontrast chest CT in 6 months is recommended. This examination was marked \"immediate notification\" in Epic in order to begin the standard process by which the radiology reading room liaison alerts the referring practitioner. Resident: Bijan Fajardo I, the attending radiologist, have reviewed the images and agree with the final report above. Workstation performed: [id number]     XR chest 1 view portable  Result Date: 2/11/2025  Narrative: XR CHEST PORTABLE INDICATION: SOB. COMPARISON: 12/16/2023 FINDINGS: No infiltrates. Bibasilar linear atelectasis or scarring. No pneumothorax or pleural effusion. " Normal cardiomediastinal silhouette. Bones are unremarkable for age. Normal upper abdomen.     Impression: No acute cardiopulmonary disease. Workstation performed: [id number]     I have personally reviewed pertinent imaging study reports.      Jef Beth M.D.  [facility]  Division of Hematology & Oncology  Available on TigerText  [email or web address]    ** Please Note: This note is constructed using a voice recognition dictation system. **

## 2025-02-28 NOTE — ASSESSMENT & PLAN NOTE
"2/20/2025 EGD/colonoscopy showed 2.5 cm ileocecal mass, pathology confirmed adenocarcinoma.    Patient was due to follow-up with outpatient colorectal surgery on 2/28/2025  Pt presenting with melena, Hgb 4.6. Symptomatic anemia.    Plan:  Colorectal consulted. F/up recs  Rest of plan under \"symptomatic anemia\"  "

## 2025-02-28 NOTE — ASSESSMENT & PLAN NOTE
Recent Labs     02/28/25  0436 02/28/25  1223   HGB 4.6* 7.3*      Patient presents with 2 days of chest pain, shortness of breath, fatigue and melena  Patient had recent hospitalization 2/11/2025 - 2/22/2025 for COPD exacerbation complicated by MSSA bacteremia.  Patient anemic on hospitalization requiring 2 units of packed RBCs.  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  Patient was due to follow-up with outpatient colorectal surgery on 2/28/2025  Patient's Eliquis was restarted during his last hospital discharge. Patient has not taken Eliquis for the past 1-2 days due to melena.  S/p 3 units packed rbc in ED. Pt is hemodynamically stable. Dark/black urine noted in canister at bedside.    Plan:  H&H Q6 hrs  Transfuse for Hgb <7  PPI gtt  Isolyte 75 cc/hr  Colorectal surgery consulted. F/up recs  Hold Eliquis and aspirin  Monitor on telemetry  Consider urology consult if pt continues to have black urine

## 2025-02-28 NOTE — LETTER
[facility]  [address]  [place] [address]  Dept: [phone]    March 20, 2025     Patient: Devin Chaves   YOB: 1945   Date of Visit: 2/28/2025       To Whom it May Concern:    Devin Chaves is under my professional care. He was admitted to the hospital on 2/28/2025 and remains inpatient. He was transferred to the ICU on 3/15 and remains in the intensive care unit in critical condition at this time.    This letter is for Selina Chaves    If you have any questions or concerns, please don't hesitate to call.    Sincerely,      AMPARO Garcia

## 2025-02-28 NOTE — ASSESSMENT & PLAN NOTE
During prior hospitalization diagnosed with MSSA bacteremia, acute neck pain, possible deep cervical infection? Requiring long-term antibiotics    TTE negative for vegetation   Repeat blood cultures negative 2/14   Being treated with IV Cefazolin     Per primary team

## 2025-02-28 NOTE — H&P
"H&P - Hospitalist   Name: Devin Chaves 79 y.o. male I MRN: [id number]  Unit/Bed#: ED-18 I Date of Admission: 2/28/2025   Date of Service: 2/28/2025 I Hospital Day: 0     Assessment & Plan  Symptomatic anemia  Recent Labs     02/28/25  0436 02/28/25  1223   HGB 4.6* 7.3*      Patient presents with 2 days of chest pain, shortness of breath, fatigue and melena  Patient had recent hospitalization 2/11/2025 - 2/22/2025 for COPD exacerbation complicated by MSSA bacteremia.  Patient anemic on hospitalization requiring 2 units of packed RBCs.  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  Patient was due to follow-up with outpatient colorectal surgery on 2/28/2025  Patient's Eliquis was restarted during his last hospital discharge. Patient has not taken Eliquis for the past 1-2 days due to melena.  S/p 3 units packed rbc in ED. Pt is hemodynamically stable. Dark/black urine noted in canister at bedside.    Plan:  H&H Q6 hrs  Transfuse for Hgb <7  PPI gtt  Isolyte 75 cc/hr  Colorectal surgery consulted. F/up recs  Hold Eliquis and aspirin  Monitor on telemetry  Consider urology consult if pt continues to have black urine  Melena  See plan under \"symptomatic anemia\"  ORIANA (acute kidney injury) (HCC)  Recent Labs     02/28/25  0436   CREATININE 2.43*   EGFR 24     Estimated Creatinine Clearance: 27.9 mL/min (A) (by C-G formula based on SCr of 2.43 mg/dL (H)).     Cr 2.43 on admission. Baseline 0.9. BUN 63. BUN/Cr ratio >20  Pt having dark/black urine in canister at bedside.  Likely hypovolemic in the setting of acute GI bleed    Plan:  Isolyte 75 cc/hour  BMP at 2000  Ctm Creatinine. Consult nephrology if pt's kidney function does not improve.  F/up UA  Avoid nephrotoxic medications  Monitor I/O's  Urinary retention protocol  Colonic mass  2/20/2025 EGD/colonoscopy showed 2.5 cm ileocecal mass, pathology confirmed adenocarcinoma.    Patient was due to follow-up with outpatient " "colorectal surgery on 2/28/2025  Pt presenting with melena, Hgb 4.6. Symptomatic anemia.    Plan:  Colorectal consulted. F/up recs  Rest of plan under \"symptomatic anemia\"  MSSA bacteremia  Patient had recent hospitalization for COPD exacerbation complicated by MSSA bacteremia.  Blood cultures S aureus (+). Possible source small laceration from haircut, per chart review.  TTE negative for vegetations  Patient was discharged on cefazolin 2 g IV Q8 hrs until 3/27/2025. PICC line in place. Patient scheduled to follow-up outpatient with ID on 3/3/2025.  Repeat blood cultures showed no growth  Pt denies fevers. No leukocytosis on admission.    Plan:  Renally adjust IV cefazolin due to ORIANA.  Can restart 2 g TID once pt's kidney function has improved  F/up with ID outpatient on 3/3/2025  COPD (chronic obstructive pulmonary disease) (HCC)  Patient with COPD on 2-4 L supplemental O2 at baseline  Patient currently requiring 2 L NC. No wheezing noted on exam.   Home Albuterol prn, Xopenex Q8 hrs prn, Mometasone inhaler daily    Plan:  Titrate O2 to maintain SpO2 greater than 88%   Xopenex nebs Q8 hrs prn, Mometasone daily  Albuterol prn  Atrial fibrillation (HCC)  AC: Eliquis 5 mg BID  Rate control: Lopressor 25 mg Q12 hrs    Plan:  Eliquis in setting of GI bleed  Continue Lopressor 25 mg Q12 hrs  Elevated troponin  Lab Results   Component Value Date    HSTNI0 134 (H) 02/28/2025    HSTNI2 142 (H) 02/28/2025    HSTNID2 8 02/28/2025    HSTNI4 138 (H) 02/28/2025    HSTNID4 4 02/28/2025      Patient presents with chest pain and Hgb 4.6  Troponins downtrending.  EKG unremarkable.  Etiology nonischemic myocardial injury in setting of symptomatic anemia  Pleural effusion, bilateral  CT A/P shows bilateral pleural effusions L>R, pulmonary vascular congestion  Decreased breath sounds in bilateral lungs bases noted on exam  Pulmonary congestion likely in setting of severe anemia    Plan:  Repeat imaging if pt's respiratory status " worsens  HTN (hypertension)  Systolic BP 100s-130s.  Patient normotensive.  Blood pressure stable.    Plan:  Lopressor 25 mg every 12 hours  CAD (coronary artery disease)  TTE 2/12/25: EF 55%, Normal systolic dysfunction, G1DD, No vegetation, No mural thrombus, moderate aortic sclerosis     Plan:  Hold aspirin in setting of GI bleed  History of CVA (cerebrovascular accident)  Pt has history of CVA  MRI brain: Multiple tiny recent infarcts scattered in multiple vascular distributions that are likely embolic. Several microhemorrhages associated with recent infarcts. No acute space-occupying hematoma. Chronic ischemic changes.  [facility] 2/17: 3 mm focus of hyperdensity left superior frontal lobe as described unchanged from the prior study may represent a small 3 mm hemorrhage versus focus of mineralization.    Plan:  Hold Eliquis and aspirin in setting of GI bleed  Continue Lipitor 40 mg daily  Urinary retention  Patient has history of urinary retention and was started on Flomax during last hospitalization  Pt denies difficulty urinating. Has dark/black urine on canister at bedside.    Plan:  Flomax 0.4 mg daily  Urinary retention protocol      VTE Pharmacologic Prophylaxis: VTE Score: 5 High Risk (Score >/= 5) - Pharmacological DVT Prophylaxis Contraindicated. Sequential Compression Devices Ordered.  Code Status: Level 1 - Full Code Discussed with patient and patient's wife  Discussion with family: Updated  (wife) via phone.    Anticipated Length of Stay: Patient will be admitted on an inpatient basis with an anticipated length of stay of greater than 2 midnights secondary to symptomatic anemia.    History of Present Illness   Chief Complaint: Symptomatic anemia    Devin Chaves is a 79 y.o. male with a PMH of A-fib on Eliquis, COPD home O2 2-4 L, HTN, CVA, MSSA bacteremia, colonic adenocarcinoma who presents from [facility] with symptomatic anemia. Patient was recently hospitalized for  COPD exacerbation complicated by MSSA bacteremia 2/11/2025-2/22/2025. During his hospitalization pt had anemia that required two units packed rbc's. He was found to have an ileocecal colonic mass 2.5 cm by EGD/colonoscopy during hosptialization. Pathology revealed adencocarcinoma. He was re-started on Eliquis on hospital discharge 2/22/2025. Pt's Eliquis had been stopped 1-2 days ago by his outpatient doctor after he began experiencing fatigue, SOB, melena, and black urine. This morning patient's shortness of breath worsened and he was coughing with white phlegm and wheezing. He denied stomach pain, appetite change, weight loss, fever/chills, nausea, or emesis.  Patient's Hgb was 4 on his outpatient lab and he was instructed to come to the ED.    In the ED patient presented with softer blood pressure 105/51.  Otherwise hemodynamically stable.  Patient's hemoglobin 4.6.  Patient had ORIANA on admission, Cr 2.43. Troponins elevated (134->142->138). EKG unremarkable. CT C/A/P showed no evidence of high-volume GI bleed.  New bilateral multilobar interstitial thickening and pulmonary vascular congestion noted. Patient was provided 3 units of packed rbc's in the ED.    Review of Systems   Constitutional:  Negative for appetite change, chills and fever.   HENT:  Negative for sore throat.    Eyes:  Negative for visual disturbance.   Respiratory:  Positive for cough, shortness of breath and wheezing.    Cardiovascular:  Positive for chest pain and palpitations (intermittent).   Gastrointestinal:  Positive for blood in stool. Negative for abdominal pain, diarrhea, nausea and vomiting.   Genitourinary:  Positive for hematuria. Negative for decreased urine volume and dysuria.   Musculoskeletal:  Positive for arthralgias, myalgias (chronic) and neck pain.   Skin:  Positive for rash (bilateral lower legs).   Neurological:  Positive for dizziness, weakness and light-headedness. Negative for syncope and headaches.   Hematological:   Bruises/bleeds easily.       Historical Information   Past Medical History:   Diagnosis Date    Atrial fibrillation (HCC)     COPD (chronic obstructive pulmonary disease) (HCC)     Crushing injury of finger, left     Infectious viral hepatitis      Past Surgical History:   Procedure Laterality Date    FL LUMBAR PUNCTURE DIAGNOSTIC  2/10/2022    KS OPEN TX PHALANGEAL SHAFT FRACTURE PROX/MIDDLE EA Left 1/25/2017    Procedure: ORIF LEFT SMALL FINGER FRACTURE;  Surgeon: Blake Badillo MD;  Location: [facility];  Service: Orthopedics     Social History     Tobacco Use    Smoking status: Former    Smokeless tobacco: Former     Quit date: 4/1/2011   Substance and Sexual Activity    Alcohol use: No    Drug use: No    Sexual activity: Not on file     E-Cigarette/Vaping     E-Cigarette/Vaping Substances     History reviewed. No pertinent family history.  Social History:  Marital Status: /Civil Union   Occupation: Farmer  Patient Pre-hospital Living Situation: Home, With spouse and daughter  Patient Pre-hospital Level of Mobility: walks with walker or cane  Patient Pre-hospital Diet Restrictions: None    Meds/Allergies   I have reviewed home medications with patient personally.  Prior to Admission medications    Medication Sig Start Date End Date Taking? Authorizing Provider   apixaban (Eliquis) 5 mg Take 5 mg by mouth 2 (two) times a day   Yes Historical Provider, MD   aspirin (ECOTRIN LOW STRENGTH) 81 mg EC tablet Take 81 mg by mouth daily   Yes Historical Provider, MD   ceFAZolin (ANCEF) 2000 mg IVPB Inject 2,000 mg into a catheter in a vein over 30 minutes at 100 mL/hr every 8 (eight) hours 2/22/25 3/27/25 Yes Mal Neely,    Cholecalciferol 50 MCG (2000 UT) TABS TAKE ONE TABLET BY MOUTH DAILY (FOR LOW VITAMIN D) 4/12/21  Yes Historical Provider, MD   cyanocobalamin (VITAMIN B-12) 100 MCG tablet Take 100 mcg by mouth daily   Yes Historical Provider, MD   docusate sodium (COLACE) 100 mg capsule Take 100 mg by  mouth 2 (two) times a day   Yes Historical Provider, MD   ferrous sulfate 325 (65 Fe) mg tablet Take 325 mg by mouth daily with breakfast   Yes Historical Provider, MD   hydroxychloroquine (PLAQUENIL) 200 mg tablet Take 400 mg by mouth daily with breakfast   Yes Historical Provider, MD   levalbuterol (XOPENEX) 1.25 mg/3 mL nebulizer solution Take 3 mL (1.25 mg total) by nebulization every 8 (eight) hours as needed for wheezing 2/22/25  Yes Mal Neely DO   metoprolol tartrate (LOPRESSOR) 25 mg tablet Take 25 mg by mouth every 12 (twelve) hours   Yes Historical Provider, MD   mometasone 220 mcg/actuation inhaler Inhale 1 puff every evening Rinse mouth after use.   Yes Historical Provider, MD   polyethylene glycol (MIRALAX) 17 g packet Take 17 g by mouth daily as needed (constipation) 2/22/25  Yes Mal Neely DO   rosuvastatin (CRESTOR) 40 MG tablet Take 20 mg by mouth daily   Yes Historical Provider, MD   senna (SENOKOT) 8.6 mg Take 1 tablet (8.6 mg total) by mouth daily at bedtime as needed for constipation 2/22/25  Yes Mal Neely DO   tamsulosin (FLOMAX) 0.4 mg Take 1 capsule (0.4 mg total) by mouth daily with dinner 2/22/25  Yes Mal Neely DO   albuterol (2.5 mg/3 mL) 0.083 % nebulizer solution Take 3 mL (2.5 mg total) by nebulization every 6 (six) hours as needed for wheezing or shortness of breath 12/17/23   Sreedhar Guerra,    albuterol (PROVENTIL HFA,VENTOLIN HFA) 90 mcg/act inhaler INHALE 2 PUFFS BY MOUTH EVERY 4 HOURS AS NEEDED FOR BREATHING 5/24/21   Historical Provider, MD   albuterol (PROVENTIL HFA,VENTOLIN HFA) 90 mcg/act inhaler Inhale 2 puffs every 4 (four) hours as needed for wheezing 2/22/25   Mal Neely DO   ascorbic acid (VITAMIN C) 250 MG tablet Take 250 mg by mouth daily   Yes Historical Provider, MD   famotidine (PEPCID) 20 mg tablet Take 20 mg by mouth 2 (two) times a day   Yes Historical Provider, MD   folic acid (FOLVITE) 1 mg tablet TAKE ONE TABLET BY MOUTH EVERY DAY *FOLIC  "ACID SUPPLEMENT* 4/12/21  Yes Historical Provider, MD   lidocaine (LIDODERM) 5 % Apply 3 patches topically over 12 hours daily Remove & Discard patch within 12 hours or as directed by MD. Apply to neck and back in areas of pain. 2/23/25  Yes Mal Neely, DO   sertraline (ZOLOFT) 25 mg tablet Take 12.5 mg by mouth daily    Historical Provider, MD     Allergies   Allergen Reactions    Shellfish-Derived Products - Food Allergy Other (See Comments)     Pt states, \"I reacted, I dont know\"       Objective :  Temp:  [97.2 °F (36.2 °C)-98.3 °F (36.8 °C)] 97.4 °F (36.3 °C)  HR:  [62-84] 66  BP: (103-133)/(51-63) 118/60  Resp:  [18-20] 20  SpO2:  [90 %-100 %] 99 %  O2 Device: Nasal cannula  Nasal Cannula O2 Flow Rate (L/min):  [2 L/min-3.5 L/min] 2 L/min    Physical Exam  HENT:      Head: Normocephalic.      Mouth/Throat:      Mouth: Mucous membranes are dry.   Eyes:      Conjunctiva/sclera:      Left eye: Hemorrhage present.   Cardiovascular:      Rate and Rhythm: Normal rate and regular rhythm.   Pulmonary:      Effort: Pulmonary effort is normal.      Comments: On 2 L NC  Decreased breath sounds bilateral lung bases  Abdominal:      Palpations: There is no mass.      Tenderness: There is abdominal tenderness (RLQ and LLQ). There is no right CVA tenderness, left CVA tenderness, guarding or rebound.   Skin:     Coloration: Skin is pale.      Comments: Petechiae on bilateral ankles and feet   Neurological:      Mental Status: He is alert and oriented to person, place, and time.   Psychiatric:         Mood and Affect: Mood normal.         Behavior: Behavior normal.          Lines/Drains:  Lines/Drains/Airways       Active Status       Name Placement date Placement time Site Days    PICC Line 02/26/25 Right Brachial 02/26/25  0548  Brachial  2                    Central Line:  Goal for removal: N/A - Chronic PICC             Lab Results: I have reviewed the following results:  Results from last 7 days   Lab Units " 02/28/25  1223 02/28/25  0436   WBC Thousand/uL  --  4.50   HEMOGLOBIN g/dL 7.3* 4.6*   HEMATOCRIT %  --  15.2*   PLATELETS Thousands/uL  --  201   SEGS PCT %  --  62   LYMPHO PCT %  --  25   MONO PCT %  --  11   EOS PCT %  --  1     Results from last 7 days   Lab Units 02/28/25  0436   SODIUM mmol/L 139   POTASSIUM mmol/L 4.0   CHLORIDE mmol/L 108   CO2 mmol/L 26   BUN mg/dL 63*   CREATININE mg/dL 2.43*   ANION GAP mmol/L 5   CALCIUM mg/dL 7.6*   ALBUMIN g/dL 2.0*   TOTAL BILIRUBIN mg/dL 0.31   ALK PHOS U/L 43   ALT U/L <3*   AST U/L 16   GLUCOSE RANDOM mg/dL 80             Lab Results   Component Value Date    HGBA1C 5.8 (H) 02/16/2025    HGBA1C 5.4 09/25/2022           Imaging Results Review: I reviewed radiology reports from this admission including: chest xray, CT chest, and CT abdomen/pelvis.  CT chest w ct abdomen pelvis w wo contrast   Final Result by Eric Willis MD (02/28 0812)   Addendum (preliminary) 1 of 1 by Eric Willis MD (02/28 0812)   ADDENDUM:      Correction to spleen section in the body of report. Hyperattenuation    should be hypoattenuation. Grossly stable posterior splenic subcapsular    hypodensity presumably an infarct unchanged allowing for difference in    contrast timing.            Final      CT chest:      Decreased intracardiac blood pool density compatible with anemia.      New bilateral multi lobar interstitial thickening and tree-in-bud opacities may represent pulmonary vascular congestion or nonspecific inflammatory or infectious process.      Bilateral pleural effusions, left greater than right, mildly enlarged and additional findings suggestive of pulmonary vascular congestion. Correlate with clinical findings.      5 mm left upper lobe nodule stable since 2/11/2025 and new since December 2023. 6 mm right lower lobe nodule new since 2/11/2025. Noncontrast chest CT follow-up in 3 months is advised.      Additional chronic findings and negatives as  above.      CT abdomen and pelvis:      No evidence of high-volume gastrointestinal bleeding.      Stable upper cecal/proximal ascending colonic mass attributed to recently biopsy-proven adenocarcinoma.      Colonic diverticulosis.      No evidence of abdominopelvic metastatic disease.      Additional chronic findings and negatives as above.      The study was marked in EPIC for immediate notification.               Workstation performed: [id number]         XR chest 1 view portable   Final Result by Orion Zapien MD (02/28 0847)   Cardiomegaly with central congestion and left greater than right small basilar effusions.      Workstation performed: [id number]            Other Study Results Review: EKG was reviewed.     Administrative Statements   I have spent a total time of 45 minutes in caring for this patient on the day of the visit/encounter including Impressions, Counseling / Coordination of care, Documenting in the medical record, Reviewing/placing orders in the medical record (including tests, medications, and/or procedures), Obtaining or reviewing history  , and Communicating with other healthcare professionals .    ** Please Note: This note has been constructed using a voice recognition system. **

## 2025-02-28 NOTE — QUICK NOTE
COLORECTAL QUICK NOTE    Attempted to call wife with update with 2 phone numbers listed in chart, no answer.     Theodora Watt  02/28/25  4:43 PM

## 2025-02-28 NOTE — ASSESSMENT & PLAN NOTE
Patient with COPD on 2-4 L supplemental O2 at baseline  Patient currently requiring 2 L NC. No wheezing noted on exam.   Home Albuterol prn, Xopenex Q8 hrs prn, Mometasone inhaler daily    Plan:  Titrate O2 to maintain SpO2 greater than 88%   Xopenex nebs Q8 hrs prn, Mometasone daily  Albuterol prn

## 2025-03-01 PROBLEM — R31.0 GROSS HEMATURIA: Status: ACTIVE | Noted: 2025-03-01

## 2025-03-01 PROBLEM — R82.90 ABNORMAL URINALYSIS: Status: ACTIVE | Noted: 2025-03-01

## 2025-03-01 PROBLEM — R13.10 DYSPHAGIA: Status: ACTIVE | Noted: 2025-01-01

## 2025-03-01 NOTE — ASSESSMENT & PLAN NOTE
Patient had MSSA bacteremia during recent hospitalization.  Source is unclear but likely cutaneous.  His bacteremia cleared rapidly on IV antibiotic.  TTE did not show any vegetation.  Given possible C-spine infection, plan was for long-term IV antibiotic.  Continue high-dose IV cefazolin.  Treat x 6 weeks from clearance of bacteremia as previously planned, through 3/27.

## 2025-03-01 NOTE — ASSESSMENT & PLAN NOTE
Patient had been having dark/black urine, and persistent.  Recently diagnosed with urinary retention and was started on Flomax.  Urology consult.

## 2025-03-01 NOTE — CONSULTS
Consultation - Urology   Name: Devin Chaves 79 y.o. male I MRN: [id number]  Unit/Bed#: S -01 I Date of Admission: 2/28/2025   Date of Service: 3/1/2025 I Hospital Day: 1   Inpatient consult to Urology  Consult performed by: AMPARO Gill  Consult ordered by: Jean Brady MD        Physician Requesting Evaluation: Jean Paula*   Reason for Evaluation / Principal Problem: Hematuria    Assessment & Plan  Symptomatic anemia   No overt hemorrhage on CT.    Plan:  Supportive care  Trend labs  Transfuse per internal medicine  Atrial fibrillation (HCC)    Managed with chronic anticoagulation.  Currently on hold.  Urinary retention  Although urine is discolored, patient is voiding volitionally with most recent bladder scan of 16 mL.  He denies stranguria or urinary hesitancy    Plan: Hematuria  Flomax  Monitor voiding  ORIANA (acute kidney injury) ([facility])  Nonobstructive etiology.      Plan:  Trend labs  Continue medical management and nephrologic consultation as needed.    Gross hematuria  Dark discolored urine.  But without evidence of clot.  It is nonviscous and does not result in urinary retention.  Appears as lysed blood.  No evidence of upper or lower  tract hemorrhage.  Urine is not infected.  Platelet count normal  On Eliquis chronically--Currently on hold        Plan:  Trend labs  Monitor voiding  Serial urine collection  Hydration  Flomax and Proscar  Tang for evidence of clot retention.  Not indicated at this interval.  Outpatient cystoscopic examination.  Urology service will follow.    History of Present Illness   Devin Chaves is a 79 y.o. male with history of CVA, atrial fibrillation, anticoagulated and recent diagnosis of colonic mass presenting with chief complaint of melena.  He denies prior formal urologic consultation, evaluation or  surgical manipulation.  Patient reports mildly bothersome lower urinary tract symptoms--obstructive in nature such as urinary  frequency, weak stream and nocturia without dysuria or prior gross hematuria.  Patient is a former smoker.  He denies environmental/chemical exposures and family history of  tract malignancy is unclear/unknown to patient.  Patient required transfusion due to anemia with hemoglobin of 4.6.  Our service was contacted against background of dark urine.  Patient denies fever, chills, flank, abdominal, suprapubic pain.  CT of the abdomen and pelvis demonstrated bilateral nonobstructive renal units without evidence of nephro ureterolithiasis, perinephric, subcapsular RP bleeding, discrete fluid collections, suspicious masses or lesions, lower tract did not demonstrate significant prostatomegaly, bladder calculi, organized clot or bladder lesions.    Patient is voiding volitionally into bedside urinal.  Urologic consultation requested for further management recommendations.    Review of Systems   Constitutional: Negative.    HENT: Negative.     Eyes: Negative.    Respiratory: Negative.     Gastrointestinal:  Negative for abdominal pain, nausea and vomiting.   Endocrine: Negative.    Genitourinary:  Positive for hematuria. Negative for dysuria, flank pain and testicular pain.   Allergic/Immunologic: Negative.    Neurological: Negative.    Hematological: Negative.    Psychiatric/Behavioral: Negative.       Medical History Review: I have reviewed the patient's PMH, PSH, Social History, Family History, Meds, and Allergies   Historical Information   Past Medical History:   Diagnosis Date    Atrial fibrillation (HCC)     COPD (chronic obstructive pulmonary disease) (HCC)     Crushing injury of finger, left     Infectious viral hepatitis      Past Surgical History:   Procedure Laterality Date    FL LUMBAR PUNCTURE DIAGNOSTIC  2/10/2022    [place] OPEN TX PHALANGEAL SHAFT FRACTURE PROX/MIDDLE EA Left 1/25/2017    Procedure: ORIF LEFT SMALL FINGER FRACTURE;  Surgeon: Blake Badillo MD;  Location: [facility];  Service: Orthopedics      Social History     Tobacco Use    Smoking status: Former    Smokeless tobacco: Former     Quit date: 4/1/2011   Substance and Sexual Activity    Alcohol use: No    Drug use: No    Sexual activity: Not on file     E-Cigarette/Vaping     E-Cigarette/Vaping Substances     History reviewed. No pertinent family history.  Social History     Tobacco Use    Smoking status: Former    Smokeless tobacco: Former     Quit date: 4/1/2011   Substance and Sexual Activity    Alcohol use: No    Drug use: No    Sexual activity: Not on file       Current Facility-Administered Medications:     acetaminophen (TYLENOL) tablet 650 mg, Q6H PRN    albuterol inhalation solution 2.5 mg, Q6H PRN    ascorbic acid (VITAMIN C) tablet 250 mg, Daily    atorvastatin (LIPITOR) tablet 40 mg, Daily With Dinner    ceFAZolin (ANCEF) IVPB (premix in dextrose) 2,000 mg 50 mL, Q8H, Last Rate: 2,000 mg (03/01/25 1506)    Cholecalciferol (VITAMIN D3) tablet 2,000 Units, Daily    cyanocobalamin (VITAMIN B-12) tablet 100 mcg, Daily    fluticasone (ARNUITY ELLIPTA) 100 MCG/ACT inhaler 1 puff, Daily    folic acid (FOLVITE) tablet 1 mg, Daily    HYDROmorphone HCl (DILAUDID) injection 0.2 mg, Q4H PRN    hydroxychloroquine (PLAQUENIL) tablet 400 mg, Daily With Breakfast    levalbuterol (XOPENEX) inhalation solution 1.25 mg, Q8H PRN    lidocaine (LIDODERM) 5 % patch 3 patch, Daily    metoprolol tartrate (LOPRESSOR) tablet 25 mg, Q12H GALE    multi-electrolyte (PLASMALYTE-A/ISOLYTE-S PH 7.4) IV solution, Continuous, Last Rate: 75 mL/hr (03/01/25 0248)    oxyCODONE (ROXICODONE) IR tablet 5 mg, Q6H PRN    oxyCODONE (ROXICODONE) split tablet 2.5 mg, Q6H PRN    pantoprazole (PROTONIX) 80 mg in sodium chloride 0.9 % 100 mL infusion, Continuous, Last Rate: 8 mg/hr (03/01/25 1506)    tamsulosin (FLOMAX) capsule 0.4 mg, Daily With Dinner  Prior to Admission Medications   Prescriptions Last Dose Informant Patient Reported? Taking?   Cholecalciferol 50 MCG (2000 UT) TABS  2/27/2025  Yes Yes   Sig: TAKE ONE TABLET BY MOUTH DAILY (FOR LOW VITAMIN D)   albuterol (2.5 mg/3 mL) 0.083 % nebulizer solution More than a month  No No   Sig: Take 3 mL (2.5 mg total) by nebulization every 6 (six) hours as needed for wheezing or shortness of breath   albuterol (PROVENTIL HFA,VENTOLIN HFA) 90 mcg/act inhaler   Yes No   Sig: INHALE 2 PUFFS BY MOUTH EVERY 4 HOURS AS NEEDED FOR BREATHING   albuterol (PROVENTIL HFA,VENTOLIN HFA) 90 mcg/act inhaler More than a month  No No   Sig: Inhale 2 puffs every 4 (four) hours as needed for wheezing   apixaban (Eliquis) 5 mg Past Week  Yes Yes   Sig: Take 5 mg by mouth 2 (two) times a day   ascorbic acid (VITAMIN C) 250 MG tablet 2/27/2025  Yes Yes   Sig: Take 250 mg by mouth daily   aspirin (ECOTRIN LOW STRENGTH) 81 mg EC tablet 2/27/2025 Morning  Yes Yes   Sig: Take 81 mg by mouth daily   ceFAZolin (ANCEF) 2000 mg IVPB 2/28/2025  No Yes   Sig: Inject 2,000 mg into a catheter in a vein over 30 minutes at 100 mL/hr every 8 (eight) hours   cyanocobalamin (VITAMIN B-12) 100 MCG tablet 2/27/2025  Yes Yes   Sig: Take 100 mcg by mouth daily   docusate sodium (COLACE) 100 mg capsule Past Week  Yes Yes   Sig: Take 100 mg by mouth 2 (two) times a day   famotidine (PEPCID) 20 mg tablet 2/27/2025  Yes Yes   Sig: Take 20 mg by mouth 2 (two) times a day   ferrous sulfate 325 (65 Fe) mg tablet 2/27/2025  Yes Yes   Sig: Take 325 mg by mouth daily with breakfast   folic acid (FOLVITE) 1 mg tablet 2/27/2025  Yes Yes   Sig: TAKE ONE TABLET BY MOUTH EVERY DAY *FOLIC ACID SUPPLEMENT*   hydroxychloroquine (PLAQUENIL) 200 mg tablet 2/27/2025  Yes Yes   Sig: Take 400 mg by mouth daily with breakfast   levalbuterol (XOPENEX) 1.25 mg/3 mL nebulizer solution 2/28/2025  No Yes   Sig: Take 3 mL (1.25 mg total) by nebulization every 8 (eight) hours as needed for wheezing   lidocaine (LIDODERM) 5 % 2/28/2025  No Yes   Sig: Apply 3 patches topically over 12 hours daily Remove & Discard patch  within 12 hours or as directed by MD. Apply to neck and back in areas of pain.   metoprolol tartrate (LOPRESSOR) 25 mg tablet 2/28/2025  Yes Yes   Sig: Take 25 mg by mouth every 12 (twelve) hours   mometasone 220 mcg/actuation inhaler 2/27/2025  Yes Yes   Sig: Inhale 1 puff every evening Rinse mouth after use.   polyethylene glycol (MIRALAX) 17 g packet Past Week  No Yes   Sig: Take 17 g by mouth daily as needed (constipation)   rosuvastatin (CRESTOR) 40 MG tablet 2/28/2025  Yes Yes   Sig: Take 20 mg by mouth daily   senna (SENOKOT) 8.6 mg Past Week  No Yes   Sig: Take 1 tablet (8.6 mg total) by mouth daily at bedtime as needed for constipation   sertraline (ZOLOFT) 25 mg tablet Unknown  Yes No   Sig: Take 12.5 mg by mouth daily   tamsulosin (FLOMAX) 0.4 mg Past Week  No Yes   Sig: Take 1 capsule (0.4 mg total) by mouth daily with dinner      Facility-Administered Medications: None     Shellfish-derived products - food allergy    Objective :  Temp:  [96.7 °F (35.9 °C)-98.4 °F (36.9 °C)] 97.6 °F (36.4 °C)  HR:  [63-82] 65  BP: (104-120)/(40-59) 110/51  Resp:  [18] 18  SpO2:  [86 %-100 %] 98 %  O2 Device: Nasal cannula  Nasal Cannula O2 Flow Rate (L/min):  [3 L/min-5 L/min] 5 L/min    I/O         02/27 0701 02/28 0700 02/28 0701  03/01 0700 03/01 0701 03/02 0700    Blood 350 1050 258.3    IV Piggyback  50     Total Intake(mL/kg) 350 (3.8) 1100 (12.1) 258.3 (2.8)    Urine (mL/kg/hr)  525 (0.2) 220 (0.3)    Total Output  525 220    Net +350 +575 +38.3                 Lines/Drains/Airways       Active Status       Name Placement date Placement time Site Days    PICC Line 02/26/25 Right Brachial 02/26/25  0548  Brachial  3                  Physical Exam  Constitutional:       Appearance: Normal appearance.   HENT:      Head: Normocephalic and atraumatic.   Abdominal:      General: Bowel sounds are normal.   Musculoskeletal:         General: Normal range of motion.   Skin:     General: Skin is warm and dry.  "  Neurological:      Mental Status: He is alert and oriented to person, place, and time.   Psychiatric:         Behavior: Behavior normal.           Lab Results: I have reviewed the following results:CBC/BMP:   .     03/01/25  0603 03/01/25  1535 03/01/25 2019   WBC 4.59  --   --    HGB 6.8*   < > 7.1*   HCT 21.0*   < > 21.7*     --   --    SODIUM 140  --   --    K 3.7  --   --    *  --   --    CO2 25  --   --    BUN 58*  --   --    CREATININE 2.00*  --   --    GLUC 77  --   --     < > = values in this interval not displayed.      Recent Labs     02/28/25  0436 02/28/25  1223 02/28/25  1327 02/28/25  1825 02/28/25  2241 03/01/25 0603 03/01/25 1535   WBC 4.50  --   --   --   --  4.59  --    HGB 4.6*   < >  --    < > 7.4* 6.8* 7.2*   HCT 15.2*  --   --    < > 22.5* 21.0* 21.8*     --   --   --   --  159  --    SODIUM 139  --   --   --  139 140  --    K 4.0  --   --   --  3.8 3.7  --      --   --   --  109* 110*  --    CO2 26  --   --   --  25 25  --    BUN 63*  --   --   --  60* 58*  --    CREATININE 2.43*  --   --   --  2.11* 2.00*  --    GLUC 80  --   --   --  78 77  --    MG 2.1  --   --   --   --   --   --    AST 16  --   --   --  15  --   --    ALT <3*  --   --   --  <3*  --   --    ALB 2.0*  --   --   --  1.8*  --   --    TBILI 0.31  --   --   --  0.47  --   --    ALKPHOS 43  --   --   --  41  --   --    PTT  --   --  66*  --   --   --   --    INR  --   --  1.91*  --   --   --   --     < > = values in this interval not displayed.     Lab Results   Component Value Date    COLORU Light Orange 02/28/2025    CLARITYU Extra Turbid 02/28/2025    SPECGRAV 1.036 (H) 02/28/2025    PHUR 5.5 02/28/2025    LEUKOCYTESUR Trace (A) 02/28/2025    NITRITE Negative 02/28/2025    GLUCOSEU Negative 02/28/2025    KETONESU Trace (A) 02/28/2025    BILIRUBINUR Negative 02/28/2025    BLOODU Large (A) 02/28/2025   ,   No results found for: \"URINECX\"    Imaging Results Review: I reviewed radiology reports from " this admission including: CT abdomen/pelvis.    CT chest w ct abdomen pelvis w wo contrast [[id number]] Collected: 02/28/25 0637   Order Status: Completed Updated: 02/28/25 0813   Addenda:       ADDENDUM:   Correction to spleen section in the body of report. Hyperattenuation   should be hypoattenuation. Grossly stable posterior splenic subcapsular   hypodensity presumably an infarct unchanged allowing for difference in   contrast timing.     Signed: 02/28/25 0812 by Eric Willis MD   Narrative:     CT CHEST, ABDOMEN AND PELVIS WITH AND WITHOUT IV CONTRAST    INDICATION: eliquis, hgb-4 from facility, reports black stool, abn chest xray.    COMPARISON: CT chest abdomen pelvis 2/20/2025 and CT chest 2/11/2025, 12/4/2023    TECHNIQUE: Initial CT of the abdomen and pelvis was performed without intravenous contrast. Subsequent dynamic CT evaluation of the chest, abdomen and pelvis was performed after the administration of intravenous contrast was performed. Finally, delayed  dynamic delayed phase postcontrast CT evaluation of the abdomen and pelvis was performed. Multiplanar 2D reformatted images were created from the source data.    This examination, like all CT scans performed in the [facility], was performed utilizing techniques to minimize radiation dose exposure, including the use of iterative reconstruction and automated exposure control. Radiation dose length  product (DLP) for this visit: 2283 mGy-cm    IV Contrast: 100 mL of iohexol  Enteric Contrast: Not administered.    FINDINGS:    CHEST    LUNGS: COPD. New mild bilateral multi lobar scattered interstitial thickening and groundglass and tree-in-bud opacities. New mild multi lobar smooth septal thickening. Minimal bilateral lower lobe and lingular atelectasis, similar.    5 mm left upper lobe nodule stable since 2/11/2025 and new since 12/4/2023 image 37 series 308.    6 mm right basilar lower lobe nodule, new image 99 series  308.    Mucosal stranding the right mainstem bronchus and bronchus intermedius. Central airways otherwise clear.    PLEURA: Bilateral pleural effusions, small on the left and trace on the right, mildly increased.    HEART/GREAT VESSELS: Heart is not enlarged. Decreased intracardiac blood pool density compatible with anemia. No pericardial effusion.  Aortic, great vessel and coronary artery calcification.  No thoracic aortic aneurysm. Moderate stenosis of the mid  ascending segment of the left subclavian artery.    MEDIASTINUM AND PAWEL: No enlarged lymph nodes. Few small calcified mediastinal and left hilar calcified lymph nodes. Otherwise unremarkable.    CHEST WALL AND LOWER NECK: Stable mild bilateral gynecomastia.    ABDOMEN    RIGHT KIDNEY AND URETER:  No solid renal mass or detectable urothelial mass.  No hydronephrosis or hydroureter.  No urinary tract calculi.  No perinephric collection.    LEFT KIDNEY AND URETER:  No solid renal mass or detectable urothelial mass.  Subcentimeter hypoattenuating renal lesion(s), too small to characterize but statistically likely benign, which do not warrant follow-up (Radiology June 2019).  No hydronephrosis or hydroureter.  No urinary tract calculi.  No perinephric collection.    URINARY BLADDER:  No bladder wall mass.  No calculi.      LIVER/BILIARY TREE: Unremarkable.    GALLBLADDER: No calcified gallstones. No pericholecystic inflammatory change.    SPLEEN: Stable posterior splenic subcapsular hyperattenuation unchanged allowing for difference in contrast timing. Tiny splenic calcified granulomata.    PANCREAS: Mild to moderate diffuse atrophy. No acute findings.    ADRENAL GLANDS: Unremarkable.    STOMACH AND BOWEL: No vascular contrast extravasation into the stomach or bowel to suggest high-volume bleeding. Colonic diverticulosis and small duodenojejunal junction diverticulum. Mild nodular thickening of the upper cecum/proximal ascending colon  attributed to recently  biopsy-proven adenocarcinoma image 74 series 605. No bowel obstruction.    APPENDIX: No findings to suggest appendicitis.    ABDOMINOPELVIC CAVITY: Trace free fluid in the dependent pelvis. No pneumoperitoneum. No lymphadenopathy.    VESSELS: Aortic and mesenteric atherosclerotic disease without occlusion. Mild stenosis of the celiac and superior mesenteric origins with well-perfused distal vessels.    PELVIS    REPRODUCTIVE ORGANS: Unremarkable for patient's age.    ABDOMINAL WALL/INGUINAL REGIONS: Unremarkable.    BONES: No acute fracture or osseous destructive lesion identified. Stable mild to moderate multilevel chronic compression deformities in the thoracic spine most prominent at T7. Old bilateral rib fractures. Degenerative changes of the spine, pubic  symphysis, and multiple joints. Levoconvex lumbar scoliosis.   Impression:       CT chest:    Decreased intracardiac blood pool density compatible with anemia.    New bilateral multi lobar interstitial thickening and tree-in-bud opacities may represent pulmonary vascular congestion or nonspecific inflammatory or infectious process.    Bilateral pleural effusions, left greater than right, mildly enlarged and additional findings suggestive of pulmonary vascular congestion. Correlate with clinical findings.    5 mm left upper lobe nodule stable since 2/11/2025 and new since December 2023. 6 mm right lower lobe nodule new since 2/11/2025. Noncontrast chest CT follow-up in 3 months is advised.    Additional chronic findings and negatives as above.    CT abdomen and pelvis:    No evidence of high-volume gastrointestinal bleeding.    Stable upper cecal/proximal ascending colonic mass attributed to recently biopsy-proven adenocarcinoma.    Colonic diverticulosis.    No evidence of abdominopelvic metastatic disease.    Additional chronic findings and negatives as above.    The study was marked in EPIC for immediate notification.          Workstation performed:  [id number]       Administrative Statements   I have spent a total time of 25 minutes in caring for this patient on the day of the visit/encounter including Diagnostic results, Instructions for management, Impressions, Counseling / Coordination of care, Documenting in the medical record, Reviewing/placing orders in the medical record (including tests, medications, and/or procedures), Obtaining or reviewing history  , and Communicating with other healthcare professionals .

## 2025-03-01 NOTE — ASSESSMENT & PLAN NOTE
CT A/P shows bilateral pleural effusions L>R, pulmonary vascular congestion  Decreased breath sounds in bilateral lungs bases noted on exam  Pulmonary congestion likely in setting of severe anemia    Plan:  Repeat imaging if pt's respiratory status worsens  Continue oxygen to keep oxygen saturations 90% and above.

## 2025-03-01 NOTE — ASSESSMENT & PLAN NOTE
AC: Eliquis 5 mg BID  Rate control: Lopressor 25 mg Q12 hrs    Plan:  Hold off Eliquis in the setting of GI bleed  Continue Lopressor 25 mg Q12 hrs

## 2025-03-01 NOTE — ASSESSMENT & PLAN NOTE
Patient with ORIANA on admission, most likely secondary to hypovolemia from GI bleed.  Creatinine is improved with transfusion and IV fluid.  With improving renal function, continue cefazolin at full dose for now.  Monitor creatinine.

## 2025-03-01 NOTE — CONSULTS
Consultation - Infectious Disease   Name: Devin Chaves 79 y.o. male I MRN: [id number]  Unit/Bed#: S -01 I Date of Admission: 2/28/2025   Date of Service: 3/1/2025 I Hospital Day: 1   Inpatient consult to Infectious Diseases  Consult performed by: Theron Curry MD  Consult ordered by: Olivia Martinez MD        Physician Requesting Evaluation: Jean Paula*   Reason for Evaluation / Principal Problem: MSSA bacteremia and C-spine spinal infection.    Assessment & Plan  Neck pain  Patient developed acute neck pain with MSSA bacteremia during recent hospitalization.  CRP was highly elevated.  C-spine MRI showed possible C-spine and paraspinal infection.  Patient has no neurologic deficit.  However, his neck pain has not improved.  Given lack of improvement of neck pain, we should repeat C-spine MRI.  Antibiotic plan as in below.  Monitor neck pain.  Recommend repeat C-spine MRI with and without contrast.  This can be postponed until ORIANA resolves.  MSSA bacteremia  Patient had MSSA bacteremia during recent hospitalization.  Source is unclear but likely cutaneous.  His bacteremia cleared rapidly on IV antibiotic.  TTE did not show any vegetation.  Given possible C-spine infection, plan was for long-term IV antibiotic.  Continue high-dose IV cefazolin.  Treat x 6 weeks from clearance of bacteremia as previously planned, through 3/27.  Symptomatic anemia  Patient is clinically improved with PRBC transfusion.  Management per primary service.  Melena  Patient with melena prior to admission.  Plan for GI evaluation noted.  Follow-up GI evaluation.  ORIANA (acute kidney injury) (HCC)  Patient with ORIANA on admission, most likely secondary to hypovolemia from GI bleed.  Creatinine is improved with transfusion and IV fluid.  With improving renal function, continue cefazolin at full dose for now.  Monitor creatinine.  Colonic mass  Patient has recently diagnosed adenocarcinoma of the colon.  He has been followed by  colorectal surgery.  No plan for chemotherapy yet.  Colorectal surgery follow-up.    Hospitalization records reviewed in detail.  Discussed with patient in detail regarding the above plan.  I have discussed with Dr. Brady from primary service regarding the above plan to continue IV cefazolin.  He agrees with the plan.    Antibiotics:  Cefazolin  Last negative blood culture 2/14    History of Present Illness   Devin Chavse is a 79 y.o. year old male, with multiple medical problems including recently diagnosed adenocarcinoma of colon, 2 dependent COPD, CVA, A-fib ablation and chronic low back pain, was recently admitted last month for COPD exacerbation.  During that admission, he was found to have MSSA bacteremia with splenic infarct.  During hospitalization, patient developed acute neck pain.  MRI suggest C-spine discitis with paraspinal soft tissue infection.  Decision was made to treat patient with 6 weeks of IV cefazolin.  Of note, patient developed melena as an outpatient and his anticoagulation was discontinued.  He presented to the ER yesterday with fatigue, dyspnea, melena and nonproductive cough.  Patient had anemia on presentation.  Patient was admitted.  His energy level improved with PRBC transfusion.  Patient complains of persistent neck pain.  For these reasons, we are asked to evaluate the patient.    At present, patient's only complaint is neck pain.  His low back pain is mild and chronic.  He states that his back pain is not much better than during recent hospitalization.  He has generalized weakness but no focal arm or leg weakness.  No fever or chills.  He has been tolerating IV antibiotic well.  No nausea, vomiting or diarrhea.    A complete review of systems is negative other than that noted in the HPI.    Medical History Review: I have reviewed the patient's PMH, PSH, Social History, Family History, Meds, and Allergies     Objective :  Temp:  [96.7 °F (35.9 °C)-98.4 °F (36.9 °C)] 97.5 °F  (36.4 °C)  HR:  [63-82] 71  BP: (104-120)/(40-59) 120/47  Resp:  [18] 18  SpO2:  [86 %-100 %] 99 %  O2 Device: Nasal cannula  Nasal Cannula O2 Flow Rate (L/min):  [3 L/min-5 L/min] 5 L/min    General: Acute and chronically ill-appearing, nontoxic.  No acute distress  Psychiatric:  Awake and alert  Neck: Tenderness in posterior neck diffusely.  No deformity.  No open wound.  No mass.  No erythema.  Pulmonary:  Normal respiratory excursion, sparse basilar rhonchi, no rales, no wheezing, without accessory muscle use  Heart: Regular rate and rhythm.  No murmur, rubs or gallops.  Abdomen:  Soft, nontender, nondistended, normal bowel sounds.  Extremities: Mild leg edema  Skin:  No rashes      Lab Results: I have reviewed the following results:  Results from last 7 days   Lab Units 03/01/25  1535 03/01/25  0603 02/28/25  2241 02/28/25  1223 02/28/25  0436   WBC Thousand/uL  --  4.59  --   --  4.50   HEMOGLOBIN g/dL 7.2* 6.8* 7.4*   < > 4.6*   PLATELETS Thousands/uL  --  159  --   --  201    < > = values in this interval not displayed.     Results from last 7 days   Lab Units 03/01/25  0603 02/28/25 2241 02/28/25  0436   SODIUM mmol/L 140 139 139   POTASSIUM mmol/L 3.7 3.8 4.0   CHLORIDE mmol/L 110* 109* 108   CO2 mmol/L 25 25 26   BUN mg/dL 58* 60* 63*   CREATININE mg/dL 2.00* 2.11* 2.43*   EGFR ml/min/1.73sq m 31 29 24   CALCIUM mg/dL 7.5* 7.3* 7.6*   AST U/L  --  15 16   ALT U/L  --  <3* <3*   ALK PHOS U/L  --  41 43   ALBUMIN g/dL  --  1.8* 2.0*                     Results from last 7 days   Lab Units 02/28/25  1327   D-DIMER QUANTITATIVE ug/ml FEU 3.41*       Imaging Results Review: I personally reviewed the following image studies in PACS and associated radiology reports: chest xray, CT chest, and CT abdomen/pelvis. My interpretation of the radiology images/reports is: CXR without consolidation.  Chest CT with bilateral pleural effusions greater in left and scattered interstitial infiltrates with a stable RANDOLPH nodule.   Abdomen/pelvis CT with stable cecal/proximal ascending colon mass and colonic diverticulosis..

## 2025-03-01 NOTE — ASSESSMENT & PLAN NOTE
"2/20/2025 EGD/colonoscopy showed 2.5 cm ileocecal mass, pathology confirmed adenocarcinoma.    Patient was due to follow-up with outpatient colorectal surgery on 2/28/2025  Pt presenting with melena, Hgb 4.6. Symptomatic anemia.    Plan:  Colorectal consulted.  No surgical intervention at this time.  Outpatient follow-up with colorectal surgery.  Rest of plan under \"symptomatic anemia\"  "

## 2025-03-01 NOTE — PLAN OF CARE
Problem: Prexisting or High Potential for Compromised Skin Integrity  Goal: Skin integrity is maintained or improved  Description: INTERVENTIONS:  - Identify patients at risk for skin breakdown  - Assess and monitor skin integrity  - Assess and monitor nutrition and hydration status  - Monitor labs   - Assess for incontinence   - Turn and reposition patient  - Assist with mobility/ambulation  - Relieve pressure over bony prominences  - Avoid friction and shearing  - Provide appropriate hygiene as needed including keeping skin clean and dry  - Evaluate need for skin moisturizer/barrier cream  - Collaborate with interdisciplinary team   - Patient/family teaching  - Consider wound care consult   Outcome: Progressing     Problem: Potential for Falls  Goal: Patient will remain free of falls  Description: INTERVENTIONS:  - Educate patient/family on patient safety including physical limitations  - Instruct patient to call for assistance with activity   - Consult OT/PT to assist with strengthening/mobility   - Keep Call bell within reach  - Keep bed low and locked with side rails adjusted as appropriate  - Keep care items and personal belongings within reach  - Initiate and maintain comfort rounds  - Make Fall Risk Sign visible to staff  - Offer Toileting every 2 Hours, in advance of need  - Initiate/Maintain bed alarm    - Apply yellow socks and bracelet for high fall risk patients  - Consider moving patient to room near nurses station  Outcome: Progressing

## 2025-03-01 NOTE — ASSESSMENT & PLAN NOTE
Patient developed acute neck pain with MSSA bacteremia during recent hospitalization.  CRP was highly elevated.  C-spine MRI showed possible C-spine and paraspinal infection.  Patient has no neurologic deficit.  However, his neck pain has not improved.  Given lack of improvement of neck pain, we should repeat C-spine MRI.  Antibiotic plan as in below.  Monitor neck pain.  Recommend repeat C-spine MRI with and without contrast.  This can be postponed until ORIANA resolves.

## 2025-03-01 NOTE — ASSESSMENT & PLAN NOTE
"See plan under \"symptomatic anemia\"  With patient having melena, possible upper GI bleeding.  Patient on Protonix drip.  Will consult gastroenterologist.  "

## 2025-03-01 NOTE — ASSESSMENT & PLAN NOTE
From the previous problem list, history of esophageal dysphagia.  Today, patient admitted to still having difficulty swallowing and having feeling of food gets stuck on his throat, at times.  He attributed this to his throat being dry at times.  Speech therapy consult.  Gastroenterology consult.

## 2025-03-01 NOTE — PROGRESS NOTES
"Progress Note - Hospitalist   Name: Devin Chaves 79 y.o. male I MRN: [id number]  Unit/Bed#: S -01 I Date of Admission: 2/28/2025   Date of Service: 3/1/2025 I Hospital Day: 1    Assessment & Plan  Symptomatic anemia  Recent Labs     02/28/25  1825 02/28/25  2241 03/01/25  0603   HGB 7.3* 7.4* 6.8*      Patient presents with 2 days of chest pain, shortness of breath, fatigue and melena  Patient had recent hospitalization 2/11/2025 - 2/22/2025 for COPD exacerbation complicated by MSSA bacteremia.  Patient anemic on hospitalization requiring 2 units of packed RBCs.  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  Patient was due to follow-up with outpatient colorectal surgery on 2/28/2025  Patient's Eliquis was restarted during his last hospital discharge. Patient has not taken Eliquis for the past 1-2 days due to melena.  S/p 3 units packed rbc in ED. Pt is hemodynamically stable. Dark/black urine noted in canister at bedside.    Plan:  H&H Q6 hrs  Transfuse for Hgb <7  PPI gtt  Isolyte 75 cc/hr  Colorectal surgery consulted.  As per colorectal surgeon, in the absence of emergency indication, he is not recommending surgery at this time.  Patient should be preoperatively optimized prior to any planned surgical intervention.  Patient then may follow-up with colorectal surgeon in the outpatient setting as previously planned.  Hold Eliquis and aspirin  Monitor on telemetry  Consider urology consult if pt continues to have black urine  Melena  See plan under \"symptomatic anemia\"  With patient having melena, possible upper GI bleeding.  Patient on Protonix drip.  Will consult gastroenterologist.  ORIANA (acute kidney injury) (HCC)  Recent Labs     02/28/25  0436 02/28/25  2241 03/01/25  0603   CREATININE 2.43* 2.11* 2.00*   EGFR 24 29 31     Estimated Creatinine Clearance: 34 mL/min (A) (by C-G formula based on SCr of 2 mg/dL (H)).     Cr 2.43 on admission. Baseline 0.9. " "BUN 63. BUN/Cr ratio >20  Pt having dark/black urine in canister at bedside.  Likely hypovolemic in the setting of acute GI bleed    Plan:  Isolyte 75 cc/hour  Continue to monitor BMP. Consult nephrology if pt's kidney function does not improve.  F/up UA  Avoid nephrotoxic medications  Monitor I/O's  Urinary retention protocol  Colonic mass  2/20/2025 EGD/colonoscopy showed 2.5 cm ileocecal mass, pathology confirmed adenocarcinoma.    Patient was due to follow-up with outpatient colorectal surgery on 2/28/2025  Pt presenting with melena, Hgb 4.6. Symptomatic anemia.    Plan:  Colorectal consulted.  No surgical intervention at this time.  Outpatient follow-up with colorectal surgery.  Rest of plan under \"symptomatic anemia\"  MSSA bacteremia  Patient had recent hospitalization for COPD exacerbation complicated by MSSA bacteremia.  Blood cultures S aureus (+). Possible source small laceration from haircut, per chart review.  TTE negative for vegetations  Patient was discharged on cefazolin 2 g IV Q8 hrs until 3/27/2025. PICC line in place. Patient scheduled to follow-up outpatient with ID on 3/3/2025.  Repeat blood cultures showed no growth  Pt denies fevers. No leukocytosis on admission.    Plan:  Continue with cefazolin 2 g IV every 8 hours.  Spoke to the infectious disease doctor about this case.  Consult infectious disease.  COPD (chronic obstructive pulmonary disease) (HCC)  Patient with COPD on 2-4 L supplemental O2 at baseline  Patient currently requiring 2 L NC. No wheezing noted on exam.   Home Albuterol prn, Xopenex Q8 hrs prn, Mometasone inhaler daily    Plan:  Titrate O2 to maintain SpO2 greater than 88%   Xopenex nebs Q8 hrs prn, Mometasone daily  Albuterol prn  Atrial fibrillation (HCC)  AC: Eliquis 5 mg BID  Rate control: Lopressor 25 mg Q12 hrs    Plan:  Hold off Eliquis in the setting of GI bleed  Continue Lopressor 25 mg Q12 hrs  Elevated troponin  Lab Results   Component Value Date    HSTNI0 134 (H) " 02/28/2025    HSTNI2 142 (H) 02/28/2025    HSTNID2 8 02/28/2025    HSTNI4 138 (H) 02/28/2025    HSTNID4 4 02/28/2025      Patient presents with chest pain and Hgb 4.6.  Today, 3/1, no chest pain.  Initial troponin was elevated, but second and fourth hour delta levels were negative..  EKG unremarkable.  Etiology nonischemic myocardial injury in setting of symptomatic anemia versus non-MI troponin elevation secondary to anemia.  Pleural effusion, bilateral  CT A/P shows bilateral pleural effusions L>R, pulmonary vascular congestion  Decreased breath sounds in bilateral lungs bases noted on exam  Pulmonary congestion likely in setting of severe anemia    Plan:  Repeat imaging if pt's respiratory status worsens  Continue oxygen to keep oxygen saturations 90% and above.  HTN (hypertension)  Systolic BP 100s-130s.  Patient normotensive.  Blood pressure stable.    Plan:  Lopressor 25 mg every 12 hours  CAD (coronary artery disease)  TTE 2/12/25: EF 55%, Normal systolic dysfunction, G1DD, No vegetation, No mural thrombus, moderate aortic sclerosis     Plan:  Hold aspirin in setting of GI bleed  History of CVA (cerebrovascular accident)  Pt has history of CVA  MRI brain: Multiple tiny recent infarcts scattered in multiple vascular distributions that are likely embolic. Several microhemorrhages associated with recent infarcts. No acute space-occupying hematoma. Chronic ischemic changes.  NCCTH 2/17: 3 mm focus of hyperdensity left superior frontal lobe as described unchanged from the prior study may represent a small 3 mm hemorrhage versus focus of mineralization.    Plan:  Hold Eliquis and aspirin in setting of GI bleed  Continue Lipitor 40 mg daily  Urinary retention  Patient has history of urinary retention and was started on Flomax during last hospitalization  Pt denies difficulty urinating. Has dark/black urine on canister at bedside.    Plan:  Flomax 0.4 mg daily  Urinary retention protocol  Neck pain  Patient  complained of neck pains.  Patient told me, that he had been having neck pains in the past, however, had worsened since his last admission here when he was found to have an infection in the blood.  MRI of the neck done 2/14/2025 revealed cervical degenerative change with mild canal stenosis and mild to moderate foraminal narrowing.  No cord compression.  Mild nonspecific edema within the right posterior breast dermal musculature of the upper cervicals spine.  Minimal peripheral enhancement is noted.  Differential considerations include infectious/inflammatory myositis with soft tissue abscess.  No discitis or osteomyelitis.  Pain management.  Dysphagia  From the previous problem list, history of esophageal dysphagia.  Today, patient admitted to still having difficulty swallowing and having feeling of food gets stuck on his throat, at times.  He attributed this to his throat being dry at times.  Speech therapy consult.  Gastroenterology consult.  Abnormal urinalysis  Patient had been having dark/black urine, and persistent.  Recently diagnosed with urinary retention and was started on Flomax.  Urology consult.    VTE Pharmacologic Prophylaxis: VTE Score: 5 High Risk (Score >/= 5) - Pharmacological DVT Prophylaxis Contraindicated. Sequential Compression Devices Ordered.    Mobility:   Basic Mobility Inpatient Raw Score: 11  JH-HLM Goal: 4: Move to chair/commode  JH-HLM Achieved: 2: Bed activities/Dependent transfer  JH-HLM Goal NOT achieved. Continue with multidisciplinary rounding and encourage appropriate mobility to improve upon JH-HLM goals.    Patient Centered Rounds: I performed bedside rounds with nursing staff today.   Discussions with Specialists or Other Care Team Provider: Infectious disease doctor.    Education and Discussions with Family / Patient: Updated  (wife and daughter in law) at bedside.    Current Length of Stay: 1 day(s)  Current Patient Status: Inpatient   Certification Statement:  The patient will continue to require additional inpatient hospital stay due to above findings and plans  Discharge Plan: Anticipate discharge in 48-72 hrs to discharge location to be determined pending rehab evaluations.    Code Status: Level 1 - Full Code    Subjective   Patient was seen and examined around 12 noon today.  Patient still not doing well.  Patient still complained of dark stools and dark urine.  I personally saw patient's urine to be dark/black in color.  Patient admitted to still having significant neck pains.  According to him, he has chronic neck pains, but had gotten worse since his last admission here about 2 weeks ago.  Patient also had been having problems swallowing and described food getting stuck in his throat, that he attributed to his throat being dry.  Patient has occasional shortness of breath and lightheadedness.  Patient denied any chest or abdominal pains.  Patient denied any nausea or vomiting or for any fever or chills.      Objective :  Temp:  [96.7 °F (35.9 °C)-98.4 °F (36.9 °C)] 97.5 °F (36.4 °C)  HR:  [63-82] 71  BP: (104-121)/(40-59) 120/47  Resp:  [18-20] 18  SpO2:  [86 %-100 %] 99 %  O2 Device: Nasal cannula  Nasal Cannula O2 Flow Rate (L/min):  [3 L/min-5 L/min] 5 L/min    Body mass index is 27.21 kg/m².     Input and Output Summary (last 24 hours):     Intake/Output Summary (Last 24 hours) at 3/1/2025 1354  Last data filed at 3/1/2025 1249  Gross per 24 hour   Intake 258.33 ml   Output 525 ml   Net -266.67 ml       Physical Exam  Vitals and nursing note reviewed. Exam conducted with a chaperone present.   Constitutional:       General: He is not in acute distress.     Appearance: He is ill-appearing. He is not toxic-appearing or diaphoretic.   Cardiovascular:      Rate and Rhythm: Normal rate and regular rhythm.      Heart sounds: Normal heart sounds. No murmur heard.     No friction rub. No gallop.   Pulmonary:      Effort: Pulmonary effort is normal. No respiratory  distress.      Breath sounds: No stridor. No wheezing, rhonchi or rales.      Comments: Diminished breath sounds at both bases of lung fields.  Abdominal:      General: Bowel sounds are normal. There is no distension.      Palpations: Abdomen is soft.      Tenderness: There is no abdominal tenderness. There is no right CVA tenderness, left CVA tenderness, guarding or rebound.   Skin:     General: Skin is warm.      Coloration: Skin is not pale.      Findings: No erythema or rash.   Neurological:      Mental Status: He is alert and oriented to person, place, and time. Mental status is at baseline.   Psychiatric:         Mood and Affect: Mood normal.         Behavior: Behavior normal.         Thought Content: Thought content normal.           Lines/Drains:  Lines/Drains/Airways       Active Status       Name Placement date Placement time Site Days    PICC Line 02/26/25 Right Brachial 02/26/25  0548  Brachial  3                    Central Line:  Goal for removal: N/A - Discharging with PICC for IV ABX/medications         Telemetry:  Telemetry Orders (From admission, onward)               24 Hour Telemetry Monitoring  Continuous x 24 Hours (Telem)        Expiring   Question:  Reason for 24 Hour Telemetry  Answer:  Metabolic/electrolyte disturbance with high probability of dysrhythmia. K level <3 or >6 OR KCL infusion >10mEq/hr                     Telemetry Reviewed: Normal Sinus Rhythm  Indication for Continued Telemetry Use: Acute MI/Unstable Angina/Rule out ACS               Lab Results: I have reviewed the following results:   Results from last 7 days   Lab Units 03/01/25  0603 02/28/25  1223 02/28/25  0436   WBC Thousand/uL 4.59  --  4.50   HEMOGLOBIN g/dL 6.8*   < > 4.6*   HEMATOCRIT % 21.0*   < > 15.2*   PLATELETS Thousands/uL 159  --  201   SEGS PCT %  --   --  62   LYMPHO PCT %  --   --  25   MONO PCT %  --   --  11   EOS PCT %  --   --  1    < > = values in this interval not displayed.     Results from last 7  days   Lab Units 03/01/25  0603 02/28/25  2241   SODIUM mmol/L 140 139   POTASSIUM mmol/L 3.7 3.8   CHLORIDE mmol/L 110* 109*   CO2 mmol/L 25 25   BUN mg/dL 58* 60*   CREATININE mg/dL 2.00* 2.11*   ANION GAP mmol/L 5 5   CALCIUM mg/dL 7.5* 7.3*   ALBUMIN g/dL  --  1.8*   TOTAL BILIRUBIN mg/dL  --  0.47   ALK PHOS U/L  --  41   ALT U/L  --  <3*   AST U/L  --  15   GLUCOSE RANDOM mg/dL 77 78     Results from last 7 days   Lab Units 02/28/25  1327   INR  1.91*                   Recent Cultures (last 7 days):         Imaging Results Review: I reviewed radiology reports from this admission including: chest xray, CT chest, and CT abdomen/pelvis.  Other Study Results Review: EKG was personally reviewed and my interpretation is: NSR. HR 76/min.  Follow-up official results...    Last 24 Hours Medication List:     Current Facility-Administered Medications:     albuterol inhalation solution 2.5 mg, Q6H PRN    ascorbic acid (VITAMIN C) tablet 250 mg, Daily    atorvastatin (LIPITOR) tablet 40 mg, Daily With Dinner    ceFAZolin (ANCEF) IVPB (premix in dextrose) 2,000 mg 50 mL, Q8H    Cholecalciferol (VITAMIN D3) tablet 2,000 Units, Daily    cyanocobalamin (VITAMIN B-12) tablet 100 mcg, Daily    fluticasone (ARNUITY ELLIPTA) 100 MCG/ACT inhaler 1 puff, Daily    folic acid (FOLVITE) tablet 1 mg, Daily    hydroxychloroquine (PLAQUENIL) tablet 400 mg, Daily With Breakfast    levalbuterol (XOPENEX) inhalation solution 1.25 mg, Q8H PRN    lidocaine (LIDODERM) 5 % patch 3 patch, Daily    metoprolol tartrate (LOPRESSOR) tablet 25 mg, Q12H GALE    multi-electrolyte (PLASMALYTE-A/ISOLYTE-S PH 7.4) IV solution, Continuous, Last Rate: 75 mL/hr (03/01/25 0248)    pantoprazole (PROTONIX) 80 mg in sodium chloride 0.9 % 100 mL infusion, Continuous, Last Rate: 8 mg/hr (03/01/25 0248)    tamsulosin (FLOMAX) capsule 0.4 mg, Daily With Dinner    Administrative Statements   Today, Patient Was Seen By: Jean Brady MD      **Please Note:  This note may have been constructed using a voice recognition system.**

## 2025-03-01 NOTE — ASSESSMENT & PLAN NOTE
Patient complained of neck pains.  Patient told me, that he had been having neck pains in the past, however, had worsened since his last admission here when he was found to have an infection in the blood.  MRI of the neck done 2/14/2025 revealed cervical degenerative change with mild canal stenosis and mild to moderate foraminal narrowing.  No cord compression.  Mild nonspecific edema within the right posterior breast dermal musculature of the upper cervicals spine.  Minimal peripheral enhancement is noted.  Differential considerations include infectious/inflammatory myositis with soft tissue abscess.  No discitis or osteomyelitis.  Pain management.

## 2025-03-01 NOTE — ASSESSMENT & PLAN NOTE
Recent Labs     02/28/25  0436 02/28/25  2241 03/01/25  0603   CREATININE 2.43* 2.11* 2.00*   EGFR 24 29 31     Estimated Creatinine Clearance: 34 mL/min (A) (by C-G formula based on SCr of 2 mg/dL (H)).     Cr 2.43 on admission. Baseline 0.9. BUN 63. BUN/Cr ratio >20  Pt having dark/black urine in canister at bedside.  Likely hypovolemic in the setting of acute GI bleed    Plan:  Isolyte 75 cc/hour  Continue to monitor BMP. Consult nephrology if pt's kidney function does not improve.  F/up UA  Avoid nephrotoxic medications  Monitor I/O's  Urinary retention protocol

## 2025-03-01 NOTE — ASSESSMENT & PLAN NOTE
Patient has recently diagnosed adenocarcinoma of the colon.  He has been followed by colorectal surgery.  No plan for chemotherapy yet.  Colorectal surgery follow-up.    Hospitalization records reviewed in detail.  Discussed with patient in detail regarding the above plan.

## 2025-03-01 NOTE — ASSESSMENT & PLAN NOTE
Lab Results   Component Value Date    HSTNI0 134 (H) 02/28/2025    HSTNI2 142 (H) 02/28/2025    HSTNID2 8 02/28/2025    HSTNI4 138 (H) 02/28/2025    HSTNID4 4 02/28/2025      Patient presents with chest pain and Hgb 4.6.  Today, 3/1, no chest pain.  Initial troponin was elevated, but second and fourth hour delta levels were negative..  EKG unremarkable.  Etiology nonischemic myocardial injury in setting of symptomatic anemia versus non-MI troponin elevation secondary to anemia.

## 2025-03-01 NOTE — ASSESSMENT & PLAN NOTE
Patient had recent hospitalization for COPD exacerbation complicated by MSSA bacteremia.  Blood cultures S aureus (+). Possible source small laceration from haircut, per chart review.  TTE negative for vegetations  Patient was discharged on cefazolin 2 g IV Q8 hrs until 3/27/2025. PICC line in place. Patient scheduled to follow-up outpatient with ID on 3/3/2025.  Repeat blood cultures showed no growth  Pt denies fevers. No leukocytosis on admission.    Plan:  Continue with cefazolin 2 g IV every 8 hours.  Spoke to the infectious disease doctor about this case.  Consult infectious disease.

## 2025-03-01 NOTE — ASSESSMENT & PLAN NOTE
Newly diagnosed proximal ascending colon mass on colonoscopy 2/19, pathology confirmed adenocarcinoma.  Colorectal surgery was consulted at that time, recommended outpatient follow up which was scheduled for today?  Patient now presenting with anemia and reports of melena   VSS  2/28 CT AP no evidence of high-volume gastrointestinal bleeding. Stable upper cecal/proximal ascending colonic mass, colonic diverticulosis. No Evidence of abdominopelvic metastatic disease.     Hemoglobin 6.8 this morning from 7.4    Plan  Recommend repeat transfusion this morning   no plans for urgent surgical intervention  Recommend presurgical optimization along nutritional and iron supplements.

## 2025-03-01 NOTE — ASSESSMENT & PLAN NOTE
Recent Labs     02/28/25  1825 02/28/25  2241 03/01/25  0603   HGB 7.3* 7.4* 6.8*      Patient presents with 2 days of chest pain, shortness of breath, fatigue and melena  Patient had recent hospitalization 2/11/2025 - 2/22/2025 for COPD exacerbation complicated by MSSA bacteremia.  Patient anemic on hospitalization requiring 2 units of packed RBCs.  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  Patient was due to follow-up with outpatient colorectal surgery on 2/28/2025  Patient's Eliquis was restarted during his last hospital discharge. Patient has not taken Eliquis for the past 1-2 days due to melena.  S/p 3 units packed rbc in ED. Pt is hemodynamically stable. Dark/black urine noted in canister at bedside.    Plan:  H&H Q6 hrs  Transfuse for Hgb <7  PPI gtt  Isolyte 75 cc/hr  Colorectal surgery consulted.  As per colorectal surgeon, in the absence of emergency indication, he is not recommending surgery at this time.  Patient should be preoperatively optimized prior to any planned surgical intervention.  Patient then may follow-up with colorectal surgeon in the outpatient setting as previously planned.  Hold Eliquis and aspirin  Monitor on telemetry  Consider urology consult if pt continues to have black urine   Rescheduled.

## 2025-03-01 NOTE — PROGRESS NOTES
Progress Note - Colorectal   Name: Devin Chaves 79 y.o. male I MRN: [id number]  Unit/Bed#: S -01 I Date of Admission: 2/28/2025   Date of Service: 3/1/2025 I Hospital Day: 1    Assessment & Plan  COPD (chronic obstructive pulmonary disease) (HCC)  On 2L NC at baseline  Currently on 2L NC without increased work of breathing     Per primary team  Atrial fibrillation (HCC)  Treated with Eliqiuis    Currently on hold  MSSA bacteremia  During prior hospitalization diagnosed with MSSA bacteremia, acute neck pain, possible deep cervical infection? Requiring long-term antibiotics    TTE negative for vegetation   Repeat blood cultures negative 2/14   Being treated with IV Cefazolin     Per primary team  Colonic mass  Newly diagnosed proximal ascending colon mass on colonoscopy 2/19, pathology confirmed adenocarcinoma.  Colorectal surgery was consulted at that time, recommended outpatient follow up which was scheduled for today?  Patient now presenting with anemia and reports of melena   VSS  2/28 CT AP no evidence of high-volume gastrointestinal bleeding. Stable upper cecal/proximal ascending colonic mass, colonic diverticulosis. No Evidence of abdominopelvic metastatic disease.     Hemoglobin 6.8 this morning from 7.4    Plan  Recommend repeat transfusion this morning   no plans for urgent surgical intervention  Recommend presurgical optimization along nutritional and iron supplements.   Symptomatic anemia  Presented with hgb 4.7  Received 3 units pRBC, hgb responded to 7.3    Trend hgb  Transfuse for hgb <7  Anticoagulation on hold  Recommend iron supplements  Melena  Patient reports daily bowel movements which are dark and dark urine.   Recently EGD on 2/19 reported normal     Consider GI consult   Consider urology consult for dark urine    CAD (coronary artery disease)    History of CVA (cerebrovascular accident)    Urinary retention          Subjective   No pain.  No nausea no vomiting.  Having bowel movements  which he does not know if they are bloody or not.    Objective :  Temp:  [97.2 °F (36.2 °C)-98.4 °F (36.9 °C)] 97.5 °F (36.4 °C)  HR:  [63-76] 69  BP: (104-133)/(45-63) 107/45  Resp:  [18-20] 20  SpO2:  [97 %-100 %] 98 %  O2 Device: Nasal cannula  Nasal Cannula O2 Flow Rate (L/min):  [2 L/min-3 L/min] 3 L/min    I/O         02/27 0701  02/28 0700 02/28 0701  03/01 0700 03/01 0701 03/02 0700    Blood 350 1050     IV Piggyback  50     Total Intake(mL/kg) 350 (3.8) 1100 (12.1)     Urine (mL/kg/hr)  525 (0.2)     Total Output  525     Net +350 +575                  Lines/Drains/Airways       Active Status       Name Placement date Placement time Site Days    PICC Line 02/26/25 Right Brachial 02/26/25  0548  Brachial  3                  Physical Exam  Vitals and nursing note reviewed.   Constitutional:       General: He is not in acute distress.     Appearance: He is well-developed.   HENT:      Head: Normocephalic and atraumatic.   Eyes:      Conjunctiva/sclera: Conjunctivae normal.   Cardiovascular:      Rate and Rhythm: Normal rate and regular rhythm.      Heart sounds: No murmur heard.  Pulmonary:      Effort: Pulmonary effort is normal. No respiratory distress.      Breath sounds: Normal breath sounds.   Abdominal:      Palpations: Abdomen is soft.      Tenderness: There is no abdominal tenderness.   Musculoskeletal:         General: No swelling.      Cervical back: Neck supple.   Skin:     General: Skin is warm and dry.      Capillary Refill: Capillary refill takes less than 2 seconds.   Neurological:      Mental Status: He is alert.   Psychiatric:         Mood and Affect: Mood normal.           Lab Results: I have reviewed the following results:  Recent Labs     02/28/25  0436 02/28/25  0642 02/28/25  1223 02/28/25  1327 02/28/25  1825 02/28/25  2241 03/01/25  0603   WBC 4.50  --   --   --   --   --  4.59   HGB 4.6*  --    < >  --    < > 7.4* 6.8*   HCT 15.2*  --   --   --    < > 22.5* 21.0*     --   --    --   --   --  159   SODIUM 139  --   --   --   --  139 140   K 4.0  --   --   --   --  3.8 3.7     --   --   --   --  109* 110*   CO2 26  --   --   --   --  25 25   BUN 63*  --   --   --   --  60* 58*   CREATININE 2.43*  --   --   --   --  2.11* 2.00*   GLUC 80  --   --   --   --  78 77   MG 2.1  --   --   --   --   --   --    AST 16  --   --   --   --  15  --    ALT <3*  --   --   --   --  <3*  --    ALB 2.0*  --   --   --   --  1.8*  --    TBILI 0.31  --   --   --   --  0.47  --    ALKPHOS 43  --   --   --   --  41  --    PTT  --   --   --  66*  --   --   --    INR  --   --   --  1.91*  --   --   --    HSTNI0 134*  --   --   --   --   --   --    HSTNI2  --  142*  --   --   --   --   --     < > = values in this interval not displayed.

## 2025-03-02 PROBLEM — E43 SEVERE PROTEIN-CALORIE MALNUTRITION (HCC): Status: ACTIVE | Noted: 2025-01-01

## 2025-03-02 NOTE — PROGRESS NOTES
Progress Note - Urology   Name: Devin Chaves 79 y.o. male I MRN: [id number]  Unit/Bed#: S -01 I Date of Admission: 2/28/2025   Date of Service: 3/2/2025 I Hospital Day: 2    Assessment & Plan  Symptomatic anemia   No overt hemorrhage on CT.    Plan:  Supportive care  Trend labs  Transfuse per internal medicine  Atrial fibrillation (HCC)    Managed with chronic anticoagulation.  Currently on hold.  Urinary retention  Although urine is discolored, patient is voiding volitionally with most recent bladder scan of 16 mL.  He denies stranguria or urinary hesitancy    Plan: Hematuria  Flomax  Monitor voiding  ORIANA (acute kidney injury) (HCC)  Nonobstructive etiology.      Plan:  Trend labs  Continue medical management and nephrologic consultation as needed.    Gross hematuria  Dark discolored urine.  Appearing lysed.  Serial urine collection in progress--interval clearing with arnoldo urine.      Plan:  Trend labs  Resume Eliquis tomorrow.  Monitor voiding  Serial urine collection  Hydration  Flomax and Proscar  Tang for evidence of clot retention.  Not indicated at this interval.  Follow-up outpatient cystoscopic examination.  Discharge at the discretion of the internal medicine service.    Urology service signing off.  Service will contact patient/caregiver with hospital follow-up appointment date and time once discharged.    Subjective /interval history  Devin Chaves  Is a 79-year-old male with recent diagnosis of CRC, seen in urologic consultation to evaluate for gross hematuria.  Patient was voiding very dark cola colored urine yesterday.  This was not accompanied by fever, chills, flank, abdominal or suprapubic pain.  Of note, patient was previously anticoagulated and Eliquis is currently on hold.  He remains afebrile and hemodynamically stable.  Serial urine collection performed by bedside nursing--refer to photo.  There is been interval improvement/resolution.    Objective :  Temp:  [97 °F (36.1  °C)-97.7 °F (36.5 °C)] 97.4 °F (36.3 °C)  HR:  [63-74] 63  BP: (105-131)/(43-57) 116/56  Resp:  [16-18] 18  SpO2:  [96 %-100 %] 99 %    I/O         02/28 0701  03/01 0700 03/01 0701  03/02 0700 03/02 0701 03/03 0700    Blood 1050 258.3 500    IV Piggyback 50      Total Intake(mL/kg) 1100 (12.1) 258.3 (2.8) 500 (5.5)    Urine (mL/kg/hr) 525 (0.2) 875 (0.4)     Total Output 525 875     Net +575 -616.7 +500                 Lines/Drains/Airways       Active Status       Name Placement date Placement time Site Days    PICC Line 02/26/25 Right Brachial 02/26/25  0548  Brachial  4                  Physical Exam  Constitutional:       Appearance: Normal appearance.   HENT:      Head: Normocephalic and atraumatic.   Cardiovascular:      Rate and Rhythm: Normal rate and regular rhythm.   Pulmonary:      Effort: Pulmonary effort is normal.      Breath sounds: Normal breath sounds.   Abdominal:      General: Bowel sounds are normal.      Palpations: Abdomen is soft.   Musculoskeletal:         General: Normal range of motion.      Cervical back: Normal range of motion and neck supple.   Skin:     General: Skin is warm and dry.   Neurological:      Mental Status: He is alert and oriented to person, place, and time.           Lab Results: I have reviewed the following results:  Recent Labs     02/28/25  0436 02/28/25  0642 02/28/25  1223 02/28/25  1327 02/28/25  1825 02/28/25  2241 03/01/25  0603 03/01/25  1535 03/02/25  1013   WBC 4.50  --   --   --   --   --  4.59  --  5.40   HGB 4.6*  --    < >  --    < > 7.4* 6.8*   < > 8.4*   HCT 15.2*  --   --   --    < > 22.5* 21.0*   < > 25.8*     --   --   --   --   --  159  --  159   SODIUM 139  --   --   --   --  139 140  --   --    K 4.0  --   --   --   --  3.8 3.7  --   --      --   --   --   --  109* 110*  --   --    CO2 26  --   --   --   --  25 25  --   --    BUN 63*  --   --   --   --  60* 58*  --   --    CREATININE 2.43*  --   --   --   --  2.11* 2.00*  --   --     GLUC 80  --   --   --   --  78 77  --   --    MG 2.1  --   --   --   --   --   --   --   --    AST 16  --   --   --   --  15  --   --   --    ALT <3*  --   --   --   --  <3*  --   --   --    ALB 2.0*  --   --   --   --  1.8*  --   --   --    TBILI 0.31  --   --   --   --  0.47  --   --   --    ALKPHOS 43  --   --   --   --  41  --   --   --    PTT  --   --   --  66*  --   --   --   --   --    INR  --   --   --  1.91*  --   --   --   --   --    HSTNI0 134*  --   --   --   --   --   --   --   --    HSTNI2  --  142*  --   --   --   --   --   --   --     < > = values in this interval not displayed.       Imaging Results Review: I reviewed radiology reports from this admission including: CT abdomen/pelvis.      CT chest w ct abdomen pelvis w wo contrast [[id number]] Collected: 02/28/25 0637   Order Status: Completed Updated: 02/28/25 0813   Addenda:       ADDENDUM:   Correction to spleen section in the body of report. Hyperattenuation   should be hypoattenuation. Grossly stable posterior splenic subcapsular   hypodensity presumably an infarct unchanged allowing for difference in   contrast timing.     Signed: 02/28/25 0812 by Eric Willis MD   Narrative:     CT CHEST, ABDOMEN AND PELVIS WITH AND WITHOUT IV CONTRAST    INDICATION: eliquis, hgb-Norma from facility, reports black stool, abn chest xray.    COMPARISON: CT chest abdomen pelvis 2/20/2025 and CT chest 2/11/2025, 12/4/2023    TECHNIQUE: Initial CT of the abdomen and pelvis was performed without intravenous contrast. Subsequent dynamic CT evaluation of the chest, abdomen and pelvis was performed after the administration of intravenous contrast was performed. Finally, delayed  dynamic delayed phase postcontrast CT evaluation of the abdomen and pelvis was performed. Multiplanar 2D reformatted images were created from the source data.    This examination, like all CT scans performed in the [facility], was performed utilizing techniques  to minimize radiation dose exposure, including the use of iterative reconstruction and automated exposure control. Radiation dose length  product (DLP) for this visit: 2283 mGy-cm    IV Contrast: 100 mL of iohexol  Enteric Contrast: Not administered.    FINDINGS:    CHEST    LUNGS: COPD. New mild bilateral multi lobar scattered interstitial thickening and groundglass and tree-in-bud opacities. New mild multi lobar smooth septal thickening. Minimal bilateral lower lobe and lingular atelectasis, similar.    5 mm left upper lobe nodule stable since 2/11/2025 and new since 12/4/2023 image 37 series 308.    6 mm right basilar lower lobe nodule, new image 99 series 308.    Mucosal stranding the right mainstem bronchus and bronchus intermedius. Central airways otherwise clear.    PLEURA: Bilateral pleural effusions, small on the left and trace on the right, mildly increased.    HEART/GREAT VESSELS: Heart is not enlarged. Decreased intracardiac blood pool density compatible with anemia. No pericardial effusion.  Aortic, great vessel and coronary artery calcification.  No thoracic aortic aneurysm. Moderate stenosis of the mid  ascending segment of the left subclavian artery.    MEDIASTINUM AND PAWEL: No enlarged lymph nodes. Few small calcified mediastinal and left hilar calcified lymph nodes. Otherwise unremarkable.    CHEST WALL AND LOWER NECK: Stable mild bilateral gynecomastia.    ABDOMEN    RIGHT KIDNEY AND URETER:  No solid renal mass or detectable urothelial mass.  No hydronephrosis or hydroureter.  No urinary tract calculi.  No perinephric collection.    LEFT KIDNEY AND URETER:  No solid renal mass or detectable urothelial mass.  Subcentimeter hypoattenuating renal lesion(s), too small to characterize but statistically likely benign, which do not warrant follow-up (Radiology June 2019).  No hydronephrosis or hydroureter.  No urinary tract calculi.  No perinephric collection.    URINARY BLADDER:  No bladder wall  mass.  No calculi.      LIVER/BILIARY TREE: Unremarkable.    GALLBLADDER: No calcified gallstones. No pericholecystic inflammatory change.    SPLEEN: Stable posterior splenic subcapsular hyperattenuation unchanged allowing for difference in contrast timing. Tiny splenic calcified granulomata.    PANCREAS: Mild to moderate diffuse atrophy. No acute findings.    ADRENAL GLANDS: Unremarkable.    STOMACH AND BOWEL: No vascular contrast extravasation into the stomach or bowel to suggest high-volume bleeding. Colonic diverticulosis and small duodenojejunal junction diverticulum. Mild nodular thickening of the upper cecum/proximal ascending colon  attributed to recently biopsy-proven adenocarcinoma image 74 series 605. No bowel obstruction.    APPENDIX: No findings to suggest appendicitis.    ABDOMINOPELVIC CAVITY: Trace free fluid in the dependent pelvis. No pneumoperitoneum. No lymphadenopathy.    VESSELS: Aortic and mesenteric atherosclerotic disease without occlusion. Mild stenosis of the celiac and superior mesenteric origins with well-perfused distal vessels.    PELVIS    REPRODUCTIVE ORGANS: Unremarkable for patient's age.    ABDOMINAL WALL/INGUINAL REGIONS: Unremarkable.    BONES: No acute fracture or osseous destructive lesion identified. Stable mild to moderate multilevel chronic compression deformities in the thoracic spine most prominent at T7. Old bilateral rib fractures. Degenerative changes of the spine, pubic  symphysis, and multiple joints. Levoconvex lumbar scoliosis.   Impression:       CT chest:    Decreased intracardiac blood pool density compatible with anemia.    New bilateral multi lobar interstitial thickening and tree-in-bud opacities may represent pulmonary vascular congestion or nonspecific inflammatory or infectious process.    Bilateral pleural effusions, left greater than right, mildly enlarged and additional findings suggestive of pulmonary vascular congestion. Correlate with clinical  findings.    5 mm left upper lobe nodule stable since 2/11/2025 and new since December 2023. 6 mm right lower lobe nodule new since 2/11/2025. Noncontrast chest CT follow-up in 3 months is advised.    Additional chronic findings and negatives as above.    CT abdomen and pelvis:    No evidence of high-volume gastrointestinal bleeding.    Stable upper cecal/proximal ascending colonic mass attributed to recently biopsy-proven adenocarcinoma.    Colonic diverticulosis.    No evidence of abdominopelvic metastatic disease.    Additional chronic findings and negatives as above.    The study was marked in EPIC for immediate notification.

## 2025-03-02 NOTE — ASSESSMENT & PLAN NOTE
CT A/P shows bilateral pleural effusions L>R, pulmonary vascular congestion  Decreased breath sounds in bilateral lungs bases noted on exam  Pulmonary congestion likely in setting of severe anemia    Plan:  Repeat imaging if pt's respiratory status worsens  Continue oxygen to keep oxygen saturations 88% and above.

## 2025-03-02 NOTE — DISCHARGE SUMMARY
"Discharge Summary - Hospitalist   Name: Devin Chaves 79 y.o. male I MRN: [id number]  Unit/Bed#: S -01 I Date of Admission: 2/28/2025   Date of Service: 3/2/2025 I Hospital Day: 2   { ?Quick Links I Problem List I PORCH I Billing Tip:41330}  Assessment & Plan  Symptomatic anemia  Recent Labs     03/02/25  0121 03/02/25  1013 03/02/25  1204   HGB 7.0* 8.4* 8.4*      Patient presents with 2 days of chest pain, shortness of breath, fatigue and melena  Patient had recent hospitalization 2/11/2025 - 2/22/2025 for COPD exacerbation complicated by MSSA bacteremia.  Patient anemic on hospitalization requiring 2 units of packed RBCs.  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  Patient was due to follow-up with outpatient colorectal surgery on 2/28/2025  Patient's Eliquis was restarted during his last hospital discharge. Patient has not taken Eliquis for the past 1-2 days due to melena.  S/p 3 units packed rbc in ED. Pt is hemodynamically stable. Dark/black urine noted in canister at bedside.  S/p 1 unit PRBCs on 3/2    Plan:  H&H Q6 hrs  Transfuse for Hgb <7  PPI gtt  Isolyte 75 cc/hr  Colorectal surgery consulted.  As per colorectal surgeon, in the absence of emergency indication, he is not recommending surgery at this time.  Patient should be preoperatively optimized prior to any planned surgical intervention.  Patient then may follow-up with colorectal surgeon in the outpatient setting as previously planned.  Hold Eliquis and aspirin  Monitor on telemetry  Urology consulted for dark black urine, outpatient cystoscopy   Melena  See plan under \"symptomatic anemia\"  With patient having melena, possible upper GI bleeding.  Patient on Protonix drip. Will hold off on GI consult at this time as he was recently seen with EGD and colonoscopy on 2/20/2025, friable colonic mass  No bowel movement, continue to monitor for bleeding   ORIANA (acute kidney injury) (HCC)  Recent " "Labs     02/28/25  2241 03/01/25  0603 03/02/25  1013   CREATININE 2.11* 2.00* 1.88*   EGFR 29 31 33     Estimated Creatinine Clearance: 36.1 mL/min (A) (by C-G formula based on SCr of 1.88 mg/dL (H)).     Cr 2.43 on admission. Baseline 0.9. BUN 63. BUN/Cr ratio >20  Pt having dark/black urine in canister at bedside.  Likely hypovolemic in the setting of acute GI bleed  UA, no signs of infection with innumerable RBCs    Plan:  Isolyte 75 cc/hour  Continue to monitor BMP. Consult nephrology if pt's kidney function does not improve.  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    Patient was due to follow-up with outpatient colorectal surgery on 2/28/2025  Pt presenting with melena, Hgb 4.6. Symptomatic anemia.    Plan:  Colorectal consulted.  No surgical intervention at this time.  Outpatient follow-up with colorectal surgery.  Rest of plan under \"symptomatic anemia\"  MSSA bacteremia  Patient had recent hospitalization for COPD exacerbation complicated by MSSA bacteremia.  Blood cultures S aureus (+). Possible source small laceration from haircut, per chart review.  TTE negative for vegetations  Patient was discharged on cefazolin 2 g IV Q8 hrs until 3/27/2025. PICC line in place. Patient scheduled to follow-up outpatient with ID on 3/3/2025.  Repeat blood cultures showed no growth  Pt denies fevers. No leukocytosis on admission.    Plan:  Continue with cefazolin 2 g IV every 8 hours.  Spoke to the infectious disease doctor about this case.  Consult infectious disease.  Recommend repeat C-spine MRI with and without contrast, postpone until ORIANA resolves  6 weeks treatment through 3/27/2025  COPD (chronic obstructive pulmonary disease) (HCC)  Patient with COPD on 2-4 L supplemental O2 at baseline  Patient currently requiring 2 L NC. No wheezing noted on exam.   Home Albuterol prn, " Xopenex Q8 hrs prn, Mometasone inhaler daily    Plan:  Titrate O2 to maintain SpO2 greater than 88%   Xopenex nebs Q8 hrs prn, Mometasone daily  Albuterol prn  Atrial fibrillation (HCC)  AC: Eliquis 5 mg BID  Rate control: Lopressor 25 mg Q12 hrs    Plan:  Hold off Eliquis in the setting of GI bleed  Continue Lopressor 25 mg Q12 hrs  Elevated troponin  Lab Results   Component Value Date    HSTNI0 134 (H) 02/28/2025    HSTNI2 142 (H) 02/28/2025    HSTNID2 8 02/28/2025    HSTNI4 138 (H) 02/28/2025    HSTNID4 4 02/28/2025      Patient presents with chest pain and Hgb 4.6.  Continues to report no chest pain.  Initial troponin was elevated, but second and fourth hour delta levels were negative. EKG unremarkable.   Etiology nonischemic myocardial injury in setting of symptomatic anemia versus non-MI troponin elevation secondary to anemia.  Pleural effusion, bilateral  CT A/P shows bilateral pleural effusions L>R, pulmonary vascular congestion  Decreased breath sounds in bilateral lungs bases noted on exam  Pulmonary congestion likely in setting of severe anemia    Plan:  Repeat imaging if pt's respiratory status worsens  Continue oxygen to keep oxygen saturations 88% and above.  HTN (hypertension)  Systolic BP 100s-130s.  Patient normotensive.  Blood pressure stable.    Plan:  Lopressor 25 mg every 12 hours  CAD (coronary artery disease)  TTE 2/12/25: EF 55%, Normal systolic dysfunction, G1DD, No vegetation, No mural thrombus, moderate aortic sclerosis     Plan:  Hold aspirin in setting of GI bleed  History of CVA (cerebrovascular accident)  Pt has history of CVA  MRI brain: Multiple tiny recent infarcts scattered in multiple vascular distributions that are likely embolic. Several microhemorrhages associated with recent infarcts. No acute space-occupying hematoma. Chronic ischemic changes.  NCCTH 2/17: 3 mm focus of hyperdensity left superior frontal lobe as described unchanged from the prior study may represent a small 3  mm hemorrhage versus focus of mineralization.    Plan:  Hold Eliquis and aspirin in setting of GI bleed  Continue Lipitor 40 mg daily  Urinary retention  Patient has history of urinary retention and was started on Flomax during last hospitalization  Pt denies difficulty urinating. Has dark/black urine on canister at bedside.    Plan:  Flomax 0.4 mg daily  Urinary retention protocol  Neck pain  Patient complained of neck pains.  Patient told me, that he had been having neck pains in the past, however, had worsened since his last admission here when he was found to have an infection in the blood.  MRI of the neck done 2/14/2025 revealed cervical degenerative change with mild canal stenosis and mild to moderate foraminal narrowing.  No cord compression.  Mild nonspecific edema within the right posterior breast dermal musculature of the upper cervicals spine.  Minimal peripheral enhancement is noted.  Differential considerations include infectious/inflammatory myositis with soft tissue abscess.  No discitis or osteomyelitis.  Pain management.  Dysphagia  From the previous problem list, history of esophageal dysphagia.  Today, patient admitted to still having difficulty swallowing and having feeling of food gets stuck on his throat, at times.  He attributed this to his throat being dry at times.  Speech therapy consult, cleared for regular foods  Will hold off on gastroenterology consult as patient recently was seen by GI during his last admission with EGD and colonoscopy done  Abnormal urinalysis  Patient had been having dark/black urine, and persistent.  Recently diagnosed with urinary retention and was started on Flomax.  Urology consult, appreciate recommendations  No immediate indication for any acute intervention  Outpatient cystoscopy  Continue Flomax and Proscar  Monitor voiding and trend labs  Serial urine collection  Hydration  Tang for evidence of clot retention, not indicated at this time  Can restart  "eliquis tomorrow   Gross hematuria  Urology consulted, please see plan and \"abnormal urinalysis\"  currently holding Eliquis, aspirin, iron  Severe protein-calorie malnutrition (HCC)  Malnutrition Findings:   Adult Malnutrition type: Acute illness  Adult Degree of Malnutrition: Other severe protein calorie malnutrition  Malnutrition Characteristics: Inadequate energy, Fluid accumulation  360 Statement: related to inadequate energy/protein intake as evidenced by consuming < 50% of energy intake compared to estimated needs for > 5 days and B/L LE +3 edema. Treated with ONS.  BMI Findings:     Body mass index is 27.21 kg/m².   Ambulatory dysfunction  Worsening ambulatory dysfunction since December  PT/OT evaluations pending     Medical Problems       Resolved Problems  Date Reviewed: 3/1/2025   None       Discharging Physician / Practitioner: Brooke Mendelson, DO  PCP: Park Saxena MD  Admission Date:   Admission Orders (From admission, onward)       Ordered        02/28/25 1045  INPATIENT ADMISSION  Once                          Discharge Date: 03/02/25    Consultations During Hospital Stay:  Oncology hematology  Colorectal surgery  Infectious disease  Urology    Procedures Performed:   None    Significant Findings / Test Results:   2/20/2025 x-ray chest, cardiomegaly with central congestion and left greater than right small basilar effusions  2/20/2025 CT chest with abdomen, pelvis with and without contrast  CT chest:     Decreased intracardiac blood pool density compatible with anemia.     New bilateral multi lobar interstitial thickening and tree-in-bud opacities may represent pulmonary vascular congestion or nonspecific inflammatory or infectious process.     Bilateral pleural effusions, left greater than right, mildly enlarged and additional findings suggestive of pulmonary vascular congestion. Correlate with clinical findings.     5 mm left upper lobe nodule stable since 2/11/2025 and new since December " "2023. 6 mm right lower lobe nodule new since 2/11/2025. Noncontrast chest CT follow-up in 3 months is advised.     Additional chronic findings and negatives as above.     CT abdomen and pelvis:     No evidence of high-volume gastrointestinal bleeding.     Stable upper cecal/proximal ascending colonic mass attributed to recently biopsy-proven adenocarcinoma.     Colonic diverticulosis.     No evidence of abdominopelvic metastatic disease.     3/1/2025 x-ray chest PICC line right PICC line in upper SVC with moderate pulmonary venous congestion with small pleural effusions and bibasilar atelectasis  2/20/2025 EKG normal sinus rhythm, QTc 477    Incidental Findings:   ***   {SLIM Discharge Incidential Findings:65076}    Test Results Pending at Discharge (will require follow up):   ***     Outpatient Tests Requested:  ***    Complications: None    Reason for Admission: Symptomatic anemia    Hospital Course:   Devin Chaves is a 79 y.o. male with a PMH of A-fib on Eliquis, COPD home O2 2-4 L, HTN, CVA, MSSA bacteremia, colonic adenocarcinoma who presents from [facility] with symptomatic anemia.     Per H&P on 2/28/2025: \"Patient was recently hospitalized for COPD exacerbation complicated by MSSA bacteremia 2/11/2025-2/22/2025. During his hospitalization pt had anemia that required two units packed rbc's. He was found to have an ileocecal colonic mass 2.5 cm by EGD/colonoscopy during hosptialization. Pathology revealed adencocarcinoma. He was re-started on Eliquis on hospital discharge 2/22/2025. Pt's Eliquis had been stopped 1-2 days ago by his outpatient doctor after he began experiencing fatigue, SOB, melena, and black urine. This morning patient's shortness of breath worsened and he was coughing with white phlegm and wheezing. He denied stomach pain, appetite change, weight loss, fever/chills, nausea, or emesis.  Patient's Hgb was 4 on his outpatient lab and he was instructed to come to the ED.   " "  In the ED patient presented with softer blood pressure 105/51.  Otherwise hemodynamically stable.  Patient's hemoglobin 4.6.  Patient had ORIANA on admission, Cr 2.43. Troponins elevated (134->142->138). EKG unremarkable. CT C/A/P showed no evidence of high-volume GI bleed.  New bilateral multilobar interstitial thickening and pulmonary vascular congestion noted. Patient was provided 3 units of packed rbc's in the ED.\"      Please see above list of diagnoses and related plan for additional information.     Condition at Discharge: stable    Discharge Day Visit / Exam:   {SL IP SLIM DISCHARGE EXAM OPTIONS:27187}    Discussion with Family: {Family Communication:26531}    Discharge instructions/Information to patient and family:   See after visit summary for information provided to patient and family.      Provisions for Follow-Up Care:  See after visit summary for information related to follow-up care and any pertinent home health orders.      Mobility at time of Discharge:   Basic Mobility Inpatient Raw Score: 11  -[facility] Goal: 4: Move to chair/commode  -[facility] Achieved: 2: Bed activities/Dependent transfer  {Mobility:95094}     Disposition:   {Disposition on Discharge:59584}    Planned Readmission: No    Discharge Medications:  See after visit summary for reconciled discharge medications provided to patient and/or family.      Administrative Statements   Discharge Statement:  I have spent a total time of 60 minutes in caring for this patient on the day of the visit/encounter. >30 minutes of time was spent on: Diagnostic results, Prognosis, Risks and benefits of tx options, Instructions for management, Patient and family education, Importance of tx compliance, Risk factor reductions, Impressions, Counseling / Coordination of care, Documenting in the medical record, Reviewing / ordering tests, medicine, procedures  , and Communicating with other healthcare professionals .    **Please Note: This note may have been " constructed using a voice recognition system**    Brooke Mendelson, DO   PGY-1 Psychiatry Resident

## 2025-03-02 NOTE — ASSESSMENT & PLAN NOTE
Malnutrition Findings:   Adult Malnutrition type: Acute illness  Adult Degree of Malnutrition: Other severe protein calorie malnutrition  Malnutrition Characteristics: Inadequate energy, Fluid accumulation  360 Statement: related to inadequate energy/protein intake as evidenced by consuming < 50% of energy intake compared to estimated needs for > 5 days and B/L LE +3 edema. Treated with ONS.  BMI Findings:     Body mass index is 27.21 kg/m².

## 2025-03-02 NOTE — PLAN OF CARE
Rec regular diet, pt  to choose softer foods. Refused dysphagia 3 diet when offered.   Meds as tolerated, consider larger ones in applesauce.    ITCH/Three red, swollen, warm lesions

## 2025-03-02 NOTE — ASSESSMENT & PLAN NOTE
Recent Labs     02/28/25  2241 03/01/25  0603 03/02/25  1013   CREATININE 2.11* 2.00* 1.88*   EGFR 29 31 33     Estimated Creatinine Clearance: 36.1 mL/min (A) (by C-G formula based on SCr of 1.88 mg/dL (H)).     Cr 2.43 on admission. Baseline 0.9. BUN 63. BUN/Cr ratio >20  Pt having dark/black urine in canister at bedside.  Likely hypovolemic in the setting of acute GI bleed  UA, no signs of infection with innumerable RBCs    Plan:  Isolyte 75 cc/hour  Continue to monitor BMP. Consult nephrology if pt's kidney function does not improve.  Avoid nephrotoxic medications  Monitor I/O's  Urinary retention protocol

## 2025-03-02 NOTE — ASSESSMENT & PLAN NOTE
Patient had recent hospitalization for COPD exacerbation complicated by MSSA bacteremia.  Blood cultures S aureus (+). Possible source small laceration from haircut, per chart review.  TTE negative for vegetations  Patient was discharged on cefazolin 2 g IV Q8 hrs until 3/27/2025. PICC line in place. Patient scheduled to follow-up outpatient with ID on 3/3/2025.  Repeat blood cultures showed no growth  Pt denies fevers. No leukocytosis on admission.    Plan:  Continue with cefazolin 2 g IV every 8 hours.  Spoke to the infectious disease doctor about this case.  Consult infectious disease.  Recommend repeat C-spine MRI with and without contrast, postpone until ORIANA resolves  6 weeks treatment through 3/27/2025

## 2025-03-02 NOTE — ASSESSMENT & PLAN NOTE
Lab Results   Component Value Date    HSTNI0 134 (H) 02/28/2025    HSTNI2 142 (H) 02/28/2025    HSTNID2 8 02/28/2025    HSTNI4 138 (H) 02/28/2025    HSTNID4 4 02/28/2025      Patient presents with chest pain and Hgb 4.6.  Continues to report no chest pain.  Initial troponin was elevated, but second and fourth hour delta levels were negative. EKG unremarkable.   Etiology nonischemic myocardial injury in setting of symptomatic anemia versus non-MI troponin elevation secondary to anemia.

## 2025-03-02 NOTE — ASSESSMENT & PLAN NOTE
From the previous problem list, history of esophageal dysphagia.  Today, patient admitted to still having difficulty swallowing and having feeling of food gets stuck on his throat, at times.  He attributed this to his throat being dry at times.  Speech therapy consult, cleared for regular foods  Will hold off on gastroenterology consult as patient recently was seen by GI during his last admission with EGD and colonoscopy done

## 2025-03-02 NOTE — ASSESSMENT & PLAN NOTE
"See plan under \"symptomatic anemia\"  With patient having melena, possible upper GI bleeding.  Patient on Protonix drip. Will hold off on GI consult at this time as he was recently seen with EGD and colonoscopy on 2/20/2025, friable colonic mass  No bowel movement, continue to monitor for bleeding   "

## 2025-03-02 NOTE — UTILIZATION REVIEW
Initial Clinical Review    Admission: Date/Time/Statement:   Admission Orders (From admission, onward)       Ordered        02/28/25 1045  INPATIENT ADMISSION  Once                          Orders Placed This Encounter   Procedures    INPATIENT ADMISSION     Standing Status:   Standing     Number of Occurrences:   1     Level of Care:   Level 2 Stepdown / HOT [14]     Estimated length of stay:   More than 2 Midnights     Certification:   I certify that inpatient services are medically necessary for this patient for a duration of greater than two midnights. See H&P and MD Progress Notes for additional information about the patient's course of treatment.     ED Arrival Information       Expected   -    Arrival   2/28/2025 03:51    Acuity   Urgent              Means of arrival   Ambulance    Escorted by   [facility]    Service   Hospitalist    Admission type   Emergency              Arrival complaint   EMS             Chief Complaint   Patient presents with    Abnormal Lab     Patient arrives via EMS with low hemoglobin. Patient complains of SOB and chest pain upon arrival. Hx of anemia and current hemoglobin of 4.        Initial Presentation: 79 y.o. male  to ED via EMS from nursing facility.    Admitted to inpatient with Dx: Acute blood loss anemia due to ascending colonic mass exacerbated by Eliquis/Elevated troponin/ORIANA/Chronic hypoxic respiratory failure secondary to COPD/PAF.  Patient had recent hospitalization for COPD exacerbation complicated by MSSA bacteremia.  Blood cultures S aureus (+). Possible source small laceration from haircut, on cefazolin 2 g IV Q8 hrs until 3/27/2025. PICC line in place.    Presented to ED with hgb of 4,  has felt not well last couple of days prior to arrival with chest pain, shortness of breath and dark stools.  The day prior to arrival started with diffuse abdominal tenderness.  PMHx:  A-fib on Eliquis, COPD home O2 2-4 L, HTN, CVA, MSSA bacteremia, colonic  "adenocarcinoma . On exam: abdominal tenderness. BLE edema.    H&H 4.6/15.2.  Hs tnl 134>142/8.    Bun 63. Creatinine 2.43 with baseline of 0.9.  CT C/A/P showed no evidence of high-volume GI bleed.  New bilateral multilobar interstitial thickening and pulmonary vascular congestion    ED treatment: transfused 3 units of PRBC.    Plan includes to admit to level 2 Stepdown/HOT.   H&H every 6 hours, transfuse hgb < 7.  PPI gtt.  IVF.  Consult colo rectal surgery.  Hold eliquis and asa.   Telemetry.  Trend BMP.    Renally adjust IV cefazolin due to ORIANA.  OP ID follow up.   Titrate O2 to maintain SpO2 greater than 88%. Xopenex nebs Q8 hrs prn, Mometasone daily.  Continue home Lopressor, Flomax.  Consult oncology/Heme, and colo rectal    Anticipated Length of Stay/Certification Statement: Patient will be admitted on an inpatient basis with an anticipated length of stay of greater than 2 midnights secondary to symptomatic anemia.     2/28/25 per heme - ct with mass in ascending colon, patient admits to melena.  Colonoscopy showed adenocarcinoma of colon on 2/20/25.  Biopsy possible MSI high.  ECOG score of 3.  No evidence of distant metastasis.   Recommend [place] rectal eval for right hemicolectomy    2/28/25 per [place] rectal:  \"Recently he was in the hospital for septic emboli and bacteremia, he was noted to be anemic and underwent workup for this. This showed a small right-sided colonic mass. Biopsies are consistent with adenocarcinoma. He was recommended for outpatient follow-up.\"  Re presents with worsening anemia,  no signs high volume bleed.  At this point no colectomy at this time.  \"He should be preoperatively optimized prior to any planned surgical intervention. This would include potential iron supplementation for improvement in anemia, physical therapy given his mobility status is limited, surgical nutritional preoperative position\"  then OP follow up.     Date: 3/1/25    Day 2: Complaints of dark stool and dark " urine.  Neck pains,  has chronic neck pain but worse since hospitalization about 2 weeks ago.   Feels food getting stuck in throat due to dryness.  Occasional shortness of breath and lightheaded.  On exam appears ill.  Diminished breath sounds.  Telemetry sinus.   Hgb 7.3. creatinine 2.43.   continue to trend H&H every 6 hours.  PPI gtt.  IVF.  Eliquis and asa on hold.  Telemetry.  Trend BMP.  Pain management.  Consult GI and speech. Consult Urology for dark urine.   As per colorectal surgeon, in the absence of emergency indication, he is not recommending surgery at this time. Patient should be preoperatively optimized prior to any planned surgical intervention. Patient then may follow-up with colorectal surgeon in the outpatient setting as previously planned.   Patient has crossed 3 midnights and requires ongoing care.    3/1/25 per ID - Patient had MSSA bacteremia during recent hospitalization. Source is unclear but likely cutaneous. His bacteremia cleared rapidly on IV antibiotic. TTE did not show any vegetation. Given possible C-spine infection, plan was for long-term IV antibiotic. Continue high-dose IV cefazolin.  Treat x 6 weeks from clearance of bacteremia as previously planned, through 3/27.    3/1/25 per urology significant anemia/hematuria, doubt cause of anemia.  Recommend OP cystoscopy      3/2/2025 .  Patient presents with concern of being more and more bed bound and less movement in legs in last couple of months.  Ongoing neck pain.  No BM overnight.  Intake poor.  Urine less dark than yesterday.  Feels cold all the time.      On exam :  appears ill.  Wearing gloves on hands.  Diminished breath sounds, on oxygen 3 liters at baseline.  Abdominal tenderness.  Petechial rash bilateral feet to ankles.   Lower extremity weakness, unable to left left leg.   Abnormal labs or imaging:  H&H 7/21.7.> 8.4/26.6.  bun 48. Creatinine 1.88  Diagnosis/Plan    Symptomatic anemia/Melena/ORIANA/Colonic Mass/recent MSSA  bacteremia/elevated troponin non MI/neck pain/Dysphagia.    Overnight transfused unit of PRBC for decreased MAP.   Continue to trend H&H.  PPI dtt.  IVF.  Hold eliquis and asa.  Telemetry.   Continue Ancef.   Pain management.  GI consult cancelled.     ED Treatment-Medication Administration from 02/28/2025 0351 to 02/28/2025 1729         Date/Time Order Dose Route Action     02/28/2025 1135 pantoprazole (PROTONIX) 80 mg in sodium chloride 0.9 % 100 mL infusion 8 mg/hr Intravenous New Bag     02/28/2025 1329 multi-electrolyte (PLASMALYTE-A/ISOLYTE-S PH 7.4) IV solution 75 mL/hr Intravenous New Bag     02/28/2025 1200 apixaban (ELIQUIS) tablet 5 mg -- Oral Held Dose     02/28/2025 1315 ceFAZolin (ANCEF) IVPB (premix in dextrose) 2,000 mg 50 mL 2,000 mg Intravenous New Bag     02/28/2025 1200 aspirin (ECOTRIN LOW STRENGTH) EC tablet 81 mg -- Oral Held Dose     02/28/2025 1314 metoprolol tartrate (LOPRESSOR) tablet 25 mg 25 mg Oral Given     02/28/2025 1608 fluticasone (ARNUITY ELLIPTA) 100 MCG/ACT inhaler 1 puff 1 puff Inhalation Given     02/28/2025 1315 folic acid (FOLVITE) tablet 1 mg 1 mg Oral Given     02/28/2025 1315 ascorbic acid (VITAMIN C) tablet 250 mg 250 mg Oral Given     02/28/2025 1314 Cholecalciferol (VITAMIN D3) tablet 2,000 Units 2,000 Units Oral Given     02/28/2025 1608 tamsulosin (FLOMAX) capsule 0.4 mg 0.4 mg Oral Given            Scheduled Medications:  ascorbic acid, 250 mg, Oral, Daily  atorvastatin, 40 mg, Oral, Daily With Dinner  ceFAZolin, 2,000 mg, Intravenous, Q8H  Cholecalciferol, 2,000 Units, Oral, Daily  cyanocobalamin, 100 mcg, Oral, Daily  fluticasone, 1 puff, Inhalation, Daily  folic acid, 1 mg, Oral, Daily  hydroxychloroquine, 400 mg, Oral, Daily With Breakfast  lidocaine, 3 patch, Topical, Daily  metoprolol tartrate, 25 mg, Oral, Q12H GALE  tamsulosin, 0.4 mg, Oral, Daily With Dinner    ceFAZolin (ANCEF) IVPB (premix in dextrose) 2,000 mg 50 mL  Dose: 2,000 mg  Freq: Every 12 hours  Route: IV  Last Dose: 2,000 mg (03/01/25 0118)  Start: 02/28/25 1245 End: 03/01/25 1102    Continuous IV Infusions:  multi-electrolyte, 75 mL/hr, Intravenous, Continuous  pantoprazole (PROTONIX) 80 mg in sodium chloride 0.9 % 100 mL infusion, 8 mg/hr, Intravenous, Continuous    PRN Meds:  acetaminophen, 650 mg, Oral, Q6H PRN  albuterol, 2.5 mg, Nebulization, Q6H PRN  HYDROmorphone, 0.2 mg, Intravenous, Q4H PRN  levalbuterol, 1.25 mg, Nebulization, Q8H PRN  oxyCODONE, 5 mg, Oral, Q6H PRN x 1 3/1/25  oxyCODONE, 2.5 mg, Oral, Q6H PRN      ED Triage Vitals   Temperature Pulse Respirations Blood Pressure SpO2 Pain Score   02/28/25 0403 02/28/25 0358 02/28/25 0358 02/28/25 0358 02/28/25 0358 02/28/25 0358   98 °F (36.7 °C) 62 18 105/51 93 % 8     Weight (last 2 days)       Date/Time Weight    03/01/25 0600 91 (200.62)    02/28/25 0855 91.2 (201.06)    02/28/25 0358 91.2 (201.06)            Vital Signs (last 3 days)       Date/Time Temp Pulse Resp BP MAP (mmHg) SpO2 Calculated FIO2 (%) - Nasal Cannula Nasal Cannula O2 Flow Rate (L/min) O2 Device Patient Position - Orthostatic VS [place] Coma Scale Score Pain    03/02/25 14:26:29 98.5 °F (36.9 °C) 72 -- 121/55 77 100 % -- -- -- -- -- --    03/02/25 1333 97.9 °F (36.6 °C) -- -- -- -- -- -- -- -- -- -- --    03/02/25 10:11:21 97.4 °F (36.3 °C) 63 18 116/56 76 99 % -- -- -- -- -- --    03/02/25 1011 97.4 °F (36.3 °C) 67 18 116/56 -- -- -- -- -- -- -- --    03/02/25 0800 -- -- -- -- -- -- -- -- -- -- 15 No Pain    03/02/25 07:33:03 97.6 °F (36.4 °C) 68 -- 118/57 77 99 % -- -- -- -- -- --    03/02/25 07:05:08 97.6 °F (36.4 °C) 63 16 120/55 77 99 % -- -- -- -- -- --    03/02/25 0651 -- -- 16 -- -- -- -- -- -- -- -- --    03/02/25 06:50:21 97.3 °F (36.3 °C) 67 -- 131/57 82 97 % -- -- -- -- -- --    03/02/25 0240 97.5 °F (36.4 °C) 71 17 113/52 72 99 % -- -- -- -- -- --    03/01/25 21:54:07 97.7 °F (36.5 °C) 73 -- 108/48 68 100 % -- -- -- -- -- 5    03/01/25 21:53:32 -- 72 -- 108/48  68 100 % -- -- -- -- -- --    03/01/25 2000 -- -- -- -- -- -- -- -- -- -- 15 --    03/01/25 18:56:12 97.7 °F (36.5 °C) 74 -- 110/51 71 98 % -- -- -- -- -- --    03/01/25 18:54:59 97.7 °F (36.5 °C) 74 -- 116/47 70 96 % -- -- -- -- -- --    03/01/25 1817 -- -- -- -- -- -- -- -- -- -- -- 7    03/01/25 16:03:39 97.6 °F (36.4 °C) 65 -- 110/51 71 98 % -- -- -- -- -- --    03/01/25 1300 -- -- -- -- -- -- -- -- -- -- -- No Pain    03/01/25 12:49:55 97.5 °F (36.4 °C) 71 18 120/47 71 99 % -- -- -- Sitting -- --    03/01/25 1200 -- -- -- -- -- -- -- -- -- -- 15 --    03/01/25 11:23:02 97 °F (36.1 °C) 68 18 105/43 64 96 % -- -- -- -- -- --    03/01/25 10:29:14 -- 70 -- 104/40 61 98 % -- -- -- -- -- --    03/01/25 1026 96.7 °F (35.9 °C) 75 18 104/40 -- 92 % 40 5 L/min Nasal cannula -- -- --    03/01/25 1020 96.7 °F (35.9 °C) 66 18 105/44 -- 95 % 40 5 L/min Nasal cannula -- -- --    03/01/25 10:14:15 -- 72 -- 105/44 64 86 % -- -- -- -- -- --    03/01/25 1014 96.8 °F (36 °C) 72 18 105/44 -- -- -- -- -- -- -- --    03/01/25 09:44:19 96.9 °F (36.1 °C) 82 -- 115/47 70 98 % -- -- -- -- -- --    03/01/25 0800 -- -- -- -- -- 100 % 32 3 L/min -- -- 15 5    03/01/25 07:41:23 97.5 °F (36.4 °C) 69 -- 107/45 66 98 % -- -- -- -- -- --    03/01/25 02:29:23 97.7 °F (36.5 °C) 69 -- 112/49 70 100 % -- -- -- -- -- --    02/28/25 22:40:30 98.4 °F (36.9 °C) 65 -- 109/47 68 98 % -- -- -- -- -- --    02/28/25 2000 -- -- -- -- -- -- -- -- -- -- 15 No Pain    02/28/25 19:55:43 -- 70 -- 109/48 68 97 % -- -- -- -- -- --    02/28/25 1955 -- -- -- 109/48 -- -- -- -- -- -- -- --    02/28/25 17:43:39 97.8 °F (36.6 °C) 63 -- 110/59 76 100 % -- -- -- -- -- --    02/28/25 17:43:02 97.8 °F (36.6 °C) -- -- 110/59 76 -- -- -- -- -- -- --    02/28/25 1742 -- -- -- -- -- -- -- -- -- -- 15 No Pain    02/28/25 1545 -- 68 20 121/58 84 97 % 32 3 L/min Nasal cannula Lying -- --    02/28/25 1228 -- 66 20 118/60 86 99 % 28 2 L/min Nasal cannula Lying -- No Pain    02/28/25  1015 97.4 °F (36.3 °C) 67 18 133/63 -- 100 % 28 2 L/min Nasal cannula Lying -- --    02/28/25 0958 98.3 °F (36.8 °C) 64 18 118/57 -- -- 28 2 L/min Nasal cannula -- -- --    02/28/25 0946 97.8 °F (36.6 °C) 67 18 132/59 -- 99 % 28 2 L/min Nasal cannula -- -- --    02/28/25 0933 97.2 °F (36.2 °C) 70 18 127/59 -- 100 % 28 2 L/min Nasal cannula -- -- --    02/28/25 0916 97.2 °F (36.2 °C) 75 18 104/53 -- 99 % 28 2 L/min Nasal cannula -- -- --    02/28/25 0901 98.2 °F (36.8 °C) 76 18 133/59 -- 100 % 28 2 L/min Nasal cannula -- -- --    02/28/25 0900 -- -- -- -- -- -- -- -- -- -- 15 --    02/28/25 0855 97.4 °F (36.3 °C) 71 18 115/58 83 100 % 28 2 L/min Nasal cannula Lying -- No Pain    02/28/25 0851 97.4 °F (36.3 °C) 72 18 115/58 -- 100 % 28 2 L/min Nasal cannula -- -- --    02/28/25 0846 97.4 °F (36.3 °C) 71 18 131/58 -- 100 % 28 2 L/min Nasal cannula -- -- --    02/28/25 0825 98.1 °F (36.7 °C) 73 18 130/63 -- 100 % 28 2 L/min Nasal cannula -- -- --    02/28/25 0809 97.6 °F (36.4 °C) 75 18 122/59 -- 100 % 28 2 L/min Nasal cannula -- -- --    02/28/25 0800 97.4 °F (36.3 °C) 69 18 131/59 -- 100 % 28 2 L/min Nasal cannula -- -- --    02/28/25 0745 97.4 °F (36.3 °C) 73 18 110/56 -- 97 % 28 2 L/min Nasal cannula -- -- --    02/28/25 0735 98 °F (36.7 °C) 71 18 121/57 -- 98 % 28 2 L/min Nasal cannula -- -- --    02/28/25 0730 98 °F (36.7 °C) 73 18 117/57 -- 100 % 28 2 L/min Nasal cannula -- -- --    02/28/25 0726 97.5 °F (36.4 °C) 75 18 120/59 -- 100 % 28 2 L/min Nasal cannula Lying -- --    02/28/25 0659 97.2 °F (36.2 °C) 77 18 122/56 -- 100 % 34 3.5 L/min Nasal cannula -- -- --    02/28/25 0648 -- -- -- -- -- 99 % -- -- -- -- 15 --    02/28/25 0629 97.6 °F (36.4 °C) 74 18 103/51 -- 100 % 34 3.5 L/min Nasal cannula -- -- --    02/28/25 0619 97.7 °F (36.5 °C) 79 18 103/53 -- 100 % 34 3.5 L/min Nasal cannula -- -- --    02/28/25 0609 97.7 °F (36.5 °C) 79 18 -- -- -- -- -- -- -- -- --    02/28/25 0605 -- 84 18 111/53 -- 100 % 34  3.5 L/min Nasal cannula Sitting -- --    02/28/25 0530 -- 75 -- 103/58 76 100 % -- -- -- -- -- --    02/28/25 0515 -- 78 -- -- -- 90 % -- -- -- -- -- --    02/28/25 0433 -- -- -- -- -- -- -- -- Nasal cannula -- 15 --    02/28/25 0407 -- -- -- -- -- 95 % 34 3.5 L/min Nasal cannula -- -- --    02/28/25 0405 -- -- -- -- -- 93 % -- -- -- -- -- --    02/28/25 0403 98 °F (36.7 °C) -- -- -- -- -- -- -- -- -- -- --    02/28/25 0358 -- 62 18 105/51 -- 93 % -- -- None (Room air) Lying -- 8          Pertinent Labs/Diagnostic Test Results:   Radiology:  XR chest PICC line portable   Final Interpretation by Jacqueline Ha MD (03/02 0850)      Right PICC in upper SVC.      Moderate pulmonary venous congestion with small pleural effusions and bibasilar atelectasis.            Workstation performed: [id number]         CT chest w ct abdomen pelvis w wo contrast   Final Interpretation by Eric Willis MD (02/28 0812)   Addendum (preliminary) 1 of 1 by Eric Willis MD (02/28 0812)   ADDENDUM:      Correction to spleen section in the body of report. Hyperattenuation    should be hypoattenuation. Grossly stable posterior splenic subcapsular    hypodensity presumably an infarct unchanged allowing for difference in    contrast timing.            Final      CT chest:      Decreased intracardiac blood pool density compatible with anemia.      New bilateral multi lobar interstitial thickening and tree-in-bud opacities may represent pulmonary vascular congestion or nonspecific inflammatory or infectious process.      Bilateral pleural effusions, left greater than right, mildly enlarged and additional findings suggestive of pulmonary vascular congestion. Correlate with clinical findings.      5 mm left upper lobe nodule stable since 2/11/2025 and new since December 2023. 6 mm right lower lobe nodule new since 2/11/2025. Noncontrast chest CT follow-up in 3 months is advised.      Additional chronic findings  and negatives as above.      CT abdomen and pelvis:      No evidence of high-volume gastrointestinal bleeding.      Stable upper cecal/proximal ascending colonic mass attributed to recently biopsy-proven adenocarcinoma.      Colonic diverticulosis.      No evidence of abdominopelvic metastatic disease.      Additional chronic findings and negatives as above.      The study was marked in EPIC for immediate notification.               Workstation performed: [id number]         XR chest 1 view portable   Final Interpretation by Orion Zapien MD (02/28 0847)   Cardiomegaly with central congestion and left greater than right small basilar effusions.      Workstation performed: [id number]           Cardiology:  ECG 12 lead    by Interface, Ris Results In (02/28 0416)        ECG 12 Lead Documentation Only   Date/Time: 2/28/2025 4:30 AM  Patient location:  ED  Interpretation:     Interpretation: non-specific    Quality:     Tracing quality:  Limited by artifact  Rhythm:     Rhythm: sinus rhythm    Ectopy:     Ectopy: none    QRS:     QRS axis:  Normal    QRS intervals:  Normal  Conduction:     Conduction: normal    ST segments:     ST segments:  Normal  T waves:     T waves: normal    GI:  No orders to display           Results from last 7 days   Lab Units 03/02/25  1204 03/02/25  1013 03/02/25  0121 03/01/25  2019 03/01/25  1535 03/01/25  0603 02/28/25  1223 02/28/25  0436   WBC Thousand/uL  --  5.40  --   --   --  4.59  --  4.50   HEMOGLOBIN g/dL 8.4* 8.4* 7.0* 7.1* 7.2* 6.8*   < > 4.6*   HEMATOCRIT % 26.6* 25.8* 21.7* 21.7* 21.8* 21.0*   < > 15.2*   PLATELETS Thousands/uL  --  159  --   --   --  159  --  201   TOTAL NEUT ABS Thousands/µL  --  3.88  --   --   --   --   --  2.83    < > = values in this interval not displayed.     Results from last 7 days   Lab Units 03/02/25  1013 03/01/25  0603 02/28/25  2241 02/28/25  0436   SODIUM mmol/L 139 140 139 139   POTASSIUM mmol/L 3.9 3.7 3.8 4.0   CHLORIDE mmol/L 109* 110* 109*  108   CO2 mmol/L 27 25 25 26   ANION GAP mmol/L 3* 5 5 5   BUN mg/dL 48* 58* 60* 63*   CREATININE mg/dL 1.88* 2.00* 2.11* 2.43*   EGFR ml/min/1.73sq m 33 31 29 24   CALCIUM mg/dL 7.9* 7.5* 7.3* 7.6*   MAGNESIUM mg/dL 2.1  --   --  2.1     Results from last 7 days   Lab Units 02/28/25  2241 02/28/25  0436   AST U/L 15 16   ALT U/L <3* <3*   ALK PHOS U/L 41 43   TOTAL PROTEIN g/dL 4.7* 5.1*   ALBUMIN g/dL 1.8* 2.0*   TOTAL BILIRUBIN mg/dL 0.47 0.31     Results from last 7 days   Lab Units 03/02/25  1013 03/01/25  0603 02/28/25  2241 02/28/25  0436   GLUCOSE RANDOM mg/dL 92 77 78 80     Results from last 7 days   Lab Units 02/28/25  0824 02/28/25  0642 02/28/25  0436   HS TNI 0HR ng/L  --   --  134*   HS TNI 2HR ng/L  --  142*  --    HSTNI D2 ng/L  --  8  --    HS TNI 4HR ng/L 138*  --   --    HSTNI D4 ng/L 4  --   --      Results from last 7 days   Lab Units 02/28/25  1327   D-DIMER QUANTITATIVE ug/ml FEU 3.41*     Results from last 7 days   Lab Units 03/02/25  1013 02/28/25  1327   PROTIME seconds 17.5* 22.7*   INR  1.36* 1.91*   PTT seconds  --  66*     Results from last 7 days   Lab Units 03/02/25  0640 03/02/25  0547 03/01/25  0547   UNIT PRODUCT CODE  [id number]  [id number]  [id number]   UNIT NUMBER  [id number]  [id number]  [id number]   UNITABO  O O O  O  O   UNITRH  NEG NEG NEG  NEG  NEG   CROSSMATCH  Compatible Compatible Compatible  Compatible  Compatible   UNIT DISPENSE STATUS  Issued Presumed Trans Presumed Trans  Presumed Trans  Presumed Trans   UNIT PRODUCT VOL ml 350 159 350  350  350     Results from last 7 days   Lab Units 02/28/25  0436   LIPASE u/L 24     Results from last 7 days   Lab Units 02/28/25  1541   CLARITY UA  Extra Turbid   COLOR UA  Light Orange   SPEC GRAV UA  1.036*   PH UA  5.5   GLUCOSE UA mg/dl Negative   KETONES UA mg/dl Trace*   BLOOD UA  Large*   PROTEIN UA mg/dl 100 (2+)*   NITRITE UA  Negative   BILIRUBIN UA   Negative   UROBILINOGEN UA (BE) mg/dl <2.0   LEUKOCYTES UA  Trace*   WBC UA /hpf 4-10*   RBC UA /hpf Innumerable*   BACTERIA UA /hpf None Seen   EPITHELIAL CELLS WET PREP /hpf None Seen         Past Medical History:   Diagnosis Date    Atrial fibrillation (HCC)     COPD (chronic obstructive pulmonary disease) (HCC)     Crushing injury of finger, left     Infectious viral hepatitis      Present on Admission:   Symptomatic anemia   MSSA bacteremia   COPD (chronic obstructive pulmonary disease) (HCC)   Atrial fibrillation (HCC)   HTN (hypertension)   Colonic mass   CAD (coronary artery disease)   Urinary retention   Dysphagia   Neck pain   Abnormal urinalysis   Ambulatory dysfunction      Admitting Diagnosis: Anemia [D64.9]  Pulmonary nodule [R91.1]  Elevated troponin [R79.89]  Abnormal laboratory test result [R89.9]  Pleural effusion, bilateral [J90]  ORIANA (acute kidney injury) (HCC) [N17.9]  Colonic mass [K63.89]  Anemia, unspecified [D64.9]  Age/Sex: 79 y.o. male    Network Utilization Review Department  ATTENTION: Please call with any questions or concerns to [phone] and carefully listen to the prompts so that you are directed to the right person. All voicemails are confidential.   For Discharge needs, contact Care Management DC Support Team at [phone] opt. [phone]  Send all requests for admission clinical reviews, approved or denied determinations and any other requests to dedicated fax number below belonging to the campus where the patient is receiving treatment. List of dedicated fax numbers for the Facilities:  FACILITY NAME UR FAX NUMBER   ADMISSION DENIALS (Administrative/Medical Necessity) [phone]   DISCHARGE SUPPORT TEAM (NETWORK) [phone]   PARENT CHILD HEALTH (Maternity/NICU/Pediatrics) [phone]   [facility] [phone]   [facility] [phone]   [facility] [phone]   [facility]  [place] [phone]   [facility] [phone]   [facility] [phone]   [facility] [phone]   [facility] [phone]   [facility] [phone]   On license of [facility] [phone]   [facility] [phone]   [facility] [phone]

## 2025-03-02 NOTE — ASSESSMENT & PLAN NOTE
Dark discolored urine, appears to be lysed blood.  UA positive for innumerable RBCs.  Patient otherwise not infected.  Severe anemia requiring transfusion.  Degree of hematuria is not consistent with lab values.    Plan:  Monitor voiding  Serial urine collection  Trend labs  Tang for retention or if evidence of positive

## 2025-03-02 NOTE — ASSESSMENT & PLAN NOTE
Patient had been having dark/black urine, and persistent.  Recently diagnosed with urinary retention and was started on Flomax.  Urology consult, appreciate recommendations  No immediate indication for any acute intervention  Outpatient cystoscopy  Continue Flomax and Proscar  Monitor voiding and trend labs  Serial urine collection  Hydration  Tang for evidence of clot retention, not indicated at this time  Can restart eliquis tomorrow

## 2025-03-02 NOTE — ASSESSMENT & PLAN NOTE
No overt hemorrhage on CT.    Plan:  Supportive care  Trend labs  Transfuse per internal medicine

## 2025-03-02 NOTE — ASSESSMENT & PLAN NOTE
Nonobstructive etiology.      Plan:  Trend labs  Continue medical management and nephrologic consultation as needed.

## 2025-03-02 NOTE — ASSESSMENT & PLAN NOTE
"Urology consulted, please see plan and \"abnormal urinalysis\"  currently holding Eliquis, aspirin, iron  "

## 2025-03-02 NOTE — ASSESSMENT & PLAN NOTE
Dark discolored urine.  Appearing lysed.  Serial urine collection in progress--interval clearing with arnoldo urine.      Plan:  Trend labs  Resume Eliquis tomorrow.  Monitor voiding  Serial urine collection  Hydration  Flomax and Proscar  Tang for evidence of clot retention.  Not indicated at this interval.  Follow-up outpatient cystoscopic examination.

## 2025-03-02 NOTE — MALNUTRITION/BMI
This medical record reflects one or more clinical indicators suggestive of malnutrition.    Malnutrition Findings:   Adult Malnutrition type: Acute illness  Adult Degree of Malnutrition: Other severe protein calorie malnutrition  Malnutrition Characteristics: Inadequate energy, Fluid accumulation                360 Statement: related to inadequate energy/protein intake as evidenced by consuming < 50% of energy intake compared to estimated needs for > 5 days and B/L LE +3 edema. Treated with ONS.      See Nutrition note dated 3/1/2025 for additional details.  Completed nutrition assessment is viewable in the nutrition documentation.

## 2025-03-02 NOTE — ASSESSMENT & PLAN NOTE
Dark discolored urine.  But without evidence of clot.  It is nonviscous and does not result in urinary retention.  Appears as lysed blood.  No evidence of upper or lower  tract hemorrhage.  Urine is not infected.  Platelet count normal  On Eliquis chronically--Currently on hold        Plan:  Trend labs  Monitor voiding  Serial urine collection  Hydration  Flomax and Proscar  Tang for evidence of clot retention.  Not indicated at this interval.  Outpatient cystoscopic examination.

## 2025-03-02 NOTE — PROGRESS NOTES
"Progress Note - Hospitalist   Name: Devin Chaves 79 y.o. male I MRN: [id number]  Unit/Bed#: S -01 I Date of Admission: 2/28/2025   Date of Service: 3/2/2025 I Hospital Day: 2    Assessment & Plan  Symptomatic anemia  Recent Labs     03/02/25  0121 03/02/25  1013 03/02/25  1204   HGB 7.0* 8.4* 8.4*      Patient presents with 2 days of chest pain, shortness of breath, fatigue and melena  Patient had recent hospitalization 2/11/2025 - 2/22/2025 for COPD exacerbation complicated by MSSA bacteremia.  Patient anemic on hospitalization requiring 2 units of packed RBCs.  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  Patient was due to follow-up with outpatient colorectal surgery on 2/28/2025  Patient's Eliquis was restarted during his last hospital discharge. Patient has not taken Eliquis for the past 1-2 days due to melena.  S/p 3 units packed rbc in ED. Pt is hemodynamically stable. Dark/black urine noted in canister at bedside.  S/p 1 unit PRBCs on 3/2    Plan:  H&H Q6 hrs  Transfuse for Hgb <7  PPI gtt  Isolyte 75 cc/hr  Colorectal surgery consulted.  As per colorectal surgeon, in the absence of emergency indication, he is not recommending surgery at this time.  Patient should be preoperatively optimized prior to any planned surgical intervention.  Patient then may follow-up with colorectal surgeon in the outpatient setting as previously planned.  Hold Eliquis and aspirin  Monitor on telemetry  Urology consulted for dark black urine, outpatient cystoscopy   Melena  See plan under \"symptomatic anemia\"  With patient having melena, possible upper GI bleeding.  Patient on Protonix drip. Will hold off on GI consult at this time as he was recently seen with EGD and colonoscopy on 2/20/2025, friable colonic mass  No bowel movement, continue to monitor for bleeding   ORIANA (acute kidney injury) (HCC)  Recent Labs     02/28/25  2241 03/01/25  0603 03/02/25  1013   CREATININE " "2.11* 2.00* 1.88*   EGFR 29 31 33     Estimated Creatinine Clearance: 36.1 mL/min (A) (by C-G formula based on SCr of 1.88 mg/dL (H)).     Cr 2.43 on admission. Baseline 0.9. BUN 63. BUN/Cr ratio >20  Pt having dark/black urine in canister at bedside.  Likely hypovolemic in the setting of acute GI bleed  UA, no signs of infection with innumerable RBCs    Plan:  Isolyte 75 cc/hour  Continue to monitor BMP. Consult nephrology if pt's kidney function does not improve.  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    Patient was due to follow-up with outpatient colorectal surgery on 2/28/2025  Pt presenting with melena, Hgb 4.6. Symptomatic anemia.    Plan:  Colorectal consulted.  No surgical intervention at this time.  Outpatient follow-up with colorectal surgery.  Rest of plan under \"symptomatic anemia\"  MSSA bacteremia  Patient had recent hospitalization for COPD exacerbation complicated by MSSA bacteremia.  Blood cultures S aureus (+). Possible source small laceration from haircut, per chart review.  TTE negative for vegetations  Patient was discharged on cefazolin 2 g IV Q8 hrs until 3/27/2025. PICC line in place. Patient scheduled to follow-up outpatient with ID on 3/3/2025.  Repeat blood cultures showed no growth  Pt denies fevers. No leukocytosis on admission.    Plan:  Continue with cefazolin 2 g IV every 8 hours.  Spoke to the infectious disease doctor about this case.  Consult infectious disease.  Recommend repeat C-spine MRI with and without contrast, postpone until ORIANA resolves  6 weeks treatment through 3/27/2025  COPD (chronic obstructive pulmonary disease) (HCC)  Patient with COPD on 2-4 L supplemental O2 at baseline  Patient currently requiring 2 L NC. No wheezing noted on exam.   Home Albuterol prn, Xopenex Q8 hrs prn, Mometasone inhaler daily    Plan:  Titrate O2 to " maintain SpO2 greater than 88%   Xopenex nebs Q8 hrs prn, Mometasone daily  Albuterol prn  Atrial fibrillation (HCC)  AC: Eliquis 5 mg BID  Rate control: Lopressor 25 mg Q12 hrs    Plan:  Hold off Eliquis in the setting of GI bleed  Continue Lopressor 25 mg Q12 hrs  Elevated troponin  Lab Results   Component Value Date    HSTNI0 134 (H) 02/28/2025    HSTNI2 142 (H) 02/28/2025    HSTNID2 8 02/28/2025    HSTNI4 138 (H) 02/28/2025    HSTNID4 4 02/28/2025      Patient presents with chest pain and Hgb 4.6.  Continues to report no chest pain.  Initial troponin was elevated, but second and fourth hour delta levels were negative. EKG unremarkable.   Etiology nonischemic myocardial injury in setting of symptomatic anemia versus non-MI troponin elevation secondary to anemia.  Pleural effusion, bilateral  CT A/P shows bilateral pleural effusions L>R, pulmonary vascular congestion  Decreased breath sounds in bilateral lungs bases noted on exam  Pulmonary congestion likely in setting of severe anemia    Plan:  Repeat imaging if pt's respiratory status worsens  Continue oxygen to keep oxygen saturations 88% and above.  HTN (hypertension)  Systolic BP 100s-130s.  Patient normotensive.  Blood pressure stable.    Plan:  Lopressor 25 mg every 12 hours  CAD (coronary artery disease)  TTE 2/12/25: EF 55%, Normal systolic dysfunction, G1DD, No vegetation, No mural thrombus, moderate aortic sclerosis     Plan:  Hold aspirin in setting of GI bleed  History of CVA (cerebrovascular accident)  Pt has history of CVA  MRI brain: Multiple tiny recent infarcts scattered in multiple vascular distributions that are likely embolic. Several microhemorrhages associated with recent infarcts. No acute space-occupying hematoma. Chronic ischemic changes.  NCCTH 2/17: 3 mm focus of hyperdensity left superior frontal lobe as described unchanged from the prior study may represent a small 3 mm hemorrhage versus focus of mineralization.    Plan:  Hold Eliquis  and aspirin in setting of GI bleed  Continue Lipitor 40 mg daily  Urinary retention  Patient has history of urinary retention and was started on Flomax during last hospitalization  Pt denies difficulty urinating. Has dark/black urine on canister at bedside.    Plan:  Flomax 0.4 mg daily  Urinary retention protocol  Neck pain  Patient complained of neck pains.  Patient told me, that he had been having neck pains in the past, however, had worsened since his last admission here when he was found to have an infection in the blood.  MRI of the neck done 2/14/2025 revealed cervical degenerative change with mild canal stenosis and mild to moderate foraminal narrowing.  No cord compression.  Mild nonspecific edema within the right posterior breast dermal musculature of the upper cervicals spine.  Minimal peripheral enhancement is noted.  Differential considerations include infectious/inflammatory myositis with soft tissue abscess.  No discitis or osteomyelitis.  Pain management.  Dysphagia  From the previous problem list, history of esophageal dysphagia.  Today, patient admitted to still having difficulty swallowing and having feeling of food gets stuck on his throat, at times.  He attributed this to his throat being dry at times.  Speech therapy consult, cleared for regular foods  Will hold off on gastroenterology consult as patient recently was seen by GI during his last admission with EGD and colonoscopy done  Abnormal urinalysis  Patient had been having dark/black urine, and persistent.  Recently diagnosed with urinary retention and was started on Flomax.  Urology consult, appreciate recommendations  No immediate indication for any acute intervention  Outpatient cystoscopy  Continue Flomax and Proscar  Monitor voiding and trend labs  Serial urine collection  Hydration  Tang for evidence of clot retention, not indicated at this time  Can restart eliquis tomorrow   Gross hematuria  Urology consulted, please see plan and  "\"abnormal urinalysis\"  currently holding Eliquis, aspirin, iron  Severe protein-calorie malnutrition (HCC)  Malnutrition Findings:   Adult Malnutrition type: Acute illness  Adult Degree of Malnutrition: Other severe protein calorie malnutrition  Malnutrition Characteristics: Inadequate energy, Fluid accumulation  360 Statement: related to inadequate energy/protein intake as evidenced by consuming < 50% of energy intake compared to estimated needs for > 5 days and B/L LE +3 edema. Treated with ONS.  BMI Findings:     Body mass index is 27.21 kg/m².   Ambulatory dysfunction  Worsening ambulatory dysfunction since December  PT/OT evaluations pending    VTE Pharmacologic Prophylaxis: VTE Score: 5 High Risk (Score >/= 5) - Pharmacological DVT Prophylaxis Contraindicated. Sequential Compression Devices Ordered.    Mobility:   Basic Mobility Inpatient Raw Score: 11  JH-HLM Goal: 4: Move to chair/commode  JH-HLM Achieved: 2: Bed activities/Dependent transfer  JH-HLM Goal NOT achieved. Continue with multidisciplinary rounding and encourage appropriate mobility to improve upon JH-HLM goals.    Patient Centered Rounds: I performed bedside rounds with nursing staff today.   Discussions with Specialists or Other Care Team Provider: Dr. Brady    Education and Discussions with Family / Patient: Updated  (wife) at bedside.    Current Length of Stay: 2 day(s)  Current Patient Status: Inpatient   Certification Statement: The patient will continue to require additional inpatient hospital stay due to ongoing management of symptomatic anemia requiring blood transfusions  Discharge Plan: Anticipate discharge in 48-72 hrs to pending PT/OT [place] patient was seen and evaluated at bedside.    Code Status: Level 1 - Full Code    Subjective   Patient was seen and evaluated at bedside.  He reports having multiple different doctors see him and wanting to know what the overall plan is.  Writer explained all of the different doctors " that are consulted for him and why they are and what their recommendations are.  He reports in the last couple months being more and more bedbound and having less movement in his legs.  [facility] [place] [facility] and was able to walk the hallways on his own, now he is unable to get up on his own and is having difficulty raising his left leg.  Prior to arrival patient was at rehab where he states he was in bed for the majority of the time and did not get a lot of physical therapy.  He also reports ongoing neck pain that is worse with movement.  He has had no bowel movement overnight, however he is unaware when he is having bowel movements due to not being able to feel.  He also reports in the last couple days he has barely been able to swallow and has been having more dryness in his nose and throat due to not having humidified air on his oxygen.  He is also not been eating very much while here.  He reports his urine has improved in color since yesterday and has been less dark than previous.  Patient denied senna.  He also reports feeling cold all the time and wears gloves on his hands.  Patient has been passing fluctuance however no bowel movement yet today.    Overnight patient's hemoglobin was 7.0 with decreased maps yesterday, 1 unit of PRBCs transfused.    Objective :  Temp:  [97.3 °F (36.3 °C)-97.7 °F (36.5 °C)] 97.4 °F (36.3 °C)  HR:  [63-74] 63  BP: (108-131)/(47-57) 116/56  Resp:  [16-18] 18  SpO2:  [96 %-100 %] 99 %    Body mass index is 27.21 kg/m².     Input and Output Summary (last 24 hours):     Intake/Output Summary (Last 24 hours) at 3/2/2025 1309  Last data filed at 3/2/2025 1105  Gross per 24 hour   Intake 500 ml   Output 850 ml   Net -350 ml       Physical Exam  Vitals reviewed.   Constitutional:       General: He is not in acute distress.     Appearance: He is ill-appearing. He is not diaphoretic.      Comments: Wearing gloves on his hands due to feeling cold   HENT:      Head:  Normocephalic and atraumatic.      Mouth/Throat:      Pharynx: Oropharynx is clear.   Eyes:      Extraocular Movements: Extraocular movements intact.   Cardiovascular:      Rate and Rhythm: Normal rate and regular rhythm.   Pulmonary:      Effort: Pulmonary effort is normal. No respiratory distress.      Comments: Diminished breath sounds, 3 L nasal cannula at baseline  Abdominal:      General: Bowel sounds are normal. There is no distension.      Tenderness: There is abdominal tenderness (Tenderness to palpation in the left and right lower quadrants). There is no guarding or rebound.   Musculoskeletal:      Right lower leg: No edema.      Left lower leg: No edema.   Skin:     General: Skin is warm and dry.      Comments: Petechial rash bilateral feet to ankles   Neurological:      Mental Status: He is alert.      Comments: Lower extremity weakness, unable to lift left leg, sensation intact bilaterally         Lines/Drains:  Lines/Drains/Airways       Active Status       Name Placement date Placement time Site Days    PICC Line 02/26/25 Right Brachial 02/26/25  0548  Brachial  4                    Central Line:  Goal for removal: N/A - Discharging with PICC for IV ABX/medications               Lab Results: I have reviewed the following results:   Results from last 7 days   Lab Units 03/02/25  1204 03/02/25  1013   WBC Thousand/uL  --  5.40   HEMOGLOBIN g/dL 8.4* 8.4*   HEMATOCRIT % 26.6* 25.8*   PLATELETS Thousands/uL  --  159   SEGS PCT %  --  71   LYMPHO PCT %  --  15   MONO PCT %  --  10   EOS PCT %  --  1     Results from last 7 days   Lab Units 03/02/25  1013 03/01/25  0603 02/28/25  2241   SODIUM mmol/L 139   < > 139   POTASSIUM mmol/L 3.9   < > 3.8   CHLORIDE mmol/L 109*   < > 109*   CO2 mmol/L 27   < > 25   BUN mg/dL 48*   < > 60*   CREATININE mg/dL 1.88*   < > 2.11*   ANION GAP mmol/L 3*   < > 5   CALCIUM mg/dL 7.9*   < > 7.3*   ALBUMIN g/dL  --   --  1.8*   TOTAL BILIRUBIN mg/dL  --   --  0.47   ALK PHOS  U/L  --   --  41   ALT U/L  --   --  <3*   AST U/L  --   --  15   GLUCOSE RANDOM mg/dL 92   < > 78    < > = values in this interval not displayed.     Results from last 7 days   Lab Units 03/02/25  1013   INR  1.36*                   Recent Cultures (last 7 days):               Last 24 Hours Medication List:     Current Facility-Administered Medications:     acetaminophen (TYLENOL) tablet 650 mg, Q6H PRN    albuterol inhalation solution 2.5 mg, Q6H PRN    ascorbic acid (VITAMIN C) tablet 250 mg, Daily    atorvastatin (LIPITOR) tablet 40 mg, Daily With Dinner    ceFAZolin (ANCEF) IVPB (premix in dextrose) 2,000 mg 50 mL, Q8H, Last Rate: 2,000 mg (03/02/25 1010)    Cholecalciferol (VITAMIN D3) tablet 2,000 Units, Daily    cyanocobalamin (VITAMIN B-12) tablet 100 mcg, Daily    fluticasone (ARNUITY ELLIPTA) 100 MCG/ACT inhaler 1 puff, Daily    folic acid (FOLVITE) tablet 1 mg, Daily    HYDROmorphone HCl (DILAUDID) injection 0.2 mg, Q4H PRN    hydroxychloroquine (PLAQUENIL) tablet 400 mg, Daily With Breakfast    levalbuterol (XOPENEX) inhalation solution 1.25 mg, Q8H PRN    lidocaine (LIDODERM) 5 % patch 3 patch, Daily    metoprolol tartrate (LOPRESSOR) tablet 25 mg, Q12H GALE    multi-electrolyte (PLASMALYTE-A/ISOLYTE-S PH 7.4) IV solution, Continuous, Last Rate: 75 mL/hr (03/02/25 0901)    oxyCODONE (ROXICODONE) IR tablet 5 mg, Q6H PRN    oxyCODONE (ROXICODONE) split tablet 2.5 mg, Q6H PRN    pantoprazole (PROTONIX) 80 mg in sodium chloride 0.9 % 100 mL infusion, Continuous, Last Rate: 8 mg/hr (03/02/25 1038)    tamsulosin (FLOMAX) capsule 0.4 mg, Daily With Dinner    Administrative Statements   Today, Patient Was Seen By: Brooke Mendelson, DO      **Please Note: This note may have been constructed using a voice recognition system.**

## 2025-03-02 NOTE — ASSESSMENT & PLAN NOTE
Nonobstructive etiology.      Plan:  Trend labs  Continue medical management and nephrologic consultation as needed.     20-Jul-2023 06:46

## 2025-03-02 NOTE — SPEECH THERAPY NOTE
Speech-Language Pathology Bedside Swallow Evaluation        Patient Name: Devin Chaves    Today's Date: 3/2/2025     Problem List  Principal Problem:    Symptomatic anemia  Active Problems:    HTN (hypertension)    CAD (coronary artery disease)    COPD (chronic obstructive pulmonary disease) (HCC)    History of CVA (cerebrovascular accident)    Atrial fibrillation (HCC)    Urinary retention    MSSA bacteremia    Neck pain    Colonic mass    Melena    ORIANA (acute kidney injury) (HCC)    Elevated troponin    Pleural effusion, bilateral    Dysphagia    Abnormal urinalysis    Gross hematuria    Severe protein-calorie malnutrition (HCC)         Summary    Pt presents with pharyngo-esophageal dysphagia characterized by difficulty w/ solid foods- specifically meat and bread. Offered pt dysphagia diet (softer foods w/ extra gravy), pt refused, would rather have regular and choose the softer foods.  No overt s/s aspiration.      Recommendations:   Diet: regular diet and thin liquids   Meds: whole with liquid -larger ones in applesauce  Frequent Oral care: 2x/day  Other Recommendations/ considerations: alternate food and liquids as needed. Will follow up x 1-2 as needed.        Current Medical Status  Pt is a 79 y.o. male who presented to [facility]  with symptomatic anemia.  Pt with known ascending colon mass presents with a chief complaint of melena. The patient reports that he has had black stool for many days. He has had some mild abdominal discomfort. Also reports shortness of breath.     Past medical history:   Please see H&P for details    Special Studies:  CT-chest/abd/pelvis: 2/28/25 LUNGS: COPD. New mild bilateral multi lobar scattered interstitial thickening and groundglass and tree-in-bud opacities. New mild multi lobar smooth septal thickening. Minimal bilateral lower lobe and lingular atelectasis, similar.     VBS: 2/15/25 Pt presents with mild-moderate oropharyngeal dysphagia characterized by  reduced pharyngeal constriction, tongue base retraction and epiglottic inversion with post swallow retention. No aspiration or penetration was noted today however risk is present. Additionally, he does appear to have a component of esophageal dysphagia- likely retrograde aspiration risk.     Social/Education/Vocational Hx:  Pt lives with family    Swallow Information   Prior speech/swallowing tx: seen feb 2025, pt tolerating a regular diet choosing soft foods   Current Risks for Dysphagia & Aspiration: known history of dysphagia  Current Symptoms/Concerns:  c/o food dysphagia symptoms   Current Diet:  clears     Baseline Diet: regular diet and thin liquids  Takes pills- whole w/ water, c/o difficulty w/ larger pills    Baseline Assessment   Behavior/Cognition: alert  Speech/Language Status: able to participate in conversation and able to follow commands  Patient Positioning: upright in bed     Swallow Mechanism Exam   Facial: symmetrical  Labial: WFL  Lingual: WFL  Velum: unable to visualize  Mandible: adequate ROM  Dentition: edentulous  Vocal quality:clear/adequate   Volitional Cough: strong/productive   Respiratory: NC    Consistencies Assessed and Performance   Consistencies Administered: thin liquids, puree, soft solids, and barium pill whole in applesauce    Oral Stage: pt lily to bite toast, drink liquids from straw w/ good oral control. Mastication appeared WFL w/ edentulous state. Bolus transfer was timely, including swallowing barium tablet in applesauce.     Pharyngeal Stage: swallow initiation appeared timely and complete w/ no overt s/s aspiration. Pt c/o difficulty swallowing bread bolus x2, able to regurgitate x1 and re-swallow. Sips of liquids given to aid w/ bolus clearance. No difficulty swallowing barium tablet in applesauce. Facial grimacing noted when swallowing, pt admitted to some discomfort when swallowing.       Esophageal Concerns:  pt c/o soft/solid bolus (bread) getting stuck, regurgitated  x1, then re-swallowed.       Results Reviewed with: patient, RN, MD, and family   Dysphagia Goals: pt will tolerate regular diet with thin liquids without s/s of aspiration x1-2 sessions       Cheri Strong MA [facility]-SLP  Speech Pathologist  [place] license # [id number]  [place] license # [id number]  Available via Secure Chat

## 2025-03-02 NOTE — ASSESSMENT & PLAN NOTE
Although urine is discolored, patient is voiding volitionally with most recent bladder scan of 16 mL.  He denies stranguria or urinary hesitancy    Plan: Hematuria  Flomax  Monitor voiding

## 2025-03-02 NOTE — ASSESSMENT & PLAN NOTE
Recent Labs     03/02/25  0121 03/02/25  1013 03/02/25  1204   HGB 7.0* 8.4* 8.4*      Patient presents with 2 days of chest pain, shortness of breath, fatigue and melena  Patient had recent hospitalization 2/11/2025 - 2/22/2025 for COPD exacerbation complicated by MSSA bacteremia.  Patient anemic on hospitalization requiring 2 units of packed RBCs.  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  Patient was due to follow-up with outpatient colorectal surgery on 2/28/2025  Patient's Eliquis was restarted during his last hospital discharge. Patient has not taken Eliquis for the past 1-2 days due to melena.  S/p 3 units packed rbc in ED. Pt is hemodynamically stable. Dark/black urine noted in canister at bedside.  S/p 1 unit PRBCs on 3/2    Plan:  H&H Q6 hrs  Transfuse for Hgb <7  PPI gtt  Isolyte 75 cc/hr  Colorectal surgery consulted.  As per colorectal surgeon, in the absence of emergency indication, he is not recommending surgery at this time.  Patient should be preoperatively optimized prior to any planned surgical intervention.  Patient then may follow-up with colorectal surgeon in the outpatient setting as previously planned.  Hold Eliquis and aspirin  Monitor on telemetry  Urology consulted for dark black urine, outpatient cystoscopy

## 2025-03-02 NOTE — QUICK NOTE
Patient's last H&H showed a hemoglobin of 7.0.  Day team wanted transfusion for hemoglobin less than 7.0.     Thought hgb does not warrant pRBCs, day team had also instructed for stat H&H if pt became hypotensive or unstable.    Patient has had soft pressures with MAPs around 65 overnight on 3/1 10 PM to 11:30 AM, on call night team was not notified.      Placing order for 1 unit pRBC at this time.

## 2025-03-03 NOTE — OCCUPATIONAL THERAPY NOTE
Occupational Therapy Evaluation     Patient Name: Devin Chaves  Today's Date: 3/3/2025  Problem List  Principal Problem:    Symptomatic anemia  Active Problems:    HTN (hypertension)    CAD (coronary artery disease)    COPD (chronic obstructive pulmonary disease) (HCC)    History of CVA (cerebrovascular accident)    Atrial fibrillation (HCC)    Urinary retention    MSSA bacteremia    Neck pain    Colonic mass    Ambulatory dysfunction    Melena    ORIANA (acute kidney injury) (HCC)    Elevated troponin    Pleural effusion, bilateral    Dysphagia    Abnormal urinalysis    Gross hematuria    Severe protein-calorie malnutrition (HCC)    Past Medical History  Past Medical History:   Diagnosis Date    Atrial fibrillation (HCC)     COPD (chronic obstructive pulmonary disease) (HCC)     Crushing injury of finger, left     Infectious viral hepatitis      Past Surgical History  Past Surgical History:   Procedure Laterality Date    FL LUMBAR PUNCTURE DIAGNOSTIC  2/10/2022    [place] OPEN TX PHALANGEAL SHAFT FRACTURE PROX/MIDDLE EA Left 2017    Procedure: ORIF LEFT SMALL FINGER FRACTURE;  Surgeon: Blake Badillo MD;  Location: [facility];  Service: Orthopedics     Patient's identity confirmed via 2 patient identifiers (full name and ) at start of session        25 1016   OT Last Visit   OT Visit Date 25   Note Type   Note type Evaluation   Pain Assessment   Pain Assessment Tool FLACC   Pain Location/Orientation Orientation: Left;Other (Comment)  (ankle)   Pain Onset/Description Frequency: Intermittent;Descriptor: Aching   Effect of Pain on Daily Activities limits weight bearing, standing tolerance, performance of ADLs/mobility   Patient's Stated Pain Goal No pain   Hospital Pain Intervention(s) Repositioned;Ambulation/increased activity;Emotional support;Elevated   Pain Rating: FLACC (Rest) - Face 0   Pain Rating: FLACC (Rest) - Legs 0   Pain Rating: FLACC (Rest) - Activity 0   Pain Rating: FLACC (Rest) -  Cry 1   Pain Rating: FLACC (Rest) - Consolability 1   Score: FLACC (Rest) 2   Pain Rating: FLACC (Activity) - Face 1   Pain Rating: FLACC (Activity) - Legs 1   Pain Rating: FLACC (Activity) - Activity 1   Pain Rating: FLACC (Activity) - Cry 1   Pain Rating: FLACC (Activity) - Consolability 1   Score: FLACC (Activity) 5   Restrictions/Precautions   Weight Bearing Precautions Per Order No   LLE Weight Bearing Per Order (S)  Other  (pt questionable historian, reports [facility] deformity however unable to locate documentation. Pt also reports recently weightbearing/noncompliance. Spoke to [facility] re obtaining formal order)   Braces or Orthoses Other (Comment)  (L prevalon boot)   Other Precautions Cognitive;Chair Alarm;Bed Alarm;WBS;Multiple lines;Fall Risk;Pain;O2  (3L O2 NC; IV)   Home Living   Type of Home House   Home Layout Two level;Work area in basement;Performs ADLs on one level;Able to live on main level with bedroom/bathroom;Stairs to enter with rails  (6 ZEINAB)   Bathroom Shower/Tub Tub/shower unit   Bathroom Toilet Standard   Home Equipment Walker;Cane;Hospital bed   Additional Comments Pt currently admitted from [facility] for STR. Baseline home setup listed above.   Prior Function   Level of [place] Independent with ADLs;Independent with functional mobility;Independent with IADLS  (at true baseline; most recently pt reports mostly bedbound, A for ADLs/mobility)   Lives With Spouse   Receives Help From Family;Personal care attendant  (facility staff at STR)   IADLs Independent with driving;Independent with meal prep;Independent with medication management  (at true baseline; total A recently)   Falls in the last 6 months 0  (pt denies)   Vocational Retired   Comments Pt is a highly questionable historian. Reports requiring assist for ADLs but then states that he either has been using a SPC vs w/c for mobility. Inconsistent timeline/PLOF.   Lifestyle   Autonomy Pt  "currently admitted from [facility] for STR. At true baseline, pt lives w/ spouse in a 2 levle house and is fully (I).   Reciprocal Relationships Supportive spouse/family and facility staff at STR   Service to Others Retired   General   Additional Pertinent History Pt admitted from [facility] due to low Hgb of 4. S/p 3 units PRBC. Recent hospitalization 2/11-2/22 for COPD exacerbation. PMH includes: HTN, CAD, COPD, hx of CVA, A-fib, colonic mass, dysphagia, chronic L ankle deformity   Family/Caregiver Present No   Subjective   Subjective \"I need to catch by breath\"   ADL   Where Assessed Chair   Eating Assistance 6  Modified independent   Eating Deficit Setup;Beverage management   Grooming Assistance 5  Supervision/Setup   UB Bathing Assistance 4  Minimal Assistance   LB Bathing Assistance 2  Maximal Assistance   UB Dressing Assistance 4  Minimal Assistance   LB Dressing Assistance 2  Maximal Assistance   LB Dressing Deficit Setup;Steadying;Requires assistive device for steadying;Supervision/safety;Verbal cueing;Increased time to complete;Thread RLE into pants;Thread LLE into pants;Pull up over hips  (sitting in recliner, A to thread over over BLEs pt asking for help prior to attempting himself. A x 2 to stand, min A x 2 to maintain stance, able to assist w/ pulling over hips)   Toileting Assistance  2  Maximal Assistance   Bed Mobility   Supine to Sit 3  Moderate assistance   Additional items Assist x 1;HOB elevated;Bedrails;Increased time required;Verbal cues;LE management   Additional Comments Able to maintain sitting EOB w/ S; SpO2 remains above 97% but PUTNAM observed. OOB to recliner at end of session   Transfers   Sit to Stand 2  Maximal assistance   Additional items Assist x 2;Increased time required;Verbal cues   Stand to Sit 2  Maximal assistance   Additional items Assist x 2;Increased time required;Verbal cues   Additional Comments initially required max A x 2 for attempt at STS from EOB, unsuccessful at maintaining " "despite mod A x 2 and required immediate return to sitting. Additional attempt w/ bed height elevated, progressing to mod A x 2 to RW, mod A x 2 for transition to recliner. Additional STS from recliner w/ mod Ax 2 to don pants, min A x 2 to maintain stance for clothing management   Functional Mobility   Functional Mobility 3  Moderate assistance  (A x 2)   Additional Comments Few steps from EOB>recliner w/ RW and mod A x 2 . Limited by L ankle pain, SOB. Required significant amount of time to recover from activity, SpO2 remains above 97% w/ activity.   Additional items Rolling walker   Balance   Static Sitting Fair +   Dynamic Sitting Fair   Static Standing Zero  (A x 2)   Activity Tolerance   Activity Tolerance Patient limited by fatigue;Patient limited by pain  (SOB/PUTNAM)   Medical Staff Made Aware care coordinated w/ PT Ana, Spoke to RICHY Sparks   Nurse Made Aware yes, RICHIE DONOHUE Assessment   RUE Assessment WFL   LUE Assessment   LUE Assessment WFL   Hand Function   Gross Motor Coordination Functional   Fine Motor Coordination Functional   Sensation   Light Touch No apparent deficits   Vision-Basic Assessment   Current Vision Wears glasses all the time   Cognition   Overall Cognitive Status Impaired   Arousal/Participation Alert;Cooperative   Attention Attends with cues to redirect   Orientation Level Oriented to person;Disoriented to time;Oriented to situation;Oriented to place  (\"I think I'm at [facility] right now\"; \"Reports time is March 28th)   Memory Decreased short term memory;Decreased recall of recent events;Decreased recall of precautions  (poor historian/timeline)   Following Commands Follows one step commands with increased time or repetition   Comments Pleasantly confused. Highly questionable timeline/recent PLOF. Poor insight into deficits/safety. VC for problem solving/safety. Recommend formal cognitive evaluation.   Assessment   Limitation Decreased ADL status;Decreased Safe " judgement during ADL;Decreased cognition;Decreased endurance;Decreased self-care trans;Decreased high-level ADLs  (pain, balance, fxnl mobility, act broderick, fxnl reach, standing broderick, and strength, safety awareness, insight, problem solving, learning new tasks, and initiation, response time)   Prognosis Good   Assessment Pt is a 79 y.o. male seen for OT evaluation s/p admit to [facility] on 2/28/2025 w/Symptomatic anemia. Prior to admission, pt was at [facility] for STR. At true baseline, pt was living with spouse in a 2 level house, (I) with ADLs, (I) with IADLs, no AD, (-) falls, (+) . Personal and environmental factors affecting patient at time of evaluation include current habits and behavioral patterns, limited social support, inaccessible home environment, inaccessible bathroom environment, difficulty completing ADLs, and difficulty completing IADLs. Personal factors supporting patient at time of evaluation include (I) PLOF, supportive spouse and family, and attitude towards recovery. Based upon this evaluation, pt is functioning significantly below baseline. Pt will benefit from continued skilled inpatient OT to maximize safety, level of independence and overall performance in ADLs, functional transfers, functional mobility to return to functional baseline/highest level of function.   Goals   Patient Goals to get better   LTG Time Frame 10-14   Long Term Goal #1 see goals below   Plan   Treatment Interventions ADL retraining;Functional transfer training;Endurance training;Cognitive reorientation;Patient/family training;Equipment evaluation/education;Compensatory technique education;Continued evaluation;Energy conservation;Activityengagement   Goal Expiration Date 03/13/25   OT Treatment Day 0   OT Frequency 3-5x/wk   Discharge Recommendation   Rehab Resource Intensity Level, OT II (Moderate Resource Intensity)   AM-PAC Daily Activity Inpatient   Lower Body Dressing 2   Bathing 2   Toileting 2   Upper  Body Dressing 3   Grooming 3   Eating 4   Daily Activity Raw Score 16   Daily Activity Standardized Score (Calc for Raw Score >=11) 35.96   AM-PAC Applied Cognition Inpatient   Following a Speech/Presentation 2   Understanding Ordinary Conversation 3   Taking Medications 2   Remembering Where Things Are Placed or Put Away 3   Remembering List of 4-5 Errands 2   Taking Care of Complicated Tasks 2   Applied Cognition Raw Score 14   Applied Cognition Standardized Score 32.02   End of Consult   Patient Position at End of Consult Bedside chair;Bed/Chair alarm activated;All needs within reach   Nurse Communication Nurse aware of consult     GOALS:    *Goals established to promote patient goal of to get better:      *Patient will perform grooming tasks sitting at sink with mod (I) in order to increase overall independence w/ self-care     *UB ADL with (I) for inc'd independence with self care    *LB ADL with Min (A) using AE prn for inc'd independence with self care    *Toileting with Min (A) for clothing management and hygiene to increase hygiene/thoroughness in order to reduce caregiver burden    *Pt will demonstrate use of long handled AE during 100% of tx sessions for increased ADL safety and independence following D/C     *Patient will verbalize and demonstrate use of energy conservation/deep breathing techniques and work simplification skills during functional activities with no verbal cues.     *ADL transfers with Mod (A) for inc'd independence with ADLs/purposeful tasks    *Bed mobility- Min (A) for inc'd independence to manage own comfort and initiate EOB & OOB purposeful tasks    *Patient will increase functional mobility to and from [facility] with rolling walker with mod assist to increase independence with toileting    *Patient will increase OOB/sitting tolerance to 2-4 hours per day to increase participation in self-care and leisure tasks with no s/s of exertion.     *Patient will improve functional activity  tolerance to 20 minutes of sustained functional tasks to increase participation in basic self-care and decrease assistance level.     *Patient will increase static/dynamic standing balance to fair/fair- to maximize safety/performance of ADLs/standing purposeful tasks    *Patient will engage in ongoing cognitive assessment to assist with safe discharge planning/recommendations.     *Pt will consistently follow multi step directions during ADL performance w/ % accuracy to max I and safety w/ ADL performance    The patient's raw score on the -PAC Daily Activity Inpatient Short Form is 16. A raw score of less than 19 suggests the patient may benefit from discharge to post-acute rehabilitation services. Please also refer to the recommendation of the Occupational Therapist for safe discharge planning.     Pt seen as a co-eval with PT due to the patient's co-morbidities and clinically unstable presentation indicated by chart review. During session, pt benefited from two skilled therapists due to extensive physical assistance to achieve transitional movements and pt's decreased activity tolerance.      ASAD Nino, OTR/L    [place] License [id number]  [place] License [id number]

## 2025-03-03 NOTE — ASSESSMENT & PLAN NOTE
Patient developed acute neck pain with MSSA bacteremia during recent hospitalization.  CRP was highly elevated.  C-spine MRI showed possible C-spine and paraspinal infection.  Patient has no neurologic deficit.  However, his neck pain has not improved.  Given lack of improvement of neck pain, we should repeat C-spine MRI with contrast when creatinine is improved.  Antibiotic plan as in below.  Monitor neck pain.  Recommend repeat C-spine MRI with and without contrast.  This can be postponed until ORIANA resolves.

## 2025-03-03 NOTE — PROGRESS NOTES
Progress Note - Infectious Disease   Name: Devin Chaves 79 y.o. male I MRN: [id number]  Unit/Bed#: S -01 I Date of Admission: 2/28/2025   Date of Service: 3/2/2025 I Hospital Day: 2    Assessment & Plan  Neck pain  Patient developed acute neck pain with MSSA bacteremia during recent hospitalization.  CRP was highly elevated.  C-spine MRI showed possible C-spine and paraspinal infection.  Patient has no neurologic deficit.  However, his neck pain has not improved.  Given lack of improvement of neck pain, we should repeat C-spine MRI.  Antibiotic plan as in below.  Monitor neck pain.  Recommend repeat C-spine MRI with and without contrast.  This can be postponed until ORIANA resolves.  MSSA bacteremia  Patient had MSSA bacteremia during recent hospitalization.  Source is unclear but likely cutaneous.  His bacteremia cleared rapidly on IV antibiotic.  TTE did not show any vegetation.  Given possible C-spine infection, plan was for long-term IV antibiotic.  Continue high-dose IV cefazolin.  Treat x 6 weeks from clearance of bacteremia as previously planned, through 3/27.  Symptomatic anemia  Patient is clinically improved with PRBC transfusion.  Management per primary service.  Melena  Patient with melena prior to admission.  This is most likely secondary to colonic mass.  Colorectal surgery follow-up.  ORIANA (acute kidney injury) (HCC)  Patient with ORIANA on admission, most likely secondary to hypovolemia from GI bleed.  Creatinine is improving with transfusion and IV fluid.  With improving renal function, continue cefazolin at full dose for now.  Monitor creatinine.  Colonic mass  Patient has recently diagnosed adenocarcinoma of the colon.  He has been followed by colorectal surgery.  No plan for chemotherapy yet.  Colorectal surgery follow-up.    Discussed with patient and his family in detail regarding the above plan.      Antibiotics:  Cefazolin  Last negative blood culture 2/14    Subjective   Patient with  stable posterior neck pain.  No arm or leg weakness  Temperature stays down.  No chills.  He is tolerating antibiotic well.  No nausea, vomiting or diarrhea.    Objective :  Temp:  [97.3 °F (36.3 °C)-98.6 °F (37 °C)] 98.6 °F (37 °C)  HR:  [63-77] 77  BP: (108-131)/(48-57) 121/57  Resp:  [16-18] 18  SpO2:  [97 %-100 %] 99 %    General:  No acute distress  Psychiatric:  Awake and alert  Pulmonary:  Normal respiratory excursion, no rales, no wheezing, without accessory muscle use  Heart: Regular rate rhythm.  No murmur, rubs or gallops.  Abdomen:  Soft, nontender, nondistended, normal bowel sounds  Extremities: Stable mild leg edema  Skin:  No rashes      Lab Results: I have reviewed the following results:  Results from last 7 days   Lab Units 03/02/25  1204 03/02/25  1013 03/02/25  0121 03/01/25  1535 03/01/25  0603 02/28/25  1223 02/28/25  0436   WBC Thousand/uL  --  5.40  --   --  4.59  --  4.50   HEMOGLOBIN g/dL 8.4* 8.4* 7.0*   < > 6.8*   < > 4.6*   PLATELETS Thousands/uL  --  159  --   --  159  --  201    < > = values in this interval not displayed.     Results from last 7 days   Lab Units 03/02/25  1013 03/01/25  0603 02/28/25  2241 02/28/25  0436   SODIUM mmol/L 139 140 139 139   POTASSIUM mmol/L 3.9 3.7 3.8 4.0   CHLORIDE mmol/L 109* 110* 109* 108   CO2 mmol/L 27 25 25 26   BUN mg/dL 48* 58* 60* 63*   CREATININE mg/dL 1.88* 2.00* 2.11* 2.43*   EGFR ml/min/1.73sq m 33 31 29 24   CALCIUM mg/dL 7.9* 7.5* 7.3* 7.6*   AST U/L  --   --  15 16   ALT U/L  --   --  <3* <3*   ALK PHOS U/L  --   --  41 43   ALBUMIN g/dL  --   --  1.8* 2.0*     Results from last 7 days   Lab Units 02/28/25  1752   MRSA CULTURE ONLY  No Methicillin Resistant Staphlyococcus aureus (MRSA) isolated                 Results from last 7 days   Lab Units 02/28/25  1327   D-DIMER QUANTITATIVE ug/ml FEU 3.41*

## 2025-03-03 NOTE — ASSESSMENT & PLAN NOTE
"See plan under \"symptomatic anemia\"  With patient having melena, possible upper GI bleeding.  Patient on Protonix initiated on drip and transition to PO daily. Will hold off on GI consult at this time as he was recently seen with EGD and colonoscopy on 2/20/2025, friable colonic mass seen   No bowel movement, continue to monitor for bleeding   Patient reports bowel movement overnight however no record of it from staff, continue to monitor   "

## 2025-03-03 NOTE — ASSESSMENT & PLAN NOTE
Patient with melena prior to admission.  This is most likely secondary to colonic mass.  Colorectal surgery follow-up.

## 2025-03-03 NOTE — ASSESSMENT & PLAN NOTE
Patient complained of neck pains.  Patient told me, that he had been having neck pains in the past, however, had worsened since his last admission here when he was found to have an infection in the blood.  MRI of the neck done 2/14/2025 revealed cervical degenerative change with mild canal stenosis and mild to moderate foraminal narrowing.  No cord compression.  Mild nonspecific edema within the right posterior breast dermal musculature of the upper cervicals spine.  Minimal peripheral enhancement is noted.  Differential considerations include infectious/inflammatory myositis with soft tissue abscess.  No discitis or osteomyelitis.  Pain management.  Will plan for repeat MRI C-spine with contrast once ORIANA resolves per ID recommendations

## 2025-03-03 NOTE — ASSESSMENT & PLAN NOTE
Patient has recently diagnosed adenocarcinoma of the colon.  He has been followed by colorectal surgery.  No plan for chemotherapy yet.  Colorectal surgery follow-up.

## 2025-03-03 NOTE — ASSESSMENT & PLAN NOTE
Patient had been having dark/black urine, and persistent.  Recently diagnosed with urinary retention and was started on Flomax.  Urology consult, appreciate recommendations  No immediate indication for any acute intervention  Outpatient cystoscopy  Continue Flomax and Proscar  Monitor voiding and trend labs  Serial urine collection  Hydration  Tang for evidence of clot retention, not indicated at this time  Can restart eliquis, held off for now due to dark urine

## 2025-03-03 NOTE — ASSESSMENT & PLAN NOTE
Patient with ORIANA on admission, most likely secondary to hypovolemia from GI bleed.  Creatinine is improving with transfusion and IV fluid.  With improving renal function, continue cefazolin at full dose for now.  Monitor creatinine.

## 2025-03-03 NOTE — ASSESSMENT & PLAN NOTE
From the previous problem list, history of esophageal dysphagia.  Today, patient admitted to still having difficulty swallowing and having feeling of food gets stuck on his throat, at times.  He attributed this to his throat being dry at times.  Speech therapy consult, cleared for regular foods  Will hold off on gastroenterology consult as patient recently was seen by GI during his last admission with EGD and colonoscopy done  Patient continue to report painful swallowing   VBS done on 2/15/25 showing food retention due to pharyngeal weakness  EGD was done last month which showed normal esophagus.     Consult to ENT, appreciate recommendations

## 2025-03-03 NOTE — ASSESSMENT & PLAN NOTE
Malnutrition Findings:   Adult Malnutrition type: Acute illness  Adult Degree of Malnutrition: Other severe protein calorie malnutrition  Malnutrition Characteristics: Inadequate energy, Fluid accumulation  360 Statement: related to inadequate energy/protein intake as evidenced by consuming < 50% of energy intake compared to estimated needs for > 5 days and B/L LE +3 edema. Treated with ONS.  BMI Findings:     Body mass index is 26.61 kg/m².

## 2025-03-03 NOTE — SPEECH THERAPY NOTE
Speech Language/Pathology    Speech/Language Pathology Progress Note    Patient Name: Devin Chaves  Today's Date: 3/3/2025         Subjective:  Pt seen for dysphagia tx follow up at lunch on a regular diet. Pt sitting in chair, family at bedside. Pt stated he has mucous in his throat in the morning which makes it hard to swallow.   Objective:  Pt ate meatloaf, broccoli and sweet potato. Prolonged mastication due to edentulous state. Pt spit out broccoli stalk that he could not chew. Pt also gagged x1 and expectorated mod amt of blood tinged mucous. Pt then continued eating w/o further difficulty. No facial grimacing noted. Pt encouraged to take sips of liquids in between bites. Pt took sips of soda by straw at end of meal- no overt s/s aspiration.     Assessment:   Pt w/ oral dysphagia due to edentulous state but appears to be tolerating regular diet w/ thin liquids w/o overt s/s aspiration.   Suspect mucous related to esophageal dysphagia     Plan/Recommendations:  Cont regular diet w/ thin liquids  Meds as tolerated- pt prefers large pills whole in pudding.  No further follow up tx needed.       Cheri Strong MA CCC-SLP  Speech Pathologist  Available via Secure[employer]t

## 2025-03-03 NOTE — ASSESSMENT & PLAN NOTE
Pt has history of CVA  MRI brain: Multiple tiny recent infarcts scattered in multiple vascular distributions that are likely embolic. Several microhemorrhages associated with recent infarcts. No acute space-occupying hematoma. Chronic ischemic changes.  NCCTH 2/17: 3 mm focus of hyperdensity left superior frontal lobe as described unchanged from the prior study may represent a small 3 mm hemorrhage versus focus of mineralization.    Plan:  Hold Eliquis and aspirin in setting of GI/ bleed  Continue Lipitor 40 mg daily

## 2025-03-03 NOTE — ASSESSMENT & PLAN NOTE
Patient with ORIANA on admission, most likely secondary to hypovolemia from GI bleed.  Creatinine is improving with transfusion and IV fluid.  Cefazolin at full dose for now.  Monitor creatinine.

## 2025-03-03 NOTE — ASSESSMENT & PLAN NOTE
AC: Eliquis 5 mg BID  Rate control: Lopressor 25 mg Q12 hrs    Plan:  Hold off Eliquis in the setting of GI/ bleed  Continue Lopressor 25 mg Q12 hrs

## 2025-03-03 NOTE — ASSESSMENT & PLAN NOTE
Recent Labs     03/02/25  2121 03/03/25  0516 03/03/25  0823   HGB 8.0* 8.1* 8.2*      Patient presents with 2 days of chest pain, shortness of breath, fatigue and melena  Patient had recent hospitalization 2/11/2025 - 2/22/2025 for COPD exacerbation complicated by MSSA bacteremia.  Patient anemic on hospitalization requiring 2 units of packed RBCs.  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  Patient was due to follow-up with outpatient colorectal surgery on 2/28/2025  Patient's Eliquis was restarted during his last hospital discharge. Patient has not taken Eliquis for the past 1-2 days due to melena.  S/p 3 units packed rbc in ED. Pt is hemodynamically stable. Dark/black urine noted in canister at bedside.  S/p 1 unit PRBCs on 3/2    Plan:  H&H Q12 hrs  Transfuse for Hgb <7  PPI daily   Isolyte 75 cc/hr discontinued today d/t fluid overload, 1x dose lasix 40 mg IV  Colorectal surgery consulted.  As per colorectal surgeon, in the absence of emergency indication, he is not recommending surgery at this time.  Patient should be preoperatively optimized prior to any planned surgical intervention.  Patient then may follow-up with colorectal surgeon in the outpatient setting as previously planned.  Will follow up with colorectal in the AM   Hold Eliquis and aspirin for now, urine continues to be dark   Monitor on telemetry  Urology consulted for dark black urine, outpatient cystoscopy, urology signed off

## 2025-03-03 NOTE — CONSULTS
Consultation - [facility]/ENT   Devin Chaves 79 y.o. male MRN: [id number]  Unit/Bed#: S -01 Encounter: [id number]        Assessment: Normal laryngeal exam. No evidence of any acute pathology noted on exam today. Patient reports of difficulty swallowing since childhood, denies of any pain.     Plan:  Recommend adequate PO hydration  Appreciate SLP recs  Recommend outpatient F/U with ENT (Dr. Flynn Reyez) upon discharge  Pain meds as per primary team  ENT will sign off at this time, call with any questions, thank you    Discussed with on call ENT attending.       History of Present Illness   Physician Requesting Consult: Jean Paula*  Reason for Consult / Principal Problem:  HPI: Devin Chaves is a 79 y.o. year old male who presents with symptomatic anemia. The patient complained of long standing history of difficulty swallowing. Speech therapy has seen patient multiple times recommending soft foods however patient would like to continue with a regular diet. VBS was completed on 2/15 which showed food retention due to pharyngeal weakness. EGD was done  which showed normal esophagus. ENT consulted for painful swallowing.    The patient reports that he is having difficulty swallowing since childhood, denies of any pain with swallowing. Also reports that he generally drinks less water on daily basis.      Review of systems:  10 Point ROS was performed and negative except as above or otherwise noted in the medical record.    Historical Information   Past Medical History:   Diagnosis Date    Atrial fibrillation (HCC)     COPD (chronic obstructive pulmonary disease) (HCC)     Crushing injury of finger, left     Infectious viral hepatitis      Past Surgical History:   Procedure Laterality Date    FL LUMBAR PUNCTURE DIAGNOSTIC  2/10/2022    NV OPEN TX PHALANGEAL SHAFT FRACTURE PROX/MIDDLE EA Left 1/25/2017    Procedure: ORIF LEFT SMALL FINGER FRACTURE;  Surgeon: Blake Badillo MD;  Location: [facility];   "Service: Orthopedics     Social History   Social History     Substance and Sexual Activity   Alcohol Use No     Social History     Substance and Sexual Activity   Drug Use No     Social History     Tobacco Use   Smoking Status Former   Smokeless Tobacco Former    Quit date: 4/1/2011     Family History: Family history non-contributory    Meds/Allergies   all current active meds have been reviewed    Allergies   Allergen Reactions    Shellfish-Derived Products - Food Allergy Other (See Comments)     Pt states, \"I reacted, I dont know\"       Objective     Vitals:    03/03/25 1603   BP: 134/58   Pulse: 78   Resp: 19   Temp: 97.8 °F (36.6 °C)   SpO2: 98%       Physical Exam   Constitutional:  Well-developed and well-nourished, no apparent distress, non-toxic appearance. Cooperative, able to hear and answer questions without difficulty.    Voice: Normal voice quality.  Head: Normocephalic, atraumatic.  No scars, masses or lesions.  Face: Symmetric, no edema, no sinus tenderness.  Eyes: Vision grossly intact, extra-ocular movement intact.  Ears: External ears normal.  No post-auricular erythema or tenderness.  Nose: On nasal canula due to COPD. Septum intact, nares clear.  Mucosa moist, turbinates well appearing.  No crusting, polyps or discharge evident.  Oral cavity: Edentulous maxilla and mandible. Mucosa pale and moist, lips without lesions or masses.  Tongue mobile, floor of mouth soft and flat.  Hard palate intact.  No masses or lesions.   Oropharynx: Uvula is midline, soft palate intact without lesion or mass. Oropharyngeal inlet without obstruction. Tonsils unremarkable. Posterior pharyngeal wall clear. No masses or lesions.  Salivary glands:  Parotid glands and submandibular glands symmetric, no enlargement or tenderness.  Neck: Normal laryngeal elevation with swallow.  Trachea midline.  No masses or lesions. No palpable adenopathy.  Thyroid: Without tenderness or palpable nodules.  Neurological: CN II-XII grossly " intact.   Skin: Skin is warm and dry.        PROCEDURE: Flexible Laryngoscopy  Indication: difficulty swallowing  Verbal consent obtained  Surgeon: Santos Marsh MD, DMD   Scope passed through nasal cavity  Nasal cavity: Clear, no masses, exudates or polyps  Nasopharynx: Clear, fossae of Rosenmuller clear bilaterally  Oropharynx: Base of tongue: Clear                       Vallecula: empty                       Epiglottis: no erythema or edema   Hypopharynx/Larynx:            Arytenoids/folds: no erythema or edema                        Interaryntenoid: no erythema or edema                        Postcricoid: clear                       Glottis: Normal vocal fold motion, patent glottic opening, no lesions                       Piriform sinuses: Clear            Other findings: Left sided deviated nasal septum    Scope was removed, the patient tolerated procedure well without any complications.           Intake/Output Summary (Last 24 hours) at 3/3/2025 1611  Last data filed at 3/3/2025 1016  Gross per 24 hour   Intake --   Output 675 ml   Net -675 ml       Invasive Devices       Peripherally Inserted Central Catheter Line  Duration             PICC Line 02/26/25 Right Brachial 5 days              Peripheral Intravenous Line  Duration             Peripheral IV 02/28/25 Distal;Left;Upper;Ventral (anterior) Arm 3 days    Peripheral IV 02/28/25 Left;Ventral (anterior) Forearm 3 days                    Lab Results: CBC:   Lab Results   Component Value Date    WBC 4.69 03/03/2025    HGB 8.2 (L) 03/03/2025    HCT 26.0 (L) 03/03/2025    MCV 89 03/03/2025     03/03/2025    RBC 2.82 (L) 03/03/2025    MCH 28.7 03/03/2025    MCHC 32.1 03/03/2025    RDW 18.3 (H) 03/03/2025    MPV 8.7 (L) 03/03/2025    NRBC 0 03/03/2025   , CMP:   Lab Results   Component Value Date    SODIUM 140 03/03/2025    K 3.6 03/03/2025     (H) 03/03/2025    CO2 26 03/03/2025    BUN 44 (H) 03/03/2025    CREATININE 1.88 (H) 03/03/2025  "   CALCIUM 7.9 (L) 03/03/2025    AST 16 03/03/2025    ALT <3 (L) 03/03/2025    ALKPHOS 47 03/03/2025    EGFR 33 03/03/2025   , Coags: No results found for: \"PT\", \"PTT\", \"INR\"      Imaging Studies:     FL barium swallow video w speech   2/15/2025  Assessment Summary;  Pt presents with mild-moderate oropharyngeal dysphagia characterized by reduced pharyngeal constriction, tongue base retraction and epiglottic inversion with post swallow retention. No aspiration or penetration was noted today however risk is present. Additionally, he does appear to have a component of esophageal dysphagia- likely retrograde aspiration risk. If a dedicated assessment of the esophagus is desired, consider esophagram/barium swallow.         Recommendations;  Recommend mechanically altered/level 2 diet and thin liquids, with upright posture, only feed when fully alert, slow rate of feeding, small bites/sips, and alternating bites and sips.   Recommended Form of Meds:  as tolerated/desired       CTA head and neck w wo contrast   2/15/2025    CT Brain:  - Known small multifocal infarcts in bilateral cerebral hemispheres and right cerebellum were better evaluated on MRI brain dated 2/14/2025, likely embolic. No new CT signs of acute infarction.  - Unchanged chronic lacunar infarct in right basal ganglia with moderate chronic microangiopathy.     CT Angiography:  - Negative CTA head and neck for large vessel occlusion, dissection, aneurysm, or high-grade stenosis.  - Mild atherosclerotic disease of the head and neck, as detailed above.     Multiple pulmonary nodules measuring up to 0.9 cm in left upper lobe, unchanged. Based on current Fleischner Society 2017 Guidelines on incidental pulmonary nodule, follow-up non-contrast CT is recommended at 3-6 months from the initial examination and,   if stable at that time, an additional follow-up is recommended for 18-24 months from the initial examination.     4.1 cm ectasia of ascending thoracic " aorta. Recommend follow-up CT chest in 12 months.       EGD  2/19/2025  IMPRESSION:  The esophagus, stomach and duodenum appeared normal.    RECOMMENDATION:  There is no recommended follow-up for this procedure.

## 2025-03-03 NOTE — ASSESSMENT & PLAN NOTE
----- Message from Chang Skelton MD sent at 7/26/2018 10:08 AM CDT -----  Start iron supplementation and recheck CBC in 3 months   S/p transfusion with 3 units of packed RBC, he was on apixaban as well as aspirin    Now he is off anticoagulation, hemoglobin stable at 8.1    I suggest apixaban 2.5 mg p.o. twice daily or aspirin 81 mg p.o. daily because of coronary artery disease/intermittent A-fib or no anticoagulation at all at the patient has performance status of ECOG 3 and multiple comorbidities

## 2025-03-03 NOTE — PROGRESS NOTES
"Progress Note - Hospitalist   Name: Devin Chaves 79 y.o. male I MRN: [id number]  Unit/Bed#: S -01 I Date of Admission: 2/28/2025   Date of Service: 3/3/2025 I Hospital Day: 3    Assessment & Plan  Symptomatic anemia  Recent Labs     03/02/25  2121 03/03/25  0516 03/03/25  0823   HGB 8.0* 8.1* 8.2*      Patient presents with 2 days of chest pain, shortness of breath, fatigue and melena  Patient had recent hospitalization 2/11/2025 - 2/22/2025 for COPD exacerbation complicated by MSSA bacteremia.  Patient anemic on hospitalization requiring 2 units of packed RBCs.  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  Patient was due to follow-up with outpatient colorectal surgery on 2/28/2025  Patient's Eliquis was restarted during his last hospital discharge. Patient has not taken Eliquis for the past 1-2 days due to melena.  S/p 3 units packed rbc in ED. Pt is hemodynamically stable. Dark/black urine noted in canister at bedside.  S/p 1 unit PRBCs on 3/2    Plan:  H&H Q12 hrs  Transfuse for Hgb <7  PPI daily   Isolyte 75 cc/hr discontinued today d/t fluid overload, 1x dose lasix 40 mg IV  Colorectal surgery consulted.  As per colorectal surgeon, in the absence of emergency indication, he is not recommending surgery at this time.  Patient should be preoperatively optimized prior to any planned surgical intervention.  Patient then may follow-up with colorectal surgeon in the outpatient setting as previously planned.  Will follow up with colorectal in the AM   Hold Eliquis and aspirin for now, urine continues to be dark   Monitor on telemetry  Urology consulted for dark black urine, outpatient cystoscopy, urology signed off  Melena  See plan under \"symptomatic anemia\"  With patient having melena, possible upper GI bleeding.  Patient on Protonix initiated on drip and transition to PO daily. Will hold off on GI consult at this time as he was recently seen with EGD and " "colonoscopy on 2/20/2025, friable colonic mass seen   No bowel movement, continue to monitor for bleeding   Patient reports bowel movement overnight however no record of it from staff, continue to monitor   ORIANA (acute kidney injury) (HCC)  Recent Labs     03/01/25  0603 03/02/25  1013 03/03/25  0516   CREATININE 2.00* 1.88* 1.88*   EGFR 31 33 33     Estimated Creatinine Clearance: 36.1 mL/min (A) (by C-G formula based on SCr of 1.88 mg/dL (H)).     Cr 2.43 on admission. Baseline 0.9. BUN 63. BUN/Cr ratio >20  Pt having dark/black urine in canister at bedside.  Likely hypovolemic in the setting of acute GI bleed  UA, no signs of infection with innumerable RBCs    Plan:  Isolyte 75 cc/hour, discontinued d/t volume overload   Continue to monitor BMP. Consult nephrology if pt's kidney function does not improve.  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    Patient was due to follow-up with outpatient colorectal surgery on 2/28/2025  Pt presenting with melena, Hgb 4.6. Symptomatic anemia.    Plan:  Colorectal consulted.  No surgical intervention at this time.  Outpatient follow-up with colorectal surgery.  Rest of plan under \"symptomatic anemia\"  MSSA bacteremia  Patient had recent hospitalization for COPD exacerbation complicated by MSSA bacteremia.  Blood cultures S aureus (+). Possible source small laceration from haircut, per chart review.  TTE negative for vegetations  Patient was discharged on cefazolin 2 g IV Q8 hrs until 3/27/2025. PICC line in place. Patient rescheduled to follow-up outpatient with ID  Repeat blood cultures showed no growth  Pt denies fevers. No leukocytosis on admission.    Plan:  Continue with cefazolin 2 g IV every 8 hours.  Spoke to the infectious disease.  Consult infectious disease.  Recommend repeat C-spine MRI with and without contrast, postpone until " ORIANA resolves  6 weeks treatment through 3/27/2025  COPD (chronic obstructive pulmonary disease) (HCC)  Patient with COPD on 2-4 L supplemental O2 at baseline  Patient currently requiring 2 L NC. No wheezing noted on exam.   Home Albuterol prn, Xopenex Q8 hrs prn, Mometasone inhaler daily    Plan:  Titrate O2 to maintain SpO2 greater than 88%   Xopenex nebs Q8 hrs prn, Mometasone daily  Albuterol prn  Atrial fibrillation (HCC)  AC: Eliquis 5 mg BID  Rate control: Lopressor 25 mg Q12 hrs    Plan:  Hold off Eliquis in the setting of GI/ bleed  Continue Lopressor 25 mg Q12 hrs  Elevated troponin  Lab Results   Component Value Date    HSTNI0 134 (H) 02/28/2025    HSTNI2 142 (H) 02/28/2025    HSTNID2 8 02/28/2025    HSTNI4 138 (H) 02/28/2025    HSTNID4 4 02/28/2025      Patient presents with chest pain and Hgb 4.6.  Continues to report no chest pain.  Initial troponin was elevated, but second and fourth hour delta levels were negative. EKG unremarkable.   Etiology nonischemic myocardial injury in setting of symptomatic anemia versus non-MI troponin elevation secondary to anemia.  Pleural effusion, bilateral  CT A/P shows bilateral pleural effusions L>R, pulmonary vascular congestion  Decreased breath sounds in bilateral lungs bases noted on exam  Pulmonary congestion likely in setting of severe anemia    Plan:  Repeat imaging if pt's respiratory status worsens  Continue oxygen to keep oxygen saturations 88% and above.  HTN (hypertension)  Systolic BP 100s-130s.  Patient normotensive.  Blood pressure stable.    Plan:  Lopressor 25 mg every 12 hours  CAD (coronary artery disease)  TTE 2/12/25: EF 55%, Normal systolic dysfunction, G1DD, No vegetation, No mural thrombus, moderate aortic sclerosis     Plan:  Hold aspirin in setting of GI bleed  History of CVA (cerebrovascular accident)  Pt has history of CVA  MRI brain: Multiple tiny recent infarcts scattered in multiple vascular distributions that are likely embolic.  Several microhemorrhages associated with recent infarcts. No acute space-occupying hematoma. Chronic ischemic changes.  NCC 2/17: 3 mm focus of hyperdensity left superior frontal lobe as described unchanged from the prior study may represent a small 3 mm hemorrhage versus focus of mineralization.    Plan:  Hold Eliquis and aspirin in setting of GI/ bleed  Continue Lipitor 40 mg daily  Urinary retention  Patient has history of urinary retention and was started on Flomax during last hospitalization  Pt denies difficulty urinating. Has dark/black urine on canister at bedside.    Plan:  Flomax 0.4 mg daily  Urinary retention protocol  Neck pain  Patient complained of neck pains.  Patient told me, that he had been having neck pains in the past, however, had worsened since his last admission here when he was found to have an infection in the blood.  MRI of the neck done 2/14/2025 revealed cervical degenerative change with mild canal stenosis and mild to moderate foraminal narrowing.  No cord compression.  Mild nonspecific edema within the right posterior breast dermal musculature of the upper cervicals spine.  Minimal peripheral enhancement is noted.  Differential considerations include infectious/inflammatory myositis with soft tissue abscess.  No discitis or osteomyelitis.  Pain management.  Will plan for repeat MRI C-spine with contrast once ORIANA resolves per ID recommendations    Dysphagia  From the previous problem list, history of esophageal dysphagia.  Today, patient admitted to still having difficulty swallowing and having feeling of food gets stuck on his throat, at times.  He attributed this to his throat being dry at times.  Speech therapy consult, cleared for regular foods  Will hold off on gastroenterology consult as patient recently was seen by GI during his last admission with EGD and colonoscopy done  Patient continue to report painful swallowing   VBS done on 2/15/25 showing food retention due to  "pharyngeal weakness  EGD was done last month which showed normal esophagus.     Consult to ENT, appreciate recommendations   Abnormal urinalysis  Patient had been having dark/black urine, and persistent.  Recently diagnosed with urinary retention and was started on Flomax.  Urology consult, appreciate recommendations  No immediate indication for any acute intervention  Outpatient cystoscopy  Continue Flomax and Proscar  Monitor voiding and trend labs  Serial urine collection  Hydration  Tang for evidence of clot retention, not indicated at this time  Can restart eliquis, held off for now due to dark urine   Gross hematuria  Urology consulted, please see plan and \"abnormal urinalysis\"  currently holding Eliquis, aspirin, iron  Severe protein-calorie malnutrition (HCC)  Malnutrition Findings:   Adult Malnutrition type: Acute illness  Adult Degree of Malnutrition: Other severe protein calorie malnutrition  Malnutrition Characteristics: Inadequate energy, Fluid accumulation  360 Statement: related to inadequate energy/protein intake as evidenced by consuming < 50% of energy intake compared to estimated needs for > 5 days and B/L LE +3 edema. Treated with ONS.  BMI Findings:     Body mass index is 26.61 kg/m².   Ambulatory dysfunction  Worsening ambulatory dysfunction since December  PT/OT evaluations recommend level 2   Discussed with podiatry, patient has chronic left ankle fracture over 2 years ago, recommend WBAT to left foot as curbside, no further podiatry needs   Consider Ortho consult and brace fitting     VTE Pharmacologic Prophylaxis: VTE Score: 5 High Risk (Score >/= 5) - Pharmacological DVT Prophylaxis Contraindicated. Sequential Compression Devices Ordered.    Mobility:   Basic Mobility Inpatient Raw Score: 9  JH-HLM Goal: 3: Sit at edge of bed  JH-HLM Achieved: 5: Stand (1 or more minutes)  JH-HLM Goal achieved. Continue to encourage appropriate mobility.    Patient Centered Rounds: I performed bedside " rounds with nursing staff today.   Discussions with Specialists or Other Care Team Provider: Dr. Brady, speech therapy    Education and Discussions with Family / Patient: Updated  (wife) at bedside.    Current Length of Stay: 3 day(s)  Current Patient Status: Inpatient   Certification Statement: The patient will continue to require additional inpatient hospital stay due to ongoing management of symptomatic anemia requiring blood transfusions, ORIANA  Discharge Plan: Anticipate discharge in 48-72 hrs to rehab facility.    Code Status: Level 1 - Full Code    Subjective   Patient was seen and evaluated at bedside.  He reports not having much of an appetite recently and also continues to have painful swallowing.  Swallowing is worse in the morning with increased mucus production.  He reports having a bowel movement overnight was not changed until this a.m., per conversation with nurse and tech, patient did not have bowel movement this morning.  Patient is currently without oxygen on his nose satting well in the mid to high 90s.  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  Patient's family would like to follow-up with colorectal surgeon about possibility of mass resection.  Patient denies shortness of breath, fever, chills, dizziness, abdominal pain.  He continues to endorse neck pain.    Objective :  Temp:  [97.3 °F (36.3 °C)-98.6 °F (37 °C)] 97.3 °F (36.3 °C)  HR:  [61-78] 73  BP: (113-121)/(49-60) 113/49  Resp:  [16] 16  SpO2:  [97 %-100 %] 97 %    Body mass index is 26.61 kg/m².     Input and Output Summary (last 24 hours):     Intake/Output Summary (Last 24 hours) at 3/3/2025 1525  Last data filed at 3/3/2025 1016  Gross per 24 hour   Intake --   Output 675 ml   Net -675 ml       Physical Exam  Vitals reviewed.   Constitutional:       General: He is not in acute distress.     Appearance: He is ill-appearing. He is not diaphoretic.      Comments: Wearing gloves on his hands due to  feeling cold   HENT:      Head: Normocephalic and atraumatic.      Mouth/Throat:      Mouth: Mucous membranes are moist.      Pharynx: Oropharynx is clear.   Eyes:      Extraocular Movements: Extraocular movements intact.   Cardiovascular:      Rate and Rhythm: Normal rate and regular rhythm.   Pulmonary:      Effort: Pulmonary effort is normal. No respiratory distress.      Comments: Diminished breath sounds, currently with nasal cannula out of his nose  Abdominal:      General: Bowel sounds are normal. There is no distension.      Tenderness: There is no abdominal tenderness. There is no guarding or rebound.   Musculoskeletal:      Right lower leg: No edema.      Left lower leg: No edema.      Comments: Prevalon boot on left foot   Skin:     General: Skin is warm and dry.      Comments: Petechial rash bilateral feet to ankles, picture in chart   Neurological:      Mental Status: He is alert.      Comments: Lower extremity weakness, unable to lift left leg up from lying position in bed, sensation intact bilaterally         Lines/Drains:  Lines/Drains/Airways       Active Status       Name Placement date Placement time Site Days    PICC Line 02/26/25 Right Brachial 02/26/25  0548  Brachial  5                    Central Line:  Goal for removal: N/A - Discharging with PICC for IV ABX/medications               Lab Results: I have reviewed the following results:   Results from last 7 days   Lab Units 03/03/25  0823 03/03/25  0516   WBC Thousand/uL  --  4.69   HEMOGLOBIN g/dL 8.2* 8.1*   HEMATOCRIT % 26.0* 25.2*   PLATELETS Thousands/uL  --  173   SEGS PCT %  --  72   LYMPHO PCT %  --  16   MONO PCT %  --  10   EOS PCT %  --  1     Results from last 7 days   Lab Units 03/03/25  0516   SODIUM mmol/L 140   POTASSIUM mmol/L 3.6   CHLORIDE mmol/L 110*   CO2 mmol/L 26   BUN mg/dL 44*   CREATININE mg/dL 1.88*   ANION GAP mmol/L 4   CALCIUM mg/dL 7.9*   ALBUMIN g/dL 2.0*   TOTAL BILIRUBIN mg/dL 0.40   ALK PHOS U/L 47   ALT U/L  <3*   AST U/L 16   GLUCOSE RANDOM mg/dL 88     Results from last 7 days   Lab Units 03/02/25  1013   INR  1.36*                   Recent Cultures (last 7 days):               Last 24 Hours Medication List:     Current Facility-Administered Medications:     acetaminophen (TYLENOL) tablet 650 mg, Q6H PRN    albuterol inhalation solution 2.5 mg, Q6H PRN    ascorbic acid (VITAMIN C) tablet 250 mg, Daily    atorvastatin (LIPITOR) tablet 40 mg, Daily With Dinner    ceFAZolin (ANCEF) IVPB (premix in dextrose) 2,000 mg 50 mL, Q8H, Last Rate: 2,000 mg (03/03/25 1239)    Cholecalciferol (VITAMIN D3) tablet 2,000 Units, Daily    cyanocobalamin (VITAMIN B-12) tablet 100 mcg, Daily    fluticasone (ARNUITY ELLIPTA) 100 MCG/ACT inhaler 1 puff, Daily    folic acid (FOLVITE) tablet 1 mg, Daily    HYDROmorphone HCl (DILAUDID) injection 0.2 mg, Q4H PRN    hydroxychloroquine (PLAQUENIL) tablet 400 mg, Daily With Breakfast    levalbuterol (XOPENEX) inhalation solution 1.25 mg, Q8H PRN    lidocaine (LIDODERM) 5 % patch 3 patch, Daily    metoprolol tartrate (LOPRESSOR) tablet 25 mg, Q12H GALE    oxyCODONE (ROXICODONE) IR tablet 5 mg, Q6H PRN    oxyCODONE (ROXICODONE) split tablet 2.5 mg, Q6H PRN    pantoprazole (PROTONIX) EC tablet 40 mg, Daily Before Breakfast    tamsulosin (FLOMAX) capsule 0.4 mg, Daily With Dinner    Administrative Statements   Today, Patient Was Seen By: Brooke Mendelson, DO      **Please Note: This note may have been constructed using a voice recognition system.**

## 2025-03-03 NOTE — ASSESSMENT & PLAN NOTE
Worsening ambulatory dysfunction since December  PT/OT evaluations recommend level 2   Discussed with podiatry, patient has chronic left ankle fracture over 2 years ago, recommend WBAT to left foot as curbside, no further podiatry needs   Consider Ortho consult and brace fitting

## 2025-03-03 NOTE — ASSESSMENT & PLAN NOTE
Recent Labs     03/01/25  0603 03/02/25  1013 03/03/25  0516   CREATININE 2.00* 1.88* 1.88*   EGFR 31 33 33     Estimated Creatinine Clearance: 36.1 mL/min (A) (by C-G formula based on SCr of 1.88 mg/dL (H)).     Cr 2.43 on admission. Baseline 0.9. BUN 63. BUN/Cr ratio >20  Pt having dark/black urine in canister at bedside.  Likely hypovolemic in the setting of acute GI bleed  UA, no signs of infection with innumerable RBCs    Plan:  Isolyte 75 cc/hour, discontinued d/t volume overload   Continue to monitor BMP. Consult nephrology if pt's kidney function does not improve.  Avoid nephrotoxic medications  Monitor I/O's  Urinary retention protocol

## 2025-03-03 NOTE — PLAN OF CARE
Problem: OCCUPATIONAL THERAPY ADULT  Goal: Performs self-care activities at highest level of function for planned discharge setting.  See evaluation for individualized goals.  Description: Treatment Interventions: ADL retraining, Functional transfer training, Endurance training, Cognitive reorientation, Patient/family training, Equipment evaluation/education, Compensatory technique education, Continued evaluation, Energy conservation, Activityengagement          See flowsheet documentation for full assessment, interventions and recommendations.   Note: Limitation: Decreased ADL status, Decreased Safe judgement during ADL, Decreased cognition, Decreased endurance, Decreased self-care trans, Decreased high-level ADLs (pain, balance, fxnl mobility, act broderick, fxnl reach, standing broderick, and strength, safety awareness, insight, problem solving, learning new tasks, and initiation, response time)  Prognosis: Good  Assessment: Pt is a 79 y.o. male seen for OT evaluation s/p admit to [facility] on 2/28/2025 w/Symptomatic anemia. Prior to admission, pt was at [facility] for STR. At true baseline, pt was living with spouse in a 2 level house, (I) with ADLs, (I) with IADLs, no AD, (-) falls, (+) . Personal and environmental factors affecting patient at time of evaluation include current habits and behavioral patterns, limited social support, inaccessible home environment, inaccessible bathroom environment, difficulty completing ADLs, and difficulty completing IADLs. Personal factors supporting patient at time of evaluation include (I) PLOF, supportive spouse and family, and attitude towards recovery. Based upon this evaluation, pt is functioning significantly below baseline. Pt will benefit from continued skilled inpatient OT to maximize safety, level of independence and overall performance in ADLs, functional transfers, functional mobility to return to functional baseline/highest level of function.     Rehab Resource  Intensity Level, OT: II (Moderate Resource Intensity)     ASAD Nino, OTR/L  [place] License [id number]  [place] License [id number]

## 2025-03-03 NOTE — ASSESSMENT & PLAN NOTE
[facility] Hospitalist Discharge Summary    ADMIT DATE: 1/31/2020   DISCHARGE DATE: 2/2/2020     PCP: Debora Hernández PA-C   DATE PCP NOTIFIED:   METHOD OF NOTIFICATION:     CONSULTING PHYSICIAN(s):   Dr. Mat Monk - Orthopedic Surgery      DISCHARGE DIAGNOSES:   Right hip osteoarthritis s/p right hip total arthroplasty on 1/31/2020  Acute postprocedural hypoxic respiratory failure  Acute blood-loss anemia, expected postoperatively  Primary hypertension  History of PVCs  Anxiety NOS  Hyperlipidemia  Glaucoma  Osteoporosis  Carotid stenosis  hx    PROCEDURES PERFORMED DURING ADMISSION AND DATES PERFORMED:  Right hip total arthroplasty on 1/31/2020    PENDING TEST RESULTS AND PROBLEMS NEEDING F/U:  None     HOSPITAL COURSE:   This is an 82 year old female, patient of Debora Hernández PA-C, with a past medical history significant for right hip osteoarthritis, primary hypertension, PVCs, anxiety, carotid stenosis, and hyperlipidemia, who presented for an elective right total hip arthroplasty with Dr. Monk on 1/31/2020. She tolerated the procedure well with no immediate perioperative complications. Upon arrival to the recovery room, the patient was afebrile, with RH 67, RR 16, SpO2 99%, and /67. She was admitted postoperatively for further workup and management.    The patient presented with right hip osteoarthritis s/p right hip total arthroplasty on 1/31/2020. She completed surgical prophylaxis with IV Vancomycin, and her diet was advanced to Regular. The patient was given pain management with gabapentin, PRN Norco, PRN tramadol, and PRN IV Dilaudid. She was also given DVT prophylaxis with Warfarin bridging with Lovenox. The patient presented with acute postprocedural hypoxic respiratory failure as well, and was encouraged to use the incentive spirometer. She did not require any supplemental oxygen. PT/OT evaluated the patient, and recommended non-daily skilled therapy after discharge. She was discharged on PRN  Patient developed acute neck pain with MSSA bacteremia during recent hospitalization.  CRP was highly elevated.  C-spine MRI showed possible C-spine and paraspinal infection.  Patient has no neurologic deficit.  However, his neck pain has not improved.  Given lack of improvement of neck pain, we should repeat C-spine MRI.  Antibiotic plan as in below.  Monitor neck pain.  Recommend repeat C-spine MRI with and without contrast.  This can be postponed until ORIANA resolves.   Norco, and continued the Warfarin-Lovenox bridge.     All of the patient's other chronic issues remained stable, and no additional acute interventions or new medications needed to be initiated during this admission. Currently, the patient remains stable and has no further acute complaints or issues at this time. She has now been deemed stable for discharge home with home healthcare by all services and was instructed to follow up with her PCP and specialists documented below. The patient has confirmed her understanding of the hospital course and discharge instructions and is agreeable to the discharge plan.    CONDITION ON DISCHARGE: Stable  CODE STATUS:   Code Status: Full Resuscitation     DISCHARGE INSTRUCTIONS  DISCHARGE TO: Home with home healthcare   DIET Cardiac  ACTIVITY: WBAT w assistive device   Fall risk  IS 10x/hr w/a   Posterior hip precautions without abductor pillow  HOME HEALTH CARE RN/PT/OT/MSW/Wound/Ostomy Agency: [facility] PT RN  HOME OXYGEN: N/A  Dressing changes per ortho recs  Lovenox/Warfarin Bridge following [facility] protocol pathway  Goal 1.8-2.3  Inr 1.4 today  Continue lovenox until INR 1.8 or greater  To receive 6mg warfarin today pm   [facility]. Will need to check INR and CBC tomorrow to determine appropriate warfarin dosage. Home health nurse will need to contact [facility] coordinator regarding this.                   FOLLOW-UP APPOINTMENTS:   Dr. Mat Monk (Orthopedic Surgery) in 7 days for initial postop appointment  PCP in 3 days     DISCHARGE MEDICATION LIST:     Summary of your Discharge Medications      Take these Medications      Details   alendronate 70 MG tablet  Commonly known as:  FOSAMAX   Take 70 mg by mouth every 7 days. Indications: Osteoporosis Every Friday     atorvastatin 20 MG tablet  Commonly known as:  LIPITOR   Take 20 mg by mouth daily.     CALCIUM CARBONATE PO   Take 300 mg by mouth 2 times daily.      dilTIAZem 120 MG 24 hr capsule  Commonly known as:  CARDIZEM CD   Take 120 mg by mouth daily.     enoxaparin 40 MG/0.4ML injectable solution  Commonly known as:  LOVENOX   Inject 0.4 mLs into the skin daily. Stop when INR is 1.8-2.3     estradiol 0.1 MG/GM vaginal cream  Commonly known as:  ESTRACE   Place vaginally 2 days a week. Indications: 1 cm twice a week Every Monday and Friday     flecainide 100 MG tablet  Commonly known as:  TAMBOCOR   Take 50 mg by mouth 2 times daily.     GLUCOSAMINE SULFATE PO   Take 500 mg by mouth daily.     HYDROcodone-acetaminophen  MG per tablet  Commonly known as:  NORCO   Take 1 or 2 tablets as needed q6h for post operative Total Hip Arthroplasty pain     polyethylene glycol packet  Commonly known as:  GLYCOLAX, MIRALAX   Take 17 g by mouth daily as needed (constipation).     sertraline 50 MG tablet  Commonly known as:  ZOLOFT   Take 50 mg by mouth daily. Indications: Generalized Anxiety Disorder     timolol 0.5 % ophthalmic solution  Commonly known as:  TIMOPTIC   Place 1 drop into both eyes daily.     valsartan-hydroCHLOROthiazide 160-12.5 MG per tablet  Commonly known as:  DIOVAN-HCT   Take 0.5 tablets by mouth every evening. Indications: High Blood Pressure Disorder     Vitamin D (Ergocalciferol) 50 mcg (2,000 units) capsule   Take 2,000 Units by mouth daily.     warfarin 1 MG tablet (warfarin)  Commonly known as:  COUMADIN   Take tab or tablets as directed by your Physician to maintain INR 1.8-2.3 with BiWeekly blood draws             PHYSICAL EXAMINATION:   Physical Exam   Constitutional: She is oriented to person, place, and time. No distress.   Cardiovascular: Normal rate and regular rhythm.   No murmur heard.  Pulmonary/Chest:   Lungs clear bilaterally with adequate effort    Abdominal: Soft. Bowel sounds are normal. She exhibits no distension. There is no tenderness. Musculoskeletal:         General: Tenderness (right hip) present.      Comments: No calf  tenderness  Wiggles toes, normal sensation, normal pulses   No calf tenderness     Neurological: She is alert and oriented to person, place, and time.   No focal deficit present    Skin: Skin is warm. She is not diaphoretic.   Right hip dressing C/D/I       Vital Last Value 24 Hour Range   Temperature 99.5 °F (37.5 °C) (02/02/20 1136) Temp  Min: 98.1 °F (36.7 °C)  Max: 99.5 °F (37.5 °C)   Pulse 75 (02/02/20 1136) Pulse  Min: 72  Max: 81   Respiratory 16 (02/02/20 1136) Resp  Min: 14  Max: 18   Non-Invasive  Blood Pressure 103/55 (02/02/20 1136) BP  Min: 103/55  Max: 132/60   Pulse Oximetry 91 % (02/02/20 1136) SpO2  Min: 91 %  Max: 94 %     Vital Today Admitted   Weight 65.3 kg (143 lb 15.4 oz) (01/31/20 0619) Weight: 63.5 kg (140 lb) (01/30/20 1130)   Height N/A Height: 5' 6\" (167.6 cm) (01/30/20 1130)   BMI N/A BMI (Calculated): 22.6 (01/30/20 1130)       Recent Labs   Lab 02/02/20  1015 02/02/20  0505 02/01/20  0450   WBC  --  11.6* 10.4   HCT  --  31.5* 33.6*   HGB  --  9.9* 10.8*   PLT  --  200 213   INR 1.4  --  1.0   PTT  --  34* 29   SODIUM  --   --  137   POTASSIUM  --   --  3.6   CHLORIDE  --   --  106   CO2  --   --  28   CALCIUM  --   --  7.4*   GLUCOSE  --   --  121*   BUN  --   --  9   CREATININE  --   --  0.54   GFRNA  --   --  89       TIME SPENT:  > 31 minutes    Charting performed by kalyn Orourke for Dr. Ava Carroll      All medical record entries made by the scribe were at my direction. I have reviewed the chart and agree that the record accurately reflects my personal performance of the history, physical exam, hospital course, and assessment and plan.

## 2025-03-03 NOTE — PHYSICAL THERAPY NOTE
PHYSICAL THERAPY EVALUATION & TREATMENT  DATE: 03/03/25  TIME: 3798-3477    NAME:  Devin Chaves  AGE:   79 y.o.  Mrn:   [id number]  Length Of Stay: 3    ADMIT DX:  Anemia [D64.9]  Pulmonary nodule [R91.1]  Elevated troponin [R79.89]  Abnormal laboratory test result [R89.9]  Pleural effusion, bilateral [J90]  ORIANA (acute kidney injury) (HCC) [N17.9]  Colonic mass [K63.89]  Anemia, unspecified [D64.9]    Past Medical History:   Diagnosis Date    Atrial fibrillation (HCC)     COPD (chronic obstructive pulmonary disease) (HCC)     Crushing injury of finger, left     Infectious viral hepatitis      Past Surgical History:   Procedure Laterality Date    FL LUMBAR PUNCTURE DIAGNOSTIC  2/10/2022    RI OPEN TX PHALANGEAL SHAFT FRACTURE PROX/MIDDLE EA Left 1/25/2017    Procedure: ORIF LEFT SMALL FINGER FRACTURE;  Surgeon: Blake Badillo MD;  Location: [facility];  Service: Orthopedics       Performed at least 2 patient identifiers during session: Name, Birthday, ID bracelet, and Epic photo     03/03/25 0951   PT Last Visit   PT Visit Date 03/03/25   Note Type   Note type Evaluation  (& treatment)   Pain Assessment   Pain Assessment Tool FLACC   Pain Location/Orientation Orientation: Left;Other (Comment)  (foot/ankle)   Pain Onset/Description Frequency: Intermittent;Descriptor: Discomfort  (with WBing)   Effect of Pain on Daily Activities limits WB tolerance, standing tolerance, independence with safety and mobility   Patient's Stated Pain Goal No pain   Hospital Pain Intervention(s) Repositioned;Ambulation/increased activity;Elevated;Emotional support   Multiple Pain Sites No   Pain Rating: FLACC (Rest) - Face 0   Pain Rating: FLACC (Rest) - Legs 0   Pain Rating: FLACC (Rest) - Activity 0   Pain Rating: FLACC (Rest) - Cry 1   Pain Rating: FLACC (Rest) - Consolability 1   Score: FLACC (Rest) 2   Pain Rating: FLACC (Activity) - Face 1   Pain Rating: FLACC (Activity) - Legs 1   Pain Rating: FLACC (Activity) - Activity 1  "  Pain Rating: FLACC (Activity) - Cry 1   Pain Rating: FLACC (Activity) - Consolability 1   Score: FLACC (Activity) 5   Restrictions/Precautions   Weight Bearing Precautions Per Order No   LLE Weight Bearing Per Order (S)  Other  (pt is a poor historian; reports he [facility] deformity, however unable to locate documentation. Pt reports recently ignoring that and WBing / noncompliance. Spoke to [facility] re obtaining formal order/info)   Braces or Orthoses Other (Comment)  (LLE heel offloading boot)   Other Precautions Cognitive;Impulsive;Chair Alarm;Bed Alarm;WBS;Multiple lines;Telemetry;O2;Fall Risk;Pain  (3L O2 via NC; IV)   Home Living   Type of Home House   Home Layout Two level;Performs ADLs on one level;Able to live on main level with bedroom/bathroom;Work area in basement;Stairs to enter with rails  (3-6 ZEINAB with HR; is able to maintain FFSU; wood stove in basement)   Bathroom Shower/Tub Tub/shower unit   Bathroom Toilet Standard   Bathroom Accessibility Accessible   Home Equipment Cane;Walker;Hospital bed   Prior Function   Level of [place] Independent with ADLs;Independent with functional mobility   Lives With Spouse   Receives Help From Family   IADLs Independent with driving;Independent with meal prep;Independent with medication management   Falls in the last 6 months 0  (pt denies)   Vocational Retired   Comments Pt is a very poor historian. Information obtained from combination of pt report + historical therapy records in EMR. At true baseline, pt was living at home with spouse (above information), fully independent with all self care, functional mobility with no AD vs SPC. Pt is CURRENTLY coming from [facility] where he has been since 2/6/25. Pt reports that at rehab he is \"barely\" getting therapy; is transferring independently to , but is also \"walking about 400ft on his own at night when no one is watching.\"   General   Additional " "Pertinent History Pt is a 79 yr old male admitted 2/28/25 with chest pain and SOB, dark black stools, abdominal pain, low hgb (4.0 on admission, now 8.1). PMH includes Afib, COPD, hepatitis, colonic mass, HTN, CVA .   Family/Caregiver Present No   Cognition   Overall Cognitive Status Impaired   Arousal/Participation Cooperative   Attention Attends with cues to redirect   Orientation Level Oriented to person;Oriented to place;Oriented to time  (\"I think i'm at [facility] right now\"; reports date as Feb 28, 2025.)   Memory Decreased short term memory;Decreased recall of recent events;Decreased recall of precautions  (poor recall of recent events / timeline of events)   Following Commands Follows one step commands with increased time or repetition   Subjective   Subjective \"I know about points of rotation. I should be putting weight on this foot to get it back aligned.\"   RUE Assessment   RUE Assessment WFL   LUE Assessment   LUE Assessment WFL   RLE Assessment   RLE Assessment X  (grossly 3/5 to 3+/5 MMT throughout)   LLE Assessment   LLE Assessment X  (pt reports a NWB L LE status from [facility] however admits to not being compliant with; hip and knee strength grossly 3/5; ankle 1/5 MMT)   Vision-Basic Assessment   Current Vision Wears glasses all the time   Coordination   Movements are Fluid and Coordinated 1   Sensation WFL   Bed Mobility   Supine to Sit 3  Moderate assistance   Additional items Assist x 1;HOB elevated;Bedrails;Increased time required;Verbal cues  (trunk management)   Sit to Supine   (NT as pt was left seated OOB in recliner chair at end of session, alarm engaged)   Additional Comments Pt able to scoot fwd at EOB w/ S in order to achieve more optimal positioning and balance.   Transfers   Sit to Stand 2  Maximal assistance   Additional items Assist x 2;Bedrails;Increased time required;Verbal cues  (RW)   Stand to Sit 2  Maximal assistance   Additional items Assist x 2;Increased time " required;Verbal cues  (RW)   Additional Comments Pt needing MAXA 2 person to transfer STS from EOB with RW. Pt needing MODA 2 person w/ RW to maintain standing balance. Poor tolerance. Inability to maintain NWB LLE, however pt also with blantant non compliance to attempt WB restriction. Pt impulsive to return to sit EOB. See additional treatment below for addt'l transfer and mobility training.   Balance   Static Sitting Fair +   Dynamic Sitting Fair   Static Standing Poor -  (w/ RW)   Dynamic Standing Poor -  (w/ RW)   Ambulatory Poor -  (w/ RW)   Endurance Deficit   Endurance Deficit Yes   Endurance Deficit Description Pt presenting on 3L O2 via NC. Pt reports wearing 3L O2 NC at home continuous. Pt with mod-max degree of SOB/PUTNAM with minimal mobility efforts, however SPO2 monitored and stable at 97%+ on 3L.   Activity Tolerance   Activity Tolerance Patient limited by fatigue;Patient limited by pain   Medical Staff Made Aware Spoke with CM Samuel, OT Jesus, RN Janey, RICHY PACHECO Kelly   Assessment   Prognosis Fair   Problem List Decreased strength;Decreased range of motion;Decreased endurance;Impaired balance;Decreased mobility;Decreased cognition;Impaired judgement;Decreased safety awareness;Impaired hearing;Obesity;Decreased skin integrity;Orthopedic restrictions;Pain   Assessment Pt seen for PT evaluation for mobility assessment & discharge needs. Pt admitted 2/28/2025 w/ chest pain, SOB, dark stools, dx Symptomatic anemia. During PT IE, pt requires MODA for bed mobility in hospital bed, MAXA 2 person for STS transfer w/ RW from EOB and MODA 2 person to maintain supported standing at RW; pt impulsive to return to sit EOB. During addt'l treatment time pt progresses to transfer with MODA 2 person from EOB (w/ significant height increase), MODA 2 person w/ RW for ambulatory transition from EOB to recliner chair, and MODA 2 person for additional STS transfer from recliner chair. Pt displays above outlined functional  "impairments & limitations, and presents below his baseline level of functional mobility. The AM-PAC & Barthel Index outcome tools were used to assist in determining pt safety w/ mobility/self care & appropriate d/c recommendations, see above for scores. Pt is at risk of falls d/t multiple comorbidities, impulsivity, h/o falls, impaired balance, impaired cognition, impaired insight/safety awareness, use of ambulatory aid, varying levels of pain, acuity of medical illness, ongoing medical treatment of primary dx, abnormal lab values, polypharmacy, unstable vitals, and WB restriction. Pt's clinical presentation is currently unstable/unpredictable as seen in pt's presentation of changing level of pain, varying levels of cognitive performance, increased fall risk, new onset of impairment of functional mobility, decreased endurance, and new onset of weakness. Pt will benefit from continued PT services in order to address impairments, decrease risk of falls, maximize independence w/ fnxl mobility, & ensure safety w/ mobility for transition to next level of care. Based on pt presentation & impairments, pt would most appropriately benefit from Level II (moderate PT intensity) resources upon d/c.   Barriers to Discharge Decreased caregiver support;Inaccessible home environment   Goals   Patient Goals \"to have less pain in the ankle\"   STG Expiration Date 03/17/25   Short Term Goal #1 Pt will: complete all bed mobility in hospital bed with S in order to promote increased OOB functional mobility and simulate home environment; complete all transfers with RW and TITA in order to increase safety with functional mobility; ambulate >50ft with RW and appropriate WB restriction in order to increase safety with in room/in facility distance functional mobility; improve B LE strength to >/= 4/5 MMT t/o in order to increase safety with functional mobility and decrease risk of falls; demonstrate understanding and independence with LE " strengthening HEP; improve dynamic standing balance to >/= good grade in order to promote safety and increased independence with mobility; tolerate >3hrs OOB in upright position, in order to improve muscular endurance and respiratory status; improve AM-PAC score to >/= 14/24 in order to increase independence with mobility and decrease burden of care; improve Barthel Index score to >/= 45/100 in order to increase independence and decrease risk of falls.   PT Treatment Day 1   Plan   Treatment/Interventions Functional transfer training;LE strengthening/ROM;Therapeutic exercise;Endurance training;Cognitive reorientation;Patient/family training;Equipment eval/education;Bed mobility;Gait training;Compensatory technique education;Spoke to nursing;Spoke to case management  (WC mobility training)   PT Frequency 3-5x/wk   Discharge Recommendation   Rehab Resource Intensity Level, PT II (Moderate Resource Intensity)   AM-PAC Basic Mobility Inpatient   Turning in Flat Bed Without Bedrails 3   Lying on Back to Sitting on Edge of Flat Bed Without Bedrails 2   Moving Bed to Chair 1   Standing Up From Chair Using Arms 1   Walk in Room 1   Climb 3-5 Stairs With Railing 1   Basic Mobility Inpatient Raw Score 9   Turning Head Towards Sound 3   Follow Simple Instructions 3   Low Function Basic Mobility Raw Score  15   Low Function Basic Mobility Standardized Score  23.9   [facility] Highest Level Of Mobility   -HL Goal 3: Sit at edge of bed   -[facility] Achieved 5: Stand (1 or more minutes)   Modified Percy Scale   Modified [place] Scale 4   Barthel Index   Feeding 10   Bathing 0   Grooming Score 0   Dressing Score 5   Bladder Score 5   Bowels Score 10   Toilet Use Score 0   Transfers (Bed/Chair) Score 5   Mobility (Level Surface) Score 0   Stairs Score 0   Barthel Index Score 35   Additional Treatment Session   Start Time 1005   End Time 1016   Treatment Assessment Pt is agreeable to participate in additional transfer/mobility  training post IE. With significantly elevated bed height, pt able to progress to complete STS transfer from EOB with MODA 2 person + RW. Pt continues to disregard attempt at NWB LLE during standing/mobility, requires MODA 2 person + RW to maintain standing. Pt then completes ambulatory transition from EOB to recliner chair with MODA 2 person + RW, poor Wbing tolerance to LLE due to pain. Additional STS transfer from recliner chair completed with MODA 2 person + RW, improved transfer ability with utilization of B armrests on recliner chair; able to maintain static/dynamic standing at RW with TITA 2 person. At end of session pt was left seated OOB in recliner chair with alarm engaged and needs in reach, care returned to RN. Regarding functional mobility, pt remains below his true baseline level of functional mobility and is not safe for return to home at current functional status. Continue to recommend Level II (moderate PT intensity) resources once medically cleared for d/c from the acute care setting. Will continue skilled PT POC as able and appropriate to address functional impairments and progress towards therapy goals. Next session, plan for intervention of increased transfer and gait training as able.   Equipment Use Ax1, RW   Additional Treatment Day 1   End of Consult   Patient Position at End of Consult Bedside chair;Bed/Chair alarm activated;All needs within reach     This session, pt required and most appropriately benefited from partial or full skilled PT/OT co eval & treatment due to extensive physical assistance of SKILLED therapists, cognitive-communication impairments, significant regression from baseline level of mobility, continuous vitals monitoring, decreased activity tolerance, and unpredictable medical and/or functional status. PT and OT goals were addressed separately as seen in documentation.    Based on patient's [facility] Highest Level of Mobility scores today, patient currently has a goal  of -[facility] Levels: 5: STAND (1 OR MORE MINUTES), to be completed with RN staffing each shift, in order to improve overall activity tolerance and mobility, combat hospital related deconditioning, and maximize outcomes for d/c from the acute care setting.     The patient's AM-PAC Basic Mobility Inpatient Short Form Raw Score is 9. A Raw score of less than or equal to 16 suggests the patient may benefit from discharge to post-acute rehabilitation services. Please also refer to the recommendation of the Physical Therapist for safe discharge planning.      Melissa Tavarez PT, DPT   Available via [employer]  NPI # [id number]  [place] License - [id number]  3/3/2025

## 2025-03-03 NOTE — PROGRESS NOTES
Progress Note - Infectious Disease   Name: Devin Chaves 79 y.o. male I MRN: [id number]  Unit/Bed#: S -01 I Date of Admission: 2/28/2025   Date of Service: 3/3/2025 I Hospital Day: 3    Assessment & Plan  Neck pain  Patient developed acute neck pain with MSSA bacteremia during recent hospitalization.  CRP was highly elevated.  C-spine MRI showed possible C-spine and paraspinal infection.  Patient has no neurologic deficit.  However, his neck pain has not improved.  Given lack of improvement of neck pain, we should repeat C-spine MRI with contrast when creatinine is improved.  Antibiotic plan as in below.  Monitor neck pain.  Recommend repeat C-spine MRI with and without contrast.  This can be postponed until ORIANA resolves.  MSSA bacteremia  Patient had MSSA bacteremia during recent hospitalization.  Source is unclear but likely cutaneous.  His bacteremia cleared rapidly on IV antibiotic.  TTE did not show any vegetation.  Given possible C-spine infection, plan was for long-term IV antibiotic.  Continue high-dose IV cefazolin.  Treat x 6 weeks from clearance of bacteremia as previously planned, through 3/27.  Symptomatic anemia  Patient is clinically improved with PRBC transfusion.  Management per primary service.  Melena  Patient with melena prior to admission.  This is most likely secondary to colonic mass.  Colorectal surgery follow-up.  ORIANA (acute kidney injury) (HCC)  Patient with ORIANA on admission, most likely secondary to hypovolemia from GI bleed.  Creatinine is improving with transfusion and IV fluid.  Cefazolin at full dose for now.  Monitor creatinine.  Colonic mass  Patient has recently diagnosed adenocarcinoma of the colon.  He has been followed by colorectal surgery.  No plan for chemotherapy yet.  Colorectal surgery follow-up.    Discussed with patient in detail regarding the above plan.  I have discussed with Dr. Herman from primary service regarding the above plan to continue IV cefazolin and  check C-spine MRI when creatinine is improved.  Team agrees with the plan.    Antibiotics:  Cefazolin  Last negative blood culture 2/14    Subjective   Patient has neck pain, but better controlled now.  Low back pain mild and chronic.  Temperature stays down.  No chills.  He is tolerating antibiotic well.  No nausea, vomiting or diarrhea.    Objective :  Temp:  [97.3 °F (36.3 °C)-98.6 °F (37 °C)] 97.3 °F (36.3 °C)  HR:  [61-78] 66  BP: (114-121)/(54-60) 114/54  Resp:  [16] 16  SpO2:  [98 %-100 %] 98 %    General:  No acute distress  Psychiatric:  Awake and alert  Neck: Stable to moderate tenderness posteriorly at midline.  No deformity.  No open wound.  No erythema.  Pulmonary:  Normal respiratory excursion, no rales, no wheezing, without accessory muscle use  Heart: Regular rate and rhythm.  No murmur, rub or gallop.  Abdomen:  Soft, nontender, nondistended, normal bowel sounds  Extremities: Stable leg edema  Skin:  No rashes      Lab Results: I have reviewed the following results:  Results from last 7 days   Lab Units 03/03/25  0823 03/03/25  0516 03/02/25  2121 03/02/25  1204 03/02/25  1013 03/01/25  1535 03/01/25  0603   WBC Thousand/uL  --  4.69  --   --  5.40  --  4.59   HEMOGLOBIN g/dL 8.2* 8.1* 8.0*   < > 8.4*   < > 6.8*   PLATELETS Thousands/uL  --  173  --   --  159  --  159    < > = values in this interval not displayed.     Results from last 7 days   Lab Units 03/03/25  0516 03/02/25  1013 03/01/25  0603 02/28/25  2241 02/28/25  0436   SODIUM mmol/L 140 139 140 139 139   POTASSIUM mmol/L 3.6 3.9 3.7 3.8 4.0   CHLORIDE mmol/L 110* 109* 110* 109* 108   CO2 mmol/L 26 27 25 25 26   BUN mg/dL 44* 48* 58* 60* 63*   CREATININE mg/dL 1.88* 1.88* 2.00* 2.11* 2.43*   EGFR ml/min/1.73sq m 33 33 31 29 24   CALCIUM mg/dL 7.9* 7.9* 7.5* 7.3* 7.6*   AST U/L 16  --   --  15 16   ALT U/L <3*  --   --  <3* <3*   ALK PHOS U/L 47  --   --  41 43   ALBUMIN g/dL 2.0*  --   --  1.8* 2.0*     Results from last 7 days   Lab  Units 02/28/25  1752   MRSA CULTURE ONLY  No Methicillin Resistant Staphlyococcus aureus (MRSA) isolated                 Results from last 7 days   Lab Units 02/28/25  1327   D-DIMER QUANTITATIVE ug/ml FEU 3.41*

## 2025-03-03 NOTE — PROGRESS NOTES
Progress Note - Oncology-Medical   Name: Devin Chaves 79 y.o. male I MRN: [id number]  Unit/Bed#: S -01 I Date of Admission: 2/28/2025   Date of Service: 3/3/2025 I Hospital Day: 3     Assessment & Plan  Symptomatic anemia  S/p transfusion with 3 units of packed RBC, he was on apixaban as well as aspirin    Now he is off anticoagulation, hemoglobin stable at 8.1    I suggest apixaban 2.5 mg p.o. twice daily or aspirin 81 mg p.o. daily because of coronary artery disease/intermittent A-fib or no anticoagulation at all at the patient has performance status of ECOG 3 and multiple comorbidities  CAD (coronary artery disease)  As mentioned above  History of CVA (cerebrovascular accident)  As mentioned above  Atrial fibrillation (HCC)  As mentioned above

## 2025-03-03 NOTE — PLAN OF CARE
Problem: PHYSICAL THERAPY ADULT  Goal: Performs mobility at highest level of function for planned discharge setting.  See evaluation for individualized goals.  Description: Treatment/Interventions: Functional transfer training, LE strengthening/ROM, Therapeutic exercise, Endurance training, Cognitive reorientation, Patient/family training, Equipment eval/education, Bed mobility, Gait training, Compensatory technique education, Spoke to nursing, Spoke to case management (WC mobility training)     See flowsheet documentation for full assessment, interventions and recommendations.  Outcome: Progressing  Note: Prognosis: Fair  Problem List: Decreased strength, Decreased range of motion, Decreased endurance, Impaired balance, Decreased mobility, Decreased cognition, Impaired judgement, Decreased safety awareness, Impaired hearing, Obesity, Decreased skin integrity, Orthopedic restrictions, Pain  Assessment: Pt seen for PT evaluation for mobility assessment & discharge needs. Pt admitted 2/28/2025 w/ chest pain, SOB, dark stools, dx Symptomatic anemia. During PT IE, pt requires MODA for bed mobility in hospital bed, MAXA 2 person for STS transfer w/ RW from EOB and MODA 2 person to maintain supported standing at RW; pt impulsive to return to sit EOB. During addt'l treatment time pt progresses to transfer with MODA 2 person from EOB (w/ significant height increase), MODA 2 person w/ RW for ambulatory transition from EOB to recliner chair, and MODA 2 person for additional STS transfer from recliner chair. Pt displays above outlined functional impairments & limitations, and presents below his baseline level of functional mobility. The AM-PAC & Barthel Index outcome tools were used to assist in determining pt safety w/ mobility/self care & appropriate d/c recommendations, see above for scores. Pt is at risk of falls d/t multiple comorbidities, impulsivity, h/o falls, impaired balance, impaired cognition, impaired  insight/safety awareness, use of ambulatory aid, varying levels of pain, acuity of medical illness, ongoing medical treatment of primary dx, abnormal lab values, polypharmacy, unstable vitals, and WB restriction. Pt's clinical presentation is currently unstable/unpredictable as seen in pt's presentation of changing level of pain, varying levels of cognitive performance, increased fall risk, new onset of impairment of functional mobility, decreased endurance, and new onset of weakness. Pt will benefit from continued PT services in order to address impairments, decrease risk of falls, maximize independence w/ fnxl mobility, & ensure safety w/ mobility for transition to next level of care. Based on pt presentation & impairments, pt would most appropriately benefit from Level II (moderate PT intensity) resources upon d/c.    Barriers to Discharge: Decreased caregiver support, Inaccessible home environment     Rehab Resource Intensity Level, PT: II (Moderate Resource Intensity)    See flowsheet documentation for full assessment.

## 2025-03-03 NOTE — ASSESSMENT & PLAN NOTE
Patient had recent hospitalization for COPD exacerbation complicated by MSSA bacteremia.  Blood cultures S aureus (+). Possible source small laceration from haircut, per chart review.  TTE negative for vegetations  Patient was discharged on cefazolin 2 g IV Q8 hrs until 3/27/2025. PICC line in place. Patient rescheduled to follow-up outpatient with ID  Repeat blood cultures showed no growth  Pt denies fevers. No leukocytosis on admission.    Plan:  Continue with cefazolin 2 g IV every 8 hours.  Spoke to the infectious disease.  Consult infectious disease.  Recommend repeat C-spine MRI with and without contrast, postpone until ORIANA resolves  6 weeks treatment through 3/27/2025

## 2025-03-04 ENCOUNTER — TELEPHONE (OUTPATIENT)
Dept: HEMATOLOGY ONCOLOGY | Facility: CLINIC | Age: 80
End: 2025-03-04

## 2025-03-04 ENCOUNTER — DOCUMENTATION (OUTPATIENT)
Dept: HEMATOLOGY ONCOLOGY | Facility: CLINIC | Age: 80
End: 2025-03-04

## 2025-03-04 NOTE — CASE MANAGEMENT
Case Management Progress Note    Patient name Devin Chvaes  Location S /S -01 MRN [id number]  : 1945 Date 3/4/2025       LOS (days): 4  Geometric Mean LOS (GMLOS) (days):   Days to GMLOS:        OBJECTIVE:        Current admission status: Inpatient  Preferred Pharmacy:   [id number] [place] [address]  [place] [address]  Phone: [phone] Fax: [phone]    [facility] - [place] - [address]  [address]  [place] [address]  Phone: [phone] Fax: [phone]    [facility] [place] - [address]  [address]  [place] [address]  Phone: [phone] Fax: [phone]    Primary Care Provider: Park Saxena MD    [facility]  [facility]: [facility]    PROGRESS NOTE:    Weekly Care Management Length of Stay Review     Current LOS: 4 Days    Most Recent Labs:     Lab Results   Component Value Date/Time    WBC 4.96 2025 05:25 AM    HGB 8.2 (L) 2025 05:25 AM    HCT 26.2 (L) 2025 05:25 AM     2025 05:25 AM    SODIUM 141 2025 05:25 AM    K 3.7 2025 05:25 AM     (H) 2025 05:25 AM    CO2 26 2025 05:25 AM    BUN 41 (H) 2025 05:25 AM    CREATININE 1.85 (H) 2025 05:25 AM    GLUC 84 2025 05:25 AM    ALKPHOS 51 2025 05:25 AM    ALT <3 (L) 2025 05:25 AM    AST 16 2025 05:25 AM    ALB 2.1 (L) 2025 05:25 AM    TBILI 0.38 2025 05:25 AM    INR 1.36 (H) 2025 10:13 AM       Most Recent Vitals:   Vitals:    25 0743   BP: (!) 125/49   Pulse: 62   Resp: 18   Temp: 97.8 °F (36.6 °C)   SpO2: 100%        Identified Barriers to Discharge/Discharge Goals/Care Management Interventions: symptomatic anemia, monitor H&H, colon mass-bleeding,Oncology consulted.   GOC discussions     Intended Discharge Disposition: 100[facility] with [facility].     Expected Discharge Date:  TBD

## 2025-03-04 NOTE — ASSESSMENT & PLAN NOTE
From the previous problem list, history of esophageal dysphagia.  Today, patient admitted to still having difficulty swallowing and having feeling of food gets stuck on his throat, at times.  He attributed this to his throat being dry at times.  Speech therapy consult, cleared for regular foods  Will hold off on gastroenterology consult as patient recently was seen by GI during his last admission with EGD and colonoscopy done  Patient continue to report painful swallowing   VBS done on 2/15/25 showing food retention due to pharyngeal weakness  EGD was done last month which showed normal esophagus.   ENT recommending outpatient follow-up with Dr. Flynn Reyez

## 2025-03-04 NOTE — TELEPHONE ENCOUNTER
New Patient Intake Form   Patient Details:    Devin Chaves  1945    Appointment Information   Who is calling to schedule? Alta Gutierrez   If not self, what is the caller's name?   NA   DID YOU CONFIRM INSURANCE WITH PATIENT? [facility] primary (will need referral), [employer] is secondary and was E verified, Routed to finance.    Referring provider Dr. Amin   What is the diagnosis? mass in the ascending colon, biopsy indicates MMR deficient adenocarcinoma, Symptomatic anemia     Is there a confirmed tissue diagnosis?   Colon mass bx taken on 2/20     Is there a biopsy ordered or pending?  Please specify dates  If yes, route to NN/OCC   See above     Is patient aware of diagnosis?   Yes     Have you had any imaging or labs done?  If yes, where?  (If imaging done outside of [facility], please remind patient to bring a disk.) Yes-[facility]     If imaging done at outside facility, did you instruct patient to obtain discs and bring to visit? NA   Have you been seen by another Oncologist/Hematologist?  If so, who and where? No   Are the records in [facility] or Care Everywhere? Yes   Does the patient have records at another facility/hospital?    If yes, Name of facility, city and state where facility is located. [place]     Did you instruct patient to have records faxed to rightx and provide rightfax number?   Care Everywhere   Preferred [place]   Is the patient willing to be seen by another provider?  (This is for breast patients only) NA     Did you send new patient paperwork?  Email or mail? NA   Miscellaneous Information: The patient is scheduled for his HFU appointment with Dr. Amin on 4/3 at 0840 in the [place] office. He will be seeing Dr. Chacko from Colorectal surgery on 4/1.

## 2025-03-04 NOTE — ASSESSMENT & PLAN NOTE
Recent Labs     03/02/25  1013 03/03/25  0516 03/04/25  0525   CREATININE 1.88* 1.88* 1.85*   EGFR 33 33 34     Estimated Creatinine Clearance: 36.7 mL/min (A) (by C-G formula based on SCr of 1.85 mg/dL (H)).     Cr 2.43 on admission. Baseline 0.9. BUN 63. BUN/Cr ratio >20  Pt having dark/black urine in canister at bedside.  Likely hypovolemic in the setting of acute GI bleed  UA, no signs of infection with innumerable RBCs    Plan:  Hold IVF given fluid overload on 3/2 and good oral intake  Continue to monitor BMP. Consult nephrology if pt's kidney function does not improve.  Avoid nephrotoxic medications  Monitor I/O's  Urinary retention protocol

## 2025-03-04 NOTE — ASSESSMENT & PLAN NOTE
Recent Labs     03/03/25  0516 03/03/25  0823 03/04/25  0525   HGB 8.1* 8.2* 8.2*      Patient presents with 2 days of chest pain, shortness of breath, fatigue and melena  Patient had recent hospitalization 2/11/2025 - 2/22/2025 for COPD exacerbation complicated by MSSA bacteremia.  Patient anemic on hospitalization requiring 2 units of packed RBCs.  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  Patient was due to follow-up with outpatient colorectal surgery on 2/28/2025  Patient's Eliquis was restarted during his last hospital discharge. Patient has not taken Eliquis for the past 1-2 days due to melena.  S/p 3 units packed rbc in ED. Pt is hemodynamically stable. Dark/black urine noted in canister at bedside.  S/p 1 unit PRBCs on 3/2  As per colorectal surgeon, in the absence of emergency indication, he is not recommending surgery at this time.  Patient should be preoperatively optimized prior to any planned surgical intervention.  Patient then may follow-up with colorectal surgeon in the outpatient setting as previously planned.    Plan:  H&H Q12 hrs  Transfuse for Hgb <7  Monitor for signs of overt bleeding or melenotic stools  Protonix 40 mg PO QD   Colorectal surgery consulted.   Hold Eliquis and aspirin for now  Monitor on telemetry  Urology consulted for dark black urine,   Outpatient cystoscopy, urology signed off

## 2025-03-04 NOTE — CASE MANAGEMENT
Case Management Discharge Planning Note    Patient name Devin Chaves  Location S /S -01 MRN [id number]  : 1945 Date 3/4/2025       Current Admission Date: 2025  Current Admission Diagnosis:Symptomatic anemia   Patient Active Problem List    Diagnosis Date Noted Date Diagnosed    Severe protein-calorie malnutrition (HCC) 2025     Dysphagia 2025     Abnormal urinalysis 2025     Gross hematuria 2025     Melena 2025     ORIANA (acute kidney injury) ([facility]) 2025     Elevated troponin 2025     Pleural effusion, bilateral 2025     Family history of cerebrovascular accident (CVA) 2025     Rash 2025     Esophageal dysphagia 2025     Ambulatory dysfunction 2025     Left ankle joint deformity 2025     Colonic mass 2025     Stroke ([facility]) 02/15/2025     Neck pain 2025     Symptomatic anemia 2025     MSSA bacteremia 2025     Sepsis ([facility]) 2025     Urinary retention 2025     Hypotension 2025     Constipation 2025     Atrial fibrillation ([facility]) 2025     Hip region mass, unspecified laterality 2023     Chronic hypoxic respiratory failure ([facility]) 2023     Fracture of multiple ribs of right side 2023     Rheumatoid arthritis ([facility]) 2023     Moderate protein-calorie malnutrition ([facility]) 2023     HTN (hypertension) 2023     CAD (coronary artery disease) 2023     COPD (chronic obstructive pulmonary disease) ([facility]) 2023     JAZMINE (obstructive sleep apnea) 2023     Back pain 2023     COVID-19 2023     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  [address]  [place] [address]  Phone: [phone] Fax: [phone]    [facility] - [place] - [address]  [address]  [place] [address]  Phone: [phone] Fax: [phone]    [facility] [place] - [address]  [address]  [place] [address]  Phone: [phone] Fax: [phone]    Primary Care Provider: Park Saxena MD    Primary Insurance: [facility]  Secondary Insurance: [employer] [facility] REP    DISCHARGE DETAILS:    Discharge planning discussed with:: Ariadne salcedo- over phone  Freedom of Choice: Yes  Comments - Freedom of Choice: STR  CM contacted family/caregiver?: Yes  Were Treatment Team discharge recommendations reviewed with patient/caregiver?: Yes  Did patient/caregiver verbalize understanding of patient care needs?: Yes  Were patient/caregiver advised of the risks associated with not following Treatment Team discharge recommendations?: Yes    Contacts  Patient Contacts: Ariadne  Relationship to Patient:: Family (daughter)  Contact Method: Phone  Phone Number: [phone]  Reason/Outcome: Continuity of Care, Emergency Contact, Discharge Planning, Referral    Requested Home Health Care         Is the patient interested in HHC at discharge?: No    DME Referral Provided  Referral made for DME?: No    Other Referral/Resources/Interventions Provided:  Interventions: SNF, Short Term Rehab  Referral Comments: Call made to Ariadne salcedo to discuss dcp. Per daughter, she is not agreeable to patient returning to [facility]. Daughter stating she would prefer patient goes to [facility], as she works there. Daughter aware if [facility] is not in network with [facility] plan (confirmed with Theodora CLAYTON- they are not), patient would need to utilize his Medicare  plan-- daughter agreeable to this. Referral to [facility] placed in [facility]. CM to follow up, as able to continue with dcp.    Would you like to participate in our [facility] service program?  : No - Declined    Treatment Team Recommendation: Short Term Rehab, SNF  Discharge Destination Plan:: Short Term Rehab, SNF  Transport at Discharge : S Ambulance

## 2025-03-04 NOTE — PROGRESS NOTES
Chart rvw'd on 3/4/25    Referring Provider: HFU message received from Cierra Sommer    Diagnosis: adenocarcinoma of colon    EGD/EUS/colonoscopy:  25 colonoscopy  The cecum appeared normal.  Proximal ascending colon-across from the ileocecal valve there is a 2.5 cm hard friable polypoid mass appears malignant status post multiple biopsies.  Diverticulosis in the sigmoid colon  Some liquid stool was washed off and suctioned in few areas    25 EGD  The esophagus, stomach and duodenum appeared normal.     Pathology: 25  Final Diagnosis   A. Colon, “Proximal ascending mass biopsy,” Biopsy:  - At least intramucosal adenocarcinoma with mucinous features arising in a tubular adenoma  - Additional levels examined     Imagin25 CT chest abdomen pelvis w contrast  Wall thickening at the ileocecal valve/cecum compatible with colonic mass discovered on recent colonoscopy.     Posterior splenic infarct     Small loculated left pleural effusion. Left lower lobe atelectasis.    Labs:  CBC & CMP UTD  25 CEA: 3.1    Scheduled appointments:  25 Dr Chacko, colorectal surgery   4/3/25 Dr Amin, medical oncology     *colorectal HFU apt will need to be moved sooner from 25. Will need to send message to change apt date once patient discharged from hospital as patient is still inpatient.

## 2025-03-04 NOTE — PROGRESS NOTES
Progress Note - Infectious Disease   Name: Devin Chaves 79 y.o. male I MRN: [id number]  Unit/Bed#: S -01 I Date of Admission: 2/28/2025   Date of Service: 3/4/2025 I Hospital Day: 4    Assessment & Plan  Neck pain  Patient developed acute neck pain with MSSA bacteremia during recent hospitalization.  CRP was highly elevated.  C-spine MRI showed possible C-spine and paraspinal infection.  Patient has no neurologic deficit.  However, his neck pain has not improved.  Given lack of improvement of neck pain, we should repeat C-spine MRI with contrast when creatinine is improved.  However, without any neurological deficit, it would be fine to postpone MRI until renal function is further improved, to decrease risk of contrast-induced ORIANA.  Antibiotic plan as in below.  Monitor neck pain.  Recommend repeat C-spine MRI with and without contrast.  This can be postponed until ORIANA resolves.  MSSA bacteremia  Patient had MSSA bacteremia during recent hospitalization.  Source is unclear but likely cutaneous.  His bacteremia cleared rapidly on IV antibiotic.  TTE did not show any vegetation.  Given possible C-spine infection, plan was for long-term IV antibiotic.  Continue high-dose IV cefazolin.  Treat x 6 weeks from clearance of bacteremia as previously planned, through 3/27.  Symptomatic anemia  Patient is clinically improved with PRBC transfusion.  Management per primary service.  Melena  Patient with melena prior to admission.  This is most likely secondary to colonic mass.  Colorectal surgery follow-up.  ORAINA (acute kidney injury) (HCC)  Patient with ORIANA on admission, most likely secondary to hypovolemia from GI bleed.  Creatinine is improving with transfusion and IV fluid.  Cefazolin at full dose for now.  Monitor creatinine.  Colonic mass  Patient has recently diagnosed adenocarcinoma of the colon.  He has been followed by colorectal surgery.  No plan for chemotherapy yet.  Colorectal surgery  follow-up.    Discussed with patient in detail regarding the above plan.  I have discussed with Dr. Ibrahim (from primary service regarding the above plan to continue IV cefazolin and repeat C-spine MRI when renal function is improved.  She agrees with the plan.    Antibiotics:  Cefazolin  Last negative blood culture 2/14    Subjective   Patient's neck pain is about the same.  No arm or leg weakness.  Stable mild chronic low back pain.  Temperature stays down.  No chills.  He is tolerating antibiotic well.  No nausea, vomiting or diarrhea.    Objective :  Temp:  [97.8 °F (36.6 °C)-99.3 °F (37.4 °C)] 99 °F (37.2 °C)  HR:  [62-76] 76  BP: (107-129)/(46-57) 107/46  Resp:  [16-18] 18  SpO2:  [96 %-100 %] 98 %    General:  No acute distress  Psychiatric:  Awake and alert  Pulmonary:  Normal respiratory excursion, no rales, no wheezing, without accessory muscle use  Heart: Regular rate and rhythm.  No murmur, rub or gallop.  Abdomen:  Soft, nontender, nondistended, normal bowel sounds  Extremities: Stable leg edema, no draining wound, no erythema/warmth, nontender  Skin:  No rashes      Lab Results: I have reviewed the following results:  Results from last 7 days   Lab Units 03/04/25  0525 03/03/25  0823 03/03/25  0516 03/02/25  1204 03/02/25  1013   WBC Thousand/uL 4.96  --  4.69  --  5.40   HEMOGLOBIN g/dL 8.2* 8.2* 8.1*   < > 8.4*   PLATELETS Thousands/uL 173  --  173  --  159    < > = values in this interval not displayed.     Results from last 7 days   Lab Units 03/04/25  0525 03/03/25  0516 03/02/25  1013 03/01/25  0603 02/28/25  2241   SODIUM mmol/L 141 140 139   < > 139   POTASSIUM mmol/L 3.7 3.6 3.9   < > 3.8   CHLORIDE mmol/L 110* 110* 109*   < > 109*   CO2 mmol/L 26 26 27   < > 25   BUN mg/dL 41* 44* 48*   < > 60*   CREATININE mg/dL 1.85* 1.88* 1.88*   < > 2.11*   EGFR ml/min/1.73sq m 34 33 33   < > 29   CALCIUM mg/dL 7.9* 7.9* 7.9*   < > 7.3*   AST U/L 16 16  --   --  15   ALT U/L <3* <3*  --   --  <3*   ALK  PHOS U/L 51 47  --   --  41   ALBUMIN g/dL 2.1* 2.0*  --   --  1.8*    < > = values in this interval not displayed.     Results from last 7 days   Lab Units 02/28/25  1752   MRSA CULTURE ONLY  No Methicillin Resistant Staphlyococcus aureus (MRSA) isolated                 Results from last 7 days   Lab Units 02/28/25  1327   D-DIMER QUANTITATIVE ug/ml FEU 3.41*

## 2025-03-04 NOTE — PLAN OF CARE
Problem: Prexisting or High Potential for Compromised Skin Integrity  Goal: Skin integrity is maintained or improved  Description: INTERVENTIONS:  - Identify patients at risk for skin breakdown  - Assess and monitor skin integrity  - Assess and monitor nutrition and hydration status  - Monitor labs   - Assess for incontinence   - Turn and reposition patient  - Assist with mobility/ambulation  - Relieve pressure over bony prominences  - Avoid friction and shearing  - Provide appropriate hygiene as needed including keeping skin clean and dry  - Evaluate need for skin moisturizer/barrier cream  - Collaborate with interdisciplinary team   - Patient/family teaching  - Consider wound care consult   Outcome: Progressing     Problem: Potential for Falls  Goal: Patient will remain free of falls  Description: INTERVENTIONS:  - Educate patient/family on patient safety including physical limitations  - Instruct patient to call for assistance with activity   - Consult OT/PT to assist with strengthening/mobility   - Keep Call bell within reach  - Keep bed low and locked with side rails adjusted as appropriate  - Keep care items and personal belongings within reach  - Initiate and maintain comfort rounds  - Make Fall Risk Sign visible to staff  - Offer Toileting every 2 Hours, in advance of need  - Initiate/Maintain bed alarm  - Obtain necessary fall risk management equipment: yes   - Apply yellow socks and bracelet for high fall risk patients  - Consider moving patient to room near nurses station  Outcome: Progressing

## 2025-03-04 NOTE — ASSESSMENT & PLAN NOTE
Patient developed acute neck pain with MSSA bacteremia during recent hospitalization.  CRP was highly elevated.  C-spine MRI showed possible C-spine and paraspinal infection.  Patient has no neurologic deficit.  However, his neck pain has not improved.  Given lack of improvement of neck pain, we should repeat C-spine MRI with contrast when creatinine is improved.  However, without any neurological deficit, it would be fine to postpone MRI until renal function is further improved, to decrease risk of contrast-induced ORIANA.  Antibiotic plan as in below.  Monitor neck pain.  Recommend repeat C-spine MRI with and without contrast.  This can be postponed until ORIANA resolves.

## 2025-03-04 NOTE — PROGRESS NOTES
"Progress Note - Hospitalist   Name: Devin Chaves 79 y.o. male I MRN: [id number]  Unit/Bed#: S -01 I Date of Admission: 2/28/2025   Date of Service: 3/4/2025 I Hospital Day: 4    Assessment & Plan  Symptomatic anemia  Recent Labs     03/03/25  0516 03/03/25  0823 03/04/25  0525   HGB 8.1* 8.2* 8.2*      Patient presents with 2 days of chest pain, shortness of breath, fatigue and melena  Patient had recent hospitalization 2/11/2025 - 2/22/2025 for COPD exacerbation complicated by MSSA bacteremia.  Patient anemic on hospitalization requiring 2 units of packed RBCs.  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  Patient was due to follow-up with outpatient colorectal surgery on 2/28/2025  Patient's Eliquis was restarted during his last hospital discharge. Patient has not taken Eliquis for the past 1-2 days due to melena.  S/p 3 units packed rbc in ED. Pt is hemodynamically stable. Dark/black urine noted in canister at bedside.  S/p 1 unit PRBCs on 3/2  As per colorectal surgeon, in the absence of emergency indication, he is not recommending surgery at this time.  Patient should be preoperatively optimized prior to any planned surgical intervention.  Patient then may follow-up with colorectal surgeon in the outpatient setting as previously planned.    Plan:  H&H Q12 hrs  Transfuse for Hgb <7  Monitor for signs of overt bleeding or melenotic stools  Protonix 40 mg PO QD   Colorectal surgery consulted.   Hold Eliquis and aspirin for now  Monitor on telemetry  Urology consulted for dark black urine,   Outpatient cystoscopy, urology signed off  Melena  See plan under \"symptomatic anemia\"  With patient having melena, possible upper GI bleeding.  Patient on Protonix initiated on drip and transition to PO daily. Will hold off on GI consult at this time as he was recently seen with EGD and colonoscopy on 2/20/2025, friable colonic mass seen   No bowel movement, continue to " "monitor for bleeding   Patient reports bowel movement overnight however no record of it from staff, continue to monitor   ORIANA (acute kidney injury) ([facility])  Recent Labs     03/02/25  1013 03/03/25  0516 03/04/25  0525   CREATININE 1.88* 1.88* 1.85*   EGFR 33 33 34     Estimated Creatinine Clearance: 36.7 mL/min (A) (by C-G formula based on SCr of 1.85 mg/dL (H)).     Cr 2.43 on admission. Baseline 0.9. BUN 63. BUN/Cr ratio >20  Pt having dark/black urine in canister at bedside.  Likely hypovolemic in the setting of acute GI bleed  UA, no signs of infection with innumerable RBCs    Plan:  Hold IVF given fluid overload on 3/2 and good oral intake  Continue to monitor BMP. Consult nephrology if pt's kidney function does not improve.  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    Patient was due to follow-up with outpatient colorectal surgery on 2/28/2025  Pt presenting with melena, Hgb 4.6. Symptomatic anemia.    Plan:  Colorectal consulted.  No surgical intervention at this time.  Outpatient follow-up with colorectal surgery.  Rest of plan under \"symptomatic anemia\"  MSSA bacteremia  Patient had recent hospitalization for COPD exacerbation complicated by MSSA bacteremia.  Blood cultures S aureus (+). Possible source small laceration from haircut, per chart review.  TTE negative for vegetations  Patient was discharged on cefazolin 2 g IV Q8 hrs until 3/27/2025. PICC line in place. Patient rescheduled to follow-up outpatient with ID  Repeat blood cultures showed no growth  Pt denies fevers. No leukocytosis on admission.    Plan:  Continue with cefazolin 2 g IV every 8 hours.  Spoke to the infectious disease.  Consult infectious disease.  Recommend repeat C-spine MRI with and without contrast, postpone until ORIANA resolves  6 weeks treatment through 3/27/2025  COPD (chronic obstructive " pulmonary disease) (HCC)  Patient with COPD on 2-4 L supplemental O2 at baseline  Patient currently requiring 2 L NC. No wheezing noted on exam.   Home Albuterol prn, Xopenex Q8 hrs prn, Mometasone inhaler daily    Plan:  Titrate O2 to maintain SpO2 greater than 88%   Xopenex nebs Q8 hrs prn, Mometasone daily  Albuterol prn  Atrial fibrillation (HCC)  AC: Eliquis 5 mg BID  Rate control: Lopressor 25 mg Q12 hrs    Plan:  Hold off Eliquis in the setting of GI/ bleed  Continue Lopressor 25 mg Q12 hrs  Elevated troponin  Lab Results   Component Value Date    HSTNI0 134 (H) 02/28/2025    HSTNI2 142 (H) 02/28/2025    HSTNID2 8 02/28/2025    HSTNI4 138 (H) 02/28/2025    HSTNID4 4 02/28/2025      Patient presents with chest pain and Hgb 4.6.  Continues to report no chest pain.  Initial troponin was elevated, but second and fourth hour delta levels were negative. EKG unremarkable.   Etiology nonischemic myocardial injury in setting of symptomatic anemia versus non-MI troponin elevation secondary to anemia.  Pleural effusion, bilateral  CT A/P shows bilateral pleural effusions L>R, pulmonary vascular congestion  Decreased breath sounds in bilateral lungs bases noted on exam  Pulmonary congestion likely in setting of severe anemia    Plan:  Repeat imaging if pt's respiratory status worsens  Continue oxygen to keep oxygen saturations 88% and above.  HTN (hypertension)  Systolic BP 100s-130s.  Patient normotensive.  Blood pressure stable.    Plan:  Lopressor 25 mg every 12 hours  CAD (coronary artery disease)  TTE 2/12/25: EF 55%, Normal systolic dysfunction, G1DD, No vegetation, No mural thrombus, moderate aortic sclerosis     Plan:  Hold aspirin in setting of GI bleed  History of CVA (cerebrovascular accident)  Pt has history of CVA  MRI brain: Multiple tiny recent infarcts scattered in multiple vascular distributions that are likely embolic. Several microhemorrhages associated with recent infarcts. No acute space-occupying  hematoma. Chronic ischemic changes.  [facility] 2/17: 3 mm focus of hyperdensity left superior frontal lobe as described unchanged from the prior study may represent a small 3 mm hemorrhage versus focus of mineralization.    Plan:  Hold Eliquis and aspirin in setting of GI/ bleed  Continue Lipitor 40 mg daily  Urinary retention  Patient has history of urinary retention and was started on Flomax during last hospitalization  Pt denies difficulty urinating. Has dark/black urine on canister at bedside.    Plan:  Flomax 0.4 mg daily  Urinary retention protocol  Neck pain  Patient complained of neck pains.  Patient told me, that he had been having neck pains in the past, however, had worsened since his last admission here when he was found to have an infection in the blood.  MRI of the neck done 2/14/2025 revealed cervical degenerative change with mild canal stenosis and mild to moderate foraminal narrowing.  No cord compression.  Mild nonspecific edema within the right posterior breast dermal musculature of the upper cervicals spine.  Minimal peripheral enhancement is noted.  Differential considerations include infectious/inflammatory myositis with soft tissue abscess.  No discitis or osteomyelitis.  Pain management.  Will plan for repeat MRI C-spine with contrast once ORIANA resolves per ID recommendations    Dysphagia  From the previous problem list, history of esophageal dysphagia.  Today, patient admitted to still having difficulty swallowing and having feeling of food gets stuck on his throat, at times.  He attributed this to his throat being dry at times.  Speech therapy consult, cleared for regular foods  Will hold off on gastroenterology consult as patient recently was seen by GI during his last admission with EGD and colonoscopy done  Patient continue to report painful swallowing   VBS done on 2/15/25 showing food retention due to pharyngeal weakness  EGD was done last month which showed normal esophagus.   ENT  "recommending outpatient follow-up with Dr. Flynn Reyez  Abnormal urinalysis  Patient had been having dark/black urine, and persistent.  Recently diagnosed with urinary retention and was started on Flomax.  Urology consult, appreciate recommendations  No immediate indication for any acute intervention  Outpatient cystoscopy  Continue Flomax and Proscar  Monitor voiding and trend labs  Serial urine collection  Hydration  Tang for evidence of clot retention, not indicated at this time  Can restart eliquis, held off for now due to dark urine   Gross hematuria  Urology consulted, please see plan and \"abnormal urinalysis\"  currently holding Eliquis, aspirin, iron  Severe protein-calorie malnutrition (HCC)  Malnutrition Findings:   Adult Malnutrition type: Acute illness  Adult Degree of Malnutrition: Other severe protein calorie malnutrition  Malnutrition Characteristics: Inadequate energy, Fluid accumulation  360 Statement: related to inadequate energy/protein intake as evidenced by consuming < 50% of energy intake compared to estimated needs for > 5 days and B/L LE +3 edema. Treated with ONS.  BMI Findings:     Body mass index is 26.61 kg/m².   Ambulatory dysfunction  Worsening ambulatory dysfunction since December  PT/OT evaluations recommend level 2   Discussed with podiatry, patient has chronic left ankle fracture over 2 years ago, recommend WBAT to left foot as curbside, no further podiatry needs   Consider ambulatory Ortho consult and brace fitting     VTE Pharmacologic Prophylaxis: VTE Score: 5 High Risk (Score >/= 5) - Pharmacological DVT Prophylaxis Contraindicated. Sequential Compression Devices Ordered.    Mobility:   Basic Mobility Inpatient Raw Score: 9  JH-HLM Goal: 3: Sit at edge of bed  JH-HLM Achieved: 5: Stand (1 or more minutes)  JH-HLM Goal NOT achieved. Continue with multidisciplinary rounding and encourage appropriate mobility to improve upon JH-HLM goals.    Patient Centered Rounds: I performed " bedside rounds with nursing staff today.   Discussions with Specialists or Other Care Team Provider: ID, ENT, Urology    Education and Discussions with Family / Patient: Updated  (wife) via phone.    Current Length of Stay: 4 day(s)  Current Patient Status: Inpatient   Certification Statement: The patient will continue to require additional inpatient hospital stay due to ORIANA, MSSA bacteremia, and symptomatic anemia requiring blood transfusions   Discharge Plan: Anticipate discharge in >72 hrs to discharge location to be determined pending rehab evaluations.    Code Status: Level 1 - Full Code    Subjective   Patient seen and examined at bedside, does not endorse any bowel movements overnight or any other additional bleeding.  Denies any abdominal pain, chest pain, shortness of breath.  Patient saturating well on baseline 2.5 L.    Objective :  Temp:  [97.3 °F (36.3 °C)-99.3 °F (37.4 °C)] 97.9 °F (36.6 °C)  HR:  [64-78] 64  BP: (113-134)/(49-58) 129/57  Resp:  [16-19] 16  SpO2:  [96 %-100 %] 96 %    Body mass index is 26.61 kg/m².     Input and Output Summary (last 24 hours):     Intake/Output Summary (Last 24 hours) at 3/4/2025 0646  Last data filed at 3/3/2025 2003  Gross per 24 hour   Intake --   Output 1350 ml   Net -1350 ml       Physical Exam  Vitals and nursing note reviewed.   Constitutional:       General: He is not in acute distress.     Appearance: He is well-developed.   HENT:      Head: Normocephalic and atraumatic.   Eyes:      Conjunctiva/sclera: Conjunctivae normal.   Cardiovascular:      Rate and Rhythm: Normal rate and regular rhythm.      Heart sounds: No murmur heard.  Pulmonary:      Effort: Pulmonary effort is normal. No respiratory distress.      Comments: Diminished breath sounds bilaterally without evidence of rales  Abdominal:      Palpations: Abdomen is soft.      Tenderness: There is no abdominal tenderness.   Musculoskeletal:         General: No swelling.      Cervical back:  Neck supple.   Skin:     General: Skin is warm and dry.      Capillary Refill: Capillary refill takes less than 2 seconds.      Findings: Rash present.      Comments: Unchanged petechial rash on right lower extremity   Neurological:      General: No focal deficit present.      Mental Status: He is alert and oriented to person, place, and time.      Motor: No weakness (Generalized).   Psychiatric:         Mood and Affect: Mood normal.           Lines/Drains:  Lines/Drains/Airways       Active Status       Name Placement date Placement time Site Days    PICC Line 02/26/25 Right Brachial 02/26/25  0548  Brachial  6                    Central Line:  Goal for removal: N/A - Chronic PICC N/A - Discharging with PICC for IV ABX/medications               Lab Results: I have reviewed the following results:   Results from last 7 days   Lab Units 03/04/25  0525   WBC Thousand/uL 4.96   HEMOGLOBIN g/dL 8.2*   HEMATOCRIT % 26.2*   PLATELETS Thousands/uL 173   SEGS PCT % 71   LYMPHO PCT % 14   MONO PCT % 11   EOS PCT % 2     Results from last 7 days   Lab Units 03/04/25  0525   SODIUM mmol/L 141   POTASSIUM mmol/L 3.7   CHLORIDE mmol/L 110*   CO2 mmol/L 26   BUN mg/dL 41*   CREATININE mg/dL 1.85*   ANION GAP mmol/L 5   CALCIUM mg/dL 7.9*   ALBUMIN g/dL 2.1*   TOTAL BILIRUBIN mg/dL 0.38   ALK PHOS U/L 51   ALT U/L <3*   AST U/L 16   GLUCOSE RANDOM mg/dL 84     Results from last 7 days   Lab Units 03/02/25  1013   INR  1.36*                   Recent Cultures (last 7 days):         XR chest PICC line portable  Result Date: 3/2/2025  Impression: Right PICC in upper SVC. Moderate pulmonary venous congestion with small pleural effusions and bibasilar atelectasis. Workstation performed: [id number]     XR chest 1 view portable  Result Date: 2/28/2025  Impression: Cardiomegaly with central congestion and left greater than right small basilar effusions. Workstation performed: [id number]     CT chest w ct abdomen pelvis w wo  contrast  Addendum Date: 2/28/2025  ADDENDUM: Correction to spleen section in the body of report. Hyperattenuation should be hypoattenuation. Grossly stable posterior splenic subcapsular hypodensity presumably an infarct unchanged allowing for difference in contrast timing.     Result Date: 2/28/2025  Impression: CT chest: Decreased intracardiac blood pool density compatible with anemia. New bilateral multi lobar interstitial thickening and tree-in-bud opacities may represent pulmonary vascular congestion or nonspecific inflammatory or infectious process. Bilateral pleural effusions, left greater than right, mildly enlarged and additional findings suggestive of pulmonary vascular congestion. Correlate with clinical findings. 5 mm left upper lobe nodule stable since 2/11/2025 and new since December 2023. 6 mm right lower lobe nodule new since 2/11/2025. Noncontrast chest CT follow-up in 3 months is advised. Additional chronic findings and negatives as above. CT abdomen and pelvis: No evidence of high-volume gastrointestinal bleeding. Stable upper cecal/proximal ascending colonic mass attributed to recently biopsy-proven adenocarcinoma. Colonic diverticulosis. No evidence of abdominopelvic metastatic disease. Additional chronic findings and negatives as above. The study was marked in EPIC for immediate notification. Workstation performed: [id number]       No Chest XR results available for this patient.     Other Study Results Review: No additional pertinent studies reviewed.    Last 24 Hours Medication List:     Current Facility-Administered Medications:     acetaminophen (TYLENOL) tablet 650 mg, Q6H PRN    albuterol inhalation solution 2.5 mg, Q6H PRN    ascorbic acid (VITAMIN C) tablet 250 mg, Daily    atorvastatin (LIPITOR) tablet 40 mg, Daily With Dinner    ceFAZolin (ANCEF) IVPB (premix in dextrose) 2,000 mg 50 mL, Q8H, Last Rate: 2,000 mg (03/04/25 0259)    Cholecalciferol (VITAMIN D3) tablet 2,000 Units,  Daily    cyanocobalamin (VITAMIN B-12) tablet 100 mcg, Daily    fluticasone (ARNUITY ELLIPTA) 100 MCG/ACT inhaler 1 puff, Daily    folic acid (FOLVITE) tablet 1 mg, Daily    HYDROmorphone HCl (DILAUDID) injection 0.2 mg, Q4H PRN    hydroxychloroquine (PLAQUENIL) tablet 400 mg, Daily With Breakfast    levalbuterol (XOPENEX) inhalation solution 1.25 mg, Q8H PRN    lidocaine (LIDODERM) 5 % patch 3 patch, Daily    metoprolol tartrate (LOPRESSOR) tablet 25 mg, Q12H GALE    oxyCODONE (ROXICODONE) IR tablet 5 mg, Q6H PRN    oxyCODONE (ROXICODONE) split tablet 2.5 mg, Q6H PRN    pantoprazole (PROTONIX) EC tablet 40 mg, Daily Before Breakfast    tamsulosin (FLOMAX) capsule 0.4 mg, Daily With Dinner    Administrative Statements   Today, Patient Was Seen By: Mal Neely DO      **Please Note: This note may have been constructed using a voice recognition system.**

## 2025-03-04 NOTE — ASSESSMENT & PLAN NOTE
Worsening ambulatory dysfunction since December  PT/OT evaluations recommend level 2   Discussed with podiatry, patient has chronic left ankle fracture over 2 years ago, recommend WBAT to left foot as curbside, no further podiatry needs   Consider ambulatory Ortho consult and brace fitting

## 2025-03-04 NOTE — QUICK NOTE
Correction from previous progress note, patient is on chronic oxygen 2-4L, not due to paralyzed diaphragm but due to COPD.

## 2025-03-05 PROBLEM — Z91.89 AT RISK FOR ELECTROLYTE IMBALANCE: Status: ACTIVE | Noted: 2025-03-05

## 2025-03-05 PROBLEM — R31.0 GROSS HEMATURIA: Status: RESOLVED | Noted: 2025-01-01 | Resolved: 2025-01-01

## 2025-03-05 PROBLEM — R82.90 ABNORMAL URINALYSIS: Status: RESOLVED | Noted: 2025-03-01 | Resolved: 2025-03-05

## 2025-03-05 PROBLEM — R79.89 ELEVATED TROPONIN: Status: RESOLVED | Noted: 2025-01-01 | Resolved: 2025-01-01

## 2025-03-05 NOTE — CONSULTS
NEPHROLOGY HOSPITAL CONSULTATION   Devin Chaves 79 y.o. male MRN: [id number]  Unit/Bed#: S -01 Encounter: [id number]    Brief History of Admission - 79 year old male who presented to [facility] with 2 days of CP and SOB with melena and nonproductive cough, He was then admitted. Patient had anemia on presentation with PMH of Afib on Eliquis, JAZMINE, COPD on 2L NC, RA on plaquenil who presents with anemia.  Hemoglobin 4.6 on admission, 3 units PRBC in ER and 1 while on the unit 3/2. nephrology consulted for ORIANA    Assessment & Plan  ORIANA (acute kidney injury) (HCC)  Etiology: Likely to hypovolemia related to blood loss + age related nephron loss + Hypertensive Nephropathy. +/- component of ATN d/t ischemia with Melena/low hemoglobin + Colon mass/cancer   Admission Cr: 2.43 (2/28)  Current Cr: 1.80 (3/5)  Baseline Cr: 0.9-1.1 since 2017  Per Care Everywhere, [facility], and Patient - does not follow outpatient with Nephrology  Not currently on IV fluids  Was previously on Plasma-Lyte IV fluids 75 mL/hr. Last given 3/3  Home Rx: Not an ACE/ARB, Diuretics, NSAIDs  While inpatient, no ACE/ARB or NSAIDs.    Did receive 1 dose of IV 40 mg Lasix on 3/3  UA 2/28/25: Specific gravity 1.036, trace ketones, large blood, trace leuks, +2 protein, 4-10 WBC  Hemoglobin on admission 4.6 - s/p 4 units PRBC (3 in ED and 1 on 3/2).   Current hemoglobin: 8.0  Patient underwent colonoscopy on 2/20/25 which indicated proximal ascending colon-across from the ileocecal valve there is a 2.5 cm hard friable polypoid mass appears malignant status post multiple biopsies. 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 No hydronephrosis or hydroureter. No urinary tract calculi. No perinephric collection.  LEFT KIDNEY AND URETER: No solid renal mass or detectable urothelial mass. Subcentimeter  hypoattenuating renal lesion(s), too small to characterize but statistically likely benign, which do not warrant follow-up.   No mets and stable upper cecal/proximal ascending colonic mass attributed to recently biopsy-proven adenocarcinoma     Current Status:  Creatinine currently 1.80 proving from original admission creatinine  Recommend dose of 20mg IV Lasix one time and 12.5 g Albumin one time d/t hypervolemia + decreased urine output. Will reevaluate in AM.  Bladder scan and retention protocol for history of urinary retention/to avoid urinary retention   Recommend UACR d/t +2 protein in UA from 2/28/25, protein also noted in previous UA from 2/11 as well as dating back to 12/4/23.  Avoid ACE/ARB, NSAIDs, Nephrotoxic Agents, hypotension, and any further IV Contrast at this time  Labs in AM  HTN (hypertension)  Blood Pressure: 139/62  Fluid Status: hypervolemic  In-Patient Rx: Metoprolol 25 mg oral every 12 hours  Home Rx: None  Avoid perturbations and blood pressure  Maintain MAPs greater than 65 mmHg   Melena  Patient endorsed melena prior to admission  1 episode of dark, tarry stool overnight on 3/4/2025  Oral Protonix  Stool record at bedside  Monitor stools  Colorectal surgery follow-up  Symptomatic anemia  Per colorectal surgeon, in the absence of emergency indication, he is not recommending surgery at this time.  H&H every 12 hours  Transfuse for hemoglobin under 7  Per primary team  Urinary retention  Patient has history of urinary retention  Flomax 0.4 mg orally daily  Bladder scan and urinary retention protocol  Urology following  At risk for electrolyte imbalance  Magnesium level stable  Potassium 3.5 -was given 40 mEq p.o. potassium chloride this morning  monitor electrolytes  Per primary team  CAD (coronary artery disease)  TTE 2/12/2025: EF 55%  Normal systolic dysfunction, G1DD, No vegetation, No mural thrombus, moderate aortic sclerosis  COPD (chronic obstructive pulmonary disease) (HCC)  Patient  with COPD on 2-4 L supplemental O2 at baseline  Patient currently requiring 2 L NC.   Home Albuterol prn, Xopenex Q8 hrs prn, Mometasone inhaler daily  Per primary team  History of CVA (cerebrovascular accident)  Pt had multiple embolic strokes during admission from 2/11/25-2/22/25  Thought to be embolic strokes 2/2 MSSA bacteremia, no vegetations on TTE  Atrial fibrillation (HCC)  Home Rx: Eliquis 5 mg orally twice daily - on HOLD  On Lopressor 25 mg orally every 12 hours for rate control  Per primary team  MSSA bacteremia  Patient had MSSA bacteremia during recent hospitalization. Source is unclear but likely cutaneous   ID following  IV antibiotics  Neck pain  MRI of the neck done 2/14/2025 revealed cervical degenerative change with mild canal stenosis and mild to moderate foraminal narrowing. No cord compression. Mild nonspecific edema within the right posterior breast dermal musculature of the upper cervicals spine. Minimal peripheral enhancement is noted.     Colonic mass  2/20/2025 EGD/colonoscopy showed 2.5 cm ileocecal mass, pathology confirmed adenocarcinoma  Patient was due for follow-up with outpatient colorectal surgery on 2/28/2025  Presented with melena, hemoglobin 4.6 on admission, symptomatic anemia  Colorectal consulted and following  Pleural effusion, bilateral  CT A/P shows bilateral pleural effusions L>R, pulmonary vascular congestion     Other: Dysphagia, severe protein-calorie malnutrition    HISTORY OF PRESENT ILLNESS:  Requesting Physician: Fiordaliza Ibrahim MD  Reason for Consult: ORIANA Chaves is a 79 y.o. male 79 y.o. male with a PMH of COPD, chronic respiratory failure, JAZMINE, coronary artery disease, atrial fibrillation, rheumatoid arthritis on Plaquenil, hypertension, aortic ectasia, stroke, enlarged prostate, who [facility] from [facility] for shortness of breath on 2/11/25. Pt originally presented to the hospital on 2/11/2025 due to COPD exacerbation,  "complicated by MSSA bacteremia.  Patient then discharged from that hospitalization 2/22/25 to SNF.     Pt admitted to [facility]  for this admission on 2/28/25. This 79 year old male who presented with 2 days of CP and SOB with melena and nonproductive cough. He was then admitted. Patient had anemia on presentation. PMH of COPD, chronic respiratory failure, JAZMINE, coronary artery disease, atrial fibrillation, rheumatoid arthritis on Plaquenil, hypertension, aortic ectasia, stroke, enlarged prostate. While in ER patient received 3 units of PRBCs. Since then has had one more PRBC 3/2. CT Chest with contrast, A/P w/wo contrast 2/28/25: B/L pleural effusions,Stable upper cecal/proximal ascending colonic mass attributed to recently biopsy-proven adenocarcinoma. Colonic diverticulosis. No evidence of abdominopelvic metastatic disease. Pt follows with colorectal, urology, Hem-onc, Geriatrics.  A renal consultation is requested today for assistance in the management of ORIANA.    Patient assessed at bedside, patient awake and cooperative.  Complains of pain in his neck and back.  Denies nausea and vomiting.  No recent stool.  Patient complains of minimal shortness of breath, he states this \"is baseline\" since he has COPD.  Patient has increased shortness of breath with activity or if \"he gets works up and angry\". Pt on NC. Denies any chest pain or palpitations.  Scattered rash noted on right lower extremity.  Pitting edema noted on bilateral lower extremities, left greater than right.  Left Posey boot on.  Urinal at bedside, on Flomax.  Patient says he does not pee as often as he would like.  Patient does get bladder scanned. Pt has no questions at this time from a kidney standpoint.     PAST MEDICAL HISTORY:  Past Medical History:   Diagnosis Date    Atrial fibrillation (HCC)     COPD (chronic obstructive pulmonary disease) (HCC)     Crushing injury of finger, left     Infectious viral hepatitis        PAST SURGICAL HISTORY:  Past " "Surgical History:   Procedure Laterality Date    FL LUMBAR PUNCTURE DIAGNOSTIC  2/10/2022    AL OPEN TX PHALANGEAL SHAFT FRACTURE PROX/MIDDLE EA Left 1/25/2017    Procedure: ORIF LEFT SMALL FINGER FRACTURE;  Surgeon: Blake Badillo MD;  Location: [facility];  Service: Orthopedics       ALLERGIES:  Allergies   Allergen Reactions    Shellfish-Derived Products - Food Allergy Other (See Comments)     Pt states, \"I reacted, I dont know\"       SOCIAL HISTORY:  Social History     Substance and Sexual Activity   Alcohol Use No     Social History     Substance and Sexual Activity   Drug Use No     Social History     Tobacco Use   Smoking Status Former   Smokeless Tobacco Former    Quit date: 4/1/2011       FAMILY HISTORY:  History reviewed. No pertinent family history.    MEDICATIONS:    Current Facility-Administered Medications:     acetaminophen (TYLENOL) tablet 650 mg, 650 mg, Oral, Q6H PRN, Jean Brady MD    albuterol inhalation solution 2.5 mg, 2.5 mg, Nebulization, Q6H PRN, Kanchan Wilson MD    ascorbic acid (VITAMIN C) tablet 250 mg, 250 mg, Oral, Daily, Kanchan Wilson MD, 250 mg at 03/05/25 0832    atorvastatin (LIPITOR) tablet 40 mg, 40 mg, Oral, Daily With Dinner, Kanchan Wilson MD, 40 mg at 03/04/25 1745    ceFAZolin (ANCEF) IVPB (premix in dextrose) 2,000 mg 50 mL, 2,000 mg, Intravenous, Q8H, Olivia Martinez MD, Last Rate: 100 mL/hr at 03/05/25 1238, 2,000 mg at 03/05/25 1238    Cholecalciferol (VITAMIN D3) tablet 2,000 Units, 2,000 Units, Oral, Daily, Kanchan Wilson MD, 2,000 Units at 03/05/25 0830    cyanocobalamin (VITAMIN B-12) tablet 100 mcg, 100 mcg, Oral, Daily, Kanchan Wilson MD, 100 mcg at 03/05/25 0832    fluticasone (ARNUITY ELLIPTA) 100 MCG/ACT inhaler 1 puff, 1 puff, Inhalation, Daily, Kanchan Wilson MD, 1 puff at 03/05/25 0834    folic acid (FOLVITE) tablet 1 mg, 1 mg, Oral, Daily, Kanchan Wilson MD, 1 mg at 03/05/25 0832    HYDROmorphone HCl (DILAUDID) injection 0.2 mg, " 0.2 mg, Intravenous, Q4H PRN, Jean Brady MD    hydroxychloroquine (PLAQUENIL) tablet 400 mg, 400 mg, Oral, Daily With Breakfast, Kanchan Wilson MD, 400 mg at 03/05/25 0830    levalbuterol (XOPENEX) inhalation solution 1.25 mg, 1.25 mg, Nebulization, Q8H PRN, Kanchan Wilson MD    lidocaine (LIDODERM) 5 % patch 3 patch, 3 patch, Topical, Daily, Kanchan Wilson MD, 3 patch at 03/05/25 0832    metoprolol tartrate (LOPRESSOR) tablet 25 mg, 25 mg, Oral, Q12H GALE, Kanchan Wilson MD, 25 mg at 03/05/25 0832    oxyCODONE (ROXICODONE) IR tablet 5 mg, 5 mg, Oral, Q6H PRN, Jean Brady MD, 5 mg at 03/01/25 1817    oxyCODONE (ROXICODONE) split tablet 2.5 mg, 2.5 mg, Oral, Q6H PRN, Jean Brady MD, 2.5 mg at 03/05/25 0945    pantoprazole (PROTONIX) EC tablet 40 mg, 40 mg, Oral, Daily Before Breakfast, Jean Brady MD, 40 mg at 03/05/25 0513    polyethylene glycol (MIRALAX) packet 17 g, 17 g, Oral, Daily PRN, Kelly Herman MD    senna-docusate sodium (SENOKOT S) 8.6-50 mg per tablet 1 tablet, 1 tablet, Oral, HS, Kelly eHrman MD, 1 tablet at 03/04/25 2058    tamsulosin (FLOMAX) capsule 0.4 mg, 0.4 mg, Oral, Daily With Dinner, Kanchan Wilson MD, 0.4 mg at 03/04/25 1745    REVIEW OF SYSTEMS:  Respiratory:  + shortness of breath   Cardiovascular: Negative for chest pain and palpitations. + leg swelling.   Gastrointestinal: Negative for abdominal pain, constipation, diarrhea, nausea and vomiting. + GI bleeding  Genitourinary: No dysuria, hematuria   Musculoskeletal: + chronic back pain + neck pain    Skin: + rash on RLE  Neurological: Negative for focal weakness, headaches, dizziness.  All the systems were reviewed and were negative except as documented on the HPI.    PHYSICAL EXAM:  Current Weight: Weight - Scale: 93.8 kg (206 lb 12.7 oz)  First Weight: Weight - Scale: 91.2 kg (201 lb 1 oz)  Vitals:    03/04/25 1954 03/04/25 2229 03/05/25 0600 03/05/25 0737   BP:  120/54 118/55  147/60   Pulse: 67 63  70   Resp:    18   Temp:    98.5 °F (36.9 °C)   TempSrc:    Oral   SpO2: 99% 97%  98%   Weight:   93.8 kg (206 lb 12.7 oz)    Height:           Intake/Output Summary (Last 24 hours) at 3/5/2025 1308  Last data filed at 3/5/2025 0518  Gross per 24 hour   Intake --   Output 450 ml   Net -450 ml     Physical Exam  Vitals and nursing note reviewed.   Constitutional:       Appearance: He is ill-appearing.   HENT:      Head: Normocephalic.      Mouth/Throat:      Mouth: Mucous membranes are moist.   Eyes:      Comments: Glasses on   Cardiovascular:      Rate and Rhythm: Normal rate.      Pulses: Normal pulses.      Heart sounds: No murmur heard.     No gallop.   Pulmonary:      Effort: No respiratory distress.      Breath sounds: No wheezing.      Comments: Diminished lung sounds  Abdominal:      General: Bowel sounds are normal. There is no distension.      Palpations: Abdomen is soft.      Tenderness: There is no abdominal tenderness.   Musculoskeletal:         General: Swelling present.      Right lower le+ Edema present.      Left lower leg: 3+ Edema present.      Comments: Missing left pinky finger from previous accident   Skin:     General: Skin is warm and dry.      Capillary Refill: Capillary refill takes 2 to 3 seconds.      Findings: Bruising and rash present.   Neurological:      Mental Status: He is alert and oriented to person, place, and time.   Psychiatric:         Mood and Affect: Mood normal.         Behavior: Behavior normal.           Invasive Devices:      Lab Results:   Results from last 7 days   Lab Units 25  0525 25  0525 25  0823 25  0516 25  0603 25  2241   WBC Thousand/uL 5.28  --  4.96  --  4.69   < >  --    HEMOGLOBIN g/dL 8.0* 8.3* 8.2*   < > 8.1*   < > 7.4*   HEMATOCRIT % 26.1* 26.2* 26.2*   < > 25.2*   < > 22.5*   PLATELETS Thousands/uL 187  --  173  --  173   < >  --    POTASSIUM mmol/L 3.5  --  3.7  " --  3.6   < > 3.8   CHLORIDE mmol/L 111*  --  110*  --  110*   < > 109*   CO2 mmol/L 27  --  26  --  26   < > 25   BUN mg/dL 40*  --  41*  --  44*   < > 60*   CREATININE mg/dL 1.80*  --  1.85*  --  1.88*   < > 2.11*   CALCIUM mg/dL 7.9*  --  7.9*  --  7.9*   < > 7.3*   MAGNESIUM mg/dL 2.0  --  2.0  --  2.1   < >  --    ALK PHOS U/L  --   --  51  --  47  --  41   ALT U/L  --   --  <3*  --  <3*  --  <3*   AST U/L  --   --  16  --  16  --  15    < > = values in this interval not displayed.     Other Studies:     Portions of the record may have been created with voice recognition software. Occasional wrong word or \"sound a like\" substitutions may have occurred due to the inherent limitations of voice recognition software. Read the chart carefully and recognize, using context, where substitutions have occurred.If you have any questions, please contact the dictating provider.  "

## 2025-03-05 NOTE — ASSESSMENT & PLAN NOTE
MRI of the neck done 2/14/2025 revealed cervical degenerative change with mild canal stenosis and mild to moderate foraminal narrowing.  No cord compression.  Mild nonspecific edema within the right posterior breast dermal musculature of the upper cervicals spine.  Minimal peripheral enhancement is noted.     Plan:  Will plan for repeat MRI C-spine with contrast once ORIANA resolves per ID recommendations

## 2025-03-05 NOTE — ASSESSMENT & PLAN NOTE
TTE 2/12/2025: EF 55%  Normal systolic dysfunction, G1DD, No vegetation, No mural thrombus, moderate aortic sclerosis

## 2025-03-05 NOTE — ASSESSMENT & PLAN NOTE
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    Patient was due to follow-up with outpatient colorectal surgery on 2/28/2025  Pt presenting with melena, Hgb 4.6. Symptomatic anemia.    Plan:  Colorectal consulted.   No surgical intervention at this time.  Outpatient follow-up with colorectal surgery.  Oncology consulted, appreciate recs

## 2025-03-05 NOTE — ASSESSMENT & PLAN NOTE
Per colorectal surgeon, in the absence of emergency indication, he is not recommending surgery at this time.  H&H every 12 hours  Transfuse for hemoglobin under 7  Per primary team

## 2025-03-05 NOTE — ASSESSMENT & PLAN NOTE
Patient with COPD on 2-4 L supplemental O2 at baseline  Patient currently requiring 2 L NC.   Home Albuterol prn, Xopenex Q8 hrs prn, Mometasone inhaler daily  Per primary team

## 2025-03-05 NOTE — ASSESSMENT & PLAN NOTE
Patient developed acute neck pain with MSSA bacteremia during recent hospitalization.  CRP was highly elevated.  C-spine MRI showed possible C-spine and paraspinal infection.  Patient has no neurologic deficit.  However, his neck pain has not improved.  Given lack of improvement of neck pain, we should repeat C-spine MRI with contrast when creatinine is improved.  However, without any neurological deficit, it would be fine to postpone MRI until renal function is further improved, to decrease risk of contrast-induced ORIANA.  Antibiotic plan as in below.  Monitor neck pain.  Recommend repeat C-spine MRI with and without contrast.  This can be postponed until ORIANA further improves.

## 2025-03-05 NOTE — ASSESSMENT & PLAN NOTE
Patient has history of urinary retention  Flomax 0.4 mg orally daily  Bladder scan and urinary retention protocol  Urology following

## 2025-03-05 NOTE — ASSESSMENT & PLAN NOTE
Patient had MSSA bacteremia during recent hospitalization. Source is unclear but likely cutaneous   ID following  IV antibiotics

## 2025-03-05 NOTE — ASSESSMENT & PLAN NOTE
Patient with ORIANA on admission, most likely secondary to hypovolemia from GI bleed.  Creatinine is proving ever so slowly.  Cefazolin at full dose for now.  Monitor creatinine.

## 2025-03-05 NOTE — ASSESSMENT & PLAN NOTE
MRI of the neck done 2/14/2025 revealed cervical degenerative change with mild canal stenosis and mild to moderate foraminal narrowing. No cord compression. Mild nonspecific edema within the right posterior breast dermal musculature of the upper cervicals spine. Minimal peripheral enhancement is noted.

## 2025-03-05 NOTE — PROGRESS NOTES
Progress Note - Hospitalist   Name: Devin Chaves 79 y.o. male I MRN: [id number]  Unit/Bed#: S [facility]-01 I Date of Admission: 2/28/2025   Date of Service: 3/5/2025 I Hospital Day: 5    Assessment & Plan  Symptomatic anemia  Recent Labs     03/04/25  0525 03/04/25 2034 03/05/25  0525   HGB 8.2* 8.3* 8.0*      Patient presents with 2 days of chest pain, shortness of breath, fatigue and melena  2/20/2025 EGD/colonoscopy showed 2.5 cm ileocecal mass, pathology confirmed adenocarcinoma.  Patient was due to follow-up with outpatient colorectal surgery on 2/28/2025  S/p 3 units packed rbc in ED. S/p 1 unit PRBCs on 3/2    Plan:  H&H Q12 hrs  Transfuse for Hgb <7  Monitor for signs of overt bleeding or melenotic stools  Protonix 40 mg PO QD   Colorectal surgery consulted.   Oncology Consulted  Hold Eliquis and aspirin for now  Monitor on telemetry  Urology consulted for dark black urine,   Outpatient cystoscopy, urology signed off  Colonic mass  2/20/2025 EGD/colonoscopy showed 2.5 cm ileocecal mass, pathology confirmed adenocarcinoma.    Patient was due to follow-up with outpatient colorectal surgery on 2/28/2025  Pt presenting with melena, Hgb 4.6. Symptomatic anemia.    Plan:  Colorectal consulted.   No surgical intervention at this time.  Outpatient follow-up with colorectal surgery.  Oncology consulted, appreciate recs    Melena  Patient endorsed melena prior to admission  One episode dark, tarry stool overnight on 3/4/25. Not documented.    Plan:  Stool record at bedside  ORIANA (acute kidney injury) (HCC)  Recent Labs     03/03/25  0516 03/04/25  0525 03/05/25  0525   CREATININE 1.88* 1.85* 1.80*   EGFR 33 34 35     Estimated Creatinine Clearance: 37.7 mL/min (A) (by C-G formula based on SCr of 1.8 mg/dL (H)).     Cr 2.43 on admission. Baseline 0.9. BUN 63. BUN/Cr ratio >20    Plan:  Daily BMP  Avoid nephrotoxic medications  Monitor I/O's  Urinary retention protocol  Nephrology consulted, appreciate recs  [facility]  bacteremia  Patient was discharged on cefazolin 2 g IV Q8 hrs until 3/27/2025. PICC line in place. Patient rescheduled to follow-up outpatient with ID    Plan:  Continue with cefazolin 2 g IV every 8 hours.  Spoke to the infectious disease.  Consult infectious disease.  Recommend repeat C-spine MRI with and without contrast, postpone until ORIANA resolves  6 weeks treatment through 3/27/2025  COPD (chronic obstructive pulmonary disease) (HCC)  Patient with COPD on 2-4 L supplemental O2 at baseline  Home Albuterol prn, Xopenex Q8 hrs prn, Mometasone inhaler daily    Plan:  Titrate O2 to maintain SpO2 greater than 88%   Xopenex nebs Q8 hrs prn, Mometasone daily  Albuterol prn  Atrial fibrillation (HCC)  AC: Eliquis 5 mg BID  Rate control: Lopressor 25 mg Q12 hrs    Plan:  Hold eliquis in setting of melena  Continue Lopressor 25 mg Q12 hrs  Pleural effusion, bilateral  CT A/P shows bilateral pleural effusions L>R, pulmonary vascular congestion    Plan:  Repeat imaging if pt's respiratory status worsens  Continue oxygen to keep oxygen saturations 88% and above.  HTN (hypertension)  Systolic BP 100s-130s.  Patient normotensive.  Blood pressure stable.    Plan:  Lopressor 25 mg every 12 hours  CAD (coronary artery disease)  TTE 2/12/25: EF 55%, Normal systolic dysfunction, G1DD, No vegetation, No mural thrombus, moderate aortic sclerosis     Plan:  Hold aspirin in setting of GI bleed  History of CVA (cerebrovascular accident)  Pt had multiple embolic strokes during admission from 2/11/25-2/22/25  Thought to be embolic strokes 2/2 MSSA bacteremia, no vegetations on TTE    Plan:  Hold Eliquis and aspirin in setting of GI/ bleed  Continue Lipitor 40 mg daily  Urinary retention  Patient has history of urinary retention and was started on Flomax during last hospitalization  Pt denies difficulty urinating at this shayy    Plan:  Flomax 0.4 mg daily  Urinary retention protocol  Neck pain  MRI of the neck done 2/14/2025 revealed  "cervical degenerative change with mild canal stenosis and mild to moderate foraminal narrowing.  No cord compression.  Mild nonspecific edema within the right posterior breast dermal musculature of the upper cervicals spine.  Minimal peripheral enhancement is noted.     Plan:  Will plan for repeat MRI C-spine with contrast once ORIANA resolves per ID recommendations    Dysphagia  VBS done on 2/15/25 showing food retention due to pharyngeal weakness  EGD was done last month which showed normal esophagus.   ENT recommending outpatient follow-up with Dr. Flynn Reyez    Plan:  Normal diet as tolerated  Severe protein-calorie malnutrition (HCC)  Malnutrition Findings:   Adult Malnutrition type: Acute illness  Adult Degree of Malnutrition: Other severe protein calorie malnutrition  Malnutrition Characteristics: Inadequate energy, Fluid accumulation  360 Statement: related to inadequate energy/protein intake as evidenced by consuming < 50% of energy intake compared to estimated needs for > 5 days and B/L LE +3 edema. Treated with ONS.  BMI Findings:     Body mass index is 26.16 kg/m².     Plan:  Ensure supplements  Ambulatory dysfunction  Worsening ambulatory dysfunction since December  PT/OT evaluations recommend level 2   Discussed with podiatry, patient has chronic left ankle fracture over 2 years ago, recommend WBAT to left foot as curbside, no further podiatry needs   Consider ambulatory Ortho consult and brace fitting     Plan:  Weight bearing status: As tolerated with brace  Gross hematuria (Resolved: 3/5/2025)  Urology consulted, please see plan and \"abnormal urinalysis\"  currently holding Eliquis, aspirin, iron  Abnormal urinalysis (Resolved: 3/5/2025)  Patient had been having dark/black urine, and persistent.  Recently diagnosed with urinary retention and was started on Flomax.  Urology consult, appreciate recommendations  No immediate indication for any acute intervention  Outpatient cystoscopy  Continue Flomax and " Proscar  Monitor voiding and trend labs  Serial urine collection  Hydration  Tang for evidence of clot retention, not indicated at this time  Can restart eliquis, held off for now due to dark urine   Elevated troponin (Resolved: 3/5/2025)  Lab Results   Component Value Date    HSTNI0 134 (H) 02/28/2025    HSTNI2 142 (H) 02/28/2025    HSTNID2 8 02/28/2025    HSTNI4 138 (H) 02/28/2025    HSTNID4 4 02/28/2025      Patient presents with chest pain and Hgb 4.6.  Continues to report no chest pain.  Initial troponin was elevated, but second and fourth hour delta levels were negative. EKG unremarkable.   Etiology nonischemic myocardial injury in setting of symptomatic anemia versus non-MI troponin elevation secondary to anemia.    VTE Pharmacologic Prophylaxis: VTE Score: 5 High Risk (Score >/= 5) - Pharmacological DVT Prophylaxis Contraindicated. Sequential Compression Devices Ordered.    Mobility:   Basic Mobility Inpatient Raw Score: 9  -[facility] Goal: 3: Sit at edge of bed  -HLM Achieved: 4: Move to chair/commode  -HLM Goal achieved. Continue to encourage appropriate mobility.    Patient Centered Rounds: I performed bedside rounds with nursing staff today.   Discussions with Specialists or Other Care Team Provider: Colorectal surgery, Oncology, Nephrology    Education and Discussions with Family / Patient: Updated  (wife) at bedside.    Current Length of Stay: 5 day(s)  Current Patient Status: Inpatient   Certification Statement: The patient will continue to require additional inpatient hospital stay due to ORIANA and further imaging to assess progression of myositis  Discharge Plan: Anticipate discharge in >72 hrs to rehab facility.    Code Status: Level 1 - Full Code    Subjective   Patient seen and examined at bedside, states that he is feeling well this morning endorses dyspnea on exertion but this is not unchanged from baseline.  Currently saturating well on 3 L which is consistent with his baseline  oxygen requirement.  Denies any chest pain, fever, worsening of neck or back pain.  Did confirm with patient that if able he does want to pursue surgery to manage his colonic mass, had discussion with patient and he understands that he may not be a surgical candidate given his severe COPD.    Objective :  Temp:  [97.8 °F (36.6 °C)-99 °F (37.2 °C)] 99 °F (37.2 °C)  HR:  [62-76] 63  BP: (107-125)/(46-55) 118/55  Resp:  [18] 18  SpO2:  [97 %-100 %] 97 %    Body mass index is 26.16 kg/m².     Input and Output Summary (last 24 hours):     Intake/Output Summary (Last 24 hours) at 3/5/2025 0629  Last data filed at 3/5/2025 0518  Gross per 24 hour   Intake --   Output 450 ml   Net -450 ml       Physical Exam  Vitals and nursing note reviewed.   Constitutional:       General: He is not in acute distress.     Appearance: He is well-developed.   HENT:      Head: Normocephalic and atraumatic.   Eyes:      Conjunctiva/sclera: Conjunctivae normal.   Cardiovascular:      Rate and Rhythm: Normal rate and regular rhythm.      Heart sounds: No murmur heard.  Pulmonary:      Effort: Pulmonary effort is normal. No respiratory distress.      Breath sounds: Normal breath sounds.   Abdominal:      Palpations: Abdomen is soft.      Tenderness: There is no abdominal tenderness.   Musculoskeletal:      Cervical back: Neck supple.      Right lower leg: Edema present.      Left lower leg: Edema present.      Comments: 2+ pitting edema bilaterally  L ankle in contraction boot   Skin:     General: Skin is warm and dry.      Capillary Refill: Capillary refill takes less than 2 seconds.   Neurological:      General: No focal deficit present.      Mental Status: He is alert and oriented to person, place, and time.   Psychiatric:         Mood and Affect: Mood normal.           Lines/Drains:  Lines/Drains/Airways       Active Status       Name Placement date Placement time Site Days    PICC Line 02/26/25 Right Brachial 02/26/25  0548  Brachial  7                     Central Line:  Goal for removal: N/A - Discharging with PICC for IV ABX/medications               Lab Results: I have reviewed the following results:   Results from last 7 days   Lab Units 03/05/25  0525   WBC Thousand/uL 5.28   HEMOGLOBIN g/dL 8.0*   HEMATOCRIT % 26.1*   PLATELETS Thousands/uL 187   SEGS PCT % 65   LYMPHO PCT % 21   MONO PCT % 11   EOS PCT % 1     Results from last 7 days   Lab Units 03/05/25  0525 03/04/25  0525   SODIUM mmol/L 142 141   POTASSIUM mmol/L 3.5 3.7   CHLORIDE mmol/L 111* 110*   CO2 mmol/L 27 26   BUN mg/dL 40* 41*   CREATININE mg/dL 1.80* 1.85*   ANION GAP mmol/L 4 5   CALCIUM mg/dL 7.9* 7.9*   ALBUMIN g/dL  --  2.1*   TOTAL BILIRUBIN mg/dL  --  0.38   ALK PHOS U/L  --  51   ALT U/L  --  <3*   AST U/L  --  16   GLUCOSE RANDOM mg/dL 82 84     Results from last 7 days   Lab Units 03/02/25  1013   INR  1.36*                   Recent Cultures (last 7 days):         XR chest PICC line portable  Result Date: 3/2/2025  Impression: Right PICC in upper SVC. Moderate pulmonary venous congestion with small pleural effusions and bibasilar atelectasis. Workstation performed: [id number]     XR chest 1 view portable  Result Date: 2/28/2025  Impression: Cardiomegaly with central congestion and left greater than right small basilar effusions. Workstation performed: [id number]     CT chest w ct abdomen pelvis w wo contrast  Addendum Date: 2/28/2025  ADDENDUM: Correction to spleen section in the body of report. Hyperattenuation should be hypoattenuation. Grossly stable posterior splenic subcapsular hypodensity presumably an infarct unchanged allowing for difference in contrast timing.     Result Date: 2/28/2025  Impression: CT chest: Decreased intracardiac blood pool density compatible with anemia. New bilateral multi lobar interstitial thickening and tree-in-bud opacities may represent pulmonary vascular congestion or nonspecific inflammatory or infectious process. Bilateral  pleural effusions, left greater than right, mildly enlarged and additional findings suggestive of pulmonary vascular congestion. Correlate with clinical findings. 5 mm left upper lobe nodule stable since 2/11/2025 and new since December 2023. 6 mm right lower lobe nodule new since 2/11/2025. Noncontrast chest CT follow-up in 3 months is advised. Additional chronic findings and negatives as above. CT abdomen and pelvis: No evidence of high-volume gastrointestinal bleeding. Stable upper cecal/proximal ascending colonic mass attributed to recently biopsy-proven adenocarcinoma. Colonic diverticulosis. No evidence of abdominopelvic metastatic disease. Additional chronic findings and negatives as above. The study was marked in EPIC for immediate notification. Workstation performed: [id number]       No Chest XR results available for this patient.     Other Study Results Review: No additional pertinent studies reviewed.    Last 24 Hours Medication List:     Current Facility-Administered Medications:     acetaminophen (TYLENOL) tablet 650 mg, Q6H PRN    albuterol inhalation solution 2.5 mg, Q6H PRN    ascorbic acid (VITAMIN C) tablet 250 mg, Daily    atorvastatin (LIPITOR) tablet 40 mg, Daily With Dinner    ceFAZolin (ANCEF) IVPB (premix in dextrose) 2,000 mg 50 mL, Q8H, Last Rate: 2,000 mg (03/05/25 0234)    Cholecalciferol (VITAMIN D3) tablet 2,000 Units, Daily    cyanocobalamin (VITAMIN B-12) tablet 100 mcg, Daily    fluticasone (ARNUITY ELLIPTA) 100 MCG/ACT inhaler 1 puff, Daily    folic acid (FOLVITE) tablet 1 mg, Daily    HYDROmorphone HCl (DILAUDID) injection 0.2 mg, Q4H PRN    hydroxychloroquine (PLAQUENIL) tablet 400 mg, Daily With Breakfast    levalbuterol (XOPENEX) inhalation solution 1.25 mg, Q8H PRN    lidocaine (LIDODERM) 5 % patch 3 patch, Daily    metoprolol tartrate (LOPRESSOR) tablet 25 mg, Q12H GALE    oxyCODONE (ROXICODONE) IR tablet 5 mg, Q6H PRN    oxyCODONE (ROXICODONE) split tablet 2.5 mg, Q6H PRN     pantoprazole (PROTONIX) EC tablet 40 mg, Daily Before Breakfast    polyethylene glycol (MIRALAX) packet 17 g, Daily    potassium chloride (Klor-Con M20) CR tablet 40 mEq, Once    senna-docusate sodium (SENOKOT S) 8.6-50 mg per tablet 1 tablet, HS    tamsulosin (FLOMAX) capsule 0.4 mg, Daily With Dinner    Administrative Statements   Today, Patient Was Seen By: Mal Neely DO      **Please Note: This note may have been constructed using a voice recognition system.**

## 2025-03-05 NOTE — ASSESSMENT & PLAN NOTE
Patient endorsed melena prior to admission  1 episode of dark, tarry stool overnight on 3/4/2025  Oral Protonix  Stool record at bedside  Monitor stools  Colorectal surgery follow-up

## 2025-03-05 NOTE — ASSESSMENT & PLAN NOTE
Patient endorsed melena prior to admission  One episode dark, tarry stool overnight on 3/4/25. Not documented.    Plan:  Stool record at bedside

## 2025-03-05 NOTE — ASSESSMENT & PLAN NOTE
Recent Labs     03/03/25  0516 03/04/25  0525 03/05/25  0525   CREATININE 1.88* 1.85* 1.80*   EGFR 33 34 35     Estimated Creatinine Clearance: 37.7 mL/min (A) (by C-G formula based on SCr of 1.8 mg/dL (H)).     Cr 2.43 on admission. Baseline 0.9. BUN 63. BUN/Cr ratio >20    Plan:  Daily BMP  Avoid nephrotoxic medications  Monitor I/O's  Urinary retention protocol  Nephrology consulted, appreciate recs

## 2025-03-05 NOTE — ASSESSMENT & PLAN NOTE
2/20/2025 EGD/colonoscopy showed 2.5 cm ileocecal mass, pathology confirmed adenocarcinoma  Patient was due for follow-up with outpatient colorectal surgery on 2/28/2025  Presented with melena, hemoglobin 4.6 on admission, symptomatic anemia  Colorectal consulted and following

## 2025-03-05 NOTE — ASSESSMENT & PLAN NOTE
Malnutrition Findings:   Adult Malnutrition type: Acute illness  Adult Degree of Malnutrition: Other severe protein calorie malnutrition  Malnutrition Characteristics: Inadequate energy, Fluid accumulation  360 Statement: related to inadequate energy/protein intake as evidenced by consuming < 50% of energy intake compared to estimated needs for > 5 days and B/L LE +3 edema. Treated with ONS.  BMI Findings:     Body mass index is 26.16 kg/m².     Plan:  Ensure supplements

## 2025-03-05 NOTE — ASSESSMENT & PLAN NOTE
Magnesium level stable  Potassium 3.5 -was given 40 mEq p.o. potassium chloride this morning  monitor electrolytes  Per primary team

## 2025-03-05 NOTE — ASSESSMENT & PLAN NOTE
Patient with COPD on 2-4 L supplemental O2 at baseline  Home Albuterol prn, Xopenex Q8 hrs prn, Mometasone inhaler daily    Plan:  Titrate O2 to maintain SpO2 greater than 88%   Xopenex nebs Q8 hrs prn, Mometasone daily  Albuterol prn

## 2025-03-05 NOTE — ASSESSMENT & PLAN NOTE
AC: Eliquis 5 mg BID  Rate control: Lopressor 25 mg Q12 hrs    Plan:  Hold eliquis in setting of melena  Continue Lopressor 25 mg Q12 hrs

## 2025-03-05 NOTE — ASSESSMENT & PLAN NOTE
Home Rx: Eliquis 5 mg orally twice daily - on HOLD  On Lopressor 25 mg orally every 12 hours for rate control  Per primary team

## 2025-03-05 NOTE — ASSESSMENT & PLAN NOTE
VBS done on 2/15/25 showing food retention due to pharyngeal weakness  EGD was done last month which showed normal esophagus.   ENT recommending outpatient follow-up with Dr. Flynn Reyez    Plan:  Normal diet as tolerated

## 2025-03-05 NOTE — ASSESSMENT & PLAN NOTE
Etiology: Likely to hypovolemia related to blood loss + age related nephron loss + Hypertensive Nephropathy. +/- component of ATN d/t ischemia with Melena/low hemoglobin + Colon mass/cancer   Admission Cr: 2.43 (2/28)  Current Cr: 1.80 (3/5)  Baseline Cr: 0.9-1.1 since 2017  Per Care Everywhere, Epic, and Patient - does not follow outpatient with Nephrology  Not currently on IV fluids  Was previously on Plasma-Lyte IV fluids 75 mL/hr. Last given 3/3  Home Rx: Not an ACE/ARB, Diuretics, NSAIDs  While inpatient, no ACE/ARB or NSAIDs.    Did receive 1 dose of IV 40 mg Lasix on 3/3  UA 2/28/25: Specific gravity 1.036, trace ketones, large blood, trace leuks, +2 protein, 4-10 WBC  Hemoglobin on admission 4.6 - s/p 4 units PRBC (3 in ED and 1 on 3/2).   Current hemoglobin: 8.0  Patient underwent colonoscopy on 2/20/25 which indicated proximal ascending colon-across from the ileocecal valve there is a 2.5 cm hard friable polypoid mass appears malignant status post multiple biopsies. 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 No hydronephrosis or hydroureter. No urinary tract calculi. No perinephric collection.  LEFT KIDNEY AND URETER: No solid renal mass or detectable urothelial mass. Subcentimeter hypoattenuating renal lesion(s), too small to characterize but statistically likely benign, which do not warrant follow-up.   No mets and stable upper cecal/proximal ascending colonic mass attributed to recently biopsy-proven adenocarcinoma     Current Status:  Creatinine currently 1.80 proving from original admission creatinine  Recommend dose of 20mg IV Lasix one time and 12.5 g Albumin one time d/t hypervolemia + decreased urine output. Will reevaluate in AM.  Bladder scan and retention protocol for history of urinary retention/to avoid urinary retention   Recommend  UACR d/t +2 protein in UA from 2/28/25, protein also noted in previous UA from 2/11 as well as dating back to 12/4/23.  Avoid ACE/ARB, NSAIDs, Nephrotoxic Agents, hypotension, and any further IV Contrast at this time  Labs in AM

## 2025-03-05 NOTE — ASSESSMENT & PLAN NOTE
Blood Pressure: 139/62  Fluid Status: hypervolemic  In-Patient Rx: Metoprolol 25 mg oral every 12 hours  Home Rx: None  Avoid perturbations and blood pressure  Maintain MAPs greater than 65 mmHg

## 2025-03-05 NOTE — ASSESSMENT & PLAN NOTE
Recent Labs     03/04/25  0525 03/04/25 2034 03/05/25  0525   HGB 8.2* 8.3* 8.0*      Patient presents with 2 days of chest pain, shortness of breath, fatigue and melena  2/20/2025 EGD/colonoscopy showed 2.5 cm ileocecal mass, pathology confirmed adenocarcinoma.  Patient was due to follow-up with outpatient colorectal surgery on 2/28/2025  S/p 3 units packed rbc in ED. S/p 1 unit PRBCs on 3/2    Plan:  H&H Q12 hrs  Transfuse for Hgb <7  Monitor for signs of overt bleeding or melenotic stools  Protonix 40 mg PO QD   Colorectal surgery consulted.   Oncology Consulted  Hold Eliquis and aspirin for now  Monitor on telemetry  Urology consulted for dark black urine,   Outpatient cystoscopy, urology signed off

## 2025-03-05 NOTE — ASSESSMENT & PLAN NOTE
Worsening ambulatory dysfunction since December  PT/OT evaluations recommend level 2   Discussed with podiatry, patient has chronic left ankle fracture over 2 years ago, recommend WBAT to left foot as curbside, no further podiatry needs   Consider ambulatory Ortho consult and brace fitting     Plan:  Weight bearing status: As tolerated with brace

## 2025-03-05 NOTE — ASSESSMENT & PLAN NOTE
CT A/P shows bilateral pleural effusions L>R, pulmonary vascular congestion    Plan:  Repeat imaging if pt's respiratory status worsens  Continue oxygen to keep oxygen saturations 88% and above.

## 2025-03-05 NOTE — ASSESSMENT & PLAN NOTE
Pt had multiple embolic strokes during admission from 2/11/25-2/22/25  Thought to be embolic strokes 2/2 MSSA bacteremia, no vegetations on TTE    Plan:  Hold Eliquis and aspirin in setting of GI/ bleed  Continue Lipitor 40 mg daily

## 2025-03-05 NOTE — ASSESSMENT & PLAN NOTE
Pt had multiple embolic strokes during admission from 2/11/25-2/22/25  Thought to be embolic strokes 2/2 MSSA bacteremia, no vegetations on TTE

## 2025-03-05 NOTE — ASSESSMENT & PLAN NOTE
Patient was discharged on cefazolin 2 g IV Q8 hrs until 3/27/2025. PICC line in place. Patient rescheduled to follow-up outpatient with ID    Plan:  Continue with cefazolin 2 g IV every 8 hours.  Spoke to the infectious disease.  Consult infectious disease.  Recommend repeat C-spine MRI with and without contrast, postpone until ORIANA resolves  6 weeks treatment through 3/27/2025

## 2025-03-05 NOTE — PROGRESS NOTES
Progress Note - Infectious Disease   Name: Devin Chaves 79 y.o. male I MRN: [id number]  Unit/Bed#: S -01 I Date of Admission: 2/28/2025   Date of Service: 3/5/2025 I Hospital Day: 5    Assessment & Plan  Neck pain  Patient developed acute neck pain with MSSA bacteremia during recent hospitalization.  CRP was highly elevated.  C-spine MRI showed possible C-spine and paraspinal infection.  Patient has no neurologic deficit.  However, his neck pain has not improved.  Given lack of improvement of neck pain, we should repeat C-spine MRI with contrast when creatinine is improved.  However, without any neurological deficit, it would be fine to postpone MRI until renal function is further improved, to decrease risk of contrast-induced ORIANA.  Antibiotic plan as in below.  Monitor neck pain.  Recommend repeat C-spine MRI with and without contrast.  This can be postponed until ORIANA further improves.  MSSA bacteremia  Patient had MSSA bacteremia during recent hospitalization.  Source is unclear but likely cutaneous.  His bacteremia cleared rapidly on IV antibiotic.  TTE did not show any vegetation.  Given possible C-spine infection, plan was for long-term IV antibiotic.  Continue high-dose IV cefazolin.  Treat x 6 weeks from clearance of bacteremia as previously planned, through 3/27.  Symptomatic anemia  Patient is clinically improved with PRBC transfusion.  Management per primary service.  Melena  Patient with melena prior to admission.  This is most likely secondary to colonic mass.  Colorectal surgery follow-up.  ORIANA (acute kidney injury) (HCC)  Patient with ORIANA on admission, most likely secondary to hypovolemia from GI bleed.  Creatinine is proving ever so slowly.  Cefazolin at full dose for now.  Monitor creatinine.  Colonic mass  Patient has recently diagnosed adenocarcinoma of the colon.  He has been followed by colorectal surgery.  No plan for chemotherapy yet.  Colorectal surgery follow-up.    Discussed with  patient in detail regarding the above plan.      Antibiotics:  Cefazolin  Last negative blood culture 2/14    Subjective   Patient is overall stable.  Neck pain about the same.  No focal weakness.  Temperature stays down.  No chills.  He is tolerating antibiotic well.  No nausea, vomiting or diarrhea.    Objective :  Temp:  [98.5 °F (36.9 °C)-99 °F (37.2 °C)] 98.5 °F (36.9 °C)  HR:  [63-76] 70  BP: (107-147)/(46-60) 147/60  Resp:  [18] 18  SpO2:  [97 %-99 %] 98 %    General:  No acute distress  Psychiatric:  Awake and alert  Pulmonary:  Normal respiratory excursion, no rales, no wheezing, without accessory muscle use  Heart: Regular rate and rhythm.  No murmur, rub or gallop.  Abdomen:  Soft, nontender, nondistended, normal bowel sounds  Extremities: Stable leg edema.  No draining wound.  Skin:  No rashes      Lab Results: I have reviewed the following results:  Results from last 7 days   Lab Units 03/05/25 0525 03/04/25 2034 03/04/25  0525 03/03/25  0823 03/03/25  0516   WBC Thousand/uL 5.28  --  4.96  --  4.69   HEMOGLOBIN g/dL 8.0* 8.3* 8.2*   < > 8.1*   PLATELETS Thousands/uL 187  --  173  --  173    < > = values in this interval not displayed.     Results from last 7 days   Lab Units 03/05/25 0525 03/04/25  0525 03/03/25  0516 03/01/25  0603 02/28/25  2241   SODIUM mmol/L 142 141 140   < > 139   POTASSIUM mmol/L 3.5 3.7 3.6   < > 3.8   CHLORIDE mmol/L 111* 110* 110*   < > 109*   CO2 mmol/L 27 26 26   < > 25   BUN mg/dL 40* 41* 44*   < > 60*   CREATININE mg/dL 1.80* 1.85* 1.88*   < > 2.11*   EGFR ml/min/1.73sq m 35 34 33   < > 29   CALCIUM mg/dL 7.9* 7.9* 7.9*   < > 7.3*   AST U/L  --  16 16  --  15   ALT U/L  --  <3* <3*  --  <3*   ALK PHOS U/L  --  51 47  --  41   ALBUMIN g/dL  --  2.1* 2.0*  --  1.8*    < > = values in this interval not displayed.     Results from last 7 days   Lab Units 02/28/25  8138   MRSA CULTURE ONLY  No Methicillin Resistant Staphlyococcus aureus (MRSA) isolated                  Results from last 7 days   Lab Units 02/28/25  1327   D-DIMER QUANTITATIVE ug/ml FEU 3.41*

## 2025-03-05 NOTE — ASSESSMENT & PLAN NOTE
Patient has history of urinary retention and was started on Flomax during last hospitalization  Pt denies difficulty urinating at this shayy    Plan:  Flomax 0.4 mg daily  Urinary retention protocol

## 2025-03-06 NOTE — OCCUPATIONAL THERAPY NOTE
Occupational Therapy Progress Note     Patient Name: Devin Chaves  Today's Date: 3/6/2025  Problem List  Principal Problem:    Symptomatic anemia  Active Problems:    HTN (hypertension)    CAD (coronary artery disease)    COPD (chronic obstructive pulmonary disease) (HCC)    History of CVA (cerebrovascular accident)    Atrial fibrillation (HCC)    Urinary retention    MSSA bacteremia    Neck pain    Colonic mass    Ambulatory dysfunction    Melena    ORIANA (acute kidney injury) (HCC)    Pleural effusion, bilateral    Dysphagia    Severe protein-calorie malnutrition (HCC)    At risk for electrolyte imbalance          03/06/25 0944   OT Last Visit   OT Visit Date 03/06/25   Note Type   Note Type Treatment   Pain Assessment   Pain Assessment Tool FLACC   Pain Location/Orientation Orientation: Right;Location: Foot   Pain Onset/Description Onset: Ongoing;Frequency: Constant/Continuous;Descriptor: Aching   Effect of Pain on Daily Activities Limits comfort and tolerance during ADLs/functional trasnfers/functional mobility in standing   Patient's Stated Pain Goal No pain   Hospital Pain Intervention(s) Repositioned;Ambulation/increased activity;Emotional support   Multiple Pain Sites No   Pain Rating: FLACC (Rest) - Face 0   Pain Rating: FLACC (Rest) - Legs 0   Pain Rating: FLACC (Rest) - Activity 0   Pain Rating: FLACC (Rest) - Cry 0   Pain Rating: FLACC (Rest) - Consolability 0   Score: FLACC (Rest) 0   Pain Rating: FLACC (Activity) - Face 1   Pain Rating: FLACC (Activity) - Legs 1   Pain Rating: FLACC (Activity) - Activity 1   Pain Rating: FLACC (Activity) - Cry 1   Pain Rating: FLACC (Activity) - Consolability 1   Score: FLACC (Activity) 5   Restrictions/Precautions   Weight Bearing Precautions Per Order Yes   LLE Weight Bearing Per Order WBAT  (Per Dr. Ibrahim, WBAT LLE)   Braces or Orthoses Other (Comment)  (L prevlon boot in bed)   Other Precautions (S)  Cognitive;Chair Alarm;Bed Alarm;O2;Fall Risk;Pain  (3L NC,  of note SpO2 down to 78% during functional transfers/mobility few steps from EOB to chair)   Lifestyle   Autonomy Pt currently admitted from [facility] for STR. At true baseline, pt lives w/ spouse in a 2 levle house and is fully (I).   Reciprocal Relationships Supportive spouse/family and facility staff at STR   Service to Others Retired   ADL   Where Assessed Edge of bed  (and in chair)   LB Dressing Assistance 3  Moderate Assistance   LB Dressing Deficit Steadying;Requires assistive device for steadying;Verbal cueing;Increased time to complete;Don/doff L sock;Thread RLE into underwear;Thread LLE into underwear;Pull up over hips   LB Dressing Comments Pt sitting EOB able to perform tailor sit to don L sock. Pt sitting in chair able to thread BLEs into pants, requiring A to stand and steadying to pull over hips. Increased fatigue and SOB noted.   Toileting Assistance  2  Maximal Assistance   Toileting Deficit Perineal hygiene   Toileting Comments Pt w/ some incontinence, A to stand w/ RW, close S to maintain standing balance and therapist to perform posterior hygiene   Functional Standing Tolerance   Time ~2 mins   Activity Pt in standing w/ RW for posterior hygiene from therapist and to pull pants over hips   Comments close S for static standing while therapist performed posterior hygiene, min A to maintain dynamic standing to pull pants over hips, pt w/ increased fatigue and SOB   Bed Mobility   Supine to Sit 4  Minimal assistance   Additional items Assist x 1;HOB elevated;Increased time required;Verbal cues;LE management  (VC for body positioning, pt able to use LUE to pull self up from therapist hand)   Additional Comments Pt w/ increased fatigue after bed mobility, able to recover unsupported sitting EOB after few mins   Transfers   Sit to Stand 3  Moderate assistance   Additional items Assist x 2;Armrests;Increased time required;Verbal cues  (Initially mod Ax2 from EOB w/ RW and bed height elevated, progressing to  "max Ax1 from chair w/ RW)   Stand to Sit 2  Maximal assistance   Additional items Assist x 1;Armrests;Increased time required;Verbal cues  (VCs for hand placement/body positioning and A for controlled descent)   Additional Comments Pt able to maintian static standing w/ close S w/ tolerance of ~2 mins and min A for steadying for dynamic standing during LB dressing   Functional Mobility   Functional Mobility 4  Minimal assistance  (A x 2)   Additional Comments (S)  Few steps from EOB to chair w/ RW, SpO2 down to 78%, pt required significant amount of time to recover w/ head of chair in reclined position   Additional items Rolling walker   Subjective   Subjective \"Can you put the chair back?\"   Cognition   Overall Cognitive Status Impaired   Arousal/Participation Alert;Responsive;Cooperative   Attention Attends with cues to redirect   Orientation Level Oriented to person;Oriented to situation;Oriented to time  (Pt grossly oriented to place, able to report we are in a hospital but unable to recall [facility], pt able to report month and year)   Memory Decreased short term memory;Decreased recall of recent events;Decreased recall of precautions   Following Commands Follows one step commands with increased time or repetition   Comments Pt pleasant and agreeable w/ therapy. Pt requires cues for redirection and increased time for processing. Questionable higher level cognition, insight and safety awareness.   Activity Tolerance   Activity Tolerance Patient limited by fatigue;Patient limited by pain  (Pt highly limited by fatigue, SpO2 and SOB requiring extensive time to recover from activity)   Medical Staff Made Aware Care coordinated w/ PT Ana due to need for two person transfer and medical complexity of pt   Assessment   Assessment Pt was seen for skilled acute care OT treat on 03/06/25 . Upon arrival pt was supine in bed. Treatment was focused on bed mobility, functional transfer/mobility, LB dressing, and overall " activity tolerance. Pt ended session in chair w/ alarm on and all needs met.  Compared to eval, pt now requires less A for functional transfers w/ RW, LB dressing, and standing balance. However, pt has showed decreased endurance/activity tolerance this session requiring increased time to recover from activity as well as increased drop in SpO2 on 3L NC. Pt continues to perfom below baseline and personal factors that continue to impact discharge include inaccessible home environment, inaccessible bathroom environment, lack of appropriate care at home, decreased ability to perform ADLs, decreased ability to perform IADLs, and cognitive decline. Continue to see pt 3-5 to maximize independence in ADLs/IADLs/transfers/functional mobility, continue to edu pt on energy conservation/pacing techniques, reduce caregiver burden, and maximize safety during participation in meaningful tasks. Level II are still recommended upon d/c at this time.   Plan   Treatment Interventions ADL retraining;Functional transfer training;Endurance training;Cognitive reorientation;Patient/family training;Equipment evaluation/education;Compensatory technique education;Continued evaluation;Energy conservation;Activityengagement   Goal Expiration Date 03/13/25   OT Treatment Day 1   OT Frequency 3-5x/wk   Discharge Recommendation   Rehab Resource Intensity Level, OT II (Moderate Resource Intensity)   AM-PAC Daily Activity Inpatient   Lower Body Dressing 2   Bathing 2   Toileting 2   Upper Body Dressing 3   Grooming 3   Eating 4   Daily Activity Raw Score 16   Daily Activity Standardized Score (Calc for Raw Score >=11) 35.96   AM-PAC Applied Cognition Inpatient   Following a Speech/Presentation 2   Understanding Ordinary Conversation 3   Taking Medications 2   Remembering Where Things Are Placed or Put Away 3   Remembering List of 4-5 Errands 2   Taking Care of Complicated Tasks 2   Applied Cognition Raw Score 14   Applied Cognition Standardized Score  32.02   End of Consult   Education Provided Yes  (Pt edu on breathing techniques during recovery from activity)   Patient Position at End of Consult Bedside chair;Bed/Chair alarm activated;All needs within reach   Nurse Communication Nurse aware of consult  (RICHIE Osei)     The patient's raw score on the AM-PAC Daily Activity Inpatient Short Form is 16. A raw score of less than 19 suggests the patient may benefit from discharge to post-acute rehabilitation services. Please refer to the recommendation of the Occupational Therapist for safe discharge planning.      GOALS:     *Goals established to promote patient goal of to get better:       *Patient will perform grooming tasks sitting at sink with mod (I) in order to increase overall independence w/ self-care     *UB ADL with (I) for inc'd independence with self care     *LB ADL with Min (A) using AE prn for inc'd independence with self care     *Toileting with Min (A) for clothing management and hygiene to increase hygiene/thoroughness in order to reduce caregiver burden (NOT PROGRESSING)     *Pt will demonstrate use of long handled AE during 100% of tx sessions for increased ADL safety and independence following D/C      *Patient will verbalize and demonstrate use of energy conservation/deep breathing techniques and work simplification skills during functional activities with no verbal cues. (PROGRESSING)     *ADL transfers with Mod (A) for inc'd independence with ADLs/purposeful tasks (PROGRESSING)     *Bed mobility- Min (A) for inc'd independence to manage own comfort and initiate EOB & OOB purposeful tasks (GOAL MET- upgrade to mod (I))     *Patient will increase functional mobility to and from [facility] with rolling walker with mod assist to increase independence with toileting (PROGRESSING)     *Patient will increase OOB/sitting tolerance to 2-4 hours per day to increase participation in self-care and leisure tasks with no s/s of exertion. (PROGRESSING)     *Patient  will improve functional activity tolerance to 20 minutes of sustained functional tasks to increase participation in basic self-care and decrease assistance level. (NOT PROGRESSING)     *Patient will increase static/dynamic standing balance to fair/fair- to maximize safety/performance of ADLs/standing purposeful tasks (PROGRESSING)     *Patient will engage in ongoing cognitive assessment to assist with safe discharge planning/recommendations.      *Pt will consistently follow multi step directions during ADL performance w/ % accuracy to max I and safety w/ ADL performance    Uzma Elliott, OTS

## 2025-03-06 NOTE — ASSESSMENT & PLAN NOTE
Blood Pressure: 119/55  Fluid Status: borderline hypervolemic - weight decreased to 88.8kg from 93.8kg from 3/5  Pt states left ankle and foot is a chronic edema, right ankle and pedal + edema, improved slightly  In-Patient Rx: Metoprolol 25 mg oral every 12 hours  Home Rx: None  Avoid perturbations and blood pressure  Maintain MAPs greater than 65 mmHg

## 2025-03-06 NOTE — CASE MANAGEMENT
Case Management Discharge Planning Note    Patient name Devin Chaves  Location S /S -01 MRN [id number]  : 1945 Date 3/6/2025       Current Admission Date: 2025  Current Admission Diagnosis:Symptomatic anemia   Patient Active Problem List    Diagnosis Date Noted Date Diagnosed    At risk for electrolyte imbalance 2025     Severe protein-calorie malnutrition (HCC) 2025     Dysphagia 2025     Melena 2025     ORIANA (acute kidney injury) ([facility]) 2025     Pleural effusion, bilateral 2025     Family history of cerebrovascular accident (CVA) 2025     Rash 2025     Esophageal dysphagia 2025     Ambulatory dysfunction 2025     Left ankle joint deformity 2025     Colonic mass 2025     Stroke ([facility]) 02/15/2025     Neck pain 2025     Symptomatic anemia 2025     MSSA bacteremia 2025     Sepsis ([facility]) 2025     Urinary retention 2025     Hypotension 2025     Constipation 2025     Atrial fibrillation ([facility]) 2025     Hip region mass, unspecified laterality 2023     Chronic hypoxic respiratory failure ([facility]) 2023     Fracture of multiple ribs of right side 2023     Rheumatoid arthritis ([facility]) 2023     Moderate protein-calorie malnutrition ([facility]) 2023     HTN (hypertension) 2023     CAD (coronary artery disease) 2023     COPD (chronic obstructive pulmonary disease) ([facility]) 2023     JAZMINE (obstructive sleep apnea) 2023     Back pain 2023     COVID-19 2023     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  [address]  [facility] [address]  Phone: [phone] Fax: [phone]    [facility] [place] - [address]  [address]  [address]  [facility] [address]  Phone: [phone] Fax: [phone]    [facility] [place] - [address]  [address] [facility]  [place] [address]  Phone: [phone] Fax: [phone]    Primary Care Provider: Park Saxena MD    Primary Insurance: [facility]UM [facility]  [facility]    DISCHARGE DETAILS:    Discharge planning discussed with:: Ariadne salcedo- over phone  Freedom of Choice: Yes  Comments - Freedom of Choice: [facility]  CM contacted family/caregiver?: Yes  Were Treatment Team discharge recommendations reviewed with patient/caregiver?: Yes  Did patient/caregiver verbalize understanding of patient care needs?: N/A- going to facility  Were patient/caregiver advised of the risks associated with not following Treatment Team discharge recommendations?: Yes    Contacts  Patient Contacts: Ariadne  Relationship to Patient:: Family (daughter)  Contact Method: Phone  Phone Number: [phone]  Reason/Outcome: Continuity of Care, Emergency Contact, Discharge Planning, Referral    Requested Home Health Care         Is the patient interested in HHC at discharge?: No    DME Referral Provided  Referral made for DME?: No    Other Referral/Resources/Interventions Provided:  Interventions: SNF, Short Term Rehab  Referral Comments: CM notified my HFM they can accept patient for STR (under [employer] auth). Call made to daughterAriadne, who confirms this is still first choice. Facility reserved in [facility]. Daughter stating she will update the rest of patients family.     [facility].     CM to follow up, as able to continue with dcp.    Would you like to  participate in our [facility] service program?  : No - Declined    Treatment Team Recommendation: Short Term Rehab, SNF  Discharge Destination Plan:: Short Term Rehab, SNF  Transport at Discharge : [facility]LS Ambulance

## 2025-03-06 NOTE — ASSESSMENT & PLAN NOTE
Patient with COPD on 2-4 L supplemental O2 at baseline  Home Albuterol prn, Xopenex Q8 hrs prn, Mometasone inhaler daily  Wheezing on lung exam during rounds, plus 2 pitting edema   Suspect volume overload. Received 1 dose of lasix yesterday    Plan:  Lasix 20 mg ordered   Titrate O2 to maintain SpO2 greater than 88%   Xopenex nebs Q8 hrs prn, Mometasone daily  Albuterol prn  RT notified to administer nebs  Follow-up in chest xray ordered by Nephrology.

## 2025-03-06 NOTE — ASSESSMENT & PLAN NOTE
Patient has history of urinary retention  Flomax 0.4 mg orally daily  Bladder scan and urinary retention protocol  Monitor I&O  Urology following

## 2025-03-06 NOTE — PROGRESS NOTES
Progress Note - Hospitalist   Name: Devin Chaves 79 y.o. male I MRN: [id number]  Unit/Bed#: S -01 I Date of Admission: 2/28/2025   Date of Service: 3/6/2025 I Hospital Day: 6    Assessment & Plan  Symptomatic anemia  Recent Labs     03/05/25 0525 03/05/25 2032 03/06/25  0521   HGB 8.0* 7.7* 7.7*      Patient presents with 2 days of chest pain, shortness of breath, fatigue and melena  2/20/2025 EGD/colonoscopy showed 2.5 cm ileocecal mass, pathology confirmed adenocarcinoma.  S/p 3 units packed rbc in ED. S/p 1 unit PRBCs on 3/2  Hemoglobin has been stable. No reported dark stool today.   Attending discussion with Colorectal: If COPD can stay controlled, increase chance of operation.  Oncology: Outpatient appointment for 4/3  Urology consulted: Outpatient cystoscopy, urology signed off    Plan:  H&H Q12 hrs  Transfuse for Hgb <7  Monitor for signs of overt bleeding or melenotic stools  Protonix 40 mg PO QD   Hold Eliquis and aspirin for now due to black tary stool   Monitor on telemetry  ORIANA (acute kidney injury) (HCC)  Recent Labs     03/04/25 0525 03/05/25 0525 03/06/25  0521   CREATININE 1.85* 1.80* 1.86*   EGFR 34 35 34     Estimated Creatinine Clearance: 36.5 mL/min (A) (by C-G formula based on SCr of 1.86 mg/dL (H)).     Cr 2.43 on admission. Baseline 0.9. BUN 63. BUN/Cr ratio >20  Secondary to severe anemia on admission and IV contrast. Received 1 dose of 20 IV lasix and albumin on 3/5. Creatinine slightly increase as expected. Still overload, may benefit from addition lasix.     Plan:  1 dose of lasix 20 mg ordered   Follow-up on chest xray  Daily BMP  Avoid nephrotoxic medications  Monitor I/O's  Urinary retention protocol  Nephrology consulted, appreciate recs  Neck pain  MRI of the neck done 2/14/2025 revealed cervical degenerative change with mild canal stenosis and mild to moderate foraminal narrowing.  No cord compression.  Mild nonspecific edema within the right posterior breast dermal  musculature of the upper cervicals spine.  Minimal peripheral enhancement is noted.     Plan:  Will plan for repeat MRI C-spine with contrast once ORIANA resolves per ID recommendations    COPD (chronic obstructive pulmonary disease) (HCC)  Patient with COPD on 2-4 L supplemental O2 at baseline  Home Albuterol prn, Xopenex Q8 hrs prn, Mometasone inhaler daily  Wheezing on lung exam during rounds, plus 2 pitting edema   Suspect volume overload. Received 1 dose of lasix yesterday    Plan:  Lasix 20 mg ordered   Titrate O2 to maintain SpO2 greater than 88%   Xopenex nebs Q8 hrs prn, Mometasone daily  Albuterol prn  RT notified to administer nebs  Follow-up in chest xray ordered by Nephrology.  Colonic mass  2/20/2025 EGD/colonoscopy showed 2.5 cm ileocecal mass, pathology confirmed adenocarcinoma.    Patient was due to follow-up with outpatient colorectal surgery on 2/28/2025  Pt presenting with melena, Hgb 4.6. Symptomatic anemia.  Attending discussion with Colorectal: If COPD can stay controlled, increase chance of operation.  Oncology: Outpatient appointment for 4/3    MSSA bacteremia  Patient was discharged on cefazolin 2 g IV Q8 hrs until 3/27/2025. PICC line in place. Patient rescheduled to follow-up outpatient with ID    Plan:  Continue with cefazolin 2 g IV every 8 hours.  infectious disease recommendations appreciated   Recommend repeat C-spine MRI with and without contrast, postpone until ORIANA resolves  6 weeks treatment through 3/27/2025  Atrial fibrillation (HCC)  AC: Eliquis 5 mg BID  Rate control: Lopressor 25 mg Q12 hrs    Plan:  Hold eliquis in setting of melena  Continue Lopressor 25 mg Q12 hrs  Pleural effusion, bilateral  CT A/P shows bilateral pleural effusions L>R, pulmonary vascular congestion    Plan:  Repeat imaging if pt's respiratory status worsens  Continue oxygen to keep oxygen saturations 88% and above.  HTN (hypertension)  Systolic BP 100s-130s.  Patient normotensive.  Blood pressure  stable.    Plan:  Lopressor 25 mg every 12 hours  CAD (coronary artery disease)  TTE 2/12/25: EF 55%, Normal systolic dysfunction, G1DD, No vegetation, No mural thrombus, moderate aortic sclerosis     Plan:  Hold aspirin in setting of GI bleed  History of CVA (cerebrovascular accident)  Pt had multiple embolic strokes during admission from 2/11/25-2/22/25  Thought to be embolic strokes 2/2 MSSA bacteremia, no vegetations on TTE    Plan:  Hold Eliquis and aspirin in setting of GI/ bleed  Continue Lipitor 40 mg daily  Urinary retention  Patient has history of urinary retention and was started on Flomax during last hospitalization  Pt denies difficulty urinating at this shayy    Plan:  Flomax 0.4 mg daily  Urinary retention protocol  Dysphagia  VBS done on 2/15/25 showing food retention due to pharyngeal weakness  EGD was done last month which showed normal esophagus.   ENT recommending outpatient follow-up with Dr. Flynn Reyez    Plan:  Normal diet as tolerated  Severe protein-calorie malnutrition (HCC)  Malnutrition Findings:   Adult Malnutrition type: Acute illness  Adult Degree of Malnutrition: Other severe protein calorie malnutrition  Malnutrition Characteristics: Inadequate energy, Fluid accumulation  360 Statement: related to inadequate energy/protein intake as evidenced by consuming < 50% of energy intake compared to estimated needs for > 5 days and B/L LE +3 edema. Treated with ONS.  BMI Findings:     Body mass index is 26.55 kg/m².     Plan:  Ensure supplements  Ambulatory dysfunction  Worsening ambulatory dysfunction since December  PT/OT evaluations recommend level 2   Discussed with podiatry, patient has chronic left ankle fracture over 2 years ago, recommend WBAT to left foot as curbside, no further podiatry needs   Consider ambulatory Ortho consult and brace fitting     Plan:  Weight bearing status: As tolerated with brace    VTE Pharmacologic Prophylaxis: VTE Score: 5 High Risk (Score >/= 5) -  Pharmacological DVT Prophylaxis Contraindicated. Sequential Compression Devices Ordered.    Mobility:   Basic Mobility Inpatient Raw Score: 9  JH-HLM Goal: 3: Sit at edge of bed  JH-HLM Achieved: 3: Sit at edge of bed  JH-HLM Goal NOT achieved. Continue with multidisciplinary rounding and encourage appropriate mobility to improve upon JH-HLM goals.    Patient Centered Rounds: I performed bedside rounds with nursing staff today.   Discussions with Specialists or Other Care Team Provider: Nephrology, ID    Education and Discussions with Family / Patient: Updated  (wife) via phone.    Current Length of Stay: 6 day(s)  Current Patient Status: Inpatient   Certification Statement: The patient will continue to require additional inpatient hospital stay due to ORIANA  Discharge Plan: Anticipate discharge in >72 hrs to tbd    Code Status: Level 1 - Full Code    Subjective   No acute event overnight. This morning, patient was laying in bed and complained of back and neck pain. Patient that his pain is unchanged. During rounds, diffused wheezing noted on lung exam after patient was transitioned from bed to chair. He remained on his baseline oxygen at 3 L. He denied chest pain, fever, chills, abdominal pain, nausea and vomiting.     Objective :  Temp:  [97.8 °F (36.6 °C)-98.6 °F (37 °C)] 97.8 °F (36.6 °C)  HR:  [65-79] 66  BP: (117-139)/(53-62) 119/55  Resp:  [18] 18  SpO2:  [95 %-100 %] 95 %  O2 Device: Nasal cannula  Nasal Cannula O2 Flow Rate (L/min):  [3 L/min] 3 L/min    Body mass index is 26.55 kg/m².     Input and Output Summary (last 24 hours):     Intake/Output Summary (Last 24 hours) at 3/6/2025 1151  Last data filed at 3/6/2025 0900  Gross per 24 hour   Intake 240 ml   Output 995 ml   Net -755 ml       Physical Exam  HENT:      Head: Normocephalic and atraumatic.      Mouth/Throat:      Mouth: Mucous membranes are moist.   Cardiovascular:      Rate and Rhythm: Normal rate and regular rhythm.   Pulmonary:       Effort: Pulmonary effort is normal.      Breath sounds: Wheezing present.      Comments: Diminished breath sounds  Abdominal:      General: Abdomen is flat. Bowel sounds are normal.      Palpations: Abdomen is soft.   Musculoskeletal:      Right lower le+ Pitting Edema present.      Left lower le+ Pitting Edema present.   Neurological:      Mental Status: He is alert and oriented to person, place, and time.   Psychiatric:         Mood and Affect: Mood normal.           Lines/Drains:  Lines/Drains/Airways       Active Status       Name Placement date Placement time Site Days    PICC Line 25 Right Brachial 25  0548  Brachial  8                    Central Line:  Goal for removal: Continuing for TPN.  N/A - Chronic PICC               Lab Results: I have reviewed the following results:   Results from last 7 days   Lab Units 25  0521   WBC Thousand/uL 4.63   HEMOGLOBIN g/dL 7.7*   HEMATOCRIT % 24.7*   PLATELETS Thousands/uL 159   SEGS PCT % 67   LYMPHO PCT % 18   MONO PCT % 13*   EOS PCT % 1     Results from last 7 days   Lab Units 25  0521 25  0525 25  0525   SODIUM mmol/L 141   < > 141   POTASSIUM mmol/L 3.5   < > 3.7   CHLORIDE mmol/L 109*   < > 110*   CO2 mmol/L 28   < > 26   BUN mg/dL 38*   < > 41*   CREATININE mg/dL 1.86*   < > 1.85*   ANION GAP mmol/L 4   < > 5   CALCIUM mg/dL 7.7*   < > 7.9*   ALBUMIN g/dL  --   --  2.1*   TOTAL BILIRUBIN mg/dL  --   --  0.38   ALK PHOS U/L  --   --  51   ALT U/L  --   --  <3*   AST U/L  --   --  16   GLUCOSE RANDOM mg/dL 82   < > 84    < > = values in this interval not displayed.     Results from last 7 days   Lab Units 25  1013   INR  1.36*                   Recent Cultures (last 7 days):         Imaging Results Review: I personally reviewed the following image studies/reports in PACS and discussed pertinent findings with Radiology: CT chest. My interpretation of the radiology images/reports is: New bilateral multi lobar  interstitial thickening and tree-in-bud opacities may represent pulmonary vascular congestion or nonspecific inflammatory or infectious process..  Other Study Results Review: EKG was reviewed.     Last 24 Hours Medication List:     Current Facility-Administered Medications:     acetaminophen (TYLENOL) tablet 650 mg, Q6H PRN    albuterol inhalation solution 2.5 mg, Q6H PRN    ascorbic acid (VITAMIN C) tablet 250 mg, Daily    atorvastatin (LIPITOR) tablet 40 mg, Daily With Dinner    ceFAZolin (ANCEF) IVPB (premix in dextrose) 2,000 mg 50 mL, Q8H, Last Rate: 2,000 mg (03/06/25 0222)    Cholecalciferol (VITAMIN D3) tablet 2,000 Units, Daily    cyanocobalamin (VITAMIN B-12) tablet 100 mcg, Daily    fluticasone (ARNUITY ELLIPTA) 100 MCG/ACT inhaler 1 puff, Daily    folic acid (FOLVITE) tablet 1 mg, Daily    HYDROmorphone HCl (DILAUDID) injection 0.2 mg, Q4H PRN    hydroxychloroquine (PLAQUENIL) tablet 400 mg, Daily With Breakfast    levalbuterol (XOPENEX) inhalation solution 1.25 mg, Q8H PRN    lidocaine (LIDODERM) 5 % patch 3 patch, Daily    metoprolol tartrate (LOPRESSOR) tablet 25 mg, Q12H GALE    oxyCODONE (ROXICODONE) IR tablet 5 mg, Q6H PRN    oxyCODONE (ROXICODONE) split tablet 2.5 mg, Q6H PRN    pantoprazole (PROTONIX) EC tablet 40 mg, Daily Before Breakfast    polyethylene glycol (MIRALAX) packet 17 g, Daily PRN    senna-docusate sodium (SENOKOT S) 8.6-50 mg per tablet 1 tablet, HS    tamsulosin (FLOMAX) capsule 0.4 mg, Daily With Dinner    Administrative Statements   Today, Patient Was Seen By: Kelly Herman MD      **Please Note: This note may have been constructed using a voice recognition system.**

## 2025-03-06 NOTE — PROGRESS NOTES
Progress Note - Nephrology   Name: Devin Chaves 79 y.o. male I MRN: [id number]  Unit/Bed#: S -01 I Date of Admission: 2/28/2025   Date of Service: 3/6/2025 I Hospital Day: 6     Assessment & Plan  ORIANA (acute kidney injury) (HCC)  Etiology: Likely to hypovolemia related to blood loss + age related nephron loss + Hypertensive Nephropathy. +/- component of ATN d/t ischemia with Melena/low hemoglobin + Colon mass/cancer   Admission Cr: 2.43 (2/28)  Current Cr: 1.86 (3/6)  Baseline Cr: 0.9-1.1 since 2017  Per Care Everywhere, Epic, and Patient - does not follow outpatient with Nephrology  Not currently on IV fluids  Was previously on Plasma-Lyte IV fluids 75 mL/hr. Last given 3/3  Home Rx: Not an ACE/ARB, Diuretics, NSAIDs  While inpatient, no ACE/ARB or NSAIDs.    Did receive 1 dose of IV 40 mg Lasix on 3/3  UA 2/28/25: Specific gravity 1.036, trace ketones, large blood, trace leuks, +2 protein, 4-10 WBC  Hemoglobin on admission 4.6 - s/p 4 units PRBC (3 in ED and 1 on 3/2).   Current hemoglobin: 8.0  Patient underwent colonoscopy on 2/20/25 which indicated proximal ascending colon-across from the ileocecal valve there is a 2.5 cm hard friable polypoid mass appears malignant status post multiple biopsies. 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 No hydronephrosis or hydroureter. No urinary tract calculi. No perinephric collection.  LEFT KIDNEY AND URETER: No solid renal mass or detectable urothelial mass. Subcentimeter hypoattenuating renal lesion(s), too small to characterize but statistically likely benign, which do not warrant follow-up.   No mets and stable upper cecal/proximal ascending colonic mass attributed to recently biopsy-proven adenocarcinoma     Current Status:  Creatinine currently 1.86 which is a slight increase from yesterday of  1.80  Pt received 20mg IV Lasix one time and 12.5 g Albumin one time d/t hypervolemia + decreased urine output 3/5. Cr had slight bump to 1.86 which could possibly be related to lab error. Total output 3/5 895 cc. Recommend to continue to monitor I&O with another 20mg IV Lasix with 12.5g Albumin.  Bladder scan and retention protocol for history of urinary retention/to avoid urinary retention   Repeat UPCR showing ACR of 821. With result, concern for possible GN? Possibly not in steady state and would require redraw?  Avoid ACE/ARB, NSAIDs, Nephrotoxic Agents, hypotension, and any further IV Contrast at this time  Monitor daily weights and I&O  Labs in AM  HTN (hypertension)  Blood Pressure: 119/55  Fluid Status: borderline hypervolemic - weight decreased to 88.8kg from 93.8kg from 3/5  Pt states left ankle and foot is a chronic edema, right ankle and pedal + edema, improved slightly  In-Patient Rx: Metoprolol 25 mg oral every 12 hours  Home Rx: None  Avoid perturbations and blood pressure  Maintain MAPs greater than 65 mmHg   Melena  Patient endorsed melena prior to admission  1 episode of dark, tarry stool overnight on 3/4/2025  Oral Protonix  Stool record at bedside  Monitor stools  Colorectal surgery follow-up  Symptomatic anemia  Per colorectal surgeon, in the absence of emergency indication, he is not recommending surgery at this time.  Current hemoglobin: 7.7  H&H every 12 hours  Transfuse for hemoglobin under 7  Per primary team  Urinary retention  Patient has history of urinary retention  Flomax 0.4 mg orally daily  Bladder scan and urinary retention protocol  Monitor I&O  Urology following  At risk for electrolyte imbalance  Magnesium level stable  Potassium 3.5 - pt ordered 40 mEq p.o. potassium chloride, needs to be given  Magnesium 2.0 - stable  Calcium 7.9 - no change  monitor electrolytes  Per primary team  CAD (coronary artery disease)  TTE 2/12/2025: EF 55%  Normal systolic dysfunction, G1DD, No  vegetation, No mural thrombus, moderate aortic sclerosis  COPD (chronic obstructive pulmonary disease) (HCC)  Patient with COPD on 2-4 L supplemental O2 at baseline  Patient currently requiring 2 L NC.   Home Albuterol prn, Xopenex Q8 hrs prn, Mometasone inhaler daily  Per primary team  History of CVA (cerebrovascular accident)  Pt had multiple embolic strokes during admission from 2/11/25-2/22/25  Thought to be embolic strokes 2/2 MSSA bacteremia, no vegetations on TTE  Atrial fibrillation ([facility])  Home Rx: Eliquis 5 mg orally twice daily - on HOLD  On Lopressor 25 mg orally every 12 hours for rate control  Per primary team  MSSA bacteremia  Patient had MSSA bacteremia during recent hospitalization. Source is unclear but likely cutaneous   ID following  IV antibiotics  Neck pain  MRI of the neck done 2/14/2025 revealed cervical degenerative change with mild canal stenosis and mild to moderate foraminal narrowing. No cord compression. Mild nonspecific edema within the right posterior breast dermal musculature of the upper cervicals spine. Minimal peripheral enhancement is noted.   Per ID - would recommend a repeat MRI of C-Spine with and without contrast when improvement in ORIANA    Colonic mass  2/20/2025 EGD/colonoscopy showed 2.5 cm ileocecal mass, pathology confirmed adenocarcinoma  Patient was due for follow-up with outpatient colorectal surgery on 2/28/2025  Presented with melena, hemoglobin 4.6 on admission, symptomatic anemia  Colorectal consulted and following  Pleural effusion, bilateral  CT A/P shows bilateral pleural effusions L>R, pulmonary vascular congestion     Ambulatory dysfunction    Dysphagia    Severe protein-calorie malnutrition (HCC)  Malnutrition Findings:   Adult Malnutrition type: Acute illness  Adult Degree of Malnutrition: Other severe protein calorie malnutrition  Malnutrition Characteristics: Inadequate energy, Fluid accumulation                  360 Statement: related to inadequate  "energy/protein intake as evidenced by consuming < 50% of energy intake compared to estimated needs for > 5 days and B/L LE +3 edema. Treated with ONS.    BMI Findings:           Body mass index is 26.55 kg/m².         Review of Systems  Patient seen and examined with pt OOB in bedside chair. C/O pain and discomfort in neck and L ankle which is ongoing. Denies N/V/D. Pt given low dose Lasix yesterday. Pt states, \"he's been peeing just great\". On NC. C/o of SOB, respiratory at bedside to give neb treatment. Pt diminished throughout. Pt states he is SOB with mvmt as well as sitting up right. He feels better \"laying back\" slightly in the bedside chair. Main question of pt would be where he is going to be after this. Pt was previously at [facility] and described the place as \"a dump\" and would like to possibly go somewhere else that would do move mvmt and therapy if possible. Information passed to case management.     Review of Systems   Respiratory:  Positive for shortness of breath.    Cardiovascular:  Positive for leg swelling.   Gastrointestinal:  Negative for abdominal pain, constipation, diarrhea, nausea and vomiting.   Genitourinary:  Positive for difficulty urinating.        History of retention   Musculoskeletal:  Positive for arthralgias and neck pain.        Left ankle   Skin:  Positive for color change and pallor.   Neurological:  Negative for dizziness, numbness and headaches.         Physical Exam:  Current Weight: Weight - Scale: 88.8 kg (195 lb 12.3 oz)  Vitals:    03/05/25 2249 03/06/25 0300 03/06/25 0749 03/06/25 1140   BP: 120/53  119/55    Pulse: 65  66    Resp:       Temp: 98.6 °F (37 °C)  97.8 °F (36.6 °C)    TempSrc:       SpO2: 100%  100% 95%   Weight:  88.8 kg (195 lb 12.3 oz)     Height:           Physical Exam  Vitals reviewed.   Constitutional:       Appearance: He is ill-appearing.   HENT:      Mouth/Throat:      Mouth: Mucous membranes are moist.   Cardiovascular:      " Rate and Rhythm: Normal rate.      Heart sounds: No murmur heard.     No gallop.   Pulmonary:      Breath sounds: No wheezing.      Comments: Diminished throughout. RT at bedside with neb treatment  Abdominal:      General: Bowel sounds are normal. There is no distension.      Palpations: Abdomen is soft.   Musculoskeletal:      Right lower leg: Edema present.      Left lower leg: Edema present.   Skin:     General: Skin is warm and dry.      Capillary Refill: Capillary refill takes 2 to 3 seconds.      Coloration: Skin is pale.   Neurological:      Mental Status: He is alert and oriented to person, place, and time.   Psychiatric:         Mood and Affect: Mood normal.         Behavior: Behavior normal.        Medications:    Current Facility-Administered Medications:     acetaminophen (TYLENOL) tablet 650 mg, 650 mg, Oral, Q6H PRN, Jean Brady MD    albuterol inhalation solution 2.5 mg, 2.5 mg, Nebulization, Q6H PRN, Kanchan Wilson MD, 2.5 mg at 03/06/25 1140    ascorbic acid (VITAMIN C) tablet 250 mg, 250 mg, Oral, Daily, Kanchan Wilson MD, 250 mg at 03/06/25 0812    atorvastatin (LIPITOR) tablet 40 mg, 40 mg, Oral, Daily With Dinner, Kanchan Wilson MD, 40 mg at 03/05/25 1733    ceFAZolin (ANCEF) IVPB (premix in dextrose) 2,000 mg 50 mL, 2,000 mg, Intravenous, Q8H, Olivia Martinez MD, Last Rate: 100 mL/hr at 03/06/25 0222, 2,000 mg at 03/06/25 0222    Cholecalciferol (VITAMIN D3) tablet 2,000 Units, 2,000 Units, Oral, Daily, Kanchan Wilson MD, 2,000 Units at 03/06/25 0812    cyanocobalamin (VITAMIN B-12) tablet 100 mcg, 100 mcg, Oral, Daily, Kanchan Wilson MD, 100 mcg at 03/06/25 0812    fluticasone (ARNUITY ELLIPTA) 100 MCG/ACT inhaler 1 puff, 1 puff, Inhalation, Daily, Kanchan Wilson MD, 1 puff at 03/06/25 0813    folic acid (FOLVITE) tablet 1 mg, 1 mg, Oral, Daily, Kanchan Wilson MD, 1 mg at 03/06/25 0812    HYDROmorphone HCl (DILAUDID) injection 0.2 mg, 0.2 mg, Intravenous, Q4H PRN,  Jean Brady MD    hydroxychloroquine (PLAQUENIL) tablet 400 mg, 400 mg, Oral, Daily With Breakfast, Kanchan Wilsno MD, 400 mg at 03/06/25 0812    levalbuterol (XOPENEX) inhalation solution 1.25 mg, 1.25 mg, Nebulization, Q8H PRN, Kanchan Wilson MD    lidocaine (LIDODERM) 5 % patch 3 patch, 3 patch, Topical, Daily, Kanchan Wilson MD, 3 patch at 03/06/25 0812    metoprolol tartrate (LOPRESSOR) tablet 25 mg, 25 mg, Oral, Q12H GALE, Kanchan Wilson MD, 25 mg at 03/06/25 0812    oxyCODONE (ROXICODONE) IR tablet 5 mg, 5 mg, Oral, Q6H PRN, Jean Brady MD, 5 mg at 03/01/25 1817    oxyCODONE (ROXICODONE) split tablet 2.5 mg, 2.5 mg, Oral, Q6H PRN, eJan Brady MD, 2.5 mg at 03/05/25 0945    pantoprazole (PROTONIX) EC tablet 40 mg, 40 mg, Oral, Daily Before Breakfast, Jean Brady MD, 40 mg at 03/06/25 0516    polyethylene glycol (MIRALAX) packet 17 g, 17 g, Oral, Daily PRN, Kelly Herman MD    senna-docusate sodium (SENOKOT S) 8.6-50 mg per tablet 1 tablet, 1 tablet, Oral, HS, Kelly Herman MD, 1 tablet at 03/05/25 2035    tamsulosin (FLOMAX) capsule 0.4 mg, 0.4 mg, Oral, Daily With Dinner, Kanchan Wilson MD, 0.4 mg at 03/05/25 1733    Laboratory Results:  Results from last 7 days   Lab Units 03/06/25 0521 03/05/25 2032 03/05/25 0525 03/04/25 2034 03/04/25  0525 03/03/25  0823 03/03/25  0516 03/02/25  1204 03/02/25  1013 03/01/25  1535 03/01/25  0603 02/28/25  2241 02/28/25  1223 02/28/25  0436   WBC Thousand/uL 4.63  --  5.28  --  4.96  --  4.69  --  5.40  --  4.59  --   --  4.50   HEMOGLOBIN g/dL 7.7* 7.7* 8.0* 8.3* 8.2* 8.2* 8.1*   < > 8.4*   < > 6.8* 7.4*   < > 4.6*   HEMATOCRIT % 24.7* 24.5* 26.1* 26.2* 26.2* 26.0* 25.2*   < > 25.8*   < > 21.0* 22.5*   < > 15.2*   PLATELETS Thousands/uL 159  --  187  --  173  --  173  --  159  --  159  --   --  201   SODIUM mmol/L 141  --  142  --  141  --  140  --  139  --  140 139  --  139   POTASSIUM mmol/L 3.5  " --  3.5  --  3.7  --  3.6  --  3.9  --  3.7 3.8  --  4.0   CHLORIDE mmol/L 109*  --  111*  --  110*  --  110*  --  109*  --  110* 109*  --  108   CO2 mmol/L 28  --  27  --  26  --  26  --  27  --  25 25  --  26   BUN mg/dL 38*  --  40*  --  41*  --  44*  --  48*  --  58* 60*  --  63*   CREATININE mg/dL 1.86*  --  1.80*  --  1.85*  --  1.88*  --  1.88*  --  2.00* 2.11*  --  2.43*   CALCIUM mg/dL 7.7*  --  7.9*  --  7.9*  --  7.9*  --  7.9*  --  7.5* 7.3*  --  7.6*   MAGNESIUM mg/dL 1.9  --  2.0  --  2.0  --  2.1  --  2.1  --   --   --   --  2.1    < > = values in this interval not displayed.       Medical records have been reviewed through [facility] and care everywhere for this patient encounter    Portions of the record may have been created with voice recognition software. Occasional wrong word or \"sound a like\" substitutions may have occurred due to the inherent limitations of voice recognition software. Read the chart carefully and recognize,   "

## 2025-03-06 NOTE — ASSESSMENT & PLAN NOTE
Recent Labs     03/04/25  0525 03/05/25  0525 03/06/25  0521   CREATININE 1.85* 1.80* 1.86*   EGFR 34 35 34     Estimated Creatinine Clearance: 36.5 mL/min (A) (by C-G formula based on SCr of 1.86 mg/dL (H)).     Cr 2.43 on admission. Baseline 0.9. BUN 63. BUN/Cr ratio >20  Secondary to severe anemia on admission and IV contrast. Received 1 dose of 20 IV lasix and albumin on 3/5. Creatinine slightly increase as expected. Still overload, may benefit from addition lasix.     Plan:  1 dose of lasix 20 mg ordered   Follow-up on chest xray  Daily BMP  Avoid nephrotoxic medications  Monitor I/O's  Urinary retention protocol  Nephrology consulted, appreciate recs

## 2025-03-06 NOTE — ASSESSMENT & PLAN NOTE
Magnesium level stable  Potassium 3.5 - pt ordered 40 mEq p.o. potassium chloride, needs to be given  Magnesium 2.0 - stable  Calcium 7.9 - no change  monitor electrolytes  Per primary team

## 2025-03-06 NOTE — PROGRESS NOTES
Progress Note - Infectious Disease   Name: Devin Chaves 79 y.o. male I MRN: [id number]  Unit/Bed#: S -01 I Date of Admission: 2/28/2025   Date of Service: 3/6/2025 I Hospital Day: 6    Assessment & Plan  Neck pain  Patient developed acute neck pain with MSSA bacteremia during recent hospitalization.  CRP was highly elevated.  C-spine MRI showed possible C-spine and paraspinal infection.  Patient has no neurologic deficit.  However, his neck pain has not improved.  Given lack of improvement of neck pain, we should repeat C-spine MRI with contrast when creatinine is improved.  However, without any neurological deficit, it would be fine to postpone MRI until renal function is further improved, to decrease risk of contrast-induced ORIANA.  Antibiotic plan as in below.  Monitor neck pain.  Recommend repeat C-spine MRI with and without contrast.  This can be postponed until ORIANA further improves.  MSSA bacteremia  Patient had MSSA bacteremia during recent hospitalization.  Source is unclear but likely cutaneous.  His bacteremia cleared rapidly on IV antibiotic.  TTE did not show any vegetation.  Given possible C-spine infection, plan was for long-term IV antibiotic.  Continue high-dose IV cefazolin.  Treat x 6 weeks from clearance of bacteremia as previously planned, through 3/27.  Symptomatic anemia  Patient is clinically improved with PRBC transfusion.  Management per primary service.  Melena  Patient with melena prior to admission.  This is most likely secondary to colonic mass.  Colorectal surgery follow-up.  ORIANA (acute kidney injury) (HCC)  Patient with ORIANA on admission, most likely secondary to hypovolemia from GI bleed.  Creatinine overall improving slowly but trended up today.  Cefazolin at full dose for now.  Monitor creatinine.  Nephrology follow-up.  Colonic mass  Patient has recently diagnosed adenocarcinoma of the colon.  He has been followed by colorectal surgery.  No plan for chemotherapy  yet.  Colorectal surgery follow-up.    Discussed with patient in detail regarding the above plan      Antibiotics:  Cefazolin  Last negative blood culture 2/14    Subjective   Patient with stable neck pain.  Stable generalized weakness but no focal weakness.  No abdominal or flank pain  Temperature stays down.  No chills.  He is tolerating antibiotic well.  No nausea, vomiting or diarrhea.    Objective :  Temp:  [97.8 °F (36.6 °C)-98.6 °F (37 °C)] 97.8 °F (36.6 °C)  HR:  [65-79] 66  BP: (117-139)/(53-62) 119/55  Resp:  [18] 18  SpO2:  [95 %-100 %] 95 %  O2 Device: Nasal cannula  Nasal Cannula O2 Flow Rate (L/min):  [3 L/min] 3 L/min    General:  No acute distress  Psychiatric:  Awake and alert  Pulmonary:  Normal respiratory excursion no rales, no wheezing, without accessory muscle use  Heart: Regular rate and rhythm.  No murmur, rub or gallop.  Abdomen:  Soft, nontender, nondistended, normal bowel sounds  Extremities: Stable leg edema.  No draining wound/ulcer  Skin:  No rashes      Lab Results: I have reviewed the following results:  Results from last 7 days   Lab Units 03/06/25 0521 03/05/25 2032 03/05/25 0525 03/04/25 2034 03/04/25 0525   WBC Thousand/uL 4.63  --  5.28  --  4.96   HEMOGLOBIN g/dL 7.7* 7.7* 8.0*   < > 8.2*   PLATELETS Thousands/uL 159  --  187  --  173    < > = values in this interval not displayed.     Results from last 7 days   Lab Units 03/06/25 0521 03/05/25 0525 03/04/25  0525 03/03/25  0516 03/01/25  0603 02/28/25  2241   SODIUM mmol/L 141 142 141 140   < > 139   POTASSIUM mmol/L 3.5 3.5 3.7 3.6   < > 3.8   CHLORIDE mmol/L 109* 111* 110* 110*   < > 109*   CO2 mmol/L 28 27 26 26   < > 25   BUN mg/dL 38* 40* 41* 44*   < > 60*   CREATININE mg/dL 1.86* 1.80* 1.85* 1.88*   < > 2.11*   EGFR ml/min/1.73sq m 34 35 34 33   < > 29   CALCIUM mg/dL 7.7* 7.9* 7.9* 7.9*   < > 7.3*   AST U/L  --   --  16 16  --  15   ALT U/L  --   --  <3* <3*  --  <3*   ALK PHOS U/L  --   --  51 47  --  41    ALBUMIN g/dL  --   --  2.1* 2.0*  --  1.8*    < > = values in this interval not displayed.     Results from last 7 days   Lab Units 02/28/25  1752   MRSA CULTURE ONLY  No Methicillin Resistant Staphlyococcus aureus (MRSA) isolated                 Results from last 7 days   Lab Units 02/28/25  1327   D-DIMER QUANTITATIVE ug/ml FEU 3.41*

## 2025-03-06 NOTE — PLAN OF CARE
Problem: PHYSICAL THERAPY ADULT  Goal: Performs mobility at highest level of function for planned discharge setting.  See evaluation for individualized goals.  Description: Treatment/Interventions: Functional transfer training, LE strengthening/ROM, Therapeutic exercise, Endurance training, Cognitive reorientation, Patient/family training, Equipment eval/education, Bed mobility, Gait training, Compensatory technique education, Spoke to nursing, Spoke to case management (WC mobility training)     See flowsheet documentation for full assessment, interventions and recommendations.  Outcome: Progressing  Note: Prognosis: Fair  Problem List: Decreased strength, Decreased range of motion, Decreased endurance, Impaired balance, Decreased mobility, Decreased cognition, Impaired judgement, Decreased safety awareness, Impaired hearing, Impaired sensation, Decreased skin integrity, Pain  Assessment: Pt seen for PT treatment 3/6/2025 with focus on: gait training and therapeutic activity, with intervention focusing on goal of increased independence with transfers and OOB mobility. Pt presents semi-supine in bed, is agreeable to participate in session. During session, pt requires TITA for bed mobility in hospital bed, MODA 2 person for STS transfer with RW from EOB, TITA 2 person + RW for ambulatory transition of 5ft from EOB to recliner chair. Pt needing EXTENDED functional seated rest break in chair prior to addt'l activity due to SOB, O2 desat to lowest of 78% on 3L NC. Pt progresses to complete additional STS transfer from recliner chair with MAXA 1 person + RW, pt with good application of hand placement for optimal mechanics, TITA/CGA to maintain supported standing approx 2 minutes at RW during dependent hygiene care and pants management. At end of session pt was left seated OOB in recliner chair with alarm engaged and needs in reach. Overall pt continues to perform below their baseline level of functional mobility due to  pain, weakness, impaired balance, decreased endurance, impaired respiratory status. Pt continues to most appropriately benefit from Level II (moderate PT intensity) resources upon d/c from the acute care setting. Continue skilled PT POC as able and appropriate in order to progress towards therapy goals and maximize independence with functional mobility. Next session, plan for intervention of increased gait distance training as able and appropriate.    Barriers to Discharge: Decreased caregiver support, Inaccessible home environment     Rehab Resource Intensity Level, PT: II (Moderate Resource Intensity)    See flowsheet documentation for full assessment.

## 2025-03-06 NOTE — ASSESSMENT & PLAN NOTE
Per colorectal surgeon, in the absence of emergency indication, he is not recommending surgery at this time.  Current hemoglobin: 7.7  H&H every 12 hours  Transfuse for hemoglobin under 7  Per primary team

## 2025-03-06 NOTE — ASSESSMENT & PLAN NOTE
Malnutrition Findings:   Adult Malnutrition type: Acute illness  Adult Degree of Malnutrition: Other severe protein calorie malnutrition  Malnutrition Characteristics: Inadequate energy, Fluid accumulation                  360 Statement: related to inadequate energy/protein intake as evidenced by consuming < 50% of energy intake compared to estimated needs for > 5 days and B/L LE +3 edema. Treated with ONS.    BMI Findings:           Body mass index is 26.55 kg/m².

## 2025-03-06 NOTE — PROGRESS NOTES
Patient:  WADE AGUILAR    MRN:  [id number]    Aidin Request ID:  [id number]    Level of care reserved:  Skilled Nursing Facility    Partner Reserved:  [facility], [place] [address] [phone]    Clinical needs requested:    Geography searched:  10 miles around [phone]    Start of Service:    Request sent:  4:22pm EST on 3/4/2025 by Samuel Aguilar    Partner reserved:  3:44pm EST on 3/6/2025 by Samuel Aguilar    Choice list shared:  3:43pm EST on 3/6/2025 by Samuel Aguilar

## 2025-03-06 NOTE — ASSESSMENT & PLAN NOTE
Patient with ORIANA on admission, most likely secondary to hypovolemia from GI bleed.  Creatinine overall improving slowly but trended up today.  Cefazolin at full dose for now.  Monitor creatinine.  Nephrology follow-up.

## 2025-03-06 NOTE — PHYSICAL THERAPY NOTE
PHYSICAL THERAPY TREATMENT  DATE: 03/06/25  TIME: 0941-1016    NAME:  Devin Chaves  AGE:   79 y.o.  Mrn:   [id number]  Length Of Stay: 6    ADMIT DX:  Anemia [D64.9]  Pulmonary nodule [R91.1]  Elevated troponin [R79.89]  Abnormal laboratory test result [R89.9]  Pleural effusion, bilateral [J90]  ORIANA (acute kidney injury) (HCC) [N17.9]  Colonic mass [K63.89]  Anemia, unspecified [D64.9]    Past Medical History:   Diagnosis Date    Atrial fibrillation (HCC)     COPD (chronic obstructive pulmonary disease) (HCC)     Crushing injury of finger, left     Infectious viral hepatitis      Past Surgical History:   Procedure Laterality Date    FL LUMBAR PUNCTURE DIAGNOSTIC  2/10/2022    MO OPEN TX PHALANGEAL SHAFT FRACTURE PROX/MIDDLE EA Left 1/25/2017    Procedure: ORIF LEFT SMALL FINGER FRACTURE;  Surgeon: Blake Badillo MD;  Location: [facility];  Service: Orthopedics       Performed at least 2 patient identifiers during session: Name, Birthday, and ID bracelet     03/06/25 0941   PT Last Visit   PT Visit Date 03/06/25   Note Type   Note Type Treatment   Pain Assessment   Pain Assessment Tool FLACC   Pain Location/Orientation Orientation: Left;Location: Foot;Other (Comment)  (ankle)   Pain Onset/Description Onset: Ongoing;Descriptor: Aching;Descriptor: Discomfort   Effect of Pain on Daily Activities limits tolerance of functional mobility and WBing to LLE, limits standing tolerance   Patient's Stated Pain Goal No pain   Hospital Pain Intervention(s) Medication (See MAR);Repositioned;Ambulation/increased activity;Emotional support;Elevated   Multiple Pain Sites No   Pain Rating: FLACC (Rest) - Face 0   Pain Rating: FLACC (Rest) - Legs 0   Pain Rating: FLACC (Rest) - Activity 0   Pain Rating: FLACC (Rest) - Cry 0   Pain Rating: FLACC (Rest) - Consolability 0   Score: FLACC (Rest) 0   Pain Rating: FLACC (Activity) - Face 1   Pain Rating: FLACC (Activity) - Legs 1   Pain Rating: FLACC (Activity) - Activity 1   Pain  "Rating: FLACC (Activity) - Cry 1   Pain Rating: FLACC (Activity) - Consolability 1   Score: FLACC (Activity) 5   Restrictions/Precautions   Weight Bearing Precautions Per Order Yes   LLE Weight Bearing Per Order (S)  WBAT  (in ? lace up ankle brace, however not present in hospital)   Braces or Orthoses Other (Comment)  (L ankle lace up brace not present in hospital; LLE heel offloading boot)   Other Precautions Cognitive;Chair Alarm;Bed Alarm;WBS;Multiple lines;O2;Fall Risk;Pain  (3L O2 via NC)   General   Chart Reviewed Yes   Additional Pertinent History Pt is a 79 yr old male admitted 2/28/25 with chest pain and SOB, dark black stools, abdominal pain, low hgb (4.0 on admission, now 8.1). PMH includes Afib, COPD, hepatitis, colonic mass, HTN, CVA. No significant change from previous session.   Response to Previous Treatment Patient reporting fatigue but able to participate.   Family/Caregiver Present No   Cognition   Overall Cognitive Status Impaired   Arousal/Participation Alert;Cooperative   Attention Attends with cues to redirect   Orientation Level Oriented to person;Oriented to place;Oriented to situation;Disoriented to time  (grossly oriented to place (able to recall hospital but not which one); grossly oriented to situation)   Memory Decreased short term memory;Decreased recall of recent events;Decreased recall of precautions   Following Commands Follows one step commands with increased time or repetition   Subjective   Subjective \"I keep telling them they need to inject some gel into the space I have in my ankle.\"   Bed Mobility   Supine to Sit 4  Minimal assistance   Additional items Assist x 1;HOB elevated;Bedrails;Increased time required;Verbal cues  (cues for utilization of bedrail for increased independence)   Sit to Supine   (NT as pt was left seated OOB in recliner chair at end of session, alarm engaged)   Additional Comments Pt with high level of fatigue after completion of bed mobility, increased " SOB/WOB, requiring increased time seated at EOB for SOB recovery.   Transfers   Sit to Stand 3  Moderate assistance   Additional items Assist x 2;Bedrails;Increased time required;Verbal cues  (RW; bed height with significant elevation; cues for hand placement for optimal positioning)   Stand to Sit 2  Maximal assistance   Additional items Assist x 1;Armrests;Increased time required  (RW; cues for hand placement for safe and controlled descent to surface)   Stand pivot 3  Moderate assistance   Additional items Assist x 1;Increased time required;Verbal cues  (RW; cues for step patterning, RW management, surface proximity)   Additional Comments Pt needing extensive increased time and effort for completion of all functional mobility efforts. High level of fatigue and SOB with all efforts, needing extended time and PLB for recovery of SOB.   Ambulation/Elevation   Gait pattern Improper Weight shift;Antalgic;Forward Flexion;Wide NICK;Decreased foot clearance;Decreased L stance;Excessively slow;Short stride  (cues for RW management and step patterning in order to most optimally offload LLE during stance)   Gait Assistance 4  Minimal assist   Additional items Assist x 2;Verbal cues;Tactile cues   Assistive Device Rolling walker   Distance 5ft x1 from EOB to recliner chair, pt declines further ambulation distance or trials on this date due to pain and SOB  (pt needing extended time in functional seated rest for recovery of SPO2)   Stair Management Assistance Not tested   Balance   Static Sitting Fair   Dynamic Sitting Poor +   Static Standing Poor +  (w/ RW)   Dynamic Standing Poor  (w/ RW)   Ambulatory Poor  (w/ RW)   Endurance Deficit   Endurance Deficit Yes   Endurance Deficit Description (S)  Pt presenting on 3L O2 via NC. Pt with mod-max degree of SOB/PUTNAM with minimal mobility efforts, SPO2 monitored and decreased to lowest of 78% 3L with functional mobility efforts. Whereas session prior pt able to maintain saturation  in high 90s with 3L O2.   Activity Tolerance   Activity Tolerance Patient limited by fatigue;Patient limited by pain;Other (Comment)  (SOB/PUTNAM, O2 desat)   Medical Staff Made Aware Spoke with CM, OT, RN   Assessment   Prognosis Fair   Problem List Decreased strength;Decreased range of motion;Decreased endurance;Impaired balance;Decreased mobility;Decreased cognition;Impaired judgement;Decreased safety awareness;Impaired hearing;Impaired sensation;Decreased skin integrity;Pain   Assessment Pt seen for PT treatment 3/6/2025 with focus on: gait training and therapeutic activity, with intervention focusing on goal of increased independence with transfers and OOB mobility. Pt presents semi-supine in bed, is agreeable to participate in session. During session, pt requires TITA for bed mobility in hospital bed, MODA 2 person for STS transfer with RW from EOB, TITA 2 person + RW for ambulatory transition of 5ft from EOB to recliner chair. Pt needing EXTENDED functional seated rest break in chair prior to addt'l activity due to SOB, O2 desat to lowest of 78% on 3L NC. Pt progresses to complete additional STS transfer from recliner chair with MAXA 1 person + RW, pt with good application of hand placement for optimal mechanics, TITA/CGA to maintain supported standing approx 2 minutes at RW during dependent hygiene care and pants management. At end of session pt was left seated OOB in recliner chair with alarm engaged and needs in reach. Overall pt continues to perform below their baseline level of functional mobility due to pain, weakness, impaired balance, decreased endurance, impaired respiratory status. Pt continues to most appropriately benefit from Level II (moderate PT intensity) resources upon d/c from the acute care setting. Continue skilled PT POC as able and appropriate in order to progress towards therapy goals and maximize independence with functional mobility. Next session, plan for intervention of increased gait  "distance training as able and appropriate.   Barriers to Discharge Decreased caregiver support;Inaccessible home environment   Goals   Patient Goals \"to have less pain\"   [facility] Expiration Date 03/17/25   Short Term Goal #1 Pt will: complete all bed mobility in hospital bed with S in order to promote increased OOB functional mobility and simulate home environment; complete all transfers with RW and TITA in order to increase safety with functional mobility; ambulate >50ft with RW and appropriate WB restriction in order to increase safety with in room/in facility distance functional mobility; improve B LE strength to >/= 4/5 MMT t/o in order to increase safety with functional mobility and decrease risk of falls; demonstrate understanding and independence with LE strengthening HEP; improve dynamic standing balance to >/= good grade in order to promote safety and increased independence with mobility; tolerate >3hrs OOB in upright position, in order to improve muscular endurance and respiratory status; improve AM-PAC score to >/= 14/24 in order to increase independence with mobility and decrease burden of care; improve Barthel Index score to >/= 45/100 in order to increase independence and decrease risk of falls.   PT Treatment Day 2   Plan   Treatment/Interventions Functional transfer training;LE strengthening/ROM;Elevations;Therapeutic exercise;Endurance training;Patient/family training;Equipment eval/education;Bed mobility;Gait training;Spoke to nursing;Spoke to case management  (WC mobility training)   Progress Progressing toward goals   PT Frequency 3-5x/wk   Discharge Recommendation   Rehab Resource Intensity Level, PT II (Moderate Resource Intensity)   AM-PAC Basic Mobility Inpatient   Turning in Flat Bed Without Bedrails 3   Lying on Back to Sitting on Edge of Flat Bed Without Bedrails 2   Moving Bed to Chair 2   Standing Up From Chair Using Arms 1   Walk in Room 2   Climb 3-5 Stairs With Railing 1   Basic Mobility " Inpatient Raw Score 11   Basic Mobility Standardized Score 30.25   Turning Head Towards Sound 3   Follow Simple Instructions 3   Low Function Basic Mobility Raw Score  17   Low Function Basic Mobility Standardized Score  27.46   [facility] Highest Level Of Mobility   -[facility] Goal 4: Move to chair/commode   JH-HLM Achieved 5: Stand (1 or more minutes)   Education   Education Provided Mobility training;Assistive device   Patient Explanation/teachback used;Reinforcement needed   End of Consult   Patient Position at End of Consult Bedside chair;Bed/Chair alarm activated;All needs within reach     This session, pt required and most appropriately benefited from partial or full skilled PT/OT co-treat due to extensive physical assistance of SKILLED therapists, cognitive-communication impairments, significant regression from baseline level of mobility, continuous vitals monitoring, decreased activity tolerance, and unpredictable medical and/or functional status. PT and OT goals were addressed separately as seen in documentation.    Based on patient's [facility] Highest Level of Mobility scores today, patient currently has a goal of -[facility] Levels: 5: STAND (1 OR MORE MINUTES), to be completed with RN staffing each shift, in order to improve overall activity tolerance and mobility, combat hospital related deconditioning, and maximize outcomes for d/c from the acute care setting.     The patient's AM-PAC Basic Mobility Inpatient Short Form Raw Score is 11. A Raw score of less than or equal to 16 suggests the patient may benefit from discharge to post-acute rehabilitation services. Please also refer to the recommendation of the Physical Therapist for safe discharge planning.      Melissa Tavarez PT, DPT   Available via [employer]  NPI # [id number]  [place] License - [id number]  3/6/2025

## 2025-03-06 NOTE — PLAN OF CARE
Problem: Prexisting or High Potential for Compromised Skin Integrity  Goal: Skin integrity is maintained or improved  Description: INTERVENTIONS:  - Identify patients at risk for skin breakdown  - Assess and monitor skin integrity  - Assess and monitor nutrition and hydration status  - Monitor labs   - Assess for incontinence   - Turn and reposition patient  - Assist with mobility/ambulation  - Relieve pressure over bony prominences  - Avoid friction and shearing  - Provide appropriate hygiene as needed including keeping skin clean and dry  - Evaluate need for skin moisturizer/barrier cream  - Collaborate with interdisciplinary team   - Patient/family teaching  - Consider wound care consult   Outcome: Progressing     Problem: Potential for Falls  Goal: Patient will remain free of falls  Description: INTERVENTIONS:  - Educate patient/family on patient safety including physical limitations  - Instruct patient to call for assistance with activity   - Consult OT/PT to assist with strengthening/mobility   - Keep Call bell within reach  - Keep bed low and locked with side rails adjusted as appropriate  - Keep care items and personal belongings within reach  - Initiate and maintain comfort rounds  - Make Fall Risk Sign visible to staff  - Apply yellow socks and bracelet for high fall risk patients  - Consider moving patient to room near nurses station  Outcome: Progressing     Problem: Nutrition/Hydration-ADULT  Goal: Nutrient/Hydration intake appropriate for improving, restoring or maintaining nutritional needs  Description: Monitor and assess patient's nutrition/hydration status for malnutrition. Collaborate with interdisciplinary team and initiate plan and interventions as ordered.  Monitor patient's weight and dietary intake as ordered or per policy. Utilize nutrition screening tool and intervene as necessary. Determine patient's food preferences and provide high-protein, high-caloric foods as appropriate.      INTERVENTIONS:  - Monitor oral intake, urinary output, labs, and treatment plans  - Assess nutrition and hydration status and recommend course of action  - Evaluate amount of meals eaten  - Assist patient with eating if necessary   - Allow adequate time for meals  - Recommend/ encourage appropriate diets, oral nutritional supplements, and vitamin/mineral supplements  - Order, calculate, and assess calorie counts as needed  - Recommend, monitor, and adjust tube feedings and TPN/PPN based on assessed needs  - Assess need for intravenous fluids  - Provide specific nutrition/hydration education as appropriate  - Include patient/family/caregiver in decisions related to nutrition  Outcome: Progressing     Problem: GASTROINTESTINAL - ADULT  Goal: Minimal or absence of nausea and/or vomiting  Description: INTERVENTIONS:  - Administer IV fluids if ordered to ensure adequate hydration  - Maintain NPO status until nausea and vomiting are resolved  - Nasogastric tube if ordered  - Administer ordered antiemetic medications as needed  - Provide nonpharmacologic comfort measures as appropriate  - Advance diet as tolerated, if ordered  - Consider nutrition services referral to assist patient with adequate nutrition and appropriate food choices  Outcome: Progressing  Goal: Maintains or returns to baseline bowel function  Description: INTERVENTIONS:  - Assess bowel function  - Encourage oral fluids to ensure adequate hydration  - Administer IV fluids if ordered to ensure adequate hydration  - Administer ordered medications as needed  - Encourage mobilization and activity  - Consider nutritional services referral to assist patient with adequate nutrition and appropriate food choices  Outcome: Progressing  Goal: Maintains adequate nutritional intake  Description: INTERVENTIONS:  - Monitor percentage of each meal consumed  - Identify factors contributing to decreased intake, treat as appropriate  - Assist with meals as needed  -  Monitor I&O, weight, and lab values if indicated  - Obtain nutrition services referral as needed  Outcome: Progressing  Goal: Establish and maintain optimal ostomy function  Description: INTERVENTIONS:  - Assess bowel function  - Encourage oral fluids to ensure adequate hydration  - Administer IV fluids if ordered to ensure adequate hydration   - Administer ordered medications as needed  - Encourage mobilization and activity  - Nutrition services referral to assist patient with appropriate food choices  - Assess stoma site  - Consider wound care consult   Outcome: Progressing  Goal: Oral mucous membranes remain intact  Description: INTERVENTIONS  - Assess oral mucosa and hygiene practices  - Implement preventative oral hygiene regimen  - Implement oral medicated treatments as ordered  - Initiate Nutrition services referral as needed  Outcome: Progressing     Problem: HEMATOLOGIC - ADULT  Goal: Maintains hematologic stability  Description: INTERVENTIONS  - Assess for signs and symptoms of bleeding or hemorrhage  - Monitor labs  - Administer supportive blood products/factors as ordered and appropriate  Outcome: Progressing

## 2025-03-06 NOTE — ASSESSMENT & PLAN NOTE
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    Patient was due to follow-up with outpatient colorectal surgery on 2/28/2025  Pt presenting with melena, Hgb 4.6. Symptomatic anemia.  Attending discussion with Colorectal: If COPD can stay controlled, increase chance of operation.  Oncology: Outpatient appointment for 4/3

## 2025-03-06 NOTE — PLAN OF CARE
Problem: OCCUPATIONAL THERAPY ADULT  Goal: Performs self-care activities at highest level of function for planned discharge setting.  See evaluation for individualized goals.  Description: Treatment Interventions: ADL retraining, Functional transfer training, Endurance training, Cognitive reorientation, Patient/family training, Equipment evaluation/education, Compensatory technique education, Continued evaluation, Energy conservation, Activityengagement          See flowsheet documentation for full assessment, interventions and recommendations.   Outcome: Progressing  Note: Limitation: Decreased ADL status, Decreased Safe judgement during ADL, Decreased cognition, Decreased endurance, Decreased self-care trans, Decreased high-level ADLs (pain, balance, fxnl mobility, act broderick, fxnl reach, standing broderick, and strength, safety awareness, insight, problem solving, learning new tasks, and initiation, response time)  Prognosis: Good  Assessment: Pt was seen for skilled acute care OT treat on 03/06/25 . Upon arrival pt was supine in bed. Treatment was focused on bed mobility, functional transfer/mobility, LB dressing, and overall activity tolerance. Pt ended session in chair w/ alarm on and all needs met.  Compared to eval, pt now requires less A for functional transfers w/ RW, LB dressing, and standing balance. However, pt has showed decreased endurance/activity tolerance this session requiring increased time to recover from activity as well as increased drop in SpO2 on 3L NC. Pt continues to perfom below baseline and personal factors that continue to impact discharge include inaccessible home environment, inaccessible bathroom environment, lack of appropriate care at home, decreased ability to perform ADLs, decreased ability to perform IADLs, and cognitive decline. Continue to see pt 3-5 to maximize independence in ADLs/IADLs/transfers/functional mobility, continue to edu pt on energy conservation/pacing techniques,  reduce caregiver burden, and maximize safety during participation in meaningful tasks. Level II are still recommended upon d/c at this time.     Rehab Resource Intensity Level, OT: II (Moderate Resource Intensity)        Uzma Elliott OTS

## 2025-03-06 NOTE — ASSESSMENT & PLAN NOTE
Malnutrition Findings:   Adult Malnutrition type: Acute illness  Adult Degree of Malnutrition: Other severe protein calorie malnutrition  Malnutrition Characteristics: Inadequate energy, Fluid accumulation  360 Statement: related to inadequate energy/protein intake as evidenced by consuming < 50% of energy intake compared to estimated needs for > 5 days and B/L LE +3 edema. Treated with ONS.  BMI Findings:     Body mass index is 26.55 kg/m².     Plan:  Ensure supplements

## 2025-03-06 NOTE — ASSESSMENT & PLAN NOTE
MRI of the neck done 2/14/2025 revealed cervical degenerative change with mild canal stenosis and mild to moderate foraminal narrowing. No cord compression. Mild nonspecific edema within the right posterior breast dermal musculature of the upper cervicals spine. Minimal peripheral enhancement is noted.   Per ID - would recommend a repeat MRI of C-Spine with and without contrast when improvement in ORIANA

## 2025-03-06 NOTE — ASSESSMENT & PLAN NOTE
Recent Labs     03/05/25 0525 03/05/25 2032 03/06/25  0521   HGB 8.0* 7.7* 7.7*      Patient presents with 2 days of chest pain, shortness of breath, fatigue and melena  2/20/2025 EGD/colonoscopy showed 2.5 cm ileocecal mass, pathology confirmed adenocarcinoma.  S/p 3 units packed rbc in ED. S/p 1 unit PRBCs on 3/2  Hemoglobin has been stable. No reported dark stool today.   Attending discussion with Colorectal: If COPD can stay controlled, increase chance of operation.  Oncology: Outpatient appointment for 4/3  Urology consulted: Outpatient cystoscopy, urology signed off    Plan:  H&H Q12 hrs  Transfuse for Hgb <7  Monitor for signs of overt bleeding or melenotic stools  Protonix 40 mg PO QD   Hold Eliquis and aspirin for now due to black tary stool   Monitor on telemetry

## 2025-03-06 NOTE — ASSESSMENT & PLAN NOTE
Etiology: Likely to hypovolemia related to blood loss + age related nephron loss + Hypertensive Nephropathy. +/- component of ATN d/t ischemia with Melena/low hemoglobin + Colon mass/cancer   Admission Cr: 2.43 (2/28)  Current Cr: 1.86 (3/6)  Baseline Cr: 0.9-1.1 since 2017  Per Care Everywhere, Epic, and Patient - does not follow outpatient with Nephrology  Not currently on IV fluids  Was previously on Plasma-Lyte IV fluids 75 mL/hr. Last given 3/3  Home Rx: Not an ACE/ARB, Diuretics, NSAIDs  While inpatient, no ACE/ARB or NSAIDs.    Did receive 1 dose of IV 40 mg Lasix on 3/3  UA 2/28/25: Specific gravity 1.036, trace ketones, large blood, trace leuks, +2 protein, 4-10 WBC  Hemoglobin on admission 4.6 - s/p 4 units PRBC (3 in ED and 1 on 3/2).   Current hemoglobin: 8.0  Patient underwent colonoscopy on 2/20/25 which indicated proximal ascending colon-across from the ileocecal valve there is a 2.5 cm hard friable polypoid mass appears malignant status post multiple biopsies. 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 No hydronephrosis or hydroureter. No urinary tract calculi. No perinephric collection.  LEFT KIDNEY AND URETER: No solid renal mass or detectable urothelial mass. Subcentimeter hypoattenuating renal lesion(s), too small to characterize but statistically likely benign, which do not warrant follow-up.   No mets and stable upper cecal/proximal ascending colonic mass attributed to recently biopsy-proven adenocarcinoma     Current Status:  Creatinine currently 1.86 which is a slight increase from yesterday of 1.80  Pt received 20mg IV Lasix one time and 12.5 g Albumin one time d/t hypervolemia + decreased urine output 3/5. Cr had slight bump to 1.86 which could possibly be related to lab error. Total output 3/5 895 cc. Recommend to continue to  monitor I&O with another 20mg IV Lasix with 12.5g Albumin.  Bladder scan and retention protocol for history of urinary retention/to avoid urinary retention   Repeat UPCR showing ACR of 821. With result, concern for possible GN? Possibly not in steady state and would require redraw?  Avoid ACE/ARB, NSAIDs, Nephrotoxic Agents, hypotension, and any further IV Contrast at this time  Monitor daily weights and I&O  Labs in AM

## 2025-03-06 NOTE — ASSESSMENT & PLAN NOTE
Patient was discharged on cefazolin 2 g IV Q8 hrs until 3/27/2025. PICC line in place. Patient rescheduled to follow-up outpatient with ID    Plan:  Continue with cefazolin 2 g IV every 8 hours.  infectious disease recommendations appreciated   Recommend repeat C-spine MRI with and without contrast, postpone until ORIANA resolves  6 weeks treatment through 3/27/2025

## 2025-03-07 NOTE — ASSESSMENT & PLAN NOTE
Per colorectal surgeon, in the absence of emergency indication, he is not recommending surgery at this time.  Current hemoglobin: 8.2  H&H every 12 hours  Transfuse for hemoglobin under 7  Per primary team

## 2025-03-07 NOTE — ASSESSMENT & PLAN NOTE
Etiology: Likely to hypovolemia related to blood loss + age related nephron loss + Hypertensive Nephropathy. +/- component of ATN d/t ischemia with Melena/low hemoglobin + Colon mass/cancer   Admission Cr: 2.43 (2/28)  Current Cr: 1.80 (3/7)  Baseline Cr: 0.9-1.1 since 2017  Per Care Everywhere, Epic, and Patient - does not follow outpatient with Nephrology  Not currently on IV fluids  Was previously on Plasma-Lyte IV fluids 75 mL/hr. Last given 3/3  Home Rx: Not an ACE/ARB, Diuretics, NSAIDs  While inpatient, no ACE/ARB or NSAIDs.    Did receive 1 dose of IV 40 mg Lasix on 3/3  UA 2/28/25: Specific gravity 1.036, trace ketones, large blood, trace leuks, +2 protein, 4-10 WBC  Hemoglobin on admission 4.6 - s/p 4 units PRBC (3 in ED and 1 on 3/2).   Current hemoglobin: 8.0  Patient underwent colonoscopy on 2/20/25 which indicated proximal ascending colon-across from the ileocecal valve there is a 2.5 cm hard friable polypoid mass appears malignant status post multiple biopsies. 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 No hydronephrosis or hydroureter. No urinary tract calculi. No perinephric collection.  LEFT KIDNEY AND URETER: No solid renal mass or detectable urothelial mass. Subcentimeter hypoattenuating renal lesion(s), too small to characterize but statistically likely benign, which do not warrant follow-up.   No mets and stable upper cecal/proximal ascending colonic mass attributed to recently biopsy-proven adenocarcinoma     Current Status:  Creatinine currently 1.80 which is a slight decrease compared to yesterday of 1.86. If Cr remains unchanged within the next 1-2 days will plan for c3 and c4 compliments, repeat UA and UPCR  Pt received another 20mg IV Lasix one time and 12.5 g Albumin 3/6. UO: 650cc yesterday  CXR: Unchanged pulmonary edema and  small pleural effusions. Pneumonia not excluded.   Torsemide 10mg orally to start today and then daily afterwards  Bladder scan and retention protocol for history of urinary retention/to avoid urinary retention   Avoid ACE/ARB, NSAIDs, Nephrotoxic Agents, hypotension, and any further IV Contrast at this time  Monitor daily weights and I&O  Labs in AM  Per ID- Recommend repeat C-spine MRI with and without contrast. This can be postponed until ORIANA further improves

## 2025-03-07 NOTE — ASSESSMENT & PLAN NOTE
Patient with COPD on 2-4 L supplemental O2 at baseline  Home Albuterol prn, Xopenex Q8 hrs prn, Mometasone inhaler daily    Plan:  Lasix 20 mg ordered   Titrate O2 to maintain SpO2 greater than 88%   Xopenex nebs Q8 hrs prn, Mometasone daily  Albuterol prn  RT notified to administer nebs  Follow-up in chest xray ordered by Nephrology.

## 2025-03-07 NOTE — ASSESSMENT & PLAN NOTE
Recent Labs     03/05/25  0525 03/06/25  0521 03/07/25  0541   CREATININE 1.80* 1.86* 1.80*   EGFR 35 34 35     Estimated Creatinine Clearance: 37.7 mL/min (A) (by C-G formula based on SCr of 1.8 mg/dL (H)).     Cr 2.43 on admission. Baseline 0.9. BUN 63. BUN/Cr ratio >20  Secondary to severe anemia on admission and IV contrast.    Plan:  Follow-up on chest xray  Daily BMP  Avoid nephrotoxic medications  Monitor I/O's  Urinary retention protocol  Nephrology consulted, appreciate recs  Start Torsemide 10 mg QD  If persistently elevated will plan for c3/c4 complement

## 2025-03-07 NOTE — PROGRESS NOTES
Progress Note - Nephrology   Name: Devin Chaves 79 y.o. male I MRN: [id number]  Unit/Bed#: S -01 I Date of Admission: 2/28/2025   Date of Service: 3/7/2025 I Hospital Day: 7     Assessment & Plan  ORIANA (acute kidney injury) (HCC)  Etiology: Likely to hypovolemia related to blood loss + age related nephron loss + Hypertensive Nephropathy. +/- component of ATN d/t ischemia with Melena/low hemoglobin + Colon mass/cancer   Admission Cr: 2.43 (2/28)  Current Cr: 1.80 (3/7)  Baseline Cr: 0.9-1.1 since 2017  Per Care Everywhere, Epic, and Patient - does not follow outpatient with Nephrology  Not currently on IV fluids  Was previously on Plasma-Lyte IV fluids 75 mL/hr. Last given 3/3  Home Rx: Not an ACE/ARB, Diuretics, NSAIDs  While inpatient, no ACE/ARB or NSAIDs.    Did receive 1 dose of IV 40 mg Lasix on 3/3  UA 2/28/25: Specific gravity 1.036, trace ketones, large blood, trace leuks, +2 protein, 4-10 WBC  Hemoglobin on admission 4.6 - s/p 4 units PRBC (3 in ED and 1 on 3/2).   Current hemoglobin: 8.0  Patient underwent colonoscopy on 2/20/25 which indicated proximal ascending colon-across from the ileocecal valve there is a 2.5 cm hard friable polypoid mass appears malignant status post multiple biopsies. 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 No hydronephrosis or hydroureter. No urinary tract calculi. No perinephric collection.  LEFT KIDNEY AND URETER: No solid renal mass or detectable urothelial mass. Subcentimeter hypoattenuating renal lesion(s), too small to characterize but statistically likely benign, which do not warrant follow-up.   No mets and stable upper cecal/proximal ascending colonic mass attributed to recently biopsy-proven adenocarcinoma     Current Status:  Creatinine currently 1.80 which is a slight decrease compared to yesterday  of 1.86. If Cr remains unchanged within the next 1-2 days will plan for c3 and c4 compliments, repeat UA and UPCR  Pt received another 20mg IV Lasix one time and 12.5 g Albumin 3/6. UO: 650cc yesterday  CXR: Unchanged pulmonary edema and small pleural effusions. Pneumonia not excluded.   Torsemide 10mg orally to start today and then daily afterwards  Bladder scan and retention protocol for history of urinary retention/to avoid urinary retention   Avoid ACE/ARB, NSAIDs, Nephrotoxic Agents, hypotension, and any further IV Contrast at this time  Monitor daily weights and I&O  Labs in AM  Per ID- Recommend repeat C-spine MRI with and without contrast. This can be postponed until ORIANA further improves   HTN (hypertension)  Blood Pressure: 125/54  Fluid Status: Borderline hypervolemic  Pt states left ankle and foot is a chronic edema, right ankle and pedal + edema, improved slightly  In-Patient Rx: Metoprolol 25 mg oral every 12 hours  Home Rx: None  Avoid perturbations and blood pressure  Maintain MAPs greater than 65 mmHg   Melena  Patient endorsed melena prior to admission  1 episode of dark, tarry stool overnight on 3/4/2025  Oral Protonix  Stool record at bedside  Monitor stools  Colorectal surgery follow-up  Symptomatic anemia  Per colorectal surgeon, in the absence of emergency indication, he is not recommending surgery at this time.  Current hemoglobin: 8.2  H&H every 12 hours  Transfuse for hemoglobin under 7  Per primary team  Urinary retention  Patient has history of urinary retention  Flomax 0.4 mg orally daily  Bladder scan and urinary retention protocol  Monitor I&O  Urology following  At risk for electrolyte imbalance  Magnesium 1.8 - 2g IV mag replacement given  Potassium 4.2 -   Calcium 8.2 - no change  monitor electrolytes  Per primary team  CAD (coronary artery disease)  TTE 2/12/2025: EF 55%  Normal systolic dysfunction, G1DD, No vegetation, No mural thrombus, moderate aortic sclerosis  COPD (chronic  obstructive pulmonary disease) (HCC)  Patient with COPD on 2-4 L supplemental O2 at baseline  Patient currently requiring 2 L NC.   Home Albuterol prn, Xopenex Q8 hrs prn, Mometasone inhaler daily  Per primary team  History of CVA (cerebrovascular accident)  Pt had multiple embolic strokes during admission from 2/11/25-2/22/25  Thought to be embolic strokes 2/2 MSSA bacteremia, no vegetations on TTE  Atrial fibrillation (HCC)  Home Rx: Eliquis 5 mg orally twice daily - on HOLD  On Lopressor 25 mg orally every 12 hours for rate control  Per primary team  MSSA bacteremia  Patient had MSSA bacteremia during recent hospitalization. Source is unclear but likely cutaneous   ID following  IV antibiotics  Neck pain  MRI of the neck done 2/14/2025 revealed cervical degenerative change with mild canal stenosis and mild to moderate foraminal narrowing. No cord compression. Mild nonspecific edema within the right posterior breast dermal musculature of the upper cervicals spine. Minimal peripheral enhancement is noted.   Per ID - would recommend a repeat MRI of C-Spine with and without contrast when improvement in ORIANA    Colonic mass  2/20/2025 EGD/colonoscopy showed 2.5 cm ileocecal mass, pathology confirmed adenocarcinoma  Patient was due for follow-up with outpatient colorectal surgery on 2/28/2025  Presented with melena, hemoglobin 4.6 on admission, symptomatic anemia  Colorectal consulted and following  Pleural effusion, bilateral  CT A/P shows bilateral pleural effusions L>R, pulmonary vascular congestion     Ambulatory dysfunction    Dysphagia    Severe protein-calorie malnutrition (HCC)  Malnutrition Findings:   Adult Malnutrition type: Acute illness  Adult Degree of Malnutrition: Other severe protein calorie malnutrition  Malnutrition Characteristics: Inadequate energy, Fluid accumulation                  360 Statement: related to inadequate energy/protein intake as evidenced by consuming < 50% of energy intake compared  "to estimated needs for > 5 days and B/L LE +3 edema. Treated with ONS.    BMI Findings:           Body mass index is 26.55 kg/m².     Overall plan and recommendations has been reviewed with primary team and I agree with the plan as stated below      Review of Systems  Patient seen and examined at bedside. Pt in bed at this time, but would like to get OOB. Reached out to nurse to get OOB. Denies pain currently. Pt states SOB is \"okay\" but better when he was laying a little more flat. Denies CP, N/V/D. Chronic LLE/pedal edema noted. Improvement in RLE/pedal edema. Pt declined breakfast this morning because he just wasn't hungry.     Review of Systems   Constitutional:  Negative for fever.   Respiratory:  Positive for shortness of breath. Negative for wheezing.    Cardiovascular:  Positive for leg swelling. Negative for chest pain and palpitations.   Gastrointestinal:  Negative for abdominal pain, constipation, diarrhea, nausea and vomiting.   Genitourinary:  Negative for difficulty urinating.   Skin:  Positive for pallor.   Neurological:  Negative for dizziness, light-headedness and headaches.         Physical Exam:  Current Weight: Weight - Scale: 88.8 kg (195 lb 12.3 oz)  Vitals:    03/06/25 1941 03/06/25 2000 03/06/25 2245 03/07/25 0745   BP: 141/63  115/55 125/54   Pulse: 86  73 68   Resp:   16    Temp:   99 °F (37.2 °C) 97.9 °F (36.6 °C)   TempSrc:       SpO2: 100% 99% 96% 97%   Weight:       Height:           Physical Exam  Vitals and nursing note reviewed.   Constitutional:       Appearance: He is ill-appearing.   Cardiovascular:      Rate and Rhythm: Normal rate.      Pulses: Normal pulses.      Heart sounds: No murmur heard.     No gallop.   Pulmonary:      Effort: No respiratory distress.      Breath sounds: No wheezing.   Abdominal:      General: Bowel sounds are normal. There is no distension.      Palpations: Abdomen is soft.   Musculoskeletal:      Right lower leg: Edema present.      Left lower leg: " Edema present.      Comments: Ongoing RLE rash on shin/ankle/foot   Skin:     General: Skin is warm and dry.      Capillary Refill: Capillary refill takes 2 to 3 seconds.      Coloration: Skin is pale.      Findings: Rash present.   Neurological:      Mental Status: He is alert and oriented to person, place, and time.   Psychiatric:         Mood and Affect: Mood normal.         Behavior: Behavior normal.         Medications:    Current Facility-Administered Medications:     acetaminophen (TYLENOL) tablet 650 mg, 650 mg, Oral, Q6H PRN, Jean Brady MD    albuterol inhalation solution 2.5 mg, 2.5 mg, Nebulization, Q6H PRN, Kanchan Wilson MD, 2.5 mg at 03/06/25 1140    ascorbic acid (VITAMIN C) tablet 250 mg, 250 mg, Oral, Daily, Kanchan Wilson MD, 250 mg at 03/07/25 0823    atorvastatin (LIPITOR) tablet 40 mg, 40 mg, Oral, Daily With Dinner, Kanchan Wilson MD, 40 mg at 03/06/25 1657    ceFAZolin (ANCEF) IVPB (premix in dextrose) 2,000 mg 50 mL, 2,000 mg, Intravenous, Q8H, Olivia Martinez MD, Last Rate: 100 mL/hr at 03/07/25 1018, 2,000 mg at 03/07/25 1018    Cholecalciferol (VITAMIN D3) tablet 2,000 Units, 2,000 Units, Oral, Daily, Kanchan Wilson MD, 2,000 Units at 03/07/25 0822    cyanocobalamin (VITAMIN B-12) tablet 100 mcg, 100 mcg, Oral, Daily, Kanchan Wilson MD, 100 mcg at 03/07/25 0823    fluticasone (ARNUITY ELLIPTA) 100 MCG/ACT inhaler 1 puff, 1 puff, Inhalation, Daily, Kanchan Wilson MD, 1 puff at 03/07/25 0824    folic acid (FOLVITE) tablet 1 mg, 1 mg, Oral, Daily, Kanchan Wilson MD, 1 mg at 03/07/25 0823    HYDROmorphone HCl (DILAUDID) injection 0.2 mg, 0.2 mg, Intravenous, Q4H PRN, Jean Brady MD    hydroxychloroquine (PLAQUENIL) tablet 400 mg, 400 mg, Oral, Daily With Breakfast, Kanchan Wilson MD, 400 mg at 03/07/25 0822    levalbuterol (XOPENEX) inhalation solution 1.25 mg, 1.25 mg, Nebulization, Q8H PRN, Kanchan Wilson MD    lidocaine (LIDODERM) 5 % patch 3  patch, 3 patch, Topical, Daily, Kanchan Wilson MD, 3 patch at 03/06/25 0812    metoprolol tartrate (LOPRESSOR) tablet 25 mg, 25 mg, Oral, Q12H GALE, Kanchan Wilson MD, 25 mg at 03/07/25 0823    moisture barrier miconazole 2% cream (aka HITESH MOISTURE BARRIER ANTIFUNGAL CREAM), , Topical, BID, Mal Neely DO, Given at 03/07/25 0848    oxyCODONE (ROXICODONE) IR tablet 5 mg, 5 mg, Oral, Q6H PRN, Jean Brady MD, 5 mg at 03/01/25 1817    oxyCODONE (ROXICODONE) split tablet 2.5 mg, 2.5 mg, Oral, Q6H PRN, Jean Brady MD, 2.5 mg at 03/05/25 0945    pantoprazole (PROTONIX) EC tablet 40 mg, 40 mg, Oral, Daily Before Breakfast, Jean Brady MD, 40 mg at 03/07/25 0537    polyethylene glycol (MIRALAX) packet 17 g, 17 g, Oral, Daily PRN, Kelly Herman MD    senna-docusate sodium (SENOKOT S) 8.6-50 mg per tablet 1 tablet, 1 tablet, Oral, HS, Kelly Herman MD, 1 tablet at 03/06/25 2311    tamsulosin (FLOMAX) capsule 0.4 mg, 0.4 mg, Oral, Daily With Dinner, Kanchan Wilson MD, 0.4 mg at 03/06/25 1658    Laboratory Results:  Results from last 7 days   Lab Units 03/07/25  0541 03/06/25  0521 03/05/25  2032 03/05/25  0525 03/04/25  2034 03/04/25  0525 03/03/25  0823 03/03/25  0516 03/02/25  1204 03/02/25  1013 03/01/25  1535 03/01/25  0603   WBC Thousand/uL 5.12 4.63  --  5.28  --  4.96  --  4.69  --  5.40  --  4.59   HEMOGLOBIN g/dL 8.2* 7.7* 7.7* 8.0* 8.3* 8.2* 8.2* 8.1*   < > 8.4*   < > 6.8*   HEMATOCRIT % 26.4* 24.7* 24.5* 26.1* 26.2* 26.2* 26.0* 25.2*   < > 25.8*   < > 21.0*   PLATELETS Thousands/uL 159 159  --  187  --  173  --  173  --  159  --  159   SODIUM mmol/L 142 141  --  142  --  141  --  140  --  139  --  140   POTASSIUM mmol/L 4.2 3.5  --  3.5  --  3.7  --  3.6  --  3.9  --  3.7   CHLORIDE mmol/L 110* 109*  --  111*  --  110*  --  110*  --  109*  --  110*   CO2 mmol/L 28 28  --  27  --  26  --  26  --  27  --  25   BUN mg/dL 34* 38*  --  40*  --  41*  --  44*  --   "48*  --  58*   CREATININE mg/dL 1.80* 1.86*  --  1.80*  --  1.85*  --  1.88*  --  1.88*  --  2.00*   CALCIUM mg/dL 8.2* 7.7*  --  7.9*  --  7.9*  --  7.9*  --  7.9*  --  7.5*   MAGNESIUM mg/dL 1.8* 1.9  --  2.0  --  2.0  --  2.1  --  2.1  --   --     < > = values in this interval not displayed.       Medical records have been reviewed through [facility] and care everywhere for this patient encounter    Portions of the record may have been created with voice recognition software. Occasional wrong word or \"sound a like\" substitutions may have occurred due to the inherent limitations of voice recognition software. Read the chart carefully and recognize,   "

## 2025-03-07 NOTE — PROGRESS NOTES
Progress Note - Infectious Disease   Name: Devin Chaves 79 y.o. male I MRN: [id number]  Unit/Bed#: S -01 I Date of Admission: 2/28/2025   Date of Service: 3/7/2025 I Hospital Day: 7    Assessment & Plan  Neck pain  Patient developed acute neck pain with MSSA bacteremia during recent hospitalization.  CRP was highly elevated.  C-spine MRI showed possible C-spine and paraspinal infection.  Patient has no neurologic deficit.  However, his neck pain has not improved.  Given lack of improvement of neck pain, we should repeat C-spine MRI with contrast when creatinine is improved.  However, without any neurological deficit, it would be fine to postpone MRI until renal function is further improved, to decrease risk of contrast-induced ORIANA.  Patient should get MRI done prior to completion of IV antibiotic course below.  Antibiotic plan as in below.  Monitor neck pain.  Recommend repeat C-spine MRI with and without contrast.  This can be postponed until ORIANA further improves.  MSSA bacteremia  Patient had MSSA bacteremia during recent hospitalization.  Source is unclear but likely cutaneous.  His bacteremia cleared rapidly on IV antibiotic.  TTE did not show any vegetation.  Given possible C-spine infection, plan was for long-term IV antibiotic.  Continue high-dose IV cefazolin.  Treat x 6 weeks from clearance of bacteremia as previously planned, through 3/27.  Symptomatic anemia  Patient is clinically improved with PRBC transfusion.  Management per primary service.  Melena  Patient with melena prior to admission.  This is most likely secondary to colonic mass.  Colorectal surgery follow-up.  ORIANA (acute kidney injury) (HCC)  Patient with ORIANA on admission, most likely secondary to hypovolemia from GI bleed.  Creatinine overall improving slowly but trended up today.  Cefazolin at full dose for now.  Monitor creatinine.  Nephrology follow-up.  Colonic mass  Patient has recently diagnosed adenocarcinoma of the colon.  He  has been followed by colorectal surgery.  No plan for chemotherapy yet.  Colorectal surgery follow-up.    Discussed with patient in detail regarding the above plan.  I have discussed with Dr. Ibrahim from primary service regarding the above plan to continue IV antibiotic and hold off on MRI with contrast until creatinine is further improves.  She agrees with the plan.    Antibiotics:  Cefazolin  Last negative blood culture 2/14    Subjective   Patient is more comfortable today.  Neck pain is a little better controlled today.  Temperature stays down.  No chills.  He is tolerating antibiotic well.  No nausea, vomiting or diarrhea.    Objective :  Temp:  [97.8 °F (36.6 °C)-99 °F (37.2 °C)] 97.9 °F (36.6 °C)  HR:  [68-86] 68  BP: (115-141)/(54-63) 125/54  Resp:  [16] 16  SpO2:  [95 %-100 %] 97 %  O2 Device: Nasal cannula  Nasal Cannula O2 Flow Rate (L/min):  [3 L/min] 3 L/min    Physical Exam:     General: Awake, alert, cooperative, no distress.   Neck:  Supple. No mass.  Mild posterior tenderness at midline.  No deformity.   Lungs: Expansion symmetric, no rales, no wheezing, respirations unlabored.   Heart:  Regular rate and rhythm, S1 and S2 normal, no murmur.   Abdomen: Soft, nondistended, non-tender, bowel sounds active all four quadrants, no masses, no organomegaly.   Extremities: No edema. No erythema/warmth. No ulcer. Nontender to palpation.   Skin:  No rash.   Neuro: Moves all extremities.       Lab Results: I have reviewed the following results:  Results from last 7 days   Lab Units 03/07/25  0541 03/06/25  0521 03/05/25 2032 03/05/25 0525   WBC Thousand/uL 5.12 4.63  --  5.28   HEMOGLOBIN g/dL 8.2* 7.7* 7.7* 8.0*   PLATELETS Thousands/uL 159 159  --  187     Results from last 7 days   Lab Units 03/07/25  0541 03/06/25  0521 03/05/25  0525 03/04/25  0525 03/03/25  0516 03/01/25  0603 02/28/25  2241   SODIUM mmol/L 142 141 142 141 140   < > 139   POTASSIUM mmol/L 4.2 3.5 3.5 3.7 3.6   < > 3.8   CHLORIDE  mmol/L 110* 109* 111* 110* 110*   < > 109*   CO2 mmol/L 28 28 27 26 26   < > 25   BUN mg/dL 34* 38* 40* 41* 44*   < > 60*   CREATININE mg/dL 1.80* 1.86* 1.80* 1.85* 1.88*   < > 2.11*   EGFR ml/min/1.73sq m 35 34 35 34 33   < > 29   CALCIUM mg/dL 8.2* 7.7* 7.9* 7.9* 7.9*   < > 7.3*   AST U/L  --   --   --  16 16  --  15   ALT U/L  --   --   --  <3* <3*  --  <3*   ALK PHOS U/L  --   --   --  51 47  --  41   ALBUMIN g/dL  --   --   --  2.1* 2.0*  --  1.8*    < > = values in this interval not displayed.     Results from last 7 days   Lab Units 02/28/25  1752   MRSA CULTURE ONLY  No Methicillin Resistant Staphlyococcus aureus (MRSA) isolated                 Results from last 7 days   Lab Units 02/28/25  1327   D-DIMER QUANTITATIVE ug/ml FEU 3.41*

## 2025-03-07 NOTE — PROGRESS NOTES
Progress Note - Hospitalist   Name: Devin Chaves 79 y.o. male I MRN: [id number]  Unit/Bed#: S [facility] I Date of Admission: 2/28/2025   Date of Service: 3/7/2025 I Hospital Day: 7    Assessment & Plan  Symptomatic anemia  Recent Labs     03/05/25 2032 03/06/25  0521 03/07/25  0541   HGB 7.7* 7.7* 8.2*      Patient presents with 2 days of chest pain, shortness of breath, fatigue and melena  2/20/2025 EGD/colonoscopy showed 2.5 cm ileocecal mass, pathology confirmed adenocarcinoma.  S/p 3 units packed rbc in ED. S/p 1 unit PRBCs on 3/2  Hemoglobin has been stable. No reported dark stool today.   Attending discussion with Colorectal: If COPD can stay controlled, increase chance of operation.  Oncology: Outpatient appointment for 4/3  Urology consulted: Outpatient cystoscopy, urology signed off    Plan:  H&H Q12 hrs  Transfuse for Hgb <7  Monitor for signs of overt bleeding or melenotic stools  Protonix 40 mg PO QD   Hold Eliquis and aspirin for now due to black tary stool   Monitor on telemetry  ORIANA (acute kidney injury) (HCC)  Recent Labs     03/05/25 0525 03/06/25 0521 03/07/25  0541   CREATININE 1.80* 1.86* 1.80*   EGFR 35 34 35     Estimated Creatinine Clearance: 37.7 mL/min (A) (by C-G formula based on SCr of 1.8 mg/dL (H)).     Cr 2.43 on admission. Baseline 0.9. BUN 63. BUN/Cr ratio >20  Secondary to severe anemia on admission and IV contrast.    Plan:  Follow-up on chest xray  Daily BMP  Avoid nephrotoxic medications  Monitor I/O's  Urinary retention protocol  Nephrology consulted, appreciate recs  Start Torsemide 10 mg QD  If persistently elevated will plan for c3/c4 complement   Neck pain  MRI of the neck done 2/14/2025 revealed cervical degenerative change with mild canal stenosis and mild to moderate foraminal narrowing.  No cord compression.  Mild nonspecific edema within the right posterior breast dermal musculature of the upper cervicals spine.  Minimal peripheral enhancement is noted.      Plan:  Will plan for repeat MRI C-spine on 3/20/25  COPD (chronic obstructive pulmonary disease) (HCC)  Patient with COPD on 2-4 L supplemental O2 at baseline  Home Albuterol prn, Xopenex Q8 hrs prn, Mometasone inhaler daily    Plan:  Lasix 20 mg ordered   Titrate O2 to maintain SpO2 greater than 88%   Xopenex nebs Q8 hrs prn, Mometasone daily  Albuterol prn  RT notified to administer nebs  Follow-up in chest xray ordered by Nephrology.  Colonic mass  2/20/2025 EGD/colonoscopy showed 2.5 cm ileocecal mass, pathology confirmed adenocarcinoma.    Patient was due to follow-up with outpatient colorectal surgery on 2/28/2025  Pt presenting with melena, Hgb 4.6. Symptomatic anemia.  Attending discussion with Colorectal: If COPD can stay controlled, increase chance of operation.  Oncology: Outpatient appointment for 4/3    MSSA bacteremia  Patient was discharged on cefazolin 2 g IV Q8 hrs until 3/27/2025. PICC line in place. Patient rescheduled to follow-up outpatient with ID    Plan:  Continue with cefazolin 2 g IV every 8 hours.  infectious disease recommendations appreciated   Recommend repeat C-spine MRI with and without contrast, postpone until ORIANA resolves  6 weeks treatment through 3/27/2025  Atrial fibrillation (HCC)  AC: Eliquis 5 mg BID  Rate control: Lopressor 25 mg Q12 hrs    Plan:  Hold eliquis in setting of melena  Continue Lopressor 25 mg Q12 hrs  Pleural effusion, bilateral  CT A/P shows bilateral pleural effusions L>R, pulmonary vascular congestion    Plan:  Repeat imaging if pt's respiratory status worsens  Continue oxygen to keep oxygen saturations 88% and above.  HTN (hypertension)  Systolic BP 100s-130s.  Patient normotensive.  Blood pressure stable.    Plan:  Lopressor 25 mg every 12 hours  CAD (coronary artery disease)  TTE 2/12/25: EF 55%, Normal systolic dysfunction, G1DD, No vegetation, No mural thrombus, moderate aortic sclerosis     Plan:  Hold aspirin in setting of GI bleed  History of CVA  (cerebrovascular accident)  Pt had multiple embolic strokes during admission from 2/11/25-2/22/25  Thought to be embolic strokes 2/2 MSSA bacteremia, no vegetations on TTE    Plan:  Hold Eliquis and aspirin in setting of GI/ bleed  Continue Lipitor 40 mg daily  Urinary retention  Patient has history of urinary retention and was started on Flomax during last hospitalization  Pt denies difficulty urinating at this shayy    Plan:  Flomax 0.4 mg daily  Urinary retention protocol  Dysphagia  VBS done on 2/15/25 showing food retention due to pharyngeal weakness  EGD was done last month which showed normal esophagus.   ENT recommending outpatient follow-up with Dr. Flynn Reyez    Plan:  Normal diet as tolerated  Severe protein-calorie malnutrition (HCC)  Malnutrition Findings:   Adult Malnutrition type: Acute illness  Adult Degree of Malnutrition: Other severe protein calorie malnutrition  Malnutrition Characteristics: Inadequate energy, Fluid accumulation  360 Statement: related to inadequate energy/protein intake as evidenced by consuming < 50% of energy intake compared to estimated needs for > 5 days and B/L LE +3 edema. Treated with ONS.  BMI Findings:     Body mass index is 26.55 kg/m².     Plan:  Ensure supplements  Ambulatory dysfunction  Worsening ambulatory dysfunction since December  PT/OT evaluations recommend level 2   Discussed with podiatry, patient has chronic left ankle fracture over 2 years ago, recommend WBAT to left foot as curbside, no further podiatry needs   Consider ambulatory Ortho consult and brace fitting     Plan:  Weight bearing status: As tolerated with brace  Melena  Patient endorsed melena prior to admission  One episode dark, tarry stool overnight on 3/4/25. Not documented.    Plan:  Stool record at bedside  At risk for electrolyte imbalance      VTE Pharmacologic Prophylaxis: VTE Score: 5 High Risk (Score >/= 5) - Pharmacological DVT Prophylaxis Contraindicated. Sequential Compression  Devices Ordered.    Mobility:   Basic Mobility Inpatient Raw Score: 9  JH-HLM Goal: 3: Sit at edge of bed  JH-HLM Achieved: 4: Move to chair/commode  JH-HLM Goal achieved. Continue to encourage appropriate mobility.    Patient Centered Rounds: I performed bedside rounds with nursing staff today.   Discussions with Specialists or Other Care Team Provider: ID, Nephrology    Education and Discussions with Family / Patient: Updated  (wife) via phone.    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    Code Status: Level 1 - Full Code    Subjective   Patient seen and examined at bedside, notes mild improvement in shortness of breath this morning was saturating well on room air while baseline is 3 L nasal cannula.  Patient states that he had a bowel movement yesterday does not believe that there was any blood, but is unsure.  Otherwise denies any abdominal pain, nausea, vomiting, diarrhea, fever, chest pain.    Objective :  Temp:  [97.8 °F (36.6 °C)-99 °F (37.2 °C)] 99 °F (37.2 °C)  HR:  [66-86] 73  BP: (115-141)/(55-63) 115/55  Resp:  [16] 16  SpO2:  [95 %-100 %] 96 %  O2 Device: Nasal cannula  Nasal Cannula O2 Flow Rate (L/min):  [3 L/min] 3 L/min    Body mass index is 26.55 kg/m².     Input and Output Summary (last 24 hours):     Intake/Output Summary (Last 24 hours) at 3/7/2025 0637  Last data filed at 3/6/2025 1655  Gross per 24 hour   Intake 240 ml   Output 650 ml   Net -410 ml       Physical Exam  Vitals and nursing note reviewed.   Constitutional:       General: He is not in acute distress.     Appearance: He is well-developed.   HENT:      Head: Normocephalic and atraumatic.   Eyes:      Conjunctiva/sclera: Conjunctivae normal.    Cardiovascular:      Rate and Rhythm: Normal rate and regular rhythm.      Heart sounds: No murmur heard.  Pulmonary:      Effort: Pulmonary effort is normal. No respiratory distress.      Breath sounds: Normal breath sounds. No wheezing, rhonchi or rales.   Abdominal:      General: There is no distension.      Palpations: Abdomen is soft.      Tenderness: There is no abdominal tenderness.   Musculoskeletal:         General: No swelling.      Cervical back: Neck supple.      Right lower leg: Edema present.      Left lower leg: Edema present.      Comments: 1+ pitting edema BLE  LLE ankle deformity   Skin:     General: Skin is warm and dry.      Capillary Refill: Capillary refill takes less than 2 seconds.   Neurological:      General: No focal deficit present.      Mental Status: He is alert and oriented to person, place, and time.   Psychiatric:         Mood and Affect: Mood normal.           Lines/Drains:  Lines/Drains/Airways       Active Status       Name Placement date Placement time Site Days    PICC Line 02/26/25 Right Brachial 02/26/25  0548  Brachial  9                    Central Line:  Goal for removal: N/A - Discharging with PICC for IV ABX/medications               Lab Results: I have reviewed the following results:   Results from last 7 days   Lab Units 03/07/25  0541   WBC Thousand/uL 5.12   HEMOGLOBIN g/dL 8.2*   HEMATOCRIT % 26.4*   PLATELETS Thousands/uL 159   SEGS PCT % 67   LYMPHO PCT % 19   MONO PCT % 11   EOS PCT % 2     Results from last 7 days   Lab Units 03/07/25  0541 03/05/25  0525 03/04/25  0525   SODIUM mmol/L 142   < > 141   POTASSIUM mmol/L 4.2   < > 3.7   CHLORIDE mmol/L 110*   < > 110*   CO2 mmol/L 28   < > 26   BUN mg/dL 34*   < > 41*   CREATININE mg/dL 1.80*   < > 1.85*   ANION GAP mmol/L 4   < > 5   CALCIUM mg/dL 8.2*   < > 7.9*   ALBUMIN g/dL  --   --  2.1*   TOTAL BILIRUBIN mg/dL  --   --  0.38   ALK PHOS U/L  --   --  51   ALT U/L  --   --  <3*   AST U/L  --   --  16   GLUCOSE  RANDOM mg/dL 81   < > 84    < > = values in this interval not displayed.     Results from last 7 days   Lab Units 03/02/25  1013   INR  1.36*                   Recent Cultures (last 7 days):         XR chest PICC line portable  Result Date: 3/2/2025  Impression: Right PICC in upper SVC. Moderate pulmonary venous congestion with small pleural effusions and bibasilar atelectasis. Workstation performed: [id number]     XR chest 1 view portable  Result Date: 2/28/2025  Impression: Cardiomegaly with central congestion and left greater than right small basilar effusions. Workstation performed: [id number]     CT chest w ct abdomen pelvis w wo contrast  Addendum Date: 2/28/2025  ADDENDUM: Correction to spleen section in the body of report. Hyperattenuation should be hypoattenuation. Grossly stable posterior splenic subcapsular hypodensity presumably an infarct unchanged allowing for difference in contrast timing.     Result Date: 2/28/2025  Impression: CT chest: Decreased intracardiac blood pool density compatible with anemia. New bilateral multi lobar interstitial thickening and tree-in-bud opacities may represent pulmonary vascular congestion or nonspecific inflammatory or infectious process. Bilateral pleural effusions, left greater than right, mildly enlarged and additional findings suggestive of pulmonary vascular congestion. Correlate with clinical findings. 5 mm left upper lobe nodule stable since 2/11/2025 and new since December 2023. 6 mm right lower lobe nodule new since 2/11/2025. Noncontrast chest CT follow-up in 3 months is advised. Additional chronic findings and negatives as above. CT abdomen and pelvis: No evidence of high-volume gastrointestinal bleeding. Stable upper cecal/proximal ascending colonic mass attributed to recently biopsy-proven adenocarcinoma. Colonic diverticulosis. No evidence of abdominopelvic metastatic disease. Additional chronic findings and negatives as above. The study was marked in  EPIC for immediate notification. Workstation performed: [id number]       No Chest XR results available for this patient.     Other Study Results Review: No additional pertinent studies reviewed.    Last 24 Hours Medication List:     Current Facility-Administered Medications:     acetaminophen (TYLENOL) tablet 650 mg, Q6H PRN    albuterol inhalation solution 2.5 mg, Q6H PRN    ascorbic acid (VITAMIN C) tablet 250 mg, Daily    atorvastatin (LIPITOR) tablet 40 mg, Daily With Dinner    ceFAZolin (ANCEF) IVPB (premix in dextrose) 2,000 mg 50 mL, Q8H, Last Rate: 2,000 mg (03/07/25 0242)    Cholecalciferol (VITAMIN D3) tablet 2,000 Units, Daily    cyanocobalamin (VITAMIN B-12) tablet 100 mcg, Daily    fluticasone (ARNUITY ELLIPTA) 100 MCG/ACT inhaler 1 puff, Daily    folic acid (FOLVITE) tablet 1 mg, Daily    HYDROmorphone HCl (DILAUDID) injection 0.2 mg, Q4H PRN    hydroxychloroquine (PLAQUENIL) tablet 400 mg, Daily With Breakfast    levalbuterol (XOPENEX) inhalation solution 1.25 mg, Q8H PRN    lidocaine (LIDODERM) 5 % patch 3 patch, Daily    magnesium sulfate 2 g/50 mL IVPB (premix) 2 g, Once    metoprolol tartrate (LOPRESSOR) tablet 25 mg, Q12H GALE    oxyCODONE (ROXICODONE) IR tablet 5 mg, Q6H PRN    oxyCODONE (ROXICODONE) split tablet 2.5 mg, Q6H PRN    pantoprazole (PROTONIX) EC tablet 40 mg, Daily Before Breakfast    polyethylene glycol (MIRALAX) packet 17 g, Daily PRN    senna-docusate sodium (SENOKOT S) 8.6-50 mg per tablet 1 tablet, HS    tamsulosin (FLOMAX) capsule 0.4 mg, Daily With Dinner    Administrative Statements   Today, Patient Was Seen By: Mal Neely DO      **Please Note: This note may have been constructed using a voice recognition system.**

## 2025-03-07 NOTE — CASE MANAGEMENT
Case Management Progress Note    Patient name Devin Chaves  Location S /S -01 MRN [id number]  : 1945 Date 3/7/2025       LOS (days): 7  Geometric Mean LOS (GMLOS) (days): 3.6  Days to GMLOS:-3.7        OBJECTIVE:        Current admission status: Inpatient  Preferred Pharmacy:   [id number] - [place] - [address]  [facility] [place] [address]  Phone: [phone] Fax: [phone]    [facility] - [place] - [address]  [address]  [place] [address]  Phone: [phone] Fax: [phone]    [place] [facility] [facility] - [place] - [address]  [address]  [place] [address]  Phone: [phone] Fax: [phone]    Primary Care Provider: Park Saxena MD    Primary Insurance: [facility]  [facility]    PROGRESS NOTE:  CM spoke with SLIM, who states patient may be medically clear for d/c by Monday.     D/C support attached to AIDIN referral with request auth is initiated.    CM to follow up, as able to continue with dcp.

## 2025-03-07 NOTE — ASSESSMENT & PLAN NOTE
Recent Labs     03/05/25 2032 03/06/25  0521 03/07/25  0541   HGB 7.7* 7.7* 8.2*      Patient presents with 2 days of chest pain, shortness of breath, fatigue and melena  2/20/2025 EGD/colonoscopy showed 2.5 cm ileocecal mass, pathology confirmed adenocarcinoma.  S/p 3 units packed rbc in ED. S/p 1 unit PRBCs on 3/2  Hemoglobin has been stable. No reported dark stool today.   Attending discussion with Colorectal: If COPD can stay controlled, increase chance of operation.  Oncology: Outpatient appointment for 4/3  Urology consulted: Outpatient cystoscopy, urology signed off    Plan:  H&H Q12 hrs  Transfuse for Hgb <7  Monitor for signs of overt bleeding or melenotic stools  Protonix 40 mg PO QD   Hold Eliquis and aspirin for now due to black tary stool   Monitor on telemetry

## 2025-03-07 NOTE — ASSESSMENT & PLAN NOTE
Blood Pressure: 125/54  Fluid Status: Borderline hypervolemic  Pt states left ankle and foot is a chronic edema, right ankle and pedal + edema, improved slightly  In-Patient Rx: Metoprolol 25 mg oral every 12 hours  Home Rx: None  Avoid perturbations and blood pressure  Maintain MAPs greater than 65 mmHg

## 2025-03-07 NOTE — ASSESSMENT & PLAN NOTE
Magnesium 1.8 - 2g IV mag replacement given  Potassium 4.2 -   Calcium 8.2 - no change  monitor electrolytes  Per primary team

## 2025-03-07 NOTE — ASSESSMENT & PLAN NOTE
Patient developed acute neck pain with MSSA bacteremia during recent hospitalization.  CRP was highly elevated.  C-spine MRI showed possible C-spine and paraspinal infection.  Patient has no neurologic deficit.  However, his neck pain has not improved.  Given lack of improvement of neck pain, we should repeat C-spine MRI with contrast when creatinine is improved.  However, without any neurological deficit, it would be fine to postpone MRI until renal function is further improved, to decrease risk of contrast-induced ORIANA.  Patient should get MRI done prior to completion of IV antibiotic course below.  Antibiotic plan as in below.  Monitor neck pain.  Recommend repeat C-spine MRI with and without contrast.  This can be postponed until ORIANA further improves.

## 2025-03-07 NOTE — ASSESSMENT & PLAN NOTE
MRI of the neck done 2/14/2025 revealed cervical degenerative change with mild canal stenosis and mild to moderate foraminal narrowing.  No cord compression.  Mild nonspecific edema within the right posterior breast dermal musculature of the upper cervicals spine.  Minimal peripheral enhancement is noted.     Plan:  Will plan for repeat MRI C-spine on 3/20/25

## 2025-03-07 NOTE — CASE MANAGEMENT
SD Support Center received request for authorization from Care Manager.  Authorization request submitted for: SNF  Facility Name: [facility] NPI: [id number]  Facility MD: Dr. Farnsworth  NPI: [id number]  Authorization initiated by contacting insurance:  [employer]  Via: [facility] Portal   Clinicals submitted via [employer] attachment   Pending Reference #: [id number]    Care Manager notified: Samuel GALINDO    Updates to authorization status will be noted in chart. Please reach out to CM for updates on any clinical information.

## 2025-03-07 NOTE — UTILIZATION REVIEW
NOTIFICATION OF ADMISSION DISCHARGE   This is a Notification of Discharge from [facility]. Please be advised that this patient has been discharge from our facility. Below you will find the admission and discharge date and time including the patient’s disposition.   UTILIZATION REVIEW CONTACT:  Norma Roy  Utilization   Network Utilization Review Department  Phone: [phone] x carefully listen to the prompts. All voicemails are confidential.  Email: [email or web address]     ADMISSION INFORMATION  PRESENTATION DATE: 2/28/2025  3:51 AM  OBERVATION ADMISSION DATE: N/A  INPATIENT ADMISSION DATE: 2/28/25  7:43 AM   DISCHARGE DATE: No discharge date for patient encounter.   DISPOSITION:Non [facility] SNF/TCU/SNU    Network Utilization Review Department  ATTENTION: Please call with any questions or concerns to [phone] and carefully listen to the prompts so that you are directed to the right person. All voicemails are confidential.   For Discharge needs, contact Care Management DC Support Team at [phone] opt. [phone]  Send all requests for admission clinical reviews, approved or denied determinations and any other requests to dedicated fax number below belonging to the campus where the patient is receiving treatment. List of dedicated fax numbers for the Facilities:  FACILITY NAME UR FAX NUMBER   ADMISSION DENIALS (Administrative/Medical Necessity) [phone]   DISCHARGE SUPPORT TEAM ([facility]) [phone]   PARENT CHILD HEALTH (Maternity/NICU/Pediatrics) [phone]   [facility] [phone]   [facility] [phone]   [facility] [phone]   [facility] [phone]   [facility] [phone]   [facility] [phone]   [facility] [phone]   [facility]  [facility] [phone]   [facility] [phone]   [facility] [phone]   [facility] [phone]   [facility] [phone]

## 2025-03-08 PROBLEM — Z91.89 AT RISK FOR ELECTROLYTE IMBALANCE: Status: RESOLVED | Noted: 2025-03-05 | Resolved: 2025-03-08

## 2025-03-08 NOTE — ASSESSMENT & PLAN NOTE
AC: Eliquis 2.5 mg BID  Rate control: Lopressor 25 mg Q12 hrs    Plan:  Restart Eliquis at 2.5 mg due to increase risk of bleed and ORIANA  Continue Lopressor 25 mg Q12 hrs

## 2025-03-08 NOTE — ASSESSMENT & PLAN NOTE
-Imaging supports vascular congestion on repeat chest x-ray  - Continue diuretic-to note patient is diuretic naïve  - Intermittently requiring IV diuretic last 2 days  - Started on torsemide 3/7

## 2025-03-08 NOTE — ASSESSMENT & PLAN NOTE
acute anemia on admission requiring multiple transfusions     - Has colonic mass  - Further plans per primary team  - To see GI and colorectal as outpatient

## 2025-03-08 NOTE — PROGRESS NOTES
Progress Note - Nephrology   Name: Devin Chaves 79 y.o. male I MRN: [id number]  Unit/Bed#: S -01 I Date of Admission: 2/28/2025   Date of Service: 3/8/2025 I Hospital Day: 8    Assessment & Plan  ORIANA (acute kidney injury) (HCC)    - Admission creatinine 2.4 mg/dL  - Baseline creatinine below 1 mg/dL  - Creatinine slowly improving 3/5 to 1.8 mg/dL  - UACR 821 mg/g     Etiology-likely in the setting of acute anemia/volume depletion/ATN patient did have gross hematuria and urology was on board.  UACR is less than 1 g.  Renal function is improving from acute initial events.  Therefore does not appear currently to be an acute GN and rather ATN but cannot completely rule out a GN.  Patient has multiple potential etiologies that could lead to secondary GN. (Malignancy, antibiotics)     Renal imaging-no hydronephrosis     Urinalysis with innumerable RBC, 4-10 WBC, 2+ protein     3/5-creatinine is remaining at 1.8 mg/dL.  Overall improved but appears to have reached a plateau     3/6-creatinine unchanged 1.8 mg/dL.  Still with slight volume overload than baseline.  Therefore we will give another dose of albumin and Lasix one-time.  Hemoglobin stable at 8.     3/7-creatinine stable 1.8.  Sodium, potassium, bicarbonate, magnesium appropriate.  Will transition diuretics to once daily torsemide.  Chest x-ray with unchanged pulmonary edema and pneumonia not excluded    3/8-creatinine stable 1.97 mg/dL.  Sodium, potassium, bicarbonate, magnesium appropriate.  We will change torsemide to every other day.  Patient's volume state appears to have improved.  He is diuretic naïve at baseline     Plan  - Continue torsemide but changed to every other day  - I/os; avoid nephrotoxic agents  - Lab work in a.m.  - Avoid hypotension  - Noted potential plan for MRI with and without contrast-as long as it is not urgent, agree with waiting until renal function improves.  If creatinine remains unchanged at 1.8 -2 through the next 1 to 2  days, can plan for MRI potentially Monday.  The risk of NSF is low with the current contrast agents and CKD.    - Agree with trending PVR  - Recheck UA and UPCR  - Avoid hypotension  - Check C3, C4  - Overall appears to be significant ATN in the setting of initial acute issues.  With patient has multiple other acute/comorbidities that can lead to secondary renal dysfunction.  I reviewed this in detail with the patient's wife and daughter today also.  And therefore at this juncture, I do not believe that a renal biopsy is urgently needed.  Creatinine may be at new current baseline for now.  At risk for electrolyte imbalance  -Intermittently requiring potassium repletion  HTN (hypertension)  Monitor with metoprolol  Melena   acute anemia on admission requiring multiple transfusions     - Has colonic mass  - Further plans per primary team  - To see GI and colorectal as outpatient  Urinary retention  -Patient with history of urinary retention  - On tamsulosin  - Initially had gross hematuria of etiology unclear  - Urology on board  - Urine is clearing  - Urology planning further evaluation for hematuria as outpatient  MSSA bacteremia  MSSA bacteremia noted during recent hospitalization     - Possible cutaneous source  - Given that the patient was having neck pain during prior admission, C-spine possible paraspinal injection-was advised for prolonged antibiotic and was discharged with PICC line and total 6-week course plan through March 27 of cefazolin  Colonic mass  -Newly diagnosed colon mass confirmed with adenocarcinoma in February 2025  - Was to follow colorectal as outpatient  Pleural effusion, bilateral  -Imaging supports vascular congestion on repeat chest x-ray  - Continue diuretic-to note patient is diuretic naïve  - Intermittently requiring IV diuretic last 2 days  - Started on torsemide 3/7  Dysphagia  Per primary team    I have reviewed the nephrology recommendations including change torsemide to every other  day, with primary team resident, and we are in agreement with renal plan including the information outlined above.     Subjective   Brief History of Admission - 79-year-old male with a past medical history of colon mass, 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 Was recently diagnosed with anemia during prior admission and a colonic mass on colonoscopy and was found to have adenocarcinoma. Initially patient was found to have severe acute anemia 4.6. There was also concern for dark urine/hematuria And urology team was also on board.     Overnight no acute issues.  Patient denies complaints.    Objective :  Temp:  [97.8 °F (36.6 °C)-98.9 °F (37.2 °C)] 97.8 °F (36.6 °C)  HR:  [75-89] 78  BP: (105-135)/(51-63) 127/63  Resp:  [15] 15  SpO2:  [96 %-100 %] 100 %    Current Weight: Weight - Scale: 89 kg (196 lb 3.4 oz)  First Weight: Weight - Scale: 91.2 kg (201 lb 1 oz)  I/O         03/06 0701  03/07 0700 03/07 0701  03/08 0700    P.O. 240     Total Intake(mL/kg) 240 (2.7)     Urine (mL/kg/hr) 650 (0.3) 900 (0.8)    Stool 0     Total Output 650 900    Net -410 -900          Unmeasured Stool Occurrence 1 x           Physical Exam  General: NAD  Skin: no rash  Eyes: anicteric sclera  ENT: moist mucous membrane  Neck: supple  Chest: CTA b/l, no ronchii, no wheeze, no rubs, no rales  CVS: s1s2, no murmur, no gallop, no rub  Abdomen: soft, nontender, nl sounds  Extremities: 1+ edema LE b/l  improved  : no glass  Neuro: AAOX3  Psych: normal affect    Medications:    Current Facility-Administered Medications:     acetaminophen (TYLENOL) tablet 650 mg, 650 mg, Oral, Q6H PRN, Alpiniano  Moi Brady MD    albuterol inhalation solution 2.5 mg, 2.5 mg, Nebulization, Q6H PRN, Kanchan Wilson MD, 2.5 mg at 03/06/25 1140    ascorbic acid (VITAMIN C) tablet 250 mg, 250 mg, Oral, Daily, Kanchan Wilson MD, 250 mg at 03/08/25 0806    atorvastatin (LIPITOR) tablet 40 mg, 40 mg, Oral, Daily With Dinner, Kanchan Wilson MD, 40 mg at 03/07/25 1543    ceFAZolin (ANCEF) IVPB (premix in dextrose) 2,000 mg 50 mL, 2,000 mg, Intravenous, Q8H, Olivia Martinez MD, Last Rate: 100 mL/hr at 03/08/25 1100, 2,000 mg at 03/08/25 1100    Cholecalciferol (VITAMIN D3) tablet 2,000 Units, 2,000 Units, Oral, Daily, Kanchan Wilson MD, 2,000 Units at 03/08/25 0806    cyanocobalamin (VITAMIN B-12) tablet 100 mcg, 100 mcg, Oral, Daily, Kanchan Wilson MD, 100 mcg at 03/08/25 0806    fluticasone (ARNUITY ELLIPTA) 100 MCG/ACT inhaler 1 puff, 1 puff, Inhalation, Daily, Kanchan Wilson MD, 1 puff at 03/08/25 0806    folic acid (FOLVITE) tablet 1 mg, 1 mg, Oral, Daily, Kanchan Wilson MD, 1 mg at 03/08/25 0806    HYDROmorphone HCl (DILAUDID) injection 0.2 mg, 0.2 mg, Intravenous, Q4H PRN, Jean Brady MD    hydroxychloroquine (PLAQUENIL) tablet 400 mg, 400 mg, Oral, Daily With Breakfast, Kanchan Wilson MD, 400 mg at 03/08/25 0806    levalbuterol (XOPENEX) inhalation solution 1.25 mg, 1.25 mg, Nebulization, Q8H PRN, Kanchan Wilson MD    lidocaine (LIDODERM) 5 % patch 3 patch, 3 patch, Topical, Daily, Kanchan Wilson MD, 3 patch at 03/06/25 0812    metoprolol tartrate (LOPRESSOR) tablet 25 mg, 25 mg, Oral, Q12H GALE, Kanchan Wilson MD, 25 mg at 03/08/25 0806    moisture barrier miconazole 2% cream (aka HITESH MOISTURE BARRIER ANTIFUNGAL CREAM), , Topical, BID, Mal Neely DO, Given at 03/08/25 0806    oxyCODONE (ROXICODONE) IR tablet 5 mg, 5 mg, Oral, Q6H PRN, Jean Brady MD, 5 mg at 03/01/25 1817    oxyCODONE (ROXICODONE) split tablet 2.5 mg, 2.5 mg, Oral, Q6H PRN, Jean Brady,  "MD, 2.5 mg at 03/05/25 0945    pantoprazole (PROTONIX) EC tablet 40 mg, 40 mg, Oral, Daily Before Breakfast, Jean Brady MD, 40 mg at 03/08/25 0809    polyethylene glycol (MIRALAX) packet 17 g, 17 g, Oral, Daily PRN, Kelly Herman MD    senna-docusate sodium (SENOKOT S) 8.6-50 mg per tablet 1 tablet, 1 tablet, Oral, HS, Kelly Herman MD, 1 tablet at 03/07/25 2353    tamsulosin (FLOMAX) capsule 0.4 mg, 0.4 mg, Oral, Daily With Dinner, Kanchan Wilson MD, 0.4 mg at 03/07/25 1543    [START ON 3/10/2025] torsemide (DEMADEX) tablet 10 mg, 10 mg, Oral, Every Other Day, Saskia Roland MD      Lab Results: I have reviewed the following results:  Results from last 7 days   Lab Units 03/08/25  0530 03/07/25  0541 03/06/25  0521 03/05/25  2032 03/05/25  0525 03/04/25  2034 03/04/25  0525 03/03/25  0823 03/03/25  0516 03/02/25  1204 03/02/25  1013   WBC Thousand/uL 4.62 5.12 4.63  --  5.28  --  4.96  --  4.69  --  5.40   HEMOGLOBIN g/dL 7.8* 8.2* 7.7* 7.7* 8.0* 8.3* 8.2*   < > 8.1*   < > 8.4*   HEMATOCRIT % 25.3* 26.4* 24.7* 24.5* 26.1* 26.2* 26.2*   < > 25.2*   < > 25.8*   PLATELETS Thousands/uL 160 159 159  --  187  --  173  --  173  --  159   POTASSIUM mmol/L 3.9 4.2 3.5  --  3.5  --  3.7  --  3.6  --  3.9   CHLORIDE mmol/L 110* 110* 109*  --  111*  --  110*  --  110*  --  109*   CO2 mmol/L 31 28 28  --  27  --  26  --  26  --  27   BUN mg/dL 35* 34* 38*  --  40*  --  41*  --  44*  --  48*   CREATININE mg/dL 1.97* 1.80* 1.86*  --  1.80*  --  1.85*  --  1.88*  --  1.88*   CALCIUM mg/dL 8.2* 8.2* 7.7*  --  7.9*  --  7.9*  --  7.9*  --  7.9*   MAGNESIUM mg/dL 2.0 1.8* 1.9  --  2.0  --  2.0  --  2.1  --  2.1   ALBUMIN g/dL  --   --   --   --   --   --  2.1*  --  2.0*  --   --     < > = values in this interval not displayed.       Administrative Statements     Portions of the record may have been created with voice recognition software. Occasional wrong word or \"sound a like\" substitutions may have " occurred due to the inherent limitations of voice recognition software. Read the chart carefully and recognize, using context, where substitutions have occurred.If you have any questions, please contact the dictating provider.

## 2025-03-08 NOTE — ASSESSMENT & PLAN NOTE
Malnutrition Findings:   Adult Malnutrition type: Acute illness  Adult Degree of Malnutrition: Other severe protein calorie malnutrition  Malnutrition Characteristics: Inadequate energy, Fluid accumulation  360 Statement: related to inadequate energy/protein intake as evidenced by consuming < 50% of energy intake compared to estimated needs for > 5 days and B/L LE +3 edema. Treated with ONS.  BMI Findings:     Body mass index is 26.61 kg/m².     Plan:  Ensure supplements

## 2025-03-08 NOTE — ASSESSMENT & PLAN NOTE
Recent Labs     03/06/25  0521 03/07/25  0541 03/08/25  0530   HGB 7.7* 8.2* 7.8*      Patient presents with 2 days of chest pain, shortness of breath, fatigue and melena  2/20/2025 EGD/colonoscopy showed 2.5 cm ileocecal mass, pathology confirmed adenocarcinoma.  S/p 3 units packed rbc in ED. S/p 1 unit PRBCs on 3/2  Hemoglobin has been stable.   Patient recent diagnosis of adenocarcinoma, increased risk of stroke.  Plan to restart Eliquis at a lower dose    Plan:  H&H Q12 hrs  Transfuse for Hgb <7  Monitor for signs of overt bleeding or melenotic stools  Protonix 40 mg PO QD   Resume Eliquis at 2.5 mg

## 2025-03-08 NOTE — ASSESSMENT & PLAN NOTE
MSSA bacteremia noted during recent hospitalization     - Possible cutaneous source  - Given that the patient was having neck pain during prior admission, C-spine possible paraspinal injection-was advised for prolonged antibiotic and was discharged with PICC line and total 6-week course plan through March 27 of cefazolin

## 2025-03-08 NOTE — PROGRESS NOTES
Progress Note - Hospitalist   Name: Devin Chaves 79 y.o. male I MRN: [id number]  Unit/Bed#: S -01 I Date of Admission: 2/28/2025   Date of Service: 3/8/2025 I Hospital Day: 8    Assessment & Plan  ORIANA (acute kidney injury) (HCC)  Recent Labs     03/06/25  0521 03/07/25  0541 03/08/25  0530   CREATININE 1.86* 1.80* 1.97*   EGFR 34 35 31     Estimated Creatinine Clearance: 34.5 mL/min (A) (by C-G formula based on SCr of 1.97 mg/dL (H)).     Cr 2.43 on admission. Baseline 0.9. BUN 63. BUN/Cr ratio >20  Hypervolemia on exam  3/7/2025 chest xray:Unchanged pulmonary edema and small pleural effusions. Pneumonia not excluded.   Nephrology recommendations appreciated    Plan:    Daily BMP  Recheck UA and UPCR  Torsemide 10 mg every other day  Avoid nephrotoxic medications  Monitor I/O's  Urinary retention protocol  Cardiology consult- ORIANA worsening with diuretic.  Volume overloaded    Symptomatic anemia  Recent Labs     03/06/25  0521 03/07/25  0541 03/08/25  0530   HGB 7.7* 8.2* 7.8*      Patient presents with 2 days of chest pain, shortness of breath, fatigue and melena  2/20/2025 EGD/colonoscopy showed 2.5 cm ileocecal mass, pathology confirmed adenocarcinoma.  S/p 3 units packed rbc in ED. S/p 1 unit PRBCs on 3/2  Hemoglobin has been stable.   Patient recent diagnosis of adenocarcinoma, increased risk of stroke.  Plan to restart Eliquis at a lower dose    Plan:  H&H Q12 hrs  Transfuse for Hgb <7  Monitor for signs of overt bleeding or melenotic stools  Protonix 40 mg PO QD   Resume Eliquis at 2.5 mg    Neck pain  MRI of the neck done 2/14/2025 revealed cervical degenerative change with mild canal stenosis and mild to moderate foraminal narrowing.  No cord compression.  Mild nonspecific edema within the right posterior breast dermal musculature of the upper cervicals spine.  Minimal peripheral enhancement is noted.     Plan:  Will plan for repeat MRI C-spine on 3/20/25  COPD (chronic obstructive pulmonary  disease) (HCC)  Patient with COPD on 2-4 L supplemental O2 at baseline  Home Albuterol prn, Xopenex Q8 hrs prn, Mometasone inhaler daily    Plan:  Titrate O2 to maintain SpO2 greater than 88%   Xopenex nebs Q8 hrs prn, Mometasone daily  Albuterol prn  RT notified to administer nebs  Follow-up in chest xray ordered by Nephrology.  Colonic mass  2/20/2025 EGD/colonoscopy showed 2.5 cm ileocecal mass, pathology confirmed adenocarcinoma.    Patient was due to follow-up with outpatient colorectal surgery on 2/28/2025  Pt presenting with melena, Hgb 4.6. Symptomatic anemia.  Attending discussion with Colorectal: If COPD can stay controlled, increase chance of operation.  Oncology: Outpatient appointment for 4/3    MSSA bacteremia  Patient was discharged on cefazolin 2 g IV Q8 hrs until 3/27/2025. PICC line in place. Patient rescheduled to follow-up outpatient with ID    Plan:  Continue with cefazolin 2 g IV every 8 hours.  infectious disease recommendations appreciated   Recommend repeat C-spine MRI with and without contrast, postpone until ORIANA resolves  6 weeks treatment through 3/27/2025  Atrial fibrillation (HCC)  AC: Eliquis 2.5 mg BID  Rate control: Lopressor 25 mg Q12 hrs    Plan:  Restart Eliquis at 2.5 mg due to increase risk of bleed and ORIANA  Continue Lopressor 25 mg Q12 hrs  Pleural effusion, bilateral  CT A/P shows bilateral pleural effusions L>R, pulmonary vascular congestion    Plan:  Repeat imaging if pt's respiratory status worsens  Continue oxygen to keep oxygen saturations 88% and above.  HTN (hypertension)  Systolic BP 100s-130s.  Patient normotensive.  Blood pressure stable.    Plan:  Lopressor 25 mg every 12 hours  CAD (coronary artery disease)  TTE 2/12/25: EF 55%, Normal systolic dysfunction, G1DD, No vegetation, No mural thrombus, moderate aortic sclerosis     Plan:  Hold aspirin in setting of GI bleed  History of CVA (cerebrovascular accident)  Pt had multiple embolic strokes during admission from  2/11/25-2/22/25  Thought to be embolic strokes 2/2 MSSA bacteremia, no vegetations on TTE    Plan:  Restart Eliquis   Continue Lipitor 40 mg daily  Urinary retention  Patient has history of urinary retention and was started on Flomax during last hospitalization  Pt denies difficulty urinating at this shayy    Plan:  Flomax 0.4 mg daily  Urinary retention protocol  Severe protein-calorie malnutrition (HCC)  Malnutrition Findings:   Adult Malnutrition type: Acute illness  Adult Degree of Malnutrition: Other severe protein calorie malnutrition  Malnutrition Characteristics: Inadequate energy, Fluid accumulation  360 Statement: related to inadequate energy/protein intake as evidenced by consuming < 50% of energy intake compared to estimated needs for > 5 days and B/L LE +3 edema. Treated with ONS.  BMI Findings:     Body mass index is 26.61 kg/m².     Plan:  Ensure supplements  Ambulatory dysfunction  Worsening ambulatory dysfunction since December  PT/OT evaluations recommend level 2   Discussed with podiatry, patient has chronic left ankle fracture over 2 years ago, recommend WBAT to left foot as curbside, no further podiatry needs   Consider ambulatory Ortho consult and brace fitting     Plan:  Weight bearing status: As tolerated with brace    VTE Pharmacologic Prophylaxis: VTE Score: 5 High Risk (Score >/= 5) - Pharmacological DVT Prophylaxis Ordered: apixaban (Eliquis). Sequential Compression Devices Ordered.    Mobility:   Basic Mobility Inpatient Raw Score: 9  JH-HLM Goal: 3: Sit at edge of bed  JH-HLM Achieved: 4: Move to chair/commode  JH-HLM Goal NOT achieved. Continue with multidisciplinary rounding and encourage appropriate mobility to improve upon JH-HLM goals.    Patient Centered Rounds: I performed bedside rounds with nursing staff today.   Discussions with Specialists or Other Care Team Provider: Infectious disease, nephrology    Education and Discussions with Family / Patient: Updated   (wife) via phone.    Current Length of Stay: 8 day(s)  Current Patient Status: Inpatient   Certification Statement: The patient will continue to require additional inpatient hospital stay due to ORIANA  Discharge Plan: Anticipate discharge in 48-72 hrs to rehab facility.    Code Status: Level 1 - Full Code    Subjective   No acute event overnight.  This morning, patient reports shortness of breath and weakness.  Denies any bloody stool overnight.  Patient also complaining of lower extremities swelling.  Patient denies fever, chill, and abdominal pain.    Objective :  Temp:  [97.8 °F (36.6 °C)-98.9 °F (37.2 °C)] 97.8 °F (36.6 °C)  HR:  [75-89] 78  BP: (105-135)/(51-63) 127/63  Resp:  [15] 15  SpO2:  [96 %-100 %] 100 %    Body mass index is 26.61 kg/m².     Input and Output Summary (last 24 hours):     Intake/Output Summary (Last 24 hours) at 3/8/2025 1338  Last data filed at 3/8/2025 0424  Gross per 24 hour   Intake --   Output 1800 ml   Net -1800 ml       Physical Exam  Vitals and nursing note reviewed.   Constitutional:       General: He is not in acute distress.     Appearance: He is well-developed.   HENT:      Head: Normocephalic and atraumatic.   Eyes:      Conjunctiva/sclera: Conjunctivae normal.   Cardiovascular:      Rate and Rhythm: Normal rate and regular rhythm.      Pulses: Normal pulses.      Heart sounds: No murmur heard.  Pulmonary:      Effort: Pulmonary effort is normal. No respiratory distress.      Breath sounds: Normal breath sounds.   Abdominal:      Palpations: Abdomen is soft.      Tenderness: There is no abdominal tenderness.   Musculoskeletal:         General: No swelling.      Cervical back: Neck supple.      Right lower le+ Pitting Edema present.      Left lower le+ Pitting Edema present.   Skin:     General: Skin is warm and dry.      Capillary Refill: Capillary refill takes less than 2 seconds.   Neurological:      Mental Status: He is alert.   Psychiatric:         Mood and Affect:  Mood normal.            Lines/Drains:  Lines/Drains/Airways       Active Status       Name Placement date Placement time Site Days    PICC Line 02/26/25 Right Brachial 02/26/25  0548  Brachial  10                    Central Line:  Goal for removal: Will discontinue when meds requiring line are completed.               Lab Results: I have reviewed the following results:   Results from last 7 days   Lab Units 03/08/25  0530   WBC Thousand/uL 4.62   HEMOGLOBIN g/dL 7.8*   HEMATOCRIT % 25.3*   PLATELETS Thousands/uL 160   SEGS PCT % 69   LYMPHO PCT % 17   MONO PCT % 10   EOS PCT % 2     Results from last 7 days   Lab Units 03/08/25  0530 03/05/25  0525 03/04/25  0525   SODIUM mmol/L 144   < > 141   POTASSIUM mmol/L 3.9   < > 3.7   CHLORIDE mmol/L 110*   < > 110*   CO2 mmol/L 31   < > 26   BUN mg/dL 35*   < > 41*   CREATININE mg/dL 1.97*   < > 1.85*   ANION GAP mmol/L 3*   < > 5   CALCIUM mg/dL 8.2*   < > 7.9*   ALBUMIN g/dL  --   --  2.1*   TOTAL BILIRUBIN mg/dL  --   --  0.38   ALK PHOS U/L  --   --  51   ALT U/L  --   --  <3*   AST U/L  --   --  16   GLUCOSE RANDOM mg/dL 75   < > 84    < > = values in this interval not displayed.     Results from last 7 days   Lab Units 03/02/25  1013   INR  1.36*                   Recent Cultures (last 7 days):         Imaging Results Review: I reviewed radiology reports from this admission including: chest xray.  Other Study Results Review: EKG was reviewed.     Last 24 Hours Medication List:     Current Facility-Administered Medications:     acetaminophen (TYLENOL) tablet 650 mg, Q6H PRN    albuterol inhalation solution 2.5 mg, Q6H PRN    apixaban (ELIQUIS) tablet 2.5 mg, BID    ascorbic acid (VITAMIN C) tablet 250 mg, Daily    atorvastatin (LIPITOR) tablet 40 mg, Daily With Dinner    ceFAZolin (ANCEF) IVPB (premix in dextrose) 2,000 mg 50 mL, Q8H, Last Rate: 2,000 mg (03/08/25 1100)    Cholecalciferol (VITAMIN D3) tablet 2,000 Units, Daily    cyanocobalamin (VITAMIN B-12) tablet  100 mcg, Daily    fluticasone (ARNUITY ELLIPTA) 100 MCG/ACT inhaler 1 puff, Daily    folic acid (FOLVITE) tablet 1 mg, Daily    HYDROmorphone HCl (DILAUDID) injection 0.2 mg, Q4H PRN    hydroxychloroquine (PLAQUENIL) tablet 400 mg, Daily With Breakfast    levalbuterol (XOPENEX) inhalation solution 1.25 mg, Q8H PRN    lidocaine (LIDODERM) 5 % patch 3 patch, Daily    metoprolol tartrate (LOPRESSOR) tablet 25 mg, Q12H GALE    moisture barrier miconazole 2% cream (aka HITESH MOISTURE BARRIER ANTIFUNGAL CREAM), BID    oxyCODONE (ROXICODONE) IR tablet 5 mg, Q6H PRN    oxyCODONE (ROXICODONE) split tablet 2.5 mg, Q6H PRN    pantoprazole (PROTONIX) EC tablet 40 mg, Daily Before Breakfast    polyethylene glycol (MIRALAX) packet 17 g, Daily PRN    senna-docusate sodium (SENOKOT S) 8.6-50 mg per tablet 1 tablet, HS    tamsulosin (FLOMAX) capsule 0.4 mg, Daily With Dinner    [START ON 3/10/2025] torsemide (DEMADEX) tablet 10 mg, Every Other Day    Administrative Statements   Today, Patient Was Seen By: Kelly Herman MD

## 2025-03-08 NOTE — ASSESSMENT & PLAN NOTE
-Newly diagnosed colon mass confirmed with adenocarcinoma in February 2025  - Was to follow colorectal as outpatient

## 2025-03-08 NOTE — ASSESSMENT & PLAN NOTE
Pt had multiple embolic strokes during admission from 2/11/25-2/22/25  Thought to be embolic strokes 2/2 MSSA bacteremia, no vegetations on TTE    Plan:  Restart Eliquis   Continue Lipitor 40 mg daily

## 2025-03-08 NOTE — ASSESSMENT & PLAN NOTE
- Admission creatinine 2.4 mg/dL  - Baseline creatinine below 1 mg/dL  - Creatinine slowly improving 3/5 to 1.8 mg/dL  - UACR 821 mg/g     Etiology-likely in the setting of acute anemia/volume depletion/ATN patient did have gross hematuria and urology was on board.  UACR is less than 1 g.  Renal function is improving from acute initial events.  Therefore does not appear currently to be an acute GN and rather ATN but cannot completely rule out a GN.  Patient has multiple potential etiologies that could lead to secondary GN. (Malignancy, antibiotics)     Renal imaging-no hydronephrosis     Urinalysis with innumerable RBC, 4-10 WBC, 2+ protein     3/5-creatinine is remaining at 1.8 mg/dL.  Overall improved but appears to have reached a plateau     3/6-creatinine unchanged 1.8 mg/dL.  Still with slight volume overload than baseline.  Therefore we will give another dose of albumin and Lasix one-time.  Hemoglobin stable at 8.     3/7-creatinine stable 1.8.  Sodium, potassium, bicarbonate, magnesium appropriate.  Will transition diuretics to once daily torsemide.  Chest x-ray with unchanged pulmonary edema and pneumonia not excluded    3/8-creatinine stable 1.97 mg/dL.  Sodium, potassium, bicarbonate, magnesium appropriate.  We will change torsemide to every other day.  Patient's volume state appears to have improved.  He is diuretic naïve at baseline     Plan  - Continue torsemide but changed to every other day  - I/os; avoid nephrotoxic agents  - Lab work in a.m.  - Avoid hypotension  - Noted potential plan for MRI with and without contrast-as long as it is not urgent, agree with waiting until renal function improves.  If creatinine remains unchanged at 1.8 -2 through the next 1 to 2 days, can plan for MRI potentially Monday.  The risk of NSF is low with the current contrast agents and CKD.    - Agree with trending PVR  - Recheck UA and UPCR  - Avoid hypotension  - Check C3, C4  - Overall appears to be significant ATN  in the setting of initial acute issues.  With patient has multiple other acute/comorbidities that can lead to secondary renal dysfunction.  I reviewed this in detail with the patient's wife and daughter today also.  And therefore at this juncture, I do not believe that a renal biopsy is urgently needed.  Creatinine may be at new current baseline for now.

## 2025-03-08 NOTE — ASSESSMENT & PLAN NOTE
-Patient with history of urinary retention  - On tamsulosin  - Initially had gross hematuria of etiology unclear  - Urology on board  - Urine is clearing  - Urology planning further evaluation for hematuria as outpatient

## 2025-03-08 NOTE — ASSESSMENT & PLAN NOTE
Patient with COPD on 2-4 L supplemental O2 at baseline  Home Albuterol prn, Xopenex Q8 hrs prn, Mometasone inhaler daily    Plan:  Titrate O2 to maintain SpO2 greater than 88%   Xopenex nebs Q8 hrs prn, Mometasone daily  Albuterol prn  RT notified to administer nebs  Follow-up in chest xray ordered by Nephrology.

## 2025-03-08 NOTE — ASSESSMENT & PLAN NOTE
Recent Labs     03/06/25  0521 03/07/25  0541 03/08/25  0530   CREATININE 1.86* 1.80* 1.97*   EGFR 34 35 31     Estimated Creatinine Clearance: 34.5 mL/min (A) (by C-G formula based on SCr of 1.97 mg/dL (H)).     Cr 2.43 on admission. Baseline 0.9. BUN 63. BUN/Cr ratio >20  Hypervolemia on exam  3/7/2025 chest xray:Unchanged pulmonary edema and small pleural effusions. Pneumonia not excluded.   Nephrology recommendations appreciated    Plan:    Daily BMP  Recheck UA and UPCR  Torsemide 10 mg every other day  Avoid nephrotoxic medications  Monitor I/O's  Urinary retention protocol  Cardiology consult- ORIANA worsening with diuretic.  Volume overloaded

## 2025-03-09 NOTE — ASSESSMENT & PLAN NOTE
Recent Labs     03/07/25  0541 03/08/25  0530 03/09/25  0544   CREATININE 1.80* 1.97* 1.94*   EGFR 35 31 32     Estimated Creatinine Clearance: 35 mL/min (A) (by C-G formula based on SCr of 1.94 mg/dL (H)).     Cr 2.43 on admission. Baseline 0.9. BUN 63. BUN/Cr ratio >20  Hypervolemia on exam  3/7/2025 chest xray:Unchanged pulmonary edema and small pleural effusions. Pneumonia not excluded.     Plan:  Daily BMP  Recheck UA and UPCR  Torsemide 10 mg every other day  Avoid nephrotoxic medications  Monitor I/O's  Urinary retention protocol  Cardiology consult- ORIANA worsening with diuretic.  Volume overloaded  Nephrology recommendations appreciated

## 2025-03-09 NOTE — ASSESSMENT & PLAN NOTE
Recent Labs     03/07/25  0541 03/08/25  0530 03/09/25  0544   HGB 8.2* 7.8* 7.6*      Patient presents with 2 days of chest pain, shortness of breath, fatigue and melena  2/20/2025 EGD/colonoscopy showed 2.5 cm ileocecal mass, pathology confirmed adenocarcinoma.  S/p 3 units packed rbc in ED. S/p 1 unit PRBCs on 3/2  Hemoglobin has been stable.   Patient recent diagnosis of adenocarcinoma, increased risk of stroke.  Plan to restart Eliquis at a lower dose    Plan:  H&H Q12 hrs  Transfuse for Hgb <7  Monitor for signs of overt bleeding or melenotic stools  Protonix 40 mg PO QD   Resume Eliquis at 2.5 mg

## 2025-03-09 NOTE — ASSESSMENT & PLAN NOTE
Gila as directed by nephrology; he is at his baseline O2 requirement of 3L    Patient's wife would like him to follow-up with . [facility] at our [place] office upon discharge.  Message was sent to the office to call her to arrange follow-up.

## 2025-03-09 NOTE — ASSESSMENT & PLAN NOTE
Reason for admission, primary team monitoring.  He has received a total of 5 units of PRBCs this admission.    Globin is been stable in the 8 range for the past 2 days and Eliquis has been resumed at a lower dose 2.5 mg twice daily

## 2025-03-09 NOTE — ASSESSMENT & PLAN NOTE
On cardiac catheterization 2022 at [facility].  Borderline IFR LAD lesion that medical management was recommended.  See HPI for further details.  He has been on ASA, statin, BB.  Denies any current chest pain had 1 episode yesterday of sharp chest pain jolting across his chest lasting for 2 to 3 seconds.  Not indicative of angina.  Recent echo here did show preserved EF with some wall motion abnormalities.  Given his significant anemia and GI bleeding and recently diagnosed adenocarcinoma would not pursue any further ischemic workup at this time.

## 2025-03-09 NOTE — ASSESSMENT & PLAN NOTE
unclear details.  All EKGs here show sinus rhythm.  Denies any prior cardioversion  Is on metoprolol tartrate 25 mg twice daily  Eliquis is being resumed at lower dose.   Could consider watchman in the future if GI bleeding becomes issues but this we pending his recovery/course with his adenocarcinoma.  Can be discussed as an outpatient.

## 2025-03-09 NOTE — ASSESSMENT & PLAN NOTE
Diagnosed last admission during colonoscopy, pathology has revealed adenocarcinoma.  He was supposed to have surgery with colorectal 1-2/28.  It sounds like they are following peripherally until he is recovered from some of his acute illnesses and regaining strength.

## 2025-03-09 NOTE — CONSULTS
Consultation - Cardiology Team One  Devin Chaves 79 y.o. male MRN: [id number]  Unit/Bed#: S -01 Encounter: [id number]    Inpatient consult to Cardiology  Consult performed by: AMPARO Velarde  Consult ordered by: Kelly Herman MD        Physician Requesting Consult: Fiordaliza Ibrahim MD    Reason for Consult / Principal Problem: CHF      Assessment/ Plan:    Assessment & Plan  Symptomatic anemia  Reason for admission, primary team monitoring.  He has received a total of 5 units of PRBCs this admission.    Globin is been stable in the 8 range for the past 2 days and Eliquis has been resumed at a lower dose 2.5 mg twice daily  HTN (hypertension)  BP controlled; most recent 124/57  CAD (coronary artery disease)  On cardiac catheterization 2022 at [facility].  Borderline IFR LAD lesion that medical management was recommended.  See HPI for further details.  He has been on ASA, statin, BB.  Denies any current chest pain had 1 episode yesterday of sharp chest pain jolting across his chest lasting for 2 to 3 seconds.  Not indicative of angina.  Recent echo here did show preserved EF with some wall motion abnormalities.  Given his significant anemia and GI bleeding and recently diagnosed adenocarcinoma would not pursue any further ischemic workup at this time.   COPD (chronic obstructive pulmonary disease) (HCC)  On home O2 3 L, currently on 3 L here  History of CVA (cerebrovascular accident)  2 prior CVAs  Family is unsure if that is when his atrial fibrillation was diagnosed but he has been on Eliquis  Atrial fibrillation (HCC)  unclear details.  All EKGs here show sinus rhythm.  Denies any prior cardioversion  Is on metoprolol tartrate 25 mg twice daily  Eliquis is being resumed at lower dose.   Could consider watchman in the future if GI bleeding becomes issues but this we pending his recovery/course with his adenocarcinoma.  Can be discussed as an outpatient.   MSSA bacteremia  PICC in  place  Long-term antibiotics per ID  Colonic mass  Diagnosed last admission during colonoscopy, pathology has revealed adenocarcinoma.  He was supposed to have surgery with colorectal 1-2/28.  It sounds like they are following peripherally until he is recovered from some of his acute illnesses and regaining strength.  Melena  Hgb stable in 8 range; started back on eliquis 2.5mg BID today  ORIANA (acute kidney injury) (HCC)  Nephrology following; creatinine peaked to 2.43 on 2/2 8 and has been trending down  Pleural effusion, bilateral  Diurese as directed by nephrology; he is at his baseline O2 requirement of 3L    Patient's wife would like him to follow-up with [facility] at our [place] office upon discharge.  Message was sent to the office to call her to arrange follow-up.    History of Present Illness     HPI: Devin Chaves is a 79 y.o. year old male who has a history of CAD on cardiac cath 2022 without intervention, CVA x 2, bilateral carotid stenosis, Paroxysmal atrial fibrillation on eliquis, HTN, COPD with home O2, former tobacco use. Recently diagnosed adenocarcinoma. [facility]    Pt has has multiple hsospitalizations in past few months [facility] for PNA.     He was admitted to  2/11-2/22 with COPD exacerbation and sepsis/MSSA bacteremia.  Hospital course complicated by anemia he underwent a colonoscopy in which a 2.5 cm mass was found pathology revealed adenocarcinoma.  He received 2 blood transfusions.  And his Eliquis was resumed  He was discharged to rehab unit and returned on 2/28 symptomatic anemia and ongoing melena.  He has received a total of 5 units of PRBCs this admission.     This admission has been complicated by ORIANA and volume overload, COPD exacerbation.  He also remains on antibiotics for his MSSA bacteremia.    Nephrology has been following and managing diuretics.     Cardiology asked to  evaluate today due to ORIANA and volume overload.  At time my exam patient reports feeling okay.  He and his family are at bedside.  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  Since being in and out of the hospital and rehab he has lost much of his functional capacity; they voiced frustration in this.    He does carry a past medical history of CAD having undergone a cardiac cath in 2022 due to exertional chest discomfort.  He was found to have a borderline LAD lesion that was recommended to have medical management.  He had other nonobstructed lesions.  They did note that if he had ongoing symptoms despite medical management they could consider PCI of his LAD.    He does note some chest discomfort yesterday but states this was at rest it was a sharp jolting pain across his chest that resolved within a few minutes.    Cardiac catheterization 4/2022 at outside hospital:   Mid LAD 60% (borderline IFR), OM1 50 to 60% stenosis (negative by IFR), RCA 40 to 50% stenosis, small RV marginal branch 80%.           Echocardiogram 2022: Normal LV systolic function with EF of 55 to 60%, grade 1 diastolic dysfunction, mildly dilated RV, mildly dilated RA.  No significant valvular disease..       Echocardiogram 2/2025: LVEF 55%, grade 1 diastolic dysfunction, akinesis of the basal inferior and mid inferior walls, RV mildly dilated with moderately to systolic function mild to moderate aortic regurgitation, mild MR, mild TR.     EKG reviewed personally:   2/28/2025  Sinus rhythm  No ST or T wave abnormalities    Telemetry reviewed personally:   No telemetry    Review of Systems   Constitutional: Positive for malaise/fatigue. Negative for decreased appetite.   Cardiovascular:  Positive for leg swelling and orthopnea. Negative for chest pain (episode yesterday; sharp jolt across chest wall when moving; lasted a few seconds; no  recurrence).   Respiratory:  Positive for shortness of breath. Negative for sleep disturbances due to breathing.    Gastrointestinal:  Negative for abdominal pain, nausea and vomiting.   Genitourinary:  Negative for dysuria.   Neurological:  Negative for dizziness and light-headedness.   Psychiatric/Behavioral:  Negative for altered mental status.    All other systems reviewed and are negative.    Historical Information   Past Medical History:   Diagnosis Date    Atrial fibrillation (HCC)     COPD (chronic obstructive pulmonary disease) (HCC)     Crushing injury of finger, left     Infectious viral hepatitis      Past Surgical History:   Procedure Laterality Date    FL LUMBAR PUNCTURE DIAGNOSTIC  2/10/2022    RI OPEN TX PHALANGEAL SHAFT FRACTURE PROX/MIDDLE EA Left 1/25/2017    Procedure: ORIF LEFT SMALL FINGER FRACTURE;  Surgeon: Blake Badillo MD;  Location: [facility];  Service: Orthopedics     Social History     Substance and Sexual Activity   Alcohol Use No     Social History     Substance and Sexual Activity   Drug Use No     Social History     Tobacco Use   Smoking Status Former   Smokeless Tobacco Former    Quit date: 4/1/2011     Family History: Family history non-contributory    Meds/Allergies   current meds:   Current Facility-Administered Medications:     acetaminophen (TYLENOL) tablet 650 mg, Q6H PRN    albuterol (PROVENTIL HFA,VENTOLIN HFA) inhaler 2 puff, Q4H PRN    apixaban (ELIQUIS) tablet 2.5 mg, BID    ascorbic acid (VITAMIN C) tablet 250 mg, Daily    atorvastatin (LIPITOR) tablet 40 mg, Daily With Dinner    ceFAZolin (ANCEF) IVPB (premix in dextrose) 2,000 mg 50 mL, Q8H, Last Rate: 2,000 mg (03/09/25 1102)    Cholecalciferol (VITAMIN D3) tablet 2,000 Units, Daily    cyanocobalamin (VITAMIN B-12) tablet 100 mcg, Daily    fluticasone (ARNUITY ELLIPTA) 100 MCG/ACT inhaler 1 puff, Daily    folic acid (FOLVITE) tablet 1 mg, Daily    HYDROmorphone HCl (DILAUDID) injection 0.2 mg, Q4H PRN     "hydroxychloroquine (PLAQUENIL) tablet 400 mg, Daily With Breakfast    lidocaine (LIDODERM) 5 % patch 3 patch, Daily    metoprolol tartrate (LOPRESSOR) tablet 25 mg, Q12H GALE    moisture barrier miconazole 2% cream (aka HITESH MOISTURE BARRIER ANTIFUNGAL CREAM), BID    oxyCODONE (ROXICODONE) IR tablet 5 mg, Q6H PRN    oxyCODONE (ROXICODONE) split tablet 2.5 mg, Q6H PRN    pantoprazole (PROTONIX) EC tablet 40 mg, Daily Before Breakfast    polyethylene glycol (MIRALAX) packet 17 g, Daily PRN    senna-docusate sodium (SENOKOT S) 8.6-50 mg per tablet 1 tablet, HS    tamsulosin (FLOMAX) capsule 0.4 mg, Daily With Dinner    [START ON 3/10/2025] torsemide (DEMADEX) tablet 10 mg, Every Other Day       Allergies   Allergen Reactions    Shellfish-Derived Products - Food Allergy Other (See Comments)     Pt states, \"I reacted, I dont know\"     Objective   Vitals: Blood pressure 124/57, pulse 71, temperature 97.8 °F (36.6 °C), resp. rate 15, height 6' (1.829 m), weight 89 kg (196 lb 3.4 oz), SpO2 100%.,     Body mass index is 26.61 kg/m².,     Systolic (24hrs), Av , Min:96 , Max:136     Diastolic (24hrs), Av, Min:56, Max:63      Intake/Output Summary (Last 24 hours) at 3/9/2025 1118  Last data filed at 3/9/2025 0547  Gross per 24 hour   Intake --   Output 600 ml   Net -600 ml     Weight (last 2 days)       Date/Time Weight    25 0554 89 (196.21)          Invasive Devices       Peripherally Inserted Central Catheter Line  Duration             PICC Line 25 Right Brachial 11 days                      Physical Exam  Vitals reviewed.   HENT:      Head: Normocephalic.      Mouth/Throat:      Mouth: Mucous membranes are moist.   Cardiovascular:      Rate and Rhythm: Normal rate and regular rhythm.      Comments: B/l Pitting LE edema  Pulmonary:      Comments: Presents decreased.  On O2 nasal cannula 3 L  Abdominal:      Palpations: Abdomen is soft.   Musculoskeletal:      Right lower leg: Edema present.      Left " lower leg: Edema present.   Skin:     General: Skin is warm.   Neurological:      Mental Status: He is alert and oriented to person, place, and time.   Psychiatric:         Mood and Affect: Mood normal.       LABORATORY RESULTS:      CBC with diff:   Results from last 7 days   Lab Units 03/09/25 0544 03/08/25 0530 03/07/25  0541 03/06/25 0521 03/05/25 2032 03/05/25 0525 03/04/25 2034 03/04/25 0525 03/03/25  0823 03/03/25  0516   WBC Thousand/uL 5.18 4.62 5.12 4.63  --  5.28  --  4.96  --  4.69   HEMOGLOBIN g/dL 7.6* 7.8* 8.2* 7.7* 7.7* 8.0* 8.3* 8.2*   < > 8.1*   HEMATOCRIT % 24.5* 25.3* 26.4* 24.7* 24.5* 26.1* 26.2* 26.2*   < > 25.2*   MCV fL 94 94 92 92  --  92  --  91  --  89   PLATELETS Thousands/uL 144* 160 159 159  --  187  --  173  --  173   RBC Million/uL 2.61* 2.70* 2.86* 2.69*  --  2.84*  --  2.89*  --  2.82*   MCH pg 29.1 28.9 28.7 28.6  --  28.2  --  28.4  --  28.7   MCHC g/dL 31.0* 30.8* 31.1* 31.2*  --  30.7*  --  31.3*  --  32.1   RDW % 17.4* 18.0* 17.9* 17.9*  --  18.2*  --  18.3*  --  18.3*   MPV fL 9.1 9.2 8.6* 8.8*  --  8.9  --  8.8*  --  8.7*   NRBC AUTO /100 WBCs 0 0 0 0  --  0  --  0  --  0    < > = values in this interval not displayed.     CMP:  Results from last 7 days   Lab Units 03/09/25 0544 03/08/25 0530 03/07/25 0541 03/06/25  0521 03/05/25  0525 03/04/25  0525 03/03/25  0516   POTASSIUM mmol/L 3.6 3.9 4.2 3.5 3.5 3.7 3.6   CHLORIDE mmol/L 109* 110* 110* 109* 111* 110* 110*   CO2 mmol/L 30 31 28 28 27 26 26   BUN mg/dL 36* 35* 34* 38* 40* 41* 44*   CREATININE mg/dL 1.94* 1.97* 1.80* 1.86* 1.80* 1.85* 1.88*   CALCIUM mg/dL 8.1* 8.2* 8.2* 7.7* 7.9* 7.9* 7.9*   AST U/L  --   --   --   --   --  16 16   ALT U/L  --   --   --   --   --  <3* <3*   ALK PHOS U/L  --   --   --   --   --  51 47   EGFR ml/min/1.73sq m 32 31 35 34 35 34 33     BMP:  Results from last 7 days   Lab Units 03/09/25  0544 03/08/25  0530 03/07/25  0541 03/06/25  0521 03/05/25  0525 03/04/25  0525 03/03/25  0516  "  POTASSIUM mmol/L 3.6 3.9 4.2 3.5 3.5 3.7 3.6   CHLORIDE mmol/L 109* 110* 110* 109* 111* 110* 110*   CO2 mmol/L 30 31 28 28 27 26 26   BUN mg/dL 36* 35* 34* 38* 40* 41* 44*   CREATININE mg/dL 1.94* 1.97* 1.80* 1.86* 1.80* 1.85* 1.88*   CALCIUM mg/dL 8.1* 8.2* 8.2* 7.7* 7.9* 7.9* 7.9*      Results from last 7 days   Lab Units 03/09/25  0544 03/08/25  0530 03/07/25  0541 03/06/25  0521 03/05/25  0525 03/04/25  0525 03/03/25  0516   MAGNESIUM mg/dL 1.9 2.0 1.8* 1.9 2.0 2.0 2.1         Lipid Profile:   No results found for: \"CHOL\"  Lab Results   Component Value Date    HDL 17 (L) 02/16/2025     Lab Results   Component Value Date    LDLCALC 15 02/16/2025     Lab Results   Component Value Date    TRIG 112 02/16/2025     XR chest portable  Result Date: 3/7/2025  Narrative: XR CHEST PORTABLE INDICATION: f/u congestion. COMPARISON: Chest radiograph 3/1/2025 FINDINGS: Right upper extremity PICC, the central portion is difficult to visualize due to technique. Unchanged pulmonary edema and small pleural effusions. Normal cardiomediastinal silhouette. Bones are unremarkable for age. Normal upper abdomen.     Impression: Unchanged pulmonary edema and small pleural effusions. Pneumonia not excluded. Workstation performed: [id number]     XR chest PICC line portable  Result Date: 3/2/2025  Narrative: XR CHEST PICC PORTABLE INDICATION: PICC vertification right sided. COMPARISON: CXR and chest CT 2/28/2025. FINDINGS: Right PICC in upper SVC. Moderate pulmonary venous congestion. Small pleural effusions and bibasilar atelectasis. Normal cardiomediastinal silhouette. Bones are unremarkable for age. Normal upper abdomen.     Impression: Right PICC in upper SVC. Moderate pulmonary venous congestion with small pleural effusions and bibasilar atelectasis. Workstation performed: [id number]     XR chest 1 view portable  Result Date: 2/28/2025  Narrative: XR CHEST PORTABLE INDICATION: SOB. COMPARISON: 2/16/2025 FINDINGS: Central congestion " with left greater than right basilar effusions. No pneumothorax. Enlarged cardiac silhouette, unchanged. Bones are unremarkable for age. Normal upper abdomen.     Impression: Cardiomegaly with central congestion and left greater than right small basilar effusions. Workstation performed: [id number]     CT chest w ct abdomen pelvis w wo contrast  Addendum Date: 2/28/2025  Addendum: ADDENDUM: Correction to spleen section in the body of report. Hyperattenuation should be hypoattenuation. Grossly stable posterior splenic subcapsular hypodensity presumably an infarct unchanged allowing for difference in contrast timing.     Result Date: 2/28/2025  Narrative: CT CHEST, ABDOMEN AND PELVIS WITH AND WITHOUT IV CONTRAST INDICATION: eliquis, hgb-4 from facility, reports black stool, abn chest xray. COMPARISON: CT chest abdomen pelvis 2/20/2025 and CT chest 2/11/2025, 12/4/2023 TECHNIQUE: Initial CT of the abdomen and pelvis was performed without intravenous contrast. Subsequent dynamic CT evaluation of the chest, abdomen and pelvis was performed after the administration of intravenous contrast was performed. Finally, delayed dynamic delayed phase postcontrast CT evaluation of the abdomen and pelvis was performed. Multiplanar 2D reformatted images were created from the source data. This examination, like all CT scans performed in the [facility], was performed utilizing techniques to minimize radiation dose exposure, including the use of iterative reconstruction and automated exposure control. Radiation dose length product (DLP) for this visit: 2283 mGy-cm IV Contrast: 100 mL of iohexol Enteric Contrast: Not administered. FINDINGS: CHEST LUNGS: COPD. New mild bilateral multi lobar scattered interstitial thickening and groundglass and tree-in-bud opacities. New mild multi lobar smooth septal thickening. Minimal bilateral lower lobe and lingular atelectasis, similar. 5 mm left upper lobe nodule stable since  2/11/2025 and new since 12/4/2023 image 37 series 308. 6 mm right basilar lower lobe nodule, new image 99 series 308. Mucosal stranding the right mainstem bronchus and bronchus intermedius. Central airways otherwise clear. PLEURA: Bilateral pleural effusions, small on the left and trace on the right, mildly increased. HEART/GREAT VESSELS: Heart is not enlarged. Decreased intracardiac blood pool density compatible with anemia. No pericardial effusion.  Aortic, great vessel and coronary artery calcification.  No thoracic aortic aneurysm. Moderate stenosis of the mid ascending segment of the left subclavian artery. MEDIASTINUM AND PAWEL: No enlarged lymph nodes. Few small calcified mediastinal and left hilar calcified lymph nodes. Otherwise unremarkable. CHEST WALL AND LOWER NECK: Stable mild bilateral gynecomastia. ABDOMEN RIGHT KIDNEY AND URETER: No solid renal mass or detectable urothelial mass. No hydronephrosis or hydroureter. No urinary tract calculi. No perinephric collection. LEFT KIDNEY AND URETER: No solid renal mass or detectable urothelial mass. Subcentimeter hypoattenuating renal lesion(s), too small to characterize but statistically likely benign, which do not warrant follow-up (Radiology June 2019). No hydronephrosis or hydroureter. No urinary tract calculi. No perinephric collection. URINARY BLADDER: No bladder wall mass. No calculi. LIVER/BILIARY TREE: Unremarkable. GALLBLADDER: No calcified gallstones. No pericholecystic inflammatory change. SPLEEN: Stable posterior splenic subcapsular hyperattenuation unchanged allowing for difference in contrast timing. Tiny splenic calcified granulomata. PANCREAS: Mild to moderate diffuse atrophy. No acute findings. ADRENAL GLANDS: Unremarkable. STOMACH AND BOWEL: No vascular contrast extravasation into the stomach or bowel to suggest high-volume bleeding. Colonic diverticulosis and small duodenojejunal junction diverticulum. Mild nodular thickening of the upper  cecum/proximal ascending colon attributed to recently biopsy-proven adenocarcinoma image 74 series 605. No bowel obstruction. APPENDIX: No findings to suggest appendicitis. ABDOMINOPELVIC CAVITY: Trace free fluid in the dependent pelvis. No pneumoperitoneum. No lymphadenopathy. VESSELS: Aortic and mesenteric atherosclerotic disease without occlusion. Mild stenosis of the celiac and superior mesenteric origins with well-perfused distal vessels. PELVIS REPRODUCTIVE ORGANS: Unremarkable for patient's age. ABDOMINAL WALL/INGUINAL REGIONS: Unremarkable. BONES: No acute fracture or osseous destructive lesion identified. Stable mild to moderate multilevel chronic compression deformities in the thoracic spine most prominent at T7. Old bilateral rib fractures. Degenerative changes of the spine, pubic symphysis, and multiple joints. Levoconvex lumbar scoliosis.     Impression: CT chest: Decreased intracardiac blood pool density compatible with anemia. New bilateral multi lobar interstitial thickening and tree-in-bud opacities may represent pulmonary vascular congestion or nonspecific inflammatory or infectious process. Bilateral pleural effusions, left greater than right, mildly enlarged and additional findings suggestive of pulmonary vascular congestion. Correlate with clinical findings. 5 mm left upper lobe nodule stable since 2/11/2025 and new since December 2023. 6 mm right lower lobe nodule new since 2/11/2025. Noncontrast chest CT follow-up in 3 months is advised. Additional chronic findings and negatives as above. CT abdomen and pelvis: No evidence of high-volume gastrointestinal bleeding. Stable upper cecal/proximal ascending colonic mass attributed to recently biopsy-proven adenocarcinoma. Colonic diverticulosis. No evidence of abdominopelvic metastatic disease. Additional chronic findings and negatives as above. The study was marked in EPIC for immediate notification. Workstation performed: [id number]     XR ankle  3+ vw left  Result Date: 2/22/2025  Narrative: XR ANKLE 3+ VW LEFT INDICATION: Chronic L ankle arthropathy, instability with increased difficulty bearing weight. COMPARISON: None FINDINGS: No acute fracture or dislocation. Degenerative osteoarthritis of the ankle mortise with mild marginal osteophyte formation. Although the entire foot is not visualized there does appear to be some flattening of the plantar arches. Partially visualized degenerative osteoarthritis of the first metatarsal phalangeal joint. There is soft tissue swelling of the forefoot primarily along the dorsal surface superficial to the metatarsals.     Impression: No acute osseous abnormality. Degenerative changes as described above with partial loss of the plantar arch. Computerized Assisted Algorithm (CAA) may have been used to analyze all applicable images. Workstation performed: [id number]     CT chest abdomen pelvis w contrast  Result Date: 2/21/2025  Narrative: CT CHEST, ABDOMEN AND PELVIS WITH IV CONTRAST INDICATION: Rule out malignancy. COMPARISON: 2/11/2025 TECHNIQUE: CT examination of the chest, abdomen and pelvis was performed. Multiplanar 2D reformatted images were created from the source data. This examination, like all CT scans performed in the [facility], was performed utilizing techniques to minimize radiation dose exposure, including the use of iterative reconstruction and automated exposure control. Radiation dose length product (DLP) for this visit: 817 mGy-cm IV Contrast: 100 mL of iohexol Enteric Contrast: Not administered. FINDINGS: CHEST LUNGS: Left lower lobe atelectasis. Emphysematous changes no tracheal or endobronchial lesion. PLEURA: Small loculated left pleural effusion. Trace right pleural effusion HEART/GREAT VESSELS: Heart is unremarkable for patient's age. No thoracic aortic aneurysm. MEDIASTINUM AND PAWEL: Unremarkable. CHEST WALL AND LOWER NECK: Unremarkable. ABDOMEN LIVER/BILIARY TREE: Unremarkable.  GALLBLADDER: No calcified gallstones. No pericholecystic inflammatory change. SPLEEN: Calcified granulomata. Hypoenhancing at the posterior spleen. PANCREAS: Unremarkable. ADRENAL GLANDS: Unremarkable. KIDNEYS/URETERS: No hydronephrosis or urinary tract calculi. Subcentimeter hypoattenuating renal lesion(s), too small to characterize but statistically likely benign, which do not warrant follow-up (Radiology June 2019). STOMACH AND BOWEL: Colonic diverticulosis without findings of acute diverticulitis. Wall thickening at the ileocecal valve/cecum APPENDIX: No findings to suggest appendicitis. ABDOMINOPELVIC CAVITY: No ascites. No pneumoperitoneum. No lymphadenopathy. VESSELS: Unremarkable for patient's age. PELVIS REPRODUCTIVE ORGANS: Unremarkable for patient's age. URINARY BLADDER: Unremarkable. ABDOMINAL WALL/INGUINAL REGIONS: Unremarkable. BONES: No acute fracture or suspicious osseous lesion.     Impression: Wall thickening at the ileocecal valve/cecum compatible with colonic mass discovered on recent colonoscopy. Posterior splenic infarct Small loculated left pleural effusion. Left lower lobe atelectasis. Workstation performed: [id number]     CT head wo contrast  Result Date: 2/20/2025  Narrative: CT BRAIN - WITHOUT CONTRAST INDICATION:   rule out hemorrhage. COMPARISON: 2/17/2025 TECHNIQUE:  CT examination of the brain was performed.  Multiplanar 2D reformatted images were created from the source data. Radiation dose length product (DLP) for this visit:  899 mGy-cm .  This examination, like all CT scans performed in the [facility], was performed utilizing techniques to minimize radiation dose exposure, including the use of iterative reconstruction and automated exposure control. IMAGE QUALITY:  Diagnostic. FINDINGS: PARENCHYMA: Decreased attenuation is noted in periventricular and subcortical white matter demonstrating an appearance that is statistically most likely to represent moderate  microangiopathic change. Chronic right basal ganglia lacunar infarct. Old infarct left occipital region. No CT signs of acute infarction.  No intracranial mass, mass effect or midline shift.  No acute parenchymal hemorrhage. VENTRICLES AND EXTRA-AXIAL SPACES:  Normal for the patient's age. VISUALIZED ORBITS: Normal visualized orbits. PARANASAL SINUSES: Normal visualized paranasal sinuses. CALVARIUM AND EXTRACRANIAL SOFT TISSUES: Normal.     Impression: No acute intracranial abnormality. Chronic microangiopathic changes. Workstation performed: [id number]     Colonoscopy  Result Date: 2/20/2025  Narrative: Table formatting from the original result was not included. [facility] [address] [facility] [id number] [phone] DATE OF SERVICE: 2/20/25 PHYSICIAN(S): Attending: Adwoa Segal MD Fellow: No Staff Documented INDICATION: Anemia POST-OP DIAGNOSIS: See the impression below. HISTORY: Prior colonoscopy: 4 years ago. BOWEL PREPARATION: Clenpiq; Enema PREPROCEDURE: Informed consent was obtained for the procedure, including sedation. Risks including but not limited to bleeding, infection, perforation, adverse drug reaction and aspiration were explained in detail. Also explained about less than 100% sensitivity with the exam and other alternatives. The patient was placed in the left lateral decubitus position. Procedure: Colonoscopy DETAILS OF PROCEDURE: Patient was taken to the procedure room where a time out was performed to confirm correct patient and correct procedure. The patient underwent monitored anesthesia care, which was administered by an anesthesia professional. The patient's blood pressure, ECG, ETCO2, heart rate, level of consciousness, oxygen and respirations were monitored throughout the procedure. A digital rectal exam was performed. The scope was introduced through the anus and advanced to the cecum. Retroflexion was performed in the rectum. The quality of bowel  preparation was evaluated using the [place] Bowel Preparation Scale with scores of: right colon = 2, transverse colon = 2, left colon = 2. The total BBPS score was 6. Bowel prep was adequate. The patient experienced no blood loss. The procedure was not difficult. The patient tolerated the procedure well. There were no apparent adverse events. ANESTHESIA INFORMATION: ASA: III Anesthesia Type: IV Sedation with Anesthesia MEDICATIONS: simethicone (MYLICON) 40 mg in sterile water 60 mL 40 mg (Totals for administrations occurring from 1140 to 1223 on 02/20/25) FINDINGS: The cecum appeared normal. Proximal ascending colon-across from the ileocecal valve there is a 2.5 cm hard friable polypoid mass appears malignant status post multiple biopsies. Multiple diverticula in the sigmoid colon Some liquid stool was washed off and suctioned in few areas EVENTS: Procedure Events Event Event Time ENDO CECUM REACHED 2/20/2025 12:05 PM ENDO SCOPE OUT TIME 2/20/2025 12:22 PM SPECIMENS: ID Type Source Tests Collected by Time Destination 1 : proximal ascending mass bx Tissue Polyp, Colorectal TISSUE EXAM Adwoa Segal MD 2/20/2025 12:15 PM  EQUIPMENT: Colonoscope -[id number]     Impression: The cecum appeared normal. Proximal ascending colon-across from the ileocecal valve there is a 2.5 cm hard friable polypoid mass appears malignant status post multiple biopsies. Diverticulosis in the sigmoid colon Some liquid stool was washed off and suctioned in few areas RECOMMENDATION: Repeat colonoscopy in 1 year, due: 2/20/2026 Personal history of colon cancer  Follow-up on the pathology.  Check CT scan, CEA    Adwoa Segal MD     EGD  Result Date: 2/20/2025  Narrative: Table formatting from the original result was not included. [facility] [address] [facility] [address] [phone] DATE OF SERVICE: 2/20/25 PHYSICIAN(S): Attending: Adwoa Segal MD Fellow: No Staff Documented INDICATION: Anemia POST-OP  DIAGNOSIS: See the impression below. PREPROCEDURE: Informed consent was obtained for the procedure, including sedation.  Risks of perforation, hemorrhage, adverse drug reaction and aspiration were discussed. The patient was placed in the left lateral decubitus position. Patient was explained about the risks and benefits of the procedure. Risks including but not limited to bleeding, infection, and perforation were explained in detail. Also explained about less than 100% sensitivity with the exam and other alternatives. PROCEDURE: EGD DETAILS OF PROCEDURE: Patient was taken to the procedure room where a time out was performed to confirm correct patient and correct procedure. The patient underwent monitored anesthesia care, which was administered by an anesthesia professional. The patient's blood pressure, heart rate, level of consciousness, respirations, oxygen, ECG and ETCO2 were monitored throughout the procedure. The scope was introduced through the mouth and advanced to the second part of the duodenum. Retroflexion was performed in the fundus. The patient experienced no blood loss. The procedure was not difficult. The patient tolerated the procedure well. There were no apparent adverse events. ANESTHESIA INFORMATION: ASA: III Anesthesia Type: IV Sedation with Anesthesia MEDICATIONS: simethicone (MYLICON) 40 mg in sterile water 60 mL 40 mg (Totals for administrations occurring from 1140 to 1223 on 02/20/25) FINDINGS: The esophagus, stomach and duodenum appeared normal. SPECIMENS: ID Type Source Tests Collected by Time Destination 1 : proximal ascending mass bx Tissue Polyp, Colorectal TISSUE EXAM Adwoa Segal MD 2/20/2025 12:15 PM      Impression: The esophagus, stomach and duodenum appeared normal. RECOMMENDATION: There is no recommended follow-up for this procedure.   Adwoa Segal MD     XR chest portable  Result Date: 2/17/2025  Narrative: XR CHEST PORTABLE INDICATION: Hypoxia. COMPARISON: 2/11/2025  FINDINGS: Study mildly limited with the apical regions of the lungs obscured by the patient's chin. Within the limits of the exam, there is no worrisome appearing pulmonary consolidation or mass lesion seen. There does appear to be mild atelectasis in the left lung base. No significant pleural effusion appreciated. No large volume pneumothorax. No mediastinal shift. There is some prominence in the hilar regions which is felt to most likely be due to vasculature. Heart size grossly stable. Bones are unremarkable for age. Normal upper abdomen.     Impression: Limited study. There appears to be some left basilar atelectasis. There is some central vascular prominence in the hilar regions. Workstation performed: [id number]     CT head wo contrast  Result Date: 2/17/2025  Narrative: CT BRAIN - WITHOUT CONTRAST INDICATION:   Follow up CT for patient with recent strokes. COMPARISON: CT brain February 15, 2025 TECHNIQUE:  CT examination of the brain was performed.  Multiplanar 2D reformatted images were created from the source data. Radiation dose length product (DLP) for this visit:  971 mGy-cm .  This examination, like all CT scans performed in the [facility], was performed utilizing techniques to minimize radiation dose exposure, including the use of iterative reconstruction and automated exposure control. IMAGE QUALITY:  Diagnostic. FINDINGS: PARENCHYMA: Decreased attenuation is noted in periventricular and subcortical white matter demonstrating an appearance that is statistically most likely to represent mild microangiopathic change; this appearance is similar when compared to most recent prior examination. Several known recent infarcts are too small to adequately required level characterize on CT. 3 mm punctate hyperdensity identified in the left inferior frontal lobe series 2 image 24 and series 400 image 22 which is present on the prior study series 2 image 25 is stable in size. There is corresponding  susceptibility artifact in this location on MRI. Differential considerations includes mineralization or minimal hemorrhage. VENTRICLES AND EXTRA-AXIAL SPACES:  Normal for the patient's age. VISUALIZED ORBITS: Normal visualized orbits. PARANASAL SINUSES: Normal visualized paranasal sinuses. CALVARIUM AND EXTRACRANIAL SOFT TISSUES: Normal.     Impression: 3 mm focus of hyperdensity left superior frontal lobe as described unchanged from the prior study may represent a small 3 mm hemorrhage versus focus of mineralization. Continued follow-up recommended. Mild chronic microangiopathic changes. Chronic lacunar infarct right basal ganglia. The study was marked in EPIC for immediate notification. Workstation performed: [id number]     CTA head and neck w wo contrast  Result Date: 2/15/2025  Narrative: CTA NECK AND BRAIN WITH AND WITHOUT CONTRAST INDICATION: embolic strokes COMPARISON:   MRI brain + MRI cervical spine with and without contrast 2/14/2025. CTA chest PE study 2/11/2025. TECHNIQUE:  Routine CT imaging of the Brain without contrast.Post contrast imaging was performed after administration of iodinated contrast through the neck and brain. Post contrast axial 0.625 mm images timed to opacify the arterial system.  3D rendering was performed on an independent workstation.   MIP reconstructions performed. Coronal and sagittal reconstructions were performed of the non contrast portion of the brain. Radiation dose length product (DLP) for this visit:  1664 mGy-cm .  This examination, like all CT scans performed in the [facility], was performed utilizing techniques to minimize radiation dose exposure, including the use of iterative reconstruction and automated exposure control. IV Contrast:  75 mL of iohexol IMAGE QUALITY:   Diagnostic FINDINGS: NONCONTRAST BRAIN PARENCHYMA: Known small multifocal infarcts in bilateral cerebral hemispheres and right cerebellum were better evaluated on MRI brain dated  2/14/2025, likely embolic. No new CT signs of acute infarction. Similar decreased attenuation is noted in periventricular and subcortical white matter demonstrating an appearance that is statistically most likely to represent moderate microangiopathic change. Chronic lacunar infarct in right basal ganglia, unchanged.  No intracranial mass, mass effect or midline shift.  No acute parenchymal hemorrhage. VENTRICLES AND EXTRA-AXIAL SPACES: Normal for the patient's age. VISUALIZED ORBITS: Normal. PARANASAL SINUSES: Mild scattered mucosal thickening, worse in left maxillary sinus. CTA NECK ARCH AND GREAT VESSELS: 4.1 cm ectasia of ascending thoracic aorta (5:331). Mild atherosclerotic changes with no severe stenosis. VERTEBRAL ARTERIES: Patent extracranial segments. Mild stenosis in origin of right vertebral artery due to calcified atherosclerotic disease. RIGHT CAROTID: Patent. Scattered mild mixed atherosclerotic disease in common carotid, carotid bifurcation, and proximal cervical internal carotid arteries. Less than 50% stenosis.  No dissection. LEFT CAROTID: Patent. Scattered mild mixed atherosclerotic disease in common carotid, carotid bifurcation, proximal cervical internal carotid arteries. Less than 50% stenosis.   No dissection. NASCET criteria was used to determine the degree of internal carotid artery diameter stenosis. CTA BRAIN: INTERNAL CAROTID ARTERIES: Mild calcified atherosclerotic disease in bilateral ICA cavernous to supraclinoid segments. Mild stenosis in right ICA supraclinoid segment. No occlusion. ANTERIOR CEREBRAL ARTERY CIRCULATION:  No stenosis or occlusion. MIDDLE CEREBRAL ARTERY CIRCULATION:  No stenosis or occlusion. DISTAL VERTEBRAL ARTERIES: Mild calcified atherosclerotic disease in bilateral vertebral artery V4 segments. No stenosis or occlusion. BASILAR ARTERY:  No stenosis or occlusion. POSTERIOR CEREBRAL ARTERIES: No stenosis or occlusion. VENOUS STRUCTURES:  Normal. NON VASCULAR  ANATOMY BONY STRUCTURES:  No acute osseous abnormality. Edentulous. Grade 1 anterolisthesis C2-C3. Multilevel spinal degenerative changes moderate-to-severe at C3-C6. SOFT TISSUES OF THE NECK:  Normal. THORACIC INLET: 0.9 cm left upper lobe paramediastinal solid pulmonary nodule (5:324), unchanged. 0.7 cm left upper lobe solid pulmonary nodule (5:263), unchanged. 0.5 cm right upper lobe solid pulmonary nodule (5:274), unchanged.     Impression: CT Brain: - Known small multifocal infarcts in bilateral cerebral hemispheres and right cerebellum were better evaluated on MRI brain dated 2/14/2025, likely embolic. No new CT signs of acute infarction. - Unchanged chronic lacunar infarct in right basal ganglia with moderate chronic microangiopathy. CT Angiography: - Negative CTA head and neck for large vessel occlusion, dissection, aneurysm, or high-grade stenosis. - Mild atherosclerotic disease of the head and neck, as detailed above. Multiple pulmonary nodules measuring up to 0.9 cm in left upper lobe, unchanged. Based on current Fleischner Society 2017 Guidelines on incidental pulmonary nodule, follow-up non-contrast CT is recommended at 3-6 months from the initial examination and, if stable at that time, an additional follow-up is recommended for 18-24 months from the initial examination. 4.1 cm ectasia of ascending thoracic aorta. Recommend follow-up CT chest in 12 months. Additional chronic/incidental findings as detailed above. The study was marked in EPIC for immediate notification. Workstation performed: [id number]     FL barium swallow video w speech  Result Date: 2/15/2025  Narrative: A video barium swallow study was performed by the Department of Speech Pathology. Please refer to the report for the official interpretation. The images are stored for archival purposes only. Study images were not formally reviewed by the Radiology Department.    MRI brain w wo contrast  Result Date: 2/15/2025  Narrative: MRI BRAIN  WITH AND WITHOUT CONTRAST INDICATION: septic emboli. COMPARISON: There is motion artifact. TECHNIQUE: Multiplanar, multisequence imaging of the brain was performed before and after gadolinium administration. IV Contrast:  8 mL of Gadobutrol injection IMAGE QUALITY:   Diagnostic. FINDINGS: BRAIN PARENCHYMA: Small cortical focus of restricted diffusion in the left frontal lobe consistent with acute or early subacute infarct without acute hemorrhage.  There are multiple additional scattered punctate foci of acute or subacute ischemia in the bilateral frontal, right parietal, bilateral occipital lobs and right cerebellum. There are several microhemorrhages associated with the recent infarcts as well as a few additional chronic microhemorrhages. No acute space-occupying hematoma. There is focal cortical encephalomalacia and gliosis in the left occipital lobe with hemosiderin staining due to remote infarct. There is also gliosis and hemosiderin staining in the right cerebellum. T2/FLAIR hyperintensities in the periventricular and subcortical matter due to chronic microangiopathy. Chronic lacunar infarct in the right basal ganglia and left cerebellar. No definite abnormal enhancement allowing for limitations due to motion. Tiny curvilinear focus of enhancement adjacent to small recent left frontal infarct is favored to represent a vessel. VENTRICLES: Volume loss. No hydrocephalus SELLA AND PITUITARY GLAND:  Normal. ORBITS:  Normal. PARANASAL SINUSES: Mild mucosal thickening. Trace mastoid effusions. VASCULATURE:  Evaluation of the major intracranial vasculature demonstrates appropriate flow voids. CALVARIUM AND SKULL BASE:  Normal. EXTRACRANIAL SOFT TISSUES:  Normal.     Impression: Multiple tiny recent infarcts scattered in multiple vascular distributions that are likely embolic. Several microhemorrhages associated with recent infarcts. No acute space-occupying hematoma. Chronic ischemic changes. The study was marked in  EPIC for immediate notification. Workstation performed: [id number]     MRI cervical spine w wo contrast  Result Date: 2/14/2025  Narrative: MRI CERVICAL SPINE WITH AND WITHOUT CONTRAST INDICATION: Rule out discitis/osteomyelitis. COMPARISON:  None. TECHNIQUE:  Multiplanar, multisequence imaging of the cervical spine was performed before and after gadolinium administration. . IV Contrast:  8 mL of Gadobutrol injection IMAGE QUALITY: Portions of this examination are limited by patient motion. FINDINGS: ALIGNMENT: Slight anterior subluxation of C2 upon C3. And at several other mid cervical levels. No compression fracture. Mild levoscoliosis of the mid to lower cervical spine. MARROW SIGNAL:  Normal marrow signal is identified within the visualized bony structures.  No discrete marrow lesion. CERVICAL AND VISUALIZED UPPER THORACIC CORD AND CANAL:  Normal signal within the visualized cord. No epidural fluid collections. PREVERTEBRAL AND PARASPINAL SOFT TISSUES: Mild focal edema present within the right paramedian posterior spinal musculature in the upper and mid cervical spine extending from C2-C5. No mass or fluid collection identified. VISUALIZED POSTERIOR FOSSA:  The visualized posterior fossa demonstrates no abnormal signal. CERVICAL DISC SPACES: C2-C3: Slight anterior subluxation of C2 upon C3. No discrete disc herniation. Mild left-sided facet arthropathy with small facet effusion. Mild left foraminal narrowing. C3-C4: Disc desiccation and loss of disc height with mild annular bulging and endplate degenerative change. Mild canal stenosis and bilateral foraminal narrowing. C4-C5: Disc desiccation and loss of disc height with minor annular bulging and endplate degenerative change. No focal disc herniation. Mild canal stenosis and moderate bilateral foraminal narrowing. C5-C6: Disc desiccation and loss of disc height. Minor annular bulging. Right greater than left uncinate joint hypertrophic degenerative change. Mild  "central canal stenosis without cord compression. Mild left and moderate right foraminal narrowing. C6-C7: Mild annular bulging with a small focal left foraminal disc protrusion. No canal stenosis. Mild foraminal narrowing. C7-T1:  Normal. UPPER THORACIC DISC SPACES:  Normal. POSTCONTRAST IMAGING: Mild enhancement of the right posterior paraspinal musculature corresponding to the edema seen on T2 and inversion recovery imaging described above. There are 2 separate foci of linear nonenhancement which may represent peripherally  enhancing fluid collections. See series 15 images 18 through 27. OTHER FINDINGS:  None.     Impression: Cervical degenerative change with mild canal stenosis and mild to moderate foraminal narrowing. No cord compression or abnormal cord signal. Mild nonspecific edema within the right posterior paraspinal musculature of the upper cervical spine seen on sagittal STIR imaging with mild enhancement. Minimal peripheral enhancement is noted and differential considerations would include infectious/inflammatory myositis with soft tissue abscess. No discitis or osteomyelitis. This examination was marked \"immediate notification\" in Epic in order to begin the standard process by which the radiology reading room liaison alerts the referring practitioner. Workstation performed: [id number]     CT head w wo contrast  Result Date: 2/13/2025  Narrative: CT BRAIN - WITH AND WITHOUT CONTRAST INDICATION:   rule out hemorrhage and septic emboli. COMPARISON:  None. TECHNIQUE:  CT examination of the brain was performed both prior to and after the administration of intravenous contrast.  Multiplanar 2D reformatted images were created from the source data. Radiation dose length product (DLP) for this visit:  1977 mGy-cm .  This examination, like all CT scans performed in the [facility], was performed utilizing techniques to minimize radiation dose exposure, including the use of iterative " reconstruction and automated exposure control. IV Contrast:  100 mL of iohexol IMAGE QUALITY:  Diagnostic. FINDINGS: PARENCHYMA: Decreased attenuation is noted in periventricular and subcortical white matter demonstrating an appearance that is statistically most likely to represent moderate microangiopathic change. Chronic lacunar infarct right basal ganglia. Chronic appearing left PCA territory infarction. No CT signs of acute infarction.  No intracranial mass, mass effect or midline shift.  No acute parenchymal hemorrhage. Post contrast imaging of the brain is normal. VENTRICLES AND EXTRA-AXIAL SPACES: Age-appropriate volume loss is noted.  No hydrocephalus. VISUALIZED ORBITS: Normal visualized orbits. PARANASAL SINUSES: Mild mucosal thickening of the visualized paranasal sinuses. CALVARIUM: Normal.     Impression: No acute hemorrhage, edema, or mass effect. Chronic microangiopathic changes and chronic appearing infarctions as above. No pathologic enhancement. Workstation performed: [id number]     MRI lumbar spine w wo contrast  Result Date: 2/13/2025  Narrative: MRI LUMBAR SPINE WITH AND WITHOUT CONTRAST INDICATION: osteomyelitis. low back pain..   Blood cultures positive for MSSA. Reports acute on chronic low back pain. Concern for discitis/osteomyelitis. COMPARISON: CT chest abdomen pelvis 12/4/2023 TECHNIQUE:  Multiplanar, multisequence imaging of the lumbar spine was performed before and after gadolinium administration. . IV Contrast:  8 mL of Gadobutrol injection IMAGE QUALITY:  Diagnostic FINDINGS: VERTEBRAL BODIES:  There are 5 lumbar type vertebral bodies. Mild levocurvature of the lumbar spine apex L2-L3. Mild retrolisthesis of L1 on S2 and L5 on S1. No spondylolysis. No compression fracture.    Minimal inferior endplate edema at L1 without edema in the adjacent superior endplate of L2. No appreciable disc edema. Signal and edema on the STIR sequence (series 5 image 5 and series 4 image 5). There is  enhancement on postcontrast images (series 9 image 9). Minimal edema is noted in the adjacent L1-L2 disc without abnormal enhancement. Review of the corresponding CT demonstrates disc air compatible with vacuum disc phenomenon arguing against the presence of disc infection. SACRUM:  Normal signal within the sacrum. No evidence of insufficiency or stress fracture. DISTAL CORD AND CONUS: Conus medullaris terminates at the L1-L2 level. Otherwise, normal size and signal within the distal cord and conus. PARASPINAL SOFT TISSUES:  Paraspinal soft tissues are unremarkable. No evidence of abscess. No abnormal enhancement in the paraspinal soft tissues. LOWER THORACIC DISC SPACES: Osteophytes and loss of disc height in the lower thoracic levels. Otherwise normal disc signal. Mild canal stenosis. No significant foraminal narrowing. LUMBAR DISC SPACES: Multilevel osteophytes, disc height loss and disc desiccation, and facet arthropathy. L1-L2: Diffuse annular disc bulge. Mild facet arthropathy. Mild canal stenosis. Mild to moderate right and moderate left foraminal narrowing. L2-L3: Diffuse annular disc bulge. Mild facet arthropathy. Mild canal stenosis. Moderate right foraminal narrowing. L3-L4: Diffuse annular disc bulge. Mild to moderate facet arthropathy. Mild canal stenosis. Moderate right foraminal narrowing. Mild left foraminal narrowing. L4-L5: Diffuse annular disc bulge. Mild to moderate facet arthropathy. Mild canal stenosis. Moderate to severe right foraminal narrowing. Moderate left foraminal narrowing. L5-S1: Diffuse annular disc bulge. Mild facet arthropathy. No significant canal stenosis. Mild right and mild to moderate left foraminal narrowing. POSTCONTRAST IMAGING: Please see above. OTHER FINDINGS: Few left renal cysts measuring up to 0.5 cm.     Impression: Mild inferior plate edema at L1 with adjacent small Schmorl's node and minimal adjacent postcontrast enhancement. Vacuum disc phenomenon noted on the  recent CT at this level. Findings likely represent degenerative disc disease with Schmorl's node rather than discitis and osteomyelitis which should only be considered in the right clinical setting. No paraspinal edema or enhancement identified. No evidence of abscess or abnormal enhancement in the paraspinal soft tissues. 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 Resident: KEI Wallace I, the attending radiologist, have reviewed the images and agree with the final report above. Workstation performed: [id number]     Echo complete w/ contrast if indicated  Result Date: 2/12/2025  Narrative:   Left Ventricle: Left ventricle is not well visualized. Left ventricular cavity size is normal. Wall thickness is mildly increased. The left ventricular ejection fraction is 55%. Systolic function is normal. Diastolic function is mildly abnormal, consistent with grade I (abnormal) relaxation.   The following segments are akinetic: basal inferior and mid inferior.   All other segments are normal.   Right Ventricle: Right ventricle is not well visualized. Right ventricular cavity size is mildly dilated. Systolic function is mildly to moderately reduced.   Aortic Valve: The aortic valve is trileaflet. The leaflets are not thickened. The leaflets are moderately calcified. There is mildly reduced mobility. There is mild to moderate regurgitation. There is aortic valve sclerosis.   Mitral Valve: There is mild regurgitation.   Tricuspid Valve: There is mild regurgitation.     US bedside procedure  Result Date: 2/12/2025  Narrative: [id number]    VAS VENOUS DUPLEX -LOWER LIMB UNILATERAL  Result Date: 2/12/2025  Narrative:  [facility] REPORT CLINICAL: Indications: Patient presents with left lower extremity edema, worsening SOB and elevated D-dimer, currently taking Eliquis. Operative History: No cardiovascular surgeries noted Risk Factors The patient has history of HTN,  CAD and previous smoking (quit >10yrs ago).   CONCLUSION: Impression:  RIGHT LOWER LIMB LIMITED: Evaluation shows no evidence of thrombus in the common femoral vein. Doppler evaluation shows a normal response to augmentation maneuvers.  LEFT LOWER LIMB: No evidence of acute or chronic deep vein thrombosis noted. No evidence of superficial thrombophlebitis noted. Doppler evaluation shows a normal response to augmentation maneuvers. Popliteal, posterior tibial and anterior tibial arterial Doppler waveforms are triphasic.  Technical findings were documented in Epic.  SIGNATURE: Electronically Signed by: NAT MILLER MD on 2025-02-12 07:20:01 AM    US kidney and bladder  Result Date: 2/11/2025  Narrative: RENAL ULTRASOUND INDICATION: acute urinary retention. COMPARISON: CT from 2/11/2025 TECHNIQUE: Ultrasound of the retroperitoneum was performed with a curvilinear transducer utilizing volumetric sweeps and still imaging techniques. FINDINGS: KIDNEYS: Symmetric and normal size. Right kidney: 9.8 x 5.4 x 6.5 cm. Volume 178.8 mL Left kidney: 10.0 x 4.7 x 4.8 cm. Volume 117.6 mL Right kidney Normal echogenicity and contour. No mass is identified. 1.3 cm simple cyst upper pole. No hydronephrosis. No shadowing calculi. Small echogenic focus is noted with twinkle artifact. This may represent a nonshadowing stone or focus of fat. No perinephric fluid collections. Left kidney Normal echogenicity and contour. No mass is identified. 7 mm simple cyst. No hydronephrosis. No shadowing calculi. Some perinephric fluid is seen off the lower pole of the left kidney. URETERS: Nonvisualized. BLADDER: Moderate distention. No focal thickening or mass lesions. Bilateral ureteral jets not detected.     Impression: No hydronephrosis. Moderate bladder distention. Perinephric edema on the left. Findings were discussed with Dr. Peralta at 7:25 p.m. via secure text Workstation performed: [id number]     CTA chest pe study  Result Date:  2/11/2025  Narrative: CTA - CHEST WITH IV CONTRAST - PULMONARY ANGIOGRAM INDICATION: SOB, tachycardia, elevated D dimer. COMPARISON: Chest radiograph 2/11/2015. CTA chest 12/4/2023. TECHNIQUE: CTA examination of the chest was performed using angiographic technique according to a protocol specifically tailored to evaluate for pulmonary embolism. Multiplanar 2D reformatted images were created from the source data. In addition, coronal  3D MIP postprocessing was performed on the acquisition scanner. Radiation dose length product (DLP) for this visit: 364 mGy-cm . This examination, like all CT scans performed in the [facility], was performed utilizing techniques to minimize radiation dose exposure, including the use of iterative reconstruction and automated exposure control. IV Contrast: 85 mL of iohexol FINDINGS: PULMONARY ARTERIAL TREE:  No pulmonary embolus. LUNGS: Bibasilar atelectasis. Mild secretions/mucus in the lower trachea. Mild emphysematous changes. Noncalcified 6 x 5 mm pulmonary nodule in the left lung apex on image 58 of series 302, not present on the December 4, 2023 examination. PLEURA: Unremarkable. HEART/GREAT VESSELS: Normal size heart.  Coronary artery calcifications. Stable ectasia of the ascending thoracic aorta measuring up to 41 mm. MEDIASTINUM AND PAWEL: Redemonstrated few subcentimeter calcified lymph nodes could represent sequela of old infection. CHEST WALL AND LOWER NECK: Unremarkable. VISUALIZED STRUCTURES IN THE UPPER ABDOMEN: Small splenic calcified granulomata. Left renal vascular calcifications. OSSEOUS STRUCTURES: No acute fracture or destructive osseous lesion. Chronic healed bilateral rib fractures. Chronic mild wedge compression deformities in the midthoracic spine.     Impression: No evidence of pulmonary embolus. Stable ectasia of the ascending thoracic aorta measuring up to 41 mm. Mild secretions in the lower trachea, correlate for any dysphagia or possible  "aspiration. Noncalcified left lung apex pulmonary nodule, 6 x 5 mm. Because this was not present on prior chest CT of December 4, 2023, conservative management with follow-up low radiation dose noncontrast chest CT in 6 months is recommended. This examination was marked \"immediate notification\" in Epic in order to begin the standard process by which the radiology reading room liaison alerts the referring practitioner. Resident: Bijan Fajardo I, the attending radiologist, have reviewed the images and agree with the final report above. Workstation performed: [id number]     XR chest 1 view portable  Result Date: 2/11/2025  Narrative: XR CHEST PORTABLE INDICATION: SOB. COMPARISON: 12/16/2023 FINDINGS: No infiltrates. Bibasilar linear atelectasis or scarring. No pneumothorax or pleural effusion. Normal cardiomediastinal silhouette. Bones are unremarkable for age. Normal upper abdomen.     Impression: No acute cardiopulmonary disease. Workstation performed: [id number]     Assessment  Principal Problem:    Symptomatic anemia  Active Problems:    HTN (hypertension)    CAD (coronary artery disease)    COPD (chronic obstructive pulmonary disease) (HCC)    History of CVA (cerebrovascular accident)    Atrial fibrillation (HCC)    Urinary retention    MSSA bacteremia    Neck pain    Colonic mass    Ambulatory dysfunction    Melena    ORIANA (acute kidney injury) (HCC)    Pleural effusion, bilateral    Dysphagia    Severe protein-calorie malnutrition (HCC)    Thank you for allowing us to participate in this patient's care.  Patient and family would like him to establish care with [facility] upon discharge.    Counseling / Coordination of Care  Total floor / unit time spent today 45 minutes.  Greater than 50% of total time was spent with the patient and / or family counseling and / or coordination of care.  A description of the counseling / coordination of care: Review of history, current " assessment, development of a plan.      Code Status: Level 1 - Full Code    ** Please Note: Dragon 360 Dictation voice to text software may have been used in the creation of this document. **

## 2025-03-09 NOTE — ASSESSMENT & PLAN NOTE
- Admission creatinine 2.4 mg/dL  - Baseline creatinine below 1 mg/dL  - Creatinine slowly improving 3/5 to 1.8 mg/dL  - UACR 821 mg/g  - Repeat urinalysis 2-4 RBC and 2-4 WBC.  Significantly improved from initial  - UPCR though is 3.4 g/g  - C3 is borderline low at 83, C4 is 26 and normal  - Upon chart review in September 2022, kappa/lambda ratio was elevated 9.1  - UPEP no monoclonal peak     Etiology-likely in the setting of acute anemia/volume depletion/ATN patient did have gross hematuria and urology was on board.  UACR is less than 1 g.  Renal function is improving from acute initial events.  Therefore does not appear currently to be an acute GN and rather ATN but cannot completely rule out a GN.  Patient has multiple potential etiologies that could lead to secondary GN. (Malignancy, antibiotics).  Also need to consider paraproteinemia given prior kappa/lambda ratio noted from 2022 upon chart review and now with proteinuria 3.4 g/g though the degree of proteinuria is unclear truly given likely tubular dysfunction also     Renal imaging-no hydronephrosis     Urinalysis with innumerable RBC, 4-10 WBC, 2+ protein     3/5-creatinine is remaining at 1.8 mg/dL.  Overall improved but appears to have reached a plateau     3/6-creatinine unchanged 1.8 mg/dL.  Still with slight volume overload than baseline.  Therefore we will give another dose of albumin and Lasix one-time.  Hemoglobin stable at 8.     3/7-creatinine stable 1.8.  Sodium, potassium, bicarbonate, magnesium appropriate.  Will transition diuretics to once daily torsemide.  Chest x-ray with unchanged pulmonary edema and pneumonia not excluded    3/8-creatinine stable 1.97 mg/dL.  Sodium, potassium, bicarbonate, magnesium appropriate.  We will change torsemide to every other day.  Patient's volume state appears to have improved.  He is diuretic naïve at baseline    3/9-hold torsemide today as we are doing every other day starting tomorrow.  Creatinine  stable 1.9.  Sodium, potassium, magnesium appropriate.  Magnesium and phosphorus were further repleted by primary team.  Hemoglobin 7.6 and being trended by primary team     Plan  - Continue torsemide but changed to every other day  - I/os; avoid nephrotoxic agents  - Lab work in a.m.  - Avoid hypotension  - Noted potential plan for MRI with and without contrast-as long as it is not urgent, agree with waiting until renal function improves.  If creatinine remains unchanged at 1.8 -2 through the next 1 to 2 days, can plan for MRI potentially Monday.  The risk of NSF is low with the current contrast agents and CKD.    - Agree with trending PVR  - Repeat kappa/lambda ratio  - SPEP-  - UPEP-  - Overall appears to be significant ATN in the setting of initial acute issues.  With patient has multiple other acute/comorbidities that can lead to secondary renal dysfunction.  I reviewed this in detail with the patient's wife and daughter this past week.  And therefore at this juncture, I do not believe that a renal biopsy is urgently needed but may need to consider in the future but patient has other acute comorbidities that are being addressed.  Creatinine may be at new current baseline for now.  But sending prelim serologies as patient had prior elevated kappa/lambda ratio

## 2025-03-09 NOTE — ASSESSMENT & PLAN NOTE
2 prior CVAs  Family is unsure if that is when his atrial fibrillation was diagnosed but he has been on Eliquis

## 2025-03-09 NOTE — PROGRESS NOTES
Progress Note - Hospitalist   Name: Devin Chaves 79 y.o. male I MRN: [id number]  Unit/Bed#: S -01 I Date of Admission: 2/28/2025   Date of Service: 3/9/2025 I Hospital Day: 9    Assessment & Plan  ORIANA (acute kidney injury) (HCC)  Recent Labs     03/07/25  0541 03/08/25  0530 03/09/25  0544   CREATININE 1.80* 1.97* 1.94*   EGFR 35 31 32     Estimated Creatinine Clearance: 35 mL/min (A) (by C-G formula based on SCr of 1.94 mg/dL (H)).     Cr 2.43 on admission. Baseline 0.9. BUN 63. BUN/Cr ratio >20  Hypervolemia on exam  3/7/2025 chest xray:Unchanged pulmonary edema and small pleural effusions. Pneumonia not excluded.     Plan:  Daily BMP  Recheck UA and UPCR  Torsemide 10 mg every other day  Avoid nephrotoxic medications  Monitor I/O's  Urinary retention protocol  Cardiology consult- ORIANA worsening with diuretic.  Volume overloaded  Nephrology recommendations appreciated    Symptomatic anemia  Recent Labs     03/07/25  0541 03/08/25  0530 03/09/25  0544   HGB 8.2* 7.8* 7.6*      Patient presents with 2 days of chest pain, shortness of breath, fatigue and melena  2/20/2025 EGD/colonoscopy showed 2.5 cm ileocecal mass, pathology confirmed adenocarcinoma.  S/p 3 units packed rbc in ED. S/p 1 unit PRBCs on 3/2  Hemoglobin has been stable.   Patient recent diagnosis of adenocarcinoma, increased risk of stroke.  Plan to restart Eliquis at a lower dose    Plan:  H&H Q12 hrs  Transfuse for Hgb <7  Monitor for signs of overt bleeding or melenotic stools  Protonix 40 mg PO QD   Resume Eliquis at 2.5 mg    Neck pain  MRI of the neck done 2/14/2025 revealed cervical degenerative change with mild canal stenosis and mild to moderate foraminal narrowing.  No cord compression.  Mild nonspecific edema within the right posterior breast dermal musculature of the upper cervicals spine.  Minimal peripheral enhancement is noted.     Plan:  Will plan for repeat MRI C-spine on 3/20/25  COPD (chronic obstructive pulmonary disease)  (HCC)  Patient with COPD on 2-4 L supplemental O2 at baseline  Home Albuterol prn, Xopenex Q8 hrs prn, Mometasone inhaler daily    Plan:  Titrate O2 to maintain SpO2 greater than 88%   Xopenex nebs Q8 hrs prn, Mometasone daily  Albuterol prn  RT notified to administer nebs  Follow-up in chest xray ordered by Nephrology.  Colonic mass  2/20/2025 EGD/colonoscopy showed 2.5 cm ileocecal mass, pathology confirmed adenocarcinoma.    Patient was due to follow-up with outpatient colorectal surgery on 2/28/2025  Pt presenting with melena, Hgb 4.6. Symptomatic anemia.  Attending discussion with Colorectal: If COPD can stay controlled, increase chance of operation.  Oncology: Outpatient appointment for 4/3    MSSA bacteremia  Patient was discharged on cefazolin 2 g IV Q8 hrs until 3/27/2025. PICC line in place. Patient rescheduled to follow-up outpatient with ID    Plan:  Continue with cefazolin 2 g IV every 8 hours.  infectious disease recommendations appreciated   Recommend repeat C-spine MRI with and without contrast, postpone until ORIANA resolves  6 weeks treatment through 3/27/2025  Atrial fibrillation (HCC)  AC: Eliquis 2.5 mg BID  Rate control: Lopressor 25 mg Q12 hrs    Plan:  Restart Eliquis at 2.5 mg due to increase risk of bleed and ORIANA  Continue Lopressor 25 mg Q12 hrs  Pleural effusion, bilateral  CT A/P shows bilateral pleural effusions L>R, pulmonary vascular congestion    Plan:  Repeat imaging if pt's respiratory status worsens  Continue oxygen to keep oxygen saturations 88% and above.  HTN (hypertension)  Systolic BP 100s-130s.  Patient normotensive.  Blood pressure stable.    Plan:  Lopressor 25 mg every 12 hours  CAD (coronary artery disease)  TTE 2/12/25: EF 55%, Normal systolic dysfunction, G1DD, No vegetation, No mural thrombus, moderate aortic sclerosis     Plan:  Hold aspirin in setting of GI bleed  History of CVA (cerebrovascular accident)  Pt had multiple embolic strokes during admission from  2/11/25-2/22/25  Thought to be embolic strokes 2/2 MSSA bacteremia, no vegetations on TTE    Plan:  Restart Eliquis   Continue Lipitor 40 mg daily  Urinary retention  Patient has history of urinary retention and was started on Flomax during last hospitalization  Pt denies difficulty urinating at this shayy    Plan:  Flomax 0.4 mg daily  Urinary retention protocol  Severe protein-calorie malnutrition (HCC)  Malnutrition Findings:   Adult Malnutrition type: Acute illness  Adult Degree of Malnutrition: Other severe protein calorie malnutrition  Malnutrition Characteristics: Inadequate energy, Fluid accumulation  360 Statement: related to inadequate energy/protein intake as evidenced by consuming < 50% of energy intake compared to estimated needs for > 5 days and B/L LE +3 edema. Treated with ONS.  BMI Findings:     Body mass index is 26.61 kg/m².     Plan:  Ensure supplements  Ambulatory dysfunction  Worsening ambulatory dysfunction since December  PT/OT evaluations recommend level 2   Discussed with podiatry, patient has chronic left ankle fracture over 2 years ago, recommend WBAT to left foot as curbside, no further podiatry needs   Consider ambulatory Ortho consult and brace fitting     Plan:  Weight bearing status: As tolerated with brace  Melena  Patient endorsed melena prior to admission  One episode dark, tarry stool overnight on 3/4/25. Not documented.    Plan:  Stool record at bedside  Dysphagia  VBS done on 2/15/25 showing food retention due to pharyngeal weakness  EGD was done last month which showed normal esophagus.   ENT recommending outpatient follow-up with Dr. Flynn Reyez    Plan:  Normal diet as tolerated    VTE Pharmacologic Prophylaxis: VTE Score: 5 High Risk (Score >/= 5) - Pharmacological DVT Prophylaxis Contraindicated. Sequential Compression Devices Ordered.    Mobility:   Basic Mobility Inpatient Raw Score: 9  -HL Goal: 3: Sit at edge of bed  JH-HLM Achieved: 4: Move to chair/commode  JH-HLM  "Goal achieved. Continue to encourage appropriate mobility.    Patient Centered Rounds: I performed bedside rounds with nursing staff today.   Discussions with Specialists or Other Care Team Provider:     Education and Discussions with Family / Patient: Attempted to update  (wife and daughter) via phone. Left voicemail.     Current Length of Stay: 9 day(s)  Current Patient Status: Inpatient   Certification Statement: The patient will continue to require additional inpatient hospital stay due to IV antiobiotics, persistent ORIANA, further imaging studies to assess cervical myositis  Discharge Plan: Anticipate discharge in 24-48 hrs to rehab facility.    Code Status: Level 1 - Full Code    Subjective   Patient states that he is feeling \"the same\" this morning, not endorsing any chest pain, fever, bloody stools, or worsening shortness of breath. Patient stated that he still had not been showered, however stated that he did not want to shower at this time because there is a draft in the bathroom and it is too cold. Attempted to reach both wife and daughter, left voicemail.    Objective :  Temp:  [97.8 °F (36.6 °C)-99.1 °F (37.3 °C)] 97.8 °F (36.6 °C)  HR:  [67-81] 72  BP: ()/(56-63) 124/57  SpO2:  [97 %-100 %] 99 %    Body mass index is 26.61 kg/m².     Input and Output Summary (last 24 hours):     Intake/Output Summary (Last 24 hours) at 3/9/2025 0844  Last data filed at 3/9/2025 0547  Gross per 24 hour   Intake --   Output 600 ml   Net -600 ml       Physical Exam  Vitals and nursing note reviewed.   Constitutional:       General: He is not in acute distress.     Appearance: He is well-developed.   HENT:      Head: Normocephalic and atraumatic.   Eyes:      Conjunctiva/sclera: Conjunctivae normal.   Cardiovascular:      Rate and Rhythm: Normal rate and regular rhythm.      Heart sounds: No murmur heard.  Pulmonary:      Effort: Pulmonary effort is normal. No respiratory distress.      Breath sounds: " Normal breath sounds. No wheezing, rhonchi or rales.   Abdominal:      General: There is no distension.      Palpations: Abdomen is soft.      Tenderness: There is no abdominal tenderness.   Musculoskeletal:         General: No swelling.      Cervical back: Neck supple.      Right lower leg: Edema present.      Left lower leg: Edema present.      Comments: 2+ pitting edema BLE  LLE ankle deformity   Skin:     General: Skin is warm and dry.      Capillary Refill: Capillary refill takes less than 2 seconds.   Neurological:      General: No focal deficit present.      Mental Status: He is alert and oriented to person, place, and time.   Psychiatric:         Mood and Affect: Mood normal.           Lines/Drains:  Lines/Drains/Airways       Active Status       Name Placement date Placement time Site Days    PICC Line 02/26/25 Right Brachial 02/26/25  0548  Brachial  11                    Central Line:  Goal for removal: N/A - Discharging with PICC for IV ABX/medications               Lab Results: I have reviewed the following results:   Results from last 7 days   Lab Units 03/09/25  0544   WBC Thousand/uL 5.18   HEMOGLOBIN g/dL 7.6*   HEMATOCRIT % 24.5*   PLATELETS Thousands/uL 144*   SEGS PCT % 69   LYMPHO PCT % 18   MONO PCT % 11   EOS PCT % 1     Results from last 7 days   Lab Units 03/09/25  0544 03/05/25  0525 03/04/25  0525   SODIUM mmol/L 144   < > 141   POTASSIUM mmol/L 3.6   < > 3.7   CHLORIDE mmol/L 109*   < > 110*   CO2 mmol/L 30   < > 26   BUN mg/dL 36*   < > 41*   CREATININE mg/dL 1.94*   < > 1.85*   ANION GAP mmol/L 5   < > 5   CALCIUM mg/dL 8.1*   < > 7.9*   ALBUMIN g/dL  --   --  2.1*   TOTAL BILIRUBIN mg/dL  --   --  0.38   ALK PHOS U/L  --   --  51   ALT U/L  --   --  <3*   AST U/L  --   --  16   GLUCOSE RANDOM mg/dL 82   < > 84    < > = values in this interval not displayed.     Results from last 7 days   Lab Units 03/02/25  1013   INR  1.36*                   Recent Cultures (last 7 days):         XR  chest PICC line portable  Result Date: 3/2/2025  Impression: Right PICC in upper SVC. Moderate pulmonary venous congestion with small pleural effusions and bibasilar atelectasis. Workstation performed: [id number]     XR chest 1 view portable  Result Date: 2/28/2025  Impression: Cardiomegaly with central congestion and left greater than right small basilar effusions. Workstation performed: [id number]     CT chest w ct abdomen pelvis w wo contrast  Addendum Date: 2/28/2025  ADDENDUM: Correction to spleen section in the body of report. Hyperattenuation should be hypoattenuation. Grossly stable posterior splenic subcapsular hypodensity presumably an infarct unchanged allowing for difference in contrast timing.     Result Date: 2/28/2025  Impression: CT chest: Decreased intracardiac blood pool density compatible with anemia. New bilateral multi lobar interstitial thickening and tree-in-bud opacities may represent pulmonary vascular congestion or nonspecific inflammatory or infectious process. Bilateral pleural effusions, left greater than right, mildly enlarged and additional findings suggestive of pulmonary vascular congestion. Correlate with clinical findings. 5 mm left upper lobe nodule stable since 2/11/2025 and new since December 2023. 6 mm right lower lobe nodule new since 2/11/2025. Noncontrast chest CT follow-up in 3 months is advised. Additional chronic findings and negatives as above. CT abdomen and pelvis: No evidence of high-volume gastrointestinal bleeding. Stable upper cecal/proximal ascending colonic mass attributed to recently biopsy-proven adenocarcinoma. Colonic diverticulosis. No evidence of abdominopelvic metastatic disease. Additional chronic findings and negatives as above. The study was marked in EPIC for immediate notification. Workstation performed: [id number]       No Chest XR results available for this patient.     Other Study Results Review: No additional pertinent studies reviewed.    Last  24 Hours Medication List:     Current Facility-Administered Medications:     acetaminophen (TYLENOL) tablet 650 mg, Q6H PRN    albuterol (PROVENTIL HFA,VENTOLIN HFA) inhaler 2 puff, Q4H PRN    apixaban (ELIQUIS) tablet 2.5 mg, BID    ascorbic acid (VITAMIN C) tablet 250 mg, Daily    atorvastatin (LIPITOR) tablet 40 mg, Daily With Dinner    ceFAZolin (ANCEF) IVPB (premix in dextrose) 2,000 mg 50 mL, Q8H, Last Rate: 2,000 mg (03/09/25 0316)    Cholecalciferol (VITAMIN D3) tablet 2,000 Units, Daily    cyanocobalamin (VITAMIN B-12) tablet 100 mcg, Daily    fluticasone (ARNUITY ELLIPTA) 100 MCG/ACT inhaler 1 puff, Daily    folic acid (FOLVITE) tablet 1 mg, Daily    HYDROmorphone HCl (DILAUDID) injection 0.2 mg, Q4H PRN    hydroxychloroquine (PLAQUENIL) tablet 400 mg, Daily With Breakfast    lidocaine (LIDODERM) 5 % patch 3 patch, Daily    magnesium sulfate IVPB (premix) SOLN 1 g, Once    metoprolol tartrate (LOPRESSOR) tablet 25 mg, Q12H GALE    moisture barrier miconazole 2% cream (aka HITESH MOISTURE BARRIER ANTIFUNGAL CREAM), BID    oxyCODONE (ROXICODONE) IR tablet 5 mg, Q6H PRN    oxyCODONE (ROXICODONE) split tablet 2.5 mg, Q6H PRN    pantoprazole (PROTONIX) EC tablet 40 mg, Daily Before Breakfast    polyethylene glycol (MIRALAX) packet 17 g, Daily PRN    potassium chloride (Klor-Con M20) CR tablet 40 mEq, Once    senna-docusate sodium (SENOKOT S) 8.6-50 mg per tablet 1 tablet, HS    tamsulosin (FLOMAX) capsule 0.4 mg, Daily With Dinner    [START ON 3/10/2025] torsemide (DEMADEX) tablet 10 mg, Every Other Day    Administrative Statements   Today, Patient Was Seen By: Mal Neely DO      **Please Note: This note may have been constructed using a voice recognition system.**

## 2025-03-09 NOTE — PROGRESS NOTES
Progress Note - Nephrology   Name: Devin Chaves 79 y.o. male I MRN: [id number]  Unit/Bed#: S -01 I Date of Admission: 2/28/2025   Date of Service: 3/9/2025 I Hospital Day: 9    Assessment & Plan  ORIANA (acute kidney injury) (HCC)    - Admission creatinine 2.4 mg/dL  - Baseline creatinine below 1 mg/dL  - Creatinine slowly improving 3/5 to 1.8 mg/dL  - UACR 821 mg/g  - Repeat urinalysis 2-4 RBC and 2-4 WBC.  Significantly improved from initial  - UPCR though is 3.4 g/g  - C3 is borderline low at 83, C4 is 26 and normal  - Upon chart review in September 2022, kappa/lambda ratio was elevated 9.1  - UPEP no monoclonal peak     Etiology-likely in the setting of acute anemia/volume depletion/ATN patient did have gross hematuria and urology was on board.  UACR is less than 1 g.  Renal function is improving from acute initial events.  Therefore does not appear currently to be an acute GN and rather ATN but cannot completely rule out a GN.  Patient has multiple potential etiologies that could lead to secondary GN. (Malignancy, antibiotics).  Also need to consider paraproteinemia given prior kappa/lambda ratio noted from 2022 upon chart review and now with proteinuria 3.4 g/g though the degree of proteinuria is unclear truly given likely tubular dysfunction also     Renal imaging-no hydronephrosis     Urinalysis with innumerable RBC, 4-10 WBC, 2+ protein     3/5-creatinine is remaining at 1.8 mg/dL.  Overall improved but appears to have reached a plateau     3/6-creatinine unchanged 1.8 mg/dL.  Still with slight volume overload than baseline.  Therefore we will give another dose of albumin and Lasix one-time.  Hemoglobin stable at 8.     3/7-creatinine stable 1.8.  Sodium, potassium, bicarbonate, magnesium appropriate.  Will transition diuretics to once daily torsemide.  Chest x-ray with unchanged pulmonary edema and pneumonia not excluded    3/8-creatinine stable 1.97 mg/dL.  Sodium, potassium, bicarbonate, magnesium  appropriate.  We will change torsemide to every other day.  Patient's volume state appears to have improved.  He is diuretic naïve at baseline    3/9-hold torsemide today as we are doing every other day starting tomorrow.  Creatinine stable 1.9.  Sodium, potassium, magnesium appropriate.  Magnesium and phosphorus were further repleted by primary team.  Hemoglobin 7.6 and being trended by primary team     Plan  - Continue torsemide but changed to every other day  - I/os; avoid nephrotoxic agents  - Lab work in a.m.  - Avoid hypotension  - Noted potential plan for MRI with and without contrast-as long as it is not urgent, agree with waiting until renal function improves.  If creatinine remains unchanged at 1.8 -2 through the next 1 to 2 days, can plan for MRI potentially Monday.  The risk of NSF is low with the current contrast agents and CKD.    - Agree with trending PVR  - Repeat kappa/lambda ratio  - SPEP-  - UPEP-  - Overall appears to be significant ATN in the setting of initial acute issues.  With patient has multiple other acute/comorbidities that can lead to secondary renal dysfunction.  I reviewed this in detail with the patient's wife and daughter this past week.  And therefore at this juncture, I do not believe that a renal biopsy is urgently needed but may need to consider in the future but patient has other acute comorbidities that are being addressed.  Creatinine may be at new current baseline for now.  But sending prelim serologies as patient had prior elevated kappa/lambda ratio  HTN (hypertension)  Monitor with metoprolol  Melena   acute anemia on admission requiring multiple transfusions     - Has colonic mass  - Further plans per primary team  - To see GI and colorectal as outpatient  Urinary retention  -Patient with history of urinary retention  - On tamsulosin  - Initially had gross hematuria of etiology unclear  - Urology on board  - Urine is clearing  - Urology planning further evaluation for  hematuria as outpatient  MSSA bacteremia  MSSA bacteremia noted during recent hospitalization     - Possible cutaneous source  - Given that the patient was having neck pain during prior admission, C-spine possible paraspinal injection-was advised for prolonged antibiotic and was discharged with PICC line and total 6-week course plan through March 27 of cefazolin  Colonic mass  -Newly diagnosed colon mass confirmed with adenocarcinoma in February 2025  - Was to follow colorectal as outpatient  Pleural effusion, bilateral  -Imaging supports vascular congestion on repeat chest x-ray  - Continue diuretic-to note patient is diuretic naïve  - Intermittently requiring IV diuretic last 2 days  - Started on torsemide 3/7  Dysphagia  Per primary team    Subjective   Brief History of Admission -  79-year-old male with a past medical history of colon mass, 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 Was recently diagnosed with anemia during prior admission and a colonic mass on colonoscopy and was found to have adenocarcinoma. Initially patient was found to have severe acute anemia 4.6. There was also concern for dark urine/hematuria And urology team was also on board     Patient states overall he is feeling improved.  Oxygenation is stable.  Urine output 600 cc with 1 time not recorded.  Weight is bed scale so unclear if accurate.  Essentially unchanged.    Objective :  Temp:  [97.8 °F (36.6 °C)-98.9 °F (37.2 °C)] 97.9 °F (36.6 °C)  HR:  [67-75] 75  BP: ()/(56-58) 133/58  SpO2:  [99 %-100 %] 99 %    Current Weight: Weight - Scale: 89 kg (196 lb 3.4 oz)  First Weight: Weight - Scale:  91.2 kg (201 lb 1 oz)  I/O         03/07 0701  03/08 0700 03/08 0701  03/09 0700 03/09 0701  03/10 0700    P.O.   120    Total Intake(mL/kg)   120 (1.3)    Urine (mL/kg/hr) 1800 (0.8) 600 (0.3) 180 (0.2)    Stool       Total Output 1800 600 180    Net -1800 -600 -60           Unmeasured Urine Occurrence  1 x           Physical Exam  General: NAD  Skin: no rash  Eyes: anicteric sclera  ENT: moist mucous membrane  Neck: supple  Chest: Fine coarse breath sounds bilaterally unchanged  CVS: s1s2, no murmur, no gallop, no rub  Abdomen: soft, nontender, nl sounds  Extremities: 1+ unchanged edema LE b/l  : no glass  Neuro: AAOX3  Psych: normal affect    Medications:    Current Facility-Administered Medications:     acetaminophen (TYLENOL) tablet 650 mg, 650 mg, Oral, Q6H PRN, Jean Brady MD    albuterol (PROVENTIL HFA,VENTOLIN HFA) inhaler 2 puff, 2 puff, Inhalation, Q4H PRN, Fiordaliza Ibrahim MD    apixaban (ELIQUIS) tablet 2.5 mg, 2.5 mg, Oral, BID, Kelly Herman MD, 2.5 mg at 03/09/25 1718    ascorbic acid (VITAMIN C) tablet 250 mg, 250 mg, Oral, Daily, Kanchan Wilson MD, 250 mg at 03/09/25 0937    atorvastatin (LIPITOR) tablet 40 mg, 40 mg, Oral, Daily With Dinner, Kanchan Wilson MD, 40 mg at 03/09/25 1718    ceFAZolin (ANCEF) IVPB (premix in dextrose) 2,000 mg 50 mL, 2,000 mg, Intravenous, Q8H, Olivia Martinez MD, Last Rate: 100 mL/hr at 03/09/25 1102, 2,000 mg at 03/09/25 1102    Cholecalciferol (VITAMIN D3) tablet 2,000 Units, 2,000 Units, Oral, Daily, Kanchan Wilson MD, 2,000 Units at 03/09/25 0937    cyanocobalamin (VITAMIN B-12) tablet 100 mcg, 100 mcg, Oral, Daily, Kanchan Wilson MD, 100 mcg at 03/09/25 0937    fluticasone (ARNUITY ELLIPTA) 100 MCG/ACT inhaler 1 puff, 1 puff, Inhalation, Daily, Kanchan Wilson MD, 1 puff at 03/09/25 0938    folic acid (FOLVITE) tablet 1 mg, 1 mg, Oral, Daily, Kanchan Wilson MD, 1 mg at 03/09/25 0937    HYDROmorphone HCl (DILAUDID) injection 0.2 mg, 0.2  mg, Intravenous, Q4H PRN, Jean Brady MD    hydroxychloroquine (PLAQUENIL) tablet 400 mg, 400 mg, Oral, Daily With Breakfast, Kanchan Wilson MD, 400 mg at 03/09/25 0937    lidocaine (LIDODERM) 5 % patch 3 patch, 3 patch, Topical, Daily, Kanchan Wilson MD, 3 patch at 03/06/25 0812    metoprolol tartrate (LOPRESSOR) tablet 25 mg, 25 mg, Oral, Q12H GALE, Kanchan Wilson MD, 25 mg at 03/09/25 0938    moisture barrier miconazole 2% cream (aka HITESH MOISTURE BARRIER ANTIFUNGAL CREAM), , Topical, BID, Mal Neely DO, Given at 03/09/25 1718    oxyCODONE (ROXICODONE) IR tablet 5 mg, 5 mg, Oral, Q6H PRN, Jean Brady MD, 5 mg at 03/01/25 1817    oxyCODONE (ROXICODONE) split tablet 2.5 mg, 2.5 mg, Oral, Q6H PRN, Jean Brady MD, 2.5 mg at 03/05/25 0945    pantoprazole (PROTONIX) EC tablet 40 mg, 40 mg, Oral, Daily Before Breakfast, Jean Brady MD, 40 mg at 03/09/25 0545    polyethylene glycol (MIRALAX) packet 17 g, 17 g, Oral, Daily PRN, Kelly Herman MD    senna-docusate sodium (SENOKOT S) 8.6-50 mg per tablet 1 tablet, 1 tablet, Oral, HS, Kelly Herman MD, 1 tablet at 03/08/25 2130    tamsulosin (FLOMAX) capsule 0.4 mg, 0.4 mg, Oral, Daily With Dinner, Kanchan Wilson MD, 0.4 mg at 03/09/25 1718    [START ON 3/10/2025] torsemide (DEMADEX) tablet 10 mg, 10 mg, Oral, Every Other Day, Saskia Roland MD      Lab Results: I have reviewed the following results:  Results from last 7 days   Lab Units 03/09/25  0544 03/08/25  0530 03/07/25  0541 03/06/25  0521 03/05/25 2032 03/05/25  0525 03/04/25 2034 03/04/25  0525 03/03/25  0823 03/03/25  0516   WBC Thousand/uL 5.18 4.62 5.12 4.63  --  5.28  --  4.96  --  4.69   HEMOGLOBIN g/dL 7.6* 7.8* 8.2* 7.7* 7.7* 8.0* 8.3* 8.2*   < > 8.1*   HEMATOCRIT % 24.5* 25.3* 26.4* 24.7* 24.5* 26.1* 26.2* 26.2*   < > 25.2*   PLATELETS Thousands/uL 144* 160 159 159  --  187  --  173  --  173   POTASSIUM mmol/L 3.6 3.9 4.2 3.5   "--  3.5  --  3.7  --  3.6   CHLORIDE mmol/L 109* 110* 110* 109*  --  111*  --  110*  --  110*   CO2 mmol/L 30 31 28 28  --  27  --  26  --  26   BUN mg/dL 36* 35* 34* 38*  --  40*  --  41*  --  44*   CREATININE mg/dL 1.94* 1.97* 1.80* 1.86*  --  1.80*  --  1.85*  --  1.88*   CALCIUM mg/dL 8.1* 8.2* 8.2* 7.7*  --  7.9*  --  7.9*  --  7.9*   MAGNESIUM mg/dL 1.9 2.0 1.8* 1.9  --  2.0  --  2.0  --  2.1   ALBUMIN g/dL  --   --   --   --   --   --   --  2.1*  --  2.0*    < > = values in this interval not displayed.       Administrative Statements     Portions of the record may have been created with voice recognition software. Occasional wrong word or \"sound a like\" substitutions may have occurred due to the inherent limitations of voice recognition software. Read the chart carefully and recognize, using context, where substitutions have occurred.If you have any questions, please contact the dictating provider.  "

## 2025-03-10 NOTE — ASSESSMENT & PLAN NOTE
Recent Labs     03/08/25  0530 03/09/25  0544 03/10/25  0442   CREATININE 1.97* 1.94* 2.03*   EGFR 31 32 30     Estimated Creatinine Clearance: 33.4 mL/min (A) (by C-G formula based on SCr of 2.03 mg/dL (H)).     Cr 2.43 on admission. Baseline 0.9. BUN 63. BUN/Cr ratio >20  Hypervolemia on exam  3/7/2025 chest xray:Unchanged pulmonary edema and small pleural effusions. Pneumonia not excluded.   Last echocardiogram 2/2025.  EF 55% G1 DD  As of 3/10/2025, IV torsemide every other day 10 mg with I&O -6 L    Plan:  Daily BMP  IV torsemide 10 mg every other day --awaiting further recommendations from nephrology and cardiology in regards to patient's volume overload status and rising creatinine from 1.9 to 2.03  Avoid nephrotoxic medications  Monitor I/O's  Urinary retention protocol

## 2025-03-10 NOTE — CASE MANAGEMENT
Case Management Progress Note    Patient name Devin Chaves  Location S /S -01 MRN [id number]  : 1945 Date 3/10/2025       LOS (days): 10  Geometric Mean LOS (GMLOS) (days): 3.6  Days to GMLOS:-6.8        OBJECTIVE:        Current admission status: Inpatient  Preferred Pharmacy:   [facility]/pharmacy #[id number] - [place] - [address]  [address]  [facility] [address]  Phone: [phone] Fax: [phone]    [facility] - [place], [place] - [address]  [address]  [place] [address]  Phone: [phone] Fax: [phone]    [place] - [place] - [address]  [address]  [place] [address]  Phone: [phone] Fax: [phone]    Primary Care Provider: Park Saxena MD    Primary Insurance: [facility]UM [facility]  [facility]    PROGRESS NOTE:  CM notified by d/c support that auth was received for STR.     Info forwarded to facility via AIDIN.     Patient not currently medically clear for d/c, per SLIM.    CM continues to follow to assist with dcp.

## 2025-03-10 NOTE — CASE MANAGEMENT
Rec'd call from Samuel @ [facility] ([phone]) requesting information on IV abx. Gave requested information. Samuel will continue to review then call back with determination. CM notified: Samuel GALINDO

## 2025-03-10 NOTE — PLAN OF CARE
Problem: PHYSICAL THERAPY ADULT  Goal: Performs mobility at highest level of function for planned discharge setting.  See evaluation for individualized goals.  Description: Treatment/Interventions: Functional transfer training, LE strengthening/ROM, Therapeutic exercise, Endurance training, Cognitive reorientation, Patient/family training, Equipment eval/education, Bed mobility, Gait training, Compensatory technique education, Spoke to nursing, Spoke to case management (WC mobility training)     See flowsheet documentation for full assessment, interventions and recommendations.  Outcome: Progressing  Note: Prognosis: Fair  Problem List: Decreased strength, Decreased endurance, Impaired balance, Decreased mobility, Decreased coordination, Decreased safety awareness, Pain, Orthopedic restrictions  Assessment: Patient agreeable and motivated to work with PT this morning, wanting to get out of bed to the chair.  Patient is supervision for bed mobility, moderate assist for transfers with the bed raised and moderate assist from the bedside chair to transfer.  Patient's overall gait endurance is limited by respiratory status.  While admitted, patient will continue to benefit from skilled physical therapy services to continue to increase patient's strength, balance, endurance, safe functional mobility and gait with a roller walker.  When medically stable for discharge patient remains appropriate for Level II Moderate Resource Intensity.  Barriers to Discharge: Decreased caregiver support, Inaccessible home environment     Rehab Resource Intensity Level, PT: II (Moderate Resource Intensity)    See flowsheet documentation for full assessment.

## 2025-03-10 NOTE — ASSESSMENT & PLAN NOTE
MRI of the neck done 2/14/2025 revealed cervical degenerative change with mild canal stenosis and mild to moderate foraminal narrowing.  No cord compression.  Mild nonspecific edema within the right posterior breast dermal musculature of the upper cervicals spine.  Minimal peripheral enhancement is noted.     Plan:  Will plan for repeat MRI C-spine on 3/20/25.  Should be completed prior to completion of IV antibiotic course per ID

## 2025-03-10 NOTE — ASSESSMENT & PLAN NOTE
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    Patient was due to follow-up with outpatient colorectal surgery on 2/28/2025  Pt presenting with melena, Hgb 4.6. Symptomatic anemia.  Attending discussion with Colorectal: If COPD can stay controlled, increase chance of operation.  Oncology: Outpatient appointment for 4/3.  Discussed with wife 3/10/2025

## 2025-03-10 NOTE — ASSESSMENT & PLAN NOTE
Recent Labs     03/08/25  0530 03/09/25  0544 03/10/25  0442   HGB 7.8* 7.6* 7.0*      Patient presents with 2 days of chest pain, shortness of breath, fatigue and melena  2/20/2025 EGD/colonoscopy showed 2.5 cm ileocecal mass, pathology confirmed adenocarcinoma.  S/p 3 units packed rbc in ED. S/p 1 unit PRBCs on 3/2  Hemoglobin has been stable.   Patient recent diagnosis of adenocarcinoma, increased risk of stroke.     Plan:  Daily CBC  Transfuse for Hgb <7  Monitor for signs of overt bleeding or melenotic stools  Protonix 40 mg PO QD   Increase Eliquis from 2.5 mg to 5 mg as hemoglobin currently stable at 7.7 and patient denies any melena, hematuria, or hemoptysis.

## 2025-03-10 NOTE — ASSESSMENT & PLAN NOTE
Malnutrition Findings:   Adult Malnutrition type: Acute illness  Adult Degree of Malnutrition: Other severe protein calorie malnutrition  Malnutrition Characteristics: Inadequate energy, Fluid accumulation  360 Statement: related to inadequate energy/protein intake as evidenced by consuming < 50% of energy intake compared to estimated needs for > 5 days and B/L LE +3 edema. Treated with ONS.  BMI Findings:     Body mass index is 26.7 kg/m².     Plan:  Ensure supplements

## 2025-03-10 NOTE — ASSESSMENT & PLAN NOTE
Baseline creatinine is normal at 0.8 to 0.9.   Admission SCr 2.43 on 2/28/25.   SCr down to 1.80 on 3/7/25.   SCr is up to 2.03 today.   Etiology is not entirely clear - working dx is ATN but could have another underlying pathology.   CT 2/28/25 - no hydronephrosis. UA on 3/8/25 with RBC 2-4, WBC 2-4, small blood and trace protein.   Of note, UPC ratio was 3.4 gm on 3/8/25 and urine ACR was only 821 mg on 3/5/25.   Follow up repeat SPEP, UPEP,  KL ratio.   Appears fluid overloaded but not in respiratory failure.   Increase Torsemide to 10 mg OD.

## 2025-03-10 NOTE — PHYSICAL THERAPY NOTE
PT TREATMENT     03/10/25 1125   PT Last Visit   PT Visit Date 03/10/25   Note Type   Note Type Treatment   Pain Assessment   Pain Assessment Tool 0-10   Pain Score 5   Pain Location/Orientation Location: Neck  (posterior)   Pain Onset/Description Onset: Ongoing   Effect of Pain on Daily Activities Limits comfort and activity tolerance   Patient's Stated Pain Goal No pain   Hospital Pain Intervention(s) Repositioned;Ambulation/increased activity;Emotional support;Rest   Restrictions/Precautions   LLE Weight Bearing Per Order WBAT  (in ? lace up ankle brace, however not present in hospital)   Braces or Orthoses   (LLE heel offloading boot/prevalon boot)   Other Precautions WBS;Fall Risk;Bed Alarm;Chair Alarm;O2  (3L O2 via NC; Dann on room air)   General   Chart Reviewed Yes   Family/Caregiver Present No   Cognition   Overall Cognitive Status WFL   Arousal/Participation Cooperative   Attention Within functional limits   Orientation Level Oriented X4   Memory Decreased recall of precautions;Decreased recall of recent events;Decreased short term memory   Following Commands Follows one step commands with increased time or repetition   Comments At least 2 patient identifiers including name and date of birth   Subjective   Subjective Patient agreeable to PT   Bed Mobility   Supine to Sit 5  Supervision   Additional items Assist x 1;Verbal cues;Increased time required;HOB elevated;Bedrails   Transfers   Sit to Stand 3  Moderate assistance  (Bed raised)   Additional items Assist x 1;Verbal cues;Increased time required   Stand to Sit 3  Moderate assistance   Additional items Assist x 1;Verbal cues;Increased time required   Ambulation/Elevation   Gait pattern Foward flexed;Decreased foot clearance;Short stride;Step to   Gait Assistance 4  Minimal assist   Additional items Assist x 1;Verbal cues;Tactile cues   Assistive Device Rolling walker   Distance 3 feet bed to chair; 5 feet forward and back; extended rest between  ambulation trial secondary to shortness of breath   Endurance Deficit   Endurance Deficit Yes   Endurance Deficit Description Limited overall activity, standing and gait endurance   Activity Tolerance   Activity Tolerance Patient limited by fatigue;Patient limited by pain;Treatment limited secondary to medical complications (Comment)  (Shortness of breath with limited activity)   Nurse Made Aware RN   Assessment   Problem List Decreased strength;Decreased endurance;Impaired balance;Decreased mobility;Decreased coordination;Decreased safety awareness;Pain;Orthopedic restrictions   Assessment Patient agreeable and motivated to work with PT this morning, wanting to get out of bed to the chair.  Patient is supervision for bed mobility, moderate assist for transfers with the bed raised and moderate assist from the bedside chair to transfer.  Patient's overall gait endurance is limited by respiratory status.  While admitted, patient will continue to benefit from skilled physical therapy services to continue to increase patient's strength, balance, endurance, safe functional mobility and gait with a roller walker.  When medically stable for discharge patient remains appropriate for Level II Moderate Resource Intensity.    The patient's AM-PAC Basic Mobility Inpatient Short Form Raw Score is 15. A Raw score of less than or equal to 16 suggests the patient may benefit from discharge to post-acute rehabilitation services. Please also refer to the recommendation of the Physical Therapist for safe discharge planning.   Goals   STG Expiration Date 03/17/25   Short Term Goal #1 Pt will: complete all bed mobility in hospital bed with S in order to promote increased OOB functional mobility and simulate home environment; complete all transfers with RW and TITA in order to increase safety with functional mobility; ambulate >50ft with RW and appropriate WB restriction in order to increase safety with in room/in facility distance  functional mobility; improve B LE strength to >/= 4/5 MMT t/o in order to increase safety with functional mobility and decrease risk of falls; demonstrate understanding and independence with LE strengthening HEP; improve dynamic standing balance to >/= good grade in order to promote safety and increased independence with mobility; tolerate >3hrs OOB in upright position, in order to improve muscular endurance and respiratory status; improve AM-PAC score to >/= 14/24 in order to increase independence with mobility and decrease burden of care; improve Barthel Index score to >/= 45/100 in order to increase independence and decrease risk of falls.   Plan   Treatment/Interventions ADL retraining;Functional transfer training;LE strengthening/ROM;Therapeutic exercise;Endurance training;Patient/family training;Equipment eval/education;Bed mobility;Gait training;Compensatory technique education;Spoke to nursing   PT Frequency 3-5x/wk   Discharge Recommendation   Rehab Resource Intensity Level, PT II (Moderate Resource Intensity)   AM-PAC Basic Mobility Inpatient   Turning in Flat Bed Without Bedrails 3   Lying on Back to Sitting on Edge of Flat Bed Without Bedrails 3   Moving Bed to Chair 3   Standing Up From Chair Using Arms 2   Walk in Room 3   Climb 3-5 Stairs With Railing 1   Basic Mobility Inpatient Raw Score 15   Basic Mobility Standardized Score 36.97   [facility] Highest Level Of Mobility   -HL Goal 4: Move to chair/commode   -HL Achieved 6: Walk 10 steps or more   Education   Education Provided Mobility training;Assistive device   Patient Explanation/teachback used;Reinforcement needed   End of Consult   Patient Position at End of Consult Bed/Chair alarm activated;Bedside chair;All needs within reach   Licensure   [place] License Number  Kanchan L HECTOR [employer]     Portions of the documentation may have been created using voice recognition software. Occasional wrong word or sound alike substitutions may have  occurred due to the inherent limitation of the voice recognition software. Read the chart carefully and recognize, using context, where substitutions have occurred.

## 2025-03-10 NOTE — PROGRESS NOTES
Progress Note - Hospitalist   Name: Devin Chaves 79 y.o. male I MRN: [id number]  Unit/Bed#: S -01 I Date of Admission: 2/28/2025   Date of Service: 3/10/2025 I Hospital Day: 10    Assessment & Plan  Symptomatic anemia  Recent Labs     03/08/25  0530 03/09/25  0544 03/10/25  0442   HGB 7.8* 7.6* 7.0*      Patient presents with 2 days of chest pain, shortness of breath, fatigue and melena  2/20/2025 EGD/colonoscopy showed 2.5 cm ileocecal mass, pathology confirmed adenocarcinoma.  S/p 3 units packed rbc in ED. S/p 1 unit PRBCs on 3/2  Hemoglobin has been stable.   Patient recent diagnosis of adenocarcinoma, increased risk of stroke.     Plan:  Daily CBC  Transfuse for Hgb <7  Monitor for signs of overt bleeding or melenotic stools  Protonix 40 mg PO QD   Increase Eliquis from 2.5 mg to 5 mg as hemoglobin currently stable at 7.7 and patient denies any melena, hematuria, or hemoptysis.    ORIANA (acute kidney injury) (HCC)  Recent Labs     03/08/25  0530 03/09/25  0544 03/10/25  0442   CREATININE 1.97* 1.94* 2.03*   EGFR 31 32 30     Estimated Creatinine Clearance: 33.4 mL/min (A) (by C-G formula based on SCr of 2.03 mg/dL (H)).     Cr 2.43 on admission. Baseline 0.9. BUN 63. BUN/Cr ratio >20  Hypervolemia on exam  3/7/2025 chest xray:Unchanged pulmonary edema and small pleural effusions. Pneumonia not excluded.   Last echocardiogram 2/2025.  EF 55% G1 DD  As of 3/10/2025, IV torsemide every other day 10 mg with I&O -6 L    Plan:  Daily BMP  IV torsemide 10 mg every other day --awaiting further recommendations from nephrology and cardiology in regards to patient's volume overload status and rising creatinine from 1.9 to 2.03  Avoid nephrotoxic medications  Monitor I/O's  Urinary retention protocol    Neck pain  MRI of the neck done 2/14/2025 revealed cervical degenerative change with mild canal stenosis and mild to moderate foraminal narrowing.  No cord compression.  Mild nonspecific edema within the right  posterior breast dermal musculature of the upper cervicals spine.  Minimal peripheral enhancement is noted.     Plan:  Will plan for repeat MRI C-spine on 3/20/25.  Should be completed prior to completion of IV antibiotic course per ID  COPD (chronic obstructive pulmonary disease) (HCC)  Patient with COPD on 2-4 L supplemental O2 at baseline  Home Albuterol prn, Xopenex Q8 hrs prn, Mometasone inhaler daily    Plan:  Titrate O2 to maintain SpO2 greater than 88%   Xopenex nebs Q8 hrs prn, Mometasone daily  Albuterol prn  RT notified to administer nebs  Follow-up in chest xray ordered by Nephrology.  Colonic mass  2/20/2025 EGD/colonoscopy showed 2.5 cm ileocecal mass, pathology confirmed adenocarcinoma.    Patient was due to follow-up with outpatient colorectal surgery on 2/28/2025  Pt presenting with melena, Hgb 4.6. Symptomatic anemia.  Attending discussion with Colorectal: If COPD can stay controlled, increase chance of operation.  Oncology: Outpatient appointment for 4/3.  Discussed with wife 3/10/2025    MSSA bacteremia  Patient was discharged on cefazolin 2 g IV Q8 hrs until 3/27/2025. PICC line in place. Patient rescheduled to follow-up outpatient with ID    Plan:  Continue with cefazolin 2 g IV every 8 hours.  infectious disease recommendations appreciated   Recommend repeat C-spine MRI with and without contrast, postpone until ORIANA resolves  6 weeks treatment through 3/27/2025  Atrial fibrillation (HCC)  AC: Eliquis 2.5 mg BID  Rate control: Lopressor 25 mg Q12 hrs    Plan:  Restart Eliquis at 2.5 mg due to increase risk of bleed and ORIANA  Continue Lopressor 25 mg Q12 hrs  Pleural effusion, bilateral  CT A/P shows bilateral pleural effusions L>R, pulmonary vascular congestion    Plan:  Repeat imaging if pt's respiratory status worsens  Continue oxygen to keep oxygen saturations 88% and above.  HTN (hypertension)  Systolic BP 100s-130s.  Patient normotensive.  Blood pressure stable.    Plan:  Lopressor 25 mg  every 12 hours  CAD (coronary artery disease)  TTE 2/12/25: EF 55%, Normal systolic dysfunction, G1DD, No vegetation, No mural thrombus, moderate aortic sclerosis     Plan:  Hold aspirin in setting of GI bleed  History of CVA (cerebrovascular accident)  Pt had multiple embolic strokes during admission from 2/11/25-2/22/25  Thought to be embolic strokes 2/2 MSSA bacteremia, no vegetations on TTE    Plan:  Restart Eliquis   Continue Lipitor 40 mg daily  Urinary retention  Patient has history of urinary retention and was started on Flomax during last hospitalization  Pt denies difficulty urinating at this shayy    Plan:  Flomax 0.4 mg daily  Urinary retention protocol  Severe protein-calorie malnutrition (HCC)  Malnutrition Findings:   Adult Malnutrition type: Acute illness  Adult Degree of Malnutrition: Other severe protein calorie malnutrition  Malnutrition Characteristics: Inadequate energy, Fluid accumulation  360 Statement: related to inadequate energy/protein intake as evidenced by consuming < 50% of energy intake compared to estimated needs for > 5 days and B/L LE +3 edema. Treated with ONS.  BMI Findings:     Body mass index is 26.7 kg/m².     Plan:  Ensure supplements  Ambulatory dysfunction  Worsening ambulatory dysfunction since December  PT/OT evaluations recommend level 2   Discussed with podiatry, patient has chronic left ankle fracture over 2 years ago, recommend WBAT to left foot as curbside, no further podiatry needs   Consider ambulatory Ortho consult and brace fitting     Plan:  Weight bearing status: As tolerated with brace  Melena  Patient endorsed melena prior to admission  One episode dark, tarry stool overnight on 3/4/25. Not documented.    Plan:  Stool record at bedside  Dysphagia  VBS done on 2/15/25 showing food retention due to pharyngeal weakness  EGD was done last month which showed normal esophagus.   ENT recommending outpatient follow-up with Dr. Flynn Reyez    Plan:  Normal diet as  tolerated    VTE Pharmacologic Prophylaxis: VTE Score: 5 High Risk (Score >/= 5) - Pharmacological DVT Prophylaxis Ordered: apixaban (Eliquis). Sequential Compression Devices Ordered.    Mobility:   Basic Mobility Inpatient Raw Score: 9  JH-HLM Goal: 3: Sit at edge of bed  JH-HLM Achieved: 3: Sit at edge of bed  JH-HLM Goal NOT achieved. Continue with multidisciplinary rounding and encourage appropriate mobility to improve upon JH-HLM goals.    Patient Centered Rounds: I performed bedside rounds with nursing staff today.   Discussions with Specialists or Other Care Team Provider: Nephrology, cardiology    Education and Discussions with Family / Patient: Updated  (wife) via phone.    Current Length of Stay: 10 day(s)  Current Patient Status: Inpatient   Certification Statement: The patient will continue to require additional inpatient hospital stay due to requiring IV diuresis in the setting of ORIANA and hypervolemia  Discharge Plan: Anticipate discharge tomorrow to discharge location to be determined pending rehab evaluations. Planning for holy family encounter [place].     Code Status: Level 1 - Full Code    Subjective   No overnight events. Patient reports he is feeling well overall. Continues to present as volume overloaded on exam w/ rales in b/l lower lobes.     Objective :  Temp:  [97.9 °F (36.6 °C)-98.5 °F (36.9 °C)] 98.5 °F (36.9 °C)  HR:  [71-83] 71  BP: (124-140)/(56-61) 125/56  Resp:  [16-18] 16  SpO2:  [95 %-100 %] 95 %    Body mass index is 26.7 kg/m².     Input and Output Summary (last 24 hours):     Intake/Output Summary (Last 24 hours) at 3/10/2025 0808  Last data filed at 3/9/2025 1312  Gross per 24 hour   Intake --   Output 180 ml   Net -180 ml       Physical Exam  Vitals and nursing note reviewed.   Constitutional:       General: He is not in acute distress.     Appearance: He is well-developed.   HENT:      Head: Normocephalic and atraumatic.   Eyes:      Conjunctiva/sclera:  Conjunctivae normal.   Cardiovascular:      Rate and Rhythm: Normal rate and regular rhythm.      Pulses: Normal pulses.      Heart sounds: No murmur heard.  Pulmonary:      Effort: Pulmonary effort is normal. No respiratory distress.      Breath sounds: Rales present.      Comments: Rales b/l lower lobes  Abdominal:      Palpations: Abdomen is soft.      Tenderness: There is no abdominal tenderness.   Musculoskeletal:         General: Swelling present.      Cervical back: Neck supple.      Right lower leg: Edema present.      Left lower leg: Edema present.   Skin:     General: Skin is warm and dry.      Capillary Refill: Capillary refill takes less than 2 seconds.      Coloration: Skin is not pale.   Neurological:      General: No focal deficit present.      Mental Status: He is alert.      Motor: Weakness present.   Psychiatric:         Mood and Affect: Mood normal.         Lines/Drains:  Lines/Drains/Airways       Active Status       Name Placement date Placement time Site Days    PICC Line 02/26/25 Right Brachial 02/26/25  0548  Brachial  12                    Central Line:  Goal for removal: Will discontinue when meds requiring line are completed.               Lab Results: I have reviewed the following results:   Results from last 7 days   Lab Units 03/10/25  0442   WBC Thousand/uL 4.92   HEMOGLOBIN g/dL 7.0*   HEMATOCRIT % 23.5*   PLATELETS Thousands/uL 133*   SEGS PCT % 70   LYMPHO PCT % 15   MONO PCT % 11   EOS PCT % 2     Results from last 7 days   Lab Units 03/10/25  0442 03/05/25  0525 03/04/25  0525   SODIUM mmol/L 143   < > 141   POTASSIUM mmol/L 3.8   < > 3.7   CHLORIDE mmol/L 109*   < > 110*   CO2 mmol/L 32   < > 26   BUN mg/dL 36*   < > 41*   CREATININE mg/dL 2.03*   < > 1.85*   ANION GAP mmol/L 2*   < > 5   CALCIUM mg/dL 8.0*   < > 7.9*   ALBUMIN g/dL  --   --  2.1*   TOTAL BILIRUBIN mg/dL  --   --  0.38   ALK PHOS U/L  --   --  51   ALT U/L  --   --  <3*   AST U/L  --   --  16   GLUCOSE RANDOM  mg/dL 86   < > 84    < > = values in this interval not displayed.                       Recent Cultures (last 7 days):               Last 24 Hours Medication List:     Current Facility-Administered Medications:     acetaminophen (TYLENOL) tablet 650 mg, Q6H PRN    albuterol (PROVENTIL HFA,VENTOLIN HFA) inhaler 2 puff, Q4H PRN    apixaban (ELIQUIS) tablet 2.5 mg, BID    ascorbic acid (VITAMIN C) tablet 250 mg, Daily    atorvastatin (LIPITOR) tablet 40 mg, Daily With Dinner    ceFAZolin (ANCEF) IVPB (premix in dextrose) 2,000 mg 50 mL, Q8H, Last Rate: 2,000 mg (03/10/25 4879)    Cholecalciferol (VITAMIN D3) tablet 2,000 Units, Daily    cyanocobalamin (VITAMIN B-12) tablet 100 mcg, Daily    fluticasone (ARNUITY ELLIPTA) 100 MCG/ACT inhaler 1 puff, Daily    folic acid (FOLVITE) tablet 1 mg, Daily    HYDROmorphone HCl (DILAUDID) injection 0.2 mg, Q4H PRN    hydroxychloroquine (PLAQUENIL) tablet 400 mg, Daily With Breakfast    lidocaine (LIDODERM) 5 % patch 3 patch, Daily    metoprolol tartrate (LOPRESSOR) tablet 25 mg, Q12H GALE    moisture barrier miconazole 2% cream (aka HITESH MOISTURE BARRIER ANTIFUNGAL CREAM), BID    oxyCODONE (ROXICODONE) IR tablet 5 mg, Q6H PRN    oxyCODONE (ROXICODONE) split tablet 2.5 mg, Q6H PRN    pantoprazole (PROTONIX) EC tablet 40 mg, Daily Before Breakfast    polyethylene glycol (MIRALAX) packet 17 g, Daily PRN    senna-docusate sodium (SENOKOT S) 8.6-50 mg per tablet 1 tablet, HS    tamsulosin (FLOMAX) capsule 0.4 mg, Daily With Dinner    torsemide (DEMADEX) tablet 10 mg, Every Other Day    Administrative Statements   Today, Patient Was Seen By: Yazmin Up DO      **Please Note: This note may have been constructed using a voice recognition system.**

## 2025-03-10 NOTE — PROGRESS NOTES
Progress Note - Infectious Disease   Name: Devin Chaves 79 y.o. male I MRN: [id number]  Unit/Bed#: S -01 I Date of Admission: 2/28/2025   Date of Service: 3/10/2025 I Hospital Day: 10    Assessment & Plan  Neck pain  Patient developed acute neck pain with MSSA bacteremia during recent hospitalization.  CRP was highly elevated.  C-spine MRI showed possible C-spine and paraspinal infection.  Patient has no neurologic deficit.  However, his neck pain has not improved.  Given lack of improvement of neck pain, we should repeat C-spine MRI with contrast when creatinine is improved.  However, without any neurological deficit, it would be fine to postpone MRI until renal function is further improved, to decrease risk of contrast-induced ORIANA.  Patient should get MRI done prior to completion of IV antibiotic course below.  Antibiotic plan as in below.  Monitor neck pain.  Recommend repeat C-spine MRI with and without contrast.  This can be postponed until ORIANA is improved, but should be done prior to completion of IV antibiotic course.  MSSA bacteremia  Patient had MSSA bacteremia during recent hospitalization.  Source is unclear but likely cutaneous.  His bacteremia cleared rapidly on IV antibiotic.  TTE did not show any vegetation.  Given possible C-spine infection, plan was for long-term IV antibiotic.  Continue high-dose IV cefazolin.  Treat x 6 weeks from clearance of bacteremia as previously planned, through 3/27.  Symptomatic anemia  Patient is clinically improved with PRBC transfusion.  Management per primary service.  Melena  Patient with melena prior to admission.  This is most likely secondary to colonic mass.  Colorectal surgery follow-up.  ORIANA (acute kidney injury) (HCC)  Patient with ORIANA on admission, most likely secondary to hypovolemia from GI bleed.  Creatinine was slowly improving but now trending up again.  Antibiotic dosages adjusted accordingly.  Monitor creatinine.  Nephrology  follow-up.  Colonic mass  Patient has recently diagnosed adenocarcinoma of the colon.  He has been followed by colorectal surgery.  No plan for chemotherapy yet.  Colorectal surgery follow-up.    Discussed with patient in detail regarding the above plan.      Antibiotics:  Cefazolin  Last negative blood culture 2/14    Subjective   Patient with stable and unchanged neck pain.  No focal weakness.  Temperature stays down.  No chills.  He is tolerating antibiotic well.  No nausea, vomiting or diarrhea.    Objective :  Temp:  [97.9 °F (36.6 °C)-98.5 °F (36.9 °C)] 98.5 °F (36.9 °C)  HR:  [71-83] 71  BP: (125-140)/(56-61) 125/56  Resp:  [16-18] 16  SpO2:  [95 %-100 %] 95 %    Physical Exam:     General: Awake, alert, cooperative, no distress.   Neck:  Supple. No mass.  No lymphadenopathy.  Stable mild to moderate tenderness posteriorly at midline.  No deformity.   Lungs: Expansion symmetric, no rales, no wheezing, respirations unlabored.   Heart:  Regular rate and rhythm, S1 and S2 normal, no murmur.   Abdomen: Soft, nondistended, non-tender, bowel sounds active all four quadrants, no masses, no organomegaly.   Extremities: Stable leg edema. No erythema/warmth. No ulcer. Nontender to palpation.   Skin:  No rash.   Neuro: Moves all extremities.        Lab Results: I have reviewed the following results:  Results from last 7 days   Lab Units 03/10/25  0828 03/10/25  0442 03/09/25  0544 03/08/25  0530   WBC Thousand/uL  --  4.92 5.18 4.62   HEMOGLOBIN g/dL 7.7* 7.0* 7.6* 7.8*   PLATELETS Thousands/uL  --  133* 144* 160     Results from last 7 days   Lab Units 03/10/25  0442 03/09/25  0544 03/08/25  0530 03/05/25  0525 03/04/25  0525   SODIUM mmol/L 143 144 144   < > 141   POTASSIUM mmol/L 3.8 3.6 3.9   < > 3.7   CHLORIDE mmol/L 109* 109* 110*   < > 110*   CO2 mmol/L 32 30 31   < > 26   BUN mg/dL 36* 36* 35*   < > 41*   CREATININE mg/dL 2.03* 1.94* 1.97*   < > 1.85*   EGFR ml/min/1.73sq m 30 32 31   < > 34   CALCIUM mg/dL 8.0*  8.1* 8.2*   < > 7.9*   AST U/L  --   --   --   --  16   ALT U/L  --   --   --   --  <3*   ALK PHOS U/L  --   --   --   --  51   ALBUMIN g/dL  --   --   --   --  2.1*    < > = values in this interval not displayed.

## 2025-03-10 NOTE — PLAN OF CARE
Problem: Potential for Falls  Goal: Patient will remain free of falls  Description: INTERVENTIONS:  - Educate patient/family on patient safety including physical limitations  - Instruct patient to call for assistance with activity   - Consult OT/PT to assist with strengthening/mobility   - Keep Call bell within reach  - Keep bed low and locked with side rails adjusted as appropriate  - Keep care items and personal belongings within reach  - Initiate and maintain comfort rounds  - Make Fall Risk Sign visible to staff  - Apply yellow socks and bracelet for high fall risk patients  - Consider moving patient to room near nurses station  Outcome: Progressing     Problem: Nutrition/Hydration-ADULT  Goal: Nutrient/Hydration intake appropriate for improving, restoring or maintaining nutritional needs  Description: Monitor and assess patient's nutrition/hydration status for malnutrition. Collaborate with interdisciplinary team and initiate plan and interventions as ordered.  Monitor patient's weight and dietary intake as ordered or per policy. Utilize nutrition screening tool and intervene as necessary. Determine patient's food preferences and provide high-protein, high-caloric foods as appropriate.     INTERVENTIONS:  - Monitor oral intake, urinary output, labs, and treatment plans  - Assess nutrition and hydration status and recommend course of action  - Evaluate amount of meals eaten  - Assist patient with eating if necessary   - Allow adequate time for meals  - Recommend/ encourage appropriate diets, oral nutritional supplements, and vitamin/mineral supplements  - Order, calculate, and assess calorie counts as needed  - Recommend, monitor, and adjust tube feedings and TPN/PPN based on assessed needs  - Assess need for intravenous fluids  - Provide specific nutrition/hydration education as appropriate  - Include patient/family/caregiver in decisions related to nutrition  Outcome: Progressing

## 2025-03-10 NOTE — ASSESSMENT & PLAN NOTE
Newly diagnosed colon mass on C-scope on 2/20/25.   Pathology - adenocarcinoma  Was to follow colorectal as outpatient

## 2025-03-10 NOTE — PLAN OF CARE
Problem: Prexisting or High Potential for Compromised Skin Integrity  Goal: Skin integrity is maintained or improved  Description: INTERVENTIONS:  - Identify patients at risk for skin breakdown  - Assess and monitor skin integrity  - Assess and monitor nutrition and hydration status  - Monitor labs   - Assess for incontinence   - Turn and reposition patient  - Assist with mobility/ambulation  - Relieve pressure over bony prominences  - Avoid friction and shearing  - Provide appropriate hygiene as needed including keeping skin clean and dry  - Evaluate need for skin moisturizer/barrier cream  - Collaborate with interdisciplinary team   - Patient/family teaching  - Consider wound care consult   Outcome: Progressing     Problem: Potential for Falls  Goal: Patient will remain free of falls  Description: INTERVENTIONS:  - Educate patient/family on patient safety including physical limitations  - Instruct patient to call for assistance with activity   - Consult OT/PT to assist with strengthening/mobility   - Keep Call bell within reach  - Keep bed low and locked with side rails adjusted as appropriate  - Keep care items and personal belongings within reach  - Initiate and maintain comfort rounds  - Make Fall Risk Sign visible to staff  - Offer Toileting every  Hours, in advance of need  - Initiate/Maintain alarm  - Obtain necessary fall risk management equipment  - Apply yellow socks and bracelet for high fall risk patients  - Consider moving patient to room near nurses station  Outcome: Progressing     Problem: Nutrition/Hydration-ADULT  Goal: Nutrient/Hydration intake appropriate for improving, restoring or maintaining nutritional needs  Description: Monitor and assess patient's nutrition/hydration status for malnutrition. Collaborate with interdisciplinary team and initiate plan and interventions as ordered.  Monitor patient's weight and dietary intake as ordered or per policy. Utilize nutrition screening tool and  intervene as necessary. Determine patient's food preferences and provide high-protein, high-caloric foods as appropriate.     INTERVENTIONS:  - Monitor oral intake, urinary output, labs, and treatment plans  - Assess nutrition and hydration status and recommend course of action  - Evaluate amount of meals eaten  - Assist patient with eating if necessary   - Allow adequate time for meals  - Recommend/ encourage appropriate diets, oral nutritional supplements, and vitamin/mineral supplements  - Order, calculate, and assess calorie counts as needed  - Recommend, monitor, and adjust tube feedings and TPN/PPN based on assessed needs  - Assess need for intravenous fluids  - Provide specific nutrition/hydration education as appropriate  - Include patient/family/caregiver in decisions related to nutrition  Outcome: Progressing     Problem: GASTROINTESTINAL - ADULT  Goal: Minimal or absence of nausea and/or vomiting  Description: INTERVENTIONS:  - Administer IV fluids if ordered to ensure adequate hydration  - Maintain NPO status until nausea and vomiting are resolved  - Nasogastric tube if ordered  - Administer ordered antiemetic medications as needed  - Provide nonpharmacologic comfort measures as appropriate  - Advance diet as tolerated, if ordered  - Consider nutrition services referral to assist patient with adequate nutrition and appropriate food choices  Outcome: Progressing  Goal: Maintains or returns to baseline bowel function  Description: INTERVENTIONS:  - Assess bowel function  - Encourage oral fluids to ensure adequate hydration  - Administer IV fluids if ordered to ensure adequate hydration  - Administer ordered medications as needed  - Encourage mobilization and activity  - Consider nutritional services referral to assist patient with adequate nutrition and appropriate food choices  Outcome: Progressing  Goal: Maintains adequate nutritional intake  Description: INTERVENTIONS:  - Monitor percentage of each meal  consumed  - Identify factors contributing to decreased intake, treat as appropriate  - Assist with meals as needed  - Monitor I&O, weight, and lab values if indicated  - Obtain nutrition services referral as needed  Outcome: Progressing  Goal: Establish and maintain optimal ostomy function  Description: INTERVENTIONS:  - Assess bowel function  - Encourage oral fluids to ensure adequate hydration  - Administer IV fluids if ordered to ensure adequate hydration   - Administer ordered medications as needed  - Encourage mobilization and activity  - Nutrition services referral to assist patient with appropriate food choices  - Assess stoma site  - Consider wound care consult   Outcome: Progressing  Goal: Oral mucous membranes remain intact  Description: INTERVENTIONS  - Assess oral mucosa and hygiene practices  - Implement preventative oral hygiene regimen  - Implement oral medicated treatments as ordered  - Initiate Nutrition services referral as needed  Outcome: Progressing     Problem: HEMATOLOGIC - ADULT  Goal: Maintains hematologic stability  Description: INTERVENTIONS  - Assess for signs and symptoms of bleeding or hemorrhage  - Monitor labs  - Administer supportive blood products/factors as ordered and appropriate  Outcome: Progressing

## 2025-03-10 NOTE — CASE MANAGEMENT
Per [employer], SNF auth still pending.     Escalation email sent to [employer]ation Team requesting expedite of determination.     VINAY notified:  Samuel Aguilar

## 2025-03-10 NOTE — CASE MANAGEMENT
Case Management Discharge Planning Note    Patient name Devin Chaves  Location S /S -01 MRN [id number]  : 1945 Date 3/10/2025       Current Admission Date: 2025  Current Admission Diagnosis:Symptomatic anemia   Patient Active Problem List    Diagnosis Date Noted Date Diagnosed    Severe protein-calorie malnutrition (HCC) 2025     Dysphagia 2025     Melena 2025     ORIANA (acute kidney injury) (HCC) 2025     Pleural effusion, bilateral 2025     Family history of cerebrovascular accident (CVA) 2025     Rash 2025     Esophageal dysphagia 2025     Ambulatory dysfunction 2025     Left ankle joint deformity 2025     Colonic mass 2025     Stroke ([facility]) 02/15/2025     Neck pain 2025     Symptomatic anemia 2025     MSSA bacteremia 2025     Sepsis ([facility]) 2025     Urinary retention 2025     Hypotension 2025     Constipation 2025     Atrial fibrillation ([facility]) 2025     Hip region mass, unspecified laterality 2023     Chronic hypoxic respiratory failure ([facility]) 2023     Fracture of multiple ribs of right side 2023     Rheumatoid arthritis ([facility]) 2023     Moderate protein-calorie malnutrition ([facility]) 2023     HTN (hypertension) 2023     CAD (coronary artery disease) 2023     COPD (chronic obstructive pulmonary disease) (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  [address]  [facility] [place] [address]  Phone: [phone] Fax: [phone]    [facility] - [place] - [address]  [address]  [place] [address]  Phone: [phone] Fax: [phone]    [facility] - [place] - [address]  [address]  [place] [address]  Phone: [phone] Fax: [phone]    Primary Care Provider: Park Saxena MD    Primary Insurance: [facility]  Secondary Insurance: [employer] REP    DISCHARGE DETAILS:                                                                                                               Facility Insurance Auth Number: [id number]

## 2025-03-10 NOTE — CASE MANAGEMENT
[facility] has received APPROVED authorization.  Insurance:   [employer]  Auth obtained via Insurance Rep:  Samuel FAUST#: [phone]   Authorization received for: [facility]  Facility: [facility]   Authorization #: [id number]   Start of Care: 3/10  Next Review Date: 3/16  Continued Stay Care Coordinator: Samuel FAUST#: [phone]  Submit next review to: [phone]     Care Manager notified: Samuel GALINDO    Please reach out to  for updates on any clinical information.

## 2025-03-10 NOTE — ASSESSMENT & PLAN NOTE
Patient with ORIANA on admission, most likely secondary to hypovolemia from GI bleed.  Creatinine was slowly improving but now trending up again.  Antibiotic dosages adjusted accordingly.  Monitor creatinine.  Nephrology follow-up.

## 2025-03-10 NOTE — ASSESSMENT & PLAN NOTE
BP is at goal.   Rx: Metoprolol 25 mg BID, Torsemide 10 mg QOD.   Increase Torsemide to 10 mg OD.

## 2025-03-11 NOTE — CASE MANAGEMENT
Case Management Progress Note    Patient name Devin Chaves  Location S /S -01 MRN [id number]  : 1945 Date 3/11/2025       LOS (days): 11  Geometric Mean LOS (GMLOS) (days): 3.6  Days to GMLOS:-7.7        OBJECTIVE:        Current admission status: Inpatient  Preferred Pharmacy:   [id number] - [place] - [address]  [address]  [place] [address]  Phone: [phone] Fax: [phone]    [facility] - [place] - [address]  [address]  [address]  [place] [address]  Phone: [phone] Fax: [phone]    [facility] - [place] - [address]  [address]  [place] [address]  Phone: [phone] Fax: [phone]    Primary Care Provider: Park Saxena MD    Primary Insurance: [facility]  Secondary Insurance: [facility]    PROGRESS NOTE:    Weekly Care Management Length of Stay Review     Current LOS: 11 Days    Most Recent Labs:     Lab Results   Component Value Date/Time    WBC 5.32 2025 05:40 AM    HGB 7.2 (L) 2025 09:47 AM    HCT 24.3 (L) 2025 09:47 AM     (L) 2025 05:40 AM    SODIUM 144 2025 05:40 AM    K 3.7 2025 05:40 AM     (H) 2025 05:40 AM    CO2 32 2025 05:40 AM    BUN 37 (H) 2025 05:40 AM    CREATININE 1.95 (H) 2025 05:40 AM    GLUC 81 2025 05:40 AM       Most Recent Vitals:   Vitals:    25 1506   BP: 114/62   Pulse: 88   Resp: 16   Temp: 97.9 °F (36.6 °C)   SpO2: 96%        Identified Barriers to Discharge/Discharge Goals/Care Management Interventions:  symptomatic anemia, monitor H&H, ORIANA-c/w diuretic. Not medically stable    Intended Discharge Disposition: Bed at [facility] approved until 3/16.     Expected Discharge Date:  48hrs

## 2025-03-11 NOTE — ASSESSMENT & PLAN NOTE
Recent Labs     03/09/25  0544 03/10/25  0442 03/11/25  0540   CREATININE 1.94* 2.03* 1.95*   EGFR 32 30 32     Estimated Creatinine Clearance: 34.8 mL/min (A) (by C-G formula based on SCr of 1.95 mg/dL (H)).     Cr 2.43 on admission. Baseline 0.9. BUN 63. BUN/Cr ratio >20  Hypervolemia on exam  3/7/2025 chest xray:Unchanged pulmonary edema and small pleural effusions. Pneumonia not excluded.   Last echocardiogram 2/2025.  EF 55% G1 DD  As of 3/10/2025, IV torsemide every other day 10 mg with I&O -6 L    Plan:  Daily BMP  Continue torsemide 10 mg daily per nephrology's recommendations.  Creatinine decreasing from 2.03 to 1.95  Avoid nephrotoxic medications as able  Monitor I/O's  Urinary retention protocol

## 2025-03-11 NOTE — HOSPITAL COURSE
79-year-old male known history of colon adeno carcinoma originally presented due to hemoglobin of 4 requiring multiple units of blood.  Initially had dark/black urine noted in canister as well as episodes of melena.  Placed on Protonix drip and consulted GI for further recommendations.  No urgent surgical intervention was required.  Recommended nutritional and iron supplements for presurgical optimization.  Urology consulted for dark urine and recommended Flomax and to monitor voiding supportive care.    Also found to have MSSA bacteremia on last admission but TTE was negative for vegetations.  Patient was discharged on cefazolin 2 g IV every 8 hours until 3/27/2025 with PICC line in place since last hospitalization.  Consult placed to ID.  Recommended repeat C-spine MRI with and without contrast when ORIANA resolves and recommended high-dose IV cefazolin for 6 weeks adjuvant as originally planned last admission.    During course of hospitalization patient also found to be volume overloaded on exam but promptly experienced an ORIANA after IV diuresis.  Placed consult to cardiology and nephrology for further walking recommendations regarding diuresis.  Patient was originally placed on IV torsemide every other day but continued to present as volume overloaded with elevated creatinine.  Nephrology recommended p.o. torsemide 10 mg daily.  Creatinine began to downtrend at time of admission.

## 2025-03-11 NOTE — PLAN OF CARE
Problem: OCCUPATIONAL THERAPY ADULT  Goal: Performs self-care activities at highest level of function for planned discharge setting.  See evaluation for individualized goals.  Description: Treatment Interventions: ADL retraining, Functional transfer training, Endurance training, Cognitive reorientation, Patient/family training, Equipment evaluation/education, Compensatory technique education, Continued evaluation, Energy conservation, Activityengagement          See flowsheet documentation for full assessment, interventions and recommendations.   Note: Limitation: Decreased ADL status, Decreased Safe judgement during ADL, Decreased cognition, Decreased endurance, Decreased self-care trans, Decreased high-level ADLs (pain, balance, fxnl mobility, act broderick, fxnl reach, standing broderick, and strength, safety awareness, insight, problem solving, learning new tasks, and initiation, response time)  Prognosis: Good  Assessment: Patient seen for OT treatment on 3/11/2025 s/p admission for Symptomatic anemia Patient agreeable to OT session. Patient participated in fall prevention , energy conservation , self-care transfers, bed mobility , functional mobility, and ADLs with intervention focus on optimizing independence in functional mobility, self-care transfers and ADL tasks. Devin Chaves is continuing to perform below baseline due to the following deficits: endurance ,  decreased muscular strength , decreased standing tolerance for self care tasks , decreased dynamic balance impacting functional reach, decreased activity tolerance , and impaired memory . Personal factors continuing to impact D/C include: Assistance needed for ADL/IADLs, Assistance needed for ADLs and functional mobility, High fall risk , and decreased recall of precautions  From OT standpoint, patient would benefit from skilled intervention to maximize independence with ADLs and functional mobility. Goals remain appropriate, continue POC. At this time,  recommending D/C to: level II (moderate resource intensity).     Rehab Resource Intensity Level, OT: II (Moderate Resource Intensity)     Mariposa Nayak MS OTR/L   [place] Licensure# [id number]

## 2025-03-11 NOTE — ASSESSMENT & PLAN NOTE
Patient with COPD on 2-4 L supplemental O2 at baseline    Plan:  Titrate O2 to maintain SpO2 greater than 88%   Albuterol inhaler every 4 hours as needed for wheezing  RT notified to administer nebs  Xopenex TID   Respiratory protocol

## 2025-03-11 NOTE — ASSESSMENT & PLAN NOTE
AC: Eliquis 2.5 mg BID  Rate control: Lopressor 25 mg Q12 hrs    Plan:  Continue Eliquis 5 mg.  Repeat H&H at 6 PM.  If hemoglobin below 7 we will hold Eliquis  Continue Lopressor 25 mg Q12 hrs

## 2025-03-11 NOTE — UTILIZATION REVIEW
Continued Stay Review    Date: 3/11                          Current Patient Class: Inpatient  Current Level of Care:     HPI:79 y.o. male initially admitted on  2/28 as Inpatient for Dx: Acute blood loss anemia due to ascending colonic mass exacerbated by Eliquis/Elevated troponin/ORIANA/Chronic hypoxic respiratory failure secondary to COPD/PAF . Hgb 4  Current Diagnosis: MSSA bacteremia, ORIANA, neck pain     Assessment/Plan:  Infectious diseases  3/11 Continue high dose cefazolin Treat x 6 weeks from clearance of bacteremia as previously planned, through 3/27. Stable posterior neck pain, no focal weakness, no chills  Stable leg edema, Antibiotic dosages adjusted accordingly, monitor creatinine. repeat C-spine MRI with and without contrast. This can be postponed until ORIANA is improved, but should be done prior to completion of IV antibiotic course.     Medications:   Scheduled Medications:  apixaban, 5 mg, Oral, BID  ascorbic acid, 250 mg, Oral, Daily  atorvastatin, 40 mg, Oral, Daily With Dinner  ceFAZolin, 2,000 mg, Intravenous, Q8H  Cholecalciferol, 2,000 Units, Oral, Daily  cyanocobalamin, 100 mcg, Oral, Daily  fluticasone, 1 puff, Inhalation, Daily  folic acid, 1 mg, Oral, Daily  hydroxychloroquine, 400 mg, Oral, Daily With Breakfast  lidocaine, 3 patch, Topical, Daily  metoprolol tartrate, 25 mg, Oral, Q12H GALE  IHTESH ANTIFUNGAL, , Topical, BID  pantoprazole, 40 mg, Oral, Daily Before Breakfast  senna-docusate sodium, 1 tablet, Oral, HS  tamsulosin, 0.4 mg, Oral, Daily With Dinner  torsemide, 10 mg, Oral, Daily      Continuous IV Infusions:     PRN Meds:  acetaminophen, 650 mg, Oral, Q6H PRN  albuterol, 2 puff, Inhalation, Q4H PRN  HYDROmorphone, 0.2 mg, Intravenous, Q4H PRN  oxyCODONE, 5 mg, Oral, Q6H PRN  oxyCODONE, 2.5 mg, Oral, Q6H PRN  polyethylene glycol, 17 g, Oral, Daily PRN      Discharge Plan: TBD     Vital Signs (last 3 days)       Date/Time Temp Pulse Resp BP MAP (mmHg) SpO2 [place] Coma Scale Score Pain     03/11/25 07:32:35 97.9 °F (36.6 °C) 88 16 134/61 85 98 % -- --    03/11/25 0100 -- -- -- -- -- -- -- No Pain    03/10/25 22:43:07 97.8 °F (36.6 °C) 73 -- 117/49 72 99 % -- --    03/10/25 22:42:25 97.8 °F (36.6 °C) 70 -- 117/49 72 100 % -- --    03/10/25 21:58:11 -- 90 -- 128/63 85 100 % -- --    03/10/25 2000 -- -- -- -- -- -- 15 --    03/10/25 15:22:08 97.9 °F (36.6 °C) 80 16 123/99 107 100 % -- --    03/10/25 1125 -- -- -- -- -- -- -- 5    03/10/25 0900 -- -- -- -- -- -- 15 --    03/10/25 07:33:14 98.5 °F (36.9 °C) 71 16 125/56 79 95 % -- --    03/10/25 0104 -- -- -- -- -- -- 15 No Pain    03/09/25 22:52:42 98.5 °F (36.9 °C) 79 -- 125/58 80 100 % -- --    03/09/25 22:52:34 98.5 °F (36.9 °C) -- -- 125/58 80 -- -- --    03/09/25 21:55:13 -- 83 18 140/61 87 99 % -- --    03/09/25 15:36:05 97.9 °F (36.6 °C) 75 -- 133/58 83 99 % -- --    03/09/25 09:37:58 -- 71 -- 124/57 79 100 % -- No Pain    03/09/25 0800 -- -- -- -- -- -- 15 --    03/09/25 07:44:37 97.8 °F (36.6 °C) 72 -- 124/57 79 99 % -- --    03/09/25 07:41:13 97.8 °F (36.6 °C) 75 -- 96/57 70 100 % -- --    03/09/25 0100 -- -- -- -- -- -- -- No Pain    03/08/25 22:36:10 98.9 °F (37.2 °C) 67 -- 117/56 76 100 % -- --    03/08/25 2000 -- -- -- -- -- -- 15 --    03/08/25 15:55:03 99.1 °F (37.3 °C) 81 -- 136/63 87 97 % -- --    03/08/25 0800 -- -- -- -- -- -- 15 No Pain    03/08/25 07:31:06 97.8 °F (36.6 °C) 78 -- 127/63 84 100 % -- --          Weight (last 2 days)       Date/Time Weight    03/11/25 0600 89 (196.21)    03/10/25 0600 89.3 (196.87)            Pertinent Labs/Diagnostic Results:   Radiology:  XR chest portable   Final Interpretation by Eric Kolb MD (03/07 1018)      Unchanged pulmonary edema and small pleural effusions. Pneumonia not excluded.            Workstation performed: [id number]         XR chest PICC line portable   Final Interpretation by Jacqueline Ha MD (03/02 0850)      Right PICC in upper SVC.      Moderate pulmonary venous  congestion with small pleural effusions and bibasilar atelectasis.            Workstation performed: [id number]         CT chest w ct abdomen pelvis w wo contrast   Final Interpretation by Eric Willis MD (02/28 0812)   Addendum (preliminary) 1 of 1 by Eric Willis MD (02/28 0812)   ADDENDUM:      Correction to spleen section in the body of report. Hyperattenuation    should be hypoattenuation. Grossly stable posterior splenic subcapsular    hypodensity presumably an infarct unchanged allowing for difference in    contrast timing.            Final      CT chest:      Decreased intracardiac blood pool density compatible with anemia.      New bilateral multi lobar interstitial thickening and tree-in-bud opacities may represent pulmonary vascular congestion or nonspecific inflammatory or infectious process.      Bilateral pleural effusions, left greater than right, mildly enlarged and additional findings suggestive of pulmonary vascular congestion. Correlate with clinical findings.      5 mm left upper lobe nodule stable since 2/11/2025 and new since December 2023. 6 mm right lower lobe nodule new since 2/11/2025. Noncontrast chest CT follow-up in 3 months is advised.      Additional chronic findings and negatives as above.      CT abdomen and pelvis:      No evidence of high-volume gastrointestinal bleeding.      Stable upper cecal/proximal ascending colonic mass attributed to recently biopsy-proven adenocarcinoma.      Colonic diverticulosis.      No evidence of abdominopelvic metastatic disease.      Additional chronic findings and negatives as above.      The study was marked in EPIC for immediate notification.               Workstation performed: [id number]         XR chest 1 view portable   Final Interpretation by Orion Zapien MD (02/28 0847)   Cardiomegaly with central congestion and left greater than right small basilar effusions.      Workstation performed: [id number]        "    Cardiology:  ECG 12 lead   Final Result by Gianluca Montanez MD (03/02 2256)   Normal sinus rhythm   Normal ECG   When compared with ECG of 13-Feb-2025 10:11,   Minimal criteria for Inferior infarct are no longer Present   Confirmed by Gianluca Montanez ([id number]) on 3/2/2025 10:56:31 PM        GI:  No orders to display           Results from last 7 days   Lab Units 03/11/25  0540 03/10/25  0828 03/10/25  0442 03/09/25  0544 03/08/25  0530   WBC Thousand/uL 5.32  --  4.92 5.18 4.62   HEMOGLOBIN g/dL 7.1* 7.7* 7.0* 7.6* 7.8*   HEMATOCRIT % 24.0* 25.5* 23.5* 24.5* 25.3*   PLATELETS Thousands/uL 145*  --  133* 144* 160   TOTAL NEUT ABS Thousands/µL  --   --  3.43 3.58 3.21         Results from last 7 days   Lab Units 03/11/25  0540 03/10/25  0442 03/09/25  0544 03/08/25  0530 03/07/25  0541   SODIUM mmol/L 144 143 144 144 142   POTASSIUM mmol/L 3.7 3.8 3.6 3.9 4.2   CHLORIDE mmol/L 109* 109* 109* 110* 110*   CO2 mmol/L 32 32 30 31 28   ANION GAP mmol/L 3* 2* 5 3* 4   BUN mg/dL 37* 36* 36* 35* 34*   CREATININE mg/dL 1.95* 2.03* 1.94* 1.97* 1.80*   EGFR ml/min/1.73sq m 32 30 32 31 35   CALCIUM mg/dL 8.1* 8.0* 8.1* 8.2* 8.2*   MAGNESIUM mg/dL 2.0 2.1 1.9 2.0 1.8*             Results from last 7 days   Lab Units 03/11/25  0540 03/10/25  0442 03/09/25  0544 03/08/25  0530 03/07/25  0541 03/06/25  0521 03/05/25  0525   GLUCOSE RANDOM mg/dL 81 86 82 75 81 82 82             No results found for: \"BETA-HYDROXYBUTYRATE\"                                                                                       Results from last 7 days   Lab Units 03/08/25  1545 03/05/25  1647   CLARITY UA  Clear  --    COLOR UA  Light Yellow  --    SPEC GRAV UA  1.010  --    PH UA  5.0  --    GLUCOSE UA mg/dl Negative  --    KETONES UA mg/dl Negative  --    BLOOD UA  Small*  --    PROTEIN UA mg/dl 50 (1+)*  --    NITRITE UA  Negative  --    BILIRUBIN UA  Negative  --    UROBILINOGEN UA (BE) mg/dl <2.0  --    LEUKOCYTES UA  Negative  --    WBC UA /hpf " 2-4*  --    RBC UA /hpf 2-4*  --    BACTERIA UA /hpf None Seen  --    EPITHELIAL CELLS WET PREP /hpf Occasional  --    CREATININE UR mg/dL 28.1 89.7   PROTEIN UR mg/dL 96.9  --    PROT/CREAT RATIO UR  3.4*  --                                                    Network Utilization Review Department  ATTENTION: Please call with any questions or concerns to [phone] and carefully listen to the prompts so that you are directed to the right person. All voicemails are confidential.   For Discharge needs, contact Care Management DC Support Team at [phone] opt. 2  Send all requests for admission clinical reviews, approved or denied determinations and any other requests to dedicated fax number below belonging to the campus where the patient is receiving treatment. List of dedicated fax numbers for the Facilities:  FACILITY NAME UR FAX NUMBER   ADMISSION DENIALS (Administrative/Medical Necessity) [phone]   DISCHARGE SUPPORT TEAM (NETWORK) [phone]   PARENT CHILD HEALTH (Maternity/NICU/Pediatrics) [phone]   [facility] [phone]   [facility] [phone]   [facility] [phone]   General acute hospital [phone]   [facility] [phone]   [facility] [phone]   [facility] [phone]   [facility] [phone]   [facility] [phone]   [facility] [phone]   [facility] [phone]   [facility] [phone]

## 2025-03-11 NOTE — ASSESSMENT & PLAN NOTE
Patient with ORIANA on admission, most likely secondary to hypovolemia from GI bleed.  Creatinine is beginning to improve again.  Antibiotic dosages adjusted accordingly.  Monitor creatinine.  Nephrology follow-up.

## 2025-03-11 NOTE — ASSESSMENT & PLAN NOTE
Worsening ambulatory dysfunction since December  PT/OT evaluations recommend level 2   Discussed with podiatry, patient has chronic left ankle fracture over 2 years ago, recommend WBAT to left foot as curbside, no further podiatry needs   Consider ambulatory Ortho consult and brace fitting     Plan:  Weight bearing status: As tolerated with brace   No

## 2025-03-11 NOTE — OCCUPATIONAL THERAPY NOTE
Occupational Therapy Treatment Note     Patient Name: Devin Chaves  Today's Date: 3/11/2025  Problem List  Principal Problem:    Symptomatic anemia  Active Problems:    HTN (hypertension)    CAD (coronary artery disease)    COPD (chronic obstructive pulmonary disease) (HCC)    History of CVA (cerebrovascular accident)    Atrial fibrillation (HCC)    Urinary retention    MSSA bacteremia    Neck pain    Colonic mass    Ambulatory dysfunction    Melena    ORIANA (acute kidney injury) (HCC)    Pleural effusion, bilateral    Dysphagia    Severe protein-calorie malnutrition (HCC)       03/11/25 1336   OT Last Visit   OT Visit Date 03/11/25   Note Type   Note Type Treatment   Pain Assessment   Pain Assessment Tool 0-10   Pain Score No Pain   Restrictions/Precautions   Weight Bearing Precautions Per Order No   LLE Weight Bearing Per Order WBAT   Other Precautions Chair Alarm;Bed Alarm;WBS;O2;Fall Risk  (4L O2 via NC)   Lifestyle   Autonomy Pt currently admitted from [facility] for STR. At true baseline, pt lives w/ spouse in a 2 levle house and is fully (I).   Reciprocal Relationships Supportive spouse/family and facility staff at STR   Service to Others Retired   ADL   Where Assessed Chair   Eating Assistance 6  Modified independent   Eating Deficit Setup   Eating Comments w/ lunch tray at the end of the session, able to open and manage all containers   LB Dressing Assistance 3  Moderate Assistance   LB Dressing Deficit Setup;Verbal cueing;Supervision/safety;Increased time to complete;Thread RLE into underwear;Thread LLE into underwear;Pull up over hips   LB Dressing Comments performed sitting in recliner chair. Tailor sit to thread underwear over BLEs, assist to stand + pull up posteriorly over hips. Pt is significantly limited by fatigue and SOB. Frequent rest breaks throughout task   Toileting Comments Pt denies the need to void   Functional Standing Tolerance   Time 30 seconds   Activity LB dressing   Comments minimal  "assist for dynamic standing balance w/ unilateral UE support on RW. Significantly limited by fatigue, SOB/PUTNAM   Bed Mobility   Supine to Sit 4  Minimal assistance   Additional items Assist x 1;Increased time required;Verbal cues  (trunk managment: attempts to use momentum to acheive sitting position at the EOB. Education on energy conservation techniques to optimize performance)   Sit to Supine   (NT: OOB to recliner chair)   Additional Comments Fair + static sitting balance sitting at the EOB. Mild SOB/PUTNAM noted, inc time to recover.   Transfers   Sit to Stand 3  Moderate assistance   Additional items Assist x 1;Increased time required;Verbal cues  (from elevated bed height)   Stand to Sit 3  Moderate assistance   Additional items Assist x 1;Increased time required;Verbal cues   Additional Comments Attempted to perform STS from EOB, depsite MAXA x 1 pt unable to acheive standing position w/ RW support. Bed elevated for successful transfers. Additional STS performed from the recliner chair w/ MODA x 1 person, heavy use of BUEs on armrests.   Functional Mobility   Functional Mobility 3  Moderate assistance   Additional Comments Ax1 to ambulate short distance from the EOB to the recliner chair w/ use of RW. On 4L O2 via NC, O2 desaturating to low 70s (questionable waveform) with participation in purse lip breathing, pt able to recover to >90% with increased time. Pt declined futher mobility at this time d/t 2* fatigue and SOB/PUTNAM.   Additional items Rolling walker   Subjective   Subjective \"I can't catch my breath\" Pt positions self w/ neck and trunk in extension - reports expansion of lungs and being able \"to breathe better\"   Cognition   Overall Cognitive Status WFL   Arousal/Participation Responsive;Cooperative   Attention Within functional limits   Orientation Level Oriented X4   Memory Decreased recall of precautions   Following Commands Follows one step commands with increased time or repetition   Comments Pt ID " via wristband, name and . Pt is pleasent, cooperative and motivated. Cognition is WFL during conversation, questionable higher level cognitive deficits.   Activity Tolerance   Activity Tolerance Patient limited by fatigue;Treatment limited secondary to medical complications (Comment)  (SOB/PUTNAM, generalized weakness)   Medical Staff Made Aware Spoke with RICHIE King pre/post   Assessment   Assessment Patient seen for OT treatment on 3/11/2025 s/p admission for Symptomatic anemia Patient agreeable to OT session. Patient participated in fall prevention , energy conservation , self-care transfers, bed mobility , functional mobility, and ADLs with intervention focus on optimizing independence in functional mobility, self-care transfers and ADL tasks. Devin Chaves is continuing to perform below baseline due to the following deficits: endurance ,  decreased muscular strength , decreased standing tolerance for self care tasks , decreased dynamic balance impacting functional reach, decreased activity tolerance , and impaired memory . Personal factors continuing to impact D/C include: Assistance needed for ADL/IADLs, Assistance needed for ADLs and functional mobility, High fall risk , and decreased recall of precautions  From OT standpoint, patient would benefit from skilled intervention to maximize independence with ADLs and functional mobility. Goals remain appropriate, continue POC. At this time, recommending D/C to: level II (moderate resource intensity).   Plan   Treatment Interventions ADL retraining;Functional transfer training;Endurance training;Patient/family training;Equipment evaluation/education;Compensatory technique education;Energy conservation;Activityengagement   Goal Expiration Date 25  (Initial evaluation goals expiring, POC reviewed - remains appropriate. Will extend goals + 10 days)   OT Treatment Day 2   OT Frequency 3-5x/wk   Discharge Recommendation   Rehab Resource Intensity Level, OT II  (Moderate Resource Intensity)   AM-PAC Daily Activity Inpatient   Lower Body Dressing 2   Bathing 2   Toileting 2   Upper Body Dressing 3   Grooming 3   Eating 4   Daily Activity Raw Score 16   Daily Activity Standardized Score (Calc for Raw Score >=11) 35.96   AM-PAC Applied Cognition Inpatient   Following a Speech/Presentation 3   Understanding Ordinary Conversation 4   Taking Medications 3   Remembering Where Things Are Placed or Put Away 3   Remembering List of 4-5 Errands 3   Taking Care of Complicated Tasks 2   Applied Cognition Raw Score 18   Applied Cognition Standardized Score 38.07   End of Consult   Education Provided Yes   Patient Position at End of Consult Bedside chair;All needs within reach;Bed/Chair alarm activated   Nurse Communication Nurse aware of consult    GOALS:     *Goals established to promote patient goal of to get better:       *Patient will perform grooming tasks sitting at sink with mod (I) in order to increase overall independence w/ self-care     *UB ADL with (I) for inc'd independence with self care     *LB ADL with Min (A) using AE prn for inc'd independence with self care (PROGRESSING)     *Toileting with Min (A) for clothing management and hygiene to increase hygiene/thoroughness in order to reduce caregiver burden (NOT PROGRESSING)     *Pt will demonstrate use of long handled AE during 100% of tx sessions for increased ADL safety and independence following D/C      *Patient will verbalize and demonstrate use of energy conservation/deep breathing techniques and work simplification skills during functional activities with no verbal cues. (PROGRESSING)     *ADL transfers with Mod (A) for inc'd independence with ADLs/purposeful tasks (PROGRESSING)     *Bed mobility- Min (A) for inc'd independence to manage own comfort and initiate EOB & OOB purposeful tasks (GOAL MET- upgrade to mod (I))     *Patient will increase functional mobility to and from [facility] with rolling walker with mod assist  to increase independence with toileting (PROGRESSING)     *Patient will increase OOB/sitting tolerance to 2-4 hours per day to increase participation in self-care and leisure tasks with no s/s of exertion. (PROGRESSING)     *Patient will improve functional activity tolerance to 20 minutes of sustained functional tasks to increase participation in basic self-care and decrease assistance level. (NOT PROGRESSING)     *Patient will increase static/dynamic standing balance to fair/fair- to maximize safety/performance of ADLs/standing purposeful tasks (PROGRESSING)     *Patient will engage in ongoing cognitive assessment to assist with safe discharge planning/recommendations.      *Pt will consistently follow multi step directions during ADL performance w/ % accuracy to max I and safety w/ ADL performance    The patient's raw score on the -PAC Daily Activity Inpatient Short Form is 16. A raw score of less than 19 suggests the patient may benefit from discharge to post-acute rehabilitation services. Please refer to the recommendation of the Occupational Therapist for safe discharge planning.    Mariposa Nayak MS OTR/L   [place] Licensure# [id number]

## 2025-03-11 NOTE — ASSESSMENT & PLAN NOTE
Recent Labs     03/10/25  0828 03/11/25  0540 03/11/25  0947   HGB 7.7* 7.1* 7.2*      Patient presents with 2 days of chest pain, shortness of breath, fatigue and melena  2/20/2025 EGD/colonoscopy showed 2.5 cm ileocecal mass, pathology confirmed adenocarcinoma.  S/p 3 units packed rbc in ED. S/p 1 unit PRBCs on 3/2  Hemoglobin has been stable.   Patient recent diagnosis of adenocarcinoma, increased risk of stroke.     Plan:  Daily CBC  Transfuse for Hgb <7  Monitor for signs of overt bleeding or melenotic stools  Protonix 40 mg PO QD   Increase Eliquis from 2.5 mg to 5 mg as hemoglobin currently stable at 7.7 and patient denies any melena, hematuria, or hemoptysis.  Hemoglobin low at 7.1 this a.m.  Recheck hemoglobin 7.2.  Will recheck hemoglobin at 6 PM this evening.  If hemoglobin drops below 7 we will transfuse and hold Eliquis.

## 2025-03-11 NOTE — ASSESSMENT & PLAN NOTE
Baseline creatinine is normal at 0.8 to 0.9.   Admission SCr 2.43 on 2/28/25.   SCr has been around 1.8 to 2.0 since admission.   Etiology is not entirely clear - not prerenal or postrenal. Working dx is ATN but could have another underlying pathology.   CT 2/28/25 - no hydronephrosis. UA on 3/8/25 with RBC 2-4, WBC 2-4, small blood and trace protein.   Of note, UPC ratio was 3.4 gm on 3/8/25 and urine ACR was only 821 mg on 3/5/25.   Serum LIDYA no M spike, C3 83 (low), C4 26.   At risk for AIN but no eosinophilia.   Renal function stable but SCr above baseline - SCr 1.95 today.   May consider renal biopsy in the future if renal function deteriorates or if felt to . Not necessary now since stable renal function.   Continue Torsemide 10 mg OD.

## 2025-03-11 NOTE — PROGRESS NOTES
NEPHROLOGY HOSPITAL PROGRESS NOTE   Devin Chaves 79 y.o. male MRN: [id number]  Unit/Bed#: S -01 Encounter: [id number]  Reason for Consult: ORIANA    Assessment & Plan  ORIANA (acute kidney injury) (HCC)  Baseline creatinine is normal at 0.8 to 0.9.   Admission SCr 2.43 on 2/28/25.   SCr has been around 1.8 to 2.0 since admission.   Etiology is not entirely clear - not prerenal or postrenal. Working dx is ATN but could have another underlying pathology.   CT 2/28/25 - no hydronephrosis. UA on 3/8/25 with RBC 2-4, WBC 2-4, small blood and trace protein.   Of note, UPC ratio was 3.4 gm on 3/8/25 and urine ACR was only 821 mg on 3/5/25.   Serum LIDYA no M spike, C3 83 (low), C4 26.   At risk for AIN but no eosinophilia.   Renal function stable but SCr above baseline - SCr 1.95 today.   May consider renal biopsy in the future if renal function deteriorates or if felt to . Not necessary now since stable renal function.   Continue Torsemide 10 mg OD.     HTN (hypertension)  BP is controlled.   Rx: Metoprolol 25 mg BID, Torsemide 10 mg OD.   No changes.     MSSA bacteremia  Currently on Cefazolin 2 gm IV q8H - needs this until 3/27/25.   ID following.   Possible cutaneous source  TTE without vegetation.     Urinary retention  Seen by urology this admission.   Currently on Tamsulosin.     Symptomatic anemia  Hgb monitoring per primary service.   Iron studies okay in Feb 2025.     Colonic mass  Newly diagnosed colon mass on C-scope on 2/20/25.   Pathology - adenocarcinoma  Was to follow colorectal as outpatient    Pleural effusion, bilateral  CXR with vascular congestion and pleural effusions.   Continue torsemide 10 mg OD.     Atrial fibrillation (HCC)  On Metoprolol for rate control.   On Eliquis for AC      TODAY's DISCUSSION / PLAN:  Renal function is stable but SCr above baseline.   Continue Torsemide 10 mg OD.     SUBJECTIVE / 24H INTERVAL HISTORY:  Sitting in chair.  Denies worsening SOB.      OBJECTIVE:  Current Weight: Weight - Scale: 89 kg (196 lb 3.4 oz)  Vitals:    03/10/25 2243 03/11/25 0600 03/11/25 0732 03/11/25 1506   BP: (!) 117/49  134/61 114/62   Pulse: 73  88 88   Resp:   16 16   Temp: 97.8 °F (36.6 °C)  97.9 °F (36.6 °C) 97.9 °F (36.6 °C)   TempSrc:       SpO2: 99%  98% 96%   Weight:  89 kg (196 lb 3.4 oz)     Height:           Intake/Output Summary (Last 24 hours) at 3/11/2025 1600  Last data filed at 3/11/2025 1400  Gross per 24 hour   Intake --   Output 875 ml   Net -875 ml     General: conscious, cooperative, no distress  Skin: dry  Eyes: pink conjunctivae  ENT: moist mucous membranes  Respiratory: equal chest expansion, clear breath sounds.  Cardiovascular: distinct heart sounds, normal rate, regular rhythm, no rub  Abdomen: soft, non tender, non distended, normal bowel sounds  Extremities: + bilateral LE edema.   Genitourinary: no glass catheter.   Neuro: awake, alert.   Psych: appropriate affect    Medications:    Current Facility-Administered Medications:     acetaminophen (TYLENOL) tablet 650 mg, 650 mg, Oral, Q6H PRN, Jean Brady MD    albuterol (PROVENTIL HFA,VENTOLIN HFA) inhaler 2 puff, 2 puff, Inhalation, Q4H PRN, Fiordaliza Ibrahim MD    apixaban (ELIQUIS) tablet 5 mg, 5 mg, Oral, BID, Kelly Herman MD, 5 mg at 03/11/25 0833    ascorbic acid (VITAMIN C) tablet 250 mg, 250 mg, Oral, Daily, Kanchna Wilson MD, 250 mg at 03/11/25 0832    atorvastatin (LIPITOR) tablet 40 mg, 40 mg, Oral, Daily With Dinner, Kanchan Wilson MD, 40 mg at 03/10/25 1740    ceFAZolin (ANCEF) IVPB (premix in dextrose) 2,000 mg 50 mL, 2,000 mg, Intravenous, Q8H, Olivia Martinez MD, Last Rate: 100 mL/hr at 03/11/25 1139, 2,000 mg at 03/11/25 1139    Cholecalciferol (VITAMIN D3) tablet 2,000 Units, 2,000 Units, Oral, Daily, Kanchan Wilson MD, 2,000 Units at 03/11/25 0832    cyanocobalamin (VITAMIN B-12) tablet 100 mcg, 100 mcg, Oral, Daily, Kanchan Wilson MD, 100 mcg at 03/11/25 0832     fluticasone (ARNUITY ELLIPTA) 100 MCG/ACT inhaler 1 puff, 1 puff, Inhalation, Daily, Kanchan Wilson MD, 1 puff at 03/11/25 0833    folic acid (FOLVITE) tablet 1 mg, 1 mg, Oral, Daily, Kanchan Wilson MD, 1 mg at 03/11/25 0832    HYDROmorphone HCl (DILAUDID) injection 0.2 mg, 0.2 mg, Intravenous, Q4H PRN, Jean Brady MD    hydroxychloroquine (PLAQUENIL) tablet 400 mg, 400 mg, Oral, Daily With Breakfast, Kanchan Wilson MD, 400 mg at 03/11/25 0833    lidocaine (LIDODERM) 5 % patch 3 patch, 3 patch, Topical, Daily, Kanchan Wilson MD, 3 patch at 03/10/25 0850    metoprolol tartrate (LOPRESSOR) tablet 25 mg, 25 mg, Oral, Q12H GLAE, Kanchan Wilson MD, 25 mg at 03/11/25 0833    moisture barrier miconazole 2% cream (aka HITESH MOISTURE BARRIER ANTIFUNGAL CREAM), , Topical, BID, Mal Neely DO, Given at 03/11/25 0833    oxyCODONE (ROXICODONE) IR tablet 5 mg, 5 mg, Oral, Q6H PRN, Jean Brady MD, 5 mg at 03/01/25 1817    oxyCODONE (ROXICODONE) split tablet 2.5 mg, 2.5 mg, Oral, Q6H PRN, Jean Brady MD, 2.5 mg at 03/05/25 0945    pantoprazole (PROTONIX) EC tablet 40 mg, 40 mg, Oral, Daily Before Breakfast, Jean Brady MD, 40 mg at 03/11/25 0535    polyethylene glycol (MIRALAX) packet 17 g, 17 g, Oral, Daily PRN, Kelly Herman MD    senna-docusate sodium (SENOKOT S) 8.6-50 mg per tablet 1 tablet, 1 tablet, Oral, HS, Kelly Herman MD, 1 tablet at 03/10/25 2159    tamsulosin (FLOMAX) capsule 0.4 mg, 0.4 mg, Oral, Daily With Dinner, Kanchan Wilson MD, 0.4 mg at 03/10/25 1740    torsemide (DEMADEX) tablet 10 mg, 10 mg, Oral, Daily, Ben Woodall MD, 10 mg at 03/11/25 0832    Laboratory Results:  Results from last 7 days   Lab Units 03/11/25  0947 03/11/25  0540 03/10/25  0828 03/10/25  0442 03/09/25  0544 03/08/25  0530 03/07/25  0541 03/06/25  0521 03/05/25 2032 03/05/25  0525   WBC Thousand/uL  --  5.32  --  4.92 5.18 4.62 5.12 4.63  --  5.28  "  HEMOGLOBIN g/dL 7.2* 7.1* 7.7* 7.0* 7.6* 7.8* 8.2* 7.7*   < > 8.0*   HEMATOCRIT % 24.3* 24.0* 25.5* 23.5* 24.5* 25.3* 26.4* 24.7*   < > 26.1*   PLATELETS Thousands/uL  --  145*  --  133* 144* 160 159 159  --  187   POTASSIUM mmol/L  --  3.7  --  3.8 3.6 3.9 4.2 3.5  --  3.5   CHLORIDE mmol/L  --  109*  --  109* 109* 110* 110* 109*  --  111*   CO2 mmol/L  --  32  --  32 30 31 28 28  --  27   BUN mg/dL  --  37*  --  36* 36* 35* 34* 38*  --  40*   CREATININE mg/dL  --  1.95*  --  2.03* 1.94* 1.97* 1.80* 1.86*  --  1.80*   CALCIUM mg/dL  --  8.1*  --  8.0* 8.1* 8.2* 8.2* 7.7*  --  7.9*   MAGNESIUM mg/dL  --  2.0  --  2.1 1.9 2.0 1.8* 1.9  --  2.0    < > = values in this interval not displayed.     Portions of the record may have been created with voice recognition software. Occasional wrong word or \"sound a like\" substitutions may have occurred due to the inherent limitations of voice recognition software. Read the chart carefully and recognize, using context, where substitutions have occurred.If you have any questions, please contact the dictating provider.    "

## 2025-03-11 NOTE — PROGRESS NOTES
Progress Note - Hospitalist   Name: Devin Chaves 79 y.o. male I MRN: [id number]  Unit/Bed#: S -01 I Date of Admission: 2/28/2025   Date of Service: 3/11/2025 I Hospital Day: 11    Assessment & Plan  Symptomatic anemia  Recent Labs     03/10/25  0828 03/11/25  0540 03/11/25  0947   HGB 7.7* 7.1* 7.2*      Patient presents with 2 days of chest pain, shortness of breath, fatigue and melena  2/20/2025 EGD/colonoscopy showed 2.5 cm ileocecal mass, pathology confirmed adenocarcinoma.  S/p 3 units packed rbc in ED. S/p 1 unit PRBCs on 3/2  Hemoglobin has been stable.   Patient recent diagnosis of adenocarcinoma, increased risk of stroke.     Plan:  Daily CBC  Transfuse for Hgb <7  Monitor for signs of overt bleeding or melenotic stools  Protonix 40 mg PO QD   Increase Eliquis from 2.5 mg to 5 mg as hemoglobin currently stable at 7.7 and patient denies any melena, hematuria, or hemoptysis.  Hemoglobin low at 7.1 this a.m.  Recheck hemoglobin 7.2.  Will recheck hemoglobin at 6 PM this evening.  If hemoglobin drops below 7 we will transfuse and hold Eliquis.  ORIANA (acute kidney injury) (HCC)  Recent Labs     03/09/25  0544 03/10/25  0442 03/11/25  0540   CREATININE 1.94* 2.03* 1.95*   EGFR 32 30 32     Estimated Creatinine Clearance: 34.8 mL/min (A) (by C-G formula based on SCr of 1.95 mg/dL (H)).     Cr 2.43 on admission. Baseline 0.9. BUN 63. BUN/Cr ratio >20  Hypervolemia on exam  3/7/2025 chest xray:Unchanged pulmonary edema and small pleural effusions. Pneumonia not excluded.   Last echocardiogram 2/2025.  EF 55% G1 DD  As of 3/10/2025, IV torsemide every other day 10 mg with I&O -6 L    Plan:  Daily BMP  Continue torsemide 10 mg daily per nephrology's recommendations.  Creatinine decreasing from 2.03 to 1.95  Avoid nephrotoxic medications as able  Monitor I/O's  Urinary retention protocol    Neck pain  MRI of the neck done 2/14/2025 revealed cervical degenerative change with mild canal stenosis and mild to  moderate foraminal narrowing.  No cord compression.  Mild nonspecific edema within the right posterior breast dermal musculature of the upper cervicals spine.  Minimal peripheral enhancement is noted.     Plan:  Will plan for repeat MRI C-spine on 3/20/25.  Should be completed prior to completion of IV antibiotic course per ID  COPD (chronic obstructive pulmonary disease) (HCC)  Patient with COPD on 2-4 L supplemental O2 at baseline    Plan:  Titrate O2 to maintain SpO2 greater than 88%   Albuterol inhaler every 4 hours as needed for wheezing  RT notified to administer nebs  Xopenex TID   Respiratory protocol   Colonic mass  2/20/2025 EGD/colonoscopy showed 2.5 cm ileocecal mass, pathology confirmed adenocarcinoma.    Patient was due to follow-up with outpatient colorectal surgery on 2/28/2025  Pt presenting with melena, Hgb 4.6. Symptomatic anemia.  Attending discussion with Colorectal: If COPD can stay controlled, increase chance of operation.  Oncology: Outpatient appointment for 4/3.  Discussed with wife 3/10/2025    MSSA bacteremia  Patient was discharged on cefazolin 2 g IV Q8 hrs until 3/27/2025. PICC line in place. Patient rescheduled to follow-up outpatient with ID    Plan:  Continue with cefazolin 2 g IV every 8 hours.  infectious disease recommendations appreciated   Recommend repeat C-spine MRI with and without contrast, postpone until ORIANA resolves  6 weeks treatment through 3/27/2025  Atrial fibrillation (HCC)  AC: Eliquis 2.5 mg BID  Rate control: Lopressor 25 mg Q12 hrs    Plan:  Continue Eliquis 5 mg.  Repeat H&H at 6 PM.  If hemoglobin below 7 we will hold Eliquis  Continue Lopressor 25 mg Q12 hrs  Pleural effusion, bilateral  CT A/P shows bilateral pleural effusions L>R, pulmonary vascular congestion    Plan:  Repeat imaging if pt's respiratory status worsens  Continue oxygen to keep oxygen saturations 88% and above.  HTN (hypertension)  Systolic BP 100s-130s.  Patient normotensive.  Blood pressure  stable.    Plan:  Lopressor 25 mg every 12 hours  CAD (coronary artery disease)  TTE 2/12/25: EF 55%, Normal systolic dysfunction, G1DD, No vegetation, No mural thrombus, moderate aortic sclerosis     Plan:  Hold aspirin in setting of GI bleed  History of CVA (cerebrovascular accident)  Pt had multiple embolic strokes during admission from 2/11/25-2/22/25  Thought to be embolic strokes 2/2 MSSA bacteremia, no vegetations on TTE    Plan:  Restart Eliquis 5 mg  Continue Lipitor 40 mg daily  Urinary retention  Patient has history of urinary retention and was started on Flomax during last hospitalization  Pt denies difficulty urinating at this shayy    Plan:  Flomax 0.4 mg daily  Urinary retention protocol  Severe protein-calorie malnutrition (HCC)  Malnutrition Findings:   Adult Malnutrition type: Acute illness  Adult Degree of Malnutrition: Other severe protein calorie malnutrition  Malnutrition Characteristics: Inadequate energy, Fluid accumulation  360 Statement: related to inadequate energy/protein intake as evidenced by consuming < 50% of energy intake compared to estimated needs for > 5 days and B/L LE +3 edema. Treated with ONS.  BMI Findings:     Body mass index is 26.61 kg/m².     Plan:  Ensure supplements  Ambulatory dysfunction  Worsening ambulatory dysfunction since December  PT/OT evaluations recommend level 2   Discussed with podiatry, patient has chronic left ankle fracture over 2 years ago, recommend WBAT to left foot as curbside, no further podiatry needs   Consider ambulatory Ortho consult and brace fitting     Plan:  Weight bearing status: As tolerated with brace  Melena  Patient endorsed melena prior to admission  One episode dark, tarry stool overnight on 3/4/25. Not documented.    Plan:  Stool record at bedside  Dysphagia  VBS done on 2/15/25 showing food retention due to pharyngeal weakness  EGD was done last month which showed normal esophagus.   ENT recommending outpatient follow-up with   Flynn Reyez    Plan:  Normal diet as tolerated    VTE Pharmacologic Prophylaxis: VTE Score: 5 High Risk (Score >/= 5) - Pharmacological DVT Prophylaxis Ordered: apixaban (Eliquis). Sequential Compression Devices Ordered.    Mobility:   Basic Mobility Inpatient Raw Score: 15  JH-HLM Goal: 4: Move to chair/commode  JH-HLM Achieved: 6: Walk 10 steps or more  JH-HLM Goal NOT achieved. Continue with multidisciplinary rounding and encourage appropriate mobility to improve upon JH-HLM goals.    Patient Centered Rounds: Day team performed rounds with nursing at bedside  Discussions with Specialists or Other Care Team Provider: ID, nephrology, cardiology    Education and Discussions with Family / Patient: Updated  (wife) at bedside.  Discussed with day team at bedside    Current Length of Stay: 11 day(s)  Current Patient Status: Inpatient   Certification Statement: The patient will continue to require additional inpatient hospital stay due to anemia, SOB  Discharge Plan: Anticipate discharge in 24-48 hrs to rehab facility.    Code Status: Level 1 - Full Code    Subjective   No overnight events. Patient states he is feeling well overall but did have an episode of shortness of breath when attempting to ambulate to the restroom earlier today.  Required stabilization with 4 people to assist patient to restroom.  Suspect COPD exacerbation in the setting of anemia.    Objective :  Temp:  [97.8 °F (36.6 °C)-97.9 °F (36.6 °C)] 97.9 °F (36.6 °C)  HR:  [70-90] 88  BP: (117-134)/(49-99) 134/61  Resp:  [16] 16  SpO2:  [98 %-100 %] 98 %    Body mass index is 26.61 kg/m².     Input and Output Summary (last 24 hours):     Intake/Output Summary (Last 24 hours) at 3/11/2025 1503  Last data filed at 3/11/2025 1400  Gross per 24 hour   Intake --   Output 875 ml   Net -875 ml       Physical Exam  Vitals and nursing note reviewed.   Constitutional:       General: He is not in acute distress.     Appearance: He is well-developed.    HENT:      Head: Normocephalic and atraumatic.   Eyes:      Conjunctiva/sclera: Conjunctivae normal.   Cardiovascular:      Rate and Rhythm: Normal rate and regular rhythm.      Heart sounds: No murmur heard.  Pulmonary:      Effort: Pulmonary effort is normal. No respiratory distress.      Breath sounds: Wheezing present.   Abdominal:      Palpations: Abdomen is soft.      Tenderness: There is no abdominal tenderness.   Musculoskeletal:         General: No swelling.      Cervical back: Neck supple.      Right lower leg: Edema present.      Left lower leg: Edema present.   Skin:     General: Skin is warm and dry.      Capillary Refill: Capillary refill takes less than 2 seconds.   Neurological:      Mental Status: He is alert. Mental status is at baseline.      Motor: Weakness present.      Gait: Gait abnormal.   Psychiatric:         Mood and Affect: Mood normal.           Lines/Drains:  Lines/Drains/Airways       Active Status       Name Placement date Placement time Site Days    PICC Line 02/26/25 Right Brachial 02/26/25  0548  Brachial  13                    Central Line:  Goal for removal: Will discontinue when meds requiring line are completed.               Lab Results: I have reviewed the following results:   Results from last 7 days   Lab Units 03/11/25  0947 03/11/25  0540 03/10/25  0828 03/10/25  0442   WBC Thousand/uL  --  5.32  --  4.92   HEMOGLOBIN g/dL 7.2* 7.1*   < > 7.0*   HEMATOCRIT % 24.3* 24.0*   < > 23.5*   PLATELETS Thousands/uL  --  145*  --  133*   SEGS PCT %  --   --   --  70   LYMPHO PCT %  --   --   --  15   MONO PCT %  --   --   --  11   EOS PCT %  --   --   --  2    < > = values in this interval not displayed.     Results from last 7 days   Lab Units 03/11/25  0540   SODIUM mmol/L 144   POTASSIUM mmol/L 3.7   CHLORIDE mmol/L 109*   CO2 mmol/L 32   BUN mg/dL 37*   CREATININE mg/dL 1.95*   ANION GAP mmol/L 3*   CALCIUM mg/dL 8.1*   GLUCOSE RANDOM mg/dL 81                       Recent  Cultures (last 7 days):               Last 24 Hours Medication List:     Current Facility-Administered Medications:     acetaminophen (TYLENOL) tablet 650 mg, Q6H PRN    albuterol (PROVENTIL HFA,VENTOLIN HFA) inhaler 2 puff, Q4H PRN    apixaban (ELIQUIS) tablet 5 mg, BID    ascorbic acid (VITAMIN C) tablet 250 mg, Daily    atorvastatin (LIPITOR) tablet 40 mg, Daily With Dinner    ceFAZolin (ANCEF) IVPB (premix in dextrose) 2,000 mg 50 mL, Q8H, Last Rate: 2,000 mg (03/11/25 1139)    Cholecalciferol (VITAMIN D3) tablet 2,000 Units, Daily    cyanocobalamin (VITAMIN B-12) tablet 100 mcg, Daily    fluticasone (ARNUITY ELLIPTA) 100 MCG/ACT inhaler 1 puff, Daily    folic acid (FOLVITE) tablet 1 mg, Daily    HYDROmorphone HCl (DILAUDID) injection 0.2 mg, Q4H PRN    hydroxychloroquine (PLAQUENIL) tablet 400 mg, Daily With Breakfast    lidocaine (LIDODERM) 5 % patch 3 patch, Daily    metoprolol tartrate (LOPRESSOR) tablet 25 mg, Q12H GALE    moisture barrier miconazole 2% cream (aka HITESH MOISTURE BARRIER ANTIFUNGAL CREAM), BID    oxyCODONE (ROXICODONE) IR tablet 5 mg, Q6H PRN    oxyCODONE (ROXICODONE) split tablet 2.5 mg, Q6H PRN    pantoprazole (PROTONIX) EC tablet 40 mg, Daily Before Breakfast    polyethylene glycol (MIRALAX) packet 17 g, Daily PRN    senna-docusate sodium (SENOKOT S) 8.6-50 mg per tablet 1 tablet, HS    tamsulosin (FLOMAX) capsule 0.4 mg, Daily With Dinner    torsemide (DEMADEX) tablet 10 mg, Daily    Administrative Statements   Today, Patient Was Seen By: Yazmin Up DO      **Please Note: This note may have been constructed using a voice recognition system.**

## 2025-03-11 NOTE — ASSESSMENT & PLAN NOTE
Patient developed acute neck pain with MSSA bacteremia during recent hospitalization.  CRP was highly elevated.  C-spine MRI showed possible C-spine and paraspinal infection.  Patient has no neurologic deficit.  However, his neck pain has not improved.  Given lack of improvement of neck pain, we should repeat C-spine MRI with contrast when creatinine is improved.  However, without any neurological deficit, it would be fine to postpone MRI until renal function is further improved, to decrease risk of contrast-induced ORIANA.  Patient should get MRI done prior to completion of IV antibiotic course below.  Antibiotic plan as in below.  Monitor neck pain.  Recommend repeat C-spine MRI with and without contrast.  This can be postponed until ORIANA is improved, but should be done prior to completion of IV antibiotic course.

## 2025-03-11 NOTE — ASSESSMENT & PLAN NOTE
Currently on Cefazolin 2 gm IV q8H - needs this until 3/27/25.   ID following.   Possible cutaneous source  TTE without vegetation.

## 2025-03-11 NOTE — PROGRESS NOTES
Progress Note - Infectious Disease   Name: Devin Chaves 79 y.o. male I MRN: [id number]  Unit/Bed#: S -01 I Date of Admission: 2/28/2025   Date of Service: 3/11/2025 I Hospital Day: 11    Assessment & Plan  Neck pain  Patient developed acute neck pain with MSSA bacteremia during recent hospitalization.  CRP was highly elevated.  C-spine MRI showed possible C-spine and paraspinal infection.  Patient has no neurologic deficit.  However, his neck pain has not improved.  Given lack of improvement of neck pain, we should repeat C-spine MRI with contrast when creatinine is improved.  However, without any neurological deficit, it would be fine to postpone MRI until renal function is further improved, to decrease risk of contrast-induced ORIANA.  Patient should get MRI done prior to completion of IV antibiotic course below.  Antibiotic plan as in below.  Monitor neck pain.  Recommend repeat C-spine MRI with and without contrast.  This can be postponed until ORIANA is improved, but should be done prior to completion of IV antibiotic course.  MSSA bacteremia  Patient had MSSA bacteremia during recent hospitalization.  Source is unclear but likely cutaneous.  His bacteremia cleared rapidly on IV antibiotic.  TTE did not show any vegetation.  Given possible C-spine infection, plan was for long-term IV antibiotic.  Continue high-dose IV cefazolin.  Treat x 6 weeks from clearance of bacteremia as previously planned, through 3/27.  Symptomatic anemia  Patient is clinically improved with PRBC transfusion.  Management per primary service.  Melena  Patient with melena prior to admission.  This is most likely secondary to colonic mass.  Colorectal surgery follow-up.  ORIANA (acute kidney injury) (HCC)  Patient with ORIANA on admission, most likely secondary to hypovolemia from GI bleed.  Creatinine is beginning to improve again.  Antibiotic dosages adjusted accordingly.  Monitor creatinine.  Nephrology follow-up.  Colonic mass  Patient  has recently diagnosed adenocarcinoma of the colon.  He has been followed by colorectal surgery.  No plan for chemotherapy yet.  Colorectal surgery follow-up.    Discussed with patient in detail regarding the above plan.      Antibiotics:  Cefazolin  Last negative blood culture 2/14    Subjective   Patient with stable posterior neck pain.  No focal weakness.  He is set up yesterday.  Temperature stays down.  No chills.  He is tolerating antibiotic well.  No nausea, vomiting or diarrhea.    Objective :  Temp:  [97.8 °F (36.6 °C)-97.9 °F (36.6 °C)] 97.9 °F (36.6 °C)  HR:  [70-90] 88  BP: (117-134)/(49-99) 134/61  Resp:  [16] 16  SpO2:  [98 %-100 %] 98 %    Physical Exam:     General: Awake, alert, cooperative, no distress.   Neck:  Supple. No mass.  No lymphadenopathy.  Stable mild to moderate tenderness posteriorly, mostly at midline.   Lungs: Expansion symmetric, no rales, no wheezing, respirations unlabored.   Heart:  Regular rate and rhythm, S1 and S2 normal, no murmur.   Abdomen: Soft, nondistended, non-tender, bowel sounds active all four quadrants, no masses, no organomegaly.   Extremities: Stable leg edema. No erythema/warmth. No draining ulcer. Nontender to palpation.   Skin:  No rash.   Neuro: Moves all extremities.        Lab Results: I have reviewed the following results:  Results from last 7 days   Lab Units 03/11/25  0947 03/11/25  0540 03/10/25  0828 03/10/25  0442 03/09/25  0544   WBC Thousand/uL  --  5.32  --  4.92 5.18   HEMOGLOBIN g/dL 7.2* 7.1* 7.7* 7.0* 7.6*   PLATELETS Thousands/uL  --  145*  --  133* 144*     Results from last 7 days   Lab Units 03/11/25  0540 03/10/25  0442 03/09/25  0544   SODIUM mmol/L 144 143 144   POTASSIUM mmol/L 3.7 3.8 3.6   CHLORIDE mmol/L 109* 109* 109*   CO2 mmol/L 32 32 30   BUN mg/dL 37* 36* 36*   CREATININE mg/dL 1.95* 2.03* 1.94*   EGFR ml/min/1.73sq m 32 30 32   CALCIUM mg/dL 8.1* 8.0* 8.1*

## 2025-03-11 NOTE — ASSESSMENT & PLAN NOTE
Pt had multiple embolic strokes during admission from 2/11/25-2/22/25  Thought to be embolic strokes 2/2 MSSA bacteremia, no vegetations on TTE    Plan:  Restart Eliquis 5 mg  Continue Lipitor 40 mg daily

## 2025-03-12 ENCOUNTER — TELEPHONE (OUTPATIENT)
Age: 80
End: 2025-03-12

## 2025-03-12 PROBLEM — D62 ACUTE BLOOD LOSS ANEMIA: Status: ACTIVE | Noted: 2025-02-13

## 2025-03-12 NOTE — ASSESSMENT & PLAN NOTE
Malnutrition Findings:   Adult Malnutrition type: Acute illness  Adult Degree of Malnutrition: Other severe protein calorie malnutrition  Malnutrition Characteristics: Inadequate energy, Fluid accumulation  360 Statement: related to inadequate energy/protein intake as evidenced by consuming < 50% of energy intake compared to estimated needs for > 5 days and B/L LE +3 edema. Treated with ONS.  BMI Findings:     Body mass index is 26.49 kg/m².     Plan:  Ensure supplements

## 2025-03-12 NOTE — PLAN OF CARE
Problem: Prexisting or High Potential for Compromised Skin Integrity  Goal: Skin integrity is maintained or improved  Description: INTERVENTIONS:  - Identify patients at risk for skin breakdown  - Assess and monitor skin integrity  - Assess and monitor nutrition and hydration status  - Monitor labs   - Assess for incontinence   - Turn and reposition patient  - Assist with mobility/ambulation  - Relieve pressure over bony prominences  - Avoid friction and shearing  - Provide appropriate hygiene as needed including keeping skin clean and dry  - Evaluate need for skin moisturizer/barrier cream  - Collaborate with interdisciplinary team   - Patient/family teaching  - Consider wound care consult   Outcome: Progressing     Problem: Potential for Falls  Goal: Patient will remain free of falls  Description: INTERVENTIONS:  - Educate patient/family on patient safety including physical limitations  - Instruct patient to call for assistance with activity   - Consult OT/PT to assist with strengthening/mobility   - Keep Call bell within reach  - Keep bed low and locked with side rails adjusted as appropriate  - Keep care items and personal belongings within reach  - Initiate and maintain comfort rounds  - Make Fall Risk Sign visible to staff  - Apply yellow socks and bracelet for high fall risk patients  - Consider moving patient to room near nurses station  Outcome: Progressing

## 2025-03-12 NOTE — PROGRESS NOTES
NEPHROLOGY HOSPITAL PROGRESS NOTE   Devin Chaves 79 y.o. male MRN: [id number]  Unit/Bed#: S -01 Encounter: [id number]  Reason for Consult: ORIANA    Assessment & Plan  ORIANA (acute kidney injury) (HCC)  Baseline creatinine is normal at 0.8 to 0.9.   Admission SCr 2.43 on 2/28/25.   SCr has been around 1.8 to 2.0 since admission.   Etiology is not entirely clear - not prerenal or postrenal. Working dx is ATN but could have another underlying pathology.   CT 2/28/25 - no hydronephrosis. UA on 3/8/25 with RBC 2-4, WBC 2-4, small blood and trace protein.   Of note, UPC ratio was 3.4 gm on 3/8/25 and urine ACR was only 821 mg on 3/5/25.   Serum LIDYA no M spike, C3 83 (low), C4 26.   At risk for AIN but no eosinophilia.   Renal function stable but SCr above baseline - SCr 1.99 today. Stable overall.   May consider renal biopsy in the future if renal function deteriorates or if felt to . Not necessary now since stable renal function.   Continue Torsemide 10 mg OD.     HTN (hypertension)  BP is controlled.   Rx: Metoprolol 25 mg BID, Torsemide 10 mg OD.   No changes today.     MSSA bacteremia  Currently on Cefazolin 2 gm IV q8H - needs this until 3/27/25.   ID following.   Possible cutaneous source  TTE without vegetation.     Urinary retention  Seen by urology this admission.   Currently on Tamsulosin.     Acute blood loss anemia  Hgb monitoring per primary service.   PRBC tx today.   Iron studies okay in Feb 2025.   Recheck iron studies.     Colonic mass  Newly diagnosed colon mass on C-scope on 2/20/25.   Pathology - adenocarcinoma  Was to follow colorectal as outpatient    Pleural effusion, bilateral  CXR with vascular congestion and pleural effusions.   Continue torsemide 10 mg OD.     Atrial fibrillation (HCC)  On Metoprolol for rate control.   On Eliquis for AC      TODAY's DISCUSSION / PLAN:  Stable renal function but SCr above baseline.   Recheck iron studies in AM.   Continue Torsemide 10 mg  OD.     SUBJECTIVE / 24H INTERVAL HISTORY:  No new acute complaints or events.   No CP or SOB.     OBJECTIVE:  Current Weight: Weight - Scale: 88.6 kg (195 lb 5.2 oz)  Vitals:    03/12/25 1107 03/12/25 1327 03/12/25 1327 03/12/25 1614   BP: 102/52 118/52 118/52 124/54   Pulse: 68 78 78 69   Resp: 18 16  18   Temp: 98.6 °F (37 °C) 98.9 °F (37.2 °C) 98.9 °F (37.2 °C) 98.6 °F (37 °C)   TempSrc: Oral Oral     SpO2: 100%  99% 94%   Weight:       Height:           Intake/Output Summary (Last 24 hours) at 3/12/2025 1722  Last data filed at 3/12/2025 1327  Gross per 24 hour   Intake 990 ml   Output 220 ml   Net 770 ml     General: conscious, cooperative, no distress  Skin: dry  Eyes: pink conjunctivae  ENT: moist mucous membranes  Respiratory: equal chest expansion, clear breath sounds.  Cardiovascular: distinct heart sounds, normal rate, regular rhythm, no rub  Abdomen: soft, non tender, non distended, normal bowel sounds  Extremities: + bilateral LE edema.   Genitourinary: no glass catheter.   Neuro: awake, alert.   Psych: appropriate affect    Medications:    Current Facility-Administered Medications:     acetaminophen (TYLENOL) tablet 650 mg, 650 mg, Oral, Q6H PRN, Jean Brady MD    albuterol (PROVENTIL HFA,VENTOLIN HFA) inhaler 2 puff, 2 puff, Inhalation, Q4H PRN, Fiordaliza Ibrahim MD    ascorbic acid (VITAMIN C) tablet 250 mg, 250 mg, Oral, Daily, Kanchan Wilson MD, 250 mg at 03/12/25 0834    atorvastatin (LIPITOR) tablet 40 mg, 40 mg, Oral, Daily With Dinner, Kanchan Wilson MD, 40 mg at 03/11/25 1704    ceFAZolin (ANCEF) IVPB (premix in dextrose) 2,000 mg 50 mL, 2,000 mg, Intravenous, Q8H, Olivia Martinez MD, Last Rate: 100 mL/hr at 03/12/25 1330, 2,000 mg at 03/12/25 1330    Cholecalciferol (VITAMIN D3) tablet 2,000 Units, 2,000 Units, Oral, Daily, Kanchan Wilson MD, 2,000 Units at 03/12/25 0833    cyanocobalamin (VITAMIN B-12) tablet 100 mcg, 100 mcg, Oral, Daily, Kanchan Wilson MD, 100 mcg at  03/12/25 0833    fluticasone (ARNUITY ELLIPTA) 100 MCG/ACT inhaler 1 puff, 1 puff, Inhalation, Daily, Kanchan Wilson MD, 1 puff at 03/12/25 0834    folic acid (FOLVITE) tablet 1 mg, 1 mg, Oral, Daily, Kanchan Wilson MD, 1 mg at 03/12/25 0833    HYDROmorphone HCl (DILAUDID) injection 0.2 mg, 0.2 mg, Intravenous, Q4H PRN, Jean Brady MD    hydroxychloroquine (PLAQUENIL) tablet 400 mg, 400 mg, Oral, Daily With Breakfast, Kanchan Wilson MD, 400 mg at 03/12/25 0838    lidocaine (LIDODERM) 5 % patch 3 patch, 3 patch, Topical, Daily, Kanchan Wilson MD, 3 patch at 03/10/25 0850    methylPREDNISolone sodium succinate (Solu-MEDROL) injection 40 mg, 40 mg, Intravenous, Q8H GALE, Bebavitaly Will MD, 40 mg at 03/12/25 0833    metoprolol tartrate (LOPRESSOR) tablet 25 mg, 25 mg, Oral, Q12H GALE, Kanchan Wilson MD, 25 mg at 03/12/25 0834    moisture barrier miconazole 2% cream (aka HITESH MOISTURE BARRIER ANTIFUNGAL CREAM), , Topical, BID, Mal Neely DO, Given at 03/12/25 0834    oxyCODONE (ROXICODONE) IR tablet 5 mg, 5 mg, Oral, Q6H PRN, Jean Brady MD, 5 mg at 03/01/25 1817    oxyCODONE (ROXICODONE) split tablet 2.5 mg, 2.5 mg, Oral, Q6H PRN, Jean Brady MD, 2.5 mg at 03/05/25 0945    pantoprazole (PROTONIX) EC tablet 40 mg, 40 mg, Oral, Daily Before Breakfast, Jean Brady MD, 40 mg at 03/12/25 0519    polyethylene glycol (MIRALAX) packet 17 g, 17 g, Oral, Daily PRN, Kelly Herman MD    senna-docusate sodium (SENOKOT S) 8.6-50 mg per tablet 1 tablet, 1 tablet, Oral, HS, Kelly Herman MD, 1 tablet at 03/11/25 2101    tamsulosin (FLOMAX) capsule 0.4 mg, 0.4 mg, Oral, Daily With Dinner, Kanchan Wilson MD, 0.4 mg at 03/11/25 1704    torsemide (DEMADEX) tablet 10 mg, 10 mg, Oral, Daily, Ben Woodall MD, 10 mg at 03/12/25 0892    Laboratory Results:  Results from last 7 days   Lab Units 03/12/25  1343 03/12/25  0756 03/12/25  0673  "03/12/25  0519 03/11/25  1820 03/11/25  0947 03/11/25  0540 03/10/25  0828 03/10/25  0442 03/09/25  0544 03/08/25  0530 03/07/25  0541 03/06/25  0521   WBC Thousand/uL  --   --   --  4.97  --   --  5.32  --  4.92 5.18 4.62 5.12 4.63   HEMOGLOBIN g/dL 7.9* 6.5* 6.4* 6.8* 8.2* 7.2* 7.1*   < > 7.0* 7.6* 7.8* 8.2* 7.7*   HEMATOCRIT % 26.0* 21.7* 21.1* 22.1* 27.0* 24.3* 24.0*   < > 23.5* 24.5* 25.3* 26.4* 24.7*   PLATELETS Thousands/uL  --   --   --  162  --   --  145*  --  133* 144* 160 159 159   POTASSIUM mmol/L  --   --   --  4.0  --   --  3.7  --  3.8 3.6 3.9 4.2 3.5   CHLORIDE mmol/L  --   --   --  107  --   --  109*  --  109* 109* 110* 110* 109*   CO2 mmol/L  --   --   --  33*  --   --  32  --  32 30 31 28 28   BUN mg/dL  --   --   --  47*  --   --  37*  --  36* 36* 35* 34* 38*   CREATININE mg/dL  --   --   --  1.99*  --   --  1.95*  --  2.03* 1.94* 1.97* 1.80* 1.86*   CALCIUM mg/dL  --   --   --  8.0*  --   --  8.1*  --  8.0* 8.1* 8.2* 8.2* 7.7*   MAGNESIUM mg/dL  --   --   --  1.9  --   --  2.0  --  2.1 1.9 2.0 1.8* 1.9    < > = values in this interval not displayed.     Portions of the record may have been created with voice recognition software. Occasional wrong word or \"sound a like\" substitutions may have occurred due to the inherent limitations of voice recognition software. Read the chart carefully and recognize, using context, where substitutions have occurred.If you have any questions, please contact the dictating provider.    "

## 2025-03-12 NOTE — ASSESSMENT & PLAN NOTE
Worsening ambulatory dysfunction since December  PT/OT evaluations recommend level 2   Discussed with podiatry, patient has chronic left ankle fracture over 2 years ago, recommend WBAT to left foot as curbside, no further podiatry needs     Plan:  Weight bearing status: As tolerated with brace

## 2025-03-12 NOTE — ASSESSMENT & PLAN NOTE
AC: Eliquis 2.5 mg BID  Rate control: Lopressor 25 mg Q12 hrs    Plan:  Hold evening dose of eliquis 5 mg 3/12  Resume eliquis 2.5 mg BID 3/13  Continue Lopressor 25 mg Q12 hrs

## 2025-03-12 NOTE — ASSESSMENT & PLAN NOTE
"Pt had multiple embolic strokes during admission from 2/11/25-2/22/25  Thought to be embolic strokes 2/2 MSSA bacteremia, no vegetations on TTE    Plan:  See plan for eliquis under \"afib\"  Continue Lipitor 40 mg daily  "

## 2025-03-12 NOTE — ASSESSMENT & PLAN NOTE
Hgb monitoring per primary service.   PRBC tx today.   Iron studies okay in Feb 2025.   Recheck iron studies.

## 2025-03-12 NOTE — ASSESSMENT & PLAN NOTE
Recent Labs     03/12/25  0519 03/12/25  0653 03/12/25  0756   HGB 6.8* 6.4* 6.5*      Patient presents with 2 days of chest pain, shortness of breath, fatigue and melena  2/20/2025 EGD/colonoscopy showed 2.5 cm ileocecal mass, pathology confirmed adenocarcinoma.  S/p 3 units packed rbc in ED. S/p 1 unit PRBCs on 3/2  Hemoglobin has been stable.   Patient recent diagnosis of adenocarcinoma, increased risk of stroke.     Plan:  Daily CBC  Transfuse for Hgb <7  Protonix 40 mg PO QD   Hemoglobin decreased at 6.8 this am. Recheck one hour later 6.4. Transfused 1 U prbc's and will recheck hemoglobin after transfusion. Continues to have dark stools but not described as melena. Does have notable friable mass in colon resulting in acute blood loss anemia.   Hold patient's night time dose of eliquis 5 mg tonight 3/12  Resume eliquis tomorrow at 2.5 BID

## 2025-03-12 NOTE — ASSESSMENT & PLAN NOTE
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    Patient was due to follow-up with outpatient colorectal surgery on 2/28/2025  Pt presenting with melena, Hgb 4.6. Symptomatic anemia.  Attending discussion with Colorectal: If COPD can stay controlled, increase chance of operation.   Patient is currently on baseline oxygen of 3 L but currently treating with steroids for medication optimization.  Oncology: Outpatient appointment for 4/3.  Discussed with wife 3/10/2025

## 2025-03-12 NOTE — TELEPHONE ENCOUNTER
Pt wife calling to r/s virtual hsp f/u as pt is still in the hsp with no discharge date set yet. R/s with Michelle 3/27 virtually, and switched connecting phone number to wifes as she states he will no longer be in the same nursing and rehab home after discharge

## 2025-03-12 NOTE — ASSESSMENT & PLAN NOTE
Recent Labs     03/10/25  0442 03/11/25  0540 03/12/25  0519   CREATININE 2.03* 1.95* 1.99*   EGFR 30 32 31     Estimated Creatinine Clearance: 34.1 mL/min (A) (by C-G formula based on SCr of 1.99 mg/dL (H)).     Cr 2.43 on admission. Baseline 0.9. BUN 63. BUN/Cr ratio >20  Hypervolemia on exam  3/7/2025 chest xray:Unchanged pulmonary edema and small pleural effusions. Pneumonia not excluded.   Last echocardiogram 2/2025.  EF 55% G1 DD  Continue torsemide 10 mg qd    Plan:  Daily BMP  Continue torsemide 10 mg daily per nephrology's recommendations.  Creatinine stable at 1.9  Avoid nephrotoxic medications as able  Monitor I/O's  Urinary retention protocol

## 2025-03-12 NOTE — ASSESSMENT & PLAN NOTE
Baseline creatinine is normal at 0.8 to 0.9.   Admission SCr 2.43 on 2/28/25.   SCr has been around 1.8 to 2.0 since admission.   Etiology is not entirely clear - not prerenal or postrenal. Working dx is ATN but could have another underlying pathology.   CT 2/28/25 - no hydronephrosis. UA on 3/8/25 with RBC 2-4, WBC 2-4, small blood and trace protein.   Of note, UPC ratio was 3.4 gm on 3/8/25 and urine ACR was only 821 mg on 3/5/25.   Serum LIDYA no M spike, C3 83 (low), C4 26.   At risk for AIN but no eosinophilia.   Renal function stable but SCr above baseline - SCr 1.99 today. Stable overall.   May consider renal biopsy in the future if renal function deteriorates or if felt to . Not necessary now since stable renal function.   Continue Torsemide 10 mg OD.

## 2025-03-12 NOTE — PHYSICAL THERAPY NOTE
Physical Therapy Cancellation Note:    Per pts medical chart, HgB is 6.5. cancel PT services for today and will cont to follow as able to cont therapy intervention.

## 2025-03-12 NOTE — ASSESSMENT & PLAN NOTE
Patient with COPD on 2-4 L supplemental O2 at baseline    Plan:  Titrate O2 to maintain SpO2 greater than 88%   Albuterol inhaler every 4 hours as needed for wheezing  Solumedrol 40 mg q 8 hours for one more day  Will change to solumedrol 40 mg BID 3/13  Respiratory protocol

## 2025-03-12 NOTE — PROGRESS NOTES
Progress Note - Hospitalist   Name: Devin Chaves 79 y.o. male I MRN: [id number]  Unit/Bed#: S [facility] I Date of Admission: 2/28/2025   Date of Service: 3/12/2025 I Hospital Day: 12    Assessment & Plan  Acute blood loss anemia  Recent Labs     03/12/25  0519 03/12/25  0653 03/12/25  0756   HGB 6.8* 6.4* 6.5*      Patient presents with 2 days of chest pain, shortness of breath, fatigue and melena  2/20/2025 EGD/colonoscopy showed 2.5 cm ileocecal mass, pathology confirmed adenocarcinoma.  S/p 3 units packed rbc in ED. S/p 1 unit PRBCs on 3/2  Hemoglobin has been stable.   Patient recent diagnosis of adenocarcinoma, increased risk of stroke.     Plan:  Daily CBC  Transfuse for Hgb <7  Protonix 40 mg PO QD   Hemoglobin decreased at 6.8 this am. Recheck one hour later 6.4. Transfused 1 U prbc's and will recheck hemoglobin after transfusion. Continues to have dark stools but not described as melena. Does have notable friable mass in colon resulting in acute blood loss anemia.   Hold patient's night time dose of eliquis 5 mg tonight 3/12  Resume eliquis tomorrow at 2.5 BID   ORIANA (acute kidney injury) (HCC)  Recent Labs     03/10/25  0442 03/11/25  0540 03/12/25  0519   CREATININE 2.03* 1.95* 1.99*   EGFR 30 32 31     Estimated Creatinine Clearance: 34.1 mL/min (A) (by C-G formula based on SCr of 1.99 mg/dL (H)).     Cr 2.43 on admission. Baseline 0.9. BUN 63. BUN/Cr ratio >20  Hypervolemia on exam  3/7/2025 chest xray:Unchanged pulmonary edema and small pleural effusions. Pneumonia not excluded.   Last echocardiogram 2/2025.  EF 55% G1 DD  Continue torsemide 10 mg qd    Plan:  Daily BMP  Continue torsemide 10 mg daily per nephrology's recommendations.  Creatinine stable at 1.9  Avoid nephrotoxic medications as able  Monitor I/O's  Urinary retention protocol    Neck pain  MRI of the neck done 2/14/2025 revealed cervical degenerative change with mild canal stenosis and mild to moderate foraminal narrowing.  No cord  compression.  Mild nonspecific edema within the right posterior breast dermal musculature of the upper cervicals spine.  Minimal peripheral enhancement is noted.     Plan:  Will plan for repeat MRI C-spine on 3/20/25.  Should be completed prior to completion of IV antibiotic course per ID  COPD (chronic obstructive pulmonary disease) (HCC)  Patient with COPD on 2-4 L supplemental O2 at baseline    Plan:  Titrate O2 to maintain SpO2 greater than 88%   Albuterol inhaler every 4 hours as needed for wheezing  Solumedrol 40 mg q 8 hours for one more day  Will change to solumedrol 40 mg BID 3/13  Respiratory protocol   Colonic mass  2/20/2025 EGD/colonoscopy showed 2.5 cm ileocecal mass, pathology confirmed adenocarcinoma.    Patient was due to follow-up with outpatient colorectal surgery on 2/28/2025  Pt presenting with melena, Hgb 4.6. Symptomatic anemia.  Attending discussion with Colorectal: If COPD can stay controlled, increase chance of operation.   Patient is currently on baseline oxygen of 3 L but currently treating with steroids for medication optimization.  Oncology: Outpatient appointment for 4/3.  Discussed with wife 3/10/2025    MSSA bacteremia  Patient was discharged on cefazolin 2 g IV Q8 hrs until 3/27/2025. PICC line in place. Patient rescheduled to follow-up outpatient with ID    Plan:  Continue with cefazolin 2 g IV every 8 hours.  infectious disease recommendations appreciated   Recommend repeat C-spine MRI with and without contrast, postpone until ORIANA resolves  6 weeks treatment through 3/27/2025  Atrial fibrillation (HCC)  AC: Eliquis 2.5 mg BID  Rate control: Lopressor 25 mg Q12 hrs    Plan:  Hold evening dose of eliquis 5 mg 3/12  Resume eliquis 2.5 mg BID 3/13  Continue Lopressor 25 mg Q12 hrs  Pleural effusion, bilateral  CT A/P shows bilateral pleural effusions L>R, pulmonary vascular congestion    Plan:  Repeat imaging if pt's respiratory status worsens  Continue oxygen to keep oxygen  "saturations 88% and above.  HTN (hypertension)  Systolic BP 100s-130s.  Patient normotensive.  Blood pressure stable.    Plan:  Lopressor 25 mg every 12 hours  CAD (coronary artery disease)  TTE 2/12/25: EF 55%, Normal systolic dysfunction, G1DD, No vegetation, No mural thrombus, moderate aortic sclerosis     Plan:  Hold aspirin in setting of GI bleed  History of CVA (cerebrovascular accident)  Pt had multiple embolic strokes during admission from 2/11/25-2/22/25  Thought to be embolic strokes 2/2 MSSA bacteremia, no vegetations on TTE    Plan:  See plan for eliquis under \"afib\"  Continue Lipitor 40 mg daily  Urinary retention  Patient has history of urinary retention and was started on Flomax during last hospitalization  Pt denies difficulty urinating at this shayy    Plan:  Flomax 0.4 mg daily  Urinary retention protocol  Severe protein-calorie malnutrition (HCC)  Malnutrition Findings:   Adult Malnutrition type: Acute illness  Adult Degree of Malnutrition: Other severe protein calorie malnutrition  Malnutrition Characteristics: Inadequate energy, Fluid accumulation  360 Statement: related to inadequate energy/protein intake as evidenced by consuming < 50% of energy intake compared to estimated needs for > 5 days and B/L LE +3 edema. Treated with ONS.  BMI Findings:     Body mass index is 26.49 kg/m².     Plan:  Ensure supplements  Ambulatory dysfunction  Worsening ambulatory dysfunction since December  PT/OT evaluations recommend level 2   Discussed with podiatry, patient has chronic left ankle fracture over 2 years ago, recommend WBAT to left foot as curbside, no further podiatry needs     Plan:  Weight bearing status: As tolerated with brace  Melena  Patient endorsed melena prior to admission  One episode dark, tarry stool overnight on 3/4/25. Not documented.    Plan:  Stool record at bedside  Dysphagia  VBS done on 2/15/25 showing food retention due to pharyngeal weakness  EGD was done last month which showed " normal esophagus.   ENT recommending outpatient follow-up with Dr. Flynn Reyez    Plan:  Normal diet as tolerated    VTE Pharmacologic Prophylaxis: VTE Score: 5 High Risk (Score >/= 5) - Pharmacological DVT Prophylaxis Ordered: apixaban (Eliquis). Sequential Compression Devices Ordered.    Mobility:   Basic Mobility Inpatient Raw Score: 10  JH-HLM Goal: 4: Move to chair/commode  JH-HLM Achieved: 4: Move to chair/commode  JH-HLM Goal NOT achieved. Continue with multidisciplinary rounding and encourage appropriate mobility to improve upon JH-HLM goals.    Patient Centered Rounds: I performed bedside rounds with nursing staff today.   Discussions with Specialists or Other Care Team Provider: Nephrology, oncology, ID    Education and Discussions with Family / Patient: Updated  (wife) via phone.    Current Length of Stay: 12 day(s)  Current Patient Status: Inpatient   Certification Statement: The patient will continue to require additional inpatient hospital stay due to shortness of breath and acute blood loss anemia  Discharge Plan: Anticipate discharge in 24-48 hrs to rehab facility.    Code Status: Level 1 - Full Code    Subjective   No overnight events.  Patient resting comfortably in chair with blanket at time of exam.  Hemoglobin decreased at 6.8 but patient states he is asymptomatic and denies any new weakness or fatigue.  Patient amendable to transfusion 1 unit PRBCs.    Objective :  Temp:  [97.8 °F (36.6 °C)-99 °F (37.2 °C)] 98.6 °F (37 °C)  HR:  [66-99] 68  BP: (102-126)/(48-62) 102/52  Resp:  [16-18] 18  SpO2:  [96 %-100 %] 100 %  O2 Device: Nasal cannula  Nasal Cannula O2 Flow Rate (L/min):  [3 L/min] 3 L/min    Body mass index is 26.49 kg/m².     Input and Output Summary (last 24 hours):     Intake/Output Summary (Last 24 hours) at 3/12/2025 1129  Last data filed at 3/12/2025 0746  Gross per 24 hour   Intake 480 ml   Output 520 ml   Net -40 ml       Physical Exam  Vitals and nursing note  reviewed.   Constitutional:       General: He is not in acute distress.     Appearance: He is well-developed.   HENT:      Head: Normocephalic and atraumatic.   Eyes:      Conjunctiva/sclera: Conjunctivae normal.   Cardiovascular:      Rate and Rhythm: Normal rate and regular rhythm.      Heart sounds: No murmur heard.  Pulmonary:      Effort: No respiratory distress.      Breath sounds: Wheezing and rales present.      Comments: Mild rales/wheezes in bilateral lungs  Abdominal:      Palpations: Abdomen is soft.      Tenderness: There is no abdominal tenderness.   Musculoskeletal:      Cervical back: Neck supple.      Right lower leg: Edema present.      Left lower leg: Edema present.   Skin:     General: Skin is warm and dry.      Capillary Refill: Capillary refill takes less than 2 seconds.   Neurological:      Mental Status: He is alert.      Gait: Gait abnormal.      Comments: Unable to ambulate out of chair independently   Psychiatric:         Mood and Affect: Mood normal.         Lines/Drains:  Lines/Drains/Airways       Active Status       Name Placement date Placement time Site Days    PICC Line 02/26/25 Right Brachial 02/26/25  0548  Brachial  14                    Central Line:  Goal for removal: Will discontinue when meds requiring line are completed.               Lab Results: I have reviewed the following results:   Results from last 7 days   Lab Units 03/12/25  0756 03/12/25  0653 03/12/25  0519 03/10/25  0828 03/10/25  0442   WBC Thousand/uL  --   --  4.97   < > 4.92   HEMOGLOBIN g/dL 6.5*   < > 6.8*   < > 7.0*   HEMATOCRIT % 21.7*   < > 22.1*   < > 23.5*   PLATELETS Thousands/uL  --   --  162   < > 133*   SEGS PCT %  --   --   --   --  70   LYMPHO PCT %  --   --   --   --  15   MONO PCT %  --   --   --   --  11   EOS PCT %  --   --   --   --  2    < > = values in this interval not displayed.     Results from last 7 days   Lab Units 03/12/25  0519   SODIUM mmol/L 143   POTASSIUM mmol/L 4.0   CHLORIDE  mmol/L 107   CO2 mmol/L 33*   BUN mg/dL 47*   CREATININE mg/dL 1.99*   ANION GAP mmol/L 3*   CALCIUM mg/dL 8.0*   GLUCOSE RANDOM mg/dL 156*                       Recent Cultures (last 7 days):               Last 24 Hours Medication List:     Current Facility-Administered Medications:     acetaminophen (TYLENOL) tablet 650 mg, Q6H PRN    albuterol (PROVENTIL HFA,VENTOLIN HFA) inhaler 2 puff, Q4H PRN    ascorbic acid (VITAMIN C) tablet 250 mg, Daily    atorvastatin (LIPITOR) tablet 40 mg, Daily With Dinner    ceFAZolin (ANCEF) IVPB (premix in dextrose) 2,000 mg 50 mL, Q8H, Last Rate: 2,000 mg (03/12/25 0312)    Cholecalciferol (VITAMIN D3) tablet 2,000 Units, Daily    cyanocobalamin (VITAMIN B-12) tablet 100 mcg, Daily    fluticasone (ARNUITY ELLIPTA) 100 MCG/ACT inhaler 1 puff, Daily    folic acid (FOLVITE) tablet 1 mg, Daily    HYDROmorphone HCl (DILAUDID) injection 0.2 mg, Q4H PRN    hydroxychloroquine (PLAQUENIL) tablet 400 mg, Daily With Breakfast    lidocaine (LIDODERM) 5 % patch 3 patch, Daily    methylPREDNISolone sodium succinate (Solu-MEDROL) injection 40 mg, Q8H GALE    metoprolol tartrate (LOPRESSOR) tablet 25 mg, Q12H GALE    moisture barrier miconazole 2% cream (aka HITESH MOISTURE BARRIER ANTIFUNGAL CREAM), BID    oxyCODONE (ROXICODONE) IR tablet 5 mg, Q6H PRN    oxyCODONE (ROXICODONE) split tablet 2.5 mg, Q6H PRN    pantoprazole (PROTONIX) EC tablet 40 mg, Daily Before Breakfast    polyethylene glycol (MIRALAX) packet 17 g, Daily PRN    senna-docusate sodium (SENOKOT S) 8.6-50 mg per tablet 1 tablet, HS    tamsulosin (FLOMAX) capsule 0.4 mg, Daily With Dinner    torsemide (DEMADEX) tablet 10 mg, Daily    Administrative Statements   Today, Patient Was Seen By: Yazmin Up DO      **Please Note: This note may have been constructed using a voice recognition system.**

## 2025-03-12 NOTE — CASE MANAGEMENT
Case Management Progress Note    Patient name Devin Chaves  Location S /S -01 MRN [id number]  : 1945 Date 3/12/2025       LOS (days): 12  Geometric Mean LOS (GMLOS) (days): 3.6  Days to GMLOS:-8.5        OBJECTIVE:        Current admission status: Inpatient  Preferred Pharmacy:   [id number] - [place] - [address]  [address]  [facility] [place] [address]  Phone: [phone] Fax: [phone]    [facility] [place] [place] - [address]  [address]  [place] [address]  Phone: [phone] Fax: [phone]    [place] [facility] - [place] - [address]  [address]  [place] [address]  Phone: [phone] Fax: [phone]    Primary Care Provider: Park Saxena MD    [facility] Insurance: [facility]  Secondary Insurance: [employer]  REP    PROGRESS NOTE:  Patient not medically stable for d/c at this time. HFM updated via [employer]. Auth is approved until .     CM continues to follow to assist with dcp.          no

## 2025-03-12 NOTE — PROGRESS NOTES
Progress Note - Infectious Disease   Name: Devin Chaves 79 y.o. male I MRN: [id number]  Unit/Bed#: S -01 I Date of Admission: 2/28/2025   Date of Service: 3/12/2025 I Hospital Day: 12    Assessment & Plan  Neck pain  Patient developed acute neck pain with MSSA bacteremia during recent hospitalization.  CRP was highly elevated.  C-spine MRI showed possible C-spine and paraspinal infection.  Patient has no neurologic deficit.  However, his neck pain has not improved.  Given lack of improvement of neck pain, we should repeat C-spine MRI with contrast when creatinine is improved.  However, without any neurological deficit, it would be fine to postpone MRI until renal function is further improved, to decrease risk of contrast-induced ORIANA.  Patient should get MRI done prior to completion of IV antibiotic course below.  Antibiotic plan as in below.  Monitor neck pain.  Recommend repeat C-spine MRI with and without contrast.  This can be postponed until ORIANA is improved, but should be done prior to completion of IV antibiotic course.  MSSA bacteremia  Patient had MSSA bacteremia during recent hospitalization.  Source is unclear but likely cutaneous.  His bacteremia cleared rapidly on IV antibiotic.  TTE did not show any vegetation.  Given possible C-spine infection, plan was for long-term IV antibiotic.  Continue high-dose IV cefazolin.  Treat x 6 weeks from clearance of bacteremia as previously planned, through 3/27.  Acute blood loss anemia  Patient is clinically improved with PRBC transfusion.  Management per primary service.  Melena  Patient with melena prior to admission.  This is most likely secondary to colonic mass.  Colorectal surgery follow-up.  ORIANA (acute kidney injury) (HCC)  Patient with ORIANA on admission, most likely secondary to hypovolemia from GI bleed.  Creatinine is beginning to improve again.  Antibiotic dosages adjusted accordingly.  Monitor creatinine.  Nephrology follow-up.  Colonic  mass  Patient has recently diagnosed adenocarcinoma of the colon.  He has been followed by colorectal surgery.  No plan for chemotherapy yet.  Colorectal surgery follow-up.    Discussed with patient in detail regarding the above plan.      Antibiotics:  Cefazolin  Last negative blood culture 2/14    Subjective   Patient's neck pain is a little better today, worse with moving neck.  No arm or leg weakness.  Temperature stays down.  No chills.  He sits in chair during the day.  He is tolerating antibiotic well.  No nausea, vomiting or diarrhea.    Objective :  Temp:  [97.8 °F (36.6 °C)-99 °F (37.2 °C)] 98.9 °F (37.2 °C)  HR:  [66-99] 78  BP: (102-126)/(48-62) 118/52  Resp:  [16-18] 16  SpO2:  [96 %-100 %] 99 %  O2 Device: Nasal cannula  Nasal Cannula O2 Flow Rate (L/min):  [3 L/min] 3 L/min    Physical Exam:     General: Awake, alert, cooperative, no distress.   Neck:  Supple. No mass.  No lymphadenopathy.  Stable mild to moderate tenderness posteriorly.   Lungs: Expansion symmetric, no rales, no wheezing, respirations unlabored.   Heart:  Regular rate and rhythm, S1 and S2 normal, no murmur.   Abdomen: Soft, nondistended, non-tender, bowel sounds active all four quadrants, no masses, no organomegaly.   Extremities: Stable leg edema. No erythema/warmth. No draining ulcer. Nontender to palpation.   Skin:  No rash.   Neuro: Moves all extremities.        Lab Results: I have reviewed the following results:  Results from last 7 days   Lab Units 03/12/25  1343 03/12/25  0756 03/12/25  0653 03/12/25  0519 03/11/25  0947 03/11/25  0540 03/10/25  0828 03/10/25  0442   WBC Thousand/uL  --   --   --  4.97  --  5.32  --  4.92   HEMOGLOBIN g/dL 7.9* 6.5* 6.4* 6.8*   < > 7.1*   < > 7.0*   PLATELETS Thousands/uL  --   --   --  162  --  145*  --  133*    < > = values in this interval not displayed.     Results from last 7 days   Lab Units 03/12/25  0519 03/11/25  0540 03/10/25  0442   SODIUM mmol/L 143 144 143   POTASSIUM mmol/L 4.0  3.7 3.8   CHLORIDE mmol/L 107 109* 109*   CO2 mmol/L 33* 32 32   BUN mg/dL 47* 37* 36*   CREATININE mg/dL 1.99* 1.95* 2.03*   EGFR ml/min/1.73sq m 31 32 30   CALCIUM mg/dL 8.0* 8.1* 8.0*

## 2025-03-13 PROBLEM — R44.1 HALLUCINATIONS, VISUAL: Status: ACTIVE | Noted: 2025-01-01

## 2025-03-13 PROBLEM — Z01.810 PRE-OPERATIVE CARDIOVASCULAR EXAMINATION, UNSTABLE ANGINA (HCC): Status: ACTIVE | Noted: 2025-01-01

## 2025-03-13 PROBLEM — I20.0 PRE-OPERATIVE CARDIOVASCULAR EXAMINATION, UNSTABLE ANGINA (HCC): Status: ACTIVE | Noted: 2025-01-01

## 2025-03-13 NOTE — ASSESSMENT & PLAN NOTE
BP is controlled.   Rx: Metoprolol 25 mg BID, Torsemide 10 mg OD.   As above, increase Torsemide to 20 mg OD.

## 2025-03-13 NOTE — PLAN OF CARE
Problem: Prexisting or High Potential for Compromised Skin Integrity  Goal: Skin integrity is maintained or improved  Description: INTERVENTIONS:  - Identify patients at risk for skin breakdown  - Assess and monitor skin integrity  - Assess and monitor nutrition and hydration status  - Monitor labs   - Assess for incontinence   - Turn and reposition patient  - Assist with mobility/ambulation  - Relieve pressure over bony prominences  - Avoid friction and shearing  - Provide appropriate hygiene as needed including keeping skin clean and dry  - Evaluate need for skin moisturizer/barrier cream  - Collaborate with interdisciplinary team   - Patient/family teaching  - Consider wound care consult   Outcome: Progressing     Problem: GASTROINTESTINAL - ADULT  Goal: Minimal or absence of nausea and/or vomiting  Description: INTERVENTIONS:  - Administer IV fluids if ordered to ensure adequate hydration  - Maintain NPO status until nausea and vomiting are resolved  - Nasogastric tube if ordered  - Administer ordered antiemetic medications as needed  - Provide nonpharmacologic comfort measures as appropriate  - Advance diet as tolerated, if ordered  - Consider nutrition services referral to assist patient with adequate nutrition and appropriate food choices  Outcome: Progressing     Problem: Nutrition/Hydration-ADULT  Goal: Nutrient/Hydration intake appropriate for improving, restoring or maintaining nutritional needs  Description: Monitor and assess patient's nutrition/hydration status for malnutrition. Collaborate with interdisciplinary team and initiate plan and interventions as ordered.  Monitor patient's weight and dietary intake as ordered or per policy. Utilize nutrition screening tool and intervene as necessary. Determine patient's food preferences and provide high-protein, high-caloric foods as appropriate.     INTERVENTIONS:  - Monitor oral intake, urinary output, labs, and treatment plans  - Assess nutrition and  hydration status and recommend course of action  - Evaluate amount of meals eaten  - Assist patient with eating if necessary   - Allow adequate time for meals  - Recommend/ encourage appropriate diets, oral nutritional supplements, and vitamin/mineral supplements  - Order, calculate, and assess calorie counts as needed  - Recommend, monitor, and adjust tube feedings and TPN/PPN based on assessed needs  - Assess need for intravenous fluids  - Provide specific nutrition/hydration education as appropriate  - Include patient/family/caregiver in decisions related to nutrition  Outcome: Progressing

## 2025-03-13 NOTE — ASSESSMENT & PLAN NOTE
Recent Labs     03/12/25  1343 03/13/25  0559 03/13/25  0909   HGB 7.9* 6.7* 6.9*      Patient presents with 2 days of chest pain, shortness of breath, fatigue and melena  2/20/2025 EGD/colonoscopy showed 2.5 cm ileocecal mass, pathology confirmed adenocarcinoma.  S/p 3 units packed rbc in ED. S/p 1 unit PRBCs on 3/2  Hemoglobin has been stable.   Patient recent diagnosis of adenocarcinoma, increased risk of stroke.   Updated iron studies 3/2025 improved compared to 2/2025    Plan:  Daily CBC  Transfuse for Hgb <7  Protonix 40 mg PO QD   Hemoglobin decreased at less than 7 this morning.  Repeat check at 9 AM also less than 7  Transfused 1 unit PRBCs.  Continue to hold patient's Eliquis in the setting of active bleeding requiring multiple transfusions

## 2025-03-13 NOTE — ASSESSMENT & PLAN NOTE
CXR with vascular congestion and pleural effusions.   Furosemide 40 mg IV x 1 this PM.   Increase Torsemide to 20 mg OD.

## 2025-03-13 NOTE — ASSESSMENT & PLAN NOTE
Rate control: Lopressor 25 mg Q12 hrs    Plan:  Resume eliquis 2.5 mg BID 3/13  Continue Lopressor 25 mg Q12 hrs

## 2025-03-13 NOTE — PROGRESS NOTES
Progress Note - Infectious Disease   Name: Devin Chaves 79 y.o. male I MRN: [id number]  Unit/Bed#: S -01 I Date of Admission: 2/28/2025   Date of Service: 3/13/2025 I Hospital Day: 13    Assessment & Plan  Neck pain  Patient developed acute neck pain with MSSA bacteremia during recent hospitalization.  CRP was highly elevated.  C-spine MRI showed possible C-spine and paraspinal infection.  Patient has no neurologic deficit.  However, his neck pain has not improved.  Given lack of improvement of neck pain, we should repeat C-spine MRI with contrast when creatinine is improved.  However, without any neurological deficit, it would be fine to postpone MRI until renal function is further improved, to decrease risk of contrast-induced ORIANA.  Patient should get MRI done prior to completion of IV antibiotic course below.  Antibiotic plan as in below.  Monitor neck pain.  Recommend repeat C-spine MRI with and without contrast.  This can be postponed until ORIANA is improved, but should be done prior to completion of IV antibiotic course.  MSSA bacteremia  Patient had MSSA bacteremia during recent hospitalization.  Source is unclear but likely cutaneous.  His bacteremia cleared rapidly on IV antibiotic.  TTE did not show any vegetation.  Given possible C-spine infection, plan was for long-term IV antibiotic.  Continue high-dose IV cefazolin.  Treat x 6 weeks from clearance of bacteremia as previously planned, through 3/27.  Acute blood loss anemia  Patient is clinically improved with PRBC transfusion.  Management per primary service.  Melena  Patient with melena prior to admission.  This is most likely secondary to colonic mass.  Colorectal surgery follow-up.  ORIANA (acute kidney injury) (HCC)  Patient with ORIANA on admission, most likely secondary to hypovolemia from GI bleed.  Creatinine is stable over the last few days.  Antibiotic dosages adjusted accordingly.  Monitor creatinine.  Nephrology follow-up.  Colonic  mass  Patient has recently diagnosed adenocarcinoma of the colon.  He has been followed by colorectal surgery.  No plan for chemotherapy yet.  Colorectal surgery follow-up.    Discussed with patient in detail regarding the above plan.      Antibiotics:  Cefazolin  Last negative blood culture 2/14    Subjective   Patient's neck pain is slowly improving.  He is able to move neck more.  He is also sitting up more.  No arm or leg weakness.  Temperature stays down.  No chills.  He is tolerating antibiotic well.  No nausea, vomiting or diarrhea.    Objective :  Temp:  [97.6 °F (36.4 °C)-98.6 °F (37 °C)] 97.9 °F (36.6 °C)  HR:  [65-78] 67  BP: (117-125)/(48-56) 122/51  Resp:  [15-20] 20  SpO2:  [94 %-100 %] 99 %    Physical Exam:     General: Awake, alert, cooperative, no distress.   Neck:  Supple. No mass.  No lymphadenopathy.   Lungs: Expansion symmetric, no rales, no wheezing, respirations unlabored.   Heart:  Regular rate and rhythm, S1 and S2 normal, no murmur.   Abdomen: Soft, nondistended, non-tender, bowel sounds active all four quadrants, no masses, no organomegaly.   Extremities: No edema. No erythema/warmth. No ulcer. Nontender to palpation.   Skin:  No rash.   Neuro: Moves all extremities.        Lab Results: I have reviewed the following results:  Results from last 7 days   Lab Units 03/13/25  0909 03/13/25  0559 03/12/25  1343 03/12/25  0653 03/12/25  0519 03/11/25  0947 03/11/25  0540   WBC Thousand/uL  --  7.45  --   --  4.97  --  5.32   HEMOGLOBIN g/dL 6.9* 6.7* 7.9*   < > 6.8*   < > 7.1*   PLATELETS Thousands/uL  --  146*  --   --  162  --  145*    < > = values in this interval not displayed.     Results from last 7 days   Lab Units 03/13/25  0559 03/12/25  0519 03/11/25  0540   SODIUM mmol/L 143 143 144   POTASSIUM mmol/L 3.8 4.0 3.7   CHLORIDE mmol/L 107 107 109*   CO2 mmol/L 32 33* 32   BUN mg/dL 58* 47* 37*   CREATININE mg/dL 1.97* 1.99* 1.95*   EGFR ml/min/1.73sq m 31 31 32   CALCIUM mg/dL 8.1* 8.0*  8.1*                 Results from last 7 days   Lab Units 03/13/25  0559   FERRITIN ng/mL 209

## 2025-03-13 NOTE — ASSESSMENT & PLAN NOTE
Patient with COPD on 2-4 L supplemental O2 at baseline    Plan:  Titrate O2 to maintain SpO2 greater than 88%   Albuterol inhaler every 4 hours as needed for wheezing  Solumedrol 40 mg q 12 hours  Respiratory protocol

## 2025-03-13 NOTE — OCCUPATIONAL THERAPY NOTE
Occupational Therapy Cancelled Session    Patient Name: Devin Chaves  Today's Date: 3/13/2025     03/13/25 0828   Note Type   Note type Cancelled Session   Cancel Reasons Medical status   Additional Comments OT orders ppreviously received. Pt chart reviewed. Pt Hgb 6.7 this AM. Pt not appropriate for participation due to low Hgb. Will f/u as medically appropriate.     Jennyfer Vela, KATHYD, OTR/L  [place] License [id number]  [place] License [id number]

## 2025-03-13 NOTE — ASSESSMENT & PLAN NOTE
Recent Labs     03/11/25  0540 03/12/25  0519 03/13/25  0559   CREATININE 1.95* 1.99* 1.97*   EGFR 32 31 31     Estimated Creatinine Clearance: 34.5 mL/min (A) (by C-G formula based on SCr of 1.97 mg/dL (H)).     Cr 2.43 on admission. Baseline 0.9. BUN 63. BUN/Cr ratio >20  Hypervolemia on exam  3/7/2025 chest xray:Unchanged pulmonary edema and small pleural effusions. Pneumonia not excluded.   Last echocardiogram 2/2025.  EF 55% G1 DD  Continue torsemide 10 mg qd    Plan:  Daily BMP  Continue torsemide 10 mg daily per nephrology's recommendations.  Creatinine stable at 1.9  Avoid nephrotoxic medications as able  Monitor I/O's  Urinary retention protocol  Per nephrology, may consider renal biopsy in the future if renal function continues to decline.  However no need at this time as renal function stable.

## 2025-03-13 NOTE — ASSESSMENT & PLAN NOTE
Per both patient and patient's wife, patient has been experiencing visual hallucinations that usually occur prior to falling asleep or waking up.  Patient reports that he will occasionally grab for things that are not there, such as a pillow.    Also states that he will occasionally have conversations with his wife when she is not physically in the room.    Patient states he is able to discern when he is having a hallucination and they are not distressing     PLAN:  Melatonin nightly   delirium precautions  PT/OT

## 2025-03-13 NOTE — ASSESSMENT & PLAN NOTE
Patient with ORIANA on admission, most likely secondary to hypovolemia from GI bleed.  Creatinine is stable over the last few days.  Antibiotic dosages adjusted accordingly.  Monitor creatinine.  Nephrology follow-up.

## 2025-03-13 NOTE — PHYSICAL THERAPY NOTE
Physical Therapy Cancellation Note       03/13/25 0758   Note Type   Note Type Cancelled Session   Cancel Reasons Medical status  (PT consult remains active. Pt chart reviewed. However pt not appropriate for participation in PT treatment at this time d/t low hgb of 6.7. Per PT protocol will hold PT services for hgb <7.0. Will follow up as able and appropriate.)       Melissa Tavarez, PT, DPT   Available via [employer]  NPI # [id number]  [place] License - [id number]  3/13/2025

## 2025-03-13 NOTE — ASSESSMENT & PLAN NOTE
Baseline creatinine is normal at 0.8 to 0.9.   Admission SCr 2.43 on 2/28/25.   SCr has been around 1.8 to 2.0 since admission.   Etiology is not entirely clear - not prerenal or postrenal. Working dx is ATN but could have another underlying pathology.   CT 2/28/25 - no hydronephrosis. UA on 3/8/25 with RBC 2-4, WBC 2-4, small blood and trace protein.   Of note, UPC ratio was 3.4 gm on 3/8/25 and urine ACR was only 821 mg on 3/5/25.   Serum LIDYA no M spike, C3 83 (low), C4 26.   At risk for AIN but no eosinophilia.   Renal function stable but SCr above baseline - stable today at 1.97  May consider renal biopsy in the future if renal function deteriorates or if felt to . Not necessary now since stable renal function.   Seems more SOB today - give Furosemide 40 mg IV x 1 this PM.  Increase to Torsemide 20 mg OD tomorrow.

## 2025-03-13 NOTE — PROGRESS NOTES
Progress Note - Hospitalist   Name: Devin Chaves 79 y.o. male I MRN: [id number]  Unit/Bed#: S [facility] I Date of Admission: 2/28/2025   Date of Service: 3/13/2025 I Hospital Day: 13    Assessment & Plan  Acute blood loss anemia  Recent Labs     03/12/25  1343 03/13/25  0559 03/13/25  0909   HGB 7.9* 6.7* 6.9*      Patient presents with 2 days of chest pain, shortness of breath, fatigue and melena  2/20/2025 EGD/colonoscopy showed 2.5 cm ileocecal mass, pathology confirmed adenocarcinoma.  S/p 3 units packed rbc in ED. S/p 1 unit PRBCs on 3/2  Hemoglobin has been stable.   Patient recent diagnosis of adenocarcinoma, increased risk of stroke.   Updated iron studies 3/2025 improved compared to 2/2025    Plan:  Daily CBC  Transfuse for Hgb <7  Protonix 40 mg PO QD   Hemoglobin decreased at less than 7 this morning.  Repeat check at 9 AM also less than 7  Transfused 1 unit PRBCs.  Continue to hold patient's Eliquis in the setting of active bleeding requiring multiple transfusions  ORIANA (acute kidney injury) (HCC)  Recent Labs     03/11/25  0540 03/12/25  0519 03/13/25  0559   CREATININE 1.95* 1.99* 1.97*   EGFR 32 31 31     Estimated Creatinine Clearance: 34.5 mL/min (A) (by C-G formula based on SCr of 1.97 mg/dL (H)).     Cr 2.43 on admission. Baseline 0.9. BUN 63. BUN/Cr ratio >20  Hypervolemia on exam  3/7/2025 chest xray:Unchanged pulmonary edema and small pleural effusions. Pneumonia not excluded.   Last echocardiogram 2/2025.  EF 55% G1 DD  Continue torsemide 10 mg qd    Plan:  Daily BMP  Continue torsemide 10 mg daily per nephrology's recommendations.  Creatinine stable at 1.9  Avoid nephrotoxic medications as able  Monitor I/O's  Urinary retention protocol  Per nephrology, may consider renal biopsy in the future if renal function continues to decline.  However no need at this time as renal function stable.     Neck pain  MRI of the neck done 2/14/2025 revealed cervical degenerative change with mild canal  stenosis and mild to moderate foraminal narrowing.  No cord compression.  Mild nonspecific edema within the right posterior breast dermal musculature of the upper cervicals spine.  Minimal peripheral enhancement is noted.     Plan:  Will plan for repeat MRI C-spine on 3/20/25.  Should be completed prior to completion of IV antibiotic course per ID  COPD (chronic obstructive pulmonary disease) (HCC)  Patient with COPD on 2-4 L supplemental O2 at baseline    Plan:  Titrate O2 to maintain SpO2 greater than 88%   Albuterol inhaler every 4 hours as needed for wheezing  Solumedrol 40 mg q 12 hours  Respiratory protocol   Colonic mass  2/20/2025 EGD/colonoscopy showed 2.5 cm ileocecal mass, pathology confirmed adenocarcinoma.    Patient was due to follow-up with outpatient colorectal surgery on 2/28/2025  Pt presenting with melena, Hgb 4.6. Symptomatic anemia.  Attending discussion with Colorectal: If COPD can stay controlled, increase chance of operation.   Patient is currently on baseline oxygen of 3 L but currently treating with steroids for medication optimization.  Oncology: Outpatient appointment for 4/3.  Discussed with wife 3/10/2025    MSSA bacteremia  Patient was discharged on cefazolin 2 g IV Q8 hrs until 3/27/2025. PICC line in place. Patient rescheduled to follow-up outpatient with ID    Plan:  Continue with cefazolin 2 g IV every 8 hours.  infectious disease recommendations appreciated   Recommend repeat C-spine MRI with and without contrast, postpone until ORIANA resolves  6 weeks treatment through 3/27/2025  Atrial fibrillation (HCC)    Rate control: Lopressor 25 mg Q12 hrs    Plan:  Resume eliquis 2.5 mg BID 3/13  Continue Lopressor 25 mg Q12 hrs  Pleural effusion, bilateral  CT A/P shows bilateral pleural effusions L>R, pulmonary vascular congestion    Plan:  Repeat imaging if pt's respiratory status worsens  Continue oxygen to keep oxygen saturations 88% and above.  HTN (hypertension)  Systolic BP  "100s-130s.  Patient normotensive.  Blood pressure stable.    Plan:  Lopressor 25 mg every 12 hours  CAD (coronary artery disease)  TTE 2/12/25: EF 55%, Normal systolic dysfunction, G1DD, No vegetation, No mural thrombus, moderate aortic sclerosis     Plan:  Hold aspirin in setting of GI bleed  History of CVA (cerebrovascular accident)  Pt had multiple embolic strokes during admission from 2/11/25-2/22/25  Thought to be embolic strokes 2/2 MSSA bacteremia, no vegetations on TTE    Plan:  See plan for eliquis under \"afib\"  Continue Lipitor 40 mg daily  Urinary retention  Patient has history of urinary retention and was started on Flomax during last hospitalization  Pt denies difficulty urinating at this shayy    Plan:  Flomax 0.4 mg daily  Urinary retention protocol  Severe protein-calorie malnutrition (HCC)  Malnutrition Findings:   Adult Malnutrition type: Acute illness  Adult Degree of Malnutrition: Other severe protein calorie malnutrition  Malnutrition Characteristics: Inadequate energy, Fluid accumulation  360 Statement: related to inadequate energy/protein intake as evidenced by consuming < 50% of energy intake compared to estimated needs for > 5 days and B/L LE +3 edema. Treated with ONS.  BMI Findings:     Body mass index is 26.49 kg/m².     Plan:  Ensure supplements  Ambulatory dysfunction  Worsening ambulatory dysfunction since December  PT/OT evaluations recommend level 2   Discussed with podiatry, patient has chronic left ankle fracture over 2 years ago, recommend WBAT to left foot as curbside, no further podiatry needs     Plan:  Weight bearing status: As tolerated with brace  Melena  Patient endorsed melena prior to admission  One episode dark, tarry stool overnight on 3/4/25. Not documented.    Plan:  Stool record at bedside  Dysphagia  VBS done on 2/15/25 showing food retention due to pharyngeal weakness  EGD was done last month which showed normal esophagus.   ENT recommending outpatient follow-up with " Dr. Flynn Reyez    Plan:  Normal diet as tolerated  Hallucinations, visual  Per both patient and patient's wife, patient has been experiencing visual hallucinations that usually occur prior to falling asleep or waking up.  Patient reports that he will occasionally grab for things that are not there, such as a pillow.    Also states that he will occasionally have conversations with his wife when she is not physically in the room.    Patient states he is able to discern when he is having a hallucination and they are not distressing     PLAN:  Melatonin nightly   delirium precautions  PT/OT    VTE Pharmacologic Prophylaxis: VTE Score: 5 High Risk (Score >/= 5) - Pharmacological DVT Prophylaxis Ordered: apixaban (Eliquis). Sequential Compression Devices Ordered.    Mobility:   Basic Mobility Inpatient Raw Score: 10  JH-HLM Goal: 4: Move to chair/commode  JH-HLM Achieved: 4: Move to chair/commode  JH-HLM Goal NOT achieved. Continue with multidisciplinary rounding and encourage appropriate mobility to improve upon JH-HLM goals.    Patient Centered Rounds: I performed bedside rounds with nursing staff today.   Discussions with Specialists or Other Care Team Provider: Nephrology, ID    Education and Discussions with Family / Patient: Updated  (wife) via phone.    Current Length of Stay: 13 day(s)  Current Patient Status: Inpatient   Certification Statement: The patient will continue to require additional inpatient hospital stay due to acute blood loss anemia on anticoagulation; will resume AC and monitor response overnight   Discharge Plan: Anticipate discharge in 24-48 hrs to rehab facility.    Code Status: Level 1 - Full Code    Subjective   No overnight events. Patient states he tolerated transfusion well yesterday.  This morning patient had hemoglobin less than 7 requiring another transfusion today.  Patient volume overloaded on exam but also has notable decreased albumin.  Kidney function stable.  Patient  reports he has occasional hallucinations that are not distressing and he is aware that they are not real.    Objective :  Temp:  [97.6 °F (36.4 °C)-98.9 °F (37.2 °C)] 97.9 °F (36.6 °C)  HR:  [65-78] 67  BP: (117-125)/(48-56) 122/51  Resp:  [15-20] 20  SpO2:  [94 %-100 %] 99 %    Body mass index is 26.49 kg/m².     Input and Output Summary (last 24 hours):     Intake/Output Summary (Last 24 hours) at 3/13/2025 1324  Last data filed at 3/13/2025 1252  Gross per 24 hour   Intake 510 ml   Output 1000 ml   Net -490 ml       Physical Exam  Vitals and nursing note reviewed.   Constitutional:       General: He is not in acute distress.     Appearance: He is well-developed.   HENT:      Head: Normocephalic and atraumatic.   Eyes:      Conjunctiva/sclera: Conjunctivae normal.   Cardiovascular:      Rate and Rhythm: Normal rate and regular rhythm.      Pulses: Normal pulses.      Heart sounds: No murmur heard.  Pulmonary:      Breath sounds: Wheezing present.   Abdominal:      Palpations: Abdomen is soft.      Tenderness: There is no abdominal tenderness.   Musculoskeletal:         General: Signs of injury present. No swelling.      Cervical back: Neck supple.      Right lower leg: Edema present.      Left lower leg: Edema present.      Comments: Amputated left fifth digit   Skin:     General: Skin is warm and dry.      Capillary Refill: Capillary refill takes less than 2 seconds.   Neurological:      Mental Status: He is alert and oriented to person, place, and time.   Psychiatric:         Mood and Affect: Mood normal.         Lines/Drains:  Lines/Drains/Airways       Active Status       Name Placement date Placement time Site Days    PICC Line 02/26/25 Right Brachial 02/26/25  0548  Brachial  15                    Central Line:  Goal for removal: Will discontinue when meds requiring line are completed.               Lab Results: I have reviewed the following results:   Results from last 7 days   Lab Units 03/13/25  0909  03/13/25  0559 03/10/25  0828 03/10/25  0442   WBC Thousand/uL  --  7.45   < > 4.92   HEMOGLOBIN g/dL 6.9* 6.7*   < > 7.0*   HEMATOCRIT % 22.8* 22.1*   < > 23.5*   PLATELETS Thousands/uL  --  146*   < > 133*   SEGS PCT %  --   --   --  70   LYMPHO PCT %  --   --   --  15   MONO PCT %  --   --   --  11   EOS PCT %  --   --   --  2    < > = values in this interval not displayed.     Results from last 7 days   Lab Units 03/13/25  0559   SODIUM mmol/L 143   POTASSIUM mmol/L 3.8   CHLORIDE mmol/L 107   CO2 mmol/L 32   BUN mg/dL 58*   CREATININE mg/dL 1.97*   ANION GAP mmol/L 4   CALCIUM mg/dL 8.1*   GLUCOSE RANDOM mg/dL 142*                       Recent Cultures (last 7 days):               Last 24 Hours Medication List:     Current Facility-Administered Medications:     acetaminophen (TYLENOL) tablet 650 mg, Q6H PRN    albuterol (PROVENTIL HFA,VENTOLIN HFA) inhaler 2 puff, Q4H PRN    ascorbic acid (VITAMIN C) tablet 250 mg, Daily    atorvastatin (LIPITOR) tablet 40 mg, Daily With Dinner    ceFAZolin (ANCEF) IVPB (premix in dextrose) 2,000 mg 50 mL, Q8H, Last Rate: 2,000 mg (03/13/25 0550)    Cholecalciferol (VITAMIN D3) tablet 2,000 Units, Daily    cyanocobalamin (VITAMIN B-12) tablet 100 mcg, Daily    fluticasone (ARNUITY ELLIPTA) 100 MCG/ACT inhaler 1 puff, Daily    folic acid (FOLVITE) tablet 1 mg, Daily    HYDROmorphone HCl (DILAUDID) injection 0.2 mg, Q4H PRN    hydroxychloroquine (PLAQUENIL) tablet 400 mg, Daily With Breakfast    lidocaine (LIDODERM) 5 % patch 3 patch, Daily    melatonin tablet 3 mg, HS    methylPREDNISolone sodium succinate (Solu-MEDROL) injection 40 mg, Q12H GALE    metoprolol tartrate (LOPRESSOR) tablet 25 mg, Q12H GALE    moisture barrier miconazole 2% cream (aka HITESH MOISTURE BARRIER ANTIFUNGAL CREAM), BID    oxyCODONE (ROXICODONE) IR tablet 5 mg, Q6H PRN    oxyCODONE (ROXICODONE) split tablet 2.5 mg, Q6H PRN    pantoprazole (PROTONIX) EC tablet 40 mg, Daily Before Breakfast    polyethylene  glycol (MIRALAX) packet 17 g, Daily PRN    senna-docusate sodium (SENOKOT S) 8.6-50 mg per tablet 1 tablet, HS    tamsulosin (FLOMAX) capsule 0.4 mg, Daily With Dinner    torsemide (DEMADEX) tablet 10 mg, Daily    Administrative Statements   Today, Patient Was Seen By: Yazmin Up DO      **Please Note: This note may have been constructed using a voice recognition system.**

## 2025-03-13 NOTE — CONSULTS
Consultation - Pulmonary Medicine   Devin Chaves 79 y.o. male MRN: [id number]  Unit/Bed#: S -01 Encounter: [id number]      Assessment/Plan:    1.  Chronic hypoxic respiratory failure likely multifaceted as listed below        -Currently maintained on home O2 requirements of 3 L 100%        -Will continue sats greater than 88%        -Giuseppe toileting: Deep breathing cough out of bed as tolerated incentive spirometry       2.  COPD of unknown severity without acute exacerbation        -Mild emphysematous changes noted upon imaging        -Inpatient: Xopenex/Atrovent 3 times daily        -Home regimen:Proventil 2 puffs every 6 as needed, albuterol nebulizer every 6 as needed        -[facility] need to [facility] his [facility] before [facility]        -No indication for steroid therapy at this time    3.  Colonic mass causing anemia        -Pathology confirmed adenocarcinoma of ileocecal mass        -Colorectal following-recommend        -Recommended eventual colectomy    4. LLL pleural effusion        -2/12/2025-grade 1 diastolic dysfunction EF 55%        -I do not feel there is enough effusion to perform thoracentesis        -Would recommend discussion with nephrology to give another dose of Lasix prior to procedure to optimize patient        5.  Preoperative assessment     Patient has an approximate 1 pack/year 63-year smoking history.  Patient does report that he quit smoking in 2011.  Today upon examination patient reports that overall he feels that he is at his respiratory baseline.  Patient reports his respiratory baseline is chronically dyspneic upon minimal exertion.  Patient reports he feels his dyspnea has gotten significantly worse since being so deconditioned from being in the hospital so much.  Patient does have some mild emphysematous changes.  Patient is on chronic 3 L nasal cannula.  I have reviewed imaging and patient has small  size left lower lobe pleural effusion with  "some vascular congestion.  Patient does not have any formal PFT testing.  Given patient's emphysema on chronic hypoxic respiratory failure, pleural effusion, and with recent bacteremia causing septic emboli this places patient at high risk for postoperative pulmonary complications.  I have reviewed and explained postoperative pulmonary complications that include but are not limited to respiratory failure requiring mechanical intubation, atelectasis, pleural effusion, rhonchal spasm, aspiration pneumonitis, aspiration pneumonia, pneumothorax, or hemoptysis.  I recommend patient obtaining nebulizer treatment prior to procedure.  Explained to patient the importance of postoperative pulmonary care such as splinting with coughing incentive spirometry and early out of bed as tolerated.     EATON postoperative respiratory failure risk-patient is at 4.9% risk of mechanical ventilation for greater than 48 hours after surgery or unplanned intubation less than 30 days of surgery    LIZETH postoperative pulmonary complications-patient is at high risk 42.1% risk of in-hospital postoperative pulmonary complications            History of Present Illness   Physician Requesting Consult: Beba Lopes*  Reason for Consult / Principal Problem: Preop clearance  Hx and PE limited by: Nothing  Chief Complaint: \" I feel weak\"  HPI: Devin Chaves is a 79 y.o.  male who presented to [facility] days ago presenting from nursing home due to hemoglobin of 4 and black tarry stools.  Patient completed EGD and colonoscopy which showed an ileocecal mass positive for adenocarcinoma.  Patient was recommended for colectomy.  Throughout patient's hospitalization he has received several units of packed red blood cells secondary to his anemia.     Pulmonary was consulted for preoperative assessment.  Today patient had some diminished aeration throughout lung fields.  Patient's main complaint was that he feels very " deconditioned.  Patient reports that he feels that his long hospitalizations has called muscle weakness and in turn has caused dyspnea upon exertion.  Patient currently denying any fevers, chills, night sweats, pleuritic chest pain, palpitations.     From pulmonary standpoint, patient follows with Dr. Pepper for his COPD of unknown severity.  Patient reports an approximate 1 pack/day 63-year smoking history.  Patient reports he quit smoking in 2011.  Patient has never had formal PFT testing.  Patient is currently maintained on albuterol nebulizer every 6 as needed, Proventil 2 puffs every 6 as needed.  Patient is on 3 L continuous nasal cannula.  Patient denies any symptoms of seasonal allergies or postnasal drip.  Patient does have history of GERD in which she is maintained on Pepcid.  Patient likely has undiagnosed JAZMINE.  Patient currently denies any acute exposures to dust, mold, asbestos, or silica.    Inpatient consult to Pulmonology  Consult performed by: AMPARO Jimenez  Consult ordered by: Beba Will MD          Review of Systems   Constitutional:  Negative for chills and fever.   HENT:  Negative for ear pain and sore throat.    Eyes:  Negative for pain and visual disturbance.   Respiratory:  Negative for apnea, cough, choking, chest tightness, shortness of breath, wheezing and stridor.    Cardiovascular:  Negative for chest pain and palpitations.   Gastrointestinal:  Negative for abdominal pain and vomiting.   Genitourinary:  Negative for dysuria and hematuria.   Musculoskeletal:  Negative for arthralgias and back pain.   Skin:  Negative for color change and rash.   Neurological:  Negative for seizures and syncope.   Psychiatric/Behavioral:  Negative for agitation and behavioral problems.    All other systems reviewed and are negative.      Historical Information   Past Medical History:   Diagnosis Date    Atrial fibrillation (HCC)     COPD (chronic obstructive pulmonary disease)  "(HCC)     Crushing injury of finger, left     Infectious viral hepatitis      Past Surgical History:   Procedure Laterality Date    FL LUMBAR PUNCTURE DIAGNOSTIC  2/10/2022    AZ OPEN TX PHALANGEAL SHAFT FRACTURE PROX/MIDDLE EA Left 1/25/2017    Procedure: ORIF LEFT SMALL FINGER FRACTURE;  Surgeon: Blake Badillo MD;  Location: [facility];  Service: Orthopedics     Social History   Social History     Substance and Sexual Activity   Alcohol Use No     Social History     Substance and Sexual Activity   Drug Use No     Social History     Tobacco Use   Smoking Status Former   Smokeless Tobacco Former    Quit date: 4/1/2011     E-Cigarette/Vaping     E-Cigarette/Vaping Substances     Occupational History: na    Family History: History reviewed. No pertinent family history.    Meds/Allergies   all current active meds have been reviewed    Allergies   Allergen Reactions    Shellfish-Derived Products - Food Allergy Other (See Comments)     Pt states, \"I reacted, I dont know\"       Objective   Vitals: Blood pressure 122/51, pulse 67, temperature 97.9 °F (36.6 °C), temperature source Oral, resp. rate 20, height 6' (1.829 m), weight 88.6 kg (195 lb 5.2 oz), SpO2 99%.,Body mass index is 26.49 kg/m².    Intake/Output Summary (Last 24 hours) at 3/13/2025 1401  Last data filed at 3/13/2025 1252  Gross per 24 hour   Intake --   Output 1000 ml   Net -1000 ml     Invasive Devices       Peripherally Inserted Central Catheter Line  Duration             PICC Line 02/26/25 Right Brachial 15 days                    Physical Exam  Constitutional:       General: He is not in acute distress.     Appearance: Normal appearance. He is normal weight. He is not ill-appearing.   HENT:      Head: Normocephalic and atraumatic.      Nose: Nose normal. No congestion or rhinorrhea.      Mouth/Throat:      Mouth: Mucous membranes are dry.      Pharynx: Oropharynx is clear. No oropharyngeal exudate or posterior oropharyngeal erythema.   Cardiovascular: " "     Rate and Rhythm: Normal rate and regular rhythm.      Pulses: Normal pulses.      Heart sounds: Normal heart sounds. No murmur heard.     No friction rub. No gallop.   Pulmonary:      Effort: Pulmonary effort is normal. No respiratory distress.      Breath sounds: No stridor. No wheezing, rhonchi or rales.   Chest:      Chest wall: No tenderness.   Abdominal:      General: Abdomen is flat. Bowel sounds are normal. There is no distension.      Palpations: Abdomen is soft. There is no mass.   Musculoskeletal:         General: No swelling or tenderness. Normal range of motion.      Cervical back: Normal range of motion. No rigidity or tenderness.   Skin:     General: Skin is warm and dry.      Coloration: Skin is not jaundiced or pale.   Neurological:      General: No focal deficit present.      Mental Status: He is alert and oriented to person, place, and time. Mental status is at baseline.   Psychiatric:         Mood and Affect: Mood normal.         Behavior: Behavior normal.         Lab Results: I have personally reviewed pertinent lab results., ABG: No results found for: \"PHART\", \"OEA1OZM\", \"PO2ART\", \"YFA2FJH\", \"M4PYOGAV\", \"BEART\", \"SOURCE\", BNP: No results found for: \"BNP\", CBC:   Lab Results   Component Value Date    WBC 7.45 03/13/2025    HGB 6.9 (L) 03/13/2025    HCT 22.8 (L) 03/13/2025    MCV 94 03/13/2025     (L) 03/13/2025    RBC 2.34 (L) 03/13/2025    MCH 28.6 03/13/2025    MCHC 30.3 (L) 03/13/2025    RDW 17.4 (H) 03/13/2025    MPV 9.5 03/13/2025   , CMP:   Lab Results   Component Value Date    SODIUM 143 03/13/2025    K 3.8 03/13/2025     03/13/2025    CO2 32 03/13/2025    BUN 58 (H) 03/13/2025    CREATININE 1.97 (H) 03/13/2025    CALCIUM 8.1 (L) 03/13/2025    EGFR 31 03/13/2025   , PT/INR: No results found for: \"PT\", \"INR\"        Imaging Studies: Results Review Statement: I personally reviewed the following image studies/reports in PACS and discussed pertinent findings with Radiology: " chest xray. My interpretation of the radiology images/reports is:  .     IMPRESSION:     Unchanged pulmonary edema and small pleural effusions. Pneumonia not excluded.    EKG, Pathology, and Other Studies: Results Review Statement: I personally reviewed the following image studies/reports in PACS and discussed pertinent findings with Radiology: chest xray. My interpretation of the radiology images/reports is:  .       nterpretation Summary  Show Result Comparison     Left Ventricle: Left ventricle is not well visualized. Left ventricular cavity size is normal. Wall thickness is mildly increased. The left ventricular ejection fraction is 55%. Systolic function is normal. Diastolic function is mildly abnormal, consistent with grade I (abnormal) relaxation.    The following segments are akinetic: basal inferior and mid inferior.    All other segments are normal.    Right Ventricle: Right ventricle is not well visualized. Right ventricular cavity size is mildly dilated. Systolic function is mildly to moderately reduced.    Aortic Valve: The aortic valve is trileaflet. The leaflets are not thickened. The leaflets are moderately calcified. There is mildly reduced mobility. There is mild to moderate regurgitation. There is aortic valve sclerosis.    Mitral Valve: There is mild regurgitation.    Tricuspid Valve: There is mild regurgitation.        Findings    Left Ventricle Left ventricle is not well visualized. Left ventricular cavity size is normal. Wall thickness is mildly increased. The left ventricular ejection fraction is 55%. Systolic function is normal. Diastolic function is mildly abnormal, consistent with grade I (abnormal) relaxation.   Wall Scoring Baseline     The following segments are akinetic: basal inferior and mid inferior.  All other segments are normal.            Right Ventricle Right ventricle is not well visualized. Right ventricular cavity size is mildly dilated. Systolic function is mildly to  "moderately reduced. Wall thickness is normal.   Left Atrium The atrium is normal in size.   Right Atrium The atrium is normal in size.   Aortic Valve The aortic valve was not well visualized. The aortic valve is trileaflet. The leaflets are not thickened. The leaflets are moderately calcified. There is mildly reduced mobility. There is mild to moderate regurgitation. There is aortic valve sclerosis.   Mitral Valve Mitral valve structure is normal.  There is mild regurgitation. There is no evidence of stenosis.   Tricuspid Valve Tricuspid valve structure is normal. There is mild regurgitation. There is no evidence of stenosis.   Pulmonic Valve Pulmonic valve structure is normal. There is trace regurgitation. There is no evidence of stenosis.   Ascending Aorta The aortic root is normal in size.   IVC/SVC The inferior vena cava is normal in size.   Pericardium There is no pericardial effusion. The pericardium is normal in appearance.       Pulmonary Results (PFTs, PSG): Results Review Statement: I personally reviewed the following image studies/reports in PACS and discussed pertinent findings with Radiology: chest xray. My interpretation of the radiology images/reports is:  .     Formal PFTs    Code Status: Level 1 - Full Code    Portions of the record may have been created with voice recognition software.  Occasional wrong word or \"sound a like\" substitutions may have occurred due to the inherent limitations of voice recognition software.  Read the chart carefully and recognize, using context, where substitutions have occurred.  "

## 2025-03-13 NOTE — PROGRESS NOTES
NEPHROLOGY HOSPITAL PROGRESS NOTE   Devin Chaves 79 y.o. male MRN: [id number]  Unit/Bed#: S -01 Encounter: [id number]  Reason for Consult: ORIANA  Assessment & Plan  ORIANA (acute kidney injury) (HCC)  Baseline creatinine is normal at 0.8 to 0.9.   Admission SCr 2.43 on 2/28/25.   SCr has been around 1.8 to 2.0 since admission.   Etiology is not entirely clear - not prerenal or postrenal. Working dx is ATN but could have another underlying pathology.   CT 2/28/25 - no hydronephrosis. UA on 3/8/25 with RBC 2-4, WBC 2-4, small blood and trace protein.   Of note, UPC ratio was 3.4 gm on 3/8/25 and urine ACR was only 821 mg on 3/5/25.   Serum LIDYA no M spike, C3 83 (low), C4 26.   At risk for AIN but no eosinophilia.   Renal function stable but SCr above baseline - stable today at 1.97  May consider renal biopsy in the future if renal function deteriorates or if felt to . Not necessary now since stable renal function.   Seems more SOB today - give Furosemide 40 mg IV x 1 this PM.  Increase to Torsemide 20 mg OD tomorrow.     HTN (hypertension)  BP is controlled.   Rx: Metoprolol 25 mg BID, Torsemide 10 mg OD.   As above, increase Torsemide to 20 mg OD.       MSSA bacteremia  Currently on Cefazolin 2 gm IV q8H - needs this until 3/27/25.   ID following.   Possible cutaneous source  TTE without vegetation.     Urinary retention  Seen by urology this admission.   Currently on Tamsulosin.     Acute blood loss anemia  Hgb monitoring per primary service.   Getting PRBC today for Hgb 6.9.     Colonic mass  Newly diagnosed colon mass on C-scope on 2/20/25.   Pathology - adenocarcinoma  Was to follow colorectal as outpatient    Pleural effusion, bilateral  CXR with vascular congestion and pleural effusions.   Furosemide 40 mg IV x 1 this PM.   Increase Torsemide to 20 mg OD.     Atrial fibrillation (HCC)  On Metoprolol for rate control.   On Eliquis for AC      TODAY's DISCUSSION / PLAN:  Renal function  remains stable albeit above baseline creatinine.  Give furosemide 40 mg IV x 1 this PM.   Increase torsemide to 20 mg OD.     SUBJECTIVE / 24H INTERVAL HISTORY:  Reports feeling more SOB.   No CP.    cc.   Getting PRBC.     OBJECTIVE:  Current Weight: Weight - Scale: 88.6 kg (195 lb 5.2 oz)  Vitals:    03/13/25 1215 03/13/25 1252 03/13/25 1304 03/13/25 1546   BP:  124/55 122/51    Pulse:  67     Resp: 16 20 20    Temp: 97.6 °F (36.4 °C) 97.7 °F (36.5 °C) 97.9 °F (36.6 °C)    TempSrc: Oral  Oral    SpO2:    100%   Weight:       Height:           Intake/Output Summary (Last 24 hours) at 3/13/2025 1558  Last data filed at 3/13/2025 1252  Gross per 24 hour   Intake --   Output 1000 ml   Net -1000 ml     General: conscious, cooperative, no distress  Skin: dry  Eyes: pink conjunctivae  ENT: moist mucous membranes  Respiratory: equal chest expansion, clear breath sounds.  Cardiovascular: distinct heart sounds, normal rate, regular rhythm, no rub  Abdomen: soft, non tender, non distended, normal bowel sounds  Extremities: + bilateral LE edema.   Genitourinary: no glass catheter.   Neuro: awake, alert.   Psych: appropriate affect    Medications:    Current Facility-Administered Medications:     acetaminophen (TYLENOL) tablet 650 mg, 650 mg, Oral, Q6H PRN, Jean Brady MD    albuterol (PROVENTIL HFA,VENTOLIN HFA) inhaler 2 puff, 2 puff, Inhalation, Q4H PRN, Fiordaliza Ibrahim MD    ascorbic acid (VITAMIN C) tablet 250 mg, 250 mg, Oral, Daily, Kanchan Wilson MD, 250 mg at 03/13/25 0901    atorvastatin (LIPITOR) tablet 40 mg, 40 mg, Oral, Daily With Dinner, Kanchan Wilson MD, 40 mg at 03/12/25 1725    ceFAZolin (ANCEF) IVPB (premix in dextrose) 2,000 mg 50 mL, 2,000 mg, Intravenous, Q8H, Olivia Martinez MD, Last Rate: 100 mL/hr at 03/13/25 0550, 2,000 mg at 03/13/25 0550    Cholecalciferol (VITAMIN D3) tablet 2,000 Units, 2,000 Units, Oral, Daily, Kanchan Wilson MD, 2,000 Units at 03/13/25 0902     cyanocobalamin (VITAMIN B-12) tablet 100 mcg, 100 mcg, Oral, Daily, Kanchan Wilson MD, 100 mcg at 03/13/25 0901    fluticasone (ARNUITY ELLIPTA) 100 MCG/ACT inhaler 1 puff, 1 puff, Inhalation, Daily, Kanchan Wilson MD, 1 puff at 03/13/25 0904    folic acid (FOLVITE) tablet 1 mg, 1 mg, Oral, Daily, Kanchan Wilson MD, 1 mg at 03/13/25 0901    HYDROmorphone HCl (DILAUDID) injection 0.2 mg, 0.2 mg, Intravenous, Q4H PRN, Jean Brady MD    hydroxychloroquine (PLAQUENIL) tablet 400 mg, 400 mg, Oral, Daily With Breakfast, Kanchan Wilson MD, 400 mg at 03/13/25 0930    ipratropium (ATROVENT) 0.02 % inhalation solution 0.5 mg, 0.5 mg, Nebulization, TID, AMPARO Jimenez, 0.5 mg at 03/13/25 1544    levalbuterol (XOPENEX) inhalation solution 1.25 mg, 1.25 mg, Nebulization, TID, RON JimenezNP, 1.25 mg at 03/13/25 1544    lidocaine (LIDODERM) 5 % patch 3 patch, 3 patch, Topical, Daily, Kanchan Wilson MD, 3 patch at 03/13/25 0902    melatonin tablet 3 mg, 3 mg, Oral, HS, Yazmin Up DO    metoprolol tartrate (LOPRESSOR) tablet 25 mg, 25 mg, Oral, Q12H GALE, Kanchan Wilson MD, 25 mg at 03/13/25 0901    moisture barrier miconazole 2% cream (aka HITESH MOISTURE BARRIER ANTIFUNGAL CREAM), , Topical, BID, Mal Neely DO, Given at 03/13/25 0909    oxyCODONE (ROXICODONE) IR tablet 5 mg, 5 mg, Oral, Q6H PRN, Jean Brady MD, 5 mg at 03/01/25 1817    oxyCODONE (ROXICODONE) split tablet 2.5 mg, 2.5 mg, Oral, Q6H PRN, Jean Brady MD, 2.5 mg at 03/05/25 0945    pantoprazole (PROTONIX) EC tablet 40 mg, 40 mg, Oral, Daily Before Breakfast, Jean Brady MD, 40 mg at 03/13/25 0551    polyethylene glycol (MIRALAX) packet 17 g, 17 g, Oral, Daily PRN, Kelly Herman MD    senna-docusate sodium (SENOKOT S) 8.6-50 mg per tablet 1 tablet, 1 tablet, Oral, HS, Kelly Herman MD, 1 tablet at 03/12/25 2143    tamsulosin (FLOMAX) capsule 0.4 mg, 0.4 mg, Oral, Daily  "With Dinner, Kanchan Wilson MD, 0.4 mg at 03/12/25 1725    torsemide (DEMADEX) tablet 10 mg, 10 mg, Oral, Daily, Ben Woodall MD, 10 mg at 03/13/25 0901    Laboratory Results:  Results from last 7 days   Lab Units 03/13/25  0909 03/13/25  0559 03/12/25  1343 03/12/25  0756 03/12/25  0653 03/12/25  0519 03/11/25  1820 03/11/25  0947 03/11/25  0540 03/10/25  0828 03/10/25  0442 03/09/25  0544 03/08/25  0530 03/07/25  0541   WBC Thousand/uL  --  7.45  --   --   --  4.97  --   --  5.32  --  4.92 5.18 4.62 5.12   HEMOGLOBIN g/dL 6.9* 6.7* 7.9* 6.5* 6.4* 6.8* 8.2*   < > 7.1*   < > 7.0* 7.6* 7.8* 8.2*   HEMATOCRIT % 22.8* 22.1* 26.0* 21.7* 21.1* 22.1* 27.0*   < > 24.0*   < > 23.5* 24.5* 25.3* 26.4*   PLATELETS Thousands/uL  --  146*  --   --   --  162  --   --  145*  --  133* 144* 160 159   POTASSIUM mmol/L  --  3.8  --   --   --  4.0  --   --  3.7  --  3.8 3.6 3.9 4.2   CHLORIDE mmol/L  --  107  --   --   --  107  --   --  109*  --  109* 109* 110* 110*   CO2 mmol/L  --  32  --   --   --  33*  --   --  32  --  32 30 31 28   BUN mg/dL  --  58*  --   --   --  47*  --   --  37*  --  36* 36* 35* 34*   CREATININE mg/dL  --  1.97*  --   --   --  1.99*  --   --  1.95*  --  2.03* 1.94* 1.97* 1.80*   CALCIUM mg/dL  --  8.1*  --   --   --  8.0*  --   --  8.1*  --  8.0* 8.1* 8.2* 8.2*   MAGNESIUM mg/dL  --  1.9  --   --   --  1.9  --   --  2.0  --  2.1 1.9 2.0 1.8*    < > = values in this interval not displayed.     Portions of the record may have been created with voice recognition software. Occasional wrong word or \"sound a like\" substitutions may have occurred due to the inherent limitations of voice recognition software. Read the chart carefully and recognize, using context, where substitutions have occurred.If you have any questions, please contact the dictating provider.    "

## 2025-03-14 PROBLEM — E87.8 DISORDERS OF FLUID, ELECTROLYTE, AND ACID-BASE BALANCE: Status: ACTIVE | Noted: 2025-03-14

## 2025-03-14 PROBLEM — E87.8 ELECTROLYTE ABNORMALITY: Status: ACTIVE | Noted: 2025-01-01

## 2025-03-14 NOTE — ASSESSMENT & PLAN NOTE
Malnutrition Findings:   Adult Malnutrition type: Acute illness  Adult Degree of Malnutrition: Other severe protein calorie malnutrition  Malnutrition Characteristics: Inadequate energy, Fluid accumulation  360 Statement: related to inadequate energy/protein intake as evidenced by consuming < 50% of energy intake compared to estimated needs for > 5 days and B/L LE +3 edema. Treated with ONS.  BMI Findings:     Body mass index is 25.92 kg/m².     Plan:  Ensure supplements

## 2025-03-14 NOTE — PHYSICAL THERAPY NOTE
PHYSICAL THERAPY NOTE          Patient Name: Devin Chaves  Today's Date: 3/14/2025           03/14/25 1500   PT Last Visit   PT Visit Date 25   Note Type   Note Type Treatment   Pain Assessment   Pain Assessment Tool 0-10   Pain Score No Pain   Patient's Stated Pain Goal No pain   Hospital Pain Intervention(s) Repositioned;Ambulation/increased activity;Rest   Multiple Pain Sites No   Pain Rating: FLACC (Rest) - Face 0   Pain Rating: FLACC (Rest) - Legs 0   Pain Rating: FLACC (Rest) - Activity 0   Pain Rating: FLACC (Rest) - Cry 0   Pain Rating: FLACC (Rest) - Consolability 0   Score: FLACC (Rest) 0   Pain Rating: FLACC (Activity) - Face 0   Pain Rating: FLACC (Activity) - Legs 0   Pain Rating: FLACC (Activity) - Activity 0   Pain Rating: FLACC (Activity) - Cry 0   Pain Rating: FLACC (Activity) - Consolability 0   Score: FLACC (Activity) 0   Restrictions/Precautions   Weight Bearing Precautions Per Order No   LLE Weight Bearing Per Order WBAT   Other Precautions Chair Alarm;Bed Alarm;WBS;O2;Fall Risk  (.5L NC 02 pre tx session and 2L NC 02 post tx session per RN)   General   Chart Reviewed Yes   Additional Pertinent History pt Sp02 decreased to 83% with static standing, RN made aware and pt placed on 2 L NC 02   Response to Previous Treatment Patient reporting fatigue but able to participate.   Family/Caregiver Present No   Cognition   Overall Cognitive Status WFL   Arousal/Participation Alert;Responsive;Cooperative   Attention Within functional limits   Orientation Level Oriented to person;Oriented to place;Oriented to situation;Disoriented to time   Memory Decreased recall of precautions   Following Commands Follows one step commands with increased time or repetition   Comments pt ID by name and  on hospital wrist band   Subjective   Subjective pt was agreeable to participate in PT intervention and reports no pain but SOB  with activity   Bed Mobility   Additional Comments pt seated OOB in [facility] recliner pre/post tx session   Transfers   Sit to Stand 3  Moderate assistance   Additional items Assist x 1;Armrests;Increased time required;Verbal cues   Stand to Sit 3  Moderate assistance   Additional items Assist x 1;Armrests;Increased time required;Verbal cues   Stand pivot Unable to assess   Additional Comments pt continues to require mod ax1 for all functional transfers with RW and VC's for hand placement while ascending to RW and descending back into the recliner   Ambulation/Elevation   Gait pattern Decreased foot clearance;Step to;Foward flexed;Excessively slow   Gait Assistance 4  Minimal assist   Additional items Assist x 1;Verbal cues   Assistive Device Rolling walker   Distance 2'fwd and 2'back w/ RW   Stair Management Assistance Not tested   Balance   Static Sitting Fair   Dynamic Sitting Fair -   Static Standing Poor +   Dynamic Standing Poor   Ambulatory Poor +  (w/ RW)   Endurance Deficit   Endurance Deficit Yes   Endurance Deficit Description limited funcitonal transfers, ambulation and standing tolerance   Activity Tolerance   Activity Tolerance Patient limited by fatigue;Other (Comment)  (generalized weakness, decreased Sp02 and SOB)   Nurse Made Aware Spoke to RN   Exercises   Hip Abduction Sitting;10 reps;AROM;Bilateral   Hip Adduction Sitting;10 reps;AROM;Bilateral  (pillow squeezes)   Knee AROM Long Arc Quad Sitting;10 reps;AROM;Bilateral   Ankle Pumps Sitting;10 reps;AROM;Bilateral   Marching Sitting;10 reps;AROM;Bilateral   Assessment   Prognosis Fair   Problem List Decreased strength;Decreased endurance;Impaired balance;Decreased mobility;Decreased coordination;Decreased safety awareness;Orthopedic restrictions;Pain   Assessment pt began tx session seated OOB in the recliner as pt was agreeable to participate in PT interventions. PT to focus on transfer training, TE activities, posture/balance with gait and  increasing pt activity tolerance. In comparison to previous tx sessions pt cotninues to require mod ax1 for all functional transfers with RW and VC's for hand placement. pt remaisn limited with standing tolerance as pt completed 3 STS w/ RW and static standing was limited to <1 minute per stand. pt did participate in TE activities while seated in the recliner with AROM, no increases in pain in order to strengthen LE's and to increase pt activity tolerance. pt continues to be limited with activity tolerance and ambulation distance as pt ambulated 2'fwd and 2' back min Ax1. pt continues to be at an increased riks for falls and injuries with OOB activities at this time as pt would benefit from continued skilled PT intervention in order to address pt deficits listed above. Continue to recommend DC w/ level 2 moderate rehab resource intensity when medically cleared.   Barriers to Discharge Inaccessible home environment;Decreased caregiver support   Goals   Patient Goals none stated   STG Expiration Date 03/17/25   PT Treatment Day 3   Plan   Treatment/Interventions ADL retraining;Functional transfer training;LE strengthening/ROM;Therapeutic exercise;Endurance training;Patient/family training;Equipment eval/education;Bed mobility;Gait training;Spoke to nursing;Compensatory technique education   Progress No functional improvements   PT Frequency 3-5x/wk   Discharge Recommendation   Rehab Resource Intensity Level, PT II (Moderate Resource Intensity)   AM-PAC Basic Mobility Inpatient   Turning in Flat Bed Without Bedrails 3   Lying on Back to Sitting on Edge of Flat Bed Without Bedrails 2   Moving Bed to Chair 2   Standing Up From Chair Using Arms 2   Walk in Room 1   Climb 3-5 Stairs With Railing 1   Basic Mobility Inpatient Raw Score 11   Basic Mobility Standardized Score 30.25   [facility] Highest Level Of Mobility   -HL Goal 4: Move to chair/commode   -HLM Achieved 4: Move to chair/commode   Education   Education  Provided Mobility training;Assistive device   Patient Reinforcement needed   End of Consult   Patient Position at End of Consult Bedside chair;Bed/Chair alarm activated;All needs within reach   The patient's AM-PAC Basic Mobility Inpatient Short Form Raw Score is 11. A Raw score of less than or equal to 16 suggests the patient may benefit from discharge to post-acute rehabilitation services. Please also refer to the recommendation of the Physical Therapist for safe discharge planning.    Mac Valladares

## 2025-03-14 NOTE — ASSESSMENT & PLAN NOTE
CXR with vascular congestion and pleural effusions.   S/p Furosemide 40 mg IV x 1 on 3/13/25.   Continue Torsemide 20 mg OD.

## 2025-03-14 NOTE — ASSESSMENT & PLAN NOTE
Patient with COPD on 2-4 L supplemental O2 at baseline    Plan:  Titrate O2 to maintain SpO2 greater than 88%   Albuterol inhaler every 4 hours as needed for wheezing  Respiratory protocol

## 2025-03-14 NOTE — ASSESSMENT & PLAN NOTE
-Pathology confirmed adenocarcinoma of ileocecal mass  -Colorectal following-recommend  -Please see previous note for preoperative assessment

## 2025-03-14 NOTE — CONSULTS
"Consultation - Gastroenterology   Name: Devin Chaves 79 y.o. male I MRN: [id number]  Unit/Bed#: S -01 I Date of Admission: 2/28/2025   Date of Service: 3/14/2025 I Hospital Day: 14   Inpatient consult to gastroenterology  Consult performed by: Gordo Willingham PA-C  Consult ordered by: Yazmin Up DO        Physician Requesting Evaluation: Beba Lopes*   Reason for Evaluation / Principal Problem: Dark stool, anemia, colon cancer    Assessment & Plan  Acute blood loss anemia  Recurrently downtrending hemoglobin with dark-colored stool noted while patient has been on Eliquis, does not appear to be having any active signs of yoli GI bleeding while Eliquis has been on hold.  Recently had bidirectional endoscopic examination which was well-prepped, revealing only a colonic mass in the cecum as a plausible source of GI bleeding.  Patient has been followed by pulmonology and has been deemed to be very high risk for anesthesia    -Discussed with internal medicine team; while a small bowel source of melena cannot be 100% excluded at this time, we anticipate that repeating colonoscopy and/or pursuing an anterograde small bowel enteroscopy would be associated with greater risk than benefit, particularly given that we do have a plausible source of GI bleeding at this point (known R sided colonic mass)    -Will hold off on colonoscopy for the time being    -Would recommend checking stat CT bleeding scan if patient does develop brisk lower GI bleeding however, please monitor patient's stool output closely    -Continue discussions between colorectal surgery and palliative care  Colonic mass  2.5 cm friable cecal mass seen on colonoscopy recently, confirmed adenocarcinoma by biopsy    -Continue colorectal follow-up, see \"anemia\"  Melena  See anemia      History of Present Illness   HPI:  Devin Chaves is a 79 y.o. male with history of rheumatoid arthritis, atrial fibrillation and COPD anticoagulated " with Eliquis who is hospitalized approximately 2 weeks ago after presenting to the hospital with worsening weakness, tiredness, abdominal discomfort, shortness of breath and dark-colored stools.  Notably he was recently diagnosed with colon cancer, having undergone EGD and colonoscopy with Dr. Segal noting a 2.5 cm friable cecal mass with biopsy-proven adenocarcinoma.  He has been seen by colorectal surgery.  Our service were consulted as patient has been recurrently requiring PRBC transfusions throughout the admission; was reported with dark-colored stools while on Eliquis.  Eliquis has been on hold for the last 2 days, last bowel movement as of now was reportedly dark brown.  Patient denies any vomiting or hematemesis.  No bright red blood per rectum.  Currently at baseline supplemental oxygen requirements.        Review of Systems   Constitutional:  Negative for activity change, appetite change, chills, fatigue, fever and unexpected weight change.   HENT:  Negative for congestion, nosebleeds, rhinorrhea, sore throat and trouble swallowing.    Eyes:  Negative for pain, itching and visual disturbance.   Respiratory:  Negative for cough, shortness of breath and wheezing.    Cardiovascular:  Negative for chest pain, palpitations and leg swelling.   Gastrointestinal:  Negative for constipation, diarrhea, nausea and vomiting.   Endocrine: Negative for cold intolerance, heat intolerance and polyuria.   Genitourinary:  Negative for difficulty urinating, dysuria, flank pain, frequency and hematuria.   Musculoskeletal:  Negative for arthralgias, back pain, myalgias and neck pain.   Skin:  Negative for color change, pallor and rash.   Neurological:  Negative for dizziness, speech difficulty, weakness, numbness and headaches.   Hematological:  Does not bruise/bleed easily.   Psychiatric/Behavioral:  Negative for dysphoric mood and sleep disturbance. The patient is not nervous/anxious.      Historical Information   Past  Medical History:   Diagnosis Date    Atrial fibrillation (HCC)     COPD (chronic obstructive pulmonary disease) (HCC)     Crushing injury of finger, left     Infectious viral hepatitis      Past Surgical History:   Procedure Laterality Date    FL LUMBAR PUNCTURE DIAGNOSTIC  2/10/2022    MO OPEN TX PHALANGEAL SHAFT FRACTURE PROX/MIDDLE EA Left 1/25/2017    Procedure: ORIF LEFT SMALL FINGER FRACTURE;  Surgeon: Blake Badillo MD;  Location: [facility];  Service: Orthopedics     Social History     Tobacco Use    Smoking status: Former    Smokeless tobacco: Former     Quit date: 4/1/2011   Substance and Sexual Activity    Alcohol use: No    Drug use: No    Sexual activity: Not on file     E-Cigarette/Vaping     E-Cigarette/Vaping Substances       Social History     Tobacco Use    Smoking status: Former    Smokeless tobacco: Former     Quit date: 4/1/2011   Substance and Sexual Activity    Alcohol use: No    Drug use: No    Sexual activity: Not on file       Current Facility-Administered Medications:     acetaminophen (TYLENOL) tablet 650 mg, Q6H PRN    ascorbic acid (VITAMIN C) tablet 250 mg, Daily    atorvastatin (LIPITOR) tablet 40 mg, Daily With Dinner    ceFAZolin (ANCEF) IVPB (premix in dextrose) 2,000 mg 50 mL, Q8H, Last Rate: 2,000 mg (03/14/25 1515)    Cholecalciferol (VITAMIN D3) tablet 2,000 Units, Daily    cyanocobalamin (VITAMIN B-12) tablet 100 mcg, Daily    fluticasone (ARNUITY ELLIPTA) 100 MCG/ACT inhaler 1 puff, Daily    folic acid (FOLVITE) tablet 1 mg, Daily    HYDROmorphone HCl (DILAUDID) injection 0.2 mg, Q4H PRN    hydroxychloroquine (PLAQUENIL) tablet 400 mg, Daily With Breakfast    levalbuterol (XOPENEX) inhalation solution 1.25 mg, Q4H PRN    lidocaine (LIDODERM) 5 % patch 3 patch, Daily    melatonin tablet 3 mg, HS    metoprolol tartrate (LOPRESSOR) tablet 25 mg, Q12H GALE    moisture barrier miconazole 2% cream (aka HITESH MOISTURE BARRIER ANTIFUNGAL CREAM), BID    oxyCODONE (ROXICODONE) IR tablet  5 mg, Q6H PRN    oxyCODONE (ROXICODONE) split tablet 2.5 mg, Q6H PRN    pantoprazole (PROTONIX) EC tablet 40 mg, Daily Before Breakfast    polyethylene glycol (MIRALAX) packet 17 g, Daily PRN    [START ON 3/15/2025] potassium chloride (Klor-Con M20) CR tablet 20 mEq, Daily    senna-docusate sodium (SENOKOT S) 8.6-50 mg per tablet 1 tablet, HS    tamsulosin (FLOMAX) capsule 0.4 mg, Daily With Dinner    torsemide (DEMADEX) tablet 20 mg, Daily    umeclidinium-vilanterol 62.5-25 mcg/actuation inhaler 1 puff, Daily  Prior to Admission Medications   Prescriptions Last Dose Informant Patient Reported? Taking?   Cholecalciferol 50 MCG (2000 UT) TABS 2/27/2025  Yes Yes   Sig: TAKE ONE TABLET BY MOUTH DAILY (FOR LOW VITAMIN D)   albuterol (2.5 mg/3 mL) 0.083 % nebulizer solution More than a month  No No   Sig: Take 3 mL (2.5 mg total) by nebulization every 6 (six) hours as needed for wheezing or shortness of breath   albuterol (PROVENTIL HFA,VENTOLIN HFA) 90 mcg/act inhaler   Yes No   Sig: INHALE 2 PUFFS BY MOUTH EVERY 4 HOURS AS NEEDED FOR BREATHING   albuterol (PROVENTIL HFA,VENTOLIN HFA) 90 mcg/act inhaler More than a month  No No   Sig: Inhale 2 puffs every 4 (four) hours as needed for wheezing   apixaban (Eliquis) 5 mg Past Week  Yes Yes   Sig: Take 5 mg by mouth 2 (two) times a day   ascorbic acid (VITAMIN C) 250 MG tablet 2/27/2025  Yes Yes   Sig: Take 250 mg by mouth daily   aspirin (ECOTRIN LOW STRENGTH) 81 mg EC tablet 2/27/2025 Morning  Yes Yes   Sig: Take 81 mg by mouth daily   ceFAZolin (ANCEF) 2000 mg IVPB 2/28/2025  No Yes   Sig: Inject 2,000 mg into a catheter in a vein over 30 minutes at 100 mL/hr every 8 (eight) hours   cyanocobalamin (VITAMIN B-12) 100 MCG tablet 2/27/2025  Yes Yes   Sig: Take 100 mcg by mouth daily   docusate sodium (COLACE) 100 mg capsule Past Week  Yes Yes   Sig: Take 100 mg by mouth 2 (two) times a day   famotidine (PEPCID) 20 mg tablet 2/27/2025  Yes Yes   Sig: Take 20 mg by mouth 2  (two) times a day   ferrous sulfate 325 (65 Fe) mg tablet 2/27/2025  Yes Yes   Sig: Take 325 mg by mouth daily with breakfast   folic acid (FOLVITE) 1 mg tablet 2/27/2025  Yes Yes   Sig: TAKE ONE TABLET BY MOUTH EVERY DAY *FOLIC ACID SUPPLEMENT*   hydroxychloroquine (PLAQUENIL) 200 mg tablet 2/27/2025  Yes Yes   Sig: Take 400 mg by mouth daily with breakfast   levalbuterol (XOPENEX) 1.25 mg/3 mL nebulizer solution 2/28/2025  No Yes   Sig: Take 3 mL (1.25 mg total) by nebulization every 8 (eight) hours as needed for wheezing   lidocaine (LIDODERM) 5 % 2/28/2025  No Yes   Sig: Apply 3 patches topically over 12 hours daily Remove & Discard patch within 12 hours or as directed by MD. Apply to neck and back in areas of pain.   metoprolol tartrate (LOPRESSOR) 25 mg tablet 2/28/2025  Yes Yes   Sig: Take 25 mg by mouth every 12 (twelve) hours   mometasone 220 mcg/actuation inhaler 2/27/2025  Yes Yes   Sig: Inhale 1 puff every evening Rinse mouth after use.   polyethylene glycol (MIRALAX) 17 g packet Past Week  No Yes   Sig: Take 17 g by mouth daily as needed (constipation)   rosuvastatin (CRESTOR) 40 MG tablet 2/28/2025  Yes Yes   Sig: Take 20 mg by mouth daily   senna (SENOKOT) 8.6 mg Past Week  No Yes   Sig: Take 1 tablet (8.6 mg total) by mouth daily at bedtime as needed for constipation   sertraline (ZOLOFT) 25 mg tablet Unknown  Yes No   Sig: Take 12.5 mg by mouth daily   tamsulosin (FLOMAX) 0.4 mg Past Week  No Yes   Sig: Take 1 capsule (0.4 mg total) by mouth daily with dinner      Facility-Administered Medications: None     Shellfish-derived products - food allergy    Objective :  Temp:  [97.7 °F (36.5 °C)-98.7 °F (37.1 °C)] 97.7 °F (36.5 °C)  HR:  [63-87] 87  BP: ()/(49-63) 96/63  Resp:  [16-18] 18  SpO2:  [86 %-100 %] 97 %  O2 Device: Nasal cannula  Nasal Cannula O2 Flow Rate (L/min):  [2 L/min-5 L/min] 5 L/min    Physical Exam  Constitutional:       General: He is not in acute distress.     Appearance: He  is well-developed. He is not diaphoretic.   HENT:      Head: Normocephalic and atraumatic.   Eyes:      Conjunctiva/sclera: Conjunctivae normal.      Pupils: Pupils are equal, round, and reactive to light.   Cardiovascular:      Rate and Rhythm: Normal rate and regular rhythm.      Heart sounds: Normal heart sounds. No murmur heard.     No friction rub. No gallop.   Pulmonary:      Effort: Pulmonary effort is normal. No respiratory distress.      Breath sounds: Normal breath sounds. No stridor. No wheezing or rales.   Abdominal:      General: Bowel sounds are normal. There is no distension.      Palpations: Abdomen is soft. There is no mass.      Tenderness: There is no abdominal tenderness. There is no guarding or rebound.   Musculoskeletal:         General: No tenderness. Normal range of motion.      Cervical back: Normal range of motion and neck supple.   Skin:     General: Skin is warm and dry.      Coloration: Skin is not pale.      Findings: No erythema or rash.   Neurological:      Mental Status: He is alert and oriented to person, place, and time.   Psychiatric:         Behavior: Behavior normal.           Lab Results: I have reviewed the following results:

## 2025-03-14 NOTE — ASSESSMENT & PLAN NOTE
Recurrently downtrending hemoglobin with dark-colored stool noted while patient has been on Eliquis, does not appear to be having any active signs of yoli GI bleeding while Eliquis has been on hold.  Recently had bidirectional endoscopic examination which was well-prepped, revealing only a colonic mass in the cecum as a plausible source of GI bleeding.  Patient has been followed by pulmonology and has been deemed to be very high risk for anesthesia    -Discussed with internal medicine team; while a small bowel source of melena cannot be 100% excluded at this time, we anticipate that repeating colonoscopy and/or pursuing an anterograde small bowel enteroscopy would be associated with greater risk than benefit, particularly given that we do have a plausible source of GI bleeding at this point (known R sided colonic mass)    -Will hold off on colonoscopy for the time being    -Would recommend checking stat CT bleeding scan if patient does develop brisk lower GI bleeding however, please monitor patient's stool output closely    -Continue discussions between colorectal surgery and palliative care

## 2025-03-14 NOTE — ASSESSMENT & PLAN NOTE
-Currently on home O2 requirements of 3 L 100%  -Continue sats greater than 88%  -Pulmonary toileting: Deep breathing cough out of bed as tolerated incentive spirometry      -Pulmonary will follow peripherally

## 2025-03-14 NOTE — PROGRESS NOTES
Progress Note - Hospitalist   Name: Devin Chaves 79 y.o. male I MRN: [id number]  Unit/Bed#: S [facility]-01 I Date of Admission: 2/28/2025   Date of Service: 3/14/2025 I Hospital Day: 14    Assessment & Plan  Acute blood loss anemia  Recent Labs     03/13/25  0909 03/13/25  1625 03/14/25  0520   HGB 6.9* 7.8* 7.3*      Patient presents with 2 days of chest pain, shortness of breath, fatigue and melena  2/20/2025 EGD/colonoscopy showed 2.5 cm ileocecal mass, pathology confirmed adenocarcinoma.  S/p 3 units packed rbc in ED. S/p 1 unit PRBCs on 3/2  Hemoglobin has been stable.   Patient recent diagnosis of adenocarcinoma, increased risk of stroke.   Updated iron studies 3/2025 improved compared to 2/2025    Plan:  Daily CBC  Transfuse for Hgb <7  Protonix 40 mg PO QD   Hemoglobin stable at 7.3 this morning  Reached out to med onc, rad onc, and colorectal surgery regarding possible recommendations for surgery and/or treatment options  Optimized from a pulmonary standpoint per pulmonology continuing to require IV antibiotics until 3/27  Consult placed to palliative care.  Appreciate recommendations.  ORIANA (acute kidney injury) (HCC)  Recent Labs     03/12/25  0519 03/13/25  0559 03/14/25  0520   CREATININE 1.99* 1.97* 2.07*   EGFR 31 31 29     Estimated Creatinine Clearance: 32.8 mL/min (A) (by C-G formula based on SCr of 2.07 mg/dL (H)).     Cr 2.43 on admission. Baseline 0.9. BUN 63. BUN/Cr ratio >20  Hypervolemia on exam  3/7/2025 chest xray:Unchanged pulmonary edema and small pleural effusions. Pneumonia not excluded.   Last echocardiogram 2/2025.  EF 55% G1 DD  Continue torsemide 10 mg qd    Plan:  Daily BMP  Continue torsemide 20 mg daily per nephrology's recommendations.  Increased from 10 mg daily.  Creatinine stable at 2.0  Avoid nephrotoxic medications as able  Monitor I/O's  Urinary retention protocol  Per nephrology, may consider renal biopsy in the future if renal function continues to decline.  However no  need at this time as renal function stable.     Neck pain  MRI of the neck done 2/14/2025 revealed cervical degenerative change with mild canal stenosis and mild to moderate foraminal narrowing.  No cord compression.  Mild nonspecific edema within the right posterior breast dermal musculature of the upper cervicals spine.  Minimal peripheral enhancement is noted.     Plan:  Will plan for repeat MRI C-spine on 3/20/25.  Should be completed prior to completion of IV antibiotic course per ID  COPD (chronic obstructive pulmonary disease) (HCC)  Patient with COPD on 2-4 L supplemental O2 at baseline    Plan:  Titrate O2 to maintain SpO2 greater than 88%   Albuterol inhaler every 4 hours as needed for wheezing  Respiratory protocol   Colonic mass  2/20/2025 EGD/colonoscopy showed 2.5 cm ileocecal mass, pathology confirmed adenocarcinoma.    Patient was due to follow-up with outpatient colorectal surgery on 2/28/2025  Pt presenting with melena, Hgb 4.6. Symptomatic anemia.  Attending discussion with Colorectal: If COPD can stay controlled, increase chance of operation.   Patient is currently on baseline oxygen of 3 L but currently treating with steroids for medication optimization.  Oncology: Outpatient appointment for 4/3.  Discussed with wife 3/10/2025    MSSA bacteremia  Patient was discharged on cefazolin 2 g IV Q8 hrs until 3/27/2025. PICC line in place. Patient rescheduled to follow-up outpatient with ID    Plan:  Continue with cefazolin 2 g IV every 8 hours.  infectious disease recommendations appreciated   Recommend repeat C-spine MRI with and without contrast, postpone until ORIANA resolves  6 weeks treatment through 3/27/2025  Atrial fibrillation (HCC)    Rate control: Lopressor 25 mg Q12 hrs    Plan:  Resume eliquis 2.5 mg BID 3/13  Continue Lopressor 25 mg Q12 hrs  Pleural effusion, bilateral  CT A/P shows bilateral pleural effusions L>R, pulmonary vascular congestion    Plan:  Repeat imaging if pt's respiratory  "status worsens  Continue oxygen to keep oxygen saturations 88% and above.  HTN (hypertension)  Systolic BP 100s-130s.  Patient normotensive.  Blood pressure stable.    Plan:  Lopressor 25 mg every 12 hours  CAD (coronary artery disease)  TTE 2/12/25: EF 55%, Normal systolic dysfunction, G1DD, No vegetation, No mural thrombus, moderate aortic sclerosis     Plan:  Hold aspirin in setting of GI bleed  History of CVA (cerebrovascular accident)  Pt had multiple embolic strokes during admission from 2/11/25-2/22/25  Thought to be embolic strokes 2/2 MSSA bacteremia, no vegetations on TTE    Plan:  See plan for eliquis under \"afib\"  Continue Lipitor 40 mg daily  Urinary retention  Patient has history of urinary retention and was started on Flomax during last hospitalization  Pt denies difficulty urinating at this shayy    Plan:  Flomax 0.4 mg daily  Urinary retention protocol  Severe protein-calorie malnutrition (HCC)  Malnutrition Findings:   Adult Malnutrition type: Acute illness  Adult Degree of Malnutrition: Other severe protein calorie malnutrition  Malnutrition Characteristics: Inadequate energy, Fluid accumulation  360 Statement: related to inadequate energy/protein intake as evidenced by consuming < 50% of energy intake compared to estimated needs for > 5 days and B/L LE +3 edema. Treated with ONS.  BMI Findings:     Body mass index is 25.92 kg/m².     Plan:  Ensure supplements  Ambulatory dysfunction  Worsening ambulatory dysfunction since December  PT/OT evaluations recommend level 2   Discussed with podiatry, patient has chronic left ankle fracture over 2 years ago, recommend WBAT to left foot as curbside, no further podiatry needs     Plan:  Weight bearing status: As tolerated with brace  Melena  Patient endorsed melena prior to admission  One episode dark, tarry stool overnight on 3/4/25. Not documented.    Plan:  Stool record at bedside  Dysphagia  VBS done on 2/15/25 showing food retention due to pharyngeal " weakness  EGD was done last month which showed normal esophagus.   ENT recommending outpatient follow-up with Dr. Flynn Reyez    Plan:  Normal diet as tolerated  Hallucinations, visual  Per both patient and patient's wife, patient has been experiencing visual hallucinations that usually occur prior to falling asleep or waking up.  Patient reports that he will occasionally grab for things that are not there, such as a pillow.    Also states that he will occasionally have conversations with his wife when she is not physically in the room.    Patient states he is able to discern when he is having a hallucination and they are not distressing     PLAN:  Melatonin nightly   delirium precautions  PT/OT    VTE Pharmacologic Prophylaxis: VTE Score: 5 High Risk (Score >/= 5) - Pharmacological DVT Prophylaxis Contraindicated. Sequential Compression Devices Ordered.    Mobility:   Basic Mobility Inpatient Raw Score: 10  JH-HLM Goal: 4: Move to chair/commode  JH-HLM Achieved: 1: Laying in bed  JH-HLM Goal NOT achieved. Continue with multidisciplinary rounding and encourage appropriate mobility to improve upon JH-HLM goals.    Patient Centered Rounds: I performed bedside rounds with nursing staff today.   Discussions with Specialists or Other Care Team Provider: Colorectal surgery, oncology, radiation oncology, cardiology, ID, nephrology    Education and Discussions with Family / Patient: Updated  (wife) via phone.  Dr. ASTUDILLO discussed palliative measures with wife over the phone    Current Length of Stay: 14 day(s)  Current Patient Status: Inpatient   Certification Statement: The patient will continue to require additional inpatient hospital stay due to acute blood loss anemia  Discharge Plan: Anticipate discharge in 24-48 hrs to rehab facility.    Code Status: Level 1 - Full Code    Subjective   No overnight events.  Patient resting comfortably at time of exam.  States he tolerated transfusion well yesterday.      Objective :  Temp:  [97.7 °F (36.5 °C)-98.7 °F (37.1 °C)] 97.7 °F (36.5 °C)  HR:  [63-86] 86  BP: (114-137)/(49-62) 128/62  Resp:  [16-18] 18  SpO2:  [86 %-100 %] 95 %  O2 Device: Nasal cannula  Nasal Cannula O2 Flow Rate (L/min):  [2 L/min-5 L/min] 5 L/min    Body mass index is 25.92 kg/m².     Input and Output Summary (last 24 hours):     Intake/Output Summary (Last 24 hours) at 3/14/2025 1516  Last data filed at 3/14/2025 0701  Gross per 24 hour   Intake 511.67 ml   Output 900 ml   Net -388.33 ml       Physical Exam  Vitals and nursing note reviewed.   Constitutional:       General: He is not in acute distress.     Appearance: He is well-developed.   HENT:      Head: Normocephalic and atraumatic.   Eyes:      Conjunctiva/sclera: Conjunctivae normal.   Cardiovascular:      Rate and Rhythm: Normal rate and regular rhythm.      Heart sounds: No murmur heard.  Pulmonary:      Effort: Pulmonary effort is normal. No respiratory distress.      Breath sounds: Normal breath sounds.   Abdominal:      Palpations: Abdomen is soft.      Tenderness: There is no abdominal tenderness.   Musculoskeletal:         General: Deformity present. No swelling.      Cervical back: Neck supple.      Right lower leg: Edema present.      Left lower leg: Edema present.      Comments: Amputation of left fifth digit   Skin:     General: Skin is warm and dry.      Capillary Refill: Capillary refill takes less than 2 seconds.   Neurological:      Mental Status: He is alert.   Psychiatric:         Mood and Affect: Mood normal.           Lines/Drains:  Lines/Drains/Airways       Active Status       Name Placement date Placement time Site Days    PICC Line 02/26/25 Right Brachial 02/26/25  0548  Brachial  16                    Central Line:  Goal for removal: Will discontinue when meds requiring line are completed.               Lab Results: I have reviewed the following results:   Results from last 7 days   Lab Units 03/14/25  0555  03/10/25  0828 03/10/25  0442   WBC Thousand/uL 7.08   < > 4.92   HEMOGLOBIN g/dL 7.3*   < > 7.0*   HEMATOCRIT % 23.3*   < > 23.5*   PLATELETS Thousands/uL 147*   < > 133*   SEGS PCT %  --   --  70   LYMPHO PCT %  --   --  15   MONO PCT %  --   --  11   EOS PCT %  --   --  2    < > = values in this interval not displayed.     Results from last 7 days   Lab Units 03/14/25  0520   SODIUM mmol/L 143   POTASSIUM mmol/L 3.2*   CHLORIDE mmol/L 107   CO2 mmol/L 33*   BUN mg/dL 61*   CREATININE mg/dL 2.07*   ANION GAP mmol/L 3*   CALCIUM mg/dL 8.0*   GLUCOSE RANDOM mg/dL 82                       Recent Cultures (last 7 days):               Last 24 Hours Medication List:     Current Facility-Administered Medications:     acetaminophen (TYLENOL) tablet 650 mg, Q6H PRN    ascorbic acid (VITAMIN C) tablet 250 mg, Daily    atorvastatin (LIPITOR) tablet 40 mg, Daily With Dinner    ceFAZolin (ANCEF) IVPB (premix in dextrose) 2,000 mg 50 mL, Q8H, Last Rate: 2,000 mg (03/14/25 0849)    Cholecalciferol (VITAMIN D3) tablet 2,000 Units, Daily    cyanocobalamin (VITAMIN B-12) tablet 100 mcg, Daily    fluticasone (ARNUITY ELLIPTA) 100 MCG/ACT inhaler 1 puff, Daily    folic acid (FOLVITE) tablet 1 mg, Daily    HYDROmorphone HCl (DILAUDID) injection 0.2 mg, Q4H PRN    hydroxychloroquine (PLAQUENIL) tablet 400 mg, Daily With Breakfast    levalbuterol (XOPENEX) inhalation solution 1.25 mg, Q4H PRN    lidocaine (LIDODERM) 5 % patch 3 patch, Daily    melatonin tablet 3 mg, HS    metoprolol tartrate (LOPRESSOR) tablet 25 mg, Q12H GALE    moisture barrier miconazole 2% cream (aka HITESH MOISTURE BARRIER ANTIFUNGAL CREAM), BID    oxyCODONE (ROXICODONE) IR tablet 5 mg, Q6H PRN    oxyCODONE (ROXICODONE) split tablet 2.5 mg, Q6H PRN    pantoprazole (PROTONIX) EC tablet 40 mg, Daily Before Breakfast    polyethylene glycol (MIRALAX) packet 17 g, Daily PRN    [START ON 3/15/2025] potassium chloride (Klor-Con M20) CR tablet 20 mEq, Daily    senna-docusate  sodium (SENOKOT S) 8.6-50 mg per tablet 1 tablet, HS    tamsulosin (FLOMAX) capsule 0.4 mg, Daily With Dinner    torsemide (DEMADEX) tablet 20 mg, Daily    umeclidinium-vilanterol 62.5-25 mcg/actuation inhaler 1 puff, Daily    Administrative Statements   Today, Patient Was Seen By: Yazmin Up DO      **Please Note: This note may have been constructed using a voice recognition system.**

## 2025-03-14 NOTE — ASSESSMENT & PLAN NOTE
-2/12/2025-grade 1 diastolic dysfunction EF 55%   -I do not feel there is enough effusion to perform thoracentesis  -Would recommend discussion with nephrology to give another dose of Lasix prior to procedure to optimize patient

## 2025-03-14 NOTE — ASSESSMENT & PLAN NOTE
"2.5 cm friable cecal mass seen on colonoscopy recently, confirmed adenocarcinoma by biopsy    -Continue colorectal follow-up, see \"anemia\"  "

## 2025-03-14 NOTE — QUICK NOTE
Notified by nurse that patient went into a coughing fit while eating and spit some of the food product out. Oxygen currently stable at 94% on 3L NC. Will order Stat Chest xray. Aspiration precaution ordered.

## 2025-03-14 NOTE — PROGRESS NOTES
NEPHROLOGY HOSPITAL PROGRESS NOTE   Devin Chaves 79 y.o. male MRN: [id number]  Unit/Bed#: S -01 Encounter: [id number]  Reason for Consult: ORIANA  Assessment & Plan  ORIANA (acute kidney injury) (HCC)  Baseline creatinine is normal at 0.8 to 0.9.   Admission SCr 2.43 on 2/28/25.   SCr has been around 1.8 to 2.0 since admission.   Etiology is not entirely clear - not prerenal or postrenal. Working dx is ATN but could have another underlying pathology.   CT 2/28/25 - no hydronephrosis. UA on 3/8/25 with RBC 2-4, WBC 2-4, small blood and trace protein.   Of note, UPC ratio was 3.4 gm on 3/8/25 and urine ACR was only 821 mg on 3/5/25.   Serum and urine LIDYA no M spike, C3 83 (low), C4 26. At risk for AIN due to antibiotic exposure but no clear indication of this as well.   Renal function stable but SCr above baseline - stable today at 2.07  May consider renal biopsy in the future if renal function deteriorates or if felt to . Not necessary now since stable renal function.   Remains hypervolemic - continue Torsemide 20 mg OD.     HTN (hypertension)  BP is at goal.   Rx: Metoprolol 25 mg BID, Torsemide 20 mg OD.   No changes today.     MSSA bacteremia  Currently on Cefazolin 2 gm IV q8H - needs this until 3/27/25.   ID following.   Possible cutaneous source  TTE without vegetation.     Urinary retention  Seen by urology this admission.   Currently on Tamsulosin.     Acute blood loss anemia  Hgb monitoring per primary service.   Had PRBC yesterday.   Hgb 7.3 today.    Colonic mass  Newly diagnosed colon mass on C-scope on 2/20/25.   Pathology - adenocarcinoma  Was to follow colorectal as outpatient    Pleural effusion, bilateral  CXR with vascular congestion and pleural effusions.   S/p Furosemide 40 mg IV x 1 on 3/13/25.   Continue Torsemide 20 mg OD.     Atrial fibrillation (HCC)  On Metoprolol for rate control.   On Eliquis for AC    Disorders of fluid, electrolyte, and acid-base  balance  Hypokalemia: Kdur 40 meq x 1 ordered.   Hypomagnesemia: Mg sulfate 2 gm IV x 1 ordered.     TODAY's DISCUSSION / PLAN:  Stable renal function.   Continue Torsemide 20 mg OD.   Add Kdur 20 meq PO daily.     SUBJECTIVE / 24H INTERVAL HISTORY:  SOB today is better.  Urine output 850 cc overnight.  No CP.    OBJECTIVE:  Current Weight: Weight - Scale: 86.7 kg (191 lb 2.2 oz)  Vitals:    03/14/25 0500 03/14/25 0730 03/14/25 0844 03/14/25 0847   BP:  137/60     Pulse:  69     Resp:  18     Temp:  97.7 °F (36.5 °C)     TempSrc:  Oral     SpO2:  97% (!) 86% 92%   Weight: 86.7 kg (191 lb 2.2 oz)      Height:           Intake/Output Summary (Last 24 hours) at 3/14/2025 1306  Last data filed at 3/14/2025 0701  Gross per 24 hour   Intake 511.67 ml   Output 900 ml   Net -388.33 ml     General: conscious, cooperative, no distress  Skin: dry  Eyes: pink conjunctivae  ENT: moist mucous membranes  Respiratory: equal chest expansion, clear breath sounds.  Cardiovascular: distinct heart sounds, normal rate, regular rhythm, no rub  Abdomen: soft, non tender, non distended, normal bowel sounds  Extremities: + bilateral LE edema.   Genitourinary: no glass catheter.   Neuro: awake, alert.   Psych: appropriate affect    Medications:    Current Facility-Administered Medications:     acetaminophen (TYLENOL) tablet 650 mg, 650 mg, Oral, Q6H PRN, Jean Brady MD    ascorbic acid (VITAMIN C) tablet 250 mg, 250 mg, Oral, Daily, Kanchan Wilson MD, 250 mg at 03/14/25 0848    atorvastatin (LIPITOR) tablet 40 mg, 40 mg, Oral, Daily With Dinner, Kanchan Wilson MD, 40 mg at 03/13/25 1626    ceFAZolin (ANCEF) IVPB (premix in dextrose) 2,000 mg 50 mL, 2,000 mg, Intravenous, Q8H, Olivia Martinez MD, Last Rate: 100 mL/hr at 03/14/25 0849, 2,000 mg at 03/14/25 0849    Cholecalciferol (VITAMIN D3) tablet 2,000 Units, 2,000 Units, Oral, Daily, Kanchan Wilson MD, 2,000 Units at 03/14/25 0848    cyanocobalamin (VITAMIN B-12) tablet  100 mcg, 100 mcg, Oral, Daily, Kanchan Wilson MD, 100 mcg at 03/14/25 0848    fluticasone (ARNUITY ELLIPTA) 100 MCG/ACT inhaler 1 puff, 1 puff, Inhalation, Daily, Kanchan Wilson MD, 1 puff at 03/14/25 0849    folic acid (FOLVITE) tablet 1 mg, 1 mg, Oral, Daily, Kanchan Wilson MD, 1 mg at 03/14/25 0848    HYDROmorphone HCl (DILAUDID) injection 0.2 mg, 0.2 mg, Intravenous, Q4H PRN, Jean Brady MD    hydroxychloroquine (PLAQUENIL) tablet 400 mg, 400 mg, Oral, Daily With Breakfast, Kanchan Wilson MD, 400 mg at 03/14/25 0848    levalbuterol (XOPENEX) inhalation solution 1.25 mg, 1.25 mg, Nebulization, Q4H PRN, Kalia Fallon MD    lidocaine (LIDODERM) 5 % patch 3 patch, 3 patch, Topical, Daily, Kanchan Wilson MD, 3 patch at 03/13/25 0902    melatonin tablet 3 mg, 3 mg, Oral, HS, Yazmin Up DO, 3 mg at 03/13/25 2156    metoprolol tartrate (LOPRESSOR) tablet 25 mg, 25 mg, Oral, Q12H GALE, Kanchan Wilson MD, 25 mg at 03/14/25 0848    moisture barrier miconazole 2% cream (aka HITESH MOISTURE BARRIER ANTIFUNGAL CREAM), , Topical, BID, Mal Neely DO, Given at 03/14/25 0849    oxyCODONE (ROXICODONE) IR tablet 5 mg, 5 mg, Oral, Q6H PRN, Jean Brady MD, 5 mg at 03/01/25 1817    oxyCODONE (ROXICODONE) split tablet 2.5 mg, 2.5 mg, Oral, Q6H PRN, Jean Brady MD, 2.5 mg at 03/05/25 0945    pantoprazole (PROTONIX) EC tablet 40 mg, 40 mg, Oral, Daily Before Breakfast, Jean Brady MD, 40 mg at 03/14/25 0848    polyethylene glycol (MIRALAX) packet 17 g, 17 g, Oral, Daily PRN, Kelly Herman MD    senna-docusate sodium (SENOKOT S) 8.6-50 mg per tablet 1 tablet, 1 tablet, Oral, HS, Kelly Herman MD, 1 tablet at 03/13/25 2156    tamsulosin (FLOMAX) capsule 0.4 mg, 0.4 mg, Oral, Daily With Dinner, Kanchan Wilson MD, 0.4 mg at 03/13/25 1626    torsemide (DEMADEX) tablet 20 mg, 20 mg, Oral, Daily, Ben Woodall MD, 20 mg at 03/14/25 0848     "umeclidinium-vilanterol 62.5-25 mcg/actuation inhaler 1 puff, 1 puff, Inhalation, Daily, Kalia Fallon MD, 1 puff at 03/14/25 0849    Laboratory Results:  Results from last 7 days   Lab Units 03/14/25  0520 03/13/25  1625 03/13/25  0909 03/13/25  0559 03/12/25  1343 03/12/25  0756 03/12/25  0653 03/12/25  0519 03/11/25  0947 03/11/25  0540 03/10/25  0828 03/10/25  0442 03/09/25  0544 03/08/25  0530   WBC Thousand/uL 7.08  --   --  7.45  --   --   --  4.97  --  5.32  --  4.92 5.18 4.62   HEMOGLOBIN g/dL 7.3* 7.8* 6.9* 6.7* 7.9* 6.5* 6.4* 6.8*   < > 7.1*   < > 7.0* 7.6* 7.8*   HEMATOCRIT % 23.3* 25.1* 22.8* 22.1* 26.0* 21.7* 21.1* 22.1*   < > 24.0*   < > 23.5* 24.5* 25.3*   PLATELETS Thousands/uL 147*  --   --  146*  --   --   --  162  --  145*  --  133* 144* 160   POTASSIUM mmol/L 3.2*  --   --  3.8  --   --   --  4.0  --  3.7  --  3.8 3.6 3.9   CHLORIDE mmol/L 107  --   --  107  --   --   --  107  --  109*  --  109* 109* 110*   CO2 mmol/L 33*  --   --  32  --   --   --  33*  --  32  --  32 30 31   BUN mg/dL 61*  --   --  58*  --   --   --  47*  --  37*  --  36* 36* 35*   CREATININE mg/dL 2.07*  --   --  1.97*  --   --   --  1.99*  --  1.95*  --  2.03* 1.94* 1.97*   CALCIUM mg/dL 8.0*  --   --  8.1*  --   --   --  8.0*  --  8.1*  --  8.0* 8.1* 8.2*   MAGNESIUM mg/dL 1.8*  --   --  1.9  --   --   --  1.9  --  2.0  --  2.1 1.9 2.0    < > = values in this interval not displayed.     Portions of the record may have been created with voice recognition software. Occasional wrong word or \"sound a like\" substitutions may have occurred due to the inherent limitations of voice recognition software. Read the chart carefully and recognize, using context, where substitutions have occurred.If you have any questions, please contact the dictating provider.    "

## 2025-03-14 NOTE — ASSESSMENT & PLAN NOTE
-Mild emphysematous changes noted upon imaging  -Inpatient: Xopenex/Atrovent 3 times daily  -Home regimen:Proventil 2 puffs every 6 as needed, albuterol nebulizer every 6 as needed  -[facility] will need to verify his home regimen before discharge   -No indication for steroid therapy at this time

## 2025-03-14 NOTE — ASSESSMENT & PLAN NOTE
Patient with ORIANA on admission, most likely secondary to hypovolemia from GI bleed.  Creatinine has been stable over the last few days.  Antibiotic dosages adjusted accordingly.  Monitor creatinine.  Nephrology follow-up.

## 2025-03-14 NOTE — ASSESSMENT & PLAN NOTE
Patient developed acute neck pain with MSSA bacteremia during recent hospitalization.  CRP was highly elevated.  C-spine MRI showed possible C-spine and paraspinal infection.  Patient has no neurologic deficit.  His neck pain is finally improving.  However, given paraspinal infection on the initial C-spine MRI, we should repeat C-spine MRI with contrast when creatinine is improved to confirm resolution/improvement of paraspinal infection.  However, without any neurological deficit, it would be fine to postpone MRI until renal function is further improved, to decrease risk of contrast-induced ORIANA.  Patient should get MRI done prior to completion of IV antibiotic course below.  Antibiotic plan as in below.  Monitor neck pain.  Recommend repeat C-spine MRI with and without contrast.  This can be postponed until ORIANA is improved, but should be done prior to completion of IV antibiotic course.

## 2025-03-14 NOTE — ASSESSMENT & PLAN NOTE
Pulmonary evaluation noted.  Respiratory symptoms stable.  No evidence of pneumonia clinically radiologically.    Discussed with patient in detail regarding the above plan.

## 2025-03-14 NOTE — ASSESSMENT & PLAN NOTE
Recent Labs     03/12/25  0519 03/13/25  0559 03/14/25  0520   CREATININE 1.99* 1.97* 2.07*   EGFR 31 31 29     Estimated Creatinine Clearance: 32.8 mL/min (A) (by C-G formula based on SCr of 2.07 mg/dL (H)).     Cr 2.43 on admission. Baseline 0.9. BUN 63. BUN/Cr ratio >20  Hypervolemia on exam  3/7/2025 chest xray:Unchanged pulmonary edema and small pleural effusions. Pneumonia not excluded.   Last echocardiogram 2/2025.  EF 55% G1 DD  Continue torsemide 10 mg qd    Plan:  Daily BMP  Continue torsemide 20 mg daily per nephrology's recommendations.  Increased from 10 mg daily.  Creatinine stable at 2.0  Avoid nephrotoxic medications as able  Monitor I/O's  Urinary retention protocol  Per nephrology, may consider renal biopsy in the future if renal function continues to decline.  However no need at this time as renal function stable.

## 2025-03-14 NOTE — PROGRESS NOTES
Progress Note - Infectious Disease   Name: Devin Chaves 79 y.o. male I MRN: [id number]  Unit/Bed#: S -01 I Date of Admission: 2/28/2025   Date of Service: 3/14/2025 I Hospital Day: 14    Assessment & Plan  Neck pain  Patient developed acute neck pain with MSSA bacteremia during recent hospitalization.  CRP was highly elevated.  C-spine MRI showed possible C-spine and paraspinal infection.  Patient has no neurologic deficit.  His neck pain is finally improving.  However, given paraspinal infection on the initial C-spine MRI, we should repeat C-spine MRI with contrast when creatinine is improved to confirm resolution/improvement of paraspinal infection.  However, without any neurological deficit, it would be fine to postpone MRI until renal function is further improved, to decrease risk of contrast-induced ORIANA.  Patient should get MRI done prior to completion of IV antibiotic course below.  Antibiotic plan as in below.  Monitor neck pain.  Recommend repeat C-spine MRI with and without contrast.  This can be postponed until ORIANA is improved, but should be done prior to completion of IV antibiotic course.  MSSA bacteremia  Patient had MSSA bacteremia during recent hospitalization.  Source is unclear but likely cutaneous.  His bacteremia cleared rapidly on IV antibiotic.  TTE did not show any vegetation.  Given possible C-spine infection, plan was for long-term IV antibiotic.  Continue high-dose IV cefazolin.  Treat x 6 weeks from clearance of bacteremia as previously planned, through 3/27.  Acute blood loss anemia  Patient is clinically improved with PRBC transfusion.  Management per primary service.  Melena  Patient with melena prior to admission.  This is most likely secondary to colonic mass.  Colorectal surgery follow-up.  ORIANA (acute kidney injury) (HCC)  Patient with ORIANA on admission, most likely secondary to hypovolemia from GI bleed.  Creatinine has been stable over the last few days.  Antibiotic dosages  adjusted accordingly.  Monitor creatinine.  Nephrology follow-up.  Colonic mass  Patient has recently diagnosed adenocarcinoma of the colon.  He has been followed by colorectal surgery.  No plan for chemotherapy yet.  Colorectal surgery follow-up.    Chronic respiratory failure with hypoxia (HCC)  Pulmonary evaluation noted.  Respiratory symptoms stable.  No evidence of pneumonia clinically radiologically.    Discussed with patient in detail regarding the above plan.      Antibiotics:  Cefazolin  Last negative blood culture 2/14    Subjective   For the last few days, patient's neck pain has improved.  No arm or leg weakness.  Mild dyspnea.  Temperature stays down.  No chills.  He is tolerating antibiotic well.  No nausea, vomiting or diarrhea.    Objective :  Temp:  [97.6 °F (36.4 °C)-98.7 °F (37.1 °C)] 97.7 °F (36.5 °C)  HR:  [63-74] 69  BP: (114-137)/(49-60) 137/60  Resp:  [15-20] 18  SpO2:  [86 %-100 %] 92 %  O2 Device: Nasal cannula  Nasal Cannula O2 Flow Rate (L/min):  [2 L/min-5 L/min] 5 L/min    Physical Exam:     General: Awake, alert, cooperative, no distress.   Neck:  Supple. No mass.  No lymphadenopathy.   Lungs: Expansion symmetric, sparse basilar rhonchi, no rales, no wheezing, respirations unlabored.   Heart:  Regular rate and rhythm, S1 and S2 normal, no murmur.   Abdomen: Soft, nondistended, non-tender, bowel sounds active all four quadrants, no masses, no organomegaly.   Extremities: Stable leg edema. No erythema/warmth. No draining ulcer. Nontender to palpation.   Skin:  No rash.   Neuro: Moves all extremities.        Lab Results: I have reviewed the following results:  Results from last 7 days   Lab Units 03/14/25  0520 03/13/25  1625 03/13/25  0909 03/13/25  0559 03/12/25  0653 03/12/25  0519   WBC Thousand/uL 7.08  --   --  7.45  --  4.97   HEMOGLOBIN g/dL 7.3* 7.8* 6.9* 6.7*   < > 6.8*   PLATELETS Thousands/uL 147*  --   --  146*  --  162    < > = values in this interval not displayed.      Results from last 7 days   Lab Units 03/14/25  0520 03/13/25  0559 03/12/25  0519   SODIUM mmol/L 143 143 143   POTASSIUM mmol/L 3.2* 3.8 4.0   CHLORIDE mmol/L 107 107 107   CO2 mmol/L 33* 32 33*   BUN mg/dL 61* 58* 47*   CREATININE mg/dL 2.07* 1.97* 1.99*   EGFR ml/min/1.73sq m 29 31 31   CALCIUM mg/dL 8.0* 8.1* 8.0*                 Results from last 7 days   Lab Units 03/13/25  0559   FERRITIN ng/mL 209

## 2025-03-14 NOTE — PLAN OF CARE
Problem: PHYSICAL THERAPY ADULT  Goal: Performs mobility at highest level of function for planned discharge setting.  See evaluation for individualized goals.  Description: Treatment/Interventions: Functional transfer training, LE strengthening/ROM, Therapeutic exercise, Endurance training, Cognitive reorientation, Patient/family training, Equipment eval/education, Bed mobility, Gait training, Compensatory technique education, Spoke to nursing, Spoke to case management (WC mobility training)     See flowsheet documentation for full assessment, interventions and recommendations.  Outcome: Not Progressing  Note: Prognosis: Fair  Problem List: Decreased strength, Decreased endurance, Impaired balance, Decreased mobility, Decreased coordination, Decreased safety awareness, Orthopedic restrictions, Pain  Assessment: pt began tx session seated OOB in the recliner as pt was agreeable to participate in PT interventions. PT to focus on transfer training, TE activities, posture/balance with gait and increasing pt activity tolerance. In comparison to previous tx sessions pt cotninues to require mod ax1 for all functional transfers with RW and VC's for hand placement. pt remaisn limited with standing tolerance as pt completed 3 STS w/ RW and static standing was limited to <1 minute per stand. pt did participate in TE activities while seated in the recliner with AROM, no increases in pain in order to strengthen LE's and to increase pt activity tolerance. pt continues to be limited with activity tolerance and ambulation distance as pt ambulated 2'fwd and 2' back min Ax1. pt continues to be at an increased riks for falls and injuries with OOB activities at this time as pt would benefit from continued skilled PT intervention in order to address pt deficits listed above. Continue to recommend DC w/ level 2 moderate rehab resource intensity when medically cleared.  Barriers to Discharge: Inaccessible home environment, Decreased  caregiver support     Rehab Resource Intensity Level, PT: II (Moderate Resource Intensity)    See flowsheet documentation for full assessment.

## 2025-03-14 NOTE — ASSESSMENT & PLAN NOTE
Recent Labs     03/13/25  0909 03/13/25  1625 03/14/25  0520   HGB 6.9* 7.8* 7.3*      Patient presents with 2 days of chest pain, shortness of breath, fatigue and melena  2/20/2025 EGD/colonoscopy showed 2.5 cm ileocecal mass, pathology confirmed adenocarcinoma.  S/p 3 units packed rbc in ED. S/p 1 unit PRBCs on 3/2  Hemoglobin has been stable.   Patient recent diagnosis of adenocarcinoma, increased risk of stroke.   Updated iron studies 3/2025 improved compared to 2/2025    Plan:  Daily CBC  Transfuse for Hgb <7  Protonix 40 mg PO QD   Hemoglobin stable at 7.3 this morning  Reached out to med onc, rad onc, and colorectal surgery regarding possible recommendations for surgery and/or treatment options  Optimized from a pulmonary standpoint per pulmonology continuing to require IV antibiotics until 3/27  Consult placed to palliative care.  Appreciate recommendations.

## 2025-03-14 NOTE — PROGRESS NOTES
Progress Note - Oncology-Medical   Name: Devin Chaves 79 y.o. male I MRN: [id number]  Unit/Bed#: S -01 I Date of Admission: 2/28/2025   Date of Service: 3/14/2025 I Hospital Day: 14    Assessment & Plan  Acute blood loss anemia    Colonic mass    79-year-old male with past medical history significant for COPD with chronic hypoxic respiratory failure on 2 to 4 L, MSSA bacteremia on cefazolin until 3/27/2025, atrial fibrillation with Eliquis currently on hold, CAD, history of CVA, and acute blood loss anemia requiring multiple transfusions despite being off anticoagulation in the setting of recently diagnosed adenocarcinoma of ileocecum with additional hospital course complication by ORIANA/ATN with creatinine approximately 2.    Since admission patient has required a total of 7 units of packed red blood cells    Etiology of bleeding is concern for adenocarcinoma of ileocecum    Recommendations:  -Would reengage colorectal surgery to see if patient if pulmonary optimize able to proceed with procedure given unable to control bleeding from a medical standpoint, if procedure is deemed too high risk recommend evaluation by radiation oncology suspect that this is a difficult area to radiate not amenable to radiation   -From med-onc standpoint unable to give systemic chemotherapy in this setting may worsen bleeding.  -Agree with palliative care consultation  -Transfusional support for Hgb >7     Subjective     Patient no new complaints  Some frustration with using the restroom  Notes that he is still having bleeding/dark stools  Reviewed role of systemic chemotherapy which I do not believe would be able to help with bleeding and may worsen bleeding patient is understanding of this  No additional questions at bedside    Objective :  Temp:  [97.6 °F (36.4 °C)-98.7 °F (37.1 °C)] 97.7 °F (36.5 °C)  HR:  [63-74] 69  BP: (114-137)/(49-60) 137/60  Resp:  [15-20] 18  SpO2:  [86 %-100 %] 92 %  O2 Device: Nasal cannula  Nasal  Cannula O2 Flow Rate (L/min):  [2 L/min-5 L/min] 5 L/min      Physical Exam  Constitutional:       General: He is not in acute distress.     Appearance: He is ill-appearing.   HENT:      Head: Normocephalic and atraumatic.      Nose: Nose normal.      Mouth/Throat:      Mouth: Mucous membranes are moist.   Eyes:      Extraocular Movements: Extraocular movements intact.      Pupils: Pupils are equal, round, and reactive to light.   Cardiovascular:      Rate and Rhythm: Normal rate.   Pulmonary:      Comments: On baseline nasal cannula oxygen, diminished breath sounds bilaterally  Abdominal:      General: There is no distension.      Palpations: Abdomen is soft.      Tenderness: There is no abdominal tenderness.   Musculoskeletal:      Cervical back: Normal range of motion.      Comments: Limited range of motion using walker for support   Skin:     General: Skin is warm.   Neurological:      Mental Status: Mental status is at baseline.      Comments: Right lower extremity strain greater than left   Psychiatric:         Mood and Affect: Mood normal.         Lab Results: I have reviewed the following results:  Lab Results   Component Value Date    K 3.2 (L) 03/14/2025     03/14/2025    CO2 33 (H) 03/14/2025    BUN 61 (H) 03/14/2025    CREATININE 2.07 (H) 03/14/2025    CALCIUM 8.0 (L) 03/14/2025    CORRECTEDCA 9.4 03/04/2025    AST 16 03/04/2025    ALT <3 (L) 03/04/2025    ALKPHOS 51 03/04/2025    EGFR 29 03/14/2025     Lab Results   Component Value Date    WBC 7.08 03/14/2025    HGB 7.3 (L) 03/14/2025    HCT 23.3 (L) 03/14/2025    MCV 93 03/14/2025     (L) 03/14/2025     Lab Results   Component Value Date    NEUTROABS 3.43 03/10/2025     CT Chest/abd/pelvis:     CT chest:     Decreased intracardiac blood pool density compatible with anemia.     New bilateral multi lobar interstitial thickening and tree-in-bud opacities may represent pulmonary vascular congestion or nonspecific inflammatory or infectious  process.     Bilateral pleural effusions, left greater than right, mildly enlarged and additional findings suggestive of pulmonary vascular congestion. Correlate with clinical findings.     5 mm left upper lobe nodule stable since 2/11/2025 and new since December 2023. 6 mm right lower lobe nodule new since 2/11/2025. Noncontrast chest CT follow-up in 3 months is advised.     Additional chronic findings and negatives as above.     CT abdomen and pelvis:     No evidence of high-volume gastrointestinal bleeding.     Stable upper cecal/proximal ascending colonic mass attributed to recently biopsy-proven adenocarcinoma.     Colonic diverticulosis.     No evidence of abdominopelvic metastatic disease.     Additional chronic findings and negatives as above.      Iggy Cooper DO  PGY-4 Hematology/Oncology Fellow

## 2025-03-14 NOTE — ASSESSMENT & PLAN NOTE
79-year-old male with past medical history significant for COPD with chronic hypoxic respiratory failure on 2 to 4 L, MSSA bacteremia on cefazolin until 3/27/2025, atrial fibrillation with Eliquis currently on hold, CAD, history of CVA, and acute blood loss anemia requiring multiple transfusions despite being off anticoagulation in the setting of recently diagnosed adenocarcinoma of ileocecum with additional hospital course complication by ORIANA/ATN with creatinine approximately 2.    Since admission patient has required a total of 7 units of packed red blood cells    Etiology of bleeding is concern for adenocarcinoma of ileocecum    Recommendations:  -Would reengage colorectal surgery to see if patient if pulmonary optimize able to proceed with procedure given unable to control bleeding from a medical standpoint, if procedure is deemed too high risk recommend evaluation by radiation oncology suspect that this is a difficult area to radiate not amenable to radiation   -From med-onc standpoint unable to give systemic chemotherapy in this setting may worsen bleeding.  -Agree with palliative care consultation  -Transfusional support for Hgb >7

## 2025-03-14 NOTE — PROGRESS NOTES
"Progress Note - Pulmonology   Name: Devin Chaves 79 y.o. male I MRN: [id number]  Unit/Bed#: S -01 I Date of Admission: 2/28/2025   Date of Service: 3/14/2025 I Hospital Day: 14     Assessment & Plan  COPD (chronic obstructive pulmonary disease) (HCC)  -Mild emphysematous changes noted upon imaging  -Inpatient: Xopenex/Atrovent 3 times daily  -Home regimen:Proventil 2 puffs every 6 as needed, albuterol nebulizer every 6 as needed  -[facility] will need to verify his home regimen before discharge   -No indication for steroid therapy at this time  Colonic mass   -Pathology confirmed adenocarcinoma of ileocecal mass  -Colorectal following-recommend  -Please see previous note for preoperative assessment  Pleural effusion, bilateral   -2/12/2025-grade 1 diastolic dysfunction EF 55%   -I do not feel there is enough effusion to perform thoracentesis  -Would recommend discussion with nephrology to give another dose of Lasix prior to procedure to optimize patient      Chronic respiratory failure with hypoxia (HCC)  -Currently on home O2 requirements of 3 L 100%  -Continue sats greater than 88%  -Pulmonary toileting: Deep breathing cough out of bed as tolerated incentive spirometry      -Pulmonary will follow peripherally    24 Hour Events : na  Subjective : \"I feel weak\"    Objective :  Temp:  [97.6 °F (36.4 °C)-98.7 °F (37.1 °C)] 97.7 °F (36.5 °C)  HR:  [63-74] 69  BP: (114-137)/(49-60) 137/60  Resp:  [15-20] 18  SpO2:  [86 %-100 %] 92 %  O2 Device: Nasal cannula  Nasal Cannula O2 Flow Rate (L/min):  [2 L/min-5 L/min] 5 L/min    Physical Exam  Constitutional:       General: He is not in acute distress.     Appearance: Normal appearance. He is normal weight.   HENT:      Head: Normocephalic and atraumatic.      Nose: Nose normal. No congestion or rhinorrhea.      Mouth/Throat:      Mouth: Mucous membranes are dry.      Pharynx: Oropharynx is clear. No oropharyngeal exudate or posterior oropharyngeal " erythema.   Cardiovascular:      Rate and Rhythm: Normal rate and regular rhythm.      Pulses: Normal pulses.      Heart sounds: Normal heart sounds. No murmur heard.     No friction rub. No gallop.   Pulmonary:      Effort: Pulmonary effort is normal. No respiratory distress.      Breath sounds: No stridor. Rhonchi present. No wheezing or rales.      Comments: Some scattered rhonchi  Chest:      Chest wall: No tenderness.   Abdominal:      General: Abdomen is flat. Bowel sounds are normal. There is no distension.      Palpations: Abdomen is soft. There is no mass.   Musculoskeletal:         General: No swelling or tenderness. Normal range of motion.      Cervical back: Normal range of motion. No rigidity or tenderness.   Skin:     General: Skin is warm and dry.      Coloration: Skin is not jaundiced or pale.   Neurological:      General: No focal deficit present.      Mental Status: He is alert and oriented to person, place, and time. Mental status is at baseline.   Psychiatric:         Mood and Affect: Mood normal.         Behavior: Behavior normal.             Lab Results: I have reviewed the following results:   .     03/14/25  0520   WBC 7.08   HGB 7.3*   HCT 23.3*   *   SODIUM 143   K 3.2*      CO2 33*   BUN 61*   CREATININE 2.07*   GLUC 82   MG 1.8*     ABG: No new results in last 24 hours.    Imaging Results Review: I personally reviewed the following image studies/reports in PACS and discussed pertinent findings with Radiology: chest xray. My interpretation of the radiology images/reports is:  .  Other Study Results Review: EKG was reviewed.   PFT Results Reviewed: reviewed

## 2025-03-15 ENCOUNTER — ANESTHESIA EVENT (INPATIENT)
Dept: PERIOP | Facility: HOSPITAL | Age: 80
End: 2025-03-15
Payer: COMMERCIAL

## 2025-03-15 ENCOUNTER — ANESTHESIA (INPATIENT)
Dept: PERIOP | Facility: HOSPITAL | Age: 80
End: 2025-03-15
Payer: COMMERCIAL

## 2025-03-15 PROBLEM — I46.8 CARDIAC ARREST DUE TO OTHER UNDERLYING CONDITION (HCC): Status: ACTIVE | Noted: 2025-01-01

## 2025-03-15 PROBLEM — A41.9 SEPSIS (HCC): Status: RESOLVED | Noted: 2025-02-11 | Resolved: 2025-03-15

## 2025-03-15 PROBLEM — T17.908A ASPIRATION INTO RESPIRATORY TRACT: Status: ACTIVE | Noted: 2025-01-01

## 2025-03-15 PROBLEM — R57.1 HYPOVOLEMIC SHOCK (HCC): Status: ACTIVE | Noted: 2025-01-01

## 2025-03-15 RX ORDER — SODIUM CHLORIDE 9 MG/ML
INJECTION, SOLUTION INTRAVENOUS CONTINUOUS PRN
Status: DISCONTINUED | OUTPATIENT
Start: 2025-03-15 | End: 2025-03-15

## 2025-03-15 RX ORDER — METRONIDAZOLE 500 MG/100ML
INJECTION, SOLUTION INTRAVENOUS CONTINUOUS PRN
Status: DISCONTINUED | OUTPATIENT
Start: 2025-03-15 | End: 2025-03-15

## 2025-03-15 RX ORDER — CEFAZOLIN SODIUM 1 G/3ML
INJECTION, POWDER, FOR SOLUTION INTRAMUSCULAR; INTRAVENOUS AS NEEDED
Status: DISCONTINUED | OUTPATIENT
Start: 2025-03-15 | End: 2025-03-15

## 2025-03-15 RX ORDER — SODIUM CHLORIDE, SODIUM LACTATE, POTASSIUM CHLORIDE, CALCIUM CHLORIDE 600; 310; 30; 20 MG/100ML; MG/100ML; MG/100ML; MG/100ML
INJECTION, SOLUTION INTRAVENOUS CONTINUOUS PRN
Status: DISCONTINUED | OUTPATIENT
Start: 2025-03-15 | End: 2025-03-15

## 2025-03-15 RX ORDER — TRANEXAMIC ACID 10 MG/ML
INJECTION, SOLUTION INTRAVENOUS AS NEEDED
Status: DISCONTINUED | OUTPATIENT
Start: 2025-03-15 | End: 2025-03-15

## 2025-03-15 RX ORDER — ROCURONIUM BROMIDE 10 MG/ML
INJECTION, SOLUTION INTRAVENOUS AS NEEDED
Status: DISCONTINUED | OUTPATIENT
Start: 2025-03-15 | End: 2025-03-15

## 2025-03-15 RX ORDER — FENTANYL CITRATE 50 UG/ML
INJECTION, SOLUTION INTRAMUSCULAR; INTRAVENOUS AS NEEDED
Status: DISCONTINUED | OUTPATIENT
Start: 2025-03-15 | End: 2025-03-15

## 2025-03-15 RX ADMIN — CEFAZOLIN 2000 MG: 1 INJECTION, POWDER, FOR SOLUTION INTRAMUSCULAR; INTRAVENOUS at 13:45

## 2025-03-15 RX ADMIN — TRANEXAMIC ACID 1000 MG: 10 INJECTION, SOLUTION INTRAVENOUS at 13:57

## 2025-03-15 RX ADMIN — FENTANYL CITRATE 50 MCG: 50 INJECTION INTRAMUSCULAR; INTRAVENOUS at 14:30

## 2025-03-15 RX ADMIN — SODIUM CHLORIDE: 0.9 INJECTION, SOLUTION INTRAVENOUS at 13:38

## 2025-03-15 RX ADMIN — SODIUM CHLORIDE, SODIUM LACTATE, POTASSIUM CHLORIDE, AND CALCIUM CHLORIDE: .6; .31; .03; .02 INJECTION, SOLUTION INTRAVENOUS at 13:30

## 2025-03-15 RX ADMIN — FENTANYL CITRATE 50 MCG: 50 INJECTION INTRAMUSCULAR; INTRAVENOUS at 14:45

## 2025-03-15 RX ADMIN — ROCURONIUM 50 MG: 50 INJECTION, SOLUTION INTRAVENOUS at 14:47

## 2025-03-15 RX ADMIN — ROCURONIUM 50 MG: 50 INJECTION, SOLUTION INTRAVENOUS at 13:26

## 2025-03-15 RX ADMIN — EPINEPHRINE 2 MCG/MIN: 1 INJECTION, SOLUTION, CONCENTRATE INTRAVENOUS at 13:43

## 2025-03-15 RX ADMIN — METRONIDAZOLE: 500 INJECTION, SOLUTION INTRAVENOUS at 13:45

## 2025-03-15 NOTE — ASSESSMENT & PLAN NOTE
Recent Labs     03/13/25  0559 03/14/25  0520 03/15/25  0616   CREATININE 1.97* 2.07* 1.95*   EGFR 31 29 32     Estimated Creatinine Clearance: 34.8 mL/min (A) (by C-G formula based on SCr of 1.95 mg/dL (H)).     Cr 2.43 on admission. Baseline 0.9. BUN 63. BUN/Cr ratio >20  Hypervolemia on exam  3/7/2025 chest xray:Unchanged pulmonary edema and small pleural effusions. Pneumonia not excluded.   Last echocardiogram 2/2025.  EF 55% G1 DD  Continue torsemide 10 mg qd    Plan:  Daily BMP  Continue torsemide 20 mg daily per nephrology's recommendations.  Increased from 10 mg daily.  Creatinine stable at 1.9  Avoid nephrotoxic medications as able  Monitor I/O's  Urinary retention protocol  Per nephrology, may consider renal biopsy in the future if renal function continues to decline.  However no need at this time as renal function stable.

## 2025-03-15 NOTE — ASSESSMENT & PLAN NOTE
Rate control: Lopressor 25 mg Q12 hrs    Plan:  Continue Lopressor 25 mg Q12 hrs  Eliquis on hold

## 2025-03-15 NOTE — PROGRESS NOTES
Progress Note - Hospitalist   Name: Devin Chaves 79 y.o. male I MRN: [id number]  Unit/Bed#: S -01 I Date of Admission: 2/28/2025   Date of Service: 3/15/2025 I Hospital Day: 15    Assessment & Plan  Acute blood loss anemia  Recent Labs     03/14/25  0520 03/14/25  1551 03/15/25  0616   HGB 7.3* 9.0* 8.2*      Patient presents with 2 days of chest pain, shortness of breath, fatigue and melena  2/20/2025 EGD/colonoscopy showed 2.5 cm ileocecal mass, pathology confirmed adenocarcinoma.  S/p 3 units packed rbc in ED. S/p 1 unit PRBCs on 3/2  Hemoglobin has been stable.   Patient recent diagnosis of adenocarcinoma, increased risk of stroke.   Updated iron studies 3/2025 improved compared to 2/2025    Plan:  Daily CBC  Transfuse for Hgb <7  Protonix 40 mg PO QD   Hemoglobin stable at 8.2 but decreased to 5.9 resulting in hypotension and rapid response called at 10:30 AM 3/15  Communicated with medical oncology, radiation oncology, GI, colorectal surgery, and palliative care for best course of action in the setting of known right colon adenocarcinoma resulting in acute blood loss anemia with concurrent poor pulmonary status and MSSA bacteremia with IV antibiotic treatment through 3/27/2025.  Patient continues to require transfusions, but number of transfusions have decreased while holding Eliquis  Per GI, not recommended to pursue colonoscopy/enteroscopy as patient has had adequate bidirectional exams within the past several weeks.  Suspect anemia and melena are secondary to oozing right sided colonic lesion as opposed to secondary source. Recommended that if patient develops any signs of brisk GI bleed, would obtain high-volume bleeding scan for further evaluation  Per colorectal surgery, continue presurgical optimization with nutritional supplements.  Recommended heparin GTT while holding Eliquis.  Also recommended iron supplement, okay to start iron supplement per ID while undergoing IV antibiotics for  bacteremia.  Optimized from a pulmonary standpoint per pulmonology continuing to require IV antibiotics until 3/27  Medical oncology does not recommend systemic chemotherapy as may worsen bleeding.  Encouraged reaching out to CRS to see if procedure may proceed.  However if procedure is deemed too high risk, would recommend evaluation by radiation oncology but suspect that this will be a difficult area to radiate  Consult placed to palliative care.  Appreciate recommendations.  Around 10:30 AM, patient had episode of hypotension, shortness of breath, and altered mental status.  Rapid response was called.  Patient was seen and evaluated by critical care.  Hemoglobin stat found to be 5.9 which was a drop from 8 this a.m.  Repeat H&H significant for hemoglobin of 6.3.  Per nursing, no melena, hematuria, hemoptysis, or signs of overt bleeding.  Patient intubated by critical care and taken to stat CT for high-volume bleeding scan.  Called daughter Ariadne to give her update regarding rapid response events.  Per Ariadne, patient originally wanted to be level 3 DNR/DNR last month but about 1 month ago changed to full code and all life-saving measures.  Of note, heparin GTT was ordered but was NOT given prior to rapid response.  Eliquis held for more than 48 hours.  No need for reversal.  ORIANA (acute kidney injury) ([facility])  Recent Labs     03/13/25  0559 03/14/25  0520 03/15/25  0616   CREATININE 1.97* 2.07* 1.95*   EGFR 31 29 32     Estimated Creatinine Clearance: 34.8 mL/min (A) (by C-G formula based on SCr of 1.95 mg/dL (H)).     Cr 2.43 on admission. Baseline 0.9. BUN 63. BUN/Cr ratio >20  Hypervolemia on exam  3/7/2025 chest xray:Unchanged pulmonary edema and small pleural effusions. Pneumonia not excluded.   Last echocardiogram 2/2025.  EF 55% G1 DD  Continue torsemide 10 mg qd    Plan:  Daily BMP  Continue torsemide 20 mg daily per nephrology's recommendations.  Increased from 10 mg daily.  Creatinine stable at  1.9  Avoid nephrotoxic medications as able  Monitor I/O's  Urinary retention protocol  Per nephrology, may consider renal biopsy in the future if renal function continues to decline.  However no need at this time as renal function stable.     Neck pain  MRI of the neck done 2/14/2025 revealed cervical degenerative change with mild canal stenosis and mild to moderate foraminal narrowing.  No cord compression.  Mild nonspecific edema within the right posterior breast dermal musculature of the upper cervicals spine.  Minimal peripheral enhancement is noted.     Plan:  Will plan for repeat MRI C-spine on 3/20/25.  Should be completed prior to completion of IV antibiotic course per ID  Reached out to nephrology regarding patient's creatinine of 1.9 which has been stable over the past several days.  COPD (chronic obstructive pulmonary disease) (HCC)  Patient with COPD on 2-4 L supplemental O2 at baseline    Plan:  Titrate O2 to maintain SpO2 greater than 88%   Albuterol inhaler every 4 hours as needed for wheezing  Respiratory protocol   Colonic mass  2/20/2025 EGD/colonoscopy showed 2.5 cm ileocecal mass, pathology confirmed adenocarcinoma.    Patient was due to follow-up with outpatient colorectal surgery on 2/28/2025  Pt presenting with melena, Hgb 4.6. Symptomatic anemia.  Attending discussion with Colorectal: If COPD can stay controlled, increase chance of operation.   Patient is currently on baseline oxygen of 3 L but currently treating with steroids for medication optimization.  Oncology: Outpatient appointment for 4/3.  Discussed with wife 3/10/2025    MSSA bacteremia  Patient was discharged on cefazolin 2 g IV Q8 hrs until 3/27/2025. PICC line in place. Patient rescheduled to follow-up outpatient with ID    Plan:  Continue with cefazolin 2 g IV every 8 hours.  infectious disease recommendations appreciated   Recommend repeat C-spine MRI with and without contrast, postpone until ORIANA resolves  6 weeks treatment  "through 3/27/2025  Atrial fibrillation (HCC)    Rate control: Lopressor 25 mg Q12 hrs    Plan:  Continue Lopressor 25 mg Q12 hrs  Eliquis on hold  Pleural effusion, bilateral  CT A/P shows bilateral pleural effusions L>R, pulmonary vascular congestion    Plan:  Repeat imaging if pt's respiratory status worsens  Continue oxygen to keep oxygen saturations 88% and above.  HTN (hypertension)  Systolic BP 100s-130s.  Patient normotensive.  Blood pressure stable.    Plan:  Lopressor 25 mg every 12 hours  CAD (coronary artery disease)  TTE 2/12/25: EF 55%, Normal systolic dysfunction, G1DD, No vegetation, No mural thrombus, moderate aortic sclerosis     Plan:  Hold aspirin in setting of GI bleed  History of CVA (cerebrovascular accident)  Pt had multiple embolic strokes during admission from 2/11/25-2/22/25  Thought to be embolic strokes 2/2 MSSA bacteremia, no vegetations on TTE    Plan:  See plan for eliquis under \"afib\"  Continue Lipitor 40 mg daily  Urinary retention  Patient has history of urinary retention and was started on Flomax during last hospitalization  Pt denies difficulty urinating at this shayy    Plan:  Flomax 0.4 mg daily  Urinary retention protocol  Severe protein-calorie malnutrition (HCC)  Malnutrition Findings:   Adult Malnutrition type: Acute illness  Adult Degree of Malnutrition: Other severe protein calorie malnutrition  Malnutrition Characteristics: Inadequate energy, Fluid accumulation  360 Statement: related to inadequate energy/protein intake as evidenced by consuming < 50% of energy intake compared to estimated needs for > 5 days and B/L LE +3 edema. Treated with ONS.  BMI Findings:     Body mass index is 25.09 kg/m².     Plan:  Ensure supplements  Ambulatory dysfunction  Worsening ambulatory dysfunction since December  PT/OT evaluations recommend level 2   Discussed with podiatry, patient has chronic left ankle fracture over 2 years ago, recommend WBAT to left foot as curbside, no further " podiatry needs     Plan:  Weight bearing status: As tolerated with brace  Melena  Patient endorsed melena prior to admission  One episode dark, tarry stool overnight on 3/4/25. Not documented.    Plan:  Stool record at bedside  Dysphagia  VBS done on 2/15/25 showing food retention due to pharyngeal weakness  EGD was done last month which showed normal esophagus.   ENT recommending outpatient follow-up with Dr. Flynn Reyez    Plan:  Speech evaluation pending  Hallucinations, visual  Per both patient and patient's wife, patient has been experiencing visual hallucinations that usually occur prior to falling asleep or waking up.  Patient reports that he will occasionally grab for things that are not there, such as a pillow.    Also states that he will occasionally have conversations with his wife when she is not physically in the room.    Patient states he is able to discern when he is having a hallucination and they are not distressing     PLAN:  Melatonin nightly   delirium precautions  PT/OT  Disorders of fluid, electrolyte, and acid-base balance  Hypokalemia and hypomagnesia this morning  Repleted  We will continue to trend daily  Aspiration into respiratory tract  Received message from nursing evening of 3/14 that patient had episode of choking while attempting to swallow food.  Chest x-ray ordered and suggests aspiration and right lower lobe  Placed consult to speech therapy for evaluation.  N.p.o. until after completion of speech therapy eval  Placed on aspiration precautions  Encourage patient to try to maintain head of bed greater than 30 degrees to prevent further aspiration events.  Patient remains afebrile and hemodynamically stable    VTE Pharmacologic Prophylaxis: VTE Score: 5 holding DVT prophylaxis in the setting of acute blood loss anemia    Mobility:   Basic Mobility Inpatient Raw Score: 11  JH-HLM Goal: 4: Move to chair/commode  JH-HLM Achieved: 4: Move to chair/commode  JH-HLM Goal NOT achieved.  Continue with multidisciplinary rounding and encourage appropriate mobility to improve upon [facility] goals.    Patient Centered Rounds: I performed bedside rounds with nursing staff today.   Discussions with Specialists or Other Care Team Provider: Nephrology, ID,    Education and Discussions with Family / Patient: Unable to reach wife despite multiple attempts.  Discussed rapid response events with denisse Ron.    Current Length of Stay: 15 day(s)  Current Patient Status: Inpatient   Certification Statement: The patient will continue to require additional inpatient hospital stay due to palliative evaluation, concern for aspiration, speech therapy evaluation  Discharge Plan: Anticipate discharge in 24-48 hrs to rehab facility.    Code Status: Level 1 - Full Code    Subjective   Rapid response called at 10 AM this morning.  Patient found to have a hemoglobin of 5.9, hypotension, shortness of breath, and altered mental status.  Patient seen evaluated by critical care.  Patient intubated and taken to stat CT for high-volume bleeding scan.  Discussed rapid response events with denisse Ron.  Answered all questions to the best of my ability.    Objective :  Temp:  [97.7 °F (36.5 °C)-97.8 °F (36.6 °C)] 97.8 °F (36.6 °C)  HR:  [67-87] 67  BP: ()/(52-63) 119/52  Resp:  [16-18] 16  SpO2:  [86 %-99 %] 99 %  O2 Device: Nasal cannula  Nasal Cannula O2 Flow Rate (L/min):  [2 L/min-5 L/min] 5 L/min    Body mass index is 25.09 kg/m².     Input and Output Summary (last 24 hours):     Intake/Output Summary (Last 24 hours) at 3/15/2025 0834  Last data filed at 3/15/2025 0545  Gross per 24 hour   Intake --   Output 1475 ml   Net -1475 ml       Physical Exam  Vitals and nursing note reviewed.   Constitutional:       General: He is not in acute distress.     Appearance: He is well-developed.   HENT:      Head: Normocephalic and atraumatic.   Eyes:      Conjunctiva/sclera: Conjunctivae normal.   Cardiovascular:      Rate  and Rhythm: Normal rate and regular rhythm.      Heart sounds: No murmur heard.  Pulmonary:      Effort: No respiratory distress.      Breath sounds: Rales present.      Comments: Rales in bilateral lung fields right worse than left  Abdominal:      Palpations: Abdomen is soft.      Tenderness: There is no abdominal tenderness.   Musculoskeletal:         General: Deformity present. No swelling.      Cervical back: Neck supple.      Right lower leg: Edema present.      Left lower leg: Edema present.      Comments: Left fifth digit amputation   Skin:     General: Skin is warm and dry.      Capillary Refill: Capillary refill takes less than 2 seconds.   Neurological:      Mental Status: He is alert and oriented to person, place, and time.      Motor: Weakness present.      Comments: Generalized weakness   Psychiatric:         Mood and Affect: Mood normal.         Lines/Drains:  Lines/Drains/Airways       Active Status       Name Placement date Placement time Site Days    PICC Line 02/26/25 Right Brachial 02/26/25  0548  Brachial  17                    Central Line:  Goal for removal: Will discontinue when meds requiring line are completed.               Lab Results: I have reviewed the following results:   Results from last 7 days   Lab Units 03/15/25  0616 03/14/25  1551 03/14/25  0520 03/10/25  0828 03/10/25  0442   WBC Thousand/uL  --   --  7.08   < > 4.92   HEMOGLOBIN g/dL 8.2*   < > 7.3*   < > 7.0*   HEMATOCRIT % 25.8*   < > 23.3*   < > 23.5*   PLATELETS Thousands/uL  --   --  147*   < > 133*   SEGS PCT %  --   --   --   --  70   LYMPHO PCT %  --   --   --   --  15   MONO PCT %  --   --   --   --  11   EOS PCT %  --   --   --   --  2    < > = values in this interval not displayed.     Results from last 7 days   Lab Units 03/15/25  0616   SODIUM mmol/L 147   POTASSIUM mmol/L 3.3*   CHLORIDE mmol/L 107   CO2 mmol/L 34*   BUN mg/dL 58*   CREATININE mg/dL 1.95*   ANION GAP mmol/L 6   CALCIUM mg/dL 8.2*   GLUCOSE  RANDOM mg/dL 78                       Recent Cultures (last 7 days):         Imaging Results Review: No pertinent imaging studies reviewed.  Other Study Results Review: No additional pertinent studies reviewed.    Last 24 Hours Medication List:     Current Facility-Administered Medications:     acetaminophen (TYLENOL) tablet 650 mg, Q6H PRN    ascorbic acid (VITAMIN C) tablet 250 mg, Daily    atorvastatin (LIPITOR) tablet 40 mg, Daily With Dinner    ceFAZolin (ANCEF) IVPB (premix in dextrose) 2,000 mg 50 mL, Q8H, Last Rate: 2,000 mg (03/15/25 0614)    Cholecalciferol (VITAMIN D3) tablet 2,000 Units, Daily    cyanocobalamin (VITAMIN B-12) tablet 100 mcg, Daily    ferrous sulfate tablet 325 mg, Daily With Breakfast    fluticasone (ARNUITY ELLIPTA) 100 MCG/ACT inhaler 1 puff, Daily    folic acid (FOLVITE) tablet 1 mg, Daily    HYDROmorphone HCl (DILAUDID) injection 0.2 mg, Q4H PRN    hydroxychloroquine (PLAQUENIL) tablet 400 mg, Daily With Breakfast    levalbuterol (XOPENEX) inhalation solution 1.25 mg, Q4H PRN    lidocaine (LIDODERM) 5 % patch 3 patch, Daily    melatonin tablet 3 mg, HS    metoprolol tartrate (LOPRESSOR) tablet 25 mg, Q12H GALE    moisture barrier miconazole 2% cream (aka HITESH MOISTURE BARRIER ANTIFUNGAL CREAM), BID    oxyCODONE (ROXICODONE) IR tablet 5 mg, Q6H PRN    oxyCODONE (ROXICODONE) split tablet 2.5 mg, Q6H PRN    pantoprazole (PROTONIX) EC tablet 40 mg, Daily Before Breakfast    polyethylene glycol (MIRALAX) packet 17 g, Daily PRN    potassium chloride (Klor-Con M20) CR tablet 20 mEq, Daily    potassium chloride 20 mEq IVPB (premix), Once    senna-docusate sodium (SENOKOT S) 8.6-50 mg per tablet 1 tablet, HS    tamsulosin (FLOMAX) capsule 0.4 mg, Daily With Dinner    torsemide (DEMADEX) tablet 20 mg, Daily    umeclidinium-vilanterol 62.5-25 mcg/actuation inhaler 1 puff, Daily    Administrative Statements   Today, Patient Was Seen By: Yazmin Winter, DO      **Please Note: This note may have  been constructed using a voice recognition system.**

## 2025-03-15 NOTE — ASSESSMENT & PLAN NOTE
Malnutrition Findings:   Adult Malnutrition type: Acute illness  Adult Degree of Malnutrition: Other severe protein calorie malnutrition  Malnutrition Characteristics: Inadequate energy, Fluid accumulation                  360 Statement: related to inadequate energy/protein intake as evidenced by consuming < 50% of energy intake compared to estimated needs for > 5 days and B/L LE +3 edema. Treated with ONS.    BMI Findings:           Body mass index is 25.92 kg/m².      - - -

## 2025-03-15 NOTE — ASSESSMENT & PLAN NOTE
Evening 3/14 patient had episode of choking while attempting to swallow food.  Chest x-ray shows new RLL consolidation    Plan:  Patient intubated  Will need speech and swallow evaluation after extubation

## 2025-03-15 NOTE — ASSESSMENT & PLAN NOTE
Malnutrition Findings:   Adult Malnutrition type: Acute illness  Adult Degree of Malnutrition: Other severe protein calorie malnutrition  Malnutrition Characteristics: Inadequate energy, Fluid accumulation                  360 Statement: related to inadequate energy/protein intake as evidenced by consuming < 50% of energy intake compared to estimated needs for > 5 days and B/L LE +3 edema. Treated with ONS.    BMI Findings:           Body mass index is 25.09 kg/m².

## 2025-03-15 NOTE — ASSESSMENT & PLAN NOTE
VBS done on 2/15/25 showing food retention due to pharyngeal weakness  EGD was done last month which showed normal esophagus.   ENT recommending outpatient follow-up with Dr. Flynn Reyez    Plan:  Speech evaluation pending

## 2025-03-15 NOTE — ASSESSMENT & PLAN NOTE
MRI of the neck done 2/14/2025 revealed cervical degenerative change with mild canal stenosis and mild to moderate foraminal narrowing.  No cord compression.  Mild nonspecific edema within the right posterior breast dermal musculature of the upper cervicals spine.  Minimal peripheral enhancement is noted.      Plan:  Repeat MRI C spine prior to completion of ABX per ID recs  Continue ABX as recommended above

## 2025-03-15 NOTE — ASSESSMENT & PLAN NOTE
Patient lost pulses shortly after transport to ICU  Likely due to acute blood loss  CPR immediately begun  Central line and A-line placed  MTP called prior to cardiac arrest  Patient already intubated and on ventilatory  Patient shocked 1 time  ROSC achieved shortly after blood product administration begun    Plan:  On cardiac monitor  Intubated and sedated on ventilator  Surgery to treat underlying cause of bleeding

## 2025-03-15 NOTE — ASSESSMENT & PLAN NOTE
Patient has history of urinary retention and was started on Flomax during last hospitalization  Pt denies difficulty urinating at this time    Plan:  Insert glass catheter for accurate I&O monitoring

## 2025-03-15 NOTE — ASSESSMENT & PLAN NOTE
Patient with COPD on 2-4 L supplemental O2 at baseline     Plan:  Intubated  Maintain O2 saturation > 92%  Pulmonary hygiene

## 2025-03-15 NOTE — PROCEDURES
Insert arterial line    Date/Time: 3/15/2025 1:08 PM    Performed by: Kalia Fallon MD  Authorized by: Valerie Gage PA-C    Patient location:  ICU  Consent:     Consent obtained:  Emergent situation  Indications:     Indications: hemodynamic monitoring    Pre-procedure details:     Skin preparation:  Chlorhexidine  Anesthesia (see MAR for exact dosages):     Anesthesia method:  None  Procedure details:     Location / Tip of Catheter:  Femoral    Laterality:  Right    Naseem's test performed: no      Needle gauge:  20 G    Placement technique:  Ultrasound guided    Ultrasound image availability:  Not saved    Number of attempts:  1    Successful placement: yes      Transducer: waveform confirmed    Post-procedure details:     Post-procedure:  Sterile dressing applied    Patient tolerance of procedure:  Tolerated well, no immediate complications

## 2025-03-15 NOTE — ANESTHESIA POSTPROCEDURE EVALUATION
Post-Op Assessment Note    Last Filed PACU Vitals:  Vitals Value Taken Time   Temp     Pulse 109 03/15/25 1726   BP     Resp 24 03/15/25 1726   SpO2 98 % 03/15/25 1710   Vitals shown include unfiled device data.

## 2025-03-15 NOTE — ASSESSMENT & PLAN NOTE
TTE 2/12/25: EF 55%, Normal systolic dysfunction, G1DD, No vegetation, No mural thrombus, moderate aortic sclerosis

## 2025-03-15 NOTE — ASSESSMENT & PLAN NOTE
Acute blood loss earlier this AM and received > 10 units PRBC.   Hgb stable.   Hgb monitoring per primary service.

## 2025-03-15 NOTE — NURSING NOTE
At approximately 1000 RT contacted to give pt PRN breathing tx for c/o difficulty breathing. RT responded to bedside to administer breathing tx and reported to this nurse that patient had no improvement to breathing after tx. RT reported pt stating he still cannot breathe, c/o chest pain, (L) sided wheezes noted, and RT noticed pt's pallor was different from what she had seen when she'd previously cared for pt. Doctor made aware of the above. STAT EKG in the process of being obtained when pt presented to have decreased/ loss of consciousness which RT made this nurse aware of. Upon arriving at bedside at approximately 1030, pt noted to be minimally responsive, rapid response called at that time. Pt transferred to ICU after stabilized during rapid response.

## 2025-03-15 NOTE — ASSESSMENT & PLAN NOTE
Rapid response called for hypotension and AMS  Acute blood loss, suspect intraabdominal bleeding vs colonic mass bleeding  No melena, hematuria, hemoptysis, or other signs of overt bleeding  MTP called    Plan:  Source control with surgery  Levophed  Blood and fluid resuscitation as indicated

## 2025-03-15 NOTE — ASSESSMENT & PLAN NOTE
On Metoprolol tartrate 25 mg BID, Losartan 25 mg QD for HTN    Plan:  Hold home meds in while in ICU  Pressors as indicated  Maintain MAP > 65

## 2025-03-15 NOTE — CONSULTS
Consultation - Surgery-General   Name: Devin Chaves 79 y.o. male I MRN: [id number]  Unit/Bed#: OR POOL I Date of Admission: 2/28/2025   Date of Service: 3/15/2025 I Hospital Day: 15   Consults  Physician Requesting Evaluation: Kalia Fallon,*   Reason for Evaluation / Principal Problem: Acute blood loss anemia/hemoperitoneum    Assessment & Plan  Acute blood loss anemia  Rapid response called for hypotension and altered mental status; hemoglobin was undetectable on i-STAT.  Was resuscitated with massive transfusion protocol.  FAST exam consistent with large volume free fluid in the abdomen concerning for hemoperitoneum    - Continue balanced resuscitation  - Emergent operative exploration for bleeding control  - Will consider colon resection for known ascending colon adenocarcinoma pending patient's stability intraoperatively; colorectal will be available intraoperatively for assistance  -Continue to hold anticoagulation  HTN (hypertension)    CAD (coronary artery disease)    COPD (chronic obstructive pulmonary disease) (HCC)    History of CVA (cerebrovascular accident)    Chronic respiratory failure with hypoxia (HCC)    Atrial fibrillation (HCC)    Urinary retention    MSSA bacteremia    Neck pain    Colonic mass    Ambulatory dysfunction    Melena    ORIANA (acute kidney injury) (HCC)    Pleural effusion, bilateral    Dysphagia    Severe protein-calorie malnutrition (HCC)  Malnutrition Findings:   Adult Malnutrition type: Acute illness  Adult Degree of Malnutrition: Other severe protein calorie malnutrition  Malnutrition Characteristics: Inadequate energy, Fluid accumulation                  360 Statement: related to inadequate energy/protein intake as evidenced by consuming < 50% of energy intake compared to estimated needs for > 5 days and B/L LE +3 edema. Treated with ONS.    BMI Findings:           Body mass index is 25.09 kg/m².     Hallucinations, visual    Pre-operative cardiovascular  examination, unstable angina (HCC)    Disorders of fluid, electrolyte, and acid-base balance    Aspiration into respiratory tract        History of Present Illness   Devin Chaves is a 79 y.o. male with a known ascending colon mass admitted for ongoing anemia who had an acute decompensation while in the floor this morning followed by cardiac arrest.  ROSC was achieved and hemoglobin was found to be undetectable on i-STAT.  Underwent massive transfusion resuscitation and was found to have free fluid in the abdomen concerning for hemoperitoneum on FAST exam.    Review of Systems  Deferred due to acuity    Objective :  Temp:  [97.7 °F (36.5 °C)-97.8 °F (36.6 °C)] 97.8 °F (36.6 °C)  HR:  [] 130  BP: ()/(14-64) 146/64  Resp:  [] 123  SpO2:  [30 %-99 %] 61 %  O2 Device: Ventilator  Nasal Cannula O2 Flow Rate (L/min):  [3 L/min-5 L/min] 5 L/min  FiO2 (%):  [] 100    Lines/Drains/Airways       Active Status       Name Placement date Placement time Site Days    PICC Line 02/26/25 Right Brachial 02/26/25  0548  Brachial  17    CVC Central Lines 03/15/25 03/15/25  1205  --  less than 1    Arterial Line 03/15/25 Femoral 03/15/25  1308  Femoral  less than 1    ETT  Cuffed 8 mm 03/15/25  1200  -- less than 1    ETT  03/15/25  1056  -- less than 1                  Physical Exam  General: Intubated and sedated  Skin: Warm, dry, anicteric  HEENT: ET tube present  CV: RRR  Pulm: Chemically ventilated  Abd: Distended, rigid  MSK: Symmetric, no edema, no tenderness, no deformity  Neuro: AOx3, GCS 15      Lab Results: I have reviewed the following results:  Recent Labs     03/15/25  1045 03/15/25  1154 03/15/25  1156 03/15/25  1235   WBC 13.12*  --   --  9.30   HGB 6.3*  --   --  11.5*   HCT 21.0* <15*  --  35.4*     --   --  70*   SODIUM 143  --   --  152*   K 4.4  --   --  3.5     --   --  119*   CO2 24 28  --  19*   BUN 57*  --   --  42*   CREATININE 2.20*  --   --  1.47*   GLUC 439*  --   --   203*   CAIONIZED  --  1.51*  --   --    MG 2.3  --   --   --    AST 16  --   --  21   ALT <3*  --   --  6*   ALB 1.9*  --   --  1.6*   TBILI 0.31  --   --  0.30   ALKPHOS 42  --   --  34   PTT 41*  --   --  45*   INR 1.43*  --   --  1.43*   HSTNI0 141*  --   --   --    HSTNI2  --   --  116*  --    LACTICACID 10.4*  --   --  9.7*

## 2025-03-15 NOTE — ASSESSMENT & PLAN NOTE
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    Patient was due to follow-up with outpatient colorectal surgery on 2/28/2025  Pt presented initially with melena, Hgb 4.6. Symptomatic anemia.  Outpatient oncology appointment scheduled for 4/3  Rapid Response [id number], see rapid response note  Suspect intraabdominal bleeding with mass as possible source  High volume bleeding CT deferred in favor of exlap to control bleeding given unstable condition  Per surgery, possible resection of colonic mass in exlap

## 2025-03-15 NOTE — ASSESSMENT & PLAN NOTE
BP was low this AM and required vasopressors.   Better now and off vasopressors.   All BP meds are on hold.

## 2025-03-15 NOTE — ASSESSMENT & PLAN NOTE
Patient presents with 2 days of chest pain, shortness of breath, fatigue and melena  2/20/2025 EGD/colonoscopy showed 2.5 cm ileocecal mass, pathology confirmed adenocarcinoma.  S/p 3 units packed rbc in ED. S/p 1 unit PRBCs on 3/2  Hemoglobin has been stable.   Patient recent diagnosis of adenocarcinoma, increased risk of stroke.   Updated iron studies 3/2025 improved compared to 2/2025  Rapid response called 10:30A M [id number] for AMS and hypotension  Patient found to be minimally responsive and hypotensive  HGB 5.9 by iSTAT, formal labs show HGB 6.3, significant drop from 8 in AM  Bleeding from colonic mass suspected, per nursing no hematuria hemoptysis melena or other signs of overt bleeding prior to RR  Patient intubated for airway protection  Transported to ICU  MTP called  Code blue called  ROSC achieved shortly after blood products started  Rapidly distending abdomen, suspect intraabdominal bleeding     Hemoglobin   Date Value Ref Range Status   03/15/2025 11.5 (L) 12.0 - 17.0 g/dL Final     Comment:     Results verified by repeat     Hgb, i-STAT   Date Value Ref Range Status   03/15/2025   Final     Comment:     Out of Reportable Range        Plan:  Daily CBC  Transfuse for Hgb <7  Protonix 40 mg IV daily  Hold home eliquis, off for 48 hours at time of rapid response  Heparin gtt ordered but not started prior to rapid  Palliative care consulted prior to ICU admission  MTP [id number]  PRBC 12  FFP 3  Platelets 1  Cryo 0  Whole blood 3  Consider high volume bleeding scan  Surgery consult for suspected internal bleeding  Per surgery take to OR w/o scan

## 2025-03-15 NOTE — ASSESSMENT & PLAN NOTE
Patient has history of urinary retention and was started on Flomax during last hospitalization  Pt denies difficulty urinating at this time    Plan:  Insert glass catheter for accurate I&O monitoring  Hold flomax

## 2025-03-15 NOTE — ASSESSMENT & PLAN NOTE
Per both patient and patient's wife, patient has been experiencing visual hallucinations that usually occur prior to falling asleep or waking up.  Patient reports that he will occasionally grab for things that are not there, such as a pillow.    Also states that he will occasionally have conversations with his wife when she is not physically in the room.    Patient states he is able to discern when he is having a hallucination and they are not distressing      PLAN:  delirium precautions

## 2025-03-15 NOTE — QUICK NOTE
Pt returned from OR/CT. Currently on 20 propofol, vent settings: 24/440/50/6.  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  No longer on pressors  Vasopressor support as needed  Map goal > 65  A-fib on eliquis  Hold Eliquis  Monitor on Tele  HTN, HLD  Hold Metoprolol and Crestor  Pulm  Post Op vent dependence  Continue vent support  Daily SBT  Pulse OX  SPO2 > 92%  GI   Acute mesenteric bleed leading to code crimson and subsequent cardiac arrest with CPR and ROSC. Found to have worsening abd distention, s/p ex lap, left open in discontinuity with Abthera 3/15/25  Tentative return to OR for evaluation tomorrow  Trend end points  NPO  Stress ulcer ppx    Tang placed intra op  Monitor I&Os closely  Trend renal function  FEN  NPO  Correct electrolytes as needed  Endo  BG goal 140 - 180  Heme  SCDs  Hold AC & AP  ID  Continue to monitor  MSK/Skin  Frequent turning  PT/OT when appropriate  Invasive Lines  R PICC, R femoral A line, L femoral cordis, R IJ CVC, ETT, NGT  Dispo   ICU

## 2025-03-15 NOTE — ASSESSMENT & PLAN NOTE
Recent Labs     03/14/25  0520 03/14/25  1551 03/15/25  0616   HGB 7.3* 9.0* 8.2*      Patient presents with 2 days of chest pain, shortness of breath, fatigue and melena  2/20/2025 EGD/colonoscopy showed 2.5 cm ileocecal mass, pathology confirmed adenocarcinoma.  S/p 3 units packed rbc in ED. S/p 1 unit PRBCs on 3/2  Hemoglobin has been stable.   Patient recent diagnosis of adenocarcinoma, increased risk of stroke.   Updated iron studies 3/2025 improved compared to 2/2025    Plan:  Daily CBC  Transfuse for Hgb <7  Protonix 40 mg PO QD   Hemoglobin stable at 8.2 but decreased to 5.9 resulting in hypotension and rapid response called at 10:30 AM 3/15  Communicated with medical oncology, radiation oncology, GI, colorectal surgery, and palliative care for best course of action in the setting of known right colon adenocarcinoma resulting in acute blood loss anemia with concurrent poor pulmonary status and MSSA bacteremia with IV antibiotic treatment through 3/27/2025.  Patient continues to require transfusions, but number of transfusions have decreased while holding Eliquis  Per GI, not recommended to pursue colonoscopy/enteroscopy as patient has had adequate bidirectional exams within the past several weeks.  Suspect anemia and melena are secondary to oozing right sided colonic lesion as opposed to secondary source. Recommended that if patient develops any signs of brisk GI bleed, would obtain high-volume bleeding scan for further evaluation  Per colorectal surgery, continue presurgical optimization with nutritional supplements.  Recommended heparin GTT while holding Eliquis.  Also recommended iron supplement, okay to start iron supplement per ID while undergoing IV antibiotics for bacteremia.  Optimized from a pulmonary standpoint per pulmonology continuing to require IV antibiotics until 3/27  Medical oncology does not recommend systemic chemotherapy as may worsen bleeding.  Encouraged reaching out to CRS to see if  procedure may proceed.  However if procedure is deemed too high risk, would recommend evaluation by radiation oncology but suspect that this will be a difficult area to radiate  Consult placed to palliative care.  Appreciate recommendations.  Around 10:30 AM, patient had episode of hypotension, shortness of breath, and altered mental status.  Rapid response was called.  Patient was seen and evaluated by critical care.  Hemoglobin stat found to be 5.9 which was a drop from 8 this a.m.  Repeat H&H significant for hemoglobin of 6.3.  Per nursing, no melena, hematuria, hemoptysis, or signs of overt bleeding.  Patient intubated by critical care and taken to stat CT for high-volume bleeding scan.  Called daughter Ariadne to give her update regarding rapid response events.  Per Ariadne, patient originally wanted to be level 3 DNR/DNR last month but about 1 month ago changed to full code and all life-saving measures.  Of note, heparin GTT was ordered but was NOT given prior to rapid response.  Eliquis held for more than 48 hours.  No need for reversal.

## 2025-03-15 NOTE — ASSESSMENT & PLAN NOTE
CXR with vascular congestion and pleural effusions.   S/p Furosemide 40 mg IV x 1 on 3/13/25.   Torsemide 20 mg OD on hold.

## 2025-03-15 NOTE — ASSESSMENT & PLAN NOTE
Found to be hypotensive at rapid response 10:30 AM [id number]  Levo started with minimal improvement  HGB 5.9 on iSTAT, suspect colonic bleeding vs intraabdominal bleeding  New and rapidly worsening abdominal distention after CPR    Plan:  Intubate and transfer to ICU  Central line  A-line  Surgery consult  Emergency surgery to control bleeding  On levophed

## 2025-03-15 NOTE — RAPID RESPONSE
Rapid Response Note  Devin Chaves 79 y.o. male MRN: [id number]  Unit/Bed#: ICU 10 Encounter: [id number]    Rapid Response Notification(s):   Response called date/time:  3/15/2025 10:32 AM  Response team arrival date/time:  3/15/2025 10:33 AM  Response end date/time:  3/15/2025 11:00 AM  Level of care:  [facility]  Rapid response location:  [facility] unit  Primary reason for rapid response call:  Acute change in neuro status and acute change in BP    Rapid Response Intervention(s):   Airway:  Suction and endotracheal intubation  Breathing:  Oxygen and assisted ventilation  Circulation:  Other (comment) (levophed started)  Fluids administered:  Normal saline  Medications administered:  Paralytics, fentanyl, etomidate and other (comment) (norepi, succinocholine)       Assessment:   AMS  Intubated for low GCS  Bps consistently low despite up-titrating levophed  Concern for bleeding of colonic mass/ massive hemorrhage    Plan:   Transfer to ICU  Central line placement  A-line placement  Adjust pressor support  High volume bleeding scan     Rapid Response Outcome:   Transfer:  Transfer to ICU  Primary service notified of transfer: Yes    Code Status: Level 1 (Full Code)      Family notified: Yes, Name of Family member contacted Francia Chaves          Background/Situation:   Devin Chaves is a 79 y.o. male with history of friable colonic mass now altered and pale. Rapid response called. Unresponsive on my arrival with heart sounds and thready pulses. Hypotensive. Levo started. Hypotensive despite increasing levo. Ventilation support by mask with patient intially breathing on his own but becoming more bradypneic. Patient intubated, see intubation note. Patient transferred to ICU for further management.    Review of Systems   Reason unable to perform ROS: AMS, intubated, sedated.       Objective:   Vitals:    03/15/25 1130 03/15/25 1133 03/15/25 1136 03/15/25 1139   BP:       Pulse: (!) 160 (!) 27 80 (!) 130   Resp: (!)  112 (!) 123 (!) 116 (!) 123   Temp:       TempSrc:       SpO2: (!) 70% (!) 85% (!) 37% (!) 61%   Weight:       Height:         Physical Exam  Constitutional:       Appearance: He is ill-appearing. He is not diaphoretic.   HENT:      Head: Normocephalic and atraumatic.      Nose: No congestion.   Eyes:      Pupils: Pupils are equal, round, and reactive to light.   Cardiovascular:      Rate and Rhythm: Regular rhythm. Tachycardia present.      Heart sounds: Normal heart sounds.      Comments: Radial pulse present with doppler  Pulmonary:      Effort: Bradypnea present.      Breath sounds: Normal breath sounds.   Abdominal:      General: Abdomen is flat. There is no distension.      Palpations: Abdomen is soft.      Tenderness: There is no abdominal tenderness. There is no guarding.   Musculoskeletal:      Cervical back: No rigidity.      Right lower leg: No edema.      Left lower leg: No edema.   Skin:     General: Skin is cool and dry.      Coloration: Skin is pale.

## 2025-03-15 NOTE — PROGRESS NOTES
Progress Note - Colorectal   Name: Devin Chaves 79 y.o. male I MRN: [id number]  Unit/Bed#: S -01 I Date of Admission: 2/28/2025   Date of Service: 3/15/2025 I Hospital Day: 15    Assessment & Plan  Colonic mass  Newly diagnosed proximal ascending colon mass on colonoscopy 2/19, pathology confirmed adenocarcinoma.  Colorectal surgery was consulted at that time, recommended outpatient follow up.  Patient now presenting with anemia and melena.  Hemoglobin previously stable but rebleed upon resumption of Eliquis, persistently requiring multiple transfusions.  Colorectal surgery reengaged for earlier operative intervention.    2/28 CT AP no evidence of high-volume gastrointestinal bleeding. Stable upper cecal/proximal ascending colonic mass, colonic diverticulosis. No Evidence of abdominopelvic metastatic disease.     Hemoglobin on 3/14 9.0 from 7.3   last transfusion 3/13    Plan  - Continue to trend hemoglobin with serial H/H's  - Continue presurgical optimization along nutritional and iron supplements.   - recommend heparin gtt while holding Eliquis  -Will discuss with attending surgical intervention this admission given ongoing transfusion requirements vs previous plan of outpatient follow-up and surgical optimization   -Patient is high surgical risk given severe COPD and significant baseline oxygen requirement  COPD (chronic obstructive pulmonary disease) (HCC)  On 2L NC at baseline  Currently on 2L NC without increased work of breathing     Per primary team  Atrial fibrillation (HCC)  Treated with Eliqiuis    Currently on hold  MSSA bacteremia  During prior hospitalization diagnosed with MSSA bacteremia, acute neck pain, possible deep cervical infection? Requiring long-term antibiotics    TTE negative for vegetation   Repeat blood cultures negative 2/14   Being treated with IV Cefazolin     Per primary team  Acute blood loss anemia  Presented with hgb 4.7  Received 3 units pRBC, hgb responded to  7.3    Trend hgb  Transfuse for hgb <7  Anticoagulation on hold  Recommend iron supplements  Melena  Patient reports daily bowel movements which are dark and dark urine.   Recently EGD on 2/19 reported normal     Consider GI consult   Consider urology consult for dark urine    CAD (coronary artery disease)    History of CVA (cerebrovascular accident)    Urinary retention    Chronic respiratory failure with hypoxia (HCC)    Neck pain    Ambulatory dysfunction    Dysphagia    Severe protein-calorie malnutrition (HCC)  Malnutrition Findings:   Adult Malnutrition type: Acute illness  Adult Degree of Malnutrition: Other severe protein calorie malnutrition  Malnutrition Characteristics: Inadequate energy, Fluid accumulation                  360 Statement: related to inadequate energy/protein intake as evidenced by consuming < 50% of energy intake compared to estimated needs for > 5 days and B/L LE +3 edema. Treated with ONS.    BMI Findings:           Body mass index is 25.92 kg/m².     Hallucinations, visual    Pre-operative cardiovascular examination, unstable angina (HCC)    Disorders of fluid, electrolyte, and acid-base balance          Subjective   No acute events overnight.  Denies bloody BMs in the last 48 hours.  Tolerating diet. No abdominal pain, nausea or vomiting.    Objective :  Temp:  [97.7 °F (36.5 °C)] 97.7 °F (36.5 °C)  HR:  [69-87] 76  BP: ()/(52-63) 136/61  Resp:  [18] 18  SpO2:  [86 %-98 %] 94 %  O2 Device: Nasal cannula  Nasal Cannula O2 Flow Rate (L/min):  [2 L/min-5 L/min] 5 L/min    I/O         03/13 0701  03/14 0700 03/14 0701  03/15 0700    P.O. 120     Blood 391.7     Total Intake(mL/kg) 511.7 (5.9)     Urine (mL/kg/hr) 850 (0.4) 1525 (0.7)    Total Output 850 1525    Net -338.3 -1525                Lines/Drains/Airways       Active Status       Name Placement date Placement time Site Days    PICC Line 02/26/25 Right Brachial 02/26/25  0548  Brachial  16                  Physical  Exam  General: NAD  HENT: NCAT MMM  Neck: supple, no JVD  CV: nl rate  Lungs: nl wob. No resp distress  ABD: Soft, nontender, nondistended  Extrem: No CCE  Neuro: AAOx3      Lab Results: I have reviewed the following results:  Recent Labs     03/14/25  0520 03/14/25  1551   WBC 7.08  --    HGB 7.3* 9.0*   HCT 23.3* 28.8*   *  --    SODIUM 143  --    K 3.2*  --      --    CO2 33*  --    BUN 61*  --    CREATININE 2.07*  --    GLUC 82  --    MG 1.8*  --              VTE Pharmacologic Prophylaxis: VTE covered by:    None     VTE Mechanical Prophylaxis: sequential compression device

## 2025-03-15 NOTE — ASSESSMENT & PLAN NOTE
Patient endorsed melena prior to admission.  One episode dark, tarry stool overnight on 3/4/25. Not documented.  Rapid response called [id number] with suspected intraabdominal bleeding    Plan:  See above and chart for rapid response and MTP

## 2025-03-15 NOTE — PROGRESS NOTES
NEPHROLOGY HOSPITAL PROGRESS NOTE   Devin Chaves 79 y.o. male MRN: [id number]  Unit/Bed#: ICU 10 Encounter: [id number]  Reason for Consult: ORIANA  Assessment & Plan  ORIANA (acute kidney injury) (HCC)  Baseline creatinine is normal at 0.8 to 0.9.   Admission SCr 2.43 on 2/28/25.   SCr has been around 1.8 to 2.0 since admission.   Etiology is not entirely clear - not prerenal or postrenal. Working dx is ATN but could have another underlying pathology.   CT 2/28/25 - no hydronephrosis. UA on 3/8/25 with RBC 2-4, WBC 2-4, small blood and trace protein.   Of note, UPC ratio was 3.4 gm on 3/8/25 and urine ACR was only 821 mg on 3/5/25.   Serum and urine LIDYA no M spike, C3 83 (low), C4 26. At risk for AIN due to antibiotic exposure but no clear indication of this as well.   Renal function stable this AM - SCr was 1.95.   Given acute events this AM, he is at risk for developing ATN.   Monitor renal function at this time.     Hypovolemic shock (HCC)  BP was low this AM and required vasopressors.   Better now and off vasopressors.   All BP meds are on hold.     Acute blood loss anemia  Acute blood loss earlier this AM and received > 10 units PRBC.   Hgb stable.   Hgb monitoring per primary service.     HTN (hypertension)  Previous Rx: Metoprolol 25 mg BID, Torsemide 20 mg OD.   Currently on hold due to low BP .    MSSA bacteremia  Currently on Cefazolin 2 gm IV q8H - needs this until 3/27/25.   ID following.   Possible cutaneous source  TTE without vegetation.     Urinary retention  Seen by urology this admission.   Now with glass.     Colonic mass  Newly diagnosed colon mass on C-scope on 2/20/25.   Pathology - adenocarcinoma  Was to follow colorectal as outpatient    Pleural effusion, bilateral  CXR with vascular congestion and pleural effusions.   S/p Furosemide 40 mg IV x 1 on 3/13/25.   Torsemide 20 mg OD on hold.       TODAY's DISCUSSION / PLAN:  Renal function is stable as of now but at risk for ATN due to events  earlier today.   Hemodynamically stable now and seems non oliguric.   Will continue to monitor renal function.   Hgb monitoring per primary service.     Discussed with family at bedside.     SUBJECTIVE / 24H INTERVAL HISTORY:  Events this AM noted.   Patient had a rapid response this morning in the setting of acute anemia and hypotension. Rodrigo Crimson called and later Code blue called and patient had ROSC after blood products given.   Taken to the OR emergently.   Now VDRF.   Required pressors earlier but these were off when I saw him.   Glass in place with clear urine.     OBJECTIVE:  Current Weight: Weight - Scale: 83.9 kg (184 lb 15.5 oz)  Vitals:    03/15/25 1130 03/15/25 1133 03/15/25 1136 03/15/25 1139   BP:       Pulse: (!) 160 (!) 27 80 (!) 130   Resp: (!) 112 (!) 123 (!) 116 (!) 123   Temp:       TempSrc:       SpO2: (!) 70% (!) 85% (!) 37% (!) 61%   Weight:       Height:           Intake/Output Summary (Last 24 hours) at 3/15/2025 1702  Last data filed at 3/15/2025 1531  Gross per 24 hour   Intake 5375 ml   Output 1400 ml   Net 3975 ml     General: critically ill appearing on the mechanical ventilator, comfortable.   Skin: warm, dry, good turgor.   Eyes: closed  ENT: ETT in place.   Neck: supple.  Chest/Lungs: equal chest expansion, coarse breath sounds.   CVS: distinct heart sounds, normal rate, regular rhythm, no rub  Abdomen: soft, midline surgical scar and wound vac.   Extremities: + LE edema.  : (+) glass catheter.   Neuro: sedated on the mechanical ventilator.   Psych: could not evaluate.     Medications:    Current Facility-Administered Medications:     [Held by provider] ascorbic acid (VITAMIN C) tablet 250 mg, 250 mg, Oral, Daily, Yobany Mott MD, 250 mg at 03/14/25 0848    [Held by provider] atorvastatin (LIPITOR) tablet 40 mg, 40 mg, Oral, Daily With Dinner, Yobany Mott MD, 40 mg at 03/13/25 1626    ceFAZolin (ANCEF) IVPB (premix in dextrose) 2,000 mg 50 mL, 2,000 mg, Intravenous,  Q8H, Yobany Mott MD, Last Rate: 100 mL/hr at 03/15/25 0614, 2,000 mg at 03/15/25 0614    chlorhexidine (PERIDEX) 0.12 % oral rinse 15 mL, 15 mL, Mouth/Throat, Q12H GALE, Yobany Mott MD    [Held by provider] Cholecalciferol (VITAMIN D3) tablet 2,000 Units, 2,000 Units, Oral, Daily, Yobany Mott MD, 2,000 Units at 03/14/25 0848    [Held by provider] cyanocobalamin (VITAMIN B-12) tablet 100 mcg, 100 mcg, Oral, Daily, Yobany Mott MD, 100 mcg at 03/14/25 0848    etomidate (AMIDATE) 2 mg/mL injection, , , Code/Trauma/Sedation Med, Kalia Fallon MD, 20 mg at 03/15/25 1054    fentaNYL 1000 mcg in sodium chloride 0.9% 100mL infusion, 50 mcg/hr, Intravenous, Continuous, Yobany Mott MD, Held at 03/15/25 1631    fentanyl citrate (PF) 100 MCG/2ML **ADS Override Pull**, , , ,     [Held by provider] ferrous sulfate tablet 325 mg, 325 mg, Oral, Daily With Breakfast, Yobany Mott MD    [Held by provider] fluticasone (ARNUITY ELLIPTA) 100 MCG/ACT inhaler 1 puff, 1 puff, Inhalation, Daily, Yobany Mott MD, 1 puff at 03/15/25 0925    [Held by provider] folic acid (FOLVITE) tablet 1 mg, 1 mg, Oral, Daily, Yobany Mott MD, 1 mg at 03/14/25 0848    HYDROmorphone HCl (DILAUDID) injection 0.2 mg, 0.2 mg, Intravenous, Q4H PRN, Yobany Mott MD    [Held by provider] hydroxychloroquine (PLAQUENIL) tablet 400 mg, 400 mg, Oral, Daily With Breakfast, Yobany Mott MD, 400 mg at 03/14/25 0848    levalbuterol (XOPENEX) inhalation solution 1.25 mg, 1.25 mg, Nebulization, Q4H PRN, Yobany Mott MD, 1.25 mg at 03/15/25 1005    lidocaine (LIDODERM) 5 % patch 3 patch, 3 patch, Topical, Daily, Yobany Mott MD, 3 patch at 03/15/25 0924    [Held by provider] metoprolol tartrate (LOPRESSOR) tablet 25 mg, 25 mg, Oral, Q12H GALE, Yobany Mott MD, 25 mg at 03/14/25 0848    moisture barrier miconazole 2% cream (aka HITESH MOISTURE BARRIER ANTIFUNGAL CREAM), , Topical, BID,  Yobany Mott MD, 1 Application at 03/15/25 0927    norepinephrine (LEVOPHED) 4 mg (STANDARD CONCENTRATION) IV in sodium chloride 0.9% 250 mL, 1-30 mcg/min, Intravenous, Titrated, Yobany Mott MD, Stopped at 03/15/25 1506    [Held by provider] oxyCODONE (ROXICODONE) IR tablet 5 mg, 5 mg, Oral, Q6H PRN, Yobany Mott MD, 5 mg at 03/01/25 1817    [Held by provider] oxyCODONE (ROXICODONE) split tablet 2.5 mg, 2.5 mg, Oral, Q6H PRN, Yobany Mott MD, 2.5 mg at 03/05/25 0945    pantoprazole (PROTONIX) injection 40 mg, 40 mg, Intravenous, Q12H GALE, Yobany Mott MD    [Held by provider] potassium chloride (Klor-Con M20) CR tablet 20 mEq, 20 mEq, Oral, Daily, Yobany Mott MD    [Held by provider] senna-docusate sodium (SENOKOT S) 8.6-50 mg per tablet 1 tablet, 1 tablet, Oral, HS, Yobany Mott MD, 1 tablet at 03/13/25 2156    Succinylcholine Chloride 100 mg/5 mL syringe, , , Code/Trauma/Sedation Med, Kalia Fallon MD, 100 mg at 03/15/25 1055    [Held by provider] tamsulosin (FLOMAX) capsule 0.4 mg, 0.4 mg, Oral, Daily With Dinner, Yobany Mott MD, 0.4 mg at 03/13/25 1626    [Held by provider] torsemide (DEMADEX) tablet 20 mg, 20 mg, Oral, Daily, Yobany Mott MD, 20 mg at 03/14/25 0848    [Held by provider] umeclidinium-vilanterol 62.5-25 mcg/actuation inhaler 1 puff, 1 puff, Inhalation, Daily, Yobany Mott MD, 1 puff at 03/15/25 0925    vasopressin (PITRESSIN) 20 Units in sodium chloride 0.9 % 100 mL infusion, 0.04 Units/min, Intravenous, Continuous, Yobany Mott MD, Stopped at 03/15/25 1520    Laboratory Results:  Results from last 7 days   Lab Units 03/15/25  1235 03/15/25  1154 03/15/25  1045 03/15/25  1042 03/15/25  0616 03/14/25  1551 03/14/25  0520 03/13/25  1625 03/13/25  0909 03/13/25  0559 03/12/25  0653 03/12/25  0519 03/11/25  0947 03/11/25  0540 03/10/25  0828 03/10/25  0442 03/09/25  0544   WBC Thousand/uL 9.30  --  13.12*  --   --   --   "7.08  --   --  7.45  --  4.97  --  5.32  --  4.92 5.18   HEMOGLOBIN g/dL 11.5*  --  6.3*  --  8.2* 9.0* 7.3* 7.8*   < > 6.7*   < > 6.8*   < > 7.1*   < > 7.0* 7.6*   I STAT HEMOGLOBIN g/dl  --   --   --  5.4*  --   --   --   --   --   --   --   --   --   --   --   --   --    HEMATOCRIT % 35.4*  --  21.0*  --  25.8* 28.8* 23.3* 25.1*   < > 22.1*   < > 22.1*   < > 24.0*   < > 23.5* 24.5*   HEMATOCRIT, ISTAT %  --  <15*  --  16*  --   --   --   --   --   --   --   --   --   --   --   --   --    PLATELETS Thousands/uL 70*  --  220  --   --   --  147*  --   --  146*  --  162  --  145*  --  133* 144*   POTASSIUM mmol/L 3.5  --  4.4  --  3.3*  --  3.2*  --   --  3.8  --  4.0  --  3.7  --  3.8 3.6   CHLORIDE mmol/L 119*  --  107  --  107  --  107  --   --  107  --  107  --  109*  --  109* 109*   CO2 mmol/L 19*  --  24  --  34*  --  33*  --   --  32  --  33*  --  32  --  32 30   CO2, I-STAT mmol/L  --  28  --  21  --   --   --   --   --   --   --   --   --   --   --   --   --    BUN mg/dL 42*  --  57*  --  58*  --  61*  --   --  58*  --  47*  --  37*  --  36* 36*   CREATININE mg/dL 1.47*  --  2.20*  --  1.95*  --  2.07*  --   --  1.97*  --  1.99*  --  1.95*  --  2.03* 1.94*   CALCIUM mg/dL 6.9*  --  7.8*  --  8.2*  --  8.0*  --   --  8.1*  --  8.0*  --  8.1*  --  8.0* 8.1*   MAGNESIUM mg/dL  --   --  2.3  --   --   --  1.8*  --   --  1.9  --  1.9  --  2.0  --  2.1 1.9   GLUCOSE, ISTAT mg/dl  --  159*  --  314*  --   --   --   --   --   --   --   --   --   --   --   --   --     < > = values in this interval not displayed.     Portions of the record may have been created with voice recognition software. Occasional wrong word or \"sound a like\" substitutions may have occurred due to the inherent limitations of voice recognition software. Read the chart carefully and recognize, using context, where substitutions have occurred.If you have any questions, please contact the dictating provider.    "

## 2025-03-15 NOTE — CONSULTS
Consultation - Critical Care/ICU   Name: Devin Chaves 79 y.o. male I MRN: [id number]  Unit/Bed#: OR POOL I Date of Admission: 2/28/2025   Date of Service: 3/15/2025 I Hospital Day: 15   Spiritual/Pastoral Care Consult  Consult performed by: Arnold Solomon MD  Consult ordered by: Valerie Gage PA-C        Physician Requesting Evaluation: Kalia Fallon,*   Reason for Evaluation / Principal Problem: Hypotension, hypovolemic shock    I have discussed the above management plan in detail with the primary service.     Assessment & Plan  Acute blood loss anemia  Patient presents with 2 days of chest pain, shortness of breath, fatigue and melena  2/20/2025 EGD/colonoscopy showed 2.5 cm ileocecal mass, pathology confirmed adenocarcinoma.  S/p 3 units packed rbc in ED. S/p 1 unit PRBCs on 3/2  Hemoglobin has been stable.   Patient recent diagnosis of adenocarcinoma, increased risk of stroke.   Updated iron studies 3/2025 improved compared to 2/2025  Rapid response called 10:30A M [id number] for AMS and hypotension  Patient found to be minimally responsive and hypotensive  HGB 5.9 by iSTAT, formal labs show HGB 6.3, significant drop from 8 in AM  Bleeding from colonic mass suspected, per nursing no hematuria hemoptysis melena or other signs of overt bleeding prior to RR  Patient intubated for airway protection  Transported to ICU  MTP called  Code blue called  ROSC achieved shortly after blood products started  Rapidly distending abdomen, suspect intraabdominal bleeding     Hemoglobin   Date Value Ref Range Status   03/15/2025 11.5 (L) 12.0 - 17.0 g/dL Final     Comment:     Results verified by repeat     Hgb, i-STAT   Date Value Ref Range Status   03/15/2025   Final     Comment:     Out of Reportable Range        Plan:  Daily CBC  Transfuse for Hgb <7  Protonix 40 mg IV daily  Hold home eliquis, off for 48 hours at time of rapid response  Heparin gtt ordered but not started prior to rapid  Palliative care consulted  prior to ICU admission  MTP [id number]  PRBC 12  FFP 3  Platelets 1  Cryo 0  Whole blood 3  Consider high volume bleeding scan  Surgery consult for suspected internal bleeding  Per surgery take to OR w/o scan  HTN (hypertension)  Hold home meds in setting of hypotension  CAD (coronary artery disease)  TTE 2/12/25: EF 55%, Normal systolic dysfunction, G1DD, No vegetation, No mural thrombus, moderate aortic sclerosis   COPD (chronic obstructive pulmonary disease) ([facility])  Patient with COPD on 2-4 L supplemental O2 at baseline     Plan:  Intubated  History of CVA (cerebrovascular accident)    Chronic respiratory failure with hypoxia ([facility])  2-4L O2 at baseline  Currently intubated  Atrial fibrillation ([facility])  Hold home rate control medications in setting of hypotension  Urinary retention  Patient has history of urinary retention and was started on Flomax during last hospitalization  Pt denies difficulty urinating at this time    Plan:  Insert glass catheter for accurate I&O monitoring  Hypotension  Found to be hypotensive at rapid response 10:30 AM [id number]  Levo started with minimal improvement  HGB 5.9 on iSTAT, suspect colonic bleeding vs intraabdominal bleeding  New and rapidly worsening abdominal distention after CPR    Plan:  Intubate and transfer to ICU  Central line  A-line  Surgery consult  Emergency surgery to control bleeding  On levophed  MSSA bacteremia  Patient was discharged on cefazolin 2 g IV Q8 hrs until 3/27/2025. PICC line in place. Patient rescheduled to follow-up outpatient with ID     Plan:  Continue with cefazolin 2 g IV every 8 hours.  infectious disease recommendations appreciated   Recommend repeat C-spine MRI with and without contrast, postpone until patient stable  6 weeks treatment through 3/27/2025  Neck pain  MRI of the neck done 2/14/2025 revealed cervical degenerative change with mild canal stenosis and mild to moderate foraminal narrowing.  No cord compression.  Mild nonspecific edema within  the right posterior breast dermal musculature of the upper cervicals spine.  Minimal peripheral enhancement is noted.      Plan:  Repeat MRI C spine prior to completion of ABX per ID recs  Continue ABX as recommended above  Colonic mass  2/20/2025 EGD/colonoscopy showed 2.5 cm ileocecal mass, pathology confirmed adenocarcinoma.    Patient was due to follow-up with outpatient colorectal surgery on 2/28/2025  Pt presented initially with melena, Hgb 4.6. Symptomatic anemia.  Outpatient oncology appointment scheduled for 4/3  Rapid Response [id number], see rapid response note  Suspect intraabdominal bleeding with mass as possible source  High volume bleeding CT deferred in favor of exlap to control bleeding given unstable condition  Per surgery, possible resection of colonic mass in exlap  Ambulatory dysfunction  Worsening ambulatory dysfunction since December  PT/OT evaluations recommend level 2   Discussed with podiatry, patient has chronic left ankle fracture over 2 years ago, recommend WBAT to left foot as curbside, no further podiatry needs      Plan:  PT/OT consult when stable and extubated  Melena  Patient endorsed melena prior to admission.  One episode dark, tarry stool overnight on 3/4/25. Not documented.  Rapid response called [id number] with suspected intraabdominal bleeding    Plan:  See above and chart for rapid response and MTP  ORIANA (acute kidney injury) (HCC)  Creatinine   Date Value Ref Range Status   03/15/2025 1.47 (H) 0.60 - 1.30 mg/dL Final     Comment:     Standardized to IDMS reference method   09/25/2022 1 0.6 - 1.2 mg/dL Final      Present on admission  Resolving prior to ICU admission    Plan:  Trend BMPs  Fluids as indicated  Pleural effusion, bilateral  CT A/P shows bilateral pleural effusions L>R, pulmonary vascular congestion     Plan:  Repeat imaging if pt's respiratory status worsens  Patient intubated  Dysphagia  VBS done on 2/15/25 showing food retention due to pharyngeal weakness  EGD was  done last month which showed normal esophagus.   ENT recommending outpatient follow-up with Dr. Flynn Reyez    Plan:  Will need speech and swallow eval after extubation  Severe protein-calorie malnutrition (HCC)  Malnutrition Findings:   Adult Malnutrition type: Acute illness  Adult Degree of Malnutrition: Other severe protein calorie malnutrition  Malnutrition Characteristics: Inadequate energy, Fluid accumulation  360 Statement: related to inadequate energy/protein intake as evidenced by consuming < 50% of energy intake compared to estimated needs for > 5 days and B/L LE +3 edema. Treated with ONS.  BMI Findings:  Body mass index is 25.09 kg/m².      Plan:  Start parenteral nutrition when more stable and pending surgery recs                360 Statement: related to inadequate energy/protein intake as evidenced by consuming < 50% of energy intake compared to estimated needs for > 5 days and B/L LE +3 edema. Treated with ONS.    BMI Findings:           Body mass index is 25.09 kg/m².     Hallucinations, visual  Per both patient and patient's wife, patient has been experiencing visual hallucinations that usually occur prior to falling asleep or waking up.  Patient reports that he will occasionally grab for things that are not there, such as a pillow.    Also states that he will occasionally have conversations with his wife when she is not physically in the room.    Patient states he is able to discern when he is having a hallucination and they are not distressing      PLAN:  delirium precautions  Pre-operative cardiovascular examination, unstable angina (HCC)    Disorders of fluid, electrolyte, and acid-base balance  Trend electrolytes daily  Replete as needed  Aspiration into respiratory tract  Evening 3/14 patient had episode of choking while attempting to swallow food.  Chest x-ray shows new RLL consolidation    Plan:  Patient intubated  Will need speech and swallow evaluation after extubation  Cardiac arrest due  to other underlying condition (HCC)  Patient lost pulses shortly after transport to ICU  Likely due to acute blood loss  CPR immediately begun  Central line and A-line placed  MTP called prior to cardiac arrest  Patient already intubated and on ventilatory  Patient shocked 1 time  ROSC achieved shortly after blood product administration begun    Plan:  On cardiac monitor  Intubated and sedated on ventilator  Surgery to treat underlying cause of bleeding  Hypovolemic shock (HCC)  Rapid response called for hypotension and AMS  Acute blood loss, suspect intraabdominal bleeding vs colonic mass bleeding  No melena, hematuria, hemoptysis, or other signs of overt bleeding  MTP called    Plan:  Source control with surgery  Levophed  Blood and fluid resuscitation as indicated  Disposition: Critical care    History of Present Illness   Devin Chaves is a 79 y.o. who presents with hypotension and AMS at rapid response. Levophed started and patient intubated. Transferred to ICU. MTP called. During central line placement patient lost pulses, code blue called. ROSC achieved shortly after administration of blood products. See chart for MTP and code blue documentation. Case discussed with surgery team who will take him emergently to OR.    Prior to RR patient had been admitted for melena with symptomatic anemia and ORIANA. Found to have a small friable colonic mass. Surgery consulted, risks greater than benefits at that time to remove colonic mass.    History obtained from chart review and RICHY Up.  Review of Systems: Review of Systems not obtainable due to Clinical Condition, Altered mental status    Historical Information   Past Medical History:  No date: Atrial fibrillation (HCC)  No date: COPD (chronic obstructive pulmonary disease) (HCC)  No date: Crushing injury of finger, left  No date: Infectious viral hepatitis Past Surgical History:  2/10/2022: [place] LUMBAR PUNCTURE DIAGNOSTIC  1/25/2017: MS OPEN TX PHALANGEAL SHAFT  "FRACTURE PROX/MIDDLE EA; Left      Comment:  Procedure: ORIF LEFT SMALL FINGER FRACTURE;  Surgeon:                Blake Badillo MD;  Location: [facility];  Service:                Orthopedics   Current Outpatient Medications   Medication Instructions    albuterol (PROVENTIL HFA,VENTOLIN HFA) 90 mcg/act inhaler INHALE 2 PUFFS BY MOUTH EVERY 4 HOURS AS NEEDED FOR BREATHING    albuterol (PROVENTIL HFA,VENTOLIN HFA) 90 mcg/act inhaler 2 puffs, Inhalation, Every 4 hours PRN    albuterol 2.5 mg, Nebulization, Every 6 hours PRN    apixaban (ELIQUIS) 5 mg, 2 times daily    ascorbic acid (VITAMIN C) 250 mg, Daily    aspirin (ECOTRIN LOW STRENGTH) 81 mg, Daily    ceFAZolin (ANCEF) 2,000 mg, Intravenous, Every 8 hours    Cholecalciferol 50 MCG (2000 UT) TABS TAKE ONE TABLET BY MOUTH DAILY (FOR LOW VITAMIN D)    cyanocobalamin (VITAMIN B-12) 100 mcg, Daily    docusate sodium (COLACE) 100 mg, 2 times daily    famotidine (PEPCID) 20 mg, 2 times daily    ferrous sulfate 325 mg, Daily with breakfast    folic acid (FOLVITE) 1 mg tablet TAKE ONE TABLET BY MOUTH EVERY DAY *FOLIC ACID SUPPLEMENT*    hydroxychloroquine (PLAQUENIL) 400 mg, Daily with breakfast    levalbuterol (XOPENEX) 1.25 mg, Nebulization, Every 8 hours PRN    lidocaine (LIDODERM) 5 % 3 patches, Topical, Daily, Remove & Discard patch within 12 hours or as directed by MD. Apply to neck and back in areas of pain.    metoprolol tartrate (LOPRESSOR) 25 mg, Every 12 hours scheduled    mometasone 220 mcg/actuation inhaler 1 puff, Every evening    polyethylene glycol (MIRALAX) 17 g, Oral, Daily PRN    rosuvastatin (CRESTOR) 20 mg, Daily    senna (SENOKOT) 8.6 mg, Oral, Daily at bedtime PRN    sertraline (ZOLOFT) 12.5 mg, Daily    tamsulosin (FLOMAX) 0.4 mg, Oral, Daily with dinner    Allergies   Allergen Reactions    Shellfish-Derived Products - Food Allergy Other (See Comments)     Pt states, \"I reacted, I dont know\"      Social History     Tobacco Use    Smoking status: " Former    Smokeless tobacco: Former     Quit date: 4/1/2011   Substance Use Topics    Alcohol use: No    Drug use: No    History reviewed. No pertinent family history.       Objective :                   Vitals I/O      Most Recent Min/Max in 24hrs   Temp 97.8 °F (36.6 °C) Temp  Min: 97.7 °F (36.5 °C)  Max: 97.8 °F (36.6 °C)   Pulse (!) 130 Pulse  Min: 27  Max: 160   Resp (!) 123 Resp  Min: 16  Max: 123   /64 BP  Min: 35/14  Max: 146/64   O2 Sat (!) 61 % SpO2  Min: 30 %  Max: 99 %      Intake/Output Summary (Last 24 hours) at 3/15/2025 1424  Last data filed at 3/15/2025 1413  Gross per 24 hour   Intake 2475 ml   Output 2575 ml   Net -100 ml       Diet NPO    Invasive Monitoring   Arterial Line  Sanger BP    No data recorded   MAP    No data recorded           Physical Exam   Physical Exam  Eyes:      Pupils: Pupils are equal, round, and reactive to light.   Skin:     General: Skin is cool and dry.      Coloration: Skin is pale.      Comments: Skin initially cool and pale, became warm and pink after MTP   HENT:      Head: Normocephalic and atraumatic.      Nose: No congestion.      Mouth/Throat:      Mouth: Mucous membranes are moist.   Neck:      Vascular: No JVD.   Cardiovascular:      Rate and Rhythm: Regular rhythm. Tachycardia present.      Pulses:           Carotid pulses are 1+ on the right side and 1+ on the left side.       Radial pulses are 1+ on the right side and 1+ on the left side.        Femoral pulses are 1+ on the right side and 1+ on the left side.       Popliteal pulses are 1+ on the right side and 1+ on the left side.        Dorsalis pedis pulses are 1+ on the right side and 1+ on the left side.        Posterior tibial pulses are 1+ on the right side and 1+ on the left side.      Heart sounds: Normal heart sounds.   Musculoskeletal:      Right lower leg: No edema.      Left lower leg: No edema.   Abdominal: General: Abdomen is protuberant. There is distension.     Tenderness: There is no  guarding.      Hernia: No hernia is present.      Comments: Abdomen initially flat and soft, became tense and distended   Constitutional:       General: He is in acute distress.      Appearance: He is ill-appearing.      Interventions: He is sedated and intubated.   Pulmonary:      Effort: No tachypnea or bradypnea. He is intubated.      Breath sounds: Normal breath sounds.   Neurological:      GCS: GCS eye subscore is 1. GCS verbal subscore is 1. GCS motor subscore is 1.          Diagnostic Studies        Lab Results: I have reviewed the following results:     Medications:  Scheduled PRN   [Held by provider] ascorbic acid, 250 mg, Daily  [Held by provider] atorvastatin, 40 mg, Daily With Dinner  [Transfer Hold] ceFAZolin, 2,000 mg, Q8H  [Transfer Hold] chlorhexidine, 15 mL, Q12H GALE  [Held by provider] Cholecalciferol, 2,000 Units, Daily  [Held by provider] cyanocobalamin, 100 mcg, Daily  fentanyl citrate (PF), ,   [Held by provider] ferrous sulfate, 325 mg, Daily With Breakfast  [Held by provider] fluticasone, 1 puff, Daily  [Held by provider] folic acid, 1 mg, Daily  [Held by provider] hydroxychloroquine, 400 mg, Daily With Breakfast  [Transfer Hold] lidocaine, 3 patch, Daily  [Held by provider] metoprolol tartrate, 25 mg, Q12H GALE  [Transfer Hold] HITESH ANTIFUNGAL, , BID  [Transfer Hold] pantoprazole, 40 mg, Q12H GALE  [Held by provider] potassium chloride, 20 mEq, Daily  [Held by provider] senna-docusate sodium, 1 tablet, HS  [Held by provider] tamsulosin, 0.4 mg, Daily With Dinner  [Held by provider] torsemide, 20 mg, Daily  [Held by provider] umeclidinium-vilanterol, 1 puff, Daily      etomidate, , Code/Trauma/Sedation Med  fentanyl citrate (PF), ,   [Transfer Hold] HYDROmorphone, 0.2 mg, Q4H PRN  [Transfer Hold] levalbuterol, 1.25 mg, Q4H PRN  [Held by provider] oxyCODONE, 5 mg, Q6H PRN  [Held by provider] oxyCODONE, 2.5 mg, Q6H PRN  sodium chloride, , PRN  sterile water, , PRN  Succinylcholine Chloride, ,  Code/Trauma/Sedation Med       Continuous    fentaNYL, 50 mcg/hr  norepinephrine, 1-30 mcg/min, Last Rate: 12 mcg/min (03/15/25 1406)  vasopressin, 0.04 Units/min, Last Rate: 0.04 Units/min (03/15/25 1323)         Labs:   CBC    Recent Labs     03/15/25  1045 03/15/25  1154 03/15/25  1235   WBC 13.12*  --  9.30   HGB 6.3*  --  11.5*   HCT 21.0* <15* 35.4*     --  70*     BMP    Recent Labs     03/15/25  1045 03/15/25  1154 03/15/25  1235   SODIUM 143  --  152*   K 4.4  --  3.5     --  119*   CO2 24 28 19*   AGAP 12  --  14*   BUN 57*  --  42*   CREATININE 2.20*  --  1.47*   CALCIUM 7.8*  --  6.9*       Coags    Recent Labs     03/15/25  1045 03/15/25  1235   INR 1.43* 1.43*   PTT 41* 45*        Additional Electrolytes  Recent Labs     03/14/25  0520 03/15/25  1042 03/15/25  1045 03/15/25  1154   MG 1.8*  --  2.3  --    CAIONIZED  --  1.15  --  1.51*          Blood Gas    No recent results  No recent results LFTs  Recent Labs     03/15/25  1045 03/15/25  1235   ALT <3* 6*   AST 16 21   ALKPHOS 42 34   ALB 1.9* 1.6*   TBILI 0.31 0.30       Infectious  No recent results  Glucose  Recent Labs     03/14/25  0520 03/15/25  0616 03/15/25  1045 03/15/25  1235   GLUC 82 78 439* 203*

## 2025-03-15 NOTE — ASSESSMENT & PLAN NOTE
Rapid response called for hypotension and altered mental status; hemoglobin was undetectable on i-STAT.  Was resuscitated with massive transfusion protocol.  FAST exam consistent with large volume free fluid in the abdomen concerning for hemoperitoneum    - Continue balanced resuscitation  - Emergent operative exploration for bleeding control  - Will consider colon resection for known ascending colon adenocarcinoma pending patient's stability intraoperatively; colorectal will be available intraoperatively for assistance  -Continue to hold anticoagulation

## 2025-03-15 NOTE — PROCEDURES
Central Line Insertion    Date/Time: 3/15/2025 12:05 PM    Performed by: Valerie Gage PA-C  Authorized by: Valerie Gage PA-C    Patient location:  ICU  Consent:     Consent obtained:  Emergent situation  Universal protocol:     Required blood products, implants, devices, and special equipment available: yes      Patient identity confirmed:  Anonymous protocol, patient vented/unresponsive  Pre-procedure details:     Hand hygiene: Hand hygiene performed prior to insertion      Sterile barrier technique: All elements of maximal sterile technique followed      Skin preparation:  2% chlorhexidine    Skin preparation agent: Skin preparation agent completely dried prior to procedure    Indications:     Central line indications: other (comment)      Site selection rationale:  Massive transfusion hemorrhagic shock  Anesthesia (see MAR for exact dosages):     Anesthesia method:  None  Procedure details:     Location:  Left femoral    Vessel type: vein      Laterality:  Left    Catheter type:  Cordis    Catheter size:  7 Fr    Landmarks identified: yes      Ultrasound guidance: yes      Ultrasound image availability:  Not saved and not obtained due to urgency    Sterile ultrasound techniques: Sterile gel and sterile probe covers were used      Number of attempts:  1    Successful placement: yes    Post-procedure details:     Post-procedure:  Dressing applied and line sutured    Assessment:  Blood return through all ports    Patient tolerance of procedure:  Tolerated well, no immediate complications

## 2025-03-15 NOTE — ASSESSMENT & PLAN NOTE
Baseline creatinine is normal at 0.8 to 0.9.   Admission SCr 2.43 on 2/28/25.   SCr has been around 1.8 to 2.0 since admission.   Etiology is not entirely clear - not prerenal or postrenal. Working dx is ATN but could have another underlying pathology.   CT 2/28/25 - no hydronephrosis. UA on 3/8/25 with RBC 2-4, WBC 2-4, small blood and trace protein.   Of note, UPC ratio was 3.4 gm on 3/8/25 and urine ACR was only 821 mg on 3/5/25.   Serum and urine LIDYA no M spike, C3 83 (low), C4 26. At risk for AIN due to antibiotic exposure but no clear indication of this as well.   Renal function stable this AM - SCr was 1.95.   Given acute events this AM, he is at risk for developing ATN.   Monitor renal function at this time.

## 2025-03-15 NOTE — ASSESSMENT & PLAN NOTE
MRI of the neck done 2/14/2025 revealed cervical degenerative change with mild canal stenosis and mild to moderate foraminal narrowing.  No cord compression.  Mild nonspecific edema within the right posterior breast dermal musculature of the upper cervicals spine.  Minimal peripheral enhancement is noted.     Plan:  Will plan for repeat MRI C-spine on 3/20/25.  Should be completed prior to completion of IV antibiotic course per ID  Reached out to nephrology regarding patient's creatinine of 1.9 which has been stable over the past several days.

## 2025-03-15 NOTE — ASSESSMENT & PLAN NOTE
Creatinine   Date Value Ref Range Status   03/15/2025 1.47 (H) 0.60 - 1.30 mg/dL Final     Comment:     Standardized to IDMS reference method   09/25/2022 1 0.6 - 1.2 mg/dL Final      Present on admission  Resolving prior to ICU admission    Plan:  Trend BMPs  Fluids as indicated

## 2025-03-15 NOTE — OCCUPATIONAL THERAPY NOTE
Occupational Therapy Cancel Note     Patient Name: Devin Chaves  Today's Date: 3/15/2025  Problem List  Principal Problem:    Acute blood loss anemia  Active Problems:    HTN (hypertension)    CAD (coronary artery disease)    COPD (chronic obstructive pulmonary disease) (HCC)    History of CVA (cerebrovascular accident)    Chronic respiratory failure with hypoxia (HCC)    Atrial fibrillation (HCC)    Urinary retention    Hypotension    MSSA bacteremia    Neck pain    Colonic mass    Ambulatory dysfunction    Melena    ORIANA (acute kidney injury) ([facility])    Pleural effusion, bilateral    Dysphagia    Severe protein-calorie malnutrition ([facility])    Hallucinations, visual    Pre-operative cardiovascular examination, unstable angina ([facility])    Disorders of fluid, electrolyte, and acid-base balance    Aspiration into respiratory tract    Cardiac arrest due to other underlying condition ([facility])    Hypovolemic shock ([facility])          03/15/25 4904   Note Type   Note Type Cancelled Session  (Sat 3/15/25)   Cancel Reasons Medical status  (rapid response called this AM ~1031; pt to ICU. Will cancel and continue to follow as appropriate)       Zahida Cuba, OTR/L  [id number]  [id number]

## 2025-03-15 NOTE — ASSESSMENT & PLAN NOTE
VBS done on 2/15/25 showing food retention due to pharyngeal weakness  EGD was done last month which showed normal esophagus.   ENT recommending outpatient follow-up with Dr. Flynn Reyez    Plan:  Will need speech and swallow eval after extubation

## 2025-03-15 NOTE — ANESTHESIA PROCEDURE NOTES
"Central Line Insertion    Performed by: Karlie Xavier MD  Authorized by: Karlie Xavier MD    Date/Time: 3/15/2025 1:38 PM  Catheter Type:  triple lumen  Consent: The procedure was performed in an emergent situation. Verbal consent not obtained. Written consent not obtained.  Required items: required blood products, implants, devices, and special equipment available  Patient identity confirmed: arm band, provided demographic data and hospital-assigned identification number  Time out: Immediately prior to procedure a \"time out\" was called to verify the correct patient, procedure, equipment, support staff and site/side marked as required.  Indications: vascular access and central pressure monitoring  Catheter size: 7 Fr  Patient position: Trendelenburg  Assessment: blood return through all ports and free fluid flow  Preparation: skin prepped with 2% chlorhexidine  Skin prep agent dried: skin prep agent completely dried prior to procedure  Sterile barriers: all five maximum sterile barriers used - cap, mask, sterile gown, sterile gloves, and large sterile sheet  Hand hygiene: hand hygiene performed prior to central venous catheter insertion  sterile gel and probe cover used in ultrasound-guided central venous catheter insertionultrasound permanent image saved  Vessel of Catheter Tip End: svc  Number of attempts: 1  Successful placement: yes  Post-procedure: line sutured, dressing applied and chlorhexidine patch applied  Patient tolerance: Patient tolerated the procedure well with no immediate complications and patient tolerated the procedure well with no immediate complications        "

## 2025-03-15 NOTE — ASSESSMENT & PLAN NOTE
Received message from nursing evening of 3/14 that patient had episode of choking while attempting to swallow food.  Chest x-ray ordered and suggests aspiration and right lower lobe  Placed consult to speech therapy for evaluation.  N.p.o. until after completion of speech therapy eval  Placed on aspiration precautions  Encourage patient to try to maintain head of bed greater than 30 degrees to prevent further aspiration events.  Patient remains afebrile and hemodynamically stable

## 2025-03-15 NOTE — ASSESSMENT & PLAN NOTE
CT A/P shows bilateral pleural effusions L>R, pulmonary vascular congestion     Plan:  Repeat imaging if pt's respiratory status worsens  Patient intubated

## 2025-03-15 NOTE — ASSESSMENT & PLAN NOTE
Worsening ambulatory dysfunction since December  PT/OT evaluations recommend level 2   Discussed with podiatry, patient has chronic left ankle fracture over 2 years ago, recommend WBAT to left foot as curbside, no further podiatry needs      Plan:  PT/OT consult when stable and extubated

## 2025-03-15 NOTE — ASSESSMENT & PLAN NOTE
Malnutrition Findings:   Adult Malnutrition type: Acute illness  Adult Degree of Malnutrition: Other severe protein calorie malnutrition  Malnutrition Characteristics: Inadequate energy, Fluid accumulation  360 Statement: related to inadequate energy/protein intake as evidenced by consuming < 50% of energy intake compared to estimated needs for > 5 days and B/L LE +3 edema. Treated with ONS.  BMI Findings:     Body mass index is 25.09 kg/m².     Plan:  Ensure supplements

## 2025-03-15 NOTE — PROCEDURES
Intubation    Date/Time: 3/15/2025 12:00 PM    Performed by: Arnold Solomon MD  Authorized by: Arnold Solomon MD    Patient location:  Bedside  Other Assisting Provider: No    Consent:     Consent obtained:  Emergent situation  Universal protocol:     Patient identity confirmed:  Arm band and hospital-assigned identification number  Pre-procedure details:     Patient status:  Altered mental status    Mallampati score:  2    Pretreatment medications:  Etomidate and see MAR for details    Paralytics:  Succinylcholine  Indications:     Indications for intubation: airway protection    Procedure details:     Preoxygenation:  Bag valve mask    CPR in progress: no      Intubation method:  Oral    Oral intubation technique:  Direct    Laryngoscope blade:  Mac 3    Tube size (mm):  8.0    Tube type:  Cuffed    Number of attempts:  1    Tube visualized through cords: yes    Placement assessment:     ETT to lip:  25    Tube secured with:  ETT otto    Breath sounds:  Reduced on left    Placement verification: chest rise, condensation, colorimetric ETCO2 device, CXR verification, direct visualization and ETCO2 detector      CXR findings:  ETT in proper place  Post-procedure details:     Patient tolerance of procedure:  Tolerated well, no immediate complications

## 2025-03-15 NOTE — SPEECH THERAPY NOTE
Speech Language/Pathology    Speech-Language Pathology Bedside Swallow Evaluation      Patient Name: Devin Chaves    Today's Date: 3/15/2025     Problem List  Principal Problem:    Acute blood loss anemia  Active Problems:    HTN (hypertension)    CAD (coronary artery disease)    COPD (chronic obstructive pulmonary disease) (HCC)    History of CVA (cerebrovascular accident)    Chronic respiratory failure with hypoxia (HCC)    Atrial fibrillation (HCC)    Urinary retention    MSSA bacteremia    Neck pain    Colonic mass    Ambulatory dysfunction    Melena    ORIANA (acute kidney injury) (HCC)    Pleural effusion, bilateral    Dysphagia    Severe protein-calorie malnutrition (HCC)    Hallucinations, visual    Pre-operative cardiovascular examination, unstable angina (HCC)    Disorders of fluid, electrolyte, and acid-base balance    Aspiration into respiratory tract      Past Medical History  Past Medical History:   Diagnosis Date    Atrial fibrillation (HCC)     COPD (chronic obstructive pulmonary disease) ([facility])     Crushing injury of finger, left     Infectious viral hepatitis        Past Surgical History  Past Surgical History:   Procedure Laterality Date    FL LUMBAR PUNCTURE DIAGNOSTIC  2/10/2022    DC OPEN TX PHALANGEAL SHAFT FRACTURE PROX/MIDDLE EA Left 1/25/2017    Procedure: ORIF LEFT SMALL FINGER FRACTURE;  Surgeon: Blake Badillo MD;  Location: [facility];  Service: Orthopedics       Summary   Pt presented with s/s suggestive of mild oral and suspected min pharyngeal dysphagia. Pt unable to sit fully upright 2/2 significant back pain and reported increased SOB when in that position (RN aware) so evaluation completed in somewhat reclined position. Complete labial seal for retrieval and oral containment. Mildly prolonged lingual-palatal mashing to break down material 2/2 being edentulous. Once broken down, transfers appear complete. Swallows appear prompt. No overt s/s aspiration across trials. Education  "initiated on strategies to optimize swallow safety and allowing time for respiratory recovery between bites/sips if needed. Pt understanding and agreeable to all, no questions at this time.     Risk/s for Aspiration: Positioning, age, medical status      Recommended Diet: soft/level 3 diet and thin liquids   Recommended Form of Meds:  as tolerated, in puree if needed     Aspiration precautions and swallowing strategies: upright posture, only feed when fully alert, and slow rate of feeding  Other Recommendations: Continue frequent oral care         Current Medical Status  Pt is a 79 y.o. male who presented to [facility] 2/28/25. Per H&P on 2/28/2025: \"Patient was recently hospitalized for COPD exacerbation complicated by MSSA bacteremia 2/11/2025-2/22/2025. During his hospitalization pt had anemia that required two units packed rbc's. He was found to have an ileocecal colonic mass 2.5 cm by EGD/colonoscopy during hosptialization. Pathology revealed adencocarcinoma. He was re-started on Eliquis on hospital discharge 2/22/2025. Pt's Eliquis had been stopped 1-2 days ago by his outpatient doctor after he began experiencing fatigue, SOB, melena, and black urine. This morning patient's shortness of breath worsened and he was coughing with white phlegm and wheezing. He denied stomach pain, appetite change, weight loss, fever/chills, nausea, or emesis.  Patient's Hgb was 4 on his outpatient lab and he was instructed to come to the ED.     In the ED patient presented with softer blood pressure 105/51.  Otherwise hemodynamically stable.  Patient's hemoglobin 4.6.  Patient had ORIANA on admission, Cr 2.43. Troponins elevated (134->142->138). EKG unremarkable. CT C/A/P showed no evidence of high-volume GI bleed.  New bilateral multilobar interstitial thickening and pulmonary vascular congestion noted. Patient was provided 3 units of packed rbc's in the ED.\"       Current Precautions:   Fall  Aspiration   " Delirium    Allergies:  No known food allergies    Past medical history:  Please see H&P for details    Special Studies:  CXR 3/14:   Moderate bibasilar opacity which could be due to aspiration or atelectasis.     Question small pleural effusions.     CXR 3/7:   Unchanged pulmonary edema and small pleural effusions. Pneumonia not excluded.     CT chest w/ ct abdomen pelvis 228: IMPRESSION:     CT chest:     Decreased intracardiac blood pool density compatible with anemia.     New bilateral multi lobar interstitial thickening and tree-in-bud opacities may represent pulmonary vascular congestion or nonspecific inflammatory or infectious process.     Bilateral pleural effusions, left greater than right, mildly enlarged and additional findings suggestive of pulmonary vascular congestion. Correlate with clinical findings.     5 mm left upper lobe nodule stable since 2/11/2025 and new since December 2023. 6 mm right lower lobe nodule new since 2/11/2025. Noncontrast chest CT follow-up in 3 months is advised.     Additional chronic findings and negatives as above.     CT abdomen and pelvis:     No evidence of high-volume gastrointestinal bleeding.     Stable upper cecal/proximal ascending colonic mass attributed to recently biopsy-proven adenocarcinoma.     Colonic diverticulosis.     No evidence of abdominopelvic metastatic disease.     Additional chronic findings and negatives as above.       Social/Education/Vocational Hx:  Pt lives in SNF/ECF    Swallow Information   Current Risks for Dysphagia & Aspiration: known history of dysphagia  Current Symptoms/Concerns: coughing with po  Current Diet: NPO   Baseline Diet: soft/level 3 diet and thin liquids  Previous MBS:   2/15/25 Assessment Summary  Pt presents with mild-moderate oropharyngeal dysphagia characterized by reduced pharyngeal constriction, tongue base retraction and epiglottic inversion with post swallow retention. No aspiration or penetration was noted today  however risk is present. Additionally, he does appear to have a component of esophageal dysphagia- likely retrograde aspiration risk. If a dedicated assessment of the esophagus is desired, consider esophagram/barium swallow.     Recommendations:   Recommend mechanically altered/level 2 diet and thin liquids, with upright posture, only feed when fully alert, slow rate of feeding, small bites/sips, and alternating bites and sips.   Recommended Form of Meds:  as tolerated/desired      Baseline Assessment   Behavior/Cognition: alert  Speech/Language Status: able to participate in basic conversation and able to follow commands  Patient Positioning: reclined  Pain Status/Interventions/Response to Interventions:  No report of or nonverbal indications of pain.       Swallow Mechanism Exam  Facial: symmetrical  Labial: WFL  Lingual: WFL  Velum: symmetrical  Mandible: adequate ROM  Dentition: edentulous  Vocal quality:rough   Volitional Cough: strong/productive   Respiratory Status: on 5L O2    Consistencies Assessed and Performance   Consistencies Administered: thin liquids, puree, soft solids, and hard solids    Oral Stage: mild  Mastication was adequate with the materials administered today.  Bolus formation and transfer were functional with no significant oral residue noted.  No overt s/s reduced oral control.    Pharyngeal Stage: min  Swallow Mechanics:  Swallowing initiation appeared prompt.  Laryngeal rise was palpated and judged to be within functional limits.  No coughing, throat clearing, change in vocal quality or respiratory status noted today. Pt does take extended breaks between bites/sips due to occasional increased feeling of SOB, O2 stable.     Esophageal Concerns:  know h/o adenocarcinoma     Strategies and Efficacy: -     Summary and Recommendations (see above)    Results Reviewed with: patient, RN, and MD     Treatment Recommended: Yes     Frequency of treatment: As able/appropriate     Patient Stated  "Goal: \"I'll eat anything\"     Dysphagia LTG  -Patient will demonstrate safe and effective oral intake (without overt s/s significant oral/pharyngeal dysphagia including s/s penetration or aspiration) for the highest appropriate diet level.     Short Term Goals:    -Pt will tolerate Dysphagia 3/advanced (dental soft) diet and thin liquid with no significant s/s oral or pharyngeal dysphagia across 1-3 diagnostic session/s.    -Patient will tolerate trials of upgraded food and/or liquid texture with no significant s/s of oral or pharyngeal dysphagia including aspiration across 1-3 diagnostic sessions       Speech Therapy Prognosis   Prognosis: good    Prognosis Considerations: age, medical status, prior medical history, and respiratory status       "

## 2025-03-15 NOTE — ANESTHESIA POSTPROCEDURE EVALUATION
Post-Op Assessment Note    CV Status:  Stable  Pain Score: 0    Pain management: adequate       Mental Status:  Alert and awake   Hydration Status:  Euvolemic   PONV Controlled:  Controlled   Airway Patency:  Patent     Post Op Vitals Reviewed: Yes    No anethesia notable event occurred.    Staff: Anesthesiologist, CRNA   Comments: Transferred pt. to Cat scan dept fuully monitored & on 100% FO02 via ambu bag. VSS. Pt stable. Full report given to receiving ICU Staff in cat scan dept.        Last Filed PACU Vitals:  Vitals Value Taken Time   Temp 96    Pulse 80    /67    Resp 12    SpO2 100

## 2025-03-15 NOTE — ASSESSMENT & PLAN NOTE
Newly diagnosed proximal ascending colon mass on colonoscopy 2/19, pathology confirmed adenocarcinoma.  Colorectal surgery was consulted at that time, recommended outpatient follow up.  Patient now presenting with anemia and melena.  Hemoglobin previously stable but rebleed upon resumption of Eliquis, persistently requiring multiple transfusions.  Colorectal surgery reengaged for earlier operative intervention.    2/28 CT AP no evidence of high-volume gastrointestinal bleeding. Stable upper cecal/proximal ascending colonic mass, colonic diverticulosis. No Evidence of abdominopelvic metastatic disease.     Hemoglobin on 3/14 9.0 from 7.3   last transfusion 3/13    Plan  - Continue to trend hemoglobin with serial H/H's  - Continue presurgical optimization along nutritional and iron supplements.   - recommend heparin gtt while holding Eliquis  -Will discuss with attending surgical intervention this admission given ongoing transfusion requirements vs previous plan of outpatient follow-up and surgical optimization   -Patient is high surgical risk given severe COPD and significant baseline oxygen requirement

## 2025-03-15 NOTE — ASSESSMENT & PLAN NOTE
Patient was discharged on cefazolin 2 g IV Q8 hrs until 3/27/2025. PICC line in place. Patient rescheduled to follow-up outpatient with ID     Plan:  Continue with cefazolin 2 g IV every 8 hours.  infectious disease recommendations appreciated   Recommend repeat C-spine MRI with and without contrast, postpone until patient stable  6 weeks treatment through 3/27/2025

## 2025-03-15 NOTE — ASSESSMENT & PLAN NOTE
Malnutrition Findings:   Adult Malnutrition type: Acute illness  Adult Degree of Malnutrition: Other severe protein calorie malnutrition  Malnutrition Characteristics: Inadequate energy, Fluid accumulation  360 Statement: related to inadequate energy/protein intake as evidenced by consuming < 50% of energy intake compared to estimated needs for > 5 days and B/L LE +3 edema. Treated with ONS.  BMI Findings:  Body mass index is 25.09 kg/m².      Plan:  Start parenteral nutrition when more stable and pending surgery recs                360 Statement: related to inadequate energy/protein intake as evidenced by consuming < 50% of energy intake compared to estimated needs for > 5 days and B/L LE +3 edema. Treated with ONS.    BMI Findings:           Body mass index is 25.09 kg/m².

## 2025-03-15 NOTE — ANESTHESIA PREPROCEDURE EVALUATION
Procedure:  LAPAROTOMY EXPLORATORY (Abdomen)    Relevant Problems   ANESTHESIA (within normal limits)      CARDIO   (+) Atrial fibrillation (HCC)   (+) CAD (coronary artery disease)   (+) HTN (hypertension)   (+) Pre-operative cardiovascular examination, unstable angina (HCC)      GI/HEPATIC   (+) Dysphagia   (+) Esophageal dysphagia      /RENAL   (+) ORIANA (acute kidney injury) (HCC)      HEMATOLOGY   (+) Acute blood loss anemia      MUSCULOSKELETAL   (+) Back pain   (+) Rheumatoid arthritis (HCC)      NEURO/PSYCH   (+) Stroke (HCC)      PULMONARY   (+) COPD (chronic obstructive pulmonary disease) ([facility])   (+) Chronic respiratory failure with hypoxia ([facility])   (+) JAZMINE (obstructive sleep apnea)   (+) Pleural effusion, bilateral      Digestive   (+) Melena      Care Coordination   (+) Ambulatory dysfunction      Other   (+) Aspiration into respiratory tract   (+) Colonic mass   (+) Disorders of fluid, electrolyte, and acid-base balance        Physical Exam    Airway    Mallampati score: already intubated         Dental       Cardiovascular      Pulmonary      Other Findings        Anesthesia Plan  ASA Score- 5 Emergent    Anesthesia Type- general with ASA Monitors.         Additional Monitors: arterial line, central venous line and pulmonary artery catheter.    Airway Plan: ETT.           Plan Factors-    Chart reviewed. EKG reviewed.  Existing labs reviewed. Patient summary reviewed.                  Induction- intravenous and inhalational.    Postoperative Plan-     Perioperative Resuscitation Plan - Level 1 - Full Code.       Informed Consent-   I personally reviewed this patient with the CRNA. Discussed and agreed on the Anesthesia Plan with the CRNA..      NPO Status:  No vitals data found for the desired time range.      Unable to obtain complete H&P, ROS or consent due to emergent nature of procedure. Will proceed

## 2025-03-15 NOTE — ASSESSMENT & PLAN NOTE
Found to be hypotensive at rapid response 10:30 AM [id number]  Levo started with minimal improvement  HGB 5.9 on iSTAT, suspect colonic bleeding vs intraabdominal bleeding  New and rapidly worsening abdominal distention after CPR    Plan:  Intubate and transfer to ICU  Central line  A-line  Surgery consult  Emergency surgery to control bleeding  On levophed, wean as tolerated

## 2025-03-16 ENCOUNTER — ANESTHESIA EVENT (INPATIENT)
Dept: PERIOP | Facility: HOSPITAL | Age: 80
End: 2025-03-16
Payer: COMMERCIAL

## 2025-03-16 ENCOUNTER — ANESTHESIA (INPATIENT)
Dept: PERIOP | Facility: HOSPITAL | Age: 80
End: 2025-03-16
Payer: COMMERCIAL

## 2025-03-16 PROBLEM — K66.1 HEMOPERITONEUM: Status: ACTIVE | Noted: 2025-03-16

## 2025-03-16 RX ORDER — PHENYLEPHRINE HCL IN 0.9% NACL 1 MG/10 ML
SYRINGE (ML) INTRAVENOUS AS NEEDED
Status: DISCONTINUED | OUTPATIENT
Start: 2025-03-16 | End: 2025-03-16

## 2025-03-16 RX ORDER — ROCURONIUM BROMIDE 10 MG/ML
INJECTION, SOLUTION INTRAVENOUS AS NEEDED
Status: DISCONTINUED | OUTPATIENT
Start: 2025-03-16 | End: 2025-03-16

## 2025-03-16 RX ORDER — CALCIUM CHLORIDE 100 MG/ML
INJECTION INTRAVENOUS; INTRAVENTRICULAR AS NEEDED
Status: DISCONTINUED | OUTPATIENT
Start: 2025-03-16 | End: 2025-03-16

## 2025-03-16 RX ORDER — SODIUM CHLORIDE 9 MG/ML
INJECTION, SOLUTION INTRAVENOUS CONTINUOUS PRN
Status: DISCONTINUED | OUTPATIENT
Start: 2025-03-16 | End: 2025-03-16

## 2025-03-16 RX ORDER — MIDAZOLAM HYDROCHLORIDE 2 MG/2ML
INJECTION, SOLUTION INTRAMUSCULAR; INTRAVENOUS CONTINUOUS PRN
Status: DISCONTINUED | OUTPATIENT
Start: 2025-03-16 | End: 2025-03-16

## 2025-03-16 RX ORDER — METRONIDAZOLE 500 MG/100ML
INJECTION, SOLUTION INTRAVENOUS CONTINUOUS PRN
Status: DISCONTINUED | OUTPATIENT
Start: 2025-03-16 | End: 2025-03-16

## 2025-03-16 RX ORDER — FENTANYL CITRATE 50 UG/ML
INJECTION, SOLUTION INTRAMUSCULAR; INTRAVENOUS AS NEEDED
Status: DISCONTINUED | OUTPATIENT
Start: 2025-03-16 | End: 2025-03-16

## 2025-03-16 RX ORDER — SODIUM CHLORIDE, SODIUM LACTATE, POTASSIUM CHLORIDE, CALCIUM CHLORIDE 600; 310; 30; 20 MG/100ML; MG/100ML; MG/100ML; MG/100ML
INJECTION, SOLUTION INTRAVENOUS CONTINUOUS PRN
Status: DISCONTINUED | OUTPATIENT
Start: 2025-03-16 | End: 2025-03-16

## 2025-03-16 RX ADMIN — FENTANYL CITRATE 50 MCG: 50 INJECTION INTRAMUSCULAR; INTRAVENOUS at 11:56

## 2025-03-16 RX ADMIN — SODIUM CHLORIDE, SODIUM LACTATE, POTASSIUM CHLORIDE, AND CALCIUM CHLORIDE: .6; .31; .03; .02 INJECTION, SOLUTION INTRAVENOUS at 11:14

## 2025-03-16 RX ADMIN — CALCIUM CHLORIDE 400 MG: 100 INJECTION INTRAVENOUS; INTRAVENTRICULAR at 13:54

## 2025-03-16 RX ADMIN — ROCURONIUM 50 MG: 50 INJECTION, SOLUTION INTRAVENOUS at 11:14

## 2025-03-16 RX ADMIN — NOREPINEPHRINE BITARTRATE 3 MCG/MIN: 1 INJECTION, SOLUTION, CONCENTRATE INTRAVENOUS at 12:05

## 2025-03-16 RX ADMIN — METRONIDAZOLE: 500 INJECTION, SOLUTION INTRAVENOUS at 11:45

## 2025-03-16 RX ADMIN — NOREPINEPHRINE BITARTRATE 16 MCG: 1 INJECTION, SOLUTION, CONCENTRATE INTRAVENOUS at 13:54

## 2025-03-16 RX ADMIN — ROCURONIUM 30 MG: 50 INJECTION, SOLUTION INTRAVENOUS at 12:08

## 2025-03-16 RX ADMIN — CALCIUM CHLORIDE 300 MG: 100 INJECTION INTRAVENOUS; INTRAVENTRICULAR at 12:18

## 2025-03-16 RX ADMIN — NOREPINEPHRINE BITARTRATE 16 MCG: 1 INJECTION, SOLUTION, CONCENTRATE INTRAVENOUS at 13:52

## 2025-03-16 RX ADMIN — NOREPINEPHRINE BITARTRATE 16 MCG: 1 INJECTION, SOLUTION, CONCENTRATE INTRAVENOUS at 13:49

## 2025-03-16 RX ADMIN — ROCURONIUM 20 MG: 50 INJECTION, SOLUTION INTRAVENOUS at 13:44

## 2025-03-16 RX ADMIN — CALCIUM CHLORIDE 300 MG: 100 INJECTION INTRAVENOUS; INTRAVENTRICULAR at 12:13

## 2025-03-16 RX ADMIN — SUGAMMADEX 200 MG: 100 INJECTION, SOLUTION INTRAVENOUS at 14:24

## 2025-03-16 RX ADMIN — Medication 300 MCG: at 11:52

## 2025-03-16 RX ADMIN — CEFAZOLIN SODIUM 2000 MG: 2 SOLUTION INTRAVENOUS at 11:44

## 2025-03-16 RX ADMIN — SODIUM CHLORIDE: 0.9 INJECTION, SOLUTION INTRAVENOUS at 11:14

## 2025-03-16 NOTE — PROGRESS NOTES
Progress Note - Surgery-General   Name: Devin Chaves 79 y.o. male I MRN: [id number]  Unit/Bed#: ICU 10 I Date of Admission: 2/28/2025   Date of Service: 3/16/2025 I Hospital Day: 16    Assessment & Plan  Acute blood loss anemia  Patient sustained cardiac arrest in the setting of an undetectable hemoglobin on 3/15 and was taken emergently to the operating room for exploration in the setting of abdominal free fluid.  Large volume hemoperitoneum was encountered with evidence of a actively bleeding/decompressing mesenteric hematoma; bleeding control was obtained and a segmental small bowel resection was performed and the patient was left in discontinuity given ongoing instability.    - Continue balanced resuscitation as needed; trend hemoglobins  - Plan for takeback today for anastomosis and possible right colon resection for known: Adenocarcinoma pending patient's stability intraoperatively  -Continue to hold anticoagulation  -Rest of care per primary  HTN (hypertension)    CAD (coronary artery disease)    COPD (chronic obstructive pulmonary disease) (HCC)    History of CVA (cerebrovascular accident)    Chronic respiratory failure with hypoxia (HCC)    Atrial fibrillation (HCC)    Urinary retention    MSSA bacteremia    Neck pain    Colonic mass    Ambulatory dysfunction    Melena    ORIANA (acute kidney injury) (HCC)    Pleural effusion, bilateral    Dysphagia    Severe protein-calorie malnutrition (HCC)  Malnutrition Findings:   Adult Malnutrition type: Acute illness  Adult Degree of Malnutrition: Other severe protein calorie malnutrition  Malnutrition Characteristics: Inadequate energy, Fluid accumulation                  360 Statement: related to inadequate energy/protein intake as evidenced by consuming < 50% of energy intake compared to estimated needs for > 5 days and B/L LE +3 edema. Treated with ONS.    BMI Findings:           Body mass index is 27.84 kg/m².     Hallucinations, visual    Disorders of fluid,  electrolyte, and acid-base balance    Aspiration into respiratory tract    Hypotension    Cardiac arrest due to other underlying condition (HCC)    Hypovolemic shock (HCC)          Subjective   Intubated; off sedation although not responding    Objective :  Temp:  [96.8 °F (36 °C)-98.8 °F (37.1 °C)] 98.8 °F (37.1 °C)  HR:  [] 113  BP: ()/(14-91) 142/65  Resp:  [9-123] 25  SpO2:  [30 %-100 %] 99 %  O2 Device: Ventilator  Nasal Cannula O2 Flow Rate (L/min):  [3 L/min-5 L/min] 5 L/min  FiO2 (%):  [] 100    I/O         03/14 0701  03/15 0700 03/15 0701 03/16 0700 03/16 0701  03/17 0700    P.O.       I.V. (mL/kg)  2553.2 (27.4) 301.7 (3.2)    Blood  7860     IV Piggyback  3750     Cell Saver  1495     Total Intake(mL/kg)  79091.2 (168.2) 301.7 (3.2)    Urine (mL/kg/hr) 1825 (0.9) 575 (0.3) 60 (0.3)    Emesis/NG output  200     Drains  1210 250    Blood  800     Total Output 1825 2785 310    Net -1825 +88059.2 -8.3                 Lines/Drains/Airways       Active Status       Name Placement date Placement time Site Days    PICC Line 02/26/25 Right Brachial 02/26/25  0548  Brachial  18    CVC Central Lines 03/15/25 Single 03/15/25  1205  --  less than 1    CVC Central Lines 03/15/25 Triple 03/15/25  1338  --  less than 1    Arterial Line 03/15/25 Femoral 03/15/25  1308  Femoral  less than 1    ETT  Cuffed 8 mm 03/15/25  1200  -- less than 1    ETT  03/15/25  1056  -- less than 1    Urethral Catheter Latex 16 Fr. 03/15/25  1336  Latex  less than 1    NG/OG Tube Nasogastric Left nare 03/15/25  1700  Left nare  less than 1                  Physical Exam  General: Intubated  Skin: Warm, dry, anicteric  HEENT: ET tube in place  CV: Tachycardic  Pulm: Mechanically ventilated  Abd: ABThera in place with serosanguineous output in the canister  MSK: Symmetric, no edema, no tenderness, no deformity  Neuro: 3T    Lab Results: I have reviewed the following results:  Recent Labs     03/15/25  1235 03/15/25  1345  03/15/25  1654 03/15/25  1935 03/15/25  2134 03/15/25  2342 03/16/25  0440   WBC 9.30  --  11.65*  --   --   --  14.14*   HGB 11.5*   < > 12.5 12.9  --    < > 13.3  13.3   HCT 35.4*   < > 36.7 37.3  --    < > 38.7  38.4   PLT 70*  --  70*  --   --   --  103*   BANDSPCT  --   --   --   --   --   --  1   SODIUM 152*  --  151*  --   --   --  148*   K 3.5  --  3.2*  --   --   --  4.2   *  --  113*  --   --   --  113*   CO2 19*   < > 25  --   --   --  27   BUN 42*  --  45*  --   --   --  48*   CREATININE 1.47*  --  1.67*  --   --   --  1.85*   GLUC 203*  --  174*  --   --   --  147*   CAIONIZED  --    < > 1.08*  --   --   --   --    MG  --   --  1.6*  --   --   --  2.0   PHOS  --    < > 3.6  --   --   --  3.2   AST 21  --   --   --   --   --  69*   ALT 6*  --   --   --   --   --  5*   ALB 1.6*  --   --   --   --   --  2.7*   TBILI 0.30  --   --   --   --   --  0.79   ALKPHOS 34  --   --   --   --   --  53   PTT 45*  --   --   --   --   --   --    INR 1.43*  --   --   --   --   --   --    HSTNI0  --   --   --  2,898*  --   --   --    HSTNI2  --   --   --   --  4,775*  --   --    LACTICACID 9.7*  --  5.6* 4.3* 3.0*   < > 1.8    < > = values in this interval not displayed.

## 2025-03-16 NOTE — ANESTHESIA POSTPROCEDURE EVALUATION
Post-Op Assessment Note    CV Status:  Stable  Pain Score: 0         Mental Status:  Somnolent   Hydration Status:  Stable   PONV Controlled:  None   Airway Patency:  Patent  Airway: intubated     Post Op Vitals Reviewed: Yes    No anethesia notable event occurred.    Staff: Anesthesiologist, CRNA           Last Filed PACU Vitals:  Vitals Value Taken Time   Temp     Pulse 105    /54    Resp 20    SpO2 100

## 2025-03-16 NOTE — PROGRESS NOTES
NEPHROLOGY HOSPITAL PROGRESS NOTE   Devin Chaves 79 y.o. male MRN: [id number]  Unit/Bed#: ICU 10 Encounter: [id number]  Reason for Consult: ORIANA  Assessment & Plan  ORIANA (acute kidney injury) (HCC)  Baseline creatinine is normal at 0.8 to 0.9.   Admission SCr 2.43 on 2/28/25.   Etiology is not entirely clear - not prerenal or postrenal. Working dx is ATN but could have another underlying pathology.   CT 2/28/25 - no hydronephrosis. UA on 3/8/25 with RBC 2-4, WBC 2-4, small blood and trace protein.   Of note, UPC ratio was 3.4 gm on 3/8/25 and urine ACR was only 821 mg on 3/5/25.   Serum and urine LIDYA no M spike, C3 83 (low), C4 26. At risk for AIN due to antibiotic exposure but no clear indication of this as well.   SCr has been around 1.8 to 2.0 since admission.   Had cardiac arrest on 3/15/25 and is at risk for ATN due to that event.   Fortunately, renal function is stable today. SCr 1.85.   Low UO is likely due to intravascular volume depletion.     Hypovolemic shock (HCC)  Resolved and off pressors.   All BP meds are on hold.   BP stable.     Acute blood loss anemia  Acute blood loss on 3/15/25 and received > 10 units PRBC.   Hgb 13.3 today.   Hgb monitoring per primary service.     HTN (hypertension)  Previous Rx: Metoprolol 25 mg BID, Torsemide 20 mg OD.   All meds on hold.     MSSA bacteremia  Currently on Cefazolin 2 gm IV q8H - needs this until 3/27/25.   ID following.   Possible cutaneous source  TTE without vegetation.     Urinary retention  Seen by urology this admission.   Now with glass.   Tamsulosin on hold.     Colonic mass  Newly diagnosed colon mass on C-scope on 2/20/25.   Pathology - adenocarcinoma  Was to follow colorectal as outpatient    Pleural effusion, bilateral  CXR with vascular congestion and pleural effusions.   Was being diuresed before cardiac arrest on 3/15/25.   Diuresis on hold.     Cardiac arrest due to other underlying condition (HCC)  S/p ROSC.       TODAY's DISCUSSION /  PLAN:  Stable renal function.   Low UO likely due to intravascular volume depletion from fluid loss from wound vac.   Agree with LR at 100 cc/hr.   Surgery planning to take patient to the OR today.   After OR, would consider changing to hypotonic IVF or adding D5W given hypernatremia.     SUBJECTIVE / 24H INTERVAL HISTORY:  Overnight, UO dropped off.   Now with only 20 cc/hr.   Abdominal wound vac putting out more.   FiO2 stable.   Not on pressors.     OBJECTIVE:  Current Weight: Weight - Scale: 93.1 kg (205 lb 4 oz)  Vitals:    03/16/25 0542 03/16/25 0545 03/16/25 0600 03/16/25 0615   BP:       BP Location:       Pulse: (!) 109 (!) 109 (!) 109 (!) 110   Resp: 20 20 20 22   Temp:       TempSrc:       SpO2:  99% 99% 99%   Weight: 93.1 kg (205 lb 4 oz)      Height:           Intake/Output Summary (Last 24 hours) at 3/16/2025 0632  Last data filed at 3/16/2025 0600  Gross per 24 hour   Intake 83997.2 ml   Output 2785 ml   Net 63204.2 ml     General: critically ill appearing on the mechanical ventilator, comfortable.   Skin: warm, dry, good turgor.   Eyes: closed  ENT: ETT in place.   Neck: supple.  Chest/Lungs: equal chest expansion, coarse breath sounds.   CVS: distinct heart sounds, normal rate, regular rhythm, no rub  Abdomen: midline wound vac in place.   Extremities: + LE edema.  : (+) glass catheter.   Neuro: not waking up despite being off sedation.   Psych: could not evaluate.     Medications:    Current Facility-Administered Medications:     [Held by provider] ascorbic acid (VITAMIN C) tablet 250 mg, 250 mg, Oral, Daily, Yobany Mott MD, 250 mg at 03/14/25 0848    [Held by provider] atorvastatin (LIPITOR) tablet 40 mg, 40 mg, Oral, Daily With Dinner, Yobany Mott MD, 40 mg at 03/13/25 1626    ceFAZolin (ANCEF) IVPB (premix in dextrose) 2,000 mg 50 mL, 2,000 mg, Intravenous, Q8H, Yobany Mott MD, Last Rate: 100 mL/hr at 03/16/25 0459, 2,000 mg at 03/16/25 0459    chlorhexidine (PERIDEX) 0.12  % oral rinse 15 mL, 15 mL, Mouth/Throat, Q12H GALE, Yobany Mott MD, 15 mL at 03/15/25 2129    [Held by provider] Cholecalciferol (VITAMIN D3) tablet 2,000 Units, 2,000 Units, Oral, Daily, Yobany Mott MD, 2,000 Units at 03/14/25 0848    [Held by provider] cyanocobalamin (VITAMIN B-12) tablet 100 mcg, 100 mcg, Oral, Daily, Yobany Mott MD, 100 mcg at 03/14/25 0848    fentaNYL 1000 mcg in sodium chloride 0.9% 100mL infusion, 50 mcg/hr, Intravenous, Continuous, Yobany Mott MD, Stopped at 03/16/25 0600    [Held by provider] ferrous sulfate tablet 325 mg, 325 mg, Oral, Daily With Breakfast, Yobany Mott MD    [Held by provider] fluticasone (ARNUITY ELLIPTA) 100 MCG/ACT inhaler 1 puff, 1 puff, Inhalation, Daily, Yobany Mott MD, 1 puff at 03/15/25 0925    [Held by provider] folic acid (FOLVITE) tablet 1 mg, 1 mg, Oral, Daily, Yobany Mott MD, 1 mg at 03/14/25 0848    HYDROmorphone HCl (DILAUDID) injection 0.2 mg, 0.2 mg, Intravenous, Q4H PRN, Yobany Mott MD    [Held by provider] hydroxychloroquine (PLAQUENIL) tablet 400 mg, 400 mg, Oral, Daily With Breakfast, Yobany Mott MD, 400 mg at 03/14/25 0848    lactated ringers infusion, 100 mL/hr, Intravenous, Continuous, AMPARO Garcia, Last Rate: 100 mL/hr at 03/16/25 0600, 100 mL/hr at 03/16/25 0600    levalbuterol (XOPENEX) inhalation solution 1.25 mg, 1.25 mg, Nebulization, Q4H PRN, Yobany Mott MD, 1.25 mg at 03/15/25 1005    lidocaine (LIDODERM) 5 % patch 3 patch, 3 patch, Topical, Daily, Yobany Mott MD, 3 patch at 03/15/25 0924    [Held by provider] metoprolol tartrate (LOPRESSOR) tablet 25 mg, 25 mg, Oral, Q12H GALE, Yobany Mott MD, 25 mg at 03/14/25 0848    moisture barrier miconazole 2% cream (aka HITESH MOISTURE BARRIER ANTIFUNGAL CREAM), , Topical, BID, Yobany Mott MD, Given at 03/15/25 1916    [Held by provider] oxyCODONE (ROXICODONE) IR tablet 5 mg, 5 mg, Oral, Q6H PRN, Yobany  Saranya Mott MD, 5 mg at 03/01/25 1817    [Held by provider] oxyCODONE (ROXICODONE) split tablet 2.5 mg, 2.5 mg, Oral, Q6H PRN, Yobany Mott MD, 2.5 mg at 03/05/25 0945    pantoprazole (PROTONIX) injection 40 mg, 40 mg, Intravenous, Q12H GALE, Yobany Mott MD, 40 mg at 03/15/25 1807    [Held by provider] potassium chloride (Klor-Con M20) CR tablet 20 mEq, 20 mEq, Oral, Daily, Yobany Mott MD    [Held by provider] senna-docusate sodium (SENOKOT S) 8.6-50 mg per tablet 1 tablet, 1 tablet, Oral, HS, Yobany Mott MD, 1 tablet at 03/13/25 2156    [Held by provider] tamsulosin (FLOMAX) capsule 0.4 mg, 0.4 mg, Oral, Daily With Dinner, Yobany Mott MD, 0.4 mg at 03/13/25 1626    [Held by provider] torsemide (DEMADEX) tablet 20 mg, 20 mg, Oral, Daily, Yobany Mott MD, 20 mg at 03/14/25 0848    [Held by provider] umeclidinium-vilanterol 62.5-25 mcg/actuation inhaler 1 puff, 1 puff, Inhalation, Daily, Yobany Mott MD, 1 puff at 03/15/25 0925    Laboratory Results:  Results from last 7 days   Lab Units 03/16/25  0440 03/15/25  2342 03/15/25  1935 03/15/25  1654 03/15/25  1442 03/15/25  1345 03/15/25  1235 03/15/25  1154 03/15/25  1045 03/15/25  1042 03/15/25  0616 03/15/25  0616 03/14/25  1551 03/14/25  0520 03/13/25  0909 03/13/25  0559 03/12/25  0653 03/12/25  0519 03/11/25  0947 03/11/25  0540   WBC Thousand/uL 14.14*  --   --  11.65*  --   --  9.30  --  13.12*  --   --   --   --  7.08  --  7.45  --  4.97  --  5.32   HEMOGLOBIN g/dL 13.3  13.3 13.2 12.9 12.5  --   --  11.5*  --  6.3*  --   --  8.2*   < > 7.3*   < > 6.7*   < > 6.8*   < > 7.1*   I STAT HEMOGLOBIN g/dl  --   --   --   --  10.9* 11.6*  --   --   --  5.4*  --   --   --   --   --   --   --   --   --   --    HEMATOCRIT % 38.7  38.4 39.0 37.3 36.7  --   --  35.4*  --  21.0*  --   --  25.8*   < > 23.3*   < > 22.1*   < > 22.1*   < > 24.0*   HEMATOCRIT, ISTAT %  --   --   --   --  32* 34*  --  <15*  --  16*   < >  --   --   --   " --   --   --   --   --   --    PLATELETS Thousands/uL 103*  --   --  70*  --   --  70*  --  220  --   --   --   --  147*  --  146*  --  162  --  145*   POTASSIUM mmol/L 4.2  --   --  3.2*  --   --  3.5  --  4.4  --   --  3.3*  --  3.2*  --  3.8  --  4.0  --  3.7   CHLORIDE mmol/L 113*  --   --  113*  --   --  119*  --  107  --   --  107  --  107  --  107  --  107  --  109*   CO2 mmol/L 27  --   --  25  --   --  19*  --  24  --   --  34*  --  33*  --  32  --  33*  --  32   CO2, I-STAT mmol/L  --   --   --   --  24 23  --  28  --  21  --   --   --   --   --   --   --   --   --   --    BUN mg/dL 48*  --   --  45*  --   --  42*  --  57*  --   --  58*  --  61*  --  58*  --  47*  --  37*   CREATININE mg/dL 1.85*  --   --  1.67*  --   --  1.47*  --  2.20*  --   --  1.95*  --  2.07*  --  1.97*  --  1.99*  --  1.95*   CALCIUM mg/dL 8.1*  --   --  7.7*  --   --  6.9*  --  7.8*  --   --  8.2*  --  8.0*  --  8.1*  --  8.0*  --  8.1*   MAGNESIUM mg/dL 2.0  --   --  1.6*  --   --   --   --  2.3  --   --   --   --  1.8*  --  1.9  --  1.9  --  2.0   PHOSPHORUS mg/dL 3.2  --   --  3.6  --   --   --   --   --   --   --   --   --   --   --   --   --   --   --   --    GLUCOSE, ISTAT mg/dl  --   --   --   --  181* 185*  --  159*  --  314*  --   --   --   --   --   --   --   --   --   --     < > = values in this interval not displayed.     Portions of the record may have been created with voice recognition software. Occasional wrong word or \"sound a like\" substitutions may have occurred due to the inherent limitations of voice recognition software. Read the chart carefully and recognize, using context, where substitutions have occurred.If you have any questions, please contact the dictating provider.    "

## 2025-03-16 NOTE — PROGRESS NOTES
Post Op Note - Critical Care/ICU   Name: Devin Chaves 79 y.o. male I MRN: [id number]  Unit/Bed#: ICU 10 I Date of Admission: 2025   Date of Service: 3/16/2025 I Hospital Day: 16       Interval Events:       Patient returned from the OR s/p ex lap. Jejunal mesentery hematoma stable upon re-inspection. Ileocecal mass resected and anastomosed and R hemicolectomy with anastomosis. Abd was irrigated and then closed. Intra op, patient was continued on LR but also required levophed to be started for hypotension.    Pertinent New Data:   blood pressure, pulse, temperature, respirations, and pulse oximetry  Arterial Line  Coni /65  No data recorded   MAP 88 mmHg  No data recorded     Physical Exam  Vitals and nursing note reviewed.   Eyes:      Conjunctiva/sclera: Conjunctivae normal.      Comments: Pupils 3 mm sluggishly reactive   Skin:     General: Skin is warm and dry.   HENT:      Head: Normocephalic and atraumatic.      Right Ear: No drainage.      Left Ear: No drainage.      Nose: Nasogastric tube present.      Mouth/Throat:      Mouth: Mucous membranes are dry.   Neck:      Vascular: Central line present.   Cardiovascular:      Rate and Rhythm: Regular rhythm. Tachycardia present.   Musculoskeletal:      Right lower le+ Edema present.      Left lower leg: No edema.   Abdominal:      Palpations: Abdomen is soft.      Comments: Vertical abd dressing clean, dry, and intact   Constitutional:       Appearance: He is well-developed. He is ill-appearing (chronically).      Interventions: He is sedated, intubated and restrained.   Pulmonary:      Effort: Tachypnea present. No respiratory distress. He is intubated.      Breath sounds: No stridor.      Comments: Mechanically ventilated breath sounds  Neurological:      Comments: Intubated and sedated   Genitourinary/Anorectal:  Tang present.         Stat labs ordered, pending at this time. Will review upon result.      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  Was placed on levophed for hypotension, wean as tolerated  Map goal > 65  A-fib on eliquis  Hold Eliquis  Monitor on Tele  HTN, HLD  Hold Metoprolol and Crestor  Pulm  Post Op vent dependence  Continue vent support  Daily SBT  Pulse OX  SPO2 > 92%  GI   Acute mesenteric bleed leading to code crimson and subsequent cardiac arrest with CPR and ROSC. Found to have worsening abd distention, s/p ex lap, left open in discontinuity with Abthera 3/15/25  Returned to OR had small bowel resection, R colon resection, irrigation and abd closure.  Trend end points  NPO  Stress ulcer ppx    Continue Tang  Monitor I&Os closely  Trend renal function  FEN  NPO  Correct electrolytes as needed  Endo  BG goal 140 - 180  Heme  SCDs  Hold AC & AP  ID  Continue to monitor  MSK/Skin  Frequent turning  PT/OT when appropriate  Invasive Lines  R PICC, R femoral A line, L femoral cordis, R IJ CVC, ETT, NGT  Dispo   ICU        Sailaja Gonzales MD

## 2025-03-16 NOTE — ASSESSMENT & PLAN NOTE
Malnutrition Findings:   Adult Malnutrition type: Acute illness  Adult Degree of Malnutrition: Other severe protein calorie malnutrition  Malnutrition Characteristics: Inadequate energy, Fluid accumulation                  360 Statement: related to inadequate energy/protein intake as evidenced by consuming < 50% of energy intake compared to estimated needs for > 5 days and B/L LE +3 edema. Treated with ONS.    BMI Findings:           Body mass index is 27.84 kg/m².

## 2025-03-16 NOTE — ASSESSMENT & PLAN NOTE
Patient sustained cardiac arrest in the setting of an undetectable hemoglobin on 3/15 and was taken emergently to the operating room for exploration in the setting of abdominal free fluid.  Large volume hemoperitoneum was encountered with evidence of a actively bleeding/decompressing mesenteric hematoma; bleeding control was obtained and a segmental small bowel resection was performed and the patient was left in discontinuity given ongoing instability.    - Continue balanced resuscitation as needed; trend hemoglobins  - Plan for takeback today for anastomosis and possible right colon resection for known: Adenocarcinoma pending patient's stability intraoperatively  -Continue to hold anticoagulation  -Rest of care per primary

## 2025-03-16 NOTE — RESPIRATORY THERAPY NOTE
03/16/25 0749   Respiratory Assessment   Resp Comments placed pt on SBT PS 6 peep 6   Vent Information   Vent type     Vent Mode CPAP/PS Spont   $ Vital Capacity Mech/Peak Flow Yes   $ Pulse Oximetry Spot Check Charge Completed   CPAP/PS Spont Settings   FIO2 (%) 40 %   PEEP (cmH2O) 6 cmH2O   Pressure Support (cmH2O) 6 cmH20   Trigger Sensitivity Flow (lpm) 3 LPM   Rise Time (%) 50 %   Esens % 25 %   Humidification Heater   Heater Temp 98.6 °F (37 °C)   CPAP/PS Spont Actuals   Resp Rate (BPM) 25 BPM   VT (mL) 341 mL   MV (Obs) 8.5   MAP (cmH2O) 9 cmH2O   Peak Pressure (cmH2O) 13 cmH2O   I/E Ratio (Obs) 1:1.8   RSBI 67   Heater Temperature (Obs) 98.6 °F (37 °C)   CPAP/PS Spont Alarms   High Peak Pressure (cmH20) 40 cmH2O   High Resp Rate (BPM) 40 BPM   High MV (L/min) 20 L/min   Low MV (L/min) 4 L/min   High Kim VTE (mL) 800 mL   Low Kim VTE (mL) 300 mL   High SPONT VTE (mL) 800 mL   Low Spont VTE (mL) 200 mL   CPAP/PS Spont Apnea Settings   Resp Rate (BPM) 16 BPM   VT (mL) 470 mL   FIO2 (%) 100 %   Apnea Time (s) 30 S   Apnea Flow (LPM) 60 LPM   Maintenance   Alarm (pink) cable attached Yes   Resuscitation bag with peep valve at bedside Yes   Water bag changed No   Circuit changed No   Daily Screen   Patient safety screen outcome: Passed   IHI Ventilator Associated Pneumonia Bundle   Daily Assessment of Readiness to Extubate Yes   ETT  Cuffed 8 mm   Placement Date/Time: 03/15/25 (c) 9179   Type: Cuffed  Tube Size: 8 mm  Location: Oral  Insertion attempts: 1  Placement Verification: Chest x-ray;End tidal CO2  Secured at (cm): 25   Secured at (cm) 23   Measured from Teeth   Secured Location Right   Repositioned Center to Right   Secured by Commercial tube otto   Cuff Pressure (color) Green   HI-LO Suction  Capped

## 2025-03-16 NOTE — PROGRESS NOTES
Progress Note - Critical Care/ICU   Name: Devin Chaves 79 y.o. male I MRN: [id number]  Unit/Bed#: ICU 10 I Date of Admission: 2/28/2025   Date of Service: 3/16/2025 I Hospital Day: 16      Assessment & Plan  Acute blood loss anemia  Patient presents with 2 days of chest pain, shortness of breath, fatigue and melena  2/20/2025 EGD/colonoscopy showed 2.5 cm ileocecal mass, pathology confirmed adenocarcinoma.  S/p 3 units packed rbc in ED. S/p 1 unit PRBCs on 3/2  Hemoglobin has been stable.   Patient recent diagnosis of adenocarcinoma, increased risk of stroke.   Updated iron studies 3/2025 improved compared to 2/2025  Rapid response called 10:30A M [id number] for AMS and hypotension  Patient found to be minimally responsive and hypotensive  HGB 5.9 by iSTAT, formal labs show HGB 6.3, significant drop from 8 in AM  Bleeding from colonic mass suspected, per nursing no hematuria hemoptysis melena or other signs of overt bleeding prior to RR  Patient intubated for airway protection  Transported to ICU  MTP called  Code blue called  ROSC achieved shortly after blood products started  Rapidly distending abdomen, suspect intraabdominal bleeding     Hemoglobin   Date Value Ref Range Status   03/16/2025 13.3 12.0 - 17.0 g/dL Final   03/16/2025 13.3 12.0 - 17.0 g/dL Final        Plan:  Daily CBC  Transfuse for Hgb <7  Protonix 40 mg IV daily  Hold home eliquis, off for 48 hours at time of rapid response  Heparin gtt ordered but not started prior to rapid  Palliative care consulted prior to ICU admission  MTP [id number]  PRBC 12  FFP 3  Platelets 1  Cryo 0  Whole blood 3  Consider high volume bleeding scan  Surgery consult for suspected internal bleeding  Per surgery take to OR w/o scan  HTN (hypertension)  On Metoprolol tartrate 25 mg BID, Losartan 25 mg QD for HTN    Plan:  Hold home meds in while in ICU  Pressors as indicated  Maintain MAP > 65  CAD (coronary artery disease)  TTE 2/12/25: EF 55%, Normal systolic dysfunction,  "G1DD, No vegetation, No mural thrombus, moderate aortic sclerosis   COPD (chronic obstructive pulmonary disease) (HCC)  Patient with COPD on 2-4 L supplemental O2 at baseline     Plan:  Intubated  Maintain O2 saturation > 92%  Pulmonary hygiene  History of CVA (cerebrovascular accident)  Pt had multiple embolic strokes during admission from 2/11/25-2/22/25  Thought to be embolic strokes 2/2 MSSA bacteremia, no vegetations on TTE    Plan:  See plan for eliquis under \"afib\"  Continue Lipitor 40 mg daily  Chronic respiratory failure with hypoxia (HCC)  2-4L O2 at baseline  Currently intubated  Atrial fibrillation (HCC)  On metoprolol and Eliquis  Continue to hold meds while in the ICU  Monitor on telemetry  Urinary retention  Patient has history of urinary retention and was started on Flomax during last hospitalization  Pt denies difficulty urinating at this time    Plan:  Insert glass catheter for accurate I&O monitoring  Hold flomax  Hypotension  Found to be hypotensive at rapid response 10:30 AM [id number]  Levo started with minimal improvement  HGB 5.9 on iSTAT, suspect colonic bleeding vs intraabdominal bleeding  New and rapidly worsening abdominal distention after CPR    Plan:  Intubate and transfer to ICU  Central line  A-line  Surgery consult  Emergency surgery to control bleeding  On levophed, wean as tolerated  MSSA bacteremia  Patient was discharged on cefazolin 2 g IV Q8 hrs until 3/27/2025. PICC line in place. Patient rescheduled to follow-up outpatient with ID     Plan:  Continue with cefazolin 2 g IV every 8 hours.  infectious disease recommendations appreciated   Recommend repeat C-spine MRI with and without contrast, postpone until patient stable  6 weeks treatment through 3/27/2025  Neck pain  MRI of the neck done 2/14/2025 revealed cervical degenerative change with mild canal stenosis and mild to moderate foraminal narrowing.  No cord compression.  Mild nonspecific edema within the right posterior " breast dermal musculature of the upper cervicals spine.  Minimal peripheral enhancement is noted.      Plan:  Repeat MRI C spine prior to completion of ABX per ID recs  Continue ABX as recommended above  Colonic mass  2/20/2025 EGD/colonoscopy showed 2.5 cm ileocecal mass, pathology confirmed adenocarcinoma.    Patient was due to follow-up with outpatient colorectal surgery on 2/28/2025  Pt presented initially with melena, Hgb 4.6. Symptomatic anemia.  Outpatient oncology appointment scheduled for 4/3  Rapid Response [id number], see rapid response note  Suspect intraabdominal bleeding with mass as possible source  High volume bleeding CT deferred in favor of exlap to control bleeding given unstable condition  Per surgery, possible resection of colonic mass in exlap  Ambulatory dysfunction  Worsening ambulatory dysfunction since December  PT/OT evaluations recommend level 2   Discussed with podiatry, patient has chronic left ankle fracture over 2 years ago, recommend WBAT to left foot as curbside, no further podiatry needs      Plan:  PT/OT consult when stable and extubated  Melena  Patient endorsed melena prior to admission.  One episode dark, tarry stool overnight on 3/4/25. Not documented.  Rapid response called [id number] with suspected intraabdominal bleeding    Plan:  See above and chart for rapid response and MTP  ORIANA (acute kidney injury) (HCC)  Creatinine   Date Value Ref Range Status   03/16/2025 1.85 (H) 0.60 - 1.30 mg/dL Final     Comment:     Standardized to IDMS reference method   09/25/2022 1 0.6 - 1.2 mg/dL Final      Present on admission  Resolving prior to ICU admission    Plan:  Trend BMPs  Fluids as indicated  Pleural effusion, bilateral  CT A/P shows bilateral pleural effusions L>R, pulmonary vascular congestion     Plan:  Repeat imaging if pt's respiratory status worsens  Patient intubated  Dysphagia  VBS done on 2/15/25 showing food retention due to pharyngeal weakness  EGD was done last month which  showed normal esophagus.   ENT recommending outpatient follow-up with Dr. Flynn Reyez    Plan:  Will need speech and swallow eval after extubation  Severe protein-calorie malnutrition (HCC)  Malnutrition Findings:   Adult Malnutrition type: Acute illness  Adult Degree of Malnutrition: Other severe protein calorie malnutrition  Malnutrition Characteristics: Inadequate energy, Fluid accumulation  360 Statement: related to inadequate energy/protein intake as evidenced by consuming < 50% of energy intake compared to estimated needs for > 5 days and B/L LE +3 edema. Treated with ONS.  BMI Findings:  Body mass index is 25.09 kg/m².      Plan:  Start parenteral nutrition when more stable and pending surgery recs     360 Statement: related to inadequate energy/protein intake as evidenced by consuming < 50% of energy intake compared to estimated needs for > 5 days and B/L LE +3 edema. Treated with ONS.    BMI Findings:    Body mass index is 27.84 kg/m².   Hallucinations, visual  Per both patient and patient's wife, patient has been experiencing visual hallucinations that usually occur prior to falling asleep or waking up.  Patient reports that he will occasionally grab for things that are not there, such as a pillow.    Also states that he will occasionally have conversations with his wife when she is not physically in the room.    Patient states he is able to discern when he is having a hallucination and they are not distressing      PLAN:  delirium precautions  Disorders of fluid, electrolyte, and acid-base balance  Trend electrolytes daily  Replete as needed  Aspiration into respiratory tract  Evening 3/14 patient had episode of choking while attempting to swallow food.  Chest x-ray shows new RLL consolidation    Plan:  Patient intubated  Will need speech and swallow evaluation after extubation  Cardiac arrest due to other underlying condition (HCC)  Patient lost pulses shortly after transport to ICU  Likely due to acute  blood loss  CPR immediately begun  Central line and A-line placed  MTP called prior to cardiac arrest  Patient already intubated and on ventilatory  Patient shocked 1 time  ROSC achieved shortly after blood product administration begun    Plan:  On cardiac monitor  Intubated and sedated on ventilator  Surgery to treat underlying cause of bleeding  Hypovolemic shock (HCC)  Rapid response called for hypotension and AMS  Acute blood loss, suspect intraabdominal bleeding vs colonic mass bleeding  No melena, hematuria, hemoptysis, or other signs of overt bleeding  MTP called    Plan:  Source control with surgery  Levophed  Blood and fluid resuscitation as indicated  Hemoperitoneum  Emergent OR yesterday for distended abd post CPR  Found to have hemoperitoneum with hematoma at base of jejunum with active bleed, which was controlled    Plan:  Tentative plan to return to OR today for evaluation, removal of lap pads  Continue to hold AC  Disposition: Critical care    ICU Core Measures     Vented Patient  VAP Bundle  VAP bundle ordered     A: Assess, Prevent, and Manage Pain Has pain been assessed? Yes  Need for changes to pain regimen? No   B: Both Spontaneous Awakening Trials (SATs) and Spontaneous Breathing Trials (SBTs) Plan to perform spontaneous awakening trial today? Yes   Plan to perform spontaneous breathing trial today? Yes   Obvious barriers to extubation? Yes   C: Choice of Sedation RASS Goal: -1 Drowsy or 0 Alert and Calm  Need for changes to sedation or analgesia regimen? No   D: Delirium CAM-ICU: Negative   E: Early Mobility  Plan for early mobility? Yes   F: Family Engagement Plan for family engagement today? Yes       Antibiotic Review: Post op requirements     Review of Invasive Devices:    Kitty Plan: Continue for accurate I/O monitoring for 48 hours  Central access plan: Medications requiring central line  Coni Plan: Keep arterial line for hemodynamic monitoring    Prophylaxis:  VTE VTE covered  by:    None       Stress Ulcer  covered byfamotidine (PEPCID) 20 mg tablet [[id number]] (Long-Term Med), pantoprazole (PROTONIX) injection 40 mg [[id number]]         24 Hour Events : unchanged, worsening labs  Subjective   Review of Systems: See HPI for Review of Systems    Objective :                   Vitals I/O      Most Recent Min/Max in 24hrs   Temp 98.8 °F (37.1 °C) Temp  Min: 96.8 °F (36 °C)  Max: 98.8 °F (37.1 °C)   Pulse (!) 113 Pulse  Min: 27  Max: 160   Resp (!) 25 Resp  Min: 9  Max: 123   /65 BP  Min: 35/14  Max: 166/79   O2 Sat 99 % SpO2  Min: 30 %  Max: 100 %      Intake/Output Summary (Last 24 hours) at 3/16/2025 0938  Last data filed at 3/16/2025 0901  Gross per 24 hour   Intake 77026.87 ml   Output 3095 ml   Net 81476.87 ml       Diet NPO    Invasive Monitoring   Arterial Line  Coni /65  Arterial Line BP  Min: 26/17  Max: 165/73   MAP 88 mmHg  Arterial Line MAP (mmHg)  Min: 21 mmHg  Max: 119 mmHg           Physical Exam   Physical Exam  Vitals and nursing note reviewed.   Eyes:      Conjunctiva/sclera: Conjunctivae normal.      Comments: Pupils 3 mm, sluggishly reactive   Skin:     General: Skin is warm and dry.   HENT:      Head: Normocephalic and atraumatic.      Right Ear: No drainage.      Left Ear: No drainage.      Nose: Nasogastric tube present. No congestion.      Mouth/Throat:      Mouth: Mucous membranes are dry.   Neck:      Vascular: Central line present.   Cardiovascular:      Rate and Rhythm: Normal rate and regular rhythm.   Musculoskeletal:      Right lower le+ Edema present.      Left lower leg: No edema.   Abdominal:      Palpations: Abdomen is soft.      Tenderness: There is guarding.   Constitutional:       General: He is not in acute distress.     Appearance: He is well-developed. He is ill-appearing.      Interventions: He is intubated. He is not sedated.  Pulmonary:      Effort: Tachypnea present. No respiratory distress. He is intubated.      Breath sounds: No  stridor.      Comments: Mechanically ventilated breath sounds  Neurological:      Comments: Intubated, withdrew from pain   Genitourinary/Anorectal:  Kitty present.        Diagnostic Studies        Lab Results: I have reviewed the following results:     Medications:  Scheduled PRN   [Held by provider] ascorbic acid, 250 mg, Daily  [Held by provider] atorvastatin, 40 mg, Daily With Dinner  ceFAZolin, 2,000 mg, Q8H  chlorhexidine, 15 mL, Q12H GALE  [Held by provider] Cholecalciferol, 2,000 Units, Daily  [Held by provider] cyanocobalamin, 100 mcg, Daily  [Held by provider] ferrous sulfate, 325 mg, Daily With Breakfast  [Held by provider] fluticasone, 1 puff, Daily  [Held by provider] folic acid, 1 mg, Daily  [Held by provider] hydroxychloroquine, 400 mg, Daily With Breakfast  lidocaine, 3 patch, Daily  [Held by provider] metoprolol tartrate, 25 mg, Q12H GALE  HITESH ANTIFUNGAL, , BID  pantoprazole, 40 mg, Q12H GALE  [Held by provider] potassium chloride, 20 mEq, Daily  [Held by provider] senna-docusate sodium, 1 tablet, HS  [Held by provider] tamsulosin, 0.4 mg, Daily With Dinner  [Held by provider] torsemide, 20 mg, Daily  [Held by provider] umeclidinium-vilanterol, 1 puff, Daily      HYDROmorphone, 0.2 mg, Q4H PRN  levalbuterol, 1.25 mg, Q4H PRN  [Held by provider] oxyCODONE, 5 mg, Q6H PRN  [Held by provider] oxyCODONE, 2.5 mg, Q6H PRN       Continuous    fentaNYL, 50 mcg/hr, Last Rate: Stopped (03/16/25 0600)  lactated ringers, 100 mL/hr, Last Rate: 100 mL/hr (03/16/25 0600)         Labs:   CBC    Recent Labs     03/15/25  1654 03/15/25  1935 03/15/25  2342 03/16/25  0440   WBC 11.65*  --   --  14.14*   HGB 12.5   < > 13.2 13.3  13.3   HCT 36.7   < > 39.0 38.7  38.4   PLT 70*  --   --  103*   BANDSPCT  --   --   --  1    < > = values in this interval not displayed.     BMP    Recent Labs     03/15/25  1654 03/16/25  0440   SODIUM 151* 148*   K 3.2* 4.2   * 113*   CO2 25 27   AGAP 13 8   BUN 45* 48*   CREATININE  1.67* 1.85*   CALCIUM 7.7* 8.1*       Coags    Recent Labs     03/15/25  1045 03/15/25  1235   INR 1.43* 1.43*   PTT 41* 45*        Additional Electrolytes  Recent Labs     03/15/25  1442 03/15/25  1654 03/16/25  0440   MG  --  1.6* 2.0   PHOS  --  3.6 3.2   CAIONIZED 1.01* 1.08*  --           Blood Gas    Recent Labs     03/15/25  1653   PHART 7.435   DLX4BVS 32.2*   PO2ART 86.2   TVM3DMS 21.1*   BEART -2.3   SOURCE Line, Arterial     Recent Labs     03/15/25  1653   SOURCE Line, Arterial    LFTs  Recent Labs     03/15/25  1235 03/16/25  0440   ALT 6* 5*   AST 21 69*   ALKPHOS 34 53   ALB 1.6* 2.7*   TBILI 0.30 0.79       Infectious  No recent results  Glucose  Recent Labs     03/15/25  1045 03/15/25  1235 03/15/25  1654 03/16/25  0440   GLUC 439* 203* 174* 147*

## 2025-03-16 NOTE — ASSESSMENT & PLAN NOTE
Patient endorsed melena prior to admission.  One episode dark, tarry stool overnight on 3/4/25. Not documented.  Rapid response called [id number] with suspected intraabdominal bleeding    Plan:  See above and chart for rapid response and MTP

## 2025-03-16 NOTE — ASSESSMENT & PLAN NOTE
Emergent OR yesterday for distended abd post CPR  Found to have hemoperitoneum with hematoma at base of jejunum with active bleed, which was controlled    Plan:  Tentative plan to return to OR today for evaluation, removal of lap pads  Continue to hold AC

## 2025-03-16 NOTE — ASSESSMENT & PLAN NOTE
Acute blood loss on 3/15/25 and received > 10 units PRBC.   Hgb 13.3 today.   Hgb monitoring per primary service.

## 2025-03-16 NOTE — ASSESSMENT & PLAN NOTE
Creatinine   Date Value Ref Range Status   03/16/2025 1.85 (H) 0.60 - 1.30 mg/dL Final     Comment:     Standardized to IDMS reference method   09/25/2022 1 0.6 - 1.2 mg/dL Final      Present on admission  Resolving prior to ICU admission    Plan:  Trend BMPs  Fluids as indicated

## 2025-03-16 NOTE — ASSESSMENT & PLAN NOTE
CXR with vascular congestion and pleural effusions.   Was being diuresed before cardiac arrest on 3/15/25.   Diuresis on hold.

## 2025-03-16 NOTE — ASSESSMENT & PLAN NOTE
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    Patient was due to follow-up with outpatient colorectal surgery on 2/28/2025  Pt presented initially with melena, Hgb 4.6. Symptomatic anemia.  Outpatient oncology appointment scheduled for 4/3  Rapid Response [id number], see rapid response note  Suspect intraabdominal bleeding with mass as possible source  High volume bleeding CT deferred in favor of exlap to control bleeding given unstable condition  Per surgery, possible resection of colonic mass in exlap

## 2025-03-16 NOTE — PLAN OF CARE
Problem: Prexisting or High Potential for Compromised Skin Integrity  Goal: Skin integrity is maintained or improved  Description: INTERVENTIONS:  - Identify patients at risk for skin breakdown  - Assess and monitor skin integrity  - Assess and monitor nutrition and hydration status  - Monitor labs   - Assess for incontinence   - Turn and reposition patient  - Assist with mobility/ambulation  - Relieve pressure over bony prominences  - Avoid friction and shearing  - Provide appropriate hygiene as needed including keeping skin clean and dry  - Evaluate need for skin moisturizer/barrier cream  - Collaborate with interdisciplinary team   - Patient/family teaching  - Consider wound care consult   Outcome: Progressing     Problem: Potential for Falls  Goal: Patient will remain free of falls  Description: INTERVENTIONS:  - Educate patient/family on patient safety including physical limitations  - Instruct patient to call for assistance with activity   - Consult OT/PT to assist with strengthening/mobility   - Keep Call bell within reach  - Keep bed low and locked with side rails adjusted as appropriate  - Keep care items and personal belongings within reach  - Initiate and maintain comfort rounds  - Make Fall Risk Sign visible to staff  - Offer Toileting every 2 Hours, in advance of need  - Initiate/Maintain bed alarm  - Obtain necessary fall risk management equipment: assist to roll  - Apply yellow socks and bracelet for high fall risk patients  - Consider moving patient to room near nurses station  Outcome: Progressing     Problem: Nutrition/Hydration-ADULT  Goal: Nutrient/Hydration intake appropriate for improving, restoring or maintaining nutritional needs  Description: Monitor and assess patient's nutrition/hydration status for malnutrition. Collaborate with interdisciplinary team and initiate plan and interventions as ordered.  Monitor patient's weight and dietary intake as ordered or per policy. Utilize nutrition  screening tool and intervene as necessary. Determine patient's food preferences and provide high-protein, high-caloric foods as appropriate.     INTERVENTIONS:  - Monitor oral intake, urinary output, labs, and treatment plans  - Assess nutrition and hydration status and recommend course of action  - Evaluate amount of meals eaten  - Assist patient with eating if necessary   - Allow adequate time for meals  - Recommend/ encourage appropriate diets, oral nutritional supplements, and vitamin/mineral supplements  - Order, calculate, and assess calorie counts as needed  - Recommend, monitor, and adjust tube feedings and TPN/PPN based on assessed needs  - Assess need for intravenous fluids  - Provide specific nutrition/hydration education as appropriate  - Include patient/family/caregiver in decisions related to nutrition  Outcome: Progressing     Problem: GASTROINTESTINAL - ADULT  Goal: Minimal or absence of nausea and/or vomiting  Description: INTERVENTIONS:  - Administer IV fluids if ordered to ensure adequate hydration  - Maintain NPO status until nausea and vomiting are resolved  - Nasogastric tube if ordered  - Administer ordered antiemetic medications as needed  - Provide nonpharmacologic comfort measures as appropriate  - Advance diet as tolerated, if ordered  - Consider nutrition services referral to assist patient with adequate nutrition and appropriate food choices  Outcome: Progressing  Goal: Maintains or returns to baseline bowel function  Description: INTERVENTIONS:  - Assess bowel function  - Encourage oral fluids to ensure adequate hydration  - Administer IV fluids if ordered to ensure adequate hydration  - Administer ordered medications as needed  - Encourage mobilization and activity  - Consider nutritional services referral to assist patient with adequate nutrition and appropriate food choices  Outcome: Progressing  Goal: Maintains adequate nutritional intake  Description: INTERVENTIONS:  - Monitor  percentage of each meal consumed  - Identify factors contributing to decreased intake, treat as appropriate  - Assist with meals as needed  - Monitor I&O, weight, and lab values if indicated  - Obtain nutrition services referral as needed  Outcome: Progressing  Goal: Establish and maintain optimal ostomy function  Description: INTERVENTIONS:  - Assess bowel function  - Encourage oral fluids to ensure adequate hydration  - Administer IV fluids if ordered to ensure adequate hydration   - Administer ordered medications as needed  - Encourage mobilization and activity  - Nutrition services referral to assist patient with appropriate food choices  - Assess stoma site  - Consider wound care consult   Outcome: Progressing  Goal: Oral mucous membranes remain intact  Description: INTERVENTIONS  - Assess oral mucosa and hygiene practices  - Implement preventative oral hygiene regimen  - Implement oral medicated treatments as ordered  - Initiate Nutrition services referral as needed  Outcome: Progressing     Problem: HEMATOLOGIC - ADULT  Goal: Maintains hematologic stability  Description: INTERVENTIONS  - Assess for signs and symptoms of bleeding or hemorrhage  - Monitor labs  - Administer supportive blood products/factors as ordered and appropriate  Outcome: Progressing

## 2025-03-16 NOTE — ASSESSMENT & PLAN NOTE
Baseline creatinine is normal at 0.8 to 0.9.   Admission SCr 2.43 on 2/28/25.   Etiology is not entirely clear - not prerenal or postrenal. Working dx is ATN but could have another underlying pathology.   CT 2/28/25 - no hydronephrosis. UA on 3/8/25 with RBC 2-4, WBC 2-4, small blood and trace protein.   Of note, UPC ratio was 3.4 gm on 3/8/25 and urine ACR was only 821 mg on 3/5/25.   Serum and urine LIDYA no M spike, C3 83 (low), C4 26. At risk for AIN due to antibiotic exposure but no clear indication of this as well.   SCr has been around 1.8 to 2.0 since admission.   Had cardiac arrest on 3/15/25 and is at risk for ATN due to that event.   Fortunately, renal function is stable today. SCr 1.85.   Low UO is likely due to intravascular volume depletion.

## 2025-03-16 NOTE — ASSESSMENT & PLAN NOTE
Malnutrition Findings:   Adult Malnutrition type: Acute illness  Adult Degree of Malnutrition: Other severe protein calorie malnutrition  Malnutrition Characteristics: Inadequate energy, Fluid accumulation  360 Statement: related to inadequate energy/protein intake as evidenced by consuming < 50% of energy intake compared to estimated needs for > 5 days and B/L LE +3 edema. Treated with ONS.  BMI Findings:  Body mass index is 25.09 kg/m².      Plan:  Start parenteral nutrition when more stable and pending surgery recs     360 Statement: related to inadequate energy/protein intake as evidenced by consuming < 50% of energy intake compared to estimated needs for > 5 days and B/L LE +3 edema. Treated with ONS.    BMI Findings:    Body mass index is 27.84 kg/m².

## 2025-03-16 NOTE — OP NOTE
OPERATIVE REPORT  PATIENT NAME: Devin Chaves    :  1945  MRN: [id number]  Pt Location: AN OR ROOM 04    SURGERY DATE: 3/15/2025    Surgeons and Role:     * Garcia Epperson DO - Primary     * Yobany Mott MD - Assisting     * Eric Curiel MD - Assisting    Preop Diagnosis:  Anemia [D64.9]  Hemoperitoneum [K66.1]    Post-Op Diagnosis Codes:     * Anemia [D64.9]     * Hemoperitoneum [K66.1]    Procedure(s):  LAPAROTOMY EXPLORATORY. small bowel resection. therapeutic packing. control of mesenteric hemorrhage    Specimen(s):  ID Type Source Tests Collected by Time Destination   1 : Jejunum Tissue Jejunum TISSUE EXAM Garcia Epperson DO 3/15/2025 1440        Estimated Blood Loss:   500 mL    Drains:  Urethral Catheter Latex 16 Fr. (Active)   Output (mL) 350 mL 03/15/25 2000   Number of days: 0       Anesthesia Type:   General    Operative Indications:  Anemia [D64.9]  Hemoperitoneum [K66.1]      Operative Findings:  Massive hemoperitoneum upon entry to the abdomen  Large hematoma at the base of the jejunal mesentery with active bleeding.  Hemostasis achieved through mesentery transection using bipolar super jaw device  Jejunal small bowel segment associated with the above mesentery resected, small bowel left in discontinuity  After mesenteric transection there was a small residual mesenteric hematoma. This was  oversewed and appeared hemostatic at conclusion of case.  4 lap pads placed adjacent to residual hematoma to further compress  Colorectal surgery assistance appreciated, sigmoidoscopy with brown stool, no bleeding noted in the colon      Complications:   None    Procedure and Technique:  The patient was brought to the operating arena and placed in supine position. All regular monitoring devices were connected.  The patient had previously been intubated and sedated in the critical care unit following his code.. The patient received perioperative antibiotics. The patient received subcutaneous  heparin in addition to bilateral lower extremity sequential compression devices for DVT prophylaxis. A timeout was performed prior to incision to ensure correct patient position, procedure, and site.    We started with a midline incision and carried our dissection down to the fascia.  Fascia was sharply entered.  Massive hemoperitoneum noted upon entry to the abdomen.  At least 2 L of hemoperitoneum.  Abdomen packed in all 4 quadrants with lap pads.  We communicated with anesthesia who said they were in a good place with resuscitation.    We slowly began to remove the packs.  We started in the right lower quadrant which appeared hemostatic.  We were able to palpate the small cecal tumor.  The cecum was otherwise unremarkable with no perforation.    We then moved to mid abdomen/left upper quadrant.  We immediately recognized a large mesenteric hematoma at the base of the jejunal mesentery as stated above.  There was active bleeding from this hematoma.  We placed figure-of-eight Vicryl stitch which slowed the bleeding.  We then proceeded to examine the rest of the abdomen after repacking hematoma.    The left upper quadrant was largely unremarkable with the liver without lacerations or cracks.  The spleen and the right upper quadrant was also smooth without defects.  The left lower quadrant was hemostatic.    We then returned our attention to the mesenteric hematoma.  We created a window next to the small bowel adjacent to the hematoma.  We then used a bipolar super jaw device to transect the mesentery associated with the hematoma.  The small bowel associated with this mesentery was then stapled with a LEIDA stapler passed off the field.  We removed most of the mesenteric hematoma with this technique however there was a residual collection at the base of the mesentery.  This was oversewn with Vicryl suture and appeared hemostatic.    The abdomen was thoroughly irrigated with saline until effluent ran clear.    At this  point colorectal surgery assisted with a colonoscopy.  The rectum/sigmoid was filled with brown stool so the colonoscopy was terminated.  No large blood noted in the colon.    We then placed 4 lap pads adjacent to the mesenteric hematoma to further compress the area.    At this point anesthesia noticed us that the patient was downtrending on pressors but persistently hypothermic.  Decision was made to perform a temporary abdominal closure to allow for continued rewarming and ICU resuscitation.    An ABThera device was placed on the abdomen in the standard fashion. Seal check revealed good seal.    The patient tolerated procedure well was taken to the ICU in stable condition.     Dr. Epperson was present for the entire procedure.    Patient Disposition:  Critical Care Unit             SIGNATURE: Yobany Mott MD  DATE: March 15, 2025  TIME: 8:12 PM

## 2025-03-16 NOTE — OP NOTE
OPERATIVE REPORT  PATIENT NAME: Devin Chaves    :  1945  MRN: [id number]  Pt Location: AN OR ROOM 04    SURGERY DATE: 3/16/2025    Surgeons and Role:     * Brandon Tello MD - Primary     * Yobany Mott MD - Assisting     * Gabriel Alcocer MD - Observing    Preop Diagnosis:  Acute blood loss anemia [D62]    Post-Op Diagnosis Codes:     * Acute blood loss anemia [D62]    Procedure(s):  LAPAROTOMY EXPLORATORY. partial small bowel resection. right colon resection. tap block.    Specimen(s):  ID Type Source Tests Collected by Time Destination   1 :  Tissue Large Intestine, Right/Ascending Colon TISSUE EXAM Brandon Tello MD 3/16/2025 12:44 PM    2 :  Tissue Small Bowel, NOS TISSUE EXAM Brandon Tello MD 3/16/2025  1:44 PM        Estimated Blood Loss:   Minimal    Drains:  Urethral Catheter Latex 16 Fr. (Active)   Tang Care Done 03/15/25 2134   Output (mL) 60 mL 25 0901   Number of days: 1       NG/OG Tube Nasogastric Left nare (Active)   Output (mL) 200 mL 25 0600   Number of days: 1       Anesthesia Type:   Choice    Operative Indications:  Acute blood loss anemia [D62]      Operative Findings:        Complications:   None    Procedure and Technique:  Patient was notified visually by armband.  Placed in supine position.  After anesthesia the abdomen was prepped and draped in a sterile fashion.  Aida hugger's were placed in the upper as well as lower extremities.  Patient was draped.  Antibiotics up-to-date.  His family pumps in place.  Timeout completed.    The previous midline incision was exposed.  The previous sponge laps were removed.  These were counted and deemed to be correct.    Exploration revealed that the hematoma in the mesentery had not expanded.  This was stable.  Cut edges of the previous small bowel resection seem viable.  The ends however were thickened.  The right colon was examined.  There was stool within the colon but the colon was not  distended.    It was decided to embark on a right colon resection initially.  The ileum was divided several inches from the ileocecal valve.  The mesentery was then scored for proposed transection.  The lateral ligaments of the right colon and hepatic flexure were taken down using LigaSure device.  The omentum was then mobilized off the transverse colon in order to provide exposure.  Bookwalter retractor was placed.    Once the right colon was mobilized.  The ileocolic resection was then completed using harmonic scalpel.  Vessels were clamped and suture-ligated.  Duodenum was examined and preserved.  Gallbladder appeared to be normal.  Liver contour was normal.    A side-to-side anastomosis was completed using a 100 LEIDA stapling device.  [place] suture was placed.  The stapled edges were inverted.  Mesentery was reapproximated with 3-0 PDS suture.    Attention was then directed to the small bowel.  A small section of small bowel was deemed appropriate for resection as the seem to be edematous from the previous staple line.  A side-to-side small bowel anastomosis was then completed using 100 LEIDA stapling device.  [place] sutures were placed.  Enterotomies were closed with LEIDA 100 staple device.  This the suture line was inverted.  Mesentery was reapproximated.    The abdomen was copiously irrigated aspirated dry.  Hemostasis was assured.  A transverse abdominal plane block was then created using Exparel mixed with quarter percent Marcaine.  Lap sponges were deemed to be correct.  The fascia was then closed with several interrupted #1 Vicryl sutures followed by running #1 PDS sutures tied at the midline.  Clips were applied to the skin.  Sterile dressings were applied.  The patient tolerated this well.  He was transferred from the operating room in stable condition.  Sponge and instrument count correct x 2.       I was present for the entire procedure.    Patient Disposition:  PACU     Colon Resection  Operation performed  with curative intent Yes   Tumor Location (select all that apply) Ascending colon   Extent of colon and vascular resection (select all that apply) Right hemicolectomy - ileocolic, right colic (if present)               SIGNATURE: Brandon Tello MD  DATE: March 16, 2025  TIME: 2:32 PM

## 2025-03-16 NOTE — ASSESSMENT & PLAN NOTE
Patient presents with 2 days of chest pain, shortness of breath, fatigue and melena  2/20/2025 EGD/colonoscopy showed 2.5 cm ileocecal mass, pathology confirmed adenocarcinoma.  S/p 3 units packed rbc in ED. S/p 1 unit PRBCs on 3/2  Hemoglobin has been stable.   Patient recent diagnosis of adenocarcinoma, increased risk of stroke.   Updated iron studies 3/2025 improved compared to 2/2025  Rapid response called 10:30A M [id number] for AMS and hypotension  Patient found to be minimally responsive and hypotensive  HGB 5.9 by iSTAT, formal labs show HGB 6.3, significant drop from 8 in AM  Bleeding from colonic mass suspected, per nursing no hematuria hemoptysis melena or other signs of overt bleeding prior to RR  Patient intubated for airway protection  Transported to ICU  MTP called  Code blue called  ROSC achieved shortly after blood products started  Rapidly distending abdomen, suspect intraabdominal bleeding     Hemoglobin   Date Value Ref Range Status   03/16/2025 13.3 12.0 - 17.0 g/dL Final   03/16/2025 13.3 12.0 - 17.0 g/dL Final        Plan:  Daily CBC  Transfuse for Hgb <7  Protonix 40 mg IV daily  Hold home eliquis, off for 48 hours at time of rapid response  Heparin gtt ordered but not started prior to rapid  Palliative care consulted prior to ICU admission  MTP [id number]  PRBC 12  FFP 3  Platelets 1  Cryo 0  Whole blood 3  Consider high volume bleeding scan  Surgery consult for suspected internal bleeding  Per surgery take to OR w/o scan

## 2025-03-17 PROBLEM — R57.8 HEMORRHAGIC SHOCK (HCC): Status: ACTIVE | Noted: 2025-02-11

## 2025-03-17 PROBLEM — G93.40 ENCEPHALOPATHY: Status: ACTIVE | Noted: 2025-01-01

## 2025-03-17 PROBLEM — R56.9 SEIZURE (HCC): Status: ACTIVE | Noted: 2025-01-01

## 2025-03-17 PROBLEM — I46.9 CARDIAC ARREST (HCC): Status: ACTIVE | Noted: 2025-03-15

## 2025-03-17 PROBLEM — J96.21 ACUTE ON CHRONIC RESPIRATORY FAILURE WITH HYPOXIA (HCC): Status: ACTIVE | Noted: 2023-12-12

## 2025-03-17 NOTE — ASSESSMENT & PLAN NOTE
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 During hospital course had RR d/t AMS in which he became hypotensive and had subsequent cardiac arrest with ROSC achieved after around 20 minutes. Underwent ex lap with abd closure and bowel resection on 3/17. Neurology has been consulted after he was noticed not to be waking up after sedation wean.    He underwent NC CTH which did not show evidence of anoxic brain injury, stable chronic ischemic changes, and a region of hgih attenuation in the inferior L frontal lobe which represents mineralization vs trace residual hemorrhage.     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    Plan:  Frequent neuro checks  Continue with LTM   Obtain MRI brain with and with contrast tomorrow afternoon  Neurology will continue to follow

## 2025-03-17 NOTE — RESPIRATORY THERAPY NOTE
RT Ventilator Management Note  Devin Chaves 79 y.o. male MRN: [id number]  Unit/Bed#: ICU 10 Encounter: [id number]      Daily Screen         3/16/2025  0749 3/17/2025  0911          Patient safety screen outcome:: Passed Failed (P)       Not Ready for Weaning due to:: -- Underline problem not resolved (P)                 Physical Exam:   Assessment Type: Assess only  General Appearance: Sedated  Respiratory Pattern: Assisted  Chest Assessment: Chest expansion symmetrical  Bilateral Breath Sounds: Clear, Diminished  Suction: ET Tube  O2 Device: vent      Resp Comments: Pt recieved on AC/VC settings. No changes at this time.     03/17/25 0911   Respiratory Assessment   Assessment Type Assess only   General Appearance Sedated   Respiratory Pattern Assisted   Chest Assessment Chest expansion symmetrical   Bilateral Breath Sounds Clear;Diminished   Suction ET Tube   Resp Comments Pt recieved on AC/VC settings. No changes at this time.   O2 Device vent   Vent Information   Vent    Vent type     Vent Mode AC/VC   $ Vital Capacity Mech/Peak Flow Yes   $ Pulse Oximetry Spot Check Charge Completed   SpO2 100 %   AC/VC Settings   Resp Rate (BPM) 18 BPM   Vt (mL) 470 mL   FIO2 (%) 40 %   PEEP (cmH2O) 6 cmH2O   Flow Pattern (LPM) 60 L/min   Trigger Sensitivity Flow (lpm) 3 %   Humidification Heater   Heater Temperature (Set) 98.6 °F (37 °C)   AC/VC Actuals   Resp Rate (BPM) 25 BPM   VT (mL) 477   MV 12.6   MAP (cmH2O) 10 cmH2O   Peak Pressure (cmH2O) 16 cmH2O   I/E Ratio (Obs) 1:1.8   Heater Temperature (Obs) 98.6 °F (37 °C)   Static Compliance (mL/cmH20) 46 mL/cmH2O   Plateau Pressure (cm H2O) 15 cm H2O   AC/VC Alarms   High Peak Pressure (cmH2O) 40   High Resp Rate (BPM) 40 BPM   High MV (L/min) 20 L/min   Low MV (L/min) 4 L/min   Vt High (mL) 950 mL   Vt Low (mL) 300 mL   AC/VC Apnea Settings   Resp Rate (BPM) 16 BPM   VT (mL) 470 mL   FIO2 (%) 100 %   Apnea Time (s) 20 S   Apnea Flow (L/min) 60 L/min    Maintenance   Alarm (pink) cable attached Yes   Resuscitation bag with peep valve at bedside Yes   Water bag changed No   Circuit changed No   Daily Screen   Patient safety screen outcome: Failed   Not Ready for Weaning due to: Underline problem not resolved   Adult IBW/VT Calculations   Height 6' (1.829 m)   IBW (Ideal Body Weight) 77.6 kg   Range VT 6 ML/Kg 465.6 mL/kg   Range VT 8 ML/Kg 620.8 mL/kg   ETT  Cuffed 8 mm   Placement Date/Time: 03/15/25 (c) 1200   Type: Cuffed  Tube Size: 8 mm  Location: Oral  Insertion attempts: 1  Placement Verification: Chest x-ray;End tidal CO2  Secured at (cm): 25   Secured at (cm) 23   Measured from Teeth   Secured Location Right   Repositioned Right to Left   Secured by Commercial tube otto   Site Condition Cool;Dry   Cuff Pressure (color) Green   HI-LO Suction  Intermittent suction   HI-LO Secretions Scant   HI-LO Intervention Patent

## 2025-03-17 NOTE — CONSULTS
Visited w/Pt and family (son, 2 granddaughters) for about 15 minutes on 3/17/25.    Pt was intubated and not awake/alert.  Son provided Pt's recent medical history from the time he went into [facility] for rehab through his cardiac arrest on Saturday, through today.    Son appears quite anxious and very attentive.  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      Family seems close and loving.  Son stated that Pt and family have no immediate spiritual needs, that Pt was Catholic but is not practicing.

## 2025-03-17 NOTE — PROGRESS NOTES
Progress Note - Critical Care/ICU   Name: Devin Chaves 79 y.o. male I MRN: [id number]  Unit/Bed#: ICU 10 I Date of Admission: 2/28/2025   Date of Service: 3/17/2025 I Hospital Day: 17      Assessment & Plan  Acute blood loss anemia  Patient presents with 2 days of chest pain, shortness of breath, fatigue and melena  2/20/2025 EGD/colonoscopy showed 2.5 cm ileocecal mass, pathology confirmed adenocarcinoma.  S/p 3 units packed rbc in ED. S/p 1 unit PRBCs on 3/2  Hemoglobin has been stable.   Patient recent diagnosis of adenocarcinoma, increased risk of stroke.   Updated iron studies 3/2025 improved compared to 2/2025  Rapid response called 10:30A M [id number] for AMS and hypotension  Patient found to be minimally responsive and hypotensive  HGB 5.9 by iSTAT, formal labs show HGB 6.3, significant drop from 8 in AM  Bleeding from colonic mass suspected, per nursing no hematuria hemoptysis melena or other signs of overt bleeding prior to RR  Patient intubated for airway protection  Transported to ICU  MTP called  Code blue called  ROSC achieved shortly after blood products started  Rapidly distending abdomen, suspect intraabdominal bleeding     Hemoglobin   Date Value Ref Range Status   03/17/2025 11.0 (L) 12.0 - 17.0 g/dL Final        Plan:  Daily CBC  Transfuse for Hgb <7  Protonix 40 mg IV daily  Hold home eliquis, off for 48 hours at time of rapid response  Heparin gtt ordered but not started prior to rapid  Palliative care consulted prior to ICU admission  MTP [id number]  PRBC 12  FFP 3  Platelets 1  Cryo 0  Whole blood 3  Consider high volume bleeding scan  Surgery consult for suspected internal bleeding  Per surgery take to OR w/o scan  HTN (hypertension)  On Metoprolol tartrate 25 mg BID, Losartan 25 mg QD for HTN    Plan:  Hold home meds in while in ICU  Pressors as indicated  Maintain MAP > 65  CAD (coronary artery disease)  TTE 2/12/25: EF 55%, Normal systolic dysfunction, G1DD, No vegetation, No mural  "thrombus, moderate aortic sclerosis   COPD (chronic obstructive pulmonary disease) (HCC)  Patient with COPD on 2-4 L supplemental O2 at baseline     Plan:  Intubated  Maintain O2 saturation > 92%  Pulmonary hygiene  History of CVA (cerebrovascular accident)  Pt had multiple embolic strokes during admission from 2/11/25-2/22/25  Thought to be embolic strokes 2/2 MSSA bacteremia, no vegetations on TTE    Plan:  See plan for eliquis under \"afib\"  Continue Lipitor 40 mg daily  Chronic respiratory failure with hypoxia (HCC)  2-4L O2 at baseline  Currently intubated  Atrial fibrillation (HCC)  On metoprolol and Eliquis  Continue to hold meds while in the ICU  Monitor on telemetry  Urinary retention  Patient has history of urinary retention and was started on Flomax during last hospitalization  Pt denies difficulty urinating at this time    Plan:  Insert glass catheter for accurate I&O monitoring  Hold flomax  Hypotension  Found to be hypotensive at rapid response 10:30 AM [id number]  Levo started with minimal improvement  HGB 5.9 on iSTAT, suspect colonic bleeding vs intraabdominal bleeding  New and rapidly worsening abdominal distention after CPR    Plan:  Intubate and transfer to ICU  Central line  A-line  Surgery consult  Emergency surgery to control bleeding  Abd closed 3/16  Off pressors, pressor support as needed  MSSA bacteremia  Patient was discharged on cefazolin 2 g IV Q8 hrs until 3/27/2025. PICC line in place. Patient rescheduled to follow-up outpatient with ID     Plan:  Continue with cefazolin 2 g IV every 8 hours.  infectious disease recommendations appreciated   Recommend repeat C-spine MRI with and without contrast, postpone until patient stable  6 weeks treatment through 3/27/2025  Neck pain  MRI of the neck done 2/14/2025 revealed cervical degenerative change with mild canal stenosis and mild to moderate foraminal narrowing.  No cord compression.  Mild nonspecific edema within the right posterior breast " dermal musculature of the upper cervicals spine.  Minimal peripheral enhancement is noted.      Plan:  Repeat MRI C spine prior to completion of ABX per ID recs  Continue ABX as recommended above  Colonic mass  2/20/2025 EGD/colonoscopy showed 2.5 cm ileocecal mass, pathology confirmed adenocarcinoma.    Patient was due to follow-up with outpatient colorectal surgery on 2/28/2025  Pt presented initially with melena, Hgb 4.6. Symptomatic anemia.  Outpatient oncology appointment scheduled for 4/3  Rapid Response [id number], see rapid response note  Suspect intraabdominal bleeding with mass as possible source  High volume bleeding CT deferred in favor of exlap to control bleeding given unstable condition  Per surgery, possible resection of colonic mass in exlap    Ambulatory dysfunction  Worsening ambulatory dysfunction since December  PT/OT evaluations recommend level 2   Discussed with podiatry, patient has chronic left ankle fracture over 2 years ago, recommend WBAT to left foot as curbside, no further podiatry needs      Plan:  PT/OT consult when stable and extubated  Melena  Patient endorsed melena prior to admission.  One episode dark, tarry stool overnight on 3/4/25. Not documented.  Rapid response called [id number] with suspected intraabdominal bleeding    Plan:  See above and chart for rapid response and MTP  ORIANA (acute kidney injury) (HCC)  Creatinine   Date Value Ref Range Status   03/17/2025 2.54 (H) 0.60 - 1.30 mg/dL Final     Comment:     Standardized to IDMS reference method   09/25/2022 1 0.6 - 1.2 mg/dL Final      Present on admission  Resolving prior to ICU admission    Plan:  Trend BMPs  Fluids as indicated  Pleural effusion, bilateral  CT A/P shows bilateral pleural effusions L>R, pulmonary vascular congestion     Plan:  Repeat imaging if pt's respiratory status worsens  Patient intubated  Dysphagia  VBS done on 2/15/25 showing food retention due to pharyngeal weakness  EGD was done last month which  showed normal esophagus.   ENT recommending outpatient follow-up with Dr. Flynn Reyez    Plan:  Will need speech and swallow eval after extubation  Severe protein-calorie malnutrition (HCC)  Malnutrition Findings:   Adult Malnutrition type: Acute illness  Adult Degree of Malnutrition: Other severe protein calorie malnutrition  Malnutrition Characteristics: Inadequate energy, Fluid accumulation  360 Statement: related to inadequate energy/protein intake as evidenced by consuming < 50% of energy intake compared to estimated needs for > 5 days and B/L LE +3 edema. Treated with ONS.  BMI Findings:  Body mass index is 25.09 kg/m².      Plan:  Start parenteral nutrition when more stable and pending surgery recs     360 Statement: related to inadequate energy/protein intake as evidenced by consuming < 50% of energy intake compared to estimated needs for > 5 days and B/L LE +3 edema. Treated with ONS.    BMI Findings:    Body mass index is 28.58 kg/m².   Hallucinations, visual  Per both patient and patient's wife, patient has been experiencing visual hallucinations that usually occur prior to falling asleep or waking up.  Patient reports that he will occasionally grab for things that are not there, such as a pillow.    Also states that he will occasionally have conversations with his wife when she is not physically in the room.    Patient states he is able to discern when he is having a hallucination and they are not distressing      PLAN:  delirium precautions  Disorders of fluid, electrolyte, and acid-base balance  Trend electrolytes daily  Replete as needed  Aspiration into respiratory tract  Evening 3/14 patient had episode of choking while attempting to swallow food.  Chest x-ray shows new RLL consolidation    Plan:  Patient intubated  Will need speech and swallow evaluation after extubation  Cardiac arrest due to other underlying condition (HCC)  Patient lost pulses shortly after transport to ICU  Likely due to acute  blood loss  CPR immediately begun  Central line and A-line placed  MTP called prior to cardiac arrest  Patient already intubated and on ventilatory  Patient shocked 1 time  ROSC achieved shortly after blood product administration begun    Plan:  On cardiac monitor  Intubated and sedated on ventilator  Surgery to treat underlying cause of bleeding  Hypovolemic shock (HCC)  Rapid response called for hypotension and AMS  Acute blood loss, suspect intraabdominal bleeding vs colonic mass bleeding  No melena, hematuria, hemoptysis, or other signs of overt bleeding  MTP called    Plan:  Source control with surgery  Levophed  Blood and fluid resuscitation as indicated  Hemoperitoneum  Emergent OR yesterday for distended abd post CPR  Found to have hemoperitoneum with hematoma at base of jejunum with active bleed, which was controlled  OR 3/17, stable, small bowel and L hemicolon resected, abd closed    Plan:  Continue to hold AC  Seizure (HCC)  Off sedation, patient did not awaken.  On exam, pt had nystagmus  Discussed with epileptologist, agreeable with EEG    Plan:  Continue EEG  F/u results  Disposition: Critical care    ICU Core Measures     Vented Patient  VAP Bundle  VAP bundle ordered     A: Assess, Prevent, and Manage Pain Has pain been assessed? Yes  Need for changes to pain regimen? No   B: Both Spontaneous Awakening Trials (SATs) and Spontaneous Breathing Trials (SBTs) Plan to perform spontaneous awakening trial today? Yes   Plan to perform spontaneous breathing trial today? Yes   Obvious barriers to extubation? Yes   C: Choice of Sedation RASS Goal: -1 Drowsy or 0 Alert and Calm  Need for changes to sedation or analgesia regimen? No   D: Delirium CAM-ICU: Negative   E: Early Mobility  Plan for early mobility? Yes   F: Family Engagement Plan for family engagement today? Yes       Antibiotic Review: Continue broad spectrum secondary to severity of illness.     Review of Invasive Devices:    Tang Plan: Continue  for accurate I/O monitoring for 48 hours  Central access plan: Medications requiring central line Hemodynamic monitoring  [place] Plan: Keep arterial line for hemodynamic monitoring    Prophylaxis:  VTE VTE covered by:    None       Stress Ulcer  covered byfamotidine (PEPCID) 20 mg tablet [[id number]] (Long-Term Med), pantoprazole (PROTONIX) injection 40 mg [[id number]]         24 Hour Events : was found to have nystagmus and was not awakening despite being off sedation. Continuous EEG ordered after overnight team had a discussion with epileptologist  Subjective   Review of Systems: See HPI for Review of Systems    Objective :                   Vitals I/O      Most Recent Min/Max in 24hrs   Temp 97.6 °F (36.4 °C) Temp  Min: 97.6 °F (36.4 °C)  Max: 98 °F (36.7 °C)   Pulse 104 Pulse  Min: 97  Max: 125   Resp 13 Resp  Min: 13  Max: 25   /60 BP  Min: 95/53  Max: 166/72   O2 Sat 100 % SpO2  Min: 98 %  Max: 100 %      Intake/Output Summary (Last 24 hours) at 3/17/2025 1014  Last data filed at 3/17/2025 0800  Gross per 24 hour   Intake 5381.94 ml   Output 815 ml   Net 4566.94 ml       Diet NPO  Adult 3-in-1 TPN (standard base / standard electrolytes)    Invasive Monitoring   Arterial Line  Coni /68  Arterial Line BP  Min: 93/87  Max: 191/68    mmHg  Arterial Line MAP (mmHg)  Min: 63 mmHg  Max: 114 mmHg           Physical Exam   Physical Exam  Vitals and nursing note reviewed.   Eyes:      Conjunctiva/sclera: Conjunctivae normal.      Comments: Pupils 3 mm, reactive to light   Skin:     General: Skin is warm.   HENT:      Head: Normocephalic and atraumatic.      Right Ear: No drainage.      Left Ear: No drainage.      Nose: Nasogastric tube present.      Mouth/Throat:      Mouth: Mucous membranes are dry.   Neck:      Vascular: Central line present.   Cardiovascular:      Rate and Rhythm: Normal rate and regular rhythm.   Musculoskeletal:      Right lower le+ Edema present.      Left lower leg: 3+ Edema  present.   Abdominal:      Palpations: Abdomen is soft.      Comments: Abdominal surgical dressing clean, dry, and intact   Constitutional:       General: He is not in acute distress.     Appearance: He is well-developed and well-nourished. He is ill-appearing.      Interventions: He is intubated.   Pulmonary:      Effort: No respiratory distress. He is intubated.      Breath sounds: No stridor.      Comments: Mechanically ventilated breath sounds  Neurological:      Mental Status: He is unresponsive.      Cranial Nerves: No facial asymmetry.        Corneal reflex present, cough reflex and gag reflex intact.      Comments: Intubated, no sedation on. Does not awake. No withdrawal to pain   Genitourinary/Anorectal:  Tang present.        Diagnostic Studies        Lab Results: I have reviewed the following results:     Medications:  Scheduled PRN   [Held by provider] ascorbic acid, 250 mg, Daily  [Held by provider] atorvastatin, 40 mg, Daily With Dinner  ceFAZolin, 2,000 mg, Q8H  chlorhexidine, 15 mL, Q12H GALE  [Held by provider] Cholecalciferol, 2,000 Units, Daily  [Held by provider] cyanocobalamin, 100 mcg, Daily  [Held by provider] ferrous sulfate, 325 mg, Daily With Breakfast  [Held by provider] fluticasone, 1 puff, Daily  [Held by provider] folic acid, 1 mg, Daily  [Held by provider] hydroxychloroquine, 400 mg, Daily With Breakfast  insulin lispro, 1-6 Units, Q6H GALE  lidocaine, 3 patch, Daily  [Held by provider] metoprolol tartrate, 25 mg, Q12H GALE  HITESH ANTIFUNGAL, , BID  pantoprazole, 40 mg, Q12H GALE  [Held by provider] potassium chloride, 20 mEq, Daily  [Held by provider] senna-docusate sodium, 1 tablet, HS  [Held by provider] tamsulosin, 0.4 mg, Daily With Dinner  [Held by provider] torsemide, 20 mg, Daily  [Held by provider] umeclidinium-vilanterol, 1 puff, Daily      HYDROmorphone, 0.2 mg, Q4H PRN  levalbuterol, 1.25 mg, Q4H PRN  [Held by provider] oxyCODONE, 5 mg, Q6H PRN  [Held by provider] oxyCODONE, 2.5  mg, Q6H PRN       Continuous    Adult 3-in-1 TPN (standard base / standard electrolytes), , Last Rate: 42 mL/hr at 03/16/25 2335  dextrose, 75 mL/hr, Last Rate: Stopped (03/17/25 0759)  fentaNYL, 50 mcg/hr, Last Rate: Stopped (03/16/25 0600)  lactated ringers, 100 mL/hr, Last Rate: 100 mL/hr (03/17/25 0153)         Labs:   CBC    Recent Labs     03/16/25  0440 03/16/25  0925 03/16/25  1533 03/16/25  1947/25  0424 03/17/25  0726   WBC 14.14*  --  13.31*  --   --   --    HGB 13.3  13.3   < > 12.9   < > 10.8* 11.0*   HCT 38.7  38.4   < > 38.5   < > 33.1* 32.3*   *  --  104*  --   --   --    BANDSPCT 1  --   --   --   --   --     < > = values in this interval not displayed.     BMP    Recent Labs     03/17/25  0424 03/17/25  0935   SODIUM 145 146   K 4.3 4.2   * 113*   CO2 26 27   AGAP 6 6   BUN 53* 54*   CREATININE 2.51* 2.54*   CALCIUM 7.6* 7.8*       Coags    Recent Labs     03/15/25  1045 03/15/25  1235   INR 1.43* 1.43*   PTT 41* 45*        Additional Electrolytes  Recent Labs     03/16/25  0440 03/16/25  1215 03/16/25  1327 03/16/25  1533   MG 2.0  --   --  1.9   PHOS 3.2  --   --  4.1   CAIONIZED  --    < > 1.20 1.13    < > = values in this interval not displayed.          Blood Gas    Recent Labs     03/16/25  1533   PHART 7.492*   OEN1MUY 33.9*   PO2ART 94.5   SQJ7QOR 25.4   BEART 2.5   SOURCE Line, Arterial     Recent Labs     03/16/25  1533 03/17/25  0935   PHVEN  --  7.460*   [id number]  --  35.4*   PO2VEN  --  38.5   [id number]  --  24.6   BEVEN  --  1.1   [id number]  --  74.9   SOURCE Line, Arterial  --     LFTs  Recent Labs     03/16/25  0440 03/16/25  1533   ALT 5* <3*   AST 69* 60*   ALKPHOS 53 51   ALB 2.7* 2.4*   TBILI 0.79 0.62       Infectious  No recent results  Glucose  Recent Labs     03/16/25  1533 03/16/25  2235 03/17/25  0424 03/17/25  0935   GLUC 135 150* 216* 195*

## 2025-03-17 NOTE — ASSESSMENT & PLAN NOTE
Malnutrition Findings:   Adult Malnutrition type: Acute illness  Adult Degree of Malnutrition: Other severe protein calorie malnutrition  Malnutrition Characteristics: Inadequate energy, Fluid accumulation  360 Statement: related to inadequate energy/protein intake as evidenced by consuming < 50% of energy intake compared to estimated needs for > 5 days and B/L LE +3 edema. Treated with ONS.  BMI Findings:  Body mass index is 25.09 kg/m².      Plan:  Start parenteral nutrition when more stable and pending surgery recs     360 Statement: related to inadequate energy/protein intake as evidenced by consuming < 50% of energy intake compared to estimated needs for > 5 days and B/L LE +3 edema. Treated with ONS.    BMI Findings:    Body mass index is 28.58 kg/m².

## 2025-03-17 NOTE — OCCUPATIONAL THERAPY NOTE
Occupational Therapy Cancelled Session    Patient Name: Devin Chaves  Today's Date: 3/17/2025     03/17/25 1030   Note Type   Note type Cancelled Session   Cancel Reasons Medical status   Additional Comments OT orders previously received. Per ICU mobility rounds, pt is s/p code over weekend, current intubated and off sedation but not medically appropriate or responsive enough for participation in therapy at this time. Will continue to follow.     Jennyfer Vela, KATHYD, OTR/L  [place] License [id number]  [place] License [id number]

## 2025-03-17 NOTE — ASSESSMENT & PLAN NOTE
Patient developed acute bleeding from rectal mass, resulting in rapid response, and subsequent cardiac arrest.  He is status post transfusion.  He is also status post small bowel resection and right hemicolectomy.  Management per primary service.

## 2025-03-17 NOTE — ASSESSMENT & PLAN NOTE
With actively bleeding mesenteric hematoma  S/p emergent ex lap, control of bleeding, segmental SBR and left in discontinuity 3/15  Now s/p partial SBR, R hemicolectomy and closure 3/16/2025  Management per surgery

## 2025-03-17 NOTE — ASSESSMENT & PLAN NOTE
Malnutrition Findings:   Adult Malnutrition type: Acute illness  Adult Degree of Malnutrition: Other severe protein calorie malnutrition  Malnutrition Characteristics: Inadequate energy, Fluid accumulation      Body mass index is 28.58 kg/m².

## 2025-03-17 NOTE — ASSESSMENT & PLAN NOTE
Patient presents with 2 days of chest pain, shortness of breath, fatigue and melena  2/20/2025 EGD/colonoscopy showed 2.5 cm ileocecal mass, pathology confirmed adenocarcinoma.  S/p 3 units packed rbc in ED. S/p 1 unit PRBCs on 3/2  Hemoglobin has been stable.   Patient recent diagnosis of adenocarcinoma, increased risk of stroke.   Updated iron studies 3/2025 improved compared to 2/2025  Rapid response called 10:30A M [id number] for AMS and hypotension  Patient found to be minimally responsive and hypotensive  HGB 5.9 by iSTAT, formal labs show HGB 6.3, significant drop from 8 in AM  Bleeding from colonic mass suspected, per nursing no hematuria hemoptysis melena or other signs of overt bleeding prior to RR  Patient intubated for airway protection  Transported to ICU  MTP called  Code blue called  ROSC achieved shortly after blood products started  Rapidly distending abdomen, suspect intraabdominal bleeding     Hemoglobin   Date Value Ref Range Status   03/17/2025 11.0 (L) 12.0 - 17.0 g/dL Final        Plan:  Daily CBC  Transfuse for Hgb <7  Protonix 40 mg IV daily  Hold home eliquis, off for 48 hours at time of rapid response  Heparin gtt ordered but not started prior to rapid  Palliative care consulted prior to ICU admission  MTP [id number]  PRBC 12  FFP 3  Platelets 1  Cryo 0  Whole blood 3  Consider high volume bleeding scan  Surgery consult for suspected internal bleeding  Per surgery take to OR w/o scan

## 2025-03-17 NOTE — PLAN OF CARE
Problem: Prexisting or High Potential for Compromised Skin Integrity  Goal: Skin integrity is maintained or improved  Description: INTERVENTIONS:  - Identify patients at risk for skin breakdown  - Assess and monitor skin integrity  - Assess and monitor nutrition and hydration status  - Monitor labs   - Assess for incontinence   - Turn and reposition patient  - Assist with mobility/ambulation  - Relieve pressure over bony prominences  - Avoid friction and shearing  - Provide appropriate hygiene as needed including keeping skin clean and dry  - Evaluate need for skin moisturizer/barrier cream  - Collaborate with interdisciplinary team   - Patient/family teaching  - Consider wound care consult   Outcome: Progressing     Problem: Potential for Falls  Goal: Patient will remain free of falls  Description: INTERVENTIONS:  - Educate patient/family on patient safety including physical limitations  - Instruct patient to call for assistance with activity   - Consult OT/PT to assist with strengthening/mobility   - Keep Call bell within reach  - Keep bed low and locked with side rails adjusted as appropriate  - Keep care items and personal belongings within reach  - Initiate and maintain comfort rounds  - Make Fall Risk Sign visible to staff  - Apply yellow socks and bracelet for high fall risk patients  - Consider moving patient to room near nurses station  Outcome: Progressing     Problem: Nutrition/Hydration-ADULT  Goal: Nutrient/Hydration intake appropriate for improving, restoring or maintaining nutritional needs  Description: Monitor and assess patient's nutrition/hydration status for malnutrition. Collaborate with interdisciplinary team and initiate plan and interventions as ordered.  Monitor patient's weight and dietary intake as ordered or per policy. Utilize nutrition screening tool and intervene as necessary. Determine patient's food preferences and provide high-protein, high-caloric foods as appropriate.      INTERVENTIONS:  - Monitor oral intake, urinary output, labs, and treatment plans  - Assess nutrition and hydration status and recommend course of action  - Evaluate amount of meals eaten  - Assist patient with eating if necessary   - Allow adequate time for meals  - Recommend/ encourage appropriate diets, oral nutritional supplements, and vitamin/mineral supplements  - Order, calculate, and assess calorie counts as needed  - Recommend, monitor, and adjust tube feedings and TPN/PPN based on assessed needs  - Assess need for intravenous fluids  - Provide specific nutrition/hydration education as appropriate  - Include patient/family/caregiver in decisions related to nutrition  Outcome: Progressing     Problem: GASTROINTESTINAL - ADULT  Goal: Minimal or absence of nausea and/or vomiting  Description: INTERVENTIONS:  - Administer IV fluids if ordered to ensure adequate hydration  - Maintain NPO status until nausea and vomiting are resolved  - Nasogastric tube if ordered  - Administer ordered antiemetic medications as needed  - Provide nonpharmacologic comfort measures as appropriate  - Advance diet as tolerated, if ordered  - Consider nutrition services referral to assist patient with adequate nutrition and appropriate food choices  Outcome: Progressing  Goal: Maintains or returns to baseline bowel function  Description: INTERVENTIONS:  - Assess bowel function  - Encourage oral fluids to ensure adequate hydration  - Administer IV fluids if ordered to ensure adequate hydration  - Administer ordered medications as needed  - Encourage mobilization and activity  - Consider nutritional services referral to assist patient with adequate nutrition and appropriate food choices  Outcome: Progressing  Goal: Maintains adequate nutritional intake  Description: INTERVENTIONS:  - Monitor percentage of each meal consumed  - Identify factors contributing to decreased intake, treat as appropriate  - Assist with meals as needed  -  Monitor I&O, weight, and lab values if indicated  - Obtain nutrition services referral as needed  Outcome: Progressing  Goal: Establish and maintain optimal ostomy function  Description: INTERVENTIONS:  - Assess bowel function  - Encourage oral fluids to ensure adequate hydration  - Administer IV fluids if ordered to ensure adequate hydration   - Administer ordered medications as needed  - Encourage mobilization and activity  - Nutrition services referral to assist patient with appropriate food choices  - Assess stoma site  - Consider wound care consult   Outcome: Progressing  Goal: Oral mucous membranes remain intact  Description: INTERVENTIONS  - Assess oral mucosa and hygiene practices  - Implement preventative oral hygiene regimen  - Implement oral medicated treatments as ordered  - Initiate Nutrition services referral as needed  Outcome: Progressing     Problem: HEMATOLOGIC - ADULT  Goal: Maintains hematologic stability  Description: INTERVENTIONS  - Assess for signs and symptoms of bleeding or hemorrhage  - Monitor labs  - Administer supportive blood products/factors as ordered and appropriate  Outcome: Progressing     Problem: SAFETY,RESTRAINT: NV/NON-SELF DESTRUCTIVE BEHAVIOR  Goal: Remains free of harm/injury (restraint for non violent/non self-detsructive behavior)  Description: INTERVENTIONS:  - Instruct patient/family regarding restraint use   - Assess and monitor physiologic and psychological status   - Provide interventions and comfort measures to meet assessed patient needs   - Identify and implement measures to help patient regain control  - Assess readiness for release of restraint   Outcome: Progressing  Goal: Returns to optimal restraint-free functioning  Description: INTERVENTIONS:  - Assess the patient's behavior and symptoms that indicate continued need for restraint  - Identify and implement measures to help patient regain control  - Assess readiness for release of restraint   Outcome:  Progressing

## 2025-03-17 NOTE — ASSESSMENT & PLAN NOTE
Baseline creatinine is normal at 0.8 to 0.9.   Admission SCr 2.43 on 2/28/25.   Etiology is not entirely clear - not prerenal or postrenal. Working dx is ATN but could have another underlying pathology.   CT 2/28/25 - no hydronephrosis. UA on 3/8/25 with RBC 2-4, WBC 2-4, small blood and trace protein.   Of note, UPC ratio was 3.4 gm on 3/8/25 and urine ACR was only 821 mg on 3/5/25.   Serum and urine LIDYA no M spike, C3 83 (low), C4 26. At risk for AIN due to antibiotic exposure but no clear indication of this as well.   SCr has been around 1.8 to 2.0 since admission  S/p cardiac arrest on 3/15/25 and is at risk for ATN due to that event.   Today's creatinine bumped to 2.51 after return to OR for ex lap, partial SBR and R colectomy. +required levophed intraop.   Low UO previously likely due to intravascular volume depletion.  Remains borderline.  Renal function slightly worse after return to the OR yesterday in setting of relative hypotension and hemodynamic changes.  Continue IVF for now.  Currently on LR at 100 mL/h  May need to consider dose of IV diuretics  Avoid nephrotoxins, NSAIDs, IV contrast if possible  Avoid hypotension, maintain MAP >65  Trend BMP in a.m.

## 2025-03-17 NOTE — CONSULTS
Recommend to check triglycerides. Monitor electrolytes and blood sugars, if stable increase to goal TPN tonight as follows:    800 ml AA15%  1000 ml D30%  250 ml lipid20%  Standard electrolytes      This provides 2000 kcals, 120 g protein, and 2050 ml total fluid from macronutrients. GIR= 2.18 mg/kg/min, lipid load=0.53 g/kg

## 2025-03-17 NOTE — ASSESSMENT & PLAN NOTE
Newly diagnosed colon mass on C-scope on 2/20/25.   Pathology - adenocarcinoma  Was to follow colorectal as outpatient  S/p ex lap, partial SBR and R colectomy 3/16/2025  Surgical pathology pending  Management per surgery

## 2025-03-17 NOTE — PHYSICAL THERAPY NOTE
Physical Therapy Cancellation Note:    During rounds, per team pt currently not following commands, currently on no sedation, and palliative c/s ordered. Cancel Pt services for today and will cont to follow to cont PT intervention.

## 2025-03-17 NOTE — PROGRESS NOTES
Progress Note - Surgery-General   Name: Devin Chaves 79 y.o. male I MRN: [id number]  Unit/Bed#: ICU 10 I Date of Admission: 2/28/2025   Date of Service: 3/17/2025 I Hospital Day: 17    Assessment & Plan  Acute blood loss anemia  Patient sustained cardiac arrest in the setting of an undetectable hemoglobin on 3/15 and was taken emergently to the operating room for exploration in the setting of abdominal free fluid.  Large volume hemoperitoneum was encountered with evidence of a actively bleeding/decompressing mesenteric hematoma; bleeding control was obtained and on 3/15 taken to OR for a segmental small bowel resection was performed and the patient was left in discontinuity given ongoing instability.  S/p SB anastomosis, R hemicolectomy, closure on 3/16.    Plan  - Continue balanced resuscitation as needed; trend hemoglobins  - Recommend ECHO in setting of rising troponins  -Continue to hold anticoagulation  - renew TPN  - wean vent as tolerated  - consider Nephrology and Cardiology consult  - appreciate ICU level care    HTN (hypertension)    CAD (coronary artery disease)    COPD (chronic obstructive pulmonary disease) (HCC)    History of CVA (cerebrovascular accident)    Chronic respiratory failure with hypoxia (HCC)    Atrial fibrillation (HCC)    Urinary retention    MSSA bacteremia    Neck pain    Colonic mass    Ambulatory dysfunction    Melena    ORIANA (acute kidney injury) (HCC)    Pleural effusion, bilateral    Dysphagia    Severe protein-calorie malnutrition (HCC)  Malnutrition Findings:   Adult Malnutrition type: Acute illness  Adult Degree of Malnutrition: Other severe protein calorie malnutrition  Malnutrition Characteristics: Inadequate energy, Fluid accumulation                  360 Statement: related to inadequate energy/protein intake as evidenced by consuming < 50% of energy intake compared to estimated needs for > 5 days and B/L LE +3 edema. Treated with ONS.    BMI Findings:           Body mass  index is 28.58 kg/m².     Hallucinations, visual    Disorders of fluid, electrolyte, and acid-base balance    Aspiration into respiratory tract    Hypotension    Cardiac arrest due to other underlying condition (HCC)    Hypovolemic shock (HCC)    Hemoperitoneum    Seizure (HCC)          Subjective   Events detailed above. Intubated and sedated. Midline dressing CDI.    Objective :  Temp:  [97.6 °F (36.4 °C)-98.8 °F (37.1 °C)] 97.6 °F (36.4 °C)  HR:  [] 97  BP: ()/(51-65) 138/65  Resp:  [18-25] 20  SpO2:  [98 %-100 %] 98 %    I/O         03/15 0701  03/16 0700 03/16 0701  03/17 0700    I.V. (mL/kg) 2553.2 (27.4) 4793.9 (50.1)    Blood 7860     IV Piggyback 3750 100    TPN  248.5    Cell Saver 1495     Total Intake(mL/kg) 14639.2 (168.2) 5142.4 (53.8)    Urine (mL/kg/hr) 575 (0.3) 485 (0.2)    Emesis/NG output 200 350    Drains 1210 250    Blood 800     Total Output 2785 1085    Net +16849.2 +4057.4                Lines/Drains/Airways       Active Status       Name Placement date Placement time Site Days    PICC Line 02/26/25 Right Brachial 02/26/25  0548  Brachial  18    CVC Central Lines 03/15/25 Triple 03/15/25  1338  --  1    Arterial Line 03/15/25 Femoral 03/15/25  1308  Femoral  1    ETT  Cuffed 8 mm 03/15/25  1200  -- 1    ETT  03/15/25  1056  -- 1    Urethral Catheter Latex 16 Fr. 03/15/25  1336  Latex  1    NG/OG Tube Nasogastric Left nare 03/15/25  1700  Left nare  1                  Physical Exam  General: intubated, sedated  HENT: NCAT MMM  Neck: supple, no JVD  CV: nl rate  Lungs: mechanically ventilated. No resp distress  ABD: Soft, appropriately tender, nondistended. Incision CDI          Lab Results: I have reviewed the following results:  Recent Labs     03/15/25  1235 03/15/25  1345 03/15/25  1935 03/15/25  2134 03/15/25  2342 03/16/25  0440 03/16/25  0925 03/16/25  1533 03/16/25  1947/25  0424   WBC 9.30   < >  --   --   --  14.14*  --  13.31*  --   --    HGB 11.5*   < > 12.9  --     < > 13.3  13.3   < > 12.9   < > 10.8*   HCT 35.4*   < > 37.3  --    < > 38.7  38.4   < > 38.5   < > 33.1*   PLT 70*   < >  --   --   --  103*  --  104*  --   --    BANDSPCT  --   --   --   --   --  1  --   --   --   --    SODIUM 152*   < >  --   --   --  148*  --  148*   < > 145   K 3.5   < >  --   --   --  4.2  --  4.4   < > 4.3   *   < >  --   --   --  113*  --  116*   < > 113*   CO2 19*   < >  --   --   --  27   < > 25   < > 26   BUN 42*   < >  --   --   --  48*  --  48*   < > 53*   CREATININE 1.47*   < >  --   --   --  1.85*  --  2.14*   < > 2.51*   GLUC 203*   < >  --   --   --  147*  --  135   < > 216*   CAIONIZED  --    < >  --   --   --   --    < > 1.13  --   --    MG  --    < >  --   --   --  2.0  --  1.9  --   --    PHOS  --    < >  --   --   --  3.2  --  4.1  --   --    AST 21  --   --   --   --  69*  --  60*  --   --    ALT 6*  --   --   --   --  5*  --  <3*  --   --    ALB 1.6*  --   --   --   --  2.7*  --  2.4*  --   --    TBILI 0.30  --   --   --   --  0.79  --  0.62  --   --    ALKPHOS 34  --   --   --   --  53  --  51  --   --    PTT 45*  --   --   --   --   --   --   --   --   --    INR 1.43*  --   --   --   --   --   --   --   --   --    HSTNI0  --   --  2,898*  --   --   --   --   --   --   --    HSTNI2  --   --   --  4,775*  --   --   --   --   --   --    LACTICACID 9.7*   < > 4.3* 3.0*   < > 1.8  --  1.3  --   --     < > = values in this interval not displayed.             VTE Pharmacologic Prophylaxis: VTE covered by:    None     VTE Mechanical Prophylaxis: sequential compression device

## 2025-03-17 NOTE — ASSESSMENT & PLAN NOTE
Found to be hypotensive at rapid response 10:30 AM [id number]  Levo started with minimal improvement  HGB 5.9 on iSTAT, suspect colonic bleeding vs intraabdominal bleeding  New and rapidly worsening abdominal distention after CPR    Plan:  Intubate and transfer to ICU  Central line  A-line  Surgery consult  Emergency surgery to control bleeding  Abd closed 3/16  Off pressors, pressor support as needed

## 2025-03-17 NOTE — PROGRESS NOTES
Progress Note - Critical Care/ICU   Name: Devin Chaves 79 y.o. male I MRN: [id number]  Unit/Bed#: ICU 10 I Date of Admission: 2/28/2025   Date of Service: 3/17/2025 I Hospital Day: 17      Assessment & Plan  Acute blood loss anemia  Patient presents with 2 days of chest pain, shortness of breath, fatigue and melena  2/20/2025 EGD/colonoscopy showed 2.5 cm ileocecal mass, pathology confirmed adenocarcinoma.  S/p 3 units packed rbc in ED. S/p 1 unit PRBCs on 3/2  Hemoglobin has been stable.   Patient recent diagnosis of adenocarcinoma, increased risk of stroke.   Updated iron studies 3/2025 improved compared to 2/2025  Rapid response called 10:30A M [id number] for AMS and hypotension  Patient found to be minimally responsive and hypotensive  HGB 5.9 by iSTAT, formal labs show HGB 6.3, significant drop from 8 in AM  Bleeding from colonic mass suspected, per nursing no hematuria hemoptysis melena or other signs of overt bleeding prior to RR  Patient intubated for airway protection  Transported to ICU  MTP called  Code blue called  ROSC achieved shortly after blood products started  Rapidly distending abdomen, suspect intraabdominal bleeding     Hemoglobin   Date Value Ref Range Status   03/17/2025 10.8 (L) 12.0 - 17.0 g/dL Final        Plan:  Daily CBC  Transfuse for Hgb <7  Protonix 40 mg IV daily  Hold home eliquis, off for 48 hours at time of rapid response  Heparin gtt ordered but not started prior to rapid  Palliative care consulted prior to ICU admission  MTP [id number]  PRBC 12  FFP 3  Platelets 1  Cryo 0  Whole blood 3  Consider high volume bleeding scan  Surgery consult for suspected internal bleeding  Per surgery take to OR w/o scan  HTN (hypertension)  On Metoprolol tartrate 25 mg BID, Losartan 25 mg QD for HTN    Plan:  Hold home meds in while in ICU  Pressors as indicated  Maintain MAP > 65  CAD (coronary artery disease)  TTE 2/12/25: EF 55%, Normal systolic dysfunction, G1DD, No vegetation, No mural  "thrombus, moderate aortic sclerosis   COPD (chronic obstructive pulmonary disease) (HCC)  Patient with COPD on 2-4 L supplemental O2 at baseline     Plan:  Intubated  Maintain O2 saturation > 92%  Pulmonary hygiene  History of CVA (cerebrovascular accident)  Pt had multiple embolic strokes during admission from 2/11/25-2/22/25  Thought to be embolic strokes 2/2 MSSA bacteremia, no vegetations on TTE    Plan:  See plan for eliquis under \"afib\"  Continue Lipitor 40 mg daily  Chronic respiratory failure with hypoxia (HCC)  2-4L O2 at baseline  Currently intubated  Atrial fibrillation (HCC)  On metoprolol and Eliquis  Continue to hold meds while in the ICU  Monitor on telemetry  Urinary retention  Patient has history of urinary retention and was started on Flomax during last hospitalization  Pt denies difficulty urinating at this time    Plan:  Insert glass catheter for accurate I&O monitoring  Hold flomax  Hypotension  Found to be hypotensive at rapid response 10:30 AM [id number]  Levo started with minimal improvement  HGB 5.9 on iSTAT, suspect colonic bleeding vs intraabdominal bleeding  New and rapidly worsening abdominal distention after CPR    Plan:  Intubate and transfer to ICU  Central line  A-line  Surgery consult  Emergency surgery to control bleeding  Abd closed 3/16  Off pressors, pressor support as needed  MSSA bacteremia  Patient was discharged on cefazolin 2 g IV Q8 hrs until 3/27/2025. PICC line in place. Patient rescheduled to follow-up outpatient with ID     Plan:  Continue with cefazolin 2 g IV every 8 hours.  infectious disease recommendations appreciated   Recommend repeat C-spine MRI with and without contrast, postpone until patient stable  6 weeks treatment through 3/27/2025  Neck pain  MRI of the neck done 2/14/2025 revealed cervical degenerative change with mild canal stenosis and mild to moderate foraminal narrowing.  No cord compression.  Mild nonspecific edema within the right posterior breast " dermal musculature of the upper cervicals spine.  Minimal peripheral enhancement is noted.      Plan:  Repeat MRI C spine prior to completion of ABX per ID recs  Continue ABX as recommended above  Colonic mass  2/20/2025 EGD/colonoscopy showed 2.5 cm ileocecal mass, pathology confirmed adenocarcinoma.    Patient was due to follow-up with outpatient colorectal surgery on 2/28/2025  Pt presented initially with melena, Hgb 4.6. Symptomatic anemia.  Outpatient oncology appointment scheduled for 4/3  Rapid Response [id number], see rapid response note  Suspect intraabdominal bleeding with mass as possible source  High volume bleeding CT deferred in favor of exlap to control bleeding given unstable condition  Per surgery, possible resection of colonic mass in exlap    Ambulatory dysfunction  Worsening ambulatory dysfunction since December  PT/OT evaluations recommend level 2   Discussed with podiatry, patient has chronic left ankle fracture over 2 years ago, recommend WBAT to left foot as curbside, no further podiatry needs      Plan:  PT/OT consult when stable and extubated  Melena  Patient endorsed melena prior to admission.  One episode dark, tarry stool overnight on 3/4/25. Not documented.  Rapid response called [id number] with suspected intraabdominal bleeding    Plan:  See above and chart for rapid response and MTP  ORIANA (acute kidney injury) (HCC)  Creatinine   Date Value Ref Range Status   03/17/2025 2.51 (H) 0.60 - 1.30 mg/dL Final     Comment:     Standardized to IDMS reference method   09/25/2022 1 0.6 - 1.2 mg/dL Final      Present on admission  Resolving prior to ICU admission    Plan:  Trend BMPs  Fluids as indicated  Pleural effusion, bilateral  CT A/P shows bilateral pleural effusions L>R, pulmonary vascular congestion     Plan:  Repeat imaging if pt's respiratory status worsens  Patient intubated  Dysphagia  VBS done on 2/15/25 showing food retention due to pharyngeal weakness  EGD was done last month which  showed normal esophagus.   ENT recommending outpatient follow-up with Dr. Flynn Reyez    Plan:  Will need speech and swallow eval after extubation  Severe protein-calorie malnutrition (HCC)  Malnutrition Findings:   Adult Malnutrition type: Acute illness  Adult Degree of Malnutrition: Other severe protein calorie malnutrition  Malnutrition Characteristics: Inadequate energy, Fluid accumulation  360 Statement: related to inadequate energy/protein intake as evidenced by consuming < 50% of energy intake compared to estimated needs for > 5 days and B/L LE +3 edema. Treated with ONS.  BMI Findings:  Body mass index is 25.09 kg/m².      Plan:  Start parenteral nutrition when more stable and pending surgery recs     360 Statement: related to inadequate energy/protein intake as evidenced by consuming < 50% of energy intake compared to estimated needs for > 5 days and B/L LE +3 edema. Treated with ONS.    BMI Findings:    Body mass index is 28.58 kg/m².   Hallucinations, visual  Per both patient and patient's wife, patient has been experiencing visual hallucinations that usually occur prior to falling asleep or waking up.  Patient reports that he will occasionally grab for things that are not there, such as a pillow.    Also states that he will occasionally have conversations with his wife when she is not physically in the room.    Patient states he is able to discern when he is having a hallucination and they are not distressing      PLAN:  delirium precautions  Disorders of fluid, electrolyte, and acid-base balance  Trend electrolytes daily  Replete as needed  Aspiration into respiratory tract  Evening 3/14 patient had episode of choking while attempting to swallow food.  Chest x-ray shows new RLL consolidation    Plan:  Patient intubated  Will need speech and swallow evaluation after extubation  Cardiac arrest due to other underlying condition (HCC)  Patient lost pulses shortly after transport to ICU  Likely due to acute  blood loss  CPR immediately begun  Central line and A-line placed  MTP called prior to cardiac arrest  Patient already intubated and on ventilatory  Patient shocked 1 time  ROSC achieved shortly after blood product administration begun    Plan:  On cardiac monitor  Intubated and sedated on ventilator  Surgery to treat underlying cause of bleeding  Hypovolemic shock (HCC)  Rapid response called for hypotension and AMS  Acute blood loss, suspect intraabdominal bleeding vs colonic mass bleeding  No melena, hematuria, hemoptysis, or other signs of overt bleeding  MTP called    Plan:  Source control with surgery  Levophed  Blood and fluid resuscitation as indicated  Hemoperitoneum  Emergent OR yesterday for distended abd post CPR  Found to have hemoperitoneum with hematoma at base of jejunum with active bleed, which was controlled  OR 3/17, stable, small bowel and L hemicolon resected, abd closed    Plan:  Continue to hold AC  Seizure (HCC)  Off sedation, patient did not awaken.  On exam, pt had nystagmus  Discussed with epileptologist, agreeable with EEG    Plan:  Continue EEG  F/u results  Disposition: Critical care    ICU Core Measures     Vented Patient  VAP Bundle  VAP bundle ordered     A: Assess, Prevent, and Manage Pain Has pain been assessed? Yes  Need for changes to pain regimen? No   B: Both Spontaneous Awakening Trials (SATs) and Spontaneous Breathing Trials (SBTs) Plan to perform spontaneous awakening trial today? Yes   Plan to perform spontaneous breathing trial today? Yes   Obvious barriers to extubation? Yes   C: Choice of Sedation RASS Goal: -1 Drowsy or 0 Alert and Calm  Need for changes to sedation or analgesia regimen? No   D: Delirium CAM-ICU: Negative   E: Early Mobility  Plan for early mobility? Yes   F: Family Engagement Plan for family engagement today? Yes       Antibiotic Review: finished abx    Review of Invasive Devices:    Tang Plan:  bag over ileoconduit  Central access plan: Medications  requiring central line  [place] Plan: Discontinue arterial line    Prophylaxis:  VTE VTE covered by:    None       Stress Ulcer  covered byfamotidine (PEPCID) 20 mg tablet [[id number]] (Long-Term Med), pantoprazole (PROTONIX) injection 40 mg [[id number]]         24 Hour Events : no acute events overnight  Subjective   Review of Systems: See HPI for Review of Systems    Objective :                   Vitals I/O      Most Recent Min/Max in 24hrs   Temp 97.6 °F (36.4 °C) Temp  Min: 97.6 °F (36.4 °C)  Max: 98.8 °F (37.1 °C)   Pulse 97 Pulse  Min: 97  Max: 125   Resp 20 Resp  Min: 18  Max: 25   /64 BP  Min: 95/53  Max: 142/64   O2 Sat 99 % SpO2  Min: 98 %  Max: 100 %      Intake/Output Summary (Last 24 hours) at 3/17/2025 0645  Last data filed at 3/17/2025 0530  Gross per 24 hour   Intake 5142.36 ml   Output 1085 ml   Net 4057.36 ml       Diet NPO  Adult 3-in-1 TPN (standard base / standard electrolytes)    Invasive Monitoring           Physical Exam   Physical Exam  Vitals and nursing note reviewed.   Eyes:      General: Vision grossly intact.      Conjunctiva/sclera: Conjunctivae normal.      Pupils: Pupils are equal, round, and reactive to light.   HENT:      Head: Normocephalic and atraumatic.      Right Ear: No drainage.      Left Ear: No drainage.      Nose: Nasogastric tube present. No congestion.      Mouth/Throat:      Mouth: Mucous membranes are moist.   Neck:      Vascular: Central line present.   Cardiovascular:      Rate and Rhythm: Normal rate and regular rhythm.   Musculoskeletal:      Right lower leg: Trace Edema present.      Left lower leg: Trace Edema present.   Abdominal:      Palpations: Abdomen is soft.      Tenderness: There is no abdominal tenderness. There is no guarding.   Constitutional:       General: He is not in acute distress.     Appearance: He is well-developed and well-nourished. He is ill-appearing (chronically).   Pulmonary:      Effort: Pulmonary effort is normal. No respiratory  distress.      Breath sounds: Normal breath sounds.   Neurological:      Mental Status: Mental status is at baseline. He is disoriented to person and disoriented to time.      Cranial Nerves: No facial asymmetry.      Comments: Awake, disoriented, but able to follow commands          Diagnostic Studies        Lab Results: I have reviewed the following results:     Medications:  Scheduled PRN   [Held by provider] ascorbic acid, 250 mg, Daily  [Held by provider] atorvastatin, 40 mg, Daily With Dinner  ceFAZolin, 2,000 mg, Q8H  chlorhexidine, 15 mL, Q12H GALE  [Held by provider] Cholecalciferol, 2,000 Units, Daily  [Held by provider] cyanocobalamin, 100 mcg, Daily  [Held by provider] ferrous sulfate, 325 mg, Daily With Breakfast  [Held by provider] fluticasone, 1 puff, Daily  [Held by provider] folic acid, 1 mg, Daily  [Held by provider] hydroxychloroquine, 400 mg, Daily With Breakfast  insulin lispro, 1-6 Units, Q6H GALE  lidocaine, 3 patch, Daily  [Held by provider] metoprolol tartrate, 25 mg, Q12H GALE  HITESH ANTIFUNGAL, , BID  pantoprazole, 40 mg, Q12H GALE  [Held by provider] potassium chloride, 20 mEq, Daily  [Held by provider] senna-docusate sodium, 1 tablet, HS  [Held by provider] tamsulosin, 0.4 mg, Daily With Dinner  [Held by provider] torsemide, 20 mg, Daily  [Held by provider] umeclidinium-vilanterol, 1 puff, Daily      HYDROmorphone, 0.2 mg, Q4H PRN  levalbuterol, 1.25 mg, Q4H PRN  [Held by provider] oxyCODONE, 5 mg, Q6H PRN  [Held by provider] oxyCODONE, 2.5 mg, Q6H PRN       Continuous    Adult 3-in-1 TPN (standard base / standard electrolytes), , Last Rate: 42 mL/hr at 03/16/25 2335  dextrose, 75 mL/hr, Last Rate: 75 mL/hr (03/17/25 0431)  fentaNYL, 50 mcg/hr, Last Rate: Stopped (03/16/25 0600)  lactated ringers, 100 mL/hr, Last Rate: 100 mL/hr (03/17/25 0153)         Labs:   CBC    Recent Labs     03/16/25  0440 03/16/25  0925 03/16/25  1533 03/16/25  1947/25  2347 03/17/25  0424   WBC 14.14*  --   13.31*  --   --   --    HGB 13.3  13.3   < > 12.9   < > 11.8* 10.8*   HCT 38.7  38.4   < > 38.5   < > 35.6* 33.1*   *  --  104*  --   --   --    BANDSPCT 1  --   --   --   --   --     < > = values in this interval not displayed.     BMP    Recent Labs     03/16/25 2235 03/17/25  0424   SODIUM 147 145   K 4.4 4.3   * 113*   CO2 26 26   AGAP 6 6   BUN 50* 53*   CREATININE 2.43* 2.51*   CALCIUM 7.8* 7.6*       Coags    Recent Labs     03/15/25  1045 03/15/25  1235   INR 1.43* 1.43*   PTT 41* 45*        Additional Electrolytes  Recent Labs     03/16/25  0440 03/16/25  1215 03/16/25  1327 03/16/25  1533   MG 2.0  --   --  1.9   PHOS 3.2  --   --  4.1   CAIONIZED  --    < > 1.20 1.13    < > = values in this interval not displayed.          Blood Gas    Recent Labs     03/16/25  1533   PHART 7.492*   [id number] 33.9*   PO2ART 94.5   KSO0REF 25.4   BEART 2.5   SOURCE Line, Arterial     Recent Labs     03/16/25  1533   SOURCE Line, Arterial    LFTs  Recent Labs     03/16/25  0440 03/16/25  1533   ALT 5* <3*   AST 69* 60*   ALKPHOS 53 51   ALB 2.7* 2.4*   TBILI 0.79 0.62       Infectious  No recent results  Glucose  Recent Labs     03/16/25  0440 03/16/25  1533 03/16/25  2235 03/17/25  0424   GLUC 147* 135 150* 216*

## 2025-03-17 NOTE — ASSESSMENT & PLAN NOTE
Malnutrition Findings:   Adult Malnutrition type: Acute illness  Adult Degree of Malnutrition: Other severe protein calorie malnutrition  Malnutrition Characteristics: Inadequate energy, Fluid accumulation                  360 Statement: related to inadequate energy/protein intake as evidenced by consuming < 50% of energy intake compared to estimated needs for > 5 days and B/L LE +3 edema. Treated with ONS.    BMI Findings:           Body mass index is 28.58 kg/m².      [place] Counseling: I discussed with the patient the risks of tildrakizumab including but not limited to immunosuppression, malignancy, posterior leukoencephalopathy syndrome, and serious infections.  The patient understands that monitoring is required including a PPD at baseline and must alert us or the primary physician if symptoms of infection or other concerning signs are noted.

## 2025-03-17 NOTE — ASSESSMENT & PLAN NOTE
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

## 2025-03-17 NOTE — ASSESSMENT & PLAN NOTE
Found to be hypotensive at rapid response 10:30 AM [id number]  Levo started with minimal improvement  HGB 5.9 on iSTAT, suspect colonic bleeding vs intraabdominal bleeding  New and rapidly worsening abdominal distention after CPR    Plan:  Intubate and transfer to ICU  Central line  A-line  Surgery consult  Emergency surgery to control bleeding  Abd closed 3/16  Off pressors, pressor support as needed

## 2025-03-17 NOTE — ASSESSMENT & PLAN NOTE
BP stable and acceptable  Volume status mild hypervolemia  Echo 3/17/2025: Pending  Home Rx: Metoprolol 25 mg twice daily  Previous Rx: Metoprolol 25 mg BID, Torsemide 20 mg OD.   Current Rx:  none.  All meds on hold.   Consider resuming beta-blockers if persistent hypotension  Avoid hypotension or large fluctuations in BP  Continue to monitor

## 2025-03-17 NOTE — ASSESSMENT & PLAN NOTE
Emergent OR yesterday for distended abd post CPR  Found to have hemoperitoneum with hematoma at base of jejunum with active bleed, which was controlled  OR 3/17, stable, small bowel and L hemicolon resected, abd closed    Plan:  Continue to hold AC

## 2025-03-17 NOTE — ASSESSMENT & PLAN NOTE
Patient endorsed melena prior to admission.  One episode dark, tarry stool overnight on 3/4/25. Not documented.  Rapid response called [id number] with suspected intraabdominal bleeding    Plan:  See above and chart for rapid response and MTP

## 2025-03-17 NOTE — CONSULTS
"Inpatient Consultation - Palliative and Supportive Care   Devin Chaves 79 y.o. male [id number]    Counseling regarding advance care planning and goals of care  Assessment & Plan  Family's goals are hopeful for him to have some form of recovery and for him to go home  We will continue to have conversations with him about realistic expectations for prognosis                 Code Status: Full - Level 1               Decisional apparatus:  Patient is not competent on my exam today.  If competence is lost, patient's substitute decision maker would default to spouse Francia (first), son Devin (alternate)               Advance Directive / Living Will / POLST:  POA document on file       Palliative care by specialist  Assessment & Plan  Palliative diagnosis: Colon adenocarcinoma, colon mass, cardiac arrest, respiratory failure  PPS: 10%    Education/counseling provided on role/purpose of palliative care; benefits/risks of treatment options by our team reviewed; instructions for management and anticipatory guidance provided; supportive listening and presence provided; provided space for life review and whole person assessment    Psychosocial/Spiritual:   -supported by spouse Francia (\"Ashvin\"),  56 years; they live near daughter [place]  -Also two sons Nicole  -4 yrs in [employer]  -Enjoys working on farm with cows and [employer]  -Jewish kaylee-->family would like extra spiritual support-->spiritual care consult placed    Communicated and coordinated with surgical CC team and RN     Cardiac arrest due to other underlying condition (HCC)  Assessment & Plan  In setting of hemorrhagic shock    In efforts done with achievement of ROSC    Critical care team will likely do MRI brain to evaluate for anoxic brain injury  Follow-up on video EEG    Colonic mass  Assessment & Plan  2/20/2025 EGD/colonoscopy revealed 2.5 cm ileocecal mass    Pathology confirmed: Adenocarcinoma    Mass has not been removed according " to surgical notes    Acute blood loss anemia  Assessment & Plan  In setting of mesenteric bleed and hematoma  S/p small bowel resection and right hemicolectomy    MSSA bacteremia  Assessment & Plan  From previous admission in February from skin/soft tissue source  On cefazolin through 3/27/25    * Hemorrhagic shock (HCC)  Assessment & Plan  Code Crimson 3/15 for hemorrhagic shock related to mesenteric hematoma s/p small bowel resection and colon resection    Now off vasopressor medication         I have reviewed the patient's controlled substance dispensing history in the Prescription Drug Monitoring Program in compliance with the [facility] regulations before prescribing any controlled substances.         We appreciate the invitation to be involved in this patient's care.  We will continue to follow-up.  Please do not hesitate to reach our on call provider through our clinic answering service at [phone] should you have acute symptom control concerns.    Mario Watts DO  Palliative and Supportive Care  Clinic/Answering Service: [phone]  You can find me on [employer] Secure Chat!       IDENTIFICATION:  Inpatient consult to Palliative Care  Consult performed by: Mario Watts DO  Consult ordered by: Yazmin Up DO        Physician Requesting Consult: Garcia Epperson DO  Reason for Consult / Principal Problem: Colon adenocarcinoma with recent cardiac arrest and acute blood loss anemia  Hx and PE limited by: Intubated    NARRATIVE AND INTERVAL HISTORY:       Devin Chaves is a 79 y.o. male with COPD, chronic respiratory failure 2-4 L NC, CVA, colonic adenocarcinoma, HTN, and hx MSSA bacteremia (recent hospital stay from 2/11-2/22 of this year 2025). Hospitalized for anemia.  During hospital stay he had an acute mesenteric bleed leading to code Crimson and subsequent cardiac arrest with CPR and ROSC.  Admitted to the ICU, but had worsening abdominal distention, requiring s/p ex lap, small bowel resection, right colon  resection, irrigation, and abdominal closure. Known adenocarcinoma mass of ileocecum.  Consult for GOC. For encounter today, spoke with spouse and daughter Devorah. Later spoke with son Parmjit.    Past Medical History:   Diagnosis Date    Atrial fibrillation (HCC)     COPD (chronic obstructive pulmonary disease) (HCC)     Crushing injury of finger, left     Infectious viral hepatitis      Past Surgical History:   Procedure Laterality Date    FL LUMBAR PUNCTURE DIAGNOSTIC  2/10/2022    LAPAROTOMY N/A 3/15/2025    Procedure: LAPAROTOMY EXPLORATORY, small bowel resection, therapeutic packing, control of mesenteric hemorrhage;  Surgeon: Garcia Epperson DO;  Location: [facility];  Service: General    LAPAROTOMY N/A 3/16/2025    Procedure: LAPAROTOMY EXPLORATORY, partial small bowel resection, right colon resection, tap block,;  Surgeon: Brandon Tello MD;  Location: [facility];  Service: General    NJ OPEN TX PHALANGEAL SHAFT FRACTURE PROX/MIDDLE EA Left 1/25/2017    Procedure: ORIF LEFT SMALL FINGER FRACTURE;  Surgeon: Blake Badillo MD;  Location: [facility];  Service: Orthopedics     Social History     Socioeconomic History    Marital status: /Civil Union     Spouse name: Not on file    Number of children: Not on file    Years of education: Not on file    Highest education level: Not on file   Occupational History    Not on file   Tobacco Use    Smoking status: Former    Smokeless tobacco: Former     Quit date: 4/1/2011   Substance and Sexual Activity    Alcohol use: No    Drug use: No    Sexual activity: Not on file   Other Topics Concern    Not on file   Social History Narrative    Not on file     Social Drivers of Health     Financial Resource Strain: Not on file   Food Insecurity: No Food Insecurity (2/28/2025)    Nursing - Inadequate Food Risk Classification     Worried About Running Out of Food in the Last Year: Not on file     Ran Out of Food in the Last Year: Not on file     Ran Out of Food  in the Last Year: Never true   Transportation Needs: No Transportation Needs (2025)    Nursing - Transportation Risk Classification     Lack of Transportation: Not on file     Lack of Transportation: No   Physical Activity: Not on file   Stress: Not on file   Social Connections: Unknown (2024)    Received from [employer]    Social Connections     How often do you feel lonely or isolated from those around you? (Adult - for ages 18 years and over): Not on file   Intimate Partner Violence: Unknown (2025)    Nursing IPS     Feels Physically and Emotionally Safe: Not on file     Physically Hurt by Someone: Not on file     Humiliated or Emotionally Abused by Someone: Not on file     Physically Hurt by Someone: No     Hurt or Threatened by Someone: No   Housing Stability: Unknown (2025)    Nursing: Inadequate Housing Risk Classification     Has Housing: Not on file     Worried About Losing Housing: Not on file     Unable to Get Utilities: Not on file     Unable to Pay for Housing in the Last Year: No     Has Housin     History reviewed. No pertinent family history.    MEDICATIONS / ALLERGIES:    all current active meds have been reviewed, current meds:   Current Facility-Administered Medications:     Adult 3-in-1 TPN (custom base / custom electrolytes), Continuous TPN, Last Rate: 87.7 mL/hr at 25    [Held by provider] ascorbic acid (VITAMIN C) tablet 250 mg, Daily    [Held by provider] atorvastatin (LIPITOR) tablet 40 mg, Daily With Dinner    budesonide (PULMICORT) inhalation solution 0.5 mg, Q12H    ceFAZolin (ANCEF) IVPB (premix in dextrose) 2,000 mg 50 mL, Q8H, Last Rate: 2,000 mg (25 0433)    chlorhexidine (PERIDEX) 0.12 % oral rinse 15 mL, Q12H GALE    [Held by provider] Cholecalciferol (VITAMIN D3) tablet 2,000 Units, Daily    [Held by provider] cyanocobalamin (VITAMIN B-12) tablet 100 mcg, Daily    [Held by provider] ferrous sulfate tablet 325 mg, Daily With Breakfast    [Held  by provider] fluticasone (ARNUITY ELLIPTA) 100 MCG/ACT inhaler 1 puff, Daily    [Held by provider] folic acid (FOLVITE) tablet 1 mg, Daily    HYDROmorphone HCl (DILAUDID) injection 0.2 mg, Q4H PRN    [Held by provider] hydroxychloroquine (PLAQUENIL) tablet 400 mg, Daily With Breakfast    insulin lispro (HumALOG/ADMELOG) 100 units/mL subcutaneous injection 1-6 Units, Q6H GALE **AND** Fingerstick Glucose (POCT), Q6H    ipratropium (ATROVENT) 0.02 % inhalation solution 0.5 mg, TID    levalbuterol (XOPENEX) inhalation solution 1.25 mg, Q4H PRN    levalbuterol (XOPENEX) inhalation solution 1.25 mg, TID    lidocaine (LIDODERM) 5 % patch 3 patch, Daily    [Held by provider] metoprolol tartrate (LOPRESSOR) tablet 25 mg, Q12H GALE    moisture barrier miconazole 2% cream (aka HITESH MOISTURE BARRIER ANTIFUNGAL CREAM), BID    [Held by provider] oxyCODONE (ROXICODONE) IR tablet 5 mg, Q6H PRN    [Held by provider] oxyCODONE (ROXICODONE) split tablet 2.5 mg, Q6H PRN    pantoprazole (PROTONIX) injection 40 mg, Q12H GALE    [Held by provider] potassium chloride (Klor-Con M20) CR tablet 20 mEq, Daily    [Held by provider] senna-docusate sodium (SENOKOT S) 8.6-50 mg per tablet 1 tablet, HS    [Held by provider] tamsulosin (FLOMAX) capsule 0.4 mg, Daily With Dinner    [Held by provider] torsemide (DEMADEX) tablet 20 mg, Daily    [Held by provider] umeclidinium-vilanterol 62.5-25 mcg/actuation inhaler 1 puff, Daily, and PTA meds:   Prior to Admission Medications   Prescriptions Last Dose Informant Patient Reported? Taking?   Cholecalciferol 50 MCG (2000 UT) TABS 2/27/2025  Yes Yes   Sig: TAKE ONE TABLET BY MOUTH DAILY (FOR LOW VITAMIN D)   albuterol (2.5 mg/3 mL) 0.083 % nebulizer solution More than a month  No No   Sig: Take 3 mL (2.5 mg total) by nebulization every 6 (six) hours as needed for wheezing or shortness of breath   albuterol (PROVENTIL HFA,VENTOLIN HFA) 90 mcg/act inhaler   Yes No   Sig: INHALE 2 PUFFS BY MOUTH EVERY 4 HOURS AS  NEEDED FOR BREATHING   albuterol (PROVENTIL HFA,VENTOLIN HFA) 90 mcg/act inhaler More than a month  No No   Sig: Inhale 2 puffs every 4 (four) hours as needed for wheezing   apixaban (Eliquis) 5 mg Past Week  Yes Yes   Sig: Take 5 mg by mouth 2 (two) times a day   ascorbic acid (VITAMIN C) 250 MG tablet 2/27/2025  Yes Yes   Sig: Take 250 mg by mouth daily   aspirin (ECOTRIN LOW STRENGTH) 81 mg EC tablet 2/27/2025 Morning  Yes Yes   Sig: Take 81 mg by mouth daily   ceFAZolin (ANCEF) 2000 mg IVPB 2/28/2025  No Yes   Sig: Inject 2,000 mg into a catheter in a vein over 30 minutes at 100 mL/hr every 8 (eight) hours   cyanocobalamin (VITAMIN B-12) 100 MCG tablet 2/27/2025  Yes Yes   Sig: Take 100 mcg by mouth daily   docusate sodium (COLACE) 100 mg capsule Past Week  Yes Yes   Sig: Take 100 mg by mouth 2 (two) times a day   famotidine (PEPCID) 20 mg tablet 2/27/2025  Yes Yes   Sig: Take 20 mg by mouth 2 (two) times a day   ferrous sulfate 325 (65 Fe) mg tablet 2/27/2025  Yes Yes   Sig: Take 325 mg by mouth daily with breakfast   folic acid (FOLVITE) 1 mg tablet 2/27/2025  Yes Yes   Sig: TAKE ONE TABLET BY MOUTH EVERY DAY *FOLIC ACID SUPPLEMENT*   hydroxychloroquine (PLAQUENIL) 200 mg tablet 2/27/2025  Yes Yes   Sig: Take 400 mg by mouth daily with breakfast   levalbuterol (XOPENEX) 1.25 mg/3 mL nebulizer solution 2/28/2025  No Yes   Sig: Take 3 mL (1.25 mg total) by nebulization every 8 (eight) hours as needed for wheezing   lidocaine (LIDODERM) 5 % 2/28/2025  No Yes   Sig: Apply 3 patches topically over 12 hours daily Remove & Discard patch within 12 hours or as directed by MD. Apply to neck and back in areas of pain.   metoprolol tartrate (LOPRESSOR) 25 mg tablet 2/28/2025  Yes Yes   Sig: Take 25 mg by mouth every 12 (twelve) hours   mometasone 220 mcg/actuation inhaler 2/27/2025  Yes Yes   Sig: Inhale 1 puff every evening Rinse mouth after use.   polyethylene glycol (MIRALAX) 17 g packet Past Week  No Yes   Sig: Take  "17 g by mouth daily as needed (constipation)   rosuvastatin (CRESTOR) 40 MG tablet 2/28/2025  Yes Yes   Sig: Take 20 mg by mouth daily   senna (SENOKOT) 8.6 mg Past Week  No Yes   Sig: Take 1 tablet (8.6 mg total) by mouth daily at bedtime as needed for constipation   sertraline (ZOLOFT) 25 mg tablet Unknown  Yes No   Sig: Take 12.5 mg by mouth daily   tamsulosin (FLOMAX) 0.4 mg Past Week  No Yes   Sig: Take 1 capsule (0.4 mg total) by mouth daily with dinner      Facility-Administered Medications: None       Allergies   Allergen Reactions    Shellfish-Derived Products - Food Allergy Other (See Comments)     Pt states, \"I reacted, I dont know\"       OBJECTIVE:    Physical Exam  Physical Exam  Constitutional:       General: He is not in acute distress.     Appearance: He is ill-appearing.      Interventions: He is intubated and restrained.   HENT:      Head: Normocephalic and atraumatic.      Right Ear: External ear normal.      Left Ear: External ear normal.      Nose: Nose normal.      Mouth/Throat:      Mouth: Mucous membranes are moist.      Pharynx: Oropharynx is clear.   Eyes:      General: No scleral icterus.     Conjunctiva/sclera: Conjunctivae normal.   Cardiovascular:      Rate and Rhythm: Normal rate and regular rhythm.      Pulses: Decreased pulses.   Pulmonary:      Effort: Pulmonary effort is normal. No respiratory distress. He is intubated.   Abdominal:      General: There is no distension.      Palpations: Abdomen is soft.      Tenderness: There is no abdominal tenderness.   Musculoskeletal:      Right lower leg: No edema.      Left lower leg: No edema.   Skin:     Coloration: Skin is pale. Skin is not jaundiced.   Neurological:      Comments: Unresponsive to verbal and tactile stimuli   Psychiatric:      Comments: Unable to assess         Lab Results: I have personally reviewed pertinent labs., CBC:   Lab Results   Component Value Date    HGB 11.2 (L) 03/17/2025    HCT 34.3 (L) 03/17/2025   , CMP: " "  Lab Results   Component Value Date    SODIUM 145 03/18/2025    K 3.8 03/18/2025     (H) 03/18/2025    CO2 27 03/18/2025    BUN 63 (H) 03/18/2025    CREATININE 2.89 (H) 03/18/2025    CALCIUM 7.8 (L) 03/18/2025    AST 26 03/18/2025    ALT <3 (L) 03/18/2025    ALKPHOS 53 03/18/2025    EGFR 20 03/18/2025   , BMP:  Lab Results   Component Value Date    SODIUM 145 03/18/2025    K 3.8 03/18/2025     (H) 03/18/2025    CO2 27 03/18/2025    BUN 63 (H) 03/18/2025    CREATININE 2.89 (H) 03/18/2025    GLUC 187 (H) 03/18/2025    CALCIUM 7.8 (L) 03/18/2025    AGAP 7 03/18/2025    EGFR 20 03/18/2025   , PT/PTT:No results found for: \"PT\", \"PTT\"  Imaging Studies: Reviewed and interpreted the pertinent studies  EKG, Pathology, and Other Studies: Reviewed and interpreted the pertinent studies    I have spent a total time of 60 minutes in caring for this patient on the day of the visit/encounter including Risks and benefits of tx options, Instructions for management, Patient and family education, Importance of tx compliance, Risk factor reductions, Impressions, Counseling / Coordination of care, Documenting in the medical record, Reviewing/placing orders in the medical record (including tests, medications, and/or procedures), Obtaining or reviewing history  , and Communicating with other healthcare professionals . Topics discussed with the patient / family include medication review, psychosocial support, advanced directives, goals of care, hospice services, supportive listening, and anticipatory guidance.   "

## 2025-03-17 NOTE — ASSESSMENT & PLAN NOTE
Creatinine   Date Value Ref Range Status   03/17/2025 2.51 (H) 0.60 - 1.30 mg/dL Final     Comment:     Standardized to IDMS reference method   09/25/2022 1 0.6 - 1.2 mg/dL Final      Present on admission  Resolving prior to ICU admission    Plan:  Trend BMPs  Fluids as indicated

## 2025-03-17 NOTE — PROGRESS NOTES
Progress Note - Infectious Disease   Name: Devin Chaves 79 y.o. male I MRN: [id number]  Unit/Bed#: ICU 10 I Date of Admission: 2/28/2025   Date of Service: 3/17/2025 I Hospital Day: 17    Assessment & Plan  Neck pain  Patient developed acute neck pain with MSSA bacteremia during recent hospitalization.  CRP was highly elevated.  C-spine MRI showed possible C-spine and paraspinal infection.  Patient has no neurologic deficit.  His neck pain is finally improving.  However, given paraspinal infection on the initial C-spine MRI, we should repeat C-spine MRI with contrast when creatinine is improved to confirm resolution/improvement of paraspinal infection.  However, without any neurological deficit, it would be fine to postpone MRI until renal function is further improved, to decrease risk of contrast-induced ORIANA.  Patient should get MRI done prior to completion of IV antibiotic course below.  Antibiotic plan as in below.  Monitor neck pain.  Recommend repeat C-spine MRI with and without contrast.  This can be postponed until ORIANA is improved, but should be done prior to completion of IV antibiotic course.  MSSA bacteremia  Patient had MSSA bacteremia during recent hospitalization.  Source is unclear but likely cutaneous.  His bacteremia cleared rapidly on IV antibiotic.  TTE did not show any vegetation.  Given possible C-spine infection, plan was for long-term IV antibiotic.  Continue high-dose IV cefazolin.  Treat x 6 weeks from clearance of bacteremia as previously planned, through 3/27.  Acute blood loss anemia  Patient developed acute bleeding from rectal mass, resulting in rapid response, and subsequent cardiac arrest.  He is status post transfusion.  He is also status post small bowel resection and right hemicolectomy.  Management per primary service.  ORIANA (acute kidney injury) (HCC)  Patient with ORIANA on admission, most likely secondary to hypovolemia from GI bleed.  Creatinine has been stable over the last  few days.  Antibiotic dosages adjusted accordingly.  Monitor creatinine.  Nephrology follow-up.  Colonic mass  Patient has recently diagnosed adenocarcinoma of the colon.  He now status post bowel resection for bleeding from adenocarcinoma.  No plan for chemotherapy yet.  Colorectal surgery follow-up.  Chronic respiratory failure with hypoxia (HCC)  Patient is now intubated after cardiac arrest over the weekend.        Antibiotics:  Cefazolin  Last negative blood culture 2/14    Subjective   Events over weekend noted.    Patient is currently sedated, intubated, in ICU.  Temperature stays down.  No chills.    Objective :  Temp:  [97.6 °F (36.4 °C)-98.5 °F (36.9 °C)] 98.5 °F (36.9 °C)  HR:  [] 106  BP: ()/(51-72) 141/65  Resp:  [13-25] 25  SpO2:  [98 %-100 %] 100 %  O2 Device: Ventilator  FiO2 (%):  [40] 40    Physical Exam:     General: Unresponsive on ventilator.  No response to verbal or tactile stimuli.  Comfortable.  Nontoxic.   Neck:  Supple. No mass.  No lymphadenopathy.   Lungs: Expansion symmetric, diffuse rhonchi, no rales, no wheezing.   Heart:  Tachycardic with regular rhythm, S1 and S2 normal, no murmur.   Abdomen: Soft, nondistended, non-tender, bowel sounds active all four quadrants, no masses, no organomegaly.   Extremities: Stable leg edema. No erythema/warmth. No draining ulcer. Nontender to palpation.   Skin:  No rash.   Neuro: Not assessable.        Lab Results: I have reviewed the following results:  Results from last 7 days   Lab Units 03/17/25  0726 03/17/25  0424 03/16/25  2347 03/16/25  1947/25  1533 03/16/25  0925 03/16/25  0440 03/15/25  1935 03/15/25  1654   WBC Thousand/uL  --   --   --   --  13.31*  --  14.14*  --  11.65*   HEMOGLOBIN g/dL 11.0* 10.8* 11.8*   < > 12.9   < > 13.3  13.3   < > 12.5   I STAT HEMOGLOBIN   --   --   --   --   --    < >  --   --   --    PLATELETS Thousands/uL  --   --   --   --  104*  --  103*  --  70*    < > = values in this interval not  displayed.     Results from last 7 days   Lab Units 03/17/25  0935 03/17/25  0424 03/16/25  2235 03/16/25  1533 03/16/25  1327 03/16/25  1215 03/16/25  0440 03/15/25  1345 03/15/25  1235   SODIUM mmol/L 146 145 147 148*  --   --  148*   < > 152*   POTASSIUM mmol/L 4.2 4.3 4.4 4.4  --   --  4.2   < > 3.5   CHLORIDE mmol/L 113* 113* 115* 116*  --   --  113*   < > 119*   CO2 mmol/L 27 26 26 25  --   --  27   < > 19*   CO2, I-STAT mmol/L  --   --   --   --  29   < >  --    < >  --    BUN mg/dL 54* 53* 50* 48*  --   --  48*   < > 42*   CREATININE mg/dL 2.54* 2.51* 2.43* 2.14*  --   --  1.85*   < > 1.47*   EGFR ml/min/1.73sq m 23 23 24 28  --   --  34   < > 45   GLUCOSE, ISTAT mg/dl  --   --   --   --  137   < >  --    < >  --    CALCIUM mg/dL 7.8* 7.6* 7.8* 8.1*  --   --  8.1*   < > 6.9*   AST U/L  --   --   --  60*  --   --  69*  --  21   ALT U/L  --   --   --  <3*  --   --  5*  --  6*   ALK PHOS U/L  --   --   --  51  --   --  53  --  34   ALBUMIN g/dL  --   --   --  2.4*  --   --  2.7*  --  1.6*    < > = values in this interval not displayed.                 Results from last 7 days   Lab Units 03/13/25  0559   FERRITIN ng/mL 209

## 2025-03-17 NOTE — ASSESSMENT & PLAN NOTE
Patient presents with 2 days of chest pain, shortness of breath, fatigue and melena  2/20/2025 EGD/colonoscopy showed 2.5 cm ileocecal mass, pathology confirmed adenocarcinoma.  S/p 3 units packed rbc in ED. S/p 1 unit PRBCs on 3/2  Hemoglobin has been stable.   Patient recent diagnosis of adenocarcinoma, increased risk of stroke.   Updated iron studies 3/2025 improved compared to 2/2025  Rapid response called 10:30A M [id number] for AMS and hypotension  Patient found to be minimally responsive and hypotensive  HGB 5.9 by iSTAT, formal labs show HGB 6.3, significant drop from 8 in AM  Bleeding from colonic mass suspected, per nursing no hematuria hemoptysis melena or other signs of overt bleeding prior to RR  Patient intubated for airway protection  Transported to ICU  MTP called  Code blue called  ROSC achieved shortly after blood products started  Rapidly distending abdomen, suspect intraabdominal bleeding     Hemoglobin   Date Value Ref Range Status   03/17/2025 10.8 (L) 12.0 - 17.0 g/dL Final        Plan:  Daily CBC  Transfuse for Hgb <7  Protonix 40 mg IV daily  Hold home eliquis, off for 48 hours at time of rapid response  Heparin gtt ordered but not started prior to rapid  Palliative care consulted prior to ICU admission  MTP [id number]  PRBC 12  FFP 3  Platelets 1  Cryo 0  Whole blood 3  Consider high volume bleeding scan  Surgery consult for suspected internal bleeding  Per surgery take to OR w/o scan

## 2025-03-17 NOTE — PLAN OF CARE
Problem: SAFETY,RESTRAINT: NV/NON-SELF DESTRUCTIVE BEHAVIOR  Goal: Remains free of harm/injury (restraint for non violent/non self-detsructive behavior)  Description: INTERVENTIONS:  - Instruct patient/family regarding restraint use   - Assess and monitor physiologic and psychological status   - Provide interventions and comfort measures to meet assessed patient needs   - Identify and implement measures to help patient regain control  - Assess readiness for release of restraint   Outcome: Progressing  Goal: Returns to optimal restraint-free functioning  Description: INTERVENTIONS:  - Assess the patient's behavior and symptoms that indicate continued need for restraint  - Identify and implement measures to help patient regain control  - Assess readiness for release of restraint   Outcome: Progressing     Problem: Nutrition/Hydration-ADULT  Goal: Nutrient/Hydration intake appropriate for improving, restoring or maintaining nutritional needs  Description: Monitor and assess patient's nutrition/hydration status for malnutrition. Collaborate with interdisciplinary team and initiate plan and interventions as ordered.  Monitor patient's weight and dietary intake as ordered or per policy. Utilize nutrition screening tool and intervene as necessary. Determine patient's food preferences and provide high-protein, high-caloric foods as appropriate.     INTERVENTIONS:  - Monitor oral intake, urinary output, labs, and treatment plans  - Assess nutrition and hydration status and recommend course of action  - Evaluate amount of meals eaten  - Assist patient with eating if necessary   - Allow adequate time for meals  - Recommend/ encourage appropriate diets, oral nutritional supplements, and vitamin/mineral supplements  - Order, calculate, and assess calorie counts as needed  - Recommend, monitor, and adjust tube feedings and TPN/PPN based on assessed needs  - Assess need for intravenous fluids  - Provide specific  nutrition/hydration education as appropriate  - Include patient/family/caregiver in decisions related to nutrition  Outcome: Progressing     Problem: GASTROINTESTINAL - ADULT  Goal: Minimal or absence of nausea and/or vomiting  Description: INTERVENTIONS:  - Administer IV fluids if ordered to ensure adequate hydration  - Maintain NPO status until nausea and vomiting are resolved  - Nasogastric tube if ordered  - Administer ordered antiemetic medications as needed  - Provide nonpharmacologic comfort measures as appropriate  - Advance diet as tolerated, if ordered  - Consider nutrition services referral to assist patient with adequate nutrition and appropriate food choices  Outcome: Progressing  Goal: Maintains or returns to baseline bowel function  Description: INTERVENTIONS:  - Assess bowel function  - Encourage oral fluids to ensure adequate hydration  - Administer IV fluids if ordered to ensure adequate hydration  - Administer ordered medications as needed  - Encourage mobilization and activity  - Consider nutritional services referral to assist patient with adequate nutrition and appropriate food choices  Outcome: Progressing  Goal: Maintains adequate nutritional intake  Description: INTERVENTIONS:  - Monitor percentage of each meal consumed  - Identify factors contributing to decreased intake, treat as appropriate  - Assist with meals as needed  - Monitor I&O, weight, and lab values if indicated  - Obtain nutrition services referral as needed  Outcome: Progressing  Goal: Establish and maintain optimal ostomy function  Description: INTERVENTIONS:  - Assess bowel function  - Encourage oral fluids to ensure adequate hydration  - Administer IV fluids if ordered to ensure adequate hydration   - Administer ordered medications as needed  - Encourage mobilization and activity  - Nutrition services referral to assist patient with appropriate food choices  - Assess stoma site  - Consider wound care consult   Outcome:  Progressing  Goal: Oral mucous membranes remain intact  Description: INTERVENTIONS  - Assess oral mucosa and hygiene practices  - Implement preventative oral hygiene regimen  - Implement oral medicated treatments as ordered  - Initiate Nutrition services referral as needed  Outcome: Progressing

## 2025-03-17 NOTE — ASSESSMENT & PLAN NOTE
Resolved and off pressors. Now hypertensive this am   All BP meds are on hold.   BP stable.   Continue to monitor

## 2025-03-17 NOTE — ASSESSMENT & PLAN NOTE
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    Patient was due to follow-up with outpatient colorectal surgery on 2/28/2025  Pt presented initially with melena, Hgb 4.6. Symptomatic anemia.  Outpatient oncology appointment scheduled for 4/3  Rapid Response [id number], see rapid response note  Suspect intraabdominal bleeding with mass as possible source  High volume bleeding CT deferred in favor of exlap to control bleeding given unstable condition  Per surgery, possible resection of colonic mass in exlap

## 2025-03-17 NOTE — SPEECH THERAPY NOTE
Speech Language/Pathology    Speech/Language Pathology Cancel Note    Patient Name: Devin Chaves  Today's Date: 3/17/2025       Pt had speech/swallow eval 3/15, became a rapid response later that day, pt was intubated. Pt underwent exp lap w/ partial small bowel resection, right colon resection on 3/16/25.    Pt remains intubated at this time. Will sign off. Reconsult when/if needed.      Cheri Strong MA CCC-SLP  Speech Pathologist  Available via [employer]

## 2025-03-17 NOTE — PROGRESS NOTES
Progress Note - Nephrology   Name: Devin Chaves 79 y.o. male I MRN: [id number]  Unit/Bed#: ICU 10 I Date of Admission: 2/28/2025   Date of Service: 3/17/2025 I Hospital Day: 17     Assessment & Plan  ORIANA (acute kidney injury) (HCC)  Baseline creatinine is normal at 0.8 to 0.9.   Admission SCr 2.43 on 2/28/25.   Etiology is not entirely clear - not prerenal or postrenal. Working dx is ATN but could have another underlying pathology.   CT 2/28/25 - no hydronephrosis. UA on 3/8/25 with RBC 2-4, WBC 2-4, small blood and trace protein.   Of note, UPC ratio was 3.4 gm on 3/8/25 and urine ACR was only 821 mg on 3/5/25.   Serum and urine LIDYA no M spike, C3 83 (low), C4 26. At risk for AIN due to antibiotic exposure but no clear indication of this as well.   SCr has been around 1.8 to 2.0 since admission  S/p cardiac arrest on 3/15/25 and is at risk for ATN due to that event.   Today's creatinine bumped to 2.51 after return to OR for ex lap, partial SBR and R colectomy. +required levophed intraop.   Low UO previously likely due to intravascular volume depletion.  Remains borderline.  Renal function slightly worse after return to the OR yesterday in setting of relative hypotension and hemodynamic changes.  Continue IVF for now.  Currently on LR at 100 mL/h  May need to consider dose of IV diuretics  Avoid nephrotoxins, NSAIDs, IV contrast if possible  Avoid hypotension, maintain MAP >65  Trend BMP in a.m.    Hypovolemic shock (HCC)  Resolved and off pressors. Now hypertensive this am   All BP meds are on hold.   BP stable.   Continue to monitor    Acute blood loss anemia  Acute blood loss on 3/15/25 and received > 10 units PRBC.   Hgb 11 today, stable and at goal.   Hgb monitoring per primary service.     HTN (hypertension)  BP stable and acceptable  Volume status mild hypervolemia  Echo 3/17/2025: Pending  Home Rx: Metoprolol 25 mg twice daily  Previous Rx: Metoprolol 25 mg BID, Torsemide 20 mg OD.   Current Rx:  none.   All meds on hold.   Consider resuming beta-blockers if persistent hypotension  Avoid hypotension or large fluctuations in BP  Continue to monitor    MSSA bacteremia  Currently on Cefazolin 2 gm IV q8H - needs this until 3/27/25.   ID following.   Possible cutaneous source  TTE without vegetation.     Urinary retention  Seen by urology this admission.   Now with glass.   Tamsulosin on hold.     Colonic mass  Newly diagnosed colon mass on C-scope on 2/20/25.   Pathology - adenocarcinoma  Was to follow colorectal as outpatient  S/p ex lap, partial SBR and R colectomy 3/16/2025  Surgical pathology pending  Management per surgery    Pleural effusion, bilateral  CXR with vascular congestion and pleural effusions.   Was being diuresed before cardiac arrest on 3/15/25.   Diuresis on hold.     Cardiac arrest due to other underlying condition (HCC)  S/p cardiac arrest with ROSC 3/15/2025  In the setting of ABLA with undetectable Hgb and hemoperitoneum, s/p emergent ex lap, segmental SBR 3/15  Management per primary team    Hemoperitoneum  With actively bleeding mesenteric hematoma  S/p emergent ex lap, control of bleeding, segmental SBR and left in discontinuity 3/15  Now s/p partial SBR, R hemicolectomy and closure 3/16/2025  Management per surgery  Atrial fibrillation (HCC)  On Metoprolol for rate control, currently on hold  On Eliquis for AC, now on hold      Plan Summary:  -Renal function worse today after OR 3/16  -continue IVF for now  -trend H/H  -vent as per primary team  -BMP in am    SUBJECTIVE:  Patient remains intubated on vent, AC 18/40%/+6.  Undergoing TTE now.  Urine output borderline, hypotension resolved and now hypertensive.  Hgb stable. VSS.  Creat trending up this am.  IVF: LR @ 100 ml/hr.  On TPN    A complete review of systems was unable to be performed due to patient intubated on vent.    OBJECTIVE:  Current Weight: Weight - Scale: 95.6 kg (210 lb 12.2 oz)  Vitals:    03/17/25 0400 03/17/25 0500  03/17/25 0541 03/17/25 0600   BP: 128/57 138/65  142/64   Pulse: 99 97  97   Resp: 21 20  20   Temp:       TempSrc:       SpO2: 98% 98%  99%   Weight:   95.6 kg (210 lb 12.2 oz)    Height:           Intake/Output Summary (Last 24 hours) at 3/17/2025 0745  Last data filed at 3/17/2025 0530  Gross per 24 hour   Intake 5142.36 ml   Output 1085 ml   Net 4057.36 ml       General: Patient intubated on vent.  Skin:  No rash, warm, good skin turgor   Eyes:  sclerae nonicteric.  no periorbital edema   ENT:  Moist mucous membranes  Neck:  Trachea midline, symmetric.  No JVD.  Chest:  Clear to auscultation bilaterally without wheezes, crackles or rhonchi  CVS:  Regular rate and rhythm without murmur, gallop or rub.  S1 and S2 identified and normal.  No S3, S4.   Abdomen:  Soft, nontender, nondistended without masses.  Hypoactive bowel sounds x 4 quadrants.  No bruit.  + Midline abdominal incision  Extremities:  Warm, pink, well perfused.  No cyanosis, pallor.  1+ BLE edema.  Neuro: Intubated on vent  Psych: Intubated on vent  : glass catheter in place       Medications:    Current Facility-Administered Medications:     Adult 3-in-1 TPN (standard base / standard electrolytes), , Intravenous, Continuous TPN, Sailaja Gonzales MD, Last Rate: 42 mL/hr at 03/16/25 2335, New Bag at 03/16/25 2335    [Held by provider] ascorbic acid (VITAMIN C) tablet 250 mg, 250 mg, Oral, Daily, Yobany Mott MD, 250 mg at 03/14/25 0848    [Held by provider] atorvastatin (LIPITOR) tablet 40 mg, 40 mg, Oral, Daily With Dinner, Yobany Mott MD, 40 mg at 03/13/25 1626    ceFAZolin (ANCEF) IVPB (premix in dextrose) 2,000 mg 50 mL, 2,000 mg, Intravenous, Q8H, Yobany Mott MD, Last Rate: 100 mL/hr at 03/17/25 0513, 2,000 mg at 03/17/25 0513    chlorhexidine (PERIDEX) 0.12 % oral rinse 15 mL, 15 mL, Mouth/Throat, Q12H GALE, Yobany Mott MD, 15 mL at 03/16/25 8174    [Held by provider] Cholecalciferol (VITAMIN D3) tablet 2,000 Units, 2,000  Units, Oral, Daily, Yobany Mott MD, 2,000 Units at 03/14/25 0848    [Held by provider] cyanocobalamin (VITAMIN B-12) tablet 100 mcg, 100 mcg, Oral, Daily, Yobany Mott MD, 100 mcg at 03/14/25 0848    dextrose 5 % infusion, 75 mL/hr, Intravenous, Continuous, Valerie Gage PA-C, Last Rate: 75 mL/hr at 03/17/25 0431, 75 mL/hr at 03/17/25 0431    fentaNYL 1000 mcg in sodium chloride 0.9% 100mL infusion, 50 mcg/hr, Intravenous, Continuous, Yobany Mott MD, Stopped at 03/16/25 0600    [Held by provider] ferrous sulfate tablet 325 mg, 325 mg, Oral, Daily With Breakfast, Yobany Mott MD    [Held by provider] fluticasone (ARNUITY ELLIPTA) 100 MCG/ACT inhaler 1 puff, 1 puff, Inhalation, Daily, Yobany Mott MD, 1 puff at 03/15/25 0925    [Held by provider] folic acid (FOLVITE) tablet 1 mg, 1 mg, Oral, Daily, Yobany Mott MD, 1 mg at 03/14/25 0848    HYDROmorphone HCl (DILAUDID) injection 0.2 mg, 0.2 mg, Intravenous, Q4H PRN, Yobany Mott MD    [Held by provider] hydroxychloroquine (PLAQUENIL) tablet 400 mg, 400 mg, Oral, Daily With Breakfast, Yobany Mott MD, 400 mg at 03/14/25 0848    insulin lispro (HumALOG/ADMELOG) 100 units/mL subcutaneous injection 1-6 Units, 1-6 Units, Subcutaneous, Q6H GALE, 2 Units at 03/17/25 0645 **AND** Fingerstick Glucose (POCT), , , Q6H, AMPARO Garcia    lactated ringers infusion, 100 mL/hr, Intravenous, Continuous, AMPARO Garcia, Last Rate: 100 mL/hr at 03/17/25 0153, 100 mL/hr at 03/17/25 0153    levalbuterol (XOPENEX) inhalation solution 1.25 mg, 1.25 mg, Nebulization, Q4H PRN, Yobany Mott MD, 1.25 mg at 03/15/25 1005    lidocaine (LIDODERM) 5 % patch 3 patch, 3 patch, Topical, Daily, Yobany Mott MD, 3 patch at 03/15/25 0924    [Held by provider] metoprolol tartrate (LOPRESSOR) tablet 25 mg, 25 mg, Oral, Q12H GALE, Yobany Mott MD, 25 mg at 03/14/25 0848    moisture barrier miconazole 2% cream (aka HITESH  MOISTURE BARRIER ANTIFUNGAL CREAM), , Topical, BID, Yobany Mott MD, Given at 03/16/25 0916    [Held by provider] oxyCODONE (ROXICODONE) IR tablet 5 mg, 5 mg, Oral, Q6H PRN, Yobany Mott MD, 5 mg at 03/01/25 1817    [Held by provider] oxyCODONE (ROXICODONE) split tablet 2.5 mg, 2.5 mg, Oral, Q6H PRN, Yobany Mott MD, 2.5 mg at 03/05/25 0945    pantoprazole (PROTONIX) injection 40 mg, 40 mg, Intravenous, Q12H GALE, Yobany Mott MD, 40 mg at 03/16/25 2116    [Held by provider] potassium chloride (Klor-Con M20) CR tablet 20 mEq, 20 mEq, Oral, Daily, Yobany Mott MD    [Held by provider] senna-docusate sodium (SENOKOT S) 8.6-50 mg per tablet 1 tablet, 1 tablet, Oral, HS, Yobany Mott MD, 1 tablet at 03/13/25 2156    [Held by provider] tamsulosin (FLOMAX) capsule 0.4 mg, 0.4 mg, Oral, Daily With Dinner, Yobany Mott MD, 0.4 mg at 03/13/25 1626    [Held by provider] torsemide (DEMADEX) tablet 20 mg, 20 mg, Oral, Daily, Yobany Mott MD, 20 mg at 03/14/25 0848    [Held by provider] umeclidinium-vilanterol 62.5-25 mcg/actuation inhaler 1 puff, 1 puff, Inhalation, Daily, Yobany Mott MD, 1 puff at 03/15/25 0925    Laboratory Results:  Results from last 7 days   Lab Units 03/17/25  0726 03/17/25  0424 03/16/25  2347 03/16/25  2235 03/16/25  1947/25  1533 03/16/25  1327 03/16/25  1215 03/16/25  0925 03/16/25  0440 03/15/25  1935 03/15/25  1654 03/15/25  1442 03/15/25  1345 03/15/25  1235 03/15/25  1154 03/15/25  1045 03/15/25  1042 03/14/25  1551 03/14/25  0520 03/13/25  0909 03/13/25  0559 03/12/25  0653 03/12/25  0519   WBC Thousand/uL  --   --   --   --   --  13.31*  --   --   --  14.14*  --  11.65*  --   --  9.30  --  13.12*  --   --  7.08  --  7.45  --  4.97   HEMOGLOBIN g/dL 11.0* 10.8* 11.8*  --  12.5 12.9  --   --    < > 13.3  13.3   < > 12.5  --   --  11.5*  --  6.3*  --    < > 7.3*   < > 6.7*   < > 6.8*   I STAT HEMOGLOBIN g/dl  --   --   --   --   --   --   10.9* 10.2*  --   --   --   --  10.9* 11.6*  --   --   --  5.4*   < >  --   --   --   --   --    HEMATOCRIT % 32.3* 33.1* 35.6*  --  36.7 38.5  --   --    < > 38.7  38.4   < > 36.7  --   --  35.4*  --  21.0*  --    < > 23.3*   < > 22.1*   < > 22.1*   HEMATOCRIT, ISTAT %  --   --   --   --   --   --  32* 30*  --   --   --   --  32* 34*  --  <15*  --  16*   < >  --   --   --   --   --    PLATELETS Thousands/uL  --   --   --   --   --  104*  --   --   --  103*  --  70*  --   --  70*  --  220  --   --  147*  --  146*  --  162   SODIUM mmol/L  --  145  --  147  --  148*  --   --   --  148*  --  151*  --   --  152*  --  143  --    < > 143  --  143  --  143   POTASSIUM mmol/L  --  4.3  --  4.4  --  4.4  --   --   --  4.2  --  3.2*  --   --  3.5  --  4.4  --    < > 3.2*  --  3.8  --  4.0   CHLORIDE mmol/L  --  113*  --  115*  --  116*  --   --   --  113*  --  113*  --   --  119*  --  107  --    < > 107  --  107  --  107   CO2 mmol/L  --  26  --  26  --  25  --   --   --  27  --  25  --   --  19*  --  24  --    < > 33*  --  32  --  33*   CO2, I-STAT mmol/L  --   --   --   --   --   --  29 27  --   --   --   --  24 23  --  28  --  21   < >  --   --   --   --   --    BUN mg/dL  --  53*  --  50*  --  48*  --   --   --  48*  --  45*  --   --  42*  --  57*  --    < > 61*  --  58*  --  47*   CREATININE mg/dL  --  2.51*  --  2.43*  --  2.14*  --   --   --  1.85*  --  1.67*  --   --  1.47*  --  2.20*  --    < > 2.07*  --  1.97*  --  1.99*   CALCIUM mg/dL  --  7.6*  --  7.8*  --  8.1*  --   --   --  8.1*  --  7.7*  --   --  6.9*  --  7.8*  --    < > 8.0*  --  8.1*  --  8.0*   MAGNESIUM mg/dL  --   --   --   --   --  1.9  --   --   --  2.0  --  1.6*  --   --   --   --  2.3  --   --  1.8*  --  1.9  --  1.9   PHOSPHORUS mg/dL  --   --   --   --   --  4.1  --   --   --  3.2  --  3.6  --   --   --   --   --   --   --   --   --   --   --   --    GLUCOSE, ISTAT mg/dl  --   --   --   --   --   --  137 124  --   --   --   --  181* 185*  --   159*  --  314*  --   --   --   --   --   --     < > = values in this interval not displayed.       I have personally reviewed the blood work as stated above and in my note.  I have personally reviewed internal Medicine, co-consultants and previous nephrology notes.

## 2025-03-17 NOTE — ASSESSMENT & PLAN NOTE
S/p cardiac arrest with ROSC 3/15/2025  In the setting of ABLA with undetectable Hgb and hemoperitoneum, s/p emergent ex lap, segmental SBR 3/15  Management per primary team

## 2025-03-17 NOTE — QUICK NOTE
Updated son in room and addressed all questions.    Daughter then called in with spouse of patient and requested updates again soon after. Updated them on the patient's current condition, care management plan, and goals. All questions were addressed. No other concerns at this time.

## 2025-03-17 NOTE — CONSULTS
Consultation - Neurology   Name: Devin Chaves 79 y.o. 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 During hospital course had RR d/t AMS in which he became hypotensive and had subsequent cardiac arrest with ROSC achieved after around 20 minutes. Underwent ex lap with abd closure and bowel resection on 3/17. Neurology has been consulted after he was noticed not to be waking up after sedation wean.    He underwent NC CTH which did not show evidence of anoxic brain injury, stable chronic ischemic changes, and a region of hgih attenuation in the inferior L frontal lobe which represents mineralization vs trace residual hemorrhage.     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    Plan:  Frequent neuro checks  Continue with LTM   Obtain MRI brain with and with contrast tomorrow afternoon  Neurology will continue to follow  Acute blood loss anemia    Severe protein-calorie malnutrition (HCC)  Malnutrition Findings:   Adult Malnutrition type: Acute illness  Adult Degree of Malnutrition: Other severe protein calorie malnutrition  Malnutrition Characteristics: Inadequate energy, Fluid accumulation      Body mass index is 28.58 kg/m².     Neurology service will  follow.    Recommendations for outpatient neurological follow up have yet to be determined.    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 During hospital course had RR d/t AMS in which he became hypotensive and had subsequent cardiac arrest with ROSC achieved after around 20 minutes. Underwent ex lap with abd closure and bowel resection on 3/17. Neurology has been consulted after he was noticed not to be waking up after sedation wean.    Review of Systems   Unable to perform ROS: Intubated      Medical History Review: I have reviewed the patient's PMH, PSH, Social History, Family History, Meds, and Allergies   Historical Information   Past Medical History:   Diagnosis Date    Atrial fibrillation (HCC)     COPD (chronic obstructive pulmonary disease) (HCC)     Crushing injury of finger, left     Infectious viral hepatitis      Past Surgical History:   Procedure Laterality Date    FL LUMBAR PUNCTURE DIAGNOSTIC  2/10/2022    LAPAROTOMY N/A 3/15/2025    Procedure: LAPAROTOMY EXPLORATORY, small bowel resection, therapeutic packing, control of mesenteric hemorrhage;  Surgeon: Garcia Epperson DO;  Location: [facility];  Service: General    LAPAROTOMY N/A 3/16/2025    Procedure: LAPAROTOMY EXPLORATORY, partial small bowel resection, right colon resection, tap block,;  Surgeon: Brandon Tello MD;  Location: [facility];  Service: General    CA OPEN TX PHALANGEAL SHAFT FRACTURE PROX/MIDDLE EA Left 1/25/2017    Procedure: ORIF LEFT SMALL FINGER FRACTURE;  Surgeon: Blake Badillo MD;  Location: [facility];  Service: Orthopedics     Social History     Tobacco Use    Smoking status: Former    Smokeless tobacco: Former     Quit date: 4/1/2011   Substance and Sexual Activity    Alcohol use: No    Drug use: No    Sexual activity:  Not on file     E-Cigarette/Vaping     E-Cigarette/Vaping Substances     History reviewed. No pertinent family history.  Social History     Tobacco Use    Smoking status: Former    Smokeless tobacco: Former     Quit date: 4/1/2011   Substance and Sexual Activity    Alcohol use: No    Drug use: No    Sexual activity: Not on file       Current Facility-Administered Medications:     Adult 3-in-1 TPN (custom base / custom electrolytes), Continuous TPN    [Held by provider] ascorbic acid (VITAMIN C) tablet 250 mg, Daily    [Held by provider] atorvastatin (LIPITOR) tablet 40 mg, Daily With Dinner    ceFAZolin (ANCEF) IVPB (premix in dextrose) 2,000 mg 50 mL, Q8H, Last Rate: Stopped (03/17/25 8595)    chlorhexidine (PERIDEX) 0.12 % oral rinse 15 mL, Q12H GALE    [Held by provider] Cholecalciferol (VITAMIN D3) tablet 2,000 Units, Daily    [Held by provider] cyanocobalamin (VITAMIN B-12) tablet 100 mcg, Daily    [Held by provider] ferrous sulfate tablet 325 mg, Daily With Breakfast    [Held by provider] fluticasone (ARNUITY ELLIPTA) 100 MCG/ACT inhaler 1 puff, Daily    [Held by provider] folic acid (FOLVITE) tablet 1 mg, Daily    HYDROmorphone HCl (DILAUDID) injection 0.2 mg, Q4H PRN    [Held by provider] hydroxychloroquine (PLAQUENIL) tablet 400 mg, Daily With Breakfast    insulin lispro (HumALOG/ADMELOG) 100 units/mL subcutaneous injection 1-6 Units, Q6H GALE **AND** Fingerstick Glucose (POCT), Q6H    levalbuterol (XOPENEX) inhalation solution 1.25 mg, Q4H PRN    lidocaine (LIDODERM) 5 % patch 3 patch, Daily    [Held by provider] metoprolol tartrate (LOPRESSOR) tablet 25 mg, Q12H GALE    moisture barrier miconazole 2% cream (aka HITESH MOISTURE BARRIER ANTIFUNGAL CREAM), BID    [Held by provider] oxyCODONE (ROXICODONE) IR tablet 5 mg, Q6H PRN    [Held by provider] oxyCODONE (ROXICODONE) split tablet 2.5 mg, Q6H PRN    pantoprazole (PROTONIX) injection 40 mg, Q12H GALE    [Held by provider] potassium chloride (Klor-Con M20) CR  tablet 20 mEq, Daily    [Held by provider] senna-docusate sodium (SENOKOT S) 8.6-50 mg per tablet 1 tablet, HS    [Held by provider] tamsulosin (FLOMAX) capsule 0.4 mg, Daily With Dinner    [Held by provider] torsemide (DEMADEX) tablet 20 mg, Daily    [Held by provider] umeclidinium-vilanterol 62.5-25 mcg/actuation inhaler 1 puff, Daily  Prior to Admission Medications   Prescriptions Last Dose Informant Patient Reported? Taking?   Cholecalciferol 50 MCG (2000 UT) TABS 2/27/2025  Yes Yes   Sig: TAKE ONE TABLET BY MOUTH DAILY (FOR LOW VITAMIN D)   albuterol (2.5 mg/3 mL) 0.083 % nebulizer solution More than a month  No No   Sig: Take 3 mL (2.5 mg total) by nebulization every 6 (six) hours as needed for wheezing or shortness of breath   albuterol (PROVENTIL HFA,VENTOLIN HFA) 90 mcg/act inhaler   Yes No   Sig: INHALE 2 PUFFS BY MOUTH EVERY 4 HOURS AS NEEDED FOR BREATHING   albuterol (PROVENTIL HFA,VENTOLIN HFA) 90 mcg/act inhaler More than a month  No No   Sig: Inhale 2 puffs every 4 (four) hours as needed for wheezing   apixaban (Eliquis) 5 mg Past Week  Yes Yes   Sig: Take 5 mg by mouth 2 (two) times a day   ascorbic acid (VITAMIN C) 250 MG tablet 2/27/2025  Yes Yes   Sig: Take 250 mg by mouth daily   aspirin (ECOTRIN LOW STRENGTH) 81 mg EC tablet 2/27/2025 Morning  Yes Yes   Sig: Take 81 mg by mouth daily   ceFAZolin (ANCEF) 2000 mg IVPB 2/28/2025  No Yes   Sig: Inject 2,000 mg into a catheter in a vein over 30 minutes at 100 mL/hr every 8 (eight) hours   cyanocobalamin (VITAMIN B-12) 100 MCG tablet 2/27/2025  Yes Yes   Sig: Take 100 mcg by mouth daily   docusate sodium (COLACE) 100 mg capsule Past Week  Yes Yes   Sig: Take 100 mg by mouth 2 (two) times a day   famotidine (PEPCID) 20 mg tablet 2/27/2025  Yes Yes   Sig: Take 20 mg by mouth 2 (two) times a day   ferrous sulfate 325 (65 Fe) mg tablet 2/27/2025  Yes Yes   Sig: Take 325 mg by mouth daily with breakfast   folic acid (FOLVITE) 1 mg tablet 2/27/2025  Yes Yes    Sig: TAKE ONE TABLET BY MOUTH EVERY DAY *FOLIC ACID SUPPLEMENT*   hydroxychloroquine (PLAQUENIL) 200 mg tablet 2/27/2025  Yes Yes   Sig: Take 400 mg by mouth daily with breakfast   levalbuterol (XOPENEX) 1.25 mg/3 mL nebulizer solution 2/28/2025  No Yes   Sig: Take 3 mL (1.25 mg total) by nebulization every 8 (eight) hours as needed for wheezing   lidocaine (LIDODERM) 5 % 2/28/2025  No Yes   Sig: Apply 3 patches topically over 12 hours daily Remove & Discard patch within 12 hours or as directed by MD. Apply to neck and back in areas of pain.   metoprolol tartrate (LOPRESSOR) 25 mg tablet 2/28/2025  Yes Yes   Sig: Take 25 mg by mouth every 12 (twelve) hours   mometasone 220 mcg/actuation inhaler 2/27/2025  Yes Yes   Sig: Inhale 1 puff every evening Rinse mouth after use.   polyethylene glycol (MIRALAX) 17 g packet Past Week  No Yes   Sig: Take 17 g by mouth daily as needed (constipation)   rosuvastatin (CRESTOR) 40 MG tablet 2/28/2025  Yes Yes   Sig: Take 20 mg by mouth daily   senna (SENOKOT) 8.6 mg Past Week  No Yes   Sig: Take 1 tablet (8.6 mg total) by mouth daily at bedtime as needed for constipation   sertraline (ZOLOFT) 25 mg tablet Unknown  Yes No   Sig: Take 12.5 mg by mouth daily   tamsulosin (FLOMAX) 0.4 mg Past Week  No Yes   Sig: Take 1 capsule (0.4 mg total) by mouth daily with dinner      Facility-Administered Medications: None     Shellfish-derived products - food allergy    Objective :  Temp:  [97.6 °F (36.4 °C)-98.5 °F (36.9 °C)] 98.5 °F (36.9 °C)  HR:  [] 107  BP: ()/(51-74) 158/72  Resp:  [13-25] 24  SpO2:  [98 %-100 %] 100 %  O2 Device: Ventilator  FiO2 (%):  [40] 40    PHYSICAL EXAM  /65 (BP Location: Left arm)   Pulse 102   Temp 98.9 °F (37.2 °C) (Axillary)   Resp 16   Ht 6' (1.829 m)   Wt 95.6 kg (210 lb 12.2 oz)   SpO2 100%   BMI 28.58 kg/m²     NEUROLOGICAL EXAM    MENTAL STATUS:   General: Intubated and mechanically ventilated  Level of consciousness:Comatose -  does not respond to vocal or noxious/painful stimuli    Follows commands: Does not follow commands  Orientation:  Intubated  Speech/Language: Intubated    CRANIAL NERVES (BRAIN STEM REFLEXES):  CN II Pupils:  Sluggish  VF Blink to Threat (II): did not    CN III, IV, VI  Gaze: Conjugate gaze in primary position and No gaze preference  VOR/(Doll's Eye): Did maintain original direction of gaze   CN V, VII Corneal reflex: Right - present, Left - present  Facial Symmetry:Symmetric   CN IX, X Cough Reflex did   Gag Reflex did      MOTOR:  Bulk: Normal bulk, no atrophy.  Tone: Normal tone throughout  Involuntary Movement: Normal Tone  No Involuntary Movements  No Tremors Appreciated  Spontaneous Movement:No spontaneous movement of the extremities   Withdrawal/Purposeful Movement:Triple flexion withdrawal of the left lower extremity(ies)          Lab Results: I have reviewed the following results:CBC:   Results from last 7 days   Lab Units 03/17/25  0726 03/17/25  0424 03/16/25  2347 03/16/25  1947/25  1533 03/16/25  0925 03/16/25  0440 03/15/25  1935 03/15/25  1654   WBC Thousand/uL  --   --   --   --  13.31*  --  14.14*  --  11.65*   RBC Million/uL  --   --   --   --  4.45  --  4.61  --  4.29   HEMOGLOBIN g/dL 11.0* 10.8* 11.8*   < > 12.9   < > 13.3  13.3   < > 12.5   I STAT HEMOGLOBIN   --   --   --   --   --    < >  --   --   --    HEMATOCRIT % 32.3* 33.1* 35.6*   < > 38.5   < > 38.7  38.4   < > 36.7   HEMATOCRIT, ISTAT   --   --   --   --   --    < >  --   --   --    MCV fL  --   --   --   --  87  --  84  --  86   PLATELETS Thousands/uL  --   --   --   --  104*  --  103*  --  70*    < > = values in this interval not displayed.   , BMP/CMP:   Results from last 7 days   Lab Units 03/17/25  1550 03/17/25  0935 03/17/25  0424 03/16/25  2235 03/16/25  1533 03/16/25  1327 03/16/25  1215 03/16/25  0440 03/15/25  1654 03/15/25  1442 03/15/25  1345 03/15/25  1235   SODIUM mmol/L 146 146 145   < > 148*  --   --  148*   <  >  --   --  152*   POTASSIUM mmol/L 4.1 4.2 4.3   < > 4.4  --   --  4.2   < >  --   --  3.5   CHLORIDE mmol/L 113* 113* 113*   < > 116*  --   --  113*   < >  --   --  119*   CO2 mmol/L 25 27 26   < > 25  --   --  27   < >  --   --  19*   CO2, I-STAT mmol/L  --   --   --   --   --  29 27  --   --  24   < >  --    BUN mg/dL 56* 54* 53*   < > 48*  --   --  48*   < >  --   --  42*   CREATININE mg/dL 2.68* 2.54* 2.51*   < > 2.14*  --   --  1.85*   < >  --   --  1.47*   GLUCOSE, ISTAT mg/dl  --   --   --   --   --  137 124  --   --  181*   < >  --    CALCIUM mg/dL 7.9* 7.8* 7.6*   < > 8.1*  --   --  8.1*   < >  --   --  6.9*   AST U/L  --   --   --   --  60*  --   --  69*  --   --   --  21   ALT U/L  --   --   --   --  <3*  --   --  5*  --   --   --  6*   ALK PHOS U/L  --   --   --   --  51  --   --  53  --   --   --  34   EGFR ml/min/1.73sq m 21 23 23   < > 28  --   --  34   < >  --   --  45    < > = values in this interval not displayed.   , Vitamin B12:   , HgBA1C:   , TSH:     Recent Labs     03/16/25  0440 03/16/25  0925 03/16/25  1533 03/16/25  1947/25  0726 03/17/25  0935 03/17/25  1550   WBC 14.14*  --  13.31*  --   --   --   --    HGB 13.3  13.3   < > 12.9   < > 11.0*  --   --    HCT 38.7  38.4   < > 38.5   < > 32.3*  --   --    *  --  104*  --   --   --   --    BANDSPCT 1  --   --   --   --   --   --    SODIUM 148*  --  148*   < >  --  146 146   K 4.2  --  4.4   < >  --  4.2 4.1   *  --  116*   < >  --  113* 113*   CO2 27   < > 25   < >  --  27 25   BUN 48*  --  48*   < >  --  54* 56*   CREATININE 1.85*  --  2.14*   < >  --  2.54* 2.68*   GLUC 147*  --  135   < >  --  195* 172*   CAIONIZED  --    < > 1.13  --   --   --   --    MG 2.0  --  1.9  --   --  1.9  --    PHOS 3.2  --  4.1  --   --  4.0  --     < > = values in this interval not displayed.     Imaging Results Review: I reviewed radiology reports from this admission including: CT head.  Other Study Results Review: No additional  pertinent studies reviewed.    VTE Prophylaxis: VTE covered by:    None

## 2025-03-17 NOTE — ASSESSMENT & PLAN NOTE
Off sedation, patient did not awaken.  On exam, pt had nystagmus  Discussed with epileptologist, agreeable with EEG    Plan:  Continue EEG  F/u results

## 2025-03-17 NOTE — ASSESSMENT & PLAN NOTE
Rapid response called for hypotension and AMS  Acute blood loss, suspect intraabdominal bleeding vs colonic mass bleeding  No melena, hematuria, hemoptysis, or other signs of overt bleeding  MTP called    Plan:  Source control with surgery  Levophed  Blood and fluid resuscitation as indicated   Glycopyrrolate Counseling:  I discussed with the patient the risks of glycopyrrolate including but not limited to skin rash, drowsiness, dry mouth, difficulty urinating, and blurred vision.

## 2025-03-17 NOTE — ASSESSMENT & PLAN NOTE
Acute blood loss on 3/15/25 and received > 10 units PRBC.   Hgb 11 today, stable and at goal.   Hgb monitoring per primary service.

## 2025-03-17 NOTE — ASSESSMENT & PLAN NOTE
Patient endorsed melena prior to admission.  One episode dark, tarry stool overnight on 3/4/25. Not documented.  Rapid response called [id number] with suspected intraabdominal bleeding    Plan:  See above and chart for rapid response and MTP

## 2025-03-17 NOTE — ASSESSMENT & PLAN NOTE
Patient has recently diagnosed adenocarcinoma of the colon.  He now status post bowel resection for bleeding from adenocarcinoma.  No plan for chemotherapy yet.  Colorectal surgery follow-up.

## 2025-03-17 NOTE — ASSESSMENT & PLAN NOTE
Patient sustained cardiac arrest in the setting of an undetectable hemoglobin on 3/15 and was taken emergently to the operating room for exploration in the setting of abdominal free fluid.  Large volume hemoperitoneum was encountered with evidence of a actively bleeding/decompressing mesenteric hematoma; bleeding control was obtained and on 3/15 taken to OR for a segmental small bowel resection was performed and the patient was left in discontinuity given ongoing instability.  S/p SB anastomosis, R hemicolectomy, closure on 3/16.    Plan  - Continue balanced resuscitation as needed; trend hemoglobins  - Recommend ECHO in setting of rising troponins  -Continue to hold anticoagulation  - renew TPN  - wean vent as tolerated  - consider Nephrology and Cardiology consult  - appreciate ICU level care

## 2025-03-17 NOTE — ASSESSMENT & PLAN NOTE
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    Patient was due to follow-up with outpatient colorectal surgery on 2/28/2025  Pt presented initially with melena, Hgb 4.6. Symptomatic anemia.  Outpatient oncology appointment scheduled for 4/3  Rapid Response [id number], see rapid response note  Suspect intraabdominal bleeding with mass as possible source  High volume bleeding CT deferred in favor of exlap to control bleeding given unstable condition  Per surgery, possible resection of colonic mass in exlap

## 2025-03-18 PROBLEM — I63.40 EMBOLIC CEREBRAL INFARCTION (HCC): Status: ACTIVE | Noted: 2025-03-18

## 2025-03-18 PROBLEM — Z71.89 COUNSELING REGARDING ADVANCE CARE PLANNING AND GOALS OF CARE: Status: ACTIVE | Noted: 2025-03-18

## 2025-03-18 PROBLEM — Z51.5 PALLIATIVE CARE BY SPECIALIST: Status: ACTIVE | Noted: 2025-03-18

## 2025-03-18 PROBLEM — I46.8 CARDIAC ARREST DUE TO OTHER UNDERLYING CONDITION (HCC): Status: ACTIVE | Noted: 2025-03-18

## 2025-03-18 NOTE — ASSESSMENT & PLAN NOTE
Presented with ORIANA at time of admission within initial Cr of 2.43 (baseline: 0.8-0.9). Etiology likely 2/2 ATN from prerenal azotemia in the setting of volume depletion/severe anemia. Last Cr 3.35 mg/dL.  Nephrology following

## 2025-03-18 NOTE — ASSESSMENT & PLAN NOTE
In setting of ABLA, hemorrhagic shock, cardiac arrest with underlying CAD  Hs troponin post arrest- 116-->1295-->2898-->peak of 11,910 about 16 hours post arrest  Limited TTE 3/17- LVEF 55%, RV not well visualized but images suggest dilatation and reduced function. Poor visualization of valves.  Given ABLA/hemorrhagic shock, unclear neurologic recovery post cardiac arrest, and overall preserved LV function on echo would not recommend pursuing further cardiac work up at this time. Cardiac cath contraindicated. Pending clinical course, could consider repeating TTE to get better images. Could consider eventual stress testing but not indicated at this time.

## 2025-03-18 NOTE — ASSESSMENT & PLAN NOTE
2 prior CVAs  Family is unsure if that is when his atrial fibrillation was diagnosed but he has been on Eliquis - now held 2/2 ABLA, hemorrhagic shock

## 2025-03-18 NOTE — ASSESSMENT & PLAN NOTE
Remains intubated for airway protection post cardiac arrest and postoperatively with poor neuro exam   Continue mechanical ventilation ACVC 12/470/40/6, titrate FiO2 for SpO2>88  At baseline requires 2-4L NC   Monitor off of sedation  Daily SBT   Vent bundle

## 2025-03-18 NOTE — ASSESSMENT & PLAN NOTE
79M with a pertinent PMH of atrial fibrillation on Eliquis, recent embolic strokes, colon adenocarcinoma identified on recent colonoscopy, COPD, HTN, CAD, and recent hospitalization with MSSA bacteremia in which neurology was consulted for persistent encephalopathy following recent cardiac arrest. Patient was admitted on 2/28 with symptomatic anemia, melena, and ORIANA.     On 3/15/2025 reportedly had sudden loss of awareness after reporting shortness of breath and was found unresponsive with apparent pulses present and spontaneous breathing. Found to have low hemoglobin of 5.4, and was hypotensive despite fluids and vasopressors requiring ultimate intubation for hemodynamic instability and airway protection. After transfer to the ICU remained severely anemic with undetectable Hgb and remained hypotensive therefore code Amadeo and eventually CARMEN GUADALUPE was called. Reportedly 12 minutes of downtime and achieved ROSC shortly after blood products started which she received a total of 12 units of PRBC, 2 units of cryoprecipitate, and 1 unit of FFP. Taken emergently to the OR where he was found to have massive hemoperitoneum with a mesenteric hematoma identified as the source of bleeding. Taken to the OR again on 3/16 for ex lap with partial small bowel resection and right colon resection.     Remained encephalopathic post-operatively despite weaning off sedation. NC CTH showed small area in the R frontal lobe that could be mineralization or hemorrhage but stable compared to prior imaging. He was ultimately placed on LTM which showed polymorphic theta/delta activity, R frontal sharp waves, but no seizures captured after approximately 30 hours of monitoring. Underwent MRI Brain w/wo contrast demonstrating New nearly symmetric cortical DWI restrictions in the b/l frontal/parietal/occipital lobes, new small scattered acute infarcts in the L posterior frontal and b/l occipital lobes, scattered chronic hemosiderin deposition in  the R frontal, R parietal sulci, b/l frontal lobes, b/l parietal lobes, and R cerebellar lobes, scattered chronic microhemorrhages in the L frontal, L parietal, L temporal, and b/l cerebellar lobes, and as well as unchanged chronic R putamen lacunar infarcts.     Work Up:  NC CTH (03/16): No definite evidence of anoxic brain injury, new 5 mm focus of high attenuation within the superior right frontal lobe since prior CT from 2/20/2025. Differential diagnosis should include a small amount of acute hemorrhage vs parenchymal mineralization as correlated with prior MRI. Previously described focus of high attenuation within the inferior left frontal lobe is unchanged and again may represent an area of parenchymal mineralization or trace residual hemorrhage.  NC CTH (03/16): 4 mm focus of high attenuation in the right frontal lobe is stable, could represent focus of mineralization versus hemorrhage. Continued stability of punctate hyperdense focus in the left frontal lobe  MRI Brain w/wo (3/18): New nearly symmetric cortical restricted diffusion in bilateral frontal, bilateral parietal, and bilateral occipital lobes, suspicious for hypoxic ischemic encephalopathy given history of recent cardiac arrest. New scattered small acute infarcts in left posterior frontal and bilateral cerebellar lobes. Favor embolic infarcts.  cVEEG (3/17 - 3/18): Polymorphic theta/delta activity, R frontal sharp waves, but no seizures captured  TTE (3/17): Limited study with poor endocardial definition and poor visualization of valves - LVEF 55%    Impression: Acute encephalopathy - likely polyfactorial and d/t a combination of intracranial findings with components of of hypoxic-ischemic injury and b/l embolic strokes, and toxic metabolic encephalopathy in a patient with suspected poor cognitive reserve.     Plan:  Continue to monitor the patient's neurological status closely, obtain stat NC CTH for any acute changes  Avoidance of sedative and  CNS alternating patients as able  Continue to monitor and treat ongoing metabolic and infectious etiologies per primary team  LTM discontinued on 3/18  Remainder of care per primary team

## 2025-03-18 NOTE — ASSESSMENT & PLAN NOTE
Acute blood loss on 3/15/25 and received > 10 units PRBC.   S/p emergent ex lap and control of bleeding  Hgb 10.4 today, stable and at goal.   Hgb monitoring per primary service.

## 2025-03-18 NOTE — ASSESSMENT & PLAN NOTE
In setting of hemorrhagic shock    In efforts done with achievement of ROSC    Critical care team will likely do MRI brain to evaluate for anoxic brain injury--> will follow-up  Follow-up on video EEG--> no evidence of seizure activity

## 2025-03-18 NOTE — ASSESSMENT & PLAN NOTE
Identified during recent hospitalization with unclear source but felt to be most likely cutaneous. Cleared rapidly after initiation of IV Abx. During recent hospitalization c/o neck pain with MR C-Spine w/o revealing mild nonspecific edema within the right posterior breast dermal musculature of the upper cervicals spine.  Infectious Disease following  On IV antibiotics with plan for total of 6 weeks from bacteremia clearance  Plan for repeat C Spine MRI with gadolinium prior to Abx completion

## 2025-03-18 NOTE — ASSESSMENT & PLAN NOTE
Unclear details.  All EKGs here show sinus rhythm.  Denies any prior cardioversion  PTA was on metoprolol tartrate 25 mg twice daily  Eliquis held 2/2 ABLA, hemorrhagic shock

## 2025-03-18 NOTE — ASSESSMENT & PLAN NOTE
S/p arrest likely 2/2 to hemorrhagic with noted 12 minute downtime. ROSC achieved after aggressive blood product resuscitation. MRI with anoxic injury noted above.  Cardiology following

## 2025-03-18 NOTE — ASSESSMENT & PLAN NOTE
Concern for anoxic injury s/p cardiac arrest with 12 minute downtime   EEG pending    CTH shows 4mm focus of high attenuation in right frontal lobe which is stable on repeat CTH   Plan for MRI for prognostication today   Hold all sedation   Frequent neuro checks   Maintain normothermia and normoglycemia

## 2025-03-18 NOTE — ASSESSMENT & PLAN NOTE
Diagnosed last admission; pathology has revealed adenocarcinoma. Had plans for outpatient surgery.  Now s/p partial small bowel resection and right colon resection on 3/16 with general surgery

## 2025-03-18 NOTE — ASSESSMENT & PLAN NOTE
Home regimen: Metoprolol tartrate 25 mg BID, Losartan 25 mg QD for HTN  Hold home metoprolol whole NPO, consider IV formulation if needed   Hold home losartan in the setting of ORIANA

## 2025-03-18 NOTE — ASSESSMENT & PLAN NOTE
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

## 2025-03-18 NOTE — ASSESSMENT & PLAN NOTE
Likely in the setting of hemorrhagic shock  Noted 12 minutes of downtime, received aggressive blood resuscitation and surgical intervention as above   Now with ongoing encephalopathy concerning for anoxic injury   Continue close HD and telemetry monitoring   Trend Hgb as above

## 2025-03-18 NOTE — ASSESSMENT & PLAN NOTE
BP acceptable.  No further hypotension  Volume status hypervolemic  Echo 3/17/2025: EF 55%, unable to assess diastolic function  Home Rx: Metoprolol 25 mg twice daily  Previous Rx: Metoprolol 25 mg BID, Torsemide 20 mg OD.   Current Rx: Bumex 2 mg IV 3 times daily  Avoid hypotension or large fluctuations in BP  Continue to monitor

## 2025-03-18 NOTE — ASSESSMENT & PLAN NOTE
With known LAD stenosis   Continue home BB and statin   Hold ASA in the setting of hemorrhagic shock

## 2025-03-18 NOTE — ASSESSMENT & PLAN NOTE
Presented with symptomatic anemia with melena. Admission hgb 4.6 g/dL that initially improved but down trended and code crimson activated on 3/15 for hemorrhagic shock related to mesenteric hematoma s/p OR for SBR and colon resection. Last hemoglobin 9.7 g/dL.  Continue monitoring and trend hemoglobin closely

## 2025-03-18 NOTE — ASSESSMENT & PLAN NOTE
Etiology: Suspect ATN in setting of prolonged prerenal azotemia from volume depletion and severe anemia  Baseline creatinine 0.8 - 0.9.   Admission SCr 2.43 on 2/28/25  SCr had remained around 1.8-2 since admission but then worsened on 3/17/2025 after cardiac arrest on 3/15/25 and emergent ex lap and then subsequent return to OR 3/16 for R hemicolectomy  Peak creatinine 2.90 on 3/17/2025  Today's creatinine 2.89, plateaued and appears to be trending down  CT 2/28/25 - no hydronephrosis. UA on 3/8/25 with RBC 2-4, WBC 2-4, small blood and trace protein.   Of note, UPC ratio was 3.4 gm on 3/8/25 and urine ACR was only 821 mg on 3/5/25.   Serum and urine LIDYA no M spike, C3 83 (low), C4 26. At risk for AIN due to antibiotic exposure but no clear indication of this as well.    Renal function stable and creatinine appears to have plateaued with resolution of hypotension in response to IV diuretics.  No urgent indication for renal replacement therapy at this time.  Continue IV diuretics.  Received Lasix 80 mg IV x 1 this am and started on Bumex 2 mg IV TID with 1st dose this am  Avoid nephrotoxins, NSAIDs, IV contrast if possible  Avoid hypotension, maintain MAP >65  Trend BMP in a.m.  Dose all medications per EGFR  D/w primary team and we agree to continue IV diuretics as above

## 2025-03-18 NOTE — ASSESSMENT & PLAN NOTE
Patient sustained cardiac arrest in the setting of an undetectable hemoglobin on 3/15 and was taken emergently to the operating room for exploration in the setting of abdominal free fluid.  Large volume hemoperitoneum was encountered with evidence of a actively bleeding/decompressing mesenteric hematoma; bleeding control was obtained and on 3/15 taken to OR for a segmental small bowel resection was performed and the patient was left in discontinuity given ongoing instability.  S/p SB anastomosis, R hemicolectomy, closure on 3/16.    AF, intermittently tachycardic to the 110s, hypertensive    Hemoglobin: 10.6 from 11.2 from 11 from 10.8    Plan  - Continue NG tube/n.p.o. given no significant bowel function  - Appreciate neurology recommendations including MRI brain for ongoing encephalopathy  - Continue to hold anticoagulation although if hemoglobin remained stable through tomorrow will reevaluate  - Continue TPN  - Wean vent as tolerated  - Continue recommendations from cardiology and nephrology for ongoing diuresis  - Rest of care per ICU

## 2025-03-18 NOTE — ASSESSMENT & PLAN NOTE
Malnutrition Findings:   Adult Malnutrition type: Acute illness  Adult Degree of Malnutrition: Other severe protein calorie malnutrition  Malnutrition Characteristics: Inadequate energy, Fluid accumulation      Body mass index is 30.08 kg/m².

## 2025-03-18 NOTE — ASSESSMENT & PLAN NOTE
Code crimson 3/15 for hemorrhagic shock related to mesenteric hematoma s/p SBR and colon resection   S/p blood resuscitation with 12 U PRBC, 6 FFP, 2 Plt, 2 Cryo   Now off pressors and HD stable   Trend Hgb, transfuse for Hgb<7 or active bleeding with HD instability   Maintain MAP>65

## 2025-03-18 NOTE — OCCUPATIONAL THERAPY NOTE
Occupational Therapy Cancelled Session    Patient Name: Devin Chaves  Today's Date: 3/18/2025       03/18/25 1030   Note Type   Note type Cancelled Session   Cancel Reasons Medical status   Additional Comments OT orders previously received. Per ICU mobility rounds, pt not medically appropriate for participation in therapy at this time and remains intubated/sedated. Will continue to follow.     ASAD Nino, OTR/L  [place] License [id number]  [place] License [id number]

## 2025-03-18 NOTE — ASSESSMENT & PLAN NOTE
Cardiac catheterization 2022 at [facility].  Borderline IFR LAD lesion for which medical management was recommended.    He had been on ASA, statin, BB- all on hold currently  Not c/o chest pain prior to admission  Recent echo here did show preserved EF with some wall motion abnormalities.  Given his significant anemia and GI bleeding and recently diagnosed adenocarcinoma would not pursue any further ischemic workup at this time.

## 2025-03-18 NOTE — ASSESSMENT & PLAN NOTE
S/p cardiac arrest with ROSC 3/15/2025  In the setting of ABLA with undetectable Hgb and hemoperitoneum, s/p emergent ex lap, segmental SBR 3/15  Echo 3/17/2025: EF 55%  Management per primary team

## 2025-03-18 NOTE — ASSESSMENT & PLAN NOTE
Malnutrition Findings:   Adult Malnutrition type: Acute illness  Adult Degree of Malnutrition: Other severe protein calorie malnutrition  Malnutrition Characteristics: Inadequate energy, Fluid accumulation  360 Statement: related to inadequate energy/protein intake as evidenced by consuming < 50% of energy intake compared to estimated needs for > 5 days and B/L LE +3 edema. Treated with ONS.  BMI Findings:  Body mass index is 25.09 kg/m².      Plan:  Continue TPN while awaiting return of bowel function     360 Statement: related to inadequate energy/protein intake as evidenced by consuming < 50% of energy intake compared to estimated needs for > 5 days and B/L LE +3 edema. Treated with ONS.    BMI Findings:    Body mass index is 28.58 kg/m².

## 2025-03-18 NOTE — PROGRESS NOTES
Progress Note - Surgery-General   Name: Devin Chaves 79 y.o. male I MRN: [id number]  Unit/Bed#: ICU 10 I Date of Admission: 2/28/2025   Date of Service: 3/18/2025 I Hospital Day: 18    Assessment & Plan  Acute blood loss anemia  Patient sustained cardiac arrest in the setting of an undetectable hemoglobin on 3/15 and was taken emergently to the operating room for exploration in the setting of abdominal free fluid.  Large volume hemoperitoneum was encountered with evidence of a actively bleeding/decompressing mesenteric hematoma; bleeding control was obtained and on 3/15 taken to OR for a segmental small bowel resection was performed and the patient was left in discontinuity given ongoing instability.  S/p SB anastomosis, R hemicolectomy, closure on 3/16.    AF, intermittently tachycardic to the 110s, hypertensive    Hemoglobin: 10.6 from 11.2 from 11 from 10.8    Plan  - Continue NG tube/n.p.o. given no significant bowel function  - Appreciate neurology recommendations including MRI brain for ongoing encephalopathy  - Continue to hold anticoagulation although if hemoglobin remained stable through tomorrow will reevaluate  - Continue TPN  - Wean vent as tolerated  - Continue recommendations from cardiology and nephrology for ongoing diuresis  - Rest of care per ICU  HTN (hypertension)    CAD (coronary artery disease)    COPD (chronic obstructive pulmonary disease) (HCC)    History of CVA (cerebrovascular accident)    Acute on chronic respiratory failure with hypoxia (HCC)    Atrial fibrillation (HCC)    Urinary retention    MSSA bacteremia    Neck pain    Colonic mass    ORIANA (acute kidney injury) (HCC)    Pleural effusion, bilateral    Dysphagia    Severe protein-calorie malnutrition (HCC)  Malnutrition Findings:   Adult Malnutrition type: Acute illness  Adult Degree of Malnutrition: Other severe protein calorie malnutrition  Malnutrition Characteristics: Inadequate energy, Fluid accumulation                  360  Statement: related to inadequate energy/protein intake as evidenced by consuming < 50% of energy intake compared to estimated needs for > 5 days and B/L LE +3 edema. Treated with ONS.    BMI Findings:           Body mass index is 29.36 kg/m².     Hallucinations, visual    Hemorrhagic shock (HCC)    Cardiac arrest (HCC)    Hemoperitoneum    Encephalopathy    Cardiac arrest due to other underlying condition (HCC)    Palliative care by specialist    Counseling regarding advance care planning and goals of care          Subjective   Events detailed above. Intubated and sedated.  Opens eyes to voice although does not follow commands    Objective :  Temp:  [98.5 °F (36.9 °C)-98.9 °F (37.2 °C)] 98.8 °F (37.1 °C)  HR:  [] 127  BP: (125-189)/(57-84) 189/84  Resp:  [13-53] 53  SpO2:  [98 %-100 %] 100 %  O2 Device: Ventilator  FiO2 (%):  [40] 40    I/O         03/15 0701  03/16 0700 03/16 0701  03/17 0700    I.V. (mL/kg) 2553.2 (27.4) 4793.9 (50.1)    Blood 7860     IV Piggyback 3750 100    TPN  248.5    Cell Saver 1495     Total Intake(mL/kg) 14336.2 (168.2) 5142.4 (53.8)    Urine (mL/kg/hr) 575 (0.3) 485 (0.2)    Emesis/NG output 200 350    Drains 1210 250    Blood 800     Total Output 2785 1085    Net +57231.2 +4057.4                Lines/Drains/Airways       Active Status       Name Placement date Placement time Site Days    PICC Line 02/26/25 Right Brachial 02/26/25  0548  Brachial  20    CVC Central Lines 03/15/25 Triple 03/15/25  1338  --  2    Arterial Line 03/15/25 Femoral 03/15/25  1308  Femoral  2    ETT  Cuffed 8 mm 03/15/25  1200  -- 2    Urethral Catheter Latex 16 Fr. 03/15/25  1336  Latex  2    NG/OG Tube Nasogastric Left nare 03/15/25  1700  Left nare  2                  Physical Exam  General: intubated, sedated  HENT: ETT in place  Neck: supple, no JVD  CV: Irregular rhythm, tachycardic  Lungs: mechanically ventilated.   ABD: Soft, appropriately tender, nondistended. Incision CDI          Lab Results: I  have reviewed the following results:  Recent Labs     03/15/25  1235 03/15/25  1345 03/15/25  1935 03/15/25  2134 03/15/25  2342 03/16/25  0440 03/16/25  0925 03/16/25  1533 03/16/25  1947/25  0453   WBC 9.30   < >  --   --   --  14.14*  --  13.31*  --  10.78*   HGB 11.5*   < > 12.9  --    < > 13.3  13.3   < > 12.9   < > 10.6*   HCT 35.4*   < > 37.3  --    < > 38.7  38.4   < > 38.5   < > 32.4*   PLT 70*   < >  --   --   --  103*  --  104*  --  90*   BANDSPCT  --   --   --   --   --  1  --   --   --   --    SODIUM 152*   < >  --   --   --  148*  --  148*   < > 145   K 3.5   < >  --   --   --  4.2  --  4.4   < > 3.8   *   < >  --   --   --  113*  --  116*   < > 111*   CO2 19*   < >  --   --   --  27   < > 25   < > 27   BUN 42*   < >  --   --   --  48*  --  48*   < > 63*   CREATININE 1.47*   < >  --   --   --  1.85*  --  2.14*   < > 2.89*   GLUC 203*   < >  --   --   --  147*  --  135   < > 187*   CAIONIZED  --    < >  --   --   --   --    < > 1.13  --  1.12   MG  --    < >  --   --   --  2.0  --  1.9   < > 1.9   PHOS  --    < >  --   --   --  3.2  --  4.1   < > 4.0   AST 21  --   --   --   --  69*  --  60*  --  26   ALT 6*  --   --   --   --  5*  --  <3*  --  <3*   ALB 1.6*  --   --   --   --  2.7*  --  2.4*  --  2.1*   TBILI 0.30  --   --   --   --  0.79  --  0.62  --  0.38   ALKPHOS 34  --   --   --   --  53  --  51  --  53   PTT 45*  --   --   --   --   --   --   --   --   --    INR 1.43*  --   --   --   --   --   --   --   --   --    HSTNI0  --   --  2,898*  --   --   --   --   --   --   --    HSTNI2  --   --   --  4,775*  --   --   --   --   --   --    LACTICACID 9.7*   < > 4.3* 3.0*   < > 1.8  --  1.3  --   --     < > = values in this interval not displayed.             VTE Pharmacologic Prophylaxis: VTE covered by:    None      VTE Mechanical Prophylaxis: sequential compression device

## 2025-03-18 NOTE — ASSESSMENT & PLAN NOTE
Malnutrition Findings:   Adult Malnutrition type: Acute illness  Adult Degree of Malnutrition: Other severe protein calorie malnutrition  Malnutrition Characteristics: Inadequate energy, Fluid accumulation                  360 Statement: related to inadequate energy/protein intake as evidenced by consuming < 50% of energy intake compared to estimated needs for > 5 days and B/L LE +3 edema. Treated with ONS.    BMI Findings:           Body mass index is 29.36 kg/m².

## 2025-03-18 NOTE — PROGRESS NOTES
Progress Note - Infectious Disease   Name: Devin Chaves 79 y.o. male I MRN: [id number]  Unit/Bed#: ICU 10 I Date of Admission: 2/28/2025   Date of Service: 3/18/2025 I Hospital Day: 18    Assessment & Plan  Neck pain  Patient developed acute neck pain with MSSA bacteremia during recent hospitalization.  CRP was highly elevated.  C-spine MRI showed possible C-spine and paraspinal infection.  Patient has no neurologic deficit.  His neck pain is finally improving.  However, given paraspinal infection on the initial C-spine MRI, we should repeat C-spine MRI with contrast when creatinine is improved to confirm resolution/improvement of paraspinal infection.  However, without any neurological deficit, it would be fine to postpone MRI until renal function is further improved, to decrease risk of contrast-induced ORIANA.  Patient should get MRI done prior to completion of IV antibiotic course below.  Antibiotic plan as in below.  Monitor neck pain.  Recommend repeat C-spine MRI with and without contrast.  This can be postponed until ORIANA is improved, but should be done prior to completion of IV antibiotic course.  MSSA bacteremia  Patient had MSSA bacteremia during recent hospitalization.  Source is unclear but likely cutaneous.  His bacteremia cleared rapidly on IV antibiotic.  TTE did not show any vegetation.  Given possible C-spine infection, plan was for long-term IV antibiotic.  Continue high-dose IV cefazolin.  Treat x 6 weeks from clearance of bacteremia as previously planned, through 3/27.  Acute blood loss anemia  Patient developed acute bleeding from rectal mass, resulting in rapid response, and subsequent cardiac arrest.  He is status post transfusion.  He is also status post small bowel resection and right hemicolectomy.  Management per primary service.  ORIANA (acute kidney injury) (HCC)  Patient with ORIANA on admission, most likely secondary to hypovolemia from GI bleed.  Creatinine has been stable over the last  few days.  Antibiotic dosages adjusted accordingly.  Monitor creatinine.  Nephrology follow-up.  Colonic mass  Patient has recently diagnosed adenocarcinoma of the colon.  He now status post bowel resection for bleeding from adenocarcinoma.  No plan for chemotherapy yet.  Colorectal surgery follow-up.  Acute on chronic respiratory failure with hypoxia (HCC)  Patient is now intubated after cardiac arrest over the weekend.  Cardiac arrest due to other underlying condition (HCC)  Patient is status post arrest, successfully resuscitated.  Encephalopathy  Patient is currently obtunded postcardiac arrest.  Concern is for anoxic encephalopathy.  Head CT is without acute changes.  Monitor mental status.    Discussed with patient's wife in detail regarding the above plan.      Antibiotics:  Cefazolin  Last negative blood culture 2/14    Subjective   Patient gail on ventilator.  No response to verbal or tactile stimuli.  Temperature stays down.    Objective :  Temp:  [98.8 °F (37.1 °C)-98.9 °F (37.2 °C)] 98.9 °F (37.2 °C)  HR:  [] 111  BP: ()/(52-84) 121/58  Resp:  [16-53] 25  SpO2:  [98 %-100 %] 100 %  O2 Device: Ventilator  FiO2 (%):  [40-50] 50    Physical Exam:     General: Obtunded on ventilator.  No response to verbal or tactile stimuli.  Comfortable.  Nontoxic.   Neck:  Supple. No mass.  No lymphadenopathy.   Lungs: Expansion symmetric, mild diffuse rhonchi, no rales, no wheezing, respirations unlabored.   Heart:  Regular rate and rhythm, S1 and S2 normal, no murmur.   Abdomen: Soft, nondistended, non-tender, bowel sounds active all four quadrants, no masses, no organomegaly.   Extremities: Stable leg edema. No erythema/warmth. No draining ulcer. Nontender to palpation.   Skin:  No rash.   Neuro: Not assessable.        Lab Results: I have reviewed the following results:  Results from last 7 days   Lab Units 03/18/25  0825 03/18/25  0453 03/17/25 2010 03/16/25  1947/25  1533 03/16/25  0970  03/16/25  0440   WBC Thousand/uL  --  10.78*  --   --  13.31*  --  14.14*   HEMOGLOBIN g/dL 10.4* 10.6* 11.2*   < > 12.9   < > 13.3  13.3   I STAT HEMOGLOBIN   --   --   --   --   --    < >  --    PLATELETS Thousands/uL  --  90*  --   --  104*  --  103*    < > = values in this interval not displayed.     Results from last 7 days   Lab Units 03/18/25  1048 03/18/25  0453 03/18/25  0359 03/16/25  2235 03/16/25  1533 03/16/25  1327 03/16/25  1215 03/16/25  0440   SODIUM mmol/L 144 145 144   < > 148*  --   --  148*   POTASSIUM mmol/L 3.7 3.8 3.8   < > 4.4  --   --  4.2   CHLORIDE mmol/L 112* 111* 111*   < > 116*  --   --  113*   CO2 mmol/L 27 27 27   < > 25  --   --  27   CO2, I-STAT mmol/L  --   --   --   --   --  29   < >  --    BUN mg/dL 68* 63* 64*   < > 48*  --   --  48*   CREATININE mg/dL 3.08* 2.89* 2.90*   < > 2.14*  --   --  1.85*   EGFR ml/min/1.73sq m 18 20 19   < > 28  --   --  34   GLUCOSE, ISTAT mg/dl  --   --   --   --   --  137   < >  --    CALCIUM mg/dL 7.9* 7.8* 7.8*   < > 8.1*  --   --  8.1*   AST U/L  --  26  --   --  60*  --   --  69*   ALT U/L  --  <3*  --   --  <3*  --   --  5*   ALK PHOS U/L  --  53  --   --  51  --   --  53   ALBUMIN g/dL  --  2.1*  --   --  2.4*  --   --  2.7*    < > = values in this interval not displayed.                 Results from last 7 days   Lab Units 03/13/25  0559   FERRITIN ng/mL 209         Imaging Results Review: I personally reviewed the following image studies in PACS and associated radiology reports: CT head. My interpretation of the radiology images/reports is: Head CT is without acute changes.

## 2025-03-18 NOTE — PROGRESS NOTES
Progress Note - Critical Care/ICU   Name: Devin Chaves 79 y.o. 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   S/p blood resuscitation with 12 U PRBC, 6 FFP, 2 Plt, 2 Cryo   Now off pressors and HD stable   Trend Hgb, transfuse for Hgb<7 or active bleeding with HD instability   Maintain MAP>65  Cardiac arrest (HCC)  Likely in the setting of hemorrhagic shock  Noted 12 minutes of downtime, received aggressive blood resuscitation and surgical intervention as above   Now with ongoing encephalopathy concerning for anoxic injury   Continue close HD and telemetry monitoring   Trend Hgb as above   Encephalopathy  Concern for anoxic injury s/p cardiac arrest with 12 minute downtime   EEG pending    CTH shows 4mm focus of high attenuation in right frontal lobe which is stable on repeat CTH   Plan for MRI for prognostication today   Hold all sedation   Frequent neuro checks   Maintain normothermia and normoglycemia   Acute on chronic respiratory failure with hypoxia (HCC)  Remains intubated for airway protection post cardiac arrest and postoperatively with poor neuro exam   Continue mechanical ventilation ACVC 12/470/40/6, titrate FiO2 for SpO2>88  At baseline requires 2-4L NC   Monitor off of sedation  Daily SBT   Vent bundle   OIRANA (acute kidney injury) (HCC)  Creatinine   Date Value Ref Range Status   03/17/2025 2.79 (H) 0.60 - 1.30 mg/dL Final     Comment:     Standardized to IDMS reference method   09/25/2022 1 0.6 - 1.2 mg/dL Final      Likely initially in the setting of hemorrhagic shock, now with ATN   Baseline Cr 0.8   Nephrology following, appreciate recommendations  PRN lasix for UOP<50cc/hr   Monitor I/O and renal indices   HTN (hypertension)  Home regimen: Metoprolol tartrate 25 mg BID,  Losartan 25 mg QD for HTN  Hold home metoprolol whole NPO, consider IV formulation if needed   Hold home losartan in the setting of ORIANA   CAD (coronary artery disease)  With known LAD stenosis   Continue home BB and statin   Hold ASA in the setting of hemorrhagic shock   COPD (chronic obstructive pulmonary disease) (HCC)  Without acute exacerbation  Hold home inhalers while intubated  Continue scheduled xopenex, atrovent, budesonide nebs   History of CVA (cerebrovascular accident)  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   Hold home statin while NPO   Hold ASA and AC given recent hemorrhagic shock   Atrial fibrillation (HCC)  Currently rate controlled in NSR   Hold home metoprolol while NPO, consider IV formulation PRN for rate control  Hold AC given recent hemorrhagic shock   Urinary retention  Hold home flomax while NPO  Continue glass catheter for now   MSSA bacteremia  Noted on previous admission with likely cutaneous source   Continue cefazolin through 3/27 to complete course   ID following, appreciate recommendations   Neck pain  MRI of the neck done 2/14/2025 revealed cervical degenerative change with mild canal stenosis and mild to moderate foraminal narrowing.  No cord compression.  Mild nonspecific edema within the right posterior breast dermal musculature of the upper cervicals spine.  Minimal peripheral enhancement is noted  Will need repeat MRI C spine with and without contrast prior to completion of abx - currently holding on this at this time due to ORIANA   Colonic mass  2/20/2025 EGD/colonoscopy showed 2.5 cm ileocecal mass, pathology confirmed adenocarcinoma  Now s/p ex lap SBR and R colon resection  Surgery following, appreciate recommendations   Pleural effusion, bilateral  CT A/P shows bilateral pleural effusions L>R, pulmonary vascular congestion     Plan:  Repeat imaging if pt's respiratory status worsens  Patient intubated  Dysphagia  Noted prior to intubation and cardiac  arrest   Will need re-evaluation when able to take PO   Severe protein-calorie malnutrition (HCC)  Malnutrition Findings:   Adult Malnutrition type: Acute illness  Adult Degree of Malnutrition: Other severe protein calorie malnutrition  Malnutrition Characteristics: Inadequate energy, Fluid accumulation  360 Statement: related to inadequate energy/protein intake as evidenced by consuming < 50% of energy intake compared to estimated needs for > 5 days and B/L LE +3 edema. Treated with ONS.  BMI Findings:  Body mass index is 25.09 kg/m².      Plan:  Continue TPN while awaiting return of bowel function     360 Statement: related to inadequate energy/protein intake as evidenced by consuming < 50% of energy intake compared to estimated needs for > 5 days and B/L LE +3 edema. Treated with ONS.    BMI Findings:    Body mass index is 28.58 kg/m².   Disposition: Critical care    ICU Core Measures     Vented Patient  VAP Bundle  VAP bundle ordered     A: Assess, Prevent, and Manage Pain Has pain been assessed? Yes  Need for changes to pain regimen? No   B: Both Spontaneous Awakening Trials (SATs) and Spontaneous Breathing Trials (SBTs) Plan to perform spontaneous awakening trial today? N/A   Plan to perform spontaneous breathing trial today? Yes   Obvious barriers to extubation? Yes   C: Choice of Sedation RASS Goal: 0 Alert and Calm or N/A patient not on sedation  Need for changes to sedation or analgesia regimen? No   D: Delirium CAM-ICU: Unable to perform secondary to Acute cognitive dysfunction   E: Early Mobility  Plan for early mobility? Yes   F: Family Engagement Plan for family engagement today? Yes       Antibiotic Review: Infectious disease consulted    Review of Invasive Devices:    Tang Plan: Continue for accurate I/O monitoring for 48 hours  Central access plan: Patient has multiple central venous catheters.  Medications requiring central line  Tracy Plan: Keep arterial line for hemodynamic monitoring and  frequent ABGs    Prophylaxis:  VTE VTE covered by:    None       Stress Ulcer  covered byfamotidine (PEPCID) 20 mg tablet [[id number]] (Long-Term Med), pantoprazole (PROTONIX) injection 40 mg [[id number]]         24 Hour Events : EEG obtained and pending. Neuro exam remains poor. Given 60mg IV lasix yesterday afternoon and additional 80mg IV lasix overnight for low UOP. No other acute events.   Subjective   Review of Systems: Review of Systems not obtainable due to Altered mental status    Objective :                   Vitals I/O      Most Recent Min/Max in 24hrs   Temp 98.9 °F (37.2 °C) Temp  Min: 98.5 °F (36.9 °C)  Max: 98.9 °F (37.2 °C)   Pulse (!) 106 Pulse  Min: 97  Max: 110   Resp (!) 24 Resp  Min: 13  Max: 26   /66 BP  Min: 125/61  Max: 166/72   O2 Sat 100 % SpO2  Min: 98 %  Max: 100 %      Intake/Output Summary (Last 24 hours) at 3/18/2025 0417  Last data filed at 3/18/2025 0300  Gross per 24 hour   Intake 2545.46 ml   Output 1315 ml   Net 1230.46 ml       Diet NPO  Adult 3-in-1 TPN (custom base / custom electrolytes)    Invasive Monitoring   Arterial Line  Coni /51  Arterial Line BP  Min: 134/46  Max: 195/66   MAP 91 mmHg  Arterial Line MAP (mmHg)  Min: 78 mmHg  Max: 115 mmHg           Physical Exam   Physical Exam  Eyes:      Pupils: Pupils are equal, round, and reactive to light.   Skin:     General: Skin is warm and dry.      Coloration: Skin is pale.   HENT:      Head: Normocephalic and atraumatic.      Mouth/Throat:      Mouth: Mucous membranes are moist.   Cardiovascular:      Rate and Rhythm: Tachycardia present.      Pulses: Normal pulses.      Heart sounds: Normal heart sounds.   Musculoskeletal:         General: Swelling present.      Right lower leg: No edema.      Left lower leg: No edema.   Abdominal: General: Bowel sounds are normal.      Palpations: Abdomen is soft.   Constitutional:       General: He is not in acute distress.     Appearance: He is well-developed and  well-nourished. He is ill-appearing.   Pulmonary:      Effort: Pulmonary effort is normal. No respiratory distress.      Breath sounds: No wheezing, rhonchi or rales.      Comments: Diminished   Neurological:      Comments: Overbreathing vent, +pupillary reaction, does not respond to painful stimuli           Diagnostic Studies        Lab Results: I have reviewed the following results:     Medications:  Scheduled PRN   [Held by provider] ascorbic acid, 250 mg, Daily  [Held by provider] atorvastatin, 40 mg, Daily With Dinner  budesonide, 0.5 mg, Q12H  ceFAZolin, 2,000 mg, Q8H  chlorhexidine, 15 mL, Q12H GALE  [Held by provider] Cholecalciferol, 2,000 Units, Daily  [Held by provider] cyanocobalamin, 100 mcg, Daily  [Held by provider] ferrous sulfate, 325 mg, Daily With Breakfast  [Held by provider] fluticasone, 1 puff, Daily  [Held by provider] folic acid, 1 mg, Daily  furosemide, 80 mg, Once  [Held by provider] hydroxychloroquine, 400 mg, Daily With Breakfast  insulin lispro, 1-6 Units, Q6H GALE  ipratropium, 0.5 mg, TID  levalbuterol, 1.25 mg, TID  lidocaine, 3 patch, Daily  [Held by provider] metoprolol tartrate, 25 mg, Q12H GALE  HITESH ANTIFUNGAL, , BID  pantoprazole, 40 mg, Q12H GALE  [Held by provider] potassium chloride, 20 mEq, Daily  [Held by provider] senna-docusate sodium, 1 tablet, HS  [Held by provider] tamsulosin, 0.4 mg, Daily With Dinner  [Held by provider] torsemide, 20 mg, Daily  [Held by provider] umeclidinium-vilanterol, 1 puff, Daily      HYDROmorphone, 0.2 mg, Q4H PRN  levalbuterol, 1.25 mg, Q4H PRN  [Held by provider] oxyCODONE, 5 mg, Q6H PRN  [Held by provider] oxyCODONE, 2.5 mg, Q6H PRN       Continuous    Adult 3-in-1 TPN (custom base / custom electrolytes), , Last Rate: 87.7 mL/hr at 03/17/25 2102         Labs:   CBC    Recent Labs     03/16/25  0440 03/16/25  0925 03/16/25  1533 03/16/25  1947/25  0726 03/17/25 2010   WBC 14.14*  --  13.31*  --   --   --    HGB 13.3  13.3   < > 12.9   <  > 11.0* 11.2*   HCT 38.7  38.4   < > 38.5   < > 32.3* 34.3*   *  --  104*  --   --   --    BANDSPCT 1  --   --   --   --   --     < > = values in this interval not displayed.     BMP    Recent Labs     03/17/25  1550 03/17/25 2010   SODIUM 146 146   K 4.1 4.2   * 112*   CO2 25 28   AGAP 8 6   BUN 56* 58*   CREATININE 2.68* 2.79*   CALCIUM 7.9* 8.1*       Coags    No recent results     Additional Electrolytes  Recent Labs     03/16/25  1327 03/16/25  1533 03/17/25  0935   MG  --  1.9 1.9   PHOS  --  4.1 4.0   CAIONIZED 1.20 1.13  --           Blood Gas    Recent Labs     03/17/25  1407   PHART 7.458*   XKC8GOR 35.4*   PO2ART 103.2   XCD9DLD 24.5   BEART 0.9   SOURCE Line, Arterial     Recent Labs     03/17/25  0935 03/17/25  1407   PHVEN 7.460*  --    YEA4RLB 35.4*  --    PO2VEN 38.5  --    VUU1RIT 24.6  --    BEVEN 1.1  --    Q2SETWZ 74.9  --    SOURCE  --  Line, Arterial    LFTs  Recent Labs     03/16/25  0440 03/16/25  1533   ALT 5* <3*   AST 69* 60*   ALKPHOS 53 51   ALB 2.7* 2.4*   TBILI 0.79 0.62       Infectious  No recent results  Glucose  Recent Labs     03/17/25  0424 03/17/25  0935 03/17/25  1550 03/17/25 2010   GLUC 216* 195* 172* 169*

## 2025-03-18 NOTE — ASSESSMENT & PLAN NOTE
Family's goals are hopeful for him to have some form of recovery and for him to go home  They confirm with me today that he agreed to go full cares without any limits within the past month due to new family member (new granddaughter on the way)  We will continue to have conversations with him about realistic expectations for prognosis as situation evolves                 Code Status: Full - Level 1               Decisional apparatus:  Patient is not competent on my exam today.  If competence is lost, patient's substitute decision maker would default to spouse Francia (first), son Devin (alternate)               Advance Directive / Living Will / POLST:  POA document on file

## 2025-03-18 NOTE — ASSESSMENT & PLAN NOTE
Without acute exacerbation  Hold home inhalers while intubated  Continue scheduled xopenex, atrovent, budesonide nebs

## 2025-03-18 NOTE — ASSESSMENT & PLAN NOTE
Creatinine   Date Value Ref Range Status   03/17/2025 2.79 (H) 0.60 - 1.30 mg/dL Final     Comment:     Standardized to IDMS reference method   09/25/2022 1 0.6 - 1.2 mg/dL Final      Likely initially in the setting of hemorrhagic shock, now with ATN   Baseline Cr 0.8   Nephrology following, appreciate recommendations  PRN lasix for UOP<50cc/hr   Monitor I/O and renal indices

## 2025-03-18 NOTE — RESPIRATORY THERAPY NOTE
RT Ventilator Management Note  Devin Chaves 79 y.o. male MRN: [id number]  Unit/Bed#: ICU 10 Encounter: [id number]      Daily Screen         3/17/2025  0911 3/18/2025  0743          Patient safety screen outcome:: Failed Failed      Not Ready for Weaning due to:: Underline problem not resolved Underline problem not resolved                Physical Exam:   Assessment Type: During-treatment  General Appearance: Unresponsive  Respiratory Pattern: Assisted  Chest Assessment: Chest expansion symmetrical  Bilateral Breath Sounds: Diminished, Coarse  Suction: Oral, ET Tube      Resp Comments: Patient recieved on documented settings. Patient not following commands. Has very weak cough. Hold SBT at this time     03/18/25 0743   Respiratory Assessment   Assessment Type During-treatment   General Appearance Unresponsive   Respiratory Pattern Assisted   Chest Assessment Chest expansion symmetrical   Bilateral Breath Sounds Diminished;Coarse   Suction Oral;ET Tube   Resp Comments Patient recieved on documented settings. Patient not following commands. Has very weak cough. Hold SBT at this time   Vent Information   Vent    Vent type     Vent Mode AC/VC   $ Vital Capacity Mech/Peak Flow Yes   $ Pulse Oximetry Spot Check Charge Completed   SpO2 100 %   AC/VC Settings   Resp Rate (BPM) 12 BPM   Vt (mL) 470 mL   FIO2 (%) 40 %   PEEP (cmH2O) 6 cmH2O   Flow Pattern (LPM) 60 L/min   Trigger Sensitivity Flow (lpm) 3 %   Humidification Heater   Heater Temperature (Set) 98.6 °F (37 °C)   AC/VC Actuals   Resp Rate (BPM) 31 BPM   VT (mL) 501   MV 14.9   MAP (cmH2O) 12 cmH2O   Peak Pressure (cmH2O) 19 cmH2O   I/E Ratio (Obs) 1:1.3   Heater Temperature (Obs) 98.6 °F (37 °C)   Static Compliance (mL/cmH20) 40 mL/cmH2O   Plateau Pressure (cm H2O) 18 cm H2O   AC/VC Alarms   High Peak Pressure (cmH2O) 40   High Resp Rate (BPM) 40 BPM   High MV (L/min) 20 L/min   Low MV (L/min) 4 L/min   Vt High (mL) 800 mL   Vt Low (mL) 300 mL    AC/VC Apnea Settings   Resp Rate (BPM) 12 BPM   VT (mL) 470 mL   FIO2 (%) 100 %   Apnea Time (s) 20 S   Apnea Flow (L/min) 60 L/min   Maintenance   Alarm (pink) cable attached Yes   Resuscitation bag with peep valve at bedside Yes   Water bag changed No   Circuit changed No   Daily Screen   Patient safety screen outcome: Failed   Not Ready for Weaning due to: Underline problem not resolved   ETT  Cuffed 8 mm   Placement Date/Time: 03/15/25 (c) 1200   Type: Cuffed  Tube Size: 8 mm  Location: Oral  Insertion attempts: 1  Placement Verification: Chest x-ray;End tidal CO2  Secured at (cm): 25   Secured at (cm) 22   Measured from Teeth   Secured Location Center   Repositioned Center to Right   Secured by Commercial tube otto   Site Condition Cool;Dry   Cuff Pressure (color) Green   HI-LO Suction  Intermittent suction   HI-LO Secretions Small   HI-LO Intervention Patent

## 2025-03-18 NOTE — QUICK NOTE
Called patient's wife Francia to given clinical update from today's MRI.   We reviewed the findings of the new infarcts which appear to be embolic in nature.  We discussed the risk and benefits of starting anticoagulation to treat the embolic infarcts versus not starting anticoagulation and monitoring to see if more infarcts occur while not being treated.  Both starting him on anticoagulation and observing without treatment have risks of worsening his neurological prognosis. I informed her that the teams are discussing the best option in this difficult situation and will proceed with what is determined as best for him knowing all choices have risks. Wife was understanding of the risks and aware we were weighing the options and will proceed accordingly.   We discussed the areas which are suspicious for hypoxic injury inclusive of the frontal, parietal, and occipital lobes of the brain.   This does not give a definitive answer to what his recovery could be like.  What it does allude is that to see neurological recovery will take an extended period of time and require a trach and PEG if the patient and family would desire that.  Wife stated that originally he said he would never want long-term life support but then of recent months stated that he would want it if it meant he could live.   We did briefly discuss what life or quality of life would mean or look like for the patient.  As this would likely be a prolonged recovery requiring a nursing home and high level of care.  At this moment in time patient is not even engaging with eye movement to any kind of stimulation.  I unfortunately could not give any timeframe for which any of this recovery could potentially happen and he could also live in this state for prolonged period of time.   Wife asked is this why he is not waking up. I stated that these findings are factors to why he is not waking up but, his kidney failure also adds a confounding factors to his recovery  from this massive cardiac event.   Wife asked if we could have a family meeting. She said it will likely be sometime tomorrow afternoon. I stated we will be available to her family whenever they are ready.     AMPARO Tamez

## 2025-03-18 NOTE — ASSESSMENT & PLAN NOTE
Currently rate controlled in NSR   Hold home metoprolol while NPO, consider IV formulation PRN for rate control  Hold AC given recent hemorrhagic shock

## 2025-03-18 NOTE — ASSESSMENT & PLAN NOTE
Poor responsiveness since cardiac arrest  CTH with 4 mm focus of high attenuation right frontal lobe without evidence of anoxic brain injury, stable chronic ischemic changes.  vEEG monitoring with no evidence of seizure  Brain MRI pending  Neurology following  Management per neurology and primary team

## 2025-03-18 NOTE — ASSESSMENT & PLAN NOTE
MRI of the neck done 2/14/2025 revealed cervical degenerative change with mild canal stenosis and mild to moderate foraminal narrowing.  No cord compression.  Mild nonspecific edema within the right posterior breast dermal musculature of the upper cervicals spine.  Minimal peripheral enhancement is noted  Will need repeat MRI C spine with and without contrast prior to completion of abx - currently holding on this at this time due to ORIANA

## 2025-03-18 NOTE — PHYSICAL THERAPY NOTE
Physical Therapy Cancellation Note       03/18/25 1030   Note Type   Note Type Cancelled Session   Cancel Reasons Medical status  (PT consult remains active. Per ICU mobility rounds, pt remains medically inappropriate for participation in PT services. Will hold and f/u as pt becomes medically appropriate.)       Melissa Tavarez, PT, DPT   Available via [employer]  NPI # [id number]  [place] License - [id number]  3/18/2025

## 2025-03-18 NOTE — PROGRESS NOTES
Progress Note - Neurology   Name: Devin Chaves 79 y.o. male I MRN: [id number]  Unit/Bed#: ICU 10 I Date of Admission: 2/28/2025   Date of Service: 3/19/2025 I Hospital Day: 19      Assessment & Plan  Encephalopathy  79M with a pertinent PMH of atrial fibrillation on Eliquis, recent embolic strokes, colon adenocarcinoma identified on recent colonoscopy, COPD, HTN, CAD, and recent hospitalization with MSSA bacteremia in which neurology was consulted for persistent encephalopathy following recent cardiac arrest. Patient was admitted on 2/28 with symptomatic anemia, melena, and ORIANA.     On 3/15/2025 reportedly had sudden loss of awareness after reporting shortness of breath and was found unresponsive with apparent pulses present and spontaneous breathing. Found to have low hemoglobin of 5.4, and was hypotensive despite fluids and vasopressors requiring ultimate intubation for hemodynamic instability and airway protection. After transfer to the ICU remained severely anemic with undetectable Hgb and remained hypotensive therefore code Crimson and eventually CARMEN GUADALUPE was called. Reportedly 12 minutes of downtime and achieved ROSC shortly after blood products started which she received a total of 12 units of PRBC, 2 units of cryoprecipitate, and 1 unit of FFP. Taken emergently to the OR where he was found to have massive hemoperitoneum with a mesenteric hematoma identified as the source of bleeding. Taken to the OR again on 3/16 for ex lap with partial small bowel resection and right colon resection.     Remained encephalopathic post-operatively despite weaning off sedation. NC CTH showed small area in the R frontal lobe that could be mineralization or hemorrhage but stable compared to prior imaging. He was ultimately placed on LTM which showed polymorphic theta/delta activity, R frontal sharp waves, but no seizures captured after approximately 30 hours of monitoring. Underwent MRI Brain w/wo contrast demonstrating New  nearly symmetric cortical DWI restrictions in the b/l frontal/parietal/occipital lobes, new small scattered acute infarcts in the L posterior frontal and b/l occipital lobes, scattered chronic hemosiderin deposition in the R frontal, R parietal sulci, b/l frontal lobes, b/l parietal lobes, and R cerebellar lobes, scattered chronic microhemorrhages in the L frontal, L parietal, L temporal, and b/l cerebellar lobes, and as well as unchanged chronic R putamen lacunar infarcts.     Work Up:  NC CTH (03/16): No definite evidence of anoxic brain injury, new 5 mm focus of high attenuation within the superior right frontal lobe since prior CT from 2/20/2025. Differential diagnosis should include a small amount of acute hemorrhage vs parenchymal mineralization as correlated with prior MRI. Previously described focus of high attenuation within the inferior left frontal lobe is unchanged and again may represent an area of parenchymal mineralization or trace residual hemorrhage.  NC CTH (03/16): 4 mm focus of high attenuation in the right frontal lobe is stable, could represent focus of mineralization versus hemorrhage. Continued stability of punctate hyperdense focus in the left frontal lobe  MRI Brain w/wo (3/18): New nearly symmetric cortical restricted diffusion in bilateral frontal, bilateral parietal, and bilateral occipital lobes, suspicious for hypoxic ischemic encephalopathy given history of recent cardiac arrest. New scattered small acute infarcts in left posterior frontal and bilateral cerebellar lobes. Favor embolic infarcts.  cVEEG (3/17 - 3/18): Polymorphic theta/delta activity, R frontal sharp waves, but no seizures captured  TTE (3/17): Limited study with poor endocardial definition and poor visualization of valves - LVEF 55%    Impression: Acute encephalopathy - likely polyfactorial and d/t a combination of intracranial findings with components of of hypoxic-ischemic injury and b/l embolic strokes, and toxic  metabolic encephalopathy in a patient with suspected poor cognitive reserve.     Plan:  Continue to monitor the patient's neurological status closely, obtain stat NC CTH for any acute changes  Avoidance of sedative and CNS alternating patients as able  Continue to monitor and treat ongoing metabolic and infectious etiologies per primary team  LTM discontinued on 3/18  Remainder of care per primary team  Embolic cerebral infarction (HCC)  History of prior strokes including recent bilateral hemispheric strokes during recent hospitalization in 2/2025. PMH of Afib and Eliquis held during stay d/t hemorrhagic shock. MRI this admission demonstrate new scattered small acute infarcts in the L posterior frontal and bilateral cerebellar lobes, likely embolic in nature. No clear evidence of cardiac embolic source of limited TTE. On 3/18 was started on heparin gtt by primary team after MR resulted and cleared by other teams.   Hemoglobin A1c 5.8 (2/16/2025), LDL 15 (2/16/2025)    Impression: Etiology of his strokes are likely cardioembolic given that he has been without AC after hemorrhagic bleed vs hypercoaguability in the setting of active GI malignancy.     Plan:  Continue to monitor on telemetry  BP Goal: Avoid hypotension and major fluctuations in BP as able.   AC/AP: Heparin gtt stroke protocol - no boluses, obtain repeat CTH after 2 therapeutic PTTs --> obtain CTH now given supratherapeutic PTTs overnight  High Intensity Statin  Cardiac arrest due to other underlying condition (HCC)  S/p arrest likely 2/2 to hemorrhagic with noted 12 minute downtime. ROSC achieved after aggressive blood product resuscitation. MRI with anoxic injury noted above.  Cardiology following  Acute blood loss anemia  Presented with symptomatic anemia with melena. Admission hgb 4.6 g/dL that initially improved but down trended and code crimson activated on 3/15 for hemorrhagic shock related to mesenteric hematoma s/p OR for SBR and colon  resection. Last hemoglobin 9.7 g/dL.  Continue monitoring and trend hemoglobin closely  ORIANA (acute kidney injury) (HCC)  Presented with ORIANA at time of admission within initial Cr of 2.43 (baseline: 0.8-0.9). Etiology likely 2/2 ATN from prerenal azotemia in the setting of volume depletion/severe anemia. Last Cr 3.35 mg/dL.  Nephrology following  MSSA bacteremia  Identified during recent hospitalization with unclear source but felt to be most likely cutaneous. Cleared rapidly after initiation of IV Abx. During recent hospitalization c/o neck pain with MR C-Spine w/o revealing mild nonspecific edema within the right posterior breast dermal musculature of the upper cervicals spine.  Infectious Disease following  On IV antibiotics with plan for total of 6 weeks from bacteremia clearance  Plan for repeat C Spine MRI with gadolinium prior to Abx completion  Colonic mass  Newly identified colonic mass with diagnosis consistent with adenocarcinoma. S/p SBR and colon resection as a result of mesenteric hematoma bleed.  Colorectal surgery following  Severe protein-calorie malnutrition (HCC)  Malnutrition Findings:   Adult Malnutrition type: Acute illness  Adult Degree of Malnutrition: Other severe protein calorie malnutrition  Malnutrition Characteristics: Inadequate energy, Fluid accumulation      Body mass index is 30.08 kg/m².       Devin Chaves will need follow up in in 4 weeks with neurovascular attending. He will not require outpatient neurological testing.  I have discussed the above management plan in detail with the primary service.   Neurology service will follow.  Please contact the SecureChat role for the Neurology service with any questions/concerns.    24 Hour Events   Patient continues to be critically ill in the ICU and remains intubated and mechanically ventilated. MRI Brain with and without contrast obtained yesterday afternoon which showed evidence of anoxic brain injury and new scattered embolic  appearing strokes. After discussion and clearance by all teams heparin gtt stroke protocol was initiated. Had supratherapeutic PTTs overnight (169 >> 153).    Subjective   Patient seen and examined this morning at bedside. Intubated, off sedation.    Review of Systems   Unable to perform ROS: Intubated     Objective :    Temp:  [98 °F (36.7 °C)-99 °F (37.2 °C)] 98 °F (36.7 °C)  HR:  [106-127] 115  BP: (105-177)/(53-75) 114/56  Resp:  [12-32] 24  SpO2:  [95 %-100 %] 99 %  O2 Device: Ventilator  FiO2 (%):  [40-50] 40    PHYSICAL EXAM  /56   Pulse (!) 115   Temp 98 °F (36.7 °C)   Resp (!) 24   Ht 6' (1.829 m)   Wt 101 kg (221 lb 12.5 oz)   SpO2 99%   BMI 30.08 kg/m²     NEUROLOGICAL EXAM    MENTAL STATUS:   General: Laying supine in bed and Intubated and mechanically ventilated, off sedation.  Level of consciousness:Stuporous - responds only to noxious/painful stimuli  Follows commands: Does not follow commands  Orientation: Unable to assess d/t intubation  Speech/Language: Unable to assess d/t intubation    CRANIAL NERVES (BRAIN STEM REFLEXES):  CN II Pupils: 3 mm bilaterally - sluggish   CN III, IV, VI  Gaze: Conjugate gaze in primary position and No gaze preference  VOR/(Doll's Eye): Not Done   CN V, VII Corneal reflex: Right - [id number], Left - present  Facial Symmetry:Symmetric but view obstructed by ET tube   CN IX, X Cough Reflex did   Gag Reflex did      MOTOR:  Bulk: Normal bulk, no atrophy.  Tone: Reduced tone throughout  Involuntary Movement: No Involuntary Movements, No Tremors Appreciated  Spontaneous Movement: No spontaneous movement of the extremities   Withdrawal/Purposeful Movement:No withdrawal both upper and lower extremity(ies)    SENSORY: Grimaces to nox stim in all 4 extremities, no withdrawal    REFLEXES:    BICEP BRACHIO PATELLAR ACHILLES   RIGHT 1+ 1+ 1+ 1+   LEFT 1+ 1+ 1+ 1+   Plantar Response: Right - equivocal, Left - equivocal  Ankle Clonus: None    COORDINATION: unable to  assess as patient unable to follow commands    GAIT: unable to assess as patient unable to follow commands    ACTIVE MEDICATIONS   Scheduled PRN   [Held by provider] ascorbic acid, 250 mg, Daily  [Held by provider] atorvastatin, 40 mg, Daily With Dinner  budesonide, 0.5 mg, Q12H  ceFAZolin, 2,000 mg, Q8H  chlorhexidine, 15 mL, Q12H GALE  [Held by provider] Cholecalciferol, 2,000 Units, Daily  [Held by provider] cyanocobalamin, 100 mcg, Daily  [Held by provider] ferrous sulfate, 325 mg, Daily With Breakfast  [Held by provider] fluticasone, 1 puff, Daily  [Held by provider] folic acid, 1 mg, Daily  [Held by provider] hydroxychloroquine, 400 mg, Daily With Breakfast  insulin lispro, 2-12 Units, Q6H GALE  ipratropium, 0.5 mg, TID  levalbuterol, 1.25 mg, TID  lidocaine, 3 patch, Daily  [Held by provider] metoprolol tartrate, 25 mg, Q12H GALE  HITESH ANTIFUNGAL, , BID  pantoprazole, 40 mg, Q12H GALE  polyethylene glycol, 17 g, Daily  [Held by provider] potassium chloride, 20 mEq, Daily  senna, 17.6 mg, HS  [Held by provider] tamsulosin, 0.4 mg, Daily With Dinner  [Held by provider] torsemide, 20 mg, Daily  [Held by provider] umeclidinium-vilanterol, 1 puff, Daily      HYDROmorphone, 0.2 mg, Q4H PRN  [Held by provider] oxyCODONE, 5 mg, Q6H PRN  [Held by provider] oxyCODONE, 2.5 mg, Q6H PRN       Continuous    Adult 3-in-1 TPN (custom base / custom electrolytes), , Last Rate: 87.7 mL/hr at 03/18/25 2158  bumetanide (BUMEX) 12.5 mg infusion 50 mL, 2 mg/hr, Last Rate: 2 mg/hr (03/19/25 3132)  heparin (porcine), 3-24 Units/kg/hr (Order-Specific), Last Rate: 9 Units/kg/hr (03/19/25 0510)         VTE Mechanical Prophylaxis: Sequential Compression Device  VTE Pharmacologic Prophylaxis: VTE covered by:  heparin (porcine), Intravenous, 9 Units/kg/hr at 03/19/25 0510      ====================================================  Nirav Yancey DO [facility] Neurology Residency, PGY-3

## 2025-03-18 NOTE — CASE MANAGEMENT
Case Management Progress Note    Patient name Devin Chaves  Location ICU 10/ICU 10 MRN [id number]  : 1945 Date 3/18/2025       LOS (days): 18  Geometric Mean LOS (GMLOS) (days): 8.7  Days to GMLOS:-9.5        OBJECTIVE:        Current admission status: Inpatient  Preferred Pharmacy:   [id number] - [place] - [address]  [address]  [place] [address]  Phone: [phone] Fax: [phone]    [facility] - [place] - [address]  [address]  [address]  [place] [address]  Phone: [phone] Fax: [phone]    [place] [facility] - [place] - [address]  [address]  [place] [address]  Phone: [phone] Fax: [phone]    Primary Care Provider: Park Saxena MD    Primary Insurance: [facility] [employer] [facility]  Secondary Insurance: [employer]  REP    PROGRESS NOTE:    Pt discussed during mobility rounds. CM continues to follow to assist with dc recs pending pt's medical status.

## 2025-03-18 NOTE — ASSESSMENT & PLAN NOTE
Noted on previous admission with likely cutaneous source   Continue cefazolin through 3/27 to complete course   ID following, appreciate recommendations

## 2025-03-18 NOTE — ASSESSMENT & PLAN NOTE
2/20/2025 EGD/colonoscopy revealed 2.5 cm ileocecal mass    Pathology confirmed: Adenocarcinoma    Mass has not been removed according to surgical notes

## 2025-03-18 NOTE — ASSESSMENT & PLAN NOTE
2/20/2025 EGD/colonoscopy showed 2.5 cm ileocecal mass, pathology confirmed adenocarcinoma  Now s/p ex lap SBR and R colon resection  Surgery following, appreciate recommendations

## 2025-03-18 NOTE — ASSESSMENT & PLAN NOTE
Patient is currently obtunded postcardiac arrest.  Concern is for anoxic encephalopathy.  Head CT is without acute changes.  Monitor mental status.    Discussed with patient's wife in detail regarding the above plan.

## 2025-03-18 NOTE — PROGRESS NOTES
"Progress Note - Palliative Care   Name: Devin Chaves 79 y.o. male I MRN: [id number]  Unit/Bed#: ICU 10 I Date of Admission: 2/28/2025   Date of Service: 3/18/2025 I Hospital Day: 18     Assessment & Plan  Counseling regarding advance care planning and goals of care  Family's goals are hopeful for him to have some form of recovery and for him to go home  They confirm with me today that he agreed to go full cares without any limits within the past month due to new family member (new granddaughter on the way)  We will continue to have conversations with him about realistic expectations for prognosis as situation evolves                 Code Status: Full - Level 1               Decisional apparatus:  Patient is not competent on my exam today.  If competence is lost, patient's substitute decision maker would default to spouse Francia (first), son Devin (alternate)               Advance Directive / Living Will / POLST:  POA document on file     Palliative care by specialist  Palliative diagnosis: Colon adenocarcinoma, colon mass, cardiac arrest, respiratory failure  PPS: 10%    Education/counseling provided on role/purpose of palliative care; benefits/risks of treatment options by our team reviewed; instructions for management and anticipatory guidance provided; supportive listening and presence provided; provided space for life review and whole person assessment    Psychosocial/Spiritual:   -supported by spouse Francia (\"Ashvin\"),  56 years; they live near daughter [place]  -Also two sons Nicole  -4 yrs in [employer]  -Enjoys working on farm with cows and [employer]  -M[employer]on kaylee-->family would like extra spiritual support-->spiritual care consult placed    Communicated and coordinated with surgical CC team and RN   Cardiac arrest due to other underlying condition (HCC)  In setting of hemorrhagic shock    In efforts done with achievement of ROSC    Critical care team will likely do MRI brain to " evaluate for anoxic brain injury--> will follow-up  Follow-up on video EEG--> no evidence of seizure activity  Hemorrhagic shock (HCC)  Code [facility]son 3/15 for hemorrhagic shock related to mesenteric hematoma s/p small bowel resection and colon resection    Now off vasopressor medication  MSSA bacteremia  From previous admission in February from skin/soft tissue source  On cefazolin through 3/27/25  Acute blood loss anemia  In setting of mesenteric bleed and hematoma  S/p small bowel resection and right hemicolectomy  Colonic mass  2/20/2025 EGD/colonoscopy revealed 2.5 cm ileocecal mass    Pathology confirmed: Adenocarcinoma    Mass has not been removed according to surgical notes    NARRATIVE AND INTERVAL HISTORY:       Patient seen and examined in bed this morning.  Communicating coordinated with RN.  Spoke with family this morning, who confirmed the patient reverses DNAR decision to pursue full cares prior to current clinical scenario.  Patient's son Parmjit is planning on adopting a baby girl that patient wanted to live for and see. Plan is for MRI as soon as able.    MEDICATIONS / ALLERGIES:     all current active meds have been reviewed, current meds:   Current Facility-Administered Medications:     Adult 3-in-1 TPN (custom base / custom electrolytes), Continuous TPN, Last Rate: 87.7 mL/hr at 03/17/25 2102    [Held by provider] ascorbic acid (VITAMIN C) tablet 250 mg, Daily    [Held by provider] atorvastatin (LIPITOR) tablet 40 mg, Daily With Dinner    budesonide (PULMICORT) inhalation solution 0.5 mg, Q12H    bumetanide (BUMEX) injection 2 mg, TID    ceFAZolin (ANCEF) IVPB (premix in dextrose) 2,000 mg 50 mL, Q8H, Last Rate: 2,000 mg (03/18/25 0433)    chlorhexidine (PERIDEX) 0.12 % oral rinse 15 mL, Q12H GALE    [Held by provider] Cholecalciferol (VITAMIN D3) tablet 2,000 Units, Daily    [Held by provider] cyanocobalamin (VITAMIN B-12) tablet 100 mcg, Daily    [Held by provider] ferrous sulfate tablet 325 mg,  Daily With Breakfast    [Held by provider] fluticasone (ARNUITY ELLIPTA) 100 MCG/ACT inhaler 1 puff, Daily    [Held by provider] folic acid (FOLVITE) tablet 1 mg, Daily    HYDROmorphone HCl (DILAUDID) injection 0.2 mg, Q4H PRN    [Held by provider] hydroxychloroquine (PLAQUENIL) tablet 400 mg, Daily With Breakfast    insulin lispro (HumALOG/ADMELOG) 100 units/mL subcutaneous injection 1-6 Units, Q6H GALE **AND** Fingerstick Glucose (POCT), Q6H    ipratropium (ATROVENT) 0.02 % inhalation solution 0.5 mg, TID    levalbuterol (XOPENEX) inhalation solution 1.25 mg, TID    lidocaine (LIDODERM) 5 % patch 3 patch, Daily    [Held by provider] metoprolol tartrate (LOPRESSOR) tablet 25 mg, Q12H GALE    moisture barrier miconazole 2% cream (aka HITESH MOISTURE BARRIER ANTIFUNGAL CREAM), BID    [Held by provider] oxyCODONE (ROXICODONE) IR tablet 5 mg, Q6H PRN    [Held by provider] oxyCODONE (ROXICODONE) split tablet 2.5 mg, Q6H PRN    pantoprazole (PROTONIX) injection 40 mg, Q12H GALE    [Held by provider] potassium chloride (Klor-Con M20) CR tablet 20 mEq, Daily    [Held by provider] senna-docusate sodium (SENOKOT S) 8.6-50 mg per tablet 1 tablet, HS    [Held by provider] tamsulosin (FLOMAX) capsule 0.4 mg, Daily With Dinner    [Held by provider] torsemide (DEMADEX) tablet 20 mg, Daily    [Held by provider] umeclidinium-vilanterol 62.5-25 mcg/actuation inhaler 1 puff, Daily, and PTA meds:   Prior to Admission Medications   Prescriptions Last Dose Informant Patient Reported? Taking?   Cholecalciferol 50 MCG (2000 UT) TABS 2/27/2025  Yes Yes   Sig: TAKE ONE TABLET BY MOUTH DAILY (FOR LOW VITAMIN D)   albuterol (2.5 mg/3 mL) 0.083 % nebulizer solution More than a month  No No   Sig: Take 3 mL (2.5 mg total) by nebulization every 6 (six) hours as needed for wheezing or shortness of breath   albuterol (PROVENTIL HFA,VENTOLIN HFA) 90 mcg/act inhaler   Yes No   Sig: INHALE 2 PUFFS BY MOUTH EVERY 4 HOURS AS NEEDED FOR BREATHING   albuterol  (PROVENTIL HFA,VENTOLIN HFA) 90 mcg/act inhaler More than a month  No No   Sig: Inhale 2 puffs every 4 (four) hours as needed for wheezing   apixaban (Eliquis) 5 mg Past Week  Yes Yes   Sig: Take 5 mg by mouth 2 (two) times a day   ascorbic acid (VITAMIN C) 250 MG tablet 2/27/2025  Yes Yes   Sig: Take 250 mg by mouth daily   aspirin (ECOTRIN LOW STRENGTH) 81 mg EC tablet 2/27/2025 Morning  Yes Yes   Sig: Take 81 mg by mouth daily   ceFAZolin (ANCEF) 2000 mg IVPB 2/28/2025  No Yes   Sig: Inject 2,000 mg into a catheter in a vein over 30 minutes at 100 mL/hr every 8 (eight) hours   cyanocobalamin (VITAMIN B-12) 100 MCG tablet 2/27/2025  Yes Yes   Sig: Take 100 mcg by mouth daily   docusate sodium (COLACE) 100 mg capsule Past Week  Yes Yes   Sig: Take 100 mg by mouth 2 (two) times a day   famotidine (PEPCID) 20 mg tablet 2/27/2025  Yes Yes   Sig: Take 20 mg by mouth 2 (two) times a day   ferrous sulfate 325 (65 Fe) mg tablet 2/27/2025  Yes Yes   Sig: Take 325 mg by mouth daily with breakfast   folic acid (FOLVITE) 1 mg tablet 2/27/2025  Yes Yes   Sig: TAKE ONE TABLET BY MOUTH EVERY DAY *FOLIC ACID SUPPLEMENT*   hydroxychloroquine (PLAQUENIL) 200 mg tablet 2/27/2025  Yes Yes   Sig: Take 400 mg by mouth daily with breakfast   levalbuterol (XOPENEX) 1.25 mg/3 mL nebulizer solution 2/28/2025  No Yes   Sig: Take 3 mL (1.25 mg total) by nebulization every 8 (eight) hours as needed for wheezing   lidocaine (LIDODERM) 5 % 2/28/2025  No Yes   Sig: Apply 3 patches topically over 12 hours daily Remove & Discard patch within 12 hours or as directed by MD. Apply to neck and back in areas of pain.   metoprolol tartrate (LOPRESSOR) 25 mg tablet 2/28/2025  Yes Yes   Sig: Take 25 mg by mouth every 12 (twelve) hours   mometasone 220 mcg/actuation inhaler 2/27/2025  Yes Yes   Sig: Inhale 1 puff every evening Rinse mouth after use.   polyethylene glycol (MIRALAX) 17 g packet Past Week  No Yes   Sig: Take 17 g by mouth daily as needed  "(constipation)   rosuvastatin (CRESTOR) 40 MG tablet 2/28/2025  Yes Yes   Sig: Take 20 mg by mouth daily   senna (SENOKOT) 8.6 mg Past Week  No Yes   Sig: Take 1 tablet (8.6 mg total) by mouth daily at bedtime as needed for constipation   sertraline (ZOLOFT) 25 mg tablet Unknown  Yes No   Sig: Take 12.5 mg by mouth daily   tamsulosin (FLOMAX) 0.4 mg Past Week  No Yes   Sig: Take 1 capsule (0.4 mg total) by mouth daily with dinner      Facility-Administered Medications: None       Allergies   Allergen Reactions    Shellfish-Derived Products - Food Allergy Other (See Comments)     Pt states, \"I reacted, I dont know\"       OBJECTIVE:    Physical Exam  Physical Exam  Constitutional:       Appearance: He is ill-appearing. He is not toxic-appearing.      Interventions: He is intubated and restrained.   HENT:      Head: Normocephalic and atraumatic.      Right Ear: External ear normal.      Left Ear: External ear normal.      Nose: Nose normal.   Cardiovascular:      Rate and Rhythm: Regular rhythm. Tachycardia present.      Pulses: Normal pulses.   Pulmonary:      Effort: Pulmonary effort is normal. No respiratory distress. He is intubated.   Abdominal:      General: There is no distension.      Palpations: Abdomen is soft.      Tenderness: There is no abdominal tenderness.   Musculoskeletal:      Right lower leg: Edema present.      Left lower leg: Edema present.   Skin:     Coloration: Skin is pale. Skin is not jaundiced.   Neurological:      Comments: Does not respond to verbal or tactile stimuli   Psychiatric:      Comments: Unable to assess         Lab Results: I have personally reviewed pertinent labs., CBC:   Lab Results   Component Value Date    WBC 10.78 (H) 03/18/2025    HGB 10.4 (L) 03/18/2025    HCT 33.4 (L) 03/18/2025    MCV 89 03/18/2025    PLT 90 (L) 03/18/2025    RBC 3.63 (L) 03/18/2025    MCH 29.2 03/18/2025    MCHC 32.7 03/18/2025    RDW 18.1 (H) 03/18/2025    MPV 10.1 03/18/2025   , CMP:   Lab Results " "  Component Value Date    SODIUM 145 03/18/2025    K 3.8 03/18/2025     (H) 03/18/2025    CO2 27 03/18/2025    BUN 63 (H) 03/18/2025    CREATININE 2.89 (H) 03/18/2025    CALCIUM 7.8 (L) 03/18/2025    AST 26 03/18/2025    ALT <3 (L) 03/18/2025    ALKPHOS 53 03/18/2025    EGFR 20 03/18/2025   , BMP:  Lab Results   Component Value Date    SODIUM 145 03/18/2025    K 3.8 03/18/2025     (H) 03/18/2025    CO2 27 03/18/2025    BUN 63 (H) 03/18/2025    CREATININE 2.89 (H) 03/18/2025    GLUC 187 (H) 03/18/2025    CALCIUM 7.8 (L) 03/18/2025    AGAP 7 03/18/2025    EGFR 20 03/18/2025   , PT/PTT:No results found for: \"PT\", \"PTT\"  Imaging Studies: Reviewed and interpreted the pertinent studies  EKG, Pathology, and Other Studies: Reviewed and interpreted the pertinent studies    I have spent a total time of 35 minutes in caring for this patient on the day of the visit/encounter including Impressions, Counseling / Coordination of care, Documenting in the medical record, Reviewing/placing orders in the medical record (including tests, medications, and/or procedures), Obtaining or reviewing history  , and Communicating with other healthcare professionals . Topics discussed with the patient / family include medication review, psychosocial support, goals of care, supportive listening, and anticipatory guidance.   "

## 2025-03-18 NOTE — PROGRESS NOTES
Progress Note - Cardiology   Name: Devin Chaves 79 y.o. male I MRN: [id number]  Unit/Bed#: ICU 10 I Date of Admission: 2/28/2025   Date of Service: 3/18/2025 I Hospital Day: 18     Assessment & Plan  Cardiac arrest due to other underlying condition (HCC)  Rapid response 3/15 around 11 am for AMS. Had been c/o SOB earlier in the day. Initially admitted with ABLA 2/2 friable colonic mass. Per notes, hypotensive with thready pulses at time of rapid response team arrival. Intubated and placed on Levophed and transferred to ICU. After transfer to ICU, a code crimson was called with undetectably low hgb on iSTAT. A code blue followed shortly thereafter. He received 12 units PRBCs, 2 units cryoprecipitate, 1 unit FFP. He was taken emergently to the OR where he was found to have massive hemoperitoneum. Source of bleeding was a mesenteric hematoma. Hemostasis was achieved. He remains intubated but off vasopressors and sedation.  Limited TTE 3/17 with preserved LVEF but possibly dilated RV with reduced function  Troponin elevated as noted below  Tele- NSR and sinus tachycardia, brief atrial runs  ECGs reviewed- Sinus tachycardia without ischemic changes  Elevated troponin  In setting of ABLA, hemorrhagic shock, cardiac arrest with underlying CAD  Hs troponin post arrest- 116-->1295-->2898-->peak of 11,910 about 16 hours post arrest  Limited TTE 3/17- LVEF 55%, RV not well visualized but images suggest dilatation and reduced function. Poor visualization of valves.  Given ABLA/hemorrhagic shock, unclear neurologic recovery post cardiac arrest, and overall preserved LV function on echo would not recommend pursuing further cardiac work up at this time. Cardiac cath contraindicated. Pending clinical course, could consider repeating TTE to get better images. Could consider eventual stress testing but not indicated at this time.   Hemorrhagic shock (HCC)  See above  Hgb 10.4 today  Hemoperitoneum  S/p emergent ex lap on 3/15  with control of bleeding  Acute blood loss anemia  Reason for admission, see above  CAD (coronary artery disease)  Cardiac catheterization  at [facility].  Borderline IFR LAD lesion for which medical management was recommended.    He had been on ASA, statin, BB- all on hold currently  Not c/o chest pain prior to admission  Recent echo here did show preserved EF with some wall motion abnormalities.  Given his significant anemia and GI bleeding and recently diagnosed adenocarcinoma would not pursue any further ischemic workup at this time.   HTN (hypertension)  BP stable off pressors; most recent 121/  COPD (chronic obstructive pulmonary disease) ([facility])  Intubated   History of CVA (cerebrovascular accident)  2 prior CVAs  Family is unsure if that is when his atrial fibrillation was diagnosed but he has been on Eliquis - now held 2/ ABLA, hemorrhagic shock  Atrial fibrillation ([facility])  Unclear details.  All EKGs here show sinus rhythm.  Denies any prior cardioversion  PTA was on metoprolol tartrate 25 mg twice daily  Eliquis held / ABLA, hemorrhagic shock  MSSA bacteremia  PICC in place  Long-term antibiotics per ID  Colonic mass  Diagnosed last admission; pathology has revealed adenocarcinoma. Had plans for outpatient surgery.  Now s/p partial small bowel resection and right colon resection on 3/16 with general surgery  ORIANA (acute kidney injury) ([facility])  Nephrology following; creatinine 3.08 today  Pleural effusion, bilateral  Diurese as directed by nephrology. Bumex 2 mg IV TID  Acute on chronic respiratory failure with hypoxia ([facility])  Intubated    Subjective  Review of Systems   Unable to perform ROS: Intubated       Objective:   Vitals: Blood pressure 120/61, pulse (!) 114, temperature 98.9 °F (37.2 °C), temperature source Axillary, resp. rate (!) 27, height 6' (1.829 m), weight 98.2 kg (216 lb 7.9 oz), SpO2 100%., Body mass index is 29.36 kg/m².,     Systolic (24hrs), Av , Min:86 , Max:189      Diastolic (24hrs), Av, Min:52, Max:84        Intake/Output Summary (Last 24 hours) at 3/18/2025 1349  Last data filed at 3/18/2025 1200  Gross per 24 hour   Intake 1800.98 ml   Output 1685 ml   Net 115.98 ml     Wt Readings from Last 3 Encounters:   25 98.2 kg (216 lb 7.9 oz)   25 87.8 kg (193 lb 9.6 oz)   25 85.3 kg (188 lb 0.8 oz)       Telemetry Review: No significant arrhythmias seen on telemetry review.     EKG personally reviewed by AMPARO Johnson. Sinus tachycardia    Physical Exam  Vitals reviewed.   Constitutional:       Appearance: He is ill-appearing.      Interventions: He is intubated.   Neck:      Vascular: No hepatojugular reflux or JVD.   Cardiovascular:      Rate and Rhythm: Normal rate and regular rhythm.      Heart sounds: Normal heart sounds. No murmur heard.     No friction rub. No gallop.   Pulmonary:      Effort: No respiratory distress. He is intubated.      Breath sounds: Rales present.   Abdominal:      General: Bowel sounds are normal. There is no distension.      Palpations: Abdomen is soft.      Tenderness: There is no abdominal tenderness.   Musculoskeletal:         General: Swelling present.      Cervical back: Neck supple.      Right lower le+ Pitting Edema present.      Left lower le+ Pitting Edema present.   Skin:     General: Skin is warm and dry.      Capillary Refill: Capillary refill takes 2 to 3 seconds.      Findings: No erythema.         LABORATORY RESULTS  Results from last 7 days   Lab Units 25  0925   CK TOTAL U/L 379*     CBC with diff:   Results from last 7 days   Lab Units 25  0825 25  0453 25  2010 25  0726 25  0424 25  2347 25  1947 25  1533 25  0925 25  0440 03/15/25  1935 03/15/25  1654 03/15/25  1345 03/15/25  1235 03/15/25  1154 03/15/25  1045 25  1551 25  0520   WBC Thousand/uL  --  10.78*  --   --   --   --   --  13.31*  --  14.14*  --  11.65*  --   9.30  --  13.12*  --  7.08   HEMOGLOBIN g/dL 10.4* 10.6* 11.2* 11.0* 10.8* 11.8* 12.5 12.9   < > 13.3  13.3   < > 12.5  --  11.5*  --  6.3*   < > 7.3*   I STAT HEMOGLOBIN   --   --   --   --   --   --   --   --    < >  --   --   --    < >  --   --   --    < >  --    HEMATOCRIT % 33.4* 32.4* 34.3* 32.3* 33.1* 35.6* 36.7 38.5   < > 38.7  38.4   < > 36.7  --  35.4*  --  21.0*   < > 23.3*   HEMATOCRIT, ISTAT   --   --   --   --   --   --   --   --    < >  --   --   --    < >  --    < >  --    < >  --    MCV fL  --  89  --   --   --   --   --  87  --  84  --  86  --  89  --  98  --  93   PLATELETS Thousands/uL  --  90*  --   --   --   --   --  104*  --  103*  --  70*  --  70*  --  220  --  147*   RBC Million/uL  --  3.63*  --   --   --   --   --  4.45  --  4.61  --  4.29  --  3.97  --  2.15*  --  2.50*   MCH pg  --  29.2  --   --   --   --   --  29.0  --  28.9  --  29.1  --  28.7  --  29.3  --  29.2   MCHC g/dL  --  32.7  --   --   --   --   --  33.5  --  34.4  --  34.1  --  32.2  --  30.0*  --  31.3*   RDW %  --  18.1*  --   --   --   --   --  18.1*  --  17.7*  --  16.7*  --  16.6*  --  17.4*  --  17.5*   MPV fL  --  10.1  --   --   --   --   --  9.8  --  9.2  --  9.1  --  9.4  --  9.5  --  9.6   NRBC AUTO /100 WBCs  --   --   --   --   --   --   --  0  --   --   --   --   --   --   --  0  --   --     < > = values in this interval not displayed.       CMP:  Results from last 7 days   Lab Units 03/18/25  1048 03/18/25  0453 03/18/25  0359 03/17/25 2010 03/17/25  1550 03/17/25  0935 03/17/25  0424 03/16/25  2235 03/16/25  1533 03/16/25  1327 03/16/25  1215 03/16/25  0440 03/15/25  1654 03/15/25  1442 03/15/25  1345 03/15/25  1235 03/15/25  1154 03/15/25  1045 03/15/25  1042   POTASSIUM mmol/L 3.7 3.8 3.8 4.2 4.1 4.2 4.3   < > 4.4  --   --  4.2   < >  --   --  3.5  --  4.4  --    CHLORIDE mmol/L 112* 111* 111* 112* 113* 113* 113*   < > 116*  --   --  113*   < >  --   --  119*  --  107  --    CO2 mmol/L 27 27 27 28 25  27 26   < > 25  --   --  27   < >  --   --  19*  --  24  --    CO2, I-STAT mmol/L  --   --   --   --   --   --   --   --   --  29 27  --   --  24 23  --  28  --  21   BUN mg/dL 68* 63* 64* 58* 56* 54* 53*   < > 48*  --   --  48*   < >  --   --  42*  --  57*  --    CREATININE mg/dL 3.08* 2.89* 2.90* 2.79* 2.68* 2.54* 2.51*   < > 2.14*  --   --  1.85*   < >  --   --  1.47*  --  2.20*  --    GLUCOSE, ISTAT mg/dl  --   --   --   --   --   --   --   --   --  137 124  --   --  181* 185*  --  159*  --  314*   CALCIUM mg/dL 7.9* 7.8* 7.8* 8.1* 7.9* 7.8* 7.6*   < > 8.1*  --   --  8.1*   < >  --   --  6.9*  --  7.8*  --    AST U/L  --  26  --   --   --   --   --   --  60*  --   --  69*  --   --   --  21  --  16  --    ALT U/L  --  <3*  --   --   --   --   --   --  <3*  --   --  5*  --   --   --  6*  --  <3*  --    ALK PHOS U/L  --  53  --   --   --   --   --   --  51  --   --  53  --   --   --  34  --  42  --    EGFR ml/min/1.73sq m 18 20 19 20 21 23 23   < > 28  --   --  34   < >  --   --  45  --  27  --     < > = values in this interval not displayed.       BMP:  Results from last 7 days   Lab Units 03/18/25  1048 03/18/25  0453 03/18/25  0359 03/17/25 2010 03/17/25  1550 03/17/25  0935 03/17/25  0424 03/16/25  1533 03/16/25  1327   POTASSIUM mmol/L 3.7 3.8 3.8 4.2 4.1 4.2 4.3   < >  --    CHLORIDE mmol/L 112* 111* 111* 112* 113* 113* 113*   < >  --    CO2 mmol/L 27 27 27 28 25 27 26   < >  --    CO2, I-STAT mmol/L  --   --   --   --   --   --   --   --  29   BUN mg/dL 68* 63* 64* 58* 56* 54* 53*   < >  --    CREATININE mg/dL 3.08* 2.89* 2.90* 2.79* 2.68* 2.54* 2.51*   < >  --    GLUCOSE, ISTAT mg/dl  --   --   --   --   --   --   --   --  137   CALCIUM mg/dL 7.9* 7.8* 7.8* 8.1* 7.9* 7.8* 7.6*   < >  --     < > = values in this interval not displayed.       Lab Results   Component Value Date    CREATININE 3.08 (H) 03/18/2025    CREATININE 2.89 (H) 03/18/2025    CREATININE 2.90 (H) 03/18/2025       No results found for:  "\"NTBNP\"        Results from last 7 days   Lab Units 03/18/25  1048 03/18/25  0453 03/17/25  0935 03/16/25  1533 03/16/25  0440 03/15/25  1654 03/15/25  1045   MAGNESIUM mg/dL 1.9 1.9 1.9 1.9 2.0 1.6* 2.3                     Results from last 7 days   Lab Units 03/15/25  1235 03/15/25  1045   INR  1.43* 1.43*       Lipid Profile:   No results found for: \"CHOL\"  Lab Results   Component Value Date    HDL 17 (L) 02/16/2025     Lab Results   Component Value Date    LDLCALC 15 02/16/2025     Lab Results   Component Value Date    TRIG 160 (H) 03/17/2025    TRIG 112 02/16/2025       Meds/Allergies   all current active meds have been reviewed and current meds:   Current Facility-Administered Medications:     Adult 3-in-1 TPN (custom base / custom electrolytes), Continuous TPN, Last Rate: 87.7 mL/hr at 03/17/25 2102    Adult 3-in-1 TPN (custom base / custom electrolytes), Continuous TPN    [Held by provider] ascorbic acid (VITAMIN C) tablet 250 mg, Daily    [Held by provider] atorvastatin (LIPITOR) tablet 40 mg, Daily With Dinner    budesonide (PULMICORT) inhalation solution 0.5 mg, Q12H    bumetanide (BUMEX) injection 2 mg, TID    ceFAZolin (ANCEF) IVPB (premix in dextrose) 2,000 mg 50 mL, Q8H, Last Rate: 2,000 mg (03/18/25 0433)    chlorhexidine (PERIDEX) 0.12 % oral rinse 15 mL, Q12H GALE    [Held by provider] Cholecalciferol (VITAMIN D3) tablet 2,000 Units, Daily    [Held by provider] cyanocobalamin (VITAMIN B-12) tablet 100 mcg, Daily    [Held by provider] ferrous sulfate tablet 325 mg, Daily With Breakfast    [Held by provider] fluticasone (ARNUITY ELLIPTA) 100 MCG/ACT inhaler 1 puff, Daily    [Held by provider] folic acid (FOLVITE) tablet 1 mg, Daily    heparin (porcine) subcutaneous injection 5,000 Units, Q8H GALE    HYDROmorphone HCl (DILAUDID) injection 0.2 mg, Q4H PRN    [Held by provider] hydroxychloroquine (PLAQUENIL) tablet 400 mg, Daily With Breakfast    insulin lispro (HumALOG/ADMELOG) 100 units/mL subcutaneous " injection 2-12 Units, Q6H GALE **AND** Fingerstick Glucose (POCT), Q6H    ipratropium (ATROVENT) 0.02 % inhalation solution 0.5 mg, TID    levalbuterol (XOPENEX) inhalation solution 1.25 mg, TID    lidocaine (LIDODERM) 5 % patch 3 patch, Daily    [Held by provider] metoprolol tartrate (LOPRESSOR) tablet 25 mg, Q12H GALE    moisture barrier miconazole 2% cream (aka HITESH MOISTURE BARRIER ANTIFUNGAL CREAM), BID    [Held by provider] oxyCODONE (ROXICODONE) IR tablet 5 mg, Q6H PRN    [Held by provider] oxyCODONE (ROXICODONE) split tablet 2.5 mg, Q6H PRN    pantoprazole (PROTONIX) injection 40 mg, Q12H GALE    polyethylene glycol (MIRALAX) packet 17 g, Daily    [Held by provider] potassium chloride (Klor-Con M20) CR tablet 20 mEq, Daily    senna oral syrup 17.6 mg, HS    [Held by provider] senna-docusate sodium (SENOKOT S) 8.6-50 mg per tablet 1 tablet, HS    [Held by provider] tamsulosin (FLOMAX) capsule 0.4 mg, Daily With Dinner    [Held by provider] torsemide (DEMADEX) tablet 20 mg, Daily    [Held by provider] umeclidinium-vilanterol 62.5-25 mcg/actuation inhaler 1 puff, Daily    Medications Prior to Admission:     apixaban (Eliquis) 5 mg    ascorbic acid (VITAMIN C) 250 MG tablet    aspirin (ECOTRIN LOW STRENGTH) 81 mg EC tablet    ceFAZolin (ANCEF) 2000 mg IVPB    Cholecalciferol 50 MCG (2000 UT) TABS    cyanocobalamin (VITAMIN B-12) 100 MCG tablet    docusate sodium (COLACE) 100 mg capsule    famotidine (PEPCID) 20 mg tablet    ferrous sulfate 325 (65 Fe) mg tablet    folic acid (FOLVITE) 1 mg tablet    hydroxychloroquine (PLAQUENIL) 200 mg tablet    levalbuterol (XOPENEX) 1.25 mg/3 mL nebulizer solution    lidocaine (LIDODERM) 5 %    metoprolol tartrate (LOPRESSOR) 25 mg tablet    mometasone 220 mcg/actuation inhaler    polyethylene glycol (MIRALAX) 17 g packet    rosuvastatin (CRESTOR) 40 MG tablet    senna (SENOKOT) 8.6 mg    tamsulosin (FLOMAX) 0.4 mg    albuterol (2.5 mg/3 mL) 0.083 % nebulizer solution     albuterol (PROVENTIL HFA,VENTOLIN HFA) 90 mcg/act inhaler    albuterol (PROVENTIL HFA,VENTOLIN HFA) 90 mcg/act inhaler    sertraline (ZOLOFT) 25 mg tablet    Adult 3-in-1 TPN (custom base / custom electrolytes), , Last Rate: 87.7 mL/hr at 03/17/25 2102  Adult 3-in-1 TPN (custom base / custom electrolytes),         Counseling / Coordination of Care  Total floor / unit time spent today 20 minutes.  Greater than 50% of total time was spent with the patient and / or family counseling and / or coordination of care.      ** Please Note: Dragon 360 Dictation voice to text software may have been used in the creation of this document. **

## 2025-03-18 NOTE — ASSESSMENT & PLAN NOTE
CXR with vascular congestion and pleural effusions.   Was being diuresed before cardiac arrest on 3/15/25.   Diuretics resumed 3/17/2025.  Now on Bumex 2 mg IV 3 times daily

## 2025-03-18 NOTE — ASSESSMENT & PLAN NOTE
Newly identified colonic mass with diagnosis consistent with adenocarcinoma. S/p SBR and colon resection as a result of mesenteric hematoma bleed.  Colorectal surgery following

## 2025-03-18 NOTE — PROGRESS NOTES
Progress Note - Nephrology   Name: Devin Chaves 79 y.o. male I MRN: [id number]  Unit/Bed#: ICU 10 I Date of Admission: 2/28/2025   Date of Service: 3/18/2025 I Hospital Day: 18       Brief Hx: 79-year-old male with HTN, CHF, CVA, AF on Eliquis, COPD, colon adenocarcinoma, MSSA bacteremia presenting from rehab facility with symptomatic anemia (Hgb 4.6).  Nephrology consulted for management of ORIANA.  Assessment & Plan  ORIANA (acute kidney injury) (HCC)  Etiology: Suspect ATN in setting of prolonged prerenal azotemia from volume depletion and severe anemia  Baseline creatinine 0.8 - 0.9.   Admission SCr 2.43 on 2/28/25  SCr had remained around 1.8-2 since admission but then worsened on 3/17/2025 after cardiac arrest on 3/15/25 and emergent ex lap and then subsequent return to OR 3/16 for R hemicolectomy  Peak creatinine 2.90 on 3/17/2025  Today's creatinine 2.89, plateaued and appears to be trending down  CT 2/28/25 - no hydronephrosis. UA on 3/8/25 with RBC 2-4, WBC 2-4, small blood and trace protein.   Of note, UPC ratio was 3.4 gm on 3/8/25 and urine ACR was only 821 mg on 3/5/25.   Serum and urine LIDYA no M spike, C3 83 (low), C4 26. At risk for AIN due to antibiotic exposure but no clear indication of this as well.    Renal function stable and creatinine appears to have plateaued with resolution of hypotension in response to IV diuretics.  No urgent indication for renal replacement therapy at this time.  Continue IV diuretics.  Received Lasix 80 mg IV x 1 this am and started on Bumex 2 mg IV TID with 1st dose this am  Avoid nephrotoxins, NSAIDs, IV contrast if possible  Avoid hypotension, maintain MAP >65  Trend BMP in a.m.  Dose all medications per EGFR  D/w primary team and we agree to continue IV diuretics as above    Acute blood loss anemia  Acute blood loss on 3/15/25 and received > 10 units PRBC.   S/p emergent ex lap and control of bleeding  Hgb 10.4 today, stable and at goal.   Hgb monitoring per primary  service.     HTN (hypertension)  BP acceptable.  No further hypotension  Volume status hypervolemic  Echo 3/17/2025: EF 55%, unable to assess diastolic function  Home Rx: Metoprolol 25 mg twice daily  Previous Rx: Metoprolol 25 mg BID, Torsemide 20 mg OD.   Current Rx: Bumex 2 mg IV 3 times daily  Avoid hypotension or large fluctuations in BP  Continue to monitor    MSSA bacteremia  Currently on Cefazolin 2 gm IV q8H - needs this until 3/27/25.   ID following.   Possible cutaneous source  TTE without vegetation.     Urinary retention  Seen by urology this admission.   Now with glass.   Tamsulosin on hold.     Colonic mass  Newly diagnosed colon mass on C-scope on 2/20/25.   Pathology - adenocarcinoma  Was to follow colorectal as outpatient  S/p ex lap, partial SBR and R colectomy 3/16/2025  Surgical pathology pending  Management per surgery    Pleural effusion, bilateral  CXR with vascular congestion and pleural effusions.   Was being diuresed before cardiac arrest on 3/15/25.   Diuretics resumed 3/17/2025.  Now on Bumex 2 mg IV 3 times daily    Cardiac arrest (HCC)  S/p cardiac arrest with ROSC 3/15/2025  In the setting of ABLA with undetectable Hgb and hemoperitoneum, s/p emergent ex lap, segmental SBR 3/15  Echo 3/17/2025: EF 55%  Management per primary team    Hemoperitoneum  With actively bleeding mesenteric hematoma  S/p emergent ex lap, control of bleeding, segmental SBR and left in discontinuity 3/15  Now s/p partial SBR, R hemicolectomy and closure 3/16/2025  Management per surgery  Atrial fibrillation (HCC)  On Metoprolol for rate control, currently on hold  On Eliquis for AC, now on hold    Encephalopathy  Poor responsiveness since cardiac arrest  CTH with 4 mm focus of high attenuation right frontal lobe without evidence of anoxic brain injury, stable chronic ischemic changes.  vEEG monitoring with no evidence of seizure  Brain MRI pending  Neurology following  Management per neurology and primary  team    Plan Summary:  -Renal function stable creatinine appears to have plateaued  -Continue diuresis.  Continue Bumex 2 mg IV 3 times daily  -Trend BMP in a.m.    SUBJECTIVE:  Patient intubated on vent, unresponsive to voice.  Appropriate response to Lasix 60 mg IV x 1 yesterday.  Received Lasix 80 mg IV x 1 this a.m. and now on Bumex 2 mg IV 3 times daily.  Adequate urine output.  Remains hypervolemic.  Creatinine appears to have plateaued.  BP stable.  VSS    A complete review of systems was unable to be performed due to intubated on vent and encephalopathy.      OBJECTIVE:  Current Weight: Weight - Scale: 98.2 kg (216 lb 7.9 oz)  Vitals:    03/18/25 0915 03/18/25 0930 03/18/25 0945 03/18/25 1000   BP: 114/58 116/58 116/57 121/58   BP Location:       Pulse: (!) 107 (!) 106 (!) 106 (!) 111   Resp: (!) 24 22 22 (!) 25   Temp:       TempSrc:       SpO2: 100% 100% 100% 100%   Weight:       Height:           Intake/Output Summary (Last 24 hours) at 3/18/2025 1046  Last data filed at 3/18/2025 1000  Gross per 24 hour   Intake 1823.91 ml   Output 1315 ml   Net 508.91 ml       General: Patient intubated on vent.  Unresponsive to voice in no acute distress.  Skin:  No rash, warm, good skin turgor   Eyes:  sclerae nonicteric.  no periorbital edema   ENT:  Moist mucous membranes.  + ETT in place  Neck:  Trachea midline, symmetric.  Mild JVD.  Chest: Bibasilar crackles  CVS: Tachycardic without murmur, gallop or rub.  S1 and S2 identified and normal.  No S3, S4.   Abdomen:  Soft, nontender, nondistended without masses.  rare bowel sounds x 4 quadrants.  No bruit. Incision CDI  Extremities:  Warm, pink, well perfused.  No cyanosis, pallor.  2+ BLE edema.  Neuro: Intubated on vent.  Unresponsive to voice  Psych: Intubated on vent, unresponsive  : Tang catheter in place draining dilute urine      Medications:    Current Facility-Administered Medications:     Adult 3-in-1 TPN (custom base / custom electrolytes), ,  Intravenous, Continuous TPN, Sailaja Gonzales MD, Last Rate: 87.7 mL/hr at 03/17/25 2102, New Bag at 03/17/25 2102    Adult 3-in-1 TPN (custom base / custom electrolytes), , Intravenous, Continuous TPN, AMPARO Calvert    [Held by provider] ascorbic acid (VITAMIN C) tablet 250 mg, 250 mg, Oral, Daily, Yobany Mott MD, 250 mg at 03/14/25 0848    [Held by provider] atorvastatin (LIPITOR) tablet 40 mg, 40 mg, Oral, Daily With Dinner, Yobany Mott MD, 40 mg at 03/13/25 1626    budesonide (PULMICORT) inhalation solution 0.5 mg, 0.5 mg, Nebulization, Q12H, AMPARO Umaña, 0.5 mg at 03/18/25 0743    bumetanide (BUMEX) injection 2 mg, 2 mg, Intravenous, TID, AMPARO Calvert, 2 mg at 03/18/25 0854    ceFAZolin (ANCEF) IVPB (premix in dextrose) 2,000 mg 50 mL, 2,000 mg, Intravenous, Q8H, Yobany Mott MD, Last Rate: 100 mL/hr at 03/18/25 0433, 2,000 mg at 03/18/25 0433    chlorhexidine (PERIDEX) 0.12 % oral rinse 15 mL, 15 mL, Mouth/Throat, Q12H GALE, Yobany Mott MD, 15 mL at 03/18/25 0819    [Held by provider] Cholecalciferol (VITAMIN D3) tablet 2,000 Units, 2,000 Units, Oral, Daily, Yobany Mott MD, 2,000 Units at 03/14/25 0848    [Held by provider] cyanocobalamin (VITAMIN B-12) tablet 100 mcg, 100 mcg, Oral, Daily, Yobany Mott MD, 100 mcg at 03/14/25 0848    [Held by provider] ferrous sulfate tablet 325 mg, 325 mg, Oral, Daily With Breakfast, Yobany Mott MD    [Held by provider] fluticasone (ARNUITY ELLIPTA) 100 MCG/ACT inhaler 1 puff, 1 puff, Inhalation, Daily, Yobany Mott MD, 1 puff at 03/15/25 0925    [Held by provider] folic acid (FOLVITE) tablet 1 mg, 1 mg, Oral, Daily, Yobany Mott MD, 1 mg at 03/14/25 0848    heparin (porcine) subcutaneous injection 5,000 Units, 5,000 Units, Subcutaneous, Q8H GALE, AMPARO Calvert    HYDROmorphone HCl (DILAUDID) injection 0.2 mg, 0.2 mg, Intravenous, Q4H PRN, Yobany Mott MD    [Held by  provider] hydroxychloroquine (PLAQUENIL) tablet 400 mg, 400 mg, Oral, Daily With Breakfast, Yobany Mott MD, 400 mg at 03/14/25 0848    insulin lispro (HumALOG/ADMELOG) 100 units/mL subcutaneous injection 2-12 Units, 2-12 Units, Subcutaneous, Q6H GALE **AND** Fingerstick Glucose (POCT), , , Q6H, AMPARO Calvert    ipratropium (ATROVENT) 0.02 % inhalation solution 0.5 mg, 0.5 mg, Nebulization, TID, AMPARO Umaña, 0.5 mg at 03/18/25 0743    levalbuterol (XOPENEX) inhalation solution 1.25 mg, 1.25 mg, Nebulization, TID, AMPARO Umaña, 1.25 mg at 03/18/25 0743    lidocaine (LIDODERM) 5 % patch 3 patch, 3 patch, Topical, Daily, Yobany Mott MD, 3 patch at 03/17/25 0933    [Held by provider] metoprolol tartrate (LOPRESSOR) tablet 25 mg, 25 mg, Oral, Q12H GALE, Yobany Mott MD, 25 mg at 03/14/25 0848    moisture barrier miconazole 2% cream (aka HITESH MOISTURE BARRIER ANTIFUNGAL CREAM), , Topical, BID, Yobany Mott MD, Given at 03/18/25 0854    [Held by provider] oxyCODONE (ROXICODONE) IR tablet 5 mg, 5 mg, Oral, Q6H PRN, Yobany Mott MD, 5 mg at 03/01/25 1817    [Held by provider] oxyCODONE (ROXICODONE) split tablet 2.5 mg, 2.5 mg, Oral, Q6H PRN, Yobany Mott MD, 2.5 mg at 03/05/25 0945    pantoprazole (PROTONIX) injection 40 mg, 40 mg, Intravenous, Q12H GALE, Yobany Mott MD, 40 mg at 03/18/25 0854    [Held by provider] potassium chloride (Klor-Con M20) CR tablet 20 mEq, 20 mEq, Oral, Daily, Yobany Mott MD    [Held by provider] senna-docusate sodium (SENOKOT S) 8.6-50 mg per tablet 1 tablet, 1 tablet, Oral, HS, Yobany Mott MD, 1 tablet at 03/13/25 2156    [Held by provider] tamsulosin (FLOMAX) capsule 0.4 mg, 0.4 mg, Oral, Daily With Dinner, Yobany Mott MD, 0.4 mg at 03/13/25 1626    [Held by provider] torsemide (DEMADEX) tablet 20 mg, 20 mg, Oral, Daily, Yobany Mott MD, 20 mg at 03/14/25 0848    [Held by provider]  umeclidinium-vilanterol 62.5-25 mcg/actuation inhaler 1 puff, 1 puff, Inhalation, Daily, Yobany Mott MD, 1 puff at 03/15/25 0925    Laboratory Results:  Results from last 7 days   Lab Units 03/18/25  0825 03/18/25  0453 03/18/25  0359 03/17/25 2010 03/17/25  1550 03/17/25  0935 03/17/25  0726 03/17/25  0424 03/16/25  2347 03/16/25  2235 03/16/25  1947/25  1533 03/16/25  1327 03/16/25  1215 03/16/25  0925 03/16/25  0440 03/15/25  1935 03/15/25  1654 03/15/25  1442 03/15/25  1345 03/15/25  1235 03/15/25  1154 03/15/25  1045 03/15/25  1042 03/14/25  1551 03/14/25  0520   WBC Thousand/uL  --  10.78*  --   --   --   --   --   --   --   --   --  13.31*  --   --   --  14.14*  --  11.65*  --   --  9.30  --  13.12*  --   --  7.08   HEMOGLOBIN g/dL 10.4* 10.6*  --  11.2*  --   --  11.0* 10.8* 11.8*  --  12.5 12.9  --   --    < > 13.3  13.3   < > 12.5  --   --  11.5*  --  6.3*  --    < > 7.3*   I STAT HEMOGLOBIN g/dl  --   --   --   --   --   --   --   --   --   --   --   --  10.9* 10.2*  --   --   --   --  10.9* 11.6*  --   --   --  5.4*   < >  --    HEMATOCRIT % 33.4* 32.4*  --  34.3*  --   --  32.3* 33.1* 35.6*  --  36.7 38.5  --   --    < > 38.7  38.4   < > 36.7  --   --  35.4*  --  21.0*  --    < > 23.3*   HEMATOCRIT, ISTAT %  --   --   --   --   --   --   --   --   --   --   --   --  32* 30*  --   --   --   --  32* 34*  --  <15*  --  16*   < >  --    PLATELETS Thousands/uL  --  90*  --   --   --   --   --   --   --   --   --  104*  --   --   --  103*  --  70*  --   --  70*  --  220  --   --  147*   SODIUM mmol/L  --  145 144 146 146 146  --  145  --  147  --  148*  --   --   --  148*  --  151*  --   --  152*  --  143  --    < > 143   POTASSIUM mmol/L  --  3.8 3.8 4.2 4.1 4.2  --  4.3  --  4.4  --  4.4  --   --   --  4.2  --  3.2*  --   --  3.5  --  4.4  --    < > 3.2*   CHLORIDE mmol/L  --  111* 111* 112* 113* 113*  --  113*  --  115*  --  116*  --   --   --  113*  --  113*  --   --  119*  --  107  --     < > 107   CO2 mmol/L  --  27 27 28 25 27  --  26  --  26  --  25  --   --   --  27  --  25  --   --  19*  --  24  --    < > 33*   CO2, I-STAT mmol/L  --   --   --   --   --   --   --   --   --   --   --   --  29 27  --   --   --   --  24 23  --  28  --  21   < >  --    BUN mg/dL  --  63* 64* 58* 56* 54*  --  53*  --  50*  --  48*  --   --   --  48*  --  45*  --   --  42*  --  57*  --    < > 61*   CREATININE mg/dL  --  2.89* 2.90* 2.79* 2.68* 2.54*  --  2.51*  --  2.43*  --  2.14*  --   --   --  1.85*  --  1.67*  --   --  1.47*  --  2.20*  --    < > 2.07*   CALCIUM mg/dL  --  7.8* 7.8* 8.1* 7.9* 7.8*  --  7.6*  --  7.8*  --  8.1*  --   --   --  8.1*  --  7.7*  --   --  6.9*  --  7.8*  --    < > 8.0*   MAGNESIUM mg/dL  --  1.9  --   --   --  1.9  --   --   --   --   --  1.9  --   --   --  2.0  --  1.6*  --   --   --   --  2.3  --   --  1.8*   PHOSPHORUS mg/dL  --  4.0  --   --   --  4.0  --   --   --   --   --  4.1  --   --   --  3.2  --  3.6  --   --   --   --   --   --   --   --    GLUCOSE, ISTAT mg/dl  --   --   --   --   --   --   --   --   --   --   --   --  137 124  --   --   --   --  181* 185*  --  159*  --  314*  --   --     < > = values in this interval not displayed.       I have personally reviewed the blood work as stated above and in my note.  I have personally reviewed internal Medicine, co-consultants and previous nephrology notes.

## 2025-03-18 NOTE — ASSESSMENT & PLAN NOTE
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   Hold home statin while NPO   Hold ASA and AC given recent hemorrhagic shock

## 2025-03-18 NOTE — PLAN OF CARE
Problem: Prexisting or High Potential for Compromised Skin Integrity  Goal: Skin integrity is maintained or improved  Description: INTERVENTIONS:  - Identify patients at risk for skin breakdown  - Assess and monitor skin integrity  - Assess and monitor nutrition and hydration status  - Monitor labs   - Assess for incontinence   - Turn and reposition patient  - Assist with mobility/ambulation  - Relieve pressure over bony prominences  - Avoid friction and shearing  - Provide appropriate hygiene as needed including keeping skin clean and dry  - Evaluate need for skin moisturizer/barrier cream  - Collaborate with interdisciplinary team   - Patient/family teaching  - Consider wound care consult   Outcome: Not Progressing     Problem: Potential for Falls  Goal: Patient will remain free of falls  Description: INTERVENTIONS:  - Educate patient/family on patient safety including physical limitations  - Instruct patient to call for assistance with activity   - Consult OT/PT to assist with strengthening/mobility   - Keep Call bell within reach  - Keep bed low and locked with side rails adjusted as appropriate  - Keep care items and personal belongings within reach  - Initiate and maintain comfort rounds  - Make Fall Risk Sign visible to staff  - Apply yellow socks and bracelet for high fall risk patients  - Consider moving patient to room near nurses station  Outcome: Not Progressing     Problem: Nutrition/Hydration-ADULT  Goal: Nutrient/Hydration intake appropriate for improving, restoring or maintaining nutritional needs  Description: Monitor and assess patient's nutrition/hydration status for malnutrition. Collaborate with interdisciplinary team and initiate plan and interventions as ordered.  Monitor patient's weight and dietary intake as ordered or per policy. Utilize nutrition screening tool and intervene as necessary. Determine patient's food preferences and provide high-protein, high-caloric foods as appropriate.      INTERVENTIONS:  - Monitor oral intake, urinary output, labs, and treatment plans  - Assess nutrition and hydration status and recommend course of action  - Evaluate amount of meals eaten  - Assist patient with eating if necessary   - Allow adequate time for meals  - Recommend/ encourage appropriate diets, oral nutritional supplements, and vitamin/mineral supplements  - Order, calculate, and assess calorie counts as needed  - Recommend, monitor, and adjust tube feedings and TPN/PPN based on assessed needs  - Assess need for intravenous fluids  - Provide specific nutrition/hydration education as appropriate  - Include patient/family/caregiver in decisions related to nutrition  Outcome: Not Progressing     Problem: GASTROINTESTINAL - ADULT  Goal: Minimal or absence of nausea and/or vomiting  Description: INTERVENTIONS:  - Administer IV fluids if ordered to ensure adequate hydration  - Maintain NPO status until nausea and vomiting are resolved  - Nasogastric tube if ordered  - Administer ordered antiemetic medications as needed  - Provide nonpharmacologic comfort measures as appropriate  - Advance diet as tolerated, if ordered  - Consider nutrition services referral to assist patient with adequate nutrition and appropriate food choices  Outcome: Not Progressing  Goal: Maintains or returns to baseline bowel function  Description: INTERVENTIONS:  - Assess bowel function  - Encourage oral fluids to ensure adequate hydration  - Administer IV fluids if ordered to ensure adequate hydration  - Administer ordered medications as needed  - Encourage mobilization and activity  - Consider nutritional services referral to assist patient with adequate nutrition and appropriate food choices  Outcome: Not Progressing  Goal: Maintains adequate nutritional intake  Description: INTERVENTIONS:  - Monitor percentage of each meal consumed  - Identify factors contributing to decreased intake, treat as appropriate  - Assist with meals as  needed  - Monitor I&O, weight, and lab values if indicated  - Obtain nutrition services referral as needed  Outcome: Not Progressing  Goal: Establish and maintain optimal ostomy function  Description: INTERVENTIONS:  - Assess bowel function  - Encourage oral fluids to ensure adequate hydration  - Administer IV fluids if ordered to ensure adequate hydration   - Administer ordered medications as needed  - Encourage mobilization and activity  - Nutrition services referral to assist patient with appropriate food choices  - Assess stoma site  - Consider wound care consult   Outcome: Not Progressing  Goal: Oral mucous membranes remain intact  Description: INTERVENTIONS  - Assess oral mucosa and hygiene practices  - Implement preventative oral hygiene regimen  - Implement oral medicated treatments as ordered  - Initiate Nutrition services referral as needed  Outcome: Not Progressing     Problem: HEMATOLOGIC - ADULT  Goal: Maintains hematologic stability  Description: INTERVENTIONS  - Assess for signs and symptoms of bleeding or hemorrhage  - Monitor labs  - Administer supportive blood products/factors as ordered and appropriate  Outcome: Not Progressing     Problem: SAFETY,RESTRAINT: NV/NON-SELF DESTRUCTIVE BEHAVIOR  Goal: Remains free of harm/injury (restraint for non violent/non self-detsructive behavior)  Description: INTERVENTIONS:  - Instruct patient/family regarding restraint use   - Assess and monitor physiologic and psychological status   - Provide interventions and comfort measures to meet assessed patient needs   - Identify and implement measures to help patient regain control  - Assess readiness for release of restraint   Outcome: Not Progressing  Goal: Returns to optimal restraint-free functioning  Description: INTERVENTIONS:  - Assess the patient's behavior and symptoms that indicate continued need for restraint  - Identify and implement measures to help patient regain control  - Assess readiness for release  of restraint   Outcome: Not Progressing

## 2025-03-18 NOTE — ASSESSMENT & PLAN NOTE
Rapid response 3/15 around 11 am for AMS. Had been c/o SOB earlier in the day. Initially admitted with ABLA 2/2 friable colonic mass. Per notes, hypotensive with thready pulses at time of rapid response team arrival. Intubated and placed on Levophed and transferred to ICU. After transfer to ICU, a code crimson was called with undetectably low hgb on iSTAT. A code blue followed shortly thereafter. He received 12 units PRBCs, 2 units cryoprecipitate, 1 unit FFP. He was taken emergently to the OR where he was found to have massive hemoperitoneum. Source of bleeding was a mesenteric hematoma. Hemostasis was achieved. He remains intubated but off vasopressors and sedation.  Limited TTE 3/17 with preserved LVEF but possibly dilated RV with reduced function  Troponin elevated as noted below  Tele- NSR and sinus tachycardia, brief atrial runs  ECGs reviewed- Sinus tachycardia without ischemic changes

## 2025-03-18 NOTE — ASSESSMENT & PLAN NOTE
Code Crimson 3/15 for hemorrhagic shock related to mesenteric hematoma s/p small bowel resection and colon resection    Now off vasopressor medication

## 2025-03-18 NOTE — ASSESSMENT & PLAN NOTE
History of prior strokes including recent bilateral hemispheric strokes during recent hospitalization in 2/2025. PMH of Afib and Eliquis held during stay d/t hemorrhagic shock. MRI this admission demonstrate new scattered small acute infarcts in the L posterior frontal and bilateral cerebellar lobes, likely embolic in nature. No clear evidence of cardiac embolic source of limited TTE. On 3/18 was started on heparin gtt by primary team after MR resulted and cleared by other teams.   Hemoglobin A1c 5.8 (2/16/2025), LDL 15 (2/16/2025)    Impression: Etiology of his strokes are likely cardioembolic given that he has been without AC after hemorrhagic bleed vs hypercoaguability in the setting of active GI malignancy.     Plan:  Continue to monitor on telemetry  BP Goal: Avoid hypotension and major fluctuations in BP as able.   AC/AP: Heparin gtt stroke protocol - no boluses, obtain repeat CTH after 2 therapeutic PTTs --> obtain CTH now given supratherapeutic PTTs overnight  High Intensity Statin

## 2025-03-19 PROBLEM — E87.70 VOLUME OVERLOAD: Status: ACTIVE | Noted: 2025-03-19

## 2025-03-19 NOTE — ASSESSMENT & PLAN NOTE
Concern for anoxic injury s/p cardiac arrest with 12 minute downtime   EEG without seizure activity   CTH shows 4mm focus of high attenuation in right frontal lobe which is stable on repeat CTH   MRI with multiple areas of hypoxia related injury as well as acute embolic strokes   Hold all sedation, PRN dilaudid for pain management   Frequent neuro checks   Maintain normothermia and normoglycemia   Neurology following, appreciate recommendations

## 2025-03-19 NOTE — ASSESSMENT & PLAN NOTE
Refractory volume overload  S/p intermittent IV lasix and IV bumex  Continue bumex drip for now  May need to consider hemodialysis for UF if refractory overload

## 2025-03-19 NOTE — ASSESSMENT & PLAN NOTE
Etiology: Suspect ATN in setting of prolonged prerenal azotemia from volume depletion and severe anemia  Baseline creatinine 0.8 - 0.9.   Admission SCr   SCr had remained around 1.8-2 since admission but then worsened on 3/17/2025 after cardiac arrest on 3/15/25 and emergent ex lap and then subsequent return to OR 3/16 for R hemicolectomy  Peak creatinine 3.35 on 3/19/2025, trending  Today's creatinine 3.35, continuing to trend up  CT 2/28/25 - no hydronephrosis. UA on 3/8/25 with RBC 2-4, WBC 2-4, small blood and trace protein.   Of note, UPC ratio was 3.4 gm on 3/8/25 and urine ACR was only 821 mg on 3/5/25.   Serum and urine LIDYA no M spike, C3 83 (low), C4 26. At risk for AIN due to antibiotic exposure but no clear indication of this as well.    Renal function continues to worsen with ongoing fluid volume overload despite Bumex drip  High risk for need for initiation of renal replacement therapy if refractory volume overload  Continue bumex drip.  Currently at 2 mg/hr  Avoid nephrotoxins, NSAIDs, IV contrast if possible  Avoid hypotension, maintain MAP >65  Trend BMP  Dose all medications per EGFR  For CTH now, reevaluate after return  Ongoing GOC discussion with family by primary team     ID Progress Note      SUBJECTIVE:    Events noted. Pt coded and is intubated    OBJECTIVE:  Tmax Temp (24hrs), Av.1 °F (36.7 °C), Min:96.3 °F (35.7 °C), Max:98.8 °F (37.1 °C)     Last Vitals   Vitals:    24 1306   BP: (!) 78/47   Pulse: (!) 123   Resp:    Temp:          Gen: Well developed, well nourished. Not in distress  HEENT: EOMI, MELANI, No oral lesions. Secretions are tan whitish  Neck:  Supple, No JVD, No bruits, No LAP.  CVS:  HR - RRR, S1, S2. No murmur  Lung: Clear to auscultation, no dullness to percussion.  Abd:  Soft, Non-tender. No organomegaly, No palpable masses, NABS  : Unremarkable  Extr:  No cyanosis, clubbing. Edema of the (R) arm. Erythema and tenderness of the (R) hand remains the same  Neuro: unresponsive  Skin: No rashes.      MEDICATIONS;    As per MAR and reviewed      LABS CULTURES AND IMAGING: REVIEWED    CBC  Recent Labs   Lab 24  0451   WBC 42.4*  42.4*   HCT 28.2*  28.2*   HGB 9.4*  9.4*     210       CMP  Recent Labs   Lab 24  0451   SODIUM 148*   POTASSIUM 3.2*   CHLORIDE 109   CO2 19*   GLUCOSE 93   BUN 29*   CREATININE 1.12*   CALCIUM 7.5*   TOTPROTEIN 5.3*   ALBUMIN 1.7*   BILIRUBIN 4.2*   *   GPT 29   ALKPT 340*         Coagulation Panel  Recent Labs   Lab 24  0451   INR 1.7   PTT 61*       Arterial Blood Gas  Recent Labs   Lab 24  0450 24  1351 24  1029   RAPH 7.11* 7.39 7.38   RAPCO2 48* 31* 27*   RAPO2 52* 54* 71*   RAHCO3 15* 19* 16*   RASAT 74* 88* 96       IMAGING:    ECG:   Encounter Date: 24   Electrocardiogram 12-Lead   Result Value    Ventricular Rate EKG/Min (BPM) 91    Atrial Rate (BPM) 91    OH-Interval (MSEC) 146    QRS-Interval (MSEC) 78    QT-Interval (MSEC) 382    QTc 470    P Axis (Degrees) 81    R Axis (Degrees) 79    T Axis (Degrees) 65    REPORT TEXT      Normal sinus rhythm  Nonspecific T wave abnormality  Abnormal ECG  When compared with ECG of  2024 14:49,  Nonspecific T wave  abnormality now evident in  Inferior leads  Nonspecific T wave abnormality, worse in  Anterolateral leads          Echocardiogram  Last TTE  Results for orders placed during the hospital encounter of 24    TRANSTHORACIC ECHO (TTE) COMPLETE W/ W/O IMAGING AGENT    Narrative  *[facility]*  [facility] [address] [address]  [phone]  Transthoracic Echocardiogram (TTE)    Patient: Ibeth Byers     Study Date/Time:        2024 7:48AM  MRN:     [id number]             FIN#:                   [id number]  :     1960          Ht/Wt:                  165cm 53.1kg  Age:     63                  BSA/BMI:                1.55m^2 19.5kg/m^2  Gender:  F                   Baseline BP:            106 / 65  Ordering Physician:    Sabino Obrien    Referring Physician:   Sabino Obrien    Attending Physician:   Carlos Fountain    Diagnostic Physician:  Adeline Pichardo M.D.  Sonographer:           Mirian Priest    ------------------------------------------------------------------------------  INDICATIONS:   Lab test abnormality.    ------------------------------------------------------------------------------  STUDY CONCLUSIONS  SUMMARY:    1. Left ventricle: The cavity size is normal. Wall thickness is normal.  Systolic function is normal. The ejection fraction was measured by visual  estimation. The ejection fraction is 70%.  2. Left atrium: The atrium is mildly dilated.  3. Right ventricle: The cavity size is moderately to severely dilated. Wall  thickness is normal. Systolic function is normal.  4. Right atrium: The atrium is moderately dilated.    ------------------------------------------------------------------------------  STUDY DATA:   Procedure:  A transthoracic echocardiogram was performed. Image  quality was adequate.  M-mode, complete 2D, complete spectral Doppler, and  color Doppler.  Study status:  Routine.  Study completion:  There were  no  complications.    FINDINGS    BASELINE ECG:   Tachycardia.  LEFT VENTRICLE:  The cavity size is normal. Wall thickness is normal. There is  no evidence of hypertrophy. Systolic function is normal. Wall motion is  normal; there are no regional wall motion abnormalities.    The ejection  fraction was measured by visual estimation. The ejection fraction is 70%. The  tissue Doppler parameters are abnormal. Left ventricular diastolic function is  indeterminate.    AORTIC VALVE:  The annulus is normal. The valve is structurally normal. The  valve is trileaflet. The leaflets are normal thickness. Cusp separation is  normal. Velocity is within the normal range. There is no stenosis. There is no  regurgitation. The mean systolic gradient is 7mm Hg. The peak systolic  gradient is 19mm Hg. The LVOT to aortic valve VTI ratio is 1.15. The valve  area is 2.8cm^2. The valve area index is 1.81cm^2/m^2. The ratio of LVOT to  aortic valve peak velocity is 0.74. The valve area is 2.1cm^2. The valve area  index is 1.35cm^2/m^2.    AORTA:  Aortic root: The root is normal-sized.    MITRAL VALVE:  The valve is structurally normal. The annulus is normal. The  leaflets are normal thickness. Leaflet separation is normal. Inflow velocity  is within the normal range. There is no evidence for stenosis. There is no  regurgitation. The mean diastolic gradient is 4mm Hg. The valve area by  pressure half-time is 5.1cm^2. The valve area index by pressure half-time is  3.29cm^2/m^2. The valve area (LVOT continuity) is 3.6cm^2. The valve area  index (LVOT continuity) is 2.32cm^2/m^2.    ATRIAL SEPTUM:  The septum is normal.    LEFT ATRIUM:  The atrium is mildly dilated.    RIGHT VENTRICLE:  The cavity size is moderately to severely dilated. Wall  thickness is normal. Systolic function is normal. The TAPSE is normal,  suggestive of normal RV systolic function.       The RV pressure during  systole is 40mm Hg.    PULMONIC VALVE:   Not well  visualized. The valve is structurally normal.  Velocity is within the normal range. There is no evidence for stenosis. There  is no significant regurgitation.    TRICUSPID VALVE:  The valve is structurally normal. There is mild-moderate  regurgitation.    PULMONARY ARTERIES:  The main pulmonary artery is normal-sized.    RIGHT ATRIUM:  The atrium is moderately dilated.       The estimated central  venous pressure is 3mm Hg.    PERICARDIUM:   There is no pericardial effusion.    SYSTEMIC VEINS:  Inferior vena cava: The IVC is normal-sized. The IVC is normal-sized.  Respirophasic diameter changes are in the normal range (>= 50%).    ------------------------------------------------------------------------------  Measurements    Left ventricle            Value          Ref        12/18/2023  Left atrium continued      Value          Ref       12/18/2023  ONI, LAX chord        (L) 2.8   cm       3.8 - 5.2  ----------  Area/bsa ES, A2C           12.99 cm^2/m^2 --------- ----------  ESD, LAX chord        (L) 1.7   cm       2.2 - 3.5  ----------  Vol, S                 (H) 53    ml       22 - 52   ----------  ONI/bsa, LAX chord    (L) 1.8   cm/m^2   2.3 - 3.1  ----------  Vol/bsa, S             (N) 34    ml/m^2   16 - 34   ----------  ESD/bsa, LAX chord    (L) 1.1   cm/m^2   1.3 - 2.1  ----------  Vol, ES, 1-p A4C       (N) 26    ml       22 - 52   ----------  PW, ED, LAX           (H) 1.1   cm       0.6 - 0.9  ----------  Vol/bsa, ES, 1-p A4C   (N) 17    ml/m^2   11 - 40   ----------  ONI major ax, A4C         5.9   cm       ---------- 7.4         Vol, ES, 1-p A2C       (H) 53    ml       22 - 52   ----------  ESD major ax, A4C         5.1   cm       ---------- 6.5         Vol/bsa, ES, 1-p A2C   (N) 34    ml/m^2   13 - 40   ----------  FS major axis, A4C        13    %        ---------- 12          Vol, ES, 2-p               40    ml       --------- ----------  ONI/bsa major ax, A4C     3.8   cm/m^2   ---------- 4.6          Vol/bsa, ES, 2-p       (N) 25    ml/m^2   16 - 34   ----------  ESD/bsa major ax, A4C     3.3   cm/m^2   ---------- 4.0  LEIGH, A4C                  16.0  cm^2     ---------- 27.2        Aortic valve               Value          Ref       12/18/2023  DUSTIN, A4C                  7.1   cm^2     ---------- 15.8        Peak v, S                  2.2   m/sec    --------- ----------  FAC, A4C                  56    %        ---------- 42          Mean v, S                  1.34  m/sec    --------- ----------  IVS, ED               (H) 1.0   cm       0.6 - 0.9  ----------  Mean grad, S               7     mm Hg    --------- ----------  PW, ED                (H) 1.1   cm       0.6 - 0.9  ----------  Peak grad, S               19    mm Hg    --------- ----------  IVS/PW, ED                0.91           ---------- ----------  LVOT/AV, VTI ratio         1.15           --------- ----------  EDV                   (L) 22    ml       46 - 106   ----------  ANN, VTI                   2.8   cm^2     --------- ----------  ESV                   (L) 5     ml       14 - 42    ----------  ANN/bsa, VTI               1.81  cm^2/m^2 --------- ----------  EF                    (N) 70    %        54 - 74    60          LVOT/AV, Vpeak ratio       0.74           --------- ----------  SV                        21    ml       ---------- ----------  ANN, Vmax                  2.1   cm^2     --------- ----------  EDV/bsa               (L) 14    ml/m^2   29 - 61    ----------  ANN/bsa, Vmax              1.35  cm^2/m^2 --------- ----------  ESV/bsa               (L) 3     ml/m^2   8 - 24     ----------  SV/bsa                    14    ml/m^2   ---------- ----------  Mitral valve               Value          Ref       12/18/2023  SV, 1-p A4C               28    ml       ---------- 48          Mean v, D                  0.98  m/sec    --------- 0.65  SV/bsa, 1-p A4C           18    ml/m^2   ---------- 30          Peak E                     0.71  m/sec     --------- 0.83  EDV, 2-p              (L) 37    ml       46 - 106   ----------  Peak A                     1.32  m/sec    --------- 0.76  ESV, 2-p              (L) 13    ml       14 - 42    34          VTI leaflet coapt          19.4  cm       --------- 29.7  SV, 2-p                   24    ml       ---------- ----------  MiV/LVOT VTI               0.8            --------- ----------  EDV/bsa, 2-p          (L) 24    ml/m^2   29 - 61    ----------  Decel time                 119   ms       --------- 179  ESV/bsa, 2-p          (N) 8     ml/m^2   8 - 24     21          PHT                        51    ms       --------- 114  SV/bsa, 2-p               15.5  ml/m^2   ---------- ----------  Mean grad, D               4     mm Hg    --------- 2  E', lat lexus, TDI      (L) 7.1   cm/sec   >=10.0     ----------  Peak E/A ratio             0.5            --------- 1.1  E/e', lat lexus, TDI    (N) 10             <=13       ----------  A-VTI                      20.0  cm       --------- 29.7  E', med lexus, TDI      (L) 6.6   cm/sec   >=7.0      ----------  MVA, PHT                   5.1   cm^2     --------- 2.6  E/e', med lexus, TDI        11             ---------- ----------  MVA/bsa, PHT               3.29  cm^2/m^2 --------- 1.62  E', avg, TDI              6.855 cm/sec   ---------- ----------  MVA, LVOT cont             3.6   cm^2     --------- ----------  E/e', avg, TDI        (N) 10             <=14       ----------  MVA/bsa, LVOT cont         2.32  cm^2/m^2 --------- ----------    LVOT                      Value          Ref        12/18/2023  Tricuspid valve            Value          Ref       12/18/2023  Diam, S                   1.9   cm       ---------- ----------  TR peak v              (H) 3     m/sec    <=2.8     ----------  Area                      2.8   cm^2     ---------- ----------  Peak RV-RA grad, S         37    mm Hg    --------- ----------  Peak nina, S               1.61  m/sec    ---------- ----------  Peak  grad, S              10    mm Hg    ---------- ----------  Aortic root                Value          Ref       12/18/2023  Root diam, ED          (L) 2.1   cm       2.3 - 3.8 ----------  Right ventricle           Value          Ref        12/18/2023  S-T junct diam, ED     (N) 2.5   cm       2.0 - 3.2 ----------  ONI, LAX                  3.0   cm       ---------- ----------  S-T junct diam/bsa, ED (N) 1.6   cm/m^2   1.1 - 1.9 ----------  TAPSE, MM             (N) 1.8   cm       >=1.7      ----------  Pressure, S               40    mm Hg    ---------- ----------  Ascending aorta            Value          Ref       12/18/2023  S' lateral            (N) 12.6  cm/sec   6.0 - 13.4 ----------  AAo AP diam, ED        (N) 2.7   cm       1.9 - 3.5 ----------  AAo AP diam/bsa, ED    (N) 1.7   cm/m^2   1.0 - 2.2 ----------  Left atrium               Value          Ref        12/18/2023  AP dim, ES            (L) 2.5   cm       2.7 - 3.8  ----------  Pulmonary artery           Value          Ref       12/18/2023  AP dim index, ES      (N) 1.6   cm/m^2   1.5 - 2.3  ----------  Pressure, S                40    mm Hg    --------- ----------  Area ES, A4C          (N) 14    cm^2     <=20       ----------  Area/bsa ES, A4C          8.75  cm^2/m^2 ---------- ----------  Systemic veins             Value          Ref       12/18/2023  Area ES, A2C              20    cm^2     ---------- ----------  Estimated CVP              3     mm Hg    --------- 3  Legend:  (L)  and  (H)  jessika values outside specified reference range.    (N)  marks values inside specified reference range.    Prepared and electronically signed by  Adeline Pichardo M.D.  04/18/2024 11:49       Imaging Last 24hrs   No results found.       Microbiology Results  (Last 10 results in the past 7 days)      Specimen   Gram Smear   Culture Result   Status       04/16/24 2056         Gram positive cocci in clusters.  Comment: Detected from aerobic and anaerobic bottle after     22 hours.      [P]           24         Gram positive cocci in clusters.  Comment: Detected from aerobic and anaerobic bottle after    22 hours.      [P]                   [P] - Preliminary Result                TRANSTHORACIC ECHO (TTE) COMPLETE W/ W/O IMAGING AGENT    Result Date: 2024  *[facility]* [address] [address] [phone] Transthoracic Echocardiogram (TTE) Patient: Ibeth Byers     Study Date/Time:        2024 7:48AM MRN:     [id number]             FIN#:                   [id number] :     1960          Ht/Wt:                  165cm 53.1kg Age:     63                  BSA/BMI:                1.55m^2 19.5kg/m^2 Gender:  F                   Baseline BP:            106 / 65 Ordering Physician:    Sabino Obrien  Referring Physician:   Sabino Obrien  Attending Physician:   Carlos Fountain  Diagnostic Physician:  Adeline Pichardo M.D. Sonographer:           Mirian Priest  ------------------------------------------------------------------------------ INDICATIONS:   Lab test abnormality. ------------------------------------------------------------------------------ STUDY CONCLUSIONS SUMMARY: 1. Left ventricle: The cavity size is normal. Wall thickness is normal.    Systolic function is normal. The ejection fraction was measured by visual    estimation. The ejection fraction is 70%. 2. Left atrium: The atrium is mildly dilated. 3. Right ventricle: The cavity size is moderately to severely dilated. Wall    thickness is normal. Systolic function is normal. 4. Right atrium: The atrium is moderately dilated. ------------------------------------------------------------------------------ STUDY DATA:   Procedure:  A transthoracic echocardiogram was performed. Image quality was adequate.  M-mode, complete 2D, complete spectral Doppler, and color Doppler.  Study status:  Routine.  Study completion:  There were no complications. FINDINGS BASELINE ECG:    Tachycardia. LEFT VENTRICLE:  The cavity size is normal. Wall thickness is normal. There is no evidence of hypertrophy. Systolic function is normal. Wall motion is normal; there are no regional wall motion abnormalities.    The ejection fraction was measured by visual estimation. The ejection fraction is 70%. The tissue Doppler parameters are abnormal. Left ventricular diastolic function is indeterminate. AORTIC VALVE:  The annulus is normal. The valve is structurally normal. The valve is trileaflet. The leaflets are normal thickness. Cusp separation is normal. Velocity is within the normal range. There is no stenosis. There is no regurgitation. The mean systolic gradient is 7mm Hg. The peak systolic gradient is 19mm Hg. The LVOT to aortic valve VTI ratio is 1.15. The valve area is 2.8cm^2. The valve area index is 1.81cm^2/m^2. The ratio of LVOT to aortic valve peak velocity is 0.74. The valve area is 2.1cm^2. The valve area index is 1.35cm^2/m^2. AORTA: Aortic root: The root is normal-sized. MITRAL VALVE:  The valve is structurally normal. The annulus is normal. The leaflets are normal thickness. Leaflet separation is normal. Inflow velocity is within the normal range. There is no evidence for stenosis. There is no regurgitation. The mean diastolic gradient is 4mm Hg. The valve area by pressure half-time is 5.1cm^2. The valve area index by pressure half-time is 3.29cm^2/m^2. The valve area (LVOT continuity) is 3.6cm^2. The valve area index (LVOT continuity) is 2.32cm^2/m^2. ATRIAL SEPTUM:  The septum is normal. LEFT ATRIUM:  The atrium is mildly dilated. RIGHT VENTRICLE:  The cavity size is moderately to severely dilated. Wall thickness is normal. Systolic function is normal. The TAPSE is normal, suggestive of normal RV systolic function.       The RV pressure during systole is 40mm Hg. PULMONIC VALVE:   Not well visualized. The valve is structurally normal. Velocity is within the normal range. There is no evidence  for stenosis. There is no significant regurgitation. TRICUSPID VALVE:  The valve is structurally normal. There is mild-moderate regurgitation. PULMONARY ARTERIES: The main pulmonary artery is normal-sized. RIGHT ATRIUM:  The atrium is moderately dilated.       The estimated central venous pressure is 3mm Hg. PERICARDIUM:   There is no pericardial effusion. SYSTEMIC VEINS: Inferior vena cava: The IVC is normal-sized. The IVC is normal-sized. Respirophasic diameter changes are in the normal range (>= 50%). ------------------------------------------------------------------------------ Measurements  Left ventricle            Value          Ref        12/18/2023  Left atrium continued      Value          Ref       12/18/2023  ONI, LAX chord        (L) 2.8   cm       3.8 - 5.2  ----------  Area/bsa ES, A2C           12.99 cm^2/m^2 --------- ----------  ESD, LAX chord        (L) 1.7   cm       2.2 - 3.5  ----------  Vol, S                 (H) 53    ml       22 - 52   ----------  ONI/bsa, LAX chord    (L) 1.8   cm/m^2   2.3 - 3.1  ----------  Vol/bsa, S             (N) 34    ml/m^2   16 - 34   ----------  ESD/bsa, LAX chord    (L) 1.1   cm/m^2   1.3 - 2.1  ----------  Vol, ES, 1-p A4C       (N) 26    ml       22 - 52   ----------  PW, ED, LAX           (H) 1.1   cm       0.6 - 0.9  ----------  Vol/bsa, ES, 1-p A4C   (N) 17    ml/m^2   11 - 40   ----------  ONI major ax, A4C         5.9   cm       ---------- 7.4         Vol, ES, 1-p A2C       (H) 53    ml       22 - 52   ----------  ESD major ax, A4C         5.1   cm       ---------- 6.5         Vol/bsa, ES, 1-p A2C   (N) 34    ml/m^2   13 - 40   ----------  FS major axis, A4C        13    %        ---------- 12          Vol, ES, 2-p               40    ml       --------- ----------  ONI/bsa major ax, A4C     3.8   cm/m^2   ---------- 4.6         Vol/bsa, ES, 2-p       (N) 25    ml/m^2   16 - 34   ----------  ESD/bsa major ax, A4C     3.3   cm/m^2   ---------- 4.0  LEIGH, A4C                   16.0  cm^2     ---------- 27.2        Aortic valve               Value          Ref       12/18/2023  DUSTIN, A4C                  7.1   cm^2     ---------- 15.8        Peak v, S                  2.2   m/sec    --------- ----------  FAC, A4C                  56    %        ---------- 42          Mean v, S                  1.34  m/sec    --------- ----------  IVS, ED               (H) 1.0   cm       0.6 - 0.9  ----------  Mean grad, S               7     mm Hg    --------- ----------  PW, ED                (H) 1.1   cm       0.6 - 0.9  ----------  Peak grad, S               19    mm Hg    --------- ----------  IVS/PW, ED                0.91           ---------- ----------  LVOT/AV, VTI ratio         1.15           --------- ----------  EDV                   (L) 22    ml       46 - 106   ----------  ANN, VTI                   2.8   cm^2     --------- ----------  ESV                   (L) 5     ml       14 - 42    ----------  ANN/bsa, VTI               1.81  cm^2/m^2 --------- ----------  EF                    (N) 70    %        54 - 74    60          LVOT/AV, Vpeak ratio       0.74           --------- ----------  SV                        21    ml       ---------- ----------  ANN, Vmax                  2.1   cm^2     --------- ----------  EDV/bsa               (L) 14    ml/m^2   29 - 61    ----------  ANN/bsa, Vmax              1.35  cm^2/m^2 --------- ----------  ESV/bsa               (L) 3     ml/m^2   8 - 24     ----------  SV/bsa                    14    ml/m^2   ---------- ----------  Mitral valve               Value          Ref       12/18/2023  SV, 1-p A4C               28    ml       ---------- 48          Mean v, D                  0.98  m/sec    --------- 0.65  SV/bsa, 1-p A4C           18    ml/m^2   ---------- 30          Peak E                     0.71  m/sec    --------- 0.83  EDV, 2-p              (L) 37    ml       46 - 106   ----------  Peak A                     1.32  m/sec    ---------  0.76  ESV, 2-p              (L) 13    ml       14 - 42    34          VTI leaflet coapt          19.4  cm       --------- 29.7  SV, 2-p                   24    ml       ---------- ----------  MiV/LVOT VTI               0.8            --------- ----------  EDV/bsa, 2-p          (L) 24    ml/m^2   29 - 61    ----------  Decel time                 119   ms       --------- 179  ESV/bsa, 2-p          (N) 8     ml/m^2   8 - 24     21          PHT                        51    ms       --------- 114  SV/bsa, 2-p               15.5  ml/m^2   ---------- ----------  Mean grad, D               4     mm Hg    --------- 2  E', lat lexus, TDI      (L) 7.1   cm/sec   >=10.0     ----------  Peak E/A ratio             0.5            --------- 1.1  E/e', lat lexus, TDI    (N) 10             <=13       ----------  A-VTI                      20.0  cm       --------- 29.7  E', med lexus, TDI      (L) 6.6   cm/sec   >=7.0      ----------  MVA, PHT                   5.1   cm^2     --------- 2.6  E/e', med lexus, TDI        11             ---------- ----------  MVA/bsa, PHT               3.29  cm^2/m^2 --------- 1.62  E', avg, TDI              6.855 cm/sec   ---------- ----------  MVA, LVOT cont             3.6   cm^2     --------- ----------  E/e', avg, TDI        (N) 10             <=14       ----------  MVA/bsa, LVOT cont         2.32  cm^2/m^2 --------- ----------   LVOT                      Value          Ref        12/18/2023  Tricuspid valve            Value          Ref       12/18/2023  Diam, S                   1.9   cm       ---------- ----------  TR peak v              (H) 3     m/sec    <=2.8     ----------  Area                      2.8   cm^2     ---------- ----------  Peak RV-RA grad, S         37    mm Hg    --------- ----------  Peak nina, S               1.61  m/sec    ---------- ----------  Peak grad, S              10    mm Hg    ---------- ----------  Aortic root                Value          Ref       12/18/2023                                                                   Root diam, ED          (L) 2.1   cm       2.3 - 3.8 ----------  Right ventricle           Value          Ref        12/18/2023  S-T junct diam, ED     (N) 2.5   cm       2.0 - 3.2 ----------  ONI, LAX                  3.0   cm       ---------- ----------  S-T junct diam/bsa, ED (N) 1.6   cm/m^2   1.1 - 1.9 ----------  TAPSE, MM             (N) 1.8   cm       >=1.7      ----------  Pressure, S               40    mm Hg    ---------- ----------  Ascending aorta            Value          Ref       12/18/2023  S' lateral            (N) 12.6  cm/sec   6.0 - 13.4 ----------  AAo AP diam, ED        (N) 2.7   cm       1.9 - 3.5 ----------                                                                  AAo AP diam/bsa, ED    (N) 1.7   cm/m^2   1.0 - 2.2 ----------  Left atrium               Value          Ref        12/18/2023  AP dim, ES            (L) 2.5   cm       2.7 - 3.8  ----------  Pulmonary artery           Value          Ref       12/18/2023  AP dim index, ES      (N) 1.6   cm/m^2   1.5 - 2.3  ----------  Pressure, S                40    mm Hg    --------- ----------  Area ES, A4C          (N) 14    cm^2     <=20       ----------  Area/bsa ES, A4C          8.75  cm^2/m^2 ---------- ----------  Systemic veins             Value          Ref       12/18/2023  Area ES, A2C              20    cm^2     ---------- ----------  Estimated CVP              3     mm Hg    --------- 3 Legend: (L)  and  (H)  jessika values outside specified reference range. (N)  marks values inside specified reference range. Prepared and electronically signed by Adeline Pichardo M.D. 04/18/2024 11:49    CT HAND W CONTRAST RIGHT    Result Date: 4/18/2024  EXAM:  CT HAND W CONTRAST RIGHT CLINICAL INDICATION: Right upper extremity swelling and pain FINDINGS and impression: Already interpreted along with the CT humerus. Electronically Signed by: YA REYNOLDS M.D. Signed on: 4/18/2024 7:37 AM Workstation  ID: [id number]    CT FOREARM W CONTRAST RIGHT    Result Date: 4/18/2024  EXAM:  CT FOREARM W CONTRAST RIGHT CLINICAL INDICATION: Right upper extremity swelling and pain FINDINGS and impression: Already interpreted along with the CT humerus. Electronically Signed by: YA REYNOLDS M.D. Signed on: 4/18/2024 7:36 AM Workstation ID: [id number]    CT HUMERUS W CONTRAST RIGHT    Result Date: 4/18/2024  EXAM:  CT HUMERUS W CONTRAST RIGHT CLINICAL INDICATION: Right upper extremity pain especially hand. COMPARISON:  No previous relevant exams available for review. TECHNIQUE: 100 cc Omnipaque 350 IV contrast administered for the concomitant CT of the chest abdomen and pelvis which has already been reported. Field-of-view shoulder through hand. FINDINGS and impression: 1. Extensive subcutaneous edema of the right lower extremity most pronounced in the forearm. Skin thickening distally. Cellulitis a consideration. 2. No abscess. No CT evidence of necrotizing fasciitis, normal anatomy of the muscle bundles and interspersed fascial fat. 3. Sclerosis in the proximal humerus and scapula consistent with skeletal metastasis. Otherwise unremarkable bones and joints of right upper extremity. 4. Unremarkable enhancement of vessels across the upper extremity. Electronically Signed by: YA REYNOLDS M.D. Signed on: 4/18/2024 7:36 AM Workstation ID: [id number]    CTA CHEST PE AND CT ABD PEL W CONTRAST    Result Date: 4/18/2024  EXAM:  CTA CHEST PE AND CT ABD PEL W CONTRAST CLINICAL INDICATION: Severe lactic acidosis. History of breast cancer. COMPARISON: CT chest from 2 4/4/2024 and CT abdomen/pelvis from 4/17/2024. Report of CT PET scan from 12/6/2023 identifying multifocal right breast malignancy. TECHNIQUE: CT Angiography 3D postprocessing was performed on computer workstation at the following location (acquisition vs remote/independent): acquisition.  Postprocessing Maximum intensity projections were done. Volume rendering, shade  surface rendering, 3D postprocessing were not done. Contrast administered IV:  Omnipaque 350, 100 cc. Angiographic findings: Well-opacified left and right intrathoracic circulation. No filling defects identified in main through subsegmental pulmonary arteries. Main pulmonary artery is wider than adjacent ascending aorta at 3.1 cm. Heart size normal. No intracardiac thrombus. Aortic caliber normal. Arterial calcification. No aneurysm. Motion related mild overall loss of detail but no significant abnormality is detected in major aortic branches through each groin. Normal portal vein and IVC opacification. Reasonably symmetric opacified major pelvic veins. Nonangiographic findings: No pneumoperitoneum or pneumothorax. Punctate calcified nodule in the left posterior lung base, small multiple calcifications in the right apex both linear and punctate, no actionable noncalcified nodule. Extensive bilateral pulmonary interstitial thickening much worse since prior CT. Centrilobular lucencies and small subpleural bulla. Virtually all bones containing areas of permeative sclerosis. No fracture. Vertebral heights and alignment normal. Spinal canal unremarkable. Pelvic joints intact. Thyroid unremarkable. Right subclavian Port-A-Cath tip in SVC. Right breast biopsy marker end asymmetric soft tissue. No anasarca. Small right pleural effusion, very small left pleural effusion. Mild ascites. No abscess. Left inguinal hernia 2.2 cm containing fat. No distinct lymph node enlargement. No mesenteric mass. Peritoneum smooth. Esophagus and GE junction normal. Stomach normal. Small bowel normal. Appendix normal. Large bowel caliber normal, mild edematous mural thickening of the ascending segment. No pneumatosis. Normal mural enhancement in the area of the edematous thickening in the right colon and elsewhere. To the extent visualized, mesenteric vessels enhance normally. Kidneys normal volume, no hydronephrosis. Left lower pole 1 cm cyst.  No abnormality of the partially opacified ureters. Urinary bladder normal at low volume. Uterus normal. Ovaries not seen. Spleen size normal, upper lateral 3 cm cyst. Adrenals normal. Pancreas normal. Gallbladder and bile ducts normal. Liver mild enlargement, smooth contour, multiple hypodense round lesions of various sizes involving all segments, largest at the lateral segment left lobe posterior dome measuring about 2 cm.     1. No evidence of pulmonary embolism, aortic dissection, or aneurysm. 2. Pulmonary edema, pleural effusions and mild ascites. 3. Right: Mild edematous mural thickening new since the prior study. This is nonspecific and may be related to hypoproteinemia and physiologic nondistention, but bowel ischemia is not entirely excluded. A follow-up CT abdomen could be considered likely warranted. Discussed by telephone with ordering physician around 0726 hours. 4. Widespread osteoblastic skeletal metastases. 5. Right breast asymmetry compatible with known breast cancer. 6. Multiple liver metastases. 7. Cysts of the spleen and left kidney. 8. Small left inguinal hernia of fat. 9. Pulmonary emphysema. Electronically Signed by: YA REYNOLDS M.D. Signed on: 4/18/2024 7:29 AM Workstation ID: [id number]    XR CHEST AP OR PA    Result Date: 4/18/2024  EXAM: AP portable semiupright chest INDICATION Short of breath. History of breast cancer. COMPARISON x-ray from 4/17/2024 and 4/16/2024 FINDINGS with impression: New bilateral pulmonary edema seen as symmetric extensive consolidation. Mild cardiomegaly. Right subclavian Port-A-Cath stable. Skeletal metastases seen as hyperdense thoracolumbar vertebrae and vague sclerosis of the bilateral proximal humerus. Electronically Signed by: YA REYNOLDS M.D. Signed on: 4/18/2024 6:18 AM Workstation ID: [id number]             ASSESSMENT:     - MSSA bacteremia due to cellulitis  - Cellulitis of the right hand  - S/P cardic arrest  - Probable pneumonia vs fluid overload  -  Leukemoid reaction-multifactorial  - Rule out DVT of the right upper extremity  - Breast cancer with metastasis to the bone  - Suspected metastasis to the liver  - Lactatemia  - History of COPD      Recommendation:     - Repeat BC NGTD  - Family planning for TLS. Pt is DNR  - All medication discontinued. Will sign off. Please call me with any questions.    Mone Mccray MD  4/19/2024

## 2025-03-19 NOTE — PROGRESS NOTES
Progress Note - Infectious Disease   Name: Devin Chaves 79 y.o. male I MRN: [id number]  Unit/Bed#: ICU 10 I Date of Admission: 2/28/2025   Date of Service: 3/19/2025 I Hospital Day: 19    Assessment & Plan  Neck pain  Patient developed acute neck pain with MSSA bacteremia during recent hospitalization.  CRP was highly elevated.  C-spine MRI showed possible C-spine and paraspinal infection.  Patient has no neurologic deficit.  His neck pain is finally improving.  However, given paraspinal infection on the initial C-spine MRI, we should repeat C-spine MRI with contrast when creatinine is improved to confirm resolution/improvement of paraspinal infection.  However, without any neurological deficit, it would be fine to postpone MRI until renal function is further improved, to decrease risk of contrast-induced ORIANA.  Patient should get MRI done prior to completion of IV antibiotic course below.  Antibiotic plan as in below.  Monitor neck pain.  Recommend repeat C-spine MRI with and without contrast.  This can be postponed until ORIANA is improved, but should be done prior to completion of IV antibiotic course.  MSSA bacteremia  Patient had MSSA bacteremia during recent hospitalization.  Source is unclear but likely cutaneous.  His bacteremia cleared rapidly on IV antibiotic.  TTE did not show any vegetation.  Given possible C-spine infection, plan was for long-term IV antibiotic.  Continue high-dose IV cefazolin.  Treat x 6 weeks from clearance of bacteremia as previously planned, through 3/27.  Acute blood loss anemia  Patient developed acute bleeding from rectal mass, resulting in rapid response, and subsequent cardiac arrest.  He is status post transfusion.  He is also status post small bowel resection and right hemicolectomy.  Management per primary service.  ORIANA (acute kidney injury) (HCC)  Patient with ORIANA on admission, most likely secondary to hypovolemia from GI bleed.  Creatinine has been stable over the last  few days.  Antibiotic dosages adjusted accordingly.  Monitor creatinine.  Nephrology follow-up.  Colonic mass  Patient has recently diagnosed adenocarcinoma of the colon.  He now status post bowel resection for bleeding from adenocarcinoma.  No plan for chemotherapy yet.  Colorectal surgery follow-up.  Acute on chronic respiratory failure with hypoxia (HCC)  Patient is now intubated after cardiac arrest over the weekend.  Cardiac arrest due to other underlying condition (HCC)  Patient is status post arrest, successfully resuscitated.  Encephalopathy  Patient is currently obtunded postcardiac arrest.  Concern is for anoxic encephalopathy.  Head CT is without acute changes.  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  Monitor mental status.        Antibiotics:  Cefazolin  Last negative blood culture 2/14    Subjective   Patient gail obtunded, on ventilator, in ICU.  Temperature stays down.  No chills.    Objective :  Temp:  [97.6 °F (36.4 °C)-99 °F (37.2 °C)] 97.6 °F (36.4 °C)  HR:  [110-127] 112  BP: (106-164)/(55-72) 123/58  Resp:  [9-28] 14  SpO2:  [95 %-100 %] 99 %  O2 Device: Ventilator  FiO2 (%):  [40] 40    Physical Exam:     General: Unresponsive to verbal or tactile stimuli, comfortable, nontoxic.   Neck:  Supple. No mass.  No lymphadenopathy.   Lungs: Expansion symmetric, diffuse rhonchi, no rales, no wheezing.   Heart:  Regular rate and rhythm, S1 and S2 normal, no murmur.   Abdomen: Soft, nondistended, non-tender, bowel sounds active all four quadrants, no masses, no organomegaly.   Extremities: Stable leg edema. No erythema/warmth. No draining ulcer. Nontender to palpation.   Skin:  No rash.   Neuro: Moves all extremities.        Lab Results: I have reviewed the following results:  Results from last 7 days   Lab Units 03/19/25  0451 03/18/25  2142 03/18/25  1719 03/18/25  0825 03/18/25  0453   WBC Thousand/uL 6.69  --  8.29  --  10.78*    HEMOGLOBIN g/dL 9.7* 10.3* 10.3*   < > 10.6*   PLATELETS Thousands/uL 74*  --  86*  --  90*    < > = values in this interval not displayed.     Results from last 7 days   Lab Units 03/19/25  0921 03/19/25  0451 03/19/25  0326 03/18/25  2142 03/18/25  1048 03/18/25  0453 03/16/25  2235 03/16/25  1533 03/16/25  1327   SODIUM mmol/L 141  --  143 142   < > 145   < > 148*  --    POTASSIUM mmol/L 3.8  --  3.9 3.9   < > 3.8   < > 4.4  --    CHLORIDE mmol/L 109*  --  109* 110*   < > 111*   < > 116*  --    CO2 mmol/L 24  --  26 26   < > 27   < > 25  --    CO2, I-STAT mmol/L  --   --   --   --   --   --   --   --  29   BUN mg/dL 84*  --  78* 74*   < > 63*   < > 48*  --    CREATININE mg/dL 3.59*  --  3.35* 3.33*   < > 2.89*   < > 2.14*  --    EGFR ml/min/1.73sq m 15  --  16 16   < > 20   < > 28  --    GLUCOSE, ISTAT mg/dl  --   --   --   --   --   --   --   --  137   CALCIUM mg/dL 7.5*  --  7.5* 7.7*   < > 7.8*   < > 8.1*  --    AST U/L  --  20  --   --   --  26  --  60*  --    ALT U/L  --  <3*  --   --   --  <3*  --  <3*  --    ALK PHOS U/L  --  50  --   --   --  53  --  51  --    ALBUMIN g/dL  --  2.1*  --   --   --  2.1*  --  2.4*  --     < > = values in this interval not displayed.                 Results from last 7 days   Lab Units 03/13/25  0559   FERRITIN ng/mL 209         Imaging Results Review: I personally reviewed the following image studies in PACS and associated radiology reports: MRI brain. My interpretation of the radiology images/reports is: New bilateral symmetric cortical restricted diffusion, suspicious for hypoxic ischemic encephalopathy.  There are also new scattered small acute infarcts, suggestive of embolic infarcts.

## 2025-03-19 NOTE — ASSESSMENT & PLAN NOTE
Concern for anoxic brain injury s/p cardiac arrest with new embolic infarcts  CTH with 4 mm focus of high attenuation right frontal lobe without evidence of anoxic brain injury, stable chronic ischemic changes.  vEEG monitoring with no evidence of seizure  Brain MRI 3/18/2025: New nearly symmetric cortical restricted diffusion in bilateral frontal, bilateral parietal and bilateral occipital lobes suspicious for hypoxic ischemic encephalopathy.  New scattered small acute infarcts in left posterior frontal and bilateral cerebellar lobes favoring embolic infarcts.  For repeat CTH now  On heparin drip  Neurology following  Management per neurology and primary team

## 2025-03-19 NOTE — ASSESSMENT & PLAN NOTE
Malnutrition Findings:   Adult Malnutrition type: Acute illness  Adult Degree of Malnutrition: Other severe protein calorie malnutrition  Malnutrition Characteristics: Inadequate energy, Fluid accumulation  360 Statement: related to inadequate energy/protein intake as evidenced by consuming < 50% of energy intake compared to estimated needs for > 5 days and B/L LE +3 edema. Treated with ONS.  BMI Findings:  Body mass index is 25.09 kg/m².      Plan:  Continue TPN and trickle TF per surgery     360 Statement: related to inadequate energy/protein intake as evidenced by consuming < 50% of energy intake compared to estimated needs for > 5 days and B/L LE +3 edema. Treated with ONS.    BMI Findings:    Body mass index is 29.36 kg/m².

## 2025-03-19 NOTE — PLAN OF CARE
Problem: Prexisting or High Potential for Compromised Skin Integrity  Goal: Skin integrity is maintained or improved  Description: INTERVENTIONS:  - Identify patients at risk for skin breakdown  - Assess and monitor skin integrity  - Assess and monitor nutrition and hydration status  - Monitor labs   - Assess for incontinence   - Turn and reposition patient  - Assist with mobility/ambulation  - Relieve pressure over bony prominences  - Avoid friction and shearing  - Provide appropriate hygiene as needed including keeping skin clean and dry  - Evaluate need for skin moisturizer/barrier cream  - Collaborate with interdisciplinary team   - Patient/family teaching  - Consider wound care consult   Outcome: Progressing     Problem: Potential for Falls  Goal: Patient will remain free of falls  Description: INTERVENTIONS:  - Educate patient/family on patient safety including physical limitations  - Instruct patient to call for assistance with activity   - Consult OT/PT to assist with strengthening/mobility   - Keep Call bell within reach  - Keep bed low and locked with side rails adjusted as appropriate  - Keep care items and personal belongings within reach  - Initiate and maintain comfort rounds  - Make Fall Risk Sign visible to staff  Problem: SAFETY,RESTRAINT: NV/NON-SELF DESTRUCTIVE BEHAVIOR  Goal: Remains free of harm/injury (restraint for non violent/non self-detsructive behavior)  Description: INTERVENTIONS:  - Instruct patient/family regarding restraint use   - Assess and monitor physiologic and psychological status   - Provide interventions and comfort measures to meet assessed patient needs   - Identify and implement measures to help patient regain control  - Assess readiness for release of restraint   Outcome: Progressing  Goal: Returns to optimal restraint-free functioning  Description: INTERVENTIONS:  - Assess the patient's behavior and symptoms that indicate continued need for restraint  - Identify and  implement measures to help patient regain control  - Assess readiness for release of restraint   Outcome: Progressing     - Consider moving patient to room near nurses station  Outcome: Progressing

## 2025-03-19 NOTE — ASSESSMENT & PLAN NOTE
BP acceptable  Volume status hypervolemic, worsening volume overload despite bumex drip  Echo 3/17/2025: EF 55%, unable to assess diastolic function  Home Rx: Metoprolol 25 mg twice daily  Previous Rx: Metoprolol 25 mg BID, Torsemide 20 mg OD.   Current Rx: Bumex drip @ 2 mg/hr  Avoid hypotension or large fluctuations in BP  Continue to monitor

## 2025-03-19 NOTE — ANESTHESIA POSTPROCEDURE EVALUATION
Post-Op Assessment Note    CV Status:  Stable  Pain Score: 0         Mental Status:  Somnolent   Hydration Status:  Stable   PONV Controlled:  None   Airway Patency:  Patent  Airway: intubated     Post Op Vitals Reviewed: Yes    No anethesia notable event occurred.    Staff: Anesthesiologist,    Post-Op Assessment Note    Last Filed PACU Vitals:  Vitals Value Taken Time   Temp 98.8 °F (37.1 °C) 03/19/25 0700   Pulse 113 03/19/25 1115   /56 03/19/25 1101   Resp 29 03/19/25 1115   SpO2 100 % 03/19/25 1115   Vitals shown include unfiled device data.

## 2025-03-19 NOTE — PROGRESS NOTES
Progress Note - Critical Care/ICU   Name: Devin Chaves 79 y.o. male I MRN: [id number]  Unit/Bed#: ICU 10 I Date of Admission: 2/28/2025   Date of Service: 3/19/2025 I Hospital Day: 19      Assessment & Plan  Hemorrhagic shock (HCC)  Code crimson 3/15 for hemorrhagic shock related to mesenteric hematoma s/p SBR and colon resection   S/p blood resuscitation with 12 U PRBC, 6 FFP, 2 Plt, 2 Cryo   Now off pressors and HD stable   Trend Hgb, transfuse for Hgb<7 or active bleeding with HD instability   Maintain MAP>65  Cardiac arrest (HCC)  Likely in the setting of hemorrhagic shock  Noted 12 minutes of downtime, received aggressive blood resuscitation and surgical intervention as above   Now with ongoing encephalopathy concerning for anoxic injury   Continue close HD and telemetry monitoring   Trend Hgb as above   Encephalopathy  Concern for anoxic injury s/p cardiac arrest with 12 minute downtime   EEG without seizure activity   CTH shows 4mm focus of high attenuation in right frontal lobe which is stable on repeat CTH   MRI with multiple areas of hypoxia related injury as well as acute embolic strokes   Hold all sedation, PRN dilaudid for pain management   Frequent neuro checks   Maintain normothermia and normoglycemia   Neurology following, appreciate recommendations   Embolic cerebral infarction (HCC)  New areas of embolic stroke noted on MRI 3/18  Neurology following, appreciate recommendations  Continue ischemic stroke heparin gtt  Consider restarting statin   Frequent neuro checks    Repeat CTH after ptt therapeutic x 2   Acute on chronic respiratory failure with hypoxia (HCC)  Remains intubated for airway protection post cardiac arrest and postoperatively with poor neuro exam   Continue mechanical ventilation ACVC 12/470/40/6, titrate FiO2 for SpO2>88  At baseline requires 2-4L NC   Monitor off of sedation, PRN dilaudid for pain management   Daily SBT   Vent bundle   ORIANA (acute kidney injury)  (Formerly [facility])  Creatinine   Date Value Ref Range Status   03/18/2025 3.33 (H) 0.60 - 1.30 mg/dL Final     Comment:     Standardized to IDMS reference method   09/25/2022 1 0.6 - 1.2 mg/dL Final      Likely initially in the setting of hemorrhagic shock, now with ATN   Baseline Cr 0.8   Nephrology following, appreciate recommendations  Continue bumex infusion, could consider dose of metolazone as well for goal negative 500ml-1L over next 24 hours   Monitor and replete electrolytes Q6H  Monitor I/O and renal indices   HTN (hypertension)  Home regimen: Metoprolol tartrate 25 mg BID, Losartan 25 mg QD for HTN  Consider restarting home metoprolol   Hold home losartan in the setting of ORIANA   CAD (coronary artery disease)  With known LAD stenosis   Continue home BB and statin   Hold ASA, restart as appropriate   COPD (chronic obstructive pulmonary disease) (Formerly [facility])  Without acute exacerbation  Hold home inhalers while intubated  Continue scheduled xopenex, atrovent, budesonide nebs   History of CVA (cerebrovascular accident)  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   Now with new acute embolic strokes noted on MRI   Consider restarting home statin   Continue ischemic stroke heparin gtt   Hold ASA, restart as appropriate   Atrial fibrillation (Formerly [facility])  Currently rate controlled in NSR   Consider restarting home metoprolol for rate control   Hold home eliquis, currently on ischemic stroke heparin protocol   Urinary retention  Hold home flomax while NPO  Continue glass catheter for now   MSSA bacteremia  Noted on previous admission with likely cutaneous source   Continue cefazolin through 3/27 to complete course   ID following, appreciate recommendations   Neck pain  MRI of the neck done 2/14/2025 revealed cervical degenerative change with mild canal stenosis and mild to moderate foraminal narrowing.  No cord compression.  Mild nonspecific edema within the right posterior breast dermal musculature of the upper cervicals  spine.  Minimal peripheral enhancement is noted  Will need repeat MRI C spine with and without contrast prior to completion of abx - currently holding on this at this time due to ORIANA   Colonic mass  2/20/2025 EGD/colonoscopy showed 2.5 cm ileocecal mass, pathology confirmed adenocarcinoma  Now s/p ex lap SBR and R colon resection  Surgery following, appreciate recommendations   Pleural effusion, bilateral  CT A/P shows bilateral pleural effusions L>R, pulmonary vascular congestion     Plan:  Repeat imaging if pt's respiratory status worsens  Patient intubated  Dysphagia  Noted prior to intubation and cardiac arrest   Will need re-evaluation when able to take PO   Severe protein-calorie malnutrition (HCC)  Malnutrition Findings:   Adult Malnutrition type: Acute illness  Adult Degree of Malnutrition: Other severe protein calorie malnutrition  Malnutrition Characteristics: Inadequate energy, Fluid accumulation  360 Statement: related to inadequate energy/protein intake as evidenced by consuming < 50% of energy intake compared to estimated needs for > 5 days and B/L LE +3 edema. Treated with ONS.  BMI Findings:  Body mass index is 25.09 kg/m².      Plan:  Continue TPN and trickle TF per surgery     360 Statement: related to inadequate energy/protein intake as evidenced by consuming < 50% of energy intake compared to estimated needs for > 5 days and B/L LE +3 edema. Treated with ONS.    BMI Findings:    Body mass index is 29.36 kg/m².   Disposition: Critical care    ICU Core Measures     Vented Patient  VAP Bundle  VAP bundle ordered     A: Assess, Prevent, and Manage Pain Has pain been assessed? Yes  Need for changes to pain regimen? No   B: Both Spontaneous Awakening Trials (SATs) and Spontaneous Breathing Trials (SBTs) Plan to perform spontaneous awakening trial today? N/A   Plan to perform spontaneous breathing trial today? Yes   Obvious barriers to extubation? Yes   C: Choice of Sedation RASS Goal: 0 Alert and Calm  or N/A patient not on sedation  Need for changes to sedation or analgesia regimen? NA   D: Delirium CAM-ICU: Unable to perform secondary to Acute cognitive dysfunction   E: Early Mobility  Plan for early mobility? Yes   F: Family Engagement Plan for family engagement today? Yes       Antibiotic Review: Infectious disease consulted    Review of Invasive Devices:    Tang Plan: Continue for accurate I/O monitoring for 48 hours  Central access plan: Patient has multiple central venous catheters.  Medications requiring central line Hemodynamic monitoring      Prophylaxis:  VTE VTE covered by:  heparin (porcine), Intravenous, 12 Units/kg/hr at 03/18/25 2339       Stress Ulcer  covered byfamotidine (PEPCID) 20 mg tablet [[id number]] (Long-Term Med), pantoprazole (PROTONIX) injection 40 mg [[id number]]         24 Hour Events : MRI revealed hypoxia related injury and acute embolic strokes - heparin gtt started. Started on trickle TF per surgery. No other acute events.   Subjective   Review of Systems: Review of Systems not obtainable due to Clinical Condition    Objective :                   Vitals I/O      Most Recent Min/Max in 24hrs   Temp 98 °F (36.7 °C) Temp  Min: 98 °F (36.7 °C)  Max: 99 °F (37.2 °C)   Pulse (!) 118 Pulse  Min: 106  Max: 128   Resp 16 Resp  Min: 12  Max: 53   /57 BP  Min: 86/52  Max: 189/84   O2 Sat 100 % SpO2  Min: 95 %  Max: 100 %      Intake/Output Summary (Last 24 hours) at 3/19/2025 0243  Last data filed at 3/19/2025 0000  Gross per 24 hour   Intake 2416.46 ml   Output 1450 ml   Net 966.46 ml       Adult 3-in-1 TPN (custom base / custom electrolytes)  Diet Enteral/Parenteral; Tube Feeding No Oral Diet; Jevity 1.2 Jose; Continuous; 10    Invasive Monitoring           Physical Exam   Physical Exam  Eyes:      Pupils: Pupils are equal, round, and reactive to light.   Skin:     General: Skin is warm and dry.      Coloration: Skin is pale.   HENT:      Head: Normocephalic and atraumatic.       Mouth/Throat:      Mouth: Mucous membranes are moist.   Cardiovascular:      Rate and Rhythm: Regular rhythm. Tachycardia present.      Pulses: Normal pulses.      Heart sounds: Normal heart sounds.   Musculoskeletal:         General: Swelling present.      Right lower leg: Edema present.      Left lower leg: Edema present.   Abdominal: General: There is distension.     Palpations: Abdomen is soft.      Tenderness: There is abdominal tenderness.   Constitutional:       Appearance: He is well-developed and well-nourished.      Interventions: He is intubated.   Pulmonary:      Effort: Pulmonary effort is normal. No respiratory distress. He is intubated.      Breath sounds: No wheezing, rhonchi or rales.      Comments: Diminished   Neurological:      Cranial Nerves: No facial asymmetry.      Comments: Overbreathing vent, +cough/gag, does not withdraw to pain           Diagnostic Studies        Lab Results: I have reviewed the following results:     Medications:  Scheduled PRN   [Held by provider] ascorbic acid, 250 mg, Daily  [Held by provider] atorvastatin, 40 mg, Daily With Dinner  budesonide, 0.5 mg, Q12H  ceFAZolin, 2,000 mg, Q8H  chlorhexidine, 15 mL, Q12H GALE  [Held by provider] Cholecalciferol, 2,000 Units, Daily  [Held by provider] cyanocobalamin, 100 mcg, Daily  [Held by provider] ferrous sulfate, 325 mg, Daily With Breakfast  [Held by provider] fluticasone, 1 puff, Daily  [Held by provider] folic acid, 1 mg, Daily  [Held by provider] hydroxychloroquine, 400 mg, Daily With Breakfast  insulin lispro, 2-12 Units, Q6H GALE  ipratropium, 0.5 mg, TID  levalbuterol, 1.25 mg, TID  lidocaine, 3 patch, Daily  [Held by provider] metoprolol tartrate, 25 mg, Q12H GALE  HITESH ANTIFUNGAL, , BID  pantoprazole, 40 mg, Q12H GALE  polyethylene glycol, 17 g, Daily  [Held by provider] potassium chloride, 20 mEq, Daily  senna, 17.6 mg, HS  [Held by provider] tamsulosin, 0.4 mg, Daily With Dinner  [Held by provider] torsemide, 20 mg,  Daily  [Held by provider] umeclidinium-vilanterol, 1 puff, Daily      HYDROmorphone, 0.2 mg, Q4H PRN  [Held by provider] oxyCODONE, 5 mg, Q6H PRN  [Held by provider] oxyCODONE, 2.5 mg, Q6H PRN       Continuous    Adult 3-in-1 TPN (custom base / custom electrolytes), , Last Rate: 87.7 mL/hr at 03/18/25 2158  bumetanide (BUMEX) 12.5 mg infusion 50 mL, 2 mg/hr, Last Rate: 2 mg/hr (03/19/25 0017)  heparin (porcine), 3-24 Units/kg/hr (Order-Specific), Last Rate: 12 Units/kg/hr (03/18/25 2339)         Labs:   CBC    Recent Labs     03/18/25  0453 03/18/25  0825 03/18/25  1719 03/18/25  2142   WBC 10.78*  --  8.29  --    HGB 10.6*   < > 10.3* 10.3*   HCT 32.4*   < > 32.5* 32.4*   PLT 90*  --  86*  --     < > = values in this interval not displayed.     BMP    Recent Labs     03/18/25  1618 03/18/25  2142   SODIUM 144 142   K 3.4* 3.9   * 110*   CO2 26 26   AGAP 7 6   BUN 71* 74*   CREATININE 3.24* 3.33*   CALCIUM 7.6* 7.7*       Coags    Recent Labs     03/18/25  1719 03/18/25  2142   INR 1.19  --    PTT 44* 169*        Additional Electrolytes  Recent Labs     03/18/25  0453 03/18/25  1048 03/18/25  1618 03/18/25  2142   MG 1.9   < > 1.9 2.6   PHOS 4.0  --   --  4.7*   CAIONIZED 1.12  --   --   --     < > = values in this interval not displayed.          Blood Gas    Recent Labs     03/18/25  0825   PHART 7.430   TCC0HFM 35.7*   PO2ART 70.5*   KIT0URC 23.2   BEART -0.7   SOURCE Femoral, Right     Recent Labs     03/17/25  0935 03/17/25  1407 03/18/25  0825   PHVEN 7.460*  --   --    MTC4ESW 35.4*  --   --    PO2VEN 38.5  --   --    AEJ6GUI 24.6  --   --    BEVEN 1.1  --   --    O6CFZPO 74.9  --   --    SOURCE  --    < > Femoral, Right    < > = values in this interval not displayed.    LFTs  Recent Labs     03/18/25  0453   ALT <3*   AST 26   ALKPHOS 53   ALB 2.1*   TBILI 0.38       Infectious  No recent results  Glucose  Recent Labs     03/18/25  0453 03/18/25  1048 03/18/25  1618 03/18/25  2142   GLUC 187* 179* 168*  181*

## 2025-03-19 NOTE — ASSESSMENT & PLAN NOTE
Brain MRI 3/18/2025: New nearly symmetric cortical restricted diffusion in bilateral frontal, bilateral parietal and bilateral occipital lobes suspicious for hypoxic ischemic encephalopathy.  New scattered small acute infarcts in left posterior frontal and bilateral cerebellar lobes favoring embolic infarcts  Now on heparin drip  For repeat CTH now  Avoid hypotension  Neurology following  Management per neurology and primary team

## 2025-03-19 NOTE — ASSESSMENT & PLAN NOTE
" - met with spouse, daughter, and son at bedside   - plan for meeting with family and CCM team at 17:00 today   - expanded discussion with spouse regarding current critical condition, including MRI findings of multiple strokes and findings c/w HIE, s/p cardiac arrest, VDRF, underlying CRC.   - expanded discussion regarding advanced resuscitative supports, including CPR/ACLS. Spouse is honoring the patient's wish to \"do everything to fight\", which includes CPR in the setting of cardiac arrest. Family feels that if the patient is able to open his eyes, that is living and is acceptable. Maintain Full Code, Level 1.    - agreeable to tracheostomy and G-tube placement with understanding that if survival to discharge/transfer from hospital, transfer of care to LTACH/SNF for continued mechanical ventilatory management.   - overall goal is to maintain/preserve life with hopes to see continued improvement of cognitive/physical function. Feel that today is a better day than yesterday, as the patient would periodically open eyes to certain verbal stimuli.   - will continue to follow/support family  "

## 2025-03-19 NOTE — ASSESSMENT & PLAN NOTE
Patient sustained cardiac arrest in the setting of an undetectable hemoglobin on 3/15 and was taken emergently to the operating room for exploration in the setting of abdominal free fluid.  Large volume hemoperitoneum was encountered with evidence of a actively bleeding/decompressing mesenteric hematoma; bleeding control was obtained and on 3/15 taken to OR for a segmental small bowel resection was performed and the patient was left in discontinuity given ongoing instability.  S/p SB anastomosis, R hemicolectomy, closure on 3/16.    Afebrile, persistently tachycardic to the 110-120s, normotensive    Hemoglobin: 10.3 from 10.3 from 10.4    Plan  - Continue NG tube/n.p.o. given no significant bowel function  - Appreciate neurology recommendations   - Heparin gtt in setting of likely embolic infarcts   -Recommend serial monitoring of Hgb given concern for rebleeding in the setting of recent hemoperitoneum and mesenteric hematoma  - Continue TPN  - Wean vent as tolerated  - Continue recommendations from cardiology and nephrology for ongoing diuresis   -Currently on Bumex gtt  - Rest of care per ICU

## 2025-03-19 NOTE — OCCUPATIONAL THERAPY NOTE
Occupational Therapy Discharge    Patient Name: Devin Chaves  Today's Date: 3/19/2025     03/19/25 1030   Note Type   Note type Cancelled Session   Cancel Reasons Medical status   Additional Comments OT orders previously received. Per ICU mobility rounds, pt remains on ventilator, poorly responsive today and not medically appropriate for participation in therapy. As this is pt's 3rd medical cancel, will complete OT orders at this time. Please re-engage when pt medically appropriate for participation in therapy.     ASAD Nino, OTR/L  [place] License [id number]  [place] License [id number]

## 2025-03-19 NOTE — PROGRESS NOTES
Progress Note - Surgery-General   Name: Devin Chaves 79 y.o. male I MRN: [id number]  Unit/Bed#: ICU 10 I Date of Admission: 2/28/2025   Date of Service: 3/19/2025 I Hospital Day: 19    Assessment & Plan  Acute blood loss anemia  Patient sustained cardiac arrest in the setting of an undetectable hemoglobin on 3/15 and was taken emergently to the operating room for exploration in the setting of abdominal free fluid.  Large volume hemoperitoneum was encountered with evidence of a actively bleeding/decompressing mesenteric hematoma; bleeding control was obtained and on 3/15 taken to OR for a segmental small bowel resection was performed and the patient was left in discontinuity given ongoing instability.  S/p SB anastomosis, R hemicolectomy, closure on 3/16.    Afebrile, persistently tachycardic to the 110-120s, normotensive    Hemoglobin: 10.3 from 10.3 from 10.4    Plan  - Continue NG tube/n.p.o. given no significant bowel function  - Appreciate neurology recommendations   - Heparin gtt in setting of likely embolic infarcts   -Recommend serial monitoring of Hgb given concern for rebleeding in the setting of recent hemoperitoneum and mesenteric hematoma  - Continue TPN  - Wean vent as tolerated  - Continue recommendations from cardiology and nephrology for ongoing diuresis   -Currently on Bumex gtt  - Rest of care per ICU  HTN (hypertension)    CAD (coronary artery disease)    COPD (chronic obstructive pulmonary disease) (Formerly [facility])    History of CVA (cerebrovascular accident)    Acute on chronic respiratory failure with hypoxia (HCC)    Atrial fibrillation (HCC)    Urinary retention    MSSA bacteremia    Neck pain    Colonic mass    ORIANA (acute kidney injury) (Formerly [facility])    Pleural effusion, bilateral    Dysphagia    Severe protein-calorie malnutrition (HCC)  Malnutrition Findings:   Adult Malnutrition type: Acute illness  Adult Degree of Malnutrition: Other severe protein calorie malnutrition  Malnutrition Characteristics:  Inadequate energy, Fluid accumulation                  360 Statement: related to inadequate energy/protein intake as evidenced by consuming < 50% of energy intake compared to estimated needs for > 5 days and B/L LE +3 edema. Treated with ONS.    BMI Findings:           Body mass index is 29.36 kg/m².     Hallucinations, visual    Hemorrhagic shock (HCC)    Cardiac arrest (HCC)    Hemoperitoneum    Encephalopathy    Cardiac arrest due to other underlying condition (HCC)    Palliative care by specialist    Counseling regarding advance care planning and goals of care    Elevated troponin    Embolic cerebral infarction (HCC)          Subjective   Events detailed above.  Intubated and off sedation.  Opens eyes but not following commands.  Per RN no bowel function.    Objective :  Temp:  [98 °F (36.7 °C)-99 °F (37.2 °C)] 98 °F (36.7 °C)  HR:  [106-128] 118  BP: ()/(52-84) 106/57  Resp:  [12-53] 16  SpO2:  [95 %-100 %] 100 %  O2 Device: Ventilator  FiO2 (%):  [40-50] 40    I/O         03/17 0701  03/18 0700 03/18 0701  03/19 0700    P.O. 0     I.V. (mL/kg) 759.6 (7.7) 123.9 (1.3)    IV Piggyback 100 150    TPN 1482.6 1910.4    Feedings  72    Total Intake(mL/kg) 2342.2 (23.9) 2256.3 (23)    Urine (mL/kg/hr) 1220 (0.5) 1170 (0.5)    Emesis/NG output 0     Total Output 1220 1170    Net +1122.2 +1086.3                Lines/Drains/Airways       Active Status       Name Placement date Placement time Site Days    PICC Line 02/26/25 Right Brachial 02/26/25  0548  Brachial  20    CVC Central Lines 03/15/25 Triple 03/15/25  1338  --  3    ETT  Cuffed 8 mm 03/15/25  1200  -- 3    Urethral Catheter Latex 16 Fr. 03/15/25  1336  Latex  3    NG/OG Tube Nasogastric Left nare 03/15/25  1700  Left nare  3                  Physical Exam  General: intubated, ill-appearing  HENT: ETT in place  Neck: supple, no JVD  CV: Irregular rhythm, tachycardic  Lungs: mechanically ventilated.   ABD: Soft, appropriately tender, nondistended.  Incision CDI    Lab Results: I have reviewed the following results:  Recent Labs     03/16/25  0440 03/16/25  0925 03/16/25  1533 03/16/25  1947/25  0453 03/18/25  0825 03/18/25  1719 03/18/25  2142 03/19/25  0326   WBC 14.14*  --  13.31*  --  10.78*  --  8.29  --   --    HGB 13.3  13.3   < > 12.9   < > 10.6*   < > 10.3* 10.3*  --    HCT 38.7  38.4   < > 38.5   < > 32.4*   < > 32.5* 32.4*  --    *  --  104*  --  90*  --  86*  --   --    BANDSPCT 1  --   --   --   --   --   --   --   --    SODIUM 148*  --  148*   < > 145   < >  --  142 143   K 4.2  --  4.4   < > 3.8   < >  --  3.9 3.9   *  --  116*   < > 111*   < >  --  110* 109*   CO2 27   < > 25   < > 27   < >  --  26 26   BUN 48*  --  48*   < > 63*   < >  --  74* 78*   CREATININE 1.85*  --  2.14*   < > 2.89*   < >  --  3.33* 3.35*   GLUC 147*  --  135   < > 187*   < >  --  181* 160*   CAIONIZED  --    < > 1.13  --  1.12  --   --   --   --    MG 2.0  --  1.9   < > 1.9   < >  --  2.6 2.2   PHOS 3.2  --  4.1   < > 4.0  --   --  4.7* 4.5*   AST 69*  --  60*  --  26  --   --   --   --    ALT 5*  --  <3*  --  <3*  --   --   --   --    ALB 2.7*  --  2.4*  --  2.1*  --   --   --   --    TBILI 0.79  --  0.62  --  0.38  --   --   --   --    ALKPHOS 53  --  51  --  53  --   --   --   --    PTT  --   --   --   --   --    < > 44* 169* 153*   INR  --   --   --   --   --   --  1.19  --   --    LACTICACID 1.8  --  1.3  --   --   --   --   --   --     < > = values in this interval not displayed.             VTE Pharmacologic Prophylaxis: VTE covered by:  heparin (porcine), Intravenous, Stopped at 03/19/25 0402     VTE Mechanical Prophylaxis: sequential compression device

## 2025-03-19 NOTE — ASSESSMENT & PLAN NOTE
Creatinine   Date Value Ref Range Status   03/18/2025 3.33 (H) 0.60 - 1.30 mg/dL Final     Comment:     Standardized to IDMS reference method   09/25/2022 1 0.6 - 1.2 mg/dL Final      Likely initially in the setting of hemorrhagic shock, now with ATN   Baseline Cr 0.8   Nephrology following, appreciate recommendations  Continue bumex infusion, could consider dose of metolazone as well for goal negative 500ml-1L over next 24 hours   Monitor and replete electrolytes Q6H  Monitor I/O and renal indices

## 2025-03-19 NOTE — ASSESSMENT & PLAN NOTE
Remains intubated for airway protection post cardiac arrest and postoperatively with poor neuro exam   Continue mechanical ventilation ACVC 12/470/40/6, titrate FiO2 for SpO2>88  At baseline requires 2-4L NC   Monitor off of sedation, PRN dilaudid for pain management   Daily SBT   Vent bundle

## 2025-03-19 NOTE — ANESTHESIA PREPROCEDURE EVALUATION
Procedure:  LAPAROTOMY EXPLORATORY, partial small bowel resection, right colon resection, tap block, (Abdomen)    Relevant Problems   CARDIO   (+) Atrial fibrillation (HCC)   (+) CAD (coronary artery disease)   (+) Cardiac arrest (HCC)   (+) Cardiac arrest due to other underlying condition (HCC)   (+) HTN (hypertension)   (+) Pre-operative cardiovascular examination, unstable angina (HCC)      GI/HEPATIC   (+) Dysphagia   (+) Esophageal dysphagia      /RENAL   (+) ORIANA (acute kidney injury) (HCC)      HEMATOLOGY   (+) Acute blood loss anemia      MUSCULOSKELETAL   (+) Back pain   (+) Rheumatoid arthritis (HCC)      NEURO/PSYCH   (+) Embolic cerebral infarction (HCC)   (+) Stroke (HCC)      PULMONARY   (+) Acute on chronic respiratory failure with hypoxia (HCC)   (+) COPD (chronic obstructive pulmonary disease) (HCC)   (+) JAZMINE (obstructive sleep apnea)   (+) Pleural effusion, bilateral        Physical Exam    Airway    Mallampati score: already intubated         Dental       Cardiovascular      Pulmonary      Other Findings        Anesthesia Plan  ASA Score- 3 Emergent    Anesthesia Type- general with ASA Monitors.         Additional Monitors: arterial line.    Airway Plan: ETT.           Plan Factors-Exercise tolerance (METS): <4 METS.    Chart reviewed.                      Induction- intravenous.    Postoperative Plan-     Perioperative Resuscitation Plan - Level 1 - Full Code.       Informed Consent- Anesthetic plan and risks discussed with spouse.  I personally reviewed this patient with the CRNA. Discussed and agreed on the Anesthesia Plan with the CRNA..      NPO Status:  No vitals data found for the desired time range.

## 2025-03-19 NOTE — ASSESSMENT & PLAN NOTE
Resolved  With actively bleeding mesenteric hematoma  S/p emergent ex lap, control of bleeding, segmental SBR and left in discontinuity 3/15  Now s/p partial SBR, R hemicolectomy and closure 3/16/2025  Management per surgery

## 2025-03-19 NOTE — ASSESSMENT & PLAN NOTE
Patient is currently obtunded postcardiac arrest.  Concern is for anoxic encephalopathy.  Head CT is without acute changes.  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  Monitor mental status.

## 2025-03-19 NOTE — PROGRESS NOTES
"Progress Note - Palliative Care   Name: Devin Chaves 79 y.o. male I MRN: [id number]  Unit/Bed#: ICU 10 I Date of Admission: 2/28/2025   Date of Service: 3/19/2025 I Hospital Day: 19    Assessment & Plan  Counseling regarding advance care planning and goals of care   - met with spouse, daughter, and son at bedside   - plan for meeting with family and CCM team at 17:00 today   - expanded discussion with spouse regarding current critical condition, including MRI findings of multiple strokes and findings c/w HIE, s/p cardiac arrest, VDRF, underlying CRC.   - expanded discussion regarding advanced resuscitative supports, including CPR/ACLS. Spouse is honoring the patient's wish to \"do everything to fight\", which includes CPR in the setting of cardiac arrest. Family feels that if the patient is able to open his eyes, that is living and is acceptable. Maintain Full Code, Level 1.    - agreeable to tracheostomy and G-tube placement with understanding that if survival to discharge/transfer from hospital, transfer of care to LTACH/SNF for continued mechanical ventilatory management.   - overall goal is to maintain/preserve life with hopes to see continued improvement of cognitive/physical function. Feel that today is a better day than yesterday, as the patient would periodically open eyes to certain verbal stimuli.   - will continue to follow/support family  Palliative care by specialist  Palliative diagnosis: Colon adenocarcinoma, colon mass, cardiac arrest, respiratory failure  PPS: 10%    Education/counseling provided on role/purpose of palliative care; benefits/risks of treatment options by our team reviewed; instructions for management and anticipatory guidance provided; supportive listening and presence provided; provided space for life review and whole person assessment    Psychosocial/Spiritual:   -supported by spouse Francia (\"Ashvin\"),  56 years; they live near [place]  -Also two sons Parmjit and " Guille  -4 yrs in [employer]  -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  If competence is lost, patient's substitute decision maker would default to spouse Francia (first), son Devin (alternate)               Advance Directive / Living Will / POLST:  POA document on file  Cardiac arrest due to other underlying condition (HCC)  In setting of hemorrhagic shock    In efforts done with achievement of ROSC    Critical care team will likely do MRI brain to evaluate for anoxic brain injury--> will follow-up  Follow-up on video EEG--> no evidence of seizure activity  Hemorrhagic shock (HCC)  Code Crimson 3/15 for hemorrhagic shock related to mesenteric hematoma s/p small bowel resection and colon resection    Now off vasopressor medication  MSSA bacteremia  From previous admission in February from skin/soft tissue source  On cefazolin through 3/27/25  Acute blood loss anemia  In setting of mesenteric bleed and hematoma  S/p small bowel resection and right hemicolectomy  Colonic mass  2/20/2025 EGD/colonoscopy revealed 2.5 cm ileocecal mass    Pathology confirmed: Adenocarcinoma    Mass has not been removed according to surgical notes  Embolic cerebral infarction (HCC)  Neurology following    MRI Brain [03/18/2025] new nearly symmetric cortical restricted diffusion in bilateral frontal, bilateral parietal, and bilateral occipital lobes, suspicious for HIE given history of recent cardiac arrest; new scattered small acute infarcts in left posterior frontal and bilateral cerebellar lobes, favor embolic infarct.    PDMP Review: I have  reviewed the patient's controlled substance dispensing history in the Prescription Drug Monitoring Program in compliance with the [facility] regulations before prescribing any controlled substances.    Subjective   Remains intubated. NAEO.    Objective :  Temp:  [98 °F (36.7 °C)-99 °F (37.2 °C)] 98.8 °F (37.1 °C)  HR:  [109-127] 114  BP: (106-164)/(55-72) 126/64  Resp:  [12-28] 18  SpO2:  [95 %-100 %] 99 %  O2 Device: Ventilator  FiO2 (%):  [40-50] 40    Physical Exam  Vitals and nursing note reviewed.   Constitutional:       Appearance: He is not diaphoretic.      Interventions: He is intubated.      Comments: Critically ill appearing  BMI 30.1   HENT:      Head: Normocephalic and atraumatic.      Right Ear: External ear normal.      Left Ear: External ear normal.   Eyes:      Comments: Eyes remained closed throughout examination   Cardiovascular:      Rate and Rhythm: Tachycardia present.   Pulmonary:      Effort: He is intubated.   Genitourinary:     Comments: Tang in place  Skin:     Coloration: Skin is pale.   Psychiatric:      Comments: Unable to assess            Lab Results: I have reviewed the following results:  Lab Results   Component Value Date/Time    SODIUM 141 03/19/2025 09:21 AM    SODIUM 138 09/25/2022 07:03 AM    K 3.8 03/19/2025 09:21 AM    K 4.1 09/25/2022 07:03 AM    BUN 84 (H) 03/19/2025 09:21 AM    BUN 21 (H) 09/25/2022 07:03 AM    BUN 22 02/06/2022 06:40 AM    CREATININE 3.59 (H) 03/19/2025 09:21 AM    CREATININE 1 09/25/2022 07:03 AM    GLUC 158 (H) 03/19/2025 09:21 AM    GLUC 104 09/25/2022 07:03 AM    CALCIUM 7.5 (L) 03/19/2025 09:21 AM    CALCIUM 9.1 09/25/2022 07:03 AM    AST 20 03/19/2025 04:51 AM    AST 26 09/24/2022 05:34 PM    ALT <3 (L) 03/19/2025 04:51 AM    ALT 22 09/24/2022 05:34 PM    ALB 2.1 (L) 03/19/2025 04:51 AM    ALB 4.3 09/24/2022 05:34 PM    TP 4.2 (L) 03/19/2025 04:51 AM    TP 6.9 09/24/2022 05:34 PM    EGFR 15 03/19/2025 09:21 AM    EGFR 82 09/25/2022 07:03 AM    EGFR 55 (L)  08/17/2020 08:05 AM     Lab Results   Component Value Date/Time    HGB 9.7 (L) 03/19/2025 04:51 AM    WBC 6.69 03/19/2025 04:51 AM    PLT 74 (L) 03/19/2025 04:51 AM    INR 1.19 03/18/2025 05:19 PM    INR 1.0 02/05/2022 01:13 PM     (H) 03/19/2025 09:21 AM     Lab Results   Component Value Date/Time    [id number] 3.522 12/12/2023 04:43 AM       Code Status: Level 1 - Full Code  Advance Directive and Living Will: Yes    Power of : Yes  POLST:      Administrative Statements   I have spent a total time of 65 minutes in caring for this patient on the day of the visit/encounter including patient and family education, counseling/coordination of care, documenting in the medical record, reviewing tests/medicines/procedure, and communicating with other healthcare professionals. Topics discussed with the patient/family include expanded discussion with family, psychosocial support, goals of care, supportive listening, and anticipatory guidance.

## 2025-03-19 NOTE — PROGRESS NOTES
Progress Note - Nephrology   Name: Devin Chaves 79 y.o. male I MRN: [id number]  Unit/Bed#: ICU 10 I Date of Admission: 2/28/2025   Date of Service: 3/19/2025 I Hospital Day: 19       Brief Hx: 79-year-old male with HTN, CHF, CVA, AF on Eliquis, COPD, colon adenocarcinoma, MSSA bacteremia presenting from rehab facility with symptomatic anemia (Hgb 4.6).  Nephrology consulted for management of ORIANA.   Assessment & Plan  ORIANA (acute kidney injury) (HCC)  Etiology: Suspect ATN in setting of prolonged prerenal azotemia from volume depletion and severe anemia  Baseline creatinine 0.8 - 0.9.   Admission SCr   SCr had remained around 1.8-2 since admission but then worsened on 3/17/2025 after cardiac arrest on 3/15/25 and emergent ex lap and then subsequent return to OR 3/16 for R hemicolectomy  Peak creatinine 3.35 on 3/19/2025, trending  Today's creatinine 3.35, continuing to trend up  CT 2/28/25 - no hydronephrosis. UA on 3/8/25 with RBC 2-4, WBC 2-4, small blood and trace protein.   Of note, UPC ratio was 3.4 gm on 3/8/25 and urine ACR was only 821 mg on 3/5/25.   Serum and urine LIDYA no M spike, C3 83 (low), C4 26. At risk for AIN due to antibiotic exposure but no clear indication of this as well.    Renal function continues to worsen with ongoing fluid volume overload despite Bumex drip  High risk for need for initiation of renal replacement therapy if refractory volume overload  Continue bumex drip.  Currently at 2 mg/hr  Avoid nephrotoxins, NSAIDs, IV contrast if possible  Avoid hypotension, maintain MAP >65  Trend BMP  Dose all medications per EGFR  For CTH now, reevaluate after return  Ongoing GOC discussion with family by primary team    Volume overload  Refractory volume overload  S/p intermittent IV lasix and IV bumex  Continue bumex drip for now  May need to consider hemodialysis for UF if refractory overload  HTN (hypertension)  BP acceptable  Volume status hypervolemic, worsening volume overload despite  bumex drip  Echo 3/17/2025: EF 55%, unable to assess diastolic function  Home Rx: Metoprolol 25 mg twice daily  Previous Rx: Metoprolol 25 mg BID, Torsemide 20 mg OD.   Current Rx: Bumex drip @ 2 mg/hr  Avoid hypotension or large fluctuations in BP  Continue to monitor    Acute blood loss anemia  Acute blood loss on 3/15/25 and received > 10 units PRBC.   S/p emergent ex lap and control of bleeding  Hgb 9.7 today, below goal and trending down   Hgb monitoring per primary service.     MSSA bacteremia  Currently on Cefazolin 2 gm IV q8H - needs this until 3/27/25.   ID following.   Possible cutaneous source  TTE without vegetation.     Urinary retention  Seen by urology this admission.   Now with glass.   Tamsulosin on hold.     Colonic mass  Newly diagnosed colon mass on C-scope on 2/20/25.   Pathology - adenocarcinoma  Was to follow colorectal as outpatient  S/p ex lap, partial SBR and R colectomy 3/16/2025  Surgical pathology pending  Management per surgery    Pleural effusion, bilateral  CXR with vascular congestion and pleural effusions.   Was being diuresed before cardiac arrest on 3/15/25.   Diuretics resumed 3/17/2025.  Now on Bumex 2 mg IV 3 times daily    Cardiac arrest (HCC)  S/p cardiac arrest with ROSC 3/15/2025  In the setting of ABLA with undetectable Hgb and hemoperitoneum, s/p emergent ex lap, segmental SBR 3/15  Echo 3/17/2025: EF 55%  Management per primary team    Hemoperitoneum  Resolved  With actively bleeding mesenteric hematoma  S/p emergent ex lap, control of bleeding, segmental SBR and left in discontinuity 3/15  Now s/p partial SBR, R hemicolectomy and closure 3/16/2025  Management per surgery  Atrial fibrillation (HCC)  On Metoprolol for rate control, currently on hold  On Eliquis for AC, now on hold    Encephalopathy  Concern for anoxic brain injury s/p cardiac arrest with new embolic infarcts  CTH with 4 mm focus of high attenuation right frontal lobe without evidence of anoxic brain  injury, stable chronic ischemic changes.  vEEG monitoring with no evidence of seizure  Brain MRI 3/18/2025: New nearly symmetric cortical restricted diffusion in bilateral frontal, bilateral parietal and bilateral occipital lobes suspicious for hypoxic ischemic encephalopathy.  New scattered small acute infarcts in left posterior frontal and bilateral cerebellar lobes favoring embolic infarcts.  For repeat CTH now  On heparin drip  Neurology following  Management per neurology and primary team  Embolic cerebral infarction (HCC)  Brain MRI 3/18/2025: New nearly symmetric cortical restricted diffusion in bilateral frontal, bilateral parietal and bilateral occipital lobes suspicious for hypoxic ischemic encephalopathy.  New scattered small acute infarcts in left posterior frontal and bilateral cerebellar lobes favoring embolic infarcts  Now on heparin drip  For repeat CTH now  Avoid hypotension  Neurology following  Management per neurology and primary team    Plan Summary:  -Renal function worsening with volume overload poorly responsive to Bumex drip  -continue bumex drip for now.  If refractory volume overload, may need to consider renal replacement therapy for UF.  -For repeat CTH now  -Heparin drip per neurology and primary team  -Ongoing GOC with family per primary team    SUBJECTIVE:  Patient remains intubated on vent.  Unresponsive.  Going for repeat CTH now.  Bumex drip increased to 2 mg/hr with borderline response.  +volume overload.  Creatinine trending up.  Electrolytes and acid-base stable. VSS    A complete review of systems was unable to be performed due to patient intubated, encephalopathic and unresponsive.    OBJECTIVE:  Current Weight: Weight - Scale: 101 kg (221 lb 12.5 oz)  Vitals:    03/19/25 0500 03/19/25 0600 03/19/25 0700 03/19/25 0823   BP: 114/56  126/64    BP Location:   Left arm    Pulse: (!) 115  (!) 114    Resp: (!) 24  18    Temp:   98.8 °F (37.1 °C)    TempSrc:   Axillary    SpO2: 99%   100% 100%   Weight:  101 kg (221 lb 12.5 oz)     Height:           Intake/Output Summary (Last 24 hours) at 3/19/2025 0915  Last data filed at 3/19/2025 0800  Gross per 24 hour   Intake 2692.65 ml   Output 1295 ml   Net 1397.65 ml       General: Intubated on vent, unresponsive  Skin:  No rash, warm, good skin turgor   Eyes: sclerae nonicteric.  no periorbital edema   ENT:  Moist mucous membranes + ETT in place  Neck:  Trachea midline, symmetric.  No JVD.  Chest: Bibasilar crackles  CVS:  Regular rate and rhythm without murmur, gallop or rub.  S1 and S2 identified and normal.  No S3, S4.   Abdomen:  Soft, nondistended.  Hypoactive bowel sounds x 4 quadrants.  No bruit.  Midline abdominal incision  Extremities:  Warm, pink, motor and sensory intact and well perfused.  No cyanosis, pallor.  2+ BLE edema.  Neuro: Unresponsive, intubated on vent  Psych: Unresponsive, intubated on vent  : Tang catheter in place draining dilute urine.      Medications:    Current Facility-Administered Medications:     Adult 3-in-1 TPN (custom base / custom electrolytes), , Intravenous, Continuous TPN, AMPARO Calvert, Last Rate: 87.7 mL/hr at 03/18/25 2158, New Bag at 03/18/25 2158    [Held by provider] ascorbic acid (VITAMIN C) tablet 250 mg, 250 mg, Oral, Daily, Yobany Mott MD, 250 mg at 03/14/25 0848    [Held by provider] atorvastatin (LIPITOR) tablet 40 mg, 40 mg, Oral, Daily With Dinner, Yobany Mott MD, 40 mg at 03/13/25 1626    budesonide (PULMICORT) inhalation solution 0.5 mg, 0.5 mg, Nebulization, Q12H, AMPARO Umaña, 0.5 mg at 03/19/25 0823    bumetanide (BUMEX) 12.5 mg infusion 50 mL, 2 mg/hr, Intravenous, Continuous, AMPARO Umaña, Last Rate: 8 mL/hr at 03/19/25 0452, 2 mg/hr at 03/19/25 0452    ceFAZolin (ANCEF) IVPB (premix in dextrose) 2,000 mg 50 mL, 2,000 mg, Intravenous, Q8H, Yobany Mott MD, Last Rate: 100 mL/hr at 03/19/25 0448, 2,000 mg at 03/19/25 0448     chlorhexidine (PERIDEX) 0.12 % oral rinse 15 mL, 15 mL, Mouth/Throat, Q12H GALE, Yobany Mott MD, 15 mL at 03/19/25 0811    [Held by provider] Cholecalciferol (VITAMIN D3) tablet 2,000 Units, 2,000 Units, Oral, Daily, Yobany Mott MD, 2,000 Units at 03/14/25 0848    [Held by provider] cyanocobalamin (VITAMIN B-12) tablet 100 mcg, 100 mcg, Oral, Daily, Yobany Mott MD, 100 mcg at 03/14/25 0848    [Held by provider] ferrous sulfate tablet 325 mg, 325 mg, Oral, Daily With Breakfast, Yobany Mott MD    [Held by provider] fluticasone (ARNUITY ELLIPTA) 100 MCG/ACT inhaler 1 puff, 1 puff, Inhalation, Daily, Yobany Mott MD, 1 puff at 03/15/25 0925    [Held by provider] folic acid (FOLVITE) tablet 1 mg, 1 mg, Oral, Daily, Yobany Mott MD, 1 mg at 03/14/25 0848    heparin (porcine) 25,000 units in 0.45% NaCl 250 mL infusion (premix), 3-24 Units/kg/hr (Order-Specific), Intravenous, Titrated, AMPARO Calvert, Last Rate: 8.6 mL/hr at 03/19/25 0510, 9 Units/kg/hr at 03/19/25 0510    HYDROmorphone HCl (DILAUDID) injection 0.2 mg, 0.2 mg, Intravenous, Q4H PRN, Yobany Mott MD    [Held by provider] hydroxychloroquine (PLAQUENIL) tablet 400 mg, 400 mg, Oral, Daily With Breakfast, Yobany Mott MD, 400 mg at 03/14/25 0848    insulin lispro (HumALOG/ADMELOG) 100 units/mL subcutaneous injection 2-12 Units, 2-12 Units, Subcutaneous, Q6H GALE, 2 Units at 03/19/25 0015 **AND** Fingerstick Glucose (POCT), , , Q6H, AMPARO Calvert    ipratropium (ATROVENT) 0.02 % inhalation solution 0.5 mg, 0.5 mg, Nebulization, TID, AMPARO Umaña, 0.5 mg at 03/19/25 0823    levalbuterol (XOPENEX) inhalation solution 1.25 mg, 1.25 mg, Nebulization, TID, AMPARO Umaña, 1.25 mg at 03/19/25 0823    lidocaine (LIDODERM) 5 % patch 3 patch, 3 patch, Topical, Daily, Yobany Mott MD, 3 patch at 03/19/25 0810    [Held by provider] metoprolol tartrate (LOPRESSOR) tablet 25 mg,  25 mg, Oral, Q12H GALE, Yobany Mott MD, 25 mg at 03/14/25 0848    moisture barrier miconazole 2% cream (aka HITESH MOISTURE BARRIER ANTIFUNGAL CREAM), , Topical, BID, Yobany Mott MD, Given at 03/19/25 0811    [Held by provider] oxyCODONE (ROXICODONE) IR tablet 5 mg, 5 mg, Oral, Q6H PRN, Yobany Mott MD, 5 mg at 03/01/25 1817    [Held by provider] oxyCODONE (ROXICODONE) split tablet 2.5 mg, 2.5 mg, Oral, Q6H PRN, Yobany Mott MD, 2.5 mg at 03/05/25 0945    pantoprazole (PROTONIX) injection 40 mg, 40 mg, Intravenous, Q12H GALE, Yobany Mott MD, 40 mg at 03/19/25 0810    polyethylene glycol (MIRALAX) packet 17 g, 17 g, Oral, Daily, AMPARO Calvert, 17 g at 03/19/25 0820    [Held by provider] potassium chloride (Klor-Con M20) CR tablet 20 mEq, 20 mEq, Oral, Daily, Yobany Mott MD    senna oral syrup 17.6 mg, 17.6 mg, Oral, HS, AMPARO Calvert    [Held by provider] tamsulosin (FLOMAX) capsule 0.4 mg, 0.4 mg, Oral, Daily With Dinner, Yobany Mott MD, 0.4 mg at 03/13/25 1626    [Held by provider] torsemide (DEMADEX) tablet 20 mg, 20 mg, Oral, Daily, Yobany Mott MD, 20 mg at 03/14/25 0848    [Held by provider] umeclidinium-vilanterol 62.5-25 mcg/actuation inhaler 1 puff, 1 puff, Inhalation, Daily, Yobany Mott MD, 1 puff at 03/15/25 0925    Laboratory Results:  Results from last 7 days   Lab Units 03/19/25  0451 03/19/25  0326 03/18/25  2142 03/18/25  1719 03/18/25  1618 03/18/25  1048 03/18/25  0825 03/18/25  0453 03/18/25  0359 03/17/25  2010 03/17/25  1550 03/17/25  0935 03/17/25  0726 03/16/25  1947/25  1533 03/16/25  1327 03/16/25  1215 03/16/25  0925 03/16/25  0440 03/15/25  1935 03/15/25  1654 03/15/25  1442 03/15/25  1345 03/15/25  1235 03/15/25  1154 03/15/25  1045 03/15/25  1042   WBC Thousand/uL 6.69  --   --  8.29  --   --   --  10.78*  --   --   --   --   --   --  13.31*  --   --   --  14.14*  --  11.65*  --   --  9.30  --    < >   --    HEMOGLOBIN g/dL 9.7*  --  10.3* 10.3*  --   --  10.4* 10.6*  --  11.2*  --   --  11.0*   < > 12.9  --   --    < > 13.3  13.3   < > 12.5  --   --  11.5*  --    < >  --    I STAT HEMOGLOBIN g/dl  --   --   --   --   --   --   --   --   --   --   --   --   --   --   --  10.9* 10.2*  --   --   --   --  10.9* 11.6*  --   --   --  5.4*   HEMATOCRIT % 29.9*  --  32.4* 32.5*  --   --  33.4* 32.4*  --  34.3*  --   --  32.3*   < > 38.5  --   --    < > 38.7  38.4   < > 36.7  --   --  35.4*  --    < >  --    HEMATOCRIT, ISTAT %  --   --   --   --   --   --   --   --   --   --   --   --   --   --   --  32* 30*  --   --   --   --  32* 34*  --  <15*  --  16*   PLATELETS Thousands/uL 74*  --   --  86*  --   --   --  90*  --   --   --   --   --   --  104*  --   --   --  103*  --  70*  --   --  70*  --    < >  --    SODIUM mmol/L  --  143 142  --  144 144  --  145 144 146   < > 146  --    < > 148*  --   --   --  148*  --  151*  --   --  152*  --    < >  --    POTASSIUM mmol/L  --  3.9 3.9  --  3.4* 3.7  --  3.8 3.8 4.2   < > 4.2  --    < > 4.4  --   --   --  4.2  --  3.2*  --   --  3.5  --    < >  --    CHLORIDE mmol/L  --  109* 110*  --  111* 112*  --  111* 111* 112*   < > 113*  --    < > 116*  --   --   --  113*  --  113*  --   --  119*  --    < >  --    CO2 mmol/L  --  26 26  --  26 27  --  27 27 28   < > 27  --    < > 25  --   --   --  27  --  25  --   --  19*  --    < >  --    CO2, I-STAT mmol/L  --   --   --   --   --   --   --   --   --   --   --   --   --   --   --  29 27  --   --   --   --  24 23  --  28  --  21   BUN mg/dL  --  78* 74*  --  71* 68*  --  63* 64* 58*   < > 54*  --    < > 48*  --   --   --  48*  --  45*  --   --  42*  --    < >  --    CREATININE mg/dL  --  3.35* 3.33*  --  3.24* 3.08*  --  2.89* 2.90* 2.79*   < > 2.54*  --    < > 2.14*  --   --   --  1.85*  --  1.67*  --   --  1.47*  --    < >  --    CALCIUM mg/dL  --  7.5* 7.7*  --  7.6* 7.9*  --  7.8* 7.8* 8.1*   < > 7.8*  --    < > 8.1*  --   --    --  8.1*  --  7.7*  --   --  6.9*  --    < >  --    MAGNESIUM mg/dL  --  2.2 2.6  --  1.9 1.9  --  1.9  --   --   --  1.9  --   --  1.9  --   --   --  2.0  --  1.6*  --   --   --   --    < >  --    PHOSPHORUS mg/dL  --  4.5* 4.7*  --   --   --   --  4.0  --   --   --  4.0  --   --  4.1  --   --   --  3.2  --  3.6  --   --   --   --   --   --    GLUCOSE, ISTAT mg/dl  --   --   --   --   --   --   --   --   --   --   --   --   --   --   --  137 124  --   --   --   --  181* 185*  --  159*  --  314*    < > = values in this interval not displayed.       I have personally reviewed the blood work as stated above and in my note.  I have personally reviewed internal Medicine, co-consultants and previous nephrology notes.

## 2025-03-19 NOTE — ASSESSMENT & PLAN NOTE
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  If competence is lost, patient's substitute decision maker would default to spouse Francia (first), son Devin (alternate)               Advance Directive / Living Will / POLST:  POA document on file  "

## 2025-03-19 NOTE — ASSESSMENT & PLAN NOTE
Currently rate controlled in NSR   Consider restarting home metoprolol for rate control   Hold home eliquis, currently on ischemic stroke heparin protocol

## 2025-03-19 NOTE — ASSESSMENT & PLAN NOTE
Home regimen: Metoprolol tartrate 25 mg BID, Losartan 25 mg QD for HTN  Consider restarting home metoprolol   Hold home losartan in the setting of ORIANA

## 2025-03-19 NOTE — ASSESSMENT & PLAN NOTE
New areas of embolic stroke noted on MRI 3/18  Neurology following, appreciate recommendations  Continue ischemic stroke heparin gtt  Consider restarting statin   Frequent neuro checks    Repeat CTH after ptt therapeutic x 2

## 2025-03-19 NOTE — ASSESSMENT & PLAN NOTE
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   Now with new acute embolic strokes noted on MRI   Consider restarting home statin   Continue ischemic stroke heparin gtt   Hold ASA, restart as appropriate

## 2025-03-19 NOTE — ASSESSMENT & PLAN NOTE
Acute blood loss on 3/15/25 and received > 10 units PRBC.   S/p emergent ex lap and control of bleeding  Hgb 9.7 today, below goal and trending down   Hgb monitoring per primary service.

## 2025-03-20 ENCOUNTER — TELEPHONE (OUTPATIENT)
Age: 80
End: 2025-03-20

## 2025-03-20 NOTE — QUICK NOTE
Patient with ongoing worsening renal function and volume overload.  Requested to discuss hemodialysis with patient's wife.  Called Francia, patient's wife, and updated her on the current status of Devin's renal function.  No urgent indication of initiation of renal replacement therapy but remains high risk for need to start dialysis if worsening renal function, refractory volume overload.  Francia is agreeable to proceed with hemodialysis if the need arises.  All questions answered.  Risks and benefits discussed at length with the patient's wife.  Hemodialysis consent obtained over the phone and on chart.

## 2025-03-20 NOTE — PROGRESS NOTES
Progress Note - Surgery-General   Name: Devin Chaves 79 y.o. male I MRN: [id number]  Unit/Bed#: ICU 10 I Date of Admission: 2/28/2025   Date of Service: 3/19/2025 I Hospital Day: 19    Assessment & Plan  Acute blood loss anemia  Patient sustained cardiac arrest in the setting of an undetectable hemoglobin on 3/15 and was taken emergently to the operating room for exploration in the setting of abdominal free fluid.  Large volume hemoperitoneum was encountered with evidence of a actively bleeding/decompressing mesenteric hematoma; bleeding control was obtained and on 3/15 taken to OR for a segmental small bowel resection was performed and the patient was left in discontinuity given ongoing instability.  S/p SB anastomosis, R hemicolectomy, closure on 3/16.    Afebrile, persistently tachycardic to 120s, hypotensive.    Hemoglobin: 9.5 from 10.3     Plan  - Continue NG tube/n.p.o. given emesis and no significant bowel function  - Appreciate neurology recommendations   - Avoid hypotension, optimize BP management  - Heparin gtt in setting of embolic infarcts  - Continue TPN  - Wean vent as tolerated  - Continue recommendations from cardiology and nephrology for ongoing diuresis  - Rest of care per ICU  HTN (hypertension)    CAD (coronary artery disease)    COPD (chronic obstructive pulmonary disease) (HCC)    History of CVA (cerebrovascular accident)    Acute on chronic respiratory failure with hypoxia (HCC)    Atrial fibrillation (HCC)    Urinary retention    MSSA bacteremia    Neck pain    Colonic mass    ORIANA (acute kidney injury) (formerly [facility])    Pleural effusion, bilateral    Dysphagia    Severe protein-calorie malnutrition (HCC)  Malnutrition Findings:   Adult Malnutrition type: Acute illness  Adult Degree of Malnutrition: Other severe protein calorie malnutrition  Malnutrition Characteristics: Inadequate energy, Fluid accumulation                  360 Statement: related to inadequate energy/protein intake as evidenced  by consuming < 50% of energy intake compared to estimated needs for > 5 days and B/L LE +3 edema. Treated with ONS.    BMI Findings:           Body mass index is 30.08 kg/m².     Hallucinations, visual    Hemorrhagic shock (HCC)    Cardiac arrest (HCC)    Hemoperitoneum    Encephalopathy    Cardiac arrest due to other underlying condition (HCC)    Palliative care by specialist    Counseling regarding advance care planning and goals of care    Elevated troponin    Embolic cerebral infarction (HCC)    Volume overload          Subjective    Intubated and off sedation.  1 episode of emesis this AM with NGT to suction returning 1.7L. Per RN no bowel function.     Objective :  Temp:  [97.5 °F (36.4 °C)-98.8 °F (37.1 °C)] 97.5 °F (36.4 °C)  HR:  [110-127] 120  BP: ()/(54-72) 109/58  Resp:  [9-60] 23  SpO2:  [98 %-100 %] 100 %  O2 Device: Ventilator    I/O         03/18 0701  03/19 0700 03/19 0701  03/20 0700    P.O.      I.V. (mL/kg) 177.6 (1.8) 232.5 (2.3)    IV Piggyback 150     TPN 2107.7 1118.2    Feedings 72 159    Total Intake(mL/kg) 2507.4 (24.8) 1509.7 (14.9)    Urine (mL/kg/hr) 1270 (0.5) 1075 (0.8)    Emesis/NG output      Total Output 1270 1075    Net +1237.4 +434.7                Lines/Drains/Airways       Active Status       Name Placement date Placement time Site Days    PICC Line 02/26/25 Right Brachial 02/26/25  0548  Brachial  21    CVC Central Lines 03/15/25 Triple 03/15/25  1338  --  4    ETT  Cuffed 8 mm 03/15/25  1200  -- 4    Urethral Catheter Latex 16 Fr. 03/15/25  1336  Latex  4    NG/OG Tube Nasogastric Left nare 03/15/25  1700  Left nare  4                  Physical Exam  General: intubated, ill-appearing  HENT: ETT in place  Neck: supple, no JVD  CV: Irregular rhythm, tachycardic  Lungs: mechanically ventilated.   ABD: Soft, nondistended. Incision CDI    Lab Results: I have reviewed the following results:  Recent Labs     03/18/25  0453 03/18/25  0825 03/18/25  1719 03/18/25  2149  03/19/25  0451 03/19/25  0921 03/19/25  1704   WBC 10.78*  --  8.29  --  6.69  --  8.30   HGB 10.6*   < > 10.3*   < > 9.7*   < > 10.1*   HCT 32.4*   < > 32.5*   < > 29.9*   < > 33.0*   PLT 90*  --  86*  --  74*  --  110*   BANDSPCT  --   --   --   --  2  --   --    SODIUM 145   < >  --    < >  --    < > 141   K 3.8   < >  --    < >  --    < > 3.8   *   < >  --    < >  --    < > 109*   CO2 27   < >  --    < >  --    < > 23   BUN 63*   < >  --    < >  --    < > 90*   CREATININE 2.89*   < >  --    < >  --    < > 3.73*   GLUC 187*   < >  --    < >  --    < > 191*   CAIONIZED 1.12  --   --   --   --   --   --    MG 1.9   < >  --    < >  --    < > 2.2   PHOS 4.0  --   --    < >  --    < > 4.7*   AST 26  --   --   --  20  --   --    ALT <3*  --   --   --  <3*  --   --    ALB 2.1*  --   --   --  2.1*  --   --    TBILI 0.38  --   --   --  0.34  --   --    ALKPHOS 53  --   --   --  50  --   --    PTT  --   --  44*   < >  --    < > 61*   INR  --   --  1.19  --   --   --   --     < > = values in this interval not displayed.             VTE Pharmacologic Prophylaxis: VTE covered by:  heparin (porcine), Intravenous, 6 Units/kg/hr at 03/19/25 1115     VTE Mechanical Prophylaxis: sequential compression device

## 2025-03-20 NOTE — ASSESSMENT & PLAN NOTE
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  If competence is lost, patient's substitute decision maker would default to spouse Francia (first), son Devin (alternate)               Advance Directive / Living Will / POLST:  POA document on file  "

## 2025-03-20 NOTE — PROGRESS NOTES
Progress Note - Infectious Disease   Name: Devin Chaves 79 y.o. male I MRN: [id number]  Unit/Bed#: ICU 10 I Date of Admission: 2/28/2025   Date of Service: 3/20/2025 I Hospital Day: 20    Assessment & Plan  Neck pain  Patient developed acute neck pain with MSSA bacteremia during recent hospitalization.  CRP was highly elevated.  C-spine MRI showed possible C-spine and paraspinal infection.  Patient has no neurologic deficit.  His neck pain is finally improving.  However, given paraspinal infection on the initial C-spine MRI, we should repeat C-spine MRI with contrast when creatinine is improved to confirm resolution/improvement of paraspinal infection.  However, without any neurological deficit, it would be fine to postpone MRI until renal function is further improved, to decrease risk of contrast-induced ORIANA.  Patient should get MRI done prior to completion of IV antibiotic course below, hopefully sometime next.  Antibiotic plan as in below.  Monitor neck pain.  Recommend repeat C-spine MRI with and without contrast.  This can be postponed until ORIANA is improved, but should be done prior to completion of IV antibiotic course.  MSSA bacteremia  Patient had MSSA bacteremia during recent hospitalization.  Source is unclear but likely cutaneous.  His bacteremia cleared rapidly on IV antibiotic.  TTE did not show any vegetation.  Given possible C-spine infection, plan was for long-term IV antibiotic.  Continue high-dose IV cefazolin.  Treat x 6 weeks from clearance of bacteremia as previously planned, through 3/27.  Acute blood loss anemia  Patient developed acute bleeding from rectal mass, resulting in rapid response, and subsequent cardiac arrest.  He is status post transfusion.  He is also status post small bowel resection and right hemicolectomy.  Management per primary service.  ORIANA (acute kidney injury) (HCC)  Patient with ORIANA on admission, most likely secondary to hypovolemia from GI bleed.  Creatinine has  been stable over the last few days.  Antibiotic dosages adjusted accordingly.  Monitor creatinine.  Nephrology follow-up.  Colonic mass  Patient has recently diagnosed adenocarcinoma of the colon.  He now status post bowel resection for bleeding from adenocarcinoma.  No plan for chemotherapy yet.  Colorectal surgery follow-up.  Acute on chronic respiratory failure with hypoxia (HCC)  Patient is now intubated after cardiac arrest over the weekend.  Cardiac arrest due to other underlying condition (HCC)  Patient is status post arrest, successfully resuscitated.  Encephalopathy  Patient remains obtunded postcardiac arrest.  Concern is for anoxic encephalopathy.  Head CT is without acute changes.  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  Monitor mental status.        Antibiotics:  Cefazolin  Last negative blood culture 2/14    Subjective   Patient remains obtunded on ventilator in ICU.  Temperature stays down.  No chills.    Objective :  Temp:  [97.5 °F (36.4 °C)-100.2 °F (37.9 °C)] 98.4 °F (36.9 °C)  HR:  [104-130] 109  BP: ()/(39-67) 132/60  Resp:  [9-60] 38  SpO2:  [88 %-100 %] 100 %  O2 Device: Ventilator  FiO2 (%):  [40] 40    Physical Exam:     General: No response to verbal or tactile stimuli, comfortable, nontoxic.   Neck:  Supple. No mass.  No lymphadenopathy.   Lungs: Expansion symmetric, mild diffuse rhonchi, no rales, no wheezing.   Heart:  Regular rate and rhythm, S1 and S2 normal, no murmur.   Abdomen: Soft, nondistended, non-tender, bowel sounds active all four quadrants, no masses, no organomegaly.   Extremities: Stable leg edema. No erythema/warmth. No draining ulcer. Nontender to palpation.   Skin:  No rash.   Neuro: Not assessable.        Lab Results: I have reviewed the following results:  Results from last 7 days   Lab Units 03/20/25  0436 03/19/25  1704 03/19/25  1502 03/19/25  0451   WBC Thousand/uL 8.75 8.30  --  6.69    HEMOGLOBIN g/dL 9.5* 10.1* 10.3* 9.7*   PLATELETS Thousands/uL 99* 110*  --  74*     Results from last 7 days   Lab Units 03/20/25  0436 03/19/25  2322 03/19/25  1704 03/19/25  0921 03/19/25  0451 03/18/25  1048 03/18/25  0453 03/16/25  2235 03/16/25  1533 03/16/25  1327   SODIUM mmol/L 141 141 141   < >  --    < > 145   < > 148*  --    POTASSIUM mmol/L 3.9 4.0 3.8   < >  --    < > 3.8   < > 4.4  --    CHLORIDE mmol/L 107 107 109*   < >  --    < > 111*   < > 116*  --    CO2 mmol/L 23 22 23   < >  --    < > 27   < > 25  --    CO2, I-STAT mmol/L  --   --   --   --   --   --   --   --   --  29   BUN mg/dL 96* 98* 90*   < >  --    < > 63*   < > 48*  --    CREATININE mg/dL 3.91* 4.08* 3.73*   < >  --    < > 2.89*   < > 2.14*  --    EGFR ml/min/1.73sq m 13 13 14   < >  --    < > 20   < > 28  --    GLUCOSE, ISTAT mg/dl  --   --   --   --   --   --   --   --   --  137   CALCIUM mg/dL 7.3* 7.6* 7.4*   < >  --    < > 7.8*   < > 8.1*  --    AST U/L  --   --   --   --  20  --  26  --  60*  --    ALT U/L  --   --   --   --  <3*  --  <3*  --  <3*  --    ALK PHOS U/L  --   --   --   --  50  --  53  --  51  --    ALBUMIN g/dL  --   --   --   --  2.1*  --  2.1*  --  2.4*  --     < > = values in this interval not displayed.

## 2025-03-20 NOTE — ASSESSMENT & PLAN NOTE
Acute blood loss on 3/15/25 and received > 10 units PRBC.   S/p emergent ex lap and control of bleeding  Hgb 9.5 today, below goal and trending down   Hgb monitoring per primary service.

## 2025-03-20 NOTE — ASSESSMENT & PLAN NOTE
Patient sustained cardiac arrest in the setting of an undetectable hemoglobin on 3/15 and was taken emergently to the operating room for exploration in the setting of abdominal free fluid.  Large volume hemoperitoneum was encountered with evidence of a actively bleeding/decompressing mesenteric hematoma; bleeding control was obtained and on 3/15 taken to OR for a segmental small bowel resection was performed and the patient was left in discontinuity given ongoing instability.  S/p SB anastomosis, R hemicolectomy, closure on 3/16.    Afebrile, persistently tachycardic to 120s, hypotensive.    Hemoglobin: 9.5 from 10.3     Plan  - Continue NG tube/n.p.o. given emesis and no significant bowel function  - Appreciate neurology recommendations   - Avoid hypotension, optimize BP management  - Heparin gtt in setting of embolic infarcts  - Continue TPN  - Wean vent as tolerated  - Continue recommendations from cardiology and nephrology for ongoing diuresis  - Rest of care per ICU

## 2025-03-20 NOTE — PROGRESS NOTES
Progress Note - Nephrology   Name: Devin Chaves 79 y.o. male I MRN: [id number]  Unit/Bed#: ICU 10 I Date of Admission: 2/28/2025   Date of Service: 3/20/2025 I Hospital Day: 20       Brief Hx: 79-year-old male with HTN, CHF, CVA, AF on Eliquis, COPD, colon adenocarcinoma, MSSA bacteremia presenting from rehab facility with symptomatic anemia (Hgb 4.6).  Nephrology consulted for management of ORIANA.   Assessment & Plan  ORIANA (acute kidney injury) (HCC)  Etiology: Suspect ATN in setting of prolonged prerenal azotemia from volume depletion and severe anemia  Baseline creatinine 0.8 - 0.9.   Admission SCr   SCr had remained around 1.8-2 since admission but then worsened on 3/17/2025 after cardiac arrest on 3/15/25 and emergent ex lap and then subsequent return to OR 3/16 for R hemicolectomy  Peak creatinine 4.08  on 3/19/2025, trending  Today's creatinine 3.91, appears plateaued for now  CT 2/28/25 - no hydronephrosis. UA on 3/8/25 with RBC 2-4, WBC 2-4, small blood and trace protein.   Of note, UPC ratio was 3.4 gm on 3/8/25 and urine ACR was only 821 mg on 3/5/25.   Serum and urine LIDYA no M spike, C3 83 (low), C4 26. At risk for AIN due to antibiotic exposure but no clear indication of this as well.    Renal function continues to worsen with ongoing fluid volume overload  High risk for need for initiation of renal replacement therapy if refractory volume overload  Recommend resuming bumex drip @ 2 mg/hr.  Held last PM due to hypotension in setting of vomiting  Consider additional dose of metolazone later today if BP stable once bumex drip resumed  Avoid nephrotoxins, NSAIDs, IV contrast if possible  Avoid hypotension, maintain MAP >65  Trend BMP  Dose all medications per EGFR  Palliative care following    Volume overload  Refractory volume overload  S/p intermittent IV lasix and IV bumex  Recommend resuming bumex drip 2 mg/hr  Consider additional dose of metolazone today  May need to consider hemodialysis for UF if  refractory overload  HTN (hypertension)  BP hypotensive with SBP 68-90s since last PM as result of vomiting episode.  S/p CTC and CT A/P.  Volume status hypervolemic.  Wts continue to trend up  Echo 3/17/2025: EF 55%, unable to assess diastolic function  Home Rx: Metoprolol 25 mg twice daily  Previous Rx: Metoprolol 25 mg BID, Torsemide 20 mg OD.   Current Rx: none  Recommend resuming Bumex drip @ 2 mg/hr.  Held last PM due to hypotension  Consider pressors if persistent hypotension, management per critical care  Avoid hypotension or large fluctuations in BP  Continue to monitor    Acute blood loss anemia  Acute blood loss on 3/15/25 and received > 10 units PRBC.   S/p emergent ex lap and control of bleeding  Hgb 9.5 today, below goal and trending down   Hgb monitoring per primary service.     MSSA bacteremia  Currently on Cefazolin 2 gm IV q8H - needs this until 3/27/25.   ID following.   Possible cutaneous source  TTE without vegetation.     Urinary retention  Seen by urology this admission.   Now with glass.   Tamsulosin on hold.     Colonic mass  Newly diagnosed colon mass on C-scope on 2/20/25.   Pathology - adenocarcinoma  Was to follow colorectal as outpatient  S/p ex lap, partial SBR and R colectomy 3/16/2025  Surgical pathology pending  Management per surgery    Pleural effusion, bilateral  Was being diuresed before cardiac arrest on 3/15/25.   CTC today with moderate bilateral pleural effusions with dependent atelectasis, resolved pulmonary edema  Diuretics resumed 3/17/2025.  Now on Bumex 2 mg/hr drip    Cardiac arrest (HCC)  S/p cardiac arrest with ROSC 3/15/2025  In the setting of ABLA with undetectable Hgb and hemoperitoneum, s/p emergent ex lap, segmental SBR 3/15  Echo 3/17/2025: EF 55%  Management per primary team    Hemoperitoneum  Resolved  With actively bleeding mesenteric hematoma  S/p emergent ex lap, control of bleeding, segmental SBR and left in discontinuity 3/15  Now s/p partial SBR, R  hemicolectomy and closure 3/16/2025  Management per surgery  Atrial fibrillation (HCC)  Previously on Metoprolol for rate control, currently on hold  On Eliquis for AC prehospital  On heparin drip for embolic infarcts    Encephalopathy  Concern for anoxic brain injury s/p cardiac arrest with new embolic infarcts  CTH with 4 mm focus of high attenuation right frontal lobe without evidence of anoxic brain injury, stable chronic ischemic changes.  vEEG monitoring with no evidence of seizure  Brain MRI 3/18/2025: New nearly symmetric cortical restricted diffusion in bilateral frontal, bilateral parietal and bilateral occipital lobes suspicious for hypoxic ischemic encephalopathy.  New scattered small acute infarcts in left posterior frontal and bilateral cerebellar lobes favoring embolic infarcts.  Repeat CTH 3/19 stable without hemorrhage  On heparin drip  Neurology following  Management per neurology and primary team  Embolic cerebral infarction (HCC)  Brain MRI 3/18/2025: New nearly symmetric cortical restricted diffusion in bilateral frontal, bilateral parietal and bilateral occipital lobes suspicious for hypoxic ischemic encephalopathy.  New scattered small acute infarcts in left posterior frontal and bilateral cerebellar lobes favoring embolic infarcts  Now on heparin drip  repeat CTH 3/19/2025 with stable infarcts and no acute hemorrhage  Avoid hypotension  Neurology following  Management per neurology and primary team    Plan Summary:  -Persistent volume overload with worsening renal function  -Recommend resuming Bumex drip now that BP stabilizing  -Consider additional dose of metolazone 5 mg x 1 today  -no urgent indication for renal replacement therapy but high risk for need to initiate dialysis if refractory volume overload despite optimal medical management.  Will need to consider CRRT if persistent hypotension.      SUBJECTIVE:  Patient remains intubated on vent, unresponsive. Emesis overnight with  hypotension.  Bumex held and remains off.  Cooresponding decrease in UO last 4 hours.  Worsening volume overload.  BP remains soft but improved.     A complete review of systems was unable to be performed due to patient unresponsive and intubated    OBJECTIVE:  Current Weight: Weight - Scale: 101 kg (221 lb 12.5 oz)  Vitals:    03/20/25 0500 03/20/25 0530 03/20/25 0600 03/20/25 0700   BP: 98/55 101/51 95/53 97/55   BP Location:    Left arm   Pulse: (!) 106 (!) 106 (!) 106 (!) 107   Resp: (!) 34 (!) 34 (!) 31    Temp:    98.4 °F (36.9 °C)   TempSrc:    Axillary   SpO2: 99% 98% 99% 99%   Weight:       Height:           Intake/Output Summary (Last 24 hours) at 3/20/2025 0744  Last data filed at 3/20/2025 0601  Gross per 24 hour   Intake 3941.5 ml   Output 3725 ml   Net 216.5 ml       General: Intubated on vent, unresponsive, in no acute distress.  Nontoxic.  Skin:  No rash, warm, good skin turgor   Eyes:  sclerae nonicteric  ENT:  Moist mucous membranes.  + ETT in place  Neck:  Trachea midline, symmetric.  No JVD.  Chest: Bilateral crackles  CVS: Regular rhythm, tachycardic without murmur, gallop or rub.  S1 and S2 identified and normal.  No S3, S4.   Abdomen:  Soft, nontender, nondistended without masses.  Hypoactive bowel sounds x 4 quadrants.  No bruit.  + Midline surgical incision  Extremities:  Warm, pink, well perfused.  No cyanosis, pallor.  2-3+ BLE edema. 1+ BUE edema.  Neuro: Unresponsive, intubated on vent.  Unresponsive to stimuli  Psych: Intubated on vent, responsive  : Tang catheter in place draining dilute urine      Medications:    Current Facility-Administered Medications:     Adult 3-in-1 TPN (custom base / custom electrolytes), , Intravenous, Continuous TPN, AMPARO Garcia, Last Rate: 87.7 mL/hr at 03/19/25 2145, New Bag at 03/19/25 2145    [Held by provider] ascorbic acid (VITAMIN C) tablet 250 mg, 250 mg, Oral, Daily, Yobany Mott MD, 250 mg at 03/14/25 0848    [Held by provider]  atorvastatin (LIPITOR) tablet 40 mg, 40 mg, Oral, Daily With Dinner, Yobany Mott MD, 40 mg at 03/13/25 1626    budesonide (PULMICORT) inhalation solution 0.5 mg, 0.5 mg, Nebulization, Q12H, AMPARO Umaña, 0.5 mg at 03/19/25 2024    bumetanide (BUMEX) 12.5 mg infusion 50 mL, 2 mg/hr, Intravenous, Continuous, AMPARO Umaña, Stopped at 03/20/25 0115    ceFAZolin (ANCEF) IVPB (premix in dextrose) 2,000 mg 50 mL, 2,000 mg, Intravenous, Q8H, Yobany Mott MD, Last Rate: 100 mL/hr at 03/20/25 0541, 2,000 mg at 03/20/25 0541    chlorhexidine (PERIDEX) 0.12 % oral rinse 15 mL, 15 mL, Mouth/Throat, Q12H GALE, Yobany Mott MD, 15 mL at 03/19/25 2135    [Held by provider] Cholecalciferol (VITAMIN D3) tablet 2,000 Units, 2,000 Units, Oral, Daily, Yobany Mott MD, 2,000 Units at 03/14/25 0848    [Held by provider] cyanocobalamin (VITAMIN B-12) tablet 100 mcg, 100 mcg, Oral, Daily, Yobany Mott MD, 100 mcg at 03/14/25 0848    docusate (COLACE) oral liquid 100 mg, 100 mg, Oral, BID, AMPARO Garcia, 100 mg at 03/19/25 1708    [Held by provider] ferrous sulfate tablet 325 mg, 325 mg, Oral, Daily With Breakfast, Yobany Mott MD    [Held by provider] fluticasone (ARNUITY ELLIPTA) 100 MCG/ACT inhaler 1 puff, 1 puff, Inhalation, Daily, Yobany Mott MD, 1 puff at 03/15/25 0925    [Held by provider] folic acid (FOLVITE) tablet 1 mg, 1 mg, Oral, Daily, Yobany Mott MD, 1 mg at 03/14/25 0848    heparin (porcine) 25,000 units in 0.45% NaCl 250 mL infusion (premix), 3-24 Units/kg/hr (Order-Specific), Intravenous, Titrated, AMPARO Calvert, Last Rate: 5.7 mL/hr at 03/20/25 0001, 6 Units/kg/hr at 03/20/25 0001    HYDROmorphone HCl (DILAUDID) injection 0.2 mg, 0.2 mg, Intravenous, Q4H PRN, Yobany Mott MD, 0.2 mg at 03/20/25 0013    [Held by provider] hydroxychloroquine (PLAQUENIL) tablet 400 mg, 400 mg, Oral, Daily With Breakfast, Yobany Mott MD, 400  mg at 03/14/25 0848    insulin lispro (HumALOG/ADMELOG) 100 units/mL subcutaneous injection 2-12 Units, 2-12 Units, Subcutaneous, Q6H GALE, 2 Units at 03/20/25 0539 **AND** Fingerstick Glucose (POCT), , , Q6H, AMPARO Calvert    ipratropium (ATROVENT) 0.02 % inhalation solution 0.5 mg, 0.5 mg, Nebulization, TID, AMPARO Umaña, 0.5 mg at 03/19/25 2024    levalbuterol (XOPENEX) inhalation solution 1.25 mg, 1.25 mg, Nebulization, TID, AMPARO Umaña, 1.25 mg at 03/19/25 2024    lidocaine (LIDODERM) 5 % patch 3 patch, 3 patch, Topical, Daily, Yobany Mott MD, 3 patch at 03/19/25 0810    [Held by provider] metoprolol tartrate (LOPRESSOR) tablet 25 mg, 25 mg, Oral, Q12H GALE, Yobany Mott MD, 25 mg at 03/14/25 0848    moisture barrier miconazole 2% cream (aka HITESH MOISTURE BARRIER ANTIFUNGAL CREAM), , Topical, BID, Yobany Mott MD, Given at 03/19/25 1709    [Held by provider] oxyCODONE (ROXICODONE) IR tablet 5 mg, 5 mg, Oral, Q6H PRN, Yobany Mott MD, 5 mg at 03/01/25 1817    [Held by provider] oxyCODONE (ROXICODONE) split tablet 2.5 mg, 2.5 mg, Oral, Q6H PRN, Yobnay Mott MD, 2.5 mg at 03/05/25 0945    pantoprazole (PROTONIX) injection 40 mg, 40 mg, Intravenous, Q12H GALE, Yobany Mott MD, 40 mg at 03/19/25 2135    polyethylene glycol (MIRALAX) packet 17 g, 17 g, Oral, Daily, AMPARO Calvert, 17 g at 03/19/25 0820    [Held by provider] potassium chloride (Klor-Con M20) CR tablet 20 mEq, 20 mEq, Oral, Daily, Yobany Mott MD    senna oral syrup 17.6 mg, 17.6 mg, Oral, HS, AMPARO Calvert, 17.6 mg at 03/19/25 2136    [Held by provider] tamsulosin (FLOMAX) capsule 0.4 mg, 0.4 mg, Oral, Daily With Dinner, Yobany Mott MD, 0.4 mg at 03/13/25 1626    [Held by provider] torsemide (DEMADEX) tablet 20 mg, 20 mg, Oral, Daily, Yobany Mott MD, 20 mg at 03/14/25 0848    [Held by provider] umeclidinium-vilanterol 62.5-25 mcg/actuation  inhaler 1 puff, 1 puff, Inhalation, Daily, Yobany Mott MD, 1 puff at 03/15/25 0925    Laboratory Results:  Results from last 7 days   Lab Units 03/20/25  0436 03/19/25  2322 03/19/25  1704 03/19/25  1502 03/19/25  0921 03/19/25  0451 03/19/25  0326 03/18/25  2142 03/18/25  1719 03/18/25  1618 03/18/25  1048 03/18/25  0825 03/18/25  0453 03/16/25  1947/25  1533 03/16/25  1327 03/16/25  1215 03/16/25  0925 03/16/25  0440 03/15/25  1654 03/15/25  1442 03/15/25  1345 03/15/25  1235 03/15/25  1154 03/15/25  1045 03/15/25  1042   WBC Thousand/uL 8.75  --  8.30  --   --  6.69  --   --  8.29  --   --   --  10.78*  --  13.31*  --   --   --  14.14*   < >  --   --    < >  --    < >  --    HEMOGLOBIN g/dL 9.5*  --  10.1* 10.3*  --  9.7*  --  10.3* 10.3*  --   --  10.4* 10.6*   < > 12.9  --   --    < > 13.3  13.3   < >  --   --    < >  --    < >  --    I STAT HEMOGLOBIN g/dl  --   --   --   --   --   --   --   --   --   --   --   --   --   --   --  10.9* 10.2*  --   --   --  10.9* 11.6*  --   --   --  5.4*   HEMATOCRIT % 29.8*  --  33.0* 33.3*  --  29.9*  --  32.4* 32.5*  --   --  33.4* 32.4*   < > 38.5  --   --    < > 38.7  38.4   < >  --   --    < >  --    < >  --    HEMATOCRIT, ISTAT %  --   --   --   --   --   --   --   --   --   --   --   --   --   --   --  32* 30*  --   --   --  32* 34*  --  <15*  --  16*   PLATELETS Thousands/uL 99*  --  110*  --   --  74*  --   --  86*  --   --   --  90*  --  104*  --   --   --  103*   < >  --   --    < >  --    < >  --    SODIUM mmol/L 141 141 141  --  141  --  143 142  --  144   < >  --  145   < > 148*  --   --   --  148*   < >  --   --    < >  --    < >  --    POTASSIUM mmol/L 3.9 4.0 3.8  --  3.8  --  3.9 3.9  --  3.4*   < >  --  3.8   < > 4.4  --   --   --  4.2   < >  --   --    < >  --    < >  --    CHLORIDE mmol/L 107 107 109*  --  109*  --  109* 110*  --  111*   < >  --  111*   < > 116*  --   --   --  113*   < >  --   --    < >  --    < >  --    CO2 mmol/L 23 22 23   --  24  --  26 26  --  26   < >  --  27   < > 25  --   --   --  27   < >  --   --    < >  --    < >  --    CO2, I-STAT mmol/L  --   --   --   --   --   --   --   --   --   --   --   --   --   --   --  29 27  --   --   --  24 23  --  28  --  21   BUN mg/dL 96* 98* 90*  --  84*  --  78* 74*  --  71*   < >  --  63*   < > 48*  --   --   --  48*   < >  --   --    < >  --    < >  --    CREATININE mg/dL 3.91* 4.08* 3.73*  --  3.59*  --  3.35* 3.33*  --  3.24*   < >  --  2.89*   < > 2.14*  --   --   --  1.85*   < >  --   --    < >  --    < >  --    CALCIUM mg/dL 7.3* 7.6* 7.4*  --  7.5*  --  7.5* 7.7*  --  7.6*   < >  --  7.8*   < > 8.1*  --   --   --  8.1*   < >  --   --    < >  --    < >  --    MAGNESIUM mg/dL 2.1 2.3 2.2  --  2.2  --  2.2 2.6  --  1.9   < >  --  1.9   < > 1.9  --   --   --  2.0   < >  --   --   --   --    < >  --    PHOSPHORUS mg/dL 5.0* 4.9* 4.7*  --  4.6*  --  4.5* 4.7*  --   --   --   --  4.0   < > 4.1  --   --   --  3.2   < >  --   --   --   --   --   --    GLUCOSE, ISTAT mg/dl  --   --   --   --   --   --   --   --   --   --   --   --   --   --   --  137 124  --   --   --  181* 185*  --  159*  --  314*    < > = values in this interval not displayed.       I have personally reviewed the blood work as stated above and in my note.  I have personally reviewed internal Medicine, co-consultants and previous nephrology notes.

## 2025-03-20 NOTE — ASSESSMENT & PLAN NOTE
Previously on Metoprolol for rate control, currently on hold  On Eliquis for AC prehospital  On heparin drip for embolic infarcts

## 2025-03-20 NOTE — ASSESSMENT & PLAN NOTE
Concern for anoxic brain injury s/p cardiac arrest with new embolic infarcts  CTH with 4 mm focus of high attenuation right frontal lobe without evidence of anoxic brain injury, stable chronic ischemic changes.  vEEG monitoring with no evidence of seizure  Brain MRI 3/18/2025: New nearly symmetric cortical restricted diffusion in bilateral frontal, bilateral parietal and bilateral occipital lobes suspicious for hypoxic ischemic encephalopathy.  New scattered small acute infarcts in left posterior frontal and bilateral cerebellar lobes favoring embolic infarcts.  Repeat CTH 3/19 stable without hemorrhage  On heparin drip  Neurology following  Management per neurology and primary team

## 2025-03-20 NOTE — ASSESSMENT & PLAN NOTE
-Continue disease directed cares with limits of DNAR.  -Ultimate goal of patient prior to mechanical ventilation was to continue living so that he can come face-to-face with his new granddaughter about to be born within the next several days to weeks  -Family okay for LTAC placement despite lower likelihood of recovery

## 2025-03-20 NOTE — ASSESSMENT & PLAN NOTE
In setting of hemorrhagic shock    In efforts done with achievement of ROSC    Critical care team will likely do MRI brain to evaluate for anoxic brain injury--> hypoxic ischemic encephalopathy and new scattered small acute infarcts left posterior frontal and bilateral cerebellar lobes, likely embolic  Follow-up on video EEG--> no evidence of seizure activity

## 2025-03-20 NOTE — ASSESSMENT & PLAN NOTE
Neurology following    MRI Brain [03/18/2025] new nearly symmetric cortical restricted diffusion in bilateral frontal, bilateral parietal, and bilateral occipital lobes, suspicious for HIE given history of recent cardiac arrest; new scattered small acute infarcts in left posterior frontal and bilateral cerebellar lobes, favor embolic infarct.

## 2025-03-20 NOTE — PROGRESS NOTES
"Progress Note - Palliative Care   Name: Devin Chaves 79 y.o. male I MRN: [id number]  Unit/Bed#: ICU 10 I Date of Admission: 2/28/2025   Date of Service: 3/20/2025 I Hospital Day: 20    Assessment & Plan  Counseling regarding advance care planning and goals of care   - met with spouse at bedside   - discussed medical update meeting with family last night, all questions/concerns addressed   - plan to remain treatment focused care with no limitations at this time   - update after discussion with CCM team today, updated Code Status to DNAR/DNI, Level 2   - will continue to follow for continued supportive cares as the patient's clinical condition evolves  Palliative care by specialist  Palliative diagnosis: Colon adenocarcinoma, colon mass, cardiac arrest, respiratory failure  PPS: 10%    Education/counseling provided on role/purpose of palliative care; benefits/risks of treatment options by our team reviewed; instructions for management and anticipatory guidance provided; supportive listening and presence provided; provided space for life review and whole person assessment    Psychosocial/Spiritual:    - supported by spouse Francia (\"Ashvin\"),  56 years; they live near [place]   - Also two sons Nicole   - 4 yrs in [employer]   - Enjoys working on farm with cows and [employer]   - Christianity kaylee-->family would like extra spiritual support-->spiritual care consult placed    Communicated and coordinated with surgical CC team and RN    #ACP Documentation   - completed Durable Power of  for Health Care, signed/notarized 03/06/2025                      Code Status: Full - Level 1               Decisional apparatus:  Patient is not competent on my exam today.  If competence is lost, patient's substitute decision maker would default to spouse Francia (first), son Devin (alternate)               Advance Directive / Living Will / POLST:  POA document on file  Cardiac arrest due to other " underlying condition (HCC)  In setting of hemorrhagic shock    In efforts done with achievement of ROSC    Critical care team will likely do MRI brain to evaluate for anoxic brain injury--> will follow-up  Follow-up on video EEG--> no evidence of seizure activity  Hemorrhagic shock (HCC)  Code Crimson 3/15 for hemorrhagic shock related to mesenteric hematoma s/p small bowel resection and colon resection    Now off vasopressor medication  MSSA bacteremia  From previous admission in February from skin/soft tissue source  On cefazolin through 3/27/25  Acute blood loss anemia  In setting of mesenteric bleed and hematoma  S/p small bowel resection and right hemicolectomy  Colonic mass  2/20/2025 EGD/colonoscopy revealed 2.5 cm ileocecal mass    Pathology confirmed: Adenocarcinoma    Mass has not been removed according to surgical notes  Embolic cerebral infarction (HCC)  Neurology following    MRI Brain [03/18/2025] new nearly symmetric cortical restricted diffusion in bilateral frontal, bilateral parietal, and bilateral occipital lobes, suspicious for HIE given history of recent cardiac arrest; new scattered small acute infarcts in left posterior frontal and bilateral cerebellar lobes, favor embolic infarct.      PDMP Review: I have reviewed the patient's controlled substance dispensing history in the Prescription Drug Monitoring Program in compliance with the [facility] regulations before prescribing any controlled substances.    Subjective   Remains intubated. NAEO.    Objective :  Temp:  [97.5 °F (36.4 °C)-100.2 °F (37.9 °C)] 98.4 °F (36.9 °C)  HR:  [104-130] 109  BP: ()/(39-67) 132/60  Resp:  [9-60] 38  SpO2:  [88 %-100 %] 100 %  O2 Device: Ventilator  FiO2 (%):  [40] 40    Physical Exam  Vitals and nursing note reviewed.   Constitutional:       Appearance: He is not diaphoretic.      Interventions: He is intubated.      Comments: Critically ill appearing in NAD  BMI 30.1   HENT:      Head: Normocephalic and  atraumatic.      Right Ear: External ear normal.      Left Ear: External ear normal.   Eyes:      Comments: Eyes remained closed throughout examination   Cardiovascular:      Rate and Rhythm: Tachycardia present.   Pulmonary:      Effort: He is intubated.   Genitourinary:     Comments: Tang in place  Skin:     Coloration: Skin is pale.            Lab Results: I have reviewed the following results:  Lab Results   Component Value Date/Time    SODIUM 141 03/20/2025 04:36 AM    SODIUM 138 09/25/2022 07:03 AM    K 3.9 03/20/2025 04:36 AM    K 4.1 09/25/2022 07:03 AM    BUN 96 (H) 03/20/2025 04:36 AM    BUN 21 (H) 09/25/2022 07:03 AM    BUN 22 02/06/2022 06:40 AM    CREATININE 3.91 (H) 03/20/2025 04:36 AM    CREATININE 1 09/25/2022 07:03 AM    GLUC 181 (H) 03/20/2025 04:36 AM    GLUC 104 09/25/2022 07:03 AM    CALCIUM 7.3 (L) 03/20/2025 04:36 AM    CALCIUM 9.1 09/25/2022 07:03 AM    AST 20 03/19/2025 04:51 AM    AST 26 09/24/2022 05:34 PM    ALT <3 (L) 03/19/2025 04:51 AM    ALT 22 09/24/2022 05:34 PM    ALB 2.1 (L) 03/19/2025 04:51 AM    ALB 4.3 09/24/2022 05:34 PM    TP 4.2 (L) 03/19/2025 04:51 AM    TP 6.9 09/24/2022 05:34 PM    EGFR 13 03/20/2025 04:36 AM    EGFR 82 09/25/2022 07:03 AM    EGFR 55 (L) 08/17/2020 08:05 AM     Lab Results   Component Value Date/Time    HGB 9.5 (L) 03/20/2025 04:36 AM    WBC 8.75 03/20/2025 04:36 AM    PLT 99 (L) 03/20/2025 04:36 AM    INR 1.19 03/18/2025 05:19 PM    INR 1.0 02/05/2022 01:13 PM    PTT 69 (H) 03/20/2025 04:36 AM     Lab Results   Component Value Date/Time    [id number] 3.522 12/12/2023 04:43 AM       Code Status: Level 1 - Full Code  Advance Directive and Living Will: Yes    Power of : Yes  POLST:      Administrative Statements   I have spent a total time of 30 minutes in caring for this patient on the day of the visit/encounter including patient and family education, counseling/coordination of care, documenting in the medical record, reviewing  tests/medicines/procedure, and communicating with other healthcare professionals. Topics discussed with the patient/family include psychosocial support, goals of care, supportive listening, and anticipatory guidance.

## 2025-03-20 NOTE — ASSESSMENT & PLAN NOTE
Patient presents with 2 days of chest pain, shortness of breath, fatigue and melena  2/20/2025 EGD/colonoscopy showed 2.5 cm ileocecal mass, pathology confirmed adenocarcinoma.  S/p 3 units packed rbc in ED. S/p 1 unit PRBCs on 3/2  Hemoglobin has been stable.   Patient recent diagnosis of adenocarcinoma, increased risk of stroke.   Updated iron studies 3/2025 improved compared to 2/2025  Rapid response called 10:30A M [id number] for AMS and hypotension  Patient found to be minimally responsive and hypotensive  HGB 5.9 by iSTAT, formal labs show HGB 6.3, significant drop from 8 in AM  Bleeding from colonic mass suspected, per nursing no hematuria hemoptysis melena or other signs of overt bleeding prior to RR  Patient intubated for airway protection  Transported to ICU  MTP called  Code blue called  ROSC achieved shortly after blood products started  Rapidly distending abdomen, suspect intraabdominal bleeding     Hemoglobin   Date Value Ref Range Status   03/20/2025 9.5 (L) 12.0 - 17.0 g/dL Final        Plan:  Daily CBC  Transfuse for Hgb <7  Protonix 40 mg IV daily  Hold home eliquis, off for 48 hours at time of rapid response  Heparin gtt ordered but not started prior to rapid  Palliative care consulted prior to ICU admission  MTP [id number]  PRBC 12  FFP 3  Platelets 1  Cryo 0  Whole blood 3  Consider high volume bleeding scan  Surgery consult for suspected internal bleeding  Per surgery take to OR w/o scan

## 2025-03-20 NOTE — PLAN OF CARE
Problem: Prexisting or High Potential for Compromised Skin Integrity  Goal: Skin integrity is maintained or improved  Description: INTERVENTIONS:  - Identify patients at risk for skin breakdown  - Assess and monitor skin integrity  - Assess and monitor nutrition and hydration status  - Monitor labs   - Assess for incontinence   - Turn and reposition patient  - Assist with mobility/ambulation  - Relieve pressure over bony prominences  - Avoid friction and shearing  - Provide appropriate hygiene as needed including keeping skin clean and dry  - Evaluate need for skin moisturizer/barrier cream  - Collaborate with interdisciplinary team   - Patient/family teaching  - Consider wound care consult   Outcome: Progressing     Problem: Potential for Falls  Goal: Patient will remain free of falls  Description: INTERVENTIONS:  - Educate patient/family on patient safety including physical limitations  - Instruct patient to call for assistance with activity   - Consult OT/PT to assist with strengthening/mobility   - Keep Call bell within reach  - Keep bed low and locked with side rails adjusted as appropriate  - Keep care items and personal belongings within reach  - Initiate and maintain comfort rounds  - Make Fall Risk Sign visible to staff  - Offer Toileting every 2 Hours, in advance of need  - Initiate/Maintain bed alarm  - Obtain necessary fall risk management equipment:   - Apply yellow socks and bracelet for high fall risk patients  - Consider moving patient to room near nurses station  Outcome: Progressing     Problem: Nutrition/Hydration-ADULT  Goal: Nutrient/Hydration intake appropriate for improving, restoring or maintaining nutritional needs  Description: Monitor and assess patient's nutrition/hydration status for malnutrition. Collaborate with interdisciplinary team and initiate plan and interventions as ordered.  Monitor patient's weight and dietary intake as ordered or per policy. Utilize nutrition screening tool  and intervene as necessary. Determine patient's food preferences and provide high-protein, high-caloric foods as appropriate.     INTERVENTIONS:  - Monitor oral intake, urinary output, labs, and treatment plans  - Assess nutrition and hydration status and recommend course of action  - Evaluate amount of meals eaten  - Assist patient with eating if necessary   - Allow adequate time for meals  - Recommend/ encourage appropriate diets, oral nutritional supplements, and vitamin/mineral supplements  - Order, calculate, and assess calorie counts as needed  - Recommend, monitor, and adjust tube feedings and TPN/PPN based on assessed needs  - Assess need for intravenous fluids  - Provide specific nutrition/hydration education as appropriate  - Include patient/family/caregiver in decisions related to nutrition  Outcome: Progressing     Problem: GASTROINTESTINAL - ADULT  Goal: Minimal or absence of nausea and/or vomiting  Description: INTERVENTIONS:  - Administer IV fluids if ordered to ensure adequate hydration  - Maintain NPO status until nausea and vomiting are resolved  - Nasogastric tube if ordered  - Administer ordered antiemetic medications as needed  - Provide nonpharmacologic comfort measures as appropriate  - Advance diet as tolerated, if ordered  - Consider nutrition services referral to assist patient with adequate nutrition and appropriate food choices  Outcome: Progressing  Goal: Maintains or returns to baseline bowel function  Description: INTERVENTIONS:  - Assess bowel function  - Encourage oral fluids to ensure adequate hydration  - Administer IV fluids if ordered to ensure adequate hydration  - Administer ordered medications as needed  - Encourage mobilization and activity  - Consider nutritional services referral to assist patient with adequate nutrition and appropriate food choices  Outcome: Progressing  Goal: Maintains adequate nutritional intake  Description: INTERVENTIONS:  - Monitor percentage of each  meal consumed  - Identify factors contributing to decreased intake, treat as appropriate  - Assist with meals as needed  - Monitor I&O, weight, and lab values if indicated  - Obtain nutrition services referral as needed  Outcome: Progressing  Goal: Establish and maintain optimal ostomy function  Description: INTERVENTIONS:  - Assess bowel function  - Encourage oral fluids to ensure adequate hydration  - Administer IV fluids if ordered to ensure adequate hydration   - Administer ordered medications as needed  - Encourage mobilization and activity  - Nutrition services referral to assist patient with appropriate food choices  - Assess stoma site  - Consider wound care consult   Outcome: Progressing  Goal: Oral mucous membranes remain intact  Description: INTERVENTIONS  - Assess oral mucosa and hygiene practices  - Implement preventative oral hygiene regimen  - Implement oral medicated treatments as ordered  - Initiate Nutrition services referral as needed  Outcome: Progressing     Problem: HEMATOLOGIC - ADULT  Goal: Maintains hematologic stability  Description: INTERVENTIONS  - Assess for signs and symptoms of bleeding or hemorrhage  - Monitor labs  - Administer supportive blood products/factors as ordered and appropriate  Outcome: Progressing     Problem: SAFETY,RESTRAINT: NV/NON-SELF DESTRUCTIVE BEHAVIOR  Goal: Remains free of harm/injury (restraint for non violent/non self-detsructive behavior)  Description: INTERVENTIONS:  - Instruct patient/family regarding restraint use   - Assess and monitor physiologic and psychological status   - Provide interventions and comfort measures to meet assessed patient needs   - Identify and implement measures to help patient regain control  - Assess readiness for release of restraint   Outcome: Progressing  Goal: Returns to optimal restraint-free functioning  Description: INTERVENTIONS:  - Assess the patient's behavior and symptoms that indicate continued need for restraint  -  Identify and implement measures to help patient regain control  - Assess readiness for release of restraint   Outcome: Progressing

## 2025-03-20 NOTE — ASSESSMENT & PLAN NOTE
Neurology following    MRI Brain [03/18/2025] new nearly symmetric cortical restricted diffusion in bilateral frontal, bilateral parietal, and bilateral occipital lobes, suspicious for HIE given history of recent cardiac arrest; new scattered small acute infarcts in left posterior frontal and bilateral cerebellar lobes, favor embolic infarct.    Plan is to restart heparin gtt

## 2025-03-20 NOTE — PROGRESS NOTES
Progress Note - Critical Care/ICU   Name: Devin Chaves 79 y.o. male I MRN: [id number]  Unit/Bed#: ICU 10 I Date of Admission: 2/28/2025   Date of Service: 3/20/2025 I Hospital Day: 20      Assessment & Plan  Hemorrhagic shock (HCC)  Code crimson 3/15 for hemorrhagic shock related to mesenteric hematoma s/p SBR and colon resection   S/p blood resuscitation with 12 U PRBC, 6 FFP, 2 Plt, 2 Cryo   Now off pressors and HD stable   Trend Hgb, transfuse for Hgb<7 or active bleeding with HD instability   Maintain MAP>65  Cardiac arrest (HCC)  Likely in the setting of hemorrhagic shock  Noted 12 minutes of downtime, received aggressive blood resuscitation and surgical intervention as above   Now with ongoing encephalopathy concerning for anoxic injury   Continue close HD and telemetry monitoring   Trend Hgb as above   Encephalopathy  Concern for anoxic injury s/p cardiac arrest with 12 minute downtime   EEG without seizure activity   CTH shows 4mm focus of high attenuation in right frontal lobe which is stable on repeat CTH   MRI with multiple areas of hypoxia related injury as well as acute embolic strokes   Hold all sedation, PRN dilaudid for pain management   Frequent neuro checks   Maintain normothermia and normoglycemia   Neurology following, appreciate recommendations   Embolic cerebral infarction (HCC)  New areas of embolic stroke noted on MRI 3/18  Neurology following, appreciate recommendations  Continue ischemic stroke heparin gtt  Consider restarting statin   Frequent neuro checks    Repeat CTH after ptt therapeutic x 2   Acute on chronic respiratory failure with hypoxia (HCC)  Remains intubated for airway protection post cardiac arrest and postoperatively with poor neuro exam   Continue mechanical ventilation ACVC 12/470/40/6, titrate FiO2 for SpO2>88  At baseline requires 2-4L NC   Monitor off of sedation, PRN dilaudid for pain management   Daily SBT   Vent bundle   ORIANA (acute kidney injury)  ([facility])  Creatinine   Date Value Ref Range Status   03/20/2025 3.91 (H) 0.60 - 1.30 mg/dL Final     Comment:     Standardized to IDMS reference method   09/25/2022 1 0.6 - 1.2 mg/dL Final      Likely initially in the setting of hemorrhagic shock, now with ATN   Baseline Cr 0.8   Nephrology following, appreciate recommendations  Continue bumex infusion, could consider dose of metolazone as well for goal negative 500ml-1L over next 24 hours   Monitor and replete electrolytes Q6H  Monitor I/O and renal indices   HTN (hypertension)  Home regimen: Metoprolol tartrate 25 mg BID, Losartan 25 mg QD for HTN  Consider restarting home metoprolol   Hold home losartan in the setting of ORIANA   CAD (coronary artery disease)  With known LAD stenosis   Continue home BB and statin   Hold ASA, restart as appropriate   COPD (chronic obstructive pulmonary disease) ([facility])  Without acute exacerbation  Hold home inhalers while intubated  Continue scheduled xopenex, atrovent, budesonide nebs   History of CVA (cerebrovascular accident)  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   Now with new acute embolic strokes noted on MRI   Consider restarting home statin   Continue ischemic stroke heparin gtt   Hold ASA, restart as appropriate   Atrial fibrillation ([facility])  Currently rate controlled in NSR   Consider restarting home metoprolol for rate control   Hold home eliquis, currently on ischemic stroke heparin protocol   Urinary retention  Hold home flomax while NPO  Continue glass catheter for now   MSSA bacteremia  Noted on previous admission with likely cutaneous source   Continue cefazolin through 3/27 to complete course   ID following, appreciate recommendations   Neck pain  MRI of the neck done 2/14/2025 revealed cervical degenerative change with mild canal stenosis and mild to moderate foraminal narrowing.  No cord compression.  Mild nonspecific edema within the right posterior breast dermal musculature of the upper cervicals  spine.  Minimal peripheral enhancement is noted  Will need repeat MRI C spine with and without contrast prior to completion of abx - currently holding on this at this time due to ORIANA   Colonic mass  2/20/2025 EGD/colonoscopy showed 2.5 cm ileocecal mass, pathology confirmed adenocarcinoma  Now s/p ex lap SBR and R colon resection  Surgery following, appreciate recommendations   Pleural effusion, bilateral  CT A/P shows bilateral pleural effusions L>R, pulmonary vascular congestion     Plan:  Repeat imaging if pt's respiratory status worsens  Patient intubated  Dysphagia  Noted prior to intubation and cardiac arrest   Will need re-evaluation when able to take PO   Severe protein-calorie malnutrition (HCC)  Malnutrition Findings:   Adult Malnutrition type: Acute illness  Adult Degree of Malnutrition: Other severe protein calorie malnutrition  Malnutrition Characteristics: Inadequate energy, Fluid accumulation  360 Statement: related to inadequate energy/protein intake as evidenced by consuming < 50% of energy intake compared to estimated needs for > 5 days and B/L LE +3 edema. Treated with ONS.  BMI Findings:  Body mass index is 25.09 kg/m².      Plan:  Continue TPN and trickle TF per surgery     360 Statement: related to inadequate energy/protein intake as evidenced by consuming < 50% of energy intake compared to estimated needs for > 5 days and B/L LE +3 edema. Treated with ONS.    BMI Findings:    Body mass index is 30.08 kg/m².   Acute blood loss anemia  Patient presents with 2 days of chest pain, shortness of breath, fatigue and melena  2/20/2025 EGD/colonoscopy showed 2.5 cm ileocecal mass, pathology confirmed adenocarcinoma.  S/p 3 units packed rbc in ED. S/p 1 unit PRBCs on 3/2  Hemoglobin has been stable.   Patient recent diagnosis of adenocarcinoma, increased risk of stroke.   Updated iron studies 3/2025 improved compared to 2/2025  Rapid response called 10:30A M [id number] for AMS and hypotension  Patient  found to be minimally responsive and hypotensive  HGB 5.9 by iSTAT, formal labs show HGB 6.3, significant drop from 8 in AM  Bleeding from colonic mass suspected, per nursing no hematuria hemoptysis melena or other signs of overt bleeding prior to RR  Patient intubated for airway protection  Transported to ICU  MTP called  Code blue called  ROSC achieved shortly after blood products started  Rapidly distending abdomen, suspect intraabdominal bleeding     Hemoglobin   Date Value Ref Range Status   03/20/2025 9.5 (L) 12.0 - 17.0 g/dL Final        Plan:  Daily CBC  Transfuse for Hgb <7  Protonix 40 mg IV daily  Hold home eliquis, off for 48 hours at time of rapid response  Heparin gtt ordered but not started prior to rapid  Palliative care consulted prior to ICU admission  MTP [id number]  PRBC 12  FFP 3  Platelets 1  Cryo 0  Whole blood 3  Consider high volume bleeding scan  Surgery consult for suspected internal bleeding  Per surgery take to OR w/o scan  Elevated troponin  Last trops downtrending  No EKG changes  ECHO showed EF 55%  Continue cardiac monitoring  EKG  Hallucinations, visual  Per both patient and patient's wife, patient has been experiencing visual hallucinations that usually occur prior to falling asleep or waking up.  Patient reports that he will occasionally grab for things that are not there, such as a pillow.    Also states that he will occasionally have conversations with his wife when she is not physically in the room.    Patient states he is able to discern when he is having a hallucination and they are not distressing      PLAN:  delirium precautions  Hemoperitoneum  Emergent OR yesterday for distended abd post CPR  Found to have hemoperitoneum with hematoma at base of jejunum with active bleed, which was controlled  OR 3/17, stable, small bowel and L hemicolon resected, abd closed    Plan:  Continue to hold AC  Cardiac arrest due to other underlying condition (HCC)    Palliative care by  specialist    Counseling regarding advance care planning and goals of care    Volume overload    Disposition: Critical care    ICU Core Measures     Vented Patient  VAP Bundle  VAP bundle ordered     A: Assess, Prevent, and Manage Pain Has pain been assessed? Yes  Need for changes to pain regimen? Yes   B: Both Spontaneous Awakening Trials (SATs) and Spontaneous Breathing Trials (SBTs) Plan to perform spontaneous awakening trial today? Yes   Plan to perform spontaneous breathing trial today? Yes   Obvious barriers to extubation? Yes   C: Choice of Sedation RASS Goal: -1 Drowsy or 0 Alert and Calm  Need for changes to sedation or analgesia regimen? Yes   D: Delirium CAM-ICU: Negative   E: Early Mobility  Plan for early mobility? Yes   F: Family Engagement Plan for family engagement today? Yes       Antibiotic Review: continue Ancef for MSSA bacteremia per ID    Review of Invasive Devices:    Tang Plan: Continue for accurate I/O monitoring for 48 hours  Central access plan: Hemodynamic monitoring      Prophylaxis:  VTE VTE covered by:  heparin (porcine), Intravenous, 6 Units/kg/hr at 03/20/25 0001       Stress Ulcer  covered byfamotidine (PEPCID) 20 mg tablet [[id number]] (Long-Term Med), pantoprazole (PROTONIX) injection 40 mg [[id number]]         24 Hour Events : feculent NGT output overnight, ct abd/pelvis- showed ileus w/o transition point  Subjective   Review of Systems: See HPI for Review of Systems    Objective :                   Vitals I/O      Most Recent Min/Max in 24hrs   Temp 100.2 °F (37.9 °C) Temp  Min: 97.5 °F (36.4 °C)  Max: 100.2 °F (37.9 °C)   Pulse (!) 106 Pulse  Min: 104  Max: 130   Resp (!) 31 Resp  Min: 9  Max: 60   BP 95/53 BP  Min: 61/40  Max: 143/67   O2 Sat 99 % SpO2  Min: 88 %  Max: 100 %      Intake/Output Summary (Last 24 hours) at 3/20/2025 0659  Last data filed at 3/20/2025 0601  Gross per 24 hour   Intake 3941.5 ml   Output 3725 ml   Net 216.5 ml       Diet Enteral/Parenteral; Tube  Feeding No Oral Diet; Jevity 1.2 Jose; Continuous; 20  Adult 3-in-1 TPN (custom base / custom electrolytes)    Invasive Monitoring           Physical Exam   Physical Exam  Vitals and nursing note reviewed.   Eyes:      Comments: Pupils 2 mm, sluggishly reactive   Skin:     General: Skin is warm and dry.      Capillary Refill: Capillary refill takes less than 2 seconds.   HENT:      Head: Normocephalic. No laceration.      Right Ear: No drainage.      Left Ear: No drainage.      Nose: Nasogastric tube present. No epistaxis.      Mouth/Throat:      Mouth: Mucous membranes are dry.   Cardiovascular:      Rate and Rhythm: Normal rate and regular rhythm.      Pulses: Normal pulses.   Musculoskeletal:      Right lower leg: 3+ Edema present.      Left lower le+ Edema present.   Abdominal:      Palpations: Abdomen is soft.      Comments: Midline surgical incision   Constitutional:       General: He is not in acute distress.     Interventions: He is sedated and intubated.   Pulmonary:      Effort: He is intubated.      Comments: Ventilated breath sounds, coarse breath sounds on R > L  Neurological:        Corneal reflex present, cough reflex and gag reflex intact.      Comments: Intubated   Genitourinary/Anorectal:  Tang present.        Diagnostic Studies        Lab Results: I have reviewed the following results:     Medications:  Scheduled PRN   [Held by provider] ascorbic acid, 250 mg, Daily  [Held by provider] atorvastatin, 40 mg, Daily With Dinner  budesonide, 0.5 mg, Q12H  ceFAZolin, 2,000 mg, Q8H  chlorhexidine, 15 mL, Q12H GALE  [Held by provider] Cholecalciferol, 2,000 Units, Daily  [Held by provider] cyanocobalamin, 100 mcg, Daily  docusate, 100 mg, BID  [Held by provider] ferrous sulfate, 325 mg, Daily With Breakfast  [Held by provider] fluticasone, 1 puff, Daily  [Held by provider] folic acid, 1 mg, Daily  [Held by provider] hydroxychloroquine, 400 mg, Daily With Breakfast  insulin lispro, 2-12 Units, Q6H  GALE  ipratropium, 0.5 mg, TID  levalbuterol, 1.25 mg, TID  lidocaine, 3 patch, Daily  [Held by provider] metoprolol tartrate, 25 mg, Q12H GALE  HITESH ANTIFUNGAL, , BID  pantoprazole, 40 mg, Q12H GALE  polyethylene glycol, 17 g, Daily  [Held by provider] potassium chloride, 20 mEq, Daily  senna, 17.6 mg, HS  [Held by provider] tamsulosin, 0.4 mg, Daily With Dinner  [Held by provider] torsemide, 20 mg, Daily  [Held by provider] umeclidinium-vilanterol, 1 puff, Daily      HYDROmorphone, 0.2 mg, Q4H PRN  [Held by provider] oxyCODONE, 5 mg, Q6H PRN  [Held by provider] oxyCODONE, 2.5 mg, Q6H PRN       Continuous    Adult 3-in-1 TPN (custom base / custom electrolytes), , Last Rate: 87.7 mL/hr at 03/19/25 2145  bumetanide (BUMEX) 12.5 mg infusion 50 mL, 2 mg/hr, Last Rate: Stopped (03/20/25 0115)  heparin (porcine), 3-24 Units/kg/hr (Order-Specific), Last Rate: 6 Units/kg/hr (03/20/25 0001)         Labs:   CBC    Recent Labs     03/19/25  0451 03/19/25  1502 03/19/25  1704 03/20/25  0436   WBC 6.69  --  8.30 8.75   HGB 9.7*   < > 10.1* 9.5*   HCT 29.9*   < > 33.0* 29.8*   PLT 74*  --  110* 99*   BANDSPCT 2  --   --   --     < > = values in this interval not displayed.     BMP    Recent Labs     03/19/25 2322 03/20/25  0436   SODIUM 141 141   K 4.0 3.9    107   CO2 22 23   AGAP 12 11   BUN 98* 96*   CREATININE 4.08* 3.91*   CALCIUM 7.6* 7.3*       Coags    Recent Labs     03/18/25  1719 03/18/25  2142 03/19/25  2322 03/20/25  0436   INR 1.19  --   --   --    PTT 44*   < > 61* 69*    < > = values in this interval not displayed.        Additional Electrolytes  Recent Labs     03/19/25  2322 03/20/25  0436   MG 2.3 2.1   PHOS 4.9* 5.0*          Blood Gas    Recent Labs     03/18/25  0825   PHART 7.430   LQU0ZTD 35.7*   PO2ART 70.5*   SXZ9USF 23.2   BEART -0.7   SOURCE Femoral, Right     Recent Labs     03/18/25  0825 03/20/25  0037   PHVEN  --  7.302   FAP9YSX  --  44.0   PO2VEN  --  33.2*   OXQ0RIB  --  21.3*   BEVEN  --   -5.0   M2PTUHZ  --  59.0*   SOURCE Femoral, Right  --     LFTs  Recent Labs     03/19/25  0451   ALT <3*   AST 20   ALKPHOS 50   ALB 2.1*   TBILI 0.34       Infectious  No recent results  Glucose  Recent Labs     03/19/25  0921 03/19/25  1704 03/19/25  2322 03/20/25  0436   GLUC 158* 191* 184* 181*

## 2025-03-20 NOTE — PHYSICAL THERAPY NOTE
Physical Therapy Cancellation Note     03/20/25 1027   Note Type   Note Type Cancelled Session   Cancel Reasons Medical status   Assessment   Assessment attempted to see pt for PT intervention but Jenise WALLACE reports pt is not appropriate due to medical status. will follow and continue PT as medically appropriate and schedule allows.     Gonzalo Mcgraw, PT

## 2025-03-20 NOTE — TELEPHONE ENCOUNTER
Pt's wife called because their granddaughters need a note for their schools stating they were visiting the patient in the hospital. Dr. Hughes told her the office can send the letters. She will call back with fax number for the school

## 2025-03-20 NOTE — ASSESSMENT & PLAN NOTE
Was being diuresed before cardiac arrest on 3/15/25.   CTC today with moderate bilateral pleural effusions with dependent atelectasis, resolved pulmonary edema  Diuretics resumed 3/17/2025.  Now on Bumex 2 mg/hr drip

## 2025-03-20 NOTE — ASSESSMENT & PLAN NOTE
Patient remains obtunded postcardiac arrest.  Concern is for anoxic encephalopathy.  Head CT is without acute changes.  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  Monitor mental status.

## 2025-03-20 NOTE — ASSESSMENT & PLAN NOTE
BP hypotensive with SBP 68-90s since last PM as result of vomiting episode.  S/p CTC and CT A/P.  Volume status hypervolemic.  Wts continue to trend up  Echo 3/17/2025: EF 55%, unable to assess diastolic function  Home Rx: Metoprolol 25 mg twice daily  Previous Rx: Metoprolol 25 mg BID, Torsemide 20 mg OD.   Current Rx: none  Recommend resuming Bumex drip @ 2 mg/hr.  Held last PM due to hypotension  Consider pressors if persistent hypotension, management per critical care  Avoid hypotension or large fluctuations in BP  Continue to monitor

## 2025-03-20 NOTE — ASSESSMENT & PLAN NOTE
Patient developed acute neck pain with MSSA bacteremia during recent hospitalization.  CRP was highly elevated.  C-spine MRI showed possible C-spine and paraspinal infection.  Patient has no neurologic deficit.  His neck pain is finally improving.  However, given paraspinal infection on the initial C-spine MRI, we should repeat C-spine MRI with contrast when creatinine is improved to confirm resolution/improvement of paraspinal infection.  However, without any neurological deficit, it would be fine to postpone MRI until renal function is further improved, to decrease risk of contrast-induced ORIANA.  Patient should get MRI done prior to completion of IV antibiotic course below, hopefully sometime next.  Antibiotic plan as in below.  Monitor neck pain.  Recommend repeat C-spine MRI with and without contrast.  This can be postponed until ORIANA is improved, but should be done prior to completion of IV antibiotic course.

## 2025-03-20 NOTE — ASSESSMENT & PLAN NOTE
Brain MRI 3/18/2025: New nearly symmetric cortical restricted diffusion in bilateral frontal, bilateral parietal and bilateral occipital lobes suspicious for hypoxic ischemic encephalopathy.  New scattered small acute infarcts in left posterior frontal and bilateral cerebellar lobes favoring embolic infarcts  Now on heparin drip  repeat CTH 3/19/2025 with stable infarcts and no acute hemorrhage  Avoid hypotension  Neurology following  Management per neurology and primary team

## 2025-03-20 NOTE — PROCEDURES
Arterial Line Insertion    Date/Time: 3/20/2025 1:47 PM    Performed by: AMPARO Garcia  Authorized by: AMPARO Garcia    Patient location:  Bedside  Consent:     Consent obtained:  Verbal    Consent given by:  Spouse    Risks discussed:  Bleeding, ischemia, repeat procedure, pain and infection  Universal protocol:     Procedure explained and questions answered to patient or proxy's satisfaction: yes      Relevant documents present and verified: yes      Test results available and properly labeled: yes      Radiology Images displayed and confirmed.  If images not available, report reviewed: yes      Site/side marked: yes      Immediately prior to procedure a time out was called: yes      Patient identity confirmed:  Provided demographic data and hospital-assigned identification number  Indications:     Indications: hemodynamic monitoring, multiple ABGs and continuous blood pressure monitoring    Pre-procedure details:     Skin preparation:  Chlorhexidine    Preparation: Patient was prepped and draped in sterile fashion    Anesthesia (see MAR for exact dosages):     Anesthesia method:  None  Procedure details:     Location / Tip of Catheter:  Radial    Laterality:  Left    Naseem's test performed: yes      Needle gauge:  20 G    Placement technique:  Ultrasound guided    Ultrasound image availability:  Not saved    Sterile ultrasound techniques: Sterile gel and sterile probe covers were used      Number of attempts:  1    Successful placement: yes      Transducer: waveform confirmed    Post-procedure details:     Post-procedure:  Sutured    CMS:  Unable to assess    Patient tolerance of procedure:  Tolerated well, no immediate complications

## 2025-03-20 NOTE — ASSESSMENT & PLAN NOTE
Etiology: Suspect ATN in setting of prolonged prerenal azotemia from volume depletion and severe anemia  Baseline creatinine 0.8 - 0.9.   Admission SCr   SCr had remained around 1.8-2 since admission but then worsened on 3/17/2025 after cardiac arrest on 3/15/25 and emergent ex lap and then subsequent return to OR 3/16 for R hemicolectomy  Peak creatinine 4.08  on 3/19/2025, trending  Today's creatinine 3.91, appears plateaued for now  CT 2/28/25 - no hydronephrosis. UA on 3/8/25 with RBC 2-4, WBC 2-4, small blood and trace protein.   Of note, UPC ratio was 3.4 gm on 3/8/25 and urine ACR was only 821 mg on 3/5/25.   Serum and urine LIDYA no M spike, C3 83 (low), C4 26. At risk for AIN due to antibiotic exposure but no clear indication of this as well.    Renal function continues to worsen with ongoing fluid volume overload  High risk for need for initiation of renal replacement therapy if refractory volume overload  Recommend resuming bumex drip @ 2 mg/hr.  Held last PM due to hypotension in setting of vomiting  Consider additional dose of metolazone later today if BP stable once bumex drip resumed  Avoid nephrotoxins, NSAIDs, IV contrast if possible  Avoid hypotension, maintain MAP >65  Trend BMP  Dose all medications per EGFR  Palliative care following

## 2025-03-20 NOTE — ASSESSMENT & PLAN NOTE
Malnutrition Findings:   Adult Malnutrition type: Acute illness  Adult Degree of Malnutrition: Other severe protein calorie malnutrition  Malnutrition Characteristics: Inadequate energy, Fluid accumulation                  360 Statement: related to inadequate energy/protein intake as evidenced by consuming < 50% of energy intake compared to estimated needs for > 5 days and B/L LE +3 edema. Treated with ONS.    BMI Findings:           Body mass index is 30.08 kg/m².

## 2025-03-20 NOTE — ASSESSMENT & PLAN NOTE
Creatinine   Date Value Ref Range Status   03/20/2025 3.91 (H) 0.60 - 1.30 mg/dL Final     Comment:     Standardized to IDMS reference method   09/25/2022 1 0.6 - 1.2 mg/dL Final      Likely initially in the setting of hemorrhagic shock, now with ATN   Baseline Cr 0.8   Nephrology following, appreciate recommendations  Continue bumex infusion, could consider dose of metolazone as well for goal negative 500ml-1L over next 24 hours   Monitor and replete electrolytes Q6H  Monitor I/O and renal indices

## 2025-03-20 NOTE — ASSESSMENT & PLAN NOTE
Refractory volume overload  S/p intermittent IV lasix and IV bumex  Recommend resuming bumex drip 2 mg/hr  Consider additional dose of metolazone today  May need to consider hemodialysis for UF if refractory overload

## 2025-03-20 NOTE — UTILIZATION REVIEW
Continued Stay Review    Date: 3/7  and 3/14  and 3/20                   Current Patient Class: Inpatient  Current Level of Care: ICU    HPI:79 y.o. male initially admitted on  2/28  Current Diagnosis:hemorrhagic shock , cardiac arrest , encephalopathy     Assessment/Plan:     3/7 IM Note   notes mild improvement in shortness of breath this morning was saturating well on room air while baseline is 3 L nasal cannula. + 1pitting edema . Hgb 8.2 eliquis on hold , monitor H&H q12hr . CR stable 1.80 start torsemide . 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 Cont cefazolin .     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 Patient was deemed high risk for surgery due to pulmonary standpoint and pulmonary was obtained regarding advanced COPD on chronic oxygen requirement. Candidate for systemic chemotherapy with MSSA bacteremia receiving IV antibiotic therapy ,overall guarded prognosis. Monitor hemoglobin and transfuse as needed. CR inc to 2.07 from 1.97 . Cont toresmide . MSSA bacteremia cont cefazolin , repeat Cspine MRI w/ ORIANA resolves .     3/15 Rapid Response   Acute change in neuro status and change on BP . Given    Paralytics, fentanyl, etomidate and other (comment) (norepi, succinocholine) . Pt intubated for low GCS .Bps consistently low despite up-titrating levophed  Concern for bleeding of colonic mass/ massive hemorrhage. Transferred to ICU , central  line placement , manoj placed , adjust pressor support . High volume bleeding scan .      3/20 Critical Care Note   eculent NGT output overnight, ct abd/pelvis- showed ileus w/o transition point . Pt is intubated . 3/20 Surgery Note had some vomiting with tube feeds. These have been stopped and NG tube now placed back to suction. About 1700 of bilious material is out. Heparin gtt . Nephrology   Persistent volume overload with worsening renal function  Recommend resuming Bumex drip now that BP stabilizing.  Consider additional dose of metolazone 5 mg x 1 today.  No urgent indication for renal replacement therapy.     Medications:     Scheduled Medications:  [Held by provider] ascorbic acid, 250 mg, Oral, Daily  [Held by provider] atorvastatin, 40 mg, Oral, Daily With Dinner  budesonide, 0.5 mg, Nebulization, Q12H  ceFAZolin, 2,000 mg, Intravenous, Q8H  chlorhexidine, 15 mL, Mouth/Throat, Q12H GALE  [Held by provider] Cholecalciferol, 2,000 Units, Oral, Daily  [Held by provider] cyanocobalamin, 100 mcg, Oral, Daily  docusate, 100 mg, Oral, BID  [Held by provider] ferrous sulfate, 325 mg, Oral, Daily With Breakfast  [Held by provider] fluticasone, 1 puff, Inhalation, Daily  [Held by provider] folic acid, 1 mg, Oral, Daily  [Held by provider] hydroxychloroquine, 400 mg, Oral, Daily With Breakfast  insulin lispro, 2-12 Units, Subcutaneous, Q6H GALE  ipratropium, 0.5 mg, Nebulization, TID  levalbuterol, 1.25 mg, Nebulization, TID  lidocaine, 3 patch, Topical, Daily  [Held by provider] metoprolol tartrate, 25 mg, Oral, Q12H GALE  HITESH ANTIFUNGAL, , Topical, BID  pantoprazole, 40 mg, Intravenous, Q12H GALE  polyethylene glycol, 17 g, Oral, Daily  [Held by provider] potassium chloride, 20 mEq, Oral, Daily  senna, 17.6 mg, Oral, HS  [Held by provider] tamsulosin, 0.4 mg, Oral, Daily With Dinner  [Held by provider] torsemide, 20 mg, Oral, Daily  [Held by provider] umeclidinium-vilanterol, 1 puff, Inhalation, Daily      Continuous  IV Infusions:  Adult 3-in-1 TPN (custom base / custom electrolytes), , Intravenous, Continuous TPN  bumetanide (BUMEX) 12.5 mg infusion 50 mL, 2 mg/hr, Intravenous, Continuous  heparin (porcine), 3-24 Units/kg/hr (Order-Specific), Intravenous, Titrated      PRN Meds:  HYDROmorphone, 0.2 mg, Intravenous, Q4H PRN  3/15 x1  3/20 x2  [Held by provider] oxyCODONE, 5 mg, Oral, Q6H PRN  [Held by provider] oxyCODONE, 2.5 mg, Oral, Q6H PRN      Discharge Plan: TBD     Vital Signs (last 3 days)       Date/Time Temp Pulse Resp BP MAP (mmHg) Arterial Line BP MAP SpO2 FiO2 (%) O2 Device Patient Position - Orthostatic VS Jumping Branch Coma Scale Score Pain    03/20/25 0800 -- 109 38 132/60 87 -- -- 100 % -- -- -- 4 --    03/20/25 0759 -- -- -- -- -- -- -- 100 % 40 Ventilator -- -- --    03/20/25 0700 98.4 °F (36.9 °C) 107 -- 97/55 73 -- -- 99 % -- Ventilator Lying -- --    03/20/25 0600 -- 106 31 95/53 71 -- -- 99 % -- -- -- 4 --    03/20/25 0530 -- 106 34 101/51 73 -- -- 98 % -- -- -- -- --    03/20/25 0500 -- 106 34 98/55 74 -- -- 99 % -- -- -- -- --    03/20/25 0430 -- 104 32 88/52 67 -- -- 96 % -- -- -- -- --    03/20/25 0405 -- 108 34 95/54 72 -- -- 100 % -- -- -- -- --    03/20/25 0400 -- 109 30 80/47 59 -- -- 98 % 40 Ventilator -- 4 --    03/20/25 0341 -- -- -- -- -- -- -- 99 % -- -- -- -- --    03/20/25 0330 -- 108 30 81/47 59 -- -- 100 % -- -- -- -- --    03/20/25 0300 -- 108 36 80/43 57 -- -- 97 % -- -- -- -- --    03/20/25 0230 -- 108 31 87/52 67 -- -- 93 % -- -- -- -- --    03/20/25 0200 -- 106 28 89/50 65 -- -- 95 % -- -- -- 4 --    03/20/25 0149 -- 106 32 94/50 69 -- -- 96 % -- -- -- -- --    03/20/25 0146 -- 106 30 95/54 72 -- -- 97 % -- -- -- -- --    03/20/25 0143 -- 105 29 95/50 69 -- -- 98 % -- -- -- -- --    03/20/25 0140 -- 105 41 95/52 70 -- -- 98 % -- -- -- -- --    03/20/25 0137 -- 105 32 82/47 59 -- -- 99 % -- -- -- -- --    03/20/25 0134 -- 106 38 77/44 56 -- -- 99 % -- -- -- -- --    03/20/25 0131 -- 106 39  74/39 52 -- -- 100 % -- -- -- -- --    03/20/25 0130 -- 106 38 -- -- -- -- 100 % -- -- -- -- --    03/20/25 0128 -- 107 34 76/41 53 -- -- 100 % -- -- -- -- --    03/20/25 0125 -- 110 47 79/42 56 -- -- 100 % -- -- -- -- --    03/20/25 0122 -- 110 40 77/48 58 -- -- 99 % -- -- -- -- --    03/20/25 0119 -- 108 35 78/48 58 -- -- 88 % -- -- -- -- --    03/20/25 0115 -- 110 25 74/45 55 -- -- 94 % -- -- -- -- --    03/20/25 0112 -- 114 -- 100/58 77 -- -- 96 % -- -- -- -- --    03/20/25 0109 -- 109 -- 99/58 75 -- -- 97 % -- -- -- -- --    03/20/25 0106 -- 108 -- 81/47 59 -- -- 97 % -- -- -- -- --    03/20/25 0103 -- 111 -- 77/41 55 -- -- 98 % -- -- -- -- --    03/20/25 0100 -- 109 35 90/55 70 -- -- 100 % -- -- -- -- --    03/20/25 0059 -- -- -- -- -- -- -- 100 % -- -- -- -- --    03/20/25 0057 -- 109 37 84/49 61 -- -- 100 % -- -- -- -- --    03/20/25 0054 -- 110 37 83/47 60 -- -- 97 % -- -- -- -- --    03/20/25 0048 -- 110 34 75/45 55 -- -- 99 % -- -- -- -- --    03/20/25 0045 -- 111 31 72/43 53 -- -- 99 % -- -- -- -- --    03/20/25 0042 -- 111 31 72/43 53 -- -- 100 % -- -- -- -- --    03/20/25 0039 -- 113 28 64/42 49 -- -- 100 % -- -- -- -- --    03/20/25 0036 -- 114 29 61/40 46 -- -- 100 % -- -- -- -- --    03/20/25 0035 -- 114 40 61/42 48 -- -- 99 % -- -- -- -- --    03/20/25 0032 -- 117 45 71/43 52 -- -- 91 % -- -- -- -- --    03/20/25 0031 -- 117 44 68/44 52 -- -- 95 % -- -- -- -- --    03/20/25 0023 -- -- -- -- -- -- -- 99 % -- -- -- -- --    03/20/25 0001 -- 120 28 87/54 65 -- -- 99 % -- -- -- -- --    03/20/25 0000 -- 121 25 -- -- -- -- 99 % 40 Ventilator -- 4 --    03/19/25 2331 -- 122 30 85/54 65 -- -- 100 % -- -- -- -- --    03/19/25 2320 100.2 °F (37.9 °C) -- -- -- -- -- -- -- -- -- -- -- --    03/19/25 2200 -- 130 28 97/54 70 -- -- 97 % -- -- -- 4 --    03/19/25 2100 -- 123 27 96/51 67 -- -- 99 % -- -- -- -- --    03/19/25 2029 -- -- -- -- -- -- -- 100 % -- -- -- -- --    03/19/25 2000 -- 124 29 96/55 71 -- --  100 % 40 Ventilator -- 4 --    03/19/25 1900 97.5 °F (36.4 °C) 128 24 91/53 67 -- -- 100 % -- -- Lying -- --    03/19/25 1800 -- 126 26 109/58 78 -- -- 100 % -- -- -- 6 --    03/19/25 1730 -- -- -- 107/58 78 -- -- 100 % -- -- -- -- --    03/19/25 1700 -- -- -- 98/56 72 -- -- -- -- -- -- -- --    03/19/25 1600 97.8 °F (36.6 °C) 120 23 107/54 76 -- -- -- -- -- -- 6 --    03/19/25 1500 -- -- -- 112/56 78 -- -- -- -- -- Lying -- --    03/19/25 1453 -- -- -- -- -- -- -- 100 % -- -- -- -- --    03/19/25 1446 98.6 °F (37 °C) 117 31 118/55 79 -- -- 100 % -- -- -- -- --    03/19/25 1433 -- -- -- -- -- -- -- 100 % -- -- -- -- --    03/19/25 1400 -- 118 60 143/67 96 -- -- 100 % -- -- -- 6 --    03/19/25 1307 -- 118 32 140/63 91 -- -- 100 % -- -- -- -- Med Not Given for Pain - for MAR use only    03/19/25 1300 -- 118 33 140/63 91 -- -- 100 % -- -- -- -- --    03/19/25 1200 -- -- -- 123/58 84 -- -- -- -- -- -- 6 --    03/19/25 1100 97.6 °F (36.4 °C) 112 -- 118/56 81 -- -- -- -- Ventilator Lying -- --    03/19/25 1039 -- 113 -- 138/63 91 -- -- 99 % -- -- -- -- --    03/19/25 1011 -- 110 14 121/60 86 -- -- 99 % -- -- -- -- --    03/19/25 1000 -- 111 9 121/60 86 -- -- -- -- -- -- 6 --    03/19/25 0900 -- 113 18 121/56 80 -- -- -- -- -- -- -- --    03/19/25 0823 -- -- -- -- -- -- -- 100 % -- -- -- -- --    03/19/25 0800 -- -- -- -- -- -- -- -- -- -- -- 6 --    03/19/25 0700 98.8 °F (37.1 °C) 114 18 126/64 88 -- -- 100 % -- Ventilator Lying -- --    03/19/25 0500 -- 115 24 114/56 81 -- -- 99 % -- -- -- -- --    03/19/25 0410 -- -- -- -- -- -- -- 100 % -- -- -- -- --    03/19/25 0400 -- 116 26 116/55 79 -- -- 100 % -- -- -- -- --    03/19/25 0200 -- 118 16 106/57 77 -- -- 100 % -- -- -- -- --    03/19/25 0100 -- 120 18 111/56 79 -- -- 100 % -- -- -- -- --    03/19/25 0053 -- -- -- -- -- -- -- 100 % -- -- -- -- --    03/19/25 0000 -- 121 12 164/72 104 -- -- 100 % -- -- -- 4 --    03/18/25 2345 98 °F (36.7 °C) 125 14 116/58 80 -- -- 100  % -- -- -- -- --    03/18/25 2300 -- 126 12 116/56 79 -- -- 99 % -- -- -- -- --    03/18/25 2200 -- 126 28 117/58 81 -- -- 98 % -- -- -- -- --    03/18/25 2100 -- 127 24 108/55 75 -- -- 99 % -- -- -- -- --    03/18/25 2000 -- 124 23 114/59 82 -- -- 99 % -- -- -- 4 --    03/18/25 1957 -- -- -- -- -- -- -- 97 % -- -- -- -- --    03/18/25 1955 -- -- -- -- -- -- -- 95 % -- -- -- -- --    03/18/25 1927 99 °F (37.2 °C) 123 20 119/56 81 -- -- 95 % -- -- Lying -- --    03/18/25 1800 -- 121 -- 119/59 83 -- -- 98 % -- -- -- -- --    03/18/25 1700 -- 121 -- 122/57 82 -- -- 97 % -- -- -- -- --    03/18/25 1600 -- 117 23 113/57 80 -- -- 98 % -- -- -- 4 --    03/18/25 1530 -- 114 20 119/58 84 -- -- 100 % -- -- -- -- --    03/18/25 1500 98 °F (36.7 °C) 111 23 124/60 86 -- -- 100 % -- -- Lying -- --    03/18/25 1439 -- -- -- -- -- -- -- 97 % 40 Ventilator -- -- --    03/18/25 1430 -- 114 26 149/65 94 -- -- 100 % -- -- -- -- --    03/18/25 1415 -- 111 26 115/58 82 -- -- 99 % -- -- -- -- --    03/18/25 1300 -- 114 27 120/61 83 -- -- 100 % -- -- -- -- --    03/18/25 1245 -- 109 25 111/59 82 -- -- 98 % -- -- -- -- --    03/18/25 1230 -- 110 25 115/55 79 -- -- 100 % -- -- -- -- --    03/18/25 1215 -- 111 25 116/58 81 -- -- 100 % -- -- -- -- --    03/18/25 1200 -- 110 25 120/55 81 -- -- 100 % -- -- -- 4 --    03/18/25 1145 -- 110 25 122/60 85 -- -- 100 % -- -- -- -- --    03/18/25 1130 -- 111 27 130/60 85 -- -- 100 % -- -- -- -- --    03/18/25 1115 -- 112 24 124/61 88 -- -- 100 % -- -- -- -- --    03/18/25 1111 98.9 °F (37.2 °C) -- -- -- -- -- -- 100 % 50 Ventilator Lying -- --    03/18/25 1100 -- 114 27 134/64 92 -- -- 100 % -- -- -- -- --    03/18/25 1045 -- 112 26 119/58 83 118/39 66 mmHg 100 % -- -- -- -- --    03/18/25 1030 -- 110 26 113/57 82 111/36 61 mmHg 100 % -- -- -- -- --    03/18/25 1015 -- 110 24 112/56 80 112/37 63 mmHg 100 % -- -- -- -- --    03/18/25 1000 -- 111 25 121/58 83 124/41 70 mmHg 100 % -- -- -- -- --     03/18/25 0945 -- 106 22 116/57 82 118/38 66 mmHg 100 % -- -- -- -- --    03/18/25 0930 -- 106 22 116/58 83 123/39 68 mmHg 100 % -- -- -- -- --    03/18/25 0915 -- 107 24 114/58 83 119/38 66 mmHg 100 % -- -- -- -- --    03/18/25 0900 -- 110 26 118/56 81 129/50 77 mmHg 100 % -- -- -- -- --    03/18/25 0845 -- 112 26 110/57 80 127/51 76 mmHg 100 % -- -- -- -- --    03/18/25 0830 -- 108 32 105/53 74 143/60 91 mmHg 99 % -- -- -- -- --    03/18/25 0815 -- 119 30 177/75 108 161/58 98 mmHg 100 % -- -- -- -- --    03/18/25 0800 -- 117 27 121/58 83 119/47 72 mmHg 99 % -- -- -- 4 --    03/18/25 0745 -- 119 30 161/75 108 137/54 84 mmHg 100 % -- -- -- -- --    03/18/25 0743 -- -- -- -- -- -- -- 100 % 40 Ventilator -- -- --    03/18/25 0730 -- 116 23 86/52 64 97/32 53 mmHg 99 % -- -- -- -- --    03/18/25 0715 -- 123 31 151/72 104 171/54 100 mmHg 99 % -- -- -- -- --    03/18/25 0700 98.8 °F (37.1 °C) 128 31 154/78 108 184/56 107 mmHg 99 % -- Ventilator Lying -- --    03/18/25 0630 -- 127 53 189/84 121 195/62 117 mmHg 100 % -- -- -- -- --    03/18/25 0600 -- 114 24 165/75 108 169/63 104 mmHg 100 % -- -- -- -- --    03/18/25 0508 -- -- -- -- -- -- -- -- -- -- -- -- Med Not Given for Pain - for MAR use only    03/18/25 0500 -- 117 30 158/66 105 183/56 105 mmHg 100 % -- -- -- -- --    03/18/25 0300 -- 106 24 148/66 95 158/51 91 mmHg 100 % -- -- -- -- --    03/18/25 0200 -- 108 25 151/68 98 162/52 93 mmHg 100 % -- -- -- -- --    03/18/25 0100 -- 99 22 135/57 89 152/46 84 mmHg 100 % -- -- -- -- --    03/18/25 0000 -- 110 26 140/65 94 149/54 90 mmHg 100 % -- -- -- 6 --    03/17/25 2340 98.9 °F (37.2 °C) -- -- -- -- -- -- -- -- -- -- -- --    03/17/25 2300 -- 104 19 125/61 88 134/46 78 mmHg 100 % -- -- -- -- --    03/17/25 2200 -- 103 22 131/59 85 136/45 78 mmHg 98 % -- -- -- 6 --    03/17/25 2100 -- 108 26 152/67 96 166/51 94 mmHg 98 % -- -- -- -- --    03/17/25 2000 -- 105 19 145/64 92 166/50 92 mmHg 100 % -- -- -- 6 --    03/17/25 1800  -- 107 20 148/67 96 176/60 103 mmHg 100 % -- -- -- 3 --    03/17/25 1700 -- 106 21 151/71 102 170/59 100 mmHg 100 % -- -- -- -- --    03/17/25 1600 -- 107 20 160/74 106 187/62 108 mmHg 100 % -- -- -- 3 --    03/17/25 1500 98.9 °F (37.2 °C) 102 16 135/65 94 155/57 93 mmHg 100 % -- Ventilator Lying -- --    03/17/25 1451 -- -- -- -- -- -- -- 100 % 40 Ventilator -- -- --    03/17/25 1400 -- 107 24 158/72 104 188/64 110 mmHg 100 % -- -- -- 3 --    03/17/25 1300 -- 110 25 163/74 106 191/65 112 mmHg 100 % -- -- -- -- --    03/17/25 1200 -- 110 23 151/68 98 187/64 110 mmHg 100 % -- -- -- 3 --    03/17/25 1116 -- -- -- -- -- -- -- 100 % 40 Ventilator -- -- --    03/17/25 1100 -- 109 24 160/72 104 195/66 115 mmHg 100 % -- -- -- -- --    03/17/25 1000 98.5 °F (36.9 °C) 106 25 141/65 94 175/63 104 mmHg 99 % -- -- -- 3 --    03/17/25 0915 -- 104 13 128/60 -- -- -- 100 % -- -- -- -- --    03/17/25 0911 -- -- -- -- -- -- -- 100 % -- -- -- -- --    03/17/25 0900 -- 103 24 166/72 104 191/68 114 mmHg 100 % -- -- -- -- --    03/17/25 0800 -- 103 21 145/65 93 180/66 108 mmHg 100 % -- -- -- 3 --    03/17/25 0700 -- 102 22 141/66 95 165/54 96 mmHg 99 % -- -- -- -- --    03/17/25 0600 -- 97 20 142/64 92 158/51 91 mmHg 99 % -- -- -- -- --    03/17/25 0500 -- 97 20 138/65 93 146/48 84 mmHg 98 % -- -- -- -- --    03/17/25 0400 -- 99 21 128/57 86 -- -- 98 % -- -- -- 3 --    03/17/25 0300 97.6 °F (36.4 °C) 103 18 135/61 87 -- -- 99 % -- -- -- -- --    03/17/25 0200 -- 105 19 131/58 84 -- -- 100 % -- -- -- -- --    03/17/25 0100 -- 102 19 102/53 74 -- -- 99 % -- -- -- -- --    03/17/25 0000 -- 111 18 95/53 71 -- -- 99 % -- -- -- 3 --          Weight (last 2 days)       Date/Time Weight    03/19/25 0600 101 (221.78)    03/18/25 0534 98.2 (216.49)            Pertinent Labs/Diagnostic Results:   Radiology:  CT abdomen pelvis wo contrast   Final Interpretation by Sahil Lees MD (03/20 0914)      1.  Interval postsurgical changes of right  hemicolectomy and small bowel resection. Loops of small bowel within the left abdomen demonstrate mural thickening and surrounding mesenteric edema, possibly representing sequelae of recent    surgery/instrumentation.   2.  Dilated loops of proximal small bowel, most compatible with postoperative adynamic ileus. No definite point of transition is identified within the limits of this unenhanced examination.   3.  Small volume of hemoperitoneum, improved from prior study.   4.  Bilateral pleural effusions. See concurrently performed CT of the chest.      Workstation performed: [id number]         CT chest wo contrast   Final Interpretation by Darnell Dawkins MD (03/20 0604)      1.  Exam limited by motion artifact.   2.  Previously noted mild pulmonary edema appears largely resolved. A small 5 mm nodular density with spiculated edges in the left upper lobe is seen with evaluation limited by motion. No routine follow-up imaging recommended per current Fleischner    Society guidelines for low risk patient. Optional 12-month follow-up CT chest for high risk patients.   3.  Moderate bilateral pleural effusions with dependent bibasilar atelectasis is again seen, similar compared to the prior study.   4.  Extensive calcific atherosclerosis of the thoracic aorta, proximal thoracic vessels, and coronary arteries.   5.  Moderate perihepatic and perisplenic ascites noted in the upper abdomen. A small amount of free air in the anterior upper abdomen also seen which could be related to recent surgery. The extent of free air appears decreased compared to the prior    study.   6.  Hyperdense material within the distended gallbladder could represent vicarious contrast excretion, tiny gallstones, or viscous sludge/debris.   7.  Multiple anterior and lateral rib fracture deformities again seen bilaterally, described in detail in the prior CT chest exam.   8.  Other ancillary findings detailed above.               Workstation performed:  [id number]         CT head wo contrast   Final Interpretation by E. Alec Schoenberger, MD (03/19 1012)      Recent ischemic changes better visualized on MRI.   No acute hemorrhage or mass effect.                     Workstation performed: [id number]         MRI brain w wo contrast   Final Interpretation by Miki Mott MD (03/18 1429)      New nearly symmetric cortical restricted diffusion in bilateral frontal, bilateral parietal, and bilateral occipital lobes, suspicious for hypoxic ischemic encephalopathy given history of recent cardiac arrest. Differential includes embolic cortical    infarcts, seizure related changes, among other differentials.      New scattered small acute infarcts in left posterior frontal and bilateral cerebellar lobes. Favor embolic infarcts.      Additional chronic/incidental findings as detailed above.      The study was marked in EPIC for immediate notification.      Workstation performed: [id number]         CT head wo contrast   Final Interpretation by E. Alec Schoenberger, MD (03/17 0900)   No acute ischemic changes.   4 mm focus of high attenuation in the right frontal lobe is stable compared with CT from earlier today. Differential is unchanged and this could represent focus of mineralization versus hemorrhage.   Continued stability of punctate hyperdense focus in the left frontal lobe                     Workstation performed: [id number]         XR abdomen 1 vw portable   Final Interpretation by Michael Gray MD (03/16 1420)      Suture lines overlie the left and right hemiabdomen. No metallic foreign body.      Enteric tube courses over the diaphragm with sidehole and tip overlying the stomach.               Workstation performed: [id number]         CT head wo contrast   Final Interpretation by Jony Shelton MD (03/16 0958)      No definite CT evidence for anoxic brain injury as clinically questioned. Stable chronic ischemic changes, as described above.      New 5  mm focus of high attenuation within the superior right frontal lobe since prior CT from 2/20/2025. Differential diagnosis should include a small amount of acute hemorrhage versus parenchymal mineralization as correlated with prior MRI. Consider    repeat MRI for further evaluation.      Previously described focus of high attenuation within the inferior left frontal lobe is unchanged and again may represent an area of parenchymal mineralization or trace residual hemorrhage.         I personally discussed this study with ALEXIS TRACY on 3/16/2025 9:23 AM.                        Workstation performed: [id number]         XR chest portable ICU   Final Interpretation by José Miguel Tsang MD (03/16 0908)      Moderate congestive changes with small effusions.            Workstation performed: [id number]         CT high volume bleeding scan abdomen pelvis   Final Interpretation by Kanchan Naylor MD (03/15 5440)      1) Postoperative changes related to interval exploratory laparotomy and small bowel resection left in discontinuity with open abdomen with Abthera device in place, as described above.      2) Partially organized hematoma in the root of the mesentery measuring 5.3 cm with overlying packing material (including 4 lap pads). Two dominant hematomas in the right paracolic gutter measuring 5.6 x 2.5 cm and 3.8 x 2.9 cm, and several small    hematomas in the left paracolic gutter. No IV contrast extravasation to suggest active hemorrhage in the abdomen or pelvis.      3) Circumferential wall thickening throughout the ascending and proximal transverse colon, as well as multiple loops of small bowel, presumably reactive.      4) Additional findings as above.      Please see separate report for the CT of the chest performed concurrently.      The study was marked in EPIC for immediate notification.                  Workstation performed: [id number]         CT chest w contrast   Final Interpretation by Kanchan  MD Cyndy (03/15 8780)      1) Tubes and lines in adequate position.      2) Acute fractures of the right 3rd - 8th ribs and left 1st - 4th ribs, as detailed above, likely related to CPR. Right 6th and 7th rib fractures are mildly displaced while the rest of the fractures are nondisplaced.      3) Moderate to large bilateral pleural effusions, increased from February 2025. No evidence of hemothorax.      4) Compressive atelectasis of the lower lobes with some dependent atelectasis in the upper lobes.      5) Mild pulmonary edema.      6) Additional findings as above.      Please see separate report for the abdomen and pelvis performed concurrently.      Additional findings as above.                  Workstation performed: [id number]         XR chest portable ICU   Final Interpretation by Jacqueline Ha MD (03/16 0717)      Moderate pulmonary edema with left base atelectasis and small pleural effusions.      No pneumothorax.      Acute rib fractures on CT not visible.            Workstation performed: [id number]         XR chest portable   Final Interpretation by Jacqueline Ha MD (03/16 0716)      Moderate pulmonary edema with large pleural effusions and bibasilar atelectasis.      Acute rib fractures on CT not visible.      ET tube 1 cm above the mauro. Recommend retracting.            Workstation performed: [id number]         XR chest portable   Final Interpretation by Jacqueline Ha MD (03/15 0787)      Moderate bibasilar opacity which could be due to aspiration or atelectasis.      Question small pleural effusions.            Workstation performed: [id number]         XR chest portable   Final Interpretation by Eric Kolb MD (03/07 1018)      Unchanged pulmonary edema and small pleural effusions. Pneumonia not excluded.            Workstation performed: [id number]         XR chest PICC line portable   Final Interpretation by Jacqueline Ha MD (03/02 0850)      Right PICC in upper SVC.       Moderate pulmonary venous congestion with small pleural effusions and bibasilar atelectasis.            Workstation performed: [id number]         CT chest w ct abdomen pelvis w wo contrast   Final Interpretation by Eric Willis MD (02/28 0812)   Addendum (preliminary) 1 of 1 by Eric Willis MD (02/28 0812)   ADDENDUM:      Correction to spleen section in the body of report. Hyperattenuation    should be hypoattenuation. Grossly stable posterior splenic subcapsular    hypodensity presumably an infarct unchanged allowing for difference in    contrast timing.            Final      CT chest:      Decreased intracardiac blood pool density compatible with anemia.      New bilateral multi lobar interstitial thickening and tree-in-bud opacities may represent pulmonary vascular congestion or nonspecific inflammatory or infectious process.      Bilateral pleural effusions, left greater than right, mildly enlarged and additional findings suggestive of pulmonary vascular congestion. Correlate with clinical findings.      5 mm left upper lobe nodule stable since 2/11/2025 and new since December 2023. 6 mm right lower lobe nodule new since 2/11/2025. Noncontrast chest CT follow-up in 3 months is advised.      Additional chronic findings and negatives as above.      CT abdomen and pelvis:      No evidence of high-volume gastrointestinal bleeding.      Stable upper cecal/proximal ascending colonic mass attributed to recently biopsy-proven adenocarcinoma.      Colonic diverticulosis.      No evidence of abdominopelvic metastatic disease.      Additional chronic findings and negatives as above.      The study was marked in EPIC for immediate notification.               Workstation performed: [id number]         XR chest 1 view portable   Final Interpretation by Orion Zapien MD (02/28 0847)   Cardiomegaly with central congestion and left greater than right small basilar effusions.      Workstation  performed: [id number]           Cardiology:  ECG 12 lead   Final Result by Liam Watson MD (03/18 1809)   Sinus tachycardia   Otherwise normal ECG   When compared with ECG of 17-Mar-2025 07:36, (unconfirmed)   No significant change was found   Confirmed by Liam Watson ([id number]) on 3/18/2025 6:09:34 PM      Echo follow up/limited w/ contrast if indicated   Final Result by Sujata Whalen DO (03/17 0957)        Left Ventricle: Left ventricular cavity size is normal. Wall thickness    is increased. The left ventricular ejection fraction is 55%. Wall motion    cannot be accurately assessed. Unable to assess diastolic function.     Right Ventricle: Right ventricle is not well visualized but limited    images suggest dilatation with reduced function.      Technically difficult study with poor endocardial definition and poor    visualization of valves.          ECG 12 lead   Final Result by Liam Watson MD (03/18 1808)   Sinus tachycardia   Otherwise normal ECG   When compared with ECG of 17-Mar-2025 07:29, (unconfirmed)   No significant change was found   Confirmed by Liam Watson ([id number]) on 3/18/2025 6:08:46 PM      ECG 12 lead   Final Result by Liam Watson MD (03/18 1808)   Normal sinus rhythm   Normal ECG   Confirmed by Liam Watson ([id number]) on 3/18/2025 6:08:41 PM      ECG 12 lead   Final Result by Liam Watson MD (03/18 1808)   Sinus tachycardia   Otherwise normal ECG   Confirmed by Liam Watson ([id number]) on 3/18/2025 6:08:29 PM      ECG 12 lead   Final Result by Gianluca Montanez MD (03/02 2256)   Normal sinus rhythm   Normal ECG   When compared with ECG of 13-Feb-2025 10:11,   Minimal criteria for Inferior infarct are no longer Present   Confirmed by Gianluca Montanez ([id number]) on 3/2/2025 10:56:31 PM        GI:  No orders to display           Results from last 7 days   Lab Units 03/20/25  0436 03/19/25  1704 03/19/25  1502 03/19/25  0451 03/18/25  2142 03/16/25  1947/25  1533 03/16/25  0925  03/16/25  0440 03/15/25  1154 03/15/25  1045   WBC Thousand/uL 8.75 8.30  --  6.69  --    < > 13.31*  --  14.14*   < > 13.12*   HEMOGLOBIN g/dL 9.5* 10.1* 10.3* 9.7* 10.3*   < > 12.9   < > 13.3  13.3   < > 6.3*   I STAT HEMOGLOBIN   --   --   --   --   --   --   --    < >  --    < >  --    HEMATOCRIT % 29.8* 33.0* 33.3* 29.9* 32.4*   < > 38.5   < > 38.7  38.4   < > 21.0*   HEMATOCRIT, ISTAT   --   --   --   --   --   --   --    < >  --    < >  --    PLATELETS Thousands/uL 99* 110*  --  74*  --    < > 104*  --  103*   < > 220   TOTAL NEUT ABS Thousands/µL  --   --   --   --   --   --  11.53*  --   --   --  5.84   BANDS PCT %  --   --   --  2  --   --   --   --  1  --   --     < > = values in this interval not displayed.         Results from last 7 days   Lab Units 03/20/25  0436 03/19/25  2322 03/19/25  1704 03/19/25  0921 03/19/25  0326 03/18/25  1048 03/18/25  0453 03/16/25  2235 03/16/25  1533 03/16/25  1327 03/16/25  1215 03/16/25  0440 03/15/25  1654   SODIUM mmol/L 141 141 141 141 143   < > 145   < > 148*  --   --    < > 151*   POTASSIUM mmol/L 3.9 4.0 3.8 3.8 3.9   < > 3.8   < > 4.4  --   --    < > 3.2*   CHLORIDE mmol/L 107 107 109* 109* 109*   < > 111*   < > 116*  --   --    < > 113*   CO2 mmol/L 23 22 23 24 26   < > 27   < > 25  --   --    < > 25   CO2, I-STAT mmol/L  --   --   --   --   --   --   --   --   --  29 27  --   --    ANION GAP mmol/L 11 12 9 8 8   < > 7   < > 7  --   --    < > 13   BUN mg/dL 96* 98* 90* 84* 78*   < > 63*   < > 48*  --   --    < > 45*   CREATININE mg/dL 3.91* 4.08* 3.73* 3.59* 3.35*   < > 2.89*   < > 2.14*  --   --    < > 1.67*   EGFR ml/min/1.73sq m 13 13 14 15 16   < > 20   < > 28  --   --    < > 38   CALCIUM mg/dL 7.3* 7.6* 7.4* 7.5* 7.5*   < > 7.8*   < > 8.1*  --   --    < > 7.7*   CALCIUM, IONIZED mmol/L  --   --   --   --   --   --  1.12  --  1.13  --   --   --  1.08*   CALCIUM, IONIZED, ISTAT mmol/L  --   --   --   --   --   --   --   --   --  1.20 1.10*  --   --   "  MAGNESIUM mg/dL 2.1 2.3 2.2 2.2 2.2   < > 1.9   < > 1.9  --   --    < > 1.6*   PHOSPHORUS mg/dL 5.0* 4.9* 4.7* 4.6* 4.5*   < > 4.0   < > 4.1  --   --    < > 3.6    < > = values in this interval not displayed.     Results from last 7 days   Lab Units 03/19/25  0451 03/18/25  0453 03/16/25  1533 03/16/25  0440 03/15/25  1235   AST U/L 20 26 60* 69* 21   ALT U/L <3* <3* <3* 5* 6*   ALK PHOS U/L 50 53 51 53 34   TOTAL PROTEIN g/dL 4.2* 4.2* 4.3* 4.6* 3.1*   ALBUMIN g/dL 2.1* 2.1* 2.4* 2.7* 1.6*   TOTAL BILIRUBIN mg/dL 0.34 0.38 0.62 0.79 0.30   BILIRUBIN DIRECT mg/dL 0.15  --   --   --   --      Results from last 7 days   Lab Units 03/20/25  0437 03/19/25  2326 03/19/25  1800 03/19/25  1157 03/19/25  0610 03/18/25  2353 03/18/25  1719 03/18/25  1426 03/18/25  1218 03/18/25  0532 03/17/25  2336 03/17/25  1800   POC GLUCOSE mg/dl 176* 179* 186* 166* 144* 172* 194* 135 180* 183* 190* 171*     Results from last 7 days   Lab Units 03/20/25  0436 03/19/25  2322 03/19/25  1704 03/19/25  0921 03/19/25  0326 03/18/25  2142 03/18/25  1618 03/18/25  1048 03/18/25  0453 03/18/25  0359 03/17/25  2010 03/17/25  1550   GLUCOSE RANDOM mg/dL 181* 184* 191* 158* 160* 181* 168* 179* 187* 200* 169* 172*             No results found for: \"BETA-HYDROXYBUTYRATE\"   Results from last 7 days   Lab Units 03/18/25  0825 03/17/25  1407 03/16/25  1533   PH ART  7.430 7.458* 7.492*   PCO2 ART mm Hg 35.7* 35.4* 33.9*   PO2 ART mm Hg 70.5* 103.2 94.5   HCO3 ART mmol/L 23.2 24.5 25.4   BASE EXC ART mmol/L -0.7 0.9 2.5   O2 CONTENT ART mL/dL 18.0 16.0 18.0   O2 HGB, ARTERIAL % 93.2* 96.4 96.0   ABG SOURCE  Femoral, Right Line, Arterial Line, Arterial     Results from last 7 days   Lab Units 03/20/25  0037 03/17/25  0935   PH DEAN  7.302 7.460*   PCO2 DEAN mm Hg 44.0 35.4*   PO2 DEAN mm Hg 33.2* 38.5   HCO3 DEAN mmol/L 21.3* 24.6   BASE EXC DEAN mmol/L -5.0 1.1   O2 CONTENT DEAN ml/dL 9.5 12.6   O2 HGB, VENOUS % 59.0* 74.9     Results from last 7 days   Lab " Units 03/16/25  1327 03/16/25  1215 03/15/25  1442   I STAT BASE EXC mmol/L 1 0 -4*   I STAT O2 SAT % 96* 96* 97*   ISTAT PH ART  7.344* 7.381 7.293*   I STAT ART PCO2 mm HG 50.1* 43.2 47.3*   I STAT ART PO2 mm HG 88.0 85.0 104.0   I STAT ART HCO3 mmol/L 27.3 25.6 22.9     Results from last 7 days   Lab Units 03/16/25  0925   CK TOTAL U/L 379*     Results from last 7 days   Lab Units 03/15/25  2342 03/15/25  2134 03/15/25  1935 03/15/25  1654 03/15/25  1156 03/15/25  1045   HS TNI 0HR ng/L  --   --  2,898*  --   --  141*   HS TNI 2HR ng/L  --  4,775*  --   --  116*  --    HSTNI D2 ng/L  --  1,877*  --   --  -25  --    HS TNI 4HR ng/L 7,885*  --   --  1,295*  --   --    HSTNI D4 ng/L 4,987*  --   --  1,154*  --   --          Results from last 7 days   Lab Units 03/20/25  0436 03/19/25  2322 03/19/25  1704 03/18/25  2142 03/18/25  1719 03/15/25  1235 03/15/25  1045   PROTIME seconds  --   --   --   --  15.9* 18.2* 18.2*   INR   --   --   --   --  1.19 1.43* 1.43*   PTT seconds 69* 61* 61*   < > 44* 45* 41*    < > = values in this interval not displayed.             Results from last 7 days   Lab Units 03/20/25  0037 03/16/25  1533 03/16/25  0440 03/15/25  2342 03/15/25  2134 03/15/25  1935 03/15/25  1654   LACTIC ACID mmol/L 1.5 1.3 1.8 2.7* 3.0* 4.3* 5.6*                         Results from last 7 days   Lab Units 03/16/25  0547 03/15/25  1354 03/15/25  1300 03/15/25  1258 03/15/25  1218 03/15/25  1151 03/15/25  1144 03/15/25  1135 03/15/25  1134 03/15/25  1131 03/14/25  0547   UNIT PRODUCT CODE  H1875H52  R6584P38  G2834F19  P1696G03  Y0877Z65  C6025O06  O9757N87  E0503E42  K4049J02 P2978S17 R5222G02  M5515N63 E4468J41  Y3246U21 Y9704R45  S9204Z14  G7874W74  G6497E19 C1020J56  D7654Q35  A2061M36  K4899Q45 N8949Y58  H8863U25 A9449S09 H1835U28 U0892A30  L6018L34  F1079Z30  D6800K07  Y5787G64  A8637D56 A7595L13   UNIT NUMBER  [id number]  [id number]  [id number]  [id number]   [id number]  [id number]  [id number]  [id number]  [id number]  [id number]  [id number]  [id number]  [id number]  [id number]  [id number]  [id number]  [id number]  [id number]  [id number]  [id number]  [id number]  [id number]  [id number]   UNITABO  A  A  O  O  O  O  O  O  O O O  O O  O A  A  A  A O  O  O  O A  A A A O  O  O  O  O  O O   UNITRH  POS  POS  POS  POS  POS  NEG  NEG  NEG  NEG POS NEG  NEG POS  POS POS  POS  POS  POS POS  POS  POS  POS POS  POS POS POS POS  POS  POS  POS  POS  POS NEG   CROSSMATCH  Compatible  Compatible  Compatible  Compatible  Compatible  Compatible  Compatible  --   --   --   --   --   --   --   --   --  Compatible   UNIT DISPENSE STATUS  Presumed Trans  Presumed Trans  Presumed Trans  Presumed Trans  Presumed Trans  Presumed Trans  Return to Inv  Presumed Trans  Return to Inv Presumed Trans Return to Inv  Return to Inv Return to Inv  Return to Inv Return to Inv  Presumed Trans  Presumed Trans  Presumed Trans Presumed Trans  Presumed Trans  Presumed Trans  Presumed Trans Presumed Trans  Presumed Trans Presumed Trans Presumed Trans Presumed Trans  Presumed Trans  Presumed Trans  Presumed Trans  Presumed Trans  Presumed Trans Presumed Trans   UNIT PRODUCT VOL ml 125  125  500  500  500  250  250  350  350 300 350  250 350  350 250  250  250  280 350  350  350  350 250  250 300 280 350  350  350  350  350  350 350                                                                           Network Utilization Review Department  ATTENTION: Please call with any questions or concerns to [phone] and carefully listen to the prompts so that  you are directed to the right person. All voicemails are confidential.   For Discharge needs, contact Care Management DC Support Team at [phone] opt. 2  Send all requests for admission clinical reviews, approved or denied determinations and any other requests to dedicated fax number below belonging to the campus where the patient is receiving treatment. List of dedicated fax numbers for the Facilities:  FACILITY NAME UR FAX NUMBER   ADMISSION DENIALS (Administrative/Medical Necessity) [phone]   DISCHARGE SUPPORT TEAM (NETWORK) [phone]   PARENT CHILD HEALTH (Maternity/NICU/Pediatrics) [phone]   [facility] [phone]   [facility] [phone]   [facility] [phone]   [facility] [phone]   [facility] [phone]   [facility] [phone]   General acute hospital [phone]   [facility] [phone]   [facility] [phone]   [facility] [phone]   [facility] [phone]   [facility] [phone]

## 2025-03-20 NOTE — ASSESSMENT & PLAN NOTE
Malnutrition Findings:   Adult Malnutrition type: Acute illness  Adult Degree of Malnutrition: Other severe protein calorie malnutrition  Malnutrition Characteristics: Inadequate energy, Fluid accumulation  360 Statement: related to inadequate energy/protein intake as evidenced by consuming < 50% of energy intake compared to estimated needs for > 5 days and B/L LE +3 edema. Treated with ONS.  BMI Findings:  Body mass index is 25.09 kg/m².      Plan:  Continue TPN and trickle TF per surgery     360 Statement: related to inadequate energy/protein intake as evidenced by consuming < 50% of energy intake compared to estimated needs for > 5 days and B/L LE +3 edema. Treated with ONS.    BMI Findings:    Body mass index is 30.08 kg/m².

## 2025-03-20 NOTE — RESPIRATORY THERAPY NOTE
RT Ventilator Management Note  Devin Chaves 79 y.o. male MRN: [id number]  Unit/Bed#: ICU 10 Encounter: [id number]      Daily Screen         3/19/2025  1453 3/20/2025  0759          Patient safety screen outcome:: Failed Passed (P)       Spont breathing trial % for 30 min: -- No (P)       Spont breathing trial outcome:: Failed Failed (P)       Spont breathing trial reason failed: RR > 35 bpm;Hemodynamic unstable RR > 35 bpm;RSBI > 100  (P)       Previous settings resumed: Yes Yes (P)       Name of Medical Team Notified:: José TRUJILLO --                Physical Exam:   Assessment Type: During-treatment  General Appearance: Awake  Respiratory Pattern: (S) Assisted  Chest Assessment: Chest expansion symmetrical  Bilateral Breath Sounds: Diminished      Resp Comments: (S) Recieved patient on AC/VC settings. Trialed oon Spont 6/6 patient became tachypneic with rate >40. Placed back on previous settings.     03/20/25 0759   Respiratory Assessment   Assessment Type During-treatment   General Appearance Awake   Respiratory Pattern (S)  Assisted   Chest Assessment Chest expansion symmetrical   Bilateral Breath Sounds Diminished   Resp Comments (S)  Recieved patient on AC/VC settings. Trialed oon Spont 6/6 patient became tachypneic with rate >40. Placed back on previous settings.   Vent Information   Vent    Vent type     Vent Mode AC/VC   $ Vital Capacity Mech/Peak Flow Yes   $ Pulse Oximetry Spot Check Charge Completed   SpO2 100 %   AC/VC Settings   Resp Rate (BPM) 12 BPM   Vt (mL) 470 mL   FIO2 (%) 40 %   PEEP (cmH2O) 6 cmH2O   Flow Pattern (LPM) 60 L/min   Trigger Sensitivity Flow (lpm) 3 %   Humidification Heater   Heater Temperature (Set) 98.6 °F (37 °C)   AC/VC Actuals   Resp Rate (BPM) 32 BPM   VT (mL) 396   MV 16.7   MAP (cmH2O) 15 cmH2O   Peak Pressure (cmH2O) 25 cmH2O   I/E Ratio (Obs) 1:1.0   Heater Temperature (Obs) 98.6 °F (37 °C)   Static Compliance (mL/cmH20)   (unable to preform patient  dys.)   AC/VC Alarms   High Peak Pressure (cmH2O) 40   High Resp Rate (BPM) 40 BPM   High MV (L/min) 20 L/min   Low MV (L/min) 4 L/min   Vt High (mL) 800 mL   Vt Low (mL) 300 mL   AC/VC Apnea Settings   Resp Rate (BPM) 12 BPM   VT (mL) 470 mL   FIO2 (%) 100 %   Apnea Time (s) 20 S   Apnea Flow (L/min) 60 L/min   Maintenance   Alarm (pink) cable attached Yes   Resuscitation bag with peep valve at bedside Yes   Water bag changed No   Circuit changed No   Daily Screen   Patient safety screen outcome: Passed   Spont breathing trial % for 30 min No   Spont breathing trial outcome: Failed   Spont breathing trial reason failed (S)  RR > 35 bpm;RSBI > 100   Previous settings resumed Yes   ETT  Cuffed 8 mm   Placement Date/Time: 03/15/25 (c) 1200   Type: Cuffed  Tube Size: 8 mm  Location: Oral  Insertion attempts: 1  Placement Verification: Chest x-ray;End tidal CO2  Secured at (cm): 25   Secured at (cm) 22   Measured from Teeth   Secured Location Center   Repositioned Center to Left   Secured by Commercial tube otto   Site Condition Cool;Dry   Cuff Pressure (color) Green   HI-LO Suction  Intermittent suction   HI-LO Secretions Small   HI-LO Intervention Patent

## 2025-03-21 DIAGNOSIS — D62 ACUTE BLOOD LOSS ANEMIA: Primary | ICD-10-CM

## 2025-03-21 PROBLEM — R79.89 LOW SERUM BICARBONATE: Status: ACTIVE | Noted: 2025-01-01

## 2025-03-21 NOTE — ASSESSMENT & PLAN NOTE
Patient developed acute neck pain with MSSA bacteremia during recent hospitalization.  CRP was highly elevated.  C-spine MRI showed possible C-spine and paraspinal infection.  Patient has no neurologic deficit.  His neck pain is finally improving.  However, given paraspinal infection on the initial C-spine MRI, we should repeat C-spine MRI with contrast when creatinine is improved to confirm resolution/improvement of paraspinal infection.  However, without any neurological deficit, it would be fine to postpone MRI until renal function is further improved, to decrease risk of contrast-induced ORIANA.  Patient should get MRI done prior to completion of IV antibiotic course below, hopefully sometime next week.  Antibiotic plan as in below.  Monitor neck pain.  Recommend repeat C-spine MRI with and without contrast.  This can be postponed until ORIANA is improved, but should be done prior to completion of IV antibiotic course.

## 2025-03-21 NOTE — ASSESSMENT & PLAN NOTE
Previously on Metoprolol for rate control, currently on hold  RVR  On Eliquis for AC prehospital  On heparin drip for embolic infarcts

## 2025-03-21 NOTE — ASSESSMENT & PLAN NOTE
Refractory volume overload  S/p intermittent IV lasix/bumex and Bumex drip.  Currently off since early 3/19 due to  hypotension  May need to consider CRRT for UF if refractory overload

## 2025-03-21 NOTE — ASSESSMENT & PLAN NOTE
Creatinine   Date Value Ref Range Status   03/21/2025 4.28 (H) 0.60 - 1.30 mg/dL Final     Comment:     Standardized to IDMS reference method   09/25/2022 1 0.6 - 1.2 mg/dL Final      Likely initially in the setting of hemorrhagic shock, now with ATN   Baseline Cr 0.8   Nephrology following, appreciate recommendations  Continue bumex infusion, could consider dose of metolazone as well for goal negative 500ml-1L over next 24 hours   Monitor and replete electrolytes Q6H  Monitor I/O and renal indices   Continues to have low urine output and anasarca with hypotension  Nephro consented family for dialysis, f/u with nephro for recs

## 2025-03-21 NOTE — ASSESSMENT & PLAN NOTE
Patient with ORIANA on admission, most likely secondary to hypovolemia from GI bleed.  Creatinine has worsened send cardiac arrest but has been stable over the last few days.  Antibiotic dosages adjusted accordingly.  Monitor creatinine.  Nephrology follow-up.

## 2025-03-21 NOTE — ASSESSMENT & PLAN NOTE
Hypotension last 48 hours, now on pressors  Volume status hypervolemic  Echo 3/17/2025: EF 55%, unable to assess diastolic function  Home Rx: Metoprolol 25 mg twice daily  Previous Rx: Metoprolol 25 mg BID, Torsemide 20 mg OD.   Current Rx: Levophed  Remains off Bumex drip due to hypotension requiring pressors  Pressors per critical care  Avoid hypotension or large fluctuations in BP  Continue to monitor

## 2025-03-21 NOTE — ASSESSMENT & PLAN NOTE
Patient presents with 2 days of chest pain, shortness of breath, fatigue and melena  2/20/2025 EGD/colonoscopy showed 2.5 cm ileocecal mass, pathology confirmed adenocarcinoma.  S/p 3 units packed rbc in ED. S/p 1 unit PRBCs on 3/2  Hemoglobin has been stable.   Patient recent diagnosis of adenocarcinoma, increased risk of stroke.   Updated iron studies 3/2025 improved compared to 2/2025  Rapid response called 10:30A M [id number] for AMS and hypotension  Patient found to be minimally responsive and hypotensive  HGB 5.9 by iSTAT, formal labs show HGB 6.3, significant drop from 8 in AM  Bleeding from colonic mass suspected, per nursing no hematuria hemoptysis melena or other signs of overt bleeding prior to RR  Patient intubated for airway protection  Transported to ICU  MTP called  Code blue called  ROSC achieved shortly after blood products started  Rapidly distending abdomen, suspect intraabdominal bleeding     Hemoglobin   Date Value Ref Range Status   03/21/2025 8.6 (L) 12.0 - 17.0 g/dL Final        Plan:  Daily CBC  Transfuse for Hgb <7  Protonix 40 mg IV daily  Hold home eliquis, off for 48 hours at time of rapid response  Heparin gtt ordered but not started prior to rapid  Palliative care consulted prior to ICU admission  MTP [id number]  PRBC 12  FFP 3  Platelets 1  Cryo 0  Whole blood 3  Consider high volume bleeding scan  Surgery consult for suspected internal bleeding  Per surgery take to OR w/o scan  Continue monitoring H&H  Maintain hgb > 7  Hgb has been stable since surgery

## 2025-03-21 NOTE — PLAN OF CARE
Problem: Prexisting or High Potential for Compromised Skin Integrity  Goal: Skin integrity is maintained or improved  Description: INTERVENTIONS:  - Identify patients at risk for skin breakdown  - Assess and monitor skin integrity  - Assess and monitor nutrition and hydration status  - Monitor labs   - Assess for incontinence   - Turn and reposition patient  - Assist with mobility/ambulation  - Relieve pressure over bony prominences  - Avoid friction and shearing  - Provide appropriate hygiene as needed including keeping skin clean and dry  - Evaluate need for skin moisturizer/barrier cream  - Collaborate with interdisciplinary team   - Patient/family teaching  - Consider wound care consult   Outcome: Progressing     Problem: Potential for Falls  Goal: Patient will remain free of falls  Description: INTERVENTIONS:  - Educate patient/family on patient safety including physical limitations  - Instruct patient to call for assistance with activity   - Consult OT/PT to assist with strengthening/mobility   - Keep Call bell within reach  - Keep bed low and locked with side rails adjusted as appropriate  - Keep care items and personal belongings within reach  - Initiate and maintain comfort rounds  - Make Fall Risk Sign visible to staff  - Offer Toileting every  Hours, in advance of need  - Initiate/Maintain alarm  - Obtain necessary fall risk management equipment  - Apply yellow socks and bracelet for high fall risk patients  - Consider moving patient to room near nurses station  Outcome: Progressing     Problem: Nutrition/Hydration-ADULT  Goal: Nutrient/Hydration intake appropriate for improving, restoring or maintaining nutritional needs  Description: Monitor and assess patient's nutrition/hydration status for malnutrition. Collaborate with interdisciplinary team and initiate plan and interventions as ordered.  Monitor patient's weight and dietary intake as ordered or per policy. Utilize nutrition screening tool and  intervene as necessary. Determine patient's food preferences and provide high-protein, high-caloric foods as appropriate.     INTERVENTIONS:  - Monitor oral intake, urinary output, labs, and treatment plans  - Assess nutrition and hydration status and recommend course of action  - Evaluate amount of meals eaten  - Assist patient with eating if necessary   - Allow adequate time for meals  - Recommend/ encourage appropriate diets, oral nutritional supplements, and vitamin/mineral supplements  - Order, calculate, and assess calorie counts as needed  - Recommend, monitor, and adjust tube feedings and TPN/PPN based on assessed needs  - Assess need for intravenous fluids  - Provide specific nutrition/hydration education as appropriate  - Include patient/family/caregiver in decisions related to nutrition  Outcome: Progressing     Problem: GASTROINTESTINAL - ADULT  Goal: Minimal or absence of nausea and/or vomiting  Description: INTERVENTIONS:  - Administer IV fluids if ordered to ensure adequate hydration  - Maintain NPO status until nausea and vomiting are resolved  - Nasogastric tube if ordered  - Administer ordered antiemetic medications as needed  - Provide nonpharmacologic comfort measures as appropriate  - Advance diet as tolerated, if ordered  - Consider nutrition services referral to assist patient with adequate nutrition and appropriate food choices  Outcome: Progressing  Goal: Maintains or returns to baseline bowel function  Description: INTERVENTIONS:  - Assess bowel function  - Encourage oral fluids to ensure adequate hydration  - Administer IV fluids if ordered to ensure adequate hydration  - Administer ordered medications as needed  - Encourage mobilization and activity  - Consider nutritional services referral to assist patient with adequate nutrition and appropriate food choices  Outcome: Progressing  Goal: Maintains adequate nutritional intake  Description: INTERVENTIONS:  - Monitor percentage of each meal  consumed  - Identify factors contributing to decreased intake, treat as appropriate  - Assist with meals as needed  - Monitor I&O, weight, and lab values if indicated  - Obtain nutrition services referral as needed  Outcome: Progressing  Goal: Establish and maintain optimal ostomy function  Description: INTERVENTIONS:  - Assess bowel function  - Encourage oral fluids to ensure adequate hydration  - Administer IV fluids if ordered to ensure adequate hydration   - Administer ordered medications as needed  - Encourage mobilization and activity  - Nutrition services referral to assist patient with appropriate food choices  - Assess stoma site  - Consider wound care consult   Outcome: Progressing  Goal: Oral mucous membranes remain intact  Description: INTERVENTIONS  - Assess oral mucosa and hygiene practices  - Implement preventative oral hygiene regimen  - Implement oral medicated treatments as ordered  - Initiate Nutrition services referral as needed  Outcome: Progressing     Problem: HEMATOLOGIC - ADULT  Goal: Maintains hematologic stability  Description: INTERVENTIONS  - Assess for signs and symptoms of bleeding or hemorrhage  - Monitor labs  - Administer supportive blood products/factors as ordered and appropriate  Outcome: Progressing     Problem: SAFETY,RESTRAINT: NV/NON-SELF DESTRUCTIVE BEHAVIOR  Goal: Remains free of harm/injury (restraint for non violent/non self-detsructive behavior)  Description: INTERVENTIONS:  - Instruct patient/family regarding restraint use   - Assess and monitor physiologic and psychological status   - Provide interventions and comfort measures to meet assessed patient needs   - Identify and implement measures to help patient regain control  - Assess readiness for release of restraint   Outcome: Progressing  Goal: Returns to optimal restraint-free functioning  Description: INTERVENTIONS:  - Assess the patient's behavior and symptoms that indicate continued need for restraint  - Identify  and implement measures to help patient regain control  - Assess readiness for release of restraint   Outcome: Progressing

## 2025-03-21 NOTE — ASSESSMENT & PLAN NOTE
Was being diuresed before cardiac arrest on 3/15/25.   CTC 3/20 with moderate bilateral pleural effusions with dependent atelectasis, resolved pulmonary edema  Diuretics resumed 3/17/2025 and was on Bumex drip which is currently on hold due to hypotensio

## 2025-03-21 NOTE — ASSESSMENT & PLAN NOTE
Patient sustained cardiac arrest in the setting of an undetectable hemoglobin on 3/15 and was taken emergently to the operating room for exploration in the setting of abdominal free fluid.  Large volume hemoperitoneum was encountered with evidence of a actively bleeding/decompressing mesenteric hematoma; bleeding control was obtained and on 3/15 taken to OR for a segmental small bowel resection was performed and the patient was left in discontinuity given ongoing instability.  S/p SB anastomosis, R hemicolectomy, closure on 3/16.    Back on pressors overnight, max of 7, now levo at 2    Moving his head and intermittently twitching his extremities, not following commands off of sedation    Plan  - Continue NG tube to suction/n.p.o. given emesis and no significant bowel function  - Appreciate neurology recommendations   - Avoid hypotension, optimize BP management  - Heparin gtt in setting of embolic infarcts  - Continue TPN  - Wean vent as tolerated  - Continue recommendations from cardiology and nephrology for ongoing diuresis  - Rest of care per ICU

## 2025-03-21 NOTE — PROGRESS NOTES
Progress Note - Nephrology   Name: Devin Chaves 79 y.o. male I MRN: [id number]  Unit/Bed#: ICU 10 I Date of Admission: 2/28/2025   Date of Service: 3/21/2025 I Hospital Day: 21       Brief Hx: 79-year-old male with HTN, CHF, CVA, AF on Eliquis, COPD, colon adenocarcinoma, MSSA bacteremia presenting from rehab facility with symptomatic anemia (Hgb 4.6).  Nephrology consulted for management of ORIANA.   Assessment & Plan  ORIANA (acute kidney injury) (HCC)  Etiology: Suspect ATN in setting of prolonged prerenal azotemia from volume depletion and severe anemia  Baseline creatinine 0.8 - 0.9.   Admission SCr 2.43 on 2/28/2025  SCr had remained around 1.8-2 since admission but then worsened on 3/17/2025 after cardiac arrest on 3/15/25 and emergent ex lap and then subsequent return to OR 3/16 for R hemicolectomy  Peak creatinine 4.31  on 3/20/2025, trending  Today's creatinine 4.28, trending up  CT 2/28/25 - no hydronephrosis. UA on 3/8/25 with RBC 2-4, WBC 2-4, small blood and trace protein.   Of note, UPC ratio was 3.4 gm on 3/8/25 and urine ACR was only 821 mg on 3/5/25.   Serum and urine LIDYA no M spike, C3 83 (low), C4 26. At risk for AIN due to antibiotic exposure but no clear indication of this as well.    Remains volume overloaded with decreasing urine output and requiring pressors.  Renal function continues to worsen with ongoing fluid volume overload  High risk for need for initiation of renal replacement therapy if refractory volume overload  Hemodialysis/CRRT, including risks, benefits, d/w patient's wife, Francia, yesterday via phone.  She expressed understanding and wishes to proceed with all treatments including dialysis if deemed necessary.  Hemodialysis consent obtained 3/20 and on chart.  Avoid nephrotoxins, NSAIDs, IV contrast if possible  Avoid hypotension, maintain MAP >65  Trend BMP  Dose all medications per EGFR  Palliative care following    Volume overload  Refractory volume overload  S/p intermittent  IV lasix/bumex and Bumex drip.  Currently off since early 3/19 due to  hypotension  May need to consider CRRT for UF if refractory overload  Low serum bicarbonate  In the setting of acute renal failure  Serum bicarb 20, AG 13  Continue to monitor  HTN (hypertension)  Hypotension last 48 hours, now on pressors  Volume status hypervolemic  Echo 3/17/2025: EF 55%, unable to assess diastolic function  Home Rx: Metoprolol 25 mg twice daily  Previous Rx: Metoprolol 25 mg BID, Torsemide 20 mg OD.   Current Rx: Levophed  Remains off Bumex drip due to hypotension requiring pressors  Pressors per critical care  Avoid hypotension or large fluctuations in BP  Continue to monitor    Acute blood loss anemia  Acute blood loss on 3/15/25 and received > 10 units PRBC.   S/p emergent ex lap and control of bleeding  Hgb 8.6 today, below goal and trending down   Hgb monitoring per primary service.     MSSA bacteremia  Currently on Cefazolin 2 gm IV q8H - needs this until 3/27/25.   ID following.   Possible cutaneous source  TTE without vegetation.     Urinary retention  Seen by urology this admission.   Now with glass.   Tamsulosin on hold.     Colonic mass  Newly diagnosed colon mass on C-scope on 2/20/25.   Pathology - adenocarcinoma  Was to follow colorectal as outpatient  S/p ex lap, partial SBR and R colectomy 3/16/2025  Surgical pathology pending  Management per surgery    Pleural effusion, bilateral  Was being diuresed before cardiac arrest on 3/15/25.   CTC 3/20 with moderate bilateral pleural effusions with dependent atelectasis, resolved pulmonary edema  Diuretics resumed 3/17/2025 and was on Bumex drip which is currently on hold due to hypotensio    Cardiac arrest (HCC)  S/p cardiac arrest with ROSC 3/15/2025  In the setting of ABLA with undetectable Hgb and hemoperitoneum, s/p emergent ex lap, segmental SBR 3/15  Echo 3/17/2025: EF 55%  Management per primary team    Hemoperitoneum  Resolved  With actively bleeding  mesenteric hematoma  S/p emergent ex lap, control of bleeding, segmental SBR and left in discontinuity 3/15  Now s/p partial SBR, R hemicolectomy and closure 3/16/2025  Management per surgery  Atrial fibrillation (HCC)  Previously on Metoprolol for rate control, currently on hold  RVR  On Eliquis for AC prehospital  On heparin drip for embolic infarcts    Encephalopathy  Concern for anoxic brain injury s/p cardiac arrest with new embolic infarcts  CTH with 4 mm focus of high attenuation right frontal lobe without evidence of anoxic brain injury, stable chronic ischemic changes.  vEEG monitoring with no evidence of seizure  Brain MRI 3/18/2025: New nearly symmetric cortical restricted diffusion in bilateral frontal, bilateral parietal and bilateral occipital lobes suspicious for hypoxic ischemic encephalopathy.  New scattered small acute infarcts in left posterior frontal and bilateral cerebellar lobes favoring embolic infarcts.  Repeat CTH 3/19 stable without hemorrhage  On heparin drip  Neurology following  Management per neurology and primary team  Embolic cerebral infarction (HCC)  Brain MRI 3/18/2025: New nearly symmetric cortical restricted diffusion in bilateral frontal, bilateral parietal and bilateral occipital lobes suspicious for hypoxic ischemic encephalopathy.  New scattered small acute infarcts in left posterior frontal and bilateral cerebellar lobes favoring embolic infarcts  Now on heparin drip  repeat CTH 3/19/2025 with stable infarcts and no acute hemorrhage  Avoid hypotension  Neurology following  Management per neurology and primary team    Plan Summary:  -Renal function continues to worsen with persistent volume overload  -Pressors per primary team  -may need to consider CRRT if continuing worsening renal function and volume overload.  Will discuss with Dr. Perry and primary team    SUBJECTIVE:  Patient remains intubated on vent, unresponsive.  Volume overload persists.  Creatinine continues to  trend up.  Now requiring pressors    A complete review of systems was unable to performed due to intubation and encephalopathy, unresponsive.  OBJECTIVE:  Current Weight: Weight - Scale: 101 kg (222 lb 14.2 oz)  Vitals:    03/21/25 0540 03/21/25 0550 03/21/25 0552 03/21/25 0600   BP:    140/66   Pulse: (!) 111 (!) 117  (!) 117   Resp: (!) 36 (!) 33  (!) 33   Temp:       TempSrc:       SpO2: 95% 98%  98%   Weight:   101 kg (222 lb 14.2 oz)    Height:           Intake/Output Summary (Last 24 hours) at 3/21/2025 0738  Last data filed at 3/21/2025 0600  Gross per 24 hour   Intake 3962.56 ml   Output 1080 ml   Net 2882.56 ml       General: Intubated on vent, unresponsive and in no acute distress  Skin:  No rash, warm, good skin turgor   Eyes:  sclerae nonicteric.  no periorbital edema   ENT:  Moist mucous membranes. +ETT in place  Neck:  Trachea midline, symmetric.  No JVD.  Chest: Bilateral scattered rhonchi, bibasilar crackles  CVS:  Regular rate and rhythm without murmur, gallop or rub.  S1 and S2 identified and normal.  No S3, S4.   Abdomen:  Soft, nontender, nondistended without masses.  hypoactive bowel sounds x 4 quadrants.  No bruit. Midline incision  Extremities:  Warm, pink,  No cyanosis, pallor.  2-3+ BLE edema.  Neuro: Intubated on vent, unresponsive  Psych: Intubated on vent, unresponsive  : glass catheter in place       Medications:    Current Facility-Administered Medications:     Adult 3-in-1 TPN (custom base / custom electrolytes), , Intravenous, Continuous TPN, Sailaja Gonzales MD, Last Rate: 87.4 mL/hr at 03/20/25 2058, New Bag at 03/20/25 2058    [Held by provider] ascorbic acid (VITAMIN C) tablet 250 mg, 250 mg, Oral, Daily, Yobany Mott MD, 250 mg at 03/14/25 0848    [Held by provider] atorvastatin (LIPITOR) tablet 40 mg, 40 mg, Oral, Daily With Dinner, Yobany Mott MD, 40 mg at 03/13/25 1626    budesonide (PULMICORT) inhalation solution 0.5 mg, 0.5 mg, Nebulization, Q12H, Libby NOGUERA  AMPARO Cheng, 0.5 mg at 03/20/25 2024    bumetanide (BUMEX) 12.5 mg infusion 50 mL, 2 mg/hr, Intravenous, Continuous, AMPARO Umaña, Stopped at 03/20/25 0115    ceFAZolin (ANCEF) IVPB (premix in dextrose) 2,000 mg 50 mL, 2,000 mg, Intravenous, Q8H, Yobany Mott MD, Last Rate: 100 mL/hr at 03/21/25 0504, 2,000 mg at 03/21/25 0504    chlorhexidine (PERIDEX) 0.12 % oral rinse 15 mL, 15 mL, Mouth/Throat, Q12H GALE, Yobany Mott MD, 15 mL at 03/20/25 2057    [Held by provider] Cholecalciferol (VITAMIN D3) tablet 2,000 Units, 2,000 Units, Oral, Daily, Yobany oMtt MD, 2,000 Units at 03/14/25 0848    [Held by provider] cyanocobalamin (VITAMIN B-12) tablet 100 mcg, 100 mcg, Oral, Daily, Yobany Mott MD, 100 mcg at 03/14/25 0848    docusate (COLACE) oral liquid 100 mg, 100 mg, Oral, BID, AMPARO Garcia, 100 mg at 03/19/25 1708    [Held by provider] ferrous sulfate tablet 325 mg, 325 mg, Oral, Daily With Breakfast, Yobany Mott MD    [Held by provider] fluticasone (ARNUITY ELLIPTA) 100 MCG/ACT inhaler 1 puff, 1 puff, Inhalation, Daily, Yobany Mott MD, 1 puff at 03/15/25 0925    [Held by provider] folic acid (FOLVITE) tablet 1 mg, 1 mg, Oral, Daily, Yobany Mott MD, 1 mg at 03/14/25 0848    heparin (porcine) 25,000 units in 0.45% NaCl 250 mL infusion (premix), 3-24 Units/kg/hr (Order-Specific), Intravenous, Titrated, AMPARO Calvert, Last Rate: 5.7 mL/hr at 03/21/25 0513, 6 Units/kg/hr at 03/21/25 0513    HYDROmorphone (DILAUDID) injection 0.5 mg, 0.5 mg, Intravenous, Q3H PRN, AMPARO Herrera    [Held by provider] hydroxychloroquine (PLAQUENIL) tablet 400 mg, 400 mg, Oral, Daily With Breakfast, Yobany Mott MD, 400 mg at 03/14/25 0848    insulin lispro (HumALOG/ADMELOG) 100 units/mL subcutaneous injection 2-12 Units, 2-12 Units, Subcutaneous, Q6H GALE, 2 Units at 03/21/25 0510 **AND** Fingerstick Glucose (POCT), , , Q6H, Abiola LAMBERT  AMPARO Wallace    ipratropium (ATROVENT) 0.02 % inhalation solution 0.5 mg, 0.5 mg, Nebulization, TID, AMPARO Umaña, 0.5 mg at 03/20/25 2024    levalbuterol (XOPENEX) inhalation solution 1.25 mg, 1.25 mg, Nebulization, TID, AMPARO Umaña, 1.25 mg at 03/20/25 2024    lidocaine (LIDODERM) 5 % patch 3 patch, 3 patch, Topical, Daily, Yobany Mott MD, 3 patch at 03/19/25 0810    [Held by provider] metoprolol tartrate (LOPRESSOR) tablet 25 mg, 25 mg, Oral, Q12H GALE, Yobany Mott MD, 25 mg at 03/14/25 0848    moisture barrier miconazole 2% cream (aka HITESH MOISTURE BARRIER ANTIFUNGAL CREAM), , Topical, BID, Yobany Mott MD, Given at 03/20/25 1736    NOREPINEPHRINE 4 MG  ML NSS (CMPD ORDER) infusion, 1-30 mcg/min, Intravenous, Titrated, Sailaja Gonzales MD, Last Rate: 15 mL/hr at 03/21/25 0709, 4 mcg/min at 03/21/25 0709    [Held by provider] oxyCODONE (ROXICODONE) IR tablet 5 mg, 5 mg, Oral, Q6H PRN, Yobany Mott MD, 5 mg at 03/01/25 1817    [Held by provider] oxyCODONE (ROXICODONE) split tablet 2.5 mg, 2.5 mg, Oral, Q6H PRN, Yobany Mott MD, 2.5 mg at 03/05/25 0945    pantoprazole (PROTONIX) injection 40 mg, 40 mg, Intravenous, Q12H GALE, Yobany Mott MD, 40 mg at 03/20/25 2057    polyethylene glycol (MIRALAX) packet 17 g, 17 g, Oral, Daily, AMPARO Calvert, 17 g at 03/19/25 0820    [Held by provider] potassium chloride (Klor-Con M20) CR tablet 20 mEq, 20 mEq, Oral, Daily, Yobany Mott MD    senna oral syrup 17.6 mg, 17.6 mg, Oral, HS, AMPARO Calvert, 17.6 mg at 03/20/25 2101    [Held by provider] tamsulosin (FLOMAX) capsule 0.4 mg, 0.4 mg, Oral, Daily With Dinner, Yobany Mott MD, 0.4 mg at 03/13/25 1626    [Held by provider] torsemide (DEMADEX) tablet 20 mg, 20 mg, Oral, Daily, Yobany Mott MD, 20 mg at 03/14/25 0848    [Held by provider] umeclidinium-vilanterol 62.5-25 mcg/actuation inhaler 1 puff, 1 puff, Inhalation, Daily,  Yobany Mott MD, 1 puff at 03/15/25 0925    Laboratory Results:  Results from last 7 days   Lab Units 03/21/25  0428 03/20/25  2137 03/20/25  1735 03/20/25  1558 03/20/25  1238 03/20/25  0948 03/20/25  0436 03/19/25  2322 03/19/25  1704 03/19/25  1502 03/19/25  0921 03/19/25  0451 03/18/25  2142 03/18/25  1719 03/16/25  1533 03/16/25  1327 03/16/25  1215 03/15/25  1654 03/15/25  1442 03/15/25  1345 03/15/25  1235 03/15/25  1154 03/15/25  1045 03/15/25  1042   WBC Thousand/uL 10.31*  --  8.68  --  7.73  --  8.75  --  8.30  --   --  6.69  --  8.29   < >  --   --    < >  --   --    < >  --    < >  --    HEMOGLOBIN g/dL 8.6*  --  8.7*  --  8.8*  --  9.5*  --  10.1* 10.3*  --  9.7*   < > 10.3*   < >  --   --    < >  --   --    < >  --    < >  --    I STAT HEMOGLOBIN g/dl  --   --   --   --   --   --   --   --   --   --   --   --   --   --   --  10.9* 10.2*  --  10.9* 11.6*  --   --   --  5.4*   HEMATOCRIT % 26.9*  --  27.0*  --  28.1*  --  29.8*  --  33.0* 33.3*  --  29.9*   < > 32.5*   < >  --   --    < >  --   --    < >  --    < >  --    HEMATOCRIT, ISTAT %  --   --   --   --   --   --   --   --   --   --   --   --   --   --   --  32* 30*  --  32* 34*  --  <15*  --  16*   PLATELETS Thousands/uL 143*  --  126*  --  103*  --  99*  --  110*  --   --  74*  --  86*   < >  --   --    < >  --   --    < >  --    < >  --    SODIUM mmol/L 139 140  --  139 139 139 141 141 141  --    < >  --    < >  --    < >  --   --    < >  --   --    < >  --    < >  --    POTASSIUM mmol/L 3.9 3.7  --  3.7 3.8 4.0 3.9 4.0 3.8  --    < >  --    < >  --    < >  --   --    < >  --   --    < >  --    < >  --    CHLORIDE mmol/L 105 105  --  106 106 107 107 107 109*  --    < >  --    < >  --    < >  --   --    < >  --   --    < >  --    < >  --    CO2 mmol/L 20* 20*  --  21 21 21 23 22 23  --    < >  --    < >  --    < >  --   --    < >  --   --    < >  --    < >  --    CO2, I-STAT mmol/L  --   --   --   --   --   --   --   --   --   --   --    --   --   --   --  29 27  --  24 23  --  28  --  21   BUN mg/dL 115* 109*  --  104* 106* 104* 96* 98* 90*  --    < >  --    < >  --    < >  --   --    < >  --   --    < >  --    < >  --    CREATININE mg/dL 4.28* 4.31*  --  4.18* 4.11* 4.06* 3.91* 4.08* 3.73*  --    < >  --    < >  --    < >  --   --    < >  --   --    < >  --    < >  --    CALCIUM mg/dL 7.2* 7.1*  --  7.1* 7.4* 7.4* 7.3* 7.6* 7.4*  --    < >  --    < >  --    < >  --   --    < >  --   --    < >  --    < >  --    MAGNESIUM mg/dL 2.1 2.1  --  2.2  --  2.2 2.1 2.3 2.2  --    < >  --    < >  --    < >  --   --    < >  --   --   --   --    < >  --    PHOSPHORUS mg/dL 4.8* 5.2*  --  5.1*  --  5.2* 5.0* 4.9* 4.7*  --    < >  --    < >  --    < >  --   --    < >  --   --   --   --   --   --    GLUCOSE, ISTAT mg/dl  --   --   --   --   --   --   --   --   --   --   --   --   --   --   --  137 124  --  181* 185*  --  159*  --  314*    < > = values in this interval not displayed.       I have personally reviewed the blood work as stated above and in my note.  I have personally reviewed internal Medicine, co-consultants and previous nephrology notes.

## 2025-03-21 NOTE — TELEPHONE ENCOUNTER
Patients wife Millicent calling. Granddaughter was there to visit patient since he is currently in ICU. She needs a note for school for Saturday-Tuesday 03/15-03/19/25 visiting from [place]. Patient was seen in hospital by Dr. Watts and Dr. Hughes. If note can be faxed to [phone] for Florecita Chaves in attention to Student Affairs. Please ensure the name is on the letter. Patients grand-daughter needs the letter as soon as possible.    Can be emailed to [email or web address] and phone number [phone]

## 2025-03-21 NOTE — PHYSICAL THERAPY NOTE
Physical Therapy Cancellation Note     03/21/25 1033   Note Type   Note Type Cancelled Session   Cancel Reasons Other  (PT consult remains active - per ICU mobility rounds pt remains inappropriate for participation in PT services. D/t multiple medical cancels, will discontinue current PT orders. Please reconsult when pt becomes more medically appropriate to participate.)       Melissa Tavarez, PT, DPT   Available via [employer]  NPI # [id number]  [place] License - [id number]  3/21/2025

## 2025-03-21 NOTE — ASSESSMENT & PLAN NOTE
Etiology: Suspect ATN in setting of prolonged prerenal azotemia from volume depletion and severe anemia  Baseline creatinine 0.8 - 0.9.   Admission SCr 2.43 on 2/28/2025  SCr had remained around 1.8-2 since admission but then worsened on 3/17/2025 after cardiac arrest on 3/15/25 and emergent ex lap and then subsequent return to OR 3/16 for R hemicolectomy  Peak creatinine 4.31  on 3/20/2025, trending  Today's creatinine 4.28, trending up  CT 2/28/25 - no hydronephrosis. UA on 3/8/25 with RBC 2-4, WBC 2-4, small blood and trace protein.   Of note, UPC ratio was 3.4 gm on 3/8/25 and urine ACR was only 821 mg on 3/5/25.   Serum and urine LIDYA no M spike, C3 83 (low), C4 26. At risk for AIN due to antibiotic exposure but no clear indication of this as well.    Remains volume overloaded with decreasing urine output and requiring pressors.  Renal function continues to worsen with ongoing fluid volume overload  High risk for need for initiation of renal replacement therapy if refractory volume overload  Hemodialysis/CRRT, including risks, benefits, d/w patient's wife, Francia, yesterday via phone.  She expressed understanding and wishes to proceed with all treatments including dialysis if deemed necessary.  Hemodialysis consent obtained 3/20 and on chart.  Avoid nephrotoxins, NSAIDs, IV contrast if possible  Avoid hypotension, maintain MAP >65  Trend BMP  Dose all medications per EGFR  Palliative care following

## 2025-03-21 NOTE — PROCEDURES
Temporary HD Catheter    Date/Time: 3/21/2025 5:54 PM    Performed by: Sailaja Gonzales MD  Authorized by: Sailaja Gonzales MD    Patient location:  Bedside  Other Assisting Provider: Yes (comment) (Karl Kumar)    Consent:     Consent obtained:  Verbal and written    Consent given by:  Spouse    Risks discussed:  Arterial puncture, incorrect placement, bleeding, infection and nerve damage    Alternatives discussed:  No treatment, delayed treatment and alternative treatment  Universal protocol:     Procedure explained and questions answered to patient or proxy's satisfaction: yes      Relevant documents present and verified: yes      Test results available and properly labeled: yes      Radiology Images displayed and confirmed.  If images not available, report reviewed: yes      Required blood products, implants, devices, and special equipment available: yes      Site/side marked: yes      Immediately prior to procedure, a time out was called: yes      Patient identity confirmed:  Arm band  Pre-procedure details:     Hand hygiene: Hand hygiene performed prior to insertion      Sterile barrier technique: All elements of maximal sterile technique followed      Skin preparation:  2% chlorhexidine    Skin preparation agent: Skin preparation agent completely dried prior to procedure    Indications:     Central line indications: dialysis    Anesthesia (see MAR for exact dosages):     Anesthesia method:  Local infiltration    Local anesthetic:  Lidocaine 1% w/o epi  Procedure details:     Location:  Left femoral    Vessel type: vein      Laterality:  Left    Approach: percutaneous technique used      Patient position:  Flat    Catheter type:  Double lumen    Catheter size:  14 Fr    Catheter length:  15 cm    Landmarks identified: yes      Ultrasound guidance: yes      Ultrasound image availability:  Not saved    Sterile ultrasound techniques: Sterile gel and sterile probe covers were used      Number of attempts:  2    Successful  placement: yes (checked with 2 views w/ US, manometry showed venous pressure)    Post-procedure details:     Post-procedure:  Dressing applied and line sutured    Assessment:  Blood return through all ports    Post-procedure complications: none      Patient tolerance of procedure:  Tolerated well, no immediate complications

## 2025-03-21 NOTE — PROGRESS NOTES
Progress Note - Surgery-General   Name: Deivn Chaves 79 y.o. male I MRN: [id number]  Unit/Bed#: ICU 10 I Date of Admission: 2/28/2025   Date of Service: 3/21/2025 I Hospital Day: 21    Assessment & Plan  Acute blood loss anemia  Patient sustained cardiac arrest in the setting of an undetectable hemoglobin on 3/15 and was taken emergently to the operating room for exploration in the setting of abdominal free fluid.  Large volume hemoperitoneum was encountered with evidence of a actively bleeding/decompressing mesenteric hematoma; bleeding control was obtained and on 3/15 taken to OR for a segmental small bowel resection was performed and the patient was left in discontinuity given ongoing instability.  S/p SB anastomosis, R hemicolectomy, closure on 3/16.    Back on pressors overnight, max of 7, now levo at 2    Moving his head and intermittently twitching his extremities, not following commands off of sedation    Plan  - Continue NG tube to suction/n.p.o. given emesis and no significant bowel function  - Appreciate neurology recommendations   - Avoid hypotension, optimize BP management  - Heparin gtt in setting of embolic infarcts  - Continue TPN  - Wean vent as tolerated  - Continue recommendations from cardiology and nephrology for ongoing diuresis  - Rest of care per ICU  HTN (hypertension)    CAD (coronary artery disease)    COPD (chronic obstructive pulmonary disease) ([facility])    History of CVA (cerebrovascular accident)    Acute on chronic respiratory failure with hypoxia (HCC)    Atrial fibrillation (HCC)    Urinary retention    MSSA bacteremia    Neck pain    Colonic mass    ORIANA (acute kidney injury) ([facility])    Pleural effusion, bilateral    Dysphagia    Severe protein-calorie malnutrition (HCC)  Malnutrition Findings:   Adult Malnutrition type: Acute illness  Adult Degree of Malnutrition: Other severe protein calorie malnutrition  Malnutrition Characteristics: Inadequate energy, Fluid accumulation                   360 Statement: related to inadequate energy/protein intake as evidenced by consuming < 50% of energy intake compared to estimated needs for > 5 days and B/L LE +3 edema. Treated with ONS.    BMI Findings:           Body mass index is 30.08 kg/m².     Hallucinations, visual    Hemorrhagic shock (HCC)    Cardiac arrest (HCC)    Hemoperitoneum    Encephalopathy    Cardiac arrest due to other underlying condition (HCC)    Palliative care by specialist    Counseling regarding advance care planning and goals of care    Elevated troponin    Embolic cerebral infarction (HCC)    Volume overload          Subjective   Intubated.  Encephalopathic    Objective :  Temp:  [98.4 °F (36.9 °C)-99.1 °F (37.3 °C)] 99.1 °F (37.3 °C)  HR:  [] 104  BP: ()/(32-73) 112/57  Resp:  [22-39] 30  SpO2:  [94 %-100 %] 94 %  O2 Device: Ventilator  FiO2 (%):  [40] 40    I/O         03/19 0701  03/20 0700 03/20 0701  03/21 0700    I.V. (mL/kg) 359 (3.6) 1278.9 (12.7)    NG/GT  65    IV Piggyback 1250 371.7    TPN 2173.5 1751    Feedings 159     Total Intake(mL/kg) 3941.5 (39) 3466.6 (34.3)    Urine (mL/kg/hr) 2175 (0.9) 230 (0.1)    Emesis/NG output 1550 775    Total Output 3725 1005    Net +216.5 +2461.6                Lines/Drains/Airways       Active Status       Name Placement date Placement time Site Days    PICC Line 02/26/25 Right Brachial 02/26/25  0548  Brachial  22    CVC Central Lines 03/15/25 Triple 03/15/25  1338  --  5    Arterial Line 03/20/25 Radial 03/20/25  1347  Radial  less than 1    ETT  Cuffed 8 mm 03/15/25  1200  -- 5    Urethral Catheter Latex 16 Fr. 03/15/25  1336  Latex  5    NG/OG Tube Nasogastric Left nare 03/15/25  1700  Left nare  5                  Physical Exam  Vitals and nursing note reviewed.   Constitutional:       General: He is not in acute distress.     Appearance: He is well-developed. He is ill-appearing.   HENT:      Head: Normocephalic and atraumatic.   Eyes:      Conjunctiva/sclera:  Conjunctivae normal.   Cardiovascular:      Rate and Rhythm: Normal rate and regular rhythm.      Heart sounds: No murmur heard.  Pulmonary:      Effort: Pulmonary effort is normal. No respiratory distress.      Breath sounds: Normal breath sounds.   Abdominal:      General: There is distension.      Palpations: Abdomen is soft.      Tenderness: There is no abdominal tenderness.   Musculoskeletal:         General: No swelling.      Cervical back: Neck supple.   Skin:     General: Skin is warm and dry.      Capillary Refill: Capillary refill takes less than 2 seconds.   Neurological:      Mental Status: He is alert.           Lab Results: I have reviewed the following results:  Recent Labs     03/18/25  0453 03/18/25  0825 03/18/25  1719 03/18/25  2142 03/19/25  0451 03/19/25  0921 03/20/25  0436 03/20/25  0948 03/20/25  1238 03/20/25  1558 03/20/25  1735 03/20/25  2137   WBC 10.78*  --  8.29  --  6.69   < > 8.75  --  7.73  --  8.68  --    HGB 10.6*   < > 10.3*   < > 9.7*   < > 9.5*  --  8.8*  --  8.7*  --    HCT 32.4*   < > 32.5*   < > 29.9*   < > 29.8*  --  28.1*  --  27.0*  --    PLT 90*  --  86*  --  74*   < > 99*  --  103*  --  126*  --    BANDSPCT  --   --   --   --  2  --   --   --   --   --   --   --    SODIUM 145   < >  --    < >  --    < > 141   < > 139   < >  --  140   K 3.8   < >  --    < >  --    < > 3.9   < > 3.8   < >  --  3.7   *   < >  --    < >  --    < > 107   < > 106   < >  --  105   CO2 27   < >  --    < >  --    < > 23   < > 21   < >  --  20*   BUN 63*   < >  --    < >  --    < > 96*   < > 106*   < >  --  109*   CREATININE 2.89*   < >  --    < >  --    < > 3.91*   < > 4.11*   < >  --  4.31*   GLUC 187*   < >  --    < >  --    < > 181*   < > 221*   < >  --  156*   CAIONIZED 1.12  --   --   --   --   --   --   --   --   --   --   --    MG 1.9   < >  --    < >  --    < > 2.1   < >  --    < >  --  2.1   PHOS 4.0  --   --    < >  --    < > 5.0*   < >  --    < >  --  5.2*   AST 26  --   --   --   20  --   --   --   --   --   --   --    ALT <3*  --   --   --  <3*  --   --   --   --   --   --   --    ALB 2.1*  --   --   --  2.1*  --   --   --   --   --   --   --    TBILI 0.38  --   --   --  0.34  --   --   --   --   --   --   --    ALKPHOS 53  --   --   --  50  --   --   --   --   --   --   --    PTT  --   --  44*   < >  --    < > 69*  --   --   --   --   --    INR  --   --  1.19  --   --   --   --   --   --   --   --   --    LACTICACID  --   --   --   --   --    < >  --   --  1.1  --   --   --     < > = values in this interval not displayed.

## 2025-03-21 NOTE — ASSESSMENT & PLAN NOTE
Malnutrition Findings:   Adult Malnutrition type: Acute illness  Adult Degree of Malnutrition: Other severe protein calorie malnutrition  Malnutrition Characteristics: Inadequate energy, Fluid accumulation  360 Statement: related to inadequate energy/protein intake as evidenced by consuming < 50% of energy intake compared to estimated needs for > 5 days and B/L LE +3 edema. Treated with ONS.  BMI Findings:  Body mass index is 25.09 kg/m².      Plan:  Continue TPN and trickle TF per surgery     360 Statement: related to inadequate energy/protein intake as evidenced by consuming < 50% of energy intake compared to estimated needs for > 5 days and B/L LE +3 edema. Treated with ONS.    BMI Findings:    Body mass index is 30.23 kg/m².

## 2025-03-21 NOTE — PROGRESS NOTES
Progress Note - Critical Care/ICU   Name: Devin Chaves 79 y.o. male I MRN: [id number]  Unit/Bed#: ICU 10 I Date of Admission: 2/28/2025   Date of Service: 3/21/2025 I Hospital Day: 21       Assessment & Plan  Hemorrhagic shock (HCC)  Code crimson 3/15 for hemorrhagic shock related to mesenteric hematoma s/p SBR and colon resection   S/p blood resuscitation with 12 U PRBC, 6 FFP, 2 Plt, 2 Cryo   Now off pressors and HD stable   Trend Hgb, transfuse for Hgb<7 or active bleeding with HD instability   Maintain MAP>65  Cardiac arrest (HCC)  Likely in the setting of hemorrhagic shock  Noted 12 minutes of downtime, received aggressive blood resuscitation and surgical intervention as above   Now with ongoing encephalopathy concerning for anoxic injury   Continue close HD and telemetry monitoring   Trend Hgb as above   Encephalopathy  Concern for anoxic injury s/p cardiac arrest with 12 minute downtime   EEG without seizure activity   CTH shows 4mm focus of high attenuation in right frontal lobe which is stable on repeat CTH   MRI with multiple areas of hypoxia related injury as well as acute embolic strokes   Hold all sedation, PRN dilaudid for pain management   Frequent neuro checks   Maintain normothermia and normoglycemia   Neurology following, appreciate recommendations   Embolic cerebral infarction (HCC)  New areas of embolic stroke noted on MRI 3/18  Neurology following, appreciate recommendations  Continue ischemic stroke heparin gtt  Consider restarting statin   Frequent neuro checks    Repeat CTH after ptt therapeutic x 2   Acute on chronic respiratory failure with hypoxia (HCC)  Remains intubated for airway protection post cardiac arrest and postoperatively with poor neuro exam   Continue mechanical ventilation ACVC 12/470/40/6, titrate FiO2 for SpO2>88  At baseline requires 2-4L NC   Monitor off of sedation, PRN dilaudid for pain management   Daily SBT   Vent bundle   ORIANA (acute kidney injury)  (HCC)  Creatinine   Date Value Ref Range Status   03/21/2025 4.28 (H) 0.60 - 1.30 mg/dL Final     Comment:     Standardized to IDMS reference method   09/25/2022 1 0.6 - 1.2 mg/dL Final      Likely initially in the setting of hemorrhagic shock, now with ATN   Baseline Cr 0.8   Nephrology following, appreciate recommendations  Continue bumex infusion, could consider dose of metolazone as well for goal negative 500ml-1L over next 24 hours   Monitor and replete electrolytes Q6H  Monitor I/O and renal indices   Continues to have low urine output and anasarca with hypotension  Nephro consented family for dialysis, f/u with nephro for recs  HTN (hypertension)  Home regimen: Metoprolol tartrate 25 mg BID, Losartan 25 mg QD for HTN  Consider restarting home metoprolol   Hold home losartan in the setting of ORIANA   CAD (coronary artery disease)  With known LAD stenosis   Continue home BB and statin   Hold ASA, restart as appropriate   COPD (chronic obstructive pulmonary disease) ([facility])  Without acute exacerbation  Hold home inhalers while intubated  Continue scheduled xopenex, atrovent, budesonide nebs   History of CVA (cerebrovascular accident)  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   Now with new acute embolic strokes noted on MRI   Consider restarting home statin   Continue ischemic stroke heparin gtt   Hold ASA, restart as appropriate   Atrial fibrillation (HCC)  Currently rate controlled in NSR   Consider restarting home metoprolol for rate control   Hold home eliquis, currently on ischemic stroke heparin protocol   Urinary retention  Hold home flomax while NPO  Continue glass catheter for now   MSSA bacteremia  Noted on previous admission with likely cutaneous source   Continue cefazolin through 3/27 to complete course   ID following, appreciate recommendations   Neck pain  MRI of the neck done 2/14/2025 revealed cervical degenerative change with mild canal stenosis and mild to moderate foraminal  narrowing.  No cord compression.  Mild nonspecific edema within the right posterior breast dermal musculature of the upper cervicals spine.  Minimal peripheral enhancement is noted  Will need repeat MRI C spine with and without contrast prior to completion of abx - currently holding on this at this time due to ORIANA   Colonic mass  2/20/2025 EGD/colonoscopy showed 2.5 cm ileocecal mass, pathology confirmed adenocarcinoma  Now s/p ex lap SBR and R colon resection  Surgery following, appreciate recommendations   Pleural effusion, bilateral  CT A/P shows bilateral pleural effusions L>R, pulmonary vascular congestion     Plan:  Repeat imaging if pt's respiratory status worsens  Patient intubated  Dysphagia  Noted prior to intubation and cardiac arrest   Will need re-evaluation when able to take PO   Severe protein-calorie malnutrition (HCC)  Malnutrition Findings:   Adult Malnutrition type: Acute illness  Adult Degree of Malnutrition: Other severe protein calorie malnutrition  Malnutrition Characteristics: Inadequate energy, Fluid accumulation  360 Statement: related to inadequate energy/protein intake as evidenced by consuming < 50% of energy intake compared to estimated needs for > 5 days and B/L LE +3 edema. Treated with ONS.  BMI Findings:  Body mass index is 25.09 kg/m².      Plan:  Continue TPN and trickle TF per surgery     360 Statement: related to inadequate energy/protein intake as evidenced by consuming < 50% of energy intake compared to estimated needs for > 5 days and B/L LE +3 edema. Treated with ONS.    BMI Findings:    Body mass index is 30.23 kg/m².   Acute blood loss anemia  Patient presents with 2 days of chest pain, shortness of breath, fatigue and melena  2/20/2025 EGD/colonoscopy showed 2.5 cm ileocecal mass, pathology confirmed adenocarcinoma.  S/p 3 units packed rbc in ED. S/p 1 unit PRBCs on 3/2  Hemoglobin has been stable.   Patient recent diagnosis of adenocarcinoma, increased risk of stroke.    Updated iron studies 3/2025 improved compared to 2/2025  Rapid response called 10:30A M [id number] for AMS and hypotension  Patient found to be minimally responsive and hypotensive  HGB 5.9 by iSTAT, formal labs show HGB 6.3, significant drop from 8 in AM  Bleeding from colonic mass suspected, per nursing no hematuria hemoptysis melena or other signs of overt bleeding prior to RR  Patient intubated for airway protection  Transported to ICU  MTP called  Code blue called  ROSC achieved shortly after blood products started  Rapidly distending abdomen, suspect intraabdominal bleeding     Hemoglobin   Date Value Ref Range Status   03/21/2025 8.6 (L) 12.0 - 17.0 g/dL Final        Plan:  Daily CBC  Transfuse for Hgb <7  Protonix 40 mg IV daily  Hold home eliquis, off for 48 hours at time of rapid response  Heparin gtt ordered but not started prior to rapid  Palliative care consulted prior to ICU admission  MTP [id number]  PRBC 12  FFP 3  Platelets 1  Cryo 0  Whole blood 3  Consider high volume bleeding scan  Surgery consult for suspected internal bleeding  Per surgery take to OR w/o scan  Continue monitoring H&H  Maintain hgb > 7  Hgb has been stable since surgery  Elevated troponin  Last trops downtrending  No EKG changes  ECHO showed EF 55%  Continue cardiac monitoring  EKG  Hallucinations, visual  Per both patient and patient's wife, patient has been experiencing visual hallucinations that usually occur prior to falling asleep or waking up.  Patient reports that he will occasionally grab for things that are not there, such as a pillow.    Also states that he will occasionally have conversations with his wife when she is not physically in the room.    Patient states he is able to discern when he is having a hallucination and they are not distressing      PLAN:  delirium precautions  Hemoperitoneum  Emergent OR yesterday for distended abd post CPR  Found to have hemoperitoneum with hematoma at base of jejunum with active  "bleed, which was controlled  OR 3/17, stable, small bowel and L hemicolon resected, abd closed    Plan:  Continue to hold AC  Cardiac arrest due to other underlying condition (HCC)  See \"cardiac arrest\"  Palliative care by specialist    Counseling regarding advance care planning and goals of care    Volume overload    Disposition: Critical care    ICU Core Measures     Vented Patient  VAP Bundle  VAP bundle ordered     A: Assess, Prevent, and Manage Pain Has pain been assessed? Yes  Need for changes to pain regimen? Yes   B: Both Spontaneous Awakening Trials (SATs) and Spontaneous Breathing Trials (SBTs) Plan to perform spontaneous awakening trial today? Yes   Plan to perform spontaneous breathing trial today? Yes   Obvious barriers to extubation? Yes   C: Choice of Sedation RASS Goal: -1 Drowsy or 0 Alert and Calm  Need for changes to sedation or analgesia regimen? No   D: Delirium CAM-ICU: Negative   E: Early Mobility  Plan for early mobility? Yes   F: Family Engagement Plan for family engagement today? Yes       Antibiotic Review: continue Ancef per ID for MSSA bacteremia    Review of Invasive Devices:    Tang Plan: Continue for accurate I/O monitoring for 48 hours  Central access plan: Patient has multiple central venous catheters.   [place] Plan: Keep arterial line for hemodynamic monitoring    Prophylaxis:  VTE VTE covered by:  heparin (porcine), Intravenous, 6 Units/kg/hr at 03/21/25 0513       Stress Ulcer  covered byfamotidine (PEPCID) 20 mg tablet [[id number]] (Long-Term Med), pantoprazole (PROTONIX) injection 40 mg [[id number]]         24 Hour Events : became hypotensive overnight with precedex, which was stopped. Pt started on levo and dilaudid increased for pain management.   Subjective     Review of Systems: See HPI for Review of Systems    Objective :                   Vitals I/O      Most Recent Min/Max in 24hrs   Temp 99.5 °F (37.5 °C) Temp  Min: 98.5 °F (36.9 °C)  Max: 99.5 °F (37.5 °C)   Pulse 105 " Pulse  Min: 91  Max: 117   Resp (!) 29 Resp  Min: 22  Max: 39   /54 BP  Min: 55/32  Max: 164/73   O2 Sat 99 % SpO2  Min: 93 %  Max: 100 %      Intake/Output Summary (Last 24 hours) at 3/21/2025 1057  Last data filed at 3/21/2025 1000  Gross per 24 hour   Intake 4049.59 ml   Output 1015 ml   Net 3034.59 ml       Adult 3-in-1 TPN (custom base / custom electrolytes)  Diet NPO    Invasive Monitoring           Physical Exam   Physical Exam  Vitals and nursing note reviewed.   Eyes:      Pupils: Pupils are equal, round, and reactive to light.   Skin:     General: Skin is warm and dry.   HENT:      Head: Normocephalic.      Right Ear: No drainage.      Left Ear: No drainage.      Nose: Nasogastric tube present. No epistaxis.      Mouth/Throat:      Mouth: Mucous membranes are dry.   Neck:      Vascular: Central line present.   Cardiovascular:      Rate and Rhythm: Regular rhythm. Tachycardia present.      Pulses: Normal pulses.   Musculoskeletal:         General: Swelling present.      Right lower le+ Edema present.      Left lower le+ Edema present.   Abdominal:      Palpations: Abdomen is soft.      Comments: Midline surgical incision with mild surrounding erythema   Constitutional:       General: He is in acute distress.      Appearance: He is ill-appearing.      Interventions: He is intubated and restrained.   Pulmonary:      Effort: Pulmonary effort is normal. He is intubated.      Comments: Ventilated breath sounds  Neurological:        Corneal reflex present, cough reflex and gag reflex intact.      Comments: Intubated, grimaces to pain, does not withdraw. GCS 3T   Genitourinary/Anorectal:  Tang present.        Diagnostic Studies        Lab Results: I have reviewed the following results:     Medications:  Scheduled PRN   [Held by provider] ascorbic acid, 250 mg, Daily  [Held by provider] atorvastatin, 40 mg, Daily With Dinner  budesonide, 0.5 mg, Q12H  ceFAZolin, 2,000 mg, Q12H  chlorhexidine, 15 mL,  Q12H GALE  [Held by provider] Cholecalciferol, 2,000 Units, Daily  [Held by provider] cyanocobalamin, 100 mcg, Daily  docusate, 100 mg, BID  [Held by provider] ferrous sulfate, 325 mg, Daily With Breakfast  [Held by provider] fluticasone, 1 puff, Daily  [Held by provider] folic acid, 1 mg, Daily  [Held by provider] hydroxychloroquine, 400 mg, Daily With Breakfast  insulin lispro, 2-12 Units, Q6H GALE  ipratropium, 0.5 mg, TID  levalbuterol, 1.25 mg, TID  lidocaine, 3 patch, Daily  [Held by provider] metoprolol tartrate, 25 mg, Q12H GALE  HITESH ANTIFUNGAL, , BID  pantoprazole, 40 mg, Q12H GALE  polyethylene glycol, 17 g, Daily  [Held by provider] potassium chloride, 20 mEq, Daily  senna, 17.6 mg, HS  [Held by provider] tamsulosin, 0.4 mg, Daily With Dinner  [Held by provider] torsemide, 20 mg, Daily  [Held by provider] umeclidinium-vilanterol, 1 puff, Daily      HYDROmorphone, 0.5 mg, Q3H PRN  [Held by provider] oxyCODONE, 5 mg, Q6H PRN  [Held by provider] oxyCODONE, 2.5 mg, Q6H PRN       Continuous    Adult 3-in-1 TPN (custom base / custom electrolytes), , Last Rate: 87.4 mL/hr at 03/20/25 2058  bumetanide (BUMEX) 12.5 mg infusion 50 mL, 2 mg/hr, Last Rate: Stopped (03/20/25 0115)  heparin (porcine), 3-24 Units/kg/hr (Order-Specific), Last Rate: 6 Units/kg/hr (03/21/25 0513)  norepinephrine, 1-30 mcg/min, Last Rate: 8 mcg/min (03/21/25 0815)         Labs:   CBC    Recent Labs     03/20/25  1735 03/21/25  0428   WBC 8.68 10.31*   HGB 8.7* 8.6*   HCT 27.0* 26.9*   * 143*     BMP    Recent Labs     03/20/25  2137 03/21/25  0428   SODIUM 140 139   K 3.7 3.9    105   CO2 20* 20*   AGAP 15* 14*   * 115*   CREATININE 4.31* 4.28*   CALCIUM 7.1* 7.2*       Coags    Recent Labs     03/20/25  0436 03/21/25  0430   PTT 69* 63*        Additional Electrolytes  Recent Labs     03/20/25 2137 03/21/25  0428   MG 2.1 2.1   PHOS 5.2* 4.8*          Blood Gas    Recent Labs     03/21/25  0429   PHART 7.402   [id number]  30.7*   PO2ART 96.6   [id number] 18.7*   BEART -5.3   SOURCE Line, Arterial     Recent Labs     03/20/25  1238 03/20/25  1611 03/21/25  0429   PHVEN 7.299*  --   --    GYN8ERV 33.3*  --   --    PO2VEN 35.9  --   --    ECT9SFQ 16.0*  --   --    BEVEN -9.6  --   --    J4NEJNA 64.9  --   --    SOURCE  --    < > Line, Arterial    < > = values in this interval not displayed.    LFTs  No recent results    Infectious  No recent results  Glucose  Recent Labs     03/20/25  1238 03/20/25  1558 03/20/25  2137 03/21/25  0428   GLUC 221* 168* 156* 160*

## 2025-03-21 NOTE — PROGRESS NOTES
Progress Note - Infectious Disease   Name: Devin Chaves 79 y.o. male I MRN: [id number]  Unit/Bed#: ICU 10 I Date of Admission: 2/28/2025   Date of Service: 3/21/2025 I Hospital Day: 21    Assessment & Plan  Neck pain  Patient developed acute neck pain with MSSA bacteremia during recent hospitalization.  CRP was highly elevated.  C-spine MRI showed possible C-spine and paraspinal infection.  Patient has no neurologic deficit.  His neck pain is finally improving.  However, given paraspinal infection on the initial C-spine MRI, we should repeat C-spine MRI with contrast when creatinine is improved to confirm resolution/improvement of paraspinal infection.  However, without any neurological deficit, it would be fine to postpone MRI until renal function is further improved, to decrease risk of contrast-induced ORIANA.  Patient should get MRI done prior to completion of IV antibiotic course below, hopefully sometime next week.  Antibiotic plan as in below.  Monitor neck pain.  Recommend repeat C-spine MRI with and without contrast.  This can be postponed until ORIANA is improved, but should be done prior to completion of IV antibiotic course.  MSSA bacteremia  Patient had MSSA bacteremia during recent hospitalization.  Source is unclear but likely cutaneous.  His bacteremia cleared rapidly on IV antibiotic.  TTE did not show any vegetation.  Given possible C-spine infection, plan was for long-term IV antibiotic.  Continue high-dose IV cefazolin.  Treat x 6 weeks from clearance of bacteremia as previously planned, through 3/27.  Acute blood loss anemia  Patient developed acute bleeding from rectal mass, resulting in rapid response, and subsequent cardiac arrest.  He is status post transfusion.  He is also status post small bowel resection and right hemicolectomy.  Management per primary service.  ORIANA (acute kidney injury) (HCC)  Patient with ORIANA on admission, most likely secondary to hypovolemia from GI bleed.  Creatinine  has worsened send cardiac arrest but has been stable over the last few days.  Antibiotic dosages adjusted accordingly.  Monitor creatinine.  Nephrology follow-up.  Colonic mass  Patient has recently diagnosed adenocarcinoma of the colon.  He now status post bowel resection for bleeding from adenocarcinoma.  No plan for chemotherapy yet.  Colorectal surgery follow-up.  Acute on chronic respiratory failure with hypoxia (HCC)  Patient is now intubated after cardiac arrest over the weekend.  Cardiac arrest due to other underlying condition (HCC)  Patient is status post arrest, successfully resuscitated.  Encephalopathy  Patient remains obtunded postcardiac arrest.  Concern is for anoxic encephalopathy.  Head CT is without acute changes.  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  Monitor mental status.        Antibiotics:  Cefazolin  Last negative blood culture 2/14    Subjective   Patient gail intubated, on ventilator.  He remains minimally responsive.  Temperature stays down.    Objective :  Temp:  [98.5 °F (36.9 °C)-99.5 °F (37.5 °C)] 99.5 °F (37.5 °C)  HR:  [] 105  BP: ()/(32-73) 111/54  Resp:  [22-39] 29  SpO2:  [93 %-100 %] 99 %  O2 Device: Ventilator  FiO2 (%):  [40] 40    Physical Exam:     General: Respond to verbal or tactile stimuli, comfortable, nontoxic.   Neck:  Supple. No mass.  No lymphadenopathy.   Lungs: Expansion symmetric, basilar rhonchi, no rales, no wheezing.   Heart:  Regular rate and rhythm, S1 and S2 normal, no murmur.   Abdomen: Soft, nondistended, non-tender, bowel sounds active all four quadrants, no masses, no organomegaly.   Extremities: Stable leg edema. No erythema/warmth. No draining ulcer. Nontender to palpation.   Skin:  No rash.   Neuro: Not assessable.        Lab Results: I have reviewed the following results:  Results from last 7 days   Lab Units 03/21/25  0428 03/20/25  1735 03/20/25  1238   WBC  Thousand/uL 10.31* 8.68 7.73   HEMOGLOBIN g/dL 8.6* 8.7* 8.8*   PLATELETS Thousands/uL 143* 126* 103*     Results from last 7 days   Lab Units 03/21/25  0428 03/20/25  2137 03/20/25  1558 03/19/25  0921 03/19/25  0451 03/18/25  1048 03/18/25  0453 03/16/25  2235 03/16/25  1533 03/16/25  1327   SODIUM mmol/L 139 140 139   < >  --    < > 145   < > 148*  --    POTASSIUM mmol/L 3.9 3.7 3.7   < >  --    < > 3.8   < > 4.4  --    CHLORIDE mmol/L 105 105 106   < >  --    < > 111*   < > 116*  --    CO2 mmol/L 20* 20* 21   < >  --    < > 27   < > 25  --    CO2, I-STAT mmol/L  --   --   --   --   --   --   --   --   --  29   BUN mg/dL 115* 109* 104*   < >  --    < > 63*   < > 48*  --    CREATININE mg/dL 4.28* 4.31* 4.18*   < >  --    < > 2.89*   < > 2.14*  --    EGFR ml/min/1.73sq m 12 12 12   < >  --    < > 20   < > 28  --    GLUCOSE, ISTAT mg/dl  --   --   --   --   --   --   --   --   --  137   CALCIUM mg/dL 7.2* 7.1* 7.1*   < >  --    < > 7.8*   < > 8.1*  --    AST U/L  --   --   --   --  20  --  26  --  60*  --    ALT U/L  --   --   --   --  <3*  --  <3*  --  <3*  --    ALK PHOS U/L  --   --   --   --  50  --  53  --  51  --    ALBUMIN g/dL  --   --   --   --  2.1*  --  2.1*  --  2.4*  --     < > = values in this interval not displayed.

## 2025-03-21 NOTE — ASSESSMENT & PLAN NOTE
Acute blood loss on 3/15/25 and received > 10 units PRBC.   S/p emergent ex lap and control of bleeding  Hgb 8.6 today, below goal and trending down   Hgb monitoring per primary service.

## 2025-03-22 PROBLEM — I95.0 IDIOPATHIC HYPOTENSION: Status: ACTIVE | Noted: 2023-12-05

## 2025-03-22 PROBLEM — E87.70 FLUID OVERLOAD: Status: ACTIVE | Noted: 2025-02-28

## 2025-03-22 NOTE — ASSESSMENT & PLAN NOTE
Concern for anoxic brain injury s/p cardiac arrest with new embolic infarcts  CTH with 4 mm focus of high attenuation right frontal lobe without evidence of anoxic brain injury, stable chronic ischemic changes.  vEEG monitoring with no evidence of seizure  Brain MRI 3/18/2025: New nearly symmetric cortical restricted diffusion in bilateral frontal, bilateral parietal and bilateral occipital lobes suspicious for hypoxic ischemic encephalopathy.  New scattered small acute infarcts in left posterior frontal and bilateral cerebellar lobes favoring embolic infarcts.  Repeat CTH 3/19 stable without hemorrhage  On heparin drip  Neurology following. Management per neurology and primary team

## 2025-03-22 NOTE — PROGRESS NOTES
Progress Note - Surgery-General   Name: Devin Chaves 79 y.o. male I MRN: [id number]  Unit/Bed#: ICU 10 I Date of Admission: 2/28/2025   Date of Service: 3/22/2025 I Hospital Day: 22    Assessment & Plan  Acute blood loss anemia  Patient sustained cardiac arrest in the setting of an undetectable hemoglobin on 3/15 and was taken emergently to the operating room for exploration in the setting of abdominal free fluid.  Large volume hemoperitoneum was encountered with evidence of a actively bleeding/decompressing mesenteric hematoma; bleeding control was obtained and on 3/15 taken to OR for a segmental small bowel resection was performed and the patient was left in discontinuity given ongoing instability.  S/p SB anastomosis, R hemicolectomy, closure on 3/16.    Back on pressors; currently levo at 5    Moving his head and intermittently twitching his extremities, not following commands off of sedation    Plan  - Continue NG tube to suction/n.p.o. given emesis and no significant bowel function  - Appreciate neurology recommendations   - Avoid hypotension, optimize BP management  - Heparin gtt in setting of embolic infarcts  - Continue TPN  - Wean vent as tolerated  - Continue recommendations from cardiology and nephrology for ongoing diuresis  - Rest of care per ICU  HTN (hypertension)    CAD (coronary artery disease)    COPD (chronic obstructive pulmonary disease) (HCC)    History of CVA (cerebrovascular accident)    Acute on chronic respiratory failure with hypoxia (HCC)    Atrial fibrillation (HCC)    Urinary retention    MSSA bacteremia    Neck pain    Colonic mass    ORIANA (acute kidney injury) ([facility])    Pleural effusion, bilateral    Dysphagia    Severe protein-calorie malnutrition (HCC)  Malnutrition Findings:   Adult Malnutrition type: Acute illness  Adult Degree of Malnutrition: Other severe protein calorie malnutrition  Malnutrition Characteristics: Inadequate energy, Fluid accumulation                  360  Statement: related to inadequate energy/protein intake as evidenced by consuming < 50% of energy intake compared to estimated needs for > 5 days and B/L LE +3 edema. Treated with ONS.    BMI Findings:           Body mass index is 30.23 kg/m².     Hallucinations, visual    Hemorrhagic shock (HCC)    Cardiac arrest (HCC)    Hemoperitoneum    Encephalopathy    Cardiac arrest due to other underlying condition (HCC)    Palliative care by specialist    Counseling regarding advance care planning and goals of care    Elevated troponin    Embolic cerebral infarction (HCC)    Volume overload    Low serum bicarbonate          Subjective   Intubated, ill appearing.no BM per RN.    Objective :  Temp:  [97 °F (36.1 °C)-99.5 °F (37.5 °C)] 97 °F (36.1 °C)  HR:  [] 84  BP: ()/(24-66) 107/51  Resp:  [9-36] 12  SpO2:  [93 %-100 %] 100 %  O2 Device: Ventilator  FiO2 (%):  [40] 40    I/O         03/20 0701  03/21 0700 03/21 0701  03/22 0700    I.V. (mL/kg) 1395.3 (13.8) 1940.8 (19.2)    NG/GT 65 60    IV Piggyback 401.7 25    TPN 2100.6 1070.7    Total Intake(mL/kg) 3962.6 (39.2) 3096.5 (30.7)    Urine (mL/kg/hr) 305 (0.1) 361 (0.1)    Emesis/NG output 775 300    Other  1643    Total Output 1080 2304    Net +2882.6 +792.5                Lines/Drains/Airways       Active Status       Name Placement date Placement time Site Days    PICC Line 02/26/25 Right Brachial 02/26/25  0548  Brachial  23    CVC Central Lines 03/15/25 Triple 03/15/25  1338  --  6    Arterial Line 03/20/25 Radial 03/20/25  1347  Radial  1    HD Temporary Double Catheter 03/21/25  1754  Left femoral  less than 1    ETT  Cuffed 8 mm 03/15/25  1200  -- 6    Urethral Catheter Latex 16 Fr. 03/15/25  1336  Latex  6    NG/OG Tube Nasogastric Left nare 03/15/25  1700  Left nare  6                  Physical Exam  General: intubated, ill-appearing  HENT: ETT in place  Neck: supple, no JVD  CV: Irregular rhythm, tachycardic  Lungs: mechanically ventilated.   ABD:  Soft, nondistended. Incision CDI      Lab Results: I have reviewed the following results:  Recent Labs     03/19/25  0451 03/19/25  0921 03/21/25  0428 03/21/25  0430 03/21/25  1156 03/21/25  1802 03/21/25  1916 03/21/25  2139   WBC 6.69   < > 10.31*  --   --  9.92  --   --    HGB 9.7*   < > 8.6*  --   --  7.8*  --   --    HCT 29.9*   < > 26.9*  --   --  24.5*  --   --    PLT 74*   < > 143*  --   --  117*  --   --    BANDSPCT 2  --   --   --   --   --   --   --    SODIUM  --    < > 139  --    < >  --   --  136   K  --    < > 3.9  --    < >  --   --  3.7   CL  --    < > 105  --    < >  --   --  107   CO2  --    < > 20*  --    < >  --   --  21   BUN  --    < > 115*  --    < >  --   --  107*   CREATININE  --    < > 4.28*  --    < >  --   --  3.79*   GLUC  --    < > 160*  --    < >  --   --  158*   CAIONIZED  --   --   --   --    < >  --    < > 1.10*   MG  --    < > 2.1  --    < >  --    < > 2.0   PHOS  --    < > 4.8*  --    < >  --    < > 4.1   AST 20  --   --   --   --   --   --   --    ALT <3*  --   --   --   --   --   --   --    ALB 2.1*  --   --   --   --   --   --   --    TBILI 0.34  --   --   --   --   --   --   --    ALKPHOS 50  --   --   --   --   --   --   --    PTT  --    < >  --  63*  --   --   --   --    LACTICACID  --    < > 1.1  --   --   --   --   --     < > = values in this interval not displayed.             VTE Pharmacologic Prophylaxis: VTE covered by:  heparin (porcine), Intravenous, 6 Units/kg/hr at 03/21/25 2023     VTE Mechanical Prophylaxis: sequential compression device

## 2025-03-22 NOTE — ASSESSMENT & PLAN NOTE
Patient sustained cardiac arrest in the setting of an undetectable hemoglobin on 3/15 and was taken emergently to the operating room for exploration in the setting of abdominal free fluid.  Large volume hemoperitoneum was encountered with evidence of a actively bleeding/decompressing mesenteric hematoma; bleeding control was obtained and on 3/15 taken to OR for a segmental small bowel resection was performed and the patient was left in discontinuity given ongoing instability.  S/p SB anastomosis, R hemicolectomy, closure on 3/16.    Back on pressors; currently levo at 5    Moving his head and intermittently twitching his extremities, not following commands off of sedation    Plan  - Continue NG tube to suction/n.p.o. given emesis and no significant bowel function  - Appreciate neurology recommendations   - Avoid hypotension, optimize BP management  - Heparin gtt in setting of embolic infarcts  - Continue TPN  - Wean vent as tolerated  - Continue recommendations from cardiology and nephrology for ongoing diuresis  - Rest of care per ICU

## 2025-03-22 NOTE — ASSESSMENT & PLAN NOTE
Remains intubated for airway protection post cardiac arrest and postoperatively with poor neuro exam   Continue mechanical ventilation ACVC 12/550/40/6, titrate FiO2 for SpO2>88  At baseline requires 2-4L NC   Monitor off of sedation, PRN dilaudid for pain management   Daily SBT   Vent bundle

## 2025-03-22 NOTE — ASSESSMENT & PLAN NOTE
Refractory volume overload  S/p intermittent IV lasix/bumex and Bumex drip.  Off Bumex drip since 3/19.  Was started on CVVHD on 3/21 and running at 50 mL/h net negative.  Recommended increasing UF to 75 mL/h net negative

## 2025-03-22 NOTE — ASSESSMENT & PLAN NOTE
Malnutrition Findings:   Adult Malnutrition type: Acute illness  Adult Degree of Malnutrition: Other severe protein calorie malnutrition  Malnutrition Characteristics: Inadequate energy, Fluid accumulation                  360 Statement: related to inadequate energy/protein intake as evidenced by consuming < 50% of energy intake compared to estimated needs for > 5 days and B/L LE +3 edema. Treated with ONS.    BMI Findings:           Body mass index is 30.23 kg/m².

## 2025-03-22 NOTE — PROGRESS NOTES
Progress Note - Nephrology   Name: Devin Chaves 79 y.o. male I MRN: [id number]  Unit/Bed#: ICU 10 I Date of Admission: 2/28/2025   Date of Service: 3/22/2025 I Hospital Day: 22    79-year-old male past history of colon mass, A-fib, hypertension, CHF, CVA presented from postacute rehab after recent hospitalization for COPD exacerbation/MSSA bacteremia and was sent in for complaint of acute anemia.  Was diagnosed with colon mass on colonoscopy and found to have adenocarcinoma of colon during recent hospital admission.  Initially patient was found to have severe acute anemia with hemoglobin of 4.6 g/dL and required multiple PRBCs.  Colorectal surgery was consulted.  He was found to have acute kidney injury likely due to prerenal azotemia in the setting of blood loss anemia.  Admission creatinine was 2.4 mg/dL.     -Patient had rapid response on 3/15 AM in the setting of acute anemia and hypotension alpesh Childs was called and Dr. Agudelo was called.  Patient had return of spontaneous circulation after blood products given require vasopressors and has been intubated.  Was taken to the OR emergently-· S/p emergent ex lap, control of bleeding, segmental SBR and left in discontinuity 3/15 .Now s/p partial SBR, R hemicolectomy and closure 3/16/2025  -> Renal function continue to worsen during the hospital stay, patient became hypotensive since 3/19.  Likely ATN causing worsening renal function and required initiation of CVVHD in the setting of oliguria and fluid overload on 3/21  Assessment & Plan  ORIANA (acute kidney injury) (HCC)  -Baseline creatinine: 0.8-0.9 mg/dl  -Admission creatinine: 2.43 mg/dl  -Etiology: Likely ATN in the setting of prolonged prerenal azotemia from volume depletion/severe anemia  -CT abdomen pelvis from 2/28 did not show any hydronephrosis.  UA from 3/8 suggestive of 2-4 RBCs, 2-4 WBCs, trace protein.  UPC ratio was 3.4 g and albumin creatinine ratio was 821 mg  -Serum and urine immunofixation  with no M spike,   C3 level was slightly low at 83, C4 was normal  -Hospital Course: Patient had rapid response on 3/15 AM in the setting of acute anemia and hypotension alpesh Childs was called and Dr. Agudelo was called.  Patient had return of spontaneous circulation after blood products given require vasopressors and has been intubated.  Was taken to the OR emergently on 3/15 and again on 3/16  -Serum creatinine was around 1.8-2.0 from 3/2 to 3/16 but renal function worsened on on 3/17 and further worsened  on  3/18 to creatinine 3.08 likely due to ATN from hemodynamic changes on 3/15 and during OR on 3/16, underwent exploratory laparotomy partial small bowel resection and right colectomy.  Required Levophed IntraOp and function gradually worsening since then.  Blood pressure dropped on 3/19 and was taken off Bumex drip.  Patient required Levophed on 3/21.    -> Was started on CVVHD on 3/21 at 50 mL/h net negative in the setting of oliguria and fluid overload  -Plan:   Patient was seen and examined on CVVHD continue CVVHD at current prescription with 3K bath, blood flow rate 250 mL/min and dialysate flow up to 2500 mL/h.  Patient still with fluid overload, discussed with ICU team advanced practitioner regarding increasing need ultrafiltration to 75 mL/h net negative, they will discuss during the rounds and make changes if agreed.  Dose all medication for CRRT dosing    Continue vasopressors per ICU team-currently on low-dose Levophed  Currently on TPN per ICU team  Avoid nephrotoxins, dose all medication for EGFR  Monitor for renal recovery so far no renal recovery                   Volume overload  Refractory volume overload  S/p intermittent IV lasix/bumex and Bumex drip.  Off Bumex drip since 3/19.  Was started on CVVHD on 3/21 and running at 50 mL/h net negative.  Recommended increasing UF to 75 mL/h net negative  Idiopathic hypotension  Echo 3/17/2025: EF 55%, unable to assess diastolic function  Home Rx:  Metoprolol 25 mg twice daily  Past history of hypertension but currently hypotensive requiring Levophed, continue Levophed per ICU team.    Fluid overload  Was being diuresed before cardiac arrest on 3/15/25.  Found to have fluid overload with finding of bilateral pleural effusion  CTC 3/20 with moderate bilateral pleural effusions with dependent atelectasis, resolved pulmonary edema  Status post Bumex drip which was started on 3/19 due to hypotension.  Currently undergoing ultrafiltration with CVVHD    Acute blood loss anemia  Acute blood loss on 3/15/25 and received > 10 units PRBC.   S/p emergent ex lap and control of bleeding  Hgb 8.2 g/dL continue to monitor    MSSA bacteremia  Currently on Cefazolin 2 gm IV q8H - needs this until 3/27/25.   ID following.   Possible cutaneous source  TTE without vegetation.  Continue antibiotic per ID    Urinary retention  Seen by urology this admission.   Now with glass. Tamsulosin on hold.     Colonic mass  Newly diagnosed colon mass on C-scope on 2/20/25.   Pathology - adenocarcinoma  Was to follow colorectal as outpatient  S/p ex lap, partial SBR and R colectomy 3/16/2025, currently on TPN  Continue management per surgical team.  Follow-up pathology report    Low serum bicarbonate  In the setting of acute renal failure  Resolved with initiation of CVVHD  Cardiac arrest (HCC)  S/p cardiac arrest with ROSC 3/15/2025  In the setting of ABLA with undetectable Hgb and hemoperitoneum, s/p emergent ex lap, segmental SBR 3/15  Echo 3/17/2025: EF 55%  Management per primary team    Hemoperitoneum  Resolved  With actively bleeding mesenteric hematoma  S/p emergent ex lap, control of bleeding, segmental SBR and left in discontinuity 3/15  Now s/p partial SBR, R hemicolectomy and closure 3/16/2025  Management per surgery  Atrial fibrillation (HCC)  Previously on Metoprolol for rate control, currently on hold  On heparin drip for embolic infarcts    Encephalopathy  Concern for anoxic  brain injury s/p cardiac arrest with new embolic infarcts  CTH with 4 mm focus of high attenuation right frontal lobe without evidence of anoxic brain injury, stable chronic ischemic changes.  vEEG monitoring with no evidence of seizure  Brain MRI 3/18/2025: New nearly symmetric cortical restricted diffusion in bilateral frontal, bilateral parietal and bilateral occipital lobes suspicious for hypoxic ischemic encephalopathy.  New scattered small acute infarcts in left posterior frontal and bilateral cerebellar lobes favoring embolic infarcts.  Repeat CTH 3/19 stable without hemorrhage  On heparin drip  Neurology following. Management per neurology and primary team  Embolic cerebral infarction (HCC)  Brain MRI 3/18/2025: New nearly symmetric cortical restricted diffusion in bilateral frontal, bilateral parietal and bilateral occipital lobes suspicious for hypoxic ischemic encephalopathy.  New scattered small acute infarcts in left posterior frontal and bilateral cerebellar lobes favoring embolic infarcts  Now on heparin drip  repeat CTH 3/19/2025 with stable infarcts and no acute hemorrhage  Avoid hypotension  Neurology following  Management per neurology and primary team    I have reviewed the nephrology recommendations including plan to continue current CVVHD and to consider increasing ultrafiltration to 75 mL/h net negative in the setting of fluid overload, with primary team advanced practitioner, and we are in agreement with renal plan including the information outlined above.   Will decide finally during the rounds with critical care attending    Subjective   No new complaints, patient tolerating CVVHD currently still on low-dose of Levophed.  Still intubated at FiO2 of 40%    Objective :  Temp:  [96.4 °F (35.8 °C)-99.5 °F (37.5 °C)] 96.4 °F (35.8 °C)  HR:  [] 91  BP: ()/(24-65) 112/54  Resp:  [9-31] 22  SpO2:  [96 %-100 %] 100 %  O2 Device: Ventilator  FiO2 (%):  [40] 40    Current Weight: Weight -  Scale: 104 kg (228 lb 2.8 oz)  First Weight: Weight - Scale: 91.2 kg (201 lb 1 oz)  I/O         03/20 0701  03/21 0700 03/21 0701  03/22 0700 03/22 0701 03/23 0700    I.V. (mL/kg) 1395.3 (13.8) 2097.8 (20.4) 24 (0.2)    NG/GT 65 60     IV Piggyback 401.7 25     TPN 2100.6 1167.7 85    Feedings       Total Intake(mL/kg) 3962.6 (39.2) 3350.5 (32.5) 109 (1.1)    Urine (mL/kg/hr) 305 (0.1) 367 (0.1) 0 (0)    Emesis/NG output 775 300 0    Other  1993 169    Total Output 1080 2660 169    Net +2882.6 +690.5 -60                 Physical Exam   General:  Ill looking, intubated   Head: normocephalic, atraumatic  Eyes: Conjunctivae pink,  Sclera anicteric  ENT: Intubated.  Neck: supple   Chest: Clear to Auscultation both lungs,  no crackles or wheezing.  CVS: S1 & S2 present, normal rate, regular rhythm, no murmur.  Abdomen: soft, non-tender, non-distended, Bowel sounds normoactive  Extremities: 2+ edema both legs  Neuro: Sedated, intubated.  Skin: no rash, warm and dry.   Psych: sedated. Unable to assess.     Medications:    Current Facility-Administered Medications:     Adult 3-in-1 TPN (custom base / custom electrolytes), , Intravenous, Continuous TPN, Sailaja Gonzales MD, Last Rate: 87.4 mL/hr at 03/21/25 2055, New Bag at 03/21/25 2055    [Held by provider] ascorbic acid (VITAMIN C) tablet 250 mg, 250 mg, Oral, Daily, Yobany Mott MD, 250 mg at 03/14/25 0848    [Held by provider] atorvastatin (LIPITOR) tablet 40 mg, 40 mg, Oral, Daily With Dinner, Yobany Mott MD, 40 mg at 03/13/25 1626    budesonide (PULMICORT) inhalation solution 0.5 mg, 0.5 mg, Nebulization, Q12H, AMPARO Umaña, 0.5 mg at 03/22/25 0742    ceFAZolin (ANCEF) IVPB (premix in dextrose) 2,000 mg 50 mL, 2,000 mg, Intravenous, Q12H, Theron Curry MD, Last Rate: 100 mL/hr at 03/22/25 0513, 2,000 mg at 03/22/25 0513    chlorhexidine (PERIDEX) 0.12 % oral rinse 15 mL, 15 mL, Mouth/Throat, Q12H [facility], Yobany Mott MD, 15 mL at 03/22/25 0831     [Held by provider] Cholecalciferol (VITAMIN D3) tablet 2,000 Units, 2,000 Units, Oral, Daily, Yobany Mott MD, 2,000 Units at 03/14/25 0848    [Held by provider] cyanocobalamin (VITAMIN B-12) tablet 100 mcg, 100 mcg, Oral, Daily, Yobany Mott MD, 100 mcg at 03/14/25 0848    docusate (COLACE) oral liquid 100 mg, 100 mg, Oral, BID, AMPARO Garcia, 100 mg at 03/22/25 0831    fentaNYL 1000 mcg in sodium chloride 0.9% 100mL infusion, 100 mcg/hr, Intravenous, Continuous, Lucia Chavira PA-C, Last Rate: 10 mL/hr at 03/22/25 0330, 100 mcg/hr at 03/22/25 0330    [Held by provider] ferrous sulfate tablet 325 mg, 325 mg, Oral, Daily With Breakfast, Yobany Mott MD    [Held by provider] fluticasone (ARNUITY ELLIPTA) 100 MCG/ACT inhaler 1 puff, 1 puff, Inhalation, Daily, Yobany Mott MD, 1 puff at 03/15/25 0925    [Held by provider] folic acid (FOLVITE) tablet 1 mg, 1 mg, Oral, Daily, Yobany Mott MD, 1 mg at 03/14/25 0848    heparin (porcine) 25,000 units in 0.45% NaCl 250 mL infusion (premix), 3-24 Units/kg/hr (Order-Specific), Intravenous, Titrated, AMPARO Calvert, Last Rate: 5.7 mL/hr at 03/22/25 0552, 6 Units/kg/hr at 03/22/25 0552    HYDROmorphone (DILAUDID) injection 0.5 mg, 0.5 mg, Intravenous, Q3H PRN, AMPARO Herrera, 0.5 mg at 03/21/25 1955    [Held by provider] hydroxychloroquine (PLAQUENIL) tablet 400 mg, 400 mg, Oral, Daily With Breakfast, Yobany Mott MD, 400 mg at 03/14/25 0848    insulin lispro (HumALOG/ADMELOG) 100 units/mL subcutaneous injection 2-12 Units, 2-12 Units, Subcutaneous, Q6H GALE, 2 Units at 03/22/25 0000 **AND** Fingerstick Glucose (POCT), , , Q6H, AMPARO Calvert    ipratropium (ATROVENT) 0.02 % inhalation solution 0.5 mg, 0.5 mg, Nebulization, TID, AMPARO Umaña, 0.5 mg at 03/22/25 0742    levalbuterol (XOPENEX) inhalation solution 1.25 mg, 1.25 mg, Nebulization, TID, AMPARO Umaña, 1.25 mg at  03/22/25 0742    lidocaine (LIDODERM) 5 % patch 3 patch, 3 patch, Topical, Daily, Yobany Mott MD, 3 patch at 03/22/25 0831    [Held by provider] metoprolol tartrate (LOPRESSOR) tablet 25 mg, 25 mg, Oral, Q12H GALE, Yobany Mott MD, 25 mg at 03/14/25 0848    moisture barrier miconazole 2% cream (aka HITESH MOISTURE BARRIER ANTIFUNGAL CREAM), , Topical, BID, Yobany Mott MD, Given at 03/22/25 0832    NOREPINEPHRINE 4 MG  ML NSS (CMPD ORDER) infusion, 1-30 mcg/min, Intravenous, Titrated, Sailaja Gonzales MD, Held at 03/22/25 0839    NxStage K 4/Ca 3 dialysis solution (RFP-401) 20,000 mL, 20,000 mL, Dialysis, Continuous, Bret Perry MD, 20,000 mL at 03/22/25 0641    [Held by provider] oxyCODONE (ROXICODONE) IR tablet 5 mg, 5 mg, Oral, Q6H PRN, Yobany Mott MD, 5 mg at 03/01/25 1817    [Held by provider] oxyCODONE (ROXICODONE) split tablet 2.5 mg, 2.5 mg, Oral, Q6H PRN, Yobany Mott MD, 2.5 mg at 03/05/25 0945    pantoprazole (PROTONIX) injection 40 mg, 40 mg, Intravenous, Q12H GALE, Yobany Mott MD, 40 mg at 03/22/25 0833    polyethylene glycol (MIRALAX) packet 17 g, 17 g, Oral, Daily, AMPARO Calvert, 17 g at 03/22/25 0831    senna oral syrup 17.6 mg, 17.6 mg, Oral, HS, AMPARO Calvert, 17.6 mg at 03/21/25 2109    [Held by provider] tamsulosin (FLOMAX) capsule 0.4 mg, 0.4 mg, Oral, Daily With Dinner, Yobany Mott MD, 0.4 mg at 03/13/25 1626    [Held by provider] umeclidinium-vilanterol 62.5-25 mcg/actuation inhaler 1 puff, 1 puff, Inhalation, Daily, Yobany Mott MD, 1 puff at 03/15/25 0925      Lab Results: I have reviewed the following results:  Results from last 7 days   Lab Units 03/22/25  0425 03/21/25  2139 03/21/25  1916 03/21/25  1802 03/21/25  1350 03/21/25  1156 03/21/25  0428 03/20/25  2137 03/20/25  1735 03/20/25  1558 03/20/25  1238 03/20/25  0948 03/20/25  0436 03/19/25  2322 03/19/25  1704 03/19/25  0921 03/19/25  0451 03/18/25  0825  03/18/25  0453 03/16/25  1947/25  1533 03/16/25  1327 03/16/25  1215 03/16/25  0925 03/16/25  0440 03/15/25  1654 03/15/25  1442 03/15/25  1345 03/15/25  1235 03/15/25  1154 03/15/25  1045 03/15/25  1042 03/15/25  1042   WBC Thousand/uL 12.07*  --   --  9.92  --   --  10.31*  --  8.68  --  7.73  --  8.75  --  8.30  --  6.69   < > 10.78*  --  13.31*  --   --   --  14.14*   < >  --   --  9.30  --  13.12*   < >  --    HEMOGLOBIN g/dL 8.2*  --   --  7.8*  --   --  8.6*  --  8.7*  --  8.8*  --  9.5*  --  10.1*   < > 9.7*   < > 10.6*   < > 12.9  --   --    < > 13.3  13.3   < >  --   --  11.5*  --  6.3*   < >  --    I STAT HEMOGLOBIN g/dl  --   --   --   --   --   --   --   --   --   --   --   --   --   --   --   --   --   --   --   --   --  10.9* 10.2*  --   --   --  10.9* 11.6*  --   --   --   --  5.4*   HEMATOCRIT % 25.7*  --   --  24.5*  --   --  26.9*  --  27.0*  --  28.1*  --  29.8*  --  33.0*   < > 29.9*   < > 32.4*   < > 38.5  --   --    < > 38.7  38.4   < >  --   --  35.4*  --  21.0*   < >  --    HEMATOCRIT, ISTAT %  --   --   --   --   --   --   --   --   --   --   --   --   --   --   --   --   --   --   --   --   --  32* 30*  --   --   --  32* 34*  --  <15*  --   --  16*   PLATELETS Thousands/uL 122*  --   --  117*  --   --  143*  --  126*  --  103*  --  99*  --  110*  --  74*   < > 90*  --  104*  --   --   --  103*   < >  --   --  70*  --  220   < >  --    POTASSIUM mmol/L 4.2 3.7  --   --  3.6 3.8 3.9 3.7  --  3.7 3.8   < > 3.9   < > 3.8   < >  --    < > 3.8   < > 4.4  --   --   --  4.2   < >  --   --  3.5  --  4.4   < >  --    CHLORIDE mmol/L 107 107  --   --  107 104 105 105  --  106 106   < > 107   < > 109*   < >  --    < > 111*   < > 116*  --   --   --  113*   < >  --   --  119*  --  107   < >  --    CO2 mmol/L 22 21  --   --  19* 20* 20* 20*  --  21 21   < > 23   < > 23   < >  --    < > 27   < > 25  --   --   --  27   < >  --   --  19*  --  24   < >  --    CO2, I-STAT mmol/L  --   --   --   --    "--   --   --   --   --   --   --   --   --   --   --   --   --   --   --   --   --  29 27  --   --   --  24 23  --  28  --   --  21   BUN mg/dL 86* 107*  --   --  118* 125* 115* 109*  --  104* 106*   < > 96*   < > 90*   < >  --    < > 63*   < > 48*  --   --   --  48*   < >  --   --  42*  --  57*   < >  --    CREATININE mg/dL 2.82* 3.79*  --   --  4.27* 4.41* 4.28* 4.31*  --  4.18* 4.11*   < > 3.91*   < > 3.73*   < >  --    < > 2.89*   < > 2.14*  --   --   --  1.85*   < >  --   --  1.47*  --  2.20*   < >  --    CALCIUM mg/dL 7.7* 7.8*  --   --  6.8* 7.1* 7.2* 7.1*  --  7.1* 7.4*   < > 7.3*   < > 7.4*   < >  --    < > 7.8*   < > 8.1*  --   --   --  8.1*   < >  --   --  6.9*  --  7.8*   < >  --    MAGNESIUM mg/dL 1.9 2.0 2.0  --  2.0 2.1 2.1 2.1  --  2.2  --    < > 2.1   < > 2.2   < >  --    < > 1.9   < > 1.9  --   --   --  2.0   < >  --   --   --   --  2.3  --   --    PHOSPHORUS mg/dL 3.2 4.1 4.4*  --  4.3* 4.7* 4.8* 5.2*  --  5.1*  --    < > 5.0*   < > 4.7*   < >  --    < > 4.0   < > 4.1  --   --   --  3.2   < >  --   --   --   --   --   --   --    ALBUMIN g/dL  --   --   --   --   --   --   --   --   --   --   --   --   --   --   --   --  2.1*  --  2.1*  --  2.4*  --   --   --  2.7*  --   --   --  1.6*  --  1.9*  --   --    GLUCOSE, ISTAT mg/dl  --   --   --   --   --   --   --   --   --   --   --   --   --   --   --   --   --   --   --   --   --  137 124  --   --   --  181* 185*  --  159*  --   --  314*    < > = values in this interval not displayed.       Administrative Statements     Portions of the record may have been created with voice recognition software. Occasional wrong word or \"sound a like\" substitutions may have occurred due to the inherent limitations of voice recognition software. Read the chart carefully and recognize, using context, where substitutions have occurred.If you have any questions, please contact the dictating provider.  "

## 2025-03-22 NOTE — ASSESSMENT & PLAN NOTE
Creatinine   Date Value Ref Range Status   03/21/2025 3.79 (H) 0.60 - 1.30 mg/dL Final     Comment:     Standardized to IDMS reference method   09/25/2022 1 0.6 - 1.2 mg/dL Final      Likely initially in the setting of hemorrhagic shock, now with ATN   Baseline Cr 0.8   Nephrology following, appreciate recommendations  Monitor and replete electrolytes Q6H  Monitor I/O and renal indices   Continues to have low urine output and anasarca with hypotension  Nephro consented family for dialysis, temporary HD line placed 3/21  Initiate CVVH at -50, trend labs

## 2025-03-22 NOTE — ASSESSMENT & PLAN NOTE
Echo 3/17/2025: EF 55%, unable to assess diastolic function  Home Rx: Metoprolol 25 mg twice daily  Past history of hypertension but currently hypotensive requiring Levophed, continue Levophed per ICU team.

## 2025-03-22 NOTE — ASSESSMENT & PLAN NOTE
Patient presents with 2 days of chest pain, shortness of breath, fatigue and melena  2/20/2025 EGD/colonoscopy showed 2.5 cm ileocecal mass, pathology confirmed adenocarcinoma.  S/p 3 units packed rbc in ED. S/p 1 unit PRBCs on 3/2  Hemoglobin has been stable.   Patient recent diagnosis of adenocarcinoma, increased risk of stroke.   Updated iron studies 3/2025 improved compared to 2/2025  Rapid response called 10:30A M [id number] for AMS and hypotension  Patient found to be minimally responsive and hypotensive  HGB 5.9 by iSTAT, formal labs show HGB 6.3, significant drop from 8 in AM  Bleeding from colonic mass suspected, per nursing no hematuria hemoptysis melena or other signs of overt bleeding prior to RR  Patient intubated for airway protection  Transported to ICU  MTP called  Code blue called  ROSC achieved shortly after blood products started  Rapidly distending abdomen, suspect intraabdominal bleeding     Hemoglobin   Date Value Ref Range Status   03/21/2025 7.8 (L) 12.0 - 17.0 g/dL Final        Plan:  Daily CBC  Transfuse for Hgb <7  Protonix 40 mg IV daily  Hold home eliquis, off for 48 hours at time of rapid response  Heparin gtt ordered but not started prior to rapid  Palliative care consulted prior to ICU admission  MTP [id number]  PRBC 12  FFP 6  Platelets 2  Cryo 2  Whole blood 3  Consider high volume bleeding scan  Surgery consult for suspected internal bleeding  Per surgery take to OR w/o scan  Continue monitoring H&H  Maintain hgb > 7  Hgb has been stable since surgery

## 2025-03-22 NOTE — ASSESSMENT & PLAN NOTE
Acute blood loss on 3/15/25 and received > 10 units PRBC.   S/p emergent ex lap and control of bleeding  Hgb 8.2 g/dL continue to monitor

## 2025-03-22 NOTE — ASSESSMENT & PLAN NOTE
-Baseline creatinine: 0.8-0.9 mg/dl  -Admission creatinine: 2.43 mg/dl  -Etiology: Likely ATN in the setting of prolonged prerenal azotemia from volume depletion/severe anemia  -CT abdomen pelvis from 2/28 did not show any hydronephrosis.  UA from 3/8 suggestive of 2-4 RBCs, 2-4 WBCs, trace protein.  UPC ratio was 3.4 g and albumin creatinine ratio was 821 mg  -Serum and urine immunofixation with no M spike,   C3 level was slightly low at 83, C4 was normal  -Hospital Course: Patient had rapid response on 3/15 AM in the setting of acute anemia and hypotension alpesh Childs was called and Dr. Agudelo was called.  Patient had return of spontaneous circulation after blood products given require vasopressors and has been intubated.  Was taken to the OR emergently on 3/15 and again on 3/16  -Serum creatinine was around 1.8-2.0 from 3/2 to 3/16 but renal function worsened on on 3/17 and further worsened  on  3/18 to creatinine 3.08 likely due to ATN from hemodynamic changes on 3/15 and during OR on 3/16, underwent exploratory laparotomy partial small bowel resection and right colectomy.  Required Levophed IntraOp and function gradually worsening since then.  Blood pressure dropped on 3/19 and was taken off Bumex drip.  Patient required Levophed on 3/21.    -> Was started on CVVHD on 3/21 at 50 mL/h net negative in the setting of oliguria and fluid overload  -Plan:   Patient was seen and examined on CVVHD continue CVVHD at current prescription with 3K bath, blood flow rate 250 mL/min and dialysate flow up to 2500 mL/h.  Patient still with fluid overload, discussed with ICU team advanced practitioner regarding increasing need ultrafiltration to 75 mL/h net negative, they will discuss during the rounds and make changes if agreed.  Dose all medication for CRRT dosing    Continue vasopressors per ICU team-currently on low-dose Levophed  Currently on TPN per ICU team  Avoid nephrotoxins, dose all medication for EGFR  Monitor for  renal recovery so far no renal recovery

## 2025-03-22 NOTE — ASSESSMENT & PLAN NOTE
Home regimen: Metoprolol tartrate 25 mg BID, Losartan 25 mg QD for HTN  Consider restarting home metoprolol   Hold home losartan in the setting of ORIAAN

## 2025-03-22 NOTE — ASSESSMENT & PLAN NOTE
MRI of the neck done 2/14/2025 revealed cervical degenerative change with mild canal stenosis and mild to moderate foraminal narrowing.  No cord compression.  Mild nonspecific edema within the right posterior breast dermal musculature of the upper cervicals spine.  Minimal peripheral enhancement is noted  Will need repeat MRI C spine with and without contrast prior to completion of abx - currently holding on this at this time due to ORIANA    No adenopathy or splenomegaly. No cervical or inguinal lymphadenopathy.

## 2025-03-22 NOTE — PROGRESS NOTES
Progress Note - Critical Care/ICU   Name: Devin Chaves 79 y.o. male I MRN: [id number]  Unit/Bed#: ICU 10 I Date of Admission: 2/28/2025   Date of Service: 3/22/2025 I Hospital Day: 22      Assessment & Plan  Hemorrhagic shock (HCC)  Code crimson 3/15 for hemorrhagic shock related to mesenteric hematoma s/p SBR and colon resection   S/p blood resuscitation with 12 U PRBC, 6 FFP, 2 Plt, 2 Cryo   Now off pressors and HD stable   Trend Hgb, transfuse for Hgb<7 or active bleeding with HD instability   Maintain MAP>65  Cardiac arrest (HCC)  Likely in the setting of hemorrhagic shock  Noted 12 minutes of downtime, received aggressive blood resuscitation and surgical intervention as above   Now with ongoing encephalopathy concerning for anoxic injury   Continue close HD and telemetry monitoring   Trend Hgb as above   Encephalopathy  Concern for anoxic injury s/p cardiac arrest with 12 minute downtime   EEG without seizure activity   CTH shows 4mm focus of high attenuation in right frontal lobe which is stable on repeat CTH   MRI with multiple areas of hypoxia related injury as well as acute embolic strokes   Hold all sedation, PRN dilaudid for pain management   Frequent neuro checks   Maintain normothermia and normoglycemia   Neurology following, appreciate recommendations   Embolic cerebral infarction (HCC)  New areas of embolic stroke noted on MRI 3/18  Neurology following, appreciate recommendations  Continue ischemic stroke heparin gtt  Consider restarting statin   Frequent neuro checks    Repeat CTH after ptt therapeutic x 2   Acute on chronic respiratory failure with hypoxia (HCC)  Remains intubated for airway protection post cardiac arrest and postoperatively with poor neuro exam   Continue mechanical ventilation ACVC 12/550/40/6, titrate FiO2 for SpO2>88  At baseline requires 2-4L NC   Monitor off of sedation, PRN dilaudid for pain management   Daily SBT   Vent bundle   ORIANA (acute kidney injury)  ([facility])  Creatinine   Date Value Ref Range Status   03/21/2025 3.79 (H) 0.60 - 1.30 mg/dL Final     Comment:     Standardized to IDMS reference method   09/25/2022 1 0.6 - 1.2 mg/dL Final      Likely initially in the setting of hemorrhagic shock, now with ATN   Baseline Cr 0.8   Nephrology following, appreciate recommendations  Monitor and replete electrolytes Q6H  Monitor I/O and renal indices   Continues to have low urine output and anasarca with hypotension  Nephro consented family for dialysis, temporary HD line placed 3/21  Initiate CVVH at -50, trend labs   HTN (hypertension)  Home regimen: Metoprolol tartrate 25 mg BID, Losartan 25 mg QD for HTN  Consider restarting home metoprolol   Hold home losartan in the setting of ORIANA   CAD (coronary artery disease)  With known LAD stenosis   Continue home BB and statin   Hold ASA, restart as appropriate   COPD (chronic obstructive pulmonary disease) ([facility])  Without acute exacerbation  Hold home inhalers while intubated  Continue scheduled xopenex, atrovent, budesonide nebs   History of CVA (cerebrovascular accident)  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   Now with new acute embolic strokes noted on MRI   Consider restarting home statin   Continue ischemic stroke heparin gtt   Hold ASA, restart as appropriate   Atrial fibrillation ([facility])  Currently rate controlled in NSR   Consider restarting home metoprolol for rate control   Hold home eliquis, currently on ischemic stroke heparin protocol   Urinary retention  Hold home flomax while NPO  Continue glass catheter for now   MSSA bacteremia  Noted on previous admission with likely cutaneous source   Continue cefazolin through 3/27 to complete course   ID following, appreciate recommendations   Neck pain  MRI of the neck done 2/14/2025 revealed cervical degenerative change with mild canal stenosis and mild to moderate foraminal narrowing.  No cord compression.  Mild nonspecific edema within the right  posterior breast dermal musculature of the upper cervicals spine.  Minimal peripheral enhancement is noted  Will need repeat MRI C spine with and without contrast prior to completion of abx - currently holding on this at this time due to ORIANA   Colonic mass  2/20/2025 EGD/colonoscopy showed 2.5 cm ileocecal mass, pathology confirmed adenocarcinoma  Now s/p ex lap SBR and R colon resection  Surgery following, appreciate recommendations   Pleural effusion, bilateral  CT A/P shows bilateral pleural effusions L>R, pulmonary vascular congestion     Plan:  Repeat imaging if pt's respiratory status worsens  Patient intubated  Dysphagia  Noted prior to intubation and cardiac arrest   Will need re-evaluation when able to take PO   Severe protein-calorie malnutrition (HCC)  Malnutrition Findings:   Adult Malnutrition type: Acute illness  Adult Degree of Malnutrition: Other severe protein calorie malnutrition  Malnutrition Characteristics: Inadequate energy, Fluid accumulation  360 Statement: related to inadequate energy/protein intake as evidenced by consuming < 50% of energy intake compared to estimated needs for > 5 days and B/L LE +3 edema. Treated with ONS.  BMI Findings:  Body mass index is 25.09 kg/m².      Plan:  Continue TPN and trickle TF per surgery     360 Statement: related to inadequate energy/protein intake as evidenced by consuming < 50% of energy intake compared to estimated needs for > 5 days and B/L LE +3 edema. Treated with ONS.    BMI Findings:    Body mass index is 30.23 kg/m².   Acute blood loss anemia  Patient presents with 2 days of chest pain, shortness of breath, fatigue and melena  2/20/2025 EGD/colonoscopy showed 2.5 cm ileocecal mass, pathology confirmed adenocarcinoma.  S/p 3 units packed rbc in ED. S/p 1 unit PRBCs on 3/2  Hemoglobin has been stable.   Patient recent diagnosis of adenocarcinoma, increased risk of stroke.   Updated iron studies 3/2025 improved compared to 2/2025  Rapid response  called 10:30A M [id number] for AMS and hypotension  Patient found to be minimally responsive and hypotensive  HGB 5.9 by iSTAT, formal labs show HGB 6.3, significant drop from 8 in AM  Bleeding from colonic mass suspected, per nursing no hematuria hemoptysis melena or other signs of overt bleeding prior to RR  Patient intubated for airway protection  Transported to ICU  MTP called  Code blue called  ROSC achieved shortly after blood products started  Rapidly distending abdomen, suspect intraabdominal bleeding     Hemoglobin   Date Value Ref Range Status   03/21/2025 7.8 (L) 12.0 - 17.0 g/dL Final        Plan:  Daily CBC  Transfuse for Hgb <7  Protonix 40 mg IV daily  Hold home eliquis, off for 48 hours at time of rapid response  Heparin gtt ordered but not started prior to rapid  Palliative care consulted prior to ICU admission  [facility] [id number]  PRBC 12  FFP 6  Platelets 2  Cryo 2  Whole blood 3  Consider high volume bleeding scan  Surgery consult for suspected internal bleeding  Per surgery take to OR w/o scan  Continue monitoring H&H  Maintain hgb > 7  Hgb has been stable since surgery  Elevated troponin  Last trops downtrending  No EKG changes  ECHO showed EF 55%  Continue cardiac monitoring  EKG  Hallucinations, visual  Per both patient and patient's wife, patient has been experiencing visual hallucinations that usually occur prior to falling asleep or waking up.  Patient reports that he will occasionally grab for things that are not there, such as a pillow.    Also states that he will occasionally have conversations with his wife when she is not physically in the room.    Patient states he is able to discern when he is having a hallucination and they are not distressing      PLAN:  delirium precautions  Hemoperitoneum  Emergent OR yesterday for distended abd post CPR  Found to have hemoperitoneum with hematoma at base of jejunum with active bleed, which was controlled  OR 3/17, stable, small bowel and L hemicolon  "resected, abd closed    Plan:  Continue to hold AC  Cardiac arrest due to other underlying condition (HCC)  See \"cardiac arrest\"    Disposition: Critical care    ICU Core Measures     Vented Patient  VAP Bundle  VAP bundle ordered     A: Assess, Prevent, and Manage Pain Has pain been assessed? Yes  Need for changes to pain regimen? No   B: Both Spontaneous Awakening Trials (SATs) and Spontaneous Breathing Trials (SBTs) Plan to perform spontaneous awakening trial today? Yes   Plan to perform spontaneous breathing trial today? Yes   Obvious barriers to extubation? Yes   C: Choice of Sedation RASS Goal: 0 Alert and Calm  Need for changes to sedation or analgesia regimen? No   D: Delirium CAM-ICU: Positive   E: Early Mobility  Plan for early mobility? Yes   F: Family Engagement Plan for family engagement today? Yes       Antibiotic Review: Awaiting culture results.     Review of Invasive Devices:    [place] Plan: Continue for accurate I/O monitoring for 48 hours  Central access plan: Medications requiring central line  [place] Plan: Keep arterial line for hemodynamic monitoring    Prophylaxis:  VTE VTE covered by:  heparin (porcine), Intravenous, 6 Units/kg/hr at 03/21/25 2023       Stress Ulcer  covered byfamotidine (PEPCID) 20 mg tablet [[id number]] (Long-Term Med), pantoprazole (PROTONIX) injection 40 mg [[id number]]         24 Hour Events : No acute events overnight. CVVH initiated at -50, tolerating well. Double stacking overnight - increased TV to 550 and increased fentanyl infusion for improved sedation.    Subjective   Review of Systems: Review of Systems   Unable to perform ROS: Intubated       Objective :                   Vitals I/O      Most Recent Min/Max in 24hrs   Temp (!) 97 °F (36.1 °C) Temp  Min: 97 °F (36.1 °C)  Max: 99.5 °F (37.5 °C)   Pulse 92 Pulse  Min: 91  Max: 117   Resp 13 Resp  Min: 12  Max: 36   /53 BP  Min: 94/53  Max: 151/67   O2 Sat 100 % SpO2  Min: 93 %  Max: 100 %      Intake/Output " Summary (Last 24 hours) at 3/22/2025 0232  Last data filed at 3/22/2025 0200  Gross per 24 hour   Intake 3293.46 ml   Output 2040 ml   Net 1253.46 ml       Diet NPO  Adult 3-in-1 TPN (custom base / custom electrolytes)    Invasive Monitoring   Arterial Line  Coni /26  Arterial Line BP  Min: 19/10  Max: 177/40   MAP 78 mmHg  Arterial Line MAP (mmHg)  Min: 15 mmHg  Max: 79 mmHg           Physical Exam   Physical Exam  Vitals and nursing note reviewed.   Eyes:      Conjunctiva/sclera: Conjunctivae normal.      Pupils: Pupils are equal, round, and reactive to light.   Skin:     General: Skin is warm and dry.   HENT:      Head: Normocephalic and atraumatic.      Mouth/Throat:      Mouth: Mucous membranes are moist.      Pharynx: Oropharynx is clear.   Cardiovascular:      Rate and Rhythm: Normal rate and regular rhythm.   Musculoskeletal:         General: Swelling present.      Cervical back: Neck supple.      Right lower leg: Edema present.      Left lower leg: Edema present.   Abdominal: General: There is no distension.      Palpations: Abdomen is soft.      Tenderness: There is no abdominal tenderness.      Comments: Midline surgical incision with mild surrounding erythema    Constitutional:       General: He is not in acute distress.     Appearance: He is well-developed and well-nourished. He is not ill-appearing.      Interventions: He is sedated, intubated and restrained.   Pulmonary:      Effort: Pulmonary effort is normal. He is intubated.      Breath sounds: Normal breath sounds.   Neurological:      GCS: GCS eye subscore is 1. GCS verbal subscore is 1. GCS motor subscore is 1.      Comments: Mild grimace to painful stimuli          Diagnostic Studies        Lab Results: I have reviewed the following results:     Medications:  Scheduled PRN   [Held by provider] ascorbic acid, 250 mg, Daily  [Held by provider] atorvastatin, 40 mg, Daily With Dinner  budesonide, 0.5 mg, Q12H  ceFAZolin, 2,000 mg,  Q12H  chlorhexidine, 15 mL, Q12H GALE  [Held by provider] Cholecalciferol, 2,000 Units, Daily  [Held by provider] cyanocobalamin, 100 mcg, Daily  docusate, 100 mg, BID  [Held by provider] ferrous sulfate, 325 mg, Daily With Breakfast  [Held by provider] fluticasone, 1 puff, Daily  [Held by provider] folic acid, 1 mg, Daily  [Held by provider] hydroxychloroquine, 400 mg, Daily With Breakfast  insulin lispro, 2-12 Units, Q6H GALE  ipratropium, 0.5 mg, TID  levalbuterol, 1.25 mg, TID  lidocaine, 3 patch, Daily  [Held by provider] metoprolol tartrate, 25 mg, Q12H GALE  HITESH ANTIFUNGAL, , BID  pantoprazole, 40 mg, Q12H GALE  polyethylene glycol, 17 g, Daily  senna, 17.6 mg, HS  [Held by provider] tamsulosin, 0.4 mg, Daily With Dinner  [Held by provider] umeclidinium-vilanterol, 1 puff, Daily      HYDROmorphone, 0.5 mg, Q3H PRN  [Held by provider] oxyCODONE, 5 mg, Q6H PRN  [Held by provider] oxyCODONE, 2.5 mg, Q6H PRN       Continuous    Adult 3-in-1 TPN (custom base / custom electrolytes), , Last Rate: 87.4 mL/hr at 03/21/25 2055  fentaNYL, 100 mcg/hr, Last Rate: 100 mcg/hr (03/21/25 2315)  heparin (porcine), 3-24 Units/kg/hr (Order-Specific), Last Rate: 6 Units/kg/hr (03/21/25 2023)  norepinephrine, 1-30 mcg/min, Last Rate: 6 mcg/min (03/22/25 0204)  NxStage K 4/Ca 3, 20,000 mL         Labs:   CBC    Recent Labs     03/21/25  0428 03/21/25  1802   WBC 10.31* 9.92   HGB 8.6* 7.8*   HCT 26.9* 24.5*   * 117*     BMP    Recent Labs     03/21/25  1350 03/21/25  2139   SODIUM 137 136   K 3.6 3.7    107   CO2 19* 21   AGAP 11 8   * 107*   CREATININE 4.27* 3.79*   CALCIUM 6.8* 7.8*       Coags    Recent Labs     03/20/25  0436 03/21/25  0430   PTT 69* 63*        Additional Electrolytes  Recent Labs     03/21/25  1916 03/21/25  2139   MG 2.0 2.0   PHOS 4.4* 4.1   CAIONIZED 1.04* 1.10*          Blood Gas    Recent Labs     03/21/25  0429   PHART 7.402   [id number] 30.7*   PO2ART 96.6   [id number] 18.7*   BEART -5.3    SOURCE Line, Arterial     Recent Labs     03/21/25  0429 03/21/25  1156   PHVEN  --  7.349   EFV6YFO  --  39.5*   PO2VEN  --  40.8   [id number]  --  21.3*   BEVEN  --  -4.0   [id number]  --  74.2   SOURCE Line, Arterial  --     LFTs  No recent results    Infectious  No recent results  Glucose  Recent Labs     03/21/25  0428 03/21/25  1156 03/21/25  1350 03/21/25  2139   GLUC 160* 178* 159* 158*

## 2025-03-22 NOTE — ASSESSMENT & PLAN NOTE
Currently on Cefazolin 2 gm IV q8H - needs this until 3/27/25.   ID following.   Possible cutaneous source  TTE without vegetation.  Continue antibiotic per ID

## 2025-03-22 NOTE — ASSESSMENT & PLAN NOTE
Was being diuresed before cardiac arrest on 3/15/25.  Found to have fluid overload with finding of bilateral pleural effusion  CTC 3/20 with moderate bilateral pleural effusions with dependent atelectasis, resolved pulmonary edema  Status post Bumex drip which was started on 3/19 due to hypotension.  Currently undergoing ultrafiltration with CVVHD

## 2025-03-22 NOTE — ASSESSMENT & PLAN NOTE
Newly diagnosed colon mass on C-scope on 2/20/25.   Pathology - adenocarcinoma  Was to follow colorectal as outpatient  S/p ex lap, partial SBR and R colectomy 3/16/2025, currently on TPN  Continue management per surgical team.  Follow-up pathology report

## 2025-03-22 NOTE — ASSESSMENT & PLAN NOTE
Previously on Metoprolol for rate control, currently on hold  On heparin drip for embolic infarcts

## 2025-03-23 PROBLEM — R79.89 LOW SERUM BICARBONATE: Status: RESOLVED | Noted: 2025-03-21 | Resolved: 2025-03-23

## 2025-03-23 NOTE — ASSESSMENT & PLAN NOTE
Acute blood loss on 3/15/25 and received > 10 units PRBC.   S/p emergent ex lap and control of bleeding  Hgb trended down to 7.5 g/dL, continue to monitor

## 2025-03-23 NOTE — ASSESSMENT & PLAN NOTE
2019     George Dudley    : 9/15/1927 Sex: male   Age: 82 y.o. Chief Complaint   Patient presents with    Other     Syncope       HPI: This 82y.o. -year-old male  presents today for a chief complaint of syncope. The patient states in the last month he's had a couple episodes were he has felt whole body shakes. The patient was most recently at dinner and he had a similar sensation and then passed out. The patient states he was fully aware of the whole body shakes. The patient does have a history of cardiovascular disease and cerebrovascular disease. The patient is on glipizide for diabetes. . Current medication list reviewed. The patient is tolerating all medications well without adverse events or known side effects. The patient does understand the risk and benefits of the prescribed medications. The patient is up-to-date on all age-appropriate wellness issues. ROS:   Const: Denies changes in appetite, chills, fever, night sweats and weight loss. Eyes:  Denies discharge, a recent change in visual acuity, blurred vision and double vision. ENMT: Denies discharge of the ears, hearing loss, pain of the ears. Denies nasal or sinus symptoms other than stated above. Denies mouth or throat symptoms. CV:  Denies chest pain, dyspnea on exertion, orthopnea, palpitations and PND  Resp: Denies chest pain, cough, SOB and wheezing. GI: Denies abdominal pain, constipation, diarrhea, heartburn, indigestion, nausea and vomiting. : Denies dysuria, frequency, hematuria, nocturia and urgency. Musculo: Denies arthralgias and myalgia  Skin:  Denies lesions, pruritus and rash. Neuro: Denies dizziness, lightheadedness, numbness, tingling and weakness. Psych:  Denies anxiety and depression  Endocrine: Denies anxiety and depression. Hema/Lymph: Denies hematologic symptoms  Allergy/Immuno:  Denies allergic/immunologic symptoms.   Pertinent positives reviewed and noted    No current outpatient medications on CT A/P shows bilateral pleural effusions L>R, pulmonary vascular congestion     Plan:  Repeat imaging if pt's respiratory status worsens  Patient intubated  Continue CVVH

## 2025-03-23 NOTE — ASSESSMENT & PLAN NOTE
Last trops downtrending  No EKG changes  ECHO showed EF 55%  Continue cardiac monitoring  EKG   Hydroquinone Pregnancy And Lactation Text: This medication has not been assigned a Pregnancy Risk Category but animal studies failed to show danger with the topical medication. It is unknown if the medication is excreted in breast milk.

## 2025-03-23 NOTE — ASSESSMENT & PLAN NOTE
Creatinine   Date Value Ref Range Status   03/23/2025 1.56 (H) 0.60 - 1.30 mg/dL Final     Comment:     Standardized to IDMS reference method   09/25/2022 1 0.6 - 1.2 mg/dL Final      Likely initially in the setting of hemorrhagic shock, now with ATN   Baseline Cr 0.8   Nephrology following, appreciate recommendations  Monitor and replete electrolytes Q6H  Monitor I/O and renal indices   Continues to have low urine output and anasarca with hypotension  Nephro consented family for dialysis, temporary HD line placed 3/21  Tolerating CVVH, increased filtration to -100, trend labs

## 2025-03-23 NOTE — OP NOTE
OPERATIVE REPORT  PATIENT NAME: Devin Chaves    :  1945  MRN: [id number]  Pt Location: AN OR ROOM 04    SURGERY DATE: 3/23/2025    Surgeons and Role:     * Edith Hyde, DO - Primary     * Andre Anaya MD - Assisting     * Gabriel Alcocer MD - Assisting    Preop Diagnosis:  Hemorrhagic shock (HCC) [R57.8]    Post-Op Diagnosis Codes:     * Hemorrhagic shock (HCC) [R57.8]    Procedure(s):  EVACUATION/ DRAINAGE HEMATOMA  LAPAROTOMY EXPLORATORY    Specimen(s):  * No specimens in log *    Estimated Blood Loss:   2400 mL    Drains:  NG/OG Tube Nasogastric Left nare (Active)   Site Assessment Clean;Dry;Intact 25 0730   Flush Tube Intake (mL) 0 mL 25 1300   Output (mL) 100 mL 25 1400   Number of days: 8       Anesthesia Type:   Choice    Operative Indications:  Hemorrhagic shock (HCC) [R57.8]      Operative Findings:  Hematoma intra-abdominal and along the abdominal incision  No active bleeding  Dehiscence of inferior abdominal wall  Oozing of the abdominal wall musculature and a raw area of the right pelvic brim, but no active bleeding  Healthy appearing anastomosis x 2  No mesenteric hematomas    Complications:   None    Procedure and Technique:  Patient was brought to preop holding area and identified both verbally by name and by armband. Patient was brought to the operating room, and placed supine on the operating room table. General anesthesia was induced, airway was secured previously with ETT.  Patient was prepped and draped in the usual sterile fashion.  Time-out was called, and all were in agreement begin the procedure.    The staples were removed.  The abdomen was entered subcutaneous tissue was divided bluntly.  Old hematoma was noted within the incision.  Upon inspection it was noted that there was a dehiscence of the inferior incision.  The remainder of the incision was opened and hemoperitoneum was encountered.  Appeared that it was mostly old hematoma and possibly the  result of ongoing oozing from the abdominal wall musculature.    Abdomen was inspected in all 4 quadrants for further signs of bleeding.  The bowel was run in its entirety.  Both anastomoses appeared healthy and viable.  The mesentery was inspected.  There were no signs of mesenteric hematomas.  The abdomen was copiously irrigated with sterile saline.  There was an area that appeared raw along the right pelvic brim posteriorly with some mild oozing.  This was controlled with packing and pressure as well as placement of Surgicel.    The abdominal fascia was closed using interrupted #1 figure-of-eight sutures as well as 0 Vicryl retention sutures in a simple interrupted fashion.  There was an area of weakness again noted on the inferior aspect of the incision this was reinforced with multiple interrupted sutures as well as figure-of-eight PDS.     The skin was closed with staples.  A Mepilex dressing was applied.  An abdominal binder was applied.    All sponge and instrument counts x2 were correct.   Patient was transported to ICU in stable condition    Patient Disposition:  Critical Care Unit    This procedure was not performed to treat colon cancer through resection           SIGNATURE: Andre Anaya MD  DATE: March 23, 2025  TIME: 7:12 PM

## 2025-03-23 NOTE — ASSESSMENT & PLAN NOTE
Brain MRI 3/18/2025: New nearly symmetric cortical restricted diffusion in bilateral frontal, bilateral parietal and bilateral occipital lobes suspicious for hypoxic ischemic encephalopathy.  New scattered small acute infarcts in left posterior frontal and bilateral cerebellar lobes favoring embolic infarcts  Now on heparin drip  repeat CTH 3/19/2025 with stable infarcts and no acute hemorrhage  Avoid hypotension  Neurology following. Management per neurology and primary team

## 2025-03-23 NOTE — ASSESSMENT & PLAN NOTE
Malnutrition Findings:   Adult Malnutrition type: Acute illness  Adult Degree of Malnutrition: Other severe protein calorie malnutrition  Malnutrition Characteristics: Inadequate energy, Fluid accumulation  360 Statement: related to inadequate energy/protein intake as evidenced by consuming < 50% of energy intake compared to estimated needs for > 5 days and B/L LE +3 edema. Treated with ONS.  BMI Findings:  Body mass index is 25.09 kg/m².      Plan:  Continue TPN and trickle TF per surgery     360 Statement: related to inadequate energy/protein intake as evidenced by consuming < 50% of energy intake compared to estimated needs for > 5 days and B/L LE +3 edema. Treated with ONS.    BMI Findings:    Body mass index is 30.95 kg/m².

## 2025-03-23 NOTE — ANESTHESIA POSTPROCEDURE EVALUATION
A: Met with Nitin Mishra to explain  role and to discuss aftercare options.    R: Outpatient Mental Health Medication Provider: Patient currently sees a provider at [facility] for outpatient mental health medication management. Patient signed an GLENDY for this provider and would like an aftercare appointment made.         Primary Care Provider: Patient currently sees PCP Jennifer Nevarez at [facility]. Patient signed an GLENDY for this provider. Patient is agreeable to this provider being notified of admission but will make their own appointment.         Outpatient Therapist: Patient currently sees a provider at [facility] for outpatient therapy. Patient signed an GLENDY for this provider and would like an aftercare appointment made.    Patient is aware of aftercare options. Patient does have virtual capabilities at home. Patient confirmed address and telephone number listed on the facesheet.     P: Will collaborate with treatment team and with the patient before setting up further aftercare, which will be complete by time of discharge.     Kira Vidal LPC   5/21/2021     Post-Op Assessment Note    CV Status:  Stable    Pain management: adequate       Mental Status:  Unresponsive   Hydration Status:  Euvolemic   PONV Controlled:  Controlled   Airway Patency:  Patent  Airway: intubated     Post Op Vitals Reviewed: Yes    No anethesia notable event occurred.    Staff: CRNA           Last Filed PACU Vitals:  Vitals Value Taken Time   Temp     Pulse 92 03/23/25 1926   /71    Resp 10 03/23/25 1926   SpO2 100 % 03/23/25 1926   Vitals shown include unfiled device data.       Transported to ICU sedated and intubated. Report given

## 2025-03-23 NOTE — ASSESSMENT & PLAN NOTE
Patient presents with 2 days of chest pain, shortness of breath, fatigue and melena  2/20/2025 EGD/colonoscopy showed 2.5 cm ileocecal mass, pathology confirmed adenocarcinoma.  S/p 3 units packed rbc in ED. S/p 1 unit PRBCs on 3/2  Hemoglobin has been stable.   Patient recent diagnosis of adenocarcinoma, increased risk of stroke.   Updated iron studies 3/2025 improved compared to 2/2025  Rapid response called 10:30A M [id number] for AMS and hypotension  Patient found to be minimally responsive and hypotensive  HGB 5.9 by iSTAT, formal labs show HGB 6.3, significant drop from 8 in AM  Bleeding from colonic mass suspected, per nursing no hematuria hemoptysis melena or other signs of overt bleeding prior to RR  Patient intubated for airway protection  Transported to ICU  MTP called  Code blue called  ROSC achieved shortly after blood products started  Rapidly distending abdomen, suspect intraabdominal bleeding     Hemoglobin   Date Value Ref Range Status   03/23/2025 7.5 (L) 12.0 - 17.0 g/dL Final     Comment:     This is an appended report.  These results have been appended to a previously preliminary verified report.        Plan:  Daily CBC  Transfuse for Hgb <7  Protonix 40 mg IV daily  Hold home eliquis, off for 48 hours at time of rapid response  Heparin gtt ordered but not started prior to rapid  Palliative care consulted prior to ICU admission  MTP [id number]  PRBC 12  FFP 6  Platelets 2  Cryo 2  Whole blood 3  Consider repeat high volume bleeding scan  Surgery consult for suspected internal bleeding  Per surgery take to OR w/o scan  Continue monitoring H&H  Maintain hgb > 7  Hgb has been stable since surgery

## 2025-03-23 NOTE — RESPIRATORY THERAPY NOTE
03/23/25 1610   Respiratory Assessment   Resp Comments Patient placed on SBT at this time as requested by provider. Patient currently on documented settings.   Vent Information   Vent    Vent type     Vent Mode CPAP/PS Spont   $ Vital Capacity Mech/Peak Flow Yes   $ Pulse Oximetry Spot Check Charge Completed   SpO2 100 %   CPAP/PS Spont Settings   FIO2 (%) 40 %   PEEP (cmH2O) 6 cmH2O   Pressure Support (cmH2O) 8 cmH20   Trigger Sensitivity Flow (lpm) 3 LPM   Rise Time (%) 50 %   Esens % 25 %   Humidification Heater   Heater Temp 98.6 °F (37 °C)   CPAP/PS Spont Actuals   Resp Rate (BPM) 29 BPM   VT (mL) 570 mL   MV (Obs) 17.4   MAP (cmH2O) 9.6 cmH2O   Peak Pressure (cmH2O) 16 cmH2O   I/E Ratio (Obs) 1:2   RSBI 42   Heater Temperature (Obs) 98.2 °F (36.8 °C)   CPAP/PS Spont Alarms   High Peak Pressure (cmH20) 40 cmH2O   High Resp Rate (BPM) 40 BPM   High MV (L/min) 22 L/min   Low MV (L/min) 4 L/min   High Kim VTE (mL) 800 mL   Low Kim VTE (mL) 300 mL   High SPONT VTE (mL) 800 mL   Low Spont VTE (mL) 200 mL   CPAP/PS Spont Apnea Settings   Resp Rate (BPM) 12 BPM   VT (mL) 550 mL   FIO2 (%) 100 %   Apnea Time (s) 20 S   Apnea Flow (LPM) 60 LPM   Maintenance   Alarm (pink) cable attached Yes   Resuscitation bag with peep valve at bedside Yes   Water bag changed Yes   Circuit changed Yes   Daily Screen   Patient safety screen outcome: Passed   Spont breathing trial outcome: Passed   RSBI 42   IHI Ventilator Associated Pneumonia Bundle   Head of Bed Elevated HOB 30   ETT  Cuffed 8 mm   Placement Date/Time: 03/15/25 (c) 1200   Type: Cuffed  Tube Size: 8 mm  Location: Oral  Insertion attempts: 1  Placement Verification: Chest x-ray;End tidal CO2  Secured at (cm): 25   Secured at (cm) 23   Measured from Gums   Secured Location Right   Secured by Commercial tube otto   Site Condition Cool;Dry   Cuff Pressure (color) Green   HI-LO Suction  Intermittent suction   HI-LO Secretions Small   HI-LO Intervention  Patent

## 2025-03-23 NOTE — ASSESSMENT & PLAN NOTE
Patient sustained cardiac arrest in the setting of an undetectable hemoglobin on 3/15 and was taken emergently to the operating room for exploration in the setting of abdominal free fluid.  Large volume hemoperitoneum was encountered with evidence of a actively bleeding/decompressing mesenteric hematoma; bleeding control was obtained and on 3/15 taken to OR for a segmental small bowel resection was performed and the patient was left in discontinuity given ongoing instability.  S/p SB anastomosis, R hemicolectomy, closure on 3/16.    Back on pressors; currently levo at 6    Had episode of feculent emesis around NG tube overnight; 800 total NG tube output and emesis combined    Plan  - Continue NG tube to suction/n.p.o. given emesis and no significant bowel function  - Obtain CT chest abdomen pelvis  - Appreciate neurology recommendations   - Avoid hypotension, optimize BP management  - Heparin gtt in setting of embolic infarcts  -Continue CRRT  - Continue TPN  - Wean vent as tolerated  - Continue recommendations from cardiology and nephrology for ongoing diuresis  - Rest of care per ICU

## 2025-03-23 NOTE — ANESTHESIA POSTPROCEDURE EVALUATION
Post-Op Assessment Note    CV Status:  Unstable (BP stable on Norepi infusion)         Post-procedure mental status: sedated.  Airway: intubated     Post Op Vitals Reviewed: Yes    No anethesia notable event occurred.    Staff: Anesthesiologist     Reason for prolonged intubation > 24 hours:  Respiratory failureReason for prolonged intubation > 48 hours:  Respiritory failure      Last Filed PACU Vitals:  Vitals Value Taken Time   Temp 95.18 °F (35.1 °C) 03/23/25 1940   Pulse 101 03/23/25 1940   /60 03/23/25 1936   Resp 24 03/23/25 1940   SpO2 93 % 03/23/25 1940   Vitals shown include unfiled device data.

## 2025-03-23 NOTE — PLAN OF CARE
Problem: Prexisting or High Potential for Compromised Skin Integrity  Goal: Skin integrity is maintained or improved  Description: INTERVENTIONS:  - Identify patients at risk for skin breakdown  - Assess and monitor skin integrity  - Assess and monitor nutrition and hydration status  - Monitor labs   - Assess for incontinence   - Turn and reposition patient  - Assist with mobility/ambulation  - Relieve pressure over bony prominences  - Avoid friction and shearing  - Provide appropriate hygiene as needed including keeping skin clean and dry  - Evaluate need for skin moisturizer/barrier cream  - Collaborate with interdisciplinary team   - Patient/family teaching  - Consider wound care consult   Outcome: Progressing     Problem: Potential for Falls  Goal: Patient will remain free of falls  Description: INTERVENTIONS:  - Educate patient/family on patient safety including physical limitations  - Instruct patient to call for assistance with activity   - Consult OT/PT to assist with strengthening/mobility   - Keep Call bell within reach  - Keep bed low and locked with side rails adjusted as appropriate  - Keep care items and personal belongings within reach  - Initiate and maintain comfort rounds  - Make Fall Risk Sign visible to staff  - Offer Toileting every  Hours, in advance of need  - Initiate/Maintain alarm  - Obtain necessary fall risk management equipment:  - Apply yellow socks and bracelet for high fall risk patients  - Consider moving patient to room near nurses station  Outcome: Progressing     Problem: Nutrition/Hydration-ADULT  Goal: Nutrient/Hydration intake appropriate for improving, restoring or maintaining nutritional needs  Description: Monitor and assess patient's nutrition/hydration status for malnutrition. Collaborate with interdisciplinary team and initiate plan and interventions as ordered.  Monitor patient's weight and dietary intake as ordered or per policy. Utilize nutrition screening tool and  intervene as necessary. Determine patient's food preferences and provide high-protein, high-caloric foods as appropriate.     INTERVENTIONS:  - Monitor oral intake, urinary output, labs, and treatment plans  - Assess nutrition and hydration status and recommend course of action  - Evaluate amount of meals eaten  - Assist patient with eating if necessary   - Allow adequate time for meals  - Recommend/ encourage appropriate diets, oral nutritional supplements, and vitamin/mineral supplements  - Order, calculate, and assess calorie counts as needed  - Recommend, monitor, and adjust tube feedings and TPN/PPN based on assessed needs  - Assess need for intravenous fluids  - Provide specific nutrition/hydration education as appropriate  - Include patient/family/caregiver in decisions related to nutrition  Outcome: Progressing     Problem: GASTROINTESTINAL - ADULT  Goal: Minimal or absence of nausea and/or vomiting  Description: INTERVENTIONS:  - Administer IV fluids if ordered to ensure adequate hydration  - Maintain NPO status until nausea and vomiting are resolved  - Nasogastric tube if ordered  - Administer ordered antiemetic medications as needed  - Provide nonpharmacologic comfort measures as appropriate  - Advance diet as tolerated, if ordered  - Consider nutrition services referral to assist patient with adequate nutrition and appropriate food choices  Outcome: Progressing  Goal: Maintains or returns to baseline bowel function  Description: INTERVENTIONS:  - Assess bowel function  - Encourage oral fluids to ensure adequate hydration  - Administer IV fluids if ordered to ensure adequate hydration  - Administer ordered medications as needed  - Encourage mobilization and activity  - Consider nutritional services referral to assist patient with adequate nutrition and appropriate food choices  Outcome: Progressing  Goal: Maintains adequate nutritional intake  Description: INTERVENTIONS:  - Monitor percentage of each meal  consumed  - Identify factors contributing to decreased intake, treat as appropriate  - Assist with meals as needed  - Monitor I&O, weight, and lab values if indicated  - Obtain nutrition services referral as needed  Outcome: Progressing  Goal: Establish and maintain optimal ostomy function  Description: INTERVENTIONS:  - Assess bowel function  - Encourage oral fluids to ensure adequate hydration  - Administer IV fluids if ordered to ensure adequate hydration   - Administer ordered medications as needed  - Encourage mobilization and activity  - Nutrition services referral to assist patient with appropriate food choices  - Assess stoma site  - Consider wound care consult   Outcome: Progressing  Goal: Oral mucous membranes remain intact  Description: INTERVENTIONS  - Assess oral mucosa and hygiene practices  - Implement preventative oral hygiene regimen  - Implement oral medicated treatments as ordered  - Initiate Nutrition services referral as needed  Outcome: Progressing     Problem: HEMATOLOGIC - ADULT  Goal: Maintains hematologic stability  Description: INTERVENTIONS  - Assess for signs and symptoms of bleeding or hemorrhage  - Monitor labs  - Administer supportive blood products/factors as ordered and appropriate  Outcome: Progressing     Problem: SAFETY,RESTRAINT: NV/NON-SELF DESTRUCTIVE BEHAVIOR  Goal: Remains free of harm/injury (restraint for non violent/non self-detsructive behavior)  Description: INTERVENTIONS:  - Instruct patient/family regarding restraint use   - Assess and monitor physiologic and psychological status   - Provide interventions and comfort measures to meet assessed patient needs   - Identify and implement measures to help patient regain control  - Assess readiness for release of restraint   Outcome: Progressing  Goal: Returns to optimal restraint-free functioning  Description: INTERVENTIONS:  - Assess the patient's behavior and symptoms that indicate continued need for restraint  - Identify  and implement measures to help patient regain control  - Assess readiness for release of restraint   Outcome: Progressing

## 2025-03-23 NOTE — PROGRESS NOTES
Progress Note - Surgery-General   Name: Devin Chaves 79 y.o. male I MRN: [id number]  Unit/Bed#: ICU 10 I Date of Admission: 2/28/2025   Date of Service: 3/23/2025 I Hospital Day: 23    Assessment & Plan  Acute blood loss anemia  Patient sustained cardiac arrest in the setting of an undetectable hemoglobin on 3/15 and was taken emergently to the operating room for exploration in the setting of abdominal free fluid.  Large volume hemoperitoneum was encountered with evidence of a actively bleeding/decompressing mesenteric hematoma; bleeding control was obtained and on 3/15 taken to OR for a segmental small bowel resection was performed and the patient was left in discontinuity given ongoing instability.  S/p SB anastomosis, R hemicolectomy, closure on 3/16.    Back on pressors; currently levo at 6    Had episode of feculent emesis around NG tube overnight; 800 total NG tube output and emesis combined    Plan  - Continue NG tube to suction/n.p.o. given emesis and no significant bowel function  - Obtain CT chest abdomen pelvis  - Appreciate neurology recommendations   - Avoid hypotension, optimize BP management  - Heparin gtt in setting of embolic infarcts  -Continue CRRT  - Continue TPN  - Wean vent as tolerated  - Continue recommendations from cardiology and nephrology for ongoing diuresis  - Rest of care per ICU  Idiopathic hypotension    CAD (coronary artery disease)    COPD (chronic obstructive pulmonary disease) ([facility])    History of CVA (cerebrovascular accident)    Acute on chronic respiratory failure with hypoxia ([facility])    Atrial fibrillation ([facility])    Urinary retention    MSSA bacteremia    Neck pain    Colonic mass    ORIANA (acute kidney injury) ([facility])    Fluid overload    Dysphagia    Severe protein-calorie malnutrition (HCC)  Malnutrition Findings:   Adult Malnutrition type: Acute illness  Adult Degree of Malnutrition: Other severe protein calorie malnutrition  Malnutrition Characteristics: Inadequate energy,  Fluid accumulation                  360 Statement: related to inadequate energy/protein intake as evidenced by consuming < 50% of energy intake compared to estimated needs for > 5 days and B/L LE +3 edema. Treated with ONS.    BMI Findings:           Body mass index is 30.65 kg/m².     Hallucinations, visual    Hemorrhagic shock (HCC)    Cardiac arrest (HCC)    Hemoperitoneum    Encephalopathy    Cardiac arrest due to other underlying condition (HCC)    Palliative care by specialist    Counseling regarding advance care planning and goals of care    Elevated troponin    Embolic cerebral infarction (HCC)    Volume overload    Low serum bicarbonate          Subjective   Intubated, ill appearing.no BM per RN.    Objective :  Temp:  [96.3 °F (35.7 °C)-98.1 °F (36.7 °C)] 97.7 °F (36.5 °C)  HR:  [] 106  BP: ()/(48-60) 117/55  Resp:  [7-35] 21  SpO2:  [75 %-100 %] 100 %  O2 Device: Ventilator    I/O         03/20 0701  03/21 0700 03/21 0701  03/22 0700    I.V. (mL/kg) 1395.3 (13.8) 1940.8 (19.2)    NG/GT 65 60    IV Piggyback 401.7 25    TPN 2100.6 1070.7    Total Intake(mL/kg) 3962.6 (39.2) 3096.5 (30.7)    Urine (mL/kg/hr) 305 (0.1) 361 (0.1)    Emesis/NG output 775 300    Other  1643    Total Output 1080 2304    Net +2882.6 +792.5                Lines/Drains/Airways       Active Status       Name Placement date Placement time Site Days    PICC Line 02/26/25 Right Brachial 02/26/25  0548  Brachial  25    CVC Central Lines 03/15/25 Triple 03/15/25  1338  --  7    Arterial Line 03/20/25 Radial 03/20/25  1347  Radial  2    HD Temporary Double Catheter 03/21/25  1754  Left femoral  1    ETT  Cuffed 8 mm 03/15/25  1200  -- 7    Urethral Catheter Latex 16 Fr. 03/15/25  1336  Latex  7    NG/OG Tube Nasogastric Left nare 03/15/25  1700  Left nare  7                  Physical Exam  General: intubated, ill-appearing  HENT: ETT in place  Neck: supple, no JVD  CV: Irregular rhythm, tachycardic  Lungs: mechanically  ventilated.   ABD: Soft, nondistended. Incision CDI      Lab Results: I have reviewed the following results:  Recent Labs     03/21/25  0428 03/21/25  0430 03/23/25  0412 03/23/25  0419   WBC 10.31*   < > 13.60*  --    HGB 8.6*   < > 7.5*  --    HCT 26.9*   < > 23.9*  --    *   < > 135*  --    SODIUM 139   < > 135  --    K 3.9   < > 4.2  --       < > 106  --    CO2 20*   < > 24  --    *   < > 52*  --    CREATININE 4.28*   < > 1.56*  --    GLUC 160*   < > 148*  --    CAIONIZED  --    < > 1.14  --    MG 2.1   < > 1.9  --    PHOS 4.8*   < > 2.1*  --    PTT  --    < >  --  61*   LACTICACID 1.1  --   --   --     < > = values in this interval not displayed.             VTE Pharmacologic Prophylaxis: VTE covered by:  heparin (porcine), Intravenous, 6 Units/kg/hr at 03/23/25 0825      VTE Mechanical Prophylaxis: sequential compression device

## 2025-03-23 NOTE — ANESTHESIA PREPROCEDURE EVALUATION
Procedure:  RE-EXPLORATION OPERATIVE SITE (Abdomen)  EVACUATION/ DRAINAGE HEMATOMA (Abdomen)  LAPAROTOMY EXPLORATORY (Abdomen)    Relevant Problems   CARDIO   (+) Atrial fibrillation (HCC)   (+) CAD (coronary artery disease)   (+) Cardiac arrest (HCC)   (+) Cardiac arrest due to other underlying condition (HCC)   (+) Pre-operative cardiovascular examination, unstable angina (HCC)      GI/HEPATIC   (+) Dysphagia   (+) Esophageal dysphagia      /RENAL   (+) ORIANA (acute kidney injury) (HCC)      HEMATOLOGY   (+) Acute blood loss anemia      MUSCULOSKELETAL   (+) Back pain   (+) Rheumatoid arthritis (HCC)      NEURO/PSYCH   (+) Embolic cerebral infarction (HCC)   (+) Stroke (HCC)      PULMONARY   (+) Acute on chronic respiratory failure with hypoxia (HCC)   (+) COPD (chronic obstructive pulmonary disease) (HCC)   (+) JAZMINE (obstructive sleep apnea)        Physical Exam    Airway    Mallampati score: already intubated         Dental       Cardiovascular      Pulmonary      Other Findings        Anesthesia Plan  ASA Score- 5 Emergent    Anesthesia Type- general with ASA Monitors.         Additional Monitors:     Airway Plan: ETT.           Plan Factors-    Chart reviewed.   Existing labs reviewed.                   Induction-     Postoperative Plan-         Informed Consent- Anesthetic plan and risks discussed with patient.  I personally reviewed this patient with the CRNA. Discussed and agreed on the Anesthesia Plan with the CRNA..      NPO Status:  No vitals data found for the desired time range.

## 2025-03-23 NOTE — ASSESSMENT & PLAN NOTE
Malnutrition Findings:   Adult Malnutrition type: Acute illness  Adult Degree of Malnutrition: Other severe protein calorie malnutrition  Malnutrition Characteristics: Inadequate energy, Fluid accumulation                  360 Statement: related to inadequate energy/protein intake as evidenced by consuming < 50% of energy intake compared to estimated needs for > 5 days and B/L LE +3 edema. Treated with ONS.    BMI Findings:           Body mass index is 30.65 kg/m².

## 2025-03-23 NOTE — ASSESSMENT & PLAN NOTE
-Baseline creatinine: 0.8-0.9 mg/dl  -Admission creatinine: 2.43 mg/dl  -Etiology: Likely ATN in the setting of prolonged prerenal azotemia from volume depletion/severe anemia  -CT abdomen pelvis from 2/28 did not show any hydronephrosis.  UA from 3/8 suggestive of 2-4 RBCs, 2-4 WBCs, trace protein.  UPC ratio was 3.4 g and albumin creatinine ratio was 821 mg  -Serum and urine immunofixation with no M spike,   C3 level was slightly low at 83, C4 was normal  -Hospital Course: Patient had rapid response on 3/15 AM in the setting of acute anemia and hypotension code Amadeo was called .  Patient had return of spontaneous circulation after blood products given require vasopressors and has been intubated.  Was taken to the OR emergently on 3/15 and again on 3/16  -Serum creatinine was around 1.8-2.0 from 3/2 to 3/16 but renal function worsened on on 3/17 and further worsened  on  3/18 to creatinine 3.08 likely due to ATN from hemodynamic changes on 3/15 and during OR on 3/16, underwent exploratory laparotomy partial small bowel resection and right colectomy.  Required Levophed IntraOp and function gradually worsening since then.  Blood pressure dropped on 3/19 and was taken off Bumex drip.  Patient required Levophed on 3/21 still on low-dose Levophed.    -> Was started on CVVHD on 3/21  in the setting of oliguria and fluid overload  -Plan:   No evidence of renal recovery, continue CVVHD.  Patient was seen and examined on CVVHD, current prescription was reviewed, blood flow rate to 250 mL/min, dialysate flow rate 2500 mL/h and ultrafiltration 75 mL/h net negative.  Continue vasopressors per ICU team and wean as tolerated  Dose all medication for CRRT dosing    Currently on TPN per ICU team  Avoid nephrotoxins, dose all medication for EGFR  Monitor for renal recovery so far no renal recovery

## 2025-03-23 NOTE — PROGRESS NOTES
Progress Note - Critical Care/ICU   Name: Devin Chaves 79 y.o. male I MRN: [id number]  Unit/Bed#: ICU 10 I Date of Admission: 2/28/2025   Date of Service: 3/23/2025 I Hospital Day: 23      Assessment & Plan  Hemorrhagic shock (HCC)  Code crimson 3/15 for hemorrhagic shock related to mesenteric hematoma s/p SBR and colon resection   S/p blood resuscitation with 12 U PRBC, 6 FFP, 2 Plt, 2 Cryo   Now off pressors and HD stable   Trend Hgb, transfuse for Hgb<7 or active bleeding with HD instability   Maintain MAP>65  Cardiac arrest (HCC)  Likely in the setting of hemorrhagic shock  Noted 12 minutes of downtime, received aggressive blood resuscitation and surgical intervention as above   Now with ongoing encephalopathy concerning for anoxic injury   Continue close HD and telemetry monitoring   Trend Hgb as above   Encephalopathy  Concern for anoxic injury s/p cardiac arrest with 12 minute downtime   EEG without seizure activity   CTH shows 4mm focus of high attenuation in right frontal lobe which is stable on repeat CTH   MRI with multiple areas of hypoxia related injury as well as acute embolic strokes   Hold all sedation, PRN dilaudid for pain management   Frequent neuro checks   Maintain normothermia and normoglycemia   Neurology following, appreciate recommendations   Embolic cerebral infarction (HCC)  New areas of embolic stroke noted on MRI 3/18  Neurology following, appreciate recommendations  Continue ischemic stroke heparin gtt  Consider restarting statin   Frequent neuro checks    Repeat CTH after ptt therapeutic x 2   Acute on chronic respiratory failure with hypoxia (HCC)  Remains intubated for airway protection post cardiac arrest and postoperatively with poor neuro exam   Continue mechanical ventilation ACVC 12/550/40/6, titrate FiO2 for SpO2>88  At baseline requires 2-4L NC   Fentanyl gtt for sedation + PRNs  Daily SBT   Vent bundle   ORIANA (acute kidney injury) (HCC)  Creatinine   Date Value Ref Range  Status   03/23/2025 1.56 (H) 0.60 - 1.30 mg/dL Final     Comment:     Standardized to IDMS reference method   09/25/2022 1 0.6 - 1.2 mg/dL Final      Likely initially in the setting of hemorrhagic shock, now with ATN   Baseline Cr 0.8   Nephrology following, appreciate recommendations  Monitor and replete electrolytes Q6H  Monitor I/O and renal indices   Continues to have low urine output and anasarca with hypotension  Nephro consented family for dialysis, temporary HD line placed 3/21  Tolerating CVVH, increased filtration to -100, trend labs   Idiopathic hypotension  Home regimen: Metoprolol tartrate 25 mg BID, Losartan 25 mg QD for HTN  Consider restarting home metoprolol   Hold home losartan in the setting of ORIANA   CAD (coronary artery disease)  With known LAD stenosis   Continue home BB and statin   Hold ASA, restart as appropriate   COPD (chronic obstructive pulmonary disease) (HCC)  Without acute exacerbation  Hold home inhalers while intubated  Continue scheduled xopenex, atrovent, budesonide nebs   History of CVA (cerebrovascular accident)  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   Now with new acute embolic strokes noted on MRI   Consider restarting home statin   Continue ischemic stroke heparin gtt   Hold ASA, restart as appropriate   Atrial fibrillation (HCC)  Currently rate controlled in NSR   Consider restarting home metoprolol for rate control   Hold home eliquis, currently on ischemic stroke heparin protocol   Urinary retention  Hold home flomax while NPO  Continue glass catheter for now   MSSA bacteremia  Noted on previous admission with likely cutaneous source   Continue cefazolin through 3/27 to complete course   ID following, appreciate recommendations   Neck pain  MRI of the neck done 2/14/2025 revealed cervical degenerative change with mild canal stenosis and mild to moderate foraminal narrowing.  No cord compression.  Mild nonspecific edema within the right posterior breast  dermal musculature of the upper cervicals spine.  Minimal peripheral enhancement is noted  Will need repeat MRI C spine with and without contrast prior to completion of abx - currently holding on this at this time due to ORIANA   Colonic mass  2/20/2025 EGD/colonoscopy showed 2.5 cm ileocecal mass, pathology confirmed adenocarcinoma  Now s/p ex lap SBR and R colon resection on 3/15  Surgery following, appreciate recommendations   Still without return of bowel function since surgical intervention despite aggressive bowel management, including lactulose   Repeat CT A/P +/- chest this AM   Fluid overload  CT A/P shows bilateral pleural effusions L>R, pulmonary vascular congestion     Plan:  Repeat imaging if pt's respiratory status worsens  Patient intubated  Continue CVVH  Dysphagia  Noted prior to intubation and cardiac arrest   Will need re-evaluation when able to take PO   Severe protein-calorie malnutrition (HCC)  Malnutrition Findings:   Adult Malnutrition type: Acute illness  Adult Degree of Malnutrition: Other severe protein calorie malnutrition  Malnutrition Characteristics: Inadequate energy, Fluid accumulation  360 Statement: related to inadequate energy/protein intake as evidenced by consuming < 50% of energy intake compared to estimated needs for > 5 days and B/L LE +3 edema. Treated with ONS.  BMI Findings:  Body mass index is 25.09 kg/m².      Plan:  Continue TPN and trickle TF per surgery     360 Statement: related to inadequate energy/protein intake as evidenced by consuming < 50% of energy intake compared to estimated needs for > 5 days and B/L LE +3 edema. Treated with ONS.    BMI Findings:    Body mass index is 30.95 kg/m².   Acute blood loss anemia  Patient presents with 2 days of chest pain, shortness of breath, fatigue and melena  2/20/2025 EGD/colonoscopy showed 2.5 cm ileocecal mass, pathology confirmed adenocarcinoma.  S/p 3 units packed rbc in ED. S/p 1 unit PRBCs on 3/2  Hemoglobin has been  stable.   Patient recent diagnosis of adenocarcinoma, increased risk of stroke.   Updated iron studies 3/2025 improved compared to 2/2025  Rapid response called 10:30A M [id number] for AMS and hypotension  Patient found to be minimally responsive and hypotensive  HGB 5.9 by iSTAT, formal labs show HGB 6.3, significant drop from 8 in AM  Bleeding from colonic mass suspected, per nursing no hematuria hemoptysis melena or other signs of overt bleeding prior to RR  Patient intubated for airway protection  Transported to ICU  MTP called  Code blue called  ROSC achieved shortly after blood products started  Rapidly distending abdomen, suspect intraabdominal bleeding     Hemoglobin   Date Value Ref Range Status   03/23/2025 7.5 (L) 12.0 - 17.0 g/dL Final     Comment:     This is an appended report.  These results have been appended to a previously preliminary verified report.        Plan:  Daily CBC  Transfuse for Hgb <7  Protonix 40 mg IV daily  Hold home eliquis, off for 48 hours at time of rapid response  Heparin gtt ordered but not started prior to rapid  Palliative care consulted prior to ICU admission  MTP [id number]  PRBC 12  FFP 6  Platelets 2  Cryo 2  Whole blood 3  Consider repeat high volume bleeding scan  Surgery consult for suspected internal bleeding  Per surgery take to OR w/o scan  Continue monitoring H&H  Maintain hgb > 7  Hgb has been stable since surgery  Elevated troponin  Last trops downtrending  No EKG changes  ECHO showed EF 55%  Continue cardiac monitoring  EKG  Hallucinations, visual  Per both patient and patient's wife, patient has been experiencing visual hallucinations that usually occur prior to falling asleep or waking up.  Patient reports that he will occasionally grab for things that are not there, such as a pillow.    Also states that he will occasionally have conversations with his wife when she is not physically in the room.    Patient states he is able to discern when he is having a  "hallucination and they are not distressing      PLAN:  delirium precautions  Hemoperitoneum  Emergent OR yesterday for distended abd post CPR  Found to have hemoperitoneum with hematoma at base of jejunum with active bleed, which was controlled  OR 3/17, stable, small bowel and L hemicolon resected, abd closed    Plan:  Continue to hold AC  Cardiac arrest due to other underlying condition (HCC)  See \"cardiac arrest\"    Disposition: Critical care    ICU Core Measures     Vented Patient  VAP Bundle  VAP bundle ordered     A: Assess, Prevent, and Manage Pain Has pain been assessed? Yes  Need for changes to pain regimen? No   B: Both Spontaneous Awakening Trials (SATs) and Spontaneous Breathing Trials (SBTs) Plan to perform spontaneous awakening trial today? Yes   Plan to perform spontaneous breathing trial today? Yes   Obvious barriers to extubation? Yes   C: Choice of Sedation RASS Goal: 0 Alert and Calm  Need for changes to sedation or analgesia regimen? Yes   D: Delirium CAM-ICU: Positive   E: Early Mobility  Plan for early mobility? Yes   F: Family Engagement Plan for family engagement today? Yes       Antibiotic Review: Awaiting culture results.     Review of Invasive Devices:    Tang Plan: Continue for accurate I/O monitoring for 48 hours  Central access plan: Medications requiring central line HD cath in place.  Plan continue CVVH  [place] Plan: Keep arterial line for hemodynamic monitoring    Prophylaxis:  VTE VTE covered by:  heparin (porcine), Intravenous, 8 Units/kg/hr at 03/23/25 0456       Stress Ulcer  covered byfamotidine (PEPCID) 20 mg tablet [[id number]] (Long-Term Med), pantoprazole (PROTONIX) injection 40 mg [[id number]]         24 Hour Events : Continues without BM despite addition of lactulose. Around 5am had 450cc of feculent appearing/smelling output from his NGT. Small to moderate amount of bloody/serosanguinous drainage from incision site.     Subjective   Review of Systems: Review of Systems "   Unable to perform ROS: Intubated       Objective :                   Vitals I/O      Most Recent Min/Max in 24hrs   Temp 97.5 °F (36.4 °C) Temp  Min: 96.3 °F (35.7 °C)  Max: 97.5 °F (36.4 °C)   Pulse (!) 110 Pulse  Min: 84  Max: 110   Resp 22 Resp  Min: 7  Max: 27   BP 98/53 BP  Min: 91/49  Max: 143/64   O2 Sat 92 % SpO2  Min: 90 %  Max: 100 %      Intake/Output Summary (Last 24 hours) at 3/23/2025 0503  Last data filed at 3/23/2025 0400  Gross per 24 hour   Intake 3118 ml   Output 4569 ml   Net -1451 ml       Diet NPO  Adult 3-in-1 TPN (custom base / custom electrolytes)    Invasive Monitoring   Arterial Line  Coni /25  Arterial Line BP  Min: 88/21  Max: 173/44   MAP (!) 46 mmHg  Arterial Line MAP (mmHg)  Min: 44 mmHg  Max: 78 mmHg           Physical Exam   Physical Exam  Vitals and nursing note reviewed.   Eyes:      Conjunctiva/sclera: Conjunctivae normal.      Pupils: Pupils are equal, round, and reactive to light.   Skin:     General: Skin is warm and dry.   HENT:      Head: Normocephalic and atraumatic.      Mouth/Throat:      Mouth: Mucous membranes are moist.      Pharynx: Oropharynx is clear.   Cardiovascular:      Rate and Rhythm: Regular rhythm. Tachycardia present.   Musculoskeletal:      Cervical back: Neck supple.   Abdominal: General: There is distension.     Palpations: Abdomen is soft.      Comments: Midline abdominal incision with staples intact, mild surrounding erythema, mild to moderate amount of bloody/serosanguinous drainage from midline incision   Constitutional:       General: He is not in acute distress.     Appearance: He is well-developed and well-nourished.      Interventions: He is sedated, intubated and restrained.   Pulmonary:      Effort: Pulmonary effort is normal. He is intubated.      Breath sounds: Normal breath sounds.   Neurological:      GCS: GCS eye subscore is 1. GCS verbal subscore is 1. GCS motor subscore is 2.   Genitourinary/Anorectal:  Tang present.         Diagnostic Studies        Lab Results: I have reviewed the following results:     Medications:  Scheduled PRN   Albumin 25%, 12.5 g, Q6H  [Held by provider] ascorbic acid, 250 mg, Daily  [Held by provider] atorvastatin, 40 mg, Daily With Dinner  bisacodyl, 10 mg, Daily  budesonide, 0.5 mg, Q12H  calcium gluconate, 1 g, Once  ceFAZolin, 2,000 mg, Q12H  chlorhexidine, 15 mL, Q12H GALE  [Held by provider] Cholecalciferol, 2,000 Units, Daily  [Held by provider] cyanocobalamin, 100 mcg, Daily  docusate, 100 mg, BID  [Held by provider] ferrous sulfate, 325 mg, Daily With Breakfast  [Held by provider] fluticasone, 1 puff, Daily  [Held by provider] folic acid, 1 mg, Daily  [Held by provider] hydroxychloroquine, 400 mg, Daily With Breakfast  insulin lispro, 2-12 Units, Q6H GALE  ipratropium, 0.5 mg, TID  lactulose, 30 g, TID  levalbuterol, 1.25 mg, TID  lidocaine, 3 patch, Daily  [Held by provider] metoprolol tartrate, 25 mg, Q12H GALE  HITESH ANTIFUNGAL, , BID  pantoprazole, 40 mg, Q12H GALE  polyethylene glycol, 17 g, Daily  senna, 17.6 mg, HS  sodium phosphate, 6 mmol, Once  [Held by provider] tamsulosin, 0.4 mg, Daily With Dinner  [Held by provider] umeclidinium-vilanterol, 1 puff, Daily      HYDROmorphone, 0.5 mg, Q3H PRN  [Held by provider] oxyCODONE, 5 mg, Q6H PRN  [Held by provider] oxyCODONE, 2.5 mg, Q6H PRN       Continuous    Adult 3-in-1 TPN (custom base / custom electrolytes), , Last Rate: 87.4 mL/hr at 03/22/25 2122  fentaNYL, 100 mcg/hr, Last Rate: 50 mcg/hr (03/22/25 1726)  heparin (porcine), 3-24 Units/kg/hr (Order-Specific), Last Rate: 8 Units/kg/hr (03/23/25 0456)  norepinephrine, 1-30 mcg/min, Last Rate: 3 mcg/min (03/23/25 0422)  NxStage K 4/Ca 3, 20,000 mL, Last Rate: 20,000 mL (03/22/25 1113)         Labs:   CBC    Recent Labs     03/22/25  1617 03/23/25  0412   WBC 10.13 13.60*   HGB 7.9* 7.5*   HCT 25.6* 23.9*   PLT 95* 135*     BMP    Recent Labs     03/22/25  2148 03/23/25  0412   SODIUM 135 135   K  4.3 4.2    106   CO2 24 24   AGAP 5 5   BUN 57* 52*   CREATININE 1.82* 1.56*   CALCIUM 7.9* 8.1*       Coags    Recent Labs     03/22/25  0425 03/23/25  0419   PTT 59* 61*        Additional Electrolytes  Recent Labs     03/22/25 2148 03/23/25  0412   MG 2.0 1.9   PHOS 2.3 2.1*   CAIONIZED 1.14 1.14          Blood Gas    Recent Labs     03/23/25  0413   PHART 7.388   [id number] 42.2   PO2ART 58.2*   [id number] 24.9   BEART -0.2   SOURCE Line, Arterial     Recent Labs     03/21/25  1156 03/23/25  0413   PHVEN 7.349  --    [id number] 39.5*  --    PO2VEN 40.8  --    [id number] 21.3*  --    BEVEN -4.0  --    [id number] 74.2  --    SOURCE  --  Line, Arterial    LFTs  No recent results    Infectious  No recent results  Glucose  Recent Labs     03/22/25  0946 03/22/25  1617 03/22/25 2148 03/23/25  0412   GLUC 163* 151* 133 148*

## 2025-03-23 NOTE — ASSESSMENT & PLAN NOTE
Concern for anoxic brain injury s/p cardiac arrest with new embolic infarcts  CTH with 4 mm focus of high attenuation right frontal lobe without evidence of anoxic brain injury, stable chronic ischemic changes.  vEEG monitoring with no evidence of seizure  Brain MRI 3/18/2025: New nearly symmetric cortical restricted diffusion in bilateral frontal, bilateral parietal and bilateral occipital lobes suspicious for hypoxic ischemic encephalopathy.  New scattered small acute infarcts in left posterior frontal and bilateral cerebellar lobes favoring embolic infarcts.  Repeat CTH 3/19 stable without hemorrhage  On heparin drip  Neurology following. Management per neurology and primary team   Statement Selected

## 2025-03-23 NOTE — QUICK NOTE
Provided patient's wife, deondre, a clinical update this morning around 0930 at bedside. We reviewed the CT scan and his progress overnight. She was inquiring when the trach would be. Discussed that we need to stabilize his abdominal findings and continue to work on return of bowel function prior to tracheostomy procedure.     I called Deondre back this evening to inform her that we were taking him back to the OR now to evaluate the bleeding below the abdominal incision. He did have on small formed BM but, relatively small and am hopeful for more stool to pass soon. Informed her that we would update her when he returns.   She stated she will be in later this evening.     AMPARO Tamez

## 2025-03-23 NOTE — ASSESSMENT & PLAN NOTE
Refractory volume overload  S/p intermittent IV lasix/bumex and Bumex drip.  Off Bumex drip since 3/19.  Was started on CVVHD on 3/21 and running at 75  mL/h net negative.   Continue UF  , if off vasopressors can increase the UF rate further

## 2025-03-23 NOTE — PROGRESS NOTES
Progress Note - Nephrology   Name: Devin Chaves 79 y.o. male I MRN: [id number]  Unit/Bed#: ICU 10 I Date of Admission: 2/28/2025   Date of Service: 3/23/2025 I Hospital Day: 23    79-year-old male past history of colon mass, A-fib, hypertension, CHF, CVA presented from postacute rehab after recent hospitalization for COPD exacerbation/MSSA bacteremia and was sent in for complaint of acute anemia.  Was diagnosed with colon mass on colonoscopy and found to have adenocarcinoma of colon during recent hospital admission.  Initially patient was found to have severe acute anemia with hemoglobin of 4.6 g/dL and required multiple PRBCs.  Colorectal surgery was consulted.  He was found to have acute kidney injury likely due to prerenal azotemia in the setting of blood loss anemia.  Admission creatinine was 2.4 mg/dL.     -Patient had rapid response on 3/15 AM in the setting of acute anemia and hypotension alpesh Childs was called and Dr. Agudelo was called.  Patient had return of spontaneous circulation after blood products given require vasopressors and has been intubated.  Was taken to the OR emergently-· S/p emergent ex lap, control of bleeding, segmental SBR and left in discontinuity 3/15 .Now s/p partial SBR, R hemicolectomy and closure 3/16/2025  -> Renal function continue to worsen during the hospital stay, patient became hypotensive since 3/19.  Likely ATN causing worsening renal function and required initiation of CVVHD in the setting of oliguria and fluid overload on 3/21  Assessment & Plan  ORIANA (acute kidney injury) (HCC)  -Baseline creatinine: 0.8-0.9 mg/dl  -Admission creatinine: 2.43 mg/dl  -Etiology: Likely ATN in the setting of prolonged prerenal azotemia from volume depletion/severe anemia  -CT abdomen pelvis from 2/28 did not show any hydronephrosis.  UA from 3/8 suggestive of 2-4 RBCs, 2-4 WBCs, trace protein.  UPC ratio was 3.4 g and albumin creatinine ratio was 821 mg  -Serum and urine immunofixation  with no M spike,   C3 level was slightly low at 83, C4 was normal  -Hospital Course: Patient had rapid response on 3/15 AM in the setting of acute anemia and hypotension alpesh Childs was called .  Patient had return of spontaneous circulation after blood products given require vasopressors and has been intubated.  Was taken to the OR emergently on 3/15 and again on 3/16  -Serum creatinine was around 1.8-2.0 from 3/2 to 3/16 but renal function worsened on on 3/17 and further worsened  on  3/18 to creatinine 3.08 likely due to ATN from hemodynamic changes on 3/15 and during OR on 3/16, underwent exploratory laparotomy partial small bowel resection and right colectomy.  Required Levophed IntraOp and function gradually worsening since then.  Blood pressure dropped on 3/19 and was taken off Bumex drip.  Patient required Levophed on 3/21 still on low-dose Levophed.    -> Was started on CVVHD on 3/21  in the setting of oliguria and fluid overload  -Plan:   No evidence of renal recovery, continue CVVHD.  Patient was seen and examined on CVVHD, current prescription was reviewed, blood flow rate to 250 mL/min, dialysate flow rate 2500 mL/h and ultrafiltration 75 mL/h net negative.  Continue vasopressors per ICU team and wean as tolerated  Dose all medication for CRRT dosing    Currently on TPN per ICU team  Avoid nephrotoxins, dose all medication for EGFR  Monitor for renal recovery so far no renal recovery                   Volume overload  Refractory volume overload  S/p intermittent IV lasix/bumex and Bumex drip.  Off Bumex drip since 3/19.  Was started on CVVHD on 3/21 and running at 75  mL/h net negative.   Continue UF  , if off vasopressors can increase the UF rate further  Idiopathic hypotension  Echo 3/17/2025: EF 55%, unable to assess diastolic function  Home Rx: Metoprolol 25 mg twice daily  Past history of hypertension but currently hypotensive requiring Levophed, continue Levophed per ICU team.    Fluid  overload  Was being diuresed before cardiac arrest on 3/15/25.  Found to have fluid overload with finding of bilateral pleural effusion  CTC 3/20 with moderate bilateral pleural effusions with dependent atelectasis, resolved pulmonary edema  Status post Bumex drip which was started on 3/19 due to hypotension.  Currently undergoing ultrafiltration with CVVHD    Acute blood loss anemia  Acute blood loss on 3/15/25 and received > 10 units PRBC.   S/p emergent ex lap and control of bleeding  Hgb trended down to 7.5 g/dL, continue to monitor    MSSA bacteremia  Currently on Cefazolin 2 gm IV q8H - needs this until 3/27/25.   ID following.   Possible cutaneous source  TTE without vegetation.  Continue antibiotic per ID    Urinary retention  Seen by urology this admission.   Now with glass. Tamsulosin on hold.     Colonic mass  Newly diagnosed colon mass on C-scope on 2/20/25.   Pathology - adenocarcinoma  Was to follow colorectal as outpatient  S/p ex lap, partial SBR and R colectomy 3/16/2025, currently on TPN  Continue management per surgical team.  Follow-up pathology report    Low serum bicarbonate (Resolved: 3/23/2025)  In the setting of acute renal failure  Resolved with initiation of CVVHD  Cardiac arrest (HCC)  S/p cardiac arrest with ROSC 3/15/2025  In the setting of ABLA with undetectable Hgb and hemoperitoneum, s/p emergent ex lap, segmental SBR 3/15  Echo 3/17/2025: EF 55%  Management per primary team    Hemoperitoneum  Resolved  With actively bleeding mesenteric hematoma  S/p emergent ex lap, control of bleeding, segmental SBR and left in discontinuity 3/15  Now s/p partial SBR, R hemicolectomy and closure 3/16/2025  Management per surgery  Atrial fibrillation (HCC)  Previously on Metoprolol for rate control, currently on hold  On heparin drip for embolic infarcts    Encephalopathy  Concern for anoxic brain injury s/p cardiac arrest with new embolic infarcts  CTH with 4 mm focus of high attenuation right  frontal lobe without evidence of anoxic brain injury, stable chronic ischemic changes.  vEEG monitoring with no evidence of seizure  Brain MRI 3/18/2025: New nearly symmetric cortical restricted diffusion in bilateral frontal, bilateral parietal and bilateral occipital lobes suspicious for hypoxic ischemic encephalopathy.  New scattered small acute infarcts in left posterior frontal and bilateral cerebellar lobes favoring embolic infarcts.  Repeat CTH 3/19 stable without hemorrhage  On heparin drip  Neurology following. Management per neurology and primary team  Embolic cerebral infarction (HCC)  Brain MRI 3/18/2025: New nearly symmetric cortical restricted diffusion in bilateral frontal, bilateral parietal and bilateral occipital lobes suspicious for hypoxic ischemic encephalopathy.  New scattered small acute infarcts in left posterior frontal and bilateral cerebellar lobes favoring embolic infarcts  Now on heparin drip  repeat CTH 3/19/2025 with stable infarcts and no acute hemorrhage  Avoid hypotension  Neurology following. Management per neurology and primary team    Discussed with ICU advanced practitioner regarding continuing CVVHD at current rate with ultrafiltration 75 to 100 mL/h net negative as tolerated.  Continue to wean vasopressors as tolerated.  ICU advanced practitioner agreed with the plan    Subjective   Still intubated, no new complaints, no renal recovery still on CVVHD with 75 mL/h net negative ultrafiltration    Objective :  Temp:  [96.4 °F (35.8 °C)-98.1 °F (36.7 °C)] 97.3 °F (36.3 °C)  HR:  [] 117  BP: ()/(49-60) 101/53  Resp:  [11-35] 26  SpO2:  [75 %-100 %] 99 %  O2 Device: Ventilator    Current Weight: Weight - Scale: 102 kg (225 lb 15.5 oz)  First Weight: Weight - Scale: 91.2 kg (201 lb 1 oz)  I/O         03/20 0701  03/21 0700 03/21 0701  03/22 0700 03/22 0701  03/23 0700    I.V. (mL/kg) 1395.3 (13.8) 2097.8 (20.4) 24 (0.2)    NG/GT 65 60     IV Piggyback 401.7 25     TPN  2100.6 1167.7 85    Feedings       Total Intake(mL/kg) 3962.6 (39.2) 3350.5 (32.5) 109 (1.1)    Urine (mL/kg/hr) 305 (0.1) 367 (0.1) 0 (0)    Emesis/NG output 775 300 0    Other  1993 169    Total Output 1080 2660 169    Net +2882.6 +690.5 -60                 Physical Exam   General:  Ill looking, intubated, FiO2 of 40%  Head: normocephalic, atraumatic  Eyes: Conjunctivae pink,  Sclera anicteric  ENT: Intubated.  Neck: supple   Chest: Clear to Auscultation both lungs,  no crackles or wheezing.  CVS: S1 & S2 present, normal rate, regular rhythm, no murmur.  Abdomen: soft, non-tender, non-distended, Bowel sounds normoactive  Extremities: 1+ edema both lower extremities  Neuro: Sedated, intubated.  Skin: no rash, warm and dry.   Psych: sedated. Unable to assess.      Medications:    Current Facility-Administered Medications:     Adult 3-in-1 TPN (custom base / custom electrolytes), , Intravenous, Continuous TPN, AMPARO Calvert, Last Rate: 87.4 mL/hr at 03/22/25 2122, New Bag at 03/22/25 2122    Adult 3-in-1 TPN (custom base / custom electrolytes), , Intravenous, Continuous TPN, AMPARO Calvert    albumin human (FLEXBUMIN) 25 % injection 12.5 g, 12.5 g, Intravenous, Q6H, AMPARO Calvert, Last Rate: 0 mL/hr at 03/22/25 2206, 12.5 g at 03/23/25 0916    [Held by provider] ascorbic acid (VITAMIN C) tablet 250 mg, 250 mg, Oral, Daily, Yobany Mott MD, 250 mg at 03/14/25 0848    [Held by provider] atorvastatin (LIPITOR) tablet 40 mg, 40 mg, Oral, Daily With Dinner, Yobany Mott MD, 40 mg at 03/13/25 1626    bisacodyl (DULCOLAX) rectal suppository 10 mg, 10 mg, Rectal, Daily, AMPARO Calvert, 10 mg at 03/23/25 0833    budesonide (PULMICORT) inhalation solution 0.5 mg, 0.5 mg, Nebulization, Q12H, AMPARO Umaña, 0.5 mg at 03/23/25 0810    ceFAZolin (ANCEF) IVPB (premix in dextrose) 2,000 mg 50 mL, 2,000 mg, Intravenous, Q12H, Theron Curry MD, Last Rate: 100 mL/hr at  03/23/25 0611, 2,000 mg at 03/23/25 0611    chlorhexidine (PERIDEX) 0.12 % oral rinse 15 mL, 15 mL, Mouth/Throat, Q12H GALE, Yobany Mott MD, 15 mL at 03/23/25 0833    [Held by provider] Cholecalciferol (VITAMIN D3) tablet 2,000 Units, 2,000 Units, Oral, Daily, Yobany Mott MD, 2,000 Units at 03/14/25 0848    [Held by provider] cyanocobalamin (VITAMIN B-12) tablet 100 mcg, 100 mcg, Oral, Daily, Yobany Mott MD, 100 mcg at 03/14/25 0848    docusate (COLACE) oral liquid 100 mg, 100 mg, Oral, BID, AMPARO Garcia, 100 mg at 03/23/25 0833    fentaNYL 1000 mcg in sodium chloride 0.9% 100mL infusion, 100 mcg/hr, Intravenous, Continuous, Lucia Chavira PA-C, Last Rate: 2.5 mL/hr at 03/23/25 0825, 25 mcg/hr at 03/23/25 0825    [Held by provider] ferrous sulfate tablet 325 mg, 325 mg, Oral, Daily With Breakfast, Yobany Mott MD    [Held by provider] fluticasone (ARNUITY ELLIPTA) 100 MCG/ACT inhaler 1 puff, 1 puff, Inhalation, Daily, Yobany Mott MD, 1 puff at 03/15/25 0925    [Held by provider] folic acid (FOLVITE) tablet 1 mg, 1 mg, Oral, Daily, Yobany Mott MD, 1 mg at 03/14/25 0848    heparin (porcine) 25,000 units in 0.45% NaCl 250 mL infusion (premix), 3-24 Units/kg/hr (Order-Specific), Intravenous, Titrated, AMPARO Calvert, Last Rate: 5.7 mL/hr at 03/23/25 0825, 6 Units/kg/hr at 03/23/25 0825    HYDROmorphone (DILAUDID) injection 0.5 mg, 0.5 mg, Intravenous, Q3H PRN, AMPARO Herrera, 0.5 mg at 03/22/25 1933    [Held by provider] hydroxychloroquine (PLAQUENIL) tablet 400 mg, 400 mg, Oral, Daily With Breakfast, Yobany Mott MD, 400 mg at 03/14/25 0848    insulin lispro (HumALOG/ADMELOG) 100 units/mL subcutaneous injection 2-12 Units, 2-12 Units, Subcutaneous, Q6H GALE, 2 Units at 03/22/25 0000 **AND** Fingerstick Glucose (POCT), , , Q6H, AMPARO Calvert    ipratropium (ATROVENT) 0.02 % inhalation solution 0.5 mg, 0.5 mg, Nebulization,  TID, AMPARO Umaña, 0.5 mg at 03/23/25 0810    lactulose (CHRONULAC) oral solution 30 g, 30 g, Oral, TID, AMPARO Calvert, 30 g at 03/23/25 0834    levalbuterol (XOPENEX) inhalation solution 1.25 mg, 1.25 mg, Nebulization, TID, AMPARO Umaña, 1.25 mg at 03/23/25 0811    lidocaine (LIDODERM) 5 % patch 3 patch, 3 patch, Topical, Daily, Yobany Mott MD, 3 patch at 03/22/25 0831    [Held by provider] metoprolol tartrate (LOPRESSOR) tablet 25 mg, 25 mg, Oral, Q12H GALE, Yobany Mott MD, 25 mg at 03/14/25 0848    moisture barrier miconazole 2% cream (aka HITESH MOISTURE BARRIER ANTIFUNGAL CREAM), , Topical, BID, Yobany Mott MD, Given at 03/23/25 0834    NOREPINEPHRINE 4 MG  ML NSS (CMPD ORDER) infusion, 1-30 mcg/min, Intravenous, Titrated, Sailaja Gonzales MD, Last Rate: 15 mL/hr at 03/23/25 1005, 4 mcg/min at 03/23/25 1005    NxStage K 4/Ca 3 dialysis solution (RFP-401) 20,000 mL, 20,000 mL, Dialysis, Continuous, Bret Perry MD, Last Rate: 0 mL/hr at 03/22/25 1113, 20,000 mL at 03/23/25 0615    [Held by provider] oxyCODONE (ROXICODONE) IR tablet 5 mg, 5 mg, Oral, Q6H PRN, Yobany Mott MD, 5 mg at 03/01/25 1817    [Held by provider] oxyCODONE (ROXICODONE) split tablet 2.5 mg, 2.5 mg, Oral, Q6H PRN, Yobany Mott MD, 2.5 mg at 03/05/25 0945    pantoprazole (PROTONIX) injection 40 mg, 40 mg, Intravenous, Q12H GALE, Yobany Mott MD, 40 mg at 03/23/25 0836    polyethylene glycol (MIRALAX) packet 17 g, 17 g, Oral, Daily, AMPARO Calvert, 17 g at 03/23/25 0834    senna oral syrup 17.6 mg, 17.6 mg, Oral, HS, AMPARO Calvert, 17.6 mg at 03/22/25 2110    [Held by provider] tamsulosin (FLOMAX) capsule 0.4 mg, 0.4 mg, Oral, Daily With Dinner, Yobany Mott MD, 0.4 mg at 03/13/25 1626    [Held by provider] umeclidinium-vilanterol 62.5-25 mcg/actuation inhaler 1 puff, 1 puff, Inhalation, Daily, Yobany Mott MD, 1 puff at 03/15/25  0925      Lab Results: I have reviewed the following results:  Results from last 7 days   Lab Units 03/23/25  0412 03/22/25  2148 03/22/25  1617 03/22/25  0946 03/22/25  0425 03/21/25  2139 03/21/25  1916 03/21/25  1802 03/21/25  1350 03/21/25  1156 03/21/25  0428 03/20/25  2137 03/20/25  1735 03/20/25  1558 03/20/25  1238 03/19/25  0921 03/19/25  0451 03/18/25  0825 03/18/25  0453 03/16/25  1947/25  1533 03/16/25  1327 03/16/25  1215   0000   WBC Thousand/uL 13.60*  --  10.13  --  12.07*  --   --  9.92  --   --  10.31*  --  8.68  --  7.73   < > 6.69   < > 10.78*  --  13.31*  --   --    < >   HEMOGLOBIN g/dL 7.5*  --  7.9*  --  8.2*  --   --  7.8*  --   --  8.6*  --  8.7*  --  8.8*   < > 9.7*   < > 10.6*   < > 12.9  --   --   --    I STAT HEMOGLOBIN g/dl  --   --   --   --   --   --   --   --   --   --   --   --   --   --   --   --   --   --   --   --   --  10.9* 10.2*  --    HEMATOCRIT % 23.9*  --  25.6*  --  25.7*  --   --  24.5*  --   --  26.9*  --  27.0*  --  28.1*   < > 29.9*   < > 32.4*   < > 38.5  --   --   --    HEMATOCRIT, ISTAT %  --   --   --   --   --   --   --   --   --   --   --   --   --   --   --   --   --   --   --   --   --  32* 30*  --    PLATELETS Thousands/uL 135*  --  95*  --  122*  --   --  117*  --   --  143*  --  126*  --  103*   < > 74*   < > 90*  --  104*  --   --    < >   POTASSIUM mmol/L 4.2 4.3 4.5 3.9 4.2 3.7  --   --  3.6   < > 3.9   < >  --    < > 3.8   < >  --    < > 3.8   < > 4.4  --   --   --    CHLORIDE mmol/L 106 106 106 105 107 107  --   --  107   < > 105   < >  --    < > 106   < >  --    < > 111*   < > 116*  --   --   --    CO2 mmol/L 24 24 24 24 22 21  --   --  19*   < > 20*   < >  --    < > 21   < >  --    < > 27   < > 25  --   --   --    CO2, I-STAT mmol/L  --   --   --   --   --   --   --   --   --   --   --   --   --   --   --   --   --   --   --   --   --  29 27  --    BUN mg/dL 52* 57* 63* 79* 86* 107*  --   --  118*   < > 115*   < >  --    < > 106*   < >  --    <  "> 63*   < > 48*  --   --   --    CREATININE mg/dL 1.56* 1.82* 2.07* 2.45* 2.82* 3.79*  --   --  4.27*   < > 4.28*   < >  --    < > 4.11*   < >  --    < > 2.89*   < > 2.14*  --   --   --    CALCIUM mg/dL 8.1* 7.9* 7.9* 7.7* 7.7* 7.8*  --   --  6.8*   < > 7.2*   < >  --    < > 7.4*   < >  --    < > 7.8*   < > 8.1*  --   --   --    MAGNESIUM mg/dL 1.9 2.0 1.9 1.9 1.9 2.0 2.0  --  2.0   < > 2.1   < >  --    < >  --    < >  --    < > 1.9   < > 1.9  --   --   --    PHOSPHORUS mg/dL 2.1* 2.3 2.3 2.8 3.2 4.1 4.4*  --  4.3*   < > 4.8*   < >  --    < >  --    < >  --    < > 4.0   < > 4.1  --   --   --    ALBUMIN g/dL  --   --   --   --   --   --   --   --   --   --   --   --   --   --   --   --  2.1*  --  2.1*  --  2.4*  --   --   --    GLUCOSE, ISTAT mg/dl  --   --   --   --   --   --   --   --   --   --   --   --   --   --   --   --   --   --   --   --   --  137 124  --     < > = values in this interval not displayed.       Administrative Statements     Portions of the record may have been created with voice recognition software. Occasional wrong word or \"sound a like\" substitutions may have occurred due to the inherent limitations of voice recognition software. Read the chart carefully and recognize, using context, where substitutions have occurred.If you have any questions, please contact the dictating provider.  "

## 2025-03-23 NOTE — ASSESSMENT & PLAN NOTE
Remains intubated for airway protection post cardiac arrest and postoperatively with poor neuro exam   Continue mechanical ventilation ACVC 12/550/40/6, titrate FiO2 for SpO2>88  At baseline requires 2-4L NC   Fentanyl gtt for sedation + PRNs  Daily SBT   Vent bundle

## 2025-03-23 NOTE — ASSESSMENT & PLAN NOTE
2/20/2025 EGD/colonoscopy showed 2.5 cm ileocecal mass, pathology confirmed adenocarcinoma  Now s/p ex lap SBR and R colon resection on 3/15  Surgery following, appreciate recommendations   Still without return of bowel function since surgical intervention despite aggressive bowel management, including lactulose   Repeat CT A/P +/- chest this AM

## 2025-03-24 PROBLEM — N19 RENAL FAILURE: Status: ACTIVE | Noted: 2025-01-01

## 2025-03-24 NOTE — ASSESSMENT & PLAN NOTE
Patient sustained cardiac arrest in the setting of an undetectable hemoglobin on 3/15 and was taken emergently to the operating room for exploration in the setting of abdominal free fluid.  Large volume hemoperitoneum was encountered with evidence of a actively bleeding/decompressing mesenteric hematoma; bleeding control was obtained and on 3/15 taken to OR for a segmental small bowel resection was performed and the patient was left in discontinuity given ongoing instability.  S/p SB anastomosis, R hemicolectomy, closure on 3/16.    Afebrile, hypotensive with MAP in low 60s, currently on levo 5.    Oozing around midline incision with concern for fascial dehiscence, now s/p ex lap, evacuation of hematoma on 3/23.    Plan  - Continue NG tube to suction/n.p.o. given emesis and no significant bowel function  - Appreciate neurology recommendations   - Avoid hypotension, optimize BP management  - Heparin gtt in setting of embolic infarcts   - held in setting on hemaperitoneum   -recommend checking 12pm H/H and consider resuming pending Neurology recs  -Continue CRRT  - Continue TPN  - Wean vent as tolerated  - Continue recommendations from cardiology and nephrology for ongoing diuresis  - Rest of care per ICU

## 2025-03-24 NOTE — TELEMEDICINE
e-Consult (IP[facility])  - Interventional Radiology  Devin Chaves 79 y.o. male MRN: [id number]  Unit/Bed#: ICU 10 Encounter: [id number]          Interventional Radiology has been consulted to evaluate Devin Chaves        Inpatient Consult to IR  Consult performed by: Cordelia Carolina MD  Consult ordered by: Gali Jack DO        03/24/25    Assessment/Recommendation:   Pt had cardiac arrest and bleeding.  ICU with renal failure on CVVH.    IR consulted for TDC placement.    Order placed.  Unsure about timing.    5-10 minutes, >50% of the total time devoted to medical consultative verbal/EMR discussion between providers. Written report will be generated in the EMR.     Thank you for allowing Interventional Radiology to participate in the care of Devin Chaves. Please don't hesitate to call or TigerText us with any questions.     Cordelia Carolina MD

## 2025-03-24 NOTE — ASSESSMENT & PLAN NOTE
Acute blood loss on 3/15/25 and received more than 10 units PRBC   S/p emergent ex lap and control of bleeding  Hemoglobin today 7.7  Management per ICU team

## 2025-03-24 NOTE — PROGRESS NOTES
Progress Note - Infectious Disease   Name: Devin Chaves 79 y.o. male I MRN: [id number]  Unit/Bed#: ICU 10 I Date of Admission: 2/28/2025   Date of Service: 3/24/2025 I Hospital Day: 24    Assessment & Plan  Neck pain  Patient developed acute neck pain with MSSA bacteremia during recent hospitalization.  CRP was highly elevated.  C-spine MRI showed possible C-spine and paraspinal infection.  Patient has no neurologic deficit.  His neck pain is finally improving.  However, given paraspinal infection on the initial C-spine MRI, we should repeat C-spine MRI with contrast when creatinine is improved to confirm resolution/improvement of paraspinal infection.  However, without any neurological deficit, it would be fine to postpone MRI until renal function is further improved, to decrease risk of contrast-induced ORIANA.  Patient should get MRI done prior to completion of IV antibiotic course below.  Antibiotic plan as in below.  Monitor neck pain.  Recommend repeat C-spine MRI with and without contrast prior to completion of IV antibiotic course.  MSSA bacteremia  Patient had MSSA bacteremia during recent hospitalization.  Source is unclear but likely cutaneous.  His bacteremia cleared rapidly on IV antibiotic.  TTE did not show any vegetation.  Given possible C-spine infection, plan was for long-term IV antibiotic.  Continue high-dose IV cefazolin.  Treat x 6 weeks from clearance of bacteremia as previously planned, through 3/27.  However, patient needs repeat C-spine MRI to confirm resolution/significant improvement of paraspinal infection prior to discontinuation of IV antibiotic.  Acute blood loss anemia  Patient developed acute bleeding from rectal mass, resulting in rapid response, and subsequent cardiac arrest.  He is status post transfusion.  He is also status post small bowel resection and right hemicolectomy.  He is status post exploratory laparotomy and evacuation of intra-abdominal hematoma over the  weekend.  Management per primary service.  Renal failure  Patient with ORIANA on admission.  Creatinine had worsened and patient is now on CVVH.  Antibiotic dosages adjusted accordingly.  Monitor creatinine.  CVVH per nephrology.  Colonic mass  Patient has recently diagnosed adenocarcinoma of the colon.  He now status post bowel resection for bleeding from adenocarcinoma.  No plan for chemotherapy yet.  Colorectal surgery follow-up.  Acute on chronic respiratory failure with hypoxia (HCC)  Patient is now intubated after cardiac arrest over the weekend.  Cardiac arrest due to other underlying condition (HCC)  Patient is status post arrest, successfully resuscitated.  Encephalopathy  Patient remains obtunded postcardiac arrest.  Concern is for anoxic encephalopathy.  Head CT is without acute changes.  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  According to the patient's family, he has some response now.  Monitor mental status.    Discussed with patient's son in detail regarding the above plan.      Antibiotics:  Cefazolin  Last negative blood culture 2/14    Subjective   Patient is currently minimally responsive on ventilator, in ICU.  Temperature stays down.  No chills.    Objective :  Temp:  [95 °F (35 °C)-98.1 °F (36.7 °C)] 97.7 °F (36.5 °C)  HR:  [] 101  BP: ()/(44-60) 115/56  Resp:  [13-45] 18  SpO2:  [85 %-100 %] 100 %  O2 Device: Ventilator  FiO2 (%):  [40] 40    Physical Exam:     General: No response to verbal stimuli at present.  Comfortable.  Nontoxic.   Neck:  Supple. No mass.  No lymphadenopathy.   Lungs: Expansion symmetric, diffuse rhonchi, no rales, no wheezing.   Heart:  Regular rate and rhythm, S1 and S2 normal, no murmur.   Abdomen: Soft, nondistended, non-tender, bowel sounds active all four quadrants, no masses, no organomegaly.   Extremities: Stable leg edema. No erythema/warmth. No ulcer. Nontender to  palpation.   Skin:  No rash.   Neuro: Moves all extremities.        Lab Results: I have reviewed the following results:  Results from last 7 days   Lab Units 03/24/25  0330 03/23/25 1932 03/23/25 1811 03/23/25  1709 03/23/25  1526   WBC Thousand/uL 8.97 13.14*  --   --  21.99*   HEMOGLOBIN g/dL 7.7* 8.5*  --    < > 6.8*   I STAT HEMOGLOBIN g/dl  --   --  6.8*  --   --    PLATELETS Thousands/uL 47* 76*  --   --  142*    < > = values in this interval not displayed.     Results from last 7 days   Lab Units 03/24/25  0531 03/23/25  2337 03/23/25 1932 03/23/25  1811 03/19/25  0921 03/19/25  0451 03/18/25  1048 03/18/25  0453   SODIUM mmol/L 137 134* 135  --    < >  --    < > 145   POTASSIUM mmol/L 4.1 4.0 4.6  --    < >  --    < > 3.8   CHLORIDE mmol/L 105 105 105  --    < >  --    < > 111*   CO2 mmol/L 25 24 25  --    < >  --    < > 27   CO2, I-STAT mmol/L  --   --   --  23  --   --   --   --    BUN mg/dL 44* 46* 46*  --    < >  --    < > 63*   CREATININE mg/dL 1.23 1.39* 1.47*  --    < >  --    < > 2.89*   EGFR ml/min/1.73sq m 56 48 45  --    < >  --    < > 20   GLUCOSE, ISTAT mg/dl  --   --   --  180*  --   --   --   --    CALCIUM mg/dL 8.1* 7.7* 7.6*  --    < >  --    < > 7.8*   AST U/L  --   --  19  --   --  20  --  26   ALT U/L  --   --  4*  --   --  <3*  --  <3*   ALK PHOS U/L  --   --  60  --   --  50  --  53   ALBUMIN g/dL  --   --  2.5*  --   --  2.1*  --  2.1*    < > = values in this interval not displayed.

## 2025-03-24 NOTE — ASSESSMENT & PLAN NOTE
NP to review:  Episode of near syncope today at 3:00 while straining on the toilet.  S/P AF RFA 2/17/20.  Any recommendations other than increase fluids and avoid straining    Pt called to report today at 3:00 he had an episode of lightheadedness, dizziness and near syncope  while he was in the BR with some bowel straining   Did take some OTC anti diarrheal few days post ablation  no actual syncope  He laid on the bed and after 10 minutes was feeling somewhat  Better  He still is feeling weak   Denies chest pain.    BP generally 110-112/ 69-78, few days ago, one BP 94/57  HR 59-98 cant tell if irregular.  No diarrhea for at least 10 days  Mild edema right leg, chronic  Had at least 40 ounces of fluid today  Groin sites fine      Prior to this he had been out walking a little in the yard without any problem.  He is also concerned that he he awakened with numbness in both hands this am. No movement issues.   Now improved.  States he has history of carpel tunnel.  No other neuro symptoms    2/19/20 ER for dull chest pain  EKG:  SB 52, no ischemia    Meds:  Bisoprolol  5 mg nightly  Valsartan 80 daily    2/17/20 RFA for PAF    1/15/20 office note:  Chief complaint: Follow up in OV 1/15/2020 with MD for: The primary encounter diagnosis was PAF (paroxysmal atrial fibrillation) (PAF). A diagnosis of Long term (current) use of anticoagulants was also pertinent to this visit.     ASSESSMENT  PAF (paroxysmal atrial fibrillation)   - Echo (06/20/2012) LA 3.9cm  - Echo (5/2019-[facility])   - Dx (2005- unable to locate details) symptomatic with palpitations. Tx with digoxin ([id number]).   - ILR implanted (09/2013) to gauge burden. Noted to be 17% (05/2018)  - S/p ablation (6/2013) PVI, rotor ablation and ablation of PVC focus  - Historically EMG's on ILR were unclear and unlikely PAF. Some PAC's noted  - Previously attempted: amiodarone for a few days, stopped 2/2 s/e profile. Cardizem (unclear why stopped). Toprol (rash)  -  S/p cardiac arrest with ROSC 3/15/2025  In the setting of ABLA with undetectable Hgb and hemoperitoneum, s/p emergent ex lap, segmental SBR 3/15  Echo 3/17/2025: EF 55%  Management per primary team   Maintaining Sinus Rhythm with a well controlled rate subjectively and objectively by PE and vitals 1/15/2020 in OV on Bisoprolol (Zebeta)      Left Ventricular Hypertrophy  - Echo (06/20/2012) mild, concentric. No LVEF documented     Sinus Bradycardia    - Not new. Mild (on BB). Asymptomatic      Cardioembolic Stroke  - Subacute frontal infarct (5/16/19)  - Associated with difficulty with word finding, confusion, and facial droop  - Resolved within 1 hour.   - No AF during admit.   - Xarelto and ASA started.     Anticoagulant long-term use- Xarelto (started 5/2019)  - CHADSVASC Stroke Risk Score = 4  - With CrCl > 50 mL/min:) on appropriate  dose.       GFR Estimate, Non  (no units)   Date Value   06/14/2013 >60      Element Patient Score   Congestive Heart Failure 0   High blood pressure 0   Age 2    Diabetes 0   Stroke/TIA/Clot 2   Vascular disease 0   Sex 0      History of implantable loop recorder (04/03/2013)  - Battery Status: EOS reached (12/2017)  - Historically agreeable to leave ILR in, no need to remove or replace.     PLAN  - Discussed that the next aggressive step would be another ablation with the potential of discontinuing Bisoprolol if the ablation is successful.   - Prep for Case for Paroxysmal  Atrial Fibrillation ablation. Options were discussed with the patient, including observation, pharmacologic therapy and electrophysiology study guided radiofrequency catheter ablation. Procedure, risks (including, but not limited to infection, bleeding, tamponade, pneumothorax, need for permanent pacemaker, death), benefits, and alternatives were discussed with the patient. Information was provided. The patient voices understanding and wishes to discuss with family before proceeding. The patient will call our office if he decides to proceed with the procedure.  - Continue Bisoprolol (Zebeta) 5 MG tablet. Take 5mg by mouth daily.  - Continue Rivaroxaban (Xarelto) 20 MG Tab. Take 1 tablet by  mouth daily.  - Tests ordered: No orders of the defined types were placed in this encounter.  - Follow up: No follow-ups on file.

## 2025-03-24 NOTE — ASSESSMENT & PLAN NOTE
79-year-old male with past medical history significant for COPD with chronic hypoxic respiratory failure on 2 to 4 L, MSSA bacteremia on cefazolin until 3/27/2025, atrial fibrillation with Eliquis currently on hold, CAD, history of CVA, and acute blood loss anemia requiring multiple transfusions despite being off anticoagulation in the setting of recently diagnosed adenocarcinoma of ileocecum with additional hospital course complication by cardiac arrest due to hemorrhagic shock status post evacuation/drainage of hematoma and right hemicolectomy with persistent encephalopathy requiring ventilation in addition to renal insufficiency on CRRT.    Primary team asking for recommendations on adjuvant therapy based on current pathology staging     Pathology large intestine moderate differentiated adenocarcinoma with mucinous features, tumor invades lower one third of the submucosa (pT1, SM 3), clinical stage I, higher risk features of being right-sided plus lymphovascular invasion present however this would not indicate need for adjuvant chemotherapy/treatment.    Typical surveillance includes: Colonoscopy at 1 year after surgery if advanced adenoma repeat in 1 year after that.

## 2025-03-24 NOTE — RESPIRATORY THERAPY NOTE
RT Ventilator Management Note  Devin Chaves 79 y.o. male MRN: [id number]  Unit/Bed#: ICU 10 Encounter: [id number]      Daily Screen         3/23/2025  1610 3/24/2025  0828          Patient safety screen outcome:: Passed Passed (P)       Spont breathing trial % for 30 min: -- --      Spont breathing trial outcome:: Passed --      RSBI: 42 --                Physical Exam:   Assessment Type: Assess only  General Appearance: Sedated  Respiratory Pattern: Assisted  Chest Assessment: Chest expansion symmetrical  Bilateral Breath Sounds: Diminished, Coarse  Suction: ET Tube  O2 Device: vent      Resp Comments: (S) Patient recieved on AC/VC. Tx given. Placed on Spont 6/6 tolerating well at this time.     03/24/25 0828   Respiratory Assessment   Assessment Type Assess only   General Appearance Sedated   Respiratory Pattern Assisted   Chest Assessment Chest expansion symmetrical   Bilateral Breath Sounds Diminished;Coarse   Suction ET Tube   Resp Comments (S)  Patient recieved on AC/VC. Tx given. Placed on Spont 6/6 tolerating well at this time.   O2 Device vent   Vent Information   Vent    Vent type     Vent Mode (S)  CPAP/PS Spont   $ Vital Capacity Mech/Peak Flow Yes   $ Pulse Oximetry Spot Check Charge Completed   SpO2 99 %   CPAP/PS Spont Settings   FIO2 (%) 40 %   PEEP (cmH2O) 6 cmH2O   Pressure Support (cmH2O) 6 cmH20   Trigger Sensitivity Flow (lpm) 3 LPM   Rise Time (%) 50 %   Esens % 25 %   Humidification Heater   Heater Temp 98.6 °F (37 °C)   CPAP/PS Spont Actuals   Resp Rate (BPM) 24 BPM   VT (mL) 602 mL   MV (Obs) 15.9   MAP (cmH2O) 9.2 cmH2O   Peak Pressure (cmH2O) 12 cmH2O   I/E Ratio (Obs) 1:1.9   RSBI 37   Heater Temperature (Obs) 98.6 °F (37 °C)   CPAP/PS Spont Alarms   High Peak Pressure (cmH20) 40 cmH2O   High Resp Rate (BPM) 40 BPM   High MV (L/min) 22 L/min   Low MV (L/min) 4 L/min   High Kim VTE (mL) 800 mL   Low Kim VTE (mL) 200 mL   High SPONT VTE (mL) 800 mL   Low Spont VTE  (mL) 200 mL   CPAP/PS Spont Apnea Settings   Resp Rate (BPM) 12 BPM   VT (mL) 550 mL   FIO2 (%) 100 %   Apnea Time (s) 20 S   Apnea Flow (LPM) 55 LPM   Maintenance   Alarm (pink) cable attached Yes   Resuscitation bag with peep valve at bedside Yes   Water bag changed No   Circuit changed No   Daily Screen   Patient safety screen outcome: Passed   Spont breathing trial % for 30 min   (placed on 6/6)   [REMOVED] ETT  Cuffed 8 mm   Removal Date/Time: 03/23/25 1918  Placement Date/Time: 03/15/25 (c) 1200   Type: Cuffed  Tube Size: 8 mm  Location: Oral  Insertion attempts: 1  Placement Verification: Chest x-ray;End tidal CO2  Secured at (cm): 25   Secured at (cm) 22   Measured from Teeth   Secured Location Right   Repositioned Right to Center   Secured by Commercial tube otto   Site Condition Cool;Dry   Cuff Pressure (color) Green   HI-LO Suction  Intermittent suction   HI-LO Secretions Small   HI-LO Intervention Patent

## 2025-03-24 NOTE — PROGRESS NOTES
NEPHROLOGY HOSPITAL PROGRESS NOTE   Devin Chaves 79 y.o. male MRN: [id number]  Unit/Bed#: ICU 10 Encounter: [id number]  Reason for Consult: ORIANA  Assessment & Plan  ORIANA (acute kidney injury) ([facility])  Baseline creatinine 0.8-0.9  Creatinine on mission 2.43  Etiology: ATN in setting of prolonged prerenal azotemia from volume depletion and severe anemia  CT abdomen: No hydronephrosis  UA: 2-4 RBC, 2-4 WBC  UPC ratio 3.4 g  Serum/urine immunofixation with no M spike, C3 mildly low at 83, C4 normal  Status post initiation of CVVHD on 03/21 in setting of oliguria and fluid overload  No evidence of renal recovery  Continue CVVHD (seen and examined on CVVHD this morning)  Blood flow rate 250 mL/min  Dialysate flow rate 2500 mL/h  UF: -100 cc/h  Dialysate 4K/3 calcium       Volume overload  Refractory volume overload, status post trial of IV diuretics  Volume management with ultrafiltration on CVVHD  Idiopathic hypotension  Echo 3/17/2025: EF 55%, unable to assess diastolic function  Home Rx: Metoprolol 25 mg twice daily  Vasopressors per ICU  Acute blood loss anemia  Acute blood loss on 3/15/25 and received more than 10 units PRBC   S/p emergent ex lap and control of bleeding  Hemoglobin today 7.7  Management per ICU team  MSSA bacteremia  Currently on Cefazolin 2 gm IV q8H, end date 03/27  TTE without vegetation  Management per infectious disease  Urinary retention  Seen by urology this admission  Catheter in place  Colonic mass  Newly diagnosed colon mass on C-scope on 2/20/25  Pathology - adenocarcinoma  Was to follow colorectal as outpatient  S/p ex lap, partial SBR and R colectomy 3/16/2025, currently on TPN  Continue management per surgical team.  Follow-up pathology report  Cardiac arrest ([facility])  S/p cardiac arrest with ROSC 3/15/2025  In the setting of ABLA with undetectable Hgb and hemoperitoneum, s/p emergent ex lap, segmental SBR 3/15  Echo 3/17/2025: EF 55%  Management per primary team  Hemoperitoneum  Status  post emergent exploratory laparotomy  Management per primary team  Atrial fibrillation (HCC)  Management per primary team  Encephalopathy  Concern for anoxic brain injury s/p cardiac arrest with new embolic infarcts  Management per neurology  Embolic cerebral infarction (HCC)  Management per neurology    I have reviewed the nephrology recommendations including continuation of CVVHD with ultrafiltration, with ICU team, and we are in agreement with renal plan including the information outlined above.    SUBJECTIVE / 24H INTERVAL HISTORY:  No urine output.  1500 cc drain output.  2500 cc ultrafiltration on CVVHD. Net -620 cc in last 24 hours.  Remains on 1 pressor.  FiO2 40%.    OBJECTIVE:  Current Weight: Weight - Scale: 102 kg (225 lb 15.5 oz)  Vitals:    03/24/25 0615 03/24/25 0630 03/24/25 0700 03/24/25 0828   BP: 91/52 91/53 97/53    Pulse: 99 98 97    Resp: 17 18 18    Temp: 97.9 °F (36.6 °C) 97.9 °F (36.6 °C) 98.1 °F (36.7 °C)    TempSrc:       SpO2: 98% 98% 99% 99%   Weight:       Height:           Intake/Output Summary (Last 24 hours) at 3/24/2025 0909  Last data filed at 3/24/2025 0905  Gross per 24 hour   Intake 5947 ml   Output 7037 ml   Net -1090 ml     Review of Systems   Unable to perform ROS: Intubated     Physical Exam  Constitutional:       Appearance: He is ill-appearing.   Cardiovascular:      Pulses: Normal pulses.      Heart sounds: Normal heart sounds.   Pulmonary:      Comments: Ventilator assisted breath sounds  Abdominal:      Comments: Deferred   Musculoskeletal:      Right lower leg: Edema present.      Left lower leg: Edema present.   Neurological:      Comments: Intubated       Medications:    Current Facility-Administered Medications:     Adult 3-in-1 TPN (custom base / custom electrolytes), , Intravenous, Continuous TPN, Lucia Chavira PA-C, Last Rate: 87.4 mL/hr at 03/23/25 2119, New Bag at 03/23/25 2119    albumin human (FLEXBUMIN) 25 % injection 12.5 g, 12.5 g, Intravenous, Q6H,  Lucia Chavira PA-C, Last Rate: 0 mL/hr at 03/22/25 2206, 12.5 g at 03/24/25 0409    [Held by provider] ascorbic acid (VITAMIN C) tablet 250 mg, 250 mg, Oral, Daily, Yobany Mott MD, 250 mg at 03/14/25 0848    [Held by provider] atorvastatin (LIPITOR) tablet 40 mg, 40 mg, Oral, Daily With Dinner, Yobany Mott MD, 40 mg at 03/13/25 1626    bisacodyl (DULCOLAX) rectal suppository 10 mg, 10 mg, Rectal, Daily, Lucia Chavira PA-C, 10 mg at 03/24/25 0820    budesonide (PULMICORT) inhalation solution 0.5 mg, 0.5 mg, Nebulization, Q12H, Lucia Chavira PA-C, 0.5 mg at 03/24/25 0828    ceFAZolin (ANCEF) IVPB (premix in dextrose) 2,000 mg 50 mL, 2,000 mg, Intravenous, Q12H, Lucia Chavira PA-C, Stopped at 03/24/25 0600    chlorhexidine (PERIDEX) 0.12 % oral rinse 15 mL, 15 mL, Mouth/Throat, Q12H GALE, Lucia Chavira PA-C, 15 mL at 03/24/25 0820    [Held by provider] Cholecalciferol (VITAMIN D3) tablet 2,000 Units, 2,000 Units, Oral, Daily, Yobany Mott MD, 2,000 Units at 03/14/25 0848    [Held by provider] cyanocobalamin (VITAMIN B-12) tablet 100 mcg, 100 mcg, Oral, Daily, Yobany Mott MD, 100 mcg at 03/14/25 0848    docusate (COLACE) oral liquid 100 mg, 100 mg, Oral, BID, Lucia Chavira PA-C, 100 mg at 03/24/25 0820    fentaNYL 1000 mcg in sodium chloride 0.9% 100mL infusion, 50 mcg/hr, Intravenous, Continuous, Lucia Chavira PA-C, Last Rate: 5 mL/hr at 03/23/25 2127, 50 mcg/hr at 03/23/25 2127    fentaNYL injection 50 mcg, 50 mcg, Intravenous, Q2H PRN, Lucia Chavira PA-C, 50 mcg at 03/23/25 1510    [Held by provider] ferrous sulfate tablet 325 mg, 325 mg, Oral, Daily With Breakfast, Yobany Mott MD    [Held by provider] fluticasone (ARNUITY ELLIPTA) 100 MCG/ACT inhaler 1 puff, 1 puff, Inhalation, Daily, Yobany Mott MD, 1 puff at 03/15/25 0925    [Held by provider] folic acid (FOLVITE) tablet 1 mg, 1 mg, Oral, Daily, Yobany Mott MD, 1 mg at 03/14/25 0848     [Held by provider] hydroxychloroquine (PLAQUENIL) tablet 400 mg, 400 mg, Oral, Daily With Breakfast, Yobany Mott MD, 400 mg at 03/14/25 0848    insulin lispro (HumALOG/ADMELOG) 100 units/mL subcutaneous injection 2-12 Units, 2-12 Units, Subcutaneous, Q6H GALE, 2 Units at 03/24/25 0606 **AND** Fingerstick Glucose (POCT), , , Q6H, Lucia Chavira PA-C    ipratropium (ATROVENT) 0.02 % inhalation solution 0.5 mg, 0.5 mg, Nebulization, TID, Lucia Chavira PA-C, 0.5 mg at 03/24/25 0828    lactulose (CHRONULAC) oral solution 30 g, 30 g, Oral, TID, Lucia Chavira PA-C, 30 g at 03/24/25 0820    levalbuterol (XOPENEX) inhalation solution 1.25 mg, 1.25 mg, Nebulization, TID, Lucia Chavira PA-C, 1.25 mg at 03/24/25 0828    lidocaine (LIDODERM) 5 % patch 3 patch, 3 patch, Topical, Daily, Lucia Chavira PA-C, 3 patch at 03/22/25 0831    [Held by provider] metoprolol tartrate (LOPRESSOR) tablet 25 mg, 25 mg, Oral, Q12H GALE, Yobany Mott MD, 25 mg at 03/14/25 0848    moisture barrier miconazole 2% cream (aka HITESH MOISTURE BARRIER ANTIFUNGAL CREAM), , Topical, BID, Lucia Chavira PA-C, Given at 03/24/25 0821    NOREPINEPHRINE 4 MG  ML NSS (CMPD ORDER) infusion, 1-30 mcg/min, Intravenous, Titrated, Lucia Chavira PA-C, Last Rate: 15 mL/hr at 03/24/25 0425, 4 mcg/min at 03/24/25 0425    NxStage K 4/Ca 3 dialysis solution (RFP-401) 20,000 mL, 20,000 mL, Dialysis, Continuous, Bj Ayoub MD, Last Rate: 0 mL/hr at 03/22/25 1113, 20,000 mL at 03/24/25 0516    oxyCODONE (ROXICODONE) oral solution 2.5 mg, 2.5 mg, Oral, Q3H PRN **OR** oxyCODONE (ROXICODONE) oral solution 5 mg, 5 mg, Oral, Q3H PRN, Lucia Chavira PA-C    oxyCODONE (ROXICODONE) oral solution 5 mg, 5 mg, Oral, Q6H, Lucia Chavira PA-C, 5 mg at 03/24/25 0606    pantoprazole (PROTONIX) injection 40 mg, 40 mg, Intravenous, Q24H GALE, Lucia Chavira PA-C, 40 mg at 03/24/25 0820    polyethylene glycol (MIRALAX) packet 17 g, 17 g,  Oral, Daily, Lucia Chavira PA-C, 17 g at 03/24/25 0820    senna oral syrup 17.6 mg, 17.6 mg, Oral, HS, Lucia Chavira PA-C, 17.6 mg at 03/23/25 2139    sodium phosphate 6 mmol in sodium chloride 0.9 % 100 mL Infusion, 6 mmol, Intravenous, Once, AMPARO Calvert, Last Rate: 50 mL/hr at 03/24/25 0730, 6 mmol at 03/24/25 0730    [Held by provider] tamsulosin (FLOMAX) capsule 0.4 mg, 0.4 mg, Oral, Daily With Dinner, Yobany Mott MD, 0.4 mg at 03/13/25 1626    [Held by provider] umeclidinium-vilanterol 62.5-25 mcg/actuation inhaler 1 puff, 1 puff, Inhalation, Daily, Yobany Mott MD, 1 puff at 03/15/25 0925    Laboratory Results:  Results from last 7 days   Lab Units 03/24/25  0531 03/24/25  0330 03/23/25  2337 03/23/25  1932 03/23/25  1811 03/23/25  1709 03/23/25  1526 03/23/25  1049 03/23/25  0412 03/22/25  2148 03/22/25  1617 03/22/25  0946 03/22/25  0425 03/21/25  1916 03/21/25  1802   WBC Thousand/uL  --  8.97  --  13.14*  --   --  21.99*  --  13.60*  --  10.13  --  12.07*  --  9.92   HEMOGLOBIN g/dL  --  7.7*  --  8.5*  --  6.4* 6.8*  --  7.5*  --  7.9*  --  8.2*  --  7.8*   I STAT HEMOGLOBIN g/dl  --   --   --   --  6.8*  --   --   --   --   --   --   --   --   --   --    HEMATOCRIT %  --  23.2*  --  26.2*  --  20.1* 21.6*  --  23.9*  --  25.6*  --  25.7*  --  24.5*   HEMATOCRIT, ISTAT %  --   --   --   --  20*  --   --   --   --   --   --   --   --   --   --    PLATELETS Thousands/uL  --  47*  --  76*  --   --  142*  --  135*  --  95*  --  122*  --  117*   POTASSIUM mmol/L 4.1  --  4.0 4.6  --   --  4.3 4.3 4.2 4.3 4.5   < > 4.2   < >  --    CHLORIDE mmol/L 105  --  105 105  --   --  104 104 106 106 106   < > 107   < >  --    CO2 mmol/L 25  --  24 25  --   --  23 25 24 24 24   < > 22   < >  --    CO2, I-STAT mmol/L  --   --   --   --  23  --   --   --   --   --   --   --   --   --   --    BUN mg/dL 44*  --  46* 46*  --   --  46* 51* 52* 57* 63*   < > 86*   < >  --    CREATININE mg/dL  "1.23  --  1.39* 1.47*  --   --  1.38* 1.47* 1.56* 1.82* 2.07*   < > 2.82*   < >  --    CALCIUM mg/dL 8.1*  --  7.7* 7.6*  --   --  8.1* 8.1* 8.1* 7.9* 7.9*   < > 7.7*   < >  --    MAGNESIUM mg/dL 2.0  --  1.9 2.0  --  2.0  --  1.9 1.9 2.0 1.9   < > 1.9   < >  --    PHOSPHORUS mg/dL 2.1*  --  2.4 3.8  --  2.6  --  2.4 2.1* 2.3 2.3   < > 3.2   < >  --    GLUCOSE, ISTAT mg/dl  --   --   --   --  180*  --   --   --   --   --   --   --   --   --   --     < > = values in this interval not displayed.       Portions of the record may have been created with voice recognition software. Occasional wrong word or \"sound a like\" substitutions may have occurred due to the inherent limitations of voice recognition software. Read the chart carefully and recognize, using context, where substitutions have occurred.If you have any questions, please contact the dictating provider.    "

## 2025-03-24 NOTE — ASSESSMENT & PLAN NOTE
Patient remains obtunded postcardiac arrest.  Concern is for anoxic encephalopathy.  Head CT is without acute changes.  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  According to the patient's family, he has some response now.  Monitor mental status.

## 2025-03-24 NOTE — ASSESSMENT & PLAN NOTE
Refractory volume overload, status post trial of IV diuretics  Volume management with ultrafiltration on CVVHD

## 2025-03-24 NOTE — ASSESSMENT & PLAN NOTE
Echo 3/17/2025: EF 55%, unable to assess diastolic function  Home Rx: Metoprolol 25 mg twice daily  Vasopressors per ICU

## 2025-03-24 NOTE — ASSESSMENT & PLAN NOTE
Patient developed acute neck pain with MSSA bacteremia during recent hospitalization.  CRP was highly elevated.  C-spine MRI showed possible C-spine and paraspinal infection.  Patient has no neurologic deficit.  His neck pain is finally improving.  However, given paraspinal infection on the initial C-spine MRI, we should repeat C-spine MRI with contrast when creatinine is improved to confirm resolution/improvement of paraspinal infection.  However, without any neurological deficit, it would be fine to postpone MRI until renal function is further improved, to decrease risk of contrast-induced ORIANA.  Patient should get MRI done prior to completion of IV antibiotic course below.  Antibiotic plan as in below.  Monitor neck pain.  Recommend repeat C-spine MRI with and without contrast prior to completion of IV antibiotic course.

## 2025-03-24 NOTE — PLAN OF CARE
Problem: Prexisting or High Potential for Compromised Skin Integrity  Goal: Skin integrity is maintained or improved  Description: INTERVENTIONS:  - Identify patients at risk for skin breakdown  - Assess and monitor skin integrity  - Assess and monitor nutrition and hydration status  - Monitor labs   - Assess for incontinence   - Turn and reposition patient  - Assist with mobility/ambulation  - Relieve pressure over bony prominences  - Avoid friction and shearing  - Provide appropriate hygiene as needed including keeping skin clean and dry  - Evaluate need for skin moisturizer/barrier cream  - Collaborate with interdisciplinary team   - Patient/family teaching  - Consider wound care consult   Outcome: Progressing     Problem: Potential for Falls  Goal: Patient will remain free of falls  Description: INTERVENTIONS:  - Educate patient/family on patient safety including physical limitations  - Instruct patient to call for assistance with activity   - Consult OT/PT to assist with strengthening/mobility   - Keep Call bell within reach  - Keep bed low and locked with side rails adjusted as appropriate  - Keep care items and personal belongings within reach  - Initiate and maintain comfort rounds  - Make Fall Risk Sign visible to staff  - Offer Toileting every  Hours, in advance of need  - Initiate/Maintain alarm  - Obtain necessary fall risk management equipmen  - Apply yellow socks and bracelet for high fall risk patients  - Consider moving patient to room near nurses station  Outcome: Progressing     Problem: Nutrition/Hydration-ADULT  Goal: Nutrient/Hydration intake appropriate for improving, restoring or maintaining nutritional needs  Description: Monitor and assess patient's nutrition/hydration status for malnutrition. Collaborate with interdisciplinary team and initiate plan and interventions as ordered.  Monitor patient's weight and dietary intake as ordered or per policy. Utilize nutrition screening tool and  intervene as necessary. Determine patient's food preferences and provide high-protein, high-caloric foods as appropriate.     INTERVENTIONS:  - Monitor oral intake, urinary output, labs, and treatment plans  - Assess nutrition and hydration status and recommend course of action  - Evaluate amount of meals eaten  - Assist patient with eating if necessary   - Allow adequate time for meals  - Recommend/ encourage appropriate diets, oral nutritional supplements, and vitamin/mineral supplements  - Order, calculate, and assess calorie counts as needed  - Recommend, monitor, and adjust tube feedings and TPN/PPN based on assessed needs  - Assess need for intravenous fluids  - Provide specific nutrition/hydration education as appropriate  - Include patient/family/caregiver in decisions related to nutrition  Outcome: Progressing     Problem: GASTROINTESTINAL - ADULT  Goal: Minimal or absence of nausea and/or vomiting  Description: INTERVENTIONS:  - Administer IV fluids if ordered to ensure adequate hydration  - Maintain NPO status until nausea and vomiting are resolved  - Nasogastric tube if ordered  - Administer ordered antiemetic medications as needed  - Provide nonpharmacologic comfort measures as appropriate  - Advance diet as tolerated, if ordered  - Consider nutrition services referral to assist patient with adequate nutrition and appropriate food choices  Outcome: Progressing  Goal: Maintains or returns to baseline bowel function  Description: INTERVENTIONS:  - Assess bowel function  - Encourage oral fluids to ensure adequate hydration  - Administer IV fluids if ordered to ensure adequate hydration  - Administer ordered medications as needed  - Encourage mobilization and activity  - Consider nutritional services referral to assist patient with adequate nutrition and appropriate food choices  Outcome: Progressing  Goal: Maintains adequate nutritional intake  Description: INTERVENTIONS:  - Monitor percentage of each meal  consumed  - Identify factors contributing to decreased intake, treat as appropriate  - Assist with meals as needed  - Monitor I&O, weight, and lab values if indicated  - Obtain nutrition services referral as needed  Outcome: Progressing  Goal: Establish and maintain optimal ostomy function  Description: INTERVENTIONS:  - Assess bowel function  - Encourage oral fluids to ensure adequate hydration  - Administer IV fluids if ordered to ensure adequate hydration   - Administer ordered medications as needed  - Encourage mobilization and activity  - Nutrition services referral to assist patient with appropriate food choices  - Assess stoma site  - Consider wound care consult   Outcome: Progressing  Goal: Oral mucous membranes remain intact  Description: INTERVENTIONS  - Assess oral mucosa and hygiene practices  - Implement preventative oral hygiene regimen  - Implement oral medicated treatments as ordered  - Initiate Nutrition services referral as needed  Outcome: Progressing     Problem: HEMATOLOGIC - ADULT  Goal: Maintains hematologic stability  Description: INTERVENTIONS  - Assess for signs and symptoms of bleeding or hemorrhage  - Monitor labs  - Administer supportive blood products/factors as ordered and appropriate  Outcome: Progressing     Problem: SAFETY,RESTRAINT: NV/NON-SELF DESTRUCTIVE BEHAVIOR  Goal: Remains free of harm/injury (restraint for non violent/non self-detsructive behavior)  Description: INTERVENTIONS:  - Instruct patient/family regarding restraint use   - Assess and monitor physiologic and psychological status   - Provide interventions and comfort measures to meet assessed patient needs   - Identify and implement measures to help patient regain control  - Assess readiness for release of restraint   Outcome: Progressing  Goal: Returns to optimal restraint-free functioning  Description: INTERVENTIONS:  - Assess the patient's behavior and symptoms that indicate continued need for restraint  - Identify  and implement measures to help patient regain control  - Assess readiness for release of restraint   Outcome: Progressing

## 2025-03-24 NOTE — ASSESSMENT & PLAN NOTE
Patient developed acute bleeding from rectal mass, resulting in rapid response, and subsequent cardiac arrest.  He is status post transfusion.  He is also status post small bowel resection and right hemicolectomy.  He is status post exploratory laparotomy and evacuation of intra-abdominal hematoma over the weekend.  Management per primary service.

## 2025-03-24 NOTE — QUICK NOTE
Gave a clinical update at the bedside to the wife, son, and daughter at the bedside.   Guille, the son, was fairly concerned that someone told the family that the patient was brain dead. He wants to know when the trach is planned because they were told it was to be last Wednesday and we have yet to trach him.   We went over his neurological exam. He has facial expression and reaction to all stimuli and it spontaneously moving lower extremities but, not withdrawaling from painful stimuli or localizing. He is overbreathing the vent, coughing and gagging but, on the Fentanyl gtt - he is comfortable. Therefore, he is not brain dead. Imaging of his brain did reveal multiple areas of anoxic injury and that with his current exam means he will have a prolonged neurological recovery which the degree of improvement is not certain. A tracheostomy would be required to secure his airway during this prolonged recovery.   He is on PS on the ventilator meaning he is initiating breaths on his own at this time. The ventilator will assist him if he tires but, he is doing some of the work on his own.   Overnight, he required to go back to the OR to evacuate a hematoma and we did not find an active bleed but, concerned that sometime since the first surgery he bled for a period of time causing the hematoma. Good that nothing is actively bleeding but, while on AC he is at high risk for big and small bleeding.   On that note, his AC is on hold until his blood count stabilizes. Therefore he is at risk of more embolic strokes or other clots but, giving his bleeding state right now. That is a risk we have to take at the moment.  Because of newest bleeding event, he is not stable for a tracheostomy at this time. Once he is stable we will pursue the tracheostomy and a PEG placement.   He had a small bowel movement last night prior to the OR. He is still having significant NG output. We are continuing aggressive bowel regimen and motility agents  but not at a state to give enteral nutrition.   His kidneys are not making urine which is the indicator we use for kidney recovery and that means that he will also require dialysis as well. We are getting a permanent line for dialysis. This piece of information plays a big role in his life outside of the hospital as this impacts the available facilities that he can go to. He will need one equipped to handle his neurological condition, dialysis, trach and peg. These facilities are limited and can be far away.   All of these acute concerns are very complex and underneath all of that - he has cancer that is not staged. The mass was removed but, until we have the pathology we are not sure if more cancer treatments would be indicated and if he is a candidate for any interventions.   Family was tearful. Guille stated that they want to move forward with the trach and will deal all the other conditions and complications down the line as they come. Wife and Daughter were very tearful. Family was appreciative of the update at this time. All questions were answered to their satisfaction.       AMPARO Tamez

## 2025-03-24 NOTE — PROGRESS NOTES
"Progress Note - Palliative Care   Name: Devin Chaves 79 y.o. male I MRN: [id number]  Unit/Bed#: ICU 10 I Date of Admission: 2/28/2025   Date of Service: 3/24/2025 I Hospital Day: 24     Assessment & Plan  Counseling regarding advance care planning and goals of care  -Continue disease directed cares with limits of DNAR.  -Ultimate goal of patient prior to mechanical ventilation was to continue living so that he can come face-to-face with his new granddaughter about to be born within the next several days to weeks  -Family okay for LTAC placement despite lower likelihood of recovery  Palliative care by specialist  Palliative diagnosis: Colon adenocarcinoma, colon mass, cardiac arrest, respiratory failure  PPS: 10%    Education/counseling provided on role/purpose of palliative care; benefits/risks of treatment options by our team reviewed; instructions for management and anticipatory guidance provided; supportive listening and presence provided; provided space for life review and whole person assessment    Psychosocial/Spiritual:   -supported by spouse Francia (\"Ashvin\"),  56 years; they live near daughter [place]  -Also two sons Parmjit and Guille  -4 yrs in [employer]  -Enjoys working on farm with cows and [employer]  -Sikhism kaylee-->family would like extra spiritual support-->spiritual care consult placed    Communicated and coordinated with surgical CC team and RN    #ACP Documentation   - completed Durable Power of  for Health Care, signed/notarized 03/06/2025                      Code Status: Full - Level 1               Decisional apparatus:  Patient is not competent on my exam today.  If competence is lost, patient's substitute decision maker would default to spouse Francia (first), son Devin (alternate)               Advance Directive / Living Will / POLST:  POA document on file  Cardiac arrest due to other underlying condition (HCC)  In setting of hemorrhagic shock    In efforts done with " achievement of ROSC    Critical care team will likely do MRI brain to evaluate for anoxic brain injury--> hypoxic ischemic encephalopathy and new scattered small acute infarcts left posterior frontal and bilateral cerebellar lobes, likely embolic  Follow-up on video EEG--> no evidence of seizure activity  Hemorrhagic shock (HCC)  Code [place]son 3/15 for hemorrhagic shock related to mesenteric hematoma s/p small bowel resection and colon resection    Now off vasopressor medication  MSSA bacteremia  From previous admission in February from skin/soft tissue source  On cefazolin through 3/27/25  Acute blood loss anemia  In setting of mesenteric bleed and hematoma  S/p small bowel resection and right hemicolectomy  Colonic mass  2/20/2025 EGD/colonoscopy revealed 2.5 cm ileocecal mass    Pathology confirmed: Adenocarcinoma    Mass has not been removed according to surgical notes  Embolic cerebral infarction (HCC)  Neurology following    MRI Brain [03/18/2025] new nearly symmetric cortical restricted diffusion in bilateral frontal, bilateral parietal, and bilateral occipital lobes, suspicious for HIE given history of recent cardiac arrest; new scattered small acute infarcts in left posterior frontal and bilateral cerebellar lobes, favor embolic infarct.    Plan is to restart heparin gtt       NARRATIVE AND INTERVAL HISTORY:       Over the weekend, the patient underwent ex lap with evacuation of hematoma 3/23.  Continues with mechanical ventilation, sedation, TPN, and CRRT.  Current plan is to restart heparin drip, place temp dialysis cath, plan with nephrology about CRRT versus intermittent HD, and looking ahead towards placing PEG and tracheostomy. Family is clear that they want all disease directed cares except further resuscitation.     MEDICATIONS / ALLERGIES:     all current active meds have been reviewed, current meds:   Current Facility-Administered Medications:     Adult 3-in-1 TPN (custom base / custom  electrolytes), Continuous TPN, Last Rate: 87.4 mL/hr at 03/23/25 2119    albumin human (FLEXBUMIN) 25 % injection 12.5 g, Q6H, Last Rate: 0 g (03/22/25 2206)    [Held by provider] ascorbic acid (VITAMIN C) tablet 250 mg, Daily    [Held by provider] atorvastatin (LIPITOR) tablet 40 mg, Daily With Dinner    bisacodyl (DULCOLAX) rectal suppository 10 mg, Daily    budesonide (PULMICORT) inhalation solution 0.5 mg, Q12H    ceFAZolin (ANCEF) IVPB (premix in dextrose) 2,000 mg 50 mL, Q12H, Last Rate: 2,000 mg (03/24/25 0538)    chlorhexidine (PERIDEX) 0.12 % oral rinse 15 mL, Q12H GALE    [Held by provider] Cholecalciferol (VITAMIN D3) tablet 2,000 Units, Daily    [Held by provider] cyanocobalamin (VITAMIN B-12) tablet 100 mcg, Daily    docusate (COLACE) oral liquid 100 mg, BID    fentaNYL 1000 mcg in sodium chloride 0.9% 100mL infusion, Continuous, Last Rate: 50 mcg/hr (03/23/25 2127)    fentaNYL injection 50 mcg, Q2H PRN    [Held by provider] ferrous sulfate tablet 325 mg, Daily With Breakfast    [Held by provider] fluticasone (ARNUITY ELLIPTA) 100 MCG/ACT inhaler 1 puff, Daily    [Held by provider] folic acid (FOLVITE) tablet 1 mg, Daily    [Held by provider] hydroxychloroquine (PLAQUENIL) tablet 400 mg, Daily With Breakfast    insulin lispro (HumALOG/ADMELOG) 100 units/mL subcutaneous injection 2-12 Units, Q6H GALE **AND** Fingerstick Glucose (POCT), Q6H    ipratropium (ATROVENT) 0.02 % inhalation solution 0.5 mg, TID    lactulose (CHRONULAC) oral solution 30 g, TID    levalbuterol (XOPENEX) inhalation solution 1.25 mg, TID    lidocaine (LIDODERM) 5 % patch 3 patch, Daily    [Held by provider] metoprolol tartrate (LOPRESSOR) tablet 25 mg, Q12H GALE    moisture barrier miconazole 2% cream (aka HITESH MOISTURE BARRIER ANTIFUNGAL CREAM), BID    NOREPINEPHRINE 4 MG  ML NSS (CMPD ORDER) infusion, Titrated, Last Rate: 4 mcg/min (03/24/25 3791)    NxStage K 4/Ca 3 dialysis solution (RFP-401) 20,000 mL, Continuous, Last Rate:  20,000 mL (03/22/25 1113)    oxyCODONE (ROXICODONE) oral solution 2.5 mg, Q3H PRN **OR** oxyCODONE (ROXICODONE) oral solution 5 mg, Q3H PRN    oxyCODONE (ROXICODONE) oral solution 5 mg, Q6H    pantoprazole (PROTONIX) injection 40 mg, Q24H GALE    polyethylene glycol (MIRALAX) packet 17 g, Daily    propofol (DIPRIVAN) 1000 mg in 100 mL infusion (premix), Titrated, Last Rate: Stopped (03/23/25 2141)    senna oral syrup 17.6 mg, HS    sodium phosphate 6 mmol in sodium chloride 0.9 % 100 mL Infusion, Once, Last Rate: 6 mmol (03/24/25 0730)    [Held by provider] tamsulosin (FLOMAX) capsule 0.4 mg, Daily With Dinner    [Held by provider] umeclidinium-vilanterol 62.5-25 mcg/actuation inhaler 1 puff, Daily, and PTA meds:   Prior to Admission Medications   Prescriptions Last Dose Informant Patient Reported? Taking?   Cholecalciferol 50 MCG (2000 UT) TABS 2/27/2025  Yes Yes   Sig: TAKE ONE TABLET BY MOUTH DAILY (FOR LOW VITAMIN D)   albuterol (2.5 mg/3 mL) 0.083 % nebulizer solution More than a month  No No   Sig: Take 3 mL (2.5 mg total) by nebulization every 6 (six) hours as needed for wheezing or shortness of breath   albuterol (PROVENTIL HFA,VENTOLIN HFA) 90 mcg/act inhaler   Yes No   Sig: INHALE 2 PUFFS BY MOUTH EVERY 4 HOURS AS NEEDED FOR BREATHING   albuterol (PROVENTIL HFA,VENTOLIN HFA) 90 mcg/act inhaler More than a month  No No   Sig: Inhale 2 puffs every 4 (four) hours as needed for wheezing   apixaban (Eliquis) 5 mg Past Week  Yes Yes   Sig: Take 5 mg by mouth 2 (two) times a day   ascorbic acid (VITAMIN C) 250 MG tablet 2/27/2025  Yes Yes   Sig: Take 250 mg by mouth daily   aspirin (ECOTRIN LOW STRENGTH) 81 mg EC tablet 2/27/2025 Morning  Yes Yes   Sig: Take 81 mg by mouth daily   ceFAZolin (ANCEF) 2000 mg IVPB 2/28/2025  No Yes   Sig: Inject 2,000 mg into a catheter in a vein over 30 minutes at 100 mL/hr every 8 (eight) hours   cyanocobalamin (VITAMIN B-12) 100 MCG tablet 2/27/2025  Yes Yes   Sig: Take 100 mcg by  "mouth daily   docusate sodium (COLACE) 100 mg capsule Past Week  Yes Yes   Sig: Take 100 mg by mouth 2 (two) times a day   famotidine (PEPCID) 20 mg tablet 2/27/2025  Yes Yes   Sig: Take 20 mg by mouth 2 (two) times a day   ferrous sulfate 325 (65 Fe) mg tablet 2/27/2025  Yes Yes   Sig: Take 325 mg by mouth daily with breakfast   folic acid (FOLVITE) 1 mg tablet 2/27/2025  Yes Yes   Sig: TAKE ONE TABLET BY MOUTH EVERY DAY *FOLIC ACID SUPPLEMENT*   hydroxychloroquine (PLAQUENIL) 200 mg tablet 2/27/2025  Yes Yes   Sig: Take 400 mg by mouth daily with breakfast   levalbuterol (XOPENEX) 1.25 mg/3 mL nebulizer solution 2/28/2025  No Yes   Sig: Take 3 mL (1.25 mg total) by nebulization every 8 (eight) hours as needed for wheezing   lidocaine (LIDODERM) 5 % 2/28/2025  No Yes   Sig: Apply 3 patches topically over 12 hours daily Remove & Discard patch within 12 hours or as directed by MD. Apply to neck and back in areas of pain.   metoprolol tartrate (LOPRESSOR) 25 mg tablet 2/28/2025  Yes Yes   Sig: Take 25 mg by mouth every 12 (twelve) hours   mometasone 220 mcg/actuation inhaler 2/27/2025  Yes Yes   Sig: Inhale 1 puff every evening Rinse mouth after use.   polyethylene glycol (MIRALAX) 17 g packet Past Week  No Yes   Sig: Take 17 g by mouth daily as needed (constipation)   rosuvastatin (CRESTOR) 40 MG tablet 2/28/2025  Yes Yes   Sig: Take 20 mg by mouth daily   senna (SENOKOT) 8.6 mg Past Week  No Yes   Sig: Take 1 tablet (8.6 mg total) by mouth daily at bedtime as needed for constipation   sertraline (ZOLOFT) 25 mg tablet Unknown  Yes No   Sig: Take 12.5 mg by mouth daily   tamsulosin (FLOMAX) 0.4 mg Past Week  No Yes   Sig: Take 1 capsule (0.4 mg total) by mouth daily with dinner      Facility-Administered Medications: None       Allergies   Allergen Reactions    Shellfish-Derived Products - Food Allergy Other (See Comments)     Pt states, \"I reacted, I dont know\"       OBJECTIVE:    Physical Exam  Physical " Exam  Constitutional:       General: He is not in acute distress.     Appearance: He is ill-appearing.      Interventions: He is sedated and intubated.   HENT:      Head: Normocephalic and atraumatic.      Right Ear: External ear normal.      Left Ear: External ear normal.      Nose: Nose normal.   Cardiovascular:      Rate and Rhythm: Regular rhythm. Tachycardia present.      Pulses: Normal pulses.   Pulmonary:      Effort: Pulmonary effort is normal. No respiratory distress. He is intubated.   Abdominal:      General: There is no distension.      Tenderness: There is no abdominal tenderness.      Comments: Abd placed in binder   Musculoskeletal:      Right lower leg: Edema present.      Left lower leg: Edema present.      Comments: Anasarcic, all 4 extremities   Skin:     Coloration: Skin is pale.      Findings: Bruising present.   Neurological:      Comments: Winces to pain; moves 4 extremities spontaneously   Psychiatric:      Comments: Unable to assess         Lab Results: I have personally reviewed pertinent labs., CBC:   Lab Results   Component Value Date    WBC 8.97 03/24/2025    HGB 7.7 (L) 03/24/2025    HCT 23.2 (L) 03/24/2025    MCV 92 03/24/2025    PLT 47 (L) 03/24/2025    RBC 2.52 (L) 03/24/2025    MCH 30.6 03/24/2025    MCHC 33.2 03/24/2025    RDW 16.9 (H) 03/24/2025    MPV 12.1 03/24/2025   , CMP:   Lab Results   Component Value Date    SODIUM 137 03/24/2025    K 4.1 03/24/2025     03/24/2025    CO2 25 03/24/2025    CO2 23 03/23/2025    BUN 44 (H) 03/24/2025    CREATININE 1.23 03/24/2025    GLUCOSE 180 (H) 03/23/2025    CALCIUM 8.1 (L) 03/24/2025    AST 19 03/23/2025    ALT 4 (L) 03/23/2025    ALKPHOS 60 03/23/2025    EGFR 56 03/24/2025   , BMP:  Lab Results   Component Value Date    SODIUM 137 03/24/2025    K 4.1 03/24/2025     03/24/2025    CO2 25 03/24/2025    CO2 23 03/23/2025    BUN 44 (H) 03/24/2025    CREATININE 1.23 03/24/2025    GLUC 168 (H) 03/24/2025    CALCIUM 8.1 (L)  "03/24/2025    AGAP 7 03/24/2025    EGFR 56 03/24/2025   , PT/PTT:No results found for: \"PT\", \"PTT\"  Imaging Studies: Reviewed and interpreted the pertinent studies  EKG, Pathology, and Other Studies: Reviewed and interpreted the pertinent studies    I have spent a total time of 30 minutes in caring for this patient on the day of the visit/encounter including Impressions, Counseling / Coordination of care, Documenting in the medical record, Reviewing/placing orders in the medical record (including tests, medications, and/or procedures), Obtaining or reviewing history  , and Communicating with other healthcare professionals . Topics discussed with the patient / family include symptom assessment and management, medication review, psychosocial support, goals of care, supportive listening, and anticipatory guidance.   "

## 2025-03-24 NOTE — DISCHARGE INSTR - AVS FIRST PAGE
Please call the office when you leave to schedule an appointment for 4 weeks.              Please call [phone]. [address], [address], [place], [address]. Off of [address] between [employer] and [address].       Please note that my office is not located on the [facility].      Activity:    May lift 10 lb as many times as desired the 1st week,       20 lb in 2 weeks,       30 lb in 3 weeks.                Walking is encouraged  Normal daily activities including climbing steps are okay  Do not engage in strenuous activity ( sit-ups or crutches) or contact sports for 4-6 weeks post-operatively    Return to Work:   Okay to return to work when you feel well if you desire.        Diet:   You may return to your normal healthy diet.    Wound Care:  Your wound is closed with dissolvable stitches and glue.  It is okay to shower. Wash incision gently with soap and water and pat dry. Do not soak incisions in bath water or swim for two weeks. Do not apply any creams or ointments.    Pain Medication:   Please take as directed if needed. May use Advil or Motrin in addition.  Recall, the pain medicine and anesthesia is associated with constipation.    No driving while taking narcotic pain medications.      Other:  It is normal to developed a “healing ridge” / firm incision after surgery.  This is your body making scar tissue.  It is a good sign  Constipation is very common after general anesthesia.  Please use milk of magnesia as needed in order to help prevent constipation.  It is normal to get bruising after surgery.  If you have questions after discharge please call the office.    If you have increased pain, fever >101.5, increased drainage, redness or a bad smell at your surgery site, please call us immediately or come directly to the Emergency Room      __________________________________________________________________________________________________________    Pathology from the  right colon resection does reveal a cancer.    A. Large Intestine, Right/Ascending Colon, :  - Moderately differentiated adenocarcinoma with mucinous features ( 2.0 cm), arising in a tubular adenoma.  - Tumor invades into lower one third of submucosa (pT1, Sm3)   - Lymphovascular invasion is present .  - Terminal ileum and omentum with no pathologic abnormality  - All margins are negative for tumor.  - Twenty one lymph nodes, negative for malignancy (0/21).                                It is very favorable, however a follow-up opinion from medical oncology is still strongly recommended.

## 2025-03-24 NOTE — DISCHARGE INSTRUCTIONS
Perma-cath Placement   WHAT YOU NEED TO KNOW:   A perma-cath is a catheter placed through a vein into or near your right atrium. Your right atrium is the right upper chamber of your heart. A perma-cath is used for dialysis in an emergency or until a long-term device is ready to use. After your procedure, you will have some pain and swelling on your chest and neck. You may have some bruises on your chest and neck. You may also have 2 dressings, one on your chest and one on your neck.   DISCHARGE INSTRUCTIONS:   Call 911 for any of the following:   You feel lightheaded, short of breath, and have chest pain.    Your catheter comes out   Contact Interventional Radiology at [phone] ([facility] PATIENTS: Contact Interventional Radiology at [phone]) ([facility] PATIENTS: Contact Interventional Radiology at [phone]) if:  Blood soaks through your bandage.   You have new swelling in your arm, neck, face, or chest on your right side.  Your catheter gets wet.    Your bruises or pain get worse.   You have a fever or chills.  Persistent nausea or vomiting.   Your incision is red, swollen, or draining pus.   You have questions or concerns about your condition or care.  Self-care:       Resume your normal diet.  Keep your dressings dry. Do not take a shower or swim. You may take a tub bath, but do not get your dressings wet. Water in your wound can cause bacteria to grow and cause an infection. If your dressing gets wet, dry it off and cover it with dry sterile gauze. Call your healthcare provider. Do not use soaps or ointments.  Do not change your dressings. Your healthcare provider or dialysis nurse will change your dressings. Your dressings should stay in place until your healthcare provider removes them. The dressing on your chest will stay as long as you have the catheter in place. The dressing prevents infection.    Do not remove the red and blue caps from the end of your catheter. The caps prevent air from getting  into your catheter.  Follow up with your healthcare provider as directed: Write down your questions so you remember to ask them during your visits.

## 2025-03-24 NOTE — PROGRESS NOTES
Progress Note - Surgery-General   Name: Devin Chaves 79 y.o. male I MRN: [id number]  Unit/Bed#: ICU 10 I Date of Admission: 2/28/2025   Date of Service: 3/24/2025 I Hospital Day: 24    Assessment & Plan  Acute blood loss anemia  Patient sustained cardiac arrest in the setting of an undetectable hemoglobin on 3/15 and was taken emergently to the operating room for exploration in the setting of abdominal free fluid.  Large volume hemoperitoneum was encountered with evidence of a actively bleeding/decompressing mesenteric hematoma; bleeding control was obtained and on 3/15 taken to OR for a segmental small bowel resection was performed and the patient was left in discontinuity given ongoing instability.  S/p SB anastomosis, R hemicolectomy, closure on 3/16.    Afebrile, hypotensive with MAP in low 60s, currently on levo 5.    Oozing around midline incision with concern for fascial dehiscence, now s/p ex lap, evacuation of hematoma on 3/23.    Plan  - Continue NG tube to suction/n.p.o. given emesis and no significant bowel function  - Appreciate neurology recommendations   - Avoid hypotension, optimize BP management  - Heparin gtt in setting of embolic infarcts   - held in setting on hemaperitoneum   -recommend checking 12pm H/H and consider resuming pending Neurology recs  -Continue CRRT  - Continue TPN  - Wean vent as tolerated  - Continue recommendations from cardiology and nephrology for ongoing diuresis  - Rest of care per ICU  Idiopathic hypotension    CAD (coronary artery disease)    COPD (chronic obstructive pulmonary disease) ([facility])    History of CVA (cerebrovascular accident)    Acute on chronic respiratory failure with hypoxia ([facility])    Atrial fibrillation (HCC)    Urinary retention    MSSA bacteremia    Neck pain    Colonic mass    ORIANA (acute kidney injury) ([facility])    Fluid overload    Dysphagia    Severe protein-calorie malnutrition (HCC)  Malnutrition Findings:   Adult Malnutrition type: Acute  illness  Adult Degree of Malnutrition: Other severe protein calorie malnutrition  Malnutrition Characteristics: Inadequate energy, Fluid accumulation                  360 Statement: related to inadequate energy/protein intake as evidenced by consuming < 50% of energy intake compared to estimated needs for > 5 days and B/L LE +3 edema. Treated with ONS.    BMI Findings:           Body mass index is 30.65 kg/m².     Hallucinations, visual    Hemorrhagic shock (HCC)    Cardiac arrest (HCC)    Hemoperitoneum    Encephalopathy    Cardiac arrest due to other underlying condition (HCC)    Palliative care by specialist    Counseling regarding advance care planning and goals of care    Elevated troponin    Embolic cerebral infarction (HCC)    Volume overload          Subjective   Events detailed above.  Intubated on pressors.  Had a small BM yesterday.  Opens eyes but not following commands    Objective :  Temp:  [95 °F (35 °C)-98.1 °F (36.7 °C)] 97.7 °F (36.5 °C)  HR:  [] 96  BP: ()/(24-60) 93/51  Resp:  [13-37] 24  SpO2:  [75 %-100 %] 99 %  O2 Device: Ventilator    I/O         03/22 0701  03/23 0700 03/23 0701  03/24 0700    I.V. (mL/kg) 1694 (16.6) 1374.6 (13.5)    Blood  2400    NG/ 151    IV Piggyback 475 648     1061    Total Intake(mL/kg) 3240 (31.8) 5634.6 (55.2)    Urine (mL/kg/hr) 71 (0) 0 (0)    Emesis/NG output 820 1025    Other 4205 2140    Blood  2400    Total Output 5096 5565    Net -1856 +69.6                Lines/Drains/Airways       Active Status       Name Placement date Placement time Site Days    PICC Line 02/26/25 Right Brachial 02/26/25  0548  Brachial  25    CVC Central Lines 03/15/25 Triple 03/15/25  1338  --  8    HD Temporary Double Catheter 03/21/25  1754  Left femoral  2    NG/OG Tube Nasogastric Left nare 03/15/25  1700  Left nare  8                  Physical Exam  General: intubated, ill-appearing  HENT: ETT in place  Neck: supple, no JVD  CV: Regular rate  Lungs:  mechanically ventilated.   ABD: Soft, nondistended. Incision CDI    Lab Results: I have reviewed the following results:  Recent Labs     03/23/25  0419 03/23/25  1049 03/23/25  1932 03/23/25  2337 03/24/25  0330   WBC  --    < > 13.14*  --  8.97   HGB  --    < > 8.5*  --  7.7*   HCT  --    < > 26.2*  --  23.2*   PLT  --    < > 76*  --  47*   BANDSPCT  --    < > 19*  --  7   SODIUM  --    < > 135 134*  --    K  --    < > 4.6 4.0  --    CL  --    < > 105 105  --    CO2  --    < > 25 24  --    BUN  --    < > 46* 46*  --    CREATININE  --    < > 1.47* 1.39*  --    GLUC  --    < > 179* 180*  --    CAIONIZED  --    < > 1.13 1.12  --    MG  --    < > 2.0 1.9  --    PHOS  --    < > 3.8 2.4  --    AST  --   --  19  --   --    ALT  --   --  4*  --   --    ALB  --   --  2.5*  --   --    TBILI  --   --  0.56  --   --    ALKPHOS  --   --  60  --   --    PTT 61*  --   --   --   --    LACTICACID  --   --  1.9  --   --     < > = values in this interval not displayed.             VTE Pharmacologic Prophylaxis: VTE covered by:    None     VTE Mechanical Prophylaxis: sequential compression device

## 2025-03-24 NOTE — BRIEF OP NOTE (RAD/CATH)
INTERVENTIONAL RADIOLOGY PROCEDURE NOTE    Date: 3/24/2025    Procedure: IR TUNNELED DIALYSIS CATHETER PLACEMENT     Preoperative diagnosis:   1. Anemia    2. Chronic hypoxic respiratory failure (HCC)    3. Pulmonary nodule    4. ORIANA (acute kidney injury) (HCC)    5. Elevated troponin    6. Pleural effusion, bilateral    7. Colonic mass    8. MSSA bacteremia    9. Melena    10. Abnormal urinalysis    11. Moderate protein-calorie malnutrition (HCC)    12. Pharyngoesophageal dysphagia    13. Hemorrhage    14. Hemorrhagic shock (HCC)    15. Hemoperitoneum    16. Acute blood loss anemia    17. Cardiac arrest due to other underlying condition (HCC)    18. Chronic obstructive pulmonary disease, unspecified COPD type (HCC)    19. Atrial fibrillation, unspecified type (HCC)    20. Coronary artery disease involving native heart without angina pectoris, unspecified vessel or lesion type    21. Cardiac arrest (HCC)    22. Hypotension    23. Acute renal failure, unspecified acute renal failure type (HCC)         Postoperative diagnosis: Same.    Surgeon: Cordelia Carolina MD     Assistant: None. No qualified resident was available.    Blood loss: Minimal    Specimens: None    Findings: Successful TDC placement.    Complications: None immediate.    Anesthesia: conscious sedation

## 2025-03-24 NOTE — PROGRESS NOTES
Progress Note - Oncology-Medical   Name: Devin Chaves 79 y.o. male I MRN: [id number]  Unit/Bed#: ICU 10 I Date of Admission: 2/28/2025   Date of Service: 3/24/2025 I Hospital Day: 24    Assessment & Plan  Acute blood loss anemia    Cardiac arrest (HCC)    Colonic mass    79-year-old male with past medical history significant for COPD with chronic hypoxic respiratory failure on 2 to 4 L, MSSA bacteremia on cefazolin until 3/27/2025, atrial fibrillation with Eliquis currently on hold, CAD, history of CVA, and acute blood loss anemia requiring multiple transfusions despite being off anticoagulation in the setting of recently diagnosed adenocarcinoma of ileocecum with additional hospital course complication by cardiac arrest due to hemorrhagic shock status post evacuation/drainage of hematoma and right hemicolectomy with persistent encephalopathy requiring ventilation in addition to renal insufficiency on CRRT.    Primary team asking for recommendations on adjuvant therapy based on current pathology staging     Pathology large intestine moderate differentiated adenocarcinoma with mucinous features, tumor invades lower one third of the submucosa (pT1, SM 3), clinical stage I, higher risk features of being right-sided plus lymphovascular invasion present however this would not indicate need for adjuvant chemotherapy/treatment.    Typical surveillance includes: Colonoscopy at 1 year after surgery if advanced adenoma repeat in 1 year after that.    Subjective   Pt intubated, primary team requesting re-engagement to determine if adjuvant therapy is required based on path    Objective :  Temp:  [95 °F (35 °C)-98.6 °F (37 °C)] 98.6 °F (37 °C)  HR:  [] 106  BP: ()/(44-61) 107/55  Resp:  [13-45] 23  SpO2:  [85 %-100 %] 98 %  O2 Device: Ventilator  FiO2 (%):  [40] 40      Physical Exam  Deferred patient wasn't in room    Lab Results: I have reviewed the following results:  Lab Results   Component Value Date    K  3.8 03/24/2025     03/24/2025    CO2 25 03/24/2025    BUN 43 (H) 03/24/2025    CREATININE 1.15 03/24/2025    GLUCOSE 180 (H) 03/23/2025    CALCIUM 8.1 (L) 03/24/2025    CORRECTEDCA 8.8 03/23/2025    AST 19 03/23/2025    ALT 4 (L) 03/23/2025    ALKPHOS 60 03/23/2025    EGFR 61 03/24/2025     Lab Results   Component Value Date    WBC 8.97 03/24/2025    HGB 7.1 (L) 03/24/2025    HCT 21.6 (L) 03/24/2025    MCV 92 03/24/2025    PLT 47 (L) 03/24/2025     Lab Results   Component Value Date    NEUTROABS 9.63 (H) 03/22/2025       CT chest abd and pelvis 3/23/25:  IMPRESSION:  1.  Status post partial small bowel resection and right hemicolectomy. There is dilatation of the proximal small bowel from the D4 segment to the level of the surgical anastomosis in the midline abdomen. While findings could represent a postoperative   ileus, a small bowel obstruction at this level is not excluded. No pneumatosis.  2.  Moderate volume ascites with hyperdense appearance along the right paracolic gutter, suspicious for hemoperitoneum. Volume of free fluid has increased from 3/20/25. There is increased fluid and fat stranding around the midline incision with a focal   hyperdensity along the anterior abdominal fascia. This could represent the source of hemorrhage.  3.  Small to moderate bilateral pleural effusions with bibasilar atelectasis. Portions of the right lower lobe consolidation do not demonstrate contrast enhancement, which could represent superimposed infection.    Pathology: 3/16/2025:  Final Diagnosis   A. Large Intestine, Right/Ascending Colon, :  - Moderately differentiated adenocarcinoma with mucinous features ( 2.0 cm), arising in a tubular adenoma.  - Tumor invades into lower one third of submucosa (pT1, Sm3)   - Lymphovascular invasion is present .  - Terminal ileum and omentum with no pathologic abnormality  - All margins are negative for tumor.  - Twenty one lymph nodes, negative for malignancy (0/21).     B.  Small Bowel, NOS :  - Portion of small intestine with vascular congestion, submucosal and mural hemorrhage  and focal acute serositis.  - Negative for carcinoma.   - Histologically viable resection margins.       Iggy Cooper,   PGY-4 Hematology/Oncology Fellow

## 2025-03-24 NOTE — ASSESSMENT & PLAN NOTE
Newly diagnosed colon mass on C-scope on 2/20/25  Pathology - adenocarcinoma  Was to follow colorectal as outpatient  S/p ex lap, partial SBR and R colectomy 3/16/2025, currently on TPN  Continue management per surgical team.  Follow-up pathology report

## 2025-03-24 NOTE — ASSESSMENT & PLAN NOTE
Currently on Cefazolin 2 gm IV q8H, end date 03/27  TTE without vegetation  Management per infectious disease

## 2025-03-24 NOTE — SEDATION DOCUMENTATION
Procedure completed by Dr Carolina. Pt tolerated without issues, VSS. Education provided to pt prior to and throughout procedure, questions answered as offered. Catheter sutured, chlorhexidine dressing to site. Transport to [facility] by IR staff, bedside report given to RN

## 2025-03-24 NOTE — ASSESSMENT & PLAN NOTE
Patient with ORIANA on admission.  Creatinine had worsened and patient is now on CVVH.  Antibiotic dosages adjusted accordingly.  Monitor creatinine.  CVVH per nephrology.

## 2025-03-24 NOTE — ASSESSMENT & PLAN NOTE
Patient had MSSA bacteremia during recent hospitalization.  Source is unclear but likely cutaneous.  His bacteremia cleared rapidly on IV antibiotic.  TTE did not show any vegetation.  Given possible C-spine infection, plan was for long-term IV antibiotic.  Continue high-dose IV cefazolin.  Treat x 6 weeks from clearance of bacteremia as previously planned, through 3/27.  However, patient needs repeat C-spine MRI to confirm resolution/significant improvement of paraspinal infection prior to discontinuation of IV antibiotic.

## 2025-03-24 NOTE — ASSESSMENT & PLAN NOTE
Concern for anoxic brain injury s/p cardiac arrest with new embolic infarcts  Management per neurology

## 2025-03-24 NOTE — ASSESSMENT & PLAN NOTE
Baseline creatinine 0.8-0.9  Creatinine on mission 2.43  Etiology: ATN in setting of prolonged prerenal azotemia from volume depletion and severe anemia  CT abdomen: No hydronephrosis  UA: 2-4 RBC, 2-4 WBC  UPC ratio 3.4 g  Serum/urine immunofixation with no M spike, C3 mildly low at 83, C4 normal  Status post initiation of CVVHD on 03/21 in setting of oliguria and fluid overload  No evidence of renal recovery  Continue CVVHD (seen and examined on CVVHD this morning)  Blood flow rate 250 mL/min  Dialysate flow rate 2500 mL/h  UF: -100 cc/h  Dialysate 4K/3 calcium

## 2025-03-25 NOTE — ASSESSMENT & PLAN NOTE
Concern for anoxic injury s/p cardiac arrest with 12 minute downtime.   EEG without seizure activity.  CTH shows 4mm focus of high attenuation in right frontal lobe which is stable on repeat CTH.   MRI with multiple areas of hypoxia related injury as well as acute embolic strokes.  Hold all sedation, PRN dilaudid for pain management.  Frequent neuro checks.  Maintain normothermia and normoglycemia.   Neurology following, appreciate recommendations.

## 2025-03-25 NOTE — ASSESSMENT & PLAN NOTE
Home regimen: Metoprolol tartrate 25 mg BID, Losartan 25 mg QD for HTN  Consider restarting home metoprolol - remains off vasopressors.  Hold home losartan in the setting of ORIANA.

## 2025-03-25 NOTE — QUICK NOTE
Updated wife post bronch.   Patient stable and tolerated procedure without incident.    AMPARO Tamez

## 2025-03-25 NOTE — ED ATTENDING ATTESTATION
2/28/2025  IKrystal MD, saw and evaluated the patient. I have discussed the patient with the resident/non-physician practitioner and agree with the resident's/non-physician practitioner's findings, Plan of Care, and MDM as documented in the resident's/non-physician practitioner's note, except where noted. All available labs and Radiology studies were reviewed.  I was present for key portions of any procedure(s) performed by the resident/non-physician practitioner and I was immediately available to provide assistance.       At this point I agree with the current assessment done in the Emergency Department.  I have conducted an independent evaluation of this patient a history and physical is as follows:    78 yo male with recently diagnosed colonic mass post colonoscopy presents for evaluation due to symptomatic anemia.  Symptoms concerning for an acute GI bleed in the setting of chronic anticoagulation.Differential diagnoses includes peptic ulcer disease, versus gastritis/gastric ulcer, versus possible AVM, versus colonic mass bleeding.Presentation not consistent with esophageal or gastric variceal bleeding or Boerhaave’s syndrome. Presentation not consistent with other etiologies upper GI bleeding at this time. No red flag features or high risk bleeding. No evidence of hemorrhagic shock.  Patient underwent transfusion and a high-volume CTA which did not demonstrate any active bleeding.  Patient remained hemodynamically stable.  At this time will admit to the hospitalist service for further evaluation and management.  Plan of care was discussed with admitting team.      ED Course         Critical Care Time  Procedures

## 2025-03-25 NOTE — PROCEDURES
Bronchoscopy    Date/Time: 3/25/2025 5:29 PM    Performed by: Gali Jack DO  Authorized by: AMPARO Calvert    Patient location:  Bedside  Other Assisting Provider: Yes (comment) (Dr. Lofton)    Consent:     Consent obtained:  Written (Telephone Consent)    Consent given by:  Spouse  Universal protocol:     Procedure explained and questions answered to patient or proxy's satisfaction: yes      Test results available and properly labeled: yes      Radiology Images displayed and confirmed.  If images not available, report reviewed: yes      Required blood products, implants, devices and special equipment available: yes      Immediately prior to procedure a time out was called: yes    Indications:     Procedure Purpose: diagnostic and therapeutic      Indications: pneumonia/infiltrate and other (comment)      Indications comment:  Hypoxia  Sedation:     Sedation type: 50mcg fentanyl, 4g versed.  Scope passed:      endotracheal tube  Upper Airway:     Trachea: abnormal (comment)      Trachea comment:  Erythematous, patchy , with white patchy slough    Eliza:  Abnormal (comment) (erythematous, patchy with white patchy slough)  Procedure details:     Description:  Lavage performed through R and L mainstem bronchi and captured with lukens trap and sent for culture and yeast.  Post-procedure details:     Chest x-ray performed: yes      Chest x-ray findings:  Pleural effusion unchanged    Patient tolerance of procedure:  Tolerated well, no immediate complications    Incomplete procedure: No

## 2025-03-25 NOTE — ASSESSMENT & PLAN NOTE
Likely in the setting of hemorrhagic shock.  Noted 12 minutes of downtime, received aggressive blood resuscitation and surgical intervention as above.   Now with ongoing encephalopathy concerning for anoxic injury.   Continue close HD and telemetry monitoring.  Trend Hgb as above.

## 2025-03-25 NOTE — ASSESSMENT & PLAN NOTE
Per both patient and patient's wife, patient has been experiencing visual hallucinations that usually occur prior to falling asleep or waking up.  Patient reports that he will occasionally grab for things that are not there, such as a pillow.    Also states that he will occasionally have conversations with his wife when she is not physically in the room.    Patient states he is able to discern when he is having a hallucination and they are not distressing      PLAN:  Delirium precautions.

## 2025-03-25 NOTE — PROGRESS NOTES
Progress Note - Surgery-General   Name: Devin Chaves 79 y.o. male I MRN: [id number]  Unit/Bed#: ICU 10 I Date of Admission: 2/28/2025   Date of Service: 3/25/2025 I Hospital Day: 25    Assessment & Plan  Acute blood loss anemia  Patient sustained cardiac arrest in the setting of an undetectable hemoglobin on 3/15 and was taken emergently to the operating room for exploration in the setting of abdominal free fluid.  Large volume hemoperitoneum was encountered with evidence of a actively bleeding/decompressing mesenteric hematoma; bleeding control was obtained and on 3/15 taken to OR for a segmental small bowel resection was performed and the patient was left in discontinuity given ongoing instability.  S/p SB anastomosis, R hemicolectomy, closure on 3/16.  3/23 ex lap, evacuation of hematoma- To     Got PRBC x 2 yesterday and platelets    Plan  - Continue NG tube to suction/n.p.o.   --Renew TPN  --would hold heparin drip while still needing transfusions  --Neurology recs, nephrology  -Continue CRRT  - Wean vent as tolerated  - Rest of care per ICU  Idiopathic hypotension    CAD (coronary artery disease)    COPD (chronic obstructive pulmonary disease) (HCC)    History of CVA (cerebrovascular accident)    Acute on chronic respiratory failure with hypoxia (HCC)    Atrial fibrillation (HCC)    Urinary retention    MSSA bacteremia    Neck pain    Colonic mass    Renal failure    Fluid overload    Dysphagia    Severe protein-calorie malnutrition (HCC)  Malnutrition Findings:   Adult Malnutrition type: Acute illness  Adult Degree of Malnutrition: Other severe protein calorie malnutrition  Malnutrition Characteristics: Inadequate energy, Fluid accumulation                  360 Statement: related to inadequate energy/protein intake as evidenced by consuming < 50% of energy intake compared to estimated needs for > 5 days and B/L LE +3 edema. Treated with ONS.    BMI Findings:           Body mass index is 30.65 kg/m².      Hallucinations, visual    Hemorrhagic shock (HCC)    Cardiac arrest (HCC)    Hemoperitoneum    Encephalopathy    Cardiac arrest due to other underlying condition (HCC)    Palliative care by specialist    Counseling regarding advance care planning and goals of care    Elevated troponin    Embolic cerebral infarction (HCC)    Volume overload          Subjective   Intubated, sedated    Objective :  Temp:  [97.3 °F (36.3 °C)-98.8 °F (37.1 °C)] 97.3 °F (36.3 °C)  HR:  [] 99  BP: (105-164)/(51-70) 117/58  Resp:  [15-54] 16  SpO2:  [86 %-100 %] 100 %  O2 Device: Ventilator  FiO2 (%):  [20-40] 20    I/O         03/23 0701  03/24 0700 03/24 0701  03/25 0700 03/25 0701  03/26 0700    I.V. (mL/kg) 1446.6 (14.2) 969 (9.5)     Blood 2400 1083.3     NG/      IV Piggyback 731 1391     TPN 1327 1221     Total Intake(mL/kg) 6055.6 (59.4) 4664.3 (45.7)     Urine (mL/kg/hr) 0 (0) 0 (0)     Emesis/NG output 1525 600     Other 2789 5241     Blood 2400      Total Output 6714 5841     Net -658.4 -1176.7                  Lines/Drains/Airways       Active Status       Name Placement date Placement time Site Days    PICC Line 02/26/25 Right Brachial 02/26/25  0548  Brachial  27    CVC Central Lines 03/15/25 Triple 03/15/25  1338  --  9    HD Temporary Double Catheter 03/21/25  1754  Left femoral  3    HD Permanent Double Catheter 03/24/25  1522  Internal jugular  less than 1    ETT  Cuffed 8 mm 03/15/25  1200  -- 9    NG/OG Tube Nasogastric Left nare 03/15/25  1700  Left nare  9                  Physical Exam  Vitals and nursing note reviewed.   Constitutional:       General: He is not in acute distress.     Appearance: He is well-developed. He is ill-appearing.   HENT:      Head: Normocephalic and atraumatic.   Eyes:      Conjunctiva/sclera: Conjunctivae normal.   Cardiovascular:      Rate and Rhythm: Normal rate and regular rhythm.      Heart sounds: No murmur heard.  Pulmonary:      Effort: Pulmonary effort is normal. No  respiratory distress.      Breath sounds: Normal breath sounds.   Abdominal:      General: There is distension.      Palpations: Abdomen is soft.      Tenderness: There is no abdominal tenderness.   Musculoskeletal:         General: No swelling.      Cervical back: Neck supple.   Skin:     General: Skin is warm and dry.      Capillary Refill: Capillary refill takes less than 2 seconds.   Neurological:      Mental Status: He is alert.   Psychiatric:         Mood and Affect: Mood normal.           Lab Results: I have reviewed the following results:  Recent Labs     03/23/25  0419 03/23/25  1049 03/23/25  1932 03/23/25  2337 03/24/25  0330 03/24/25  0531 03/24/25  2158 03/24/25  2351 03/25/25  0440   WBC  --    < > 13.14*  --  8.97  --   --   --  8.29   HGB  --    < > 8.5*  --  7.7*   < > 8.9*  --  8.8*   HCT  --    < > 26.2*  --  23.2*   < > 26.9*  --  27.1*   PLT  --    < > 76*  --  47*  --   --   --  64*   BANDSPCT  --    < > 19*  --  7  --   --   --   --    SODIUM  --    < > 135   < >  --    < >  --    < > 136   K  --    < > 4.6   < >  --    < >  --    < > 4.2   CL  --    < > 105   < >  --    < >  --    < > 103   CO2  --    < > 25   < >  --    < >  --    < > 25   BUN  --    < > 46*   < >  --    < >  --    < > 37*   CREATININE  --    < > 1.47*   < >  --    < >  --    < > 1.11   GLUC  --    < > 179*   < >  --    < >  --    < > 125   CAIONIZED  --    < > 1.13   < >  --    < >  --    < > 1.16   MG  --    < > 2.0   < >  --    < >  --    < > 2.0   PHOS  --    < > 3.8   < >  --    < >  --    < > 2.4   AST  --   --  19  --   --   --   --   --   --    ALT  --   --  4*  --   --   --   --   --   --    ALB  --   --  2.5*  --   --   --   --   --   --    TBILI  --   --  0.56  --   --   --   --   --   --    ALKPHOS  --   --  60  --   --   --   --   --   --    PTT 61*  --   --   --   --   --   --   --   --    INR  --   --   --   --   --   --  1.24*  --   --    LACTICACID  --   --  1.9  --   --   --   --   --   --     < > = values  in this interval not displayed.

## 2025-03-25 NOTE — ASSESSMENT & PLAN NOTE
"Palliative diagnosis: Colon adenocarcinoma, colon mass, cardiac arrest, respiratory failure  PPS: 10%    Education/counseling provided on role/purpose of palliative care; benefits/risks of treatment options by our team reviewed; instructions for management and anticipatory guidance provided; supportive listening and presence provided; provided space for life review and whole person assessment    Psychosocial/Spiritual:    - supported by spouse Francia (\"Ashvin\"),  56 years; they live near [place]   - Also two sons Parmjit and Guille   - 4 yrs in [employer]   - Enjoys working on farm with cows and [employer]   - [employer] kaylee-->family would like extra spiritual support-->spiritual care consult placed    Communicated and coordinated with surgical CC team and RN    #ACP Documentation   - completed Durable Power of  for Health Care, signed/notarized 03/06/2025                      Code Status: Full - Level 1               Decisional apparatus:  Patient is not competent on my exam today.  If competence is lost, patient's substitute decision maker would default to spouse Francia (first), son Devin (alternate)               Advance Directive / Living Will / POLST:  POA document on file  "

## 2025-03-25 NOTE — ASSESSMENT & PLAN NOTE
Patient sustained cardiac arrest in the setting of an undetectable hemoglobin on 3/15 and was taken emergently to the operating room for exploration in the setting of abdominal free fluid.  Large volume hemoperitoneum was encountered with evidence of a actively bleeding/decompressing mesenteric hematoma; bleeding control was obtained and on 3/15 taken to OR for a segmental small bowel resection was performed and the patient was left in discontinuity given ongoing instability.  S/p SB anastomosis, R hemicolectomy, closure on 3/16.  3/23 ex lap, evacuation of hematoma- To     Got PRBC x 2 yesterday and platelets    Plan  - Continue NG tube to suction/n.p.o.   --Renew TPN  --would hold heparin drip while still needing transfusions  --Neurology recs, nephrology  -Continue CRRT  - Wean vent as tolerated  - Rest of care per ICU

## 2025-03-25 NOTE — ASSESSMENT & PLAN NOTE
Hx: Multiple embolic strokes thought to be septic in nature.   Now with new acute embolic strokes noted on MRI.  Restart statin when cleared for PO intake.  Hold heparin in setting of ABD bleeding, restart when cleared from surgery perspective.   Hold ASA, restart as appropriate

## 2025-03-25 NOTE — ASSESSMENT & PLAN NOTE
Creatinine   Date Value Ref Range Status   03/24/2025 1.20 0.60 - 1.30 mg/dL Final     Comment:     Standardized to IDMS reference method   09/25/2022 1 0.6 - 1.2 mg/dL Final      Likely initially in the setting of hemorrhagic shock, now with ATN   Baseline Cr 0.8   Nephrology following, appreciate recommendations  Monitor and replete electrolytes Q6H  Monitor I/O and renal indices   Continues to have low urine output and anasarca with hypotension  Nephro consented family for dialysis, temporary HD line placed 3/21 - tunneled HD cath placed 3/24  Tolerating CVVH, increased filtration to -100, trend labs

## 2025-03-25 NOTE — ASSESSMENT & PLAN NOTE
Emergent OR 3/15 for distended abd post CPR  Found to have hemoperitoneum with hematoma at base of jejunum with active bleed, which was controlled  OR 3/17, stable, small bowel and L hemicolon resected, abd closed.  Taken to OR on 3/23 for ex-lap - intra- abdominal hematoma, no active bleeding.     Plan:  Continue to hold AC.  H/H every 6 hours.  Serial ABD exams.

## 2025-03-25 NOTE — ASSESSMENT & PLAN NOTE
2/20/2025 EGD/colonoscopy showed 2.5 cm ileocecal mass, pathology confirmed adenocarcinoma  Now s/p ex lap SBR and R colon resection on 3/15  Surgery following, appreciate recommendations   Still without return of bowel function since surgical intervention despite aggressive bowel management, including lactulose   Repeat CT A/P +/- chest this AM   Pathology:   3/16: Large Intestine, Right/Ascending Colon - Moderately differentiated adenocarcinoma with mucinous features. Tumor invades into lower one third of submucosa, Lymphovascular invasion is present. Terminal ileum and omentum with no pathologic abnormality. All margins are negative for tumor. Twenty one lymph nodes, negative for malignancy.  3/16: Small Bowel, NOS  - Negative for carcinoma.

## 2025-03-25 NOTE — ASSESSMENT & PLAN NOTE
Baseline creatinine 0.8-0.9  Creatinine on admission 2.43  Etiology: ATN in setting of prolonged prerenal azotemia from volume depletion and severe anemia  CT abdomen: No hydronephrosis  UA: 2-4 RBC, 2-4 WBC  UPC ratio 3.4 g  Serum/urine immunofixation with no M spike, C3 mildly low at 83, C4 normal  Status post initiation of CVVHD on 03/21 in setting of oliguria and fluid overload  No evidence of renal recovery  Status post permacath placement 03/24  Currently on CVVHD, blood flow rate 250 mL/min, dialysate flow rate 2500 mL/h, UF -100 cc/h, dialysate 4K/3 calcium (seen and examined on CVVHD)  Okay to increase ultrafiltration if deemed necessary per ICU team  Currently off vasopressors, plan for intermittent HD tomorrow

## 2025-03-25 NOTE — ASSESSMENT & PLAN NOTE
With known LAD stenosis.   Hold home BB for now in setting of hypotension.  Hold home statin while NPO, restart when cleared for PO intake.   Hold ASA, restart when cleared from surgical standpoint.

## 2025-03-25 NOTE — ASSESSMENT & PLAN NOTE
Acute blood loss on 3/15/25 and received more than 10 units PRBC   S/p emergent ex lap and control of bleeding  Hemoglobin today 8.8  Management per ICU team

## 2025-03-25 NOTE — ASSESSMENT & PLAN NOTE
Without acute exacerbation.  Hold home inhalers while intubated.  Continue scheduled xopenex, atrovent, budesonide nebs.

## 2025-03-25 NOTE — ASSESSMENT & PLAN NOTE
- met with spouse at bedside   - discussed medical update meeting with family last night, all questions/concerns addressed   - plan to remain treatment focused care with no limitations at this time   - update after discussion with CCM team today, updated Code Status to DNAR/DNI, Level 2   - will continue to follow for continued supportive cares as the patient's clinical condition evolves

## 2025-03-25 NOTE — PROGRESS NOTES
Progress Note - Critical Care/ICU   Name: Devin Chaves 79 y.o. male I MRN: [id number]  Unit/Bed#: ICU 10 I Date of Admission: 2/28/2025   Date of Service: 3/25/2025 I Hospital Day: 25      Assessment & Plan  Hemorrhagic shock (HCC)  Code crimson 3/15 for hemorrhagic shock related to mesenteric hematoma s/p SBR and colon resection   S/p blood resuscitation with 12 U PRBC, 6 FFP, 2 Plt, 2 Cryo   Now off pressors and HD stable   Trend Hgb, transfuse for Hgb<7 or active bleeding with HD instability   Maintain MAP>65  Cardiac arrest (HCC)  Likely in the setting of hemorrhagic shock.  Noted 12 minutes of downtime, received aggressive blood resuscitation and surgical intervention as above.   Now with ongoing encephalopathy concerning for anoxic injury.   Continue close HD and telemetry monitoring.  Trend Hgb as above.  Encephalopathy  Concern for anoxic injury s/p cardiac arrest with 12 minute downtime.   EEG without seizure activity.  CTH shows 4mm focus of high attenuation in right frontal lobe which is stable on repeat CTH.   MRI with multiple areas of hypoxia related injury as well as acute embolic strokes.  Hold all sedation, PRN dilaudid for pain management.  Frequent neuro checks.  Maintain normothermia and normoglycemia.   Neurology following, appreciate recommendations.   Embolic cerebral infarction (HCC)  New areas of embolic stroke noted on MRI 3/18.  Neurology following, appreciate recommendations.  Hold heparin gtt in setting of ABD bleeding.   Continue to hold ASA.  Consider restarting statin when cleared for PO.  Frequent neuro checks.  Acute on chronic respiratory failure with hypoxia (HCC)  Remains intubated for airway protection post cardiac arrest and postoperatively with poor neuro exam.   Continue mechanical ventilation ACVC 12/550/40/6, titrate FiO2 for SpO2>88  At baseline requires 2-4L NC.  Fentanyl gtt for sedation + PRNs.  Daily SBT/SAT.  Vent bundle.  Will eventually need trache/PEG.  Renal  failure  Creatinine   Date Value Ref Range Status   03/24/2025 1.20 0.60 - 1.30 mg/dL Final     Comment:     Standardized to IDMS reference method   09/25/2022 1 0.6 - 1.2 mg/dL Final      Likely initially in the setting of hemorrhagic shock, now with ATN   Baseline Cr 0.8   Nephrology following, appreciate recommendations  Monitor and replete electrolytes Q6H  Monitor I/O and renal indices   Continues to have low urine output and anasarca with hypotension  Nephro consented family for dialysis, temporary HD line placed 3/21 - tunneled HD cath placed 3/24  Tolerating CVVH, increased filtration to -100, trend labs   Idiopathic hypotension  Home regimen: Metoprolol tartrate 25 mg BID, Losartan 25 mg QD for HTN  Consider restarting home metoprolol - remains off vasopressors.  Hold home losartan in the setting of ORIANA.  CAD (coronary artery disease)  With known LAD stenosis.   Hold home BB for now in setting of hypotension.  Hold home statin while NPO, restart when cleared for PO intake.   Hold ASA, restart when cleared from surgical standpoint.   COPD (chronic obstructive pulmonary disease) (HCC)  Without acute exacerbation.  Hold home inhalers while intubated.  Continue scheduled xopenex, atrovent, budesonide nebs.   History of CVA (cerebrovascular accident)  Hx: Multiple embolic strokes thought to be septic in nature.   Now with new acute embolic strokes noted on MRI.  Restart statin when cleared for PO intake.  Hold heparin in setting of ABD bleeding, restart when cleared from surgery perspective.   Hold ASA, restart as appropriate   Atrial fibrillation (HCC)  Currently rate controlled in NSR   Consider restarting home metoprolol for rate control.  Hold home eliquis/heparin in setting of ABD bleeding.   Urinary retention  Hold home flomax while NPO.  Continue glass catheter for now.   MSSA bacteremia  Noted on previous admission with likely cutaneous source.   Continue cefazolin through 3/27 to complete course.   ID  following, appreciate recommendations.  Neck pain  MRI of the neck done 2/14/2025 revealed cervical degenerative change with mild canal stenosis and mild to moderate foraminal narrowing.  No cord compression.  Mild nonspecific edema within the right posterior breast dermal musculature of the upper cervicals spine.  Minimal peripheral enhancement is noted  Will need repeat MRI C spine with and without contrast prior to completion of abx - currently holding on this at this time due to ORIANA   Colonic mass  2/20/2025 EGD/colonoscopy showed 2.5 cm ileocecal mass, pathology confirmed adenocarcinoma  Now s/p ex lap SBR and R colon resection on 3/15  Surgery following, appreciate recommendations   Still without return of bowel function since surgical intervention despite aggressive bowel management, including lactulose   Repeat CT A/P +/- chest this AM   Pathology:   3/16: Large Intestine, Right/Ascending Colon - Moderately differentiated adenocarcinoma with mucinous features. Tumor invades into lower one third of submucosa, Lymphovascular invasion is present. Terminal ileum and omentum with no pathologic abnormality. All margins are negative for tumor. Twenty one lymph nodes, negative for malignancy.  3/16: Small Bowel, NOS  - Negative for carcinoma.   Fluid overload  CT A/P shows bilateral pleural effusions L>R, pulmonary vascular congestion     Plan:  Continue mechanical ventilation.  Continue CVVH, net -100mL/hr.  Maintain accurate I&O.  Daily weights.  Dysphagia  Noted prior to intubation and cardiac arrest.  Will likely need trache/PEG.   Severe protein-calorie malnutrition (HCC)  Malnutrition Findings:   Adult Malnutrition type: Acute illness  Adult Degree of Malnutrition: Other severe protein calorie malnutrition  Malnutrition Characteristics: Inadequate energy, Fluid accumulation  360 Statement: related to inadequate energy/protein intake as evidenced by consuming < 50% of energy intake compared to estimated needs  for > 5 days and B/L LE +3 edema. Treated with ONS.  BMI Findings:  Body mass index is 25.09 kg/m².      Plan:  Continue TPN, hold TF until cleared by surgery.    360 Statement: related to inadequate energy/protein intake as evidenced by consuming < 50% of energy intake compared to estimated needs for > 5 days and B/L LE +3 edema. Treated with ONS.    BMI Findings:    Body mass index is 30.65 kg/m².   Acute blood loss anemia  Patient presents with 2 days of chest pain, shortness of breath, fatigue and melena  2/20/2025 EGD/colonoscopy showed 2.5 cm ileocecal mass, pathology confirmed adenocarcinoma.  S/p 3 units packed rbc in ED. S/p 1 unit PRBCs on 3/2  Hemoglobin has been stable.   Patient recent diagnosis of adenocarcinoma, increased risk of stroke.   Updated iron studies 3/2025 improved compared to 2/2025  Rapid response called 10:30A M [id number] for AMS and hypotension  Patient found to be minimally responsive and hypotensive  HGB 5.9 by iSTAT, formal labs show HGB 6.3, significant drop from 8 in AM  Bleeding from colonic mass suspected, per nursing no hematuria hemoptysis melena or other signs of overt bleeding prior to RR  Patient intubated for airway protection  Transported to ICU  MTP called  Code blue called  ROSC achieved shortly after blood products started  Rapidly distending abdomen, suspect intraabdominal bleeding.  3/23: OR, ex-lap, 2L old blood no active bleeding.     Hemoglobin   Date Value Ref Range Status   03/24/2025 8.9 (L) 12.0 - 17.0 g/dL Final     Plan:  Daily CBC  Transfuse for Hgb <7  Protonix 40 mg IV daily  Continue to hold heparin gtt and ASA for now  Heparin gtt ordered but not started prior to rapid  Palliative care consulted prior to ICU admission  MTP [id number]  PRBC 12  FFP 6  Platelets 3  Cryo 2  Whole blood 3  Additional 4 (3/23) + 2 (3/24)  units  Continue monitoring H&H  Maintain hgb > 7  Hgb has been stable since surgery.  Elevated troponin  Last trops down-trending  No EKG  "changes  ECHO showed EF 55%  Continue cardiac monitoring  EKG  Hallucinations, visual  Per both patient and patient's wife, patient has been experiencing visual hallucinations that usually occur prior to falling asleep or waking up.  Patient reports that he will occasionally grab for things that are not there, such as a pillow.    Also states that he will occasionally have conversations with his wife when she is not physically in the room.    Patient states he is able to discern when he is having a hallucination and they are not distressing      PLAN:  Delirium precautions.  Hemoperitoneum  Emergent OR 3/15 for distended abd post CPR  Found to have hemoperitoneum with hematoma at base of jejunum with active bleed, which was controlled  OR 3/17, stable, small bowel and L hemicolon resected, abd closed.  Taken to OR on 3/23 for ex-lap - intra- abdominal hematoma, no active bleeding.     Plan:  Continue to hold AC.  H/H every 6 hours.  Serial ABD exams.   Cardiac arrest due to other underlying condition (HCC)  See \"cardiac arrest\".  Disposition: Critical care    ICU Core Measures     Vented Patient  VAP Bundle  VAP bundle ordered     A: Assess, Prevent, and Manage Pain Has pain been assessed? Yes  Need for changes to pain regimen? No   B: Both Spontaneous Awakening Trials (SATs) and Spontaneous Breathing Trials (SBTs) Plan to perform spontaneous awakening trial today? Yes   Plan to perform spontaneous breathing trial today? Yes   Obvious barriers to extubation? Yes   C: Choice of Sedation RASS Goal: -1 Drowsy  Need for changes to sedation or analgesia regimen? No   D: Delirium CAM-ICU: Unable to perform secondary to Acute cognitive dysfunction   E: Early Mobility  Plan for early mobility? Yes   F: Family Engagement Plan for family engagement today? Yes       Antibiotic Review: Infectious disease consulted    Review of Invasive Devices:      Central access plan: Medications requiring central line HD cath in place.  Plan " continue.    Prophylaxis:  VTE VTE covered by:    None       Stress Ulcer  covered byfamotidine (PEPCID) 20 mg tablet [[id number]] (Long-Term Med), pantoprazole (PROTONIX) injection 40 mg [[id number]]         24 Hour Events : Received 2 units PRBC's yesterday for down-trending hgb, repeat hgb after both transfusion was 8.9. TEG from last night showed the need for platelets, was transfused 1 unit plt. The patient also underwent an IR placed tunneled dialysis catheter yesterday, tolerated well.     Subjective   Review of Systems: Review of Systems   Unable to perform ROS: Intubated     Objective :                   Vitals I/O      Most Recent Min/Max in 24hrs   Temp 97.5 °F (36.4 °C) Temp  Min: 97.5 °F (36.4 °C)  Max: 98.8 °F (37.1 °C)   Pulse 98 Pulse  Min: 96  Max: 114   Resp 15 Resp  Min: 15  Max: 54   /66 BP  Min: 86/50  Max: 164/70   O2 Sat 100 % SpO2  Min: 95 %  Max: 100 %      Intake/Output Summary (Last 24 hours) at 3/25/2025 0256  Last data filed at 3/25/2025 0200  Gross per 24 hour   Intake 4944.33 ml   Output 6232 ml   Net -1287.67 ml       Diet NPO  Adult 3-in-1 TPN (custom base / custom electrolytes)    Invasive Monitoring   Arterial Line  [place] /40  No data recorded   MAP 71 mmHg  No data recorded           Physical Exam   Physical Exam  Skin:     General: Skin is warm.      Capillary Refill: Capillary refill takes less than 2 seconds.   HENT:      Head: Normocephalic and atraumatic.   Cardiovascular:      Rate and Rhythm: Normal rate and regular rhythm.      Pulses:           Radial pulses are 2+ on the right side and 2+ on the left side.        Dorsalis pedis pulses are 2+ on the right side and 2+ on the left side.   Musculoskeletal:      Cervical back: Full passive range of motion without pain, normal range of motion and neck supple.      Right lower le+ Pitting Edema present.      Left lower le+ Pitting Edema present.   Abdominal: General: Abdomen is flat. Bowel sounds are absent.       Palpations: Abdomen is soft.      Tenderness: There is no abdominal tenderness.          Comments: Midline incision - dsg with old dry blood present.   Constitutional:       Appearance: He is overweight.      Interventions: He is sedated, intubated and restrained.   Pulmonary:      Effort: Pulmonary effort is normal. He is intubated.      Breath sounds: Decreased breath sounds present.   Psychiatric:      Comments: ENRIQUE   Neurological:      Mental Status: He is unresponsive.      GCS: GCS eye subscore is 1. GCS verbal subscore is 1. GCS motor subscore is 1.      Comments: GCS 3T   Genitourinary/Anorectal:     Comments: Urethral catheter         Diagnostic Studies        Lab Results: I have reviewed the following results:     Medications:  Scheduled PRN   [Held by provider] ascorbic acid, 250 mg, Daily  [Held by provider] atorvastatin, 40 mg, Daily With Dinner  bisacodyl, 10 mg, Daily  budesonide, 0.5 mg, Q12H  ceFAZolin, 2,000 mg, Q8H  chlorhexidine, 15 mL, Q12H GALE  [Held by provider] Cholecalciferol, 2,000 Units, Daily  [Held by provider] cyanocobalamin, 100 mcg, Daily  docusate, 100 mg, BID  [Held by provider] ferrous sulfate, 325 mg, Daily With Breakfast  [Held by provider] fluticasone, 1 puff, Daily  [Held by provider] folic acid, 1 mg, Daily  [Held by provider] hydroxychloroquine, 400 mg, Daily With Breakfast  insulin lispro, 2-12 Units, Q6H GALE  ipratropium, 0.5 mg, TID  lactulose, 30 g, TID  levalbuterol, 1.25 mg, TID  lidocaine, 3 patch, Daily  [Held by provider] metoprolol tartrate, 25 mg, Q12H GALE  HITESH ANTIFUNGAL, , BID  oxyCODONE, 5 mg, Q6H  pantoprazole, 40 mg, Q24H GALE  polyethylene glycol, 17 g, Daily  senna, 17.6 mg, HS  [Held by provider] tamsulosin, 0.4 mg, Daily With Dinner  [Held by provider] umeclidinium-vilanterol, 1 puff, Daily      fentaNYL, 50 mcg, Q2H PRN  oxyCODONE, 2.5 mg, Q3H PRN   Or  oxyCODONE, 5 mg, Q3H PRN       Continuous    Adult 3-in-1 TPN (custom base / custom electrolytes),    dextrose, 75 mL/hr, Last Rate: 75 mL/hr (03/24/25 2140)  fentaNYL, 50 mcg/hr, Last Rate: 50 mcg/hr (03/24/25 1108)  norepinephrine, 1-30 mcg/min, Last Rate: Stopped (03/24/25 1400)  NxStage K 4/Ca 3, 20,000 mL, Last Rate: 20,000 mL (03/22/25 1113)         Labs:   CBC    Recent Labs     03/23/25  1932 03/24/25  0330 03/24/25  1204 03/24/25  1623 03/24/25  2158   WBC 13.14* 8.97  --   --   --    HGB 8.5* 7.7*   < > 7.6* 8.9*   HCT 26.2* 23.2*   < > 22.7* 26.9*   PLT 76* 47*  --   --   --    BANDSPCT 19* 7  --   --   --     < > = values in this interval not displayed.     BMP    Recent Labs     03/24/25 1623 03/24/25  2351   SODIUM 135 136   K 4.4 4.1    105   CO2 24 25   AGAP 4 6   BUN 44* 39*   CREATININE 1.23 1.20   CALCIUM 7.9* 8.1*       Coags    Recent Labs     03/23/25  0419 03/24/25 2158   INR  --  1.24*   PTT 61*  --         Additional Electrolytes  Recent Labs     03/24/25  1623 03/24/25  2351   MG 2.0 1.9   PHOS 2.5 2.2*   CAIONIZED 1.14 1.14          Blood Gas    Recent Labs     03/23/25 2108   PHART 7.323*   [id number] 42.7   PO2ART 84.9   AVV0EDL 21.7*   BEART -4.1   SOURCE Line, Arterial     Recent Labs     03/23/25 2108 03/24/25  0318   PHVEN  --  7.354   [id number]  --  45.6   PO2VEN  --  46.3*   [id number]  --  24.8   BEVEN  --  -0.8   [id number]  --  80.5*   SOURCE Line, Arterial  --     LFTs  Recent Labs     03/23/25 1932   ALT 4*   AST 19   ALKPHOS 60   ALB 2.5*   TBILI 0.56       Infectious  No recent results  Glucose  Recent Labs     03/24/25  0531 03/24/25  1204 03/24/25  1623 03/24/25  2351   GLUC 168* 157* 149* 125

## 2025-03-25 NOTE — PROGRESS NOTES
Progress Note - Infectious Disease   Name: Devin Chaves 79 y.o. male I MRN: [id number]  Unit/Bed#: ICU 10 I Date of Admission: 2/28/2025   Date of Service: 3/25/2025 I Hospital Day: 25    Assessment & Plan  Neck pain  Patient developed acute neck pain with MSSA bacteremia during recent hospitalization.  CRP was highly elevated.  C-spine MRI showed possible C-spine and paraspinal infection.  Patient has no neurologic deficit.  His neck pain is finally improving.  However, given paraspinal infection on the initial C-spine MRI, we should repeat C-spine MRI with contrast when creatinine is improved to confirm resolution/improvement of paraspinal infection.  However, without any neurological deficit, it would be fine to postpone MRI until renal function is further improved, to decrease risk of contrast-induced ORIANA.  Patient should get MRI done prior to completion of IV antibiotic course below.  Antibiotic plan as in below.  Monitor neck pain.  Recommend repeat C-spine MRI with and without contrast prior to completion of IV antibiotic course.  MSSA bacteremia  Patient had MSSA bacteremia during recent hospitalization.  Source is unclear but likely cutaneous.  His bacteremia cleared rapidly on IV antibiotic.  TTE did not show any vegetation.  Given possible C-spine infection, plan was for long-term IV antibiotic.  Continue high-dose IV cefazolin.  Treat x 6 weeks from clearance of bacteremia as previously planned, through 3/27.  However, patient needs repeat C-spine MRI to confirm resolution/significant improvement of paraspinal infection prior to discontinuation of IV antibiotic.  Acute blood loss anemia  Patient developed acute bleeding from rectal mass, resulting in rapid response, and subsequent cardiac arrest.  He is status post transfusion.  He is also status post small bowel resection and right hemicolectomy.  He is status post exploratory laparotomy and evacuation of intra-abdominal hematoma over the  weekend.  Management per primary service.  Renal failure  Patient with ORIANA on admission.  Creatinine had worsened and patient is now on CVVH.  Antibiotic dosages adjusted accordingly.  Monitor creatinine.  CVVH per nephrology.  Colonic mass  Patient has recently diagnosed adenocarcinoma of the colon.  He now status post bowel resection for bleeding from adenocarcinoma.  No plan for chemotherapy yet.  Colorectal surgery follow-up.  Acute on chronic respiratory failure with hypoxia (HCC)  Patient is now intubated after cardiac arrest over the weekend.  Cardiac arrest due to other underlying condition (HCC)  Patient is status post arrest, successfully resuscitated.  Encephalopathy  Patient remains obtunded postcardiac arrest.  Concern is for anoxic encephalopathy.  Head CT is without acute changes.  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  According to the patient's family, he has some response now.  However, patient remains without any response for me.  Monitor mental status.        Antibiotics:  Cefazolin  Last negative blood culture 2/14    Subjective   Patient remains intubated in ICU.  No response to verbal or tactile stimuli.  Temperature stays down.  No chills.    Objective :  Temp:  [97.3 °F (36.3 °C)-98.8 °F (37.1 °C)] 97.3 °F (36.3 °C)  HR:  [] 101  BP: (105-164)/(51-70) 118/56  Resp:  [14-54] 14  SpO2:  [86 %-100 %] 100 %  O2 Device: Ventilator  FiO2 (%):  [20-40] 20    Physical Exam:     General: No response to verbal or tactile stimuli.  Comfortable.  Nontoxic.   Neck:  Supple. No mass.  No lymphadenopathy.   Lungs: Expansion symmetric, diffuse rhonchi, no rales, no wheezing, respirations unlabored.   Heart:  Regular rate and rhythm, S1 and S2 normal, no murmur.   Abdomen: Soft, stable distention, difficult to assess tenderness, bowel sounds decreased.   Extremities: Stable leg edema. No erythema/warmth. No draining ulcer. Nontender  to palpation.   Skin:  No rash.   Neuro: Not assessable.        Lab Results: I have reviewed the following results:  Results from last 7 days   Lab Units 03/25/25  0440 03/24/25  2158 03/24/25  1623 03/24/25  1204 03/24/25  0330 03/23/25  1932   WBC Thousand/uL 8.29  --   --   --  8.97 13.14*   HEMOGLOBIN g/dL 8.8* 8.9* 7.6*   < > 7.7* 8.5*   PLATELETS Thousands/uL 64*  --   --   --  47* 76*    < > = values in this interval not displayed.     Results from last 7 days   Lab Units 03/25/25 0440 03/24/25  2351 03/24/25  1623 03/23/25  2337 03/23/25  1932 03/23/25  1811 03/19/25  0921 03/19/25  0451   SODIUM mmol/L 136 136 135   < > 135  --    < >  --    POTASSIUM mmol/L 4.2 4.1 4.4   < > 4.6  --    < >  --    CHLORIDE mmol/L 103 105 107   < > 105  --    < >  --    CO2 mmol/L 25 25 24   < > 25  --    < >  --    CO2, I-STAT mmol/L  --   --   --   --   --  23  --   --    BUN mg/dL 37* 39* 44*   < > 46*  --    < >  --    CREATININE mg/dL 1.11 1.20 1.23   < > 1.47*  --    < >  --    EGFR ml/min/1.73sq m 63 57 56   < > 45  --    < >  --    GLUCOSE, ISTAT mg/dl  --   --   --   --   --  180*  --   --    CALCIUM mg/dL 8.2* 8.1* 7.9*   < > 7.6*  --    < >  --    AST U/L  --   --   --   --  19  --   --  20   ALT U/L  --   --   --   --  4*  --   --  <3*   ALK PHOS U/L  --   --   --   --  60  --   --  50   ALBUMIN g/dL  --   --   --   --  2.5*  --   --  2.1*    < > = values in this interval not displayed.

## 2025-03-25 NOTE — ASSESSMENT & PLAN NOTE
Currently rate controlled in NSR   Consider restarting home metoprolol for rate control.  Hold home eliquis/heparin in setting of ABD bleeding.

## 2025-03-25 NOTE — PLAN OF CARE
Problem: Prexisting or High Potential for Compromised Skin Integrity  Goal: Skin integrity is maintained or improved  Description: INTERVENTIONS:  - Identify patients at risk for skin breakdown  - Assess and monitor skin integrity  - Assess and monitor nutrition and hydration status  - Monitor labs   - Assess for incontinence   - Turn and reposition patient  - Assist with mobility/ambulation  - Relieve pressure over bony prominences  - Avoid friction and shearing  - Provide appropriate hygiene as needed including keeping skin clean and dry  - Evaluate need for skin moisturizer/barrier cream  - Collaborate with interdisciplinary team   - Patient/family teaching  - Consider wound care consult   Outcome: Progressing     Problem: Potential for Falls  Goal: Patient will remain free of falls  Description: INTERVENTIONS:  - Educate patient/family on patient safety including physical limitations  - Instruct patient to call for assistance with activity   - Consult OT/PT to assist with strengthening/mobility   - Keep Call bell within reach  - Keep bed low and locked with side rails adjusted as appropriate  - Keep care items and personal belongings within reach  - Initiate and maintain comfort rounds  - Make Fall Risk Sign visible to staff  - Offer Toileting every  Hours, in advance of need  - Initiate/Maintain alarm  - Obtain necessary fall risk management equipme  - Apply yellow socks and bracelet for high fall risk patients  - Consider moving patient to room near nurses station  Outcome: Progressing     Problem: Nutrition/Hydration-ADULT  Goal: Nutrient/Hydration intake appropriate for improving, restoring or maintaining nutritional needs  Description: Monitor and assess patient's nutrition/hydration status for malnutrition. Collaborate with interdisciplinary team and initiate plan and interventions as ordered.  Monitor patient's weight and dietary intake as ordered or per policy. Utilize nutrition screening tool and  intervene as necessary. Determine patient's food preferences and provide high-protein, high-caloric foods as appropriate.     INTERVENTIONS:  - Monitor oral intake, urinary output, labs, and treatment plans  - Assess nutrition and hydration status and recommend course of action  - Evaluate amount of meals eaten  - Assist patient with eating if necessary   - Allow adequate time for meals  - Recommend/ encourage appropriate diets, oral nutritional supplements, and vitamin/mineral supplements  - Order, calculate, and assess calorie counts as needed  - Recommend, monitor, and adjust tube feedings and TPN/PPN based on assessed needs  - Assess need for intravenous fluids  - Provide specific nutrition/hydration education as appropriate  - Include patient/family/caregiver in decisions related to nutrition  Outcome: Progressing     Problem: GASTROINTESTINAL - ADULT  Goal: Minimal or absence of nausea and/or vomiting  Description: INTERVENTIONS:  - Administer IV fluids if ordered to ensure adequate hydration  - Maintain NPO status until nausea and vomiting are resolved  - Nasogastric tube if ordered  - Administer ordered antiemetic medications as needed  - Provide nonpharmacologic comfort measures as appropriate  - Advance diet as tolerated, if ordered  - Consider nutrition services referral to assist patient with adequate nutrition and appropriate food choices  Outcome: Progressing  Goal: Maintains or returns to baseline bowel function  Description: INTERVENTIONS:  - Assess bowel function  - Encourage oral fluids to ensure adequate hydration  - Administer IV fluids if ordered to ensure adequate hydration  - Administer ordered medications as needed  - Encourage mobilization and activity  - Consider nutritional services referral to assist patient with adequate nutrition and appropriate food choices  Outcome: Progressing  Goal: Maintains adequate nutritional intake  Description: INTERVENTIONS:  - Monitor percentage of each meal  consumed  - Identify factors contributing to decreased intake, treat as appropriate  - Assist with meals as needed  - Monitor I&O, weight, and lab values if indicated  - Obtain nutrition services referral as needed  Outcome: Progressing  Goal: Establish and maintain optimal ostomy function  Description: INTERVENTIONS:  - Assess bowel function  - Encourage oral fluids to ensure adequate hydration  - Administer IV fluids if ordered to ensure adequate hydration   - Administer ordered medications as needed  - Encourage mobilization and activity  - Nutrition services referral to assist patient with appropriate food choices  - Assess stoma site  - Consider wound care consult   Outcome: Progressing  Goal: Oral mucous membranes remain intact  Description: INTERVENTIONS  - Assess oral mucosa and hygiene practices  - Implement preventative oral hygiene regimen  - Implement oral medicated treatments as ordered  - Initiate Nutrition services referral as needed  Outcome: Progressing     Problem: HEMATOLOGIC - ADULT  Goal: Maintains hematologic stability  Description: INTERVENTIONS  - Assess for signs and symptoms of bleeding or hemorrhage  - Monitor labs  - Administer supportive blood products/factors as ordered and appropriate  Outcome: Progressing     Problem: SAFETY,RESTRAINT: NV/NON-SELF DESTRUCTIVE BEHAVIOR  Goal: Remains free of harm/injury (restraint for non violent/non self-detsructive behavior)  Description: INTERVENTIONS:  - Instruct patient/family regarding restraint use   - Assess and monitor physiologic and psychological status   - Provide interventions and comfort measures to meet assessed patient needs   - Identify and implement measures to help patient regain control  - Assess readiness for release of restraint   Outcome: Progressing  Goal: Returns to optimal restraint-free functioning  Description: INTERVENTIONS:  - Assess the patient's behavior and symptoms that indicate continued need for restraint  - Identify  and implement measures to help patient regain control  - Assess readiness for release of restraint   Outcome: Progressing

## 2025-03-25 NOTE — ASSESSMENT & PLAN NOTE
CT A/P shows bilateral pleural effusions L>R, pulmonary vascular congestion     Plan:  Continue mechanical ventilation.  Continue CVVH, net -100mL/hr.  Maintain accurate I&O.  Daily weights.

## 2025-03-25 NOTE — ASSESSMENT & PLAN NOTE
Remains intubated for airway protection post cardiac arrest and postoperatively with poor neuro exam.   Continue mechanical ventilation ACVC 12/550/40/6, titrate FiO2 for SpO2>88  At baseline requires 2-4L NC.  Fentanyl gtt for sedation + PRNs.  Daily SBT/SAT.  Vent bundle.  Will eventually need trache/PEG.

## 2025-03-25 NOTE — ASSESSMENT & PLAN NOTE
Malnutrition Findings:   Adult Malnutrition type: Acute illness  Adult Degree of Malnutrition: Other severe protein calorie malnutrition  Malnutrition Characteristics: Inadequate energy, Fluid accumulation  360 Statement: related to inadequate energy/protein intake as evidenced by consuming < 50% of energy intake compared to estimated needs for > 5 days and B/L LE +3 edema. Treated with ONS.  BMI Findings:  Body mass index is 25.09 kg/m².      Plan:  Continue TPN, hold TF until cleared by surgery.    360 Statement: related to inadequate energy/protein intake as evidenced by consuming < 50% of energy intake compared to estimated needs for > 5 days and B/L LE +3 edema. Treated with ONS.    BMI Findings:    Body mass index is 30.65 kg/m².

## 2025-03-25 NOTE — ASSESSMENT & PLAN NOTE
Refractory volume overload, status post trial of IV diuretics  Volume management with ultrafiltration on renal replacement therapy as above

## 2025-03-25 NOTE — ASSESSMENT & PLAN NOTE
Patient presents with 2 days of chest pain, shortness of breath, fatigue and melena  2/20/2025 EGD/colonoscopy showed 2.5 cm ileocecal mass, pathology confirmed adenocarcinoma.  S/p 3 units packed rbc in ED. S/p 1 unit PRBCs on 3/2  Hemoglobin has been stable.   Patient recent diagnosis of adenocarcinoma, increased risk of stroke.   Updated iron studies 3/2025 improved compared to 2/2025  Rapid response called 10:30A M [id number] for AMS and hypotension  Patient found to be minimally responsive and hypotensive  HGB 5.9 by iSTAT, formal labs show HGB 6.3, significant drop from 8 in AM  Bleeding from colonic mass suspected, per nursing no hematuria hemoptysis melena or other signs of overt bleeding prior to RR  Patient intubated for airway protection  Transported to ICU  MTP called  Code blue called  ROSC achieved shortly after blood products started  Rapidly distending abdomen, suspect intraabdominal bleeding.  3/23: OR, ex-lap, 2L old blood no active bleeding.     Hemoglobin   Date Value Ref Range Status   03/24/2025 8.9 (L) 12.0 - 17.0 g/dL Final     Plan:  Daily CBC  Transfuse for Hgb <7  Protonix 40 mg IV daily  Continue to hold heparin gtt and ASA for now  Heparin gtt ordered but not started prior to rapid  Palliative care consulted prior to ICU admission  MTP [id number]  PRBC 12  FFP 6  Platelets 3  Cryo 2  Whole blood 3  Additional 4 (3/23) + 2 (3/24)  units  Continue monitoring H&H  Maintain hgb > 7  Hgb has been stable since surgery.

## 2025-03-25 NOTE — ASSESSMENT & PLAN NOTE
New areas of embolic stroke noted on MRI 3/18.  Neurology following, appreciate recommendations.  Hold heparin gtt in setting of ABD bleeding.   Continue to hold ASA.  Consider restarting statin when cleared for PO.  Frequent neuro checks.

## 2025-03-25 NOTE — PROGRESS NOTES
NEPHROLOGY HOSPITAL PROGRESS NOTE   Devin Chaves 79 y.o. male MRN: [id number]  Unit/Bed#: ICU 10 Encounter: [id number]  Reason for Consult: ORIANA    Assessment & Plan  Renal failure  Baseline creatinine 0.8-0.9  Creatinine on admission 2.43  Etiology: ATN in setting of prolonged prerenal azotemia from volume depletion and severe anemia  CT abdomen: No hydronephrosis  UA: 2-4 RBC, 2-4 WBC  UPC ratio 3.4 g  Serum/urine immunofixation with no M spike, C3 mildly low at 83, C4 normal  Status post initiation of CVVHD on 03/21 in setting of oliguria and fluid overload  No evidence of renal recovery  Status post permacath placement 03/24  Currently on CVVHD, blood flow rate 250 mL/min, dialysate flow rate 2500 mL/h, UF -100 cc/h, dialysate 4K/3 calcium (seen and examined on CVVHD)  Okay to increase ultrafiltration if deemed necessary per ICU team  Currently off vasopressors, plan for intermittent HD tomorrow  Volume overload  Refractory volume overload, status post trial of IV diuretics  Volume management with ultrafiltration on renal replacement therapy as above  Idiopathic hypotension  Echo 3/17/2025: EF 55%, unable to assess diastolic function  Home Rx: Metoprolol 25 mg twice daily  Vasopressors per ICU  Acute blood loss anemia  Acute blood loss on 3/15/25 and received more than 10 units PRBC   S/p emergent ex lap and control of bleeding  Hemoglobin today 8.8  Management per ICU team  MSSA bacteremia  Currently on Cefazolin 2 gm IV q8H, end date 03/27  TTE without vegetation  Management per infectious disease  Urinary retention  Seen by urology this admission  Catheter in place  Colonic mass  Newly diagnosed colon mass on C-scope on 2/20/25  Pathology - adenocarcinoma  Was to follow colorectal as outpatient  S/p ex lap, partial SBR and R colectomy 3/16/2025, currently on TPN  Continue management per surgical team.  Follow-up pathology report  Cardiac arrest (HCC)  S/p cardiac arrest with ROSC 3/15/2025  In the setting  of ABLA with undetectable Hgb and hemoperitoneum, s/p emergent ex lap, segmental SBR 3/15  Echo 3/17/2025: EF 55%  Management per primary team  Hemoperitoneum  Status post emergent exploratory laparotomy  Management per primary team  Atrial fibrillation (HCC)  Management per primary team  Encephalopathy  Concern for anoxic brain injury s/p cardiac arrest with new embolic infarcts  Management per neurology  Embolic cerebral infarction (HCC)  Management per neurology      I have reviewed the nephrology recommendations including continuation of CVVHD today and to increase ultrafiltration as needed for volume management, with ICU team, and we are in agreement with renal plan including the information outlined above.    SUBJECTIVE / 24H INTERVAL HISTORY:  Remains anuric.  Ultrafiltration on CVVHD 5200 cc.  Net -1 L.  FiO2 40%.  Off vasopressors.    OBJECTIVE:  Current Weight: Weight - Scale: 102 kg (225 lb 15.5 oz)  Vitals:    03/25/25 0200 03/25/25 0230 03/25/25 0245 03/25/25 0300   BP: 135/66 128/59 122/59 123/58   Pulse: 98 99 100 99   Resp: 15 15 15 17   Temp: 97.5 °F (36.4 °C) (!) 97.3 °F (36.3 °C) (!) 97.3 °F (36.3 °C) (!) 97.3 °F (36.3 °C)   TempSrc:       SpO2: 100% 99% 100% 100%   Weight:       Height:           Intake/Output Summary (Last 24 hours) at 3/25/2025 0639  Last data filed at 3/25/2025 0600  Gross per 24 hour   Intake 4844.33 ml   Output 5908 ml   Net -1063.67 ml     Review of Systems   Unable to perform ROS: Intubated     Physical Exam  Constitutional:       Appearance: He is ill-appearing.   HENT:      Mouth/Throat:      Comments: ET tube present  Cardiovascular:      Pulses: Normal pulses.      Heart sounds: Normal heart sounds.      Comments: Right IJ permacath present  Pulmonary:      Effort: Pulmonary effort is normal.      Breath sounds: Normal breath sounds.   Musculoskeletal:      Right lower leg: Edema present.      Left lower leg: Edema present.         Medications:    Current  Facility-Administered Medications:     Adult 3-in-1 TPN (custom base / custom electrolytes), , Intravenous, Continuous TPN, AMPARO Calvert    Adult 3-in-1 TPN (custom base / custom electrolytes), , Intravenous, Continuous TPN, AMPARO Calvert    [Held by provider] ascorbic acid (VITAMIN C) tablet 250 mg, 250 mg, Oral, Daily, Yobany Mott MD, 250 mg at 03/14/25 0848    [Held by provider] atorvastatin (LIPITOR) tablet 40 mg, 40 mg, Oral, Daily With Dinner, Yobany Mott MD, 40 mg at 03/13/25 1626    bisacodyl (DULCOLAX) rectal suppository 10 mg, 10 mg, Rectal, Daily, Lucia Chavira PA-C, 10 mg at 03/24/25 0820    budesonide (PULMICORT) inhalation solution 0.5 mg, 0.5 mg, Nebulization, Q12H, Lucia Chavira PA-C, 0.5 mg at 03/24/25 1936    ceFAZolin (ANCEF) IVPB (premix in dextrose) 2,000 mg 50 mL, 2,000 mg, Intravenous, Q8H, Theron Curry MD, Last Rate: 100 mL/hr at 03/25/25 0538, 2,000 mg at 03/25/25 0538    chlorhexidine (PERIDEX) 0.12 % oral rinse 15 mL, 15 mL, Mouth/Throat, Q12H [facility], Lucia Chavira PA-C, 15 mL at 03/24/25 2133    [Held by provider] Cholecalciferol (VITAMIN D3) tablet 2,000 Units, 2,000 Units, Oral, Daily, Yobany Mott MD, 2,000 Units at 03/14/25 0848    [Held by provider] cyanocobalamin (VITAMIN B-12) tablet 100 mcg, 100 mcg, Oral, Daily, Yobany Mott MD, 100 mcg at 03/14/25 0848    dextrose infusion 10 %, 75 mL/hr, Intravenous, Continuous, AMPARO Garcia, Last Rate: 75 mL/hr at 03/24/25 2140, 75 mL/hr at 03/24/25 2140    docusate (COLACE) oral liquid 100 mg, 100 mg, Oral, BID, Lucia Chavira PA-C, 100 mg at 03/24/25 1713    fentaNYL 1000 mcg in sodium chloride 0.9% 100mL infusion, 50 mcg/hr, Intravenous, Continuous, Lucia Chavira PA-C, Last Rate: 5 mL/hr at 03/25/25 0623, 50 mcg/hr at 03/25/25 0623    fentaNYL injection 50 mcg, 50 mcg, Intravenous, Q2H PRN, Lucia Chavira PA-C, 50 mcg at 03/25/25 0255    [Held by provider]  ferrous sulfate tablet 325 mg, 325 mg, Oral, Daily With Breakfast, Yobany Mott MD    [Held by provider] fluticasone (ARNUITY ELLIPTA) 100 MCG/ACT inhaler 1 puff, 1 puff, Inhalation, Daily, Yobany Mott MD, 1 puff at 03/15/25 0925    [Held by provider] folic acid (FOLVITE) tablet 1 mg, 1 mg, Oral, Daily, Yobany Mott MD, 1 mg at 03/14/25 0848    [Held by provider] hydroxychloroquine (PLAQUENIL) tablet 400 mg, 400 mg, Oral, Daily With Breakfast, Yobany Mott MD, 400 mg at 03/14/25 0848    insulin lispro (HumALOG/ADMELOG) 100 units/mL subcutaneous injection 2-12 Units, 2-12 Units, Subcutaneous, Q6H GALE, 2 Units at 03/24/25 1207 **AND** Fingerstick Glucose (POCT), , , Q6H, Lucia Chavira PA-C    ipratropium (ATROVENT) 0.02 % inhalation solution 0.5 mg, 0.5 mg, Nebulization, TID, Lucia Chavira PA-C, 0.5 mg at 03/24/25 1936    lactulose (CHRONULAC) oral solution 30 g, 30 g, Oral, TID, Lucia Chavira PA-C, 30 g at 03/24/25 2133    levalbuterol (XOPENEX) inhalation solution 1.25 mg, 1.25 mg, Nebulization, TID, Lucia Chavira PA-C, 1.25 mg at 03/24/25 1936    lidocaine (LIDODERM) 5 % patch 3 patch, 3 patch, Topical, Daily, Lucia Chavira PA-C, 3 patch at 03/22/25 0831    [Held by provider] metoprolol tartrate (LOPRESSOR) tablet 25 mg, 25 mg, Oral, Q12H GALE, Yobany Mott MD, 25 mg at 03/14/25 0848    moisture barrier miconazole 2% cream (aka HITESH MOISTURE BARRIER ANTIFUNGAL CREAM), , Topical, BID, Lucia Chavira PA-C, Given at 03/24/25 1710    NOREPINEPHRINE 4 MG  ML NSS (CMPD ORDER) infusion, 1-30 mcg/min, Intravenous, Titrated, Lucia Chavira PA-C, Held at 03/24/25 1400    NxStage K 4/Ca 3 dialysis solution (RFP-401) 20,000 mL, 20,000 mL, Dialysis, Continuous, Bj Ayoub MD, Last Rate: 0 mL/hr at 03/22/25 1113, 20,000 mL at 03/25/25 0428    oxyCODONE (ROXICODONE) oral solution 2.5 mg, 2.5 mg, Oral, Q3H PRN **OR** oxyCODONE (ROXICODONE) oral solution 5 mg, 5  mg, Oral, Q3H PRN, Lucia Chavira PA-C    oxyCODONE (ROXICODONE) oral solution 5 mg, 5 mg, Oral, Q6H, Lucia Chavira PA-C, 5 mg at 03/25/25 0545    pantoprazole (PROTONIX) injection 40 mg, 40 mg, Intravenous, Q24H GALE, Lucia Chavira PA-C, 40 mg at 03/24/25 0820    polyethylene glycol (MIRALAX) packet 17 g, 17 g, Oral, Daily, Lucia Chavira PA-C, 17 g at 03/24/25 0820    senna oral syrup 17.6 mg, 17.6 mg, Oral, HS, Lucia Chavira PA-C, 17.6 mg at 03/24/25 2133    sodium phosphate 6 mmol in sodium chloride 0.9 % 100 mL Infusion, 6 mmol, Intravenous, Once, Garcia Epperson DO, Last Rate: 50 mL/hr at 03/25/25 0554, 6 mmol at 03/25/25 0554    [Held by provider] tamsulosin (FLOMAX) capsule 0.4 mg, 0.4 mg, Oral, Daily With Dinner, Yobany Mott MD, 0.4 mg at 03/13/25 1626    [Held by provider] umeclidinium-vilanterol 62.5-25 mcg/actuation inhaler 1 puff, 1 puff, Inhalation, Daily, Yobany Mott MD, 1 puff at 03/15/25 0925    Laboratory Results:  Results from last 7 days   Lab Units 03/25/25  0440 03/24/25  2351 03/24/25  2158 03/24/25  1623 03/24/25  1204 03/24/25  0531 03/24/25  0330 03/23/25  2337 03/23/25  1932 03/23/25  1811 03/23/25  1709 03/23/25  1526 03/23/25  1049 03/23/25  0412 03/22/25  2148 03/22/25  1617 03/22/25  0946 03/22/25  0425   WBC Thousand/uL 8.29  --   --   --   --   --  8.97  --  13.14*  --   --  21.99*  --  13.60*  --  10.13  --  12.07*   HEMOGLOBIN g/dL 8.8*  --  8.9* 7.6* 7.1*  --  7.7*  --  8.5*  --    < > 6.8*  --  7.5*  --  7.9*  --  8.2*   I STAT HEMOGLOBIN g/dl  --   --   --   --   --   --   --   --   --  6.8*  --   --   --   --   --   --   --   --    HEMATOCRIT % 27.1*  --  26.9* 22.7* 21.6*  --  23.2*  --  26.2*  --    < > 21.6*  --  23.9*  --  25.6*  --  25.7*   HEMATOCRIT, ISTAT %  --   --   --   --   --   --   --   --   --  20*  --   --   --   --   --   --   --   --    PLATELETS Thousands/uL 64*  --   --   --   --   --  47*  --  76*  --   --  142*  --  135*  --   "95*  --  122*   POTASSIUM mmol/L 4.2 4.1  --  4.4 3.8 4.1  --  4.0 4.6  --   --  4.3   < > 4.2   < > 4.5   < > 4.2   CHLORIDE mmol/L 103 105  --  107 105 105  --  105 105  --   --  104   < > 106   < > 106   < > 107   CO2 mmol/L 25 25  --  24 25 25  --  24 25  --   --  23   < > 24   < > 24   < > 22   CO2, I-STAT mmol/L  --   --   --   --   --   --   --   --   --  23  --   --   --   --   --   --   --   --    BUN mg/dL 37* 39*  --  44* 43* 44*  --  46* 46*  --   --  46*   < > 52*   < > 63*   < > 86*   CREATININE mg/dL 1.11 1.20  --  1.23 1.15 1.23  --  1.39* 1.47*  --   --  1.38*   < > 1.56*   < > 2.07*   < > 2.82*   CALCIUM mg/dL 8.2* 8.1*  --  7.9* 8.1* 8.1*  --  7.7* 7.6*  --   --  8.1*   < > 8.1*   < > 7.9*   < > 7.7*   MAGNESIUM mg/dL 2.0 1.9  --  2.0 1.9 2.0  --  1.9 2.0  --    < >  --    < > 1.9   < > 1.9   < > 1.9   PHOSPHORUS mg/dL 2.4 2.2*  --  2.5 1.9* 2.1*  --  2.4 3.8  --    < >  --    < > 2.1*   < > 2.3   < > 3.2   GLUCOSE, ISTAT mg/dl  --   --   --   --   --   --   --   --   --  180*  --   --   --   --   --   --   --   --     < > = values in this interval not displayed.       Portions of the record may have been created with voice recognition software. Occasional wrong word or \"sound a like\" substitutions may have occurred due to the inherent limitations of voice recognition software. Read the chart carefully and recognize, using context, where substitutions have occurred.If you have any questions, please contact the dictating provider.    "

## 2025-03-25 NOTE — ASSESSMENT & PLAN NOTE
Patient remains obtunded postcardiac arrest.  Concern is for anoxic encephalopathy.  Head CT is without acute changes.  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  According to the patient's family, he has some response now.  However, patient remains without any response for me.  Monitor mental status.

## 2025-03-26 NOTE — PROGRESS NOTES
Progress Note - Surgery-General   Name: Devin Chaves 79 y.o. male I MRN: [id number]  Unit/Bed#: ICU 10 I Date of Admission: 2/28/2025   Date of Service: 3/26/2025 I Hospital Day: 26    Assessment & Plan  Acute blood loss anemia  Patient sustained cardiac arrest in the setting of an undetectable hemoglobin on 3/15 and was taken emergently to the operating room for exploration in the setting of abdominal free fluid.  Large volume hemoperitoneum was encountered with evidence of a actively bleeding/decompressing mesenteric hematoma; bleeding control was obtained and on 3/15 taken to OR for a segmental small bowel resection was performed and the patient was left in discontinuity given ongoing instability.  S/p SB anastomosis, R hemicolectomy, closure on 3/16.  3/23 ex lap, evacuation of hematoma- To     Got PRBC x 2 yesterday and platelets    Plan  - Continue NG tube to suction/n.p.o.   --Renew TPN  -- Monitor hemoglobin closely in the setting of bloody NG tube output and heparin drip  --Neurology recs, nephrology  -Continue CRRT  - Wean vent as tolerated  - Rest of care per ICU  -Tentative plan for trach/PEG on 3/27  Idiopathic hypotension    CAD (coronary artery disease)    COPD (chronic obstructive pulmonary disease) (HCC)    History of CVA (cerebrovascular accident)    Acute on chronic respiratory failure with hypoxia (HCC)    Atrial fibrillation (HCC)    Urinary retention    MSSA bacteremia    Neck pain    Colonic mass    Renal failure    Fluid overload    Dysphagia    Severe protein-calorie malnutrition (HCC)  Malnutrition Findings:   Adult Malnutrition type: Acute illness  Adult Degree of Malnutrition: Other severe protein calorie malnutrition  Malnutrition Characteristics: Inadequate energy, Fluid accumulation                  360 Statement: related to inadequate energy/protein intake as evidenced by consuming < 50% of energy intake compared to estimated needs for > 5 days and B/L LE +3 edema. Treated with  ONS.    BMI Findings:           Body mass index is 30.65 kg/m².     Hallucinations, visual    Hemorrhagic shock (HCC)    Cardiac arrest (HCC)    Hemoperitoneum    Encephalopathy    Cardiac arrest due to other underlying condition (HCC)    Palliative care by specialist    Counseling regarding advance care planning and goals of care    Elevated troponin    Embolic cerebral infarction (HCC)    Volume overload          Subjective   Intubated, sedated    Objective :  Temp:  [96.4 °F (35.8 °C)-97.3 °F (36.3 °C)] 97 °F (36.1 °C)  HR:  [105-112] 108  BP: ()/(31-85) 94/49  Resp:  [17-54] 27  SpO2:  [90 %-100 %] 98 %  O2 Device: Ventilator  FiO2 (%):  [20-21] 20    I/O         03/23 0701  03/24 0700 03/24 0701  03/25 0700 03/25 0701 03/26 0700    I.V. (mL/kg) 1446.6 (14.2) 969 (9.5)     Blood 2400 1083.3     NG/      IV Piggyback 731 1391     TPN 1327 1221     Total Intake(mL/kg) 6055.6 (59.4) 4664.3 (45.7)     Urine (mL/kg/hr) 0 (0) 0 (0)     Emesis/NG output 1525 600     Other 2789 5241     Blood 2400      Total Output 6714 5841     Net -658.4 -1176.7                  Lines/Drains/Airways       Active Status       Name Placement date Placement time Site Days    PICC Line 02/26/25 Right Brachial 02/26/25  0548  Brachial  28    CVC Central Lines 03/15/25 Triple 03/15/25  1338  --  10    HD Permanent Double Catheter 03/24/25  1522  Internal jugular  1    ETT  Cuffed 8 mm 03/15/25  1200  -- 10    NG/OG Tube Nasogastric Left nare 03/15/25  1700  Left nare  10                  Physical Exam  General: Intubated and sedated  Skin: Warm, dry, anicteric  HEENT: ET tube present  CV: RRR  Pulm: Mechanically ventilated  Abd: Distended and tympanic; midline dressing with serosanguineous saturation which was exchanged, staples in place with minimal serosanguineous drainage expressed from the wound.  Neuro: Sedated    Lab Results: I have reviewed the following results:  Recent Labs     03/23/25  1932 03/23/25  4543  03/25/25  1444 03/25/25  1447 03/26/25  0433 03/26/25  0500 03/26/25  0753 03/26/25  0833   WBC 13.14*   < >  --    < > 10.01  --   --   --    HGB 8.5*   < >  --    < > 10.1*  --   --   --    HCT 26.2*   < >  --    < > 30.7*  --   --   --    PLT 76*   < >  --    < > 72*  --   --   --    BANDSPCT 19*   < >  --   --  9*  --   --   --    SODIUM 135   < >  --    < > 135  --   --   --    K 4.6   < >  --    < > 4.1  --   --   --       < >  --    < > 104  --   --   --    CO2 25   < >  --    < > 25  --   --   --    BUN 46*   < >  --    < > 29*  --   --   --    CREATININE 1.47*   < >  --    < > 0.92  --   --   --    GLUC 179*   < >  --    < > 160*  --   --   --    CAIONIZED 1.13   < >  --    < > 1.18  --   --   --    MG 2.0   < >  --    < > 1.9  --   --   --    PHOS 3.8   < >  --    < > 2.1*  --   --   --    AST 19  --   --   --   --   --   --   --    ALT 4*  --   --   --   --   --   --   --    ALB 2.5*  --   --   --   --   --   --   --    TBILI 0.56  --   --   --   --   --   --   --    ALKPHOS 60  --   --   --   --   --   --   --    PTT  --   --  45*   < >  --   --   --  79*   INR  --    < > 1.20*  --   --   --   --   --    LACTICACID 1.9  --   --    < >  --    < > 2.4*  --     < > = values in this interval not displayed.

## 2025-03-26 NOTE — ASSESSMENT & PLAN NOTE
Hold home flomax while NPO.  D/c during OR on 3/23  Continue bladder scan  Urinary retention protocol

## 2025-03-26 NOTE — PROGRESS NOTES
Progress Note - Critical Care/ICU   Name: Devin Chaves 79 y.o. male I MRN: [id number]  Unit/Bed#: ICU 10 I Date of Admission: 2/28/2025   Date of Service: 3/26/2025 I Hospital Day: 26      Assessment & Plan  Hemorrhagic shock (HCC)  Code crimson 3/15 for hemorrhagic shock related to mesenteric hematoma s/p SBR and colon resection   S/p blood resuscitation with 12 U PRBC, 6 FFP, 2 Plt, 2 Cryo   Now off pressors and HD stable   Trend Hgb, transfuse for Hgb<7 or active bleeding with HD instability   Maintain MAP>65  Cardiac arrest (HCC)  Likely in the setting of hemorrhagic shock.  Noted 12 minutes of downtime, received aggressive blood resuscitation and surgical intervention as above.   Now with ongoing encephalopathy concerning for anoxic injury.   Continue close HD and telemetry monitoring.  Trend Hgb as above.  Encephalopathy  Concern for anoxic injury s/p cardiac arrest with 12 minute downtime.   EEG without seizure activity.  CTH shows 4mm focus of high attenuation in right frontal lobe which is stable on repeat CTH.   MRI with multiple areas of hypoxia related injury as well as acute embolic strokes.  Hold all sedation, PRN dilaudid for pain management.  Frequent neuro checks.  Maintain normothermia and normoglycemia.   Neurology following, appreciate recommendations.   Embolic cerebral infarction (HCC)  New areas of embolic stroke noted on MRI 3/18.  Neurology following, appreciate recommendations.  Hold heparin gtt in setting of ABD bleeding.   Continue to hold ASA.  Consider restarting statin when cleared for PO.  Frequent neuro checks.  Acute on chronic respiratory failure with hypoxia (HCC)  Remains intubated for airway protection post cardiac arrest and postoperatively with poor neuro exam.   Continue mechanical ventilation ACVC 12/550/40/6, titrate FiO2 for SpO2>88  At baseline requires 2-4L NC.  Fentanyl gtt for sedation + PRNs.  Daily SBT/SAT.  Vent bundle.  Will eventually need trache/PEG.  Renal  failure  Creatinine   Date Value Ref Range Status   03/25/2025 1.00 0.60 - 1.30 mg/dL Final     Comment:     Standardized to IDMS reference method   09/25/2022 1 0.6 - 1.2 mg/dL Final      Likely initially in the setting of hemorrhagic shock, now with ATN   Baseline Cr 0.8   Nephrology following, appreciate recommendations  Monitor and replete electrolytes Q6H  Monitor I/O and renal indices   Continues to have low urine output and anasarca with hypotension  Nephro consented family for dialysis, temporary HD line placed 3/21 - tunneled HD cath placed 3/24  Tolerating CVVH, increased filtration to -100, trend labs   Idiopathic hypotension  Home regimen: Metoprolol tartrate 25 mg BID, Losartan 25 mg QD for HTN  Consider restarting home metoprolol - remains off vasopressors.  Hold home losartan in the setting of ORIANA.  CAD (coronary artery disease)  With known LAD stenosis.   Hold home BB for now in setting of hypotension.  Hold home statin while NPO, restart when cleared for PO intake.   Hold ASA, restart when cleared from surgical standpoint.   COPD (chronic obstructive pulmonary disease) (HCC)  Without acute exacerbation.  Hold home inhalers while intubated.  Continue scheduled xopenex, atrovent, budesonide nebs.   History of CVA (cerebrovascular accident)  Hx: Multiple embolic strokes thought to be septic in nature.   Now with new acute embolic strokes noted on MRI.  Restart statin when cleared for PO intake.  Hold heparin in setting of ABD bleeding, restart when cleared from surgery perspective.   Hold ASA, restart as appropriate   Atrial fibrillation (HCC)  Currently rate controlled in NSR   Consider restarting home metoprolol for rate control.  Hold home eliquis/heparin in setting of ABD bleeding.   Urinary retention  Hold home flomax while NPO.  D/c during OR on 3/23  Continue bladder scan  Urinary retention protocol  MSSA bacteremia  Noted on previous admission with likely cutaneous source.   Continue cefazolin  through 3/27 to complete course.   ID following, appreciate recommendations.  Neck pain  MRI of the neck done 2/14/2025 revealed cervical degenerative change with mild canal stenosis and mild to moderate foraminal narrowing.  No cord compression.  Mild nonspecific edema within the right posterior breast dermal musculature of the upper cervicals spine.  Minimal peripheral enhancement is noted  Will need repeat MRI C spine with and without contrast prior to completion of abx - currently holding on this at this time due to ORIANA   Colonic mass  2/20/2025 EGD/colonoscopy showed 2.5 cm ileocecal mass, pathology confirmed adenocarcinoma  Now s/p ex lap SBR and R colon resection on 3/15  Surgery following, appreciate recommendations   Still without return of bowel function since surgical intervention despite aggressive bowel management, including lactulose   Repeat CT A/P +/- chest this AM   Pathology:   3/16: Large Intestine, Right/Ascending Colon - Moderately differentiated adenocarcinoma with mucinous features. Tumor invades into lower one third of submucosa, Lymphovascular invasion is present. Terminal ileum and omentum with no pathologic abnormality. All margins are negative for tumor. Twenty one lymph nodes, negative for malignancy.  3/16: Small Bowel, NOS  - Negative for carcinoma.   Fluid overload  CT A/P shows bilateral pleural effusions L>R, pulmonary vascular congestion     Plan:  Continue mechanical ventilation.  Continue CVVH, net -100mL/hr.  Maintain accurate I&O.  Daily weights.  Dysphagia  Noted prior to intubation and cardiac arrest.  Will likely need trache/PEG.   Severe protein-calorie malnutrition (HCC)  Malnutrition Findings:   Adult Malnutrition type: Acute illness  Adult Degree of Malnutrition: Other severe protein calorie malnutrition  Malnutrition Characteristics: Inadequate energy, Fluid accumulation  360 Statement: related to inadequate energy/protein intake as evidenced by consuming < 50% of  energy intake compared to estimated needs for > 5 days and B/L LE +3 edema. Treated with ONS.  BMI Findings:  Body mass index is 25.09 kg/m².      Plan:  Continue TPN, hold TF until cleared by surgery.    360 Statement: related to inadequate energy/protein intake as evidenced by consuming < 50% of energy intake compared to estimated needs for > 5 days and B/L LE +3 edema. Treated with ONS.    BMI Findings:    Body mass index is 30.65 kg/m².   Acute blood loss anemia  Patient presents with 2 days of chest pain, shortness of breath, fatigue and melena  2/20/2025 EGD/colonoscopy showed 2.5 cm ileocecal mass, pathology confirmed adenocarcinoma.  S/p 3 units packed rbc in ED. S/p 1 unit PRBCs on 3/2  Hemoglobin has been stable.   Patient recent diagnosis of adenocarcinoma, increased risk of stroke.   Updated iron studies 3/2025 improved compared to 2/2025  Rapid response called 10:30A M [id number] for AMS and hypotension  Patient found to be minimally responsive and hypotensive  HGB 5.9 by iSTAT, formal labs show HGB 6.3, significant drop from 8 in AM  Bleeding from colonic mass suspected, per nursing no hematuria hemoptysis melena or other signs of overt bleeding prior to RR  Patient intubated for airway protection  Transported to ICU  MTP called  Code blue called  ROSC achieved shortly after blood products started  Rapidly distending abdomen, suspect intraabdominal bleeding.  3/23: OR, ex-lap, 2L old blood no active bleeding.     Hemoglobin   Date Value Ref Range Status   03/25/2025 9.8 (L) 12.0 - 17.0 g/dL Final     Plan:  Daily CBC  Transfuse for Hgb <7  Protonix 40 mg IV daily  Continue to hold heparin gtt and ASA for now  Heparin gtt ordered but not started prior to rapid  Palliative care consulted prior to ICU admission  MTP [id number]  PRBC 12  FFP 6  Platelets 3  Cryo 2  Whole blood 3  Additional 4 (3/23) + 2 (3/24)  units  Continue monitoring H&H  Maintain hgb > 7  Hgb has been stable since surgery.  Elevated  "troponin  Last trops down-trending  No EKG changes  ECHO showed EF 55%  Continue cardiac monitoring  EKG  Hallucinations, visual  Per both patient and patient's wife, patient has been experiencing visual hallucinations that usually occur prior to falling asleep or waking up.  Patient reports that he will occasionally grab for things that are not there, such as a pillow.    Also states that he will occasionally have conversations with his wife when she is not physically in the room.    Patient states he is able to discern when he is having a hallucination and they are not distressing      PLAN:  Delirium precautions.  Hemoperitoneum  Emergent OR 3/15 for distended abd post CPR  Found to have hemoperitoneum with hematoma at base of jejunum with active bleed, which was controlled  OR 3/17, stable, small bowel and L hemicolon resected, abd closed.  Taken to OR on 3/23 for ex-lap - intra- abdominal hematoma, no active bleeding.     Plan:  Continue to hold AC.  H/H every 6 hours.  Serial ABD exams.   Cardiac arrest due to other underlying condition (HCC)  See \"cardiac arrest\".  Disposition: Critical care    ICU Core Measures     Vented Patient  VAP Bundle  VAP bundle ordered     A: Assess, Prevent, and Manage Pain Has pain been assessed? Yes  Need for changes to pain regimen? No   B: Both Spontaneous Awakening Trials (SATs) and Spontaneous Breathing Trials (SBTs) Plan to perform spontaneous awakening trial today? Yes   Plan to perform spontaneous breathing trial today? Yes   Obvious barriers to extubation? Yes   C: Choice of Sedation RASS Goal: -1 Drowsy or 0 Alert and Calm  Need for changes to sedation or analgesia regimen? No   D: Delirium CAM-ICU: Negative   E: Early Mobility  Plan for early mobility? Yes   F: Family Engagement Plan for family engagement today? Yes       Antibiotic Review: continue Cefazolin per ID for MSSA bacteremia    Review of Invasive Devices:      Central access plan: HD cath in place.  Plan HD " if patient is stable, otherwise continue CRRT      Prophylaxis:  VTE VTE covered by:  heparin (porcine), Intravenous, 12 Units/kg/hr at 03/25/25 2305       Stress Ulcer  covered byfamotidine (PEPCID) 20 mg tablet [[id number]] (Long-Term Med), pantoprazole (PROTONIX) injection 40 mg [[id number]]         24 Hour Events : became hypotensive overnight, map 52, got albumin without improvement. Levo started    Subjective   Review of Systems: See HPI for Review of Systems    Objective :                   Vitals I/O      Most Recent Min/Max in 24hrs   Temp (!) 97.2 °F (36.2 °C) Temp  Min: 96.4 °F (35.8 °C)  Max: 97.5 °F (36.4 °C)   Pulse (!) 108 Pulse  Min: 99  Max: 112   Resp (!) 24 Resp  Min: 12  Max: 46   /55 BP  Min: 74/36  Max: 139/62   O2 Sat 97 % SpO2  Min: 90 %  Max: 100 %      Intake/Output Summary (Last 24 hours) at 3/26/2025 0351  Last data filed at 3/26/2025 0300  Gross per 24 hour   Intake 4099 ml   Output 6635 ml   Net -2536 ml       Diet NPO  Adult 3-in-1 TPN (custom base / custom electrolytes)    Invasive Monitoring           Physical Exam   Physical Exam  Vitals and nursing note reviewed.   Eyes:      Comments: Pupils 2 mm, sluggishly reactive   Skin:     General: Skin is warm and dry.      Capillary Refill: Capillary refill takes less than 2 seconds.   HENT:      Head: Normocephalic. No laceration.      Right Ear: No drainage.      Left Ear: No drainage.      Nose: Nasogastric tube present. No congestion.      Mouth/Throat:      Mouth: Mucous membranes are dry.   Cardiovascular:      Rate and Rhythm: Regular rhythm. Tachycardia present.   Musculoskeletal:         General: Swelling (diffuse edema) present. Normal range of motion.      Right lower leg: 3+ Edema present.      Left lower leg: 3+ Edema present.   Abdominal:      Palpations: Abdomen is soft.      Tenderness: There is no guarding.      Comments: Midline surgical incision. Dressing clean, dry and intact   Constitutional:       Appearance: He  is well-developed. He is ill-appearing (chronically).      Interventions: He is intubated.   Pulmonary:      Effort: No respiratory distress. He is intubated.      Comments: Ventilated breath sounds  Neurological:      Comments: Intubated, grimaces to pain w/o withdrawal, cough/gag/corneal reflexes intact          Diagnostic Studies        Lab Results: I have reviewed the following results:     Medications:  Scheduled PRN   acetaminophen, 1,000 mg, Q6H GALE  [Held by provider] ascorbic acid, 250 mg, Daily  [Held by provider] atorvastatin, 40 mg, Daily With Dinner  bisacodyl, 10 mg, Daily  budesonide, 0.5 mg, Q12H  ceFAZolin, 2,000 mg, Q8H  chlorhexidine, 15 mL, Q12H GALE  [Held by provider] Cholecalciferol, 2,000 Units, Daily  [Held by provider] cyanocobalamin, 100 mcg, Daily  docusate, 100 mg, BID  [Held by provider] ferrous sulfate, 325 mg, Daily With Breakfast  [Held by provider] folic acid, 1 mg, Daily  glycerin (adult), 1 suppository, HS  [Held by provider] hydroxychloroquine, 400 mg, Daily With Breakfast  insulin lispro, 2-12 Units, Q6H GALE  ipratropium, 0.5 mg, TID  lactulose, 30 g, TID  levalbuterol, 1.25 mg, TID  lidocaine, 3 patch, Daily  [Held by provider] metoprolol tartrate, 25 mg, Q12H GALE  HITESH ANTIFUNGAL, , BID  oxyCODONE, 7.5 mg, Q6H  pantoprazole, 40 mg, Q24H GALE  polyethylene glycol, 17 g, Daily  senna, 17.6 mg, HS  sodium chloride, 4 mL, TID      fentaNYL, 50 mcg, Q2H PRN  oxyCODONE, 2.5 mg, Q3H PRN   Or  oxyCODONE, 5 mg, Q3H PRN       Continuous    Adult 3-in-1 TPN (custom base / custom electrolytes), , Last Rate: 87.4 mL/hr at 03/25/25 2129  fentaNYL, 50 mcg/hr, Last Rate: 50 mcg/hr (03/26/25 0231)  heparin (porcine), 3-24 Units/kg/hr (Order-Specific), Last Rate: 12 Units/kg/hr (03/25/25 2305)  norepinephrine, 1-30 mcg/min, Last Rate: 5 mcg/min (03/26/25 0340)  NxStage K 4/Ca 3, 20,000 mL, Last Rate: 20,000 mL (03/22/25 1113)         Labs:   CBC    Recent Labs     03/25/25  1109 03/25/25  1447  03/25/25  2332   WBC 7.49 7.73  --    HGB 9.0* 9.3* 9.8*   HCT 27.8* 28.6* 30.4*   PLT 64* 70*  --    BANDSPCT 19*  --   --      BMP    Recent Labs     03/25/25 1656 03/25/25 2332   SODIUM 133* 134*   K 4.0 4.0    103   CO2 23 24   AGAP 6 7   BUN 30* 30*   CREATININE 1.02 1.00   CALCIUM 8.2* 8.3*       Coags    Recent Labs     03/24/25  2158 03/25/25  1444 03/25/25  1937   INR 1.24* 1.20*  --    PTT  --  45* 123*        Additional Electrolytes  Recent Labs     03/25/25 1656 03/25/25 2332   MG 2.0 2.0   PHOS 2.3 2.4   CAIONIZED 1.15 1.16          Blood Gas    No recent results  Recent Labs     03/25/25 2152   PHVEN 7.328   TXD5QPT 46.0   PO2VEN 32.9*   [id number] 23.6*   BEVEN -2.4   [id number] 63.9    LFTs  No recent results    Infectious  No recent results  Glucose  Recent Labs     03/25/25  0440 03/25/25  1109 03/25/25  1656 03/25/25  2332   GLUC 125 128 201* 145*

## 2025-03-26 NOTE — ASSESSMENT & PLAN NOTE
Patient with ORIANA on admission.  Creatinine had worsened and patient is now on CVVH.  Antibiotic dosages adjusted accordingly.  Monitor creatinine.  CVVH per nephrology.   color consistent with ethnicity/race

## 2025-03-26 NOTE — ASSESSMENT & PLAN NOTE
Patient developed acute neck pain with MSSA bacteremia during recent hospitalization.  CRP was highly elevated.  C-spine MRI showed possible C-spine and paraspinal infection.  Patient has no neurologic deficit.  His neck pain is finally improving.  However, given paraspinal infection on the initial C-spine MRI, we should repeat C-spine MRI with contrast when creatinine is improved to confirm resolution/improvement of paraspinal infection.  However, without any neurological deficit, it would be fine to postpone MRI until renal function is further improved, to decrease risk of contrast-induced ORIANA.  Patient needs repeat C-spine MRI to confirm resolution of paraspinal infection prior to completion of IV antibiotic course below.  Antibiotic plan as in below.  Monitor neck pain.  Patient needs repeat C-spine MRI with and without contrast prior to completion of IV antibiotic course.

## 2025-03-26 NOTE — ASSESSMENT & PLAN NOTE
Patient presents with 2 days of chest pain, shortness of breath, fatigue and melena  2/20/2025 EGD/colonoscopy showed 2.5 cm ileocecal mass, pathology confirmed adenocarcinoma.  S/p 3 units packed rbc in ED. S/p 1 unit PRBCs on 3/2  Hemoglobin has been stable.   Patient recent diagnosis of adenocarcinoma, increased risk of stroke.   Updated iron studies 3/2025 improved compared to 2/2025  Rapid response called 10:30A M [id number] for AMS and hypotension  Patient found to be minimally responsive and hypotensive  HGB 5.9 by iSTAT, formal labs show HGB 6.3, significant drop from 8 in AM  Bleeding from colonic mass suspected, per nursing no hematuria hemoptysis melena or other signs of overt bleeding prior to RR  Patient intubated for airway protection  Transported to ICU  MTP called  Code blue called  ROSC achieved shortly after blood products started  Rapidly distending abdomen, suspect intraabdominal bleeding.  3/23: OR, ex-lap, 2L old blood no active bleeding.     Hemoglobin   Date Value Ref Range Status   03/25/2025 9.8 (L) 12.0 - 17.0 g/dL Final     Plan:  Daily CBC  Transfuse for Hgb <7  Protonix 40 mg IV daily  Continue to hold heparin gtt and ASA for now  Heparin gtt ordered but not started prior to rapid  Palliative care consulted prior to ICU admission  MTP [id number]  PRBC 12  FFP 6  Platelets 3  Cryo 2  Whole blood 3  Additional 4 (3/23) + 2 (3/24)  units  Continue monitoring H&H  Maintain hgb > 7  Hgb has been stable since surgery.

## 2025-03-26 NOTE — ASSESSMENT & PLAN NOTE
Patient sustained cardiac arrest in the setting of an undetectable hemoglobin on 3/15 and was taken emergently to the operating room for exploration in the setting of abdominal free fluid.  Large volume hemoperitoneum was encountered with evidence of a actively bleeding/decompressing mesenteric hematoma; bleeding control was obtained and on 3/15 taken to OR for a segmental small bowel resection was performed and the patient was left in discontinuity given ongoing instability.  S/p SB anastomosis, R hemicolectomy, closure on 3/16.  3/23 ex lap, evacuation of hematoma- To     Got PRBC x 2 yesterday and platelets    Plan  - Continue NG tube to suction/n.p.o.   --Renew TPN  -- Monitor hemoglobin closely in the setting of bloody NG tube output and heparin drip  --Neurology recs, nephrology  -Continue CRRT  - Wean vent as tolerated  - Rest of care per ICU  -Tentative plan for trach/PEG on 3/27

## 2025-03-26 NOTE — PROGRESS NOTES
Devin Chaves is a 79 y.o. male who is currently ordered Vancomycin IV with management by the Pharmacy Consult service.  Relevant clinical data and objective / subjective history reviewed.  Vancomycin Assessment:  Indication and Goal AUC/Trough: Bacteremia (goal -600, trough >10)  Clinical Status: critically ill  Micro:   3/25 Bronch: 4+ GNR, 3+ GNR, 2+ GNR  3/25 Fungal (bronch): pending  Renal Function:  SCr: 0.81 mg/dL  CrCl: 94.8 mL/min  Renal replacement: CVVH D  Days of Therapy: 1  Current Dose: 1750 mg IV loading dose  Vancomycin Plan:  New Dosin mg IV Q12H while on CRRT  Next Level: 3/28 at 0330 true trough  Renal Function Monitoring: Daily BMP and UOP  Pharmacy will continue to follow closely for s/sx of nephrotoxicity, infusion reactions and appropriateness of therapy.  BMP and CBC will be ordered per protocol. We will continue to follow the patient’s culture results and clinical progress daily.    Tova Buckley, Pharmacist   16

## 2025-03-26 NOTE — PROGRESS NOTES
Progress Note - Infectious Disease   Name: Devin Chaves 79 y.o. male I MRN: [id number]  Unit/Bed#: ICU 10 I Date of Admission: 2/28/2025   Date of Service: 3/26/2025 I Hospital Day: 26    Assessment & Plan  Neck pain  Patient developed acute neck pain with MSSA bacteremia during recent hospitalization.  CRP was highly elevated.  C-spine MRI showed possible C-spine and paraspinal infection.  Patient has no neurologic deficit.  His neck pain is finally improving.  However, given paraspinal infection on the initial C-spine MRI, we should repeat C-spine MRI with contrast when creatinine is improved to confirm resolution/improvement of paraspinal infection.  However, without any neurological deficit, it would be fine to postpone MRI until renal function is further improved, to decrease risk of contrast-induced ORIANA.  Patient needs repeat C-spine MRI to confirm resolution of paraspinal infection prior to completion of IV antibiotic course below.  Antibiotic plan as in below.  Monitor neck pain.  Patient needs repeat C-spine MRI with and without contrast prior to completion of IV antibiotic course.  MSSA bacteremia  Patient had MSSA bacteremia during recent hospitalization.  Source is unclear but likely cutaneous.  His bacteremia cleared rapidly on IV antibiotic.  TTE did not show any vegetation.  Given possible C-spine infection, plan was for long-term IV antibiotic.  Continue high-dose IV cefazolin.  Treat x 6 weeks from clearance of bacteremia as previously planned, through 3/27.  However, patient needs repeat C-spine MRI to confirm resolution/significant improvement of paraspinal infection prior to discontinuation of IV antibiotic.  Acute blood loss anemia  Patient developed acute bleeding from rectal mass, resulting in rapid response, and subsequent cardiac arrest.  He is status post transfusion.  He is also status post small bowel resection and right hemicolectomy.  He is status post exploratory laparotomy and  evacuation of intra-abdominal hematoma over the weekend.  Management per primary service.  Renal failure  Patient with ORIANA on admission.  Creatinine had worsened and patient is now on CVVH.  Antibiotic dosages adjusted accordingly.  Monitor creatinine.  CVVH per nephrology.  Colonic mass  Patient has recently diagnosed adenocarcinoma of the colon.  He now status post bowel resection for bleeding from adenocarcinoma.  No plan for chemotherapy yet.  Colorectal surgery follow-up.  Acute on chronic respiratory failure with hypoxia (HCC)  Patient is now intubated after cardiac arrest over the weekend.  Cardiac arrest due to other underlying condition (HCC)  Patient is status post arrest, successfully resuscitated.  Encephalopathy  Patient remains obtunded postcardiac arrest.  Concern is for anoxic encephalopathy.  Head CT is without acute changes.  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 Patient remains without any response for me.  Monitor mental status.    I have discussed the above management plan in detail with the primary service.     Antibiotics:  Cefazolin  Last negative blood culture 2/14    Subjective   Patient remains obtunded on ventilator.  Temperature stays down.    Objective :  Temp:  [96.4 °F (35.8 °C)-97.3 °F (36.3 °C)] 97 °F (36.1 °C)  HR:  [105-112] 108  BP: ()/(31-85) 94/49  Resp:  [12-54] 27  SpO2:  [90 %-100 %] 98 %  O2 Device: Ventilator  FiO2 (%):  [20-21] 20    Physical Exam:     General: No response to verbal or tactile stimuli.  Comfortable.  Nontoxic.   Neck:  Supple. No mass.  No lymphadenopathy.   Lungs: Expansion symmetric, stable mild diffuse rhonchi, no rales, no wheezing, respirations unlabored.   Heart:  Regular rate and rhythm, S1 and S2 normal, no murmur.   Abdomen: Soft, nondistended, non-tender, bowel sounds active all four quadrants, no masses, no organomegaly.   Extremities: Stable leg edema. No  erythema/warmth. No draining ulcer. Nontender to palpation.   Skin:  No rash.   Neuro: Not assessable.        Lab Results: I have reviewed the following results:  Results from last 7 days   Lab Units 03/26/25  0433 03/25/25  2332 03/25/25  1447 03/25/25  1109   WBC Thousand/uL 10.01  --  7.73 7.49   HEMOGLOBIN g/dL 10.1* 9.8* 9.3* 9.0*   PLATELETS Thousands/uL 72*  --  70* 64*     Results from last 7 days   Lab Units 03/26/25  0433 03/25/25  2332 03/25/25  1656 03/23/25  2337 03/23/25  1932 03/23/25  1811   SODIUM mmol/L 135 134* 133*   < > 135  --    POTASSIUM mmol/L 4.1 4.0 4.0   < > 4.6  --    CHLORIDE mmol/L 104 103 104   < > 105  --    CO2 mmol/L 25 24 23   < > 25  --    CO2, I-STAT mmol/L  --   --   --   --   --  23   BUN mg/dL 29* 30* 30*   < > 46*  --    CREATININE mg/dL 0.92 1.00 1.02   < > 1.47*  --    EGFR ml/min/1.73sq m 79 72 70   < > 45  --    GLUCOSE, ISTAT mg/dl  --   --   --   --   --  180*   CALCIUM mg/dL 8.2* 8.3* 8.2*   < > 7.6*  --    AST U/L  --   --   --   --  19  --    ALT U/L  --   --   --   --  4*  --    ALK PHOS U/L  --   --   --   --  60  --    ALBUMIN g/dL  --   --   --   --  2.5*  --     < > = values in this interval not displayed.     Results from last 7 days   Lab Units 03/25/25  1548   GRAM STAIN RESULT  3+ Gram negative rods*  2+ Gram positive rods*  Rare Disintegrating polys*

## 2025-03-26 NOTE — PROGRESS NOTES
Progress Note - Trauma   Name: Devin Chaves 79 y.o. male I MRN: [id number]  Unit/Bed#: ICU 10 I Date of Admission: 2/28/2025   Date of Service: 3/26/2025 I Hospital Day: 26       Wife at bedside. Bedside ICU nurse also present during this discussion.     Provided an update on Mr. Chaves clinical condition. We discussed that vasopressors were started last night, with increasing needs this morning.  There is a new bandemia for which antibiotics are broadened. Cultures pending. A CT c/a/p is in progress. I discussed that his clinical condition is worsening, and trach and PEG at the moment is not advised.  She expresses hope that he will get better and that she trusts the team will help him through this.  All questions and concerns addressed to her expressed satisfaction.  Emotional support provided.

## 2025-03-26 NOTE — ASSESSMENT & PLAN NOTE
Noted on previous admission with likely cutaneous source.   Continue cefazolin through 3/27 to complete course.   ID following, appreciate recommendations.

## 2025-03-26 NOTE — PROGRESS NOTES
NEPHROLOGY HOSPITAL PROGRESS NOTE   Devin Chaves 79 y.o. male MRN: [id number]  Unit/Bed#: ICU 10 Encounter: [id number]  Reason for Consult: ORIANA  Assessment & Plan  Renal failure  Baseline creatinine 0.8-0.9  Creatinine on admission 2.43  Etiology: ATN in setting of prolonged prerenal azotemia from volume depletion and severe anemia  CT abdomen: No hydronephrosis  UA: 2-4 RBC, 2-4 WBC  UPC ratio 3.4 g  Serum/urine immunofixation with no M spike, C3 mildly low at 83, C4 normal  Status post initiation of CVVHD on 03/21 in setting of oliguria and fluid overload  No evidence of renal recovery  Status post permacath placement 03/24  Currently on CVVHD, blood flow rate to 50 mL/min, dialysate flow rate 2500 mL/h, UF even to negative as tolerated, dialysate 4K/3 calcium  Currently not stable for transition to hemodialysis  He was seen and examined on CVVHD this morning around 7 AM  Volume overload  Refractory volume overload, status post trial of IV diuretics  Volume management with ultrafiltration on renal replacement therapy as above  Idiopathic hypotension  Echo 3/17/2025: EF 55%, unable to assess diastolic function  Home Rx: Metoprolol 25 mg twice daily  Vasopressors per ICU  Acute blood loss anemia  Acute blood loss on 3/15/25 and received more than 10 units PRBC   S/p emergent ex lap and control of bleeding  Hemoglobin today 10.1  Management per ICU team  MSSA bacteremia  Currently on Cefazolin 2 gm IV q8H, end date 03/27  TTE without vegetation  Management per infectious disease  Urinary retention  Seen by urology this admission  Tang catheter in place  Colonic mass  Newly diagnosed colon mass on C-scope on 2/20/25  Pathology - adenocarcinoma  Was to follow colorectal as outpatient  S/p ex lap, partial SBR and R colectomy 3/16/2025, currently on TPN  Continue management per surgical team  Cardiac arrest (HCC)  S/p cardiac arrest with ROSC 3/15/2025  In the setting of ABLA with undetectable Hgb and  hemoperitoneum, s/p emergent ex lap, segmental SBR 3/15  Echo 3/17/2025: EF 55%  Management per primary team  Hemoperitoneum  Status post emergent exploratory laparotomy  Management per primary team  Atrial fibrillation (HCC)  Management per primary team  Encephalopathy  Concern for anoxic brain injury s/p cardiac arrest with new embolic infarcts  Management per neurology  Embolic cerebral infarction (HCC)  Management per neurology    I have reviewed the nephrology recommendations including continuation of CVVHD and that patient is not stable for intermittent hemodialysis, with ICU team, and we are in agreement with renal plan including the information outlined above.    SUBJECTIVE / 24H INTERVAL HISTORY:  Ultrafiltration on CVVHD 4400 cc.  Emesis output 1100 cc.  Net -1.9 L.  Back on Levophed.  FiO2 40%.    OBJECTIVE:  Current Weight: Weight - Scale: 102 kg (225 lb 15.5 oz)  Vitals:    03/26/25 0330 03/26/25 0335 03/26/25 0336 03/26/25 0350   BP: (!) 85/47 (!) 74/36 113/55    Pulse: (!) 107 (!) 107 (!) 108    Resp: 21 (!) 23 (!) 24    Temp: (!) 97.2 °F (36.2 °C) (!) 97.2 °F (36.2 °C) (!) 97.2 °F (36.2 °C)    TempSrc:       SpO2: 98% 98% 97% 97%   Weight:       Height:           Intake/Output Summary (Last 24 hours) at 3/26/2025 0628  Last data filed at 3/26/2025 0300  Gross per 24 hour   Intake 3783 ml   Output 5876 ml   Net -2093 ml     Review of Systems   Unable to perform ROS: Intubated     Physical Exam  Constitutional:       Appearance: He is ill-appearing.   Cardiovascular:      Rate and Rhythm: Normal rate and regular rhythm.      Pulses: Normal pulses.      Heart sounds: Normal heart sounds.      Comments: Right chest wall permacath currently used for CVVHD  Pulmonary:      Effort: Pulmonary effort is normal.      Comments: Ventilator assisted breath sounds bilaterally  Musculoskeletal:      Right lower leg: Edema present.      Left lower leg: Edema present.   Neurological:      Comments: Intubated        Medications:    Current Facility-Administered Medications:     acetaminophen (Ofirmev) injection 1,000 mg, 1,000 mg, Intravenous, Q6H GALE, AMPARO Calvert, Last Rate: 400 mL/hr at 03/26/25 0538, 1,000 mg at 03/26/25 0538    Adult 3-in-1 TPN (custom base / custom electrolytes), , Intravenous, Continuous TPN, AMPARO Calvert, Last Rate: 87.4 mL/hr at 03/25/25 2129, New Bag at 03/25/25 2129    [Held by provider] ascorbic acid (VITAMIN C) tablet 250 mg, 250 mg, Oral, Daily, Yobany Mott MD, 250 mg at 03/14/25 0848    [Held by provider] atorvastatin (LIPITOR) tablet 40 mg, 40 mg, Oral, Daily With Dinner, Yobany Mott MD, 40 mg at 03/13/25 1626    bisacodyl (DULCOLAX) rectal suppository 10 mg, 10 mg, Rectal, Daily, Lucia Chavira PA-C, 10 mg at 03/25/25 0811    budesonide (PULMICORT) inhalation solution 0.5 mg, 0.5 mg, Nebulization, Q12H, Lucia Chavira PA-C, 0.5 mg at 03/25/25 2107    calcium gluconate 1 g in sodium chloride 0.9% 50 mL (premix), 1 g, Intravenous, Once, Garcia Epperson DO, Last Rate: 100 mL/hr at 03/26/25 0619, 1 g at 03/26/25 0619    ceFAZolin (ANCEF) IVPB (premix in dextrose) 2,000 mg 50 mL, 2,000 mg, Intravenous, Q8H, AMPARO Calvert, Last Rate: 100 mL/hr at 03/26/25 0539, 2,000 mg at 03/26/25 0539    chlorhexidine (PERIDEX) 0.12 % oral rinse 15 mL, 15 mL, Mouth/Throat, Q12H GALE, Lucia Chavira PA-C, 15 mL at 03/25/25 2129    [Held by provider] Cholecalciferol (VITAMIN D3) tablet 2,000 Units, 2,000 Units, Oral, Daily, Yobany Mott MD, 2,000 Units at 03/14/25 0848    [Held by provider] cyanocobalamin (VITAMIN B-12) tablet 100 mcg, 100 mcg, Oral, Daily, Yobany Mott MD, 100 mcg at 03/14/25 0848    docusate (COLACE) oral liquid 100 mg, 100 mg, Oral, BID, Lucia Chavira PA-C, 100 mg at 03/25/25 0811    fentaNYL 1000 mcg in sodium chloride 0.9% 100mL infusion, 50 mcg/hr, Intravenous, Continuous, AMPARO Calvert, Last Rate:  5 mL/hr at 03/26/25 0231, 50 mcg/hr at 03/26/25 0231    fentaNYL injection 50 mcg, 50 mcg, Intravenous, Q2H PRN, Lucia Chavira PA-C, 50 mcg at 03/26/25 0521    [Held by provider] ferrous sulfate tablet 325 mg, 325 mg, Oral, Daily With Breakfast, Yobany Mott MD    [Held by provider] folic acid (FOLVITE) tablet 1 mg, 1 mg, Oral, Daily, Yobany Mott MD, 1 mg at 03/14/25 0848    glycerin (adult) rectal suppository 1 suppository, 1 suppository, Rectal, HS, AMPARO Calvert, 1 suppository at 03/25/25 2129    heparin (porcine) 25,000 units in 0.45% NaCl 250 mL infusion (premix), 3-24 Units/kg/hr (Order-Specific), Intravenous, Titrated, AMPARO Calvert, Last Rate: 10 mL/hr at 03/26/25 0428, 10 Units/kg/hr at 03/26/25 0428    [Held by provider] hydroxychloroquine (PLAQUENIL) tablet 400 mg, 400 mg, Oral, Daily With Breakfast, Yobany Mott MD, 400 mg at 03/14/25 0848    insulin lispro (HumALOG/ADMELOG) 100 units/mL subcutaneous injection 2-12 Units, 2-12 Units, Subcutaneous, Q6H GALE, 4 Units at 03/25/25 1741 **AND** Fingerstick Glucose (POCT), , , Q6H, Lucia Chavira PA-C    ipratropium (ATROVENT) 0.02 % inhalation solution 0.5 mg, 0.5 mg, Nebulization, TID, Lucia Chavira PA-C, 0.5 mg at 03/25/25 2107    lactulose (CHRONULAC) oral solution 30 g, 30 g, Oral, TID, AMPARO Calvert, 30 g at 03/25/25 2129    levalbuterol (XOPENEX) inhalation solution 1.25 mg, 1.25 mg, Nebulization, TID, Lucia Chavira PA-C, 1.25 mg at 03/25/25 2107    lidocaine (LIDODERM) 5 % patch 3 patch, 3 patch, Topical, Daily, Lucia Chavira PA-C, 3 patch at 03/25/25 0812    magnesium sulfate IVPB (premix) SOLN 1 g, 1 g, Intravenous, Once, Garcia Epperson DO, 1 g at 03/26/25 0620    [Held by provider] metoprolol tartrate (LOPRESSOR) tablet 25 mg, 25 mg, Oral, Q12H [facility], Yobany Mott MD, 25 mg at 03/14/25 0848    moisture barrier miconazole 2% cream (aka HITESH MOISTURE BARRIER ANTIFUNGAL  CREAM), , Topical, BID, Lucia Chavira PA-C, Given at 03/25/25 1740    NOREPINEPHRINE 4 MG  ML NSS (CMPD ORDER) infusion, 1-30 mcg/min, Intravenous, Titrated, AMPARO Garcia, Last Rate: 15 mL/hr at 03/26/25 0546, 4 mcg/min at 03/26/25 0546    NxStage K 4/Ca 3 dialysis solution (RFP-401) 20,000 mL, 20,000 mL, Dialysis, Continuous, Bj Ayoub MD, Last Rate: 0 mL/hr at 03/22/25 1113, 20,000 mL at 03/26/25 0134    oxyCODONE (ROXICODONE) oral solution 2.5 mg, 2.5 mg, Oral, Q3H PRN **OR** oxyCODONE (ROXICODONE) oral solution 5 mg, 5 mg, Oral, Q3H PRN, Lucia Chavira PA-C    oxyCODONE (ROXICODONE) oral solution 7.5 mg, 7.5 mg, Oral, Q6H, AMPARO Calvert, 7.5 mg at 03/26/25 0552    pantoprazole (PROTONIX) injection 40 mg, 40 mg, Intravenous, Q24H GALE, Lucia Chavira PA-C, 40 mg at 03/25/25 0811    polyethylene glycol (MIRALAX) packet 17 g, 17 g, Oral, Daily, Lucia Chavira PA-C, 17 g at 03/25/25 0811    senna oral syrup 17.6 mg, 17.6 mg, Oral, HS, Lucia Chavira PA-C, 17.6 mg at 03/25/25 2129    sodium chloride 3 % inhalation solution 4 mL, 4 mL, Nebulization, TID, Garcia Epperson DO, 4 mL at 03/25/25 2107    sodium phosphate 6 mmol in sodium chloride 0.9 % 100 mL Infusion, 6 mmol, Intravenous, Once, Garcia Epperson DO    Laboratory Results:  Results from last 7 days   Lab Units 03/26/25  0433 03/25/25  2332 03/25/25  1656 03/25/25  1447 03/25/25  1109 03/25/25  0440 03/24/25  2351 03/24/25  2158 03/24/25  1623 03/24/25  0531 03/24/25  0330 03/23/25  2337 03/23/25  1932 03/23/25  1811 03/23/25  1709 03/23/25  1526   WBC Thousand/uL 10.01  --   --  7.73 7.49 8.29  --   --   --   --  8.97  --  13.14*  --   --  21.99*   HEMOGLOBIN g/dL 10.1* 9.8*  --  9.3* 9.0* 8.8*  --  8.9* 7.6*   < > 7.7*  --  8.5*  --    < > 6.8*   I STAT HEMOGLOBIN g/dl  --   --   --   --   --   --   --   --   --   --   --   --   --  6.8*  --   --    HEMATOCRIT % 30.7* 30.4*  --  28.6* 27.8* 27.1*  --  26.9*  "22.7*   < > 23.2*  --  26.2*  --    < > 21.6*   HEMATOCRIT, ISTAT %  --   --   --   --   --   --   --   --   --   --   --   --   --  20*  --   --    PLATELETS Thousands/uL 72*  --   --  70* 64* 64*  --   --   --   --  47*  --  76*  --   --  142*   POTASSIUM mmol/L 4.1 4.0 4.0  --  4.1 4.2 4.1  --  4.4   < >  --    < > 4.6  --   --  4.3   CHLORIDE mmol/L 104 103 104  --  103 103 105  --  107   < >  --    < > 105  --   --  104   CO2 mmol/L 25 24 23  --  24 25 25  --  24   < >  --    < > 25  --   --  23   CO2, I-STAT mmol/L  --   --   --   --   --   --   --   --   --   --   --   --   --  23  --   --    BUN mg/dL 29* 30* 30*  --  33* 37* 39*  --  44*   < >  --    < > 46*  --   --  46*   CREATININE mg/dL 0.92 1.00 1.02  --  1.10 1.11 1.20  --  1.23   < >  --    < > 1.47*  --   --  1.38*   CALCIUM mg/dL 8.2* 8.3* 8.2*  --  8.3* 8.2* 8.1*  --  7.9*   < >  --    < > 7.6*  --   --  8.1*   MAGNESIUM mg/dL 1.9 2.0 2.0  --  1.9 2.0 1.9  --  2.0   < >  --    < > 2.0  --    < >  --    PHOSPHORUS mg/dL 2.1* 2.4 2.3  --  2.3 2.4 2.2*  --  2.5   < >  --    < > 3.8  --    < >  --    GLUCOSE, ISTAT mg/dl  --   --   --   --   --   --   --   --   --   --   --   --   --  180*  --   --     < > = values in this interval not displayed.       Portions of the record may have been created with voice recognition software. Occasional wrong word or \"sound a like\" substitutions may have occurred due to the inherent limitations of voice recognition software. Read the chart carefully and recognize, using context, where substitutions have occurred.If you have any questions, please contact the dictating provider.    "

## 2025-03-26 NOTE — TELEPHONE ENCOUNTER
Patient's spouse called as she received a text message about an appointment the patient has for tomorrow. She wanted to make sure it is canceled as patient is in the ICU currently. I advised her that our clinical team canceled the appointment on 3/24 but sometimes the automated system still sends text message reminders.        Thank you.

## 2025-03-26 NOTE — ASSESSMENT & PLAN NOTE
Patient remains obtunded postcardiac arrest.  Concern is for anoxic encephalopathy.  Head CT is without acute changes.  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 Patient remains without any response for me.  Monitor mental status.

## 2025-03-26 NOTE — ASSESSMENT & PLAN NOTE
Creatinine   Date Value Ref Range Status   03/25/2025 1.00 0.60 - 1.30 mg/dL Final     Comment:     Standardized to IDMS reference method   09/25/2022 1 0.6 - 1.2 mg/dL Final      Likely initially in the setting of hemorrhagic shock, now with ATN   Baseline Cr 0.8   Nephrology following, appreciate recommendations  Monitor and replete electrolytes Q6H  Monitor I/O and renal indices   Continues to have low urine output and anasarca with hypotension  Nephro consented family for dialysis, temporary HD line placed 3/21 - tunneled HD cath placed 3/24  Tolerating CVVH, increased filtration to -100, trend labs

## 2025-03-26 NOTE — CASE MANAGEMENT
Case Management Progress Note    Patient name Devin Chaves  Location ICU 10/ICU 10 MRN [id number]  : 1945 Date 3/26/2025       LOS (days): 26  Geometric Mean LOS (GMLOS) (days): 8.7  Days to GMLOS:-17.6        OBJECTIVE:        Current admission status: Inpatient  Preferred Pharmacy:   [id number] - [place] - [address]  [address]  [place] [address]  Phone: [phone] Fax: [phone]    [facility] - [place] - [address]  [address]  [place] [address]  Phone: [phone] Fax: [phone]    [facility] - [place] - [address]  [address]  [place] [address]  Phone: [phone] Fax: [phone]    Primary Care Provider: No primary care provider on file.    Primary Insurance: [facility]ARE [facility] [employer] [facility]  Secondary Insurance: [employer]  REP    PROGRESS NOTE:    CM continues to follow. Will address dc needs/recommendations pending pt's medical status.

## 2025-03-26 NOTE — ASSESSMENT & PLAN NOTE
Newly diagnosed colon mass on C-scope on 2/20/25  Pathology - adenocarcinoma  Was to follow colorectal as outpatient  S/p ex lap, partial SBR and R colectomy 3/16/2025, currently on TPN  Continue management per surgical team

## 2025-03-26 NOTE — ASSESSMENT & PLAN NOTE
Acute blood loss on 3/15/25 and received more than 10 units PRBC   S/p emergent ex lap and control of bleeding  Hemoglobin today 10.1  Management per ICU team

## 2025-03-26 NOTE — ASSESSMENT & PLAN NOTE
Baseline creatinine 0.8-0.9  Creatinine on admission 2.43  Etiology: ATN in setting of prolonged prerenal azotemia from volume depletion and severe anemia  CT abdomen: No hydronephrosis  UA: 2-4 RBC, 2-4 WBC  UPC ratio 3.4 g  Serum/urine immunofixation with no M spike, C3 mildly low at 83, C4 normal  Status post initiation of CVVHD on 03/21 in setting of oliguria and fluid overload  No evidence of renal recovery  Status post permacath placement 03/24  Currently on CVVHD, blood flow rate to 50 mL/min, dialysate flow rate 2500 mL/h, UF even to negative as tolerated, dialysate 4K/3 calcium  Currently not stable for transition to hemodialysis  He was seen and examined on CVVHD this morning around 7 AM

## 2025-03-27 PROBLEM — R78.81 MSSA BACTEREMIA: Status: RESOLVED | Noted: 2025-01-01 | Resolved: 2025-01-01

## 2025-03-27 PROBLEM — R65.21 SEPTIC SHOCK (HCC): Status: ACTIVE | Noted: 2025-01-01

## 2025-03-27 PROBLEM — I95.9 HYPOTENSION: Status: ACTIVE | Noted: 2025-01-01

## 2025-03-27 PROBLEM — R79.89 ELEVATED TROPONIN: Status: RESOLVED | Noted: 2025-01-01 | Resolved: 2025-01-01

## 2025-03-27 PROBLEM — R57.8 HEMORRHAGIC SHOCK (HCC): Status: RESOLVED | Noted: 2025-01-01 | Resolved: 2025-01-01

## 2025-03-27 PROBLEM — K55.9 ISCHEMIA, BOWEL (HCC): Status: ACTIVE | Noted: 2025-01-01

## 2025-03-27 PROBLEM — K66.1 HEMOPERITONEUM: Status: RESOLVED | Noted: 2025-01-01 | Resolved: 2025-01-01

## 2025-03-27 PROBLEM — B95.61 MSSA BACTEREMIA: Status: RESOLVED | Noted: 2025-01-01 | Resolved: 2025-01-01

## 2025-03-27 PROBLEM — A41.9 SEPSIS (HCC): Status: ACTIVE | Noted: 2025-01-01

## 2025-03-27 NOTE — ASSESSMENT & PLAN NOTE
Emergent OR 3/15 for distended abd post CPR  Found to have hemoperitoneum with hematoma at base of jejunum with active bleed, which was controlled  OR 3/17, stable, small bowel and L hemicolon resected, abd closed.  Taken to OR on 3/23 for ex-lap - intra- abdominal hematoma, no active bleeding.     Plan:  Continue to hold AC/AP medications.  CBC in AM.  Serial ABD exams.

## 2025-03-27 NOTE — PLAN OF CARE
Problem: Prexisting or High Potential for Compromised Skin Integrity  Goal: Skin integrity is maintained or improved  Description: INTERVENTIONS:  - Identify patients at risk for skin breakdown  - Assess and monitor skin integrity  - Assess and monitor nutrition and hydration status  - Monitor labs   - Assess for incontinence   - Turn and reposition patient  - Assist with mobility/ambulation  - Relieve pressure over bony prominences  - Avoid friction and shearing  - Provide appropriate hygiene as needed including keeping skin clean and dry  - Evaluate need for skin moisturizer/barrier cream  - Collaborate with interdisciplinary team   - Patient/family teaching  - Consider wound care consult   Outcome: Progressing     Problem: Potential for Falls  Goal: Patient will remain free of falls  Description: INTERVENTIONS:  - Educate patient/family on patient safety including physical limitations  - Instruct patient to call for assistance with activity   - Consult OT/PT to assist with strengthening/mobility   - Keep Call bell within reach  - Keep bed low and locked with side rails adjusted as appropriate  - Keep care items and personal belongings within reach  - Initiate and maintain comfort rounds  - Make Fall Risk Sign visible to staff  - Offer Toileting every 2 Hours, in advance of need  - Initiate/Maintain bed alarm  - Obtain necessary fall risk management equipment  - Apply yellow socks and bracelet for high fall risk patients  - Consider moving patient to room near nurses station  Outcome: Progressing     Problem: Nutrition/Hydration-ADULT  Goal: Nutrient/Hydration intake appropriate for improving, restoring or maintaining nutritional needs  Description: Monitor and assess patient's nutrition/hydration status for malnutrition. Collaborate with interdisciplinary team and initiate plan and interventions as ordered.  Monitor patient's weight and dietary intake as ordered or per policy. Utilize nutrition screening tool  and intervene as necessary. Determine patient's food preferences and provide high-protein, high-caloric foods as appropriate.     INTERVENTIONS:  - Monitor oral intake, urinary output, labs, and treatment plans  - Assess nutrition and hydration status and recommend course of action  - Evaluate amount of meals eaten  - Assist patient with eating if necessary   - Allow adequate time for meals  - Recommend/ encourage appropriate diets, oral nutritional supplements, and vitamin/mineral supplements  - Order, calculate, and assess calorie counts as needed  - Recommend, monitor, and adjust tube feedings and TPN/PPN based on assessed needs  - Assess need for intravenous fluids  - Provide specific nutrition/hydration education as appropriate  - Include patient/family/caregiver in decisions related to nutrition  Outcome: Progressing     Problem: GASTROINTESTINAL - ADULT  Goal: Minimal or absence of nausea and/or vomiting  Description: INTERVENTIONS:  - Administer IV fluids if ordered to ensure adequate hydration  - Maintain NPO status until nausea and vomiting are resolved  - Nasogastric tube if ordered  - Administer ordered antiemetic medications as needed  - Provide nonpharmacologic comfort measures as appropriate  - Advance diet as tolerated, if ordered  - Consider nutrition services referral to assist patient with adequate nutrition and appropriate food choices  Outcome: Progressing  Goal: Maintains or returns to baseline bowel function  Description: INTERVENTIONS:  - Assess bowel function  - Encourage oral fluids to ensure adequate hydration  - Administer IV fluids if ordered to ensure adequate hydration  - Administer ordered medications as needed  - Encourage mobilization and activity  - Consider nutritional services referral to assist patient with adequate nutrition and appropriate food choices  Outcome: Progressing  Goal: Maintains adequate nutritional intake  Description: INTERVENTIONS:  - Monitor percentage of each  meal consumed  - Identify factors contributing to decreased intake, treat as appropriate  - Assist with meals as needed  - Monitor I&O, weight, and lab values if indicated  - Obtain nutrition services referral as needed  Outcome: Progressing  Goal: Establish and maintain optimal ostomy function  Description: INTERVENTIONS:  - Assess bowel function  - Encourage oral fluids to ensure adequate hydration  - Administer IV fluids if ordered to ensure adequate hydration   - Administer ordered medications as needed  - Encourage mobilization and activity  - Nutrition services referral to assist patient with appropriate food choices  - Assess stoma site  - Consider wound care consult   Outcome: Progressing  Goal: Oral mucous membranes remain intact  Description: INTERVENTIONS  - Assess oral mucosa and hygiene practices  - Implement preventative oral hygiene regimen  - Implement oral medicated treatments as ordered  - Initiate Nutrition services referral as needed  Outcome: Progressing     Problem: HEMATOLOGIC - ADULT  Goal: Maintains hematologic stability  Description: INTERVENTIONS  - Assess for signs and symptoms of bleeding or hemorrhage  - Monitor labs  - Administer supportive blood products/factors as ordered and appropriate  Outcome: Progressing     Problem: SAFETY,RESTRAINT: NV/NON-SELF DESTRUCTIVE BEHAVIOR  Goal: Remains free of harm/injury (restraint for non violent/non self-detsructive behavior)  Description: INTERVENTIONS:  - Instruct patient/family regarding restraint use   - Assess and monitor physiologic and psychological status   - Provide interventions and comfort measures to meet assessed patient needs   - Identify and implement measures to help patient regain control  - Assess readiness for release of restraint   Outcome: Progressing  Goal: Returns to optimal restraint-free functioning  Description: INTERVENTIONS:  - Assess the patient's behavior and symptoms that indicate continued need for restraint  -  Identify and implement measures to help patient regain control  - Assess readiness for release of restraint   Outcome: Progressing

## 2025-03-27 NOTE — ASSESSMENT & PLAN NOTE
Baseline creatinine 0.8-0.9  Creatinine on admission 2.43  Etiology: ATN in setting of prolonged prerenal azotemia from volume depletion and severe anemia  CT abdomen: No hydronephrosis  UA: 2-4 RBC, 2-4 WBC  UPC ratio 3.4 g  Serum/urine immunofixation with no M spike, C3 mildly low at 83, C4 normal  Status post initiation of CVVHD on 03/21 in setting of oliguria and fluid overload  No evidence of renal recovery  Status post permacath placement 03/24  CT 3/26: Kidneys essentially unremarkable.  New findings concerning for gastric and bowel ischemia.  Currently on CVVHD, 4K bath/3 calcium.  Blood flow rate 250 mL/min, dialysate flow rate 2500 mL/h, UF even to negative as tolerated, dialysate  No evidence of renal recovery.  Bladder scan minimal  Recommendations:   Continue to run even on CVVHD, volume removal as tolerated.    Blood pressure seems to be improving-remains on pressor support.  Receiving unit of packed cells.    Volume up.  Anasarca noted on CT. may be third spacing/intravascular volume depletion since blood pressure is improving with transfusion.  Albumin is needed  Replete phosphorus per protocol.

## 2025-03-27 NOTE — DISCHARGE INSTR - OTHER ORDERS
Skin care plans:  1-Hydraguard to bilateral heels BID and PRN  2-Elevate heels to offload pressure.  3-Ehob cushion in chair when out of bed.  4-Moisturize skin daily with skin nourishing cream.  5-Turn/reposition q2h for pressure re-distribution on skin.  6-Mid Sacro-Buttocks Wound: Cleanse sacro-buttocks with soap and water. Apply a Silicone Bordered Foam Dressing(Mepilex) to area. Brandon with T for Treatment. Change every other day or PRN. Peel back and inspect skin at least Q-shift.

## 2025-03-27 NOTE — PROCEDURES
Arterial Line Insertion    Date/Time: 3/26/2025 10:36 PM    Performed by: AMPARO Garcia  Authorized by: AMPARO Garcia    Patient location:  ICU  Consent:     Consent obtained:  Verbal    Consent given by:  Spouse    Risks discussed:  Bleeding, ischemia, repeat procedure, pain and infection  Universal protocol:     Procedure explained and questions answered to patient or proxy's satisfaction: yes      Relevant documents present and verified: yes      Test results available and properly labeled: yes      Radiology Images displayed and confirmed.  If images not available, report reviewed: yes      Required blood products, implants, devices, and special equipment available: yes      Site/side marked: yes      Immediately prior to procedure a time out was called: yes      Patient identity confirmed:  Provided demographic data and hospital-assigned identification number  Indications:     Indications: hemodynamic monitoring, continuous blood pressure monitoring and frequent labs / infusion    Pre-procedure details:     Skin preparation:  Chlorhexidine    Preparation: Patient was prepped and draped in sterile fashion    Anesthesia (see MAR for exact dosages):     Anesthesia method:  None  Procedure details:     Location / Tip of Catheter:  Radial    Laterality:  Left    Naseem's test performed: no      Needle gauge:  20 G    Placement technique:  Ultrasound guided    Ultrasound image availability:  Not saved    Sterile ultrasound techniques: Sterile gel and sterile probe covers were used      Number of attempts:  2    Successful placement: yes      Transducer: waveform confirmed    Post-procedure details:     Post-procedure:  Sterile dressing applied and sutured    CMS:  Unable to assess    Patient tolerance of procedure:  Tolerated well, no immediate complications

## 2025-03-27 NOTE — ASSESSMENT & PLAN NOTE
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

## 2025-03-27 NOTE — ASSESSMENT & PLAN NOTE
MRI of the neck done 2/14/2025 revealed cervical degenerative change with mild canal stenosis and mild to moderate foraminal narrowing.  No cord compression.  Mild nonspecific edema within the right posterior breast dermal musculature of the upper cervicals spine.  Minimal peripheral enhancement is noted  Will need repeat MRI C spine with and without contrast prior to completion of abx - currently holding on this at this time due to [place].

## 2025-03-27 NOTE — ASSESSMENT & PLAN NOTE
Patient remains obtunded postcardiac arrest.  Concern is for anoxic encephalopathy.  Head CT is without acute changes.  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 Patient remains obtunded, without any response to verbal or tactile stimuli.  Monitor mental status.

## 2025-03-27 NOTE — PROGRESS NOTES
Progress Note - Critical Care/ICU   Name: Devin Chaves 79 y.o. male I MRN: [id number]  Unit/Bed#: ICU 10 I Date of Admission: 2/28/2025   Date of Service: 3/27/2025 I Hospital Day: 27      Assessment & Plan  Gastric and bowel ischemia.  Hypotension and lactate persistently in the 2's despite CRRT  CT A/P showed gastric and bowel ischemia.   GOC discussion held with family, plan to remain level 2 code status for now, continue current treatment plan.   Continue to trend end-points overnight, if no improvement by the morning, family may consider transition to comfort care.   No operative intervention per general surgery.  Septic shock (HCC)  Hypotensive AM of 3.26 etiology unclear, lactate low 2's despite CRRT, additional end-points unremarkable  WBC 9  Afebrile - hypothermic  Suspected source unknown at this time.   CT A/P showed gastric and bowel ischemia.   Blood cultures x 2 obtained  Restarted ABX: Zosyn/Vancomycin.  Started on vasopressors Levophed/Vasopressin for MAP goal > 65  3/26 - Received 500cc albumin 1.25L and 750ml crystalloid with improvement in hypotension.   CRRT changed to rune NET even.   A-line placed - low diastolic  Trend lactate, check Procal in AM, trend WBC and fever curve.   Acute on chronic respiratory failure with hypoxia (HCC)  Remains intubated for airway protection post cardiac arrest and postoperatively with poor neuro exam.   Continue mechanical ventilation ACVC 12/550/40/8, titrate FiO2 for SpO2>88  At baseline requires 2-4L NC.  Fentanyl gtt for sedation + PRNs.  Daily SBT/SAT if appropriate  Vent bundle.  Will eventually need trache/PEG.  Cardiac arrest (HCC)  Likely in the setting of hemorrhagic shock.  Noted 12 minutes of downtime, received aggressive blood resuscitation and surgical intervention as above.   Now with ongoing encephalopathy concerning for anoxic injury.   Continue close HD and telemetry monitoring.  Trend Hgb as above.  Encephalopathy  Concern for anoxic injury  s/p cardiac arrest with 12 minute downtime.   EEG without seizure activity.  CTH shows 4mm focus of high attenuation in right frontal lobe which is stable on repeat CTH.   MRI with multiple areas of hypoxia related injury as well as acute embolic strokes.  Uremia resolved with CRRT  Pt remains unresponsive.  Frequent neuro checks.  Maintain normothermia and normoglycemia.   Neurology following, appreciate recommendations.   Embolic cerebral infarction (HCC)  New areas of embolic stroke noted on MRI 3/18.  Neurology following, appreciate recommendations.  Hold heparin gtt in setting of ABD bleeding.   Continue to hold ASA.  Consider restarting statin when cleared for PO.  Frequent neuro checks.  Renal failure  Creatinine   Date Value Ref Range Status   03/26/2025 0.80 0.60 - 1.30 mg/dL Final     Comment:     Standardized to IDMS reference method   09/25/2022 1 0.6 - 1.2 mg/dL Final      Likely initially in the setting of hemorrhagic shock, now with ATN   Baseline Cr 0.8   Nephrology following, appreciate recommendations  Monitor and replete electrolytes Q6H  Monitor I/O and renal indices   Continues to have low urine output and anasarca with hypotension  Nephro consented family for dialysis, temporary HD line placed 3/21 - tunneled HD cath placed 3/24  Tolerating CVVH, continue UF NET even due to hypotension, now on vasopressors  3/26 - Received 500cc albumin 1.25L and 750ml crystalloid with improvement in hypotension.   HTN (hypertension)  Home regimen: Metoprolol tartrate 25 mg BID, Losartan 25 mg QD for HTN  Consider restarting home metoprolol - remains off vasopressors.  Hold home losartan in the setting of ORIANA.  CAD (coronary artery disease)  With known LAD stenosis.   Hold home BB for now in setting of hypotension.  Hold home statin while NPO, restart when cleared for PO intake.   Hold ASA, restart when cleared from surgical standpoint.   COPD (chronic obstructive pulmonary disease) (HCC)  Without acute  exacerbation.  Hold home inhalers while intubated.  Continue scheduled xopenex, atrovent, budesonide nebs.   History of CVA (cerebrovascular accident)  Hx: Multiple embolic strokes thought to be septic in nature.   Now with new acute embolic strokes noted on MRI.  Restart statin when cleared for PO intake.  Hold heparin in setting of ABD bleeding, restart when cleared from surgery perspective.   Hold ASA, restart as appropriate   Atrial fibrillation (HCC)  Currently rate controlled in NSR.  Consider restarting home metoprolol for rate control.  Hold home eliquis/heparin in setting of ABD bleeding.   Tele monitoring.  Urinary retention  Hold home flomax while NPO.  D/c during OR on 3/23.  Continue bladder scan.  Urinary retention protocol.  Neck pain  MRI of the neck done 2/14/2025 revealed cervical degenerative change with mild canal stenosis and mild to moderate foraminal narrowing.  No cord compression.  Mild nonspecific edema within the right posterior breast dermal musculature of the upper cervicals spine.  Minimal peripheral enhancement is noted  Will need repeat MRI C spine with and without contrast prior to completion of abx - currently holding on this at this time due to ORIANA.  Colonic mass  2/20/2025 EGD/colonoscopy showed 2.5 cm ileocecal mass, pathology confirmed adenocarcinoma  Now s/p ex lap SBR and R colon resection on 3/15  Surgery following, appreciate recommendations   Still without return of bowel function since surgical intervention despite aggressive bowel management, including lactulose   Repeat CT A/P +/- chest this AM   Pathology:   3/16: Large Intestine, Right/Ascending Colon - Moderately differentiated adenocarcinoma with mucinous features. Tumor invades into lower one third of submucosa, Lymphovascular invasion is present. Terminal ileum and omentum with no pathologic abnormality. All margins are negative for tumor. Twenty one lymph nodes, negative for malignancy.  3/16: Small Bowel, NOS  -  Negative for carcinoma.   Fluid overload  Resolved  CT A/P shows bilateral pleural effusions L>R, pulmonary vascular congestion.  Adequate fluid removed with CRRT, pt developed hypotension, BP improved with fluid boluses.      Plan:  Continue mechanical ventilation.  Continue CVVH, NET even for now  Maintain accurate I&O.  Daily weights.  Dysphagia  Noted prior to intubation and cardiac arrest.  Will likely need trache/PEG.   Severe protein-calorie malnutrition (HCC)  Malnutrition Findings:   Adult Malnutrition type: Acute illness  Adult Degree of Malnutrition: Other severe protein calorie malnutrition  Malnutrition Characteristics: Inadequate energy, Fluid accumulation  360 Statement: related to inadequate energy/protein intake as evidenced by consuming < 50% of energy intake compared to estimated needs for > 5 days and B/L LE +3 edema. Treated with ONS.  BMI Findings:  Body mass index is 25.09 kg/m².      Plan:  Continue TPN, hold TF until cleared by surgery.    360 Statement: related to inadequate energy/protein intake as evidenced by consuming < 50% of energy intake compared to estimated needs for > 5 days and B/L LE +3 edema. Treated with ONS.    BMI Findings:    Body mass index is 30.65 kg/m².   Acute blood loss anemia  Patient presents with 2 days of chest pain, shortness of breath, fatigue and melena  2/20/2025 EGD/colonoscopy showed 2.5 cm ileocecal mass, pathology confirmed adenocarcinoma.  S/p 3 units packed rbc in ED. S/p 1 unit PRBCs on 3/2  Hemoglobin has been stable.   Patient recent diagnosis of adenocarcinoma, increased risk of stroke.   Updated iron studies 3/2025 improved compared to 2/2025  Rapid response called 10:30A M [id number] for AMS and hypotension  Patient found to be minimally responsive and hypotensive  HGB 5.9 by iSTAT, formal labs show HGB 6.3, significant drop from 8 in AM  Bleeding from colonic mass suspected, per nursing no hematuria hemoptysis melena or other signs of overt bleeding  prior to RR  Patient intubated for airway protection  Transported to ICU  MTP called  Code blue called  ROSC achieved shortly after blood products started  Rapidly distending abdomen, suspect intraabdominal bleeding.  3/23: OR, ex-lap, 2L old blood no active bleeding.     Hemoglobin   Date Value Ref Range Status   03/26/2025 8.2 (L) 12.0 - 17.0 g/dL Final     Plan:  Daily CBC  Transfuse for Hgb <7  Protonix 40 mg IV daily  Continue to hold heparin gtt and ASA for now  Heparin gtt ordered but not started prior to rapid  Palliative care consulted prior to ICU admission  MTP [id number]  PRBC 12  FFP 6  Platelets 3  Cryo 2  Whole blood 3  Additional 4 (3/23) + 2 (3/24)  units  Continue monitoring H&H  Maintain hgb > 7  Hgb has been stable since surgery.  Hallucinations, visual  Per both patient and patient's wife, patient has been experiencing visual hallucinations that usually occur prior to falling asleep or waking up.  Patient reports that he will occasionally grab for things that are not there, such as a pillow.    Also states that he will occasionally have conversations with his wife when she is not physically in the room.    Patient states he is able to discern when he is having a hallucination and they are not distressing      PLAN:  Delirium precautions.  Hemoperitoneum (Resolved: 3/27/2025)  Emergent OR 3/15 for distended abd post CPR  Found to have hemoperitoneum with hematoma at base of jejunum with active bleed, which was controlled  OR 3/17, stable, small bowel and L hemicolon resected, abd closed.  Taken to OR on 3/23 for ex-lap - intra- abdominal hematoma, no active bleeding.     Plan:  Continue to hold AC/AP medications.  CBC in AM.  Serial ABD exams.   Hemorrhagic shock (HCC) (Resolved: 3/27/2025)  RESOLVED  Code crimson 3/15 for hemorrhagic shock related to mesenteric hematoma s/p SBR and colon resection   S/p blood resuscitation with 12 U PRBC, 6 FFP, 2 Plt, 2 Cryo   Now off pressors and HD stable    Trend Hgb, transfuse for Hgb<7 or active bleeding with HD instability   Maintain MAP>65  MSSA bacteremia (Resolved: 3/27/2025)  Noted on previous admission with likely cutaneous source.   Continue cefazolin through 3/27 to complete course.   ID following, appreciate recommendations.  Disposition: Critical care    ICU Core Measures     Vented Patient  VAP Bundle  VAP bundle ordered     A: Assess, Prevent, and Manage Pain Has pain been assessed? Yes  Need for changes to pain regimen? No   B: Both Spontaneous Awakening Trials (SATs) and Spontaneous Breathing Trials (SBTs) Plan to perform spontaneous awakening trial today? Yes   Plan to perform spontaneous breathing trial today? Yes   Obvious barriers to extubation? Yes   C: Choice of Sedation RASS Goal: -4 Deep Sedation  Need for changes to sedation or analgesia regimen? No   D: Delirium CAM-ICU: Unable to perform secondary to Acute cognitive dysfunction   E: Early Mobility  Plan for early mobility? No   F: Family Engagement Plan for family engagement today? Yes       Antibiotic Review: Continue broad spectrum secondary to severity of illness.     Review of Invasive Devices:      Central access plan: Medications requiring central line Hemodynamic monitoring HD cath in place.  Plan continue for now.  Coni Plan: Keep arterial line for hemodynamic monitoring and frequent labs    Prophylaxis:  VTE VTE covered by:  heparin (porcine), Intravenous, Held at 03/26/25 1255       Stress Ulcer  covered byfamotidine (PEPCID) 20 mg tablet [[id number]] (Long-Term Med), pantoprazole (PROTONIX) injection 40 mg [[id number]]         24 Hour Events : Pt became hypotensive yesterday, lactate remained mid 2's despite CRRT. He started on Levophed and Vasopressin. Repeat blood cultures obtained, started on Zosyn/Vancomycin. CRRT changed UF to run NET even. CT A/P showed gastric and bowel ischemia. Goals of care discussion held last  night, plan to keep pt level 2 code status for now.  Continue current treatment plan and trend endpoints. If no improvements or continued decline by the morning, family may transition to comfort care. Overnight he received a total of 1.25L albumin and 750mL crystalloid with improvement in BP.    Subjective   Review of Systems: Review of Systems   Unable to perform ROS: Intubated     Objective :                   Vitals I/O      Most Recent Min/Max in 24hrs   Temp 97.5 °F (36.4 °C) Temp  Min: 96.4 °F (35.8 °C)  Max: 98.2 °F (36.8 °C)   Pulse 103 Pulse  Min: 102  Max: 114   Resp 19 Resp  Min: 19  Max: 54   /59 BP  Min: 74/36  Max: 178/33   O2 Sat 99 % SpO2  Min: 92 %  Max: 99 %      Intake/Output Summary (Last 24 hours) at 3/27/2025 0232  Last data filed at 3/26/2025 2100  Gross per 24 hour   Intake 5009.43 ml   Output 5161 ml   Net -151.57 ml       Diet NPO  Adult 3-in-1 TPN (custom base / custom electrolytes)    Invasive Monitoring   Arterial Line  [place] /30  Arterial Line BP  Min: 147/29  Max: 178/33   MAP (!) 60 mmHg  Arterial Line MAP (mmHg)  Min: 53 mmHg  Max: 68 mmHg           Physical Exam   Physical Exam  Vitals and nursing note reviewed.   Eyes:      Pupils: Pupils are equal, round, and reactive to light.   Skin:     General: Skin is warm.      Capillary Refill: Capillary refill takes less than 2 seconds.      Findings: Ecchymosis present.   HENT:      Head: Normocephalic and atraumatic.      Mouth/Throat:      Mouth: Mucous membranes are moist.   Neck:      Vascular: Central line present.   Cardiovascular:      Rate and Rhythm: Normal rate and regular rhythm.      Pulses: Normal pulses.           Radial pulses are 2+ on the right side and 2+ on the left side.        Dorsalis pedis pulses are 2+ on the right side and 2+ on the left side.      Comments: +3/4 generalized pitting/dependant edema with excessive weeping.  Musculoskeletal:      Cervical back: Full passive range of motion without pain, normal range of motion and neck supple.      Right  lower leg: Edema present.      Left lower leg: Edema present.   Abdominal: General: Bowel sounds are absent. There is distension.     Palpations: Abdomen is soft.      Tenderness: There is no abdominal tenderness.      Comments: Midline incision, DSG with old blood.   Constitutional:       General: He is not in acute distress.     Appearance: He is well-developed, overweight and well-nourished. He is ill-appearing.      Interventions: He is sedated, intubated and restrained.   Pulmonary:      Effort: Pulmonary effort is normal. He is intubated.      Breath sounds: Decreased breath sounds present.   Psychiatric:      Comments: ENRIQUE   Neurological:      Mental Status: He is unresponsive.      GCS: GCS eye subscore is 1. GCS verbal subscore is 1. GCS motor subscore is 1.      Comments: GCS 3T   Genitourinary/Anorectal:     Comments: Penial/scrotal edema         Diagnostic Studies        Lab Results: I have reviewed the following results:     Medications:  Scheduled PRN   acetaminophen, 1,000 mg, Q6H GALE  [Held by provider] ascorbic acid, 250 mg, Daily  [Held by provider] atorvastatin, 40 mg, Daily With Dinner  bisacodyl, 10 mg, Daily  budesonide, 0.5 mg, Q12H  chlorhexidine, 15 mL, Q12H GALE  [Held by provider] Cholecalciferol, 2,000 Units, Daily  [Held by provider] cyanocobalamin, 100 mcg, Daily  [Held by provider] docusate, 100 mg, BID  [Held by provider] ferrous sulfate, 325 mg, Daily With Breakfast  [Held by provider] folic acid, 1 mg, Daily  glycerin (adult), 1 suppository, HS  [Held by provider] hydroxychloroquine, 400 mg, Daily With Breakfast  insulin lispro, 2-12 Units, Q6H GALE  ipratropium, 0.5 mg, TID  [Held by provider] lactulose, 30 g, TID  levalbuterol, 1.25 mg, TID  lidocaine, 3 patch, Daily  [Held by provider] metoprolol tartrate, 25 mg, Q12H GALE  HITESH ANTIFUNGAL, , BID  pantoprazole, 40 mg, Q12H GALE  piperacillin-tazobactam, 4.5 g, Q8H  [Held by provider] polyethylene glycol, 17 g, Daily  [Held by  provider] senna, 17.6 mg, HS  sodium chloride, 4 mL, TID  vancomycin, 10 mg/kg (Adjusted), Q12H      fentaNYL, 50 mcg, Q2H PRN       Continuous    Adult 3-in-1 TPN (custom base / custom electrolytes), , Last Rate: 87.4 mL/hr at 03/26/25 2105  fentaNYL, 75 mcg/hr, Last Rate: 75 mcg/hr (03/26/25 1636)  heparin (porcine), 3-24 Units/kg/hr (Order-Specific), Last Rate: Stopped (03/26/25 1255)  norepinephrine, 1-30 mcg/min, Last Rate: 7 mcg/min (03/27/25 0047)  NxStage K 4/Ca 3, 20,000 mL, Last Rate: 20,000 mL (03/22/25 1113)  vasopressin, 0.04 Units/min, Last Rate: 0.04 Units/min (03/26/25 2144)         Labs:   CBC    Recent Labs     03/26/25  0433 03/26/25  1152 03/26/25  2335   WBC 10.01 9.98 9.54   HGB 10.1* 9.2* 8.2*   HCT 30.7* 29.2* 25.3*   PLT 72* 79* 78*   BANDSPCT 9* 27*  --      BMP    Recent Labs     03/26/25  1642 03/26/25  2152   SODIUM 132* 135   K 4.4 4.1    105   CO2 21 23   AGAP 8 7   BUN 32* 31*   CREATININE 0.87 0.80   CALCIUM 7.9* 8.3*       Coags    Recent Labs     03/25/25  1444 03/25/25  1937 03/26/25  0319 03/26/25  0833   INR 1.20*  --   --   --    PTT 45*   < > 81* 79*    < > = values in this interval not displayed.        Additional Electrolytes  Recent Labs     03/26/25  1642 03/26/25  2152   MG 1.9 2.0   PHOS 2.2* 2.0*   CAIONIZED 1.10* 1.18          Blood Gas    Recent Labs     03/26/25  2336   PHART 7.375   HDP1LWW 35.3*   PO2ART 89.0   KHO6QMX 20.2*   BEART -4.5   SOURCE Line, Arterial     Recent Labs     03/26/25  0753 03/26/25  1740 03/26/25  2336   PHVEN 7.340  --   --    HPU9XJL 44.9  --   --    PO2VEN 31.3*  --   --    WVU0JYM 23.7*  --   --    BEVEN -2.1  --   --    E9CDCLN 60.4  --   --    SOURCE  --    < > Line, Arterial    < > = values in this interval not displayed.    LFTs  No recent results    Infectious  No recent results  Glucose  Recent Labs     03/26/25  0433 03/26/25  1028 03/26/25  1642 03/26/25  2152   GLUC 160* 148* 281* 204*

## 2025-03-27 NOTE — PROGRESS NOTES
Progress Note - Trauma   Name: Devin Chaves 79 y.o. male I MRN: [id number]  Unit/Bed#: ICU 10 I Date of Admission: 2/28/2025   Date of Service: 3/27/2025 I Hospital Day: 27       Called wife, Francia Chaves, via telephone at [phone] at 1807.    She expressed understanding that her  is very ill. I discussed that he has multiorgan dysfunction/failure and there are no clinical signs of improvement today. He remains on levophed with a worsening bandemia. We discussed goals of care, including comfort measures. She is not ready to  proceed with this now, and states she will be in tomorrow. Any further interventions would unlikely lead to improvement to his current clinical state. She agrees with no escalation of care. All questions and concerns addressed to her expressed satisfaction. The bedside nurse and overnight ICU team were informed.

## 2025-03-27 NOTE — ASSESSMENT & PLAN NOTE
- General Surgery following   - continued maximal medical management   - no surgical recommendations at this time    CT CAP [03/26/2025] persistent pleural effusions with compressive atelectasis and possible superimposed lower lobe PNA; new L lower neck/chest/shoulder edema may be asymmetric edema from patient positioning; new findings in abdomen concerning for gastric/bowel ischemia.   Incidentally found while reviewing patients MRI images available in pacs. This was first initially noted around 2006 which would make patient around 6 years of age. Patient unable to state additional details other than she suffered from chronic constipation and this might have been found incidentally while undergoing workup for this. She denies any weakness or paresthesias of the upper or lower extremities. No chiari malformation on brain MRI.     Will repeat MRI T spine to evaluate for any interval changes. Follow up in 4 months with results.   Orders:    MRI thoracic spine with and without contrast; Future

## 2025-03-27 NOTE — ASSESSMENT & PLAN NOTE
Hold home flomax while NPO.  D/c during OR on 3/23.  Continue bladder scan.  Urinary retention protocol.

## 2025-03-27 NOTE — ASSESSMENT & PLAN NOTE
Patient developed acute neck pain with MSSA bacteremia during recent hospitalization.  CRP was highly elevated.  C-spine MRI showed possible C-spine and paraspinal infection.  Patient has no neurologic deficit.  His neck pain is finally improving.  However, given paraspinal infection on the initial C-spine MRI, we should repeat C-spine MRI with contrast when creatinine is improved to confirm resolution/improvement of paraspinal infection.  However, at this point, this is not an active issue.  Patient is currently on broad-spectrum antibiotic for septic shock, likely intra-abdominal in etiology.  Antibiotic plan as in above.

## 2025-03-27 NOTE — PROGRESS NOTES
Devin Chaves is a 79 y.o. male who is currently ordered Vancomycin IV with management by the Pharmacy Consult service.  Relevant clinical data and objective / subjective history reviewed.  Vancomycin Assessment:  Indication and Goal AUC/Trough: Bacteremia (goal -600, trough >10)  Clinical Status: critically ill  Micro:   3/26 Blood: pending   3/25 Bronch: 4+ ESBL Proteus mirabilis, 2+ mixed respiratory mary   3/25 Fungal (bronch): pending   Renal Function:  SCr: 0.71 mg/dL  CrCl: 107.7 mL/min  Renal replacement: CVVH D  Days of Therapy: 2  Current Dose: 1000 mg IV Q12H  Vancomycin Plan:  New Dosing: continue current dose while on CVVHD  Next Level: 3/28 at 0330 TRUE TROUGH  Renal Function Monitoring: Daily BMP and UOP  Pharmacy will continue to follow closely for s/sx of nephrotoxicity, infusion reactions and appropriateness of therapy.  BMP and CBC will be ordered per protocol. We will continue to follow the patient’s culture results and clinical progress daily.    Tova Buckley, Pharmacist

## 2025-03-27 NOTE — ASSESSMENT & PLAN NOTE
Patient presents with 2 days of chest pain, shortness of breath, fatigue and melena  2/20/2025 EGD/colonoscopy showed 2.5 cm ileocecal mass, pathology confirmed adenocarcinoma.  S/p 3 units packed rbc in ED. S/p 1 unit PRBCs on 3/2  Hemoglobin has been stable.   Patient recent diagnosis of adenocarcinoma, increased risk of stroke.   Updated iron studies 3/2025 improved compared to 2/2025  Rapid response called 10:30A M [id number] for AMS and hypotension  Patient found to be minimally responsive and hypotensive  HGB 5.9 by iSTAT, formal labs show HGB 6.3, significant drop from 8 in AM  Bleeding from colonic mass suspected, per nursing no hematuria hemoptysis melena or other signs of overt bleeding prior to RR  Patient intubated for airway protection  Transported to ICU  MTP called  Code blue called  ROSC achieved shortly after blood products started  Rapidly distending abdomen, suspect intraabdominal bleeding.  3/23: OR, ex-lap, 2L old blood no active bleeding.     Hemoglobin   Date Value Ref Range Status   03/26/2025 8.2 (L) 12.0 - 17.0 g/dL Final     Plan:  Daily CBC  Transfuse for Hgb <7  Protonix 40 mg IV daily  Continue to hold heparin gtt and ASA for now  Heparin gtt ordered but not started prior to rapid  Palliative care consulted prior to ICU admission  MTP [id number]  PRBC 12  FFP 6  Platelets 3  Cryo 2  Whole blood 3  Additional 4 (3/23) + 2 (3/24)  units  Continue monitoring H&H  Maintain hgb > 7  Hgb has been stable since surgery.

## 2025-03-27 NOTE — ASSESSMENT & PLAN NOTE
"Palliative diagnosis: Colon adenocarcinoma, colon mass, cardiac arrest, respiratory failure  PPS: 10%    Education/counseling provided on role/purpose of palliative care; benefits/risks of treatment options by our team reviewed; instructions for management and anticipatory guidance provided; supportive listening and presence provided; provided space for life review and whole person assessment    Psychosocial/Spiritual:    - supported by spouse Francia (\"Ashvin\"),  56 years; they live near [place]   - Also two sons Parmjit and Guille   - 4 yrs in [employer]   - Enjoys working on farm with cows and [employer]   - Latter day kaylee-->family would like extra spiritual support-->spiritual care consult placed    Communicated and coordinated with surgical CC team and RN    #ACP Documentation   - completed Durable Power of  for Health Care, signed/notarized 03/06/2025                      Code Status: DNAR/DNI - Level 2               Decisional apparatus:  Patient is not competent on my exam today. If competence is lost, patient's substitute decision maker would default to spouse Francia (first), son Devin (alternate)               Advance Directive / Living Will / POLST:  POA document on file  "

## 2025-03-27 NOTE — ASSESSMENT & PLAN NOTE
- met with spouse, son, and family friend at bedside   - discussed new findings concerning for gastric/bowel ischemia, with discussion overnight with surgeon and high-risk for mortality given critical and life-threatening condition, spouse in agreement that surgery would not be safe. Continue current critical management.   - spouse and son communicated gratitude towards ICU RN and ICU staff for care provided for the patient   - all questions/concerns addressed    At this time the goal is to see if the patient can improve with current critical cares, recognizing that his clinical condition could deteriorate at any time. If the patient does not improve or clinically worsens, the family is open to end-of-life discussions.

## 2025-03-27 NOTE — ASSESSMENT & PLAN NOTE
Remains intubated for airway protection post cardiac arrest and postoperatively with poor neuro exam.   Continue mechanical ventilation ACVC 12/550/40/8, titrate FiO2 for SpO2>88  At baseline requires 2-4L NC.  Fentanyl gtt for sedation + PRNs.  Daily SBT/SAT if appropriate  Vent bundle.  Will eventually need trache/PEG.

## 2025-03-27 NOTE — PROGRESS NOTES
NEPHROLOGY HOSPITAL PROGRESS NOTE   Devin Chaves 79 y.o. male MRN: [id number]  Unit/Bed#: ICU 10 Encounter: [id number]  Reason for Consult: Renal failure      Assessment & Plan  Renal failure  Baseline creatinine 0.8-0.9  Creatinine on admission 2.43  Etiology: ATN in setting of prolonged prerenal azotemia from volume depletion and severe anemia  CT abdomen: No hydronephrosis  UA: 2-4 RBC, 2-4 WBC  UPC ratio 3.4 g  Serum/urine immunofixation with no M spike, C3 mildly low at 83, C4 normal  Status post initiation of CVVHD on 03/21 in setting of oliguria and fluid overload  No evidence of renal recovery  Status post permacath placement 03/24  CT 3/26: Kidneys essentially unremarkable.  New findings concerning for gastric and bowel ischemia.  Currently on CVVHD, 4K bath/3 calcium.  Blood flow rate 250 mL/min, dialysate flow rate 2500 mL/h, UF even to negative as tolerated, dialysate  No evidence of renal recovery.  Bladder scan minimal  Recommendations:   Continue to run even on CVVHD, volume removal as tolerated.    Blood pressure seems to be improving-remains on pressor support.  Receiving unit of packed cells.    Volume up.  Anasarca noted on CT. may be third spacing/intravascular volume depletion since blood pressure is improving with transfusion.  Albumin is needed  Replete phosphorus per protocol.    Volume overload  Refractory volume overload, status post trial of IV diuretics  Volume management with ultrafiltration on renal replacement therapy as above  HTN (hypertension)  Echo 3/17/2025: EF 55%, unable to assess diastolic function  Home Rx: Metoprolol 25 mg twice daily  Vasopressors per ICU  Acute blood loss anemia  Acute blood loss on 3/15/25 and received more than 10 units PRBC   S/p emergent ex lap and control of bleeding  Hemoglobin 7.0.  Receiving unit of packed cells  Management per ICU team  Urinary retention  Seen by urology this admission  No Tang catheter  Bladder scan every shift  Colonic  mass  Newly diagnosed colon mass on C-scope on 2/20/25  Pathology - adenocarcinoma  Was to follow colorectal as outpatient  S/p ex lap, partial SBR and R colectomy 3/16/2025, currently on TPN  Continue management per surgical team  Cardiac arrest (HCC)  S/p cardiac arrest with ROSC 3/15/2025  In the setting of ABLA with undetectable Hgb and hemoperitoneum, s/p emergent ex lap, segmental SBR 3/15  Echo 3/17/2025: EF 55%  Management per primary team  Atrial fibrillation (HCC)  Management per primary team  Encephalopathy  Concern for anoxic brain injury s/p cardiac arrest with new embolic infarcts  Management per neurology  Embolic cerebral infarction (HCC)  Management per neurology  Septic shock (HCC)    Gastric and bowel ischemia.          SUBJECTIVE / 24H INTERVAL HISTORY:  Intubated, sedated.  No overnight events reported by nursing.  Minimal bladder scan    OBJECTIVE:  Current Weight: Weight - Scale: 102 kg (225 lb 15.5 oz)  Vitals:    03/27/25 1015 03/27/25 1030 03/27/25 1044 03/27/25 1045   BP: (!) 91/44  105/51 114/54   Pulse: 105 103 102 102   Resp: 20 19 20 20   Temp: (!) 97.2 °F (36.2 °C) (!) 97.2 °F (36.2 °C) (!) 97.2 °F (36.2 °C) (!) 97.2 °F (36.2 °C)   TempSrc:       SpO2: 95% 95% 96% 96%   Weight:       Height:           Intake/Output Summary (Last 24 hours) at 3/27/2025 1053  Last data filed at 3/27/2025 1000  Gross per 24 hour   Intake 6356.64 ml   Output 6228 ml   Net 128.64 ml     General: Critically ill male lying quietly in bed.  Sedated  Skin: no rash  Eyes: anicteric sclera  ENT: moist mucous membrane  Neck: supple  Chest: Equal breath sounds anteriorly, slightly coarse  CVS:   Irregular, slightly tachycardic  Abdomen: Distended appearing.  Dressing in place  Extremities: Pitting edema LE b/l.  Extremities warm  : no glass  Neuro: Sedated  Psych: Sedated  Medications:    Current Facility-Administered Medications:     acetaminophen (Ofirmev) injection 1,000 mg, 1,000 mg, Intravenous, Q6H [facility],  AMPARO Calvert, Stopped at 03/27/25 0642    Adult 3-in-1 TPN (custom base / custom electrolytes), , Intravenous, Continuous TPN, Sima Tang PA-C, Last Rate: 87.4 mL/hr at 03/26/25 2105, New Bag at 03/26/25 2105    [Held by provider] ascorbic acid (VITAMIN C) tablet 250 mg, 250 mg, Oral, Daily, Yobany Mott MD, 250 mg at 03/14/25 0848    [Held by provider] atorvastatin (LIPITOR) tablet 40 mg, 40 mg, Oral, Daily With Dinner, Yobany Mott MD, 40 mg at 03/13/25 1626    bisacodyl (DULCOLAX) rectal suppository 10 mg, 10 mg, Rectal, Daily, Lucia Chavira PA-C, 10 mg at 03/27/25 0813    budesonide (PULMICORT) inhalation solution 0.5 mg, 0.5 mg, Nebulization, Q12H, Lucia Chavira PA-C, 0.5 mg at 03/27/25 0826    chlorhexidine (PERIDEX) 0.12 % oral rinse 15 mL, 15 mL, Mouth/Throat, Q12H GALE, Lucia Chavira PA-C, 15 mL at 03/27/25 0813    [Held by provider] Cholecalciferol (VITAMIN D3) tablet 2,000 Units, 2,000 Units, Oral, Daily, Yobany Mott MD, 2,000 Units at 03/14/25 0848    [Held by provider] cyanocobalamin (VITAMIN B-12) tablet 100 mcg, 100 mcg, Oral, Daily, Yobany Mott MD, 100 mcg at 03/14/25 0848    [Held by provider] docusate (COLACE) oral liquid 100 mg, 100 mg, Oral, BID, Lucia Chavira PA-C, 100 mg at 03/25/25 0811    fentaNYL 1000 mcg in sodium chloride 0.9% 100mL infusion, 75 mcg/hr, Intravenous, Continuous, Sima Tagn PA-C, Last Rate: 7.5 mL/hr at 03/27/25 0621, 75 mcg/hr at 03/27/25 0621    fentaNYL injection 50 mcg, 50 mcg, Intravenous, Q2H PRN, Lucia Chavira PA-C, 50 mcg at 03/27/25 0540    [Held by provider] ferrous sulfate tablet 325 mg, 325 mg, Oral, Daily With Breakfast, Yobany Mott MD    [Held by provider] folic acid (FOLVITE) tablet 1 mg, 1 mg, Oral, Daily, Yobany Mott MD, 1 mg at 03/14/25 0848    glycerin (adult) rectal suppository 1 suppository, 1 suppository, Rectal, HS, AMPARO Calvert, 1 suppository at 03/26/25  2248    [Held by provider] hydroxychloroquine (PLAQUENIL) tablet 400 mg, 400 mg, Oral, Daily With Breakfast, Yobany Mott MD, 400 mg at 03/14/25 0848    insulin lispro (HumALOG/ADMELOG) 100 units/mL subcutaneous injection 2-12 Units, 2-12 Units, Subcutaneous, Q6H GALE, 2 Units at 03/27/25 0530 **AND** Fingerstick Glucose (POCT), , , Q6H, Lucia Chavira PA-C    ipratropium (ATROVENT) 0.02 % inhalation solution 0.5 mg, 0.5 mg, Nebulization, TID, Lucia Chavira PA-C, 0.5 mg at 03/27/25 0826    [Held by provider] lactulose (CHRONULAC) oral solution 30 g, 30 g, Oral, TID, AMPARO Calvert, 30 g at 03/25/25 2129    levalbuterol (XOPENEX) inhalation solution 1.25 mg, 1.25 mg, Nebulization, TID, Lucia Chavira PA-C, 1.25 mg at 03/27/25 0826    lidocaine (LIDODERM) 5 % patch 3 patch, 3 patch, Topical, Daily, Lucia Chavira PA-C, 3 patch at 03/25/25 0812    [Held by provider] metoprolol tartrate (LOPRESSOR) tablet 25 mg, 25 mg, Oral, Q12H GALE, Yobany Mott MD, 25 mg at 03/14/25 0848    moisture barrier miconazole 2% cream (aka HITESH MOISTURE BARRIER ANTIFUNGAL CREAM), , Topical, BID, Lucia Chavira PA-C, Given at 03/27/25 0813    NOREPINEPHRINE 4 MG  ML NSS (CMPD ORDER) infusion, 1-30 mcg/min, Intravenous, Titrated, AMPARO Garcia, Last Rate: 48.8 mL/hr at 03/27/25 1038, 13 mcg/min at 03/27/25 1038    NxStage K 4/Ca 3 dialysis solution (RFP-401) 20,000 mL, 20,000 mL, Dialysis, Continuous, Bj Ayoub MD, Last Rate: 0 mL/hr at 03/22/25 1113, 20,000 mL at 03/26/25 0134    pantoprazole (PROTONIX) injection 40 mg, 40 mg, Intravenous, Q12H Gali BEASLEY Lamaine, DO, 40 mg at 03/27/25 0813    [COMPLETED] piperacillin-tazobactam (ZOSYN) 4.5 g in sodium chloride 0.9 % 100 mL IV LOADING DOSE, 4.5 g, Intravenous, Once, Stopped at 03/26/25 1800 **FOLLOWED BY** piperacillin-tazobactam (ZOSYN) 4.5 g in sodium chloride 0.9 % 100 mL IVPB (EXTENDED INFUSION), 4.5 g, Intravenous, Q8H,  Sima Tang PA-C, Last Rate: 25 mL/hr at 03/27/25 0613, 4.5 g at 03/27/25 0613    [Held by provider] polyethylene glycol (MIRALAX) packet 17 g, 17 g, Oral, Daily, Lucia Chavira PA-C, 17 g at 03/25/25 0811    [Held by provider] senna oral syrup 17.6 mg, 17.6 mg, Oral, HS, Lucia Chavira PA-C, 17.6 mg at 03/25/25 2129    sodium chloride 3 % inhalation solution 4 mL, 4 mL, Nebulization, TID, Garcia Epperson DO, 4 mL at 03/27/25 0826    vancomycin (VANCOCIN) IVPB (premix in dextrose) 1,000 mg 200 mL, 10 mg/kg (Adjusted), Intravenous, Q12H, Theron Curry MD, Stopped at 03/27/25 0700    vasopressin (PITRESSIN) 20 Units in sodium chloride 0.9 % 100 mL infusion, 0.04 Units/min, Intravenous, Continuous, Gali Jack DO, Stopped at 03/27/25 0754    Laboratory Results:  Results from last 7 days   Lab Units 03/27/25  0959 03/27/25  0422 03/26/25  2335 03/26/25  2152 03/26/25  1642 03/26/25  1152 03/26/25  1028 03/26/25  0433 03/25/25  2332 03/25/25  1656 03/25/25  1447 03/25/25  1109 03/25/25  0440 03/23/25  1932 03/23/25  1811   WBC Thousand/uL  --  5.78 9.54  --   --  9.98  --  10.01  --   --  7.73 7.49 8.29   < >  --    HEMOGLOBIN g/dL  --  7.0* 8.2*  --   --  9.2*  --  10.1* 9.8*  --  9.3* 9.0* 8.8*   < >  --    I STAT HEMOGLOBIN g/dl  --   --   --   --   --   --   --   --   --   --   --   --   --   --  6.8*   HEMATOCRIT %  --  21.5* 25.3*  --   --  29.2*  --  30.7* 30.4*  --  28.6* 27.8* 27.1*   < >  --    HEMATOCRIT, ISTAT %  --   --   --   --   --   --   --   --   --   --   --   --   --   --  20*   PLATELETS Thousands/uL  --  56* 78*  --   --  79*  --  72*  --   --  70* 64* 64*   < >  --    POTASSIUM mmol/L 3.9 4.0  --  4.1 4.4  --  3.8 4.1 4.0   < >  --  4.1 4.2   < >  --    CHLORIDE mmol/L 105 105  --  105 103  --  104 104 103   < >  --  103 103   < >  --    CO2 mmol/L 23 23  --  23 21  --  23 25 24   < >  --  24 25   < >  --    CO2, I-STAT mmol/L  --   --   --   --   --   --   --   --   --   --   --   --    "--   --  23   BUN mg/dL 31* 32*  --  31* 32*  --  30* 29* 30*   < >  --  33* 37*   < >  --    CREATININE mg/dL 0.71 0.76  --  0.80 0.87  --  0.81 0.92 1.00   < >  --  1.10 1.11   < >  --    CALCIUM mg/dL 8.0* 7.9*  --  8.3* 7.9*  --  8.2* 8.2* 8.3*   < >  --  8.3* 8.2*   < >  --    MAGNESIUM mg/dL 1.9 1.9  --  2.0 1.9  --  2.0 1.9 2.0   < >  --  1.9 2.0   < >  --    PHOSPHORUS mg/dL 1.7* 2.1*  --  2.0* 2.2*  --  2.0* 2.1* 2.4   < >  --  2.3 2.4   < >  --    GLUCOSE, ISTAT mg/dl  --   --   --   --   --   --   --   --   --   --   --   --   --   --  180*    < > = values in this interval not displayed.       Portions of the record may have been created with voice recognition software. Occasional wrong word or \"sound a like\" substitutions may have occurred due to the inherent limitations of voice recognition software. Read the chart carefully and recognize, using context, where substitutions have occurred.If you have any questions, please contact the dictating provider.   "

## 2025-03-27 NOTE — UTILIZATION REVIEW
Continued Stay Review    Date: 3/27                          Current Patient Class: Inpatient  Current Level of Care: ICU    HPI:79 y.o. male initially admitted on 2/28   Current Diagnosis:  septic shock     Assessment/Plan:     3/27 Critical care Note    Pt became hypotensive yesterday, lactate remained mid 2's despite CRRT. He started on Levophed and Vasopressin. Repeat blood cultures obtained, started on Zosyn/Vancomycin. CRRT changed UF to run NET even. CT A/P showed gastric and bowel ischemia. Goals of care discussion held last  night, plan to keep pt level 2 code status for now. Continue current treatment plan and trend endpoints. If no improvements or continued decline by the morning, family may transition to comfort care. Overnight he received a total of 1.25L albumin and 750mL crystalloid with improvement in BP.   Remains intubated for airway protection post cardiac arrest and postoperatively with poor neuro exam.   Continue mechanical ventilation ACVC 12/550/40/8, titrate FiO2 for SpO2>88    3/27 ID Note   deteriorated overnight, with hypotension and bandemia.  Antibiotic regimen was broadened to vancomycin/Zosyn.  At present, patient remains obtunded on ventilator.  Cont vanco and zosyn . Pressor support per critical care . F/u BC obtained last night . CVVH per nephrology .     Medications:   Scheduled Medications:  acetaminophen, 1,000 mg, Intravenous, Q6H GALE  [Held by provider] ascorbic acid, 250 mg, Oral, Daily  [Held by provider] atorvastatin, 40 mg, Oral, Daily With Dinner  bisacodyl, 10 mg, Rectal, Daily  budesonide, 0.5 mg, Nebulization, Q12H  chlorhexidine, 15 mL, Mouth/Throat, Q12H GALE  [Held by provider] Cholecalciferol, 2,000 Units, Oral, Daily  [Held by provider] cyanocobalamin, 100 mcg, Oral, Daily  [Held by provider] docusate, 100 mg, Oral, BID  [Held by provider] ferrous sulfate, 325 mg, Oral, Daily With Breakfast  [Held by provider] folic acid, 1 mg, Oral, Daily  glycerin (adult), 1  suppository, Rectal, HS  [Held by provider] hydroxychloroquine, 400 mg, Oral, Daily With Breakfast  insulin lispro, 2-12 Units, Subcutaneous, Q6H GALE  ipratropium, 0.5 mg, Nebulization, TID  [Held by provider] lactulose, 30 g, Oral, TID  levalbuterol, 1.25 mg, Nebulization, TID  lidocaine, 3 patch, Topical, Daily  [Held by provider] metoprolol tartrate, 25 mg, Oral, Q12H GALE  HITESH ANTIFUNGAL, , Topical, BID  pantoprazole, 40 mg, Intravenous, Q12H GALE  piperacillin-tazobactam, 4.5 g, Intravenous, Q8H  [Held by provider] polyethylene glycol, 17 g, Oral, Daily  [Held by provider] senna, 17.6 mg, Oral, HS  sodium chloride, 4 mL, Nebulization, TID  vancomycin, 10 mg/kg (Adjusted), Intravenous, Q12H      Continuous IV Infusions:  Adult 3-in-1 TPN (custom base / custom electrolytes), , Intravenous, Continuous TPN  fentaNYL, 75 mcg/hr, Intravenous, Continuous  norepinephrine, 1-30 mcg/min, Intravenous, Titrated  NxStage K 4/Ca 3, 20,000 mL, Dialysis, Continuous  vasopressin, 0.04 Units/min, Intravenous, Continuous      PRN Meds:  fentaNYL, 50 mcg, Intravenous, Q2H PRN      Discharge Plan: TBd    Vital Signs (last 3 days)       Date/Time Temp Pulse Resp BP MAP (mmHg) Arterial Line BP MAP SpO2 FiO2 (%) O2 Device Patient Position - Orthostatic VS CVP (mean) Jessee Coma Scale Score Pain    03/27/25 0833 -- -- -- -- -- -- -- 99 % -- -- -- -- -- --    03/27/25 0832 -- -- -- -- -- -- -- 99 % -- -- -- -- -- --    03/27/25 0730 96.4 °F (35.8 °C) 94 18 119/57 82 125/29 56 mmHg 95 % -- -- -- 10 mmHg -- --    03/27/25 0715 96.4 °F (35.8 °C) 93 18 -- -- 128/24 49 mmHg 97 % -- -- -- 9 mmHg -- --    03/27/25 0700 96.4 °F (35.8 °C) 93 18 126/31 52 -- -- 96 % -- Ventilator Lying -- -- --    03/27/25 0610 96.6 °F (35.9 °C) -- -- -- -- -- -- -- -- -- -- -- -- --    03/27/25 0540 -- -- -- -- -- -- -- -- -- -- -- -- -- Med Not Given for Pain - for MAR use only    03/27/25 0430 96.8 °F (36 °C) 95 17 -- -- 148/28 57 mmHg 97 % -- -- -- 12 mmHg --  --    03/27/25 0406 97 °F (36.1 °C) 98 17 133/62 89 157/31 63 mmHg 97 % -- -- -- 14 mmHg -- --    03/27/25 0400 -- -- -- -- -- -- -- -- -- -- -- -- 4 --    03/27/25 0340 -- -- -- -- -- -- -- -- -- -- -- -- -- Med Not Given for Pain - for MAR use only    03/27/25 0330 97.2 °F (36.2 °C) 101 22 134/56 81 148/30 60 mmHg 98 % -- -- -- 14 mmHg -- --    03/27/25 0300 97.5 °F (36.4 °C) 99 18 -- -- 155/30 61 mmHg 90 % -- -- -- 13 mmHg -- --    03/27/25 0230 97.3 °F (36.3 °C) 101 36 -- -- 155/29 61 mmHg 88 % -- -- -- 14 mmHg -- --    03/27/25 0200 97.2 °F (36.2 °C) 101 33 -- -- 140/24 56 mmHg 93 % -- -- -- 6 mmHg -- --    03/27/25 0100 97.5 °F (36.4 °C) 103 19 124/59 85 158/30 60 mmHg 99 % -- -- -- 6 mmHg -- --    03/27/25 0020 97.5 °F (36.4 °C) 105 22 -- -- 178/33 68 mmHg 98 % -- -- -- 7 mmHg -- --    03/27/25 0015 97.5 °F (36.4 °C) 102 19 -- -- 161/31 59 mmHg 98 % -- -- -- 8 mmHg -- --    03/27/25 0000 97.5 °F (36.4 °C) 104 20 -- -- 160/30 59 mmHg 99 % -- -- -- 6 mmHg 4 --    03/26/25 2345 97.7 °F (36.5 °C) 105 20 -- -- 152/29 56 mmHg 98 % -- -- -- 6 mmHg -- --    03/26/25 2332 -- -- -- -- -- -- -- 99 % -- -- -- -- -- --    03/26/25 2330 97.7 °F (36.5 °C) 103 20 -- -- 163/30 58 mmHg 98 % -- -- -- 5 mmHg -- --    03/26/25 2327 -- 102 -- 178/33 -- -- 66 mmHg -- -- -- -- -- -- --    03/26/25 2315 97.5 °F (36.4 °C) 103 21 -- -- 147/29 53 mmHg 98 % -- -- -- 6 mmHg -- --    03/26/25 2300 97.5 °F (36.4 °C) 103 20 -- -- 155/31 60 mmHg 99 % -- -- -- -- -- --    03/26/25 2200 97.3 °F (36.3 °C) 107 22 96/48 69 -- -- 98 % -- -- -- -- -- --    03/26/25 2130 97.2 °F (36.2 °C) 108 23 87/45 62 -- -- 99 % -- -- -- -- -- --    03/26/25 2115 97 °F (36.1 °C) 110 23 81/53 63 -- -- 98 % -- -- -- -- -- --    03/26/25 2109 -- -- -- -- -- -- -- 99 % -- -- -- -- -- --    03/26/25 2100 97.2 °F (36.2 °C) 112 27 90/50 67 -- -- 97 % -- -- -- -- -- --    03/26/25 2033 97.2 °F (36.2 °C) 110 26 94/35 50 -- -- 97 % -- -- -- -- -- --    03/26/25 2000 97.2 °F  (36.2 °C) 105 22 98/54 73 -- -- 98 % -- Ventilator -- -- 4 --    03/26/25 1900 97.2 °F (36.2 °C) 107 25 102/40 58 -- -- 99 % -- -- -- -- -- --    03/26/25 1830 97.2 °F (36.2 °C) 111 23 91/51 69 -- -- 98 % -- -- -- -- -- --    03/26/25 1815 97.3 °F (36.3 °C) 112 23 96/51 71 -- -- 98 % -- -- -- -- -- --    03/26/25 1800 97.5 °F (36.4 °C) 113 22 93/50 70 -- -- 98 % -- -- -- -- -- --    03/26/25 1745 97.5 °F (36.4 °C) 114 23 98/49 71 -- -- 99 % -- -- -- -- -- --    03/26/25 1730 97.5 °F (36.4 °C) 114 23 102/51 74 -- -- 98 % -- -- -- -- -- --    03/26/25 1715 97.7 °F (36.5 °C) 114 22 110/51 74 -- -- 97 % -- -- -- -- -- --    03/26/25 1700 97.7 °F (36.5 °C) 114 37 111/49 70 -- -- 98 % -- -- -- -- -- --    03/26/25 1645 97.7 °F (36.5 °C) 114 24 100/49 70 -- -- 96 % -- -- -- -- -- --    03/26/25 1630 97.7 °F (36.5 °C) 112 23 106/50 69 -- -- 96 % -- -- -- -- -- --    03/26/25 1615 97.7 °F (36.5 °C) 112 22 95/45 65 -- -- 96 % -- -- -- -- -- --    03/26/25 1600 98.2 °F (36.8 °C) 114 19 97/53 71 -- -- 96 % -- -- -- -- 3 --    03/26/25 1545 -- 114 20 114/55 79 -- -- 96 % -- -- -- -- -- --    03/26/25 1538 98.2 °F (36.8 °C) -- -- -- -- -- -- -- -- Ventilator Lying -- -- --    03/26/25 1530 -- -- -- 132/60 86 -- -- 97 % -- -- -- -- -- --    03/26/25 1515 -- -- 25 129/59 85 -- -- 97 % -- -- -- -- -- --    03/26/25 1502 -- -- -- -- -- -- -- 97 % -- Ventilator Lying -- -- --    03/26/25 1500 -- 106 37 137/68 95 -- -- 98 % -- -- -- -- -- --    03/26/25 1416 -- -- -- -- -- -- -- 95 % -- -- -- -- -- --    03/26/25 1400 97.2 °F (36.2 °C) 105 23 102/50 72 -- -- 97 % -- -- -- -- -- --    03/26/25 1345 97.2 °F (36.2 °C) 105 22 109/51 74 -- -- 97 % -- -- -- -- -- --    03/26/25 1330 97 °F (36.1 °C) 105 22 106/51 74 -- -- 97 % -- -- -- -- -- --    03/26/25 1315 97.2 °F (36.2 °C) 107 21 112/53 76 -- -- 97 % -- -- -- -- -- --    03/26/25 1300 97 °F (36.1 °C) 109 39 116/53 77 -- -- 96 % -- -- -- -- -- --    03/26/25 1245 97 °F (36.1 °C) 104 23  110/49 71 -- -- 98 % -- -- -- -- -- --    03/26/25 1230 97 °F (36.1 °C) 104 22 122/57 82 -- -- 99 % -- -- -- -- -- --    03/26/25 1215 96.8 °F (36 °C) 103 21 124/60 87 -- -- 98 % -- -- -- -- -- --    03/26/25 1200 97 °F (36.1 °C) 104 21 132/59 85 -- -- 99 % -- -- -- -- 3 --    03/26/25 1145 97 °F (36.1 °C) 107 20 138/101 113 -- -- 99 % -- -- -- -- -- --    03/26/25 1130 97 °F (36.1 °C) 107 23 93/51 70 -- -- 97 % -- -- -- -- -- --    03/26/25 1115 97 °F (36.1 °C) 107 22 99/50 72 -- -- 98 % -- -- -- -- -- --    03/26/25 1100 97 °F (36.1 °C) 107 21 95/51 70 -- -- 98 % -- Ventilator Lying -- -- --    03/26/25 1045 96.8 °F (36 °C) 107 22 93/49 68 -- -- 98 % -- -- -- -- -- --    03/26/25 1030 96.8 °F (36 °C) 107 24 91/51 68 -- -- 98 % -- -- -- -- -- --    03/26/25 1015 96.8 °F (36 °C) 107 23 93/53 69 -- -- 98 % -- -- -- -- -- --    03/26/25 1000 96.8 °F (36 °C) 107 22 89/53 66 -- -- 98 % -- -- -- -- -- --    03/26/25 0945 96.8 °F (36 °C) 106 22 87/50 65 -- -- 98 % -- -- -- -- -- --    03/26/25 0930 96.8 °F (36 °C) 106 21 89/47 66 -- -- 99 % -- -- -- -- -- --    03/26/25 0915 97 °F (36.1 °C) 108 27 94/49 69 -- -- 98 % -- -- -- -- -- --    03/26/25 0900 97 °F (36.1 °C) 109 24 86/48 65 -- -- 99 % -- -- -- -- -- --    03/26/25 0845 97 °F (36.1 °C) 108 25 99/55 74 -- -- 97 % -- -- -- -- -- --    03/26/25 0830 97.2 °F (36.2 °C) 106 26 87/48 62 -- -- 97 % -- -- -- -- -- --    03/26/25 0816 -- -- -- -- -- -- -- 97 % -- -- -- -- -- --    03/26/25 0815 96.4 °F (35.8 °C) 110 27 99/53 73 -- -- 98 % -- -- -- -- -- --    03/26/25 0800 97.2 °F (36.2 °C) 112 31 98/46 66 -- -- 96 % -- -- -- -- 3 --    03/26/25 0745 97.3 °F (36.3 °C) 110 33 100/49 70 -- -- 97 % -- -- -- -- -- --    03/26/25 0730 97.2 °F (36.2 °C) 111 32 105/50 71 -- -- 97 % -- -- -- -- -- --    03/26/25 0700 97.2 °F (36.2 °C) 111 31 107/54 78 -- -- 97 % -- Ventilator Lying -- -- --    03/26/25 0645 97.2 °F (36.2 °C) 112 42 102/51 74 -- -- 97 % -- -- -- -- -- --    03/26/25  0630 97.2 °F (36.2 °C) 112 54 98/49 71 -- -- 96 % -- -- -- -- -- --    03/26/25 0615 97 °F (36.1 °C) 112 28 101/51 74 -- -- 96 % -- -- -- -- -- --    03/26/25 0600 96.8 °F (36 °C) 110 26 98/48 69 -- -- 95 % -- -- -- -- -- --    03/26/25 0545 97 °F (36.1 °C) 111 38 102/51 73 -- -- 92 % -- -- -- -- -- --    03/26/25 0521 -- -- -- -- -- -- -- -- -- -- -- -- -- Med Not Given for Pain - for MAR use only    03/26/25 0515 97.2 °F (36.2 °C) 110 41 130/58 84 -- -- 95 % -- -- -- -- -- --    03/26/25 0500 97 °F (36.1 °C) 106 22 89/48 65 -- -- 99 % -- -- -- -- -- --    03/26/25 0445 97.2 °F (36.2 °C) 108 22 94/51 70 -- -- 98 % -- -- -- -- -- --    03/26/25 0430 97.3 °F (36.3 °C) 109 21 90/48 67 -- -- 99 % -- -- -- -- -- --    03/26/25 0415 97.2 °F (36.2 °C) 107 22 93/47 68 -- -- 97 % -- -- -- -- -- --    03/26/25 0400 97.2 °F (36.2 °C) 107 22 93/50 69 -- -- 97 % -- -- -- -- 3 --    03/26/25 0350 -- -- -- -- -- -- -- 97 % -- -- -- -- -- --    03/26/25 0345 97.2 °F (36.2 °C) 107 22 98/55 73 -- -- 97 % -- -- -- -- -- --    03/26/25 0336 97.2 °F (36.2 °C) 108 24 113/55 75 -- -- 97 % -- -- -- -- -- --    03/26/25 0335 97.2 °F (36.2 °C) 107 23 74/36 52 -- -- 98 % -- -- -- -- -- --    03/26/25 0330 97.2 °F (36.2 °C) 107 21 85/47 62 -- -- 98 % -- -- -- -- -- --    03/26/25 0315 97.2 °F (36.2 °C) 107 22 88/49 65 -- -- 98 % -- -- -- -- -- --    03/26/25 0300 97.2 °F (36.2 °C) 109 20 94/55 70 -- -- 99 % -- -- -- -- -- --    03/26/25 0245 97.2 °F (36.2 °C) 106 22 89/53 66 -- -- 95 % -- -- -- -- -- --    03/26/25 0230 97.2 °F (36.2 °C) 106 21 84/46 62 -- -- 97 % -- -- -- -- -- --    03/26/25 0215 97 °F (36.1 °C) 107 22 90/46 65 -- -- 97 % -- -- -- -- -- --    03/26/25 0200 97 °F (36.1 °C) 106 21 97/52 72 -- -- 95 % -- -- -- -- -- --    03/26/25 0145 97 °F (36.1 °C) 106 20 91/48 66 -- -- 96 % -- -- -- -- -- --    03/26/25 0130 97 °F (36.1 °C) 106 22 87/48 65 -- -- 97 % -- -- -- -- -- --    03/26/25 0115 97 °F (36.1 °C) 106 22 91/45 65 -- --  97 % -- -- -- -- -- --    03/26/25 0100 97 °F (36.1 °C) 106 21 96/49 70 -- -- 98 % -- -- -- -- -- --    03/26/25 0045 96.8 °F (36 °C) 106 23 96/51 71 -- -- 98 % -- -- -- -- -- --    03/26/25 0030 96.8 °F (36 °C) 107 21 90/48 67 -- -- 97 % -- -- -- -- -- --    03/26/25 0015 96.8 °F (36 °C) 108 21 95/54 71 -- -- 97 % -- -- -- -- -- --    03/26/25 0000 96.8 °F (36 °C) 109 22 87/48 65 -- -- 97 % -- -- -- -- 3 --    03/25/25 2345 97 °F (36.1 °C) 110 23 85/47 63 -- -- 97 % -- -- -- -- -- --    03/25/25 2330 96.8 °F (36 °C) 110 44 91/50 68 -- -- 96 % -- -- -- -- -- --    03/25/25 2316 -- -- -- -- -- -- -- 96 % -- -- -- -- -- --    03/25/25 2315 96.8 °F (36 °C) 110 43 94/48 68 -- -- 96 % -- -- -- -- -- --    03/25/25 2300 96.8 °F (36 °C) 110 43 96/49 70 -- -- 96 % -- -- -- -- -- --    03/25/25 2245 96.8 °F (36 °C) 110 35 94/48 69 -- -- 96 % -- -- -- -- -- --    03/25/25 2230 96.8 °F (36 °C) 110 41 97/50 69 -- -- 96 % -- -- -- -- -- --    03/25/25 2215 96.6 °F (35.9 °C) 109 29 103/49 70 -- -- 97 % -- -- -- -- -- --    03/25/25 2200 96.6 °F (35.9 °C) 109 33 83/39 57 -- -- 97 % -- -- -- -- -- --    03/25/25 2145 96.4 °F (35.8 °C) 107 44 82/46 61 -- -- 97 % -- -- -- -- -- --    03/25/25 2130 96.8 °F (36 °C) 105 24 82/45 60 -- -- 97 % -- -- -- -- -- --    03/25/25 2115 96.8 °F (36 °C) 106 46 81/48 60 -- -- 96 % -- -- -- -- -- --    03/25/25 2107 -- -- -- -- -- -- -- 98 % -- -- -- -- -- --    03/25/25 2100 96.8 °F (36 °C) 105 27 112/56 80 -- -- 97 % -- -- -- -- -- --    03/25/25 2045 96.6 °F (35.9 °C) 108 25 114/55 79 -- -- 98 % -- -- -- -- -- --    03/25/25 2030 96.6 °F (35.9 °C) 108 25 95/31 45 -- -- 98 % -- -- -- -- -- --    03/25/25 2015 96.6 °F (35.9 °C) 109 25 93/51 69 -- -- 96 % -- -- -- -- -- --    03/25/25 2000 96.6 °F (35.9 °C) 109 24 105/59 77 -- -- 96 % -- -- -- -- 3 --    03/25/25 1945 96.6 °F (35.9 °C) 109 26 122/50 72 -- -- 96 % -- -- -- -- -- --    03/25/25 1930 96.6 °F (35.9 °C) 109 24 84/53 64 -- -- 97 % -- --  -- -- -- --    03/25/25 1915 96.6 °F (35.9 °C) 109 25 85/52 64 -- -- 97 % -- -- -- -- -- --    03/25/25 1900 96.6 °F (35.9 °C) 109 23 85/51 61 -- -- 97 % -- -- -- -- -- --    03/25/25 1845 96.6 °F (35.9 °C) 109 24 92/51 66 -- -- 96 % -- -- -- -- -- --    03/25/25 1830 96.6 °F (35.9 °C) 109 24 100/56 75 -- -- 97 % -- -- -- -- -- --    03/25/25 1815 96.6 °F (35.9 °C) 109 22 112/85 91 -- -- 97 % -- -- -- -- -- --    03/25/25 1807 -- -- -- -- -- -- -- -- -- -- -- -- -- Med Not Given for Pain - for MAR use only    03/25/25 1800 96.8 °F (36 °C) 109 22 86/54 66 -- -- -- -- -- -- -- -- --    03/25/25 1700 97 °F (36.1 °C) 106 20 91/42 61 -- -- 98 % -- -- -- -- -- --    03/25/25 1600 97.2 °F (36.2 °C) 110 22 87/50 63 -- -- 95 % -- -- -- -- 3 --    03/25/25 1533 -- -- -- -- -- -- -- 100 % -- -- -- -- -- --    03/25/25 1500 97.2 °F (36.2 °C) 107 17 103/51 73 -- -- 99 % -- -- -- -- -- --    03/25/25 1450 -- -- -- -- -- -- -- 100 % -- -- -- -- -- --    03/25/25 1400 97.3 °F (36.3 °C) 112 17 139/62 74 -- -- 95 % -- -- -- -- -- --    03/25/25 1321 -- -- -- -- -- -- -- 90 % -- -- -- -- -- --    03/25/25 1300 97.2 °F (36.2 °C) 106 20 111/56 81 -- -- 92 % -- -- -- -- -- --    03/25/25 1200 97.2 °F (36.2 °C) 106 21 121/57 82 -- -- 99 % 20 -- -- -- 3 --    03/25/25 1100 97.3 °F (36.3 °C) 106 21 93/53 71 -- -- 96 % 21 -- -- -- -- --    03/25/25 1052 -- -- -- -- -- -- -- 96 % -- -- -- -- -- --    03/25/25 1015 97.2 °F (36.2 °C) 106 17 99/50 71 -- -- 95 % 20 -- -- -- -- --    03/25/25 1000 97.3 °F (36.3 °C) 109 18 125/56 81 -- -- 91 % 20 -- -- -- -- --    03/25/25 0945 97.2 °F (36.2 °C) 106 12 117/56 80 -- -- 98 % 20 -- -- -- -- --    03/25/25 0930 97.2 °F (36.2 °C) 105 15 124/59 85 -- -- 98 % 20 -- -- -- -- --    03/25/25 0915 97.2 °F (36.2 °C) 105 16 121/57 82 -- -- 98 % 20 -- -- -- -- --    03/25/25 0900 97.2 °F (36.2 °C) 106 15 124/58 84 -- -- 98 % 20 -- -- -- -- --    03/25/25 0845 97.2 °F (36.2 °C) 104 14 118/58 84 -- -- 99 % 21 --  -- -- -- --    03/25/25 0830 97.2 °F (36.2 °C) 104 15 123/57 84 -- -- 100 % 21 -- -- -- -- --    03/25/25 0815 97 °F (36.1 °C) 103 15 116/59 83 -- -- 99 % 21 -- -- -- -- --    03/25/25 0800 97.3 °F (36.3 °C) 101 14 118/56 80 -- -- 100 % 20 -- -- -- 3 --    03/25/25 0745 -- -- -- -- -- -- -- 100 % -- -- -- -- -- --    03/25/25 0700 97.3 °F (36.3 °C) 99 16 117/58 80 -- -- 99 % 20 -- -- -- -- --    03/25/25 0645 97.3 °F (36.3 °C) 100 27 113/56 80 -- -- 99 % 20 -- -- -- -- --    03/25/25 0630 97.3 °F (36.3 °C) 103 16 114/56 81 -- -- 99 % 20 -- -- -- -- --    03/25/25 0615 97.3 °F (36.3 °C) 99 16 115/56 80 -- -- 100 % 20 -- -- -- -- --    03/25/25 0600 97.3 °F (36.3 °C) 99 15 111/54 78 -- -- 100 % 20 -- -- -- -- --    03/25/25 0545 97.5 °F (36.4 °C) 101 16 105/55 77 -- -- 100 % 20 -- -- -- -- --    03/25/25 0530 97.5 °F (36.4 °C) 102 19 112/57 80 -- -- 100 % 20 -- -- -- -- --    03/25/25 0515 97.5 °F (36.4 °C) 103 24 112/56 81 -- -- 99 % 20 -- -- -- -- --    03/25/25 0500 97.5 °F (36.4 °C) 99 18 115/54 78 -- -- 99 % 20 -- -- -- -- --    03/25/25 0445 97.5 °F (36.4 °C) 100 21 113/57 81 -- -- 99 % 20 -- -- -- -- --    03/25/25 0430 97.5 °F (36.4 °C) 99 15 111/56 81 -- -- 100 % 20 -- -- -- -- --    03/25/25 0415 97.5 °F (36.4 °C) 99 15 113/58 81 -- -- 100 % 21 -- -- -- -- --    03/25/25 0400 97.3 °F (36.3 °C) 99 15 125/60 86 -- -- 99 % 20 -- -- -- 3 --    03/25/25 0345 97.3 °F (36.3 °C) 101 18 155/67 96 -- -- 100 % 20 -- -- -- -- --    03/25/25 0330 97.3 °F (36.3 °C) 101 17 112/53 76 -- -- 86 % 20 -- -- -- -- --    03/25/25 0315 97.3 °F (36.3 °C) 102 34 116/55 79 -- -- 98 % 21 -- -- -- -- --    03/25/25 0300 97.3 °F (36.3 °C) 99 17 123/58 83 -- -- 100 % 21 -- -- -- -- --    03/25/25 0245 97.3 °F (36.3 °C) 100 15 122/59 85 -- -- 100 % 21 -- -- -- -- --    03/25/25 0230 97.3 °F (36.3 °C) 99 15 128/59 85 -- -- 99 % 20 -- -- -- -- --    03/25/25 0200 97.5 °F (36.4 °C) 98 15 135/66 93 -- -- 100 % 20 -- -- -- -- --    03/25/25  0145 97.5 °F (36.4 °C) 98 15 135/63 91 -- -- 100 % 20 -- -- -- -- --    03/25/25 0130 97.5 °F (36.4 °C) 99 16 130/60 86 -- -- 100 % 21 -- -- -- -- --    03/25/25 0115 97.5 °F (36.4 °C) 101 15 126/59 85 -- -- 99 % 20 -- -- -- -- --    03/25/25 0100 97.7 °F (36.5 °C) 101 16 132/60 87 -- -- 100 % 20 -- -- -- -- --    03/25/25 0045 97.7 °F (36.5 °C) 101 16 136/63 91 -- -- 99 % 20 -- -- -- -- --    03/25/25 0030 97.7 °F (36.5 °C) 101 16 127/60 87 -- -- 99 % 21 -- -- -- -- --    03/25/25 0015 97.7 °F (36.5 °C) 101 16 126/61 88 -- -- 99 % 20 -- -- -- -- --    03/25/25 0000 97.7 °F (36.5 °C) 103 17 129/61 88 -- -- 99 % 20 -- -- -- 3 --    03/24/25 2345 97.7 °F (36.5 °C) 102 17 129/61 90 -- -- 99 % 21 -- -- -- -- --    03/24/25 2330 97.7 °F (36.5 °C) 103 16 136/63 90 -- -- 100 % 21 -- -- -- -- --    03/24/25 2315 97.7 °F (36.5 °C) 102 17 135/61 88 -- -- 100 % 21 -- -- -- -- --    03/24/25 2300 97.7 °F (36.5 °C) 102 16 135/63 91 -- -- 100 % 20 -- -- -- -- --    03/24/25 2245 97.7 °F (36.5 °C) 104 16 134/63 91 -- -- 100 % 20 -- -- -- -- --    03/24/25 2230 97.7 °F (36.5 °C) 103 17 135/62 89 -- -- 100 % 20 -- -- -- -- --    03/24/25 2215 97.7 °F (36.5 °C) 103 17 125/57 82 -- -- 99 % 21 -- -- -- -- --    03/24/25 2200 97.7 °F (36.5 °C) 107 27 125/60 86 -- -- 98 % 21 -- -- -- -- --    03/24/25 2148 97.9 °F (36.6 °C) 104 16 125/60 -- -- -- -- -- -- -- -- -- --    03/24/25 2145 97.7 °F (36.5 °C) 104 16 126/58 84 -- -- 98 % 20 -- -- -- -- --    03/24/25 2130 97.9 °F (36.6 °C) 105 17 117/59 85 -- -- 98 % 21 -- -- -- -- --    03/24/25 2115 97.9 °F (36.6 °C) 107 16 123/58 84 -- -- 96 % 21 -- -- -- -- --    03/24/25 2100 98.1 °F (36.7 °C) 111 18 164/70 100 -- -- 95 % 21 -- -- -- -- --    03/24/25 2045 98.1 °F (36.7 °C) 114 54 121/58 83 -- -- 95 % 21 -- -- -- -- --    03/24/25 2030 98.2 °F (36.8 °C) 107 41 -- -- -- -- 97 % 21 -- -- -- -- --    03/24/25 2015 98.4 °F (36.9 °C) 105 28 120/58 83 -- -- 96 % 21 -- -- -- -- --    03/24/25 2000  98.4 °F (36.9 °C) 102 28 122/56 81 -- -- 97 % 20 -- -- -- 3 --    03/24/25 1945 98.2 °F (36.8 °C) 101 23 115/57 82 -- -- 100 % 21 -- -- -- -- --    03/24/25 1937 -- -- -- -- -- -- -- 98 % -- -- -- -- -- --    03/24/25 1936 -- -- -- -- -- -- -- 98 % -- -- -- -- -- --    03/24/25 1930 98.2 °F (36.8 °C) 102 27 118/58 83 -- -- 98 % 21 -- -- -- -- --    03/24/25 1915 98.1 °F (36.7 °C) 102 26 115/58 83 -- -- 98 % 21 -- -- -- -- --    03/24/25 1900 98.1 °F (36.7 °C) 103 17 116/54 78 -- -- 98 % 20 -- -- -- -- --    03/24/25 1800 98.1 °F (36.7 °C) 105 20 111/53 77 -- -- 97 % -- -- -- -- -- --    03/24/25 1759 98.2 °F (36.8 °C) 105 25 111/53 -- -- -- -- -- -- -- -- -- --    03/24/25 1745 98.1 °F (36.7 °C) 105 21 106/51 74 -- -- 97 % -- -- -- -- -- --    03/24/25 1730 98.1 °F (36.7 °C) 105 -- 119/56 81 -- -- 97 % -- -- -- -- -- --    03/24/25 1715 98.2 °F (36.8 °C) 106 42 111/56 80 -- -- 97 % -- -- -- -- -- --    03/24/25 1713 -- -- -- -- -- -- -- -- -- -- -- -- -- Med Not Given for Pain - for MAR use only    03/24/25 1700 98.2 °F (36.8 °C) 106 -- 118/60 86 -- -- 97 % -- -- -- -- -- --    03/24/25 1630 98.2 °F (36.8 °C) 106 -- 118/58 84 -- -- 98 % -- -- -- -- -- --    03/24/25 1615 98.2 °F (36.8 °C) 106 -- 126/62 89 -- -- 98 % -- -- -- -- -- --    03/24/25 1600 98.8 °F (37.1 °C) 106 26 137/63 90 -- -- 97 % -- -- -- -- 3 --    03/24/25 1552 -- -- -- -- -- -- -- 97 % 40 Ventilator -- -- -- --    03/24/25 1517 -- 105 -- 116/58 -- -- -- 96 % 21 -- -- -- -- --    03/24/25 1515 98.4 °F (36.9 °C) 103 -- 116/58 83 -- -- 98 % -- -- -- -- -- --    03/24/25 1505 -- 104 -- 118/58 -- -- -- 98 % 21 -- -- -- -- --    03/24/25 1430 98.6 °F (37 °C) 106 23 110/53 76 -- -- 98 % -- -- -- -- -- --    03/24/25 1415 98.6 °F (37 °C) 106 23 107/55 79 -- -- 98 % -- -- -- -- -- --    03/24/25 1401 -- -- -- -- -- -- -- 99 % 40 Ventilator -- -- -- --    03/24/25 1400 98.4 °F (36.9 °C) 104 20 116/55 79 -- -- 98 % -- -- -- -- -- --    03/24/25 1345 98.4 °F  (36.9 °C) 104 21 124/58 84 -- -- 98 % -- -- -- -- -- --    03/24/25 1330 98.2 °F (36.8 °C) 102 18 126/58 83 -- -- 99 % -- -- -- -- -- --    03/24/25 1315 98.2 °F (36.8 °C) 101 17 124/59 -- -- -- 99 % -- -- -- -- -- --    03/24/25 1300 97.9 °F (36.6 °C) 103 19 124/59 85 -- -- 99 % -- -- -- -- -- --    03/24/25 1250 97.7 °F (36.5 °C) 103 17 -- -- -- -- 99 % -- -- -- -- -- --    03/24/25 1245 97.7 °F (36.5 °C) 105 20 -- -- -- -- 99 % -- -- -- -- -- --    03/24/25 1240 97.9 °F (36.6 °C) 107 24 131/61 88 -- -- 100 % -- -- -- -- -- --    03/24/25 1230 97.9 °F (36.6 °C) 104 -- 121/56 81 -- -- 100 % -- -- -- -- -- --    03/24/25 1220 97.7 °F (36.5 °C) 103 18 -- -- -- -- 100 % -- -- -- -- -- --    03/24/25 1215 97.7 °F (36.5 °C) 104 25 122/60 86 -- -- 100 % -- -- -- -- -- --    03/24/25 1212 97.7 °F (36.5 °C) 103 21 122/60 -- -- -- -- -- -- -- -- -- --    03/24/25 1208 -- -- -- -- -- -- -- -- -- -- -- -- -- Med Not Given for Pain - for MAR use only    03/24/25 1200 97.9 °F (36.6 °C) 106 24 141/60 87 -- -- 100 % -- -- -- -- 3 --    03/24/25 1145 -- 102 19 114/56 81 -- -- -- -- -- -- -- -- --    03/24/25 1142 -- -- -- -- -- -- -- 100 % -- -- -- -- -- --    03/24/25 1130 -- 102 20 115/58 -- -- -- 99 % -- -- -- -- -- --    03/24/25 1115 97.5 °F (36.4 °C) 102 19 118/55 -- -- -- 99 % -- -- -- -- -- --    03/24/25 1105 -- 101 -- 115/56 81 -- -- -- -- -- -- -- -- --    03/24/25 1100 97.7 °F (36.5 °C) 104 18 115/56 81 -- -- 100 % -- -- -- -- -- --    03/24/25 1045 97.9 °F (36.6 °C) 102 19 109/53 76 -- -- 99 % -- -- -- -- -- --    03/24/25 1030 97.9 °F (36.6 °C) 102 20 114/54 78 -- -- 98 % -- -- -- -- -- --    03/24/25 1015 97.7 °F (36.5 °C) 105 21 115/55 79 -- -- 99 % -- -- -- -- -- --    03/24/25 1005 -- 103 -- 117/56 81 -- -- -- -- -- -- -- -- --    03/24/25 1000 98.1 °F (36.7 °C) 101 18 117/56 81 -- -- 98 % -- -- -- -- -- --    03/24/25 0945 98.1 °F (36.7 °C) 102 20 111/56 81 -- -- 99 % -- -- -- -- -- --    03/24/25 0930 98.1 °F  (36.7 °C) 101 19 111/54 78 -- -- 99 % -- -- -- -- -- --    03/24/25 0915 97.9 °F (36.6 °C) 100 19 109/53 77 -- -- 99 % -- -- -- -- -- --    03/24/25 0900 97.9 °F (36.6 °C) 102 18 109/55 79 -- -- 99 % -- -- -- -- -- --    03/24/25 0845 97.9 °F (36.6 °C) 100 19 114/53 77 -- -- 99 % -- -- -- -- -- --    03/24/25 0830 97.5 °F (36.4 °C) 100 -- 117/56 80 -- -- 100 % -- -- -- -- -- --    03/24/25 0828 -- -- -- -- -- -- -- 99 % 40 Ventilator -- -- -- --    03/24/25 0815 98.1 °F (36.7 °C) 100 18 114/53 77 -- -- 98 % -- -- -- -- -- --    03/24/25 0800 98.1 °F (36.7 °C) 100 19 127/58 83 -- -- 99 % -- -- -- -- 3 --    03/24/25 0700 98.1 °F (36.7 °C) 97 18 97/53 72 -- -- 99 % -- -- -- -- -- --    03/24/25 0630 97.9 °F (36.6 °C) 98 18 91/53 70 -- -- 98 % -- -- -- -- -- --    03/24/25 0615 97.9 °F (36.6 °C) 99 17 91/52 68 -- -- 98 % -- -- -- -- -- --    03/24/25 0600 97.9 °F (36.6 °C) 99 17 92/54 71 -- -- 98 % -- -- -- -- -- --    03/24/25 0545 97.9 °F (36.6 °C) 99 18 91/53 70 -- -- 98 % -- -- -- -- -- --    03/24/25 0530 97.9 °F (36.6 °C) 103 45 92/54 71 -- -- 100 % -- -- -- -- -- --    03/24/25 0515 98.1 °F (36.7 °C) 102 21 96/50 69 -- -- 99 % -- -- -- -- -- --    03/24/25 0500 98.1 °F (36.7 °C) 100 21 96/54 71 -- -- 98 % -- -- -- -- -- --    03/24/25 0445 97.9 °F (36.6 °C) 97 20 92/51 69 -- -- 99 % -- -- -- -- -- --    03/24/25 0430 97.9 °F (36.6 °C) 97 20 95/52 71 -- -- 99 % -- -- -- -- -- --    03/24/25 0415 97.7 °F (36.5 °C) 96 21 92/52 69 -- -- 99 % -- -- -- -- -- --    03/24/25 0400 97.7 °F (36.5 °C) 96 24 93/51 69 -- -- 99 % -- -- -- -- 3 --    03/24/25 0345 97.9 °F (36.6 °C) 98 22 90/49 67 -- -- 98 % -- -- -- -- -- --    03/24/25 0330 97.7 °F (36.5 °C) 98 19 97/54 72 -- -- 97 % -- -- -- -- -- --    03/24/25 0315 97.7 °F (36.5 °C) 97 27 93/51 68 -- -- 97 % -- -- -- -- -- --    03/24/25 0305 97.5 °F (36.4 °C) 97 26 89/51 65 -- -- 98 % -- -- -- -- -- --    03/24/25 0300 97.5 °F (36.4 °C) 96 23 86/50 63 -- -- 97 % -- -- --  -- -- --    03/24/25 0256 97.5 °F (36.4 °C) 97 24 85/48 62 -- -- 97 % -- -- -- -- -- --    03/24/25 0245 97.5 °F (36.4 °C) 98 29 87/51 67 -- -- 98 % -- -- -- -- -- --    03/24/25 0230 97.3 °F (36.3 °C) 98 26 91/51 68 -- -- 98 % -- -- -- -- -- --    03/24/25 0215 97.3 °F (36.3 °C) 96 25 106/53 76 -- -- 99 % -- -- -- -- -- --    03/24/25 0200 97.2 °F (36.2 °C) 96 24 106/51 73 -- -- 99 % -- -- -- -- -- --    03/24/25 0145 97.2 °F (36.2 °C) 95 25 114/56 80 -- -- 98 % -- -- -- -- -- --    03/24/25 0141 97.2 °F (36.2 °C) 97 26 85/49 63 -- -- 98 % -- -- -- -- -- --    03/24/25 0130 97.2 °F (36.2 °C) 98 26 88/52 67 -- -- 98 % -- -- -- -- -- --    03/24/25 0115 97.2 °F (36.2 °C) 98 27 97/51 70 -- -- 98 % -- -- -- -- -- --    03/24/25 0100 97 °F (36.1 °C) 96 24 107/55 77 -- -- 99 % -- -- -- -- -- --    03/24/25 0045 96.8 °F (36 °C) 98 23 105/53 75 -- -- 99 % -- -- -- -- -- --    03/24/25 0030 96.8 °F (36 °C) 96 22 109/53 76 -- -- 99 % -- -- -- -- -- --    03/24/25 0015 96.6 °F (35.9 °C) 96 20 114/53 75 -- -- 99 % -- -- -- -- -- --    03/24/25 0013 -- -- -- -- -- -- -- 99 % -- -- -- -- -- --    03/24/25 0000 96.6 °F (35.9 °C) 99 24 82/51 62 -- -- 98 % -- -- -- -- 3 --          Weight (last 2 days)       None            Pertinent Labs/Diagnostic Results:   Radiology:  CT chest abdomen pelvis w contrast   Final Interpretation by Tootie Meek MD (03/26 1637)      Chest:   Persistent pleural effusions with compressive atelectasis and possible superimposed lower lobe pneumonia compared to 3 days ago.      New left lower neck/chest and shoulder edema may be asymmetric edema from patient positioning, noting persistent anasarca. Correlate for trauma.         Abdomen and pelvis:   New findings concerning for. Gastric and bowel ischemia.      Mildly improved free fluid. No fluid collections.      Other stable findings above.               Workstation performed: [id number]         XR abdomen 1 view kub   Final Interpretation by Michael  Landon Kocher, MD (03/26 0916)      Redemonstration of air-filled loops of large and small bowel. Differential considerations include postoperative ileus versus obstruction. Close clinical follow-up is recommended to ensure resolution of these findings.               Workstation performed: [id number]         XR chest portable ICU   Final Interpretation by Burton Sears MD (03/26 1535)      Bilateral right greater than left basilar atelectasis and pleural effusions.      Lines and tubes appear satisfactory.            Workstation performed: [id number]         XR abdomen 1 view kub   Final Interpretation by Tova Ellington MD (03/25 8376)      Diffuse air-filled loops of small and large bowel probably represents ileus.      Follow-up is advised.      Right basilar infiltrate.               Workstation performed: [id number]         XR chest portable ICU   Final Interpretation by Nathan Davis MD (03/26 0835)      Stable right greater than left airspace opacities and pleural effusions. No pneumothorax.            Workstation performed: [id number]         XR chest portable ICU   Final Interpretation by Frederic Gomez MD (03/25 1614)      Persistent hazy bibasilar opacity which may represent layered effusion and/or parenchymal opacity            Workstation performed: [id number]         IR tunneled dialysis catheter placement   Final Interpretation by Cordelia Carolina MD (03/24 1609)      Insertion of right-sided tunneled dialysis catheter, with tip in the expected location of the right atrium.      Plan:      The catheter may be used immediately.      Workstation performed: [id number]         CT chest abdomen pelvis w contrast   Final Interpretation by Jean Cheung MD (03/23 0916)      1.  Status post partial small bowel resection and right hemicolectomy. There is dilatation of the proximal small bowel from the D4 segment to the level of the surgical anastomosis in the midline abdomen. While findings  could represent a postoperative    ileus, a small bowel obstruction at this level is not excluded. No pneumatosis.   2.  Moderate volume ascites with hyperdense appearance along the right paracolic gutter, suspicious for hemoperitoneum. Volume of free fluid has increased from 3/20/25. There is increased fluid and fat stranding around the midline incision with a focal    hyperdensity along the anterior abdominal fascia. This could represent the source of hemorrhage.   3.  Small to moderate bilateral pleural effusions with bibasilar atelectasis. Portions of the right lower lobe consolidation do not demonstrate contrast enhancement, which could represent superimposed infection.               Workstation performed: [id number]         CT head wo contrast   Final Interpretation by Jay Rick MD (03/23 0811)      Stable interval head CT. No acute hemorrhage or mass effect.                  Workstation performed: [id number]         XR chest portable ICU   Final Interpretation by Nathan Davis MD (03/24 0752)      Worsening pleural effusions and bibasilar atelectasis though pulmonary edema appears improved.            Workstation performed: [id number]         CT abdomen pelvis wo contrast   Final Interpretation by Sahil Lees MD (03/20 0235)      1.  Interval postsurgical changes of right hemicolectomy and small bowel resection. Loops of small bowel within the left abdomen demonstrate mural thickening and surrounding mesenteric edema, possibly representing sequelae of recent    surgery/instrumentation.   2.  Dilated loops of proximal small bowel, most compatible with postoperative adynamic ileus. No definite point of transition is identified within the limits of this unenhanced examination.   3.  Small volume of hemoperitoneum, improved from prior study.   4.  Bilateral pleural effusions. See concurrently performed CT of the chest.      Workstation performed: [id number]         CT chest wo contrast   Final  Interpretation by Darnell Dawkins MD (03/20 6271)      1.  Exam limited by motion artifact.   2.  Previously noted mild pulmonary edema appears largely resolved. A small 5 mm nodular density with spiculated edges in the left upper lobe is seen with evaluation limited by motion. No routine follow-up imaging recommended per current Fleischner    Society guidelines for low risk patient. Optional 12-month follow-up CT chest for high risk patients.   3.  Moderate bilateral pleural effusions with dependent bibasilar atelectasis is again seen, similar compared to the prior study.   4.  Extensive calcific atherosclerosis of the thoracic aorta, proximal thoracic vessels, and coronary arteries.   5.  Moderate perihepatic and perisplenic ascites noted in the upper abdomen. A small amount of free air in the anterior upper abdomen also seen which could be related to recent surgery. The extent of free air appears decreased compared to the prior    study.   6.  Hyperdense material within the distended gallbladder could represent vicarious contrast excretion, tiny gallstones, or viscous sludge/debris.   7.  Multiple anterior and lateral rib fracture deformities again seen bilaterally, described in detail in the prior CT chest exam.   8.  Other ancillary findings detailed above.               Workstation performed: [id number]         CT head wo contrast   Final Interpretation by E. Alec Schoenberger, MD (03/19 1012)      Recent ischemic changes better visualized on MRI.   No acute hemorrhage or mass effect.                     Workstation performed: [id number]         MRI brain w wo contrast   Final Interpretation by Miki Mott MD (03/18 6242)      New nearly symmetric cortical restricted diffusion in bilateral frontal, bilateral parietal, and bilateral occipital lobes, suspicious for hypoxic ischemic encephalopathy given history of recent cardiac arrest. Differential includes embolic cortical    infarcts, seizure  related changes, among other differentials.      New scattered small acute infarcts in left posterior frontal and bilateral cerebellar lobes. Favor embolic infarcts.      Additional chronic/incidental findings as detailed above.      The study was marked in EPIC for immediate notification.      Workstation performed: [id number]         CT head wo contrast   Final Interpretation by E. Alec Schoenberger, MD (03/17 0900)   No acute ischemic changes.   4 mm focus of high attenuation in the right frontal lobe is stable compared with CT from earlier today. Differential is unchanged and this could represent focus of mineralization versus hemorrhage.   Continued stability of punctate hyperdense focus in the left frontal lobe                     Workstation performed: [id number]         XR abdomen 1 vw portable   Final Interpretation by Michael Gray MD (03/16 0860)      Suture lines overlie the left and right hemiabdomen. No metallic foreign body.      Enteric tube courses over the diaphragm with sidehole and tip overlying the stomach.               Workstation performed: [id number]         CT head wo contrast   Final Interpretation by Jony Shelton MD (03/16 0958)      No definite CT evidence for anoxic brain injury as clinically questioned. Stable chronic ischemic changes, as described above.      New 5 mm focus of high attenuation within the superior right frontal lobe since prior CT from 2/20/2025. Differential diagnosis should include a small amount of acute hemorrhage versus parenchymal mineralization as correlated with prior MRI. Consider    repeat MRI for further evaluation.      Previously described focus of high attenuation within the inferior left frontal lobe is unchanged and again may represent an area of parenchymal mineralization or trace residual hemorrhage.         I personally discussed this study with ALEXIS TRACY on 3/16/2025 9:23 AM.                        Workstation performed: [id number]          XR chest portable ICU   Final Interpretation by José Miguel Tsang MD (03/16 0908)      Moderate congestive changes with small effusions.            Workstation performed: [id number]         CT high volume bleeding scan abdomen pelvis   Final Interpretation by Kanchan Naylor MD (03/15 1757)      1) Postoperative changes related to interval exploratory laparotomy and small bowel resection left in discontinuity with open abdomen with Abthera device in place, as described above.      2) Partially organized hematoma in the root of the mesentery measuring 5.3 cm with overlying packing material (including 4 lap pads). Two dominant hematomas in the right paracolic gutter measuring 5.6 x 2.5 cm and 3.8 x 2.9 cm, and several small    hematomas in the left paracolic gutter. No IV contrast extravasation to suggest active hemorrhage in the abdomen or pelvis.      3) Circumferential wall thickening throughout the ascending and proximal transverse colon, as well as multiple loops of small bowel, presumably reactive.      4) Additional findings as above.      Please see separate report for the CT of the chest performed concurrently.      The study was marked in EPIC for immediate notification.                  Workstation performed: [id number]         CT chest w contrast   Final Interpretation by Kanchan Naylor MD (03/15 1755)      1) Tubes and lines in adequate position.      2) Acute fractures of the right 3rd - 8th ribs and left 1st - 4th ribs, as detailed above, likely related to CPR. Right 6th and 7th rib fractures are mildly displaced while the rest of the fractures are nondisplaced.      3) Moderate to large bilateral pleural effusions, increased from February 2025. No evidence of hemothorax.      4) Compressive atelectasis of the lower lobes with some dependent atelectasis in the upper lobes.      5) Mild pulmonary edema.      6) Additional findings as above.      Please see separate report for the abdomen and  pelvis performed concurrently.      Additional findings as above.                  Workstation performed: [id number]         XR chest portable ICU   Final Interpretation by Jacqueline Ha MD (03/16 0717)      Moderate pulmonary edema with left base atelectasis and small pleural effusions.      No pneumothorax.      Acute rib fractures on CT not visible.            Workstation performed: [id number]         XR chest portable   Final Interpretation by Jacqueline Ha MD (03/16 0716)      Moderate pulmonary edema with large pleural effusions and bibasilar atelectasis.      Acute rib fractures on CT not visible.      ET tube 1 cm above the mauro. Recommend retracting.            Workstation performed: [id number]         XR chest portable   Final Interpretation by Jacqueline Ha MD (03/15 0725)      Moderate bibasilar opacity which could be due to aspiration or atelectasis.      Question small pleural effusions.            Workstation performed: [id number]         XR chest portable   Final Interpretation by Eric Kolb MD (03/07 1018)      Unchanged pulmonary edema and small pleural effusions. Pneumonia not excluded.            Workstation performed: [id number]         XR chest PICC line portable   Final Interpretation by Jacqueline Ha MD (03/02 0850)      Right PICC in upper SVC.      Moderate pulmonary venous congestion with small pleural effusions and bibasilar atelectasis.            Workstation performed: [id number]         CT chest w ct abdomen pelvis w wo contrast   Final Interpretation by Eric Willis MD (02/28 0812)   Addendum (preliminary) 1 of 1 by Eric Willis MD (02/28 0812)   ADDENDUM:      Correction to spleen section in the body of report. Hyperattenuation    should be hypoattenuation. Grossly stable posterior splenic subcapsular    hypodensity presumably an infarct unchanged allowing for difference in    contrast timing.            Final      CT  chest:      Decreased intracardiac blood pool density compatible with anemia.      New bilateral multi lobar interstitial thickening and tree-in-bud opacities may represent pulmonary vascular congestion or nonspecific inflammatory or infectious process.      Bilateral pleural effusions, left greater than right, mildly enlarged and additional findings suggestive of pulmonary vascular congestion. Correlate with clinical findings.      5 mm left upper lobe nodule stable since 2/11/2025 and new since December 2023. 6 mm right lower lobe nodule new since 2/11/2025. Noncontrast chest CT follow-up in 3 months is advised.      Additional chronic findings and negatives as above.      CT abdomen and pelvis:      No evidence of high-volume gastrointestinal bleeding.      Stable upper cecal/proximal ascending colonic mass attributed to recently biopsy-proven adenocarcinoma.      Colonic diverticulosis.      No evidence of abdominopelvic metastatic disease.      Additional chronic findings and negatives as above.      The study was marked in EPIC for immediate notification.               Workstation performed: [id number]         XR chest 1 view portable   Final Interpretation by Orion Zapien MD (02/28 6539)   Cardiomegaly with central congestion and left greater than right small basilar effusions.      Workstation performed: [id number]           Cardiology:  ECG 12 lead   Final Result by Liam Watson MD (03/22 1545)   Sinus tachycardia   Low voltage QRS   Nonspecific T wave abnormality   Abnormal ECG   Confirmed by Liam Watson ([id number]) on 3/22/2025 3:45:21 PM      ECG 12 lead   Final Result by Liam Watson MD (03/18 1809)   Sinus tachycardia   Otherwise normal ECG   When compared with ECG of 17-Mar-2025 07:36, (unconfirmed)   No significant change was found   Confirmed by Liam Watson ([id number]) on 3/18/2025 6:09:34 PM      Echo follow up/limited w/ contrast if indicated   Final Result by Sujata Whalen DO (03/17 9100)         Left Ventricle: Left ventricular cavity size is normal. Wall thickness    is increased. The left ventricular ejection fraction is 55%. Wall motion    cannot be accurately assessed. Unable to assess diastolic function.     Right Ventricle: Right ventricle is not well visualized but limited    images suggest dilatation with reduced function.      Technically difficult study with poor endocardial definition and poor    visualization of valves.          ECG 12 lead   Final Result by Liam Watson MD (03/18 1808)   Sinus tachycardia   Otherwise normal ECG   When compared with ECG of 17-Mar-2025 07:29, (unconfirmed)   No significant change was found   Confirmed by Liam Watson ([id number]) on 3/18/2025 6:08:46 PM      ECG 12 lead   Final Result by Liam Watson MD (03/18 1808)   Normal sinus rhythm   Normal ECG   Confirmed by Liam Watson ([id number]) on 3/18/2025 6:08:41 PM      ECG 12 lead   Final Result by Liam Watson MD (03/18 1808)   Sinus tachycardia   Otherwise normal ECG   Confirmed by Liam Watson ([id number]) on 3/18/2025 6:08:29 PM      ECG 12 lead   Final Result by Gianluca Montanez MD (03/02 2256)   Normal sinus rhythm   Normal ECG   When compared with ECG of 13-Feb-2025 10:11,   Minimal criteria for Inferior infarct are no longer Present   Confirmed by Gianluca Montanez ([id number]) on 3/2/2025 10:56:31 PM        GI:  Bronchoscopy   Final Result by Janie Lofton MD (03/26 8342)              Results from last 7 days   Lab Units 03/27/25  0422 03/26/25  2335 03/26/25  1152 03/26/25  0433 03/25/25  2332 03/22/25  1617 03/22/25  0425   WBC Thousand/uL 5.78 9.54 9.98 10.01  --    < > 12.07*   HEMOGLOBIN g/dL 7.0* 8.2* 9.2* 10.1* 9.8*   < > 8.2*   I STAT HEMOGLOBIN   --   --   --   --   --    < >  --    HEMATOCRIT % 21.5* 25.3* 29.2* 30.7* 30.4*   < > 25.7*   HEMATOCRIT, ISTAT   --   --   --   --   --    < >  --    PLATELETS Thousands/uL 56* 78* 79* 72*  --    < > 122*   TOTAL NEUT ABS Thousands/µL  --   --   --   --    "--   --  9.63*   BANDS PCT % 41*  --  27* 9*  --    < >  --     < > = values in this interval not displayed.         Results from last 7 days   Lab Units 03/27/25  0422 03/26/25 2152 03/26/25  1642 03/26/25  1028 03/26/25  0433   SODIUM mmol/L 134* 135 132* 134* 135   POTASSIUM mmol/L 4.0 4.1 4.4 3.8 4.1   CHLORIDE mmol/L 105 105 103 104 104   CO2 mmol/L 23 23 21 23 25   ANION GAP mmol/L 6 7 8 7 6   BUN mg/dL 32* 31* 32* 30* 29*   CREATININE mg/dL 0.76 0.80 0.87 0.81 0.92   EGFR ml/min/1.73sq m 88 86 83 85 79   CALCIUM mg/dL 7.9* 8.3* 7.9* 8.2* 8.2*   CALCIUM, IONIZED mmol/L 1.12 1.18 1.10* 1.12 1.18   MAGNESIUM mg/dL 1.9 2.0 1.9 2.0 1.9   PHOSPHORUS mg/dL 2.1* 2.0* 2.2* 2.0* 2.1*     Results from last 7 days   Lab Units 03/23/25  1932   AST U/L 19   ALT U/L 4*   ALK PHOS U/L 60   TOTAL PROTEIN g/dL 4.3*   ALBUMIN g/dL 2.5*   TOTAL BILIRUBIN mg/dL 0.56     Results from last 7 days   Lab Units 03/27/25  0529 03/26/25  2355 03/26/25  1155 03/26/25  0606 03/25/25  2334 03/25/25  0549 03/24/25  2358 03/24/25  1801 03/24/25  1203 03/24/25  0554 03/24/25  0012 03/23/25  1153   POC GLUCOSE mg/dl 175* 156* 143* 133 144* 123 123 149* 155* 156* 160* 124     Results from last 7 days   Lab Units 03/27/25  0422 03/26/25 2152 03/26/25  1642 03/26/25  1028 03/26/25  0433 03/25/25  2332 03/25/25  1656 03/25/25  1109 03/25/25  0440 03/24/25  2351 03/24/25  1623 03/24/25  1204   GLUCOSE RANDOM mg/dL 180* 204* 281* 148* 160* 145* 201* 128 125 125 149* 157*             No results found for: \"BETA-HYDROXYBUTYRATE\"   Results from last 7 days   Lab Units 03/26/25  2336 03/26/25  1740 03/23/25  2108   PH ART  7.375 7.400 7.323*   PCO2 ART mm Hg 35.3* 36.1 42.7   PO2 ART mm Hg 89.0 100.4 84.9   HCO3 ART mmol/L 20.2* 21.9* 21.7*   BASE EXC ART mmol/L -4.5 -2.5 -4.1   O2 CONTENT ART mL/dL 11.9* 13.3* 12.3*   O2 HGB, ARTERIAL % 95.3 96.3 94.0   ABG SOURCE  Line, Arterial Artery Line, Arterial     Results from last 7 days   Lab Units " 03/26/25  0753 03/25/25  2152 03/25/25  1720   PH DEAN  7.340 7.328 7.318   PCO2 DEAN mm Hg 44.9 46.0 49.4   PO2 DEAN mm Hg 31.3* 32.9* 32.7*   HCO3 DEAN mmol/L 23.7* 23.6* 24.8   BASE EXC DEAN mmol/L -2.1 -2.4 -1.6   O2 CONTENT DEAN ml/dL 8.8 9.4 9.5   O2 HGB, VENOUS % 60.4 63.9 63.6     Results from last 7 days   Lab Units 03/23/25  1811   I STAT BASE EXC mmol/L -6*   I STAT O2 SAT % 100*   ISTAT PH ART  7.175*   I STAT ART PCO2 mm HG 58.6*   I STAT ART PO2 mm .0*   I STAT ART HCO3 mmol/L 21.6*                 Results from last 7 days   Lab Units 03/26/25  0833 03/26/25  0319 03/25/25  1937 03/25/25  1444 03/24/25  2158   PROTIME seconds  --   --   --  16.0* 16.4*   INR   --   --   --  1.20* 1.24*   PTT seconds 79* 81* 123* 45*  --              Results from last 7 days   Lab Units 03/27/25  0422 03/27/25  0227 03/26/25  2335 03/26/25  2152 03/26/25  2045 03/26/25  1755 03/26/25  1614   LACTIC ACID mmol/L 2.0 1.9 2.0  2.0 2.3* 2.3* 2.5* 2.7*                         Results from last 7 days   Lab Units 03/25/25  0547 03/24/25  0547   UNIT PRODUCT CODE  G7598X17  [id number]  T1245D67 G5937K18  P9070O99  S6157Q87  M3729P60  U8573H59  A8494O44  Q4001L42  Z2076O45   UNIT NUMBER  [id number]  [id number]  [id number]  [id number]  [id number]  [id number]  [id number]  [id number]  [id number]  [id number]   UNITABO  O  O  O AB  AB  A  A  O  O  O  O   UNITRH  POS  NEG  NEG POS  POS  POS  NEG  NEG  NEG  NEG  NEG   CROSSMATCH  Compatible  Compatible Compatible  Compatible  Compatible  Compatible   UNIT DISPENSE STATUS  Presumed Trans  Presumed Trans  Presumed Trans Presumed Trans  Presumed Trans  Presumed Trans  Presumed Trans  Presumed Trans  Presumed Trans  Presumed Trans  Presumed Trans   UNIT PRODUCT VOL mL 300  300  250 280  280  250  280  350  250  250  250             Results from last 7 days   Lab Units  03/27/25  0422   CRP mg/L 166.3*   SED RATE mm/hour 5                                             Results from last 7 days   Lab Units 03/26/25  1739 03/25/25  1548   BLOOD CULTURE  Received in Microbiology Lab. Culture in Progress.  Received in Microbiology Lab. Culture in Progress.  --    GRAM STAIN RESULT   --  3+ Gram negative rods*  2+ Gram positive rods*  Rare Disintegrating polys*     Results from last 7 days   Lab Units 03/25/25  1109   TOTAL COUNTED  100               Network Utilization Review Department  ATTENTION: Please call with any questions or concerns to [phone] and carefully listen to the prompts so that you are directed to the right person. All voicemails are confidential.   For Discharge needs, contact Care Management DC Support Team at [phone] opt. 2  Send all requests for admission clinical reviews, approved or denied determinations and any other requests to dedicated fax number below belonging to the campus where the patient is receiving treatment. List of dedicated fax numbers for the Facilities:  FACILITY NAME UR FAX NUMBER   ADMISSION DENIALS (Administrative/Medical Necessity) [phone]   DISCHARGE SUPPORT TEAM (NETWORK) [phone]   PARENT CHILD HEALTH (Maternity/NICU/Pediatrics) [phone]   [facility] [phone]   [facility] [phone]   [facility] [phone]   [facility] [phone]   [facility] [phone]   [facility] [phone]   [facility] [phone]   [facility] [phone]   [facility] [phone]   [facility] [phone]   [facility] [phone]   [facility] [phone]

## 2025-03-27 NOTE — ASSESSMENT & PLAN NOTE
GOC discussion held last night with Family and Dr. Epperson. Pt remains level 2 code status with plan to repeat labs/endpoints tonight and in AM. If endpoints continue to trend in the wrong direction despite vasopressor support, CRRT he may transition to comfort.  Plan for further goals of care discussion this morning

## 2025-03-27 NOTE — ASSESSMENT & PLAN NOTE
Creatinine   Date Value Ref Range Status   03/26/2025 0.80 0.60 - 1.30 mg/dL Final     Comment:     Standardized to IDMS reference method   09/25/2022 1 0.6 - 1.2 mg/dL Final      Likely initially in the setting of hemorrhagic shock, now with ATN   Baseline Cr 0.8   Nephrology following, appreciate recommendations  Monitor and replete electrolytes Q6H  Monitor I/O and renal indices   Continues to have low urine output and anasarca with hypotension  Nephro consented family for dialysis, temporary HD line placed 3/21 - tunneled HD cath placed 3/24  Tolerating CVVH, continue UF NET even due to hypotension, now on vasopressors  3/26 - Received 500cc albumin 1.25L and 750ml crystalloid with improvement in hypotension.

## 2025-03-27 NOTE — ASSESSMENT & PLAN NOTE
Hypotension and lactate persistently in the 2's despite CRRT  CT A/P showed gastric and bowel ischemia.   GOC discussion held with family, plan to remain level 2 code status for now, continue current treatment plan.   Continue to trend end-points overnight, if no improvement by the morning, family may consider transition to comfort care.   No operative intervention per general surgery.

## 2025-03-27 NOTE — ASSESSMENT & PLAN NOTE
Patient sustained cardiac arrest in the setting of an undetectable hemoglobin on 3/15 and was taken emergently to the operating room for exploration in the setting of abdominal free fluid.  Large volume hemoperitoneum was encountered with evidence of a actively bleeding/decompressing mesenteric hematoma; bleeding control was obtained and on 3/15 taken to OR for a segmental small bowel resection was performed and the patient was left in discontinuity given ongoing instability.  S/p SB anastomosis, R hemicolectomy, closure on 3/16.  3/23 ex lap, evacuation of hematoma- To     Increasing pressor requirement yesterday prompted CT CAP revealing pneumatosis and portal venous gas    250 cc albumin overnight    Plan  - Discussed with family; will continue to pursue maximal medical therapy  -Continued resuscitation by ICU team  - Continue NG tube to suction/n.p.o.   - Renew TPN  - Monitor hemoglobin closely in the setting of bloody NG tube output and heparin drip  - Neurology recs, nephrology  -Continue CRRT  - Wean vent as tolerated  - Rest of care per ICU

## 2025-03-27 NOTE — WOUND OSTOMY CARE
Consult Note - Wound   Devin Chaves 79 y.o. male MRN: [id number]  Unit/Bed#: ICU 10 Encounter: [id number]        History and Present Illness:  Patient is a  yo male that was admitted to [facility]  for treatment of melena. Patient has a PMH of  A-fib on Eliquis, COPD home O2 2-4 L, HTN, CVA, MSSA bacteremia, colonic adenocarcinoma.  Hospital course complicated with acute blood loss anemia, cardiac arrest and multiple trips to OR. Patient is in ICU, on critical care low air loss mattress, intubated, sedated, vasopressors infusing, TPN for nutrition, anuric, and on CRRT.     Wound Care was consulted for sacral wound. Virtual wound care consultation completed for patient per discussion with primary RN, assessment of pictures in media, as well as consideration of previous wound care notes. Wound care to follow patient when available and clinically appropriate.    Assessment Findings:     HA Mid Sacrum DTPI - appears to be irregular shaped area of intact dark purple skin. Lis wound appears hyperpigmented. Unable to visualize any open aspects. Recommend silicone foam dressing.  Deep Tissue Pressure Injury wounds have the potential to evolve into unstageable, stage III or stage IV wounds. Consider combined etiology of pressure, friction, hypotension requiring vasopressors, prolonged NPO status, and multiple trips to OR.       Orders listed below and wound care will continue to follow, call or Secure Chat with questions.     Skin care plans:  1-Hydraguard to bilateral heels BID and PRN  2-Elevate heels to offload pressure.  3-Ehob cushion in chair when out of bed.  4-Moisturize skin daily with skin nourishing cream.  5-Turn/reposition q2h for pressure re-distribution on skin.  6-Mid Sacro-Buttocks Wound: Cleanse sacro-buttocks with soap and water. Apply a Silicone Bordered Foam Dressing(Mepilex) to area. Brandon with T for Treatment. Change every other day or PRN. Peel back and inspect skin at least Q-shift.    Wounds:  Wound  02/18/25 Other (comment) Sacrum (Active)   Wound Image   03/25/25 1220   Wound Description Light purple 03/26/25 1600   Treatments Cleansed 03/17/25 1200   Dressing Foam, Silicon (eg. Allevyn, etc) 03/27/25 1200   Dressing Changed Changed 03/27/25 0400   Patient Tolerance Tolerated well 03/27/25 0400   Dressing Status Clean;Dry;Intact 03/27/25 1200           Brit Phillips RN, BSN, CCRN

## 2025-03-27 NOTE — ASSESSMENT & PLAN NOTE
Patient developed septic shock overnight, with hypotension and bandemia.  Given multiple intra-abdominal complications recently, source of sepsis is most likely intra-abdominal.  Antibiotic regimen has been broadened to vancomycin/Zosyn.  Given that this is not an isolated event but a complication of multiple problems below, prognosis for recovery is quite poor.  Continue vancomycin/Zosyn for now.  Monitor temperature/WBC.  Monitor hemodynamics.  Pressor support per critical surgery service.    Follow-up on blood cultures obtained last night.

## 2025-03-27 NOTE — ASSESSMENT & PLAN NOTE
Hypotensive AM of 3.26 etiology unclear, lactate low 2's despite CRRT, additional end-points unremarkable  WBC 9  Afebrile - hypothermic  Suspected source unknown at this time.   CT A/P showed gastric and bowel ischemia.   Blood cultures x 2 obtained  Restarted ABX: Zosyn/Vancomycin.  Started on vasopressors Levophed/Vasopressin for MAP goal > 65  3/26 - Received 500cc albumin 1.25L and 750ml crystalloid with improvement in hypotension.   CRRT changed to rune NET even.   A-line placed - low diastolic  Trend lactate, check Procal in AM, trend WBC and fever curve.

## 2025-03-27 NOTE — PROGRESS NOTES
Progress Note - Surgery-General   Name: Devin Chaves 79 y.o. male I MRN: [id number]  Unit/Bed#: ICU 10 I Date of Admission: 2/28/2025   Date of Service: 3/27/2025 I Hospital Day: 27    Assessment & Plan  Acute blood loss anemia  Patient sustained cardiac arrest in the setting of an undetectable hemoglobin on 3/15 and was taken emergently to the operating room for exploration in the setting of abdominal free fluid.  Large volume hemoperitoneum was encountered with evidence of a actively bleeding/decompressing mesenteric hematoma; bleeding control was obtained and on 3/15 taken to OR for a segmental small bowel resection was performed and the patient was left in discontinuity given ongoing instability.  S/p SB anastomosis, R hemicolectomy, closure on 3/16.  3/23 ex lap, evacuation of hematoma- To     Increasing pressor requirement yesterday prompted CT CAP revealing pneumatosis and portal venous gas    250 cc albumin overnight    Plan  - Discussed with family; will continue to pursue maximal medical therapy  -Continued resuscitation by ICU team  - Continue NG tube to suction/n.p.o.   - Renew TPN  - Monitor hemoglobin closely in the setting of bloody NG tube output and heparin drip  - Neurology recs, nephrology  -Continue CRRT  - Wean vent as tolerated  - Rest of care per ICU  HTN (hypertension)    CAD (coronary artery disease)    COPD (chronic obstructive pulmonary disease) (HCC)    History of CVA (cerebrovascular accident)    Acute on chronic respiratory failure with hypoxia (HCC)    Atrial fibrillation (HCC)    Urinary retention    Neck pain    Colonic mass    Renal failure    Fluid overload    Dysphagia    Severe protein-calorie malnutrition (HCC)  Malnutrition Findings:   Adult Malnutrition type: Acute illness  Adult Degree of Malnutrition: Other severe protein calorie malnutrition  Malnutrition Characteristics: Inadequate energy, Fluid accumulation                  360 Statement: related to inadequate  energy/protein intake as evidenced by consuming < 50% of energy intake compared to estimated needs for > 5 days and B/L LE +3 edema. Treated with ONS.    BMI Findings:           Body mass index is 30.65 kg/m².     Hallucinations, visual    Cardiac arrest (HCC)    Encephalopathy    Cardiac arrest due to other underlying condition (HCC)    Palliative care by specialist    Counseling regarding advance care planning and goals of care    Embolic cerebral infarction (HCC)    Volume overload    Septic shock (HCC)    Gastric and bowel ischemia.          Subjective   Intubated, sedated    Objective :  Temp:  [96.4 °F (35.8 °C)-98.2 °F (36.8 °C)] 96.4 °F (35.8 °C)  HR:  [] 94  BP: ()/() 119/57  Resp:  [17-39] 18  SpO2:  [88 %-99 %] 95 %  O2 Device: Ventilator    I/O         03/23 0701  03/24 0700 03/24 0701  03/25 0700 03/25 0701  03/26 0700    I.V. (mL/kg) 1446.6 (14.2) 969 (9.5)     Blood 2400 1083.3     NG/      IV Piggyback 731 1391     TPN 1327 1221     Total Intake(mL/kg) 6055.6 (59.4) 4664.3 (45.7)     Urine (mL/kg/hr) 0 (0) 0 (0)     Emesis/NG output 1525 600     Other 2789 5241     Blood 2400      Total Output 6714 5841     Net -658.4 -1176.7                  Lines/Drains/Airways       Active Status       Name Placement date Placement time Site Days    PICC Line 02/26/25 Right Brachial 02/26/25  0548  Brachial  29    CVC Central Lines 03/15/25 Triple 03/15/25  1338  --  11    Arterial Line 03/26/25 Radial 03/26/25  2236  Radial  less than 1    HD Permanent Double Catheter 03/24/25  1522  Internal jugular  2    ETT  Cuffed 8 mm 03/15/25  1200  -- 11    NG/OG Tube Nasogastric Left nare 03/15/25  1700  Left nare  11                  Physical Exam  General: Intubated and sedated  Skin: Warm, dry, anicteric  HEENT: ET tube present  CV: RRR  Pulm: Mechanically ventilated  Abd: Distended and tympanic; midline dressing with serosanguineous saturation which was exchanged, staples in place with minimal  serosanguineous drainage expressed from the wound.  Neuro: Sedated    Lab Results: I have reviewed the following results:  Recent Labs     03/25/25  1444 03/25/25  1447 03/26/25  0833 03/26/25  1028 03/27/25  0422   WBC  --    < >  --    < > 5.78   HGB  --    < >  --    < > 7.0*   HCT  --    < >  --    < > 21.5*   PLT  --    < >  --    < > 56*   BANDSPCT  --    < >  --    < > 41*   SODIUM  --    < >  --    < > 134*   K  --    < >  --    < > 4.0   CL  --    < >  --    < > 105   CO2  --    < >  --    < > 23   BUN  --    < >  --    < > 32*   CREATININE  --    < >  --    < > 0.76   GLUC  --    < >  --    < > 180*   CAIONIZED  --    < >  --    < > 1.12   MG  --    < >  --    < > 1.9   PHOS  --    < >  --    < > 2.1*   PTT 45*   < > 79*  --   --    INR 1.20*  --   --   --   --    LACTICACID  --    < >  --    < > 2.0    < > = values in this interval not displayed.

## 2025-03-27 NOTE — ASSESSMENT & PLAN NOTE
Acute blood loss on 3/15/25 and received more than 10 units PRBC   S/p emergent ex lap and control of bleeding  Hemoglobin 7.0.  Receiving unit of packed cells  Management per ICU team

## 2025-03-27 NOTE — ASSESSMENT & PLAN NOTE
Resolved  CT A/P shows bilateral pleural effusions L>R, pulmonary vascular congestion.  Adequate fluid removed with CRRT, pt developed hypotension, BP improved with fluid boluses.      Plan:  Continue mechanical ventilation.  Continue CVVH, NET even for now  Maintain accurate I&O.  Daily weights.

## 2025-03-27 NOTE — ASSESSMENT & PLAN NOTE
Currently rate controlled in NSR.  Consider restarting home metoprolol for rate control.  Hold home eliquis/heparin in setting of ABD bleeding.   Tele monitoring.

## 2025-03-27 NOTE — ASSESSMENT & PLAN NOTE
Patient had MSSA bacteremia during recent hospitalization.  Source is unclear but likely cutaneous.  His bacteremia cleared rapidly on IV antibiotic.  TTE did not show any vegetation.  Patient completed antibiotic course for this indication.  No further antibiotic needed for this indication.

## 2025-03-27 NOTE — ASSESSMENT & PLAN NOTE
Concern for anoxic injury s/p cardiac arrest with 12 minute downtime.   EEG without seizure activity.  CTH shows 4mm focus of high attenuation in right frontal lobe which is stable on repeat CTH.   MRI with multiple areas of hypoxia related injury as well as acute embolic strokes.  Uremia resolved with CRRT  Pt remains unresponsive.  Frequent neuro checks.  Maintain normothermia and normoglycemia.   Neurology following, appreciate recommendations.

## 2025-03-27 NOTE — PROGRESS NOTES
Progress Note - Infectious Disease   Name: Devin Chaves 79 y.o. male I MRN: [id number]  Unit/Bed#: ICU 10 I Date of Admission: 2/28/2025   Date of Service: 3/27/2025 I Hospital Day: 27    Assessment & Plan  Septic shock (HCC)  Patient developed septic shock overnight, with hypotension and bandemia.  Given multiple intra-abdominal complications recently, source of sepsis is most likely intra-abdominal.  Antibiotic regimen has been broadened to vancomycin/Zosyn.  Given that this is not an isolated event but a complication of multiple problems below, prognosis for recovery is quite poor.  Continue vancomycin/Zosyn for now.  Monitor temperature/WBC.  Monitor hemodynamics.  Pressor support per critical surgery service.    Follow-up on blood cultures obtained last night.  MSSA bacteremia (Resolved: 3/27/2025)  Patient had MSSA bacteremia during recent hospitalization.  Source is unclear but likely cutaneous.  His bacteremia cleared rapidly on IV antibiotic.  TTE did not show any vegetation.  Patient completed antibiotic course for this indication.  No further antibiotic needed for this indication.  Neck pain  Patient developed acute neck pain with MSSA bacteremia during recent hospitalization.  CRP was highly elevated.  C-spine MRI showed possible C-spine and paraspinal infection.  Patient has no neurologic deficit.  His neck pain is finally improving.  However, given paraspinal infection on the initial C-spine MRI, we should repeat C-spine MRI with contrast when creatinine is improved to confirm resolution/improvement of paraspinal infection.  However, at this point, this is not an active issue.  Patient is currently on broad-spectrum antibiotic for septic shock, likely intra-abdominal in etiology.  Antibiotic plan as in above.  Acute blood loss anemia  Patient developed acute bleeding from rectal mass, resulting in rapid response, and subsequent cardiac arrest.  He is status post transfusion.  He is also status post  small bowel resection and right hemicolectomy.  He is status post exploratory laparotomy and evacuation of intra-abdominal hematoma over the weekend.  Management per primary service.  Renal failure  Patient with ORIANA on admission.  Creatinine had worsened and patient is now on CVVH.  Antibiotic dosages adjusted accordingly.  Monitor creatinine.  CVVH per nephrology.  Colonic mass  Patient has recently diagnosed adenocarcinoma of the colon.  He now status post bowel resection for bleeding from adenocarcinoma.  No plan for chemotherapy yet.  Colorectal surgery follow-up.  Acute on chronic respiratory failure with hypoxia (HCC)  Patient is now intubated after cardiac arrest.  Plan was for tracheostomy but this is on hold due to deterioration above.  Cardiac arrest due to other underlying condition (HCC)  Patient is status post arrest, successfully resuscitated.  Encephalopathy  Patient remains obtunded postcardiac arrest.  Concern is for anoxic encephalopathy.  Head CT is without acute changes.  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 Patient remains obtunded, without any response to verbal or tactile stimuli.  Monitor mental status.        Antibiotics:  Vancomycin/Zosyn # 1    Subjective   Patient deteriorated overnight, with hypotension and bandemia.  Antibiotic regimen was broadened to vancomycin/Zosyn.  At present, patient remains obtunded on ventilator.    Objective :  Temp:  [96.4 °F (35.8 °C)-98.2 °F (36.8 °C)] 96.4 °F (35.8 °C)  HR:  [] 94  BP: ()/() 119/57  Resp:  [17-39] 18  SpO2:  [88 %-99 %] 99 %  O2 Device: Ventilator    Physical Exam:     General: Obtunded on ventilator.   Neck:  Supple. No mass.  No lymphadenopathy.   Lungs: Expansion symmetric, diffuse rhonchi, no rales, no wheezing.   Heart:  Regular rate and rhythm, S1 and S2 normal, no murmur.   Abdomen: Soft, distended, dressing with moderate serosanguineous  saturation, no purulent.   Extremities: Stable leg edema. No erythema/warmth. No draining ulcer. Nontender to palpation.   Skin:  No rash.   Neuro: Not assessable.        Lab Results: I have reviewed the following results:  Results from last 7 days   Lab Units 03/27/25  0422 03/26/25  2335 03/26/25  1152   WBC Thousand/uL 5.78 9.54 9.98   HEMOGLOBIN g/dL 7.0* 8.2* 9.2*   PLATELETS Thousands/uL 56* 78* 79*     Results from last 7 days   Lab Units 03/27/25  0422 03/26/25  2152 03/26/25  1642 03/23/25  2337 03/23/25  1932 03/23/25  1811   SODIUM mmol/L 134* 135 132*   < > 135  --    POTASSIUM mmol/L 4.0 4.1 4.4   < > 4.6  --    CHLORIDE mmol/L 105 105 103   < > 105  --    CO2 mmol/L 23 23 21   < > 25  --    CO2, I-STAT mmol/L  --   --   --   --   --  23   BUN mg/dL 32* 31* 32*   < > 46*  --    CREATININE mg/dL 0.76 0.80 0.87   < > 1.47*  --    EGFR ml/min/1.73sq m 88 86 83   < > 45  --    GLUCOSE, ISTAT mg/dl  --   --   --   --   --  180*   CALCIUM mg/dL 7.9* 8.3* 7.9*   < > 7.6*  --    AST U/L  --   --   --   --  19  --    ALT U/L  --   --   --   --  4*  --    ALK PHOS U/L  --   --   --   --  60  --    ALBUMIN g/dL  --   --   --   --  2.5*  --     < > = values in this interval not displayed.     Results from last 7 days   Lab Units 03/26/25  1739 03/25/25  1548   BLOOD CULTURE  Received in Microbiology Lab. Culture in Progress.  Received in Microbiology Lab. Culture in Progress.  --    GRAM STAIN RESULT   --  3+ Gram negative rods*  2+ Gram positive rods*  Rare Disintegrating polys*         Results from last 7 days   Lab Units 03/27/25  0422   CRP mg/L 166.3*

## 2025-03-27 NOTE — PROGRESS NOTES
"Progress Note - Palliative Care   Name: Devin Chaves 79 y.o. male I MRN: [id number]  Unit/Bed#: ICU 10 I Date of Admission: 2/28/2025   Date of Service: 3/27/2025 I Hospital Day: 27    Assessment & Plan  Counseling regarding advance care planning and goals of care   - met with spouse, son, and family friend at bedside   - discussed new findings concerning for gastric/bowel ischemia, with discussion overnight with surgeon and high-risk for mortality given critical and life-threatening condition, spouse in agreement that surgery would not be safe. Continue current critical management.   - spouse and son communicated gratitude towards ICU RN and ICU staff for care provided for the patient   - all questions/concerns addressed    At this time the goal is to see if the patient can improve with current critical cares, recognizing that his clinical condition could deteriorate at any time. If the patient does not improve or clinically worsens, the family is open to end-of-life discussions.  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 If competence is lost, patient's substitute decision maker would default to spouse Francia (first), son Devin (alternate)               Advance Directive / Living Will / POLST:  POA document on file  Cardiac arrest due to other underlying condition (HCC)  In setting of hemorrhagic shock    In efforts done with achievement of ROSC    Critical care team will likely do MRI brain to evaluate for anoxic brain injury--> hypoxic ischemic encephalopathy and new scattered small acute infarcts left posterior frontal and bilateral cerebellar lobes, likely embolic  Follow-up on video EEG--> no evidence of seizure activity  Acute blood loss anemia  In setting of mesenteric bleed and hematoma  S/p small bowel resection and right hemicolectomy  Colonic mass  2/20/2025 EGD/colonoscopy revealed 2.5 cm ileocecal mass    Pathology confirmed: Adenocarcinoma    Mass has not been removed according to surgical notes  Embolic cerebral infarction (HCC)  Neurology following    MRI Brain [03/18/2025] new nearly symmetric cortical restricted diffusion in bilateral frontal, bilateral parietal, and bilateral occipital lobes, suspicious for HIE given history of recent cardiac arrest; new scattered small acute infarcts in left posterior frontal and bilateral cerebellar lobes, favor embolic infarct.    Plan is to restart heparin gtt   Gastric and bowel ischemia.   - General Surgery following   - continued maximal medical management   - no surgical recommendations at this time    CT CAP [03/26/2025] persistent pleural effusions with compressive atelectasis and possible superimposed lower lobe PNA; new L lower neck/chest/shoulder edema may be asymmetric edema from patient positioning; new findings in abdomen concerning for gastric/bowel ischemia.    PDMP Review: I have reviewed the patient's controlled substance dispensing history in the  Prescription Drug Monitoring Program in compliance with the [facility] regulations before prescribing any controlled substances.    Subjective   Remains intubated on vasopressors. Continued CRRT. Bowel Ischemia identified on CT imaging yesterday.    Objective :  Temp:  [96.4 °F (35.8 °C)-98.2 °F (36.8 °C)] 97.5 °F (36.4 °C)  HR:  [] 103  BP: ()/(31-68) 118/54  Resp:  [17-42] 21  SpO2:  [88 %-99 %] 97 %  O2 Device: Ventilator    Physical Exam  Vitals and nursing note reviewed.   Constitutional:       General: He is awake.      Appearance: He is not diaphoretic.      Interventions: He is intubated.      Comments: Critically ill appearing  BMI 30.5   HENT:      Head: Normocephalic and atraumatic.      Right Ear: External ear normal.      Left Ear: External ear normal.   Eyes:      Comments: No gaze preference   Cardiovascular:      Rate and Rhythm: Tachycardia present.   Pulmonary:      Effort: He is intubated.   Genitourinary:     Comments: Tang in place          Lab Results: I have reviewed the following results:  Lab Results   Component Value Date/Time    SODIUM 135 03/27/2025 09:59 AM    SODIUM 138 09/25/2022 07:03 AM    K 3.9 03/27/2025 09:59 AM    K 4.1 09/25/2022 07:03 AM    BUN 31 (H) 03/27/2025 09:59 AM    BUN 21 (H) 09/25/2022 07:03 AM    BUN 22 02/06/2022 06:40 AM    CREATININE 0.71 03/27/2025 09:59 AM    CREATININE 1 09/25/2022 07:03 AM    GLUC 144 (H) 03/27/2025 09:59 AM    GLUC 104 09/25/2022 07:03 AM    CALCIUM 8.0 (L) 03/27/2025 09:59 AM    CALCIUM 9.1 09/25/2022 07:03 AM    AST 19 03/27/2025 09:59 AM    AST 26 09/24/2022 05:34 PM    ALT <3 (L) 03/27/2025 09:59 AM    ALT 22 09/24/2022 05:34 PM    ALB 2.8 (L) 03/27/2025 09:59 AM    ALB 4.3 09/24/2022 05:34 PM    TP 4.4 (L) 03/27/2025 09:59 AM    TP 6.9 09/24/2022 05:34 PM    EGFR 90 03/27/2025 09:59 AM    EGFR 82 09/25/2022 07:03 AM    EGFR 55 (L) 08/17/2020 08:05 AM     Lab Results   Component Value Date/Time    HGB 7.0 (L) 03/27/2025 04:22 AM     WBC 5.78 03/27/2025 04:22 AM    PLT 56 (L) 03/27/2025 04:22 AM    INR 1.20 (H) 03/25/2025 02:44 PM    INR 1.0 02/05/2022 01:13 PM    PTT 79 (H) 03/26/2025 08:33 AM     Lab Results   Component Value Date/Time    MVA7ZPFFAHBT 3.522 12/12/2023 04:43 AM       Code Status: Level 2 - DNAR: but accepts endotracheal intubation  Advance Directive and Living Will: Yes    Power of : Yes  POLST:      Administrative Statements   I have spent a total time of 25 minutes in caring for this patient on the day of the visit/encounter including patient and family education, counseling/coordination of care, documenting in the medical record, reviewing tests/medicines/procedure, and communicating with other healthcare professionals. Topics discussed with the patient/family include psychosocial support, goals of care, supportive listening, and anticipatory guidance.

## 2025-03-28 PROBLEM — R78.81: Status: ACTIVE | Noted: 2025-01-01

## 2025-03-28 PROBLEM — B96.89: Status: ACTIVE | Noted: 2025-01-01

## 2025-03-28 NOTE — NURSING NOTE
Attending SHAYAN Ellison spoke with patients wife and son who are not ready to transition care. Family would like patient to remain on ventilator as well as CRRT and vasopressors and fentanyl. At this time patient is at 36mcg/min of double concetrated norepinephrine, 100 mcg/h of fentanyl. CRRT remains on, however after speaking with attending and RONNIE Torres, continue to run CRRT but do not pull any fluid. Levophed not to be titrated up at this point. RT FREDIS Pepe updated on plan of care. PCM Lucita ORELLANA Following and completed leadership rounds with family

## 2025-03-28 NOTE — ASSESSMENT & PLAN NOTE
Malnutrition Findings:   Adult Malnutrition type: Acute illness  Adult Degree of Malnutrition: Other severe protein calorie malnutrition  Malnutrition Characteristics: Inadequate energy, Fluid accumulation                  360 Statement: related to inadequate energy/protein intake as evidenced by consuming < 50% of energy intake compared to estimated needs for > 5 days and B/L LE +3 edema. Treated with ONS.    BMI Findings:           Body mass index is 30.52 kg/m².

## 2025-03-28 NOTE — PROGRESS NOTES
Progress Note - Critical Care/ICU   Name: Devin Chaves 79 y.o. male I MRN: [id number]  Unit/Bed#: ICU 10 I Date of Admission: 2/28/2025   Date of Service: 3/28/2025 I Hospital Day: 28      Assessment & Plan  Gastric and bowel ischemia.  Hypotension and lactate persistently in the 2's despite CRRT  CT A/P showed gastric and bowel ischemia.   GOC discussion held with family, plan to remain level 2 code status for now, continue current treatment plan.   Continue to trend end-points overnight, if no improvement by the morning, family may consider transition to comfort care.   No operative intervention per general surgery.  Septic shock (HCC)  Hypotensive AM of 3.26 etiology unclear, lactate low 2's despite CRRT, additional end-points unremarkable  WBC 9  Afebrile - hypothermic  Suspected source unknown at this time.   CT A/P showed gastric and bowel ischemia.   Blood cultures x 2 obtained  Restarted ABX: Zosyn/Vancomycin.  Started on vasopressors Levophed/Vasopressin for MAP goal > 65  3/26 - Received 500cc albumin 1.25L and 750ml crystalloid with improvement in hypotension.   CRRT changed to rune NET even.   A-line placed - low diastolic  Trend lactate, check Procal in AM, trend WBC and fever curve.   Acute on chronic respiratory failure with hypoxia (HCC)  Remains intubated for airway protection post cardiac arrest and postoperatively with poor neuro exam.   Continue mechanical ventilation ACVC 12/550/40/8, titrate FiO2 for SpO2>88  At baseline requires 2-4L NC.  Fentanyl gtt for sedation + PRNs.  Daily SBT/SAT if appropriate  Vent bundle.  Will eventually need trach/PEG.  Cardiac arrest (HCC)  Likely in the setting of hemorrhagic shock.  Noted 12 minutes of downtime, received aggressive blood resuscitation and surgical intervention as above.   Now with ongoing encephalopathy concerning for anoxic injury.   Continue close HD and telemetry monitoring.  Trend Hgb as above.  Encephalopathy  Concern for anoxic injury  s/p cardiac arrest with 12 minute downtime.   EEG without seizure activity.  CTH shows 4mm focus of high attenuation in right frontal lobe which is stable on repeat CTH.   MRI with multiple areas of hypoxia related injury as well as acute embolic strokes.  Uremia resolved with CRRT  Pt remains unresponsive.  Frequent neuro checks.  Maintain normothermia and normoglycemia.   Neurology following, appreciate recommendations.   Embolic cerebral infarction (HCC)  New areas of embolic stroke noted on MRI 3/18.  Neurology following, appreciate recommendations.  Hold heparin gtt in setting of ABD bleeding.   Continue to hold ASA.  Consider restarting statin when cleared for PO.  Frequent neuro checks.  Renal failure  Creatinine   Date Value Ref Range Status   03/28/2025 0.73 0.60 - 1.30 mg/dL Final     Comment:     Standardized to IDMS reference method   09/25/2022 1 0.6 - 1.2 mg/dL Final      Likely initially in the setting of hemorrhagic shock, now with ATN   Baseline Cr 0.8   Nephrology following, appreciate recommendations  Monitor and replete electrolytes Q6H  Monitor I/O and renal indices   Continues to have low urine output and anasarca with hypotension  Nephro consented family for dialysis, temporary HD line placed 3/21 - tunneled HD cath placed 3/24  Tolerating CVVH, continue UF NET even due to hypotension, now on vasopressors  3/26 - Received 500cc albumin 1.25L and 750ml crystalloid with improvement in hypotension.   3/27 - no escalation of care. Pt became hypotensive, 500 cc albumin ordered  HTN (hypertension)  Home regimen: Metoprolol tartrate 25 mg BID, Losartan 25 mg QD for HTN  Consider restarting home metoprolol - remains off vasopressors.  Hold home losartan in the setting of ORIANA.  CAD (coronary artery disease)  With known LAD stenosis.   Hold home BB for now in setting of hypotension.  Hold home statin while NPO, restart when cleared for PO intake.   Hold ASA, restart when cleared from surgical  standpoint.   COPD (chronic obstructive pulmonary disease) (HCC)  Without acute exacerbation.  Hold home inhalers while intubated.  Continue scheduled xopenex, atrovent, budesonide nebs.   History of CVA (cerebrovascular accident)  Hx: Multiple embolic strokes thought to be septic in nature.   Now with new acute embolic strokes noted on MRI.  Restart statin when cleared for PO intake.  Hold heparin in setting of ABD bleeding, restart when cleared from surgery perspective.   Hold ASA, restart as appropriate   Atrial fibrillation (HCC)  Currently rate controlled in NSR.  Consider restarting home metoprolol for rate control.  Hold home eliquis/heparin in setting of ABD bleeding.   Tele monitoring.  Urinary retention  Hold home flomax while NPO.  D/c during OR on 3/23.  Continue bladder scan.  Urinary retention protocol.  Neck pain  MRI of the neck done 2/14/2025 revealed cervical degenerative change with mild canal stenosis and mild to moderate foraminal narrowing.  No cord compression.  Mild nonspecific edema within the right posterior breast dermal musculature of the upper cervicals spine.  Minimal peripheral enhancement is noted  Will need repeat MRI C spine with and without contrast prior to completion of abx - currently holding on this at this time due to ORIANA.  Colonic mass  2/20/2025 EGD/colonoscopy showed 2.5 cm ileocecal mass, pathology confirmed adenocarcinoma  Now s/p ex lap SBR and R colon resection on 3/15  Surgery following, appreciate recommendations   Still without return of bowel function since surgical intervention despite aggressive bowel management, including lactulose   Repeat CT A/P +/- chest this AM   Pathology:   3/16: Large Intestine, Right/Ascending Colon - Moderately differentiated adenocarcinoma with mucinous features. Tumor invades into lower one third of submucosa, Lymphovascular invasion is present. Terminal ileum and omentum with no pathologic abnormality. All margins are negative for  tumor. Twenty one lymph nodes, negative for malignancy.  3/16: Small Bowel, NOS  - Negative for carcinoma.   Fluid overload  Resolved  CT A/P shows bilateral pleural effusions L>R, pulmonary vascular congestion.  Adequate fluid removed with CRRT, pt developed hypotension, BP improved with fluid boluses.      Plan:  Continue mechanical ventilation.  Continue CVVH, NET even for now  Maintain accurate I&O.  Daily weights.  Dysphagia  Noted prior to intubation and cardiac arrest.  Will likely need trache/PEG.   Severe protein-calorie malnutrition (HCC)  Malnutrition Findings:   Adult Malnutrition type: Acute illness  Adult Degree of Malnutrition: Other severe protein calorie malnutrition  Malnutrition Characteristics: Inadequate energy, Fluid accumulation  360 Statement: related to inadequate energy/protein intake as evidenced by consuming < 50% of energy intake compared to estimated needs for > 5 days and B/L LE +3 edema. Treated with ONS.  BMI Findings:  Body mass index is 25.09 kg/m².      Plan:  Continue TPN, hold TF until cleared by surgery.    360 Statement: related to inadequate energy/protein intake as evidenced by consuming < 50% of energy intake compared to estimated needs for > 5 days and B/L LE +3 edema. Treated with ONS.    BMI Findings:    Body mass index is 30.52 kg/m².   Acute blood loss anemia  Patient presents with 2 days of chest pain, shortness of breath, fatigue and melena  2/20/2025 EGD/colonoscopy showed 2.5 cm ileocecal mass, pathology confirmed adenocarcinoma.  S/p 3 units packed rbc in ED. S/p 1 unit PRBCs on 3/2  Hemoglobin has been stable.   Patient recent diagnosis of adenocarcinoma, increased risk of stroke.   Updated iron studies 3/2025 improved compared to 2/2025  Rapid response called 10:30A M [id number] for AMS and hypotension  Patient found to be minimally responsive and hypotensive  HGB 5.9 by iSTAT, formal labs show HGB 6.3, significant drop from 8 in AM  Bleeding from colonic mass  suspected, per nursing no hematuria hemoptysis melena or other signs of overt bleeding prior to RR  Patient intubated for airway protection  Transported to ICU  MTP called  Code blue called  ROSC achieved shortly after blood products started  Rapidly distending abdomen, suspect intraabdominal bleeding.  3/23: OR, ex-lap, 2L old blood no active bleeding.     Hemoglobin   Date Value Ref Range Status   03/27/2025 7.0 (L) 12.0 - 17.0 g/dL Final     Plan:  Daily CBC  Transfuse for Hgb <7  Protonix 40 mg IV daily  Continue to hold heparin gtt and ASA for now  Heparin gtt ordered but not started prior to rapid  Palliative care consulted prior to ICU admission  MTP [id number]  PRBC 12  FFP 6  Platelets 3  Cryo 2  Whole blood 3  Additional 4 (3/23) + 2 (3/24)  units  Continue monitoring H&H  Maintain hgb > 7  Hgb has been stable since surgery.  Hallucinations, visual  Per both patient and patient's wife, patient has been experiencing visual hallucinations that usually occur prior to falling asleep or waking up.  Patient reports that he will occasionally grab for things that are not there, such as a pillow.    Also states that he will occasionally have conversations with his wife when she is not physically in the room.    Patient states he is able to discern when he is having a hallucination and they are not distressing      PLAN:  Delirium precautions.  Hemoperitoneum (Resolved: 3/27/2025)  Emergent OR 3/15 for distended abd post CPR  Found to have hemoperitoneum with hematoma at base of jejunum with active bleed, which was controlled  OR 3/17, stable, small bowel and L hemicolon resected, abd closed.  Taken to OR on 3/23 for ex-lap - intra- abdominal hematoma, no active bleeding.     Plan:  Continue to hold AC/AP medications.  CBC in AM.  Serial ABD exams.   Hemorrhagic shock (HCC) (Resolved: 3/27/2025)  RESOLVED  Code crimson 3/15 for hemorrhagic shock related to mesenteric hematoma s/p SBR and colon resection   S/p blood  resuscitation with 12 U PRBC, 6 FFP, 2 Plt, 2 Cryo   Now off pressors and HD stable   Trend Hgb, transfuse for Hgb<7 or active bleeding with HD instability   Maintain MAP>65  Disposition: Critical care    ICU Core Measures     Vented Patient  VAP Bundle  VAP bundle ordered     A: Assess, Prevent, and Manage Pain Has pain been assessed? Yes  Need for changes to pain regimen? No   B: Both Spontaneous Awakening Trials (SATs) and Spontaneous Breathing Trials (SBTs) Plan to perform spontaneous awakening trial today? Yes   Plan to perform spontaneous breathing trial today? Yes   Obvious barriers to extubation? Yes   C: Choice of Sedation RASS Goal: 0 Alert and Calm  Need for changes to sedation or analgesia regimen? Yes   D: Delirium CAM-ICU: Negative   E: Early Mobility  Plan for early mobility? Yes   F: Family Engagement Plan for family engagement today? Yes       Antibiotic Review: Continue broad spectrum secondary to severity of illness.     Review of Invasive Devices:      Central access plan: Medications requiring central line      Prophylaxis:  VTE VTE covered by:    None       Stress Ulcer  covered byfamotidine (PEPCID) 20 mg tablet [[id number]] (Long-Term Med), pantoprazole (PROTONIX) injection 40 mg [[id number]]         24 Hour Events : became hypotensive, map 58, 500 cc albumin ordered. No escalation of care  Subjective   Review of Systems: See HPI for Review of Systems    Objective :                   Vitals I/O      Most Recent Min/Max in 24hrs   Temp 99.5 °F (37.5 °C) Temp  Min: 96.4 °F (35.8 °C)  Max: 99.9 °F (37.7 °C)   Pulse (!) 113 Pulse  Min: 93  Max: 124   Resp (!) 30 Resp  Min: 17  Max: 42   BP 91/56 BP  Min: 78/39  Max: 144/65   O2 Sat 92 % SpO2  Min: 90 %  Max: 99 %      Intake/Output Summary (Last 24 hours) at 3/28/2025 0601  Last data filed at 3/28/2025 0500  Gross per 24 hour   Intake 6717.31 ml   Output 5442 ml   Net 1275.31 ml       Diet NPO  Adult 3-in-1 TPN (custom base / custom  electrolytes)    Invasive Monitoring           Physical Exam   Physical Exam  Vitals and nursing note reviewed.   Eyes:      Extraocular Movements: Extraocular movements intact.      Conjunctiva/sclera: Conjunctivae normal.   Skin:     General: Skin is cool and dry.      Capillary Refill: Capillary refill takes 2 to 3 seconds.   HENT:      Head: Normocephalic and atraumatic.      Right Ear: No drainage.      Left Ear: No drainage.      Nose: No rhinorrhea or epistaxis.      Mouth/Throat:      Mouth: Mucous membranes are dry.   Neck:      Vascular: Central line present.   Cardiovascular:      Rate and Rhythm: Regular rhythm. Tachycardia present.      Pulses: Normal pulses.   Musculoskeletal:         General: Swelling present.      Right lower le+ Edema present.      Left lower le+ Edema present.   Abdominal:      Palpations: Abdomen is soft.      Tenderness: There is no abdominal tenderness. There is no guarding.   Constitutional:       General: He is not in acute distress.     Appearance: He is well-developed and well-nourished. He is ill-appearing.      Interventions: He is intubated. He is not sedated.  Pulmonary:      Effort: Tachypnea present. He is intubated.      Breath sounds: Rhonchi (bilaterally) present.      Comments: Ventilated breath sounds  Neurological:        Corneal reflex present and gag reflex intact.      Comments: GCS 3T, grimaces and moves lower extremities intermittently, but not with intent/purpose          Diagnostic Studies        Lab Results: I have reviewed the following results:     Medications:  Scheduled PRN   Albumin 5%, 25 g, Once  [Held by provider] ascorbic acid, 250 mg, Daily  [Held by provider] atorvastatin, 40 mg, Daily With Dinner  bisacodyl, 10 mg, Daily  budesonide, 0.5 mg, Q12H  chlorhexidine, 15 mL, Q12H GALE  [Held by provider] Cholecalciferol, 2,000 Units, Daily  [Held by provider] cyanocobalamin, 100 mcg, Daily  [Held by provider] docusate, 100 mg, BID  [Held by  provider] ferrous sulfate, 325 mg, Daily With Breakfast  [Held by provider] folic acid, 1 mg, Daily  glycerin (adult), 1 suppository, HS  [Held by provider] hydroxychloroquine, 400 mg, Daily With Breakfast  insulin lispro, 2-12 Units, Q6H GALE  ipratropium, 0.5 mg, TID  [Held by provider] lactulose, 30 g, TID  levalbuterol, 1.25 mg, TID  lidocaine, 3 patch, Daily  [Held by provider] metoprolol tartrate, 25 mg, Q12H GALE  HITESH ANTIFUNGAL, , BID  pantoprazole, 40 mg, Q12H GALE  piperacillin-tazobactam, 4.5 g, Q8H  [Held by provider] polyethylene glycol, 17 g, Daily  [Held by provider] senna, 17.6 mg, HS  sodium chloride, 4 mL, TID  vancomycin, 10 mg/kg (Adjusted), Q12H      fentaNYL, 50 mcg, Q2H PRN  perflutren lipid microsphere, 0.6 mL/min, Once in imaging       Continuous    Adult 3-in-1 TPN (custom base / custom electrolytes), , Last Rate: 87.4 mL/hr at 03/27/25 2115  fentaNYL, 75 mcg/hr, Last Rate: 75 mcg/hr (03/27/25 1644)  norepinephrine, 1-30 mcg/min, Last Rate: 15 mcg/min (03/28/25 0542)  NxStage K 4/Ca 3, 20,000 mL, Last Rate: 20,000 mL (03/22/25 1113)  vasopressin, 0.04 Units/min, Last Rate: Stopped (03/27/25 0754)         Labs:   CBC    Recent Labs     03/26/25  1152 03/26/25  2335 03/27/25  0422   WBC 9.98 9.54 5.78   HGB 9.2* 8.2* 7.0*   HCT 29.2* 25.3* 21.5*   PLT 79* 78* 56*   BANDSPCT 27*  --  41*     BMP    Recent Labs     03/27/25  2159 03/28/25  0211   SODIUM 135 135   K 4.3 4.4    104   CO2 25 23   AGAP 5 8   BUN 30* 30*   CREATININE 0.79 0.73   CALCIUM 8.3* 8.5       Coags    Recent Labs     03/26/25  0833   PTT 79*        Additional Electrolytes  Recent Labs     03/27/25  2159 03/28/25  0211   MG 1.9 2.1   PHOS 1.7* 2.2*   CAIONIZED 1.15 1.18          Blood Gas    Recent Labs     03/28/25  0428   PHART 7.344*   VWV4MIW 40.3   PO2ART 53.9*   QXH1LTF 21.5*   BEART -3.9   SOURCE Radial, Right     Recent Labs     03/26/25  0753 03/26/25  1740 03/28/25  0428   PHVEN 7.340  --   --    LNZ1OCA 44.9   --   --    PO2VEN 31.3*  --   --    HCI0GEC 23.7*  --   --    BEVEN -2.1  --   --    [id number] 60.4  --   --    SOURCE  --    < > Radial, Right    < > = values in this interval not displayed.    LFTs  Recent Labs     03/27/25  0959   ALT <3*   AST 19   ALKPHOS 50   ALB 2.8*   TBILI 0.65       Infectious  No recent results  Glucose  Recent Labs     03/27/25  0959 03/27/25  1614 03/27/25  2159 03/28/25  0211   GLUC 144* 135 140 147*

## 2025-03-28 NOTE — QUICK NOTE
Called family and notified them of patient's decrease in BP. Family and medical providers had a discussion yesterday regarding CT findings and was agreed to have no further escalation of care. Recommended strongly to family to come to the ICU. All questions and concerns addressed to their satisfaction at this time.

## 2025-03-28 NOTE — ASSESSMENT & PLAN NOTE
Patient is now intubated after cardiac arrest.  Plan was for tracheostomy but this is on hold due to deterioration above.

## 2025-03-28 NOTE — ASSESSMENT & PLAN NOTE
Patient remains obtunded postcardiac arrest.  Concern is for anoxic encephalopathy.  Head CT is without acute changes.  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 Patient remains obtunded, without any response to verbal or tactile stimuli.  Discussion ongoing for LOC, especially comfort care.  Plan at present is continue care with no escalation, with possible transition to comfort care in the near future if patient does not show any significant improvement, which she likely will not.  Monitor mental status.

## 2025-03-28 NOTE — RESPIRATORY THERAPY NOTE
RT Ventilator Management Note  Devin Chaves 79 y.o. male MRN: [id number]  Unit/Bed#: ICU 10 Encounter: [id number]      Daily Screen         3/27/2025  0833 3/28/2025  0835          Patient safety screen outcome:: Failed Failed (P)       Not Ready for Weaning due to:: Underline problem not resolved FiO2 >60%;PEEP > 8cmH2O (P)                 Physical Exam:   Assessment Type: Assess only  General Appearance: Sedated  Respiratory Pattern: Assisted  Chest Assessment: Chest expansion symmetrical  Bilateral Breath Sounds: Diminished      Resp Comments: patient recieved on documented settings. no new orders at this time. No SBT at this time.     03/28/25 0835   Respiratory Assessment   Assessment Type Assess only   General Appearance Sedated   Respiratory Pattern Assisted   Chest Assessment Chest expansion symmetrical   Bilateral Breath Sounds Diminished   Resp Comments patient recieved on documented settings. no new orders at this time. No SBT at this time.   Vent Information   Vent    Vent type     Vent Mode AC/VC   $ Vital Capacity Mech/Peak Flow Yes   $ Pulse Oximetry Spot Check Charge Completed   SpO2 92 %   AC/VC Settings   Resp Rate (BPM) 12 BPM   Vt (mL) 550 mL   FIO2 (%) 60 %   PEEP (cmH2O) 8 cmH2O   Flow Pattern (LPM) 85 L/min   Trigger Sensitivity Flow (lpm) 3 %   Humidification Heater   Heater Temperature (Set) 98.6 °F (37 °C)   AC/VC Actuals   Resp Rate (BPM) 32 BPM   VT (mL) 604   MV 19.2   MAP (cmH2O) 20 cmH2O   Peak Pressure (cmH2O) 38 cmH2O   I/E Ratio (Obs) 1:1.6   Heater Temperature (Obs) 98.6 °F (37 °C)   Static Compliance (mL/cmH20) 65 mL/cmH2O   Plateau Pressure (cm H2O) 33 cm H2O   AC/VC Alarms   High Peak Pressure (cmH2O) 40   High Resp Rate (BPM) 40 BPM   High MV (L/min) 20 L/min   Low MV (L/min) 4 L/min   Vt High (mL) 800 mL   Vt Low (mL) 200 mL   AC/VC Apnea Settings   Resp Rate (BPM) 12 BPM   VT (mL) 550 mL   FIO2 (%) 100 %   Apnea Time (s) 20 S   Apnea Flow (L/min) 55 L/min    Maintenance   Alarm (pink) cable attached Yes   Resuscitation bag with peep valve at bedside Yes   Water bag changed No   Circuit changed No   Daily Screen   Patient safety screen outcome: Failed   Not Ready for Weaning due to: FiO2 >60%;PEEP > 8cmH2O   ETT  Cuffed 8 mm   Placement Date/Time: 03/15/25 (c) 1200   Type: Cuffed  Tube Size: 8 mm  Location: Oral  Insertion attempts: 1  Placement Verification: Chest x-ray;End tidal CO2  Secured at (cm): 25   Secured at (cm) 22   Measured from Teeth   Secured Location Center   Repositioned Center to Right   Secured by Commercial tube otto   Site Condition Cool;Dry   Cuff Pressure (color) Green   HI-LO Suction  Intermittent suction   HI-LO Secretions (S)  Moderate;Blood tinged;Thick

## 2025-03-28 NOTE — ASSESSMENT & PLAN NOTE
"Palliative diagnosis: Colon adenocarcinoma, colon mass, cardiac arrest, respiratory failure  PPS: 10%    Education/counseling provided on role/purpose of palliative care; benefits/risks of treatment options by our team reviewed; instructions for management and anticipatory guidance provided; supportive listening and presence provided; provided space for life review and whole person assessment    Psychosocial/Spiritual:    - supported by spouse Francia (\"Ashvin\"),  56 years; they live near daughter [place]   - Also two sons Parmjit and Guille   - 4 yrs in [employer]   - Enjoys working on farm with cows and [employer] she[employer]   - Jewish kaylee-->family would like extra spiritual support-->spiritual care consult placed    Communicated and coordinated with surgical CC team and RN    #ACP Documentation   - completed Durable Power of  for Health Care, signed/notarized 03/06/2025                      Code Status: DNAR/DNI - Level 2               Decisional apparatus:  Patient is not competent on my exam today. If competence is lost, patient's substitute decision maker would default to spouse Francia (first), son Devin (alternate)               Advance Directive / Living Will / POLST:  POA document on file  "

## 2025-03-28 NOTE — ASSESSMENT & PLAN NOTE
Baseline creatinine 0.8-0.9  Creatinine on admission 2.43  Etiology: ATN in setting of prolonged prerenal azotemia from volume depletion and severe anemia  CT abdomen: No hydronephrosis  UA: 2-4 RBC, 2-4 WBC  UPC ratio 3.4 g  Serum/urine immunofixation with no M spike, C3 mildly low at 83, C4 normal  Status post initiation of CVVHD on 03/21 in setting of oliguria and fluid overload  No evidence of renal recovery  Status post permacath placement 03/24  Currently on CVVHD, 4K bath/3 calcium  Blood flow rate 250 mL/min  Dialysate flow rate 2500 mL/h  UF even to negative as tolerated  Poor prognosis  ICU team to discuss with family regarding goals of care today

## 2025-03-28 NOTE — ASSESSMENT & PLAN NOTE
Acute blood loss on 3/15/25 and received more than 10 units PRBC   S/p emergent ex lap and control of bleeding  Continue management per ICU

## 2025-03-28 NOTE — ASSESSMENT & PLAN NOTE
Patient now has polymicrobial bacteremia, initially GNR, subsequently identified as Klebsiella aerogenes and Proteus.  Similar Proteus and bronchial culture is ESBL producing.  Patient now has GPC in chains in blood culture also.  Polymicrobial bacteremia is consistent with intra-abdominal source, due to significant loss of intestinal integrity.  There may still be additional pathogens subsequently identified in blood cultures.  Given persistent hypotension on pressors, will broaden antibiotic regimen.  Change Zosyn to meropenem.  Continue IV vancomycin for now.  Continue to follow-up on blood cultures.

## 2025-03-28 NOTE — ASSESSMENT & PLAN NOTE
- General Surgery following   - continued maximal medical management   - no surgical recommendations at this time    CT CAP [03/26/2025] persistent pleural effusions with compressive atelectasis and possible superimposed lower lobe PNA; new L lower neck/chest/shoulder edema may be asymmetric edema from patient positioning; new findings in abdomen concerning for gastric/bowel ischemia.

## 2025-03-28 NOTE — PROGRESS NOTES
Progress Note - Infectious Disease   Name: Devin Chaves 79 y.o. male I MRN: [id number]  Unit/Bed#: ICU 10 I Date of Admission: 2/28/2025   Date of Service: 3/28/2025 I Hospital Day: 28    Assessment & Plan  Septic shock (HCC)  Patient developed septic shock overnight, with hypotension and bandemia.  Given multiple intra-abdominal complications recently, source of sepsis is most likely intra-abdominal.  Antibiotic regimen has been broadened to vancomycin/Zosyn.  Given that this is not an isolated event but a complication of multiple problems below, prognosis for recovery is quite poor.  Ongoing discussion with patient's family regarding LOC noted.  At present, plan is to continue aggressive care but no escalation of care, and likely transition to comfort care subsequently if patient does not improve, which he likely will not.  Antibiotic plan as in below.  Monitor temperature/WBC.  Monitor hemodynamics.  Pressor support per critical surgery service.    Follow-up on blood cultures obtained last night.  Bacteremia due to coliform bacteria  Patient now has polymicrobial bacteremia, initially GNR, subsequently identified as Klebsiella aerogenes and Proteus.  Similar Proteus and bronchial culture is ESBL producing.  Patient now has GPC in chains in blood culture also.  Polymicrobial bacteremia is consistent with intra-abdominal source, due to significant loss of intestinal integrity.  There may still be additional pathogens subsequently identified in blood cultures.  Given persistent hypotension on pressors, will broaden antibiotic regimen.  Change Zosyn to meropenem.  Continue IV vancomycin for now.  Continue to follow-up on blood cultures.  Neck pain  Patient developed acute neck pain with MSSA bacteremia during recent hospitalization.  CRP was highly elevated.  C-spine MRI showed possible C-spine and paraspinal infection.  Patient has no neurologic deficit.  His neck pain is finally improving.  However, given  paraspinal infection on the initial C-spine MRI, we should repeat C-spine MRI with contrast when creatinine is improved to confirm resolution/improvement of paraspinal infection.  However, at this point, this is not an active issue.  Patient is currently on broad-spectrum antibiotic for septic shock, likely intra-abdominal in etiology.  Antibiotic plan as in above.  Acute blood loss anemia  Patient developed acute bleeding from rectal mass, resulting in rapid response, and subsequent cardiac arrest.  He is status post transfusion.  He is also status post small bowel resection and right hemicolectomy.  He is status post exploratory laparotomy and evacuation of intra-abdominal hematoma over the weekend.  Management per primary service.  Renal failure  Patient with ORIANA on admission.  Creatinine had worsened and patient is now on CVVH.  Antibiotic dosages adjusted accordingly.  Monitor creatinine.  CVVH per nephrology.  Colonic mass  Patient has recently diagnosed adenocarcinoma of the colon.  He now status post bowel resection for bleeding from adenocarcinoma.  No plan for chemotherapy yet.  Colorectal surgery follow-up.  Acute on chronic respiratory failure with hypoxia (HCC)  Patient is now intubated after cardiac arrest.  Plan was for tracheostomy but this is on hold due to deterioration above.  Cardiac arrest due to other underlying condition (HCC)  Patient is status post arrest, successfully resuscitated.  Encephalopathy  Patient remains obtunded postcardiac arrest.  Concern is for anoxic encephalopathy.  Head CT is without acute changes.  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 Patient remains obtunded, without any response to verbal or tactile stimuli.  Discussion ongoing for LOC, especially comfort care.  Plan at present is continue care with no escalation, with possible transition to comfort care in the near future if patient does not show any  significant improvement, which she likely will not.  Monitor mental status.    I have discussed with Dr. Lofton from critical care surgery service regarding the above plan to escalate antibiotic regimen.  She agrees with the plan.    Antibiotics:  Vancomycin/Zosyn # 2    Subjective   Patient was seen earlier.  He remains critical.  He remains in ICU, intubated, on pressors and on CVVH.  Temperature stays down.    Objective :  Temp:  [97.7 °F (36.5 °C)-99.9 °F (37.7 °C)] 99.7 °F (37.6 °C)  HR:  [103-124] 114  BP: ()/(35-65) 81/35  Resp:  [17-34] 32  SpO2:  [90 %-98 %] 91 %  O2 Device: Ventilator  FiO2 (%):  [60-70] 70    Physical Exam:     General: Obtunded on ventilator.  No response to verbal or tactile stimuli.   Neck:  Supple. No mass.  No lymphadenopathy.   Lungs: Expansion symmetric, diffuse rhonchi, no rales, no wheezing.   Heart:  Regular rate and rhythm, S1 and S2 normal, no murmur.   Abdomen: Soft, distended, wound with serous drainage, without purulence.  Minimal bowel sounds.   Extremities: Stable leg edema. No erythema/warmth. No draining wound/ulcer.  Difficult to assess tenderness.   Skin:  No rash.   Neuro: Not assessable.        Lab Results: I have reviewed the following results:  Results from last 7 days   Lab Units 03/28/25  0455 03/27/25  0422 03/26/25  2335   WBC Thousand/uL 10.82* 5.78 9.54   HEMOGLOBIN g/dL 8.4* 7.0* 8.2*   PLATELETS Thousands/uL 78* 56* 78*     Results from last 7 days   Lab Units 03/28/25  1003 03/28/25  0211 03/27/25  2159 03/27/25  1614 03/27/25  0959 03/23/25  2337 03/23/25  1932 03/23/25  1811 03/23/25  1526   SODIUM mmol/L 135 135 135   < > 135   < > 135  --   --    POTASSIUM mmol/L 4.4 4.4 4.3   < > 3.9   < > 4.6  --   --    CHLORIDE mmol/L 105 104 105   < > 105   < > 105  --   --    CO2 mmol/L 23 23 25   < > 23   < > 25  --   --    CO2, I-STAT mmol/L  --   --   --   --   --   --   --  23  --    BUN mg/dL 31* 30* 30*   < > 31*   < > 46*  --   --    CREATININE  mg/dL 0.69 0.73 0.79   < > 0.71   < > 1.47*  --   --    EGFR ml/min/1.73sq m 91 89 86   < > 90   < > 45  --   --    GLUCOSE, ISTAT mg/dl  --   --   --   --   --   --   --  180*  --    CALCIUM mg/dL 8.1* 8.5 8.3*   < > 8.0*   < > 7.6*  --   --    AST U/L  --   --   --   --  19  --  19  --   --    ALT U/L  --   --   --   --  <3*  --  4*  --    < >   ALK PHOS U/L  --   --   --   --  50  --  60  --    < >   ALBUMIN g/dL  --   --   --   --  2.8*  --  2.5*  --    < >    < > = values in this interval not displayed.     Results from last 7 days   Lab Units 03/26/25  1739 03/25/25  1548   GRAM STAIN RESULT  Gram positive cocci in chains*  Gram negative rods* 3+ Gram negative rods*  2+ Gram positive rods*  Rare Disintegrating polys*         Results from last 7 days   Lab Units 03/27/25  0422   CRP mg/L 166.3*

## 2025-03-28 NOTE — ASSESSMENT & PLAN NOTE
Patient presents with 2 days of chest pain, shortness of breath, fatigue and melena  2/20/2025 EGD/colonoscopy showed 2.5 cm ileocecal mass, pathology confirmed adenocarcinoma.  S/p 3 units packed rbc in ED. S/p 1 unit PRBCs on 3/2  Hemoglobin has been stable.   Patient recent diagnosis of adenocarcinoma, increased risk of stroke.   Updated iron studies 3/2025 improved compared to 2/2025  Rapid response called 10:30A M [id number] for AMS and hypotension  Patient found to be minimally responsive and hypotensive  HGB 5.9 by iSTAT, formal labs show HGB 6.3, significant drop from 8 in AM  Bleeding from colonic mass suspected, per nursing no hematuria hemoptysis melena or other signs of overt bleeding prior to RR  Patient intubated for airway protection  Transported to ICU  MTP called  Code blue called  ROSC achieved shortly after blood products started  Rapidly distending abdomen, suspect intraabdominal bleeding.  3/23: OR, ex-lap, 2L old blood no active bleeding.     Hemoglobin   Date Value Ref Range Status   03/27/2025 7.0 (L) 12.0 - 17.0 g/dL Final     Plan:  Daily CBC  Transfuse for Hgb <7  Protonix 40 mg IV daily  Continue to hold heparin gtt and ASA for now  Heparin gtt ordered but not started prior to rapid  Palliative care consulted prior to ICU admission  MTP [id number]  PRBC 12  FFP 6  Platelets 3  Cryo 2  Whole blood 3  Additional 4 (3/23) + 2 (3/24)  units  Continue monitoring H&H  Maintain hgb > 7  Hgb has been stable since surgery.

## 2025-03-28 NOTE — PROGRESS NOTES
Progress Note - Surgery-General   Name: Devin Chaves 79 y.o. male I MRN: [id number]  Unit/Bed#: ICU 10 I Date of Admission: 2/28/2025   Date of Service: 3/27/2025 I Hospital Day: 27    Assessment & Plan  Acute blood loss anemia  Patient sustained cardiac arrest in the setting of an undetectable hemoglobin on 3/15 and was taken emergently to the operating room for exploration in the setting of abdominal free fluid.  Large volume hemoperitoneum was encountered with evidence of a actively bleeding/decompressing mesenteric hematoma; bleeding control was obtained and on 3/15 taken to OR for a segmental small bowel resection was performed and the patient was left in discontinuity given ongoing instability.  S/p SB anastomosis, R hemicolectomy, closure on 3/16.  3/23 ex lap, evacuation of hematoma- To     Increasing pressor requirement yesterday prompted CT CAP revealing pneumatosis and portal venous gas        Plan  - Discussed with family; will continue to pursue maximal medical therapy  -Continued resuscitation by ICU team  - Continue NG tube to suction/n.p.o.   - Renew TPN  - Monitor hemoglobin closely in the setting of bloody NG tube output and heparin drip  - Neurology recs, nephrology  -Continue CRRT  - Wean vent as tolerated  - Rest of care per ICU  HTN (hypertension)    CAD (coronary artery disease)    COPD (chronic obstructive pulmonary disease) (HCC)    History of CVA (cerebrovascular accident)    Acute on chronic respiratory failure with hypoxia (HCC)    Atrial fibrillation (HCC)    Urinary retention    Neck pain    Colonic mass    Renal failure    Fluid overload    Dysphagia    Severe protein-calorie malnutrition (HCC)  Malnutrition Findings:   Adult Malnutrition type: Acute illness  Adult Degree of Malnutrition: Other severe protein calorie malnutrition  Malnutrition Characteristics: Inadequate energy, Fluid accumulation                  360 Statement: related to inadequate energy/protein intake as  evidenced by consuming < 50% of energy intake compared to estimated needs for > 5 days and B/L LE +3 edema. Treated with ONS.    BMI Findings:           Body mass index is 30.52 kg/m².     Hallucinations, visual    Cardiac arrest (HCC)    Encephalopathy    Cardiac arrest due to other underlying condition (HCC)    Palliative care by specialist    Counseling regarding advance care planning and goals of care    Embolic cerebral infarction (HCC)    Volume overload    Septic shock (HCC)    Gastric and bowel ischemia.          Subjective   Intubated and sedated, on pressors    Objective :  Temp:  [96.4 °F (35.8 °C)-99.9 °F (37.7 °C)] 99.3 °F (37.4 °C)  HR:  [] 118  BP: ()/(31-65) 144/65  Resp:  [17-42] 27  SpO2:  [88 %-99 %] 96 %  O2 Device: Ventilator    I/O         03/26 0701  03/27 0700 03/27 0701  03/28 0700    I.V. (mL/kg) 3743 (36.7) 1065 (10.4)    Blood  350    NG/GT      IV Piggyback 1639.3 1692    TPN 1183.4 1054    Total Intake(mL/kg) 6565.6 (64.4) 4161 (40.8)    Urine (mL/kg/hr)  150 (0.1)    Emesis/NG output 2675 750    Other 3867 2102    Total Output 6542 3002    Net +23.6 +1159                Lines/Drains/Airways       Active Status       Name Placement date Placement time Site Days    PICC Line 02/26/25 Right Brachial 02/26/25  0548  Brachial  29    CVC Central Lines 03/15/25 Triple 03/15/25  1338  --  12    HD Permanent Double Catheter 03/24/25  1522  Internal jugular  3    ETT  Cuffed 8 mm 03/15/25  1200  -- 12    NG/OG Tube Nasogastric Left nare 03/15/25  1700  Left nare  12                  Physical Exam  General: Intubated and sedated  Skin: Warm, dry, anicteric  HEENT: ET tube present  CV: RRR  Pulm: Mechanically ventilated  Abd: Distended and tympanic; midline dressing with serosanguineous saturation which was exchanged, staples in place with minimal serosanguineous drainage expressed from the wound.  Neuro: Sedated    Lab Results: I have reviewed the following results:  Recent Labs      03/25/25  1444 03/25/25  1447 03/26/25  0833 03/26/25  1028 03/27/25  0422 03/27/25  0959 03/27/25  1408 03/27/25  1614 03/27/25  1856   WBC  --    < >  --    < > 5.78  --   --   --   --    HGB  --    < >  --    < > 7.0*  --   --   --   --    HCT  --    < >  --    < > 21.5*  --   --   --   --    PLT  --    < >  --    < > 56*  --   --   --   --    BANDSPCT  --    < >  --    < > 41*  --   --   --   --    SODIUM  --    < >  --    < > 134* 135  --  134*  --    K  --    < >  --    < > 4.0 3.9  --  4.3  --    CL  --    < >  --    < > 105 105  --  106  --    CO2  --    < >  --    < > 23 23  --  23  --    BUN  --    < >  --    < > 32* 31*  --  31*  --    CREATININE  --    < >  --    < > 0.76 0.71  --  0.73  --    GLUC  --    < >  --    < > 180* 144*  --  135  --    CAIONIZED  --    < >  --    < > 1.12 1.13  --  1.10*  --    MG  --    < >  --    < > 1.9 1.9  --  2.0  --    PHOS  --    < >  --    < > 2.1* 1.7*  --  1.4*  --    AST  --   --   --   --   --  19  --   --   --    ALT  --   --   --   --   --  <3*  --   --   --    ALB  --   --   --   --   --  2.8*  --   --   --    TBILI  --   --   --   --   --  0.65  --   --   --    ALKPHOS  --   --   --   --   --  50  --   --   --    PTT 45*   < > 79*  --   --   --   --   --   --    INR 1.20*  --   --   --   --   --   --   --   --    LACTICACID  --    < >  --    < > 2.0 2.0   < >  --  2.0    < > = values in this interval not displayed.             VTE Pharmacologic Prophylaxis: VTE covered by:    None     VTE Mechanical Prophylaxis: sequential compression device

## 2025-03-28 NOTE — PROGRESS NOTES
Devin Chaves is a 79 y.o. male who is currently ordered Vancomycin IV with management by the Pharmacy Consult service.  Relevant clinical data and objective / subjective history reviewed.  Vancomycin Assessment:  Indication and Goal AUC/Trough: Bacteremia (goal -600, trough >10)  Clinical Status: critically ill  Micro:   3/26 Blood: 1/2 no growth at 24 hours, 2/2 GNR  3/26 BCID: Kleb aerogenes detected   3/25 Bronch: 4+ ESBL Proteus mirabilis, 2+ mixed respiratory mary   3/25 Fungal (bronch): pending   Renal Function:  SCr: 0.73 mg/dL  CrCl: 104.8 mL/min  Renal replacement: Not on dialysis  Days of Therapy: 3  Current Dose: 1000 mg IV Q12H  Vancomycin Plan:  New Dosing: patient remains on CRRT, trough 14.9 and within goal 10-20; continue current dose, 1000 mg IV Q12H  Next Level: 4/2 at 0330 true trough  Renal Function Monitoring: Daily BMP and UOP  Pharmacy will continue to follow closely for s/sx of nephrotoxicity, infusion reactions and appropriateness of therapy.  BMP and CBC will be ordered per protocol. We will continue to follow the patient’s culture results and clinical progress daily.    Tova Buckley, Pharmacist

## 2025-03-28 NOTE — ASSESSMENT & PLAN NOTE
Remains intubated for airway protection post cardiac arrest and postoperatively with poor neuro exam.   Continue mechanical ventilation ACVC 12/550/40/8, titrate FiO2 for SpO2>88  At baseline requires 2-4L NC.  Fentanyl gtt for sedation + PRNs.  Daily SBT/SAT if appropriate  Vent bundle.  Will eventually need trach/PEG.

## 2025-03-28 NOTE — PROGRESS NOTES
Progress Note - Critical Care/ICU   Name: Devin Chaves 79 y.o. male I MRN: [id number]  Unit/Bed#: ICU 10 I Date of Admission: 2/28/2025   Date of Service: 3/28/2025 I Hospital Day: 28         Continued hypotension despite increased pressors.     Son, sons wife and one other family member present. Discussed clinical deterioration. They have asked to wait for Mr. Chaves wife to return before further decision. Offered spiritual support. They want a . Bedside nurse Jessica is reaching out.

## 2025-03-28 NOTE — ASSESSMENT & PLAN NOTE
- met with spouse, son/DIL, daughter, and nephews at bedside   - discussed overnight worsening hypotension with increased vasopressor requirement, which is likely sign of transition to active dying process in the setting of significant bowel ischemia. All questions/concerns addressed.   - confirmed DNAR, Level 2 at this time, however, given concern for active dying process, family wishes to discuss amongst selves to determine next steps in POC.    - expanded discussion regarding comfort-oriented cares for EOL symptoms management, which would include compassionate extubation, discontinuation of CRRT, discontinuation of vasopressors/Abx and other critical supports. All questions/concerns addressed.   - family leaving to discuss privately POC, plan to return to bedside early afternoon    Addendum #1   - family returned, discussed with CCM independently to transition to comfort-oriented cares, compassionate extubation   - wish to coordinate with other family/friends prior to transition to EOL    Addendum #2   - family returned to ICU, request for PSC at bedside   - bereavement focused cares, legacy cares, reminiscing, and EOL emotional supports provided   - RT/CCM to bedside, family and PSC stepped out of room - family confirmed POC - compassionate extubation at 15:50, family return to bedside   - pronouncement by PSC at bedside, TOD 16:00

## 2025-03-28 NOTE — QUICK NOTE
Discussed with wife and son, they are agreeable to proceeding with compassionate extubation and stopping life sustaining interventions to focus on a comfort directed plan of care.

## 2025-03-28 NOTE — PROGRESS NOTES
Progress Note - Palliative Care   Name: Devin Chaves 79 y.o. male I MRN: [id number]  Unit/Bed#: ICU 10 I Date of Admission: 2/28/2025   Date of Service: 3/28/2025 I Hospital Day: 28    Assessment & Plan  Counseling regarding advance care planning and goals of care   - met with spouse, son/DIL, daughter, and nephews at bedside   - discussed overnight worsening hypotension with increased vasopressor requirement, which is likely sign of transition to active dying process in the setting of significant bowel ischemia. All questions/concerns addressed.   - confirmed DNAR, Level 2 at this time, however, given concern for active dying process, family wishes to discuss amongst selves to determine next steps in POC.    - expanded discussion regarding comfort-oriented cares for EOL symptoms management, which would include compassionate extubation, discontinuation of CRRT, discontinuation of vasopressors/Abx and other critical supports. All questions/concerns addressed.   - family leaving to discuss privately POC, plan to return to bedside early afternoon    Addendum #1   - family returned, discussed with CCM independently to transition to comfort-oriented cares, compassionate extubation   - wish to coordinate with other family/friends prior to transition to EOL    Addendum #2   - family returned to ICU, request for PSC at bedside   - bereavement focused cares, legacy cares, reminiscing, and EOL emotional supports provided   - RT/CCM to bedside, family and PSC stepped out of room - family confirmed POC - compassionate extubation at 15:50, family return to bedside   - pronouncement by PSC at bedside, TOD 16:00  Palliative care by specialist  Palliative diagnosis: Colon adenocarcinoma, colon mass, cardiac arrest, respiratory failure  PPS: 10%    Education/counseling provided on role/purpose of palliative care; benefits/risks of treatment options by our team reviewed; instructions for management and anticipatory guidance  "provided; supportive listening and presence provided; provided space for life review and whole person assessment    Psychosocial/Spiritual:    - supported by spouse Francia (\"Ashvin\"),  56 years; they live near [place]   - Also two sons Pramjit and Guille   - 4 yrs in [employer]   - 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 If competence is lost, patient's substitute decision maker would default to spouse Francia (first), son Devin (alternate)               Advance Directive / Living Will / POLST:  POA document on file  Cardiac arrest due to other underlying condition (HCC)  In setting of hemorrhagic shock    In efforts done with achievement of ROSC    Critical care team will likely do MRI brain to evaluate for anoxic brain injury--> hypoxic ischemic encephalopathy and new scattered small acute infarcts left posterior frontal and bilateral cerebellar lobes, likely embolic  Follow-up on video EEG--> no evidence of seizure activity  Acute blood loss anemia  In setting of mesenteric bleed and hematoma  S/p small bowel resection and right hemicolectomy  Colonic mass  2/20/2025 EGD/colonoscopy revealed 2.5 cm ileocecal mass    Pathology confirmed: Adenocarcinoma    Mass has not been removed according to surgical notes  Embolic cerebral infarction (HCC)  Neurology following    MRI Brain [03/18/2025] new nearly symmetric cortical restricted diffusion in bilateral frontal, bilateral parietal, and bilateral occipital lobes, suspicious for HIE given history of recent cardiac arrest; new scattered small acute infarcts in " left posterior frontal and bilateral cerebellar lobes, favor embolic infarct.    Plan is to restart heparin gtt   Gastric and bowel ischemia.   - General Surgery following   - continued maximal medical management   - no surgical recommendations at this time    CT CAP [03/26/2025] persistent pleural effusions with compressive atelectasis and possible superimposed lower lobe PNA; new L lower neck/chest/shoulder edema may be asymmetric edema from patient positioning; new findings in abdomen concerning for gastric/bowel ischemia.    PDMP Review: I have reviewed the patient's controlled substance dispensing history in the Prescription Drug Monitoring Program in compliance with the [facility] regulations before prescribing any controlled substances.    Subjective   Remains intubated, worsening clinical status, hypotensive requiring increased vasopressor supports.    Objective :  Temp:  [96.6 °F (35.9 °C)-99.9 °F (37.7 °C)] 99.5 °F (37.5 °C)  HR:  [101-124] 107  BP: ()/(39-65) 84/47  Resp:  [17-34] 31  SpO2:  [90 %-98 %] 92 %  O2 Device: Ventilator  FiO2 (%):  [60] 60    Physical Exam  Vitals and nursing note reviewed.   Constitutional:       General: He is awake.      Appearance: He is not diaphoretic.      Interventions: He is intubated.      Comments: Critically ill appearing  BMI 30.5   HENT:      Head: Normocephalic and atraumatic.      Right Ear: External ear normal.      Left Ear: External ear normal.   Cardiovascular:      Rate and Rhythm: Tachycardia present.   Pulmonary:      Effort: He is intubated.   Genitourinary:     Comments: Tang in place  Skin:     Coloration: Skin is pale.   Psychiatric:      Comments: Unable to assess          Lab Results: I have reviewed the following results:  Lab Results   Component Value Date/Time    SODIUM 135 03/28/2025 02:11 AM    SODIUM 138 09/25/2022 07:03 AM    K 4.4 03/28/2025 02:11 AM    K 4.1 09/25/2022 07:03 AM    BUN 30 (H) 03/28/2025 02:11 AM    BUN 21 (H) 09/25/2022  07:03 AM    BUN 22 02/06/2022 06:40 AM    CREATININE 0.73 03/28/2025 02:11 AM    CREATININE 1 09/25/2022 07:03 AM    GLUC 147 (H) 03/28/2025 02:11 AM    GLUC 104 09/25/2022 07:03 AM    CALCIUM 8.5 03/28/2025 02:11 AM    CALCIUM 9.1 09/25/2022 07:03 AM    AST 19 03/27/2025 09:59 AM    AST 26 09/24/2022 05:34 PM    ALT <3 (L) 03/27/2025 09:59 AM    ALT 22 09/24/2022 05:34 PM    ALB 2.8 (L) 03/27/2025 09:59 AM    ALB 4.3 09/24/2022 05:34 PM    TP 4.4 (L) 03/27/2025 09:59 AM    TP 6.9 09/24/2022 05:34 PM    EGFR 89 03/28/2025 02:11 AM    EGFR 82 09/25/2022 07:03 AM    EGFR 55 (L) 08/17/2020 08:05 AM     Lab Results   Component Value Date/Time    HGB 8.4 (L) 03/28/2025 04:55 AM    WBC 10.82 (H) 03/28/2025 04:55 AM    PLT 78 (L) 03/28/2025 04:55 AM    INR 1.20 (H) 03/25/2025 02:44 PM    INR 1.0 02/05/2022 01:13 PM    PTT 79 (H) 03/26/2025 08:33 AM     Lab Results   Component Value Date/Time    [id number] 3.522 12/12/2023 04:43 AM       Code Status: Level 2 - DNAR: but accepts endotracheal intubation  Advance Directive and Living Will: Yes    Power of : Yes  POLST:      Administrative Statements   I have spent a total time of 120 minutes in caring for this patient on the day of the visit/encounter including patient and family education, counseling/coordination of care, documenting in the medical record, reviewing tests/medicines/procedure, and communicating with other healthcare professionals. Topics discussed with the patient/family include EOL symptoms supports, compassionate extubation, comfort cares, psychosocial support, goals of care, supportive listening, and anticipatory guidance.

## 2025-03-28 NOTE — ASSESSMENT & PLAN NOTE
Patient developed septic shock overnight, with hypotension and bandemia.  Given multiple intra-abdominal complications recently, source of sepsis is most likely intra-abdominal.  Antibiotic regimen has been broadened to vancomycin/Zosyn.  Given that this is not an isolated event but a complication of multiple problems below, prognosis for recovery is quite poor.  Ongoing discussion with patient's family regarding LOC noted.  At present, plan is to continue aggressive care but no escalation of care, and likely transition to comfort care subsequently if patient does not improve, which he likely will not.  Antibiotic plan as in below.  Monitor temperature/WBC.  Monitor hemodynamics.  Pressor support per critical surgery service.    Follow-up on blood cultures obtained last night.

## 2025-03-28 NOTE — PROGRESS NOTES
NEPHROLOGY HOSPITAL PROGRESS NOTE   Devin Chaves 79 y.o. male MRN: [id number]  Unit/Bed#: ICU 10 Encounter: [id number]  Reason for Consult: ORIANA  Assessment & Plan  Renal failure  Baseline creatinine 0.8-0.9  Creatinine on admission 2.43  Etiology: ATN in setting of prolonged prerenal azotemia from volume depletion and severe anemia  CT abdomen: No hydronephrosis  UA: 2-4 RBC, 2-4 WBC  UPC ratio 3.4 g  Serum/urine immunofixation with no M spike, C3 mildly low at 83, C4 normal  Status post initiation of CVVHD on 03/21 in setting of oliguria and fluid overload  No evidence of renal recovery  Status post permacath placement 03/24  Currently on CVVHD, 4K bath/3 calcium  Blood flow rate 250 mL/min  Dialysate flow rate 2500 mL/h  UF even to negative as tolerated  Poor prognosis  ICU team to discuss with family regarding goals of care today  Volume overload  Refractory volume overload, status post trial of IV diuretics  Volume management with ultrafiltration on renal replacement therapy as above  HTN (hypertension)  Echo 3/17/2025: EF 55%, unable to assess diastolic function  Home Rx: Metoprolol 25 mg twice daily  Vasopressors per ICU  Acute blood loss anemia  Acute blood loss on 3/15/25 and received more than 10 units PRBC   S/p emergent ex lap and control of bleeding  Continue management per ICU  Urinary retention  Seen by urology this admission  No Tang catheter  Bladder scan every shift  Colonic mass  Newly diagnosed colon mass on C-scope on 2/20/25  Pathology - adenocarcinoma  Was to follow colorectal as outpatient  S/p ex lap, partial SBR and R colectomy 3/16/2025, currently on TPN  Continue management per surgical team  Cardiac arrest (HCC)  S/p cardiac arrest with ROSC 3/15/2025  In the setting of ABLA with undetectable Hgb and hemoperitoneum, s/p emergent ex lap, segmental SBR 3/15  Echo 3/17/2025: EF 55%  Management per primary team  Atrial fibrillation (HCC)  Management per primary  team  Encephalopathy  Concern for anoxic brain injury s/p cardiac arrest with new embolic infarcts  Management per neurology  Embolic cerebral infarction (HCC)  Management per neurology    Discussed with ICU team.  After discussion, we agreed to maintain CVVHD at a net even balance and to continue CVVHD as there is no evidence of renal recovery.    SUBJECTIVE / 24H INTERVAL HISTORY:  Urine output 150 cc.  UF on CVVHD 3890 cc.  Emesis output 1200 cc.  Net +1 L.  On Levophed at 15 mics.    OBJECTIVE:  Current Weight: Weight - Scale: 102 kg (225 lb)  Vitals:    03/28/25 0600 03/28/25 0627 03/28/25 0638 03/28/25 0700   BP: (!) 92/49 (!) 97/46 (!) 88/50 (!) 99/48   Pulse: (!) 110 (!) 111 (!) 110 (!) 109   Resp: (!) 33 (!) 33 (!) 34 (!) 33   Temp: 99.3 °F (37.4 °C) 99.1 °F (37.3 °C) 99.1 °F (37.3 °C) 99.1 °F (37.3 °C)   TempSrc:       SpO2: 94% 95% 95% 94%   Weight:       Height:           Intake/Output Summary (Last 24 hours) at 3/28/2025 0736  Last data filed at 3/28/2025 0725  Gross per 24 hour   Intake 7716.49 ml   Output 5712 ml   Net 2004.49 ml     Review of Systems   Unable to perform ROS: Intubated     Physical Exam  Constitutional:       Appearance: He is ill-appearing.   Cardiovascular:      Rate and Rhythm: Tachycardia present.      Pulses: Normal pulses.      Heart sounds: Normal heart sounds.      Comments: Right chest wall permacath currently in use for CVVHD  Pulmonary:      Comments: Ventilator assisted breath sounds, labored breathing  Musculoskeletal:      Right lower leg: Edema present.      Left lower leg: Edema present.       Medications:    Current Facility-Administered Medications:     Adult 3-in-1 TPN (custom base / custom electrolytes), , Intravenous, Continuous TPN, Gali Lamaine, DO, Last Rate: 87.4 mL/hr at 03/27/25 2115, New Bag at 03/27/25 2115    [Held by provider] ascorbic acid (VITAMIN C) tablet 250 mg, 250 mg, Oral, Daily, Yobany Mott MD, 250 mg at 03/14/25 7327    [Held by  provider] atorvastatin (LIPITOR) tablet 40 mg, 40 mg, Oral, Daily With Dinner, Yobany Mott MD, 40 mg at 03/13/25 1626    bisacodyl (DULCOLAX) rectal suppository 10 mg, 10 mg, Rectal, Daily, Lucia Chavira PA-C, 10 mg at 03/27/25 0813    budesonide (PULMICORT) inhalation solution 0.5 mg, 0.5 mg, Nebulization, Q12H, Lucia Chavira PA-C, 0.5 mg at 03/27/25 1954    chlorhexidine (PERIDEX) 0.12 % oral rinse 15 mL, 15 mL, Mouth/Throat, Q12H GALE, Lucia Chavira PA-C, 15 mL at 03/27/25 2029    [Held by provider] Cholecalciferol (VITAMIN D3) tablet 2,000 Units, 2,000 Units, Oral, Daily, Yobany Mott MD, 2,000 Units at 03/14/25 0848    [Held by provider] cyanocobalamin (VITAMIN B-12) tablet 100 mcg, 100 mcg, Oral, Daily, Yobany Mott MD, 100 mcg at 03/14/25 0848    [Held by provider] docusate (COLACE) oral liquid 100 mg, 100 mg, Oral, BID, Lucia Chavira PA-C, 100 mg at 03/25/25 0811    fentaNYL 1000 mcg in sodium chloride 0.9% 100mL infusion, 75 mcg/hr, Intravenous, Continuous, Sima Tang PA-C, Last Rate: 7.5 mL/hr at 03/28/25 0631, 75 mcg/hr at 03/28/25 0631    fentaNYL injection 50 mcg, 50 mcg, Intravenous, Q2H PRN, Lucia Chavira PA-C, 50 mcg at 03/28/25 0033    [Held by provider] ferrous sulfate tablet 325 mg, 325 mg, Oral, Daily With Breakfast, Yobany Mott MD    [Held by provider] folic acid (FOLVITE) tablet 1 mg, 1 mg, Oral, Daily, Yobany Mott MD, 1 mg at 03/14/25 0848    glycerin (adult) rectal suppository 1 suppository, 1 suppository, Rectal, HS, AMPARO Calvert, 1 suppository at 03/27/25 2200    [Held by provider] hydroxychloroquine (PLAQUENIL) tablet 400 mg, 400 mg, Oral, Daily With Breakfast, Yobany Mott MD, 400 mg at 03/14/25 0848    insulin lispro (HumALOG/ADMELOG) 100 units/mL subcutaneous injection 2-12 Units, 2-12 Units, Subcutaneous, Q6H GALE, 2 Units at 03/27/25 0530 **AND** Fingerstick Glucose (POCT), , , Q6H, Lucia Chavira PA-C     ipratropium (ATROVENT) 0.02 % inhalation solution 0.5 mg, 0.5 mg, Nebulization, TID, Lucia Chavira PA-C, 0.5 mg at 03/27/25 1954    [Held by provider] lactulose (CHRONULAC) oral solution 30 g, 30 g, Oral, TID, AMPARO Calvert, 30 g at 03/25/25 2129    levalbuterol (XOPENEX) inhalation solution 1.25 mg, 1.25 mg, Nebulization, TID, Lucia Chavira PA-C, 1.25 mg at 03/27/25 1954    lidocaine (LIDODERM) 5 % patch 3 patch, 3 patch, Topical, Daily, Lucia Chavira PA-C, 3 patch at 03/25/25 0812    [Held by provider] metoprolol tartrate (LOPRESSOR) tablet 25 mg, 25 mg, Oral, Q12H GALE, Yobany Mott MD, 25 mg at 03/14/25 0848    moisture barrier miconazole 2% cream (aka HITESH MOISTURE BARRIER ANTIFUNGAL CREAM), , Topical, BID, Lucia Chavira PA-C, Given at 03/27/25 1710    NOREPINEPHRINE 4 MG  ML NSS (CMPD ORDER) infusion, 1-30 mcg/min, Intravenous, Titrated, AMPARO Garcia, Last Rate: 56.3 mL/hr at 03/28/25 0542, 15 mcg/min at 03/28/25 0542    NxStage K 4/Ca 3 dialysis solution (RFP-401) 20,000 mL, 20,000 mL, Dialysis, Continuous, Bj Ayoub MD, Last Rate: 0 mL/hr at 03/22/25 1113, 20,000 mL at 03/28/25 0631    pantoprazole (PROTONIX) injection 40 mg, 40 mg, Intravenous, Q12H GALE, Gali Lamaine, DO, 40 mg at 03/27/25 2029    perflutren lipid microsphere (DEFINITY) injection, 0.6 mL/min, Intravenous, Once in imaging, Sima Tang PA-C    [COMPLETED] piperacillin-tazobactam (ZOSYN) 4.5 g in sodium chloride 0.9 % 100 mL IV LOADING DOSE, 4.5 g, Intravenous, Once, Stopped at 03/26/25 1800 **FOLLOWED BY** piperacillin-tazobactam (ZOSYN) 4.5 g in sodium chloride 0.9 % 100 mL IVPB (EXTENDED INFUSION), 4.5 g, Intravenous, Q8H, Sima Tang PA-C, Last Rate: 25 mL/hr at 03/28/25 0540, 4.5 g at 03/28/25 0540    [Held by provider] polyethylene glycol (MIRALAX) packet 17 g, 17 g, Oral, Daily, Lucia Chavira PA-C, 17 g at 03/25/25 0811    [Held by provider] senna oral syrup 17.6  mg, 17.6 mg, Oral, HS, Lucia Chavira PA-C, 17.6 mg at 03/25/25 2129    sodium chloride 3 % inhalation solution 4 mL, 4 mL, Nebulization, TID, Garcia Epperson DO, 4 mL at 03/27/25 1954    vancomycin (VANCOCIN) IVPB (premix in dextrose) 1,000 mg 200 mL, 10 mg/kg (Adjusted), Intravenous, Q12H, Theron Curry MD, Last Rate: 200 mL/hr at 03/28/25 0403, 1,000 mg at 03/28/25 0403    vasopressin (PITRESSIN) 20 Units in sodium chloride 0.9 % 100 mL infusion, 0.04 Units/min, Intravenous, Continuous, Gali Jack DO, Stopped at 03/27/25 0754    Laboratory Results:  Results from last 7 days   Lab Units 03/28/25  0211 03/27/25  2159 03/27/25  1614 03/27/25  0959 03/27/25  0422 03/26/25  2335 03/26/25  2152 03/26/25  1642 03/26/25  1152 03/26/25  1028 03/26/25  0433 03/25/25  2332 03/25/25  1656 03/25/25  1447 03/25/25  1109 03/25/25  0440 03/23/25  1932 03/23/25  1811   WBC Thousand/uL  --   --   --   --  5.78 9.54  --   --  9.98  --  10.01  --   --  7.73 7.49 8.29   < >  --    HEMOGLOBIN g/dL  --   --   --   --  7.0* 8.2*  --   --  9.2*  --  10.1* 9.8*  --  9.3* 9.0* 8.8*   < >  --    I STAT HEMOGLOBIN g/dl  --   --   --   --   --   --   --   --   --   --   --   --   --   --   --   --   --  6.8*   HEMATOCRIT %  --   --   --   --  21.5* 25.3*  --   --  29.2*  --  30.7* 30.4*  --  28.6* 27.8* 27.1*   < >  --    HEMATOCRIT, ISTAT %  --   --   --   --   --   --   --   --   --   --   --   --   --   --   --   --   --  20*   PLATELETS Thousands/uL  --   --   --   --  56* 78*  --   --  79*  --  72*  --   --  70* 64* 64*   < >  --    POTASSIUM mmol/L 4.4 4.3 4.3 3.9 4.0  --  4.1 4.4  --    < > 4.1 4.0   < >  --  4.1 4.2   < >  --    CHLORIDE mmol/L 104 105 106 105 105  --  105 103  --    < > 104 103   < >  --  103 103   < >  --    CO2 mmol/L 23 25 23 23 23  --  23 21  --    < > 25 24   < >  --  24 25   < >  --    CO2, I-STAT mmol/L  --   --   --   --   --   --   --   --   --   --   --   --   --   --   --   --   --  23   BUN mg/dL 30*  "30* 31* 31* 32*  --  31* 32*  --    < > 29* 30*   < >  --  33* 37*   < >  --    CREATININE mg/dL 0.73 0.79 0.73 0.71 0.76  --  0.80 0.87  --    < > 0.92 1.00   < >  --  1.10 1.11   < >  --    CALCIUM mg/dL 8.5 8.3* 8.2* 8.0* 7.9*  --  8.3* 7.9*  --    < > 8.2* 8.3*   < >  --  8.3* 8.2*   < >  --    MAGNESIUM mg/dL 2.1 1.9 2.0 1.9 1.9  --  2.0 1.9  --    < > 1.9 2.0   < >  --  1.9 2.0   < >  --    PHOSPHORUS mg/dL 2.2* 1.7* 1.4* 1.7* 2.1*  --  2.0* 2.2*  --    < > 2.1* 2.4   < >  --  2.3 2.4   < >  --    GLUCOSE, ISTAT mg/dl  --   --   --   --   --   --   --   --   --   --   --   --   --   --   --   --   --  180*    < > = values in this interval not displayed.       Portions of the record may have been created with voice recognition software. Occasional wrong word or \"sound a like\" substitutions may have occurred due to the inherent limitations of voice recognition software. Read the chart carefully and recognize, using context, where substitutions have occurred.If you have any questions, please contact the dictating provider.    "

## 2025-03-28 NOTE — DISCHARGE SUMMARY
"Discharge Summary - Critical Care/ICU   Name: Devin Chaves 79 y.o. male I MRN: [id number]  Unit/Bed#: ICU 10 I Date of Admission: 2/28/2025   Date of Service: 3/28/2025 I Hospital Day: 28    Admission Date: 2/28/2025   Admitting Diagnosis: Anemia [D64.9]  Pulmonary nodule [R91.1]  Elevated troponin [R79.89]  Abnormal laboratory test result [R89.9]  Pleural effusion, bilateral [J90]  ORIANA (acute kidney injury) (HCC) [N17.9]  Colonic mass [K63.89]  Anemia, unspecified [D64.9]  Discharge Date: 03/28/25    HPI: As per Kanchan Wilson on 2/28 \"eDvin Chaves is a 79 y.o. male with a PMH of A-fib on Eliquis, COPD home O2 2-4 L, HTN, CVA, MSSA bacteremia, colonic adenocarcinoma who presents from [facility] with symptomatic anemia. Patient was recently hospitalized for COPD exacerbation complicated by MSSA bacteremia 2/11/2025-2/22/2025. During his hospitalization pt had anemia that required two units packed rbc's. He was found to have an ileocecal colonic mass 2.5 cm by EGD/colonoscopy during hosptialization. Pathology revealed adencocarcinoma. He was re-started on Eliquis on hospital discharge 2/22/2025. Pt's Eliquis had been stopped 1-2 days ago by his outpatient doctor after he began experiencing fatigue, SOB, melena, and black urine. This morning patient's shortness of breath worsened and he was coughing with white phlegm and wheezing. He denied stomach pain, appetite change, weight loss, fever/chills, nausea, or emesis.  Patient's Hgb was 4 on his outpatient lab and he was instructed to come to the ED.     In the ED patient presented with softer blood pressure 105/51.  Otherwise hemodynamically stable.  Patient's hemoglobin 4.6.  Patient had ORIANA on admission, Cr 2.43. Troponins elevated (134->142->138). EKG unremarkable. CT C/A/P showed no evidence of high-volume GI bleed.  New bilateral multilobar interstitial thickening and pulmonary vascular congestion noted. Patient was provided 3 units of " "packed rbc's in the ED\"    Procedures Performed:   Orders Placed This Encounter   Procedures    Central Line    ED ECG Documentation Only    Intubation    Temporary HD Catheter       Summary of Hospital Course: 80 y/o M admitted 2/28 for melena w/ known colonic adenocarcinoma, received 3 units pRBC on admission for Hgb 4.6. He was evaluated by general surgery without any plan for surgical intervention at that time. He was continued on IV cefazolin for MSSA bacteremia which was diagnosed at her prior admission.  On admission he had ORIANA in the setting of acute anemia/volume depletion/ATN and was trialed with albumin and Lasix.  He began having acute on chronic respiratory failure with hypoxia in the setting of COPD.  On 3/15 he was RRT for altered mental status and was intubated for airway protection.  He then had refractory hypotension and MTP initiated.  He subsequently had a PEA cardiac arrest and following ROSC went to the OR for emergent ex lap finding mesenteric bleed and had SBR. Abdomen was left open in discontinuity with Abthera.  On 3/16 he returned to the OR for washout and closure.  3/17 video EEG was negative for any seizure-like activity.  3/18 MRI demonstrated likely embolic infarcts.  3/20 course was complicated postop adynamic ileus and on 3/21 progressive renal failure requiring continuous renal replacement therapy.  On 3/23 he went to the OR for ex lap, drainage of hematoma, and partial small bowel resection with anastomosis, right hemicolectomy.  With increasing lactate on 3/26 CT chest abdomen pelvis showed gastric and bowel ischemia.  He continued to have escalating vasopressor and oxygenation requirements. Due to high risk of morbidity he not a candidate for reexploration and bowel resection. Goals of care discussion with family on 3/27 determined no escalation of care in 03/28 family and the critical care team proceeded with a comfort directed plan of care.  He passed in a peaceful manner and " was pronounced dead at 1600 with family at bedside.      Significant Findings, Care, Treatment and Services Provided: Blood product transfusions, continuous renal replacement therapy, exploratory laparotomy w/ bowel resection and anastomosis    Complications: Bowel ischemia, respiratory failure, ABLA, adynamic ileus, renal failure    Disposition:      Final Diagnosis: multi-organ failure    Medical Problems       Resolved Problems  Date Reviewed: 3/3/2025          Resolved    * (Principal) Hemorrhagic shock (HCC) 3/27/2025     Resolved by  AMPARO Garcia    MSSA bacteremia 3/27/2025     Resolved by  Karl Kumar, RONNP    Elevated troponin 3/27/2025     Resolved by  AMPARO Garcia    Abnormal urinalysis 3/5/2025     Resolved by  Mal Neely, DO    Gross hematuria 3/5/2025     Resolved by  Mal Neely, DO    At risk for electrolyte imbalance 3/8/2025     Resolved by  Kelly Herman MD    Hemoperitoneum 3/27/2025     Resolved by  AMPARO Garcia    Low serum bicarbonate 3/23/2025     Resolved by  Bret Perry MD          Condition at Time of Death: Critical  Date, Time and Cause of Death    Date of Death: 3/28/25  Time of Death:  4:00 PM  Preliminary Cause of Death: Multi-organ failure  Entered by: Guille Torres[JE1.1]       Attribution       JE1.1 Yasir Torres PA-C 25 16:07          INPATIENT DEATH NOTE  Devin Chaves 79 y.o. male MRN: [id number]  Unit/Bed#: ICU 10 Encounter: [id number]     Date, Time and Cause of Death    Date of Death: 3/28/25  Time of Death:  4:00 PM  Preliminary Cause of Death: Multi-organ failure  Entered by: Giulle Torres[[email or web address]]         Attribution         JE1.1 Yasir Torres PA-C 25 16:07                 Patient's Information  Pronounced by: Burton Hughes  Did the patient's death occur in the ED?: No  Did the patient's death occur in the OR?: No  Did the patient's death occur less than 10 days post-op?: Yes  Did the  patient's death occur within 24 hours of admission?: No  Was code status DNR at the time of death?: Yes     PHYSICAL EXAM:  Unresponsive to noxious stimuli, Spontaneous respirations absent, Breath sounds absent, Carotid pulse absent, Heart sounds absent, Pupillary light reflex absent, and Corneal blink reflex absent     Medical Examiner notification criteria:  NONE APPLICABLE             Medical Examiner's office notified?:  Yes   Medical Examiner accepted case?:  No     Family Notification  Was the family notified?: Yes  Date Notified: 25  Time Notified: 1600  Notified by: Burton Hughes  Name of Family Notified of Death: Francia   Relationship to Patient: Spouse  Family Notification Route: At bedside  Was the family told to contact a  home?: Yes  Name of  Home:: [place]  home     Autopsy Options:  Decision for post-mortem examination not yet made by next of kin.     Primary Service Attending Physician notified?:  yes - Attending:  Janie Lofton     Physician/Resident responsible for completing Discharge Summary:  Guille Torres

## 2025-03-28 NOTE — ASSESSMENT & PLAN NOTE
Malnutrition Findings:   Adult Malnutrition type: Acute illness  Adult Degree of Malnutrition: Other severe protein calorie malnutrition  Malnutrition Characteristics: Inadequate energy, Fluid accumulation  360 Statement: related to inadequate energy/protein intake as evidenced by consuming < 50% of energy intake compared to estimated needs for > 5 days and B/L LE +3 edema. Treated with ONS.  BMI Findings:  Body mass index is 25.09 kg/m².      Plan:  Continue TPN, hold TF until cleared by surgery.    360 Statement: related to inadequate energy/protein intake as evidenced by consuming < 50% of energy intake compared to estimated needs for > 5 days and B/L LE +3 edema. Treated with ONS.    BMI Findings:    Body mass index is 30.52 kg/m².

## 2025-03-28 NOTE — ASSESSMENT & PLAN NOTE
Creatinine   Date Value Ref Range Status   03/28/2025 0.73 0.60 - 1.30 mg/dL Final     Comment:     Standardized to IDMS reference method   09/25/2022 1 0.6 - 1.2 mg/dL Final      Likely initially in the setting of hemorrhagic shock, now with ATN   Baseline Cr 0.8   Nephrology following, appreciate recommendations  Monitor and replete electrolytes Q6H  Monitor I/O and renal indices   Continues to have low urine output and anasarca with hypotension  Nephro consented family for dialysis, temporary HD line placed 3/21 - tunneled HD cath placed 3/24  Tolerating CVVH, continue UF NET even due to hypotension, now on vasopressors  3/26 - Received 500cc albumin 1.25L and 750ml crystalloid with improvement in hypotension.   3/27 - no escalation of care. Pt became hypotensive, 500 cc albumin ordered

## 2025-03-28 NOTE — ASSESSMENT & PLAN NOTE
Patient sustained cardiac arrest in the setting of an undetectable hemoglobin on 3/15 and was taken emergently to the operating room for exploration in the setting of abdominal free fluid.  Large volume hemoperitoneum was encountered with evidence of a actively bleeding/decompressing mesenteric hematoma; bleeding control was obtained and on 3/15 taken to OR for a segmental small bowel resection was performed and the patient was left in discontinuity given ongoing instability.  S/p SB anastomosis, R hemicolectomy, closure on 3/16.  3/23 ex lap, evacuation of hematoma- To     Increasing pressor requirement yesterday prompted CT CAP revealing pneumatosis and portal venous gas        Plan  - Discussed with family; will continue to pursue maximal medical therapy  -Continued resuscitation by ICU team  - Continue NG tube to suction/n.p.o.   - Renew TPN  - Monitor hemoglobin closely in the setting of bloody NG tube output and heparin drip  - Neurology recs, nephrology  -Continue CRRT  - Wean vent as tolerated  - Rest of care per ICU

## 2025-03-28 NOTE — DEATH NOTE
INPATIENT DEATH NOTE  Devin Chaves 79 y.o. male MRN: [id number]  Unit/Bed#: ICU 10 Encounter: [id number]    Date, Time and Cause of Death    Date of Death: 3/28/25  Time of Death:  4:00 PM  Preliminary Cause of Death: Multi-organ failure  Entered by: Guille Torres[JE1.1]       Attribution       JE1.1 Yasir Torres PA-C 25 16:07             Patient's Information  Pronounced by: Burton Hughes  Did the patient's death occur in the ED?: No  Did the patient's death occur in the OR?: No  Did the patient's death occur less than 10 days post-op?: Yes  Did the patient's death occur within 24 hours of admission?: No  Was code status DNR at the time of death?: Yes    PHYSICAL EXAM:  Unresponsive to noxious stimuli, Spontaneous respirations absent, Breath sounds absent, Carotid pulse absent, Heart sounds absent, Pupillary light reflex absent, and Corneal blink reflex absent    Medical Examiner notification criteria:  NONE APPLICABLE   Medical Examiner's office notified?:  Yes   Medical Examiner accepted case?:  No    Family Notification  Was the family notified?: Yes  Date Notified: 25  Time Notified: 1600  Notified by: Burton Hughes  Name of Family Notified of Death: Francia   Relationship to Patient: Spouse  Family Notification Route: At bedside  Was the family told to contact a  home?: Yes  Name of  Home:: [place]  home    Autopsy Options:  Decision for post-mortem examination not yet made by next of kin.    Primary Service Attending Physician notified?:  yes - Attending:  Janie Lofton    Physician/Resident responsible for completing Discharge Summary:  Guille Torres

## 2025-03-28 NOTE — PROGRESS NOTES
Progress Note - Critical Care/ICU   Name: Devin Chaves 79 y.o. male I MRN: [id number]  Unit/Bed#: ICU 10 I Date of Admission: 2/28/2025   Date of Service: 3/28/2025 I Hospital Day: 28       Wife, son, grandson and a few other members presents. Wife and son have acknowledged continued deterioration and have decided to go ahead with compassionate extubation. They are waiting for one more grandson first. They agreed to alleviating signs of pain and suffering with fentanyl.

## 2025-03-29 LAB
BACTERIA BLD CULT: ABNORMAL
BACTERIA BLD CULT: ABNORMAL
GRAM STN SPEC: ABNORMAL
KLEBSIELLA SP DNA BLD POS QL NAA+NON-PRB: DETECTED
PROTEUS SP DNA BLD POS QL NAA+NON-PROBE: DETECTED

## 2025-03-30 LAB
BACTERIA BLD CULT: ABNORMAL
CARIS GENOMIC LOH - EXOME: NORMAL
CARIS HER2/NEU: NEGATIVE
CARIS HLA-A: NORMAL
CARIS HLA-B: NORMAL
CARIS HLA-C: NORMAL
CARIS MSI - EXOME: NORMAL
CARIS PD-L1 (SP142): NEGATIVE
CARIS TMB - EXOME: NORMAL
GRAM STN SPEC: ABNORMAL

## 2025-03-31 LAB
BACTERIA BLD CULT: ABNORMAL
BACTERIA BLD CULT: ABNORMAL
FUNGUS SPEC CULT: NORMAL
GBM AB SER IA-ACNC: <0.2 UNITS (ref 0–0.9)
GRAM STN SPEC: ABNORMAL

## 2025-03-31 NOTE — UTILIZATION REVIEW
NOTIFICATION OF ADMISSION DISCHARGE   This is a Notification of Discharge from [facility]. Please be advised that this patient has been discharge from our facility. Below you will find the admission and discharge date and time including the patient’s disposition.   UTILIZATION REVIEW CONTACT:  Utilization Review Assistants  Network Utilization Review Department  Phone: [phone] x carefully listen to the prompts. All voicemails are confidential.  Email: [email or web address]     ADMISSION INFORMATION  PRESENTATION DATE: 2025  3:51 AM  OBERVATION ADMISSION DATE: N/A  INPATIENT ADMISSION DATE: 25 10:45 AM   DISCHARGE DATE: 3/28/2025  6:13 PM   DISPOSITION:    Network Utilization Review Department  ATTENTION: Please call with any questions or concerns to [phone] and carefully listen to the prompts so that you are directed to the right person. All voicemails are confidential.   For Discharge needs, contact Care Management DC Support Team at [phone] opt. [phone]  Send all requests for admission clinical reviews, approved or denied determinations and any other requests to dedicated fax number below belonging to the campus where the patient is receiving treatment. List of dedicated fax numbers for the Facilities:  FACILITY NAME UR FAX NUMBER   ADMISSION DENIALS (Administrative/Medical Necessity) [phone]   DISCHARGE SUPPORT TEAM ([facility]) [phone]   PARENT CHILD HEALTH (Maternity/NICU/Pediatrics) [phone]   [facility] [phone]   [facility] [phone]   [facility] [phone]   [facility] [phone]   [facility] [phone]   [facility] [phone]   [facility] [phone]   [facility] [phone]   [facility]  [facility] [phone]   [facility] [phone]   [facility] [phone]   [facility] [phone]

## 2025-04-01 LAB
C-ANCA TITR SER IF: NORMAL TITER
MYELOPEROXIDASE AB SER IA-ACNC: <0.2 UNITS (ref 0–0.9)
P-ANCA ATYPICAL TITR SER IF: NORMAL TITER
P-ANCA TITR SER IF: NORMAL TITER
PROTEINASE3 AB SER IA-ACNC: <0.2 UNITS (ref 0–0.9)

## 2025-04-07 LAB — FUNGUS SPEC CULT: NORMAL

## 2025-04-09 ENCOUNTER — TELEPHONE (OUTPATIENT)
Age: 80
End: 2025-04-09

## 2025-04-14 LAB — FUNGUS SPEC CULT: NORMAL

## 2025-04-21 LAB — FUNGUS SPEC CULT: NORMAL

## 2025-04-23 NOTE — ASSESSMENT & PLAN NOTE
AC: Eliquis 5 mg BID  Rate control: Lopressor 25 mg Q12 hrs    Plan:  Hold off Eliquis in the setting of GI bleed  Continue Lopressor 25 mg Q12 hrs   Schedule Proced:   Please Schedule Routine (next available or patient preference)  Procedure: Colonoscopy (97486) with SuPrep and EGD (84943)  Diagnosis: Colon Cancer Screening Z12.11 and Gastroesophageal Reflux Disease without Esophagitis K21.9  Is patient:    Diabetic? No  On GLP-1 Class? (Examples: Semaglutide (Wegovy/Ozempic/Rybelsus), Dulaglutide (Trulicity), Exenatide(Byetta/Bydureon Bcise), Liraglutide (Saxenda/Victoza/Xultophy), Tirzepatide(Mounjaro), Lixisenatide(Soliqua)) No  On Coumadin, heparin, lovenox? No   On ASA/NSAIDS? Platelet Modifying (examples - Plavix, aspirin, nsaids, Aggrenox)? No   On Phentermine? No   On Viagra, Sildenafil, Verdenafil, Tadalafil? No  Latex allergy: yes   Sleep apnea: no  Location: [facility]    Special Instructions:   MAC Anesthesia  Hold losartan hydrochlorothiazide 24h     For MAC/GA patients if applicable, please verify to hold medications prior to procedure: Selegiline patches x 10 days  Sildenafil, Verdenafil, Tadalafil x 48 hours   Monoamine oxidase inhibitor (MAOIs), angiotensin receptor blockers, renin inhibitors, ACE inhibitors, diuretics (unless in combination with beta blocker) day of procedure      Physician: Dr. Tovar      Routed to Providers Surgery Scheduling Pool.

## 2025-04-28 LAB — FUNGUS SPEC CULT: NORMAL

## 2025-05-07 ENCOUNTER — TELEPHONE (OUTPATIENT)
Age: 80
End: 2025-05-07

## 2025-05-07 NOTE — TELEPHONE ENCOUNTER
Sent message to Dr. Epperson via secure chat, he advised he will call  home  as [place].    Called  home and made aware Dr. Epperson will be reaching out  to them 25 to update death certificate.

## 2025-05-07 NOTE — TELEPHONE ENCOUNTER
[facility] calling in to ask for Dr. Epperson to reissue a death certificate that had COPD listed on it. Needs it to say COPD for veterans benefits.   Dr. Epperson reportedly called Harvinder Briceno on  to report this was completed and reissued, but it did not include the diagnosis.   This can be done online.      Harvinder Briceno [phone] or [phone].

## 2025-06-10 ENCOUNTER — TELEPHONE (OUTPATIENT)
Age: 80
End: 2025-06-10

## 2025-06-10 NOTE — TELEPHONE ENCOUNTER
Patient's wife called requesting to speak with you. Attempted a warm transfer but you were not available. Please call her cell [phone]. Thank you.

## (undated) DEVICE — SURGICEL 4 X 8IN

## (undated) DEVICE — BETHLEHEM MAJOR GENERAL PACK: Brand: CARDINAL HEALTH

## (undated) DEVICE — SUT VICRYL 3-0 SH 27 IN J416H

## (undated) DEVICE — SUT VICRYL PLUS 2-0 54IN VCP286G

## (undated) DEVICE — INTENDED FOR TISSUE SEPARATION, AND OTHER PROCEDURES THAT REQUIRE A SHARP SURGICAL BLADE TO PUNCTURE OR CUT.: Brand: BARD-PARKER SAFETY BLADES SIZE 15, STERILE

## (undated) DEVICE — TUBING SUCTION 5MM X 12 FT

## (undated) DEVICE — PADDING CAST 4 IN  COTTON STRL

## (undated) DEVICE — GAUZE SPONGES,16 PLY: Brand: CURITY

## (undated) DEVICE — SUT PDS II 1 CTX 36 IN Z371T

## (undated) DEVICE — 1.1MM NON-THREADED GUIDE WIRE 150MM

## (undated) DEVICE — POOLE SUCTION HANDLE: Brand: CARDINAL HEALTH

## (undated) DEVICE — 2.0MM CANNULATED DRILL BIT/QC 150MM

## (undated) DEVICE — CARTRIDGE WITH TRI-STAPLE TECHNOLOGY: Brand: GIA

## (undated) DEVICE — GLOVE SRG BIOGEL 7.5

## (undated) DEVICE — SLIM BODY SKIN STAPLER: Brand: APPOSE ULC

## (undated) DEVICE — NEPTUNE E-SEP SMOKE EVACUATION PENCIL, COATED, 70MM BLADE, PUSH BUTTON SWITCH: Brand: NEPTUNE E-SEP

## (undated) DEVICE — GLOVE INDICATOR UNDERGLOVE SZ 6 BLUE

## (undated) DEVICE — SPONGE PVP SCRUB WING STERILE

## (undated) DEVICE — SUT SILK 3-0 SH CR/8 18 IN C013D

## (undated) DEVICE — STAPLER WITH DST SERIES TECHNOLOGY: Brand: GIA

## (undated) DEVICE — LOADING UNIT WITH DST SERIES TECHNOLOGY: Brand: GIA

## (undated) DEVICE — SPONGE STICK WITH PVP-I: Brand: KENDALL

## (undated) DEVICE — SPLINT 4 X 15 IN FAST SET PLASTER

## (undated) DEVICE — MEDI-VAC YANK SUCT HNDL W/TPRD BULBOUS TIP: Brand: CARDINAL HEALTH

## (undated) DEVICE — SUT VICRYL PLUS 1 CT-1 27IN VCPP40D

## (undated) DEVICE — SUT PDS II 3-0 SH 27 IN Z316H

## (undated) DEVICE — SUT MONOCRYL 5-0 P-3 18 IN Y493G

## (undated) DEVICE — ASTOUND STANDARD SURGICAL GOWN, XXL: Brand: CONVERTORS

## (undated) DEVICE — ASPIRATION/ANTICOAGULATION SET: Brand: HAEMONETICS CELL SAVER SYSTEM

## (undated) DEVICE — GLOVE INDICATOR PI UNDERGLOVE SZ 8.5 BLUE

## (undated) DEVICE — PACK PLASTIC HAND PBDS

## (undated) DEVICE — GLOVE SRG BIOGEL ECLIPSE 8.5

## (undated) DEVICE — SUT SILK 3-0 SH 30 IN K832H

## (undated) DEVICE — ANTIBACTERIAL UNDYED BRAIDED (POLYGLACTIN 910), SYNTHETIC ABSORBABLE SUTURE: Brand: COATED VICRYL

## (undated) DEVICE — OCCLUSIVE GAUZE STRIP,3% BISMUTH TRIBROMOPHENATE IN PETROLATUM BLEND: Brand: XEROFORM

## (undated) DEVICE — GLOVE INDICATOR PI UNDERGLOVE SZ 7.5 BLUE

## (undated) DEVICE — DRESSING MEPILEX AG BORDER POST-OP 4 X 12 IN

## (undated) DEVICE — DISPOSABLE EQUIPMENT COVER: Brand: SMALL TOWEL DRAPE

## (undated) DEVICE — GLOVE SRG BIOGEL 5.5

## (undated) DEVICE — GLOVE INDICATOR PI UNDERGLOVE SZ 8 BLUE

## (undated) DEVICE — TRAY FOLEY 16FR URIMETER SURESTEP

## (undated) DEVICE — STAPLER WITH TRI-STAPLE TECHNOLOGY: Brand: GIA

## (undated) DEVICE — INTENDED FOR TISSUE SEPARATION, AND OTHER PROCEDURES THAT REQUIRE A SHARP SURGICAL BLADE TO PUNCTURE OR CUT.: Brand: BARD-PARKER SAFETY BLADES SIZE 10, STERILE

## (undated) DEVICE — SUT SILK 2-0 TIES 144 IN LA55G

## (undated) DEVICE — DRAPE C-ARM X-RAY

## (undated) DEVICE — CUFF TOURNIQUET 18 X 4 IN QUICK CONNECT DISP 1 BLADDER

## (undated) DEVICE — ABTHERA OPEN ABDOMEN DRESSING WITH SENSATRAC PAD: Brand: ABTHERA™ SENSAT.R.A.C.™

## (undated) DEVICE — TOWEL SURG XR DETECT GREEN STRL RFD

## (undated) DEVICE — ACE WRAP 4 IN STERILE

## (undated) DEVICE — CHLORAPREP HI-LITE 26ML ORANGE

## (undated) DEVICE — CURVED, LARGE JAW, OPEN SEALER/DIVIDER NANO-COATED: Brand: LIGASURE IMPACT